# Patient Record
Sex: MALE | Race: WHITE | NOT HISPANIC OR LATINO | Employment: OTHER | URBAN - NONMETROPOLITAN AREA
[De-identification: names, ages, dates, MRNs, and addresses within clinical notes are randomized per-mention and may not be internally consistent; named-entity substitution may affect disease eponyms.]

---

## 2020-07-26 ENCOUNTER — APPOINTMENT (EMERGENCY)
Dept: CT IMAGING | Facility: HOSPITAL | Age: 60
End: 2020-07-26
Payer: MEDICARE

## 2020-07-26 ENCOUNTER — APPOINTMENT (EMERGENCY)
Dept: RADIOLOGY | Facility: HOSPITAL | Age: 60
End: 2020-07-26
Payer: MEDICARE

## 2020-07-26 ENCOUNTER — HOSPITAL ENCOUNTER (EMERGENCY)
Facility: HOSPITAL | Age: 60
Discharge: HOME | End: 2020-07-26
Attending: EMERGENCY MEDICINE | Admitting: EMERGENCY MEDICINE
Payer: MEDICARE

## 2020-07-26 VITALS
WEIGHT: 245 LBS | OXYGEN SATURATION: 95 % | TEMPERATURE: 97.5 F | DIASTOLIC BLOOD PRESSURE: 79 MMHG | HEART RATE: 50 BPM | SYSTOLIC BLOOD PRESSURE: 184 MMHG | HEIGHT: 68 IN | RESPIRATION RATE: 20 BRPM | BODY MASS INDEX: 37.13 KG/M2

## 2020-07-26 DIAGNOSIS — R60.0 LOCALIZED EDEMA: Primary | ICD-10-CM

## 2020-07-26 DIAGNOSIS — R91.1 PULMONARY NODULE: ICD-10-CM

## 2020-07-26 LAB
ALBUMIN SERPL BCP-MCNC: 3.5 G/DL (ref 3.5–5)
ALP SERPL-CCNC: 95 U/L (ref 46–116)
ALT SERPL W P-5'-P-CCNC: 33 U/L (ref 12–78)
ANION GAP SERPL CALCULATED.3IONS-SCNC: 8 MMOL/L (ref 4–13)
AST SERPL W P-5'-P-CCNC: 19 U/L (ref 5–45)
BASOPHILS # BLD AUTO: 0.03 THOUSANDS/ΜL (ref 0–0.1)
BASOPHILS NFR BLD AUTO: 0 % (ref 0–1)
BILIRUB SERPL-MCNC: 0.25 MG/DL (ref 0.2–1)
BUN SERPL-MCNC: 19 MG/DL (ref 5–25)
CALCIUM SERPL-MCNC: 8.7 MG/DL (ref 8.3–10.1)
CHLORIDE SERPL-SCNC: 106 MMOL/L (ref 100–108)
CO2 SERPL-SCNC: 28 MMOL/L (ref 21–32)
CREAT SERPL-MCNC: 0.95 MG/DL (ref 0.6–1.3)
EOSINOPHIL # BLD AUTO: 0.29 THOUSAND/ΜL (ref 0–0.61)
EOSINOPHIL NFR BLD AUTO: 3 % (ref 0–6)
ERYTHROCYTE [DISTWIDTH] IN BLOOD BY AUTOMATED COUNT: 14.1 % (ref 11.6–15.1)
GFR SERPL CREATININE-BSD FRML MDRD: 86 ML/MIN/1.73SQ M
GLUCOSE SERPL-MCNC: 84 MG/DL (ref 65–140)
HCT VFR BLD AUTO: 37.3 % (ref 36.5–49.3)
HGB BLD-MCNC: 12.5 G/DL (ref 12–17)
IMM GRANULOCYTES # BLD AUTO: 0.03 THOUSAND/UL (ref 0–0.2)
IMM GRANULOCYTES NFR BLD AUTO: 0 % (ref 0–2)
LYMPHOCYTES # BLD AUTO: 2.54 THOUSANDS/ΜL (ref 0.6–4.47)
LYMPHOCYTES NFR BLD AUTO: 29 % (ref 14–44)
MAGNESIUM SERPL-MCNC: 2.1 MG/DL (ref 1.6–2.6)
MCH RBC QN AUTO: 31.2 PG (ref 26.8–34.3)
MCHC RBC AUTO-ENTMCNC: 33.5 G/DL (ref 31.4–37.4)
MCV RBC AUTO: 93 FL (ref 82–98)
MONOCYTES # BLD AUTO: 0.58 THOUSAND/ΜL (ref 0.17–1.22)
MONOCYTES NFR BLD AUTO: 7 % (ref 4–12)
NEUTROPHILS # BLD AUTO: 5.19 THOUSANDS/ΜL (ref 1.85–7.62)
NEUTS SEG NFR BLD AUTO: 61 % (ref 43–75)
NRBC BLD AUTO-RTO: 0 /100 WBCS
NT-PROBNP SERPL-MCNC: 499 PG/ML
PLATELET # BLD AUTO: 180 THOUSANDS/UL (ref 149–390)
PMV BLD AUTO: 10.8 FL (ref 8.9–12.7)
POTASSIUM SERPL-SCNC: 3.9 MMOL/L (ref 3.5–5.3)
PROT SERPL-MCNC: 7 G/DL (ref 6.4–8.2)
RBC # BLD AUTO: 4.01 MILLION/UL (ref 3.88–5.62)
SARS-COV-2 RNA RESP QL NAA+PROBE: NEGATIVE
SODIUM SERPL-SCNC: 142 MMOL/L (ref 136–145)
TROPONIN I SERPL-MCNC: <0.02 NG/ML
WBC # BLD AUTO: 8.66 THOUSAND/UL (ref 4.31–10.16)

## 2020-07-26 PROCEDURE — 71275 CT ANGIOGRAPHY CHEST: CPT

## 2020-07-26 PROCEDURE — 80053 COMPREHEN METABOLIC PANEL: CPT | Performed by: EMERGENCY MEDICINE

## 2020-07-26 PROCEDURE — 99285 EMERGENCY DEPT VISIT HI MDM: CPT | Performed by: EMERGENCY MEDICINE

## 2020-07-26 PROCEDURE — 85025 COMPLETE CBC W/AUTO DIFF WBC: CPT | Performed by: EMERGENCY MEDICINE

## 2020-07-26 PROCEDURE — 87635 SARS-COV-2 COVID-19 AMP PRB: CPT | Performed by: EMERGENCY MEDICINE

## 2020-07-26 PROCEDURE — 71046 X-RAY EXAM CHEST 2 VIEWS: CPT

## 2020-07-26 PROCEDURE — 84484 ASSAY OF TROPONIN QUANT: CPT | Performed by: EMERGENCY MEDICINE

## 2020-07-26 PROCEDURE — 83880 ASSAY OF NATRIURETIC PEPTIDE: CPT | Performed by: EMERGENCY MEDICINE

## 2020-07-26 PROCEDURE — 83735 ASSAY OF MAGNESIUM: CPT | Performed by: EMERGENCY MEDICINE

## 2020-07-26 PROCEDURE — 99284 EMERGENCY DEPT VISIT MOD MDM: CPT

## 2020-07-26 PROCEDURE — 36415 COLL VENOUS BLD VENIPUNCTURE: CPT | Performed by: EMERGENCY MEDICINE

## 2020-07-26 RX ORDER — LOSARTAN POTASSIUM 100 MG/1
25 TABLET ORAL DAILY
COMMUNITY
End: 2021-01-11

## 2020-07-26 RX ORDER — LAMOTRIGINE 25 MG/1
50 TABLET ORAL
COMMUNITY

## 2020-07-26 RX ORDER — GABAPENTIN 300 MG/1
300 CAPSULE ORAL 2 TIMES DAILY
COMMUNITY

## 2020-07-26 RX ORDER — DULOXETIN HYDROCHLORIDE 30 MG/1
30 CAPSULE, DELAYED RELEASE ORAL DAILY
COMMUNITY

## 2020-07-26 RX ORDER — FUROSEMIDE 40 MG/1
40 TABLET ORAL DAILY
Qty: 3 TABLET | Refills: 0 | Status: SHIPPED | OUTPATIENT
Start: 2020-07-26 | End: 2021-01-11

## 2020-07-26 RX ORDER — ASPIRIN 81 MG/1
81 TABLET, CHEWABLE ORAL DAILY
COMMUNITY

## 2020-07-26 RX ORDER — FOLIC ACID/MULTIVIT,IRON,MINER .4-18-35
1 TABLET,CHEWABLE ORAL DAILY
COMMUNITY

## 2020-07-26 RX ORDER — METHADONE HYDROCHLORIDE 10 MG/1
TABLET ORAL EVERY 6 HOURS PRN
COMMUNITY
End: 2021-01-11

## 2020-07-26 RX ORDER — ATORVASTATIN CALCIUM 20 MG/1
20 TABLET, FILM COATED ORAL DAILY
COMMUNITY

## 2020-07-26 RX ORDER — LANOLIN ALCOHOL/MO/W.PET/CERES
CREAM (GRAM) TOPICAL DAILY
COMMUNITY

## 2020-07-26 RX ADMIN — IOHEXOL 100 ML: 350 INJECTION, SOLUTION INTRAVENOUS at 20:08

## 2020-07-26 NOTE — ED PROVIDER NOTES
History  Chief Complaint   Patient presents with    Pain     to the legs and ankles     Patient is a 60-year-old male presenting to the emergency department today complaining of lower extremity edema, shortness of breath, dyspnea on exertion and orthopnea, as well as 3-4 lb weight gain that fluctuates, patient reports symptoms have been worsening over the past few weeks, he denies any fever or chills, no chest pain, no abdominal pain patient denies a history of congestive heart failure          Prior to Admission Medications   Prescriptions Last Dose Informant Patient Reported? Taking?    DULoxetine (CYMBALTA) 30 mg delayed release capsule 7/26/2020 at Unknown time  Yes Yes   Sig: Take 30 mg by mouth daily   Melatonin 1 MG CAPS 7/26/2020 at Unknown time  Yes Yes   Sig: Take 1 mg by mouth   aspirin 81 mg chewable tablet 7/26/2020 at Unknown time  Yes Yes   Sig: Chew 81 mg daily   atorvastatin (LIPITOR) 20 mg tablet 7/25/2020 at Unknown time  Yes Yes   Sig: Take 20 mg by mouth daily   co-enzyme Q-10 30 MG capsule 7/26/2020 at Unknown time  Yes Yes   Sig: Take 100 mg by mouth 3 (three) times a day   gabapentin (NEURONTIN) 600 MG tablet 7/26/2020 at Unknown time  Yes Yes   Sig: Take 600 mg by mouth 3 (three) times a day   lamoTRIgine (LaMICtal) 25 mg tablet 7/26/2020 at Unknown time  Yes Yes   Sig: Take 25 mg by mouth daily   losartan (COZAAR) 100 MG tablet 7/26/2020 at Unknown time  Yes Yes   Sig: Take 25 mg by mouth daily   methadone (DOLOPHINE) 10 mg tablet 7/26/2020 at Unknown time  Yes Yes   Sig: Take by mouth every 6 (six) hours as needed for moderate pain,   multivitamin-iron-minerals-folic acid (CENTRUM) chewable tablet 7/26/2020 at Unknown time  Yes Yes   Sig: Chew 1 tablet daily   vitamin B-12 (VITAMIN B-12) 1,000 mcg tablet 7/26/2020 at Unknown time  Yes Yes   Sig: Take by mouth daily      Facility-Administered Medications: None       Past Medical History:   Diagnosis Date    Arthritis     High cholesterol  Hypertension        History reviewed  No pertinent surgical history  History reviewed  No pertinent family history  I have reviewed and agree with the history as documented  E-Cigarette/Vaping     E-Cigarette/Vaping Substances     Social History     Tobacco Use    Smoking status: Never Smoker    Smokeless tobacco: Never Used   Substance Use Topics    Alcohol use: Not Currently    Drug use: Not Currently       Review of Systems   Constitutional: Positive for unexpected weight change  HENT: Negative  Eyes: Negative  Respiratory: Positive for shortness of breath  Cardiovascular: Positive for leg swelling  Gastrointestinal: Negative  Endocrine: Negative  Genitourinary: Negative  Musculoskeletal: Negative  Skin: Negative  Allergic/Immunologic: Negative  Neurological: Negative  Hematological: Negative  Psychiatric/Behavioral: Negative  Physical Exam  Physical Exam   Constitutional: He is oriented to person, place, and time  He appears well-developed and well-nourished  HENT:   Head: Normocephalic and atraumatic  Eyes: Pupils are equal, round, and reactive to light  Conjunctivae and EOM are normal    Neck: Normal range of motion  Neck supple  Cardiovascular: Normal rate  Pulmonary/Chest: Effort normal    Abdominal: Soft  Obese   Musculoskeletal: Normal range of motion  He exhibits edema (Pitting edema to bilateral lower extremities)  Neurological: He is alert and oriented to person, place, and time  Skin: Skin is warm and dry  Psychiatric: He has a normal mood and affect         Vital Signs  ED Triage Vitals   Temperature Pulse Respirations Blood Pressure SpO2   07/26/20 1800 07/26/20 1800 07/26/20 1800 07/26/20 1800 07/26/20 1800   97 5 °F (36 4 °C) 62 16 (!) 195/87 99 %      Temp Source Heart Rate Source Patient Position - Orthostatic VS BP Location FiO2 (%)   07/26/20 1800 07/26/20 2034 07/26/20 2034 07/26/20 2034 --   Temporal Monitor Sitting Right arm       Pain Score       --                  Vitals:    07/26/20 1800 07/26/20 1835 07/26/20 2034   BP: (!) 195/87 (!) 171/79 (!) 184/79   Pulse: 62 (!) 48 (!) 50   Patient Position - Orthostatic VS:   Sitting             ED Medications  Medications   iohexol (OMNIPAQUE) 350 MG/ML injection (SINGLE-DOSE) 100 mL (100 mL Intravenous Given 7/26/20 2008)       Diagnostic Studies  Results Reviewed     Procedure Component Value Units Date/Time    Novel Coronavirus L[facility] [[id number]]  (Normal) Collected:  07/26/20 1932    Lab Status:  Final result Specimen:  Nares from Nose Updated:  07/26/20 2035     SARS-CoV-2 Negative    Narrative: The specimen collection materials, transport medium, and/or testing methodology utilized in the production of these test results have been proven to be reliable in a limited validation with an abbreviated program under the Emergency Utilization Authorization provided by the FDA  Testing reported as "Presumptive positive" will be confirmed with secondary testing with a reference laboratory to ensure result accuracy  Clinical caution and judgement should be used with the interpretation of these results with consideration of the clinical impression and other laboratory testing  Testing reported as "Positive" or "Negative" has been proven to be accurate according to standard laboratory validation requirements  All testing is performed with control materials showing appropriate reactivity at standard intervals        NT-BNP PRO [[id number]]  (Abnormal) Collected:  07/26/20 1833    Lab Status:  Final result Specimen:  Blood from Arm, Left Updated:  07/26/20 1908     NT-proBNP 499 pg/mL     Comprehensive metabolic panel [[id number]] Collected:  07/26/20 1833    Lab Status:  Final result Specimen:  Blood from Arm, Left Updated:  07/26/20 1908     Sodium 142 mmol/L      Potassium 3 9 mmol/L      Chloride 106 mmol/L      CO2 28 mmol/L      ANION GAP 8 mmol/L      BUN 19 mg/dL      Creatinine 0 95 mg/dL      Glucose 84 mg/dL      Calcium 8 7 mg/dL      AST 19 U/L      ALT 33 U/L      Alkaline Phosphatase 95 U/L      Total Protein 7 0 g/dL      Albumin 3 5 g/dL      Total Bilirubin 0 25 mg/dL      eGFR 86 ml/min/1 73sq m     Narrative:       [place] guidelines for Chronic Kidney Disease (CKD):     Stage 1 with normal or high GFR (GFR > 90 mL/min/1 73 square meters)    Stage 2 Mild CKD (GFR = 60-89 mL/min/1 73 square meters)    Stage 3A Moderate CKD (GFR = 45-59 mL/min/1 73 square meters)    Stage 3B Moderate CKD (GFR = 30-44 mL/min/1 73 square meters)    Stage 4 Severe CKD (GFR = 15-29 mL/min/1 73 square meters)    Stage 5 End Stage CKD (GFR <15 mL/min/1 73 square meters)  Note: GFR calculation is accurate only with a steady state creatinine    Magnesium [[id number]]  (Normal) Collected:  07/26/20 1833    Lab Status:  Final result Specimen:  Blood from Arm, Left Updated:  07/26/20 1908     Magnesium 2 1 mg/dL     Troponin I [[id number]]  (Normal) Collected:  07/26/20 1833    Lab Status:  Final result Specimen:  Blood from Arm, Left Updated:  07/26/20 1907     Troponin I <0 02 ng/mL     CBC and differential [[id number]] Collected:  07/26/20 1833    Lab Status:  Final result Specimen:  Blood from Arm, Left Updated:  07/26/20 1847     WBC 8 66 Thousand/uL      RBC 4 01 Million/uL      Hemoglobin 12 5 g/dL      Hematocrit 37 3 %      MCV 93 fL      MCH 31 2 pg      MCHC 33 5 g/dL      RDW 14 1 %      MPV 10 8 fL      Platelets 250 Thousands/uL      nRBC 0 /100 WBCs      Neutrophils Relative 61 %      Immat GRANS % 0 %      Lymphocytes Relative 29 %      Monocytes Relative 7 %      Eosinophils Relative 3 %      Basophils Relative 0 %      Neutrophils Absolute 5 19 Thousands/µL      Immature Grans Absolute 0 03 Thousand/uL      Lymphocytes Absolute 2 54 Thousands/µL      Monocytes Absolute 0 58 Thousand/µL      Eosinophils Absolute 0 29 Thousand/µL Basophils Absolute 0 03 Thousands/µL                  CTA ED chest PE study   Final Result by Talia Johnson MD (07/26 2017)      No acute findings  No pulmonary arterial embolism or pulmonary infiltrate/consolidation  7 mm subpleural right middle lobe nodule  Follow-up with repeat CT chest in 6 months  Mild nonspecific mediastinal and bilateral parahilar adenopathy  The study was marked in [facility] for immediate notification                    Workstation performed: [id number]         XR chest 2 views    (Results Pending)              Procedures  ECG 12 Lead Documentation Only  Date/Time: 7/26/2020 6:40 PM  Performed by: Muna Harrison DO  Authorized by: Muna Harrison DO     Indications / Diagnosis:  Shortness of breath  ECG reviewed by me, the ED Provider: yes    Patient location:  ED  Previous ECG:     Previous ECG:  Unavailable  Interpretation:     Interpretation: abnormal    Rate:     ECG rate:  48    ECG rate assessment: bradycardic    Rhythm:     Rhythm: sinus rhythm    Ectopy:     Ectopy: none    QRS:     QRS axis:  Normal    QRS intervals:  Normal  Conduction:     Conduction: normal    ST segments:     ST segments:  Normal  T waves:     T waves: normal               ED Course  ED Course as of Jul 26 2047   Sun Jul 26, 2020 2041 Lab and imaging findings discussed at bedside which are relatively unremarkable except for BNP of 499 and nodule noted on CT scan in the right lung, patient advised to follow-up with PCP in 2-3 days, as well as obtain repeat CT scan in 6 months, since patient has pitting edema with dyspnea on exertion and normal kidney function I will place him on Lasix x3 days in an effort to decrease his increased fluid, I offered a dose of this medication in the emergency department but after advising patient that it would likely make him urinate multiple times throughout the night he opted to  the prescription and start taking it tomorrow, patient acknowledges understanding and agreement with this plan          US AUDIT      Most Recent Value   Initial Alcohol Screen: US AUDIT-C    1  How often do you have a drink containing alcohol?  0 Filed at: 07/26/2020 1800   2  How many drinks containing alcohol do you have on a typical day you are drinking? 0 Filed at: 07/26/2020 1800   3a  Male UNDER 65: How often do you have five or more drinks on one occasion? 0 Filed at: 07/26/2020 1800   3b  FEMALE Any Age, or MALE 65+: How often do you have 4 or more drinks on one occassion? 0 Filed at: 07/26/2020 1800   Audit-C Score  0 Filed at: 07/26/2020 1800                  ANN/DAST-10      Most Recent Value   How many times in the past year have you    Used an illegal drug or used a prescription medication for non-medical reasons? Never Filed at: 07/26/2020 1800                                    Disposition  Final diagnoses:   Localized edema   Pulmonary nodule     Time reflects when diagnosis was documented in both MDM as applicable and the Disposition within this note     Time User Action Codes Description Comment    7/26/2020  8:38 PM Suzie Batista Add [R60 0] Localized edema     7/26/2020  8:39 PM Suzie Batista Add [R91 1] Pulmonary nodule       ED Disposition     ED Disposition Condition Date/Time Comment    Discharge Stable Sun Jul 26, 2020  8:38 PM Kelli Lopezu discharge to home/self care  Follow-up Information     Follow up With Specialties Details Why Contact Info    Gabriela Mehta MD Family Medicine In 2 days  [address]  [phone]            Patient's Medications   Discharge Prescriptions    FUROSEMIDE (LASIX) 40 MG TABLET    Take 1 tablet (40 mg total) by mouth daily for 3 days       Start Date: 7/26/2020 End Date: 7/29/2020       Order Dose: 40 mg       Quantity: 3 tablet    Refills: 0     No discharge procedures on file      PDMP Review     None          ED Provider  Electronically Signed by           Costella Meckel [id number],   07/26/20 2041

## 2020-07-31 ENCOUNTER — TRANSCRIBE ORDERS (OUTPATIENT)
Dept: ADMINISTRATIVE | Facility: HOSPITAL | Age: 60
End: 2020-07-31

## 2020-07-31 DIAGNOSIS — R06.02 SHORTNESS OF BREATH: Primary | ICD-10-CM

## 2020-08-18 DIAGNOSIS — G47.33 OBSTRUCTIVE SLEEP APNEA (ADULT) (PEDIATRIC): ICD-10-CM

## 2020-08-18 DIAGNOSIS — I50.32 CHRONIC DIASTOLIC (CONGESTIVE) HEART FAILURE (HCC): ICD-10-CM

## 2020-08-18 DIAGNOSIS — I10 ESSENTIAL (PRIMARY) HYPERTENSION: ICD-10-CM

## 2020-08-18 DIAGNOSIS — E66.01 MORBID (SEVERE) OBESITY DUE TO EXCESS CALORIES (HCC): ICD-10-CM

## 2020-08-18 DIAGNOSIS — E78.5 HYPERLIPIDEMIA, UNSPECIFIED: ICD-10-CM

## 2020-08-18 DIAGNOSIS — Z86.79 PERSONAL HISTORY OF OTHER DISEASES OF THE CIRCULATORY SYSTEM: ICD-10-CM

## 2020-08-18 DIAGNOSIS — Z87.891 PERSONAL HISTORY OF NICOTINE DEPENDENCE: ICD-10-CM

## 2020-08-18 DIAGNOSIS — R06.09 OTHER FORMS OF DYSPNEA: ICD-10-CM

## 2020-08-31 ENCOUNTER — HOSPITAL ENCOUNTER (OUTPATIENT)
Dept: NUCLEAR MEDICINE | Facility: HOSPITAL | Age: 60
Discharge: HOME | End: 2020-08-31
Payer: MEDICARE

## 2020-08-31 ENCOUNTER — HOSPITAL ENCOUNTER (OUTPATIENT)
Dept: NON INVASIVE DIAGNOSTICS | Facility: HOSPITAL | Age: 60
Discharge: HOME | End: 2020-08-31
Payer: MEDICARE

## 2020-08-31 DIAGNOSIS — Z87.891 PERSONAL HISTORY OF NICOTINE DEPENDENCE: ICD-10-CM

## 2020-08-31 DIAGNOSIS — E78.5 HYPERLIPIDEMIA, UNSPECIFIED: ICD-10-CM

## 2020-08-31 DIAGNOSIS — I50.32 CHRONIC DIASTOLIC (CONGESTIVE) HEART FAILURE (HCC): ICD-10-CM

## 2020-08-31 DIAGNOSIS — E66.01 MORBID (SEVERE) OBESITY DUE TO EXCESS CALORIES (HCC): ICD-10-CM

## 2020-08-31 DIAGNOSIS — Z86.79 PERSONAL HISTORY OF OTHER DISEASES OF THE CIRCULATORY SYSTEM: ICD-10-CM

## 2020-08-31 DIAGNOSIS — R06.09 OTHER FORMS OF DYSPNEA: ICD-10-CM

## 2020-08-31 DIAGNOSIS — G47.33 OBSTRUCTIVE SLEEP APNEA (ADULT) (PEDIATRIC): ICD-10-CM

## 2020-08-31 DIAGNOSIS — I10 ESSENTIAL (PRIMARY) HYPERTENSION: ICD-10-CM

## 2020-08-31 PROCEDURE — 78452 HT MUSCLE IMAGE SPECT MULT: CPT | Performed by: INTERNAL MEDICINE

## 2020-08-31 PROCEDURE — 93016 CV STRESS TEST SUPVJ ONLY: CPT | Performed by: INTERNAL MEDICINE

## 2020-08-31 PROCEDURE — 93017 CV STRESS TEST TRACING ONLY: CPT

## 2020-08-31 PROCEDURE — 93018 CV STRESS TEST I&R ONLY: CPT | Performed by: INTERNAL MEDICINE

## 2020-08-31 PROCEDURE — A9502 TC99M TETROFOSMIN: HCPCS

## 2020-08-31 PROCEDURE — 78452 HT MUSCLE IMAGE SPECT MULT: CPT

## 2020-08-31 RX ADMIN — REGADENOSON 0.4 MG: 0.08 INJECTION, SOLUTION INTRAVENOUS at 08:35

## 2021-01-11 ENCOUNTER — APPOINTMENT (EMERGENCY)
Dept: RADIOLOGY | Facility: HOSPITAL | Age: 61
End: 2021-01-11
Payer: MEDICARE

## 2021-01-11 ENCOUNTER — HOSPITAL ENCOUNTER (EMERGENCY)
Facility: HOSPITAL | Age: 61
Discharge: HOME | End: 2021-01-11
Attending: EMERGENCY MEDICINE
Payer: MEDICARE

## 2021-01-11 VITALS
BODY MASS INDEX: 37.42 KG/M2 | RESPIRATION RATE: 15 BRPM | DIASTOLIC BLOOD PRESSURE: 66 MMHG | HEART RATE: 67 BPM | OXYGEN SATURATION: 98 % | TEMPERATURE: 97.5 F | HEIGHT: 68 IN | WEIGHT: 246.91 LBS | SYSTOLIC BLOOD PRESSURE: 137 MMHG

## 2021-01-11 DIAGNOSIS — E87.6 HYPOKALEMIA: ICD-10-CM

## 2021-01-11 DIAGNOSIS — I50.9 CONGESTIVE HEART FAILURE (CHF) (HCC): ICD-10-CM

## 2021-01-11 DIAGNOSIS — R06.00 DYSPNEA: Primary | ICD-10-CM

## 2021-01-11 LAB
ALBUMIN SERPL BCP-MCNC: 3.3 G/DL (ref 3.5–5)
ALP SERPL-CCNC: 119 U/L (ref 46–116)
ALT SERPL W P-5'-P-CCNC: 36 U/L (ref 12–78)
ANION GAP SERPL CALCULATED.3IONS-SCNC: 8 MMOL/L (ref 4–13)
AST SERPL W P-5'-P-CCNC: 18 U/L (ref 5–45)
BASOPHILS # BLD AUTO: 0.02 THOUSANDS/ΜL (ref 0–0.1)
BASOPHILS NFR BLD AUTO: 0 % (ref 0–1)
BILIRUB SERPL-MCNC: 0.42 MG/DL (ref 0.2–1)
BUN SERPL-MCNC: 23 MG/DL (ref 5–25)
CALCIUM ALBUM COR SERPL-MCNC: 9.8 MG/DL (ref 8.3–10.1)
CALCIUM SERPL-MCNC: 9.2 MG/DL (ref 8.3–10.1)
CHLORIDE SERPL-SCNC: 96 MMOL/L (ref 100–108)
CO2 SERPL-SCNC: 33 MMOL/L (ref 21–32)
CREAT SERPL-MCNC: 1.15 MG/DL (ref 0.6–1.3)
D DIMER PPP FEU-MCNC: 0.57 UG/ML FEU
EOSINOPHIL # BLD AUTO: 0.22 THOUSAND/ΜL (ref 0–0.61)
EOSINOPHIL NFR BLD AUTO: 3 % (ref 0–6)
ERYTHROCYTE [DISTWIDTH] IN BLOOD BY AUTOMATED COUNT: 13.5 % (ref 11.6–15.1)
FLUAV RNA RESP QL NAA+PROBE: NEGATIVE
FLUBV RNA RESP QL NAA+PROBE: NEGATIVE
GFR SERPL CREATININE-BSD FRML MDRD: 68 ML/MIN/1.73SQ M
GLUCOSE SERPL-MCNC: 151 MG/DL (ref 65–140)
HCT VFR BLD AUTO: 40.7 % (ref 36.5–49.3)
HGB BLD-MCNC: 13.9 G/DL (ref 12–17)
IMM GRANULOCYTES # BLD AUTO: 0.02 THOUSAND/UL (ref 0–0.2)
IMM GRANULOCYTES NFR BLD AUTO: 0 % (ref 0–2)
LYMPHOCYTES # BLD AUTO: 1.9 THOUSANDS/ΜL (ref 0.6–4.47)
LYMPHOCYTES NFR BLD AUTO: 24 % (ref 14–44)
MCH RBC QN AUTO: 31.2 PG (ref 26.8–34.3)
MCHC RBC AUTO-ENTMCNC: 34.2 G/DL (ref 31.4–37.4)
MCV RBC AUTO: 91 FL (ref 82–98)
MONOCYTES # BLD AUTO: 0.57 THOUSAND/ΜL (ref 0.17–1.22)
MONOCYTES NFR BLD AUTO: 7 % (ref 4–12)
NEUTROPHILS # BLD AUTO: 5.18 THOUSANDS/ΜL (ref 1.85–7.62)
NEUTS SEG NFR BLD AUTO: 66 % (ref 43–75)
NRBC BLD AUTO-RTO: 0 /100 WBCS
NT-PROBNP SERPL-MCNC: 39 PG/ML
PLATELET # BLD AUTO: 176 THOUSANDS/UL (ref 149–390)
PMV BLD AUTO: 11.5 FL (ref 8.9–12.7)
POTASSIUM SERPL-SCNC: 2.7 MMOL/L (ref 3.5–5.3)
PROT SERPL-MCNC: 7.5 G/DL (ref 6.4–8.2)
RBC # BLD AUTO: 4.46 MILLION/UL (ref 3.88–5.62)
RSV RNA RESP QL NAA+PROBE: NEGATIVE
SARS-COV-2 RNA RESP QL NAA+PROBE: NEGATIVE
SODIUM SERPL-SCNC: 137 MMOL/L (ref 136–145)
TROPONIN I SERPL-MCNC: <0.02 NG/ML
WBC # BLD AUTO: 7.91 THOUSAND/UL (ref 4.31–10.16)

## 2021-01-11 PROCEDURE — 85379 FIBRIN DEGRADATION QUANT: CPT | Performed by: EMERGENCY MEDICINE

## 2021-01-11 PROCEDURE — 36415 COLL VENOUS BLD VENIPUNCTURE: CPT | Performed by: EMERGENCY MEDICINE

## 2021-01-11 PROCEDURE — 96365 THER/PROPH/DIAG IV INF INIT: CPT

## 2021-01-11 PROCEDURE — 99285 EMERGENCY DEPT VISIT HI MDM: CPT | Performed by: EMERGENCY MEDICINE

## 2021-01-11 PROCEDURE — 71045 X-RAY EXAM CHEST 1 VIEW: CPT

## 2021-01-11 PROCEDURE — 85025 COMPLETE CBC W/AUTO DIFF WBC: CPT | Performed by: EMERGENCY MEDICINE

## 2021-01-11 PROCEDURE — 99285 EMERGENCY DEPT VISIT HI MDM: CPT

## 2021-01-11 PROCEDURE — 96375 TX/PRO/DX INJ NEW DRUG ADDON: CPT

## 2021-01-11 PROCEDURE — 0241U HB NFCT DS VIR RESP RNA 4 TRGT: CPT | Performed by: EMERGENCY MEDICINE

## 2021-01-11 PROCEDURE — 80053 COMPREHEN METABOLIC PANEL: CPT | Performed by: EMERGENCY MEDICINE

## 2021-01-11 PROCEDURE — 84484 ASSAY OF TROPONIN QUANT: CPT | Performed by: EMERGENCY MEDICINE

## 2021-01-11 PROCEDURE — 83880 ASSAY OF NATRIURETIC PEPTIDE: CPT | Performed by: EMERGENCY MEDICINE

## 2021-01-11 RX ORDER — TORSEMIDE 20 MG/1
60 TABLET ORAL DAILY
COMMUNITY

## 2021-01-11 RX ORDER — OXYCODONE HYDROCHLORIDE 20 MG/1
20 TABLET ORAL 4 TIMES DAILY
COMMUNITY

## 2021-01-11 RX ORDER — FUROSEMIDE 10 MG/ML
80 INJECTION INTRAMUSCULAR; INTRAVENOUS ONCE
Status: COMPLETED | OUTPATIENT
Start: 2021-01-11 | End: 2021-01-11

## 2021-01-11 RX ORDER — POTASSIUM CHLORIDE 14.9 MG/ML
20 INJECTION INTRAVENOUS ONCE
Status: COMPLETED | OUTPATIENT
Start: 2021-01-11 | End: 2021-01-11

## 2021-01-11 RX ORDER — POTASSIUM CHLORIDE 750 MG/1
20 CAPSULE, EXTENDED RELEASE ORAL DAILY
COMMUNITY

## 2021-01-11 RX ORDER — METOLAZONE 2.5 MG/1
2.5 TABLET ORAL WEEKLY
COMMUNITY

## 2021-01-11 RX ORDER — METOPROLOL SUCCINATE 25 MG/1
25 TABLET, EXTENDED RELEASE ORAL DAILY
COMMUNITY

## 2021-01-11 RX ADMIN — FUROSEMIDE 80 MG: 10 INJECTION, SOLUTION INTRAMUSCULAR; INTRAVENOUS at 11:23

## 2021-01-11 RX ADMIN — POTASSIUM CHLORIDE 20 MEQ: 14.9 INJECTION, SOLUTION INTRAVENOUS at 11:23

## 2021-01-11 NOTE — ED PROVIDER NOTES
History  Chief Complaint   Patient presents with    Shortness of Breath     pt c/o increased sob w/dizziness, fatigue, body aches, nausea,vomiting, and cough for past 3 days  denies travel/fevers/d     Patient to this emergency department complaining of 3 days worsening shortness of breath  Patient reports he has been fatigued and felt dizzy and has generalized weakness with some hot flashes, nausea and some vomiting  Patient reports that he has had difficulty sleeping despite the use of his BiPAP machine  He reports he is getting about 90 minutes a night  Reports that he believes that the shortness of breath is what is keeping him up  He reports that he has had some back pain which is somewhat normal for him, however, he has not had any chest pressure or discomfort  He does report that he has had some lower extremity edema which he believes is worse and he has had in the past     Patient reports that he has a history of congestive heart failure  He has had by his report a cardiac catheterization within the last several months which he reports is normal   In July he also had a nuclear stress test which showed "Abnormal study after pharmacologic stress with a fixed inferior wall defect with a mild degree of ischemia noted in the apical inferior wall as well as a small fixed defect in the basal to mid anterior wall with mild ischemia in the anterolateral wall  Study quality severely reduces accuracy of the test  Significant patient motion noted on raw images  Left ventricular systolic function was normal "      History provided by:  Patient      Prior to Admission Medications   Prescriptions Last Dose Informant Patient Reported? Taking?    DULoxetine (CYMBALTA) 30 mg delayed release capsule   Yes Yes   Sig: Take 30 mg by mouth daily   aspirin 81 mg chewable tablet   Yes Yes   Sig: Chew 81 mg daily   atorvastatin (LIPITOR) 20 mg tablet   Yes Yes   Sig: Take 20 mg by mouth daily   co-enzyme Q-10 30 MG capsule Yes Yes   Sig: Take 100 mg by mouth daily    gabapentin (NEURONTIN) 300 mg capsule   Yes Yes   Sig: Take 300 mg by mouth 2 (two) times a day    lamoTRIgine (LaMICtal) 25 mg tablet   Yes Yes   Sig: Take 50 mg by mouth daily at bedtime    metolazone (ZAROXOLYN) 2 5 mg tablet   Yes Yes   Sig: Take 2 5 mg by mouth once a week   metoprolol succinate (TOPROL-XL) 25 mg 24 hr tablet   Yes Yes   Sig: Take 25 mg by mouth daily   multivitamin-iron-minerals-folic acid (CENTRUM) chewable tablet   Yes Yes   Sig: Chew 1 tablet daily   oxyCODONE (ROXICODONE) 20 MG TABS 2021 at Unknown time  Yes Yes   Si mg 4 (four) times a day    potassium chloride (MICRO-K) 10 MEQ CR capsule   Yes Yes   Sig: Take 20 mEq by mouth daily   torsemide (DEMADEX) 20 mg tablet  Self Yes Yes   Sig: Take 60 mg by mouth daily   vitamin B-12 (VITAMIN B-12) 1,000 mcg tablet   Yes Yes   Sig: Take by mouth daily      Facility-Administered Medications: None       Past Medical History:   Diagnosis Date    Arthritis     High cholesterol     Hypertension     Obstructive sleep apnea on CPAP     Sleep apnea        No past surgical history on file  No family history on file  I have reviewed and agree with the history as documented  E-Cigarette/Vaping     E-Cigarette/Vaping Substances     Social History     Tobacco Use    Smoking status: Never Smoker    Smokeless tobacco: Never Used   Substance Use Topics    Alcohol use: Not Currently    Drug use: Not Currently       Review of Systems   Constitutional: Positive for diaphoresis  Negative for activity change and fever  HENT: Negative for congestion, ear pain, rhinorrhea, sinus pressure and sore throat  Eyes: Negative  Respiratory: Positive for shortness of breath  Negative for cough, chest tightness and wheezing  Cardiovascular: Positive for leg swelling  Negative for chest pain and palpitations  Gastrointestinal: Positive for nausea   Negative for abdominal pain, diarrhea and vomiting  Endocrine: Negative  Genitourinary: Negative for decreased urine volume, dysuria and flank pain  Musculoskeletal: Negative for arthralgias, back pain and myalgias  Skin: Negative for pallor and rash  Allergic/Immunologic: Negative  Neurological: Positive for weakness and light-headedness  Negative for dizziness and headaches  Hematological: Negative  Psychiatric/Behavioral: The patient is nervous/anxious  All other systems reviewed and are negative  Physical Exam  Physical Exam  Vitals signs and nursing note reviewed  Constitutional:       Appearance: Normal appearance  He is well-developed  He is obese  HENT:      Head: Normocephalic and atraumatic  Nose: Nose normal       Mouth/Throat:      Mouth: Mucous membranes are moist       Pharynx: Oropharynx is clear  Eyes:      Pupils: Pupils are equal, round, and reactive to light  Neck:      Musculoskeletal: Normal range of motion and neck supple  Cardiovascular:      Rate and Rhythm: Normal rate and regular rhythm  Heart sounds: Normal heart sounds  No murmur  Pulmonary:      Effort: Pulmonary effort is normal  No respiratory distress  Breath sounds: No stridor  Decreased breath sounds present  No wheezing, rhonchi or rales  Abdominal:      Palpations: Abdomen is soft  Abdomen is not rigid  There is no mass  Tenderness: There is no abdominal tenderness  There is no guarding or rebound  Hernia: No hernia is present  Musculoskeletal: Normal range of motion  Right lower leg: Edema present  Left lower leg: Edema present  Lymphadenopathy:      Cervical: No cervical adenopathy  Skin:     General: Skin is warm and dry  Coloration: Skin is not pale  Findings: No rash  Neurological:      Mental Status: He is alert and oriented to person, place, and time  Psychiatric:         Mood and Affect: Mood is anxious           Vital Signs  ED Triage Vitals [01/11/21 0835] Temperature Pulse Respirations Blood Pressure SpO2   97 5 °F (36 4 °C) 66 22 160/76 99 %      Temp Source Heart Rate Source Patient Position - Orthostatic VS BP Location FiO2 (%)   Temporal Monitor Lying Left arm --      Pain Score       7           Vitals:    01/11/21 0835 01/11/21 1015 01/11/21 1245   BP: 160/76 115/55 137/66   Pulse: 66 (!) 50 67   Patient Position - Orthostatic VS: Lying           Visual Acuity      ED Medications  Medications   potassium chloride 20 mEq IVPB (premix) (0 mEq Intravenous Stopped 1/11/21 1252)   furosemide (LASIX) injection 80 mg (80 mg Intravenous Given 1/11/21 1123)       Diagnostic Studies  Results Reviewed     Procedure Component Value Units Date/Time    NT-BNP PRO [[id number]]  (Normal) Collected: 01/11/21 0919    Lab Status: Final result Specimen: Blood from Line, Venous Updated: 01/11/21 1023     NT-proBNP 39 pg/mL     COVID19, Influenza A/B, RSV PCR, [facility] [[id number]]  (Normal) Collected: 01/11/21 0919    Lab Status: Final result Specimen: Nares from Nasopharyngeal Swab Updated: 01/11/21 1009     SARS-CoV-2 Negative     INFLUENZA A PCR Negative     INFLUENZA B PCR Negative     RSV PCR Negative    Narrative: This test has been authorized by FDA under an EUA (Emergency Use Assay) for use by authorized laboratories  Clinical caution and judgement should be used with the interpretation of these results with consideration of the clinical impression and other laboratory testing  Testing reported as "Positive" or "Negative" has been proven to be accurate according to standard laboratory validation requirements  All testing is performed with control materials showing appropriate reactivity at standard intervals      D-Dimer [[id number]]  (Abnormal) Collected: 01/11/21 0919    Lab Status: Final result Specimen: Blood from Line, Venous Updated: 01/11/21 0953     D-Dimer, Quant 0 57 ug/ml FEU     Troponin I [[id number]]  (Normal) Collected: 01/11/21 0919    Lab Status: Final result Specimen: Blood from Line, Venous Updated: 01/11/21 0950     Troponin I <0 02 ng/mL     Comprehensive metabolic panel [[id number]]  (Abnormal) Collected: 01/11/21 0919    Lab Status: Final result Specimen: Blood from Line, Venous Updated: 01/11/21 0948     Sodium 137 mmol/L      Potassium 2 7 mmol/L      Chloride 96 mmol/L      CO2 33 mmol/L      ANION GAP 8 mmol/L      BUN 23 mg/dL      Creatinine 1 15 mg/dL      Glucose 151 mg/dL      Calcium 9 2 mg/dL      Corrected Calcium 9 8 mg/dL      AST 18 U/L      ALT 36 U/L      Alkaline Phosphatase 119 U/L      Total Protein 7 5 g/dL      Albumin 3 3 g/dL      Total Bilirubin 0 42 mg/dL      eGFR 68 ml/min/1 73sq m     Narrative:      [place] guidelines for Chronic Kidney Disease (CKD):     Stage 1 with normal or high GFR (GFR > 90 mL/min/1 73 square meters)    Stage 2 Mild CKD (GFR = 60-89 mL/min/1 73 square meters)    Stage 3A Moderate CKD (GFR = 45-59 mL/min/1 73 square meters)    Stage 3B Moderate CKD (GFR = 30-44 mL/min/1 73 square meters)    Stage 4 Severe CKD (GFR = 15-29 mL/min/1 73 square meters)    Stage 5 End Stage CKD (GFR <15 mL/min/1 73 square meters)  Note: GFR calculation is accurate only with a steady state creatinine    CBC and differential [[id number]] Collected: 01/11/21 0919    Lab Status: Final result Specimen: Blood from Line, Venous Updated: 01/11/21 0930     WBC 7 91 Thousand/uL      RBC 4 46 Million/uL      Hemoglobin 13 9 g/dL      Hematocrit 40 7 %      MCV 91 fL      MCH 31 2 pg      MCHC 34 2 g/dL      RDW 13 5 %      MPV 11 5 fL      Platelets 416 Thousands/uL      nRBC 0 /100 WBCs      Neutrophils Relative 66 %      Immat GRANS % 0 %      Lymphocytes Relative 24 %      Monocytes Relative 7 %      Eosinophils Relative 3 %      Basophils Relative 0 %      Neutrophils Absolute 5 18 Thousands/µL      Immature Grans Absolute 0 02 Thousand/uL      Lymphocytes Absolute 1 90 Thousands/µL      Monocytes Absolute 0 57 Thousand/µL      Eosinophils Absolute 0 22 Thousand/µL      Basophils Absolute 0 02 Thousands/µL                  XR chest 1 view portable   ED Interpretation by Santiago Burgess DO (01/11 1049)   No significant cardiopulmonary disease  Final Result by Melissa Dahl MD (01/11 1211)      No acute findings  Stable 7 mm right middle lobe nodule                  Workstation performed: [id number]                    Procedures  ECG 12 Lead Documentation Only    Date/Time: 1/11/2021 9:24 AM  Performed by: Santiago Burgess DO  Authorized by: Santiago Burgess DO     ECG reviewed by me, the ED Provider: yes    Patient location:  ED  Previous ECG:     Previous ECG:  Unavailable    Comparison to cardiac monitor: Yes    Interpretation:     Interpretation: normal    Rate:     ECG rate assessment: normal    Rhythm:     Rhythm: sinus rhythm    Ectopy:     Ectopy: none    QRS:     QRS axis:  Normal  Conduction:     Conduction: normal    ST segments:     ST segments:  Normal  T waves:     T waves: normal               ED Course  ED Course as of Jan 11 1400   Mon Jan 11, 2021   0957 Will replace     Potassium(!!): 2 7   1007 Age adjusted D-dimer would indicate that this was negative  D-Dimer, Quant(!): 0 57   1007 Troponin I: <0 02   1010 SARS-COV-2: Negative   1033 NT-proBNP: 39   1048 Reviewed patient's findings  Discussed the findings with him  Also discussed with Cardiology  Plan at this time is to give the patient an extra dose diuretic, replace potassium and patient will be discharged home  Patient follow-up with Cardiology in the office  I have also advised the patient to increase his potassium supplement from 20 mg once a day to 20 mg b i d  SBIRT 20yo+      Most Recent Value   SBIRT (24 yo +)   In order to provide better care to our patients, we are screening all of our patients for alcohol and drug use   Would it be okay to ask you these screening questions? Yes Filed at: 01/11/2021 2333   Initial Alcohol Screen: US AUDIT-C    1  How often do you have a drink containing alcohol?  0 Filed at: 01/11/2021 0912   2  How many drinks containing alcohol do you have on a typical day you are drinking? 0 Filed at: 01/11/2021 0912   3a  Male UNDER 65: How often do you have five or more drinks on one occasion? 0 Filed at: 01/11/2021 0912   3b  FEMALE Any Age, or MALE 65+: How often do you have 4 or more drinks on one occassion? 0 Filed at: 01/11/2021 0912   Audit-C Score  0 Filed at: 01/11/2021 9612   ANN: How many times in the past year have you    Used an illegal drug or used a prescription medication for non-medical reasons? Never Filed at: 01/11/2021 3793                    MDM    Disposition  Final diagnoses:   Dyspnea   Hypokalemia   Congestive heart failure (CHF) ([facility] [address] )     Time reflects when diagnosis was documented in both MDM as applicable and the Disposition within this note     Time User Action Codes Description Comment    1/11/2021 10:52 AM Clovia Lindy Add [R06 00] Dyspnea     1/11/2021 10:52 AM Clovia Lindy Add [E87 6] Hypokalemia     1/11/2021 10:52 AM Radha Benitez Add [I50 9] Congestive heart failure (CHF) [facility])       ED Disposition     ED Disposition Condition Date/Time Comment    Discharge Stable Mon Jan 11, 2021 10:47 AM Shawn Wolfe discharge to home/self care              Follow-up Information     Follow up With Specialties Details Why [address] [facility], Physician Assistant In 1 week Even in well [address] [place] [id number]            Discharge Medication List as of 1/11/2021 11:18 AM      CONTINUE these medications which have NOT CHANGED    Details   aspirin 81 mg chewable tablet Chew 81 mg daily, Historical Med      atorvastatin (LIPITOR) 20 mg tablet Take 20 mg by mouth daily, Historical Med      co-enzyme Q-10 30 MG capsule Take 100 mg by mouth daily , Historical Med DULoxetine (CYMBALTA) 30 mg delayed release capsule Take 30 mg by mouth daily, Historical Med      gabapentin (NEURONTIN) 300 mg capsule Take 300 mg by mouth 2 (two) times a day , Historical Med      lamoTRIgine (LaMICtal) 25 mg tablet Take 50 mg by mouth daily at bedtime , Historical Med      metolazone (ZAROXOLYN) 2 5 mg tablet Take 2 5 mg by mouth once a week, Historical Med      metoprolol succinate (TOPROL-XL) 25 mg 24 hr tablet Take 25 mg by mouth daily, Historical Med      multivitamin-iron-minerals-folic acid (CENTRUM) chewable tablet Chew 1 tablet daily, Historical Med      oxyCODONE (ROXICODONE) 20 MG TABS 20 mg 4 (four) times a day , Historical Med      potassium chloride (MICRO-K) 10 MEQ CR capsule Take 20 mEq by mouth daily, Historical Med      torsemide (DEMADEX) 20 mg tablet Take 60 mg by mouth daily, Historical Med      vitamin B-12 (VITAMIN B-12) 1,000 mcg tablet Take by mouth daily, Historical Med           Outpatient Discharge Orders   Basic metabolic panel   Standing Status: Future Standing Exp   Date: 01/11/22       PDMP Review     None          ED Provider  Electronically Signed by           Brijesh Reyez DO  01/11/21 1400

## 2021-01-11 NOTE — DISCHARGE INSTRUCTIONS
We have given you an extra dose of diuretic today  Please increase your potassium from two 10 mEq tablets in the morning to two twice a day  Potassium will need to be rechecked in about a week  We have provided use prescription for this  Please return with any worsening  Please follow-up Cardiology  Your imaging studies have been preliminarily reviewed by the emergency department  Further review by Radiology is pending at this time  If there is a discrepancy or a finding of additional concern identified, we will attempt to contact you at the number you have provided us  If you do not hear from us, follow-up with your primary care provider within 1-2 weeks is always recommended to ensure that all findings were normal or as initially reported  Your results may also be available on [email or web address] com cy    Thank you for choosing the emergency department at [facility]  We appreciated the opportunity and privilege to address your healthcare needs  We remain available to you should you require additional evaluation or assistance  We value your feedback and would appreciate the opportunity to address anything you identified as an opportunity to improve or where we excelled  If there are colleagues who deserve special recognition, please let us know! We hope you are feeling better soon!

## 2021-12-17 ENCOUNTER — HOSPITAL ENCOUNTER (OUTPATIENT)
Dept: RADIOLOGY | Facility: HOSPITAL | Age: 61
Discharge: HOME | End: 2021-12-17
Payer: MEDICARE

## 2021-12-17 DIAGNOSIS — I10 ESSENTIAL HYPERTENSION WITH GOAL BLOOD PRESSURE LESS THAN 130/80: ICD-10-CM

## 2021-12-17 DIAGNOSIS — Z87.891 FORMER SMOKER: ICD-10-CM

## 2021-12-17 DIAGNOSIS — G47.33 OSA (OBSTRUCTIVE SLEEP APNEA): ICD-10-CM

## 2021-12-17 DIAGNOSIS — E78.5 DYSLIPIDEMIA, GOAL LDL BELOW 70: ICD-10-CM

## 2021-12-17 DIAGNOSIS — I50.33 ACUTE ON CHRONIC DIASTOLIC HEART FAILURE (HCC): ICD-10-CM

## 2021-12-17 DIAGNOSIS — Z86.79 HISTORY OF CEREBROVASCULAR DISEASE: ICD-10-CM

## 2021-12-17 PROCEDURE — 71046 X-RAY EXAM CHEST 2 VIEWS: CPT

## 2022-09-20 ENCOUNTER — HOSPITAL ENCOUNTER (OUTPATIENT)
Dept: ULTRASOUND IMAGING | Facility: HOSPITAL | Age: 62
Discharge: HOME | End: 2022-09-20
Payer: MEDICARE

## 2022-09-20 ENCOUNTER — HOSPITAL ENCOUNTER (OUTPATIENT)
Dept: RADIOLOGY | Facility: HOSPITAL | Age: 62
Discharge: HOME | End: 2022-09-20
Payer: MEDICARE

## 2022-09-20 DIAGNOSIS — E11.29 TYPE 2 DIABETES MELLITUS WITH OTHER DIABETIC KIDNEY COMPLICATION (HCC): ICD-10-CM

## 2022-09-20 DIAGNOSIS — M25.572 ARTHRALGIA OF BOTH ANKLES: ICD-10-CM

## 2022-09-20 DIAGNOSIS — Z82.49 FAMILY HISTORY OF ISCHEMIC HEART DISEASE AND OTHER DISEASES OF THE CIRCULATORY SYSTEM: ICD-10-CM

## 2022-09-20 DIAGNOSIS — R80.9 PROTEINURIA, UNSPECIFIED: ICD-10-CM

## 2022-09-20 DIAGNOSIS — M25.571 ARTHRALGIA OF BOTH ANKLES: ICD-10-CM

## 2022-09-20 PROCEDURE — 76706 US ABDL AORTA SCREEN AAA: CPT

## 2022-09-20 PROCEDURE — 73610 X-RAY EXAM OF ANKLE: CPT

## 2022-09-20 PROCEDURE — 76770 US EXAM ABDO BACK WALL COMP: CPT

## 2022-09-23 ENCOUNTER — TRANSCRIBE ORDERS (OUTPATIENT)
Dept: CARDIOLOGY CLINIC | Facility: CLINIC | Age: 62
End: 2022-09-23

## 2022-09-23 DIAGNOSIS — N32.89 BLADDER MASS: Primary | ICD-10-CM

## 2022-11-09 RX ORDER — LOSARTAN POTASSIUM 25 MG/1
TABLET ORAL
COMMUNITY

## 2022-11-09 RX ORDER — FUROSEMIDE 40 MG/1
TABLET ORAL
COMMUNITY

## 2022-11-09 RX ORDER — TOPIRAMATE 25 MG/1
TABLET ORAL
COMMUNITY

## 2022-11-09 RX ORDER — FLUTICASONE PROPIONATE 50 MCG
SPRAY, SUSPENSION (ML) NASAL
COMMUNITY
Start: 2020-12-07

## 2022-11-14 ENCOUNTER — OFFICE VISIT (OUTPATIENT)
Dept: UROLOGY | Facility: CLINIC | Age: 62
End: 2022-11-14

## 2022-11-14 ENCOUNTER — TELEPHONE (OUTPATIENT)
Dept: UROLOGY | Facility: CLINIC | Age: 62
End: 2022-11-14

## 2022-11-14 VITALS
HEART RATE: 62 BPM | DIASTOLIC BLOOD PRESSURE: 66 MMHG | BODY MASS INDEX: 37.68 KG/M2 | SYSTOLIC BLOOD PRESSURE: 112 MMHG | OXYGEN SATURATION: 93 % | TEMPERATURE: 97.8 F | WEIGHT: 248.6 LBS | HEIGHT: 68 IN

## 2022-11-14 DIAGNOSIS — R35.0 URINARY FREQUENCY: ICD-10-CM

## 2022-11-14 DIAGNOSIS — N32.89 BLADDER MASS: Primary | ICD-10-CM

## 2022-11-14 DIAGNOSIS — Z12.5 PROSTATE CANCER SCREENING: ICD-10-CM

## 2022-11-14 RX ORDER — DAPAGLIFLOZIN 5 MG/1
TABLET, FILM COATED ORAL
COMMUNITY
Start: 2022-10-26

## 2022-11-14 RX ORDER — POTASSIUM CHLORIDE 20 MEQ/1
TABLET, EXTENDED RELEASE ORAL
COMMUNITY
Start: 2022-10-26

## 2022-11-14 RX ORDER — LISINOPRIL 2.5 MG/1
TABLET ORAL
COMMUNITY
Start: 2022-10-26

## 2022-11-14 RX ORDER — ATORVASTATIN CALCIUM 40 MG/1
TABLET, FILM COATED ORAL
COMMUNITY
Start: 2022-10-03

## 2022-11-14 NOTE — Clinical Note
Please schedule patient for cystoscopy to further evaluate possible bladder mass seen on Renal US from September  Would prefer within next 1-2 months

## 2022-11-14 NOTE — PROGRESS NOTES
11/14/2022    Chief Complaint   Patient presents with   • New Patient Visit     Here for ultrasound results       Assessment and Plan    60 y o  male new patient to office    1  Bladder mass  · Renal US from 9/20/2022 showed a bladder mass measuring 2 2 x 2 2 x 2 9 cm that protrudes into the bladder  Position appears somewhat atypical for prostate invagination and a bladder tumor is possible  · Recommend cystoscopy for further evaluation  2  Urinary frequency  · frequency every 2-3 hours  Does take diuretics for CHF  Possible component of BPH however is not currently bothered and does not wish to start any oral medications  · Follow-up as needed  3  Prostate Cancer Screening   · Negative family history of prostate cancer   · EDWIN reveals smooth symmetric prostate, no palpable nodules or masses  Estimated gland size between 35-40 g  · No prior PSA on record to review  We will check a PSA and call with those results  History of Present Illness  Silverio Arnold is a 60 y o  male new patient to office with PMHx significant for HTN, HLD, ERICA, and CHF  Here for evaluation of bladder mass  He reports that he has a family history of aortic aneurysms and Renal US was completed as part of routine screening  He denies any family history of prostate, bladder, ureteral, or renal cancer  Does report family history of colon cancer on his mother side  Renal US from 9/20/2022 showed a mass protruding into the bladder lumen with what appears to be a calcified edge measuring 2 2 x 2 2 x 2 9 cm  This arises from the inferior wall of the bladder  He denies any episodes of gross hematuria or abdominal pain  Is is a former smoker having quite about 6 years ago and was smoking about 1 ppd  Denies any chemical exposure history  He does reports issues with urinary frequency and nocturia ongoing for several years  He reports frquency and nocturia every 2-3 hours   He feels as though he is emptying his bladder completely  He does take torsemide daily for CHF  He drinks about 80-90 oz of liquid per day  Review of Systems   Constitutional: Negative for chills and fever  HENT: Negative for congestion and sore throat  Respiratory: Negative for cough and shortness of breath  Cardiovascular: Negative for chest pain and leg swelling  Gastrointestinal: Negative for abdominal pain, constipation, diarrhea, nausea and vomiting  Genitourinary: Positive for frequency  Negative for difficulty urinating, dysuria, hematuria and urgency  Musculoskeletal: Negative for back pain and gait problem  Skin: Negative for wound  Allergic/Immunologic: Negative for immunocompromised state  Neurological: Negative for dizziness, weakness and numbness  Hematological: Does not bruise/bleed easily  Vitals  Vitals:    11/14/22 1140   BP: 112/66   Pulse: 62   Temp: 97 8 °F (36 6 °C)   SpO2: 93%   Weight: 113 kg (248 lb 9 6 oz)   Height: 5' 8" (1 727 m)       Physical Exam  Vitals reviewed  Constitutional:       General: He is not in acute distress  Appearance: Normal appearance  He is not ill-appearing or toxic-appearing  HENT:      Head: Normocephalic and atraumatic  Eyes:      General: No scleral icterus  Conjunctiva/sclera: Conjunctivae normal    Cardiovascular:      Rate and Rhythm: Normal rate  Pulmonary:      Effort: Pulmonary effort is normal  No respiratory distress  Abdominal:      Tenderness: There is no right CVA tenderness or left CVA tenderness  Hernia: No hernia is present  Genitourinary:     Comments: EDWIN reveals smooth symmetric prostate, no palpable nodules or masses  Musculoskeletal:      Cervical back: Normal range of motion  Right lower leg: No edema  Left lower leg: No edema  Skin:     General: Skin is warm and dry  Coloration: Skin is not jaundiced or pale  Neurological:      General: No focal deficit present        Mental Status: He is alert and oriented to person, place, and time  Mental status is at baseline  Gait: Gait normal    Psychiatric:         Mood and Affect: Mood normal          Behavior: Behavior normal          Thought Content:  Thought content normal          Judgment: Judgment normal          Past History  Past Medical History:   Diagnosis Date   • Arthritis    • High cholesterol    • Hypertension    • Obstructive sleep apnea on CPAP    • Sleep apnea      Social History     Socioeconomic History   • Marital status: /Civil Union     Spouse name: None   • Number of children: None   • Years of education: None   • Highest education level: None   Occupational History   • None   Tobacco Use   • Smoking status: Former Smoker     Packs/day: 1 00     Years: 30 00     Pack years: 30 00     Quit date:      Years since quittin 8   • Smokeless tobacco: Never Used   Vaping Use   • Vaping Use: Never used   Substance and Sexual Activity   • Alcohol use: Not Currently   • Drug use: Not Currently   • Sexual activity: Not Currently   Other Topics Concern   • None   Social History Narrative   • None     Social Determinants of Health     Financial Resource Strain: Not on file   Food Insecurity: Not on file   Transportation Needs: Not on file   Physical Activity: Not on file   Stress: Not on file   Social Connections: Not on file   Intimate Partner Violence: Not on file   Housing Stability: Not on file     Social History     Tobacco Use   Smoking Status Former Smoker   • Packs/day: 1 00   • Years: 30 00   • Pack years: 30 00   • Quit date:    • Years since quittin 8   Smokeless Tobacco Never Used     Family History   Problem Relation Age of Onset   • Aortic aneurysm Father    • No Known Problems Mother        The following portions of the patient's history were reviewed and updated as appropriate allergies, current medications, past medical history, past social history, past surgical history and problem list    Imaging: 9/20/2022  RENAL ULTRASOUND     INDICATION:   E11 29: Type 2 diabetes mellitus with other diabetic kidney complication  Y53 8: Proteinuria, unspecified      COMPARISON: PET study from 7/10/2017; CT chest from 7/26/2020     TECHNIQUE:   Ultrasound of the retroperitoneum was performed with a curvilinear transducer utilizing volumetric sweeps and still imaging techniques       FINDINGS:     KIDNEYS:  Symmetric and normal size  Right kidney:  13 4 x 6 6 x 6 1 cm  Volume 282 7 mL  Left kidney:  13 1 x 6 6 x 6 0 cm  Volume 269 5 mL     Right kidney  Normal echogenicity and contour  No mass is identified  No hydronephrosis  No shadowing calculi  No perinephric fluid collections      Left kidney  Normal echogenicity and contour  No mass is identified  No hydronephrosis  No shadowing calculi  No perinephric fluid collections      URETERS:  Nonvisualized      BLADDER:   Normally distended  Mild diffuse bladder wall thickening is identified  There is a mass protruding into the bladder lumen with what appears to be a calcified edge measuring 2 2 x 2 2 x 2 9 cm  This arises from the inferior wall the bladder  No focal thickening or mass lesions  Bilateral ureteral jets detected            IMPRESSION:     Mass protrudes into the bladder  The position appears somewhat atypical for prostate invagination and a bladder tumor is possible and should be correlated with any history of hematuria  Urologic consultation/cystoscopy is advised            Results  No results found for this or any previous visit (from the past 1 hour(s))  ]  No results found for: PSA  Lab Results   Component Value Date    CALCIUM 9 2 01/11/2021    K 2 7 (LL) 01/11/2021    CO2 33 (H) 01/11/2021    CL 96 (L) 01/11/2021    BUN 23 01/11/2021    CREATININE 1 15 01/11/2021     Lab Results   Component Value Date    WBC 7 91 01/11/2021    HGB 13 9 01/11/2021    HCT 40 7 01/11/2021    MCV 91 01/11/2021     01/11/2021       Please Note:  Voice dictation software has been used to create this document  There may be inadvertent transcriptions errors       Stephanie Sandhu

## 2022-11-14 NOTE — TELEPHONE ENCOUNTER
----- Message from DIA Mathew sent at 11/14/2022 12:02 PM EST -----  Please schedule patient for cystoscopy to further evaluate possible bladder mass seen on Renal US from September  Would prefer within next 1-2 months

## 2022-11-18 ENCOUNTER — APPOINTMENT (OUTPATIENT)
Dept: LAB | Facility: HOSPITAL | Age: 62
End: 2022-11-18

## 2022-11-18 DIAGNOSIS — Z12.5 PROSTATE CANCER SCREENING: ICD-10-CM

## 2022-11-18 LAB — PSA SERPL-MCNC: 0.3 NG/ML (ref 0–4)

## 2022-11-25 ENCOUNTER — HOSPITAL ENCOUNTER (OUTPATIENT)
Dept: NON INVASIVE DIAGNOSTICS | Facility: HOSPITAL | Age: 62
Discharge: HOME | End: 2022-11-25

## 2022-11-25 VITALS
BODY MASS INDEX: 37.59 KG/M2 | HEIGHT: 68 IN | WEIGHT: 248 LBS | DIASTOLIC BLOOD PRESSURE: 60 MMHG | HEART RATE: 70 BPM | SYSTOLIC BLOOD PRESSURE: 110 MMHG

## 2022-11-25 DIAGNOSIS — G47.33 OBSTRUCTIVE SLEEP APNEA (ADULT) (PEDIATRIC): ICD-10-CM

## 2022-11-25 DIAGNOSIS — E66.01 MORBID (SEVERE) OBESITY DUE TO EXCESS CALORIES (HCC): ICD-10-CM

## 2022-11-25 DIAGNOSIS — Z87.891 PERSONAL HISTORY OF NICOTINE DEPENDENCE: ICD-10-CM

## 2022-11-25 DIAGNOSIS — I50.32 CHRONIC DIASTOLIC (CONGESTIVE) HEART FAILURE (HCC): ICD-10-CM

## 2022-11-25 DIAGNOSIS — E78.5 HYPERLIPIDEMIA, UNSPECIFIED: ICD-10-CM

## 2022-11-25 DIAGNOSIS — I10 ESSENTIAL (PRIMARY) HYPERTENSION: ICD-10-CM

## 2022-11-25 LAB
AORTIC ROOT: 3.7 CM
APICAL FOUR CHAMBER EJECTION FRACTION: 69 %
ASCENDING AORTA: 3.1 CM
E WAVE DECELERATION TIME: 270 MS
FRACTIONAL SHORTENING: 46 % (ref 28–44)
INTERVENTRICULAR SEPTUM IN DIASTOLE (PARASTERNAL SHORT AXIS VIEW): 1.1 CM
INTERVENTRICULAR SEPTUM: 1.1 CM (ref 0.6–1.1)
IVC: 2.1 MM
LAAS-AP2: 18.9 CM2
LAAS-AP4: 17.8 CM2
LEFT ATRIUM SIZE: 4.2 CM
LEFT INTERNAL DIMENSION IN SYSTOLE: 2.6 CM (ref 2.1–4)
LEFT VENTRICULAR INTERNAL DIMENSION IN DIASTOLE: 4.8 CM (ref 3.5–6)
LEFT VENTRICULAR POSTERIOR WALL IN END DIASTOLE: 1.1 CM
LEFT VENTRICULAR STROKE VOLUME: 86 ML
LVSV (TEICH): 86 ML
MV E'TISSUE VEL-LAT: 15 CM/S
MV E'TISSUE VEL-SEP: 11 CM/S
MV PEAK A VEL: 0.54 M/S
MV PEAK E VEL: 88 CM/S
MV STENOSIS PRESSURE HALF TIME: 78 MS
MV VALVE AREA P 1/2 METHOD: 2.82 CM2
RA PRESSURE ESTIMATED: 3 MMHG
RIGHT ATRIAL 2D VOLUME: 59 ML
RIGHT ATRIUM AREA SYSTOLE A4C: 18.8 CM2
RIGHT VENTRICLE ID DIMENSION: 4.3 CM
RV PSP: 28 MMHG
SL CV LEFT ATRIUM LENGTH A2C: 5.6 CM
SL CV LV EF: 65
SL CV PED ECHO LEFT VENTRICLE DIASTOLIC VOLUME (MOD BIPLANE) 2D: 110 ML
SL CV PED ECHO LEFT VENTRICLE SYSTOLIC VOLUME (MOD BIPLANE) 2D: 24 ML
TR MAX PG: 25 MMHG
TR PEAK VELOCITY: 2.5 M/S
TRICUSPID VALVE PEAK REGURGITATION VELOCITY: 2.48 M/S

## 2023-02-13 ENCOUNTER — PROCEDURE VISIT (OUTPATIENT)
Dept: UROLOGY | Facility: CLINIC | Age: 63
End: 2023-02-13

## 2023-02-13 VITALS
DIASTOLIC BLOOD PRESSURE: 76 MMHG | WEIGHT: 254 LBS | BODY MASS INDEX: 38.49 KG/M2 | SYSTOLIC BLOOD PRESSURE: 140 MMHG | HEART RATE: 77 BPM | HEIGHT: 68 IN

## 2023-02-13 DIAGNOSIS — N32.89 BLADDER MASS: Primary | ICD-10-CM

## 2023-02-13 DIAGNOSIS — R35.0 URINARY FREQUENCY: ICD-10-CM

## 2023-02-13 DIAGNOSIS — C67.3 MALIGNANT NEOPLASM OF ANTERIOR WALL OF URINARY BLADDER (HCC): ICD-10-CM

## 2023-02-13 DIAGNOSIS — R68.89 OTHER GENERAL SYMPTOMS AND SIGNS: ICD-10-CM

## 2023-02-13 RX ORDER — CEPHALEXIN 500 MG/1
500 CAPSULE ORAL ONCE
Qty: 1 CAPSULE | Refills: 0 | Status: SHIPPED | OUTPATIENT
Start: 2023-02-13 | End: 2023-02-13

## 2023-02-13 NOTE — PROGRESS NOTES
[address] [facility]  [facility]  [address] [facility]  [place], [address] [place] [address]  [phone]  www  [email or web address]  [email or web address]      NAME: Jj Zapata  AGE: 58 y o  SEX: male  : 1960   MRN: [id number]    DATE: 2023  TIME: 10:33 AM    Assessment and Plan:    3 cm bladder mass, most likely bladder cancer anterior wall, just before the dome  No other evidence of cancer in the bladder  Plan TURBT in the operating room and he wishes to have this done at [facility]  I have contacted the urologist there to set him up for this  H&P done  The risks of bleeding infection damage to the bladder with perforation and need for open surgery been explained and he gives informed consent  We will ask Dr Arti Vergara to clear him from a medical standpoint  Chief Complaint   No chief complaint on file  History of Present Illness   Bladder mass-58year-old man, established patient but new to me-saw Mr Hernan Celeste on 2022 for a bladder mass seen on renal ultrasound 2022 measuring 2 2 cm that protruded into the bladder  The position appeared somewhat atypical for prostate invagination  He also has urinary frequency every 2-3 hours but he takes diuretics for CHF  PSA 0 3 2022  He has a cardiomyopathy of unknown origin, denies any history of cardiac surgery or stents, and only takes aspirin  He says he has left ventricular dysfunction  He has been seeing cardiology for 3 years  He takes Zaroxolyn for the chronic lower extremity swelling and he says this is his only manifestation of this  Cystoscopy     Date/Time 2023 10:22 AM     Performed by  Wolfgang العلي MD     Authorized by DIA Hernandez      Universal Protocol:  Consent: Verbal consent obtained  Written consent obtained        Procedure Details:  Procedure type: cystoscopy    Additional Procedure Details: Cystoscopy Procedure Note        Pre-operative Diagnosis: bladder mass  Seen on VICKY    Post-operative Diagnosis: 3 cm bladder cancer anterior wall    Procedure: Flexible cystoscopy    Surgeon: Med Barnes MD    Anesthesia: 1% Xylocaine per urethra    EBL: Minimal    Complications: none    Procedure Details   The risks, benefits, complications, treatment options, and expected outcomes were discussed with the patient  The patient concurred with the proposed plan, giving informed consent  Cystoscopy was performed today under local anesthesia, using sterile technique  The patient was placed in the supine position, prepped with Betadine, and draped in the usual sterile fashion  The flexible cystocope was used to inspect both the urethra and bladder    Findings:  Urethra: Normal without stricture  Prostate moderately obstructed  Bladder: Smooth trabeculated and there is a papillary mass at the anterior wall of the bladder about 3 cm  The orifices were orthotopic and intact  Specimens: none                 Complications:  None           Disposition: To home            Condition:  Stable          The following portions of the patient's history were reviewed and updated as appropriate: allergies, current medications, past family history, past medical history, past social history, past surgical history and problem list   Past Medical History:   Diagnosis Date   • Arthritis    • High cholesterol    • Hypertension    • Obstructive sleep apnea on CPAP    • Sleep apnea      Past Surgical History:   Procedure Laterality Date   • BACK SURGERY       shoulder  Review of Systems   Review of Systems    Active Problem List   There is no problem list on file for this patient  Objective   /76   Pulse 77   Ht 5' 8" (1 727 m)   Wt 115 kg (254 lb)   BMI 38 62 kg/m²     Physical Exam  Vitals reviewed  Constitutional:       Appearance: Normal appearance  HENT:      Head: Normocephalic and atraumatic     Eyes:      Extraocular Movements: Extraocular movements intact  Cardiovascular:      Rate and Rhythm: Normal rate and regular rhythm  Heart sounds: Normal heart sounds  Pulmonary:      Effort: Pulmonary effort is normal  No respiratory distress  Breath sounds: Normal breath sounds  Abdominal:      Palpations: Abdomen is soft  Genitourinary:     Penis: Normal        Testes: Normal    Musculoskeletal:      Cervical back: Normal range of motion  Right lower leg: Edema present  Left lower leg: Edema present  Comments: Walks with a cane due to spinal stenosis  Skin:     Coloration: Skin is not jaundiced or pale  Neurological:      General: No focal deficit present  Mental Status: He is alert and oriented to person, place, and time  Psychiatric:         Mood and Affect: Mood normal          Behavior: Behavior normal          Thought Content:  Thought content normal          Judgment: Judgment normal              Current Medications     Current Outpatient Medications:   •  aspirin 81 mg chewable tablet, Chew 81 mg daily, Disp: , Rfl:   •  atorvastatin (LIPITOR) 40 mg tablet, , Disp: , Rfl:   •  cephalexin (KEFLEX) 500 mg capsule, Take 1 capsule (500 mg total) by mouth 1 (one) time for 1 dose Take after procedure, Disp: 1 capsule, Rfl: 0  •  co-enzyme Q-10 30 MG capsule, Take 100 mg by mouth daily , Disp: , Rfl:   •  Farxiga 5 MG TABS, , Disp: , Rfl:   •  gabapentin (NEURONTIN) 300 mg capsule, Take 300 mg by mouth 2 (two) times a day , Disp: , Rfl:   •  lisinopril (ZESTRIL) 2 5 mg tablet, , Disp: , Rfl:   •  metolazone (ZAROXOLYN) 2 5 mg tablet, Take 2 5 mg by mouth once a week, Disp: , Rfl:   •  metoprolol succinate (TOPROL-XL) 25 mg 24 hr tablet, Take 25 mg by mouth daily, Disp: , Rfl:   •  multivitamin-iron-minerals-folic acid (CENTRUM) chewable tablet, Chew 1 tablet daily, Disp: , Rfl:   •  oxyCODONE (ROXICODONE) 20 MG TABS, 30 mg 4 (four) times a day Takes 4 times daily per patient, Disp: , Rfl:   •  potassium chloride (K-DUR,KLOR-CON) 20 mEq tablet, , Disp: , Rfl:   •  torsemide (DEMADEX) 20 mg tablet, Take 60 mg by mouth daily, Disp: , Rfl:   •  vitamin B-12 (VITAMIN B-12) 1,000 mcg tablet, Take by mouth daily, Disp: , Rfl:   •  atorvastatin (LIPITOR) 20 mg tablet, Take 20 mg by mouth daily (Patient not taking: Reported on 11/14/2022), Disp: , Rfl:   •  DULoxetine (CYMBALTA) 30 mg delayed release capsule, Take 30 mg by mouth daily (Patient not taking: Reported on 11/14/2022), Disp: , Rfl:   •  fluticasone (FLONASE) 50 mcg/act nasal spray, , Disp: , Rfl:   •  furosemide (LASIX) 40 mg tablet, Take by mouth (Patient not taking: Reported on 2/13/2023), Disp: , Rfl:   •  lamoTRIgine (LaMICtal) 25 mg tablet, Take 50 mg by mouth daily at bedtime  (Patient not taking: Reported on 11/14/2022), Disp: , Rfl:   •  losartan (COZAAR) 25 mg tablet, Take by mouth (Patient not taking: Reported on 11/14/2022), Disp: , Rfl:   •  potassium chloride (MICRO-K) 10 MEQ CR capsule, Take 20 mEq by mouth daily (Patient not taking: Reported on 11/14/2022), Disp: , Rfl:   •  topiramate (TOPAMAX) 25 mg tablet, , Disp: , Rfl:         Nessa Moore MD

## 2023-02-13 NOTE — H&P
[address] [facility]    [address] [facility]  [place], [address] [place] [address]  [phone]  www  [facility]  org      NAME: Tiffanie Magaan  AGE: 58 y o  SEX: male  : 1960   MRN: [id number]    DATE: 2023  TIME: 10:33 AM    Assessment and Plan:    3 cm bladder mass, most likely bladder cancer anterior wall, just before the dome  No other evidence of cancer in the bladder  Plan TURBT in the operating room and he wishes to have this done at [facility]  I have contacted the urologist there to set him up for this  H&P done  The risks of bleeding infection damage to the bladder with perforation and need for open surgery been explained and he gives informed consent  We will ask Dr Jeff Hastings to clear him from a medical standpoint  Chief Complaint   No chief complaint on file  History of Present Illness   Bladder mass-58year-old man, established patient but new to me-saw Mr Matt Quevedo on 2022 for a bladder mass seen on renal ultrasound 2022 measuring 2 2 cm that protruded into the bladder  The position appeared somewhat atypical for prostate invagination  He also has urinary frequency every 2-3 hours but he takes diuretics for CHF  PSA 0 3 2022  He has a cardiomyopathy of unknown origin, denies any history of cardiac surgery or stents, and only takes aspirin  He says he has left ventricular dysfunction  He has been seeing cardiology for 3 years  He takes Zaroxolyn for the chronic lower extremity swelling and he says this is his only manifestation of this  Cystoscopy     Date/Time 2023 10:22 AM     Performed by  Edmund Cates MD     Authorized by DIA Mccormack      Universal Protocol:  Consent: Verbal consent obtained  Written consent obtained        Procedure Details:  Procedure type: cystoscopy    Additional Procedure Details: Cystoscopy Procedure Note        Pre-operative Diagnosis: bladder mass  Seen on VICKY    Post-operative Diagnosis: 3 cm bladder cancer anterior wall    Procedure: Flexible cystoscopy    Surgeon: Corean Dancer MD    Anesthesia: 1% Xylocaine per urethra    EBL: Minimal    Complications: none    Procedure Details   The risks, benefits, complications, treatment options, and expected outcomes were discussed with the patient  The patient concurred with the proposed plan, giving informed consent  Cystoscopy was performed today under local anesthesia, using sterile technique  The patient was placed in the supine position, prepped with Betadine, and draped in the usual sterile fashion  The flexible cystocope was used to inspect both the urethra and bladder    Findings:  Urethra: Normal without stricture  Prostate moderately obstructed  Bladder: Smooth trabeculated and there is a papillary mass at the anterior wall of the bladder about 3 cm  The orifices were orthotopic and intact  Specimens: none                 Complications:  None           Disposition: To home            Condition:  Stable          The following portions of the patient's history were reviewed and updated as appropriate: allergies, current medications, past family history, past medical history, past social history, past surgical history and problem list   Past Medical History:   Diagnosis Date   • Arthritis    • High cholesterol    • Hypertension    • Obstructive sleep apnea on CPAP    • Sleep apnea      Past Surgical History:   Procedure Laterality Date   • BACK SURGERY       shoulder  Review of Systems   Review of Systems    Active Problem List   There is no problem list on file for this patient  Objective   /76   Pulse 77   Ht 5' 8" (1 727 m)   Wt 115 kg (254 lb)   BMI 38 62 kg/m²     Physical Exam  Vitals reviewed  Constitutional:       Appearance: Normal appearance  HENT:      Head: Normocephalic and atraumatic     Eyes:      Extraocular Movements: Extraocular movements intact  Cardiovascular:      Rate and Rhythm: Normal rate and regular rhythm  Heart sounds: Normal heart sounds  Pulmonary:      Effort: Pulmonary effort is normal  No respiratory distress  Breath sounds: Normal breath sounds  Abdominal:      Palpations: Abdomen is soft  Genitourinary:     Penis: Normal        Testes: Normal    Musculoskeletal:      Cervical back: Normal range of motion  Right lower leg: Edema present  Left lower leg: Edema present  Comments: Walks with a cane due to spinal stenosis  Skin:     Coloration: Skin is not jaundiced or pale  Neurological:      General: No focal deficit present  Mental Status: He is alert and oriented to person, place, and time  Psychiatric:         Mood and Affect: Mood normal          Behavior: Behavior normal          Thought Content:  Thought content normal          Judgment: Judgment normal              Current Medications     Current Outpatient Medications:   •  aspirin 81 mg chewable tablet, Chew 81 mg daily, Disp: , Rfl:   •  atorvastatin (LIPITOR) 40 mg tablet, , Disp: , Rfl:   •  cephalexin (KEFLEX) 500 mg capsule, Take 1 capsule (500 mg total) by mouth 1 (one) time for 1 dose Take after procedure, Disp: 1 capsule, Rfl: 0  •  co-enzyme Q-10 30 MG capsule, Take 100 mg by mouth daily , Disp: , Rfl:   •  Farxiga 5 MG TABS, , Disp: , Rfl:   •  gabapentin (NEURONTIN) 300 mg capsule, Take 300 mg by mouth 2 (two) times a day , Disp: , Rfl:   •  lisinopril (ZESTRIL) 2 5 mg tablet, , Disp: , Rfl:   •  metolazone (ZAROXOLYN) 2 5 mg tablet, Take 2 5 mg by mouth once a week, Disp: , Rfl:   •  metoprolol succinate (TOPROL-XL) 25 mg 24 hr tablet, Take 25 mg by mouth daily, Disp: , Rfl:   •  multivitamin-iron-minerals-folic acid (CENTRUM) chewable tablet, Chew 1 tablet daily, Disp: , Rfl:   •  oxyCODONE (ROXICODONE) 20 MG TABS, 30 mg 4 (four) times a day Takes 4 times daily per patient, Disp: , Rfl:   •  potassium chloride (K-DUR,KLOR-CON) 20 mEq tablet, , Disp: , Rfl:   •  torsemide (DEMADEX) 20 mg tablet, Take 60 mg by mouth daily, Disp: , Rfl:   •  vitamin B-12 (VITAMIN B-12) 1,000 mcg tablet, Take by mouth daily, Disp: , Rfl:   •  atorvastatin (LIPITOR) 20 mg tablet, Take 20 mg by mouth daily (Patient not taking: Reported on 11/14/2022), Disp: , Rfl:   •  DULoxetine (CYMBALTA) 30 mg delayed release capsule, Take 30 mg by mouth daily (Patient not taking: Reported on 11/14/2022), Disp: , Rfl:   •  fluticasone (FLONASE) 50 mcg/act nasal spray, , Disp: , Rfl:   •  furosemide (LASIX) 40 mg tablet, Take by mouth (Patient not taking: Reported on 2/13/2023), Disp: , Rfl:   •  lamoTRIgine (LaMICtal) 25 mg tablet, Take 50 mg by mouth daily at bedtime  (Patient not taking: Reported on 11/14/2022), Disp: , Rfl:   •  losartan (COZAAR) 25 mg tablet, Take by mouth (Patient not taking: Reported on 11/14/2022), Disp: , Rfl:   •  potassium chloride (MICRO-K) 10 MEQ CR capsule, Take 20 mEq by mouth daily (Patient not taking: Reported on 11/14/2022), Disp: , Rfl:   •  topiramate (TOPAMAX) 25 mg tablet, , Disp: , Rfl:         Freddie Adkins MD

## 2023-02-13 NOTE — LETTER
2023     [facility], DO  [address]    Patient: Shelby Sandra   YOB: 1960   Date of Visit: 2023       Dear Dr Maria Teresa Storm:    Thank you for referring Cinthya Zimmerman to me for evaluation  Below are my notes for this consultation  He has a 3 cm bladder tumor that needs to be removed  I am setting him up for this to be done at [facility]  If you could provide medical clearance I would greatly appreciate it  I have ordered a chest abdomen and pelvis CT as well as blood work  Sincerely,        Pastor Sonya MD        CC: No Recipients  Pastor Sonya MD  2023 10:51 AM  Sign when Signing Visit  [address] [facility]  [place] [phone]  www  [facility]  org      NAME: Shelby Sandra  AGE: 58 y o  SEX: male  : 1960   MRN: [id number]    DATE: 2023  TIME: 10:33 AM    Assessment and Plan:    3 cm bladder mass, most likely bladder cancer anterior wall, just before the dome  No other evidence of cancer in the bladder  Plan TURBT in the operating room and he wishes to have this done at [facility]  I have contacted the urologist there to set him up for this  H&P done  The risks of bleeding infection damage to the bladder with perforation and need for open surgery been explained and he gives informed consent  We will ask Dr Ely Viera to clear him from a medical standpoint  Chief Complaint   No chief complaint on file  History of Present Illness   Bladder mass-58year-old man, established patient but new to me-saw Mr Elsa Kothari on 2022 for a bladder mass seen on renal ultrasound 2022 measuring 2 2 cm that protruded into the bladder  The position appeared somewhat atypical for prostate invagination    He also has urinary frequency every 2-3 hours but he takes diuretics for CHF PSA 0 3 11/18/2022  He has a cardiomyopathy of unknown origin, denies any history of cardiac surgery or stents, and only takes aspirin  He says he has left ventricular dysfunction  He has been seeing cardiology for 3 years  He takes Zaroxolyn for the chronic lower extremity swelling and he says this is his only manifestation of this  Cystoscopy     Date/Time 2/13/2023 10:22 AM     Performed by  Nessa Moore MD     Authorized by DIA Schofield      Universal Protocol:  Consent: Verbal consent obtained  Written consent obtained  Procedure Details:  Procedure type: cystoscopy    Additional Procedure Details: Cystoscopy Procedure Note        Pre-operative Diagnosis: bladder mass  Seen on VICKY    Post-operative Diagnosis: 3 cm bladder cancer anterior wall    Procedure: Flexible cystoscopy    Surgeon: Manolo Das MD    Anesthesia: 1% Xylocaine per urethra    EBL: Minimal    Complications: none    Procedure Details   The risks, benefits, complications, treatment options, and expected outcomes were discussed with the patient  The patient concurred with the proposed plan, giving informed consent  Cystoscopy was performed today under local anesthesia, using sterile technique  The patient was placed in the supine position, prepped with Betadine, and draped in the usual sterile fashion  The flexible cystocope was used to inspect both the urethra and bladder    Findings:  Urethra: Normal without stricture  Prostate moderately obstructed  Bladder: Smooth trabeculated and there is a papillary mass at the anterior wall of the bladder about 3 cm  The orifices were orthotopic and intact             Specimens: none                 Complications:  None           Disposition: To home            Condition:  Stable          The following portions of the patient's history were reviewed and updated as appropriate: allergies, current medications, past family history, past medical history, past social history, past surgical history and problem list   Past Medical History:   Diagnosis Date   • Arthritis    • High cholesterol    • Hypertension    • Obstructive sleep apnea on CPAP    • Sleep apnea      Past Surgical History:   Procedure Laterality Date   • BACK SURGERY       shoulder  Review of Systems   Review of Systems    Active Problem List   There is no problem list on file for this patient  Objective   /76   Pulse 77   Ht 5' 8" (1 727 m)   Wt 115 kg (254 lb)   BMI 38 62 kg/m²     Physical Exam  Vitals reviewed  Constitutional:       Appearance: Normal appearance  HENT:      Head: Normocephalic and atraumatic  Eyes:      Extraocular Movements: Extraocular movements intact  Cardiovascular:      Rate and Rhythm: Normal rate and regular rhythm  Heart sounds: Normal heart sounds  Pulmonary:      Effort: Pulmonary effort is normal  No respiratory distress  Breath sounds: Normal breath sounds  Abdominal:      Palpations: Abdomen is soft  Genitourinary:     Penis: Normal        Testes: Normal    Musculoskeletal:      Cervical back: Normal range of motion  Right lower leg: Edema present  Left lower leg: Edema present  Comments: Walks with a cane due to spinal stenosis  Skin:     Coloration: Skin is not jaundiced or pale  Neurological:      General: No focal deficit present  Mental Status: He is alert and oriented to person, place, and time  Psychiatric:         Mood and Affect: Mood normal          Behavior: Behavior normal          Thought Content:  Thought content normal          Judgment: Judgment normal              Current Medications     Current Outpatient Medications:   •  aspirin 81 mg chewable tablet, Chew 81 mg daily, Disp: , Rfl:   •  atorvastatin (LIPITOR) 40 mg tablet, , Disp: , Rfl:   •  cephalexin (KEFLEX) 500 mg capsule, Take 1 capsule (500 mg total) by mouth 1 (one) time for 1 dose Take after procedure, Disp: 1 capsule, Rfl: 0  •  co-enzyme Q-10 30 MG capsule, Take 100 mg by mouth daily , Disp: , Rfl:   •  Farxiga 5 MG TABS, , Disp: , Rfl:   •  gabapentin (NEURONTIN) 300 mg capsule, Take 300 mg by mouth 2 (two) times a day , Disp: , Rfl:   •  lisinopril (ZESTRIL) 2 5 mg tablet, , Disp: , Rfl:   •  metolazone (ZAROXOLYN) 2 5 mg tablet, Take 2 5 mg by mouth once a week, Disp: , Rfl:   •  metoprolol succinate (TOPROL-XL) 25 mg 24 hr tablet, Take 25 mg by mouth daily, Disp: , Rfl:   •  multivitamin-iron-minerals-folic acid (CENTRUM) chewable tablet, Chew 1 tablet daily, Disp: , Rfl:   •  oxyCODONE (ROXICODONE) 20 MG TABS, 30 mg 4 (four) times a day Takes 4 times daily per patient, Disp: , Rfl:   •  potassium chloride (K-DUR,KLOR-CON) 20 mEq tablet, , Disp: , Rfl:   •  torsemide (DEMADEX) 20 mg tablet, Take 60 mg by mouth daily, Disp: , Rfl:   •  vitamin B-12 (VITAMIN B-12) 1,000 mcg tablet, Take by mouth daily, Disp: , Rfl:   •  atorvastatin (LIPITOR) 20 mg tablet, Take 20 mg by mouth daily (Patient not taking: Reported on 11/14/2022), Disp: , Rfl:   •  DULoxetine (CYMBALTA) 30 mg delayed release capsule, Take 30 mg by mouth daily (Patient not taking: Reported on 11/14/2022), Disp: , Rfl:   •  fluticasone (FLONASE) 50 mcg/act nasal spray, , Disp: , Rfl:   •  furosemide (LASIX) 40 mg tablet, Take by mouth (Patient not taking: Reported on 2/13/2023), Disp: , Rfl:   •  lamoTRIgine (LaMICtal) 25 mg tablet, Take 50 mg by mouth daily at bedtime  (Patient not taking: Reported on 11/14/2022), Disp: , Rfl:   •  losartan (COZAAR) 25 mg tablet, Take by mouth (Patient not taking: Reported on 11/14/2022), Disp: , Rfl:   •  potassium chloride (MICRO-K) 10 MEQ CR capsule, Take 20 mEq by mouth daily (Patient not taking: Reported on 11/14/2022), Disp: , Rfl:   •  topiramate (TOPAMAX) 25 mg tablet, , Disp: , Rfl:         David Cook MD

## 2023-02-14 ENCOUNTER — PREP FOR PROCEDURE (OUTPATIENT)
Dept: UROLOGY | Facility: CLINIC | Age: 63
End: 2023-02-14

## 2023-02-14 ENCOUNTER — APPOINTMENT (OUTPATIENT)
Dept: LAB | Facility: HOSPITAL | Age: 63
End: 2023-02-14

## 2023-02-14 ENCOUNTER — OFFICE VISIT (OUTPATIENT)
Dept: LAB | Facility: HOSPITAL | Age: 63
End: 2023-02-14

## 2023-02-14 ENCOUNTER — TELEPHONE (OUTPATIENT)
Dept: UROLOGY | Facility: CLINIC | Age: 63
End: 2023-02-14

## 2023-02-14 DIAGNOSIS — C67.3 MALIGNANT NEOPLASM OF ANTERIOR WALL OF URINARY BLADDER (HCC): ICD-10-CM

## 2023-02-14 LAB
ALBUMIN SERPL BCP-MCNC: 3.8 G/DL (ref 3.5–5)
ALP SERPL-CCNC: 74 U/L (ref 34–104)
ALT SERPL W P-5'-P-CCNC: 24 U/L (ref 7–52)
ANION GAP SERPL CALCULATED.3IONS-SCNC: 3 MMOL/L (ref 4–13)
AST SERPL W P-5'-P-CCNC: 14 U/L (ref 13–39)
BILIRUB SERPL-MCNC: 0.27 MG/DL (ref 0.2–1)
BUN SERPL-MCNC: 28 MG/DL (ref 5–25)
CALCIUM SERPL-MCNC: 9.1 MG/DL (ref 8.4–10.2)
CHLORIDE SERPL-SCNC: 102 MMOL/L (ref 96–108)
CO2 SERPL-SCNC: 34 MMOL/L (ref 21–32)
CREAT SERPL-MCNC: 0.91 MG/DL (ref 0.6–1.3)
ERYTHROCYTE [DISTWIDTH] IN BLOOD BY AUTOMATED COUNT: 14 % (ref 11.6–15.1)
GFR SERPL CREATININE-BSD FRML MDRD: 88 ML/MIN/1.73SQ M
GLUCOSE P FAST SERPL-MCNC: 123 MG/DL (ref 65–99)
HCT VFR BLD AUTO: 40.9 % (ref 36.5–49.3)
HGB BLD-MCNC: 13.8 G/DL (ref 12–17)
MCH RBC QN AUTO: 31.9 PG (ref 26.8–34.3)
MCHC RBC AUTO-ENTMCNC: 33.7 G/DL (ref 31.4–37.4)
MCV RBC AUTO: 95 FL (ref 82–98)
PLATELET # BLD AUTO: 171 THOUSANDS/UL (ref 149–390)
PMV BLD AUTO: 12 FL (ref 8.9–12.7)
POTASSIUM SERPL-SCNC: 3.8 MMOL/L (ref 3.5–5.3)
PROT SERPL-MCNC: 6.5 G/DL (ref 6.4–8.4)
RBC # BLD AUTO: 4.32 MILLION/UL (ref 3.88–5.62)
SODIUM SERPL-SCNC: 139 MMOL/L (ref 135–147)
WBC # BLD AUTO: 7.65 THOUSAND/UL (ref 4.31–10.16)

## 2023-02-14 NOTE — TELEPHONE ENCOUNTER
Spoke to pt, scheduled TURBT for 3/17 at [facility] with Dr Alfredo Garcia  Verbal instructions given and mailed along with all preop info  Postop appt also scheduled  Medical clearance form faxed to PCP

## 2023-02-15 LAB — BACTERIA UR CULT: NORMAL

## 2023-02-17 ENCOUNTER — HOSPITAL ENCOUNTER (OUTPATIENT)
Dept: CT IMAGING | Facility: HOSPITAL | Age: 63
End: 2023-02-17
Attending: UROLOGY

## 2023-02-17 DIAGNOSIS — C67.3 MALIGNANT NEOPLASM OF ANTERIOR WALL OF URINARY BLADDER (HCC): ICD-10-CM

## 2023-02-17 RX ADMIN — IOHEXOL 100 ML: 350 INJECTION, SOLUTION INTRAVENOUS at 08:01

## 2023-02-18 LAB
ATRIAL RATE: 48 BPM
P AXIS: 69 DEGREES
PR INTERVAL: 148 MS
QRS AXIS: 26 DEGREES
QRSD INTERVAL: 84 MS
QT INTERVAL: 436 MS
QTC INTERVAL: 389 MS
T WAVE AXIS: 36 DEGREES
VENTRICULAR RATE: 48 BPM

## 2023-03-05 PROBLEM — R35.0 BENIGN PROSTATIC HYPERPLASIA WITH URINARY FREQUENCY: Status: ACTIVE | Noted: 2023-03-05

## 2023-03-05 PROBLEM — N40.1 BENIGN PROSTATIC HYPERPLASIA WITH URINARY FREQUENCY: Status: ACTIVE | Noted: 2023-03-05

## 2023-03-06 PROBLEM — N32.89 BLADDER MASS: Status: ACTIVE | Noted: 2023-03-06

## 2023-03-07 NOTE — H&P (VIEW-ONLY)
UROLOGY PROGRESS NOTE         NAME: Xavier Ronquillo  AGE: 58 y o  SEX: male  : 1960   MRN: [id number]    DATE: 3/6/2023  TIME: 7:12 PM    Assessment and Plan   Procedures       Impression:   1  Benign prostatic hyperplasia with urinary frequency    2  Bladder mass         Plan: Plan is a cystoscopy TURBT and bilateral retrograde pyelogram risk and mention the procedure explained to the patient he wished to proceed  These risk include bleeding infection death and bladder perforation  Plan is a 23 observation likely CBI postop  I did review the CAT scan with he and his wife regarding the slightly enlarged right iliac node that will be dealt with certainly after his surgical pathology report is back  Chief Complaint   No chief complaint on file  History of Present Illness     HPI: Xavier Ronquillo is a 58y o  year old male who presents with follow-up for surgery on 3/17/2023 for bladder mass  This patient has been worked up by Dr Antioen Siddiqui  On 2023 underwent a cystoscopy that revealed a 3 cm mass in the bladder anterior wall just before the dome  He presented with urinary frequency and an ultrasound in September showed the bladder lesion  His PSA in 2022 was 0 3  It was noted on the office cystoscopy by Dr Antione Siddiqui the patient had a moderately enlarged prostate but no ureteral strictures  Patient did have a CT scan done on 2023 this included the chest abdomen and pelvis that showed no evidence of metastatic disease  There was some mildly enlarged hilar and mediastinal nodes that were stable  It did note to 2 5 cm calcified mass in the bladder  It did mention some mild enlargement of the right iliac chain raising suspicion for possible local metastasis  Patient's recent creatinine level 2023 was 0 91 and urine culture was negative                The following portions of the patient's history were reviewed and updated as appropriate: allergies, current medications, past family history, past medical history, past social history, past surgical history and problem list   Past Medical History:   Diagnosis Date   • Arthritis    • High cholesterol    • Hypertension    • Obstructive sleep apnea on CPAP    • Sleep apnea      Past Surgical History:   Procedure Laterality Date   • BACK SURGERY       shoulder  Review of Systems     Const: Denies chills, fever and weight loss  CV: Denies chest pain  Resp: Denies SOB  GI: Denies abdominal pain, nausea and vomiting  : Denies symptoms other than stated above  Musculo: Denies back pain  Objective   There were no vitals taken for this visit  Physical Exam  Const: Appears healthy and well developed  No signs of acute distress present  Resp: Respirations are regular and unlabored  CV: Rate is regular  Rhythm is regular  Abdomen: Abdomen is soft, nontender, and nondistended  Kidneys are not palpable  : Normal  Psych: Patient's attitude is cooperative   Mood is normal  Affect is normal     Current Medications     Current Outpatient Medications:   •  aspirin 81 mg chewable tablet, Chew 81 mg daily, Disp: , Rfl:   •  atorvastatin (LIPITOR) 20 mg tablet, Take 20 mg by mouth daily (Patient not taking: Reported on 11/14/2022), Disp: , Rfl:   •  atorvastatin (LIPITOR) 40 mg tablet, , Disp: , Rfl:   •  co-enzyme Q-10 30 MG capsule, Take 100 mg by mouth daily , Disp: , Rfl:   •  DULoxetine (CYMBALTA) 30 mg delayed release capsule, Take 30 mg by mouth daily (Patient not taking: Reported on 11/14/2022), Disp: , Rfl:   •  Farxiga 5 MG TABS, , Disp: , Rfl:   •  fluticasone (FLONASE) 50 mcg/act nasal spray, , Disp: , Rfl:   •  furosemide (LASIX) 40 mg tablet, Take by mouth (Patient not taking: Reported on 2/13/2023), Disp: , Rfl:   •  gabapentin (NEURONTIN) 300 mg capsule, Take 300 mg by mouth 2 (two) times a day , Disp: , Rfl:   •  lamoTRIgine (LaMICtal) 25 mg tablet, Take 50 mg by mouth daily at bedtime  (Patient not taking: Reported on 11/14/2022), Disp: , Rfl:   •  lisinopril (ZESTRIL) 2 5 mg tablet, , Disp: , Rfl:   •  losartan (COZAAR) 25 mg tablet, Take by mouth (Patient not taking: Reported on 11/14/2022), Disp: , Rfl:   •  metolazone (ZAROXOLYN) 2 5 mg tablet, Take 2 5 mg by mouth once a week, Disp: , Rfl:   •  metoprolol succinate (TOPROL-XL) 25 mg 24 hr tablet, Take 25 mg by mouth daily, Disp: , Rfl:   •  multivitamin-iron-minerals-folic acid (CENTRUM) chewable tablet, Chew 1 tablet daily, Disp: , Rfl:   •  oxyCODONE (ROXICODONE) 20 MG TABS, 30 mg 4 (four) times a day Takes 4 times daily per patient, Disp: , Rfl:   •  potassium chloride (K-DUR,KLOR-CON) 20 mEq tablet, , Disp: , Rfl:   •  potassium chloride (MICRO-K) 10 MEQ CR capsule, Take 20 mEq by mouth daily (Patient not taking: Reported on 11/14/2022), Disp: , Rfl:   •  topiramate (TOPAMAX) 25 mg tablet, , Disp: , Rfl:   •  torsemide (DEMADEX) 20 mg tablet, Take 60 mg by mouth daily, Disp: , Rfl:   •  vitamin B-12 (VITAMIN B-12) 1,000 mcg tablet, Take by mouth daily, Disp: , Rfl:         Raelene Siemens, MD

## 2023-03-07 NOTE — PROGRESS NOTES
UROLOGY PROGRESS NOTE         NAME: Hemalatha Costello  AGE: 58 y o  SEX: male  : 1960   MRN: [id number]    DATE: 3/6/2023  TIME: 7:12 PM    Assessment and Plan   Procedures       Impression:   1  Benign prostatic hyperplasia with urinary frequency    2  Bladder mass         Plan: Plan is a cystoscopy TURBT and bilateral retrograde pyelogram risk and mention the procedure explained to the patient he wished to proceed  These risk include bleeding infection death and bladder perforation  Plan is a 23 observation likely CBI postop  I did review the CAT scan with he and his wife regarding the slightly enlarged right iliac node that will be dealt with certainly after his surgical pathology report is back  Chief Complaint   No chief complaint on file  History of Present Illness     HPI: Hemalatha Costello is a 58y o  year old male who presents with follow-up for surgery on 3/17/2023 for bladder mass  This patient has been worked up by Dr Starr James  On 2023 underwent a cystoscopy that revealed a 3 cm mass in the bladder anterior wall just before the dome  He presented with urinary frequency and an ultrasound in September showed the bladder lesion  His PSA in 2022 was 0 3  It was noted on the office cystoscopy by Dr Starr James the patient had a moderately enlarged prostate but no ureteral strictures  Patient did have a CT scan done on 2023 this included the chest abdomen and pelvis that showed no evidence of metastatic disease  There was some mildly enlarged hilar and mediastinal nodes that were stable  It did note to 2 5 cm calcified mass in the bladder  It did mention some mild enlargement of the right iliac chain raising suspicion for possible local metastasis  Patient's recent creatinine level 2023 was 0 91 and urine culture was negative                The following portions of the patient's history were reviewed and updated as appropriate: allergies, current medications, past family history, past medical history, past social history, past surgical history and problem list   Past Medical History:   Diagnosis Date   • Arthritis    • High cholesterol    • Hypertension    • Obstructive sleep apnea on CPAP    • Sleep apnea      Past Surgical History:   Procedure Laterality Date   • BACK SURGERY       shoulder  Review of Systems     Const: Denies chills, fever and weight loss  CV: Denies chest pain  Resp: Denies SOB  GI: Denies abdominal pain, nausea and vomiting  : Denies symptoms other than stated above  Musculo: Denies back pain  Objective   There were no vitals taken for this visit  Physical Exam  Const: Appears healthy and well developed  No signs of acute distress present  Resp: Respirations are regular and unlabored  CV: Rate is regular  Rhythm is regular  Abdomen: Abdomen is soft, nontender, and nondistended  Kidneys are not palpable  : Normal  Psych: Patient's attitude is cooperative   Mood is normal  Affect is normal     Current Medications     Current Outpatient Medications:   •  aspirin 81 mg chewable tablet, Chew 81 mg daily, Disp: , Rfl:   •  atorvastatin (LIPITOR) 20 mg tablet, Take 20 mg by mouth daily (Patient not taking: Reported on 11/14/2022), Disp: , Rfl:   •  atorvastatin (LIPITOR) 40 mg tablet, , Disp: , Rfl:   •  co-enzyme Q-10 30 MG capsule, Take 100 mg by mouth daily , Disp: , Rfl:   •  DULoxetine (CYMBALTA) 30 mg delayed release capsule, Take 30 mg by mouth daily (Patient not taking: Reported on 11/14/2022), Disp: , Rfl:   •  Farxiga 5 MG TABS, , Disp: , Rfl:   •  fluticasone (FLONASE) 50 mcg/act nasal spray, , Disp: , Rfl:   •  furosemide (LASIX) 40 mg tablet, Take by mouth (Patient not taking: Reported on 2/13/2023), Disp: , Rfl:   •  gabapentin (NEURONTIN) 300 mg capsule, Take 300 mg by mouth 2 (two) times a day , Disp: , Rfl:   •  lamoTRIgine (LaMICtal) 25 mg tablet, Take 50 mg by mouth daily at bedtime  (Patient not taking: Reported on 11/14/2022), Disp: , Rfl:   •  lisinopril (ZESTRIL) 2 5 mg tablet, , Disp: , Rfl:   •  losartan (COZAAR) 25 mg tablet, Take by mouth (Patient not taking: Reported on 11/14/2022), Disp: , Rfl:   •  metolazone (ZAROXOLYN) 2 5 mg tablet, Take 2 5 mg by mouth once a week, Disp: , Rfl:   •  metoprolol succinate (TOPROL-XL) 25 mg 24 hr tablet, Take 25 mg by mouth daily, Disp: , Rfl:   •  multivitamin-iron-minerals-folic acid (CENTRUM) chewable tablet, Chew 1 tablet daily, Disp: , Rfl:   •  oxyCODONE (ROXICODONE) 20 MG TABS, 30 mg 4 (four) times a day Takes 4 times daily per patient, Disp: , Rfl:   •  potassium chloride (K-DUR,KLOR-CON) 20 mEq tablet, , Disp: , Rfl:   •  potassium chloride (MICRO-K) 10 MEQ CR capsule, Take 20 mEq by mouth daily (Patient not taking: Reported on 11/14/2022), Disp: , Rfl:   •  topiramate (TOPAMAX) 25 mg tablet, , Disp: , Rfl:   •  torsemide (DEMADEX) 20 mg tablet, Take 60 mg by mouth daily, Disp: , Rfl:   •  vitamin B-12 (VITAMIN B-12) 1,000 mcg tablet, Take by mouth daily, Disp: , Rfl:         Silver Calix MD

## 2023-03-08 RX ORDER — CLOTRIMAZOLE AND BETAMETHASONE DIPROPIONATE 10; .64 MG/G; MG/G
CREAM TOPICAL
COMMUNITY
Start: 2023-02-15

## 2023-03-08 NOTE — PRE-PROCEDURE INSTRUCTIONS
Pre-Surgery Instructions:   Medication Instructions   • aspirin 81 mg chewable tablet Instructions provided by Kirit pt last dose 3/9/23   • atorvastatin (LIPITOR) 40 mg tablet Take day of surgery  • clotrimazole-betamethasone (LOTRISONE) 1-0 05 % cream Hold day of surgery  • co-enzyme Q-10 30 MG capsule Stop taking 7 days prior to surgery  • Farxiga 5 MG TABS Hold day of surgery  • gabapentin (NEURONTIN) 300 mg capsule Take day of surgery  • lisinopril (ZESTRIL) 2 5 mg tablet Hold day of surgery  • metolazone (ZAROXOLYN) 2 5 mg tablet Hold day of surgery  • metoprolol succinate (TOPROL-XL) 25 mg 24 hr tablet Take day of surgery  • multivitamin-iron-minerals-folic acid (CENTRUM) chewable tablet Stop taking 7 days prior to surgery  • oxyCODONE (ROXICODONE) 20 MG TABS Take day of surgery  • potassium chloride (K-DUR,KLOR-CON) 20 mEq tablet Hold day of surgery  • torsemide (DEMADEX) 20 mg tablet Hold day of surgery  • vitamin B-12 (VITAMIN B-12) 1,000 mcg tablet Stop taking 7 days prior to surgery  Medication instructions for day surgery reviewed  Please use only a sip of water to take your instructed medications  Avoid all over the counter vitamins, supplements and NSAIDS for one week prior to surgery per anesthesia guidelines  Tylenol is ok to take as needed  You will receive a call one business day prior to surgery with an arrival time and hospital directions  If your surgery is scheduled on a Monday, the hospital will be calling you on the Friday prior to your surgery  If you have not heard from anyone by 8pm, please call the hospital supervisor through the hospital  at [phone]  [facility] [phone])  Do not eat or drink anything after midnight the night before your surgery, including candy, mints, lifesavers, or chewing gum  Do not drink alcohol 24hrs before your surgery  Try not to smoke at least 24hrs before your surgery         Follow the pre surgery showering instructions as listed in the [facility] Surgical Experience Booklet” or otherwise provided by your surgeon's office  Do not shave the surgical area 24 hours before surgery  Do not apply any lotions, creams, including makeup, cologne, deodorant, or perfumes after showering on the day of your surgery  No contact lenses, eye make-up, or artificial eyelashes  Remove nail polish, including gel polish, and any artificial, gel, or acrylic nails if possible  Remove all jewelry including rings and body piercing jewelry  Wear causal clothing that is easy to take on and off  Consider your type of surgery  Keep any valuables, jewelry, piercings at home  Please bring any specially ordered equipment (sling, braces) if indicated  Arrange for a responsible person to drive you to and from the hospital on the day of your surgery  Visitor Guidelines discussed  Call the surgeon's office with any new illnesses, exposures, or additional questions prior to surgery  Please reference your [facility] Surgical Experience Booklet” for additional information to prepare for your upcoming surgery

## 2023-03-14 ENCOUNTER — TELEPHONE (OUTPATIENT)
Dept: UROLOGY | Facility: CLINIC | Age: 63
End: 2023-03-14

## 2023-03-14 NOTE — TELEPHONE ENCOUNTER
Left message for pt to call office back  Please find out if pt was cleared by PCP for upcoming procedure, & have him sign release from [facility] to release records to us

## 2023-03-15 ENCOUNTER — OFFICE VISIT (OUTPATIENT)
Dept: UROLOGY | Facility: CLINIC | Age: 63
End: 2023-03-15

## 2023-03-15 VITALS — BODY MASS INDEX: 38.56 KG/M2 | TEMPERATURE: 97.3 F | WEIGHT: 253.6 LBS

## 2023-03-15 DIAGNOSIS — R35.0 BENIGN PROSTATIC HYPERPLASIA WITH URINARY FREQUENCY: Primary | ICD-10-CM

## 2023-03-15 DIAGNOSIS — N40.1 BENIGN PROSTATIC HYPERPLASIA WITH URINARY FREQUENCY: Primary | ICD-10-CM

## 2023-03-15 DIAGNOSIS — N32.89 BLADDER MASS: ICD-10-CM

## 2023-03-15 LAB
SL AMB  POCT GLUCOSE, UA: ABNORMAL
SL AMB LEUKOCYTE ESTERASE,UA: ABNORMAL
SL AMB POCT BILIRUBIN,UA: ABNORMAL
SL AMB POCT BLOOD,UA: ABNORMAL
SL AMB POCT CLARITY,UA: CLEAR
SL AMB POCT COLOR,UA: YELLOW
SL AMB POCT KETONES,UA: ABNORMAL
SL AMB POCT NITRITE,UA: ABNORMAL
SL AMB POCT PH,UA: 7
SL AMB POCT SPECIFIC GRAVITY,UA: 1.01
SL AMB POCT URINE PROTEIN: ABNORMAL
SL AMB POCT UROBILINOGEN: 0.2

## 2023-03-17 ENCOUNTER — ANESTHESIA (OUTPATIENT)
Dept: PERIOP | Facility: HOSPITAL | Age: 63
End: 2023-03-17

## 2023-03-17 ENCOUNTER — ANESTHESIA EVENT (OUTPATIENT)
Dept: PERIOP | Facility: HOSPITAL | Age: 63
End: 2023-03-17

## 2023-03-17 ENCOUNTER — APPOINTMENT (OUTPATIENT)
Dept: RADIOLOGY | Facility: HOSPITAL | Age: 63
End: 2023-03-17

## 2023-03-17 ENCOUNTER — HOSPITAL ENCOUNTER (OUTPATIENT)
Facility: HOSPITAL | Age: 63
Setting detail: OUTPATIENT SURGERY
Discharge: HOME | End: 2023-03-18
Attending: UROLOGY | Admitting: UROLOGY

## 2023-03-17 DIAGNOSIS — N32.89 BLADDER MASS: Primary | ICD-10-CM

## 2023-03-17 DIAGNOSIS — C67.3 MALIGNANT NEOPLASM OF ANTERIOR WALL OF URINARY BLADDER (HCC): ICD-10-CM

## 2023-03-17 DIAGNOSIS — G47.33 OBSTRUCTIVE SLEEP APNEA ON CPAP: ICD-10-CM

## 2023-03-17 DIAGNOSIS — I10 PRIMARY HYPERTENSION: ICD-10-CM

## 2023-03-17 DIAGNOSIS — Z99.89 OBSTRUCTIVE SLEEP APNEA ON CPAP: ICD-10-CM

## 2023-03-17 RX ORDER — MIDAZOLAM HYDROCHLORIDE 2 MG/2ML
INJECTION, SOLUTION INTRAMUSCULAR; INTRAVENOUS AS NEEDED
Status: DISCONTINUED | OUTPATIENT
Start: 2023-03-17 | End: 2023-03-17

## 2023-03-17 RX ORDER — METOPROLOL SUCCINATE 25 MG/1
25 TABLET, EXTENDED RELEASE ORAL DAILY
Status: DISCONTINUED | OUTPATIENT
Start: 2023-03-18 | End: 2023-03-18 | Stop reason: HOSPADM

## 2023-03-17 RX ORDER — HYDROCODONE BITARTRATE AND ACETAMINOPHEN 5; 325 MG/1; MG/1
1 TABLET ORAL EVERY 6 HOURS PRN
Status: DISCONTINUED | OUTPATIENT
Start: 2023-03-17 | End: 2023-03-17

## 2023-03-17 RX ORDER — FENTANYL CITRATE 50 UG/ML
INJECTION, SOLUTION INTRAMUSCULAR; INTRAVENOUS AS NEEDED
Status: DISCONTINUED | OUTPATIENT
Start: 2023-03-17 | End: 2023-03-17

## 2023-03-17 RX ORDER — IPRATROPIUM BROMIDE AND ALBUTEROL SULFATE 2.5; .5 MG/3ML; MG/3ML
3 SOLUTION RESPIRATORY (INHALATION)
Status: DISCONTINUED | OUTPATIENT
Start: 2023-03-17 | End: 2023-03-17

## 2023-03-17 RX ORDER — SODIUM CHLORIDE, SODIUM LACTATE, POTASSIUM CHLORIDE, CALCIUM CHLORIDE 600; 310; 30; 20 MG/100ML; MG/100ML; MG/100ML; MG/100ML
INJECTION, SOLUTION INTRAVENOUS CONTINUOUS PRN
Status: DISCONTINUED | OUTPATIENT
Start: 2023-03-17 | End: 2023-03-17

## 2023-03-17 RX ORDER — OXYBUTYNIN CHLORIDE 5 MG/1
5 TABLET ORAL 3 TIMES DAILY PRN
Status: DISCONTINUED | OUTPATIENT
Start: 2023-03-17 | End: 2023-03-18 | Stop reason: HOSPADM

## 2023-03-17 RX ORDER — SODIUM CHLORIDE, SODIUM LACTATE, POTASSIUM CHLORIDE, CALCIUM CHLORIDE 600; 310; 30; 20 MG/100ML; MG/100ML; MG/100ML; MG/100ML
125 INJECTION, SOLUTION INTRAVENOUS CONTINUOUS
Status: DISCONTINUED | OUTPATIENT
Start: 2023-03-17 | End: 2023-03-17

## 2023-03-17 RX ORDER — ACETAMINOPHEN 325 MG/1
650 TABLET ORAL EVERY 6 HOURS PRN
Status: DISCONTINUED | OUTPATIENT
Start: 2023-03-17 | End: 2023-03-18 | Stop reason: HOSPADM

## 2023-03-17 RX ORDER — ACETAMINOPHEN 325 MG/1
975 TABLET ORAL ONCE
Status: COMPLETED | OUTPATIENT
Start: 2023-03-17 | End: 2023-03-17

## 2023-03-17 RX ORDER — OXYCODONE HYDROCHLORIDE 10 MG/1
30 TABLET ORAL EVERY 6 HOURS
Status: DISCONTINUED | OUTPATIENT
Start: 2023-03-17 | End: 2023-03-18 | Stop reason: HOSPADM

## 2023-03-17 RX ORDER — HYDROCODONE BITARTRATE AND ACETAMINOPHEN 5; 325 MG/1; MG/1
1 TABLET ORAL EVERY 4 HOURS PRN
Status: DISCONTINUED | OUTPATIENT
Start: 2023-03-17 | End: 2023-03-18 | Stop reason: HOSPADM

## 2023-03-17 RX ORDER — CEFAZOLIN SODIUM 2 G/50ML
2000 SOLUTION INTRAVENOUS ONCE
Status: COMPLETED | OUTPATIENT
Start: 2023-03-17 | End: 2023-03-17

## 2023-03-17 RX ORDER — METOPROLOL TARTRATE 5 MG/5ML
INJECTION INTRAVENOUS AS NEEDED
Status: DISCONTINUED | OUTPATIENT
Start: 2023-03-17 | End: 2023-03-17

## 2023-03-17 RX ORDER — ROCURONIUM BROMIDE 10 MG/ML
INJECTION, SOLUTION INTRAVENOUS AS NEEDED
Status: DISCONTINUED | OUTPATIENT
Start: 2023-03-17 | End: 2023-03-17

## 2023-03-17 RX ORDER — HYDROMORPHONE HCL/PF 1 MG/ML
0.4 SYRINGE (ML) INJECTION ONCE
Status: COMPLETED | OUTPATIENT
Start: 2023-03-17 | End: 2023-03-17

## 2023-03-17 RX ORDER — ALBUTEROL SULFATE 2.5 MG/3ML
2.5 SOLUTION RESPIRATORY (INHALATION) ONCE AS NEEDED
Status: DISCONTINUED | OUTPATIENT
Start: 2023-03-17 | End: 2023-03-18 | Stop reason: HOSPADM

## 2023-03-17 RX ORDER — PHENAZOPYRIDINE HYDROCHLORIDE 100 MG/1
100 TABLET, FILM COATED ORAL
Status: DISCONTINUED | OUTPATIENT
Start: 2023-03-17 | End: 2023-03-18 | Stop reason: HOSPADM

## 2023-03-17 RX ORDER — MAGNESIUM HYDROXIDE 1200 MG/15ML
LIQUID ORAL AS NEEDED
Status: DISCONTINUED | OUTPATIENT
Start: 2023-03-17 | End: 2023-03-17 | Stop reason: HOSPADM

## 2023-03-17 RX ORDER — GABAPENTIN 300 MG/1
300 CAPSULE ORAL 2 TIMES DAILY
Status: DISCONTINUED | OUTPATIENT
Start: 2023-03-17 | End: 2023-03-18 | Stop reason: HOSPADM

## 2023-03-17 RX ORDER — ONDANSETRON 2 MG/ML
4 INJECTION INTRAMUSCULAR; INTRAVENOUS ONCE AS NEEDED
Status: DISCONTINUED | OUTPATIENT
Start: 2023-03-17 | End: 2023-03-18 | Stop reason: HOSPADM

## 2023-03-17 RX ORDER — PROPOFOL 10 MG/ML
INJECTION, EMULSION INTRAVENOUS AS NEEDED
Status: DISCONTINUED | OUTPATIENT
Start: 2023-03-17 | End: 2023-03-17

## 2023-03-17 RX ORDER — FENTANYL CITRATE 50 UG/ML
INJECTION, SOLUTION INTRAMUSCULAR; INTRAVENOUS
Status: COMPLETED
Start: 2023-03-17 | End: 2023-03-17

## 2023-03-17 RX ORDER — HYDROMORPHONE HCL/PF 1 MG/ML
1 SYRINGE (ML) INJECTION ONCE
Status: COMPLETED | OUTPATIENT
Start: 2023-03-17 | End: 2023-03-17

## 2023-03-17 RX ORDER — FENTANYL CITRATE/PF 50 MCG/ML
25 SYRINGE (ML) INJECTION
Status: DISCONTINUED | OUTPATIENT
Start: 2023-03-17 | End: 2023-03-18 | Stop reason: HOSPADM

## 2023-03-17 RX ORDER — ATORVASTATIN CALCIUM 40 MG/1
40 TABLET, FILM COATED ORAL
Status: DISCONTINUED | OUTPATIENT
Start: 2023-03-17 | End: 2023-03-18 | Stop reason: HOSPADM

## 2023-03-17 RX ORDER — DEXAMETHASONE SODIUM PHOSPHATE 10 MG/ML
INJECTION, SOLUTION INTRAMUSCULAR; INTRAVENOUS AS NEEDED
Status: DISCONTINUED | OUTPATIENT
Start: 2023-03-17 | End: 2023-03-17

## 2023-03-17 RX ORDER — TORSEMIDE 20 MG/1
60 TABLET ORAL DAILY
Status: DISCONTINUED | OUTPATIENT
Start: 2023-03-18 | End: 2023-03-18 | Stop reason: HOSPADM

## 2023-03-17 RX ORDER — ONDANSETRON 2 MG/ML
INJECTION INTRAMUSCULAR; INTRAVENOUS AS NEEDED
Status: DISCONTINUED | OUTPATIENT
Start: 2023-03-17 | End: 2023-03-17

## 2023-03-17 RX ADMIN — HYDROMORPHONE HYDROCHLORIDE 1 MG: 1 INJECTION, SOLUTION INTRAMUSCULAR; INTRAVENOUS; SUBCUTANEOUS at 11:42

## 2023-03-17 RX ADMIN — SUGAMMADEX 200 MG: 100 INJECTION, SOLUTION INTRAVENOUS at 10:01

## 2023-03-17 RX ADMIN — ONDANSETRON 4 MG: 2 INJECTION INTRAMUSCULAR; INTRAVENOUS at 09:54

## 2023-03-17 RX ADMIN — METOPROLOL TARTRATE 2.5 MG: 5 INJECTION INTRAVENOUS at 09:32

## 2023-03-17 RX ADMIN — FENTANYL CITRATE 25 MCG: 50 INJECTION INTRAMUSCULAR; INTRAVENOUS at 10:24

## 2023-03-17 RX ADMIN — PHENAZOPYRIDINE 100 MG: 100 TABLET ORAL at 15:23

## 2023-03-17 RX ADMIN — FENTANYL CITRATE 100 MCG: 50 INJECTION INTRAMUSCULAR; INTRAVENOUS at 08:48

## 2023-03-17 RX ADMIN — PROPOFOL 200 MG: 10 INJECTION, EMULSION INTRAVENOUS at 08:48

## 2023-03-17 RX ADMIN — MORPHINE SULFATE 2 MG: 2 INJECTION, SOLUTION INTRAMUSCULAR; INTRAVENOUS at 12:39

## 2023-03-17 RX ADMIN — MORPHINE SULFATE 2 MG: 2 INJECTION, SOLUTION INTRAMUSCULAR; INTRAVENOUS at 17:20

## 2023-03-17 RX ADMIN — OXYBUTYNIN CHLORIDE 5 MG: 5 TABLET ORAL at 15:25

## 2023-03-17 RX ADMIN — OXYCODONE HYDROCHLORIDE 30 MG: 10 TABLET ORAL at 22:15

## 2023-03-17 RX ADMIN — PHENAZOPYRIDINE 100 MG: 100 TABLET ORAL at 10:58

## 2023-03-17 RX ADMIN — GABAPENTIN 300 MG: 300 CAPSULE ORAL at 17:20

## 2023-03-17 RX ADMIN — DEXAMETHASONE SODIUM PHOSPHATE 8 MG: 10 INJECTION, SOLUTION INTRAMUSCULAR; INTRAVENOUS at 09:03

## 2023-03-17 RX ADMIN — FENTANYL CITRATE 50 MCG: 50 INJECTION INTRAMUSCULAR; INTRAVENOUS at 09:34

## 2023-03-17 RX ADMIN — MORPHINE SULFATE 2 MG: 2 INJECTION, SOLUTION INTRAMUSCULAR; INTRAVENOUS at 21:26

## 2023-03-17 RX ADMIN — ROCURONIUM BROMIDE 50 MG: 50 INJECTION, SOLUTION INTRAVENOUS at 08:48

## 2023-03-17 RX ADMIN — FENTANYL CITRATE 25 MCG: 50 INJECTION INTRAMUSCULAR; INTRAVENOUS at 10:33

## 2023-03-17 RX ADMIN — METOPROLOL TARTRATE 2.5 MG: 5 INJECTION INTRAVENOUS at 09:08

## 2023-03-17 RX ADMIN — MIDAZOLAM 2 MG: 1 INJECTION INTRAMUSCULAR; INTRAVENOUS at 08:42

## 2023-03-17 RX ADMIN — ACETAMINOPHEN 325MG 975 MG: 325 TABLET ORAL at 11:42

## 2023-03-17 RX ADMIN — SODIUM CHLORIDE, SODIUM LACTATE, POTASSIUM CHLORIDE, AND CALCIUM CHLORIDE: .6; .31; .03; .02 INJECTION, SOLUTION INTRAVENOUS at 08:10

## 2023-03-17 RX ADMIN — ROCURONIUM BROMIDE 25 MG: 50 INJECTION, SOLUTION INTRAVENOUS at 09:34

## 2023-03-17 RX ADMIN — ROCURONIUM BROMIDE 10 MG: 50 INJECTION, SOLUTION INTRAVENOUS at 09:17

## 2023-03-17 RX ADMIN — CEFAZOLIN SODIUM 2000 MG: 2 SOLUTION INTRAVENOUS at 08:42

## 2023-03-17 RX ADMIN — HYDROMORPHONE HYDROCHLORIDE 0.4 MG: 1 INJECTION, SOLUTION INTRAMUSCULAR; INTRAVENOUS; SUBCUTANEOUS at 11:11

## 2023-03-17 RX ADMIN — OXYBUTYNIN CHLORIDE 5 MG: 5 TABLET ORAL at 10:32

## 2023-03-17 RX ADMIN — ATORVASTATIN CALCIUM 40 MG: 40 TABLET, FILM COATED ORAL at 17:20

## 2023-03-17 RX ADMIN — ROCURONIUM BROMIDE 15 MG: 50 INJECTION, SOLUTION INTRAVENOUS at 09:02

## 2023-03-17 RX ADMIN — SODIUM CHLORIDE, SODIUM LACTATE, POTASSIUM CHLORIDE, AND CALCIUM CHLORIDE: .6; .31; .03; .02 INJECTION, SOLUTION INTRAVENOUS at 10:00

## 2023-03-17 RX ADMIN — FENTANYL CITRATE 50 MCG: 50 INJECTION INTRAMUSCULAR; INTRAVENOUS at 09:59

## 2023-03-17 NOTE — ASSESSMENT & PLAN NOTE
Status post TURBT with urology  Continue CBI as per urology recommendation  Pain management  Anticoagulation

## 2023-03-17 NOTE — OP NOTE
OPERATIVE REPORT  PATIENT NAME: Aliza Dunbar    :  1960  MRN: [id number]  Pt Location: [facility] OR ROOM 02    SURGERY DATE: 3/17/2023    Surgeon(s) and Role:     * Mona Rubinstein, MD - Primary    Preop Diagnosis:  Malignant neoplasm of anterior wall of urinary bladder ([facility] [address] ) [C67 3]    Post-Op Diagnosis Codes:     * Malignant neoplasm of anterior wall of urinary bladder (HCC) [C67 3]    Procedure(s):  TRANSURETHRAL RESECTION OF BLADDER TUMOR (TURBT)  Bilateral - bilateral retrograde    Specimen(s):  ID Type Source Tests Collected by Time Destination   1 : bladder tumor (anterior) Tissue Urinary Bladder TISSUE EXAM Mona Rubinstein, MD 3/17/2023  9:01 AM        Estimated Blood Loss:   Minimal    Drains:  Urethral Catheter 24 Fr  (Active)   Number of days: 0       Anesthesia Type:   General/LMA    Operative Indications:  Malignant neoplasm of anterior wall of urinary bladder (HCC) [C67 3]  Gross hematuria    Operative Findings:  Normal anterior and posterior urethra  He had an enlarged prostatic fossa with a prominent median lobe  The bladder showed the anterior bladder wall mass worrisome for bladder cancer it was about 3 cm  The rest of his bladder looked fine just some mild bladder trabeculation  Right retrograde and left retrograde pyelograms were both normal no filling defect strictures or stones    Complications:   None    Procedure and Technique:  Patient was placed in dorsolithotomy vision and perineum and external genitalia were prepped and draped usual sterile fashion Debra Jungling sounds were used to dilate the anterior urethra to 27 [place] and then we placed a 24 Wolof resectoscope sheath into the bladder  However prior to doing that we did do our own cystoscopy using the 30 and 70 degree lens with the above findings noted    Once the resectoscope was in the bladder we used a 24 medium loop and we resected the tumor great care was taken not to perforate the bladder we removed all visible tumor on that anterior/right side of his bladder neck area  All specimen was removed hemostasis was achieved  Prior to doing the TUR however when we were doing the cystoscopy we did do bilateral retrograde pyelograms using a tiger tail with the above findings both collecting systems were normal with no filling defects no strictures no hydro      A three-way Barragan catheter was placed 24 Swazi irrigated to clear hooked up to CBI patient tolerated well   I was present for the entire procedure    Patient Disposition:  hemodynamically stable        SIGNATURE: Agustin Sarabia MD  DATE: March 17, 2023  TIME: 10:05 AM

## 2023-03-17 NOTE — ANESTHESIA PREPROCEDURE EVALUATION
Procedure:  TRANSURETHRAL RESECTION OF BLADDER TUMOR (TURBT) (Bladder)  bilateral retrograde (Bilateral: Ureter)    Relevant Problems   CARDIO   (+) Hypertension      PULMONARY   (+) Obstructive sleep apnea on CPAP      Other   (+) Benign prostatic hyperplasia with urinary frequency   (+) Bladder mass      Sinus bradycardia  Otherwise normal ECG  When compared with ECG of 11-JAN-2021 08:39,  QT has shortened  Confirmed by Heather Toney, [employer] ([id number]) on 2/18/2023 10:18:34 PM    TTE SUMMARY (2020): -  Stress results: There was no chest pain during stress  -  ECG conclusions: The stress ECG was negative for ischemia and normal   -  Perfusion imaging: There was a moderate-sized, moderately severe, mainly fixed myocardial perfusion defect of the basal to mid inferior wall  There does appear to be a mild degree of inferoapical ischemia (although study quality is  poor)  There was a moderate-sized, mildly severe, partially reversible myocardial perfusion defect of the basal to mid anterior/anterolateral wall  There is a small amount of ischemia noted in the anterior/anterolateral wall (again limited  by study quality)  -  Gated SPECT: The calculated left ventricular ejection fraction was 55 %  Left ventricular ejection fraction was within normal limits by visual estimate      IMPRESSIONS: Abnormal study after pharmacologic stress with a fixed inferior wall defect with a mild degree of ischemia noted in the apical inferior wall as well as a small fixed defect in the basal to mid anterior wall with mild ischemia  in the anterolateral wall  Study quality severely reduces accuracy of the test  Significant patient motion noted on raw images  Left ventricular systolic function was normal     Physical Exam    Airway    Mallampati score:  I  TM Distance: >3 FB  Neck ROM: full     Dental   upper dentures and lower dentures,     Cardiovascular  Rhythm: regular, Rate: normal,     Pulmonary  Breath sounds clear to auscultation, Other Findings        Anesthesia Plan  ASA Score- 3     Anesthesia Type- general with ASA Monitors  Additional Monitors:   Airway Plan: ETT  Plan Factors-Exercise tolerance (METS): >4 METS  Chart reviewed  Patient is not a current smoker  Obstructive sleep apnea risk education given perioperatively  Induction- intravenous  Postoperative Plan- Plan for postoperative opioid use  Planned trial extubation    Informed Consent- Anesthetic plan and risks discussed with patient  I personally reviewed this patient with the CRNA  Discussed and agreed on the Anesthesia Plan with the CRNA  Margo Larson

## 2023-03-17 NOTE — CONSULTS
Tyra 80 1960, 58 y o  male MRN: [id number]  Unit/Bed#: -01 Encounter: [id number]  Primary Care Provider: Isabella Castano DO   Date and time admitted to hospital: 3/17/2023  7:04 AM    Inpatient consult to Internal Medicine  Consult performed by: Delon Martinez MD  Consult ordered by: Tres Garcia MD          Obstructive sleep apnea on CPAP  Assessment & Plan  Continue CPAP at night    * Malignant neoplasm of anterior wall of urinary bladder [facility])  Assessment & Plan  Status post TURBT with urology  Continue CBI as per urology recommendation  Pain management  Anticoagulation    Hypertension  Assessment & Plan  Blood pressure is in target range  Continue metoprolol , torsemide        VTE Prophylaxis:   Patient is status post surgery with hematuria, avoid any kind of anticoagulant    Recommendations for Discharge:  · Resume all home medication    Total Time Spent on Date of Encounter in care of patient: 15 minutes This time was spent on one or more of the following: performing physical exam; counseling and coordination of care; obtaining or reviewing history; documenting in the medical record; reviewing/ordering tests, medications or procedures; communicating with other healthcare professionals and discussing with patient's family/caregivers  Collaboration of Care: Were Recommendations Directly Discussed with Primary Treatment Team? Yes    History of Present Illness:  Scott Osman is a 58 y o  male who is originally admitted to the urology service due to malignant neoplasm of anterior wall of bladder, status post surgical procedure  We are consulted for additional management  Waning of pain and pressure-like effect and burning in the urine area  Denies any nausea, vomiting, diarrhea  Patient reports he had history of hypertension hyperlipidemia and CPAP      Review of Systems:  Review of Systems   Constitutional: Negative for activity change, appetite change, chills and diaphoresis  HENT: Negative for congestion, dental problem and drooling  Respiratory: Negative for apnea, choking, chest tightness, shortness of breath, wheezing and stridor  Cardiovascular: Negative for chest pain, palpitations and leg swelling  Gastrointestinal: Negative for abdominal distention, abdominal pain, anal bleeding, blood in stool, rectal pain and vomiting  Genitourinary: Positive for dysuria  Musculoskeletal: Negative for arthralgias, back pain and gait problem  Skin: Negative for color change and pallor  Neurological: Negative for dizziness, facial asymmetry and headaches  Hematological: Negative for adenopathy  Psychiatric/Behavioral: Negative for agitation, behavioral problems, confusion and decreased concentration  All other systems reviewed and are negative        Past Medical and Surgical History:   Past Medical History:   Diagnosis Date   • Arthritis    • Bladder cancer [address] [address] )    • [facility]    • Chronic pain disorder     lower back and down both legs   • Colon polyp    • Coronary artery disease    • CPAP (continuous positive airway pressure) dependence    • Does use hearing aid     will wear DOS   • Full dentures    • Heart failure (HCC)     and "fluid retention" SL ABDULAZIZ musa PA"   • High cholesterol    • Hypertension    • Mild ankle edema     and feet   • Obstructive sleep apnea on CPAP    • Prediabetes    • Shortness of breath     per pt "with just exertion"   • Sleep apnea    • Wears glasses        Past Surgical History:   Procedure Laterality Date   • BACK SURGERY      titanium rods implanted   • COLONOSCOPY     • CYSTOSCOPY W/ URETERAL STENT PLACEMENT Bilateral 3/17/2023    Procedure: bilateral retrograde;  Surgeon: Lilia Perkins MD;  Location: [facility];  Service: Urology   • HERNIA REPAIR      x5   • TRANSURETHRAL RESECTION OF BLADDER TUMOR N/A 3/17/2023    Procedure: TRANSURETHRAL RESECTION OF BLADDER TUMOR (TURBT); Surgeon: Chace Alonzo MD;  Location:  MAIN OR;  Service: Urology       Meds/Allergies:  all medications and allergies reviewed    Allergies: No Known Allergies    Social History:  Marital Status: /Civil Union  Substance Use History:   Social History     Substance and Sexual Activity   Alcohol Use Not Currently    Comment: 3 per year     Social History     Tobacco Use   Smoking Status Former   • Packs/day: 1 00   • Years: 35 00   • Pack years: 35 00   • Types: Cigarettes   • Start date: 2023   • Quit date: 2016   • Years since quittin 2   Smokeless Tobacco Never     Social History     Substance and Sexual Activity   Drug Use Not Currently   • Types: Marijuana       Family History:  Noncontributory    Physical Exam:   Vitals:   Blood Pressure: 144/64 (23 1451)  Pulse: 65 (23 145)  Temperature: 97 5 °F (36 4 °C) (23 145)  Temp Source: Oral (23 07)  Respirations: 16 (23 145)  Height: 5' 8" (172 7 cm) (23 0725)  Weight - Scale: 109 kg (240 lb 4 8 oz) (23 0725)  SpO2: 94 % (23 145)    Physical Exam  Vitals and nursing note reviewed  Constitutional:       Appearance: Normal appearance  He is not ill-appearing or diaphoretic  HENT:      Nose: Nose normal  No congestion  Mouth/Throat:      Mouth: Mucous membranes are moist       Pharynx: Oropharynx is clear  No oropharyngeal exudate  Eyes:      General:         Left eye: No discharge  Extraocular Movements: Extraocular movements intact  Conjunctiva/sclera: Conjunctivae normal       Pupils: Pupils are equal, round, and reactive to light  Cardiovascular:      Rate and Rhythm: Normal rate  Heart sounds: No murmur heard  No friction rub  No gallop  Pulmonary:      Effort: Pulmonary effort is normal  No respiratory distress  Breath sounds: No stridor  No wheezing or rhonchi  Abdominal:      General: Abdomen is flat   Bowel sounds are normal  There is no distension  Palpations: There is no mass  Tenderness: There is no abdominal tenderness  Hernia: No hernia is present  Genitourinary:     Comments: Barragan in place with continuous bladder irrigation  Musculoskeletal:         General: No swelling or deformity  Normal range of motion  Cervical back: Normal range of motion  No rigidity  Lymphadenopathy:      Cervical: No cervical adenopathy  Skin:     General: Skin is warm  Capillary Refill: Capillary refill takes less than 2 seconds  Neurological:      General: No focal deficit present  Mental Status: He is alert and oriented to person, place, and time  Cranial Nerves: No cranial nerve deficit  Sensory: No sensory deficit  Motor: No weakness  Coordination: Coordination normal          Additional Data:   Lab Results:                    No results found for: HGBA1C            Imaging: No pertinent imaging reviewed  XR retrograde pyelogram    (Results Pending)       EKG, Pathology, and Other Studies Reviewed on Admission:   · EKG: No EKG obtained  ** Please Note: This note may have been constructed using a voice recognition system   **

## 2023-03-17 NOTE — INTERVAL H&P NOTE
H&P reviewed  After examining the patient I find no changes in the patients condition since the H&P had been written      Vitals:    03/17/23 0725   BP: 150/70   Pulse: 64   Resp: 20   Temp: 98 6 °F (37 °C)   SpO2: 94%

## 2023-03-17 NOTE — PLAN OF CARE
Problem: MOBILITY - ADULT  Goal: Maintain or return to baseline ADL function  Description: INTERVENTIONS:  -  Assess patient's ability to carry out ADLs; assess patient's baseline for ADL function and identify physical deficits which impact ability to perform ADLs (bathing, care of mouth/teeth, toileting, grooming, dressing, etc )  - Assess/evaluate cause of self-care deficits   - Assess range of motion  - Assess patient's mobility; develop plan if impaired  - Assess patient's need for assistive devices and provide as appropriate  - Encourage maximum independence but intervene and supervise when necessary  - Involve family in performance of ADLs  - Assess for home care needs following discharge   - Consider OT consult to assist with ADL evaluation and planning for discharge  - Provide patient education as appropriate  Outcome: Progressing  Goal: Maintains/Returns to pre admission functional level  Description: INTERVENTIONS:  - Perform BMAT or MOVE assessment daily    - Set and communicate daily mobility goal to care team and patient/family/caregiver     - Collaborate with rehabilitation services on mobility goals if consulted  - Ambulate patient 3 times a day  - Out of bed for toileting  - Record patient progress and toleration of activity level   Outcome: Progressing     Problem: GENITOURINARY - ADULT  Goal: Maintains or returns to baseline urinary function  Description: INTERVENTIONS:  - Assess urinary function  - Encourage oral fluids to ensure adequate hydration if ordered  - Administer IV fluids as ordered to ensure adequate hydration  - Administer ordered medications as needed  - Offer frequent toileting  - Follow urinary retention protocol if ordered  Outcome: Progressing  Goal: Absence of urinary retention  Description: INTERVENTIONS:  - Assess patient’s ability to void and empty bladder  - Monitor I/O  - Bladder scan as needed  - Discuss with physician/AP medications to alleviate retention as needed  - Discuss catheterization for long term situations as appropriate  Outcome: Progressing  Goal: Urinary catheter remains patent  Description: INTERVENTIONS:  - Assess patency of urinary catheter  - If patient has a chronic solis, consider changing catheter if non-functioning  - Follow guidelines for intermittent irrigation of non-functioning urinary catheter  Outcome: Progressing     Problem: PAIN - ADULT  Goal: Verbalizes/displays adequate comfort level or baseline comfort level  Description: Interventions:  - Encourage patient to monitor pain and request assistance  - Assess pain using appropriate pain scale  - Administer analgesics based on type and severity of pain and evaluate response  - Implement non-pharmacological measures as appropriate and evaluate response  - Consider cultural and social influences on pain and pain management  - Notify physician/advanced practitioner if interventions unsuccessful or patient reports new pain  Outcome: Progressing     Problem: INFECTION - ADULT  Goal: Absence or prevention of progression during hospitalization  Description: INTERVENTIONS:  - Assess and monitor for signs and symptoms of infection  - Monitor lab/diagnostic results  - Monitor all insertion sites, i e  indwelling lines, tubes, and drains  - Monitor endotracheal if appropriate and nasal secretions for changes in amount and color  - [place] appropriate cooling/warming therapies per order  - Administer medications as ordered  - Instruct and encourage patient and family to use good hand hygiene technique  - Identify and instruct in appropriate isolation precautions for identified infection/condition  Outcome: Progressing     Problem: DISCHARGE PLANNING  Goal: Discharge to home or other facility with appropriate resources  Description: INTERVENTIONS:  - Identify barriers to discharge w/patient and caregiver  - Arrange for needed discharge resources and transportation as appropriate  - Identify discharge learning needs (meds, wound care, etc )  - Arrange for interpretive services to assist at discharge as needed  - Refer to Case Management Department for coordinating discharge planning if the patient needs post-hospital services based on physician/advanced practitioner order or complex needs related to functional status, cognitive ability, or social support system  Outcome: Progressing     Problem: Knowledge Deficit  Goal: Patient/family/caregiver demonstrates understanding of disease process, treatment plan, medications, and discharge instructions  Description: Complete learning assessment and assess knowledge base    Interventions:  - Provide teaching at level of understanding  - Provide teaching via preferred learning methods  Outcome: Progressing

## 2023-03-17 NOTE — ANESTHESIA POSTPROCEDURE EVALUATION
Post-Op Assessment Note    CV Status:  Stable    Pain management: adequate     Mental Status:  Alert and awake   Hydration Status:  Euvolemic   PONV Controlled:  Controlled   Airway Patency:  Patent      Post Op Vitals Reviewed: Yes      Staff: CRNA         No notable events documented      /70 (03/17/23 1014)    Temp 97 7 °F (36 5 °C) (03/17/23 1014)    Pulse 68 (03/17/23 1014)   Resp 20 (03/17/23 1014)    SpO2 97 % (03/17/23 1014)

## 2023-03-17 NOTE — RESPIRATORY THERAPY NOTE
RT Protocol Note  Hemalatha Costello 58 y o  male MRN: [id number]  Unit/Bed#: -01 Encounter: [id number]    Assessment    Principal Problem:    Malignant neoplasm of anterior wall of urinary bladder (HCC)  Active Problems:    Obstructive sleep apnea on CPAP    Hypertension      Home Pulmonary Medications:    Home Devices/Therapy: BiPAP/CPAP    Past Medical History:   Diagnosis Date   • Arthritis    • Bladder cancer ([facility] [address] )    • Chronic narcotic dependence ([facility] [address] )    • Chronic pain disorder     lower back and down both legs   • Colon polyp    • Coronary artery disease    • CPAP (continuous positive airway pressure) dependence    • Does use hearing aid     will wear DOS   • Full dentures    • Heart failure (HCC)     and "fluid retention" SL OW B Daryl cardio PA"   • High cholesterol    • Hypertension    • Mild ankle edema     and feet   • Obstructive sleep apnea on CPAP    • Prediabetes    • Shortness of breath     per pt "with just exertion"   • Sleep apnea    • Wears glasses      Social History     Socioeconomic History   • Marital status: /Civil Union     Spouse name: None   • Number of children: None   • Years of education: None   • Highest education level: None   Occupational History   • None   Tobacco Use   • Smoking status: Former     Packs/day: 1 00     Years: 35 00     Pack years: 35 00     Types: Cigarettes     Start date: 2023     Quit date: 2016     Years since quittin 2   • Smokeless tobacco: Never   Vaping Use   • Vaping Use: Never used   Substance and Sexual Activity   • Alcohol use: Not Currently     Comment: 3 per year   • Drug use: Not Currently     Types: Marijuana   • Sexual activity: Not Currently     Partners: Female     Birth control/protection: Male Sterilization     Comment: defer   Other Topics Concern   • None   Social History Narrative   • None     Social Determinants of Health     Financial Resource Strain: Not on file   Food Insecurity: Not on file   Transportation Needs: Not on file   Physical Activity: Not on file   Stress: Not on file   Social Connections: Not on file   Intimate Partner Violence: Not on file   Housing Stability: Not on file       Subjective         Objective    Physical Exam:   Assessment Type: Assess only  General Appearance: Alert, Awake  Respiratory Pattern: Normal  Chest Assessment: Chest expansion symmetrical  Bilateral Breath Sounds: Clear, Diminished    Vitals:  Blood pressure 144/64, pulse 65, temperature 97 5 °F (36 4 °C), resp  rate 16, height 5' 8" (1 727 m), weight 109 kg (240 lb 4 8 oz), SpO2 94 %  Imaging and other studies: I have personally reviewed pertinent reports              Plan    Respiratory Plan: Discontinue Protocol        Resp Comments: Pt denies pulm hx besides portillo, denies SOB, denies home resp meds

## 2023-03-18 VITALS
SYSTOLIC BLOOD PRESSURE: 143 MMHG | BODY MASS INDEX: 36.42 KG/M2 | OXYGEN SATURATION: 96 % | RESPIRATION RATE: 18 BRPM | DIASTOLIC BLOOD PRESSURE: 62 MMHG | TEMPERATURE: 97.7 F | HEIGHT: 68 IN | WEIGHT: 240.3 LBS | HEART RATE: 52 BPM

## 2023-03-18 PROBLEM — Z87.898 HISTORY OF GROSS HEMATURIA: Status: ACTIVE | Noted: 2023-03-18

## 2023-03-18 LAB
HCT VFR BLD AUTO: 36.3 % (ref 36.5–49.3)
HGB BLD-MCNC: 12 G/DL (ref 12–17)

## 2023-03-18 RX ORDER — PHENAZOPYRIDINE HYDROCHLORIDE 100 MG/1
100 TABLET, FILM COATED ORAL 3 TIMES DAILY PRN
Qty: 20 TABLET | Refills: 0 | Status: SHIPPED | OUTPATIENT
Start: 2023-03-18 | End: 2023-03-22

## 2023-03-18 RX ORDER — CEFUROXIME AXETIL 500 MG/1
500 TABLET ORAL EVERY 12 HOURS SCHEDULED
Qty: 8 TABLET | Refills: 0 | Status: SHIPPED | OUTPATIENT
Start: 2023-03-18 | End: 2023-03-22

## 2023-03-18 RX ADMIN — OXYBUTYNIN CHLORIDE 5 MG: 5 TABLET ORAL at 00:12

## 2023-03-18 RX ADMIN — MORPHINE SULFATE 2 MG: 2 INJECTION, SOLUTION INTRAMUSCULAR; INTRAVENOUS at 01:27

## 2023-03-18 RX ADMIN — PHENAZOPYRIDINE 100 MG: 100 TABLET ORAL at 11:22

## 2023-03-18 RX ADMIN — GABAPENTIN 300 MG: 300 CAPSULE ORAL at 08:02

## 2023-03-18 RX ADMIN — METOPROLOL SUCCINATE 25 MG: 25 TABLET, EXTENDED RELEASE ORAL at 08:00

## 2023-03-18 RX ADMIN — PHENAZOPYRIDINE 100 MG: 100 TABLET ORAL at 07:59

## 2023-03-18 RX ADMIN — HYDROCODONE BITARTRATE AND ACETAMINOPHEN 1 TABLET: 5; 325 TABLET ORAL at 11:22

## 2023-03-18 RX ADMIN — TORSEMIDE 60 MG: 20 TABLET ORAL at 08:00

## 2023-03-18 RX ADMIN — OXYCODONE HYDROCHLORIDE 30 MG: 10 TABLET ORAL at 08:02

## 2023-03-18 RX ADMIN — MORPHINE SULFATE 2 MG: 2 INJECTION, SOLUTION INTRAMUSCULAR; INTRAVENOUS at 06:09

## 2023-03-18 RX ADMIN — OXYBUTYNIN CHLORIDE 5 MG: 5 TABLET ORAL at 11:22

## 2023-03-18 RX ADMIN — OXYCODONE HYDROCHLORIDE 30 MG: 10 TABLET ORAL at 03:50

## 2023-03-18 NOTE — RESPIRATORY THERAPY NOTE
Assisted patient with home CPAP unit set up including t-piece for O2 bleed in and sterile water for humidifier    Patient is capable of managing its use on his own and was encouraged to do so

## 2023-03-18 NOTE — DISCHARGE INSTR - AVS FIRST PAGE
You are being sent home with a solis catheter in place  This may be removed by Dr My Ge at your follow up  Until then you will need to care for this  Instructions are provided to you so you can properly care for your catheter  Take your medications as directed  You are prescribed an antibiotic called ceftin  It is very important you take this medication to prevent infection  Follow up with Dr My Ge as scheduled on Tuesday 3/21/23 in [facility] at 1:15PM     Call Dr Laura Strong office, [place] [address] Urology, with any concerns: [phone]  Nurse's notes:    KEEP CATHETER BAG BELOW BLADDER LEVEL AT ALL TIMES  When using leg bag, twist blue cap over toilet to open and twist to close--Empty this bag Frequently--    Use bigger bag for night time and squeeze green clips and then pop silver clamp over toilet or cylinder to empty  Re-clamp silver clip and insert green plug into mejía  Always keep extra port plugged with white plug  I have given you an extra one

## 2023-03-18 NOTE — PROGRESS NOTES
UROLOGY PROGRESS NOTE         NAME: Kamran Morris  AGE: 58 y o  SEX: male  : 1960   MRN: [id number]    DATE: 3/18/2023  TIME: 2:10 PM    Assessment and Plan         Bladder instillation     Date/Time 3/21/2023 1:14 PM     Performed by  Jakub Howell MD     Authorized by Jakub Howell MD      Procedure Details   Procedure Notes: The bladder was irrigated with 60 cc of normal saline irrigated to clear and a three-way 24 Armenian catheter was removed by deflating the balloon the patient tolerated well              Impression:   1  Bladder mass    2  History of gross hematuria    3  Benign prostatic hyperplasia with urinary frequency         Plan: Plan is to give the patient a call once we get the pathology back then make appropriate further recommendations based on his report  He and his wife agree with this plan  He has a drink extra water and rest and postop instructions were discussed he understands and agrees      Chief Complaint   No chief complaint on file  History of Present Illness     HPI: Kamran Morris is a 58y o  year old male who presents with postop day #4 status post cystoscopy bilateral retrograde pyelograms and TURBT of a large anterior calcified bladder mass suspicious for bladder cancer  Please note his retrogrades were normal   The patient did spend 1 day in the hospital postop with CBI and he did well  Patient is here for voiding trial and discussed pathology  Pathology report is still pending  No problems per patient with the catheter has been draining clear urine      The following portions of the patient's history were reviewed and updated as appropriate: allergies, current medications, past family history, past medical history, past social history, past surgical history and problem list   Past Medical History:   Diagnosis Date   • Arthritis    • Bladder cancer ([facility] [address] )    • Chronic narcotic dependence ([facility] [address] )    • Chronic pain disorder     lower back and down both legs • Colon polyp    • Coronary artery disease    • CPAP (continuous positive airway pressure) dependence    • Does use hearing aid     will wear DOS   • Full dentures    • Heart failure (HCC)     and "fluid retention" SL OW B Daryl cardio PA"   • High cholesterol    • Hypertension    • Mild ankle edema     and feet   • Obstructive sleep apnea on CPAP    • Prediabetes    • Shortness of breath     per pt "with just exertion"   • Sleep apnea    • Wears glasses      Past Surgical History:   Procedure Laterality Date   • BACK SURGERY      titanium rods implanted   • COLONOSCOPY     • CYSTOSCOPY W/ URETERAL STENT PLACEMENT Bilateral 3/17/2023    Procedure: bilateral retrograde;  Surgeon: Scottie Hatch MD;  Location:  MAIN OR;  Service: Urology   • HERNIA REPAIR      x5   • TRANSURETHRAL RESECTION OF BLADDER TUMOR N/A 3/17/2023    Procedure: TRANSURETHRAL RESECTION OF BLADDER TUMOR (TURBT); Surgeon: Scottie Hatch MD;  Location:  MAIN OR;  Service: Urology     shoulder  Review of Systems     Const: Denies chills, fever and weight loss  CV: Denies chest pain  Resp: Denies SOB  GI: Denies abdominal pain, nausea and vomiting  : Denies symptoms other than stated above  Musculo: Denies back pain  Objective   There were no vitals taken for this visit  Physical Exam  Const: Appears healthy and well developed  No signs of acute distress present  Resp: Respirations are regular and unlabored  CV: Rate is regular  Rhythm is regular  Abdomen: Abdomen is soft, nontender, and nondistended  Kidneys are not palpable  : nl  Psych: Patient's attitude is cooperative   Mood is normal  Affect is normal     Current Medications     Current Outpatient Medications:   •  aspirin 81 mg chewable tablet, Chew 81 mg daily, Disp: , Rfl:   •  atorvastatin (LIPITOR) 40 mg tablet, , Disp: , Rfl:   •  cefuroxime (CEFTIN) 500 mg tablet, Take 1 tablet (500 mg total) by mouth every 12 (twelve) hours for 4 days, Disp: 8 tablet, Rfl: 0  • clotrimazole-betamethasone (LOTRISONE) 1-0 05 % cream, , Disp: , Rfl:   •  co-enzyme Q-10 30 MG capsule, Take 100 mg by mouth daily , Disp: , Rfl:   •  Farxiga 5 MG TABS, 5 mg daily 5mg alternating with 2 5mg for fluid retention, Disp: , Rfl:   •  gabapentin (NEURONTIN) 300 mg capsule, Take 300 mg by mouth 2 (two) times a day , Disp: , Rfl:   •  lisinopril (ZESTRIL) 2 5 mg tablet, , Disp: , Rfl:   •  metolazone (ZAROXOLYN) 2 5 mg tablet, Take 2 5 mg by mouth 3 (three) times a week M-W-F, Disp: , Rfl:   •  metoprolol succinate (TOPROL-XL) 25 mg 24 hr tablet, Take 25 mg by mouth daily, Disp: , Rfl:   •  multivitamin-iron-minerals-folic acid (CENTRUM) chewable tablet, Chew 1 tablet daily, Disp: , Rfl:   •  oxyCODONE (ROXICODONE) 20 MG TABS, 30 mg 4 (four) times a day Takes 4 times daily per patient, Disp: , Rfl:   •  phenazopyridine (PYRIDIUM) 100 mg tablet, Take 1 tablet (100 mg total) by mouth 3 (three) times a day as needed for bladder spasms for up to 4 days, Disp: 20 tablet, Rfl: 0  •  potassium chloride (K-DUR,KLOR-CON) 20 mEq tablet, 2 (two) times a day, Disp: , Rfl:   •  torsemide (DEMADEX) 20 mg tablet, Take 60 mg by mouth daily, Disp: , Rfl:   •  vitamin B-12 (VITAMIN B-12) 1,000 mcg tablet, Take by mouth daily, Disp: , Rfl:   No current facility-administered medications for this visit      Facility-Administered Medications Ordered in Other Visits:   •  acetaminophen (TYLENOL) tablet 650 mg, 650 mg, Oral, Q6H PRN, Jenifer Webber MD  •  albuterol inhalation solution 2 5 mg, 2 5 mg, Nebulization, Once PRN, Jarod Griffith CRNA  •  atorvastatin (LIPITOR) tablet 40 mg, 40 mg, Oral, Daily With Mehreen Hinds MD, 40 mg at 03/17/23 1720  •  fentaNYL (SUBLIMAZE) injection 25 mcg, 25 mcg, Intravenous, Q3 min PRN, Jarod Griffith CRNA, 25 mcg at 03/17/23 1033  •  gabapentin (NEURONTIN) capsule 300 mg, 300 mg, Oral, BID, Jenifer Webber MD, 300 mg at 03/18/23 0802  •  HYDROcodone-acetaminophen ([facility]) 5-325 mg per tablet 1 tablet, 1 tablet, Oral, Q4H PRN, Jose Webber MD, 1 tablet at 03/18/23 1122  •  metoprolol succinate (TOPROL-XL) 24 hr tablet 25 mg, 25 mg, Oral, Daily, Yves Webber MD, 25 mg at 03/18/23 0800  •  morphine injection 2 mg, 2 mg, Intravenous, Q4H PRN, Mar Del Castillo MD, 2 mg at 03/18/23 0609  •  ondansetron [facility]) injection 4 mg, 4 mg, Intravenous, Once PRN, Shila Ocampo CRNA  •  oxybutynin [facility]) tablet 5 mg, 5 mg, Oral, TID PRN, Mar Del Castillo MD, 5 mg at 03/18/23 1122  •  oxyCODONE (ROXICODONE) immediate release tablet 30 mg, 30 mg, Oral, Q6H, Laura Landaverde MD, 30 mg at 03/18/23 0802  •  phenazopyridine (PYRIDIUM) tablet 100 mg, 100 mg, Oral, TID With Meals, Mar Del Castillo MD, 100 mg at 03/18/23 1122  •  torsemide (DEMADEX) tablet 60 mg, 60 mg, Oral, Daily, Gino Ormond, MD, 60 mg at 03/18/23 0800        Mar Del Castillo MD

## 2023-03-18 NOTE — NURSING NOTE
DC'ed to home in stable condition  Spouse as   Solis education completed w pt and spouse who verbalize understanding of same-See AVS for nurse's notes  Pt valuables w pt at DC including glasses dentures and CPAP  DC'ed w new 2000ml solis bag in place

## 2023-03-18 NOTE — PLAN OF CARE
Problem: GENITOURINARY - ADULT  Goal: Maintains or returns to baseline urinary function  Description: INTERVENTIONS:  - Assess urinary function  - Encourage oral fluids to ensure adequate hydration if ordered  - Administer IV fluids as ordered to ensure adequate hydration  - Administer ordered medications as needed  - Offer frequent toileting  - Follow urinary retention protocol if ordered  Outcome: Progressing  Goal: Absence of urinary retention  Description: INTERVENTIONS:  - Assess patient’s ability to void and empty bladder  - Monitor I/O  - Bladder scan as needed  - Discuss with physician/AP medications to alleviate retention as needed  - Discuss catheterization for long term situations as appropriate  Outcome: Progressing  Goal: Urinary catheter remains patent  Description: INTERVENTIONS:  - Assess patency of urinary catheter  - If patient has a chronic solis, consider changing catheter if non-functioning  - Follow guidelines for intermittent irrigation of non-functioning urinary catheter  Outcome: Progressing

## 2023-03-18 NOTE — QUICK NOTE
Patient quickly evaluated during the rounds  Resting comfortably  Patient is still has Barragan in place, in the bag, patient has blood-tinged urine  Blood in the tube, urine color is improving  Remained comfortable  H&H normal     From medicine point of view, patient remain stable to be discharged with resuming all home medication  Please feel free to reach out to medicine, if any further needs or concerns  Thanks for involving us with his patient care

## 2023-03-18 NOTE — DISCHARGE SUMMARY
Discharge Summary - Scott Osman 58 y o  male MRN: [id number]    Unit/Bed#: -01 Encounter: [id number]    Admission Date:   Admission Orders (From admission, onward)     Ordered        03/17/23 1014  Place in Observation  Once                        Admitting Diagnosis: Malignant neoplasm of anterior wall of urinary bladder ([address] ) [C67 3]    HPI: Scott Osman is a 58y o  year old male who presents for surgery for bladder mass  This patient has been worked up by Dr Kaley Rowland  On 2/13/2023 underwent a cystoscopy that revealed a 3 cm mass in the bladder anterior wall just before the dome  He presented with urinary frequency and an ultrasound in September showed the bladder lesion  His PSA in November 2022 was 0 3  It was noted on the office cystoscopy by Dr Kaley Rowland the patient had a moderately enlarged prostate but no ureteral strictures  Patient did have a CT scan done on 2/17/2023 this included the chest abdomen and pelvis that showed no evidence of metastatic disease  There was some mildly enlarged hilar and mediastinal nodes that were stable  It did note to 2 5 cm calcified mass in the bladder  It did mention some mild enlargement of the right iliac chain raising suspicion for possible local metastasis         Procedures Performed: TRANSURETHRAL RESECTION OF BLADDER TUMOR (TURBT)  Bilateral - bilateral retrograde by Dr Andres Bob on 3/17/23     Summary of Hospital Course: Patient presented for outpatient surgery with Dr Glynn Hassan on 3/17/23  Transurethral resection of bladder tumor and bilateral retrograde pyelogram completed excessively  Barragan catheter placed and CBI ran until POD#1 6AM  Medicine team managed comorbidities including hypertension  POD#1 patient felt appropriately sore in the lower abdomen but was overall feeling well  Initially bloody urine but began to clear up to yellow  Pt tolerated regular diet  Barragan left in place and patient discharged with pyridium and ceftin   Follow up already scheduled for Tuesday with Dr Faiza Breen  Significant Findings, Care, Treatment and Services Provided: 3cm anterior wall bladder tumor resected via transurethral approach by Dr Faiza Breen  Bilateral retrograde pyelogram both normal without filling defect, strictures, or stones  Complications: None    Discharge Diagnosis: s/p resection of bladder wall tumor    Medical Problems     Resolved Problems  Date Reviewed: 3/15/2023   None         Condition at Discharge: good         Discharge instructions/Information to patient and family:   See after visit summary for information provided to patient and family  Provisions for Follow-Up Care:  See after visit summary for information related to follow-up care and any pertinent home health orders  PCP: Ben Cordon DO    Disposition: Home    Planned Readmission: No      Discharge Statement   I spent 25 minutes discharging the patient  This time was spent on the day of discharge  I had direct contact with the patient on the day of discharge  Additional documentation is required if more than 30 minutes were spent on discharge  Discharge Medications:  See after visit summary for reconciled discharge medications provided to patient and family         John Clinton PA-C

## 2023-03-18 NOTE — PROGRESS NOTES
Progress Note - Urology  Samia Carrington 58 y o  male MRN: [id number]  Unit/Bed#: -01 Encounter: [id number]    Assessment:  - 58 year old male s/p transurethral resection of bladder tumor   - Intra-Op right and left retrograde pyelograms normal  - Placed on CBI post-op, d/amor at 6am today  - Afebrile  Bradycardic to 50bpm, RR 16, /55, SpO2 87% this AM on 2L nasal cannula  - 24hr urine output via catheter: 6425mL    Plan:  - Will re-evaluate later today around lunch for possible discharge  - F/u with Dr Brenda Farfan Tufanny  - CBI capped at 6AM today  - Leave in catheter at discharge, will likely be removed at Urology f/u  - Pyridium 100 TID prn   - Ceftin 500mg bid 4 days  - 2L O2 nasal cannula  - Incentive spirometry  - OOB with assitance    Subjective/Objective   Subjective: No acute events overnight  Patient reports overall he is doing well  He does have pressure/soreness in the pelvic area, which he has expected  There is bloody urine going through the catheter  CBI was discontinued at 6 AM as directed by urologist  No BM yet  Patient denies abdominal pain, nausea, vomiting, fever, chills, or chest pain  Does admit to mild SOB if lying flat  Uses CPAP at home  Objective:     Blood pressure 123/55, pulse (!) 50, temperature 97 9 °F (36 6 °C), resp  rate 16, height 5' 8" (1 727 m), weight 109 kg (240 lb 4 8 oz), SpO2 (!) 87 %  ,Body mass index is 36 54 kg/m²        Intake/Output Summary (Last 24 hours) at 3/18/2023 0725  Last data filed at 3/18/2023 0541  Gross per 24 hour   Intake 2300 ml   Output 6425 ml   Net -4125 ml       Invasive Devices     Peripheral Intravenous Line  Duration           Peripheral IV 03/18/23 Left;Ventral (anterior) Forearm <1 day          Drain  Duration           Urethral Catheter 24 Fr  <1 day                Physical Exam: /62   Pulse (!) 52   Temp 97 7 °F (36 5 °C)   Resp 18   Ht 5' 8" (1 727 m)   Wt 109 kg (240 lb 4 8 oz)   SpO2 96%   BMI 36 54 kg/m² General appearance: alert and oriented, in no acute distress  Lungs: clear to auscultation bilaterally  Heart: regular rate and rhythm, S1, S2 normal, no murmur, click, rub or gallop  Abdomen: soft, non-tender; bowel sounds normal; no masses,  no organomegaly and distended  Male genitalia: normal, solis in place with transparent red urine in collection bag      VTE Mechanical Prophylaxis: sequential compression device      Prasad Salcido PA-C

## 2023-03-21 ENCOUNTER — OFFICE VISIT (OUTPATIENT)
Dept: UROLOGY | Facility: CLINIC | Age: 63
End: 2023-03-21

## 2023-03-21 VITALS
DIASTOLIC BLOOD PRESSURE: 60 MMHG | SYSTOLIC BLOOD PRESSURE: 110 MMHG | BODY MASS INDEX: 37.22 KG/M2 | OXYGEN SATURATION: 98 % | HEART RATE: 85 BPM | TEMPERATURE: 97.7 F | HEIGHT: 68 IN | WEIGHT: 245.6 LBS

## 2023-03-21 DIAGNOSIS — Z87.898 HISTORY OF GROSS HEMATURIA: ICD-10-CM

## 2023-03-21 DIAGNOSIS — R35.0 BENIGN PROSTATIC HYPERPLASIA WITH URINARY FREQUENCY: ICD-10-CM

## 2023-03-21 DIAGNOSIS — N40.1 BENIGN PROSTATIC HYPERPLASIA WITH URINARY FREQUENCY: ICD-10-CM

## 2023-03-21 DIAGNOSIS — N32.89 BLADDER MASS: Primary | ICD-10-CM

## 2023-03-22 DIAGNOSIS — C67.3 MALIGNANT NEOPLASM OF ANTERIOR WALL OF URINARY BLADDER (HCC): Primary | ICD-10-CM

## 2023-03-23 ENCOUNTER — DOCUMENTATION (OUTPATIENT)
Dept: HEMATOLOGY ONCOLOGY | Facility: CLINIC | Age: 63
End: 2023-03-23

## 2023-03-23 NOTE — PROGRESS NOTES
Intake received/ Chart reviewed for services completed outside of [facility]    Pathology completed: 3/17/23     Imaging completed: 2/17/23    All records needed are in patients chart  No records retrieval needed at this time

## 2023-03-28 ENCOUNTER — HOSPITAL ENCOUNTER (EMERGENCY)
Facility: HOSPITAL | Age: 63
Discharge: HOME | End: 2023-03-28
Attending: STUDENT IN AN ORGANIZED HEALTH CARE EDUCATION/TRAINING PROGRAM

## 2023-03-28 ENCOUNTER — APPOINTMENT (EMERGENCY)
Dept: RADIOLOGY | Facility: HOSPITAL | Age: 63
End: 2023-03-28

## 2023-03-28 ENCOUNTER — APPOINTMENT (EMERGENCY)
Dept: CT IMAGING | Facility: HOSPITAL | Age: 63
End: 2023-03-28

## 2023-03-28 VITALS
OXYGEN SATURATION: 100 % | SYSTOLIC BLOOD PRESSURE: 128 MMHG | TEMPERATURE: 98.1 F | BODY MASS INDEX: 38.52 KG/M2 | HEART RATE: 60 BPM | WEIGHT: 254.19 LBS | HEIGHT: 68 IN | DIASTOLIC BLOOD PRESSURE: 78 MMHG | RESPIRATION RATE: 18 BRPM

## 2023-03-28 DIAGNOSIS — J44.1 COPD WITH ACUTE EXACERBATION (HCC): ICD-10-CM

## 2023-03-28 DIAGNOSIS — R06.02 SOB (SHORTNESS OF BREATH): Primary | ICD-10-CM

## 2023-03-28 DIAGNOSIS — E87.6 HYPOKALEMIA: ICD-10-CM

## 2023-03-28 LAB
ALBUMIN SERPL BCP-MCNC: 4.1 G/DL (ref 3.5–5)
ALP SERPL-CCNC: 97 U/L (ref 34–104)
ALT SERPL W P-5'-P-CCNC: 23 U/L (ref 7–52)
ANION GAP SERPL CALCULATED.3IONS-SCNC: 12 MMOL/L (ref 4–13)
APTT PPP: 30 SECONDS (ref 23–37)
AST SERPL W P-5'-P-CCNC: 11 U/L (ref 13–39)
BACTERIA UR QL AUTO: ABNORMAL /HPF
BASOPHILS # BLD AUTO: 0.04 THOUSANDS/ÂΜL (ref 0–0.1)
BASOPHILS NFR BLD AUTO: 0 % (ref 0–1)
BILIRUB SERPL-MCNC: 0.44 MG/DL (ref 0.2–1)
BILIRUB UR QL STRIP: NEGATIVE
BNP SERPL-MCNC: 37 PG/ML (ref 0–100)
BUN SERPL-MCNC: 20 MG/DL (ref 5–25)
CALCIUM SERPL-MCNC: 9.7 MG/DL (ref 8.4–10.2)
CARDIAC TROPONIN I PNL SERPL HS: 5 NG/L
CHLORIDE SERPL-SCNC: 93 MMOL/L (ref 96–108)
CLARITY UR: CLEAR
CO2 SERPL-SCNC: 30 MMOL/L (ref 21–32)
COLOR UR: YELLOW
CREAT SERPL-MCNC: 1.11 MG/DL (ref 0.6–1.3)
EOSINOPHIL # BLD AUTO: 0.13 THOUSAND/ÂΜL (ref 0–0.61)
EOSINOPHIL NFR BLD AUTO: 1 % (ref 0–6)
ERYTHROCYTE [DISTWIDTH] IN BLOOD BY AUTOMATED COUNT: 13.5 % (ref 11.6–15.1)
FLUAV RNA RESP QL NAA+PROBE: NEGATIVE
FLUBV RNA RESP QL NAA+PROBE: NEGATIVE
GFR SERPL CREATININE-BSD FRML MDRD: 69 ML/MIN/1.73SQ M
GLUCOSE SERPL-MCNC: 168 MG/DL (ref 65–140)
GLUCOSE UR STRIP-MCNC: ABNORMAL MG/DL
HCT VFR BLD AUTO: 39.1 % (ref 36.5–49.3)
HGB BLD-MCNC: 13 G/DL (ref 12–17)
HGB UR QL STRIP.AUTO: ABNORMAL
IMM GRANULOCYTES # BLD AUTO: 0.04 THOUSAND/UL (ref 0–0.2)
IMM GRANULOCYTES NFR BLD AUTO: 0 % (ref 0–2)
INR PPP: 0.93 (ref 0.84–1.19)
KETONES UR STRIP-MCNC: NEGATIVE MG/DL
LACTATE SERPL-SCNC: 1.5 MMOL/L (ref 0.5–2)
LEUKOCYTE ESTERASE UR QL STRIP: ABNORMAL
LYMPHOCYTES # BLD AUTO: 1.64 THOUSANDS/ÂΜL (ref 0.6–4.47)
LYMPHOCYTES NFR BLD AUTO: 17 % (ref 14–44)
MAGNESIUM SERPL-MCNC: 1.7 MG/DL (ref 1.9–2.7)
MCH RBC QN AUTO: 31 PG (ref 26.8–34.3)
MCHC RBC AUTO-ENTMCNC: 33.2 G/DL (ref 31.4–37.4)
MCV RBC AUTO: 93 FL (ref 82–98)
MONOCYTES # BLD AUTO: 0.52 THOUSAND/ÂΜL (ref 0.17–1.22)
MONOCYTES NFR BLD AUTO: 5 % (ref 4–12)
NEUTROPHILS # BLD AUTO: 7.35 THOUSANDS/ÂΜL (ref 1.85–7.62)
NEUTS SEG NFR BLD AUTO: 77 % (ref 43–75)
NITRITE UR QL STRIP: NEGATIVE
NON-SQ EPI CELLS URNS QL MICRO: ABNORMAL /HPF
NRBC BLD AUTO-RTO: 0 /100 WBCS
PH UR STRIP.AUTO: 7.5 [PH]
PLATELET # BLD AUTO: 234 THOUSANDS/UL (ref 149–390)
PMV BLD AUTO: 10.1 FL (ref 8.9–12.7)
POTASSIUM SERPL-SCNC: 2.9 MMOL/L (ref 3.5–5.3)
PROT SERPL-MCNC: 7.9 G/DL (ref 6.4–8.4)
PROT UR STRIP-MCNC: NEGATIVE MG/DL
PROTHROMBIN TIME: 12.5 SECONDS (ref 11.6–14.5)
RBC # BLD AUTO: 4.19 MILLION/UL (ref 3.88–5.62)
RBC #/AREA URNS AUTO: ABNORMAL /HPF
RSV RNA RESP QL NAA+PROBE: NEGATIVE
SARS-COV-2 RNA RESP QL NAA+PROBE: NEGATIVE
SODIUM SERPL-SCNC: 135 MMOL/L (ref 135–147)
SP GR UR STRIP.AUTO: 1.01 (ref 1–1.03)
UROBILINOGEN UR QL STRIP.AUTO: 0.2 E.U./DL
WBC # BLD AUTO: 9.72 THOUSAND/UL (ref 4.31–10.16)
WBC #/AREA URNS AUTO: ABNORMAL /HPF

## 2023-03-28 RX ORDER — FUROSEMIDE 10 MG/ML
20 INJECTION INTRAMUSCULAR; INTRAVENOUS ONCE
Status: COMPLETED | OUTPATIENT
Start: 2023-03-28 | End: 2023-03-28

## 2023-03-28 RX ORDER — ALBUTEROL SULFATE 90 UG/1
2 AEROSOL, METERED RESPIRATORY (INHALATION) ONCE
Status: COMPLETED | OUTPATIENT
Start: 2023-03-28 | End: 2023-03-28

## 2023-03-28 RX ORDER — AZITHROMYCIN 250 MG/1
TABLET, FILM COATED ORAL
Qty: 6 TABLET | Refills: 0 | Status: SHIPPED | OUTPATIENT
Start: 2023-03-28 | End: 2023-04-01

## 2023-03-28 RX ORDER — METHYLPREDNISOLONE SODIUM SUCCINATE 125 MG/2ML
60 INJECTION, POWDER, LYOPHILIZED, FOR SOLUTION INTRAMUSCULAR; INTRAVENOUS ONCE
Status: COMPLETED | OUTPATIENT
Start: 2023-03-28 | End: 2023-03-28

## 2023-03-28 RX ORDER — POTASSIUM CHLORIDE 20 MEQ/1
40 TABLET, EXTENDED RELEASE ORAL ONCE
Status: COMPLETED | OUTPATIENT
Start: 2023-03-28 | End: 2023-03-28

## 2023-03-28 RX ORDER — POTASSIUM CHLORIDE 750 MG/1
10 TABLET, EXTENDED RELEASE ORAL 2 TIMES DAILY
Qty: 20 TABLET | Refills: 0 | Status: SHIPPED | OUTPATIENT
Start: 2023-03-28

## 2023-03-28 RX ORDER — PREDNISONE 20 MG/1
40 TABLET ORAL DAILY
Qty: 10 TABLET | Refills: 0 | Status: SHIPPED | OUTPATIENT
Start: 2023-03-28 | End: 2023-04-02

## 2023-03-28 RX ORDER — MAGNESIUM SULFATE 1 G/100ML
1 INJECTION INTRAVENOUS ONCE
Status: COMPLETED | OUTPATIENT
Start: 2023-03-28 | End: 2023-03-28

## 2023-03-28 RX ORDER — ALBUTEROL SULFATE 90 UG/1
2 AEROSOL, METERED RESPIRATORY (INHALATION) EVERY 6 HOURS PRN
Qty: 6.7 G | Refills: 0 | Status: SHIPPED | OUTPATIENT
Start: 2023-03-28

## 2023-03-28 RX ADMIN — ALBUTEROL SULFATE 2 PUFF: 90 AEROSOL, METERED RESPIRATORY (INHALATION) at 14:16

## 2023-03-28 RX ADMIN — METHYLPREDNISOLONE SODIUM SUCCINATE 60 MG: 125 INJECTION, POWDER, FOR SOLUTION INTRAMUSCULAR; INTRAVENOUS at 14:14

## 2023-03-28 RX ADMIN — FUROSEMIDE 20 MG: 10 INJECTION, SOLUTION INTRAVENOUS at 14:14

## 2023-03-28 RX ADMIN — IOHEXOL 100 ML: 350 INJECTION, SOLUTION INTRAVENOUS at 11:47

## 2023-03-28 RX ADMIN — POTASSIUM CHLORIDE 40 MEQ: 20 TABLET, EXTENDED RELEASE ORAL at 12:16

## 2023-03-28 RX ADMIN — MAGNESIUM SULFATE HEPTAHYDRATE 1 G: 1 INJECTION, SOLUTION INTRAVENOUS at 12:16

## 2023-03-28 NOTE — ED PROVIDER NOTES
History  Chief Complaint   Patient presents with   • Shortness of Breath     SOB over the past few days, worse with exertion and lying down  Tingling/numbness in legs, ankles, arms, wrists  Hx of CHF  Patient tearful in triage, also reporting recent tumor removal and anxiety over biopsy results  The patient is a 65-year-old male, former smoker, presents emergency department today with a chief complaint of this of breath  The patient has had increased shortness of breath over the last few days worse with laying flat and exertion  Patient states that he just feels uncomfortable  He denies any chest pain, abdominal pain nausea vomiting diarrhea falls or traumas  Patient states that approximately a week ago he did have a found mass on his bladder wall which was biopsied and found to be cancerous  Patient is now scheduled to have an upcoming appointment with an oncologist to start chemotherapy  Patient has been very emotionally upset over the last few weeks  Patient states that he feels as though he is increasingly more anxious  He denies any Deshaun ideations or homicidal ideations  He states that he is unsure if his breathing has worsened secondary to his anxiety  Denies any cough or sputum production  Denies  any chest pain  Does not wear oxygen at home  History provided by:  Patient  Shortness of Breath  Severity:  Moderate  Onset quality:  Gradual  Duration:  4 days  Timing:  Constant  Progression:  Worsening  Chronicity:  New  Context: emotional upset    Relieved by:  Rest  Worsened by: Activity, stress and exertion  Ineffective treatments:  Rest and sitting up  Associated symptoms: no abdominal pain, no chest pain, no cough, no ear pain, no fever, no headaches, no sore throat, no sputum production and no syncope    Risk factors: recent surgery        Prior to Admission Medications   Prescriptions Last Dose Informant Patient Reported? Taking?    Farxiga 5 MG TABS   Yes No   Si mg daily 5mg "alternating with 2 5mg for fluid retention   aspirin 81 mg chewable tablet   Yes No   Sig: Chew 81 mg daily   atorvastatin (LIPITOR) 40 mg tablet   Yes No   clotrimazole-betamethasone (LOTRISONE) 1-0 05 % cream   Yes No   co-enzyme Q-10 30 MG capsule   Yes No   Sig: Take 100 mg by mouth daily    gabapentin (NEURONTIN) 300 mg capsule   Yes No   Sig: Take 300 mg by mouth 2 (two) times a day    lisinopril (ZESTRIL) 2 5 mg tablet   Yes No   metolazone (ZAROXOLYN) 2 5 mg tablet   Yes No   Sig: Take 2 5 mg by mouth 3 (three) times a week -   metoprolol succinate (TOPROL-XL) 25 mg 24 hr tablet   Yes No   Sig: Take 25 mg by mouth daily   multivitamin-iron-minerals-folic acid (CENTRUM) chewable tablet   Yes No   Sig: Chew 1 tablet daily   oxyCODONE (ROXICODONE) 20 MG TABS   Yes No   Si mg 4 (four) times a day Takes 4 times daily per patient   potassium chloride (K-DUR,KLOR-CON) 20 mEq tablet   Yes No   Si (two) times a day   torsemide (DEMADEX) 20 mg tablet   Yes No   Sig: Take 60 mg by mouth daily   vitamin B-12 (VITAMIN B-12) 1,000 mcg tablet   Yes No   Sig: Take by mouth daily      Facility-Administered Medications: None       Past Medical History:   Diagnosis Date   • Arthritis    • Bladder cancer (HCC)    • Chronic narcotic dependence (HCC)    • Chronic pain disorder     lower back and down both legs   • Colon polyp    • Coronary artery disease    • CPAP (continuous positive airway pressure) dependence    • Does use hearing aid     will wear DOS   • Full dentures    • Heart failure (HCC)     and \"fluid retention\" SL  B Daryl cardio PA\"   • High cholesterol    • Hypertension    • Mild ankle edema     and feet   • Obstructive sleep apnea on CPAP    • Prediabetes    • Shortness of breath     per pt \"with just exertion\"   • Sleep apnea    • Wears glasses        Past Surgical History:   Procedure Laterality Date   • BACK SURGERY      titanium rods implanted   • COLONOSCOPY     • CYSTOSCOPY W/ URETERAL STENT " PLACEMENT Bilateral 3/17/2023    Procedure: bilateral retrograde;  Surgeon: Joellen Lucio MD;  Location: [facility];  Service: Urology   • HERNIA REPAIR      x5   • TRANSURETHRAL RESECTION OF BLADDER TUMOR N/A 3/17/2023    Procedure: TRANSURETHRAL RESECTION OF BLADDER TUMOR (TURBT); Surgeon: Joellen Lucio MD;  Location: [facility];  Service: Urology       Family History   Problem Relation Age of Onset   • Cancer Father    • Aortic aneurysm Father    • Leukemia Father    • Hypertension Mother      I have reviewed and agree with the history as documented  E-Cigarette/Vaping   • E-Cigarette Use Never User      E-Cigarette/Vaping Substances     Social History     Tobacco Use   • Smoking status: Former     Packs/day: 1 00     Years: 35 00     Pack years: 35 00     Types: Cigarettes     Start date: 2023     Quit date: 2016     Years since quittin 2   • Smokeless tobacco: Never   Vaping Use   • Vaping Use: Never used   Substance Use Topics   • Alcohol use: Not Currently     Comment: 3 per year   • Drug use: Not Currently     Types: Marijuana       Review of Systems   Constitutional: Negative for fever  HENT: Negative for ear pain and sore throat  Respiratory: Positive for shortness of breath  Negative for cough and sputum production  Cardiovascular: Negative for chest pain and syncope  Gastrointestinal: Negative for abdominal pain  Neurological: Negative for headaches  All other systems reviewed and are negative  Physical Exam  Physical Exam  Vitals and nursing note reviewed  Constitutional:       General: He is not in acute distress  Appearance: He is well-developed  HENT:      Head: Normocephalic and atraumatic  Mouth/Throat:      Mouth: Mucous membranes are moist    Eyes:      Extraocular Movements: Extraocular movements intact  Conjunctiva/sclera: Conjunctivae normal       Pupils: Pupils are equal, round, and reactive to light     Cardiovascular:      Rate and Rhythm: Normal rate and regular rhythm  Pulses: Normal pulses  Heart sounds: Normal heart sounds  No murmur heard  Pulmonary:      Effort: Pulmonary effort is normal  No respiratory distress  Breath sounds: Examination of the left-lower field reveals wheezing  Wheezing present  Chest:      Chest wall: No mass or tenderness  Abdominal:      Palpations: Abdomen is soft  Tenderness: There is no abdominal tenderness  Musculoskeletal:         General: No swelling  Cervical back: Neck supple  Right lower leg: No tenderness  No edema  Left lower leg: No tenderness  No edema  Skin:     General: Skin is warm and dry  Capillary Refill: Capillary refill takes less than 2 seconds  Neurological:      General: No focal deficit present  Mental Status: He is alert and oriented to person, place, and time     Psychiatric:         Mood and Affect: Mood normal       Comments: Patient is tearful and anxious          Vital Signs  ED Triage Vitals   Temperature Pulse Respirations Blood Pressure SpO2   03/28/23 1016 03/28/23 1016 03/28/23 1016 03/28/23 1016 03/28/23 1016   98 1 °F (36 7 °C) 68 21 139/83 96 %      Temp Source Heart Rate Source Patient Position - Orthostatic VS BP Location FiO2 (%)   03/28/23 1016 03/28/23 1016 03/28/23 1016 03/28/23 1016 --   Temporal Monitor Lying Left arm       Pain Score       03/28/23 1400       No Pain           Vitals:    03/28/23 1030 03/28/23 1115 03/28/23 1230 03/28/23 1400   BP: 140/84 154/70 127/60 128/78   Pulse: 64 59 58 60   Patient Position - Orthostatic VS:             Visual Acuity      ED Medications  Medications   iohexol (OMNIPAQUE) 350 MG/ML injection (SINGLE-DOSE) 100 mL (100 mL Intravenous Given 3/28/23 1147)   magnesium sulfate IVPB (premix) SOLN 1 g (0 g Intravenous Stopped 3/28/23 1316)   potassium chloride (K-DUR,KLOR-CON) CR tablet 40 mEq (40 mEq Oral Given 3/28/23 1216)   furosemide (LASIX) injection 20 mg (20 mg Intravenous Given 3/28/23 1414)   methylPREDNISolone sodium succinate (Solu-MEDROL) injection 60 mg (60 mg Intravenous Given 3/28/23 1414)   albuterol (PROVENTIL HFA,VENTOLIN HFA) inhaler 2 puff (2 puffs Inhalation Given 3/28/23 1416)       Diagnostic Studies  Results Reviewed     Procedure Component Value Units Date/Time    Urine Microscopic [[id number]]  (Abnormal) Collected: 03/28/23 1106    Lab Status: Final result Specimen: Urine, Clean Catch Updated: 03/28/23 1137     RBC, UA 10-20 /hpf      WBC, UA 1-2 /hpf      Epithelial Cells Occasional /hpf      Bacteria, UA Occasional /hpf     FLU/RSV/COVID - if FLU/RSV clinically relevant [[id number]]  (Normal) Collected: 03/28/23 1047    Lab Status: Final result Specimen: Nares from Nose Updated: 03/28/23 1134     SARS-CoV-2 Negative     INFLUENZA A PCR Negative     INFLUENZA B PCR Negative     RSV PCR Negative    Narrative:      FOR PEDIATRIC PATIENTS - copy/paste COVID Guidelines URL to browser: [email or web address]  Combined [employer]    SARS-CoV-2 assay is a Nucleic Acid Amplification assay intended for the  qualitative detection of nucleic acid from SARS-CoV-2 in nasopharyngeal  swabs  Results are for the presumptive identification of SARS-CoV-2 RNA  Positive results are indicative of infection with SARS-CoV-2, the virus  causing COVID-19, but do not rule out bacterial infection or co-infection  with other viruses  Laboratories within the [place] and its  territories are required to report all positive results to the appropriate  public health authorities  Negative results do not preclude SARS-CoV-2  infection and should not be used as the sole basis for treatment or other  patient management decisions  Negative results must be combined with  clinical observations, patient history, and epidemiological information  This test has not been FDA cleared or approved      This test has been authorized by FDA under an Emergency Use Authorization  (EUA)  This test is only authorized for the duration of time the  declaration that circumstances exist justifying the authorization of the  emergency use of an in vitro diagnostic tests for detection of SARS-CoV-2  virus and/or diagnosis of COVID-19 infection under section 564(b)(1) of  the Act, 21 U  S C  956JVA-0(Z)(0), unless the authorization is terminated  or revoked sooner  The test has been validated but independent review by FDA  and CLIA is pending  Test performed using [employer] GeneXpert: This RT-PCR assay targets N2,  a region unique to SARS-CoV-2  A conserved region in the E-gene was chosen  for pan-Sarbecovirus detection which includes SARS-CoV-2  According to CMS-2020-01-R, this platform meets the definition of high-throughput technology      HS Troponin 0hr (reflex protocol) [[id number]]  (Normal) Collected: 03/28/23 1047    Lab Status: Final result Specimen: Blood from Arm, Right Updated: 03/28/23 1123     hs TnI 0hr 5 ng/L     B-Type Natriuretic Peptide(BNP) [[id number]]  (Normal) Collected: 03/28/23 1047    Lab Status: Final result Specimen: Blood from Arm, Right Updated: 03/28/23 1123     BNP 37 pg/mL     UA w Reflex to Microscopic w Reflex to Culture [[id number]]  (Abnormal) Collected: 03/28/23 1106    Lab Status: Final result Specimen: Urine, Clean Catch Updated: 03/28/23 1116     Color, UA Yellow     Clarity, UA Clear     Specific Gravity, UA 1 015     pH, UA 7 5     Leukocytes, UA Trace     Nitrite, UA Negative     Protein, UA Negative mg/dl      Glucose,  (1/10%) mg/dl      Ketones, UA Negative mg/dl      Urobilinogen, UA 0 2 E U /dl      Bilirubin, UA Negative     Occult Blood, UA Moderate    Comprehensive metabolic panel [[id number]]  (Abnormal) Collected: 03/28/23 1047    Lab Status: Final result Specimen: Blood from Arm, Right Updated: 03/28/23 1116     Sodium 135 mmol/L      Potassium 2 9 mmol/L      Chloride 93 mmol/L      CO2 30 mmol/L      ANION GAP 12 mmol/L BUN 20 mg/dL      Creatinine 1 11 mg/dL      Glucose 168 mg/dL      Calcium 9 7 mg/dL      AST 11 U/L      ALT 23 U/L      Alkaline Phosphatase 97 U/L      Total Protein 7 9 g/dL      Albumin 4 1 g/dL      Total Bilirubin 0 44 mg/dL      eGFR 69 ml/min/1 73sq m     Narrative:      [place] guidelines for Chronic Kidney Disease (CKD):   •  Stage 1 with normal or high GFR (GFR > 90 mL/min/1 73 square meters)  •  Stage 2 Mild CKD (GFR = 60-89 mL/min/1 73 square meters)  •  Stage 3A Moderate CKD (GFR = 45-59 mL/min/1 73 square meters)  •  Stage 3B Moderate CKD (GFR = 30-44 mL/min/1 73 square meters)  •  Stage 4 Severe CKD (GFR = 15-29 mL/min/1 73 square meters)  •  Stage 5 End Stage CKD (GFR <15 mL/min/1 73 square meters)  Note: GFR calculation is accurate only with a steady state creatinine    Magnesium [[id number]]  (Abnormal) Collected: 03/28/23 1047    Lab Status: Final result Specimen: Blood from Arm, Right Updated: 03/28/23 1116     Magnesium 1 7 mg/dL     Lactic acid [[id number]]  (Normal) Collected: 03/28/23 1047    Lab Status: Final result Specimen: Blood from Arm, Right Updated: 03/28/23 1115     LACTIC ACID 1 5 mmol/L     Narrative:      Result may be elevated if tourniquet was used during collection      Bernie Centeno [[id number]]  (Normal) Collected: 03/28/23 1047    Lab Status: Final result Specimen: Blood from Arm, Right Updated: 03/28/23 1113     Protime 12 5 seconds      INR 0 93    APTT [[id number]]  (Normal) Collected: 03/28/23 1047    Lab Status: Final result Specimen: Blood from Arm, Right Updated: 03/28/23 1113     PTT 30 seconds     CBC and differential [[id number]]  (Abnormal) Collected: 03/28/23 1047    Lab Status: Final result Specimen: Blood from Arm, Right Updated: 03/28/23 1055     WBC 9 72 Thousand/uL      RBC 4 19 Million/uL      Hemoglobin 13 0 g/dL      Hematocrit 39 1 %      MCV 93 fL      MCH 31 0 pg      MCHC 33 2 g/dL      RDW 13 5 %      MPV 10 1 fL      Platelets 234 Thousands/uL      nRBC 0 /100 WBCs      Neutrophils Relative 77 %      Immat GRANS % 0 %      Lymphocytes Relative 17 %      Monocytes Relative 5 %      Eosinophils Relative 1 %      Basophils Relative 0 %      Neutrophils Absolute 7 35 Thousands/µL      Immature Grans Absolute 0 04 Thousand/uL      Lymphocytes Absolute 1 64 Thousands/µL      Monocytes Absolute 0 52 Thousand/µL      Eosinophils Absolute 0 13 Thousand/µL      Basophils Absolute 0 04 Thousands/µL                  CTA ED chest PE study   Final Result by Bin Rodriguez MD (03/28 1340)   No PE  Mild pulmonary edema and background COPD  Workstation performed: [id number]         XR chest 1 view portable    (Results Pending)              Procedures  ECG 12 Lead Documentation Only    Date/Time: 3/28/2023 11:34 AM  Performed by: Evan Bhagat PA-C  Authorized by: Evan Bhagat PA-C     Indications / Diagnosis:  Sob  ECG reviewed by me, the ED Provider: yes    Patient location:  ED  Previous ECG:     Previous ECG:  Unavailable  Interpretation:     Interpretation: abnormal    Rate:     ECG rate:  61    ECG rate assessment: normal    Rhythm:     Rhythm: sinus rhythm               ED Course  ED Course as of 03/28/23 1424   Tue Mar 28, 2023   1152 Potassium(!): 2 9   1152 Magnesium(!): 1 7   1221 We will replenish the patient's magnesium and potassium  1221 UA does have some blood and trace leukocytes probably from recent bladder biopsy/tumor                               SBIRT 22yo+    Flowsheet Row Most Recent Value   SBIRT (25 yo +)    In order to provide better care to our patients, we are screening all of our patients for alcohol and drug use  Would it be okay to ask you these screening questions? No Filed at: 03/28/2023 1143                    Medical Decision Making  The patient is a 59-year-old male, former smoker, presents emergency department today with a chief complaint of this of breath    The patient has had increased shortness of breath over the last few days worse with laying flat and exertion  Patient states that he just feels uncomfortable  He denies any chest pain, abdominal pain nausea vomiting diarrhea falls or traumas  Patient states that approximately a week ago he did have a found mass on his bladder wall which was biopsied and found to be cancerous  Patient is now scheduled to have an upcoming appointment with an oncologist to start chemotherapy  Patient has been very emotionally upset over the last few weeks  Patient states that he feels as though he is increasingly more anxious  He denies any Deshaun ideations or homicidal ideations  He states that he is unsure if his breathing has worsened secondary to his anxiety  Denies any cough or sputum production  Denies  any chest pain  Does not wear oxygen at home  Labs illustrated hypomag and hypo kalemia  EKG unremarkable  UA did blood however the patient does have a known diagnosis of a bladder tumor       the patient on physical examination had a slight expiratory wheeze in the bases  Patient was not hypoxic did not appear dyspneic  No lower leg edema  This time low suspicion for CHF exacerbation  ddx did include CHF exacerbation, pneumonia, COPD exacerbation, PE , ACS      At this time will discharge and tx as an outpatient  And in agreement with treatment plan we will treat for COPD exacerbation  Also given prescription for potassium due to hypokalemia  Patient was given predisone, zpack and inhaler as outpatient tx for COPD exacerbation  Was given a once time IV dose of lasix for the mild edema seen on CTPE  Amount and/or Complexity of Data Reviewed  Labs: ordered  Decision-making details documented in ED Course  Radiology: ordered  Decision-making details documented in ED Course  ECG/medicine tests: ordered  Decision-making details documented in ED Course        Risk  Prescription drug management  Disposition  Final diagnoses:   SOB (shortness of breath)   COPD with acute exacerbation ([facility] [address] )   Hypokalemia     Time reflects when diagnosis was documented in both MDM as applicable and the Disposition within this note     Time User Action Codes Description Comment    3/28/2023  1:52 PM Barak Ehumuet Add [R06 02] SOB (shortness of breath)     3/28/2023  1:53 PM [facility] Ehonnet Add [J44 1] COPD with acute exacerbation ([facility] [address] )     3/28/2023  1:53 PM [facility] Ehumuet Add [E87 6] Hypokalemia       ED Disposition     ED Disposition   Discharge    Condition   Stable    Date/Time   Tue Mar 28, 2023  1:54 PM    Comment   Irasema Wang discharge to home/self care  Follow-up Information    None         Patient's Medications   Discharge Prescriptions    ALBUTEROL (PROVENTIL HFA) 90 MCG/ACT INHALER    Inhale 2 puffs every 6 (six) hours as needed for wheezing       Start Date: 3/28/2023 End Date: --       Order Dose: 2 puffs       Quantity: 6 7 g    Refills: 0    AZITHROMYCIN (ZITHROMAX) 250 MG TABLET    Take 2 tablets today then 1 tablet daily x 4 days       Start Date: 3/28/2023 End Date: 4/1/2023       Order Dose: --       Quantity: 6 tablet    Refills: 0    POTASSIUM CHLORIDE (K-DUR,KLOR-CON) 10 MEQ TABLET    Take 1 tablet (10 mEq total) by mouth 2 (two) times a day       Start Date: 3/28/2023 End Date: --       Order Dose: 10 mEq       Quantity: 20 tablet    Refills: 0    PREDNISONE 20 MG TABLET    Take 2 tablets (40 mg total) by mouth daily for 5 days       Start Date: 3/28/2023 End Date: 4/2/2023       Order Dose: 40 mg       Quantity: 10 tablet    Refills: 0       No discharge procedures on file      PDMP Review     None          ED Provider  Electronically Signed by           Luis Garcia PA-C  03/28/23 2867

## 2023-03-29 ENCOUNTER — PATIENT OUTREACH (OUTPATIENT)
Dept: HEMATOLOGY ONCOLOGY | Facility: CLINIC | Age: 63
End: 2023-03-29

## 2023-03-29 ENCOUNTER — TELEPHONE (OUTPATIENT)
Dept: UROLOGY | Facility: CLINIC | Age: 63
End: 2023-03-29

## 2023-03-29 DIAGNOSIS — C67.3 MALIGNANT NEOPLASM OF ANTERIOR WALL OF URINARY BLADDER (HCC): ICD-10-CM

## 2023-03-29 DIAGNOSIS — Z74.09 SLIGHTLY LIMITED MOBILITY: ICD-10-CM

## 2023-03-29 DIAGNOSIS — Z72.820 POOR SLEEP: ICD-10-CM

## 2023-03-29 DIAGNOSIS — F41.9 ANXIETY: ICD-10-CM

## 2023-03-29 LAB
ATRIAL RATE: 61 BPM
P AXIS: 53 DEGREES
PR INTERVAL: 158 MS
QRS AXIS: 10 DEGREES
QRSD INTERVAL: 86 MS
QT INTERVAL: 428 MS
QTC INTERVAL: 430 MS
T WAVE AXIS: 6 DEGREES
VENTRICULAR RATE: 61 BPM

## 2023-03-29 NOTE — PROGRESS NOTES
Call to pt and introduced myself as Nurse Navigator and explained my role in his care with coordinating appts, being a point of contact, providing support and connecting him with available resources as needed  General assessment completed and evaluated for any barriers to care  Referral to Oncology Social Work placed  Pt with chronic back pain that he states has worsened since initial work-up for bladder ca began  On chronic oxycodone via PCP  States he is having issues with severe anxiety and inability to sleep  Reviewed Palliative Care and he is agreeable to referral   Answered questions to pt's satisfaction  Reviewed upcoming appts and informed him of consult with Dr Shekhar Burns on 4/7/23 and pt stated he is scheduled with Dr Deisy Dhillon Oncologist at [facility] on 4/11/23  Provided support and encouraged to call with any questions or concerns      Future Appointments   Date Time Provider [place]   4/5/2023  9:40 AM Avis Baez MD [facility]   4/7/2023  9:00 AM Juan Hunter MD [facility]     4/11/23 Dr Deisy Dhillon with [facility]

## 2023-03-29 NOTE — TELEPHONE ENCOUNTER
Spoke with pt for initial outreach and made him aware of appt with Dr Katie Andre for 4/7/23 and he accepted

## 2023-03-29 NOTE — TELEPHONE ENCOUNTER
Patient of Dr Ana Paula Salas in [facility] office  Recent pathology from TURBT showing MIBC, plan for appt with Dr Ramya Gomez to discuss surgery  Appt scheduled for 4/7/23 in the [facility] office  Please confirm

## 2023-03-30 ENCOUNTER — DOCUMENTATION (OUTPATIENT)
Dept: HEMATOLOGY ONCOLOGY | Facility: CLINIC | Age: 63
End: 2023-03-30

## 2023-03-30 NOTE — PROGRESS NOTES
In-basket message received from Curry Russell RN to add patient to urology oncology tumor board on 4/4/2023  Chart reviewed and tumor board prep completed

## 2023-04-04 ENCOUNTER — DOCUMENTATION (OUTPATIENT)
Dept: HEMATOLOGY ONCOLOGY | Facility: CLINIC | Age: 63
End: 2023-04-04

## 2023-04-04 DIAGNOSIS — C67.3 MALIGNANT NEOPLASM OF ANTERIOR WALL OF URINARY BLADDER (HCC): Primary | ICD-10-CM

## 2023-04-04 NOTE — PROGRESS NOTES
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 and involvement of smooth muscle     CT C/A/P from 2/2023: Mild asymmetric enlargement of a right external iliac chain lymph node measures top limits of normal at 10 mm  Given the concomitant bladder mass, this raises suspicion for regional metastasis  Path review-Could not differentiate between muscularis mucosa invasion vs propria  High likelihood for T2 disease    Recommendations of Group:  Pet Scan first to further evaluate LN and for additional metastatic disease  If LN positive proceed with neoadjuvant chemo followed by cystectomy  If PET scan negative, will need external review of path +/- repeat TURBT  Upcoming Appointments:    Future Appointments   Date Time Provider [place]   4/7/2023  9:00 AM Case Clark MD [facility]      4/11/2023- Medical Oncology at [facility]       Patient was discussed at the Multidisciplinary Urology Case review on 4/4/2023      NCCN guidelines were available for review  The final treatment plan will be left to the discretion of the patient and the treating physician  DISCLAIMERS:  TO THE TREATING PHYSICIAN:  This conference is a meeting of clinicians from various specialty areas who evaluate and discuss patients for whom a multidisciplinary treatment approach is being considered   Please note that the above opinion was a consensus of the conference attendees and is intended only to assist in quality care of the discussed patient  The responsibility for follow up on the input given during the conference, along with any final decisions regarding plan of care, is that of the patient and the patient's provider  Accordingly, appointments have only been recommended based on this information and have NOT been scheduled unless otherwise noted  TO THE PATIENT:  This summary is a brief record of major aspects of your cancer treatment  You may choose to share a copy with any of your doctors or nurses  However, this is not a detailed or comprehensive record of your care

## 2023-04-05 ENCOUNTER — PATIENT OUTREACH (OUTPATIENT)
Dept: HEMATOLOGY ONCOLOGY | Facility: CLINIC | Age: 63
End: 2023-04-05

## 2023-04-05 NOTE — PROGRESS NOTES
Email sent to Pathology requesting slides to be sent to [facility], Dr Saul Hernandez, for expert review  PET scan scheduled for 4/19/23 and once authorized will work on moving up  [place] [facility] asap and let me know once complete so I can try for sooner appt  Thank you!

## 2023-04-05 NOTE — PATIENT INSTRUCTIONS
For pain:    Start oxycodone extended release 40mg tab, 1 tab every 8 hours SCHEDULED  Please do not crush, chew or cut in half  Oxycodone 30mg per tab, 1/2 tab for moderate pain or 1 tab for severe pain every 4 hours AS NEEDED only for breakthrough pain  Take senokot every other day to prevent constipation  We will schedule you for re-certification for MMJ to help with anxiety, appetite and energy          PRESCRIPTION REFILL REMINDER:  All medication refills should be requested prior to Noon on Friday  Any refill requests after noon on Friday would be addressed the following Monday  Please protect yourself from [place]   = Wash your hands  Soap and water, or hand  with at least 60% alcohol, are both effective at killing the virus  = Wear a mask  This will help protect others from any virus particles you might spread  Your mouth and nose BOTH need to be covered  = Keep the distance  Keep 6 feet of distance from other people, even if they seem healthy  Keeping distance protects you from the other person's virus spread     = Get a vaccine, and boost it  Three vaccines are approved for use in the [place] for all adults  These vaccines do provide protection against all known variants, and the new 'bi-valent' booster is predicted to be more protective against Omicron variants   + Pfizer is FDA approved for all persons age 11 and older   + Iron End may be given to all person age 25 and older   - We do NOT recommend [address] vaccine for our Palliative Care patients  - [facility] is no longer offering regular COVID vaccine clinics  You may ask your primary doctor for help and advice  = you may also visit the CDC's complete list of approved sites for new vaccines: Vaccines  gov - Vaccine Location Search Results   (You may also visit Vaccines  gov and search for 'Bi-valent booster' in your zip code )  = Le[address] ([facility]), [employer], [employer], [facility], and many [facility] locations ALL have shots   + The CDC recommends booster shots on ALL vaccines for ALL adults  = Test to treat  If you have symptoms, get tested, and get treatment! New drugs like Paxlovid can prevent you from ever having to go to the hospital , and may be covered completely by your insurance or special government programs    - https://aspr hhs gov/TestToTreat/      Informacion en espanol sobre vacunas, de nos companeros de [address] --  Amber ramirez    Check out [employer] for [address] that are updated daily:    http://www [employer]/     Global Epidemics  Org, from [facility] (OUTPATIENT C[facility]), will give you Tyjwtq-pn-Lhytvi information on virus cases and vaccination rates:    Https://globalepidemics  org/    Frequently Asked Questions about COVID, answered by [facility]    [employer]

## 2023-04-06 ENCOUNTER — DOCUMENTATION (OUTPATIENT)
Dept: HEMATOLOGY ONCOLOGY | Facility: CLINIC | Age: 63
End: 2023-04-06

## 2023-04-06 ENCOUNTER — CONSULT (OUTPATIENT)
Dept: PALLIATIVE MEDICINE | Facility: CLINIC | Age: 63
End: 2023-04-06

## 2023-04-06 VITALS
OXYGEN SATURATION: 95 % | HEART RATE: 67 BPM | TEMPERATURE: 96.4 F | HEIGHT: 68 IN | SYSTOLIC BLOOD PRESSURE: 90 MMHG | DIASTOLIC BLOOD PRESSURE: 60 MMHG | WEIGHT: 249.4 LBS | BODY MASS INDEX: 37.8 KG/M2

## 2023-04-06 DIAGNOSIS — M54.41 CHRONIC BILATERAL LOW BACK PAIN WITH BILATERAL SCIATICA: ICD-10-CM

## 2023-04-06 DIAGNOSIS — M54.42 CHRONIC BILATERAL LOW BACK PAIN WITH BILATERAL SCIATICA: ICD-10-CM

## 2023-04-06 DIAGNOSIS — G89.3 CANCER-RELATED PAIN: Primary | ICD-10-CM

## 2023-04-06 DIAGNOSIS — C67.3 MALIGNANT NEOPLASM OF ANTERIOR WALL OF URINARY BLADDER (HCC): ICD-10-CM

## 2023-04-06 DIAGNOSIS — Z74.09 SLIGHTLY LIMITED MOBILITY: ICD-10-CM

## 2023-04-06 DIAGNOSIS — G89.29 CHRONIC BILATERAL LOW BACK PAIN WITH BILATERAL SCIATICA: ICD-10-CM

## 2023-04-06 DIAGNOSIS — Z72.820 POOR SLEEP: ICD-10-CM

## 2023-04-06 DIAGNOSIS — F41.9 ANXIETY: ICD-10-CM

## 2023-04-06 RX ORDER — OXYCODONE HYDROCHLORIDE 30 MG/1
TABLET ORAL
Qty: 120 TABLET | Refills: 0 | Status: SHIPPED | OUTPATIENT
Start: 2023-04-06

## 2023-04-06 RX ORDER — OXYCODONE HCL 40 MG/1
40 TABLET, FILM COATED, EXTENDED RELEASE ORAL EVERY 8 HOURS SCHEDULED
Qty: 63 TABLET | Refills: 0 | Status: SHIPPED | OUTPATIENT
Start: 2023-04-06

## 2023-04-06 NOTE — PROGRESS NOTES
Palliative and Supportive Care   Chu Patrick 58 y o  male [id number]    Assessment/Plan:  1  Cancer-related pain    2  Anxiety    3  Poor sleep    4  Slightly limited mobility    5  Malignant neoplasm of anterior wall of urinary bladder (HCC)    6  Chronic bilateral low back pain with bilateral sciatica      Symptoms - chronic low back pain; increased back pain since TURBT - will assume CRP for now until proven otherwise; anxiety; poor sleep; poor appetite    · Start OxyER 40mg PO q8H ATC in an effort to provide continuous pain coverage to minimize frequency or intensity of breakthrough pains  · OxyIR 30mg tabs, 1/2 tabs (15mg) or 1 tab (30mg) PO q4H prn for moderate or severe breakthrough pain  · He is advised to call the office before making any changes on his own, contrary to above instructions  · Will avoid polypharmacy at this time given increase in opioids  He is also agreeable and appreciative of this approach  · Senokot, 1 tab every other day to prevent OIC  · Continue gabapentin 300mg BID  Will let PCP continue managing for now given no changes recommended at this time  · Recommend re-certifying for MMJ to help with anxiety, sleep, appetite  · I encouraged him to discuss with Dr Grady Bass increase in LBP since TURBT and bilateral retrograde pyelogram done on 3/17  · He requests we call his PCP to let them know about palliative managing his pain for now  I made it clear to patient that palliative care will continue managing pain as long as cancer is active  Once we achieve remission, we will turn over pain management to PCP for his chronic LBP  · He is interested in following cares eventually with one of our providers who is closer to his home  Follow up  · RTO in 3 weeks, check on pain and discuss ACP    Controlled Substance Review    [place] PDMP or [place]  reviewed: No red flags were identified; safe to proceed with prescription  Titi Bro  Written  Sold  ID  Drug  QTY  Days  Prescriber  RX # Dispenser  Refill  Daily Dose*  Pymt Type       03/16/2023 03/16/2023   1  Oxycodone Hcl (Ir) 30 Mg Tab 120 00  30  An Leopold Berg  [id number]   Tho ([id number])   180 00 MME  [place]     02/16/2023 02/16/2023   1  Oxycodone Hcl (Ir) 30 Mg Tab 120 00  30  An Leopold Berg  [id number]   Tho ([id number])   180 00 M[place]     01/19/2023 01/19/2023   1  Oxycodone Hcl (Ir) 30 Mg Tab 120 00  30  An Leopold Berg  [id number]   Tho ([id number])   180 00 M[place]     12/21/2022 12/20/2022   1  Oxycodone Hcl (Ir) 30 Mg Tab 120 00  30  An Leopold Berg  [id number]   Tho (1121)   180 00 M[place]     11/23/2022 11/21/2022   1  Oxycodone Hcl (Ir) 30 Mg Tab 120 00  30  An Leopold Berg  [id number]   Tho ([id number])   180 00 M[place]     10/27/2022  10/26/2022   1  Oxycodone Hcl (Ir) 30 Mg Tab 120 00  30  An Leopold Berg  [id number]   Tho ([id number])   180 00 M[place]     09/29/2022 09/29/2022   1  Oxycodone Hcl (Ir) 30 Mg Tab 120 00  30  An Leopold Berg  [id number]   Tho ([id number])   180 00 M[place]     08/30/2022 08/30/2022   1  Oxycodone Hcl (Ir) 30 Mg Tab 120 00  30  An Leopold Berg  [id number]   Tho (1277)   180 00 [place]     08/03/2022 08/03/2022   1  Oxycodone Hcl (Ir) 30 Mg Tab 120 00  30  An Leopold Berg  [id number]   Tho ([id number])   180 00 M[place]     07/05/2022 07/05/2022   1  Oxycodone Hcl (Ir) 30 Mg Tab 120 00  30  An Leopold Berg  [id number]   Tho ([id number])   180 00 M[place]     06/06/2022 06/06/2022   1  Oxycodone Hcl (Ir) 30 Mg Tab 120 00  30  An Leopold Berg  [id number]   Tho ([id number])   180 00 [place]     05/18/2022 05/17/2022   1  Belsomra 10 Mg Tablet 30 00  30  Ch Gid  [id number]   Tho (0343)   0 20 L[place]     05/09/2022 05/09/2022   1  Oxycodone Hcl (Ir) 30 Mg Tab 120 00  30  An Leopold Berg  [id number]   Tho (0343)   180 00 M[place]     04/11/2022 04/11/2022   1  Oxycodone Hcl (Ir) 30 Mg Tab 120 00  30  An Leopold Berg  [id number]   Tho ([id number])   180 00 M[place]     03/17/2022 03/17/2022   1  Oxycodone Hcl (Ir) 20 Mg Tab                Requested Prescriptions     Signed Prescriptions Disp Refills   • oxyCODONE (OxyCONTIN) 40 mg 12 hr tablet 63 tablet 0     Sig: Take 1 tablet (40 mg total) by mouth every 8 (eight) hours Max Daily Amount: 120 mg   • oxyCODONE (ROXICODONE) 30 MG immediate release tablet 120 tablet 0     Sig: Take 1/2 tab for moderate breakthrough pain OR 1 tab for severe breakthrough pain every 4 hours as needed     Medications Discontinued During This Encounter   Medication Reason   • oxyCODONE (ROXICODONE) 20 MG TABS        Representatives have queried the patient's controlled substance dispensing history in the Prescription Drug Monitoring Program in compliance with regulations before I have prescribed any controlled substances  The prescription history is consistent with prescribed therapy and our practice policies  60 minutes were spent face to face with Alva Diaz with greater than 50% of the time spent in counseling or coordination of care including discussions of etiology of diagnosis, pathogenesis of diagnosis, diagnostic results, impression, and recommendations, risks and benefits of treatment, instructions for disease self management, treatment instructions, follow up requirements, risk factors and risk reduction of disease, patient and family counseling/involvement in care and compliance with treatment regimen   All of the patient's questions were answered during this discussion  No follow-ups on file  Subjective:   Chief Complaint  New consultation for:  symptom management, goal of care assessment and decisional support, disease process education and discussion of prognosis, advance care planning, emotional support in the setting of serious illness  HPI     Alva Diaz is a 58 y o  male with 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 and involvement of smooth muscle (diagnosed via TURBT in 2/2023)   CT CAP also showed   enlargement of a right external iliac chain lymph "node suspicion for regional metastasis  His case is discussed at the tumor board, recommendations are for PET-CT (scheduled on 4/19) to further evaluate LN and for additional metastatic disease  If LN positive proceed with neoadjuvant chemo followed by cystectomy  If PET scan negative, will need external review of path +/- repeat TURBT  He is referred to palliative care for supportive cares in the interim  Of note, he has been on opioids for a long time for chronic low back pain that did not improve after spine fusion surgery done 6 years ago (2017)  Met with patient who arrived by himself in the office  Introduced palliative care  We only focused on symptom management at this time given increased pain since TURBT  We will address goals at a later time after he completes his work up at least      He reports being on oxyIR at least for 6-7 years since undergoing spinal fusion surgery for injured lower back that did not really work for pain  He reports being on OxyER before the surgery but he weaned himself off after the surgery in the hopes that it will help with his pain, but it did not  He remained on the IR and tried to manage his LBP as best as he can  He followed with his PCP for pain control since  He reports having more lower back pain since they did \"the procedure from March 17\" which, on chart review, is a TURBT and bilateral retrograde pyelogram  He said since then, his lower back pain on both sides \"exploded\"  He can hardly walk or stand or sit without a lot of discomfort  This was witnessed in the office, patient had to stand up from sitting when back pain increased while sitting  Pain is described as constant but will have frequent waves of increased/worsened pain that can be exacerbated with minimal movement  In reviewing his opioids - he was ordered a 30mg OxyIR q6H prn  He reports not lasting 6 hours, feels it wearing off after 3 5hrs   So what he has been doing at home is taking 15mg " (1/2 tab) ever 3 hours  However, in comparing 15mg and 30mg, 30mg obviously provided him more relief but not enough, especially since 2-3 weeks ago  We spent time discussing rationale of starting ER oxycodone to provide steady coverage for his constant pain with use of IR oxycodone to address breakthrough pain in between those ER opioid  We utilized the white board to illustrate the plan  He was given the chance to ask questions and clarifications that were answered to his expressed satisfaction  We spent time discussing risk and benefit of opioids and discussed no further increase if not medically necessary and/or if safety is becoming a concern  He understands and wishes to proceed  He requests that we reach out to his PCP to let them know that we are taking over pain control while patient has active cancer  We also discussed that if/when he achieves remission, we will turn over cares back to his PCP who had been managing his chronic LBP for 6-7 years now  He has no issues with constipation, does take senokot and/or suppository prn  I discussed taking senokot every other day on a regular basis to prevent OIC  He is on gabapentin that we will let PCP continue managing  As for his other symptoms of anxiety (about health), poor sleep (because of cancer diagnosis) and poor appetite (likely from cancer), MMJ appears appropriate for these indications  He had an MMJ card that he let lapsed when it did not help his pain  He is agreeable to a renewal  He agreed to avoid polypharmacy at this time  Focus on better pain control for now  He lives with is wife  They have 2 children together  All are supportive  The following portions of the medical history were reviewed: past medical history, problem list, medication list, and social history      Current Outpatient Medications:   •  aspirin 81 mg chewable tablet, Chew 81 mg daily, Disp: , Rfl:   •  atorvastatin (LIPITOR) 40 mg tablet, , Disp: , Rfl:   • co-enzyme Q-10 30 MG capsule, Take 100 mg by mouth daily , Disp: , Rfl:   •  Farxiga 5 MG TABS, 5 mg daily 5mg alternating with 2 5mg for fluid retention, Disp: , Rfl:   •  gabapentin (NEURONTIN) 300 mg capsule, Take 300 mg by mouth 2 (two) times a day , Disp: , Rfl:   •  lisinopril (ZESTRIL) 2 5 mg tablet, , Disp: , Rfl:   •  metolazone (ZAROXOLYN) 2 5 mg tablet, Take 2 5 mg by mouth 3 (three) times a week M-W-F, Disp: , Rfl:   •  metoprolol succinate (TOPROL-XL) 25 mg 24 hr tablet, Take 25 mg by mouth daily, Disp: , Rfl:   •  multivitamin-iron-minerals-folic acid (CENTRUM) chewable tablet, Chew 1 tablet daily, Disp: , Rfl:   •  oxyCODONE (OxyCONTIN) 40 mg 12 hr tablet, Take 1 tablet (40 mg total) by mouth every 8 (eight) hours Max Daily Amount: 120 mg, Disp: 63 tablet, Rfl: 0  •  oxyCODONE (ROXICODONE) 30 MG immediate release tablet, Take 1/2 tab for moderate breakthrough pain OR 1 tab for severe breakthrough pain every 4 hours as needed, Disp: 120 tablet, Rfl: 0  •  potassium chloride (K-DUR,KLOR-CON) 20 mEq tablet, 2 (two) times a day, Disp: , Rfl:   •  torsemide (DEMADEX) 20 mg tablet, Take 60 mg by mouth daily, Disp: , Rfl:   •  albuterol (Proventil HFA) 90 mcg/act inhaler, Inhale 2 puffs every 6 (six) hours as needed for wheezing, Disp: 6 7 g, Rfl: 0  •  clotrimazole-betamethasone (LOTRISONE) 1-0 05 % cream, , Disp: , Rfl:   •  potassium chloride (K-DUR,KLOR-CON) 10 mEq tablet, Take 1 tablet (10 mEq total) by mouth 2 (two) times a day, Disp: 20 tablet, Rfl: 0  •  vitamin B-12 (VITAMIN B-12) 1,000 mcg tablet, Take by mouth daily (Patient not taking: Reported on 4/6/2023), Disp: , Rfl:   Review of Systems   Constitutional: Positive for activity change and appetite change  HENT: Negative for trouble swallowing  Respiratory: Negative for shortness of breath  Cardiovascular: Negative for chest pain  Gastrointestinal: Negative for abdominal pain, constipation, diarrhea, nausea and vomiting  "  Musculoskeletal:        Chronic low back pain, had gotten worse lately   Neurological: Negative for weakness  Psychiatric/Behavioral: Positive for sleep disturbance  The patient is nervous/anxious  All other systems reviewed and are negative  All other systems negative    Objective:  Vital Signs  BP 90/60 (BP Location: Left arm, Patient Position: Sitting, Cuff Size: Standard)   Pulse 67   Temp (!) 96 4 °F (35 8 °C) (Temporal)   Ht 5' 8\" (1 727 m)   Wt 113 kg (249 lb 6 4 oz)   SpO2 95%   BMI 37 92 kg/m²    Physical Exam    Constitutional: Appears well-developed and well-nourished  Appears chronically ill looking, but not toxic appearing  Firnedly, pleasant  In no acute physical or emotional distress  Head: Normocephalic and atraumatic  Eyes: EOM are normal  No ocular discharge  No scleral icterus  Neck: No visible adenopathy or masses  Respiratory: Effort normal  No stridor  No respiratory distress  Gastrointestinal: No abdominal distension  Musculoskeletal: No edema  Neurological: Alert, oriented and appropriately conversant  Walked with a cane  Walking slowly because of LBP and hunched slightly forward  Skin: No diaphoresis, no rashes seen on exposed areas of skin  Psychiatric: Displays a normal mood and affect   Behavior, judgement and thought content appear normal  Slightly tearful    Mandy Vaughan MD  Palliative Medicine & Supportive Care  Internal Medicine  Available via [facility] Text  Office: [phone]  Fax: [phone]   "

## 2023-04-06 NOTE — PROGRESS NOTES
Received call from Coit Najjar with Pet dept and pt's appt has been moved to 4/7/23 at 6:30am   STAT read requested in preparation for visit with Dr Sabi Coughlin same day

## 2023-04-07 ENCOUNTER — PATIENT OUTREACH (OUTPATIENT)
Dept: HEMATOLOGY ONCOLOGY | Facility: CLINIC | Age: 63
End: 2023-04-07

## 2023-04-07 ENCOUNTER — OFFICE VISIT (OUTPATIENT)
Dept: UROLOGY | Facility: CLINIC | Age: 63
End: 2023-04-07

## 2023-04-07 ENCOUNTER — HOSPITAL ENCOUNTER (OUTPATIENT)
Dept: NUCLEAR MEDICINE | Facility: HOSPITAL | Age: 63
End: 2023-04-07

## 2023-04-07 VITALS
WEIGHT: 243.8 LBS | HEART RATE: 77 BPM | SYSTOLIC BLOOD PRESSURE: 112 MMHG | DIASTOLIC BLOOD PRESSURE: 72 MMHG | OXYGEN SATURATION: 98 % | HEIGHT: 68 IN | BODY MASS INDEX: 36.95 KG/M2

## 2023-04-07 DIAGNOSIS — C67.3 MALIGNANT NEOPLASM OF ANTERIOR WALL OF URINARY BLADDER (HCC): Primary | ICD-10-CM

## 2023-04-07 DIAGNOSIS — C67.3 MALIGNANT NEOPLASM OF ANTERIOR WALL OF URINARY BLADDER (HCC): ICD-10-CM

## 2023-04-07 LAB — GLUCOSE SERPL-MCNC: 151 MG/DL (ref 65–140)

## 2023-04-07 NOTE — PROGRESS NOTES
4/7/2023    Michele Level  1960  [id number]        Assessment  High-grade muscle invasive bladder cancer      Discussion  I had a lengthy discussion with Rosa Marc and his wife in the office today along with her nurse navigator  We discussed recent pathology which reveals high-grade muscle invasive bladder cancer  Pathology has been sent to [facility] for second opinion  Assuming that this is high-grade muscle invasive disease we discussed standard of care is neoadjuvant chemotherapy followed by radical cystoprostatectomy with ileal conduit creation  We also discussed bladder preservation therapy as well with concurrent chemoradiation and repeat TURBT as necessary  We discussed the possibility of a neobladder which can be performed at a specialty institution such as [facility]  The patient is not interested in a neobladder after discussion of the risk and benefits  We reviewed that fortunately his PET scan shows no evidence of locally advanced or metastatic disease  A single borderline 7 mm lymph node was identified but was not confirmed to be pathologic  If there is a question of pathology review at [facility] I discussed returning to the operating room for repeat TURBT  The patient is scheduled to follow with a medical oncologist at [facility], Dr Jewel Rich  I recommend that she hold on initiation of platinum based chemotherapy until pathology from [facility] has been confirmed as muscle invasive  He will return in follow-up with me in approximately 4 weeks at which point pathology will have returned and hopefully he will have initiated his platinum based chemotherapy  History of Present Illness  58 y o  male with a history of a 3 cm bladder tumor  He underwent TURBT with Dr [place] Brantley in March 2023  Pathology shows high-grade muscle invasive disease  Pathology was reviewed at our recent urology tumor board  There was a question of true invasion into thick muscle fibers  The consensus of the multidisciplinary tumor board was to send the pathology to [facility] for second opinion  This is pending at this time  This morning the patient had a CT PET scan and fortunately this is negative for locally advanced or metastatic disease  A single lymph node measuring 7 mm was identified  Patient presents today with his wife  We had a lengthy discussion along with our nurse navigator reviewing high-grade muscle invasive bladder cancer along with standard of care treatment and plan  He denies any lower urinary tract symptoms  He has significant erectile dysfunction  He is on disability after recent back surgery  He ambulates with a cane  He has had multiple inguinal hernia repairs as a child but is never had abdominal surgery  AUA Symptom Score  AUA SYMPTOM SCORE    Flowsheet Row Most Recent Value   AUA SYMPTOM SCORE    How often have you had a sensation of not emptying your bladder completely after you finished urinating? 2 (P)     How often have you had to urinate again less than two hours after you finished urinating? 5 (P)     How often have you found you stopped and started again several times when you urinate? 4 (P)     How often have you found it difficult to postpone urination? 1 (P)     How often have you had a weak urinary stream? 3 (P)     How often have you had to push or strain to begin urination? 1 (P)     How many times did you most typically get up to urinate from the time you went to bed at night until the time you got up in the morning? 4 (P)     Quality of Life: If you were to spend the rest of your life with your urinary condition just the way it is now, how would you feel about that? 4 (P)     AUA SYMPTOM SCORE 20 (P)           Review of Systems  Review of Systems   Constitutional: Negative  HENT: Negative  Eyes: Negative  Respiratory: Negative  Cardiovascular: Negative  Gastrointestinal: Negative  Endocrine: Negative      Genitourinary: "    Per HPI   Musculoskeletal: Negative  Skin: Negative  Allergic/Immunologic: Negative  Neurological: Negative  Hematological: Negative  Psychiatric/Behavioral: Negative  Past Medical History  Past Medical History:   Diagnosis Date   • Arthritis    • Bladder cancer ([facility] [address] )    • Chronic narcotic dependence ([facility] [address] )    • Chronic pain disorder     lower back and down both legs   • Colon polyp    • Coronary artery disease    • CPAP (continuous positive airway pressure) dependence    • Does use hearing aid     will wear DOS   • Full dentures    • Heart failure ([facility] [address] )     and \"fluid retention\" SL OW B Daryl cardio PA\"   • High cholesterol    • Hypertension    • Mild ankle edema     and feet   • Obstructive sleep apnea on CPAP    • Prediabetes    • Shortness of breath     per pt \"with just exertion\"   • Sleep apnea    • Wears glasses        Past Social History  Past Surgical History:   Procedure Laterality Date   • BACK SURGERY      titanium rods implanted   • COLONOSCOPY     • CYSTOSCOPY W/ URETERAL STENT PLACEMENT Bilateral 3/17/2023    Procedure: bilateral retrograde;  Surgeon: Megan Walsh MD;  Location:  MAIN OR;  Service: Urology   • HERNIA REPAIR      x5   • TRANSURETHRAL RESECTION OF BLADDER TUMOR N/A 3/17/2023    Procedure: TRANSURETHRAL RESECTION OF BLADDER TUMOR (TURBT);   Surgeon: Megan Walsh MD;  Location:  MAIN OR;  Service: Urology       Past Family History  Family History   Problem Relation Age of Onset   • Cancer Father    • Aortic aneurysm Father    • Leukemia Father    • Hypertension Mother        Past Social history  Social History     Socioeconomic History   • Marital status: /Civil Union     Spouse name: Not on file   • Number of children: Not on file   • Years of education: Not on file   • Highest education level: Not on file   Occupational History   • Not on file   Tobacco Use   • Smoking status: Former     Packs/day: 1 00     Years: 35 00     Pack years: 35 00     " Types: Cigarettes     Start date: 2023     Quit date: 2016     Years since quittin 2   • Smokeless tobacco: Never   Vaping Use   • Vaping Use: Never used   Substance and Sexual Activity   • Alcohol use: Not Currently     Comment: 3 per year   • Drug use: Not Currently     Types: Marijuana   • Sexual activity: Not Currently   Other Topics Concern   • Not on file   Social History Narrative   • Not on file     Social Determinants of Health     Financial Resource Strain: Not on file   Food Insecurity: Not on file   Transportation Needs: Not on file   Physical Activity: Not on file   Stress: Not on file   Social Connections: Not on file   Intimate Partner Violence: Not on file   Housing Stability: Not on file       Current Medications  Current Outpatient Medications   Medication Sig Dispense Refill   • aspirin 81 mg chewable tablet Chew 81 mg daily     • atorvastatin (LIPITOR) 40 mg tablet      • co-enzyme Q-10 30 MG capsule Take 100 mg by mouth daily      • Farxiga 5 MG TABS 5 mg daily 5mg alternating with 2 5mg for fluid retention     • gabapentin (NEURONTIN) 300 mg capsule Take 300 mg by mouth 2 (two) times a day      • lisinopril (ZESTRIL) 2 5 mg tablet      • metolazone (ZAROXOLYN) 2 5 mg tablet Take 2 5 mg by mouth 3 (three) times a week --     • metoprolol succinate (TOPROL-XL) 25 mg 24 hr tablet Take 25 mg by mouth daily     • multivitamin-iron-minerals-folic acid (CENTRUM) chewable tablet Chew 1 tablet daily     • oxyCODONE (OxyCONTIN) 40 mg 12 hr tablet Take 1 tablet (40 mg total) by mouth every 8 (eight) hours Max Daily Amount: 120 mg 63 tablet 0   • oxyCODONE (ROXICODONE) 30 MG immediate release tablet Take 1/2 tab for moderate breakthrough pain OR 1 tab for severe breakthrough pain every 4 hours as needed 120 tablet 0   • potassium chloride (K-DUR,KLOR-CON) 20 mEq tablet 2 (two) times a day     • torsemide (DEMADEX) 20 mg tablet Take 60 mg by mouth daily     • albuterol (Proventil HFA) 90 "mcg/act inhaler Inhale 2 puffs every 6 (six) hours as needed for wheezing 6 7 g 0   • clotrimazole-betamethasone (LOTRISONE) 1-0 05 % cream      • potassium chloride (K-DUR,KLOR-CON) 10 mEq tablet Take 1 tablet (10 mEq total) by mouth 2 (two) times a day 20 tablet 0   • vitamin B-12 (VITAMIN B-12) 1,000 mcg tablet Take by mouth daily (Patient not taking: Reported on 4/6/2023)       No current facility-administered medications for this visit  Allergies  No Known Allergies    Past Medical History, Social History, Family History, medications and allergies were reviewed  Vitals  Vitals:    04/07/23 0856   BP: 112/72   BP Location: Left arm   Patient Position: Sitting   Cuff Size: Adult   Pulse: 77   SpO2: 98%   Weight: 111 kg (243 lb 12 8 oz)   Height: 5' 8\" (1 727 m)       Physical Exam  Physical Exam  On examination he is in no acute distress  Gait is slow with the assistance of a cane  Affect normal       Results  Lab Results   Component Value Date    PSA 0 3 11/18/2022     Lab Results   Component Value Date    CALCIUM 9 7 03/28/2023    K 2 9 (L) 03/28/2023    CO2 30 03/28/2023    CL 93 (L) 03/28/2023    BUN 20 03/28/2023    CREATININE 1 11 03/28/2023     Lab Results   Component Value Date    WBC 9 72 03/28/2023    HGB 13 0 03/28/2023    HCT 39 1 03/28/2023    MCV 93 03/28/2023     03/28/2023         Office Urine Dip  No results found for this or any previous visit (from the past 1 hour(s))  ]      Total visit time was 50 minutes of which over 50% was spent on counseling        "

## 2023-04-07 NOTE — PROGRESS NOTES
Attend pt consult with Dr Clary Reid  Pt was accompanied by his wife  Plan of care reviewed and recommendations from Tumor Board also discussed  Pt and wife emotional during visit and support provided  Information on support group and Urostomy provided  [facility]'s to start chemotherapy    Encouraged pt to call with any questions/concerns

## 2023-04-07 NOTE — LETTER
2023     Landon Jo MD PhD  Charo  [address] [address] [facility]    Patient: Michele Level   YOB: 1960   Date of Visit: 2023       Dear Dr Kermit Cochran: Thank you for referring Frank Barnett to me for evaluation  Below are my notes for this consultation  If you have questions, please do not hesitate to call me  I look forward to following your patient along with you  Sincerely,        Italo Hector MD        CC: MD Margarita Muse, DO Italo Hector MD  2023 10:12 AM  Sign when Signing Visit  2023    Michele Level  1960  [id number]        Assessment  High-grade muscle invasive bladder cancer      Discussion  I had a lengthy discussion with Rosa Marc and his wife in the office today along with her nurse navigator  We discussed recent pathology which reveals high-grade muscle invasive bladder cancer  Pathology has been sent to [facility] for second opinion  Assuming that this is high-grade muscle invasive disease we discussed standard of care is neoadjuvant chemotherapy followed by radical cystoprostatectomy with ileal conduit creation  We also discussed bladder preservation therapy as well with concurrent chemoradiation and repeat TURBT as necessary  We discussed the possibility of a neobladder which can be performed at a specialty [facility] such [facility]  The patient is not interested in a neobladder after discussion of the risk and benefits  We reviewed that fortunately his PET scan shows no evidence of locally advanced or metastatic disease  A single borderline 7 mm lymph node was identified but was not confirmed to be pathologic  If there is a question of pathology review at [facility] I discussed returning to the operating room for repeat TURBT  The patient is scheduled to follow with a medical oncologist at [facility] Ritika Foster, Dr Jewel Rich    I recommend that she hold on initiation of platinum based chemotherapy until pathology from [facility] has been confirmed as muscle invasive  He will return in follow-up with me in approximately 4 weeks at which point pathology will have returned and hopefully he will have initiated his platinum based chemotherapy  History of Present Illness  58 y o  male with a history of a 3 cm bladder tumor  He underwent TURBT with Dr Carlos Srinivasan in March 2023  Pathology shows high-grade muscle invasive disease  Pathology was reviewed at our recent urology tumor board  There was a question of true invasion into thick muscle fibers  The consensus of the multidisciplinary tumor board was to send the pathology to [facility] for second opinion  This is pending at this time  This morning the patient had a CT PET scan and fortunately this is negative for locally advanced or metastatic disease  A single lymph node measuring 7 mm was identified  Patient presents today with his wife  We had a lengthy discussion along with our nurse navigator reviewing high-grade muscle invasive bladder cancer along with standard of care treatment and plan  He denies any lower urinary tract symptoms  He has significant erectile dysfunction  He is on disability after recent back surgery  He ambulates with a cane  He has had multiple inguinal hernia repairs as a child but is never had abdominal surgery        AUA Symptom Score  AUA SYMPTOM SCORE    Flowsheet Row Most Recent Value   AUA SYMPTOM SCORE    How often have you had a sensation of not emptying your bladder completely after you finished urinating? 2 (P)     How often have you had to urinate again less than two hours after you finished urinating? 5 (P)     How often have you found you stopped and started again several times when you urinate? 4 (P)     How often have you found it difficult to postpone urination? 1 (P)     How often have you had a weak urinary stream? 3 (P)     How often have you had to push "or strain to begin urination? 1 (P)     How many times did you most typically get up to urinate from the time you went to bed at night until the time you got up in the morning? 4 (P)     Quality of Life: If you were to spend the rest of your life with your urinary condition just the way it is now, how would you feel about that? 4 (P)     AUA SYMPTOM SCORE 20 (P)           Review of Systems  Review of Systems   Constitutional: Negative  HENT: Negative  Eyes: Negative  Respiratory: Negative  Cardiovascular: Negative  Gastrointestinal: Negative  Endocrine: Negative  Genitourinary:        Per HPI   Musculoskeletal: Negative  Skin: Negative  Allergic/Immunologic: Negative  Neurological: Negative  Hematological: Negative  Psychiatric/Behavioral: Negative  Past Medical History  Past Medical History:   Diagnosis Date   • Arthritis    • Bladder cancer ([facility] [address] )    • Chronic narcotic dependence ([facility] [address] )    • Chronic pain disorder     lower back and down both legs   • Colon polyp    • Coronary artery disease    • CPAP (continuous positive airway pressure) dependence    • Does use hearing aid     will wear DOS   • Full dentures    • Heart failure ([facility] [address] )     and \"fluid retention\" SL OW B Daryl cardio PA\"   • High cholesterol    • Hypertension    • Mild ankle edema     and feet   • Obstructive sleep apnea on CPAP    • Prediabetes    • Shortness of breath     per pt \"with just exertion\"   • Sleep apnea    • Wears glasses        Past Social History  Past Surgical History:   Procedure Laterality Date   • BACK SURGERY      titanium rods implanted   • COLONOSCOPY     • CYSTOSCOPY W/ URETERAL STENT PLACEMENT Bilateral 3/17/2023    Procedure: bilateral retrograde;  Surgeon: Loan Garcia MD;  Location: [facility];  Service: Urology   • HERNIA REPAIR      x5   • TRANSURETHRAL RESECTION OF BLADDER TUMOR N/A 3/17/2023    Procedure: TRANSURETHRAL RESECTION OF BLADDER TUMOR (TURBT);   Surgeon: Thong Box" Chapis Cordova MD;  Location:  MAIN OR;  Service: Urology       Past Family History  Family History   Problem Relation Age of Onset   • Cancer Father    • Aortic aneurysm Father    • Leukemia Father    • Hypertension Mother        Past Social history  Social History     Socioeconomic History   • Marital status: /Civil Union     Spouse name: Not on file   • Number of children: Not on file   • Years of education: Not on file   • Highest education level: Not on file   Occupational History   • Not on file   Tobacco Use   • Smoking status: Former     Packs/day: 1 00     Years: 35 00     Pack years: 35 00     Types: Cigarettes     Start date: 2023     Quit date: 2016     Years since quittin 2   • Smokeless tobacco: Never   Vaping Use   • Vaping Use: Never used   Substance and Sexual Activity   • Alcohol use: Not Currently     Comment: 3 per year   • Drug use: Not Currently     Types: Marijuana   • Sexual activity: Not Currently   Other Topics Concern   • Not on file   Social History Narrative   • Not on file     Social Determinants of Health     Financial Resource Strain: Not on file   Food Insecurity: Not on file   Transportation Needs: Not on file   Physical Activity: Not on file   Stress: Not on file   Social Connections: Not on file   Intimate Partner Violence: Not on file   Housing Stability: Not on file       Current Medications  Current Outpatient Medications   Medication Sig Dispense Refill   • aspirin 81 mg chewable tablet Chew 81 mg daily     • atorvastatin (LIPITOR) 40 mg tablet      • co-enzyme Q-10 30 MG capsule Take 100 mg by mouth daily      • Farxiga 5 MG TABS 5 mg daily 5mg alternating with 2 5mg for fluid retention     • gabapentin (NEURONTIN) 300 mg capsule Take 300 mg by mouth 2 (two) times a day      • lisinopril (ZESTRIL) 2 5 mg tablet      • metolazone (ZAROXOLYN) 2 5 mg tablet Take 2 5 mg by mouth 3 (three) times a week      • metoprolol succinate (TOPROL-XL) 25 mg 24 hr tablet "Take 25 mg by mouth daily     • multivitamin-iron-minerals-folic acid (CENTRUM) chewable tablet Chew 1 tablet daily     • oxyCODONE (OxyCONTIN) 40 mg 12 hr tablet Take 1 tablet (40 mg total) by mouth every 8 (eight) hours Max Daily Amount: 120 mg 63 tablet 0   • oxyCODONE (ROXICODONE) 30 MG immediate release tablet Take 1/2 tab for moderate breakthrough pain OR 1 tab for severe breakthrough pain every 4 hours as needed 120 tablet 0   • potassium chloride (K-DUR,KLOR-CON) 20 mEq tablet 2 (two) times a day     • torsemide (DEMADEX) 20 mg tablet Take 60 mg by mouth daily     • albuterol (Proventil HFA) 90 mcg/act inhaler Inhale 2 puffs every 6 (six) hours as needed for wheezing 6 7 g 0   • clotrimazole-betamethasone (LOTRISONE) 1-0 05 % cream      • potassium chloride (K-DUR,KLOR-CON) 10 mEq tablet Take 1 tablet (10 mEq total) by mouth 2 (two) times a day 20 tablet 0   • vitamin B-12 (VITAMIN B-12) 1,000 mcg tablet Take by mouth daily (Patient not taking: Reported on 4/6/2023)       No current facility-administered medications for this visit  Allergies  No Known Allergies    Past Medical History, Social History, Family History, medications and allergies were reviewed  Vitals  Vitals:    04/07/23 0856   BP: 112/72   BP Location: Left arm   Patient Position: Sitting   Cuff Size: Adult   Pulse: 77   SpO2: 98%   Weight: 111 kg (243 lb 12 8 oz)   Height: 5' 8\" (1 727 m)       Physical Exam  Physical Exam  On examination he is in no acute distress  Gait is slow with the assistance of a cane    Affect normal       Results  Lab Results   Component Value Date    PSA 0 3 11/18/2022     Lab Results   Component Value Date    CALCIUM 9 7 03/28/2023    K 2 9 (L) 03/28/2023    CO2 30 03/28/2023    CL 93 (L) 03/28/2023    BUN 20 03/28/2023    CREATININE 1 11 03/28/2023     Lab Results   Component Value Date    WBC 9 72 03/28/2023    HGB 13 0 03/28/2023    HCT 39 1 03/28/2023    MCV 93 03/28/2023     03/28/2023 " Office Urine Dip  No results found for this or any previous visit (from the past 1 hour(s))  ]      Total visit time was 50 minutes of which over 50% was spent on counseling

## 2023-04-15 NOTE — H&P (VIEW-ONLY)
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)    2  History of gross hematuria    3  Benign prostatic hyperplasia with urinary frequency         Plan: We will continue monitor to bladder cancer  Once we get the report back and I discussed with the patient will let medical oncology as well as my partner Dr French Roach aware of  Risk and benefits of the procedure explained to the patient he understands and agrees she is likely to go home with a catheter for a couple days  Repeat urine culture preop will be done  Chief Complaint   No chief complaint on file  History of Present Illness     HPI: Joshua Martinez is a 58y o  year old male who presents with follow-up bladder cancer diagnosis  Patient had a TURBT done on 3/17/2023  Pathology report came back 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  They recommended repeat TURBT they were not sure whether the tumor invaded muscularis proper or lamina propria or muscularis  So patient will be set up for a repeat TURBT to determine whether this is T1 or T2 disease  He did have a PET scan that did not show any evidence of metastatic disease  He has been seen by [facility] Dr Elissa Castillo as well as medical oncology with Dr Kamaljit Voss  Patient is here today to update H&P and set up for surgery on May 1  Patient is doing well outside of some lower back pain that is been chronic no irritable or obstructive voiding complaints no fever chills nausea vomiting          The following portions of the patient's history were reviewed and updated as appropriate: allergies, current medications, past "family history, past medical history, past social history, past surgical history and problem list   Past Medical History:   Diagnosis Date    Arthritis     [facility]adder [facility]     Chronic narcotic dependence ([facility] )     Chronic pain disorder     lower back and down both legs    Colon polyp     Coronary artery disease     CPAP (continuous positive airway pressure) dependence     Does use hearing aid     will wear DOS    Full dentures     Heart failure ([facility] )     and \"fluid retention\" SL OW B Daryl cardio PA\"    High cholesterol     Hypertension     Mild ankle edema     and feet    Obstructive sleep apnea on CPAP     Prediabetes     Shortness of breath     per pt \"with just exertion\"    Sleep apnea     Wears glasses      Past Surgical History:   Procedure Laterality Date    BACK SURGERY      titanium rods implanted    COLONOSCOPY      CYSTOSCOPY W/ URETERAL STENT PLACEMENT Bilateral 3/17/2023    Procedure: bilateral retrograde;  Surgeon: Melissa Mcdonald MD;  Location:  MAIN OR;  Service: Urology    HERNIA REPAIR      x5    TRANSURETHRAL RESECTION OF BLADDER TUMOR N/A 3/17/2023    Procedure: TRANSURETHRAL RESECTION OF BLADDER TUMOR (TURBT); Surgeon: Melissa Mcdonald MD;  Location:  MAIN OR;  Service: Urology     shoulder  Review of Systems     Const: Denies chills, fever and weight loss  CV: Denies chest pain  Resp: Denies SOB  GI: Denies abdominal pain, nausea and vomiting  : Denies symptoms other than stated above  Musculo: Denies back pain  Objective   There were no vitals taken for this visit  Physical Exam  Const: Appears healthy and well developed  No signs of acute distress present  Resp: Respirations are regular and unlabored  CV: Rate is regular  Rhythm is regular  Abdomen: Abdomen is soft, nontender, and nondistended  Kidneys are not palpable  : nl  Psych: Patient's attitude is cooperative   Mood is normal  Affect is normal     Current Medications     Current " Outpatient Medications:     aspirin 81 mg chewable tablet, Chew 81 mg daily, Disp: , Rfl:     atorvastatin (LIPITOR) 40 mg tablet, , Disp: , Rfl:     co-enzyme Q-10 30 MG capsule, Take 100 mg by mouth daily , Disp: , Rfl:     Farxiga 5 MG TABS, 5 mg daily 5mg alternating with 2 5mg for fluid retention, Disp: , Rfl:     gabapentin (NEURONTIN) 300 mg capsule, Take 300 mg by mouth 2 (two) times a day , Disp: , Rfl:     lisinopril (ZESTRIL) 2 5 mg tablet, , Disp: , Rfl:     metolazone (ZAROXOLYN) 2 5 mg tablet, Take 2 5 mg by mouth 3 (three) times a week M-W-F, Disp: , Rfl:     metoprolol succinate (TOPROL-XL) 25 mg 24 hr tablet, Take 25 mg by mouth daily, Disp: , Rfl:     multivitamin-iron-minerals-folic acid (CENTRUM) chewable tablet, Chew 1 tablet daily, Disp: , Rfl:     oxyCODONE (OxyCONTIN) 40 mg 12 hr tablet, Take 1 tablet (40 mg total) by mouth every 8 (eight) hours Max Daily Amount: 120 mg, Disp: 63 tablet, Rfl: 0    oxyCODONE (ROXICODONE) 30 MG immediate release tablet, Take 1/2 tab for moderate breakthrough pain OR 1 tab for severe breakthrough pain every 4 hours as needed, Disp: 120 tablet, Rfl: 0    potassium chloride (K-DUR,KLOR-CON) 20 mEq tablet, 2 (two) times a day, Disp: , Rfl:     torsemide (DEMADEX) 20 mg tablet, Take 60 mg by mouth daily, Disp: , Rfl:     vitamin B-12 (VITAMIN B-12) 1,000 mcg tablet, Take by mouth daily (Patient not taking: Reported on 4/6/2023), Disp: , Rfl:         Sebastián Peraza MD

## 2023-04-19 ENCOUNTER — ANESTHESIA EVENT (OUTPATIENT)
Dept: PERIOP | Facility: HOSPITAL | Age: 63
End: 2023-04-19

## 2023-04-21 NOTE — PRE-PROCEDURE INSTRUCTIONS
Pre-Surgery Instructions:   Medication Instructions    aspirin 81 mg chewable tablet Stop taking 7 days prior to surgery   atorvastatin (LIPITOR) 40 mg tablet Take day of surgery   co-enzyme Q-10 30 MG capsule Stop taking 7 days prior to surgery   Farxiga 5 MG TABS Msg to soc to clarify since being used for CHF    gabapentin (NEURONTIN) 300 mg capsule Take day of surgery   lisinopril (ZESTRIL) 2 5 mg tablet Hold day of surgery   metolazone (ZAROXOLYN) 2 5 mg tablet Hold day of surgery   metoprolol succinate (TOPROL-XL) 25 mg 24 hr tablet Take day of surgery   multivitamin-iron-minerals-folic acid (CENTRUM) chewable tablet Stop taking 7 days prior to surgery   oxyCODONE (OxyCONTIN) 40 mg 12 hr tablet Uses PRN- OK to take day of surgery    oxyCODONE (ROXICODONE) 30 MG immediate release tablet Uses PRN- OK to take day of surgery    potassium chloride (K-DUR,KLOR-CON) 20 mEq tablet Hold day of surgery   torsemide (DEMADEX) 20 mg tablet Hold day of surgery  Medication instructions for day surgery reviewed  Please use only a sip of water to take your instructed medications  Avoid all over the counter vitamins, supplements and NSAIDS for one week prior to surgery per anesthesia guidelines  Tylenol is ok to take as needed  You will receive a call one business day prior to surgery with an arrival time and hospital directions  If your surgery is scheduled on a Monday, the hospital will be calling you on the Friday prior to your surgery  If you have not heard from anyone by 8pm, please call the hospital supervisor through the hospital  at [phone]  Camila Jacobo [phone])  Do not eat or drink anything after midnight the night before your surgery, including candy, mints, lifesavers, or chewing gum  Do not drink alcohol 24hrs before your surgery  Try not to smoke at least 24hrs before your surgery         Follow the pre surgery showering instructions as listed in the [facility] Surgical Experience Booklet or otherwise provided by your surgeon's office  Do not shave the surgical area 24 hours before surgery  Do not apply any lotions, creams, including makeup, cologne, deodorant, or perfumes after showering on the day of your surgery  No contact lenses, eye make-up, or artificial eyelashes  Remove nail polish, including gel polish, and any artificial, gel, or acrylic nails if possible  Remove all jewelry including rings and body piercing jewelry  Wear causal clothing that is easy to take on and off  Consider your type of surgery  Keep any valuables, jewelry, piercings at home  Please bring any specially ordered equipment (sling, braces) if indicated  Arrange for a responsible person to drive you to and from the hospital on the day of your surgery  Visitor Guidelines discussed  Call the surgeon's office with any new illnesses, exposures, or additional questions prior to surgery  Please reference your [facility] Surgical Experience Booklet for additional information to prepare for your upcoming surgery

## 2023-04-26 ENCOUNTER — OFFICE VISIT (OUTPATIENT)
Dept: PALLIATIVE MEDICINE | Facility: CLINIC | Age: 63
End: 2023-04-26

## 2023-04-26 ENCOUNTER — SOCIAL WORK (OUTPATIENT)
Dept: PALLIATIVE MEDICINE | Facility: CLINIC | Age: 63
End: 2023-04-26

## 2023-04-26 VITALS
DIASTOLIC BLOOD PRESSURE: 70 MMHG | HEART RATE: 68 BPM | WEIGHT: 253.2 LBS | SYSTOLIC BLOOD PRESSURE: 120 MMHG | OXYGEN SATURATION: 96 % | BODY MASS INDEX: 38.5 KG/M2 | TEMPERATURE: 96.7 F

## 2023-04-26 DIAGNOSIS — Z71.89 COUNSELING AND COORDINATION OF CARE: Primary | ICD-10-CM

## 2023-04-26 DIAGNOSIS — Z71.89 ADVANCED CARE PLANNING/COUNSELING DISCUSSION: ICD-10-CM

## 2023-04-26 DIAGNOSIS — M54.41 CHRONIC BILATERAL LOW BACK PAIN WITH BILATERAL SCIATICA: ICD-10-CM

## 2023-04-26 DIAGNOSIS — C67.3 MALIGNANT NEOPLASM OF ANTERIOR WALL OF URINARY BLADDER (HCC): Primary | ICD-10-CM

## 2023-04-26 DIAGNOSIS — M54.42 CHRONIC BILATERAL LOW BACK PAIN WITH BILATERAL SCIATICA: ICD-10-CM

## 2023-04-26 DIAGNOSIS — G89.29 CHRONIC BILATERAL LOW BACK PAIN WITH BILATERAL SCIATICA: ICD-10-CM

## 2023-04-26 RX ORDER — OXYCODONE HCL 40 MG/1
40 TABLET, FILM COATED, EXTENDED RELEASE ORAL EVERY 8 HOURS SCHEDULED
Qty: 90 TABLET | Refills: 0 | Status: SHIPPED | OUTPATIENT
Start: 2023-04-26

## 2023-04-26 NOTE — PROGRESS NOTES
[facility] SW met with patient to introduce the roll of [facility] HELENA in his care/treatment  Patient was given the opportunity to have his questions/concerns addressed  [facility] encouraged patient to reach out to SW as needed for support and/or resources as needed  Palliative Outpatient Assessment of Need    MSW completed an assessment of need which was completed with patient in the office     Relationship status:     Duration of relationship: 35 years    Name of significant other:Cami    Children and Ages: Adult son Jayden Justice    Pets: 1 dog ([place]) pt states is his wife's dog    Other important family information:The pt also has two adult step sons who reside in [place] and [place]    Living situation (where and whom):The pt resides in a 3 story home with 0 WES  The pt bedroom is on the second floor but he states there is an ADA shower on the first floor as well  The pt will either sleep upstairs or downstairs at times  Patient's primary caregiver:  Self, wife and son, Jayden Justice    Any limitations of caregiver:none    Environmental concerns or barriers:none     history:none    Employment history/source of income: Retired from [employer] pt was a     Disability:  None    Concerns regarding literacy: none    Spirituality/ Episcopalian:  Spiritual    Patient's strengths, social supports, and resources: The pt wife, Luis Manuel Madrigal and his son, Rebeca Santos information:     Mental Health current or previous:Anxiety about the upcoming surgery Monday and starting Chemotherapy May 12th, 2023  MSW discussed therapeutic support, but the pt states he has to have the surgery and treatment plan completed first in order to determine what he would need from therapy  Substance use or history: 1 sanya of cigarettes a until 6 years ago when the pt stopped smoking    Sleep: The pt states he has not been able to sleep more than 1 1/2 at a time due to anxiety about the unknown    The pt shared he was prescribed medication to assist him with sleeping, but he woke up in the [address] parking lot a couple weeks ago with no recollection about how he arrived there  The pt has discontinued taking the medication but he states it has taken awhile for the medication to leave his system  The pt states Melatonin is not helpful either  MSW spoke with the pt about [place] as a possible sleep aide  The pt was comfortable with the all natural suggestion     Exercise:walking and yard work    Diet/nutrition:The pt is eating well and states he enjoys cooking    [address] needs:Cpap machine and cane    Transportation:The pt drives himself to and from appointments    Financial concerns:none at this time    Advanced Directive:discussed with the pt  He completed the pw and a copy has been scanned into his chart    Other medical or social work providers involved:Palliative Sw only    Patient/caregiver current level of coping: The pt is very anxious about his surgery and his treatment  The pt shared he is very concerned about how his health will impact his immediate family  The pt has always been the provider and expressed concerns about his role in the family  MSW spoke with the pt about accepting his families assistance and giving himself permission to focus on himself  The pt recognizes his family would like to assist him  MSW discussed with the pt the importance of recognizing he may have some limitations as his body responds to his treatment  MSW explained it is a normal response, but the pt has been encouraged to share when something does not feel right or he has pain  Understanding:  The pt is awaiting the surgery on Monday and a discussion about his treatment plan    Patient/family concerns and areas of need:the pt was concerned about the AD paperwork and was pleased the pw was able to be completed today    Patient's interests:     I have spent 40 minutes with Patient today in which greater than 50% of this time was spent in counseling/coordination of care regarding ongoing emotional support  *All questions may not be answered due to constraints    Follow-up discussions may need to occur

## 2023-04-26 NOTE — PROGRESS NOTES
Palliative and Supportive Care   Lyn Dunn 58 y o  male [id number]    Assessment/Plan:  1  Malignant neoplasm of anterior wall of urinary bladder (HCC)    2  Chronic bilateral low back pain with bilateral sciatica    3  Advanced care planning/counseling discussion      Symptoms - chronic low back pain, improved  · Continue OxyER 40mg PO q8H ATC - pill count: 22 tabs  Will send refill now with note to pharmacy to not dispense earlier than 5/2  · OxyIR 30mg tabs, 1/2 tabs (15mg) PO q4H prn - averaging 4-6 doses a day - he did not bring this for pill count  He will bring next time  He said he still has enough, he will call for refills  ·  He is advised to call the office before making any changes on his own, contrary to above instructions  · Will avoid polypharmacy at this time given increase in opioids  He is also agreeable and appreciative of this approach  · Senokot, 1 tab daily to prevent OIC  · Continue gabapentin 300mg BID  Will let PCP continue managing for now given no changes recommended at this time  · Recommend re-certifying for MMJ to help with anxiety, sleep, appetite  · He is interested in following cares eventually with one of our providers who is closer to his home  ACP/GOC:  · He has no living will  [place] Act 169 explained in term of hierarchy in surrogate decision making  · He lives with his wife  They have 1 son (33yrs old) who lives a mile away from his home  His mother is in her 80s and she lives in [place]  He has 5 other siblings (4 brothers and a sister) who all live in [place] as well  · SL Advanced directives/POA paperwork offered and he is interested in this - he filled this out and completed this during this visit  · He names his wife as his HCA and his son as 1st alternate  · He wishes to be a level 4/comfort cares only in the event of a terminal illness  · This was witnessed, signed, and scanned in his chart   He was given back the original copy with 2 photocopies   · He also met with our [facility] SW  Please refer to her separate documentation for more details     Follow up  · RTO in 6 weeks    Controlled Substance Review    [place] or [place]  reviewed: No red flags were identified; safe to proceed with prescription  04/10/2023  04/06/2023   1  Oxycontin Er 40 Mg Tablet 63 00  21  Ti Mode  [id number]   Tho (0343)   180 00 MME  [place] Ins  [place]     04/10/2023  04/06/2023   1  Oxycodone Hcl (Ir) 30 Mg Tab 120 00  30  Ti Mode  [id number]   Tho (0343)   180 00 MME  [place]     03/16/2023 03/16/2023   1  Oxycodone Hcl (Ir) 30 Mg Tab 120 00  30  An Gwynda Jonatan  [id number]   Tho (8726)   180 00 MME  [place]     02/16/2023 02/16/2023   1  Oxycodone Hcl (Ir) 30 Mg Tab 120 00  30  An Gwynda Jonatan  [id number]   Tho (0343)   180 00 MME  [place]     Requested Prescriptions     Signed Prescriptions Disp Refills   • oxyCODONE (OxyCONTIN) 40 mg 12 hr tablet 90 tablet 0     Sig: Take 1 tablet (40 mg total) by mouth every 8 (eight) hours Max Daily Amount: 120 mg     Medications Discontinued During This Encounter   Medication Reason   • oxyCODONE (OxyCONTIN) 40 mg 12 hr tablet Reorder       Representatives have queried the patient's controlled substance dispensing history in the Prescription Drug Monitoring Program in compliance with regulations before I have prescribed any controlled substances  The prescription history is consistent with prescribed therapy and our practice policies        60 minutes were spent face to face with Tommy Felton with greater than 50% of the time spent in counseling or coordination of care including discussions of etiology of diagnosis, pathogenesis of diagnosis, diagnostic results, impression, and recommendations, risks and benefits of treatment, instructions for disease self management, treatment instructions, follow up requirements, risk factors and risk reduction of disease, patient and family counseling/involvement in care and compliance with treatment regimen    All of the patient's questions were answered during this discussion  No follow-ups on file  Subjective:   Chief Complaint  Follow up visit for:  symptom management, goal of care assessment and decisional support, disease process education and discussion of prognosis, advance care planning, emotional support in the setting of serious illness  HPI     Marta Pacheco is a 58 y o  male with Hx of chronic low back that did not improve after spine fusion surgery done 6 years ago (2017); and 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 and involvement of smooth muscle (diagnosed via TURBT in 2/2023)  CT CAP also showed enlargement of a right external iliac chain lymph node suspicion for regional metastasis  His case is discussed at the tumor board, recommendations are for PET-CT (scheduled on 4/19) to further evaluate LN and for additional metastatic disease  If LN positive proceed with neoadjuvant chemo followed by cystectomy  If PET scan negative, will need external review of path +/- repeat TURBT  PET-CT on 4/7 showed no evidence of metastatic disease  Rishabh Alexander recommended a repeat TURBT to determine T1 or T2 disease  This is scheduled for 5/1/2023  Patient was last seen on initial consultation on 4/6/2023 where he complained of new pain in the lower back pain that started and persisted after his TURBT on March 17, 2023  We started him on long acting Oxy ER and continued oxyIR as is  Since this last visit, he had his PET-CT done and he followed up with urology  Findings and plans as noted above  He returns today for his routine follow up  Since starting OxyER, he did note improvement of his LBP to levels close/similar to his chronic pain levels before March 17, 2023  He struggles with his mobility, but this is a chronic issue with some worsening lately  Mobility restricted due to ongoing LBP and joint pains   He had done PT 6 months ago and was told they had "\"maxed out\" what they can offer him  We can revisit this again in the future, when appropriate  He is moving his bowels with daily senokot  He wonders about addition of a muscle relaxant, but given high dose opioids, will not recommend this at this time  Can revisit in the future if pain gets worse  He is on gabapentin that we will let PCP continue managing  On previous visit ---> As for his other symptoms of anxiety (about health), poor sleep (because of cancer diagnosis) and poor appetite (likely from cancer), MMJ appears appropriate for these indications  He had an MMJ card that he let lapsed when it did not help his pain  He is agreeable to a renewal  He agreed to avoid polypharmacy at this time  Focus on better pain control for now  Chronic back pain:  He reports being on oxyIR at least for 6-7 years since undergoing spinal fusion surgery for injured lower back that did not really work for pain  He reports being on OxyER before the surgery but he weaned himself off after the surgery in the hopes that it will help with his pain, but it did not  He remained on the IR and tried to manage his LBP as best as he can  He followed with his PCP for pain control since  ACP/GOC:  He has no living will  [place] 169 explained in term of hierarchy in surrogate decision making  He lives with his wife  They have 1 son (33yrs old) who lives a mile away from his home  His mother is in her 80s and she lives in [place]  He has 5 other siblings (4 brothers and a sister) who all live in [place] as well  [place] Advanced directives/POA paperwork offered and he is interested in this  He also met with our [facility] SW  Please refer to her separate documentation for more details     The following portions of the medical history were reviewed: past medical history, problem list, medication list, and social history      Current Outpatient Medications:   •  aspirin 81 mg chewable tablet, Chew 81 mg daily, Disp: , Rfl:   •  " atorvastatin (LIPITOR) 40 mg tablet, , Disp: , Rfl:   •  co-enzyme Q-10 30 MG capsule, Take 100 mg by mouth daily , Disp: , Rfl:   •  Farxiga 5 MG TABS, 5 mg daily 5mg alternating with 2 5mg for fluid retention, Disp: , Rfl:   •  gabapentin (NEURONTIN) 300 mg capsule, Take 300 mg by mouth 2 (two) times a day , Disp: , Rfl:   •  lisinopril (ZESTRIL) 2 5 mg tablet, , Disp: , Rfl:   •  metolazone (ZAROXOLYN) 2 5 mg tablet, Take 2 5 mg by mouth 3 (three) times a week M-W-F, Disp: , Rfl:   •  metoprolol succinate (TOPROL-XL) 25 mg 24 hr tablet, Take 25 mg by mouth daily, Disp: , Rfl:   •  multivitamin-iron-minerals-folic acid (CENTRUM) chewable tablet, Chew 1 tablet daily, Disp: , Rfl:   •  oxyCODONE (OxyCONTIN) 40 mg 12 hr tablet, Take 1 tablet (40 mg total) by mouth every 8 (eight) hours Max Daily Amount: 120 mg, Disp: 90 tablet, Rfl: 0  •  oxyCODONE (ROXICODONE) 30 MG immediate release tablet, Take 1/2 tab for moderate breakthrough pain OR 1 tab for severe breakthrough pain every 4 hours as needed, Disp: 120 tablet, Rfl: 0  •  potassium chloride (K-DUR,KLOR-CON) 20 mEq tablet, 2 (two) times a day, Disp: , Rfl:   •  torsemide (DEMADEX) 20 mg tablet, Take 60 mg by mouth daily, Disp: , Rfl:   •  vitamin B-12 (VITAMIN B-12) 1,000 mcg tablet, Take by mouth daily (Patient not taking: Reported on 4/6/2023), Disp: , Rfl:   Review of Systems   Constitutional: Positive for activity change  Negative for appetite change and fatigue  HENT: Negative for trouble swallowing  Respiratory: Negative for shortness of breath  Cardiovascular: Negative for chest pain  Gastrointestinal: Negative for abdominal pain, constipation, diarrhea, nausea and vomiting  Musculoskeletal: Positive for arthralgias  Chronic low back pain, back to chronic baseline   Neurological: Negative for weakness  Psychiatric/Behavioral: Negative for sleep disturbance  The patient is nervous/anxious      All other systems reviewed and are negative  All other systems negative    Objective:  Vital Signs  /70 (BP Location: Left arm, Patient Position: Sitting, Cuff Size: Standard)   Pulse 68   Temp (!) 96 7 °F (35 9 °C) (Temporal)   Wt 115 kg (253 lb 3 2 oz)   SpO2 96%   BMI 38 50 kg/m²    Physical Exam    Constitutional: Appears well-developed and well-nourished  Appears chronically ill looking, but not toxic appearing  Friendly, pleasant  In no acute physical or emotional distress  Head: Normocephalic and atraumatic  Eyes: EOM are normal  No ocular discharge  No scleral icterus  Neck: No visible adenopathy or masses  Respiratory: Effort normal  No stridor  No respiratory distress  Gastrointestinal: No abdominal distension  Musculoskeletal: No edema  Neurological: Alert, oriented and appropriately conversant  Walked with a cane  Walking slowly because of LBP  Skin: No diaphoresis, no rashes seen on exposed areas of skin  Psychiatric: Displays a normal mood and affect   Behavior, judgement and thought content appear normal  Slightly tearful    Marianne Packer MD  Palliative Medicine & Supportive Care  Internal Medicine  Available via [facility] Text  Office: [phone]  Fax: [phone]

## 2023-04-28 ENCOUNTER — DOCUMENTATION (OUTPATIENT)
Dept: HEMATOLOGY ONCOLOGY | Facility: CLINIC | Age: 63
End: 2023-04-28

## 2023-04-28 ENCOUNTER — PATIENT OUTREACH (OUTPATIENT)
Dept: HEMATOLOGY ONCOLOGY | Facility: CLINIC | Age: 63
End: 2023-04-28

## 2023-04-28 NOTE — PROGRESS NOTES
Pt with repeat TURBT on 5/1/23 with Dr Gabriela Eubanks  Currently scheduled for 5/5/23 with Dr Harmon Nurse  Concerned path will not be resulted in time  Can we push this to 5/10/23 or wait on scheduling new appt until we know results? Please advise  Thank you!

## 2023-04-28 NOTE — PROGRESS NOTES
Received voicemail from pt:  Good morning, Yudith  This is Cam Giron calling  My phone number is [phone]  My date of birth is   If you can give me a call  I have some concerns about scheduling next week  Nothing of great importance, but just something I'd like to straighten out if possible  Thanks a lot  debra Simmons      Returned call to pt  Pt inquiring about appt scheduled with Dr Tabitha Toro on 5/5/23 and inquiring if it is needed at this time  Scheduled for repeat TURBT on 5/1/23  Advised that we most likely will not have results for the 5/5 visit and suggested delaying 1 week but will check with Urology if we need to reschedule at this time or wait until we know results  Pt has already met with Dr Katie Andre and had discussion should this be muscle invasive and surgical option  Task sent to Urology to inquire how to proceed with scheduling

## 2023-04-30 NOTE — ANESTHESIA PREPROCEDURE EVALUATION
Procedure:  CYSTOSCOPY TURBT (Bladder)    Relevant Problems   CARDIO   (+) Hypertension      MUSCULOSKELETAL   (+) Chronic bilateral low back pain with bilateral sciatica      NEURO/PSYCH   (+) Anxiety   (+) Chronic bilateral low back pain with bilateral sciatica   (+) History of gross hematuria      PULMONARY   (+) Obstructive sleep apnea on CPAP     Chronic narcotics    Former smoker     Physical Exam    Airway    Mallampati score: II  TM Distance: >3 FB  Neck ROM: full     Dental   No notable dental hx     Cardiovascular  Cardiovascular exam normal    Pulmonary  Pulmonary exam normal     Other Findings     Sinus bradycardia  Otherwise normal ECG  When compared with ECG of 11-JAN-2021 08:39,  QT has shortened  Confirmed by Chelle Ahmadi ([id number]) on 2/18/2023 10:18:34 PM     TTE SUMMARY (2020):  -  Stress results: There was no chest pain during stress  -  ECG conclusions: The stress ECG was negative for ischemia and normal   -  Perfusion imaging: There was a moderate-sized, moderately severe, mainly fixed myocardial perfusion defect of the basal to mid inferior wall  There does appear to be a mild degree of inferoapical ischemia (although study quality is  poor)  There was a moderate-sized, mildly severe, partially reversible myocardial perfusion defect of the basal to mid anterior/anterolateral wall  There is a small amount of ischemia noted in the anterior/anterolateral wall (again limited  by study quality)  -  Gated SPECT: The calculated left ventricular ejection fraction was 55 %  Left ventricular ejection fraction was within normal limits by visual estimate      IMPRESSIONS: Abnormal study after pharmacologic stress with a fixed inferior wall defect with a mild degree of ischemia noted in the apical inferior wall as well as a small fixed defect in the basal to mid anterior wall with mild ischemia  in the anterolateral wall   Study quality severely reduces accuracy of the test  Significant patient motion noted on raw images  Left ventricular systolic function was normal    Anesthesia Plan  ASA Score- 3     Anesthesia Type- general with ASA Monitors  Additional Monitors:   Airway Plan: ETT  Plan Factors-Exercise tolerance (METS): >4 METS  Chart reviewed  EKG reviewed  Imaging results reviewed  Existing labs reviewed  Patient summary reviewed  Patient is not a current smoker  Patient not instructed to abstain from smoking on day of procedure  Patient did not smoke on day of surgery  Obstructive sleep apnea risk education given perioperatively  Induction-     Postoperative Plan-     Informed Consent- Anesthetic plan and risks discussed with patient  I personally reviewed this patient with the CRNA  Discussed and agreed on the Anesthesia Plan with the CRNA  Rupesh Treadwell

## 2023-05-01 ENCOUNTER — HOSPITAL ENCOUNTER (INPATIENT)
Facility: HOSPITAL | Age: 63
LOS: 1 days | Discharge: HOME | DRG: 669 | End: 2023-05-03
Attending: UROLOGY | Admitting: UROLOGY
Payer: MEDICARE

## 2023-05-01 ENCOUNTER — ANESTHESIA (OUTPATIENT)
Dept: PERIOP | Facility: HOSPITAL | Age: 63
End: 2023-05-01

## 2023-05-01 DIAGNOSIS — C67.3 MALIGNANT NEOPLASM OF ANTERIOR WALL OF URINARY BLADDER (HCC): Primary | ICD-10-CM

## 2023-05-01 DIAGNOSIS — N40.1 BENIGN PROSTATIC HYPERPLASIA WITH URINARY FREQUENCY: ICD-10-CM

## 2023-05-01 DIAGNOSIS — I10 PRIMARY HYPERTENSION: ICD-10-CM

## 2023-05-01 DIAGNOSIS — E87.6 HYPOKALEMIA: ICD-10-CM

## 2023-05-01 DIAGNOSIS — R35.0 BENIGN PROSTATIC HYPERPLASIA WITH URINARY FREQUENCY: ICD-10-CM

## 2023-05-01 PROBLEM — G89.3 CANCER-RELATED PAIN: Status: RESOLVED | Noted: 2023-04-06 | Resolved: 2023-05-01

## 2023-05-01 PROBLEM — Z72.820 POOR SLEEP: Status: RESOLVED | Noted: 2023-04-06 | Resolved: 2023-05-01

## 2023-05-01 PROBLEM — Z87.898 HISTORY OF GROSS HEMATURIA: Status: RESOLVED | Noted: 2023-03-18 | Resolved: 2023-05-01

## 2023-05-01 PROBLEM — Z74.09: Status: RESOLVED | Noted: 2023-04-06 | Resolved: 2023-05-01

## 2023-05-01 LAB
ANION GAP SERPL CALCULATED.3IONS-SCNC: 10 MMOL/L (ref 4–13)
BASOPHILS # BLD AUTO: 0.02 THOUSANDS/ÂΜL (ref 0–0.1)
BASOPHILS NFR BLD AUTO: 0 % (ref 0–1)
BUN SERPL-MCNC: 19 MG/DL (ref 5–25)
CALCIUM SERPL-MCNC: 8.9 MG/DL (ref 8.4–10.2)
CHLORIDE SERPL-SCNC: 101 MMOL/L (ref 96–108)
CO2 SERPL-SCNC: 26 MMOL/L (ref 21–32)
CREAT SERPL-MCNC: 0.95 MG/DL (ref 0.6–1.3)
EOSINOPHIL # BLD AUTO: 0 THOUSAND/ÂΜL (ref 0–0.61)
EOSINOPHIL NFR BLD AUTO: 0 % (ref 0–6)
ERYTHROCYTE [DISTWIDTH] IN BLOOD BY AUTOMATED COUNT: 14.6 % (ref 11.6–15.1)
GFR SERPL CREATININE-BSD FRML MDRD: 84 ML/MIN/1.73SQ M
GLUCOSE P FAST SERPL-MCNC: 146 MG/DL (ref 65–99)
GLUCOSE SERPL-MCNC: 146 MG/DL (ref 65–140)
GLUCOSE SERPL-MCNC: 153 MG/DL (ref 65–140)
HCT VFR BLD AUTO: 35.5 % (ref 36.5–49.3)
HGB BLD-MCNC: 11.8 G/DL (ref 12–17)
IMM GRANULOCYTES # BLD AUTO: 0.05 THOUSAND/UL (ref 0–0.2)
IMM GRANULOCYTES NFR BLD AUTO: 1 % (ref 0–2)
LYMPHOCYTES # BLD AUTO: 0.69 THOUSANDS/ÂΜL (ref 0.6–4.47)
LYMPHOCYTES NFR BLD AUTO: 11 % (ref 14–44)
MCH RBC QN AUTO: 31.6 PG (ref 26.8–34.3)
MCHC RBC AUTO-ENTMCNC: 33.2 G/DL (ref 31.4–37.4)
MCV RBC AUTO: 95 FL (ref 82–98)
MONOCYTES # BLD AUTO: 0.08 THOUSAND/ÂΜL (ref 0.17–1.22)
MONOCYTES NFR BLD AUTO: 1 % (ref 4–12)
NEUTROPHILS # BLD AUTO: 5.73 THOUSANDS/ÂΜL (ref 1.85–7.62)
NEUTS SEG NFR BLD AUTO: 87 % (ref 43–75)
NRBC BLD AUTO-RTO: 0 /100 WBCS
PLATELET # BLD AUTO: 198 THOUSANDS/UL (ref 149–390)
PMV BLD AUTO: 10.5 FL (ref 8.9–12.7)
POTASSIUM SERPL-SCNC: 3.3 MMOL/L (ref 3.5–5.3)
RBC # BLD AUTO: 3.73 MILLION/UL (ref 3.88–5.62)
SODIUM SERPL-SCNC: 137 MMOL/L (ref 135–147)
WBC # BLD AUTO: 6.57 THOUSAND/UL (ref 4.31–10.16)

## 2023-05-01 PROCEDURE — 80048 BASIC METABOLIC PNL TOTAL CA: CPT | Performed by: STUDENT IN AN ORGANIZED HEALTH CARE EDUCATION/TRAINING PROGRAM

## 2023-05-01 PROCEDURE — 88307 TISSUE EXAM BY PATHOLOGIST: CPT | Performed by: PATHOLOGY

## 2023-05-01 PROCEDURE — 99222 1ST HOSP IP/OBS MODERATE 55: CPT | Performed by: FAMILY MEDICINE

## 2023-05-01 PROCEDURE — 82948 REAGENT STRIP/BLOOD GLUCOSE: CPT

## 2023-05-01 PROCEDURE — 85025 COMPLETE CBC W/AUTO DIFF WBC: CPT | Performed by: FAMILY MEDICINE

## 2023-05-01 PROCEDURE — 52235 CYSTOSCOPY AND TREATMENT: CPT | Performed by: UROLOGY

## 2023-05-01 RX ORDER — MIDAZOLAM HYDROCHLORIDE 2 MG/2ML
INJECTION, SOLUTION INTRAMUSCULAR; INTRAVENOUS AS NEEDED
Status: DISCONTINUED | OUTPATIENT
Start: 2023-05-01 | End: 2023-05-01

## 2023-05-01 RX ORDER — GABAPENTIN 300 MG/1
300 CAPSULE ORAL 2 TIMES DAILY
Status: DISCONTINUED | OUTPATIENT
Start: 2023-05-01 | End: 2023-05-03 | Stop reason: HOSPADM

## 2023-05-01 RX ORDER — TORSEMIDE 20 MG/1
60 TABLET ORAL DAILY
Status: DISCONTINUED | OUTPATIENT
Start: 2023-05-01 | End: 2023-05-03 | Stop reason: HOSPADM

## 2023-05-01 RX ORDER — FENTANYL CITRATE 50 UG/ML
INJECTION, SOLUTION INTRAMUSCULAR; INTRAVENOUS AS NEEDED
Status: DISCONTINUED | OUTPATIENT
Start: 2023-05-01 | End: 2023-05-01

## 2023-05-01 RX ORDER — HYDROMORPHONE HCL/PF 1 MG/ML
0.5 SYRINGE (ML) INJECTION EVERY 4 HOURS PRN
Status: DISCONTINUED | OUTPATIENT
Start: 2023-05-01 | End: 2023-05-02

## 2023-05-01 RX ORDER — HYDROMORPHONE HCL IN WATER/PF 6 MG/30 ML
0.2 PATIENT CONTROLLED ANALGESIA SYRINGE INTRAVENOUS
Status: DISCONTINUED | OUTPATIENT
Start: 2023-05-01 | End: 2023-05-01

## 2023-05-01 RX ORDER — LISINOPRIL 2.5 MG/1
2.5 TABLET ORAL DAILY
Status: DISCONTINUED | OUTPATIENT
Start: 2023-05-01 | End: 2023-05-03 | Stop reason: HOSPADM

## 2023-05-01 RX ORDER — ACETAMINOPHEN 325 MG/1
975 TABLET ORAL ONCE
Status: COMPLETED | OUTPATIENT
Start: 2023-05-01 | End: 2023-05-01

## 2023-05-01 RX ORDER — CIPROFLOXACIN 500 MG/1
500 TABLET, FILM COATED ORAL EVERY 12 HOURS SCHEDULED
Status: COMPLETED | OUTPATIENT
Start: 2023-05-01 | End: 2023-05-01

## 2023-05-01 RX ORDER — MAGNESIUM HYDROXIDE 1200 MG/15ML
LIQUID ORAL AS NEEDED
Status: DISCONTINUED | OUTPATIENT
Start: 2023-05-01 | End: 2023-05-01 | Stop reason: HOSPADM

## 2023-05-01 RX ORDER — FENTANYL CITRATE/PF 50 MCG/ML
50 SYRINGE (ML) INJECTION
Status: DISCONTINUED | OUTPATIENT
Start: 2023-05-01 | End: 2023-05-01

## 2023-05-01 RX ORDER — OXYCODONE HCL 20 MG/1
40 TABLET, FILM COATED, EXTENDED RELEASE ORAL EVERY 8 HOURS SCHEDULED
Status: DISCONTINUED | OUTPATIENT
Start: 2023-05-01 | End: 2023-05-03 | Stop reason: HOSPADM

## 2023-05-01 RX ORDER — ROCURONIUM BROMIDE 10 MG/ML
INJECTION, SOLUTION INTRAVENOUS AS NEEDED
Status: DISCONTINUED | OUTPATIENT
Start: 2023-05-01 | End: 2023-05-01

## 2023-05-01 RX ORDER — LIDOCAINE HYDROCHLORIDE 10 MG/ML
INJECTION, SOLUTION EPIDURAL; INFILTRATION; INTRACAUDAL; PERINEURAL AS NEEDED
Status: DISCONTINUED | OUTPATIENT
Start: 2023-05-01 | End: 2023-05-01

## 2023-05-01 RX ORDER — HYDROMORPHONE HCL/PF 1 MG/ML
SYRINGE (ML) INJECTION AS NEEDED
Status: DISCONTINUED | OUTPATIENT
Start: 2023-05-01 | End: 2023-05-01

## 2023-05-01 RX ORDER — PROPOFOL 10 MG/ML
INJECTION, EMULSION INTRAVENOUS AS NEEDED
Status: DISCONTINUED | OUTPATIENT
Start: 2023-05-01 | End: 2023-05-01

## 2023-05-01 RX ORDER — SODIUM CHLORIDE, SODIUM LACTATE, POTASSIUM CHLORIDE, CALCIUM CHLORIDE 600; 310; 30; 20 MG/100ML; MG/100ML; MG/100ML; MG/100ML
50 INJECTION, SOLUTION INTRAVENOUS CONTINUOUS
Status: DISCONTINUED | OUTPATIENT
Start: 2023-05-01 | End: 2023-05-03 | Stop reason: HOSPADM

## 2023-05-01 RX ORDER — HYDROMORPHONE HCL IN WATER/PF 6 MG/30 ML
0.2 PATIENT CONTROLLED ANALGESIA SYRINGE INTRAVENOUS EVERY 6 HOURS PRN
Status: DISCONTINUED | OUTPATIENT
Start: 2023-05-01 | End: 2023-05-02

## 2023-05-01 RX ORDER — CEFAZOLIN SODIUM 2 G/50ML
2000 SOLUTION INTRAVENOUS ONCE
Status: COMPLETED | OUTPATIENT
Start: 2023-05-01 | End: 2023-05-01

## 2023-05-01 RX ORDER — HYDROCODONE BITARTRATE AND ACETAMINOPHEN 5; 325 MG/1; MG/1
1 TABLET ORAL EVERY 6 HOURS PRN
Status: DISCONTINUED | OUTPATIENT
Start: 2023-05-01 | End: 2023-05-01

## 2023-05-01 RX ORDER — OXYBUTYNIN CHLORIDE 5 MG/1
5 TABLET ORAL ONCE
Status: COMPLETED | OUTPATIENT
Start: 2023-05-01 | End: 2023-05-01

## 2023-05-01 RX ORDER — POTASSIUM CHLORIDE 14.9 MG/ML
20 INJECTION INTRAVENOUS
Status: DISCONTINUED | OUTPATIENT
Start: 2023-05-01 | End: 2023-05-01

## 2023-05-01 RX ORDER — HYDROMORPHONE HCL/PF 1 MG/ML
0.5 SYRINGE (ML) INJECTION
Status: DISCONTINUED | OUTPATIENT
Start: 2023-05-01 | End: 2023-05-01

## 2023-05-01 RX ORDER — ATORVASTATIN CALCIUM 40 MG/1
40 TABLET, FILM COATED ORAL
Status: DISCONTINUED | OUTPATIENT
Start: 2023-05-01 | End: 2023-05-03 | Stop reason: HOSPADM

## 2023-05-01 RX ORDER — KETAMINE HCL IN NACL, ISO-OSM 100MG/10ML
SYRINGE (ML) INJECTION AS NEEDED
Status: DISCONTINUED | OUTPATIENT
Start: 2023-05-01 | End: 2023-05-01

## 2023-05-01 RX ORDER — LIDOCAINE HYDROCHLORIDE 10 MG/ML
0.5 INJECTION, SOLUTION EPIDURAL; INFILTRATION; INTRACAUDAL; PERINEURAL ONCE AS NEEDED
Status: DISCONTINUED | OUTPATIENT
Start: 2023-05-01 | End: 2023-05-01

## 2023-05-01 RX ORDER — PHENAZOPYRIDINE HYDROCHLORIDE 100 MG/1
100 TABLET, FILM COATED ORAL
Status: DISCONTINUED | OUTPATIENT
Start: 2023-05-01 | End: 2023-05-03 | Stop reason: HOSPADM

## 2023-05-01 RX ORDER — ACETAMINOPHEN 325 MG/1
650 TABLET ORAL EVERY 6 HOURS PRN
Status: DISCONTINUED | OUTPATIENT
Start: 2023-05-01 | End: 2023-05-03 | Stop reason: HOSPADM

## 2023-05-01 RX ORDER — DEXAMETHASONE SODIUM PHOSPHATE 10 MG/ML
INJECTION, SOLUTION INTRAMUSCULAR; INTRAVENOUS AS NEEDED
Status: DISCONTINUED | OUTPATIENT
Start: 2023-05-01 | End: 2023-05-01

## 2023-05-01 RX ORDER — METOPROLOL SUCCINATE 25 MG/1
25 TABLET, EXTENDED RELEASE ORAL DAILY
Status: DISCONTINUED | OUTPATIENT
Start: 2023-05-02 | End: 2023-05-03 | Stop reason: HOSPADM

## 2023-05-01 RX ORDER — METOLAZONE 5 MG/1
2.5 TABLET ORAL 3 TIMES WEEKLY
Status: DISCONTINUED | OUTPATIENT
Start: 2023-05-01 | End: 2023-05-03 | Stop reason: HOSPADM

## 2023-05-01 RX ORDER — ONDANSETRON 2 MG/ML
INJECTION INTRAMUSCULAR; INTRAVENOUS AS NEEDED
Status: DISCONTINUED | OUTPATIENT
Start: 2023-05-01 | End: 2023-05-01

## 2023-05-01 RX ORDER — POTASSIUM CHLORIDE 20 MEQ/1
40 TABLET, EXTENDED RELEASE ORAL ONCE
Status: COMPLETED | OUTPATIENT
Start: 2023-05-01 | End: 2023-05-01

## 2023-05-01 RX ORDER — ONDANSETRON 2 MG/ML
4 INJECTION INTRAMUSCULAR; INTRAVENOUS ONCE AS NEEDED
Status: DISCONTINUED | OUTPATIENT
Start: 2023-05-01 | End: 2023-05-01 | Stop reason: HOSPADM

## 2023-05-01 RX ADMIN — FENTANYL CITRATE 50 MCG: 0.05 INJECTION, SOLUTION INTRAMUSCULAR; INTRAVENOUS at 09:16

## 2023-05-01 RX ADMIN — GABAPENTIN 300 MG: 300 CAPSULE ORAL at 17:53

## 2023-05-01 RX ADMIN — PROPOFOL 100 MG: 10 INJECTION, EMULSION INTRAVENOUS at 09:16

## 2023-05-01 RX ADMIN — HYDROMORPHONE HYDROCHLORIDE 0.5 MG: 1 INJECTION, SOLUTION INTRAMUSCULAR; INTRAVENOUS; SUBCUTANEOUS at 15:35

## 2023-05-01 RX ADMIN — DEXAMETHASONE SODIUM PHOSPHATE 10 MG: 10 INJECTION, SOLUTION INTRAMUSCULAR; INTRAVENOUS at 09:20

## 2023-05-01 RX ADMIN — OXYBUTYNIN CHLORIDE 5 MG: 5 TABLET ORAL at 12:58

## 2023-05-01 RX ADMIN — PHENAZOPYRIDINE 100 MG: 100 TABLET ORAL at 11:07

## 2023-05-01 RX ADMIN — OXYCODONE HYDROCHLORIDE 40 MG: 20 TABLET, FILM COATED, EXTENDED RELEASE ORAL at 21:48

## 2023-05-01 RX ADMIN — ROCURONIUM BROMIDE 10 MG: 50 INJECTION, SOLUTION INTRAVENOUS at 09:37

## 2023-05-01 RX ADMIN — CIPROFLOXACIN HYDROCHLORIDE 500 MG: 500 TABLET, FILM COATED ORAL at 12:32

## 2023-05-01 RX ADMIN — MIDAZOLAM 2 MG: 1 INJECTION INTRAMUSCULAR; INTRAVENOUS at 09:12

## 2023-05-01 RX ADMIN — SUGAMMADEX 200 MG: 100 INJECTION, SOLUTION INTRAVENOUS at 10:12

## 2023-05-01 RX ADMIN — Medication 50 MG: at 09:16

## 2023-05-01 RX ADMIN — LIDOCAINE HYDROCHLORIDE 50 MG: 10 INJECTION, SOLUTION EPIDURAL; INFILTRATION; INTRACAUDAL; PERINEURAL at 09:16

## 2023-05-01 RX ADMIN — HYDROMORPHONE HYDROCHLORIDE 0.5 MG: 1 INJECTION, SOLUTION INTRAMUSCULAR; INTRAVENOUS; SUBCUTANEOUS at 10:20

## 2023-05-01 RX ADMIN — ACETAMINOPHEN 975 MG: 325 TABLET ORAL at 09:09

## 2023-05-01 RX ADMIN — SODIUM CHLORIDE, SODIUM LACTATE, POTASSIUM CHLORIDE, AND CALCIUM CHLORIDE: .6; .31; .03; .02 INJECTION, SOLUTION INTRAVENOUS at 09:12

## 2023-05-01 RX ADMIN — POTASSIUM CHLORIDE 20 MEQ: 14.9 INJECTION, SOLUTION INTRAVENOUS at 14:41

## 2023-05-01 RX ADMIN — TORSEMIDE 60 MG: 20 TABLET ORAL at 12:58

## 2023-05-01 RX ADMIN — FENTANYL CITRATE 50 MCG: 50 INJECTION INTRAMUSCULAR; INTRAVENOUS at 10:31

## 2023-05-01 RX ADMIN — ROCURONIUM BROMIDE 40 MG: 50 INJECTION, SOLUTION INTRAVENOUS at 09:16

## 2023-05-01 RX ADMIN — FENTANYL CITRATE 50 MCG: 0.05 INJECTION, SOLUTION INTRAMUSCULAR; INTRAVENOUS at 09:37

## 2023-05-01 RX ADMIN — POTASSIUM CHLORIDE 40 MEQ: 1500 TABLET, EXTENDED RELEASE ORAL at 15:28

## 2023-05-01 RX ADMIN — CEFAZOLIN SODIUM 2000 MG: 2 SOLUTION INTRAVENOUS at 09:20

## 2023-05-01 RX ADMIN — Medication 90 MG: at 12:58

## 2023-05-01 RX ADMIN — HYDROMORPHONE HYDROCHLORIDE 0.5 MG: 1 INJECTION, SOLUTION INTRAMUSCULAR; INTRAVENOUS; SUBCUTANEOUS at 21:48

## 2023-05-01 RX ADMIN — FENTANYL CITRATE 50 MCG: 50 INJECTION INTRAMUSCULAR; INTRAVENOUS at 10:38

## 2023-05-01 RX ADMIN — METOLAZONE 2.5 MG: 5 TABLET ORAL at 12:59

## 2023-05-01 RX ADMIN — HYDROMORPHONE HYDROCHLORIDE 0.2 MG: 0.2 INJECTION, SOLUTION INTRAMUSCULAR; INTRAVENOUS; SUBCUTANEOUS at 12:59

## 2023-05-01 RX ADMIN — LISINOPRIL 2.5 MG: 2.5 TABLET ORAL at 14:40

## 2023-05-01 RX ADMIN — ATORVASTATIN CALCIUM 40 MG: 40 TABLET, FILM COATED ORAL at 15:36

## 2023-05-01 RX ADMIN — ROCURONIUM BROMIDE 10 MG: 50 INJECTION, SOLUTION INTRAVENOUS at 09:47

## 2023-05-01 RX ADMIN — PHENAZOPYRIDINE 100 MG: 100 TABLET ORAL at 15:36

## 2023-05-01 RX ADMIN — HYDROMORPHONE HYDROCHLORIDE 0.5 MG: 1 INJECTION, SOLUTION INTRAMUSCULAR; INTRAVENOUS; SUBCUTANEOUS at 10:47

## 2023-05-01 RX ADMIN — ONDANSETRON 4 MG: 2 INJECTION INTRAMUSCULAR; INTRAVENOUS at 09:20

## 2023-05-01 RX ADMIN — HYDROCODONE BITARTRATE AND ACETAMINOPHEN 1 TABLET: 5; 325 TABLET ORAL at 12:58

## 2023-05-01 RX ADMIN — HYDROMORPHONE HYDROCHLORIDE 0.5 MG: 1 INJECTION, SOLUTION INTRAMUSCULAR; INTRAVENOUS; SUBCUTANEOUS at 11:22

## 2023-05-01 NOTE — ASSESSMENT & PLAN NOTE
Patient has noticed a bump on her left wrist. She does not have swelling but has intermittent pain.  She has had this for several months.  This week she was lifting an object and had to stop due to severe pain in her left wrist/hand.  She does not take anything for pain because the pain is intermittent.  Appointment made for tomorrow. Patient verbalized understanding.   Status post TURBT  Patient has Barragan in place, continue CBI as per urology recommendation  Hold any kind of anticoagulation  Continue pain management

## 2023-05-01 NOTE — PLAN OF CARE
Problem: Prexisting or High Potential for Compromised Skin Integrity  Goal: Skin integrity is maintained or improved  Description: INTERVENTIONS:  - Identify patients at risk for skin breakdown  - Assess and monitor skin integrity  - Assess and monitor nutrition and hydration status  - Monitor labs   - Assess for incontinence   - Turn and reposition patient  - Assist with mobility/ambulation  - Relieve pressure over bony prominences  - Avoid friction and shearing  - Provide appropriate hygiene as needed including keeping skin clean and dry  - Evaluate need for skin moisturizer/barrier cream  - Collaborate with interdisciplinary team   - Patient/family teaching  - Consider wound care consult   Outcome: Progressing     Problem: MOBILITY - ADULT  Goal: Maintain or return to baseline ADL function  Description: INTERVENTIONS:  -  Assess patient's ability to carry out ADLs; assess patient's baseline for ADL function and identify physical deficits which impact ability to perform ADLs (bathing, care of mouth/teeth, toileting, grooming, dressing, etc )  - Assess/evaluate cause of self-care deficits   - Assess range of motion  - Assess patient's mobility; develop plan if impaired  - Assess patient's need for assistive devices and provide as appropriate  - Encourage maximum independence but intervene and supervise when necessary  - Involve family in performance of ADLs  - Assess for home care needs following discharge   - Consider OT consult to assist with ADL evaluation and planning for discharge  - Provide patient education as appropriate  Outcome: Progressing  Goal: Maintains/Returns to pre admission functional level  Description: INTERVENTIONS:  - Perform BMAT or MOVE assessment daily    - Set and communicate daily mobility goal to care team and patient/family/caregiver  - Collaborate with rehabilitation services on mobility goals if consulted  - Perform Range of Motion 2 times a day    - Reposition patient every 2 hours   - Dangle patient 2 times a day  - Stand patient 2 times a day  - Ambulate patient 2 times a day  - Out of bed to chair 2 times a day   - Out of bed for meals 2 times a day  - Out of bed for toileting  - Record patient progress and toleration of activity level   Outcome: Progressing     Problem: PAIN - ADULT  Goal: Verbalizes/displays adequate comfort level or baseline comfort level  Description: Interventions:  - Encourage patient to monitor pain and request assistance  - Assess pain using appropriate pain scale  - Administer analgesics based on type and severity of pain and evaluate response  - Implement non-pharmacological measures as appropriate and evaluate response  - Consider cultural and social influences on pain and pain management  - Notify physician/advanced practitioner if interventions unsuccessful or patient reports new pain  Outcome: Progressing     Problem: INFECTION - ADULT  Goal: Absence or prevention of progression during hospitalization  Description: INTERVENTIONS:  - Assess and monitor for signs and symptoms of infection  - Monitor lab/diagnostic results  - Monitor all insertion sites, i e  indwelling lines, tubes, and drains  - Monitor endotracheal if appropriate and nasal secretions for changes in amount and color  - [place] appropriate cooling/warming therapies per order  - Administer medications as ordered  - Instruct and encourage patient and family to use good hand hygiene technique  - Identify and instruct in appropriate isolation precautions for identified infection/condition  Outcome: Progressing  Goal: Absence of fever/infection during neutropenic period  Description: INTERVENTIONS:  - Monitor WBC    Outcome: Progressing     Problem: SAFETY ADULT  Goal: Maintain or return to baseline ADL function  Description: INTERVENTIONS:  -  Assess patient's ability to carry out ADLs; assess patient's baseline for ADL function and identify physical deficits which impact ability to perform ADLs (bathing, care of mouth/teeth, toileting, grooming, dressing, etc )  - Assess/evaluate cause of self-care deficits   - Assess range of motion  - Assess patient's mobility; develop plan if impaired  - Assess patient's need for assistive devices and provide as appropriate  - Encourage maximum independence but intervene and supervise when necessary  - Involve family in performance of ADLs  - Assess for home care needs following discharge   - Consider OT consult to assist with ADL evaluation and planning for discharge  - Provide patient education as appropriate  Outcome: Progressing  Goal: Maintains/Returns to pre admission functional level  Description: INTERVENTIONS:  - Perform BMAT or MOVE assessment daily    - Set and communicate daily mobility goal to care team and patient/family/caregiver  - Collaborate with rehabilitation services on mobility goals if consulted  - Perform Range of Motion 2 times a day  - Reposition patient every 2 hours    - Dangle patient 2 times a day  - Stand patient 2 times a day  - Ambulate patient 2 times a day  - Out of bed to chair 2 times a day   - Out of bed for meals 2 times a day  - Out of bed for toileting  - Record patient progress and toleration of activity level   Outcome: Progressing  Goal: Patient will remain free of falls  Description: INTERVENTIONS:  - Educate patient/family on patient safety including physical limitations  - Instruct patient to call for assistance with activity   - Consult OT/PT to assist with strengthening/mobility   - Keep Call bell within reach  - Keep bed low and locked with side rails adjusted as appropriate  - Keep care items and personal belongings within reach  - Initiate and maintain comfort rounds  - Make Fall Risk Sign visible to staff  - Offer Toileting every 2 Hours, in advance of need  - Initiate/Maintain bed alarm  - Obtain necessary fall risk management equipment: yellow socks  Problem: Knowledge Deficit  Goal: Patient/family/caregiver demonstrates understanding of disease process, treatment plan, medications, and discharge instructions  Description: Complete learning assessment and assess knowledge base    Interventions:  - Provide teaching at level of understanding  - Provide teaching via preferred learning methods  Outcome: Progressing     Problem: GENITOURINARY - ADULT  Goal: Maintains or returns to baseline urinary function  Description: INTERVENTIONS:  - Assess urinary function  - Encourage oral fluids to ensure adequate hydration if ordered  - Administer IV fluids as ordered to ensure adequate hydration  - Administer ordered medications as needed  - Offer frequent toileting  - Follow urinary retention protocol if ordered  Outcome: Progressing  Goal: Absence of urinary retention  Description: INTERVENTIONS:  - Assess patients ability to void and empty bladder  - Monitor I/O  - Bladder scan as needed  - Discuss with physician/AP medications to alleviate retention as needed  - Discuss catheterization for long term situations as appropriate  Outcome: Progressing  Goal: Urinary catheter remains patent  Description: INTERVENTIONS:  - Assess patency of urinary catheter  - If patient has a chronic solis, consider changing catheter if non-functioning  - Follow guidelines for intermittent irrigation of non-functioning urinary catheter  Outcome: Progressing     - Apply yellow socks and bracelet for high fall risk patients  - Consider moving patient to room near nurses station  Outcome: Progressing

## 2023-05-01 NOTE — RESPIRATORY THERAPY NOTE
"RT Protocol Note  Aaron Morris 58 y o  male MRN: [id number]  Unit/Bed#: -01 Encounter: [id number]    Assessment    Active Problems:    * No active hospital problems   *      Home Pulmonary Medications:  None       Past Medical History:   Diagnosis Date    Arthritis     Bladder cancer ([facility] [address] )     Chronic narcotic dependence (HCC)     Chronic pain disorder     lower back and down both legs    Colon polyp     Coronary artery disease     CPAP (continuous positive airway pressure) dependence     Does use hearing aid     will wear DOS    Full dentures     Heart failure ([facility] [address] )     and \"fluid retention\" [facility] cardio PA\"    High cholesterol     Hypertension     Mild ankle edema     and feet    Obstructive sleep apnea on CPAP     Prediabetes     Shortness of breath     per pt \"with just exertion\"    Sleep apnea     Wears glasses      Social History     Socioeconomic History    Marital status: /Civil Union     Spouse name: None    Number of children: None    Years of education: None    Highest education level: None   Occupational History    None   Tobacco Use    Smoking status: Former     Packs/day: 1 00     Years: 35 00     Pack years: 35 00     Types: Cigarettes     Start date: 2023     Quit date: 2016     Years since quittin 3    Smokeless tobacco: Never   Vaping Use    Vaping Use: Never used   Substance and Sexual Activity    Alcohol use: Not Currently     Comment: 3 per year    Drug use: Not Currently     Types: Marijuana    Sexual activity: Not Currently   Other Topics Concern    None   Social History Narrative    None     Social Determinants of Health     Financial Resource Strain: Not on file   Food Insecurity: Not on file   Transportation Needs: Not on file   Physical Activity: Not on file   Stress: Not on file   Social Connections: Not on file   Intimate Partner Violence: Not on file   Housing Stability: Not on file       Subjective         Objective    Physical Exam:   Assessment " "Type: Assess only  General Appearance: Awake, Alert  Respiratory Pattern: Normal  Chest Assessment: Chest expansion symmetrical  Bilateral Breath Sounds: Clear  O2 Device: 2L nc    Vitals:  Blood pressure 117/62, pulse 67, temperature (!) 97 °F (36 1 °C), resp  rate 19, height 5' 8\" (1 727 m), weight 115 kg (253 lb), SpO2 91 %  Imaging and other studies:     O2 Device: 2L nc     Plan    Respiratory Plan: No distress/Pulmonary history, Discontinue Protocol        Resp Comments: (P) Pt admitted by urology for obs  No respiratory hx does not use any nebs or MDI denies resp distress will d/c protocol  Wears cpap at night     "

## 2023-05-01 NOTE — OP NOTE
OPERATIVE REPORT  PATIENT NAME: Vicki Cannon    :  1960  MRN: [id number]  Pt Location: [facility] OR ROOM 01    SURGERY DATE: 2023    Surgeon(s) and Role:     * Alba Raygoza MD - Primary    Preop Diagnosis:  Malignant neoplasm of anterior wall of urinary bladder ([facility] [address] ) [C67 3]    Post-Op Diagnosis Codes:     * Malignant neoplasm of anterior wall of urinary bladder ([address] ) [C67 3]    Procedure(s):  CYSTOSCOPY TURBT    Specimen(s):  ID Type Source Tests Collected by Time Destination   1 : previously resected bladder tumor rule out t1 or t2 lesion (see previous lab report) Tissue Urinary Bladder TISSUE EXAM Alba Raygoza MD 2023  9:30 AM        Estimated Blood Loss:   Minimal    Drains:  Urethral Catheter Non-latex 24 Fr  (Active)   Number of days: 0       Anesthesia Type:   General    Operative Indications:  Malignant neoplasm of anterior wall of urinary bladder (HCC) [C67 3]      Operative Findings:  Previously resected anterior bladder neck had mucosal changes consistent with recent TUR no gross tumor was seen  We resected deep into the tumor bed with plenty of specimen  No perforation was noted  The orifice ease were normal the rest of his bladder just had mild trabeculation no evidence of gross tumors    Complications:   None    Procedure and Technique:  Patient was placed in dorsolithotomy vision prepped and draped in usual sterile fashion we dilated the anterior meatus with Kaykay Pupa sounds up to [address] [place] and placed a 24 [place] resectoscope sheath into the bladder 30 degree lens and the median loop using the bipolar device for resected the tumor bed as described above  All specimen was removed irrigated to clear three-way [address] [place] Barragan catheter was placed patient tolerated procedure well   I was present for the entire procedure      Patient Disposition:  hemodynamically stable  The size of the lesion resected 2 0 cm      SIGNATURE: Alba Raygoza MD  DATE: May 1, 2023  TIME: 10:14 AM

## 2023-05-01 NOTE — ANESTHESIA POSTPROCEDURE EVALUATION
Post-Op Assessment Note    CV Status:  Stable  Pain Score: 7       Mental Status:  Alert and awake   Hydration Status:  Stable   PONV Controlled:  None   Airway Patency:  Patent  Airway: intubated      Post Op Vitals Reviewed: Yes      Staff: CRNA         No notable events documented      BP   160/71   Temp 98 1 °F (36 7 °C) (05/01/23 1023)    Pulse 77 (05/01/23 1023)   Resp 18 (05/01/23 1023)    SpO2 93 % (05/01/23 1023)

## 2023-05-01 NOTE — CONSULTS
[address]  Consult  Name: Bertin Hampton 58 y o  male I MRN: [id number]  Unit/Bed#: -01 I Date of Admission: 5/1/2023   Date of Service: 5/1/2023 I Hospital Day: 0    Inpatient consult to Internal Medicine  Consult performed by: Kunal Watts MD  Consult ordered by: Kristin Henao MD          Assessment/Plan   Malignant neoplasm of anterior wall of urinary bladder [facility])  Assessment & Plan  Status post TURBT  Patient has Barragan in place, continue CBI as per urology recommendation  Hold any kind of anticoagulation  Continue pain management    Obstructive sleep apnea on CPAP  Assessment & Plan  Patient does use CPAP  Continue    Hypertension  Assessment & Plan  Due to home medication    Hypokalemia  Assessment & Plan  Present 3 3  Continue supplement           VTE Prophylaxis:   Hold anticoagulation    Recommendations for Discharge:  · Can resume all home medication upon discharge    Total Time Spent on Date of Encounter in care of patient: 15 minutes This time was spent on one or more of the following: performing physical exam; counseling and coordination of care; obtaining or reviewing history; documenting in the medical record; reviewing/ordering tests, medications or procedures; communicating with other healthcare professionals and discussing with patient's family/caregivers  Collaboration of Care: Were Recommendations Directly Discussed with Primary Treatment Team? Yes    History of Present Illness:  Bertin Hampton is a 58 y o  male who is originally admitted to the urology service service due to bladder mass  We are consulted for regular management  Status post TURBT with urology  Came to floor  Complaining of severe pain and spasm  Denies any nausea, vomiting, diarrhea  Review of Systems:  Review of Systems   Constitutional: Positive for activity change  Negative for appetite change, chills, diaphoresis, fatigue and fever     HENT: Negative for congestion, dental "problem, drooling and ear discharge  Respiratory: Negative for apnea, choking and chest tightness  Cardiovascular: Negative for chest pain and leg swelling  Gastrointestinal: Negative for abdominal distention, abdominal pain and anal bleeding  Genitourinary: Positive for difficulty urinating  Discomfort, Barragan in place   Musculoskeletal: Positive for back pain  Negative for arthralgias  Skin: Negative for color change, pallor and rash  Neurological: Negative for dizziness, facial asymmetry, light-headedness and headaches  Hematological: Negative for adenopathy  Psychiatric/Behavioral: Negative for agitation  All other systems reviewed and are negative        Past Medical and Surgical History:   Past Medical History:   Diagnosis Date    Arthritis     Bladder [facility] ([facility] [address]     Chronic narcotic dependence ([facility] )     Chronic pain disorder     lower back and down both legs    Colon polyp     Coronary artery disease     CPAP (continuous positive airway pressure) dependence     Does use hearing aid     will wear DOS    Full dentures     [facility]     and \"fluid retention\" SL OW B Daryl cardio PA\"    High cholesterol     Hypertension     Mild ankle edema     and feet    Obstructive sleep apnea on CPAP     Prediabetes     Shortness of breath     per pt \"with just exertion\"    Sleep apnea     Wears glasses        Past Surgical History:   Procedure Laterality Date    BACK SURGERY      titanium rods implanted    COLONOSCOPY      CYSTOSCOPY W/ URETERAL STENT PLACEMENT Bilateral 3/17/2023    Procedure: bilateral retrograde;  Surgeon: Shun Pulido MD;  Location:  MAIN OR;  Service: Urology    HERNIA REPAIR      x5    DC CYSTO W/REMOVAL OF LESIONS SMALL N/A 5/1/2023    Procedure: CYSTOSCOPY TURBT;  Surgeon: Shun Pulido MD;  Location:  MAIN OR;  Service: Urology    TRANSURETHRAL RESECTION OF BLADDER TUMOR N/A 3/17/2023    Procedure: TRANSURETHRAL RESECTION OF BLADDER " "TUMOR (TURBT); Surgeon: Joseph He MD;  Location:  MAIN OR;  Service: Urology       Meds/Allergies:  all medications and allergies reviewed    Allergies: Allergies   Allergen Reactions    Mirtazapine Other (See Comments) and Confusion     Pt drove without knowing and woke up at a new destination       Social History:  Marital Status: /Civil Union  Substance Use History:   Social History     Substance and Sexual Activity   Alcohol Use Not Currently    Comment: 3 per year     Social History     Tobacco Use   Smoking Status Former    Packs/day: 1 00    Years: 35 00    Pack years: 35 00    Types: Cigarettes    Start date: 2023   Sandra Alvarez Quit date: 2016    Years since quittin 3   Smokeless Tobacco Never     Social History     Substance and Sexual Activity   Drug Use Not Currently    Types: Marijuana       Family History:  Family History   Problem Relation Age of Onset    Cancer Father     Aortic aneurysm Father     Leukemia Father     Hypertension Mother        Physical Exam:   Vitals:   Blood Pressure: 130/52 (23 1312)  Pulse: 70 (23 1312)  Temperature: (!) 97 °F (36 1 °C) (23 1211)  Temp Source: Oral (23 0852)  Respirations: 18 (23 1312)  Height: 5' 8\" (172 7 cm) (23 0852)  Weight - Scale: 115 kg (253 lb) (23 0852)  SpO2: 96 % (23 1312)    Physical Exam  Vitals and nursing note reviewed  Constitutional:       Appearance: Normal appearance  He is obese  He is not ill-appearing or diaphoretic  HENT:      Mouth/Throat:      Mouth: Mucous membranes are moist    Eyes:      General: No scleral icterus  Left eye: No discharge  Extraocular Movements: Extraocular movements intact  Conjunctiva/sclera: Conjunctivae normal       Pupils: Pupils are equal, round, and reactive to light  Cardiovascular:      Rate and Rhythm: Normal rate  Heart sounds: Normal heart sounds  No murmur heard  No friction rub  No gallop   " Pulmonary:      Effort: Pulmonary effort is normal  No respiratory distress  Breath sounds: No stridor  No wheezing or rhonchi  Abdominal:      General: Abdomen is flat  Bowel sounds are normal  There is no distension  Palpations: There is no mass  Tenderness: There is no abdominal tenderness  Hernia: No hernia is present  Genitourinary:     Comments: Barragan in place  Musculoskeletal:         General: Tenderness (lower pelvic area) present  No swelling  Normal range of motion  Cervical back: Normal range of motion  Neurological:      General: No focal deficit present  Mental Status: He is alert and oriented to person, place, and time  Cranial Nerves: No cranial nerve deficit  Sensory: No sensory deficit  Motor: No weakness  Coordination: Coordination normal          Additional Data:   Lab Results:        Results from last 7 days   Lab Units 05/01/23  0907   SODIUM mmol/L 137   POTASSIUM mmol/L 3 3*   CHLORIDE mmol/L 101   CO2 mmol/L 26   BUN mg/dL 19   CREATININE mg/dL 0 95   ANION GAP mmol/L 10   CALCIUM mg/dL 8 9   GLUCOSE RANDOM mg/dL 146*             No results found for: HGBA1C  Results from last 7 days   Lab Units 05/01/23  1025   POC GLUCOSE mg/dl 153*           Imaging: No pertinent imaging reviewed  No orders to display       EKG, Pathology, and Other Studies Reviewed on Admission:   · EKG: No EKG obtained  ** Please Note: This note may have been constructed using a voice recognition system   **

## 2023-05-02 LAB
ANION GAP SERPL CALCULATED.3IONS-SCNC: 9 MMOL/L (ref 4–13)
BUN SERPL-MCNC: 22 MG/DL (ref 5–25)
CA-I BLD-SCNC: 1.04 MMOL/L (ref 1.12–1.32)
CALCIUM SERPL-MCNC: 9 MG/DL (ref 8.4–10.2)
CHLORIDE SERPL-SCNC: 98 MMOL/L (ref 96–108)
CO2 SERPL-SCNC: 30 MMOL/L (ref 21–32)
CREAT SERPL-MCNC: 1.05 MG/DL (ref 0.6–1.3)
GFR SERPL CREATININE-BSD FRML MDRD: 74 ML/MIN/1.73SQ M
GLUCOSE P FAST SERPL-MCNC: 122 MG/DL (ref 65–99)
GLUCOSE SERPL-MCNC: 122 MG/DL (ref 65–140)
POTASSIUM SERPL-SCNC: 3.1 MMOL/L (ref 3.5–5.3)
SODIUM SERPL-SCNC: 137 MMOL/L (ref 135–147)

## 2023-05-02 PROCEDURE — 82330 ASSAY OF CALCIUM: CPT

## 2023-05-02 PROCEDURE — 80048 BASIC METABOLIC PNL TOTAL CA: CPT

## 2023-05-02 PROCEDURE — 99232 SBSQ HOSP IP/OBS MODERATE 35: CPT | Performed by: UROLOGY

## 2023-05-02 PROCEDURE — 99232 SBSQ HOSP IP/OBS MODERATE 35: CPT

## 2023-05-02 RX ORDER — POTASSIUM CHLORIDE 20 MEQ/1
20 TABLET, EXTENDED RELEASE ORAL 2 TIMES DAILY WITH MEALS
Status: DISCONTINUED | OUTPATIENT
Start: 2023-05-03 | End: 2023-05-03 | Stop reason: HOSPADM

## 2023-05-02 RX ORDER — POTASSIUM CHLORIDE 20 MEQ/1
40 TABLET, EXTENDED RELEASE ORAL ONCE
Status: COMPLETED | OUTPATIENT
Start: 2023-05-02 | End: 2023-05-02

## 2023-05-02 RX ORDER — HYDROMORPHONE HCL/PF 1 MG/ML
0.5 SYRINGE (ML) INJECTION EVERY 6 HOURS PRN
Status: DISCONTINUED | OUTPATIENT
Start: 2023-05-02 | End: 2023-05-03 | Stop reason: HOSPADM

## 2023-05-02 RX ORDER — TAMSULOSIN HYDROCHLORIDE 0.4 MG/1
0.4 CAPSULE ORAL
Status: DISCONTINUED | OUTPATIENT
Start: 2023-05-02 | End: 2023-05-03 | Stop reason: HOSPADM

## 2023-05-02 RX ORDER — OXYBUTYNIN CHLORIDE 5 MG/1
10 TABLET, EXTENDED RELEASE ORAL ONCE
Status: COMPLETED | OUTPATIENT
Start: 2023-05-02 | End: 2023-05-02

## 2023-05-02 RX ORDER — HYDROXYZINE HYDROCHLORIDE 25 MG/1
25 TABLET, FILM COATED ORAL EVERY 6 HOURS PRN
Status: DISCONTINUED | OUTPATIENT
Start: 2023-05-02 | End: 2023-05-03 | Stop reason: HOSPADM

## 2023-05-02 RX ORDER — OXYCODONE HYDROCHLORIDE 10 MG/1
30 TABLET ORAL EVERY 4 HOURS PRN
Status: DISCONTINUED | OUTPATIENT
Start: 2023-05-02 | End: 2023-05-03 | Stop reason: HOSPADM

## 2023-05-02 RX ADMIN — OXYCODONE HYDROCHLORIDE 40 MG: 20 TABLET, FILM COATED, EXTENDED RELEASE ORAL at 21:50

## 2023-05-02 RX ADMIN — GABAPENTIN 300 MG: 300 CAPSULE ORAL at 17:43

## 2023-05-02 RX ADMIN — ATORVASTATIN CALCIUM 40 MG: 40 TABLET, FILM COATED ORAL at 16:14

## 2023-05-02 RX ADMIN — PHENAZOPYRIDINE 100 MG: 100 TABLET ORAL at 06:02

## 2023-05-02 RX ADMIN — OXYBUTYNIN CHLORIDE 10 MG: 5 TABLET, EXTENDED RELEASE ORAL at 09:07

## 2023-05-02 RX ADMIN — HYDROMORPHONE HYDROCHLORIDE 0.5 MG: 1 INJECTION, SOLUTION INTRAMUSCULAR; INTRAVENOUS; SUBCUTANEOUS at 04:29

## 2023-05-02 RX ADMIN — OXYCODONE HYDROCHLORIDE 40 MG: 20 TABLET, FILM COATED, EXTENDED RELEASE ORAL at 13:49

## 2023-05-02 RX ADMIN — OXYCODONE HYDROCHLORIDE 30 MG: 10 TABLET ORAL at 09:07

## 2023-05-02 RX ADMIN — METOPROLOL SUCCINATE 25 MG: 25 TABLET, EXTENDED RELEASE ORAL at 08:19

## 2023-05-02 RX ADMIN — OXYCODONE HYDROCHLORIDE 15 MG: 10 TABLET ORAL at 11:53

## 2023-05-02 RX ADMIN — Medication 90 MG: at 08:18

## 2023-05-02 RX ADMIN — PHENAZOPYRIDINE 100 MG: 100 TABLET ORAL at 11:53

## 2023-05-02 RX ADMIN — LISINOPRIL 2.5 MG: 2.5 TABLET ORAL at 08:18

## 2023-05-02 RX ADMIN — POTASSIUM CHLORIDE 40 MEQ: 1500 TABLET, EXTENDED RELEASE ORAL at 08:19

## 2023-05-02 RX ADMIN — TAMSULOSIN HYDROCHLORIDE 0.4 MG: 0.4 CAPSULE ORAL at 17:43

## 2023-05-02 RX ADMIN — OXYCODONE HYDROCHLORIDE 40 MG: 20 TABLET, FILM COATED, EXTENDED RELEASE ORAL at 05:11

## 2023-05-02 RX ADMIN — HYDROXYZINE HYDROCHLORIDE 25 MG: 25 TABLET ORAL at 11:52

## 2023-05-02 RX ADMIN — PHENAZOPYRIDINE 100 MG: 100 TABLET ORAL at 16:14

## 2023-05-02 RX ADMIN — POTASSIUM CHLORIDE 40 MEQ: 1500 TABLET, EXTENDED RELEASE ORAL at 09:07

## 2023-05-02 RX ADMIN — OXYCODONE HYDROCHLORIDE 15 MG: 10 TABLET ORAL at 16:13

## 2023-05-02 RX ADMIN — GABAPENTIN 300 MG: 300 CAPSULE ORAL at 08:18

## 2023-05-02 RX ADMIN — TORSEMIDE 60 MG: 20 TABLET ORAL at 08:19

## 2023-05-02 RX ADMIN — HYDROMORPHONE HYDROCHLORIDE 0.5 MG: 1 INJECTION, SOLUTION INTRAMUSCULAR; INTRAVENOUS; SUBCUTANEOUS at 08:18

## 2023-05-02 NOTE — PROGRESS NOTES
[address]  Progress Note  Name: Fabiola Romero  MRN: [id number]  Unit/Bed#: -01 I Date of Admission: 5/1/2023   Date of Service: 5/2/2023 I Hospital Day: 0    Assessment/Plan   * Malignant neoplasm of anterior wall of urinary bladder ([facility] [address] )  Assessment & Plan  · Status post TURBT  · Patient has Solis in place, continue CBI as per urology recommendation  · Hold any kind of anticoagulation - solis catheter draining blood, continue to hold anticoagulation  · Continue pain management - patient was not having adequate pain management, restarted home pain regimen  Adjust as needed  Discussed with urology and due to spasms, 1 dose of ditropan XL 10 mg ordered  Does not want to do more than this due to giving him a voiding trial in the next 48 hours and does not want patient to develop urinary retention  · Further management per urology  · Continue follow up with urology as instructed  Hypokalemia  Assessment & Plan  · Present 3 3  · On potassium supplement outpatient  Reordered on 5/2, given 80 mEq of potassium on 5/2, will need to have repeat BMP done in 1 week after discharge  · Continue supplement and additional potassium if needed  Lab Results   Component Value Date    K 3 1 (L) 05/02/2023    K 3 3 (L) 05/01/2023    K 2 9 (L) 03/28/2023       Chronic bilateral low back pain with bilateral sciatica  Assessment & Plan  · History of chronic back pain  Currently taking oxycodone 40 mg 12 hr tablet q8h scheduled outpatient  · Also taking oxycodone 30 mg IR 1/2 tablet for moderate breakthrough pain or 1 tablet for severe breakthrough pain q4h prn  · Reordered as patient was not having adequate pain control     · Monitor    Hypertension  Assessment & Plan  · Resume home medication   · Monitor BP  · Pain control  · Avoid hypotension    Obstructive sleep apnea on CPAP  Assessment & Plan  · Patient does use CPAP  · Continue         VTE Pharmacologic Prophylaxis:   Moderate Risk (Score 3-4) - Pharmacological DVT Prophylaxis Contraindicated  Sequential Compression Devices Ordered  Patient Centered Rounds: I performed bedside rounds with nursing staff today  Discussions with Specialists or Other Care Team Provider: nursing, case management, urology    Education and Discussions with Family / Patient: Primary to call patient's family  Total Time Spent on Date of Encounter in care of patient: 35 minutes This time was spent on one or more of the following: performing physical exam; counseling and coordination of care; obtaining or reviewing history; documenting in the medical record; reviewing/ordering tests, medications or procedures; communicating with other healthcare professionals and discussing with patient's family/caregivers  Current Length of Stay: 0 day(s)  Current Patient Status: Outpatient Surgery   Certification Statement: The patient will continue to require additional inpatient hospital stay due to urology clearance, as urology is primary  Discharge Plan: Corinna Aase is following this patient on consult  They are medically stable for discharge when deemed appropriate by primary service  Ensure pain control for patient prior to discharge, but medically stable  Code Status: Level 3 - DNAR and DNI    Subjective:   Patient seen and examined at bedside this morning  He is in lots of pain following his procedure  He states that he has pain in the suprapubic area and also in the penis  Still with his chronic back pain that goes down the back of his legs, but biggest complaint is the pain in the penis  Feeling anxious  Denies nausea, vomiting, diarrhea, constipation, fever, chills, CP, SOB  Feels that his pain is worse with his anxiety       Objective:     Vitals:   Temp (24hrs), Av 9 °F (36 6 °C), Min:97 °F (36 1 °C), Max:98 2 °F (36 8 °C)    Temp:  [97 °F (36 1 °C)-98 2 °F (36 8 °C)] 98 2 °F (36 8 °C)  HR:  [56-77] 56  Resp:  [14-22] 19  BP: (111-145)/(49-74) 124/63  SpO2:  [89 %-97 %] 96 %  Body mass index is 38 47 kg/m²  Input and Output Summary (last 24 hours): Intake/Output Summary (Last 24 hours) at 5/2/2023 1140  Last data filed at 5/2/2023 1120  Gross per 24 hour   Intake 2292 ml   Output 6850 ml   Net -4558 ml       Physical Exam:   Physical Exam  Vitals reviewed  Constitutional:       General: He is not in acute distress  Appearance: He is obese  He is not toxic-appearing  HENT:      Head: Normocephalic and atraumatic  Nose: Nose normal       Mouth/Throat:      Mouth: Mucous membranes are moist    Eyes:      Pupils: Pupils are equal, round, and reactive to light  Cardiovascular:      Rate and Rhythm: Normal rate and regular rhythm  Heart sounds: Normal heart sounds  Pulmonary:      Effort: Pulmonary effort is normal  No respiratory distress  Breath sounds: Normal breath sounds  No stridor  No wheezing  Abdominal:      General: Bowel sounds are normal  There is distension  Palpations: Abdomen is soft  Tenderness: There is abdominal tenderness (suprapubic tenderness)  Genitourinary:     Comments: Barragan in place draining red urine output  Musculoskeletal:         General: Normal range of motion  Right lower leg: No edema  Left lower leg: No edema  Skin:     General: Skin is warm and dry  Neurological:      General: No focal deficit present  Mental Status: He is alert and oriented to person, place, and time     Psychiatric:         Mood and Affect: Mood normal       Comments: Anxious and tearful          Additional Data:     Labs:  Results from last 7 days   Lab Units 05/01/23  1428   WBC Thousand/uL 6 57   HEMOGLOBIN g/dL 11 8*   HEMATOCRIT % 35 5*   PLATELETS Thousands/uL 198   NEUTROS PCT % 87*   LYMPHS PCT % 11*   MONOS PCT % 1*   EOS PCT % 0     Results from last 7 days   Lab Units 05/02/23  0423   SODIUM mmol/L 137   POTASSIUM mmol/L 3 1*   CHLORIDE mmol/L 98   CO2 mmol/L 30   BUN mg/dL 22 CREATININE mg/dL 1 05   ANION GAP mmol/L 9   CALCIUM mg/dL 9 0   GLUCOSE RANDOM mg/dL 122         Results from last 7 days   Lab Units 05/01/23  1025   POC GLUCOSE mg/dl 153*               Lines/Drains:  Invasive Devices     Peripheral Intravenous Line  Duration           Peripheral IV 05/01/23 Dorsal (posterior); Left Hand 1 day          Drain  Duration           Continuous Bladder Irrigation Three-way 1 day    Urethral Catheter Non-latex 24 Fr  1 day              Urinary Catheter:  Goal for removal: Per urology recommendations               Imaging: No pertinent imaging reviewed      Recent Cultures (last 7 days):         Last 24 Hours Medication List:   Current Facility-Administered Medications   Medication Dose Route Frequency Provider Last Rate    acetaminophen  650 mg Oral Q6H PRN Sarah Portillo MD      atorvastatin  40 mg Oral Daily With Stacey Lira MD      co-enzyme Q-10  90 mg Oral Daily Yves Webber MD      gabapentin  300 mg Oral BID Sarah Portillo MD      HYDROmorphone  0 5 mg Intravenous Q6H PRN Kristy Landrum PA-C      hydrOXYzine HCL  25 mg Oral Q6H PRN Kristy Landrum PA-C      lactated ringers  50 mL/hr Intravenous Continuous Willem Goncalves MD      lisinopril  2 5 mg Oral Daily Sarah oPrtillo MD      metolazone  2 5 mg Oral Once per day on Mon Wed Fri Yves Webber MD      metoprolol succinate  25 mg Oral Daily Sarah Portillo MD      naloxone  0 04 mg Intravenous Q1MIN PRN Sarah Portillo MD      oxyCODONE  40 mg Oral [facility] DIA Salazar      oxyCODONE  30 mg Oral Q4H PRN Lupe Bowman PA-C      oxyCODONE  15 mg Oral Q4H PRN Lupe Bowman PA-C      phenazopyridine  100 mg Oral TID With Meals Willem Goncalves MD      [START ON 5/3/2023] potassium chloride  20 mEq Oral BID With Meals Lupe Ceron PA-C      torsemide  60 mg Oral Daily Sarah Portillo MD          Today, Patient Was Seen By: Lupe Castillo MOHAN Ceron    **Please Note: This note may have been constructed using a voice recognition system  **

## 2023-05-02 NOTE — NURSING NOTE
Pt started on home Oxycontin dose and given 0 5 mg IV Dilaudid for acute pain  The last few hours of the shift, pt c/o burning, throbbing pain at end of penis  AM dose of oxycontin and prn dilaudid did not alleviate pt's pain  Pt moaning and crying in pain  Pt given morning's dose of pyridium early  Urine orange/lt punch clear without clots or flecks of blood  Will continue to monitor pt's pain

## 2023-05-02 NOTE — PLAN OF CARE
Problem: Prexisting or High Potential for Compromised Skin Integrity  Goal: Skin integrity is maintained or improved  Description: INTERVENTIONS:  - Identify patients at risk for skin breakdown  - Assess and monitor skin integrity  - Assess and monitor nutrition and hydration status  - Monitor labs   - Assess for incontinence   - Turn and reposition patient  - Assist with mobility/ambulation  - Relieve pressure over bony prominences  - Avoid friction and shearing  - Provide appropriate hygiene as needed including keeping skin clean and dry  - Evaluate need for skin moisturizer/barrier cream  - Collaborate with interdisciplinary team   - Patient/family teaching  - Consider wound care consult   Outcome: Progressing     Problem: MOBILITY - ADULT  Goal: Maintain or return to baseline ADL function  Description: INTERVENTIONS:  -  Assess patient's ability to carry out ADLs; assess patient's baseline for ADL function and identify physical deficits which impact ability to perform ADLs (bathing, care of mouth/teeth, toileting, grooming, dressing, etc )  - Assess/evaluate cause of self-care deficits   - Assess range of motion  - Assess patient's mobility; develop plan if impaired  - Assess patient's need for assistive devices and provide as appropriate  - Encourage maximum independence but intervene and supervise when necessary  - Involve family in performance of ADLs  - Assess for home care needs following discharge   - Consider OT consult to assist with ADL evaluation and planning for discharge  - Provide patient education as appropriate  Outcome: Progressing  Goal: Maintains/Returns to pre admission functional level  Description: INTERVENTIONS:  - Perform BMAT or MOVE assessment daily    - Set and communicate daily mobility goal to care team and patient/family/caregiver  - Collaborate with rehabilitation services on mobility goals if consulted  - Perform Range of Motion 2 times a day    - Reposition patient every 2 hours if unable to reposition self  - Record patient progress and toleration of activity level   Outcome: Progressing     Problem: PAIN - ADULT  Goal: Verbalizes/displays adequate comfort level or baseline comfort level  Description: Interventions:  - Encourage patient to monitor pain and request assistance  - Assess pain using appropriate pain scale  - Administer analgesics based on type and severity of pain and evaluate response  - Implement non-pharmacological measures as appropriate and evaluate response  - Consider cultural and social influences on pain and pain management  - Notify physician/advanced practitioner if interventions unsuccessful or patient reports new pain  Outcome: Progressing     Problem: INFECTION - ADULT  Goal: Absence or prevention of progression during hospitalization  Description: INTERVENTIONS:  - Assess and monitor for signs and symptoms of infection  - Monitor lab/diagnostic results  - Monitor all insertion sites, i e  indwelling lines, tubes, and drains  - Monitor endotracheal if appropriate and nasal secretions for changes in amount and color  - [place] appropriate cooling/warming therapies per order  - Administer medications as ordered  - Instruct and encourage patient and family to use good hand hygiene technique  - Identify and instruct in appropriate isolation precautions for identified infection/condition  Outcome: Progressing     Problem: SAFETY ADULT  Goal: Maintain or return to baseline ADL function  Description: INTERVENTIONS:  -  Assess patient's ability to carry out ADLs; assess patient's baseline for ADL function and identify physical deficits which impact ability to perform ADLs (bathing, care of mouth/teeth, toileting, grooming, dressing, etc )  - Assess/evaluate cause of self-care deficits   - Assess range of motion  - Assess patient's mobility; develop plan if impaired  - Assess patient's need for assistive devices and provide as appropriate  - Encourage maximum independence but intervene and supervise when necessary  - Involve family in performance of ADLs  - Assess for home care needs following discharge   - Consider OT consult to assist with ADL evaluation and planning for discharge  - Provide patient education as appropriate  Outcome: Progressing  Goal: Maintains/Returns to pre admission functional level  Description: INTERVENTIONS:  - Perform BMAT or MOVE assessment daily    - Set and communicate daily mobility goal to care team and patient/family/caregiver  - Collaborate with rehabilitation services on mobility goals if consulted  - Perform Range of Motion 2 times a day    - Reposition patient every 2 hours if unable to reposition self  - Record patient progress and toleration of activity level   Outcome: Progressing  Goal: Patient will remain free of falls  Description: INTERVENTIONS:  - Educate patient/family on patient safety including physical limitations  - Instruct patient to call for assistance with activity   - Consult OT/PT to assist with strengthening/mobility   - Keep Call bell within reach  - Keep bed low and locked with side rails adjusted as appropriate  - Keep care items and personal belongings within reach  - Initiate and maintain comfort rounds  - Make Fall Risk Sign visible to staff  - Offer Toileting every 2 Hours, in advance of need  - Initiate/Maintain na alarm  - Obtain necessary fall risk management equipment: na  - Apply yellow socks and bracelet for high fall risk patients  - Consider moving patient to room near nurses station  Outcome: Progressing     Problem: DISCHARGE PLANNING  Goal: Discharge to home or other facility with appropriate resources  Description: INTERVENTIONS:  - Identify barriers to discharge w/patient and caregiver  - Arrange for needed discharge resources and transportation as appropriate  - Identify discharge learning needs (meds, wound care, etc )  - Arrange for interpretive services to assist at discharge as needed  - Refer to Case Management Department for coordinating discharge planning if the patient needs post-hospital services based on physician/advanced practitioner order or complex needs related to functional status, cognitive ability, or social support system  Outcome: Progressing     Problem: Knowledge Deficit  Goal: Patient/family/caregiver demonstrates understanding of disease process, treatment plan, medications, and discharge instructions  Description: Complete learning assessment and assess knowledge base    Interventions:  - Provide teaching at level of understanding  - Provide teaching via preferred learning methods  Outcome: Progressing     Problem: GENITOURINARY - ADULT  Goal: Maintains or returns to baseline urinary function  Description: INTERVENTIONS:  - Assess urinary function  - Encourage oral fluids to ensure adequate hydration if ordered  - Administer IV fluids as ordered to ensure adequate hydration  - Administer ordered medications as needed  - Offer frequent toileting  - Follow urinary retention protocol if ordered  Outcome: Progressing  Goal: Absence of urinary retention  Description: INTERVENTIONS:  - Assess patients ability to void and empty bladder  - Monitor I/O  - Bladder scan as needed  - Discuss with physician/AP medications to alleviate retention as needed  - Discuss catheterization for long term situations as appropriate  Outcome: Progressing  Goal: Urinary catheter remains patent  Description: INTERVENTIONS:  - Assess patency of urinary catheter  - If patient has a chronic solis, consider changing catheter if non-functioning  - Follow guidelines for intermittent irrigation of non-functioning urinary catheter  Outcome: Progressing

## 2023-05-02 NOTE — PROGRESS NOTES
Postop day #1 status post cystoscopy TURBT  Patient CBI was turned off last night and the urine has been fine  He has been having a lot of pain in his back legs and tip of penis  Patient's on chronic pain medication at home for his back and the nurse was able to alert the medical team to restart all his pain medication  He did tolerate a diet  Physical exam abdomen is protuberant but nontender  Catheter in place draining clear urine off CBI    Patient's creatinine level is 1 0 this morning electrolytes are normal except for hypokalemia  We will ask medicine to replace potassium   impression status post above urological procedure clinically stable but difficult with postop pain management   plan replace potassium per the medical team   Gerda Grajeda all outpatient pain medicine per medical team     If patient is ambulating well later today eating well and pain controlled we will plan discharge later today or possibly tomorrow    Patient agrees

## 2023-05-02 NOTE — PLAN OF CARE
Pt POD #1 s/p TURBT  CBI off since 2200 5/1  Pt's urine orange from Pyridium  Pt with bladder pressure but no clots and no urine on bladder scan  Pt with increased anxiety and tremors last night  Pt restarted on long acting Oxycontin  Pt for labs in am   Will continue to monitor and support as needed      Problem: Prexisting or High Potential for Compromised Skin Integrity  Goal: Skin integrity is maintained or improved  Description: INTERVENTIONS:  - Identify patients at risk for skin breakdown  - Assess and monitor skin integrity  - Assess and monitor nutrition and hydration status  - Monitor labs   - Assess for incontinence   - Turn and reposition patient  - Assist with mobility/ambulation  - Relieve pressure over bony prominences  - Avoid friction and shearing  - Provide appropriate hygiene as needed including keeping skin clean and dry  - Evaluate need for skin moisturizer/barrier cream  - Collaborate with interdisciplinary team   - Patient/family teaching  - Consider wound care consult   Outcome: Progressing     Problem: MOBILITY - ADULT  Goal: Maintain or return to baseline ADL function  Description: INTERVENTIONS:  -  Assess patient's ability to carry out ADLs; assess patient's baseline for ADL function and identify physical deficits which impact ability to perform ADLs (bathing, care of mouth/teeth, toileting, grooming, dressing, etc )  - Assess/evaluate cause of self-care deficits   - Assess range of motion  - Assess patient's mobility; develop plan if impaired  - Assess patient's need for assistive devices and provide as appropriate  - Encourage maximum independence but intervene and supervise when necessary  - Involve family in performance of ADLs  - Assess for home care needs following discharge   - Consider OT consult to assist with ADL evaluation and planning for discharge  - Provide patient education as appropriate  Outcome: Progressing  Goal: Maintains/Returns to pre admission functional level  Description: INTERVENTIONS:  - Perform BMAT or MOVE assessment daily    - Set and communicate daily mobility goal to care team and patient/family/caregiver     - Record patient progress and toleration of activity level   Outcome: Progressing     Problem: PAIN - ADULT  Goal: Verbalizes/displays adequate comfort level or baseline comfort level  Description: Interventions:  - Encourage patient to monitor pain and request assistance  - Assess pain using appropriate pain scale  - Administer analgesics based on type and severity of pain and evaluate response  - Implement non-pharmacological measures as appropriate and evaluate response  - Consider cultural and social influences on pain and pain management  - Notify physician/advanced practitioner if interventions unsuccessful or patient reports new pain  Outcome: Progressing     Problem: INFECTION - ADULT  Goal: Absence or prevention of progression during hospitalization  Description: INTERVENTIONS:  - Assess and monitor for signs and symptoms of infection  - Monitor lab/diagnostic results  - Monitor all insertion sites, i e  indwelling lines, tubes, and drains  - Monitor endotracheal if appropriate and nasal secretions for changes in amount and color  - Frederica appropriate cooling/warming therapies per order  - Administer medications as ordered  - Instruct and encourage patient and family to use good hand hygiene technique  - Identify and instruct in appropriate isolation precautions for identified infection/condition  Outcome: Progressing  Goal: Absence of fever/infection during neutropenic period  Description: INTERVENTIONS:  - Monitor WBC    Outcome: Progressing     Problem: SAFETY ADULT  Goal: Maintain or return to baseline ADL function  Description: INTERVENTIONS:  -  Assess patient's ability to carry out ADLs; assess patient's baseline for ADL function and identify physical deficits which impact ability to perform ADLs (bathing, care of mouth/teeth, toileting, grooming, dressing, etc )  - Assess/evaluate cause of self-care deficits   - Assess range of motion  - Assess patient's mobility; develop plan if impaired  - Assess patient's need for assistive devices and provide as appropriate  - Encourage maximum independence but intervene and supervise when necessary  - Involve family in performance of ADLs  - Assess for home care needs following discharge   - Consider OT consult to assist with ADL evaluation and planning for discharge  - Provide patient education as appropriate  Outcome: Progressing  Goal: Maintains/Returns to pre admission functional level  Description: INTERVENTIONS:  - Perform BMAT or MOVE assessment daily    - Set and communicate daily mobility goal to care team and patient/family/caregiver  - Collaborate with rehabilitation services on mobility goals if consulted  - Perform Range of Motion 2 times a day  - Reposition patient every 2 hours    - Dangle patient 2 times a day  - Stand patient 2 times a day  - Ambulate patient 2 times a day  - Out of bed to chair 2 times a day   - Out of bed for meals 2 times a day  - Out of bed for toileting  - Record patient progress and toleration of activity level   Outcome: Progressing  Goal: Patient will remain free of falls  Description: INTERVENTIONS:  - Educate patient/family on patient safety including physical limitations  - Instruct patient to call for assistance with activity   - Consult OT/PT to assist with strengthening/mobility   - Keep Call bell within reach  - Keep bed low and locked with side rails adjusted as appropriate  - Keep care items and personal belongings within reach  - Initiate and maintain comfort rounds  - Make Fall Risk Sign visible to staff  - Apply yellow socks and bracelet for high fall risk patients  - Consider moving patient to room near nurses station  Outcome: Progressing     Problem: DISCHARGE PLANNING  Goal: Discharge to home or other facility with appropriate resources  Description: INTERVENTIONS:  - Identify barriers to discharge w/patient and caregiver  - Arrange for needed discharge resources and transportation as appropriate  - Identify discharge learning needs (meds, wound care, etc )  - Arrange for interpretive services to assist at discharge as needed  - Refer to Case Management Department for coordinating discharge planning if the patient needs post-hospital services based on physician/advanced practitioner order or complex needs related to functional status, cognitive ability, or social support system  Outcome: Progressing     Problem: Knowledge Deficit  Goal: Patient/family/caregiver demonstrates understanding of disease process, treatment plan, medications, and discharge instructions  Description: Complete learning assessment and assess knowledge base    Interventions:  - Provide teaching at level of understanding  - Provide teaching via preferred learning methods  Outcome: Progressing     Problem: GENITOURINARY - ADULT  Goal: Maintains or returns to baseline urinary function  Description: INTERVENTIONS:  - Assess urinary function  - Encourage oral fluids to ensure adequate hydration if ordered  - Administer IV fluids as ordered to ensure adequate hydration  - Administer ordered medications as needed  - Offer frequent toileting  - Follow urinary retention protocol if ordered  Outcome: Progressing  Goal: Absence of urinary retention  Description: INTERVENTIONS:  - Assess patients ability to void and empty bladder  - Monitor I/O  - Bladder scan as needed  - Discuss with physician/AP medications to alleviate retention as needed  - Discuss catheterization for long term situations as appropriate  Outcome: Progressing  Goal: Urinary catheter remains patent  Description: INTERVENTIONS:  - Assess patency of urinary catheter  - If patient has a chronic solis, consider changing catheter if non-functioning  - Follow guidelines for intermittent irrigation of non-functioning urinary catheter  Outcome: Progressing

## 2023-05-02 NOTE — ASSESSMENT & PLAN NOTE
· Present 3 3  · On potassium supplement outpatient  Reordered on 5/2, given 80 mEq of potassium on 5/2, will need to have repeat BMP done in 1 week after discharge  · Continue supplement and additional potassium if needed       Lab Results   Component Value Date    K 3 1 (L) 05/02/2023    K 3 3 (L) 05/01/2023    K 2 9 (L) 03/28/2023

## 2023-05-02 NOTE — ASSESSMENT & PLAN NOTE
· Status post TURBT  · Patient has Solis in place, continue CBI as per urology recommendation  · Hold any kind of anticoagulation - solis catheter draining blood, continue to hold anticoagulation  · Continue pain management - patient was not having adequate pain management, restarted home pain regimen  Adjust as needed  Discussed with urology and due to spasms, 1 dose of ditropan XL 10 mg ordered  Does not want to do more than this due to giving him a voiding trial in the next 48 hours and does not want patient to develop urinary retention  · Further management per urology  · Continue follow up with urology as instructed

## 2023-05-02 NOTE — ASSESSMENT & PLAN NOTE
· History of chronic back pain  Currently taking oxycodone 40 mg 12 hr tablet q8h scheduled outpatient  · Also taking oxycodone 30 mg IR 1/2 tablet for moderate breakthrough pain or 1 tablet for severe breakthrough pain q4h prn  · Reordered as patient was not having adequate pain control     · Monitor

## 2023-05-02 NOTE — NURSING NOTE
"Pt yelling out and stating he \"feels antsy and not sure what is wrong with me  \"  Pt jittery, crying, and agitated  Pt states he feels confused  Bladder scan completed to ensure solis emptying appropriately  Bladder scan revealed 0 ml urine  Urine completely clear orange so CBI turned off  Pt takes a large amt of opioids for chronic pain; meds were not started  Orders received for routine oxycontin and provided to pt  Pt also given prn dilaudid for severe pain  Will continue to monitor     "

## 2023-05-03 ENCOUNTER — TELEPHONE (OUTPATIENT)
Dept: OTHER | Facility: OTHER | Age: 63
End: 2023-05-03

## 2023-05-03 VITALS
OXYGEN SATURATION: 89 % | DIASTOLIC BLOOD PRESSURE: 53 MMHG | SYSTOLIC BLOOD PRESSURE: 101 MMHG | HEART RATE: 70 BPM | BODY MASS INDEX: 38.34 KG/M2 | TEMPERATURE: 98.1 F | WEIGHT: 253 LBS | HEIGHT: 68 IN | RESPIRATION RATE: 20 BRPM

## 2023-05-03 LAB
ANION GAP SERPL CALCULATED.3IONS-SCNC: 9 MMOL/L (ref 4–13)
BUN SERPL-MCNC: 26 MG/DL (ref 5–25)
CALCIUM SERPL-MCNC: 8.8 MG/DL (ref 8.4–10.2)
CHLORIDE SERPL-SCNC: 95 MMOL/L (ref 96–108)
CO2 SERPL-SCNC: 31 MMOL/L (ref 21–32)
CREAT SERPL-MCNC: 1.03 MG/DL (ref 0.6–1.3)
ERYTHROCYTE [DISTWIDTH] IN BLOOD BY AUTOMATED COUNT: 15.2 % (ref 11.6–15.1)
GFR SERPL CREATININE-BSD FRML MDRD: 76 ML/MIN/1.73SQ M
GLUCOSE SERPL-MCNC: 121 MG/DL (ref 65–140)
HCT VFR BLD AUTO: 33.3 % (ref 36.5–49.3)
HGB BLD-MCNC: 11 G/DL (ref 12–17)
MAGNESIUM SERPL-MCNC: 2 MG/DL (ref 1.9–2.7)
MCH RBC QN AUTO: 31.2 PG (ref 26.8–34.3)
MCHC RBC AUTO-ENTMCNC: 33 G/DL (ref 31.4–37.4)
MCV RBC AUTO: 94 FL (ref 82–98)
PLATELET # BLD AUTO: 179 THOUSANDS/UL (ref 149–390)
PMV BLD AUTO: 11 FL (ref 8.9–12.7)
POTASSIUM SERPL-SCNC: 2.8 MMOL/L (ref 3.5–5.3)
POTASSIUM SERPL-SCNC: 3.1 MMOL/L (ref 3.5–5.3)
POTASSIUM SERPL-SCNC: 3.4 MMOL/L (ref 3.5–5.3)
RBC # BLD AUTO: 3.53 MILLION/UL (ref 3.88–5.62)
SODIUM SERPL-SCNC: 135 MMOL/L (ref 135–147)
WBC # BLD AUTO: 8.57 THOUSAND/UL (ref 4.31–10.16)

## 2023-05-03 PROCEDURE — 84132 ASSAY OF SERUM POTASSIUM: CPT

## 2023-05-03 PROCEDURE — 99232 SBSQ HOSP IP/OBS MODERATE 35: CPT

## 2023-05-03 PROCEDURE — 0TBB8ZZ EXCISION OF BLADDER, VIA NATURAL OR ARTIFICIAL OPENING ENDOSCOPIC: ICD-10-PCS | Performed by: UROLOGY

## 2023-05-03 PROCEDURE — 88307 TISSUE EXAM BY PATHOLOGIST: CPT | Performed by: PATHOLOGY

## 2023-05-03 PROCEDURE — 80048 BASIC METABOLIC PNL TOTAL CA: CPT

## 2023-05-03 PROCEDURE — 85027 COMPLETE CBC AUTOMATED: CPT

## 2023-05-03 PROCEDURE — 99232 SBSQ HOSP IP/OBS MODERATE 35: CPT | Performed by: UROLOGY

## 2023-05-03 PROCEDURE — 83735 ASSAY OF MAGNESIUM: CPT

## 2023-05-03 PROCEDURE — NC001 PR NO CHARGE: Performed by: UROLOGY

## 2023-05-03 RX ORDER — POTASSIUM CHLORIDE 14.9 MG/ML
20 INJECTION INTRAVENOUS
Status: DISPENSED | OUTPATIENT
Start: 2023-05-03 | End: 2023-05-03

## 2023-05-03 RX ORDER — POTASSIUM CHLORIDE 14.9 MG/ML
20 INJECTION INTRAVENOUS ONCE
Status: COMPLETED | OUTPATIENT
Start: 2023-05-03 | End: 2023-05-03

## 2023-05-03 RX ORDER — PHENAZOPYRIDINE HYDROCHLORIDE 200 MG/1
200 TABLET, FILM COATED ORAL
Qty: 20 TABLET | Refills: 1 | Status: SHIPPED | OUTPATIENT
Start: 2023-05-03 | End: 2023-06-21

## 2023-05-03 RX ORDER — TAMSULOSIN HYDROCHLORIDE 0.4 MG/1
0.4 CAPSULE ORAL
Qty: 90 CAPSULE | Refills: 3 | Status: ON HOLD | OUTPATIENT
Start: 2023-05-03 | End: 2023-06-26

## 2023-05-03 RX ORDER — POTASSIUM CHLORIDE 20 MEQ/1
20 TABLET, EXTENDED RELEASE ORAL ONCE
Status: COMPLETED | OUTPATIENT
Start: 2023-05-03 | End: 2023-05-03

## 2023-05-03 RX ORDER — POTASSIUM CHLORIDE 20 MEQ/1
40 TABLET, EXTENDED RELEASE ORAL 2 TIMES DAILY
Qty: 28 TABLET | Refills: 0 | Status: ON HOLD | OUTPATIENT
Start: 2023-05-03 | End: 2023-06-26

## 2023-05-03 RX ORDER — CEFUROXIME AXETIL 500 MG/1
500 TABLET ORAL EVERY 12 HOURS SCHEDULED
Qty: 6 TABLET | Refills: 0 | Status: SHIPPED | OUTPATIENT
Start: 2023-05-03 | End: 2023-05-06

## 2023-05-03 RX ORDER — POTASSIUM CHLORIDE 20 MEQ/1
40 TABLET, EXTENDED RELEASE ORAL ONCE
Status: COMPLETED | OUTPATIENT
Start: 2023-05-03 | End: 2023-05-03

## 2023-05-03 RX ORDER — POTASSIUM CHLORIDE 14.9 MG/ML
20 INJECTION INTRAVENOUS
Status: COMPLETED | OUTPATIENT
Start: 2023-05-03 | End: 2023-05-03

## 2023-05-03 RX ADMIN — POTASSIUM CHLORIDE 20 MEQ: 14.9 INJECTION, SOLUTION INTRAVENOUS at 12:32

## 2023-05-03 RX ADMIN — METOPROLOL SUCCINATE 25 MG: 25 TABLET, EXTENDED RELEASE ORAL at 08:22

## 2023-05-03 RX ADMIN — METOLAZONE 2.5 MG: 5 TABLET ORAL at 08:22

## 2023-05-03 RX ADMIN — OXYCODONE HYDROCHLORIDE 40 MG: 20 TABLET, FILM COATED, EXTENDED RELEASE ORAL at 15:49

## 2023-05-03 RX ADMIN — POTASSIUM CHLORIDE 20 MEQ: 1500 TABLET, EXTENDED RELEASE ORAL at 05:59

## 2023-05-03 RX ADMIN — HYDROXYZINE HYDROCHLORIDE 25 MG: 25 TABLET ORAL at 11:26

## 2023-05-03 RX ADMIN — POTASSIUM CHLORIDE 20 MEQ: 14.9 INJECTION, SOLUTION INTRAVENOUS at 06:07

## 2023-05-03 RX ADMIN — POTASSIUM CHLORIDE 40 MEQ: 1500 TABLET, EXTENDED RELEASE ORAL at 18:48

## 2023-05-03 RX ADMIN — PHENAZOPYRIDINE 100 MG: 100 TABLET ORAL at 08:23

## 2023-05-03 RX ADMIN — POTASSIUM CHLORIDE 20 MEQ: 1500 TABLET, EXTENDED RELEASE ORAL at 08:22

## 2023-05-03 RX ADMIN — TAMSULOSIN HYDROCHLORIDE 0.4 MG: 0.4 CAPSULE ORAL at 15:48

## 2023-05-03 RX ADMIN — OXYCODONE HYDROCHLORIDE 30 MG: 10 TABLET ORAL at 08:29

## 2023-05-03 RX ADMIN — GABAPENTIN 300 MG: 300 CAPSULE ORAL at 15:49

## 2023-05-03 RX ADMIN — GABAPENTIN 300 MG: 300 CAPSULE ORAL at 08:22

## 2023-05-03 RX ADMIN — Medication 90 MG: at 08:22

## 2023-05-03 RX ADMIN — SODIUM CHLORIDE, SODIUM LACTATE, POTASSIUM CHLORIDE, AND CALCIUM CHLORIDE 50 ML/HR: .6; .31; .03; .02 INJECTION, SOLUTION INTRAVENOUS at 01:56

## 2023-05-03 RX ADMIN — POTASSIUM CHLORIDE 20 MEQ: 1500 TABLET, EXTENDED RELEASE ORAL at 15:49

## 2023-05-03 RX ADMIN — ATORVASTATIN CALCIUM 40 MG: 40 TABLET, FILM COATED ORAL at 15:49

## 2023-05-03 RX ADMIN — PHENAZOPYRIDINE 100 MG: 100 TABLET ORAL at 11:26

## 2023-05-03 RX ADMIN — PHENAZOPYRIDINE 100 MG: 100 TABLET ORAL at 16:01

## 2023-05-03 RX ADMIN — LISINOPRIL 2.5 MG: 2.5 TABLET ORAL at 08:22

## 2023-05-03 RX ADMIN — TORSEMIDE 60 MG: 20 TABLET ORAL at 08:23

## 2023-05-03 RX ADMIN — HYDROMORPHONE HYDROCHLORIDE 0.5 MG: 1 INJECTION, SOLUTION INTRAMUSCULAR; INTRAVENOUS; SUBCUTANEOUS at 11:26

## 2023-05-03 RX ADMIN — POTASSIUM CHLORIDE 20 MEQ: 14.9 INJECTION, SOLUTION INTRAVENOUS at 15:49

## 2023-05-03 RX ADMIN — OXYCODONE HYDROCHLORIDE 40 MG: 20 TABLET, FILM COATED, EXTENDED RELEASE ORAL at 05:22

## 2023-05-03 NOTE — PROGRESS NOTES
"[address]  Progress Note  Name: Sally Hinkle  MRN: [id number]  Unit/Bed#: -01 I Date of Admission: 5/1/2023   Date of Service: 5/3/2023 I Hospital Day: 1    Assessment/Plan   * Malignant neoplasm of anterior wall of urinary bladder ([facility] )  Assessment & Plan  · Status post TURBT  · Patient has Solis in place, continue CBI as per urology recommendation  · Hold any kind of anticoagulation - solis catheter draining blood, continue to hold anticoagulation  · Continue pain management - patient was not having adequate pain management, restarted home pain regimen  Adjust as needed  Discussed with urology and due to spasms, 1 dose of ditropan XL 10 mg ordered  Does not want to do more than this due to giving him a voiding trial in the next 48 hours and does not want patient to develop urinary retention  · Further management per urology  · Continue follow up with urology as instructed  Opioid withdrawal ([place] [id number] )  Assessment & Plan  · Patient likely with opioid withdrawal on 5/1 in chronic back pain and daily use of narcotics  · RN document on 5/1: \"patient yelling out and stating he feels antsy and not sure what is wrong with me  Patient jittery, crying, and agitated, treated with dilaudid and orders for restarting patient's home pain meds  · Currently taking oxycodone 40 mg 12 hr q8h scheduled outpatinet  Also taking oxycodone 30 mg IR 0 5 tablet for moderate breakthrough pain or 1 tablet for severe breakthrough pain q4h prn  · Pain medications reordered in hospital and patient no further complaints of anxiousness or jittery  · Monitor and follow up outpatient with providers for follow up pain regimen       Chronic diastolic heart failure (HCC)  Assessment & Plan  Wt Readings from Last 3 Encounters:   05/01/23 115 kg (253 lb)   04/26/23 115 kg (253 lb 3 2 oz)   04/17/23 114 kg (252 lb 3 2 oz)     · With history of diastolic CHF, not currently in acute " exacerbation  · Continue with torsemide 60 mg QD, zaroxolyn 2 5 mg three times a week, lisinopril 2 5 mg qd, toprol xl 25 mg qd  · Echo from 11/25/2022: the left ventricular ejection fraction is 65%  Systolic function is normal  Wall motion is normal  Indeterminate diastolic function  Hypokalemia  Assessment & Plan  · Present 3 3  · On potassium supplement outpatient  Reordered on 5/2, given 80 mEq of potassium on 5/2, will need to have repeat BMP done in 1 week after discharge  · Continue supplement and additional potassium if needed  · Given potassium 20 mEq IV x 2 doses along with his daily 20 mEq bid  Recommending discharge home with potassium 40 mEq bid and repeat BMP in 1 week  Repeat potassium to be done at 1400  Lab Results   Component Value Date    K 2 8 (L) 05/03/2023    K 3 1 (L) 05/02/2023    K 3 3 (L) 05/01/2023       Chronic bilateral low back pain with bilateral sciatica  Assessment & Plan  · History of chronic back pain  Currently taking oxycodone 40 mg 12 hr tablet q8h scheduled outpatient  · Also taking oxycodone 30 mg IR 1/2 tablet for moderate breakthrough pain or 1 tablet for severe breakthrough pain q4h prn  · Reordered as patient was not having adequate pain control  · Monitor    Hypertension  Assessment & Plan  · Resume home medication   · Monitor BP  · Pain control  · Avoid hypotension    Obstructive sleep apnea on CPAP  Assessment & Plan  · Patient does use CPAP  · Continue         VTE Pharmacologic Prophylaxis:   Moderate Risk (Score 3-4) - Pharmacological DVT Prophylaxis Contraindicated  Sequential Compression Devices Ordered  Patient Centered Rounds: I performed bedside rounds with nursing staff today  Discussions with Specialists or Other Care Team Provider: nursing and case management    Education and Discussions with Family / Patient: Primary team to call patient's family       Total Time Spent on Date of Encounter in care of patient: 35 minutes This time was spent on one or more of the following: performing physical exam; counseling and coordination of care; obtaining or reviewing history; documenting in the medical record; reviewing/ordering tests, medications or procedures; communicating with other healthcare professionals and discussing with patient's family/caregivers  Current Length of Stay: 1 day(s)  Current Patient Status: Inpatient   Certification Statement: SLIM on this patient as consult, pending urology recommendations  Discharge Plan: Babs Pretty is following this patient on consult  They are medically stable for discharge when deemed appropriate by primary service  Will need discharge home with potassium supplement and repeat BMP in 1 week  Code Status: Level 3 - DNAR and DNI    Subjective:   Patient seen and examined at bedside this morning  He states that he is feeling better today he is not in as much pain and his anxiety is better  States that he had a bowel movement and has been urinating well  He states that he does not have full control over his urination feels like he does not always empty it fully but it is better than it was before  He is very relieved that the pathology results came back with good news  Denies nausea, vomiting, diarrhea, constipation, fever, chills, chest pain, shortness of breath  He is eating and drinking well  Ambulating without difficulty  Objective:     Vitals:   Temp (24hrs), Av 7 °F (36 5 °C), Min:97 3 °F (36 3 °C), Max:98 1 °F (36 7 °C)    Temp:  [97 3 °F (36 3 °C)-98 1 °F (36 7 °C)] 98 1 °F (36 7 °C)  HR:  [55-65] 63  Resp:  [16-19] 19  BP: (109-115)/(51-62) 115/52  SpO2:  [89 %-96 %] 93 %  Body mass index is 38 47 kg/m²  Input and Output Summary (last 24 hours): Intake/Output Summary (Last 24 hours) at 5/3/2023 1414  Last data filed at 5/3/2023 1406  Gross per 24 hour   Intake 780 ml   Output 2900 ml   Net -2120 ml       Physical Exam:   Physical Exam  Vitals reviewed     Constitutional: General: He is not in acute distress  Appearance: He is obese  He is not ill-appearing or toxic-appearing  HENT:      Head: Normocephalic and atraumatic  Nose: Nose normal       Mouth/Throat:      Mouth: Mucous membranes are moist    Eyes:      Pupils: Pupils are equal, round, and reactive to light  Cardiovascular:      Rate and Rhythm: Normal rate and regular rhythm  Heart sounds: Normal heart sounds  Pulmonary:      Effort: Pulmonary effort is normal  No respiratory distress  Breath sounds: Normal breath sounds  No stridor  No wheezing  Abdominal:      General: Bowel sounds are normal  There is distension (Mild distention improved from yesterday)  Palpations: Abdomen is soft  There is no mass  Tenderness: There is abdominal tenderness (Mild suprapubic tenderness  )  Musculoskeletal:         General: Normal range of motion  Cervical back: Normal range of motion  Right lower leg: No edema  Left lower leg: No edema  Skin:     General: Skin is warm and dry  Neurological:      General: No focal deficit present  Mental Status: He is alert and oriented to person, place, and time     Psychiatric:         Mood and Affect: Mood normal          Behavior: Behavior normal           Additional Data:     Labs:  Results from last 7 days   Lab Units 05/03/23  0500 05/01/23  1428   WBC Thousand/uL 8 57 6 57   HEMOGLOBIN g/dL 11 0* 11 8*   HEMATOCRIT % 33 3* 35 5*   PLATELETS Thousands/uL 179 198   NEUTROS PCT %  --  87*   LYMPHS PCT %  --  11*   MONOS PCT %  --  1*   EOS PCT %  --  0     Results from last 7 days   Lab Units 05/03/23  0500   SODIUM mmol/L 135   POTASSIUM mmol/L 2 8*   CHLORIDE mmol/L 95*   CO2 mmol/L 31   BUN mg/dL 26*   CREATININE mg/dL 1 03   ANION GAP mmol/L 9   CALCIUM mg/dL 8 8   GLUCOSE RANDOM mg/dL 121         Results from last 7 days   Lab Units 05/01/23  1025   POC GLUCOSE mg/dl 153*               Lines/Drains:  Invasive Devices     Peripheral Intravenous Line  Duration           Peripheral IV 05/03/23 Right Antecubital <1 day                      Imaging: No pertinent imaging reviewed  Recent Cultures (last 7 days):         Last 24 Hours Medication List:   Current Facility-Administered Medications   Medication Dose Route Frequency Provider Last Rate    acetaminophen  650 mg Oral Q6H PRN Korey Shin MD      atorvastatin  40 mg Oral Daily With Stacey Lira MD      co-enzyme Q-10  90 mg Oral Daily Yves Webber MD      gabapentin  300 mg Oral BID Korey Shin MD      HYDROmorphone  0 5 mg Intravenous Q6H PRN Saige Reed PA-C      hydrOXYzine HCL  25 mg Oral Q6H PRN Saige Reed PA-C      lactated ringers  50 mL/hr Intravenous Continuous Lesly Franco MD 50 mL/hr (05/03/23 0156)    lisinopril  2 5 mg Oral Daily Korey Shin MD      metolazone  2 5 mg Oral Once per day on Mon Wed Fri Yves Webber MD      metoprolol succinate  25 mg Oral Daily Korey Shin MD      naloxone  0 04 mg Intravenous Q1MIN PRN Korey Shin MD      oxyCODONE  40 mg Oral [facility] Lear DIA Thomas      oxyCODONE  30 mg Oral Q4H PRN Juany Bowman PA-C      oxyCODONE  15 mg Oral Q4H PRN Saige Reed PA-C      phenazopyridine  100 mg Oral TID With Meals Lesly Franco MD      potassium chloride  20 mEq Oral BID With Meals Juany Bowman PA-C      potassium chloride  20 mEq Intravenous Q2H Juany Bowman PA-C 20 mEq (05/03/23 1232)    tamsulosin  0 4 mg Oral Daily With Robert Franco MD      torsemide  60 mg Oral Daily Korey Shin MD          Today, Patient Was Seen By: Saige Reed PA-C    **Please Note: This note may have been constructed using a voice recognition system  **

## 2023-05-03 NOTE — PROGRESS NOTES
Patient discharged today 5/3/23 from [facility]  Patient scheduled for 5/5 to review biopsy results   - labs resulted please review

## 2023-05-03 NOTE — PLAN OF CARE
Problem: Prexisting or High Potential for Compromised Skin Integrity  Goal: Skin integrity is maintained or improved  Description: INTERVENTIONS:  - Identify patients at risk for skin breakdown  - Assess and monitor skin integrity  - Assess and monitor nutrition and hydration status  - Monitor labs   - Assess for incontinence   - Turn and reposition patient  - Assist with mobility/ambulation  - Relieve pressure over bony prominences  - Avoid friction and shearing  - Provide appropriate hygiene as needed including keeping skin clean and dry  - Evaluate need for skin moisturizer/barrier cream  - Collaborate with interdisciplinary team   - Patient/family teaching  - Consider wound care consult   Outcome: Progressing     Problem: MOBILITY - ADULT  Goal: Maintain or return to baseline ADL function  Description: INTERVENTIONS:  -  Assess patient's ability to carry out ADLs; assess patient's baseline for ADL function and identify physical deficits which impact ability to perform ADLs (bathing, care of mouth/teeth, toileting, grooming, dressing, etc )  - Assess/evaluate cause of self-care deficits   - Assess range of motion  - Assess patient's mobility; develop plan if impaired  - Assess patient's need for assistive devices and provide as appropriate  - Encourage maximum independence but intervene and supervise when necessary  - Involve family in performance of ADLs  - Assess for home care needs following discharge   - Consider OT consult to assist with ADL evaluation and planning for discharge  - Provide patient education as appropriate  Outcome: Progressing  Goal: Maintains/Returns to pre admission functional level  Description: INTERVENTIONS:  - Perform BMAT or MOVE assessment daily    - Set and communicate daily mobility goal to care team and patient/family/caregiver  - Collaborate with rehabilitation services on mobility goals if consulted  - Perform Range of Motion 2 times a day    - Reposition patient every 2 hours if unable to reposition self  - Record patient progress and toleration of activity level   Outcome: Progressing     Problem: PAIN - ADULT  Goal: Verbalizes/displays adequate comfort level or baseline comfort level  Description: Interventions:  - Encourage patient to monitor pain and request assistance  - Assess pain using appropriate pain scale  - Administer analgesics based on type and severity of pain and evaluate response  - Implement non-pharmacological measures as appropriate and evaluate response  - Consider cultural and social influences on pain and pain management  - Notify physician/advanced practitioner if interventions unsuccessful or patient reports new pain  Outcome: Progressing     Problem: INFECTION - ADULT  Goal: Absence or prevention of progression during hospitalization  Description: INTERVENTIONS:  - Assess and monitor for signs and symptoms of infection  - Monitor lab/diagnostic results  - Monitor all insertion sites, i e  indwelling lines, tubes, and drains  - Monitor endotracheal if appropriate and nasal secretions for changes in amount and color  - [place] appropriate cooling/warming therapies per order  - Administer medications as ordered  - Instruct and encourage patient and family to use good hand hygiene technique  - Identify and instruct in appropriate isolation precautions for identified infection/condition  Outcome: Progressing     Problem: SAFETY ADULT  Goal: Maintain or return to baseline ADL function  Description: INTERVENTIONS:  -  Assess patient's ability to carry out ADLs; assess patient's baseline for ADL function and identify physical deficits which impact ability to perform ADLs (bathing, care of mouth/teeth, toileting, grooming, dressing, etc )  - Assess/evaluate cause of self-care deficits   - Assess range of motion  - Assess patient's mobility; develop plan if impaired  - Assess patient's need for assistive devices and provide as appropriate  - Encourage maximum independence but intervene and supervise when necessary  - Involve family in performance of ADLs  - Assess for home care needs following discharge   - Consider OT consult to assist with ADL evaluation and planning for discharge  - Provide patient education as appropriate  Outcome: Progressing  Goal: Maintains/Returns to pre admission functional level  Description: INTERVENTIONS:  - Perform BMAT or MOVE assessment daily    - Set and communicate daily mobility goal to care team and patient/family/caregiver  - Collaborate with rehabilitation services on mobility goals if consulted  - Perform Range of Motion 2 times a day  - Reposition patient every 2 hours if unable to reposition self  - Record patient progress and toleration of activity level   Outcome: Progressing  Goal: Patient will remain free of falls  Description: INTERVENTIONS:  - Educate patient/family on patient safety including physical limitations  - Instruct patient to call for assistance with activity   - Consult OT/PT to assist with strengthening/mobility   - Keep Call bell within reach  - Keep bed low and locked with side rails adjusted as appropriate  - Keep care items and personal belongings within reach  - Initiate and maintain comfort rounds  - Make Fall Risk Sign visible to staff  - Offer Toileting every   Hours, in advance of need  - Initiate/Maintain   alarm  - Obtain necessary fall risk management equipment:     [place]   - Apply yellow socks and bracelet for high fall risk patients  - Consider moving patient to room near nurses station  Outcome: Progressing     Problem: DISCHARGE PLANNING  Goal: Discharge to home or other facility with appropriate resources  Description: INTERVENTIONS:  - Identify barriers to discharge w/patient and caregiver  - Arrange for needed discharge resources and transportation as appropriate  - Identify discharge learning needs (meds, wound care, etc )  - Arrange for interpretive services to assist at discharge as needed  - Refer to Case Management Department for coordinating discharge planning if the patient needs post-hospital services based on physician/advanced practitioner order or complex needs related to functional status, cognitive ability, or social support system  Outcome: Progressing     Problem: Knowledge Deficit  Goal: Patient/family/caregiver demonstrates understanding of disease process, treatment plan, medications, and discharge instructions  Description: Complete learning assessment and assess knowledge base    Interventions:  - Provide teaching at level of understanding  - Provide teaching via preferred learning methods  Outcome: Progressing     Problem: GENITOURINARY - ADULT  Goal: Maintains or returns to baseline urinary function  Description: INTERVENTIONS:  - Assess urinary function  - Encourage oral fluids to ensure adequate hydration if ordered  - Administer IV fluids as ordered to ensure adequate hydration  - Administer ordered medications as needed  - Offer frequent toileting  - Follow urinary retention protocol if ordered  Outcome: Progressing  Goal: Absence of urinary retention  Description: INTERVENTIONS:  - Assess patients ability to void and empty bladder  - Monitor I/O  - Bladder scan as needed  - Discuss with physician/AP medications to alleviate retention as needed  - Discuss catheterization for long term situations as appropriate  Outcome: Progressing  Goal: Urinary catheter remains patent  Description: INTERVENTIONS:  - Assess patency of urinary catheter  - If patient has a chronic solis, consider changing catheter if non-functioning  - Follow guidelines for intermittent irrigation of non-functioning urinary catheter  Outcome: Progressing

## 2023-05-03 NOTE — TELEPHONE ENCOUNTER
Patients wife called in stating she received a call from Dr Nakia Mccarthy advising her to call back to discuss patients recent surgery

## 2023-05-03 NOTE — ASSESSMENT & PLAN NOTE
· Present 3 3  · On potassium supplement outpatient  Reordered on 5/2, given 80 mEq of potassium on 5/2, will need to have repeat BMP done in 1 week after discharge  · Continue supplement and additional potassium if needed  · Given potassium 20 mEq IV x 2 doses along with his daily 20 mEq bid  Recommending discharge home with potassium 40 mEq bid and repeat BMP in 1 week  Repeat potassium to be done at 1400      Lab Results   Component Value Date    K 2 8 (L) 05/03/2023    K 3 1 (L) 05/02/2023    K 3 3 (L) 05/01/2023

## 2023-05-03 NOTE — ASSESSMENT & PLAN NOTE
Wt Readings from Last 3 Encounters:   05/01/23 115 kg (253 lb)   04/26/23 115 kg (253 lb 3 2 oz)   04/17/23 114 kg (252 lb 3 2 oz)     · With history of diastolic CHF, not currently in acute exacerbation  · Continue with torsemide 60 mg QD, zaroxolyn 2 5 mg three times a week, lisinopril 2 5 mg qd, toprol xl 25 mg qd  · Echo from 11/25/2022: the left ventricular ejection fraction is 65%  Systolic function is normal  Wall motion is normal  Indeterminate diastolic function

## 2023-05-03 NOTE — PROGRESS NOTES
No fever vital signs stable postop day #2 status post cystoscopy TURBT  Patient states pain was controlled well overnight  His Barr[place] catheter was removed this morning about an hour ago  Physical exam he is alert awake answers questions appropriately abdomen is protuberant but soft he had some old dried blood around the meatus but the bladder was nontender nondistended  No calf tenderness  Labs include a creatinine stable at 1 0 potassium still low at 2 8  His H&H is stable at 11 and 33   impression postop day #2 status post TUR BT clinically improved   plan if okay with medicine and the patient is ambulating and voiding we will discharge her on lunch today  Will discuss with medicine may need to replace his potassium or send him home on a potassium supplement  I will arrange outpatient follow-up with me once the path report is complete  Patient understands and agrees went over discharge instructions

## 2023-05-03 NOTE — ASSESSMENT & PLAN NOTE
"· Patient likely with opioid withdrawal on 5/1 in chronic back pain and daily use of narcotics  · RN document on 5/1: \"patient yelling out and stating he feels antsy and not sure what is wrong with me  Patient jittery, crying, and agitated, treated with dilaudid and orders for restarting patient's home pain meds  · Currently taking oxycodone 40 mg 12 hr q8h scheduled outpatinet  Also taking oxycodone 30 mg IR 0 5 tablet for moderate breakthrough pain or 1 tablet for severe breakthrough pain q4h prn  · Pain medications reordered in hospital and patient no further complaints of anxiousness or jittery  · Monitor and follow up outpatient with providers for follow up pain regimen     "

## 2023-05-03 NOTE — DISCHARGE SUMMARY
Discharge Summary - Rupal Roe 58 y o  male MRN: [id number]    Unit/Bed#: -01 Encounter: [id number]    Admission Date:   Admission Orders (From admission, onward)     Ordered        05/02/23 1611  Inpatient Admission  Once            05/01/23 1023  Place in Observation  Once                        Admitting Diagnosis: Malignant neoplasm of anterior wall of urinary bladder ([address] ) [C67 3]    HPI: 58year-old history of bladder cancer for relook in the bladder for restaging  Procedures Performed: No orders of the defined types were placed in this encounter  Summary of Hospital Course: Patient was admitted status post TURBT needed for postop pain control and hematuria  Eventually his catheter was removed and his pain was controlled and was ready for discharge on postop day 2  Medicine was managing his medical issues including hypokalemia hypertension chronic pain management as well  Significant Findings, Care, Treatment and Services Provided: TURBT and medical consult  Complications: None    Discharge Diagnosis: Bladder cancer    Medical Problems     Resolved Problems  Date Reviewed: 5/2/2023   None         Condition at Discharge: good         Discharge instructions/Information to patient and family:   See after visit summary for information provided to patient and family  Provisions for Follow-Up Care:  See after visit summary for information related to follow-up care and any pertinent home health orders  PCP: Fernanda Martínez DO    Disposition: Home    Planned Readmission: No      Discharge Statement   I spent  minutes discharging the patient  This time was spent on the day of discharge  I had direct contact with the patient on the day of discharge  Additional documentation is required if more than 30 minutes were spent on discharge  Discharge Medications:  See after visit summary for reconciled discharge medications provided to patient and family

## 2023-05-03 NOTE — DISCHARGE INSTR - AVS FIRST PAGE
Call the office fever greater than 102 increased bleeding or pain  Report to the ER for chest pain shortness of breath leg swelling or medical concerns  We will have some blood in the urine 7 to 10 days or so please drink extra water and rest   May passed some blood clots as well  Heating pad over the penis and bladder area is helpful for bladder discomfort  3 prescriptions will be sent electronically for outpatient discharge including Flomax to help relax the prostate, Ceftin antibiotic for few days, and Pyridium to help for urinary burning and penile discomfort  It turns urine orange  Outpatient follow-up with Dr Deandra Moore to be arranged  Please take potassium 40 mEq by mouth twice daily for 7 days and then resume your 20 mEq by mouth twice daily  Repeat BMP in 1 week

## 2023-05-04 ENCOUNTER — PATIENT OUTREACH (OUTPATIENT)
Dept: HEMATOLOGY ONCOLOGY | Facility: CLINIC | Age: 63
End: 2023-05-04

## 2023-05-04 NOTE — PROGRESS NOTES
Received voicemail from pt:  Good morning, Yudith  This is Cam Giron calling  Birth date is   My phone number is [phone]  I'm calling you from home, but I guess I got the best news that a butch could get yesterday  So I just need to talk to you and kind of reevaluate where I'm at and where I'm going  So thank you so much for everything  I so appreciate what you've done  I'll talk to you soon  debra Simmons     Returned call to pt  Pt is very relieved that repeat TURB did not show any evidence of cancer  Briefly reviewed BCG but assured him this would be discussed during his first visit  Email sent with BCG information from [facility]

## 2023-05-04 NOTE — TELEPHONE ENCOUNTER
Spoke to patient and informed him that DR emery is out of office for a week and will reach out when he returns

## 2023-05-05 ENCOUNTER — DOCUMENTATION (OUTPATIENT)
Dept: HEMATOLOGY ONCOLOGY | Facility: CLINIC | Age: 63
End: 2023-05-05

## 2023-05-05 NOTE — PROGRESS NOTES
In-basket message received from Odin Madrigal, RN to add patient to  oncology [facility] on 5/16/2023  Chart reviewed and prep completed

## 2023-05-10 DIAGNOSIS — M54.41 CHRONIC BILATERAL LOW BACK PAIN WITH BILATERAL SCIATICA: ICD-10-CM

## 2023-05-10 DIAGNOSIS — C67.3 MALIGNANT NEOPLASM OF ANTERIOR WALL OF URINARY BLADDER (HCC): ICD-10-CM

## 2023-05-10 DIAGNOSIS — G89.29 CHRONIC BILATERAL LOW BACK PAIN WITH BILATERAL SCIATICA: ICD-10-CM

## 2023-05-10 DIAGNOSIS — G89.3 CANCER-RELATED PAIN: ICD-10-CM

## 2023-05-10 DIAGNOSIS — M54.42 CHRONIC BILATERAL LOW BACK PAIN WITH BILATERAL SCIATICA: ICD-10-CM

## 2023-05-10 RX ORDER — OXYCODONE HYDROCHLORIDE 30 MG/1
15 TABLET ORAL EVERY 4 HOURS PRN
Qty: 120 TABLET | Refills: 0 | Status: SHIPPED | OUTPATIENT
Start: 2023-05-10

## 2023-05-10 NOTE — TELEPHONE ENCOUNTER
Primary palliative medicine provider: Dr Naylor Poag     Medication requested:oxycodone 27         Next scheduled appointment:6/7/23      Pharmacy:attached

## 2023-05-10 NOTE — TELEPHONE ENCOUNTER
Controlled Substance Review    [place] or [place]  reviewed: No red flags were identified; safe to proceed with prescription       05/06/2023 04/26/2023   1  Oxycontin Er 40 Mg Tablet 90 00  30  Ti Mode  [id number]   Tho (0343)   180 00 MME  [place]    04/10/2023  04/06/2023   1  Oxycontin Er 40 Mg Tablet 63 00  21  Ti Mode  [id number]   Tho (0343)   180 00 [place]    04/10/2023  04/06/2023   1  Oxycodone Hcl (Ir) 30 Mg Tab 120 00  30  Ti Mode  [id number]   Tho (0343)   180 00 MME  [place]    03/16/2023 03/16/2023   1  Oxycodone Hcl (Ir) 30 Mg Tab 120 00  30  An Soundra Reese  [id number]   Tho ([id number])   180 00 MME  Comm Ins  ALESIA Palma MD  Palliative Medicine & Supportive Care  Internal Medicine  Available via [facility] Text  Office: [phone]  Fax: [phone]

## 2023-05-15 ENCOUNTER — PATIENT OUTREACH (OUTPATIENT)
Dept: HEMATOLOGY ONCOLOGY | Facility: CLINIC | Age: 63
End: 2023-05-15

## 2023-05-15 ENCOUNTER — DOCUMENTATION (OUTPATIENT)
Dept: HEMATOLOGY ONCOLOGY | Facility: CLINIC | Age: 63
End: 2023-05-15

## 2023-05-15 NOTE — PROGRESS NOTES
Received email from pt stating he has not heard anything from urology since having his surgery 2 weeks ago, does not have any follow-up scheduled and inquiring about scheduling/teaching of BCG treatments  Has a vacation to the [facility] scheduled for 9/2-9/16/2023  Task sent to [facility] Urology Clinical team with pt's concerns and request to follow-up with him

## 2023-05-15 NOTE — PROGRESS NOTES
Called and spoke with patient at this time  Advised I was waiting for BCG allotment as well as tumor board discussion tomorrow  Advised the plan is BCG, and I have allotted dosing for him  I will be in contact later this week to schedule  Patient thankful for call back and verbalized understanding  spontaneous

## 2023-05-15 NOTE — PROGRESS NOTES
Received email from pt stating he has not heard anything from urology since having his surgery 2 weeks ago, does not have any follow-up scheduled and inquiring about scheduling/teaching of BCG treatments  Please follow-up with pt to assist with questions/scheduling  Thank you!

## 2023-05-16 ENCOUNTER — DOCUMENTATION (OUTPATIENT)
Dept: HEMATOLOGY ONCOLOGY | Facility: CLINIC | Age: 63
End: 2023-05-16

## 2023-05-16 NOTE — PROGRESS NOTES
Oncology Navigator Note: Multidisciplinary Urology Case Review 5/16/2023        Diagnosis: Amor Newton is a 58 y o  male who was presented at the Urology Oncology Multidisciplinary Tumor Conference today  Marianela Kumar has a history of a 3cm bladder tumor s/p TURBT in 3/2023 invasion into muscularis propria was not definitive and sent out for second opinoin at [facility]  Second opinion and tumor board consensus was to repeat TURBT  Pt underwent second TURB on 5/1/23 and benign urothelial mucosa seen  Muscularis propria negative benign  No visible gross tumor on repeat TURB and negative for malignancy in deep muscle        Recommendations of Group:  BCG      Upcoming Appointments:    Future Appointments   Date Time Provider [place]   6/7/2023  8:00 AM Havery Smolder, MD [facility]   6/7/2023  8:00 AM JUAN Cifuentes [facility]            Patient was discussed at the Multidisciplinary Urology Case review on 5/16/2023      NCCN guidelines were available for review  The final treatment plan will be left to the discretion of the patient and the treating physician  DISCLAIMERS:  TO THE TREATING PHYSICIAN:  This conference is a meeting of clinicians from various specialty areas who evaluate and discuss patients for whom a multidisciplinary treatment approach is being considered  Please note that the above opinion was a consensus of the conference attendees and is intended only to assist in quality care of the discussed patient  The responsibility for follow up on the input given during the conference, along with any final decisions regarding plan of care, is that of the patient and the patient's provider  Accordingly, appointments have only been recommended based on this information and have NOT been scheduled unless otherwise noted  TO THE PATIENT:  This summary is a brief record of major aspects of your cancer treatment   You may choose to share a copy with any of your doctors or nurses  However, this is not a detailed or comprehensive record of your care

## 2023-06-03 NOTE — PROGRESS NOTES
UROLOGY PROGRESS NOTE         NAME: June Lozoya  AGE: 58 y o  SEX: male  : 1960   MRN: [id number]    DATE: 6/3/2023  TIME: 12:52 PM    Assessment and Plan   Procedures     Impression:   1  Malignant neoplasm of anterior wall of urinary bladder (HCC)    2  Benign prostatic hyperplasia with urinary frequency    3  History of gross hematuria         Plan: Today #1 and 6 BCG  Patient was given amoxicillin  He was told to call for fever greater than 102 increased bleeding or pain or report to the emergency room after hours  Patient will follow-up in 1 week to continue induction therapy for BCG  He wanted to know what the plan was down the road and I told him in about 4 weeks after the BCG has been completed we will redo with TURBT bladder biopsy which will occur sometime in mid August     All questions were answered for the patient he was satisfied  Patient will also refill his Flomax  He was told to hold the medicine for 2 hours then void  He also received 500 mg of amoxicillin  Chief Complaint   No chief complaint on file  History of Present Illness     HPI: June Lozoya is a 58y o  year old male who presents with follow-up bladder cancer high-grade T1 disease  Patient to start his induction therapy with BCG No  1 of 6 treatments  Patient was originally diagnosed with bladder cancer on 3/17/2023 August initial TURBT  Initially the path came back likely muscle invasive cancer but [facility] for second opinion could determine whether it was T1 or T2 and he subsequently underwent a repeat TURBT in May 2023 that came back high-grade T1 disease plenty of muscle present but not invasive  Patient did have a PET scan set up that did not show any evidence of metastatic disease  Patient has been on Flomax for his BPH  He is also on [place] for diabetes that can cause urinary frequency                        The following portions of the patient's history were reviewed and "updated as appropriate: allergies, current medications, past family history, past medical history, past social history, past surgical history and problem list   Past Medical History:   Diagnosis Date   • Arthritis    • Bladder cancer ([facility] [address] )    • Chronic narcotic dependence ([facility] )    • Chronic pain disorder     lower back and down both legs   • Colon polyp    • Coronary artery disease    • CPAP (continuous positive airway pressure) dependence    • Does use hearing aid     will wear DOS   • Full dentures    • Heart failure ([facility] )     and \"fluid retention\" SL OW B Daryl cardio PA\"   • High cholesterol    • Hypertension    • Mild ankle edema     and feet   • Obstructive sleep apnea on CPAP    • Prediabetes    • Shortness of breath     per pt \"with just exertion\"   • Sleep apnea    • Wears glasses      Past Surgical History:   Procedure Laterality Date   • BACK SURGERY      titanium rods implanted   • COLONOSCOPY     • CYSTOSCOPY W/ URETERAL STENT PLACEMENT Bilateral 3/17/2023    Procedure: bilateral retrograde;  Surgeon: Maxi Boucher MD;  Location:  MAIN OR;  Service: Urology   • HERNIA REPAIR      x5   • NE CYSTO W/REMOVAL OF LESIONS SMALL N/A 5/1/2023    Procedure: CYSTOSCOPY TURBT;  Surgeon: Maxi Boucher MD;  Location:  MAIN OR;  Service: Urology   • TRANSURETHRAL RESECTION OF BLADDER TUMOR N/A 3/17/2023    Procedure: TRANSURETHRAL RESECTION OF BLADDER TUMOR (TURBT); Surgeon: Maxi Boucher MD;  Location:  MAIN OR;  Service: Urology     shoulder  Review of Systems     Const: Denies chills, fever and weight loss  CV: Denies chest pain  Resp: Denies SOB  GI: Denies abdominal pain, nausea and vomiting  : Denies symptoms other than stated above  Musculo: Denies back pain  Objective   There were no vitals taken for this visit  Physical Exam  Const: Appears healthy and well developed  No signs of acute distress present  Resp: Respirations are regular and unlabored  CV: Rate is regular   Rhythm is " regular  Abdomen: Abdomen is soft, nontender, and nondistended  Kidneys are not palpable  : nl  Psych: Patient's attitude is cooperative   Mood is normal  Affect is normal     Current Medications     Current Outpatient Medications:   •  aspirin 81 mg chewable tablet, Chew 81 mg daily, Disp: , Rfl:   •  atorvastatin (LIPITOR) 40 mg tablet, , Disp: , Rfl:   •  co-enzyme Q-10 30 MG capsule, Take 100 mg by mouth daily , Disp: , Rfl:   •  Farxiga 5 MG TABS, 5 mg daily 5mg alternating with 2 5mg for fluid retention, Disp: , Rfl:   •  gabapentin (NEURONTIN) 300 mg capsule, Take 300 mg by mouth 2 (two) times a day , Disp: , Rfl:   •  lisinopril (ZESTRIL) 2 5 mg tablet, , Disp: , Rfl:   •  metolazone (ZAROXOLYN) 2 5 mg tablet, Take 2 5 mg by mouth 3 (three) times a week M-W-F, Disp: , Rfl:   •  metoprolol succinate (TOPROL-XL) 25 mg 24 hr tablet, Take 25 mg by mouth daily, Disp: , Rfl:   •  multivitamin-iron-minerals-folic acid (CENTRUM) chewable tablet, Chew 1 tablet daily, Disp: , Rfl:   •  oxyCODONE (OxyCONTIN) 40 mg 12 hr tablet, Take 1 tablet (40 mg total) by mouth every 8 (eight) hours Max Daily Amount: 120 mg, Disp: 90 tablet, Rfl: 0  •  oxyCODONE (ROXICODONE) 30 MG immediate release tablet, Take 0 5 tablets (15 mg total) by mouth every 4 (four) hours as needed for moderate pain Max Daily Amount: 90 mg, Disp: 120 tablet, Rfl: 0  •  phenazopyridine (PYRIDIUM) 200 mg tablet, Take 1 tablet (200 mg total) by mouth 3 (three) times a day with meals, Disp: 20 tablet, Rfl: 1  •  potassium chloride (K-DUR,KLOR-CON) 20 mEq tablet, 2 (two) times a day, Disp: , Rfl:   •  potassium chloride (K-DUR,KLOR-CON) 20 mEq tablet, Take 2 tablets (40 mEq total) by mouth 2 (two) times a day, Disp: 28 tablet, Rfl: 0  •  tamsulosin (FLOMAX) 0 4 mg, Take 1 capsule (0 4 mg total) by mouth daily with dinner, Disp: 90 capsule, Rfl: 3  •  torsemide (DEMADEX) 20 mg tablet, Take 60 mg by mouth daily, Disp: , Rfl:         Geovanni Maria MD

## 2023-06-05 DIAGNOSIS — M54.41 CHRONIC BILATERAL LOW BACK PAIN WITH BILATERAL SCIATICA: ICD-10-CM

## 2023-06-05 DIAGNOSIS — C67.3 MALIGNANT NEOPLASM OF ANTERIOR WALL OF URINARY BLADDER (HCC): ICD-10-CM

## 2023-06-05 DIAGNOSIS — M54.42 CHRONIC BILATERAL LOW BACK PAIN WITH BILATERAL SCIATICA: ICD-10-CM

## 2023-06-05 DIAGNOSIS — G89.29 CHRONIC BILATERAL LOW BACK PAIN WITH BILATERAL SCIATICA: ICD-10-CM

## 2023-06-05 RX ORDER — OXYCODONE HCL 40 MG/1
40 TABLET, FILM COATED, EXTENDED RELEASE ORAL EVERY 8 HOURS SCHEDULED
Qty: 90 TABLET | Refills: 0 | Status: SHIPPED | OUTPATIENT
Start: 2023-06-05

## 2023-06-05 NOTE — TELEPHONE ENCOUNTER
Primary palliative medicine provider:   Lachelle Patino     Medication requested:  OxyContin 40 mg      If for pain, how has the patient been taking their pain medicine? Every 8 hours scheduled     Last appointment: 4/26/23    Next scheduled appointment: 6/7 Dr Lachelle Patino ( will try to reschedule in [facility] office for 6/6/23  Awaiting call back from pt         Pharmacy: [facility]

## 2023-06-06 ENCOUNTER — OFFICE VISIT (OUTPATIENT)
Age: 63
End: 2023-06-06

## 2023-06-06 VITALS
SYSTOLIC BLOOD PRESSURE: 138 MMHG | WEIGHT: 243.2 LBS | HEART RATE: 78 BPM | DIASTOLIC BLOOD PRESSURE: 72 MMHG | TEMPERATURE: 96.8 F | BODY MASS INDEX: 36.86 KG/M2 | OXYGEN SATURATION: 95 % | HEIGHT: 68 IN

## 2023-06-06 DIAGNOSIS — I50.32 CHRONIC DIASTOLIC HEART FAILURE (HCC): ICD-10-CM

## 2023-06-06 DIAGNOSIS — Z71.89 ADVANCED CARE PLANNING/COUNSELING DISCUSSION: ICD-10-CM

## 2023-06-06 DIAGNOSIS — G89.3 CANCER-RELATED PAIN: ICD-10-CM

## 2023-06-06 DIAGNOSIS — Z99.89 OBSTRUCTIVE SLEEP APNEA ON CPAP: ICD-10-CM

## 2023-06-06 DIAGNOSIS — R35.0 BENIGN PROSTATIC HYPERPLASIA WITH URINARY FREQUENCY: ICD-10-CM

## 2023-06-06 DIAGNOSIS — N40.1 BENIGN PROSTATIC HYPERPLASIA WITH URINARY FREQUENCY: ICD-10-CM

## 2023-06-06 DIAGNOSIS — G47.33 OBSTRUCTIVE SLEEP APNEA ON CPAP: ICD-10-CM

## 2023-06-06 DIAGNOSIS — N32.89 BLADDER MASS: ICD-10-CM

## 2023-06-06 DIAGNOSIS — Z71.89 COUNSELING AND COORDINATION OF CARE: ICD-10-CM

## 2023-06-06 DIAGNOSIS — M54.42 CHRONIC BILATERAL LOW BACK PAIN WITH BILATERAL SCIATICA: ICD-10-CM

## 2023-06-06 DIAGNOSIS — Z72.820 POOR SLEEP: ICD-10-CM

## 2023-06-06 DIAGNOSIS — F41.9 ANXIETY: ICD-10-CM

## 2023-06-06 DIAGNOSIS — M54.41 CHRONIC BILATERAL LOW BACK PAIN WITH BILATERAL SCIATICA: ICD-10-CM

## 2023-06-06 DIAGNOSIS — C67.3 MALIGNANT NEOPLASM OF ANTERIOR WALL OF URINARY BLADDER (HCC): Primary | ICD-10-CM

## 2023-06-06 DIAGNOSIS — Z74.09 SLIGHTLY LIMITED MOBILITY: ICD-10-CM

## 2023-06-06 DIAGNOSIS — G89.29 CHRONIC BILATERAL LOW BACK PAIN WITH BILATERAL SCIATICA: ICD-10-CM

## 2023-06-06 DIAGNOSIS — G47.00 INSOMNIA: ICD-10-CM

## 2023-06-06 RX ORDER — OXYCODONE HYDROCHLORIDE 30 MG/1
15 TABLET ORAL EVERY 4 HOURS PRN
Qty: 120 TABLET | Refills: 0 | Status: SHIPPED | OUTPATIENT
Start: 2023-06-09 | End: 2023-06-09 | Stop reason: SDUPTHER

## 2023-06-06 NOTE — PROGRESS NOTES
Follow-up with Palliative and Supportive Care  Nigel Wilkerson 58 y o  male [id number]    ASSESSMENT & PLAN:  1  Malignant neoplasm of anterior wall of urinary bladder (HCC)    2  Chronic bilateral low back pain with bilateral sciatica    3  Benign prostatic hyperplasia with urinary frequency    4  Obstructive sleep apnea on CPAP    5  Chronic diastolic heart failure ([facility] [address] )    6  Anxiety    7  Bladder mass    8  Cancer-related pain    9  Advanced care planning/counseling discussion    10  Poor sleep    11  Slightly limited mobility    12  Counseling and coordination of care    13  Insomnia      • Chronic pain - worse pain recently since his cancer diagnosis  Discussed how other untreated symptoms like his anxiety/depression/insomnia can lead to worsening pain as well  Patient would like to continue current pain regimen and consider treatment of other associated symptoms to help with pain  o Refill oxycodone  OxyContin refill already sent to pharmacy yesterday  • Bowel regimen - BMs normal, continue senokot daily  • Anxiety/depression/sleep difficulty - Symptoms worse recently since new cancer diagnosis  Patient is open to trying a medication at next visit if symptoms don't improve  He is to start immunotherapy tomorrow and agrees that waiting to start a new medication to better gauge how he reacts to immunotherapy is the best   Return in about 6 weeks (around 7/18/2023) for Next scheduled follow up  • Emotional support provided  • Medication safety issues addressed - no driving under the influence of narcotics (including opioids), watch for adverse effects including AMS or respiratory depression (slowed breathing), keep medications stored in a safe/locked environment, do not use alcohol while opioids or other narcotics are in one's system    • Reviewed recent notes (palliative, Urology), labs (05/03/23 - K+ 3 4 [L - improving], Mg 2 0 [normalized], BMP - BUN/Cr  26/1 03, eGFR 76, CBC - RBC 3 53 [L], H/H 11  0/33 3[L] ), imaging (04/07/23 PET scan)  •   Requested Prescriptions     Signed Prescriptions Disp Refills   • oxyCODONE (ROXICODONE) 30 MG immediate release tablet 120 tablet 0     Sig: Take 0 5 tablets (15 mg total) by mouth every 4 (four) hours as needed for moderate pain Max Daily Amount: 90 mg Do not start before June 9, 2023  Medications Discontinued During This Encounter   Medication Reason   • oxyCODONE (ROXICODONE) 30 MG immediate release tablet Reorder       Mel Fischer was seen today for symptoms and planning cares related to above illnesses  I have reviewed the patient's controlled substance dispensing history in the Prescription Drug Monitoring Program in compliance with the [facility] regulations before prescribing any controlled substances  They are invited to continue to follow with us  If there are questions or concerns, please contact us through our clinic/answering service 24 hours a day, seven days a week  45 minutes were spent in this ambulatory visit with greater than 50% of the time spent face to face with patient  in counseling or coordination of care including symptom assessment and management, medication review, psychosocial support, chart review, imaging review, lab review, supportive listening and anticipatory guidance  All of the patient's questions were answered during this discussion  Visit Information    Accompanied By: No one    Source of History: Self    History Limitations: None    Contacts: Extended Emergency Contact Information  Primary Emergency Contact: Chuck Storey  Mobile Phone: [phone]  Relation: Spouse  Secondary Emergency Contact: Edgerton Fuss  Mobile Phone: [phone]  Relation: Son       SUBJECTIVE:  Chief Complaint   Patient presents with   • Follow-up     Pt has a history of chronic pain and is also starting immunotherapy treatments          BRIAN Fischer is a 58 y o  male with hx of high-grade papillary urothelial carcinoma and chronic low back pain presents for follow up for symptom management  Follows with Dr Boneta Prader (urology), Dr Jessica Rosen (PCP)  He is about to start immunotherapy tomorrow Jillian Hatchet)  Today patient reports ongoing low back pain, anxiety and depresison about his cancer treatment/dx, and trouble sleeping  He states his pain has been worse since his recent cancer diagnosis  He continues to take his OxyContin and oxycodone with some relief  Lately he has had trouble sleeping and reports high levels of anxiety as wel  Also reports some depressive thoughts, no SI/HI  Was on duloxetine in the past for depression but stopped since he didn't feel it helped much  Had also tried mirtazapine but had a reaction to this and it is now listed as an allergy  Discussed medications to help address the aforementioned symptoms that could also help with component of pain  Patient agrees better sleep, less anxiety and depressive thoughts could help with his pain  He is going to consider this more to discuss at his next visit  He would like to see how he does on the immunotherapy first and is hoping some of this will resolve or improve in the next 6 weeks  His pain is manageable enough now and is okay with keeping medications the same at this point  Previous office visit, Dr Leopold Penta, 04/26/23  Mychal Muñiz is a 58 y o  male with Hx of chronic low back that did not improve after spine fusion surgery done 6 years ago (2017); and 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 and involvement of smooth muscle (diagnosed via TURBT in 2/2023)  CT CAP also showed enlargement of a right external iliac chain lymph node suspicion for regional metastasis   His case is discussed at the tumor board, recommendations are for PET-CT (scheduled on 4/19) to further evaluate LN and for additional metastatic disease   If LN positive proceed with neoadjuvant chemo followed by cystectomy   If PET scan negative, will "need external review of path +/- repeat TURBT  PET-CT on 4/7 showed no evidence of metastatic disease  Chase Hart recommended a repeat TURBT to determine T1 or T2 disease  This is scheduled for 5/1/2023       Patient was last seen on initial consultation on 4/6/2023 where he complained of new pain in the lower back pain that started and persisted after his TURBT on March 17, 2023  We started him on long acting Oxy ER and continued oxyIR as is  Since this last visit, he had his PET-CT done and he followed up with urology  Findings and plans as noted above       He returns today for his routine follow up  Since starting OxyER, he did note improvement of his LBP to levels close/similar to his chronic pain levels before March 17, 2023  He struggles with his mobility, but this is a chronic issue with some worsening lately  Mobility restricted due to ongoing LBP and joint pains  He had done PT 6 months ago and was told they had \"maxed out\" what they can offer him  We can revisit this again in the future, when appropriate  He is moving his bowels with daily senokot  He wonders about addition of a muscle relaxant, but given high dose opioids, will not recommend this at this time  Can revisit in the future if pain gets worse  He is on gabapentin that we will let PCP continue managing       On previous visit ---> As for his other symptoms of anxiety (about health), poor sleep (because of cancer diagnosis) and poor appetite (likely from cancer), MMJ appears appropriate for these indications  He had an MMJ card that he let lapsed when it did not help his pain  He is agreeable to a renewal  He agreed to avoid polypharmacy at this time  Focus on better pain control for now         Chronic back pain:  He reports being on oxyIR at least for 6-7 years since undergoing spinal fusion surgery for injured lower back that did not really work for pain   He reports being on OxyER before the surgery but he weaned himself off after the " surgery in the hopes that it will help with his pain, but it did not  He remained on the IR and tried to manage his LBP as best as he can  He followed with his PCP for pain control since       ACP/GOC:  He has no living will  [place] Act 169 explained in term of hierarchy in surrogate decision making  He lives with his wife  They have 1 son (33yrs old) who lives a mile away from his home  His mother is in her 80s and she lives in [place]  He has 5 other siblings (4 brothers and a sister) who all live in [place] as well  SL Advanced directives/POA paperwork offered and he is interested in this  He also met with our [facility] SW  Please refer to her separate documentation for more details     PDMP shows no concerns  Filled  Written  ID  Drug  QTY  Days  Prescriber  RX #  Dispenser  Refill  Daily Dose*  Pymt Type      05/11/2023  05/10/2023  1  Oxycodone Hcl (Ir) 30 Mg Tab 120 00  40  Ti Mode  [id number]   Tho ([id number])   135 00 MME  [place]    05/06/2023 04/26/2023  1  Oxycontin Er 40 Mg Tablet 90 00  30  Ti Mode  [id number]   Tho ([id number])   180 00 MME  [place]      The following portions of the medical history were reviewed: past medical history, surgical history, problem list, medication list, family history, and social history        Current Outpatient Medications:   •  aspirin 81 mg chewable tablet, Chew 81 mg daily, Disp: , Rfl:   •  atorvastatin (LIPITOR) 40 mg tablet, , Disp: , Rfl:   •  co-enzyme Q-10 30 MG capsule, Take 100 mg by mouth daily , Disp: , Rfl:   •  Farxiga 5 MG TABS, 5 mg daily 5mg alternating with 2 5mg for fluid retention, Disp: , Rfl:   •  gabapentin (NEURONTIN) 300 mg capsule, Take 300 mg by mouth 2 (two) times a day , Disp: , Rfl:   •  lisinopril (ZESTRIL) 2 5 mg tablet, , Disp: , Rfl:   •  metolazone (ZAROXOLYN) 2 5 mg tablet, Take 2 5 mg by mouth 3 (three) times a week M-W-F, Disp: , Rfl:   •  metoprolol succinate (TOPROL-XL) 25 mg 24 hr tablet, Take 25 mg by mouth daily, Disp: , Rfl:   •  multivitamin-iron-minerals-folic acid (CENTRUM) chewable tablet, Chew 1 tablet daily, Disp: , Rfl:   •  oxyCODONE (OxyCONTIN) 40 mg 12 hr tablet, Take 1 tablet (40 mg total) by mouth every 8 (eight) hours Max Daily Amount: 120 mg, Disp: 90 tablet, Rfl: 0  •  [START ON 6/9/2023] oxyCODONE (ROXICODONE) 30 MG immediate release tablet, Take 0 5 tablets (15 mg total) by mouth every 4 (four) hours as needed for moderate pain Max Daily Amount: 90 mg Do not start before June 9, 2023 , Disp: 120 tablet, Rfl: 0  •  potassium chloride (K-DUR,KLOR-CON) 20 mEq tablet, 2 (two) times a day, Disp: , Rfl:   •  torsemide (DEMADEX) 20 mg tablet, Take 60 mg by mouth daily, Disp: , Rfl:   •  phenazopyridine (PYRIDIUM) 200 mg tablet, Take 1 tablet (200 mg total) by mouth 3 (three) times a day with meals (Patient not taking: Reported on 6/6/2023), Disp: 20 tablet, Rfl: 1  •  potassium chloride (K-DUR,KLOR-CON) 20 mEq tablet, Take 2 tablets (40 mEq total) by mouth 2 (two) times a day (Patient not taking: Reported on 6/6/2023), Disp: 28 tablet, Rfl: 0  •  tamsulosin (FLOMAX) 0 4 mg, Take 1 capsule (0 4 mg total) by mouth daily with dinner (Patient not taking: Reported on 6/6/2023), Disp: 90 capsule, Rfl: 3    Past medical, surgical, social, and family histories are reviewed and pertinent updates are made  Review of Systems   Constitutional: Positive for fatigue  Negative for appetite change, chills, fever and unexpected weight change  HENT: Negative  Respiratory: Negative for shortness of breath  Cardiovascular: Negative for chest pain  Gastrointestinal: Negative for abdominal distention, abdominal pain, blood in stool, constipation, diarrhea, nausea and vomiting  Genitourinary: Negative for decreased urine volume, dysuria and urgency  Musculoskeletal: Positive for arthralgias, back pain and myalgias  Skin: Negative for rash  Neurological: Negative for weakness, numbness and headaches  "  Psychiatric/Behavioral: Positive for dysphoric mood and sleep disturbance  Negative for agitation, behavioral problems and confusion  The patient is nervous/anxious  OBJECTIVE:  /72 (BP Location: Left arm, Patient Position: Sitting, Cuff Size: Adult)   Pulse 78   Temp (!) 96 8 °F (36 °C)   Ht 5' 8\" (1 727 m)   Wt 110 kg (243 lb 3 2 oz)   SpO2 95%   BMI 36 98 kg/m²   Physical Exam     Siddharth Hernandez PA-C  [facility] Palliative and Supportive Care      Portions of this document may have been created using dictation software and as such some \"sound alike\" terms may have been generated by the system  Do not hesitate to contact me with any questions or clarifications       "

## 2023-06-07 ENCOUNTER — PROCEDURE VISIT (OUTPATIENT)
Dept: UROLOGY | Facility: CLINIC | Age: 63
End: 2023-06-07
Payer: MEDICARE

## 2023-06-07 VITALS
BODY MASS INDEX: 36.4 KG/M2 | SYSTOLIC BLOOD PRESSURE: 132 MMHG | HEIGHT: 68 IN | OXYGEN SATURATION: 96 % | WEIGHT: 240.2 LBS | DIASTOLIC BLOOD PRESSURE: 60 MMHG | HEART RATE: 64 BPM

## 2023-06-07 DIAGNOSIS — N40.1 BENIGN PROSTATIC HYPERPLASIA WITH URINARY FREQUENCY: ICD-10-CM

## 2023-06-07 DIAGNOSIS — C67.3 MALIGNANT NEOPLASM OF ANTERIOR WALL OF URINARY BLADDER (HCC): Primary | ICD-10-CM

## 2023-06-07 DIAGNOSIS — Z87.898 HISTORY OF GROSS HEMATURIA: ICD-10-CM

## 2023-06-07 DIAGNOSIS — R35.0 BENIGN PROSTATIC HYPERPLASIA WITH URINARY FREQUENCY: ICD-10-CM

## 2023-06-07 LAB
SL AMB  POCT GLUCOSE, UA: ABNORMAL
SL AMB LEUKOCYTE ESTERASE,UA: ABNORMAL
SL AMB POCT BILIRUBIN,UA: ABNORMAL
SL AMB POCT BLOOD,UA: ABNORMAL
SL AMB POCT CLARITY,UA: CLEAR
SL AMB POCT COLOR,UA: YELLOW
SL AMB POCT KETONES,UA: ABNORMAL
SL AMB POCT NITRITE,UA: ABNORMAL
SL AMB POCT PH,UA: 5.5
SL AMB POCT SPECIFIC GRAVITY,UA: 1.01
SL AMB POCT URINE PROTEIN: ABNORMAL
SL AMB POCT UROBILINOGEN: 0.2

## 2023-06-07 PROCEDURE — 81003 URINALYSIS AUTO W/O SCOPE: CPT | Performed by: UROLOGY

## 2023-06-07 PROCEDURE — 99213 OFFICE O/P EST LOW 20 MIN: CPT | Performed by: UROLOGY

## 2023-06-07 RX ORDER — TAMSULOSIN HYDROCHLORIDE 0.4 MG/1
0.4 CAPSULE ORAL
Qty: 90 CAPSULE | Refills: 3 | Status: SHIPPED | OUTPATIENT
Start: 2023-06-07

## 2023-06-09 ENCOUNTER — TELEPHONE (OUTPATIENT)
Dept: PALLIATIVE MEDICINE | Facility: CLINIC | Age: 63
End: 2023-06-09

## 2023-06-09 DIAGNOSIS — G89.29 CHRONIC BILATERAL LOW BACK PAIN WITH BILATERAL SCIATICA: ICD-10-CM

## 2023-06-09 DIAGNOSIS — M54.42 CHRONIC BILATERAL LOW BACK PAIN WITH BILATERAL SCIATICA: ICD-10-CM

## 2023-06-09 DIAGNOSIS — G89.3 CANCER-RELATED PAIN: ICD-10-CM

## 2023-06-09 DIAGNOSIS — C67.3 MALIGNANT NEOPLASM OF ANTERIOR WALL OF URINARY BLADDER (HCC): ICD-10-CM

## 2023-06-09 DIAGNOSIS — M54.41 CHRONIC BILATERAL LOW BACK PAIN WITH BILATERAL SCIATICA: ICD-10-CM

## 2023-06-09 RX ORDER — OXYCODONE HYDROCHLORIDE 30 MG/1
TABLET ORAL
Qty: 28 TABLET | Refills: 0 | Status: SHIPPED | OUTPATIENT
Start: 2023-06-09 | End: 2023-06-14 | Stop reason: SDUPTHER

## 2023-06-09 RX ORDER — OXYCODONE HYDROCHLORIDE 30 MG/1
TABLET ORAL
Qty: 90 TABLET | Refills: 0 | Status: SHIPPED | OUTPATIENT
Start: 2023-06-09 | End: 2023-06-09

## 2023-06-09 NOTE — TELEPHONE ENCOUNTER
Pt is due to  his rx for Oxycodone 30mg tabs today  He attempted to fill rx at his pharmacy but insurance isn't covering as there are some discrepancies  Current SIG only allows for pt to take up to 3 tabs a day, however, the quantity of 120 will allow for either a 40 day fill with current SIG or 30 day fill if pt is to take 4 tabs a day (which pt stated this is how he is using medication)  If you wish for pt to fill today at 4 tabs a day, please resend with new SIG  Pharmacy states pt is completely out

## 2023-06-09 NOTE — TELEPHONE ENCOUNTER
06/06/2023 06/05/2023   1  Oxycontin Er 40 Mg Tablet 90 00  30  Marian Gonzales  [id number]   Tho ([id number])   180 00 MME  [place]    05/11/2023  05/10/2023   1  Oxycodone Hcl (Ir) 30 Mg Tab 120 00  40  Ti Mode  [id number]   Tho ([id number])   135 00 MME  [place]    05/06/2023 04/26/2023   1  Oxycontin Er 40 Mg Tablet 90 00  30  Ti Mode  [id number]   Tho ([id number])   180 00 MME  [place]      Spoke to patient, he is using oxycodone 1/2 tablet (15mg) q4h around the clock  Also taking Oxycotin 40mg q8 scheduled  Says he does wake up at night to stay on schedule with q4 dosing  He will run out of medication on Saturday night  Will give patient a week supply and have primary provider contact him next week to discuss pain regimen

## 2023-06-10 PROBLEM — C67.9 MALIGNANT NEOPLASM OF URINARY BLADDER (HCC): Status: ACTIVE | Noted: 2023-03-17

## 2023-06-10 NOTE — PROGRESS NOTES
"UROLOGY PROGRESS NOTE         NAME: Jesusita Barcenas  AGE: 58 y o  SEX: male  : 1960   MRN: [id number]    DATE: 6/10/2023  TIME: 2:13 PM    Assessment and Plan   Procedures     Impression:   1  Malignant neoplasm of urinary bladder, unspecified site ([facility] )    2  Benign prostatic hyperplasia with urinary frequency    3  History of gross hematuria         Plan: Patient received second of 6 BCG's today  He also received an amoxicillin  He will follow-up in 1 week to continue induction BCG therapy for bladder cancer  I recommended for his testicular discomfort to try heating pad and take Motrin 600 mg with meals for 2 or 3 days  Chief Complaint   No chief complaint on file  History of Present Illness     HPI: Jesusita Barcenas is a 58y o  year old male who presents with continued follow-up for T1 bladder cancer receiving induction therapy with BCG G  This will be his second of 6 treatments  He will receive an amoxicillin  Patient tolerated first and 6 BCG's well was able to hold it for 2 hours  No fever chills nausea vomiting and no voiding complaints  Patient is complaining of some scrotal testicle pain            The following portions of the patient's history were reviewed and updated as appropriate: allergies, current medications, past family history, past medical history, past social history, past surgical history and problem list   Past Medical History:   Diagnosis Date   • Arthritis    • Bladder cancer ([facility] )    • Chronic narcotic dependence ([facility] )    • Chronic pain disorder     lower back and down both legs   • Colon polyp    • Coronary artery disease    • CPAP (continuous positive airway pressure) dependence    • Does use hearing aid     will wear DOS   • Full dentures    • Heart failure ([facility] )     and \"fluid retention\" [facility] PA\"   • High cholesterol    • Hypertension    • Mild ankle edema     and feet   • Obstructive sleep apnea on CPAP    • Prediabetes    • " "Shortness of breath     per pt \"with just exertion\"   • Sleep apnea    • Wears glasses      Past Surgical History:   Procedure Laterality Date   • BACK SURGERY      titanium rods implanted   • COLONOSCOPY     • CYSTOSCOPY W/ URETERAL STENT PLACEMENT Bilateral 3/17/2023    Procedure: bilateral retrograde;  Surgeon: Amos Rodriguez MD;  Location: [facility];  Service: Urology   • HERNIA REPAIR      x5   • MO CYSTO W/REMOVAL OF LESIONS SMALL N/A 5/1/2023    Procedure: CYSTOSCOPY TURBT;  Surgeon: Amos Rodriguez MD;  Location: [facility];  Service: Urology   • TRANSURETHRAL RESECTION OF BLADDER TUMOR N/A 3/17/2023    Procedure: TRANSURETHRAL RESECTION OF BLADDER TUMOR (TURBT); Surgeon: Amos Rodriguez MD;  Location: [facility];  Service: Urology     shoulder  Review of Systems     Const: Denies chills, fever and weight loss  CV: Denies chest pain  Resp: Denies SOB  GI: Denies abdominal pain, nausea and vomiting  : Denies symptoms other than stated above  Musculo: Denies back pain  Objective   There were no vitals taken for this visit  Physical Exam  Const: Appears healthy and well developed  No signs of acute distress present  Resp: Respirations are regular and unlabored  CV: Rate is regular  Rhythm is regular  Abdomen: Abdomen is soft, nontender, and nondistended  Kidneys are not palpable  : nl both testicles are fine images a bit sensitive but no focal mass no sign of swelling or infection  No induration either  Psych: Patient's attitude is cooperative   Mood is normal  Affect is normal     Current Medications     Current Outpatient Medications:   •  aspirin 81 mg chewable tablet, Chew 81 mg daily, Disp: , Rfl:   •  atorvastatin (LIPITOR) 40 mg tablet, , Disp: , Rfl:   •  co-enzyme Q-10 30 MG capsule, Take 100 mg by mouth daily , Disp: , Rfl:   •  Farxiga 5 MG TABS, 5 mg daily 5mg alternating with 2 5mg for fluid retention, Disp: , Rfl:   •  gabapentin (NEURONTIN) 300 mg capsule, Take 300 mg by mouth 2 " (two) times a day , Disp: , Rfl:   •  lisinopril (ZESTRIL) 2 5 mg tablet, , Disp: , Rfl:   •  metolazone (ZAROXOLYN) 2 5 mg tablet, Take 2 5 mg by mouth 3 (three) times a week M-W-F, Disp: , Rfl:   •  metoprolol succinate (TOPROL-XL) 25 mg 24 hr tablet, Take 25 mg by mouth daily, Disp: , Rfl:   •  multivitamin-iron-minerals-folic acid (CENTRUM) chewable tablet, Chew 1 tablet daily, Disp: , Rfl:   •  oxyCODONE (OxyCONTIN) 40 mg 12 hr tablet, Take 1 tablet (40 mg total) by mouth every 8 (eight) hours Max Daily Amount: 120 mg, Disp: 90 tablet, Rfl: 0  •  oxyCODONE (ROXICODONE) 30 MG immediate release tablet, Take 0 5 tablets (15mg) by mouth q4h PRN for moderate to severe cancer pain   Max daily amount 120mgs, Disp: 28 tablet, Rfl: 0  •  phenazopyridine (PYRIDIUM) 200 mg tablet, Take 1 tablet (200 mg total) by mouth 3 (three) times a day with meals (Patient not taking: Reported on 6/6/2023), Disp: 20 tablet, Rfl: 1  •  potassium chloride (K-DUR,KLOR-CON) 20 mEq tablet, 2 (two) times a day, Disp: , Rfl:   •  potassium chloride (K-DUR,KLOR-CON) 20 mEq tablet, Take 2 tablets (40 mEq total) by mouth 2 (two) times a day (Patient not taking: Reported on 6/6/2023), Disp: 28 tablet, Rfl: 0  •  tamsulosin (FLOMAX) 0 4 mg, Take 1 capsule (0 4 mg total) by mouth daily with dinner (Patient not taking: Reported on 6/6/2023), Disp: 90 capsule, Rfl: 3  •  tamsulosin (FLOMAX) 0 4 mg, Take 1 capsule (0 4 mg total) by mouth daily with dinner, Disp: 90 capsule, Rfl: 3  •  torsemide (DEMADEX) 20 mg tablet, Take 60 mg by mouth daily, Disp: , Rfl:         Sanjeev Cruz MD

## 2023-06-12 ENCOUNTER — PATIENT OUTREACH (OUTPATIENT)
Dept: CASE MANAGEMENT | Facility: HOSPITAL | Age: 63
End: 2023-06-12

## 2023-06-12 NOTE — PROGRESS NOTES
OncSW referral received, chart review completed  Pt has started tx, MSW will outreach in the near future to assess for needs

## 2023-06-14 ENCOUNTER — DOCUMENTATION (OUTPATIENT)
Dept: PALLIATIVE MEDICINE | Facility: CLINIC | Age: 63
End: 2023-06-14

## 2023-06-14 ENCOUNTER — PROCEDURE VISIT (OUTPATIENT)
Dept: UROLOGY | Facility: CLINIC | Age: 63
End: 2023-06-14
Payer: MEDICARE

## 2023-06-14 VITALS
OXYGEN SATURATION: 97 % | BODY MASS INDEX: 37.28 KG/M2 | SYSTOLIC BLOOD PRESSURE: 146 MMHG | TEMPERATURE: 96.3 F | HEART RATE: 56 BPM | HEIGHT: 68 IN | DIASTOLIC BLOOD PRESSURE: 80 MMHG | WEIGHT: 246 LBS

## 2023-06-14 DIAGNOSIS — Z87.898 HISTORY OF GROSS HEMATURIA: ICD-10-CM

## 2023-06-14 DIAGNOSIS — C67.9 MALIGNANT NEOPLASM OF URINARY BLADDER, UNSPECIFIED SITE (HCC): Primary | ICD-10-CM

## 2023-06-14 DIAGNOSIS — C67.3 MALIGNANT NEOPLASM OF ANTERIOR WALL OF URINARY BLADDER (HCC): ICD-10-CM

## 2023-06-14 DIAGNOSIS — N40.1 BENIGN PROSTATIC HYPERPLASIA WITH URINARY FREQUENCY: ICD-10-CM

## 2023-06-14 DIAGNOSIS — G89.29 CHRONIC BILATERAL LOW BACK PAIN WITH BILATERAL SCIATICA: ICD-10-CM

## 2023-06-14 DIAGNOSIS — R35.0 BENIGN PROSTATIC HYPERPLASIA WITH URINARY FREQUENCY: ICD-10-CM

## 2023-06-14 DIAGNOSIS — G89.3 CANCER-RELATED PAIN: ICD-10-CM

## 2023-06-14 DIAGNOSIS — M54.42 CHRONIC BILATERAL LOW BACK PAIN WITH BILATERAL SCIATICA: ICD-10-CM

## 2023-06-14 DIAGNOSIS — M54.41 CHRONIC BILATERAL LOW BACK PAIN WITH BILATERAL SCIATICA: ICD-10-CM

## 2023-06-14 PROCEDURE — 99213 OFFICE O/P EST LOW 20 MIN: CPT | Performed by: UROLOGY

## 2023-06-14 PROCEDURE — 81003 URINALYSIS AUTO W/O SCOPE: CPT | Performed by: UROLOGY

## 2023-06-14 RX ORDER — OXYCODONE HYDROCHLORIDE 30 MG/1
TABLET ORAL
Qty: 28 TABLET | Refills: 0 | Status: SHIPPED | OUTPATIENT
Start: 2023-06-14 | End: 2023-06-20 | Stop reason: SDUPTHER

## 2023-06-14 NOTE — TELEPHONE ENCOUNTER
Called  pt as fu to call to MSW  ( see separate documentation 6/14/23)     Assessed pain location, severity, character. Lower back, throughout lower body, legs. Rates 8.5/10 . Describes pain in legs as heavy, tingling numbness. Difficult to even walk. Reports the pain is worse over past week and different then his pst chronic pain. Has received 2 treatments immunotherapy per Urology. Medication   Has continued his Oxycontin 40 mg every 8 hours as scheduled. Denies missing a dose. Continues Oxycodone 15 mg ( half 30 mg tab) every 4 hours as needed. Per pt needs to take every 4 hours. Pt told new script for oxycodone 30 mg called into pharmacy today. Pt has enough from previous order to last another day and half at current dosing. Instructed pt , if pain is intractable, he should proceed to ED . Instructed pt to also report his status to urology , including the increased/different pain as it may be related to therapy and may need to be adjusted. Per pt he ws not reporting the level of pain. Will fu with pt if any new instructions. Pt will contact Palliative if presents to ED in [place]. Next appointment 07/11/23    Should appointment date be moved up?

## 2023-06-14 NOTE — PROGRESS NOTES
MSW received a message to call the pt  MSW reached out the pt at [phone] and he informed the MSW that his pain was a 8 1/2 and he stated his pain medications have not been working  The pt states he has not been able to eat or sleep for the past 7 days  He had his cancer treatment today and he states he has just not been able to do anything that brings him alfredo in the past week  MSW inquired if she was able to share this information with the provider in order to get the pt some relief from his pain  The pt was agreeable and apologetic for contacting the MSW, but was reassured we are all a team in Palliative and he should never apologize for calling for assistance  MSW will follow up with the team about the pt needs  I have spent 15 minutes with Patient today in which greater than 50% of this time was spent in counseling/coordination of care regarding ongoing emotional support      Palliative  will follow-up as requested by patient, family, and primary team   Please contact with any specific requests

## 2023-06-14 NOTE — PATIENT INSTRUCTIONS
For the testicular pain please try Motrin 600 mg 3 times a day with meals for 2 to 3 days    Also heating pad over the scrotal area is helpful to over the next 3 to 4 days [place]

## 2023-06-14 NOTE — TELEPHONE ENCOUNTER
Primary palliative medicine provider: Dory      Medication requested: Oxycodone 30 mg IR      Last appointment: 6/6/2023    Next scheduled appointment: 7/11/2023    [facility]

## 2023-06-15 ENCOUNTER — PATIENT OUTREACH (OUTPATIENT)
Dept: CASE MANAGEMENT | Facility: HOSPITAL | Age: 63
End: 2023-06-15

## 2023-06-15 ENCOUNTER — TELEPHONE (OUTPATIENT)
Facility: HOSPITAL | Age: 63
End: 2023-06-15

## 2023-06-15 DIAGNOSIS — M54.50 ACUTE BILATERAL LOW BACK PAIN, UNSPECIFIED WHETHER SCIATICA PRESENT: Primary | ICD-10-CM

## 2023-06-15 RX ORDER — DEXAMETHASONE 2 MG/1
2 TABLET ORAL 2 TIMES DAILY WITH MEALS
Qty: 7 TABLET | Refills: 0 | Status: SHIPPED | OUTPATIENT
Start: 2023-06-15

## 2023-06-15 NOTE — TELEPHONE ENCOUNTER
"Patient called in pain crisis  States his pain level is 8/10 consistently  His pain has worsened after his biopsies  States pain starts in his lower back, when he walks it moves into his pelvis and radiates down his legs and into his ankles  Makes walking difficult  D/t pain  He does experience some tingling/numbness below his knees  States he is only sleeping 2 hours a night  Also c/o dizziness and loss of balance that comes in goes the past 6 weeks  Using a cane to ambulate now  Taking oxycodone ER 40mg TID,was taking OxyIR 15mg q4hrs  Has been taking 30mg every 4 hours for past 6 weeks    Says \"top 5% of my pain dissipates but in no way do I feel relief  \"    Taking gabapentin 300mg BID - no noticeable effect  Not taking OTC tylenol or motrin  Etiology of worsening pain in question  Discussed concern for lumbar compression fx/nerve impingement  No cauda equina symptoms such as saddle anesthesia, loss of b/b control  Patient advised to go to ER for immediate imaging and rapid pain relief  Patient refused ER today, states he is concerned things would not get done in a timely manner as the weekend is approaching  Can offer outpatient imaging and start decadron 2mg BID  Escalation in opioids does not seem appropriate at this time as he has had insignificant improvement with recent escalations in his oxycodone  Discussed red flag symptoms and patient will be reflecting on his symptoms today to decide if he will go to the hospital or not  He understands symptom management for this concerning acute pain with unknown etiology will be limited as we are unable to rapid dose find and work up the source of his pain quickly  Patient verbalizes understanding  He will contact our office in the morning and let us know what he decides  Lumbar xray and decadron orders placed  Advised to increase gabapentin to 600mg BID, confirmed he has additional gabapentin at home to accommodate this     "

## 2023-06-15 NOTE — PROGRESS NOTES
Pt returned my call this afternoon, he says that he was appreciative of the outreach  He shared that he just had his second immunotherapy tx this week and shared some of his diagnosis story with me  Pt's treating physician at this point is his urologist   He also shared that unfortunately with each biopsy he has had done, he has had increasing pain which he thinks originally stems from spinal surgery several years ago, regardless his pain is uncontrolled  Pt was emotional when explaining to me that he sees a decline in his functioning, he shared that he was previously a fully independent person and now he requires assistance with almost all ADLs, including dressing himself  He says that he sleeps about 2 hours per night due to pain, and is frustrated with the lack of relief  He says that he can't tolerate running quick errands and it takes him working in small increments of time essentially all day to cook a meal    He does note that he lives with his wife and his son lives nearby and they are helping him, however he is very down about this change in being able to care for himself with no end in sight  Pt has seen palliative care and I sent a TT to Dr Yue Davenport and Douglas Shane asking that pt receive a call to assess his symptoms today, Barrie Metz replied that he will reach out today  Pt was genuinely appreciative of the help and I encouraged him to call me as needed moving forward, he is also in touch with the palliative MSW Alexander Cortes as well

## 2023-06-16 ENCOUNTER — HOSPITAL ENCOUNTER (OUTPATIENT)
Dept: RADIOLOGY | Facility: HOSPITAL | Age: 63
End: 2023-06-16
Payer: MEDICARE

## 2023-06-16 ENCOUNTER — TELEPHONE (OUTPATIENT)
Dept: PALLIATIVE MEDICINE | Facility: CLINIC | Age: 63
End: 2023-06-16

## 2023-06-16 DIAGNOSIS — M54.50 ACUTE BILATERAL LOW BACK PAIN, UNSPECIFIED WHETHER SCIATICA PRESENT: ICD-10-CM

## 2023-06-16 PROCEDURE — 72114 X-RAY EXAM L-S SPINE BENDING: CPT

## 2023-06-16 NOTE — TELEPHONE ENCOUNTER
He has completed all radiology testing, results will be in about 5 days  He stated he is taking prescribed medications

## 2023-06-20 DIAGNOSIS — C67.3 MALIGNANT NEOPLASM OF ANTERIOR WALL OF URINARY BLADDER (HCC): ICD-10-CM

## 2023-06-20 DIAGNOSIS — G89.29 CHRONIC BILATERAL LOW BACK PAIN WITH BILATERAL SCIATICA: ICD-10-CM

## 2023-06-20 DIAGNOSIS — M54.42 CHRONIC BILATERAL LOW BACK PAIN WITH BILATERAL SCIATICA: ICD-10-CM

## 2023-06-20 DIAGNOSIS — M54.41 CHRONIC BILATERAL LOW BACK PAIN WITH BILATERAL SCIATICA: ICD-10-CM

## 2023-06-20 DIAGNOSIS — G89.3 CANCER-RELATED PAIN: ICD-10-CM

## 2023-06-20 RX ORDER — OXYCODONE HYDROCHLORIDE 30 MG/1
TABLET ORAL
Qty: 28 TABLET | Refills: 0 | Status: SHIPPED | OUTPATIENT
Start: 2023-06-20 | End: 2023-06-20 | Stop reason: CLARIF

## 2023-06-20 RX ORDER — OXYCODONE HYDROCHLORIDE 30 MG/1
TABLET ORAL
Qty: 90 TABLET | Refills: 0 | Status: SHIPPED | OUTPATIENT
Start: 2023-06-20

## 2023-06-20 RX ORDER — GABAPENTIN 300 MG/1
300 CAPSULE ORAL 2 TIMES DAILY
Qty: 60 CAPSULE | Refills: 0 | Status: ON HOLD | OUTPATIENT
Start: 2023-06-20 | End: 2023-07-01 | Stop reason: SDUPTHER

## 2023-06-20 NOTE — TELEPHONE ENCOUNTER
Patient called requesting a 30 day supply of his pain medication oxycodone  He stated he will be out of gabapentin in a week           Last appointment:6/6    Next scheduled appointment:  7/11

## 2023-06-21 ENCOUNTER — CLINICAL SUPPORT (OUTPATIENT)
Dept: UROLOGY | Facility: CLINIC | Age: 63
End: 2023-06-21
Payer: MEDICARE

## 2023-06-21 VITALS
HEART RATE: 63 BPM | TEMPERATURE: 97.5 F | WEIGHT: 246.5 LBS | OXYGEN SATURATION: 97 % | HEIGHT: 68 IN | BODY MASS INDEX: 37.36 KG/M2 | SYSTOLIC BLOOD PRESSURE: 150 MMHG | DIASTOLIC BLOOD PRESSURE: 72 MMHG

## 2023-06-21 DIAGNOSIS — N32.89 BLADDER MASS: Primary | ICD-10-CM

## 2023-06-21 LAB
SL AMB  POCT GLUCOSE, UA: ABNORMAL
SL AMB LEUKOCYTE ESTERASE,UA: ABNORMAL
SL AMB POCT BILIRUBIN,UA: ABNORMAL
SL AMB POCT BLOOD,UA: ABNORMAL
SL AMB POCT CLARITY,UA: CLEAR
SL AMB POCT COLOR,UA: YELLOW
SL AMB POCT KETONES,UA: ABNORMAL
SL AMB POCT NITRITE,UA: ABNORMAL
SL AMB POCT PH,UA: 6.5
SL AMB POCT SPECIFIC GRAVITY,UA: 1.01
SL AMB POCT URINE PROTEIN: ABNORMAL
SL AMB POCT UROBILINOGEN: 0.2

## 2023-06-21 PROCEDURE — 51720 TREATMENT OF BLADDER LESION: CPT

## 2023-06-21 PROCEDURE — 99211 OFF/OP EST MAY X REQ PHY/QHP: CPT

## 2023-06-21 PROCEDURE — 81003 URINALYSIS AUTO W/O SCOPE: CPT

## 2023-06-21 RX ORDER — DEXAMETHASONE 1 MG
TABLET ORAL
Status: ON HOLD | COMMUNITY
Start: 2023-06-15 | End: 2023-06-26

## 2023-06-22 ENCOUNTER — TELEPHONE (OUTPATIENT)
Age: 63
End: 2023-06-22

## 2023-06-22 DIAGNOSIS — M54.41 CHRONIC BILATERAL LOW BACK PAIN WITH BILATERAL SCIATICA: Primary | ICD-10-CM

## 2023-06-22 DIAGNOSIS — R20.2 PARESTHESIA OF BILATERAL LEGS: ICD-10-CM

## 2023-06-22 DIAGNOSIS — G89.29 CHRONIC BILATERAL LOW BACK PAIN WITH BILATERAL SCIATICA: Primary | ICD-10-CM

## 2023-06-22 DIAGNOSIS — Z74.09 SLIGHTLY LIMITED MOBILITY: ICD-10-CM

## 2023-06-22 DIAGNOSIS — M54.42 CHRONIC BILATERAL LOW BACK PAIN WITH BILATERAL SCIATICA: Primary | ICD-10-CM

## 2023-06-22 RX ORDER — METHYLPREDNISOLONE 4 MG/1
TABLET ORAL
Qty: 21 TABLET | Refills: 0 | Status: SHIPPED | OUTPATIENT
Start: 2023-06-22 | End: 2023-07-01

## 2023-06-22 NOTE — TELEPHONE ENCOUNTER
Patient called regarding pain and medication  Patient inquiring about last script Oxycodone 30mg qty 90  He states he has been getting qty 120  He also states per last conversation he is taking Gabapentin 600mg 2x daily and will run out of medication early  Patient also wanted to let you know while he was taking the Dexamethason he was having positive results  He was feeling better and pain was better  He finnished the Dexamethasone  Monday  Patient also inquiring about x-ray results

## 2023-06-22 NOTE — TELEPHONE ENCOUNTER
Called patient back  Will resend gabapentin script for missing additional doses as it was sent as 300mg BID instead of 600mg BID  Discussed oxycodone supply  Was sent at 80 vs 120 intentionally, it will be enough at the current dosage to make it to our next office visit  Seeing as patients pain is currently uncontrolled and we have been adjusting pain regimen, will not want to have additional controlled substances at home if we were to adjust our regimen at our next visit  He had significant improvement with decadron but pain is returning since finishing steroid  Will place on medrol taper for now  Reviewed xrays, no acute findings, however, symptoms remain concerning for nerve impingement  Patient may need better imaging of his spine  Had last seen ortho for his back >6years ago  Discussed referral to orthopedics and patient is agreeable

## 2023-06-23 NOTE — PROGRESS NOTES
"6/21/2023    Aleah Lat  1960  [id number]    Diagnosis  Chief Complaint    bcg         Patient presents for BCG 3 of 6 managed by Dr Tylor Hua This Encounter   Procedures   • POCT urine dip auto non-scope     Patient instructed to call with persistent hematuria, fever, or other concerns  Procedure BCG treatment 3 of 6  Vitals:    06/21/23 1020   BP: 150/72   BP Location: Left arm   Patient Position: Sitting   Cuff Size: Adult   Pulse: 63   Temp: 97 5 °F (36 4 °C)   SpO2: 97%   Weight: 112 kg (246 lb 8 oz)   Height: 5' 8\" (1 727 m)       No results found for this or any previous visit (from the past 4 hour(s))  Bladder instillation     Date/Time 6/21/2023 10:30 AM     Performed by  Skip Thorpe LPN   Authorized by Nery Padilla MD     Universal Protocol   Consent: Verbal consent obtained  Risks and benefits: risks, benefits and alternatives were discussed  Consent given by: patient  Patient understanding: patient states understanding of the procedure being performed  Patient consent: the patient's understanding of the procedure matches consent given  Procedure consent: procedure consent matches procedure scheduled  Patient identity confirmed: verbally with patient               Sterile technique employed  Patient prepped and identified  16F Straight coude catheter placed  Bladder drained, residual approximately 30 mL  The following solution was instilled directly into the bladder via catheter: 1 Vial BCG  Catheter removed  Patient discharged  Patient tolerated procedure             Skip Thorpe LPN  "

## 2023-06-26 ENCOUNTER — HOSPITAL ENCOUNTER (INPATIENT)
Facility: HOSPITAL | Age: 63
LOS: 1 days | DRG: 552 | End: 2023-06-27
Attending: EMERGENCY MEDICINE | Admitting: FAMILY MEDICINE
Payer: MEDICARE

## 2023-06-26 ENCOUNTER — APPOINTMENT (INPATIENT)
Dept: ULTRASOUND IMAGING | Facility: HOSPITAL | Age: 63
DRG: 552 | End: 2023-06-26
Payer: MEDICARE

## 2023-06-26 ENCOUNTER — TELEPHONE (OUTPATIENT)
Dept: OTHER | Facility: OTHER | Age: 63
End: 2023-06-26

## 2023-06-26 DIAGNOSIS — N17.9 AKI (ACUTE KIDNEY INJURY) (HCC): ICD-10-CM

## 2023-06-26 DIAGNOSIS — E87.6 HYPOKALEMIA: ICD-10-CM

## 2023-06-26 DIAGNOSIS — M54.50 LOW BACK PAIN: Primary | ICD-10-CM

## 2023-06-26 PROBLEM — M54.59 INTRACTABLE LOW BACK PAIN: Status: ACTIVE | Noted: 2023-06-26

## 2023-06-26 LAB
ALBUMIN SERPL BCP-MCNC: 4.5 G/DL (ref 3.5–5)
ALP SERPL-CCNC: 90 U/L (ref 34–104)
ALT SERPL W P-5'-P-CCNC: 30 U/L (ref 7–52)
ANION GAP SERPL CALCULATED.3IONS-SCNC: 11 MMOL/L
ANION GAP SERPL CALCULATED.3IONS-SCNC: 11 MMOL/L
ANION GAP SERPL CALCULATED.3IONS-SCNC: 12 MMOL/L
AST SERPL W P-5'-P-CCNC: 12 U/L (ref 13–39)
BASOPHILS # BLD AUTO: 0.03 THOUSANDS/ÂΜL (ref 0–0.1)
BASOPHILS NFR BLD AUTO: 0 % (ref 0–1)
BILIRUB SERPL-MCNC: 0.35 MG/DL (ref 0.2–1)
BILIRUB UR QL STRIP: NEGATIVE
BUN SERPL-MCNC: 64 MG/DL (ref 5–25)
BUN SERPL-MCNC: 64 MG/DL (ref 5–25)
BUN SERPL-MCNC: 67 MG/DL (ref 5–25)
CALCIUM SERPL-MCNC: 10.3 MG/DL (ref 8.4–10.2)
CALCIUM SERPL-MCNC: 9.4 MG/DL (ref 8.4–10.2)
CALCIUM SERPL-MCNC: 9.6 MG/DL (ref 8.4–10.2)
CHLORIDE SERPL-SCNC: 86 MMOL/L (ref 96–108)
CHLORIDE SERPL-SCNC: 87 MMOL/L (ref 96–108)
CHLORIDE SERPL-SCNC: 90 MMOL/L (ref 96–108)
CLARITY UR: CLEAR
CO2 SERPL-SCNC: 31 MMOL/L (ref 21–32)
CO2 SERPL-SCNC: 34 MMOL/L (ref 21–32)
CO2 SERPL-SCNC: 35 MMOL/L (ref 21–32)
COLOR UR: YELLOW
CREAT SERPL-MCNC: 1.69 MG/DL (ref 0.6–1.3)
CREAT SERPL-MCNC: 1.73 MG/DL (ref 0.6–1.3)
CREAT SERPL-MCNC: 1.79 MG/DL (ref 0.6–1.3)
EOSINOPHIL # BLD AUTO: 0.08 THOUSAND/ÂΜL (ref 0–0.61)
EOSINOPHIL NFR BLD AUTO: 1 % (ref 0–6)
ERYTHROCYTE [DISTWIDTH] IN BLOOD BY AUTOMATED COUNT: 14.7 % (ref 11.6–15.1)
GFR SERPL CREATININE-BSD FRML MDRD: 39 ML/MIN/1.73SQ M
GFR SERPL CREATININE-BSD FRML MDRD: 40 ML/MIN/1.73SQ M
GFR SERPL CREATININE-BSD FRML MDRD: 41 ML/MIN/1.73SQ M
GLUCOSE SERPL-MCNC: 181 MG/DL (ref 65–140)
GLUCOSE SERPL-MCNC: 203 MG/DL (ref 65–140)
GLUCOSE SERPL-MCNC: 215 MG/DL (ref 65–140)
GLUCOSE UR STRIP-MCNC: NEGATIVE MG/DL
HCT VFR BLD AUTO: 42.8 % (ref 36.5–49.3)
HGB BLD-MCNC: 14.5 G/DL (ref 12–17)
HGB UR QL STRIP.AUTO: NEGATIVE
IMM GRANULOCYTES # BLD AUTO: 0.08 THOUSAND/UL (ref 0–0.2)
IMM GRANULOCYTES NFR BLD AUTO: 1 % (ref 0–2)
KETONES UR STRIP-MCNC: NEGATIVE MG/DL
LEUKOCYTE ESTERASE UR QL STRIP: NEGATIVE
LYMPHOCYTES # BLD AUTO: 2.21 THOUSANDS/ÂΜL (ref 0.6–4.47)
LYMPHOCYTES NFR BLD AUTO: 21 % (ref 14–44)
MAGNESIUM SERPL-MCNC: 2.6 MG/DL (ref 1.9–2.7)
MCH RBC QN AUTO: 31.4 PG (ref 26.8–34.3)
MCHC RBC AUTO-ENTMCNC: 33.9 G/DL (ref 31.4–37.4)
MCV RBC AUTO: 93 FL (ref 82–98)
MONOCYTES # BLD AUTO: 0.63 THOUSAND/ÂΜL (ref 0.17–1.22)
MONOCYTES NFR BLD AUTO: 6 % (ref 4–12)
NEUTROPHILS # BLD AUTO: 7.69 THOUSANDS/ÂΜL (ref 1.85–7.62)
NEUTS SEG NFR BLD AUTO: 71 % (ref 43–75)
NITRITE UR QL STRIP: NEGATIVE
NRBC BLD AUTO-RTO: 0 /100 WBCS
PH UR STRIP.AUTO: 6.5 [PH]
PLATELET # BLD AUTO: 241 THOUSANDS/UL (ref 149–390)
PMV BLD AUTO: 10.9 FL (ref 8.9–12.7)
POTASSIUM SERPL-SCNC: 2.5 MMOL/L (ref 3.5–5.3)
POTASSIUM SERPL-SCNC: 2.6 MMOL/L (ref 3.5–5.3)
POTASSIUM SERPL-SCNC: 2.8 MMOL/L (ref 3.5–5.3)
PROT SERPL-MCNC: 8.2 G/DL (ref 6.4–8.4)
PROT UR STRIP-MCNC: NEGATIVE MG/DL
RBC # BLD AUTO: 4.62 MILLION/UL (ref 3.88–5.62)
SODIUM SERPL-SCNC: 132 MMOL/L (ref 135–147)
SODIUM SERPL-SCNC: 132 MMOL/L (ref 135–147)
SODIUM SERPL-SCNC: 133 MMOL/L (ref 135–147)
SP GR UR STRIP.AUTO: 1.01 (ref 1–1.03)
TSH SERPL DL<=0.05 MIU/L-ACNC: 0.85 UIU/ML (ref 0.45–4.5)
UROBILINOGEN UR QL STRIP.AUTO: 0.2 E.U./DL
WBC # BLD AUTO: 10.72 THOUSAND/UL (ref 4.31–10.16)

## 2023-06-26 PROCEDURE — 85025 COMPLETE CBC W/AUTO DIFF WBC: CPT | Performed by: EMERGENCY MEDICINE

## 2023-06-26 PROCEDURE — 83735 ASSAY OF MAGNESIUM: CPT | Performed by: FAMILY MEDICINE

## 2023-06-26 PROCEDURE — 81003 URINALYSIS AUTO W/O SCOPE: CPT | Performed by: EMERGENCY MEDICINE

## 2023-06-26 PROCEDURE — 36415 COLL VENOUS BLD VENIPUNCTURE: CPT | Performed by: EMERGENCY MEDICINE

## 2023-06-26 PROCEDURE — 84443 ASSAY THYROID STIM HORMONE: CPT | Performed by: FAMILY MEDICINE

## 2023-06-26 PROCEDURE — 80053 COMPREHEN METABOLIC PANEL: CPT | Performed by: EMERGENCY MEDICINE

## 2023-06-26 PROCEDURE — 99223 1ST HOSP IP/OBS HIGH 75: CPT | Performed by: FAMILY MEDICINE

## 2023-06-26 PROCEDURE — 76770 US EXAM ABDO BACK WALL COMP: CPT

## 2023-06-26 PROCEDURE — 80048 BASIC METABOLIC PNL TOTAL CA: CPT | Performed by: FAMILY MEDICINE

## 2023-06-26 RX ORDER — TIZANIDINE 2 MG/1
4 TABLET ORAL EVERY 8 HOURS PRN
Status: DISCONTINUED | OUTPATIENT
Start: 2023-06-26 | End: 2023-06-26

## 2023-06-26 RX ORDER — ATORVASTATIN CALCIUM 40 MG/1
40 TABLET, FILM COATED ORAL
Status: DISCONTINUED | OUTPATIENT
Start: 2023-06-27 | End: 2023-06-27 | Stop reason: HOSPADM

## 2023-06-26 RX ORDER — TIZANIDINE 2 MG/1
4 TABLET ORAL 3 TIMES DAILY
Status: DISCONTINUED | OUTPATIENT
Start: 2023-06-26 | End: 2023-06-27 | Stop reason: HOSPADM

## 2023-06-26 RX ORDER — POLYETHYLENE GLYCOL 3350 17 G/17G
17 POWDER, FOR SOLUTION ORAL DAILY
Status: DISCONTINUED | OUTPATIENT
Start: 2023-06-26 | End: 2023-06-27 | Stop reason: HOSPADM

## 2023-06-26 RX ORDER — ACETAMINOPHEN 325 MG/1
650 TABLET ORAL EVERY 6 HOURS PRN
Status: DISCONTINUED | OUTPATIENT
Start: 2023-06-26 | End: 2023-06-27 | Stop reason: HOSPADM

## 2023-06-26 RX ORDER — GABAPENTIN 300 MG/1
600 CAPSULE ORAL 2 TIMES DAILY
Status: DISCONTINUED | OUTPATIENT
Start: 2023-06-26 | End: 2023-06-27 | Stop reason: HOSPADM

## 2023-06-26 RX ORDER — POTASSIUM CHLORIDE 20 MEQ/1
20 TABLET, EXTENDED RELEASE ORAL ONCE
Status: COMPLETED | OUTPATIENT
Start: 2023-06-26 | End: 2023-06-26

## 2023-06-26 RX ORDER — ASPIRIN 81 MG/1
81 TABLET, CHEWABLE ORAL DAILY
Status: DISCONTINUED | OUTPATIENT
Start: 2023-06-26 | End: 2023-06-27 | Stop reason: HOSPADM

## 2023-06-26 RX ORDER — POTASSIUM CHLORIDE 20 MEQ/1
40 TABLET, EXTENDED RELEASE ORAL ONCE
Status: COMPLETED | OUTPATIENT
Start: 2023-06-26 | End: 2023-06-26

## 2023-06-26 RX ORDER — ONDANSETRON 2 MG/ML
4 INJECTION INTRAMUSCULAR; INTRAVENOUS EVERY 6 HOURS PRN
Status: DISCONTINUED | OUTPATIENT
Start: 2023-06-26 | End: 2023-06-27 | Stop reason: HOSPADM

## 2023-06-26 RX ORDER — LIDOCAINE 50 MG/G
1 PATCH TOPICAL DAILY
Status: DISCONTINUED | OUTPATIENT
Start: 2023-06-26 | End: 2023-06-27 | Stop reason: HOSPADM

## 2023-06-26 RX ORDER — POTASSIUM CHLORIDE 20 MEQ/1
20 TABLET, EXTENDED RELEASE ORAL 2 TIMES DAILY
Status: DISCONTINUED | OUTPATIENT
Start: 2023-06-26 | End: 2023-06-26

## 2023-06-26 RX ORDER — HYDROMORPHONE HCL/PF 1 MG/ML
0.5 SYRINGE (ML) INJECTION EVERY 6 HOURS PRN
Status: DISCONTINUED | OUTPATIENT
Start: 2023-06-26 | End: 2023-06-27 | Stop reason: HOSPADM

## 2023-06-26 RX ORDER — SODIUM CHLORIDE AND POTASSIUM CHLORIDE 300; 900 MG/100ML; MG/100ML
125 INJECTION, SOLUTION INTRAVENOUS CONTINUOUS
Status: DISCONTINUED | OUTPATIENT
Start: 2023-06-26 | End: 2023-06-27

## 2023-06-26 RX ORDER — DEXAMETHASONE 2 MG/1
2 TABLET ORAL 2 TIMES DAILY WITH MEALS
Status: DISCONTINUED | OUTPATIENT
Start: 2023-06-26 | End: 2023-06-26

## 2023-06-26 RX ORDER — DEXAMETHASONE 2 MG/1
2 TABLET ORAL 2 TIMES DAILY WITH MEALS
Status: DISCONTINUED | OUTPATIENT
Start: 2023-06-27 | End: 2023-06-27 | Stop reason: HOSPADM

## 2023-06-26 RX ORDER — DOCUSATE SODIUM 100 MG/1
100 CAPSULE, LIQUID FILLED ORAL 2 TIMES DAILY
Status: DISCONTINUED | OUTPATIENT
Start: 2023-06-26 | End: 2023-06-27 | Stop reason: HOSPADM

## 2023-06-26 RX ORDER — HYDROMORPHONE HCL/PF 1 MG/ML
1 SYRINGE (ML) INJECTION ONCE
Status: COMPLETED | OUTPATIENT
Start: 2023-06-26 | End: 2023-06-26

## 2023-06-26 RX ORDER — HEPARIN SODIUM 5000 [USP'U]/ML
5000 INJECTION, SOLUTION INTRAVENOUS; SUBCUTANEOUS EVERY 8 HOURS SCHEDULED
Status: DISCONTINUED | OUTPATIENT
Start: 2023-06-26 | End: 2023-06-27 | Stop reason: HOSPADM

## 2023-06-26 RX ORDER — OXYCODONE HCL 20 MG/1
40 TABLET, FILM COATED, EXTENDED RELEASE ORAL EVERY 8 HOURS SCHEDULED
Status: DISCONTINUED | OUTPATIENT
Start: 2023-06-26 | End: 2023-06-27 | Stop reason: HOSPADM

## 2023-06-26 RX ORDER — METOPROLOL SUCCINATE 25 MG/1
25 TABLET, EXTENDED RELEASE ORAL DAILY
Status: DISCONTINUED | OUTPATIENT
Start: 2023-06-27 | End: 2023-06-27 | Stop reason: HOSPADM

## 2023-06-26 RX ORDER — SODIUM CHLORIDE AND POTASSIUM CHLORIDE 300; 900 MG/100ML; MG/100ML
125 INJECTION, SOLUTION INTRAVENOUS CONTINUOUS
Status: DISCONTINUED | OUTPATIENT
Start: 2023-06-26 | End: 2023-06-26

## 2023-06-26 RX ORDER — TAMSULOSIN HYDROCHLORIDE 0.4 MG/1
0.4 CAPSULE ORAL
Status: DISCONTINUED | OUTPATIENT
Start: 2023-06-26 | End: 2023-06-27 | Stop reason: HOSPADM

## 2023-06-26 RX ADMIN — TAMSULOSIN HYDROCHLORIDE 0.4 MG: 0.4 CAPSULE ORAL at 15:30

## 2023-06-26 RX ADMIN — TIZANIDINE 4 MG: 2 TABLET ORAL at 21:29

## 2023-06-26 RX ADMIN — HEPARIN SODIUM 5000 UNITS: 5000 INJECTION INTRAVENOUS; SUBCUTANEOUS at 13:55

## 2023-06-26 RX ADMIN — HYDROMORPHONE HYDROCHLORIDE 1 MG: 1 INJECTION, SOLUTION INTRAMUSCULAR; INTRAVENOUS; SUBCUTANEOUS at 08:58

## 2023-06-26 RX ADMIN — POTASSIUM CHLORIDE 20 MEQ: 1500 TABLET, EXTENDED RELEASE ORAL at 10:07

## 2023-06-26 RX ADMIN — POTASSIUM CHLORIDE AND SODIUM CHLORIDE 125 ML/HR: 900; 300 INJECTION, SOLUTION INTRAVENOUS at 13:53

## 2023-06-26 RX ADMIN — HEPARIN SODIUM 5000 UNITS: 5000 INJECTION INTRAVENOUS; SUBCUTANEOUS at 21:29

## 2023-06-26 RX ADMIN — OXYCODONE HYDROCHLORIDE 15 MG: 5 TABLET ORAL at 18:04

## 2023-06-26 RX ADMIN — OXYCODONE HYDROCHLORIDE 15 MG: 5 TABLET ORAL at 13:54

## 2023-06-26 RX ADMIN — POLYETHYLENE GLYCOL 3350 17 G: 17 POWDER, FOR SOLUTION ORAL at 13:55

## 2023-06-26 RX ADMIN — POTASSIUM CHLORIDE 40 MEQ: 1500 TABLET, EXTENDED RELEASE ORAL at 21:29

## 2023-06-26 RX ADMIN — POTASSIUM CHLORIDE 20 MEQ: 1500 TABLET, EXTENDED RELEASE ORAL at 13:55

## 2023-06-26 RX ADMIN — OXYCODONE HYDROCHLORIDE 40 MG: 20 TABLET, FILM COATED, EXTENDED RELEASE ORAL at 20:40

## 2023-06-26 RX ADMIN — POTASSIUM CHLORIDE AND SODIUM CHLORIDE 125 ML/HR: 900; 300 INJECTION, SOLUTION INTRAVENOUS at 10:08

## 2023-06-26 RX ADMIN — TIZANIDINE 4 MG: 2 TABLET ORAL at 15:29

## 2023-06-26 RX ADMIN — POTASSIUM CHLORIDE AND SODIUM CHLORIDE 125 ML/HR: 900; 300 INJECTION, SOLUTION INTRAVENOUS at 20:19

## 2023-06-26 RX ADMIN — OXYCODONE HYDROCHLORIDE 15 MG: 5 TABLET ORAL at 22:55

## 2023-06-26 RX ADMIN — HYDROMORPHONE HYDROCHLORIDE 0.5 MG: 1 INJECTION, SOLUTION INTRAMUSCULAR; INTRAVENOUS; SUBCUTANEOUS at 15:53

## 2023-06-26 RX ADMIN — GABAPENTIN 600 MG: 300 CAPSULE ORAL at 17:27

## 2023-06-26 RX ADMIN — DOCUSATE SODIUM 100 MG: 100 CAPSULE, LIQUID FILLED ORAL at 13:55

## 2023-06-26 RX ADMIN — ASPIRIN 81 MG 81 MG: 81 TABLET ORAL at 13:55

## 2023-06-26 RX ADMIN — LIDOCAINE 1 PATCH: 50 PATCH CUTANEOUS at 13:55

## 2023-06-26 NOTE — H&P
[address]  H&P  Name: Marlee Lucas 58 y o  male I MRN: [id number]  Unit/Bed#: -01 I Date of Admission: 6/26/2023   Date of Service: 6/26/2023 I Hospital Day: 0      Assessment/Plan   Hypokalemia  Assessment & Plan  Potassium 2 9  Magnesium  Placed on IV hydration with potassium supplement-continue to monitor for volume status since patient has chronic diastolic heart failure  Malignant neoplasm of urinary bladder [facility])  Assessment & Plan  Follow-up with urology    Obstructive sleep apnea on CPAP  Assessment & Plan  Patient oxygen saturations dropped to 87% while patient sleeping  Continue CPAP    * RODO (acute kidney injury) ([facility] [address] )  Assessment & Plan  Baseline creatinine ranged between 0 9-1 14  Today creatinine is 1 79  Hold lisinopril, Demadex, metolazone  Retention protocol  Ultrasound of kidney bladder  Patient placed on IV hydration with potassium supplement,-monitor volume status while remain in the IV hydration    Intractable low back pain  Assessment & Plan  Imaging showsStable postoperative change after L4-5 posterior fixation without hardware complication  Age-appropriate degenerative change  No dynamic instability  Per chart review, patient does follow-up with palliative care-we will continue pain medication as per last note  Continue steroid, Lidoderm patch, tizanidine  PT/OT  It will be better if we could use NSAIDs-due to renal function unable to use      Chronic diastolic heart failure (HCC)  Assessment & Plan  Wt Readings from Last 3 Encounters:   06/26/23 113 kg (250 lb)   06/21/23 112 kg (246 lb 8 oz)   06/14/23 112 kg (246 lb)     Last echocardiogram shows ejection fraction 65% with dilated chambers  Diuretics remain on hold due to RODO, as well as lisinopril  She is receiving IV hydration-continue to monitor for volume status  Intake and output, standing weight if possible, cardiac diet            VTE Pharmacologic Prophylaxis: VTE Score: 5 High Risk (Score >/= 5) - Pharmacological DVT Prophylaxis Ordered: heparin  Sequential Compression Devices Ordered  Code Status: Level 1 - Full Code as per patient  Discussion with family: Updated  (wife) via phone -left voice message    Anticipated Length of Stay: Patient will be admitted on an inpatient basis with an anticipated length of stay of greater than 2 midnights secondary to To monitor above condition  Total Time Spent on Date of Encounter in care of patient: 20 minutes This time was spent on one or more of the following: performing physical exam; counseling and coordination of care; obtaining or reviewing history; documenting in the medical record; reviewing/ordering tests, medications or procedures; communicating with other healthcare professionals and discussing with patient's family/caregivers  Chief Complaint: Lower back pain    History of Present Illness:  Gil Bethea is a 58 y o  male with a PMH of CHF, bladder mass, chronic lower back pain who presents with intractable back pain, electric like feeling, radiates to both lower extremities, affecting his daily activities including walking  On and off basis had some incontinence  Patient had surgery 6 years ago  Was able to walk around, but recently started using walker  Denies any weight loss, any trauma, any nausea, vomiting, diarrhea  Denies any chest pain       Review of Systems:  Review of Systems   Constitutional: Positive for activity change  Negative for appetite change, chills, diaphoresis, fatigue, fever and unexpected weight change  HENT: Negative for congestion, dental problem, drooling, sinus pressure, sneezing and sore throat  Respiratory: Negative for apnea, cough, shortness of breath and stridor  Cardiovascular: Negative for chest pain and leg swelling  Gastrointestinal: Negative for abdominal distention, abdominal pain, constipation, nausea and rectal pain     Genitourinary: Negative for difficulty urinating and "dysuria  Musculoskeletal: Positive for arthralgias, back pain and gait problem  Skin: Negative for color change, pallor and wound  Neurological: Negative for dizziness, tremors, facial asymmetry, weakness and headaches  Hematological: Negative for adenopathy  Psychiatric/Behavioral: Negative for agitation, behavioral problems and confusion  All other systems reviewed and are negative  Past Medical and Surgical History:   Past Medical History:   Diagnosis Date   • Arthritis    • Bladder cancer ([facility] [address] )    • [facility]    • Chronic pain disorder     lower back and down both legs   • Colon polyp    • Coronary artery disease    • CPAP (continuous positive airway pressure) dependence    • Does use hearing aid     will wear DOS   • Full dentures    • Heart failure ([facility] [address] )     and \"fluid retention\" SL OW B Daryl cardio PA\"   • High cholesterol    • Hypertension    • Mild ankle edema     and feet   • Obstructive sleep apnea on CPAP    • Prediabetes    • Shortness of breath     per pt \"with just exertion\"   • Sleep apnea    • Wears glasses        Past Surgical History:   Procedure Laterality Date   • BACK SURGERY      titanium rods implanted   • COLONOSCOPY     • CYSTOSCOPY W/ URETERAL STENT PLACEMENT Bilateral 3/17/2023    Procedure: bilateral retrograde;  Surgeon: Neptali Adamson MD;  Location:  MAIN OR;  Service: Urology   • HERNIA REPAIR      x5   • LA CYSTO W/REMOVAL OF LESIONS SMALL N/A 5/1/2023    Procedure: CYSTOSCOPY TURBT;  Surgeon: Neptali Adamson MD;  Location:  MAIN OR;  Service: Urology   • TRANSURETHRAL RESECTION OF BLADDER TUMOR N/A 3/17/2023    Procedure: TRANSURETHRAL RESECTION OF BLADDER TUMOR (TURBT); Surgeon: Neptali Adamson MD;  Location:  MAIN OR;  Service: Urology       Meds/Allergies:  Prior to Admission medications    Medication Sig Start Date End Date Taking?  Authorizing Provider   aspirin 81 mg chewable tablet Chew 81 mg daily    Historical Provider, MD " atorvastatin (LIPITOR) 40 mg tablet  10/3/22   Historical Provider, MD   co-enzyme Q-10 30 MG capsule Take 100 mg by mouth daily     Historical Provider, MD   dexamethasone (DECADRON) 1 mg tablet  6/15/23   Historical Provider, MD   dexamethasone (DECADRON) 2 mg tablet Take 1 tablet (2 mg total) by mouth 2 (two) times a day with meals 1 tab po at breakfast and at lunch  Patient not taking: Reported on 6/21/2023 6/15/23   Thea Alves PA-C   Farxiga 5 MG TABS 5 mg daily 5mg alternating with 2 5mg for fluid retention 10/26/22   Historical Provider, MD   gabapentin (NEURONTIN) 300 mg capsule Take 1 capsule (300 mg total) by mouth 2 (two) times a day 6/20/23   Thea Alves PA-C   lisinopril (ZESTRIL) 2 5 mg tablet  10/26/22   Historical Provider, MD   methylPREDNISolone 4 MG tablet therapy pack Use as directed on package 6/22/23   Thea Alves PA-C   metolazone (ZAROXOLYN) 2 5 mg tablet Take 2 5 mg by mouth 3 (three) times a week M-W-F    Historical Provider, MD   metoprolol succinate (TOPROL-XL) 25 mg 24 hr tablet Take 25 mg by mouth daily    Historical Provider, MD   multivitamin-iron-minerals-folic acid (CENTRUM) chewable tablet Chew 1 tablet daily    Historical Provider, MD   oxyCODONE (OxyCONTIN) 40 mg 12 hr tablet Take 1 tablet (40 mg total) by mouth every 8 (eight) hours Max Daily Amount: 120 mg 6/5/23   Connie Ewing MD   oxyCODONE (ROXICODONE) 30 MG immediate release tablet Take 0 5 tablets (15mg) by mouth q4h PRN for moderate to severe cancer pain   Max daily amount 120mgs 6/20/23   Thea Alves PA-C   potassium chloride (K-DUR,KLOR-CON) 20 mEq tablet 2 (two) times a day 10/26/22   Historical Provider, MD   potassium chloride (K-DUR,KLOR-CON) 20 mEq tablet Take 2 tablets (40 mEq total) by mouth 2 (two) times a day  Patient not taking: Reported on 6/6/2023 5/3/23   Damoin Meyers PA-C   tamsulosin [facility]) 0 4 mg Take 1 capsule (0 4 mg total) by mouth daily with dinner  Patient not "taking: Reported on 2023 5/3/23   Paola Calhoun MD   tamsulosin [facility]) 0 4 mg Take 1 capsule (0 4 mg total) by mouth daily with dinner 23   Paola Calhoun MD   torsemide [facility]) 20 mg tablet Take 60 mg by mouth daily    Historical Provider, MD     I have reviewed home medications with patient personally  Allergies: Allergies   Allergen Reactions   • Mirtazapine Other (See Comments) and Confusion     Pt drove without knowing and woke up at a new destination       Social History:  Marital Status: /Civil Union   Occupation: Unknown  Patient Pre-hospital Living Situation: Home  Patient Pre-hospital Level of Mobility: walks  Patient Pre-hospital Diet Restrictions: Cardiac diet  Substance Use History:   Social History     Substance and Sexual Activity   Alcohol Use Not Currently    Comment: 3 per year     Social History     Tobacco Use   Smoking Status Former   • Packs/day: 1 00   • Years: 35 00   • Total pack years: 35 00   • Types: Cigarettes   • Start date: 2023   • Quit date: 2016   • Years since quittin 4   Smokeless Tobacco Never     Social History     Substance and Sexual Activity   Drug Use Not Currently   • Types: Marijuana       Family History:  Family History   Problem Relation Age of Onset   • Cancer Father    • Aortic aneurysm Father    • Leukemia Father    • Hypertension Mother        Physical Exam:     Vitals:   Blood Pressure: 145/64 (23 1127)  Pulse: 57 (23 112)  Temperature: (!) 97 3 °F (36 3 °C) (23 112)  Temp Source: Temporal (23 0816)  Respirations: 17 (23 112)  Height: 5' 8\" (172 7 cm) (23 0816)  Weight - Scale: 110 kg (241 lb 10 oz) (23 1121)  SpO2: 97 % (23 1127)    Physical Exam  Vitals and nursing note reviewed  Constitutional:       Appearance: Normal appearance  He is obese  He is not ill-appearing or diaphoretic  HENT:      Nose: Nose normal  No congestion        Mouth/Throat:      Mouth: Mucous membranes " are moist       Pharynx: Oropharynx is clear  No oropharyngeal exudate  Eyes:      General: No scleral icterus  Left eye: No discharge  Extraocular Movements: Extraocular movements intact  Conjunctiva/sclera: Conjunctivae normal       Pupils: Pupils are equal, round, and reactive to light  Cardiovascular:      Rate and Rhythm: Normal rate  Heart sounds: Normal heart sounds  No murmur heard  No gallop  Pulmonary:      Effort: Pulmonary effort is normal  No respiratory distress  Breath sounds: No stridor  No wheezing or rhonchi  Abdominal:      General: Abdomen is flat  Bowel sounds are normal  There is no distension  Palpations: There is no mass  Tenderness: There is no abdominal tenderness  Hernia: No hernia is present  Musculoskeletal:         General: Tenderness (lower back) present  Cervical back: Normal range of motion  Comments: Severely restricted range of motion in the sacroiliac -and lumbosacral joint  Neurological:      Mental Status: He is alert and oriented to person, place, and time  Cranial Nerves: No cranial nerve deficit  Motor: No weakness        Coordination: Coordination normal       Gait: Gait normal          Additional Data:     Lab Results:  Results from last 7 days   Lab Units 06/26/23  0857   WBC Thousand/uL 10 72*   HEMOGLOBIN g/dL 14 5   HEMATOCRIT % 42 8   PLATELETS Thousands/uL 241   NEUTROS PCT % 71   LYMPHS PCT % 21   MONOS PCT % 6   EOS PCT % 1     Results from last 7 days   Lab Units 06/26/23  0857   SODIUM mmol/L 132*   POTASSIUM mmol/L 2 8*   CHLORIDE mmol/L 86*   CO2 mmol/L 35*   BUN mg/dL 64*   CREATININE mg/dL 1 79*   ANION GAP mmol/L 11   CALCIUM mg/dL 10 3*   ALBUMIN g/dL 4 5   TOTAL BILIRUBIN mg/dL 0 35   ALK PHOS U/L 90   ALT U/L 30   AST U/L 12*   GLUCOSE RANDOM mg/dL 203*                       Lines/Drains:  Invasive Devices     Peripheral Intravenous Line  Duration           Peripheral IV 06/26/23 Right Antecubital <1 day                    Imaging: Reviewed radiology reports from this admission including: xray(s)  US kidney and bladder with pvr    (Results Pending)       EKG and Other Studies Reviewed on Admission:   · EKG: Sinus rhythm with heart rate 61       ** Please Note: This note has been constructed using a voice recognition system   **

## 2023-06-26 NOTE — ASSESSMENT & PLAN NOTE
Wt Readings from Last 3 Encounters:   06/26/23 113 kg (250 lb)   06/21/23 112 kg (246 lb 8 oz)   06/14/23 112 kg (246 lb)     Last echocardiogram shows ejection fraction 65% with dilated chambers  Diuretics remain on hold due to RODO, as well as lisinopril  She is receiving IV hydration-continue to monitor for volume status  Intake and output, standing weight if possible, cardiac diet

## 2023-06-26 NOTE — ED PROVIDER NOTES
History  Chief Complaint   Patient presents with   • Back Pain     Pt c/o worsening bilateral lower back pain radiating down both legs over past 4 months just unable to tolerate pain at this time starting to lose bladder at times  Current pain medication and steroid tx not working  Hx back surgery years ago;chronic back pain  Denies recent injury/trauma/fevers     79-year-old male with chronic back pain bladder cancer describes worsening of severe lower back discomfort despite pain regimen  No numbness or weakness  No bowel or bladder incontinence or retention except occasionally when sleeping will wake having urinated, has happened a few times in recent past   Notes he is able to control bladder and move the bathroom and urinate without difficulty currently and typically  No fever or chills  No injuries  He is currently on potassium supplementation for hypokalemia      History provided by:  Patient  Back Pain  Location:  Lumbar spine  Quality:  Aching  Radiates to:  R thigh and L thigh  Pain severity:  Severe  Pain is:  Same all the time  Onset quality:  Gradual  Timing:  Constant  Progression:  Worsening  Chronicity:  Chronic  Context: not recent injury and not twisting    Relieved by:  Nothing  Worsened by:  Ambulation, bending and movement  Ineffective treatments:  Narcotics  Associated symptoms: no abdominal pain, no bladder incontinence, no bowel incontinence, no numbness, no tingling and no weakness    Risk factors: hx of cancer        Prior to Admission Medications   Prescriptions Last Dose Informant Patient Reported? Taking?    Farxiga 5 MG TABS  Self Yes No   Si mg daily 5mg alternating with 2 5mg for fluid retention   aspirin 81 mg chewable tablet  Self Yes No   Sig: Chew 81 mg daily   atorvastatin (LIPITOR) 40 mg tablet  Self Yes No   co-enzyme Q-10 30 MG capsule  Self Yes No   Sig: Take 100 mg by mouth daily    dexamethasone (DECADRON) 1 mg tablet   Yes No   Patient not taking: Reported on 2023   dexamethasone (DECADRON) 2 mg tablet   No No   Sig: Take 1 tablet (2 mg total) by mouth 2 (two) times a day with meals 1 tab po at breakfast and at lunch   Patient not taking: Reported on 2023   gabapentin (NEURONTIN) 300 mg capsule   No No   Sig: Take 1 capsule (300 mg total) by mouth 2 (two) times a day   lisinopril (ZESTRIL) 2 5 mg tablet  Self Yes No   methylPREDNISolone 4 MG tablet therapy pack   No No   Sig: Use as directed on package   metolazone (ZAROXOLYN) 2 5 mg tablet  Self Yes No   Sig: Take 2 5 mg by mouth 3 (three) times a week    metoprolol succinate (TOPROL-XL) 25 mg 24 hr tablet  Self Yes No   Sig: Take 25 mg by mouth daily   multivitamin-iron-minerals-folic acid (CENTRUM) chewable tablet  Self Yes No   Sig: Chew 1 tablet daily   oxyCODONE (OxyCONTIN) 40 mg 12 hr tablet   No No   Sig: Take 1 tablet (40 mg total) by mouth every 8 (eight) hours Max Daily Amount: 120 mg   oxyCODONE (ROXICODONE) 30 MG immediate release tablet   No No   Sig: Take 0 5 tablets (15mg) by mouth q4h PRN for moderate to severe cancer pain   Max daily amount 120mgs   potassium chloride (K-DUR,KLOR-CON) 20 mEq tablet  Self Yes No   Si (two) times a day   potassium chloride (K-DUR,KLOR-CON) 20 mEq tablet   No No   Sig: Take 2 tablets (40 mEq total) by mouth 2 (two) times a day   Patient not taking: Reported on 2023   tamsulosin (FLOMAX) 0 4 mg   No No   Sig: Take 1 capsule (0 4 mg total) by mouth daily with dinner   Patient not taking: Reported on 2023   tamsulosin (FLOMAX) 0 4 mg   No No   Sig: Take 1 capsule (0 4 mg total) by mouth daily with dinner   torsemide (DEMADEX) 20 mg tablet  Self Yes No   Sig: Take 60 mg by mouth daily      Facility-Administered Medications: None       Past Medical History:   Diagnosis Date   • Arthritis    • Bladder cancer ([facility] [address] )    • Chronic narcotic dependence (HCC)    • Chronic pain disorder     lower back and down both legs   • Colon polyp    • Coronary artery "disease    • CPAP (continuous positive airway pressure) dependence    • Does use hearing aid     will wear DOS   • Full dentures    • Heart failure ([address] )     and \"fluid retention\" SL OW B Daryl cardio PA\"   • High cholesterol    • Hypertension    • Mild ankle edema     and feet   • Obstructive sleep apnea on CPAP    • Prediabetes    • Shortness of breath     per pt \"with just exertion\"   • Sleep apnea    • Wears glasses        Past Surgical History:   Procedure Laterality Date   • BACK SURGERY      titanium rods implanted   • COLONOSCOPY     • CYSTOSCOPY W/ URETERAL STENT PLACEMENT Bilateral 3/17/2023    Procedure: bilateral retrograde;  Surgeon: Shreyas Garsia MD;  Location:  MAIN OR;  Service: Urology   • HERNIA REPAIR      x5   • NV CYSTO W/REMOVAL OF LESIONS SMALL N/A 2023    Procedure: CYSTOSCOPY TURBT;  Surgeon: Shreyas Garsia MD;  Location:  MAIN OR;  Service: Urology   • TRANSURETHRAL RESECTION OF BLADDER TUMOR N/A 3/17/2023    Procedure: TRANSURETHRAL RESECTION OF BLADDER TUMOR (TURBT); Surgeon: Shreyas Garsia MD;  Location:  MAIN OR;  Service: Urology       Family History   Problem Relation Age of Onset   • Cancer Father    • Aortic aneurysm Father    • Leukemia Father    • Hypertension Mother      I have reviewed and agree with the history as documented      E-Cigarette/Vaping   • E-Cigarette Use Never User      E-Cigarette/Vaping Substances   • Nicotine No    • THC No    • CBD No    • Flavoring No    • Other No    • Unknown No      Social History     Tobacco Use   • Smoking status: Former     Packs/day: 1 00     Years: 35 00     Total pack years: 35 00     Types: Cigarettes     Start date: 2023     Quit date: 2016     Years since quittin 4   • Smokeless tobacco: Never   Vaping Use   • Vaping Use: Never used   Substance Use Topics   • Alcohol use: Not Currently     Comment: 3 per year   • Drug use: Not Currently     Types: Marijuana       Review of Systems   Gastrointestinal: Negative " for abdominal pain and bowel incontinence  Genitourinary: Negative for bladder incontinence  Musculoskeletal: Positive for back pain  Neurological: Negative for tingling, weakness and numbness  All other systems reviewed and are negative  Physical Exam  Physical Exam  Vitals and nursing note reviewed  Constitutional:       Comments: Pleasant, comfortable-appearing, until stands, getting up from bed and ambulates, slowly, shuffling using cane   HENT:      Head: Normocephalic and atraumatic  Mouth/Throat:      Mouth: Mucous membranes are moist       Pharynx: Oropharynx is clear  Eyes:      Conjunctiva/sclera: Conjunctivae normal       Pupils: Pupils are equal, round, and reactive to light  Cardiovascular:      Rate and Rhythm: Normal rate and regular rhythm  Heart sounds: Normal heart sounds  Pulmonary:      Effort: Pulmonary effort is normal       Breath sounds: Normal breath sounds  Abdominal:      General: Bowel sounds are normal  There is no distension  Palpations: Abdomen is soft  Tenderness: There is no abdominal tenderness  Musculoskeletal:         General: No deformity  Cervical back: Neck supple  Comments: Generally tender posterior pelvic/lumbar/sacroiliac areas, no overlying skin changes or deformity except midline lumbar scar  Able to stand on heels and toes  Saddle area sensation intact  Skin:     General: Skin is warm and dry  Neurological:      General: No focal deficit present  Mental Status: He is alert and oriented to person, place, and time  Cranial Nerves: No cranial nerve deficit  Coordination: Coordination normal    Psychiatric:         Behavior: Behavior normal          Thought Content:  Thought content normal          Judgment: Judgment normal          Vital Signs  ED Triage Vitals [06/26/23 0816]   Temperature Pulse Respirations Blood Pressure SpO2   97 7 °F (36 5 °C) 61 18 145/63 95 %      Temp Source Heart Rate Source Patient Position - Orthostatic VS BP Location FiO2 (%)   Temporal Monitor Lying Left arm --      Pain Score       9           Vitals:    06/26/23 0816 06/26/23 0900 06/26/23 0930 06/26/23 0935   BP: 145/63 152/70     Pulse: 61 60 55 (!) 53   Patient Position - Orthostatic VS: Lying Lying           Visual Acuity  Visual Acuity    Flowsheet Row Most Recent Value   L Pupil Size (mm) 3   R Pupil Size (mm) 3          ED Medications  Medications   sodium chloride 0 9 % with KCl 40 mEq/L infusion (premix) (125 mL/hr Intravenous New Bag 6/26/23 1008)   HYDROmorphone (DILAUDID) injection 1 mg (1 mg Intravenous Given 6/26/23 0858)   potassium chloride (K-DUR,KLOR-CON) CR tablet 20 mEq (20 mEq Oral Given 6/26/23 1007)       Diagnostic Studies  Results Reviewed     Procedure Component Value Units Date/Time    Comprehensive metabolic panel [[id number]]  (Abnormal) Collected: 06/26/23 0857    Lab Status: Final result Specimen: Blood from Arm, Right Updated: 06/26/23 0929     Sodium 132 mmol/L      Potassium 2 8 mmol/L      Chloride 86 mmol/L      CO2 35 mmol/L      ANION GAP 11 mmol/L      BUN 64 mg/dL      Creatinine 1 79 mg/dL      Glucose 203 mg/dL      Calcium 10 3 mg/dL      AST 12 U/L      ALT 30 U/L      Alkaline Phosphatase 90 U/L      Total Protein 8 2 g/dL      Albumin 4 5 g/dL      Total Bilirubin 0 35 mg/dL      eGFR 39 ml/min/1 73sq m     Narrative:      [place] guidelines for Chronic Kidney Disease (CKD):   •  Stage 1 with normal or high GFR (GFR > 90 mL/min/1 73 square meters)  •  Stage 2 Mild CKD (GFR = 60-89 mL/min/1 73 square meters)  •  Stage 3A Moderate CKD (GFR = 45-59 mL/min/1 73 square meters)  •  Stage 3B Moderate CKD (GFR = 30-44 mL/min/1 73 square meters)  •  Stage 4 Severe CKD (GFR = 15-29 mL/min/1 73 square meters)  •  Stage 5 End Stage CKD (GFR <15 mL/min/1 73 square meters)  Note: GFR calculation is accurate only with a steady state creatinine    UA w Reflex to Microscopic w Reflex to Culture [[id number]] Collected: 06/26/23 0858    Lab Status: Final result Specimen: Urine, Clean Catch Updated: 06/26/23 0909     Color, UA Yellow     Clarity, UA Clear     Specific Gravity, UA 1 010     pH, UA 6 5     Leukocytes, UA Negative     Nitrite, UA Negative     Protein, UA Negative mg/dl      Glucose, UA Negative mg/dl      Ketones, UA Negative mg/dl      Urobilinogen, UA 0 2 E U /dl      Bilirubin, UA Negative     Occult Blood, UA Negative    CBC and differential [[id number]]  (Abnormal) Collected: 06/26/23 0857    Lab Status: Final result Specimen: Blood from Arm, Right Updated: 06/26/23 0908     WBC 10 72 Thousand/uL      RBC 4 62 Million/uL      Hemoglobin 14 5 g/dL      Hematocrit 42 8 %      MCV 93 fL      MCH 31 4 pg      MCHC 33 9 g/dL      RDW 14 7 %      MPV 10 9 fL      Platelets 912 Thousands/uL      nRBC 0 /100 WBCs      Neutrophils Relative 71 %      Immat GRANS % 1 %      Lymphocytes Relative 21 %      Monocytes Relative 6 %      Eosinophils Relative 1 %      Basophils Relative 0 %      Neutrophils Absolute 7 69 Thousands/µL      Immature Grans Absolute 0 08 Thousand/uL      Lymphocytes Absolute 2 21 Thousands/µL      Monocytes Absolute 0 63 Thousand/µL      Eosinophils Absolute 0 08 Thousand/µL      Basophils Absolute 0 03 Thousands/µL                  No orders to display              Procedures  Procedures         ED Course  ED Course as of 06/26/23 1016   Mon Jun 26, 2023   0932 Leukocytes, UA: Negative   0932 Nitrite, UA: Negative   0932 Sodium(!): 132  Mildly decreased   0932 Potassium(!): 2 8  Stable compared to prior   0932 BUN(!): 64   0932 Creatinine(!): 1 79  Increased compared to prior   0933 WBC(!): 10 72   0941 Sedated with medications, sleeping with decreased and oxygenation to 85-86%, improved with 2 L nasal cannula  Notes back pain minimally improved  We discussed hypokalemia and currently on supplementation    Informed of results and agreeable with hospitalization for RODO and hydration                               SBIRT 20yo+    Flowsheet Row Most Recent Value   Initial Alcohol Screen: US AUDIT-C     1  How often do you have a drink containing alcohol? 0 Filed at: 06/26/2023 0819   2  How many drinks containing alcohol do you have on a typical day you are drinking? 0 Filed at: 06/26/2023 0819   3a  Male UNDER 65: How often do you have five or more drinks on one occasion? 0 Filed at: 06/26/2023 0819   Audit-C Score 0 Filed at: 06/26/2023 2864   ANN: How many times in the past year have you    Used an illegal drug or used a prescription medication for non-medical reasons? Never Filed at: 06/26/2023 6610                    Medical Decision Making  RODO (acute kidney injury) [facility]): acute illness or injury  Hypokalemia: acute illness or injury  Low back pain: acute illness or injury  Amount and/or Complexity of Data Reviewed  Labs: ordered  Decision-making details documented in ED Course  ECG/medicine tests: ordered and independent interpretation performed  Decision-making details documented in ED Course  Risk  Prescription drug management  Decision regarding hospitalization            Disposition  Final diagnoses:   Low back pain   RODO (acute kidney injury) ([facility] [address] )   Hypokalemia     Time reflects when diagnosis was documented in both MDM as applicable and the Disposition within this note     Time User Action Codes Description Comment    6/26/2023 10:14 AM Claudia Jorge Add [M54 50] Low back pain     6/26/2023 10:14 AM Claudia Jorge Add [N17 9] RODO (acute kidney injury) ([facility] [address] )     6/26/2023 10:15 AM Claudia Jorge Add [E87 6] Hypokalemia       ED Disposition     ED Disposition   Admit    Condition   Stable    Date/Time   Mon Jun 26, 2023 10:14 AM    Comment   Case was discussed with Counts and the patient's admission status was agreed to be Admission Status: inpatient status to the service of Dr Alyse Roe            Follow-up Information    None         Patient's Medications   Discharge Prescriptions    No medications on file       No discharge procedures on file      PDMP Review       Value Time User    PDMP Reviewed  Yes 6/22/2023 11:43 AM Michelle Santoro PA-C          ED Provider  Electronically Signed by           Mayte Arguello DO  06/26/23 1016

## 2023-06-26 NOTE — ASSESSMENT & PLAN NOTE
Baseline creatinine ranged between 0 9-1 14  Today creatinine is 1 79  Hold lisinopril, Demadex, metolazone  Retention protocol  Ultrasound of kidney bladder  Patient placed on IV hydration with potassium supplement,-monitor volume status while remain in the IV hydration

## 2023-06-26 NOTE — ASSESSMENT & PLAN NOTE
Potassium 2 9  Magnesium  Placed on IV hydration with potassium supplement-continue to monitor for volume status since patient has chronic diastolic heart failure

## 2023-06-26 NOTE — RESPIRATORY THERAPY NOTE
"RT Protocol Note  Gil Bethea 58 y o  male MRN: [id number]  Unit/Bed#: -01 Encounter: [id number]    Assessment    Principal Problem:    RODO (acute kidney injury) ([facility] [address] )  Active Problems:    Obstructive sleep apnea on CPAP    Malignant neoplasm of urinary bladder (HCC)    Hypokalemia    Chronic diastolic heart failure (HCC)    Intractable low back pain      Home Pulmonary Medications:  NA  Home Devices/Therapy: BiPAP/CPAP    Past Medical History:   Diagnosis Date    Arthritis     Bladder cancer (HCC)     Chronic narcotic dependence (HCC)     Chronic pain disorder     lower back and down both legs    Colon polyp     Coronary artery disease     CPAP (continuous positive airway pressure) dependence     Does use hearing aid     will wear DOS    Full dentures     Heart failure ([facility] )     and \"fluid retention\" SL ABDULAZIZ B Daryl cardio PA\"    High cholesterol     Hypertension     Mild ankle edema     and feet    Obstructive sleep apnea on CPAP     Prediabetes     Shortness of breath     per pt \"with just exertion\"    Sleep apnea     Wears glasses      Social History     Socioeconomic History    Marital status: /Civil Union     Spouse name: None    Number of children: None    Years of education: None    Highest education level: None   Occupational History    None   Tobacco Use    Smoking status: Former     Packs/day: 1 00     Years: 35 00     Total pack years: 35 00     Types: Cigarettes     Start date: 2023     Quit date: 2016     Years since quittin 4    Smokeless tobacco: Never   Vaping Use    Vaping Use: Never used   Substance and Sexual Activity    Alcohol use: Not Currently     Comment: 3 per year    Drug use: Not Currently     Types: Marijuana    Sexual activity: Not Currently   Other Topics Concern    None   Social History Narrative    None     Social Determinants of Health     Financial Resource Strain: Not on file   Food Insecurity: Not on file   Transportation Needs: Not on file   Physical " "Activity: Not on file   Stress: Not on file   Social Connections: Not on file   Intimate Partner Violence: Not on file   Housing Stability: Not on file       Subjective         Objective    Physical Exam:   Assessment Type: Assess only  General Appearance: Alert, Awake  Respiratory Pattern: Normal  Chest Assessment: Chest expansion symmetrical  Bilateral Breath Sounds: Clear  Cough: None  O2 Device: (P) 2 L    Vitals:  Blood pressure 145/64, pulse 57, temperature (!) 97 3 °F (36 3 °C), resp  rate 17, height 5' 8\" (1 727 m), weight 110 kg (241 lb 10 oz), SpO2 96 %  Imaging and other studies: I have personally reviewed pertinent reports  O2 Device: (P) 2 L     Plan    Respiratory Plan: Discontinue Protocol        Resp Comments: (P) Pt admitted with RODO has no pulmonary medications athome  Currently uses CPAP at night for ERICA, cpap ordered hs  Denies any SOB  Will d/c protocol     "

## 2023-06-26 NOTE — ASSESSMENT & PLAN NOTE
Imaging showsStable postoperative change after L4-5 posterior fixation without hardware complication  Age-appropriate degenerative change  No dynamic instability  Per chart review, patient does follow-up with palliative care-we will continue pain medication as per last note  Continue steroid, Lidoderm patch, tizanidine  PT/OT  It will be better if we could use NSAIDs-due to renal function unable to use

## 2023-06-26 NOTE — PLAN OF CARE
Problem: MOBILITY - ADULT  Goal: Maintain or return to baseline ADL function  Description: INTERVENTIONS:  -  Assess patient's ability to carry out ADLs; assess patient's baseline for ADL function and identify physical deficits which impact ability to perform ADLs (bathing, care of mouth/teeth, toileting, grooming, dressing, etc )  - Assess/evaluate cause of self-care deficits   - Assess range of motion  - Assess patient's mobility; develop plan if impaired  - Assess patient's need for assistive devices and provide as appropriate  - Encourage maximum independence but intervene and supervise when necessary  - Involve family in performance of ADLs  - Assess for home care needs following discharge   - Consider OT consult to assist with ADL evaluation and planning for discharge  - Provide patient education as appropriate  Outcome: Progressing  Goal: Maintains/Returns to pre admission functional level  Description: INTERVENTIONS:  - Perform BMAT or MOVE assessment daily    - Set and communicate daily mobility goal to care team and patient/family/caregiver  - Collaborate with rehabilitation services on mobility goals if consulted  - Perform Range of Motion   times a day  - Reposition patient every   hours  - Dangle patient   times a day  - Stand patient   times a day  - Ambulate patient   times a day  - Out of bed to chair     times a day   - Out of bed for meals    times a day  - Out of bed for toileting  - Record patient progress and toleration of activity level   Outcome: Progressing     Problem: GENITOURINARY - ADULT  Goal: Maintains or returns to baseline urinary function  Description: INTERVENTIONS:  - Assess urinary function  - Encourage oral fluids to ensure adequate hydration if ordered  - Administer IV fluids as ordered to ensure adequate hydration  - Administer ordered medications as needed  - Offer frequent toileting  - Follow urinary retention protocol if ordered  Outcome: Progressing  Goal: Absence of urinary retention  Description: INTERVENTIONS:  - Assess patient's ability to void and empty bladder  - Monitor I/O  - Bladder scan as needed  - Discuss with physician/AP medications to alleviate retention as needed  - Discuss catheterization for long term situations as appropriate  Outcome: Progressing     Problem: METABOLIC, FLUID AND ELECTROLYTES - ADULT  Goal: Electrolytes maintained within normal limits  Description: INTERVENTIONS:  - Monitor labs and assess patient for signs and symptoms of electrolyte imbalances  - Administer electrolyte replacement as ordered  - Monitor response to electrolyte replacements, including repeat lab results as appropriate  - Instruct patient on fluid and nutrition as appropriate  Outcome: Progressing  Goal: Fluid balance maintained  Description: INTERVENTIONS:  - Monitor labs   - Monitor I/O and WT  - Instruct patient on fluid and nutrition as appropriate  - Assess for signs & symptoms of volume excess or deficit  Outcome: Progressing  Goal: Glucose maintained within target range  Description: INTERVENTIONS:  - Monitor Blood Glucose as ordered  - Assess for signs and symptoms of hyperglycemia and hypoglycemia  - Administer ordered medications to maintain glucose within target range  - Assess nutritional intake and initiate nutrition service referral as needed  Outcome: Progressing     Problem: PAIN - ADULT  Goal: Verbalizes/displays adequate comfort level or baseline comfort level  Description: Interventions:  - Encourage patient to monitor pain and request assistance  - Assess pain using appropriate pain scale  - Administer analgesics based on type and severity of pain and evaluate response  - Implement non-pharmacological measures as appropriate and evaluate response  - Consider cultural and social influences on pain and pain management  - Notify physician/advanced practitioner if interventions unsuccessful or patient reports new pain  Outcome: Progressing     Problem: DISCHARGE PLANNING  Goal: Discharge to home or other facility with appropriate resources  Description: INTERVENTIONS:  - Identify barriers to discharge w/patient and caregiver  - Arrange for needed discharge resources and transportation as appropriate  - Identify discharge learning needs (meds, wound care, etc )  - Arrange for interpretive services to assist at discharge as needed  - Refer to Case Management Department for coordinating discharge planning if the patient needs post-hospital services based on physician/advanced practitioner order or complex needs related to functional status, cognitive ability, or social support system  Outcome: Progressing

## 2023-06-27 ENCOUNTER — APPOINTMENT (INPATIENT)
Dept: MRI IMAGING | Facility: HOSPITAL | Age: 63
DRG: 552 | End: 2023-06-27
Payer: MEDICARE

## 2023-06-27 ENCOUNTER — HOSPITAL ENCOUNTER (INPATIENT)
Facility: HOSPITAL | Age: 63
LOS: 4 days | Discharge: HOME HEALTH | DRG: 519 | End: 2023-07-01
Attending: STUDENT IN AN ORGANIZED HEALTH CARE EDUCATION/TRAINING PROGRAM | Admitting: STUDENT IN AN ORGANIZED HEALTH CARE EDUCATION/TRAINING PROGRAM
Payer: MEDICARE

## 2023-06-27 VITALS
SYSTOLIC BLOOD PRESSURE: 126 MMHG | BODY MASS INDEX: 37.04 KG/M2 | OXYGEN SATURATION: 95 % | RESPIRATION RATE: 16 BRPM | WEIGHT: 244.4 LBS | DIASTOLIC BLOOD PRESSURE: 89 MMHG | HEART RATE: 52 BPM | TEMPERATURE: 97.7 F | HEIGHT: 68 IN

## 2023-06-27 DIAGNOSIS — M54.42 CHRONIC BILATERAL LOW BACK PAIN WITH BILATERAL SCIATICA: ICD-10-CM

## 2023-06-27 DIAGNOSIS — M54.41 CHRONIC BILATERAL LOW BACK PAIN WITH BILATERAL SCIATICA: ICD-10-CM

## 2023-06-27 DIAGNOSIS — M48.062 SPINAL STENOSIS OF LUMBAR REGION WITH NEUROGENIC CLAUDICATION: ICD-10-CM

## 2023-06-27 DIAGNOSIS — C67.3 MALIGNANT NEOPLASM OF ANTERIOR WALL OF URINARY BLADDER (HCC): ICD-10-CM

## 2023-06-27 DIAGNOSIS — G89.29 CHRONIC BILATERAL LOW BACK PAIN WITH BILATERAL SCIATICA: ICD-10-CM

## 2023-06-27 DIAGNOSIS — M54.59 INTRACTABLE LOW BACK PAIN: Primary | ICD-10-CM

## 2023-06-27 DIAGNOSIS — R00.1 BRADYCARDIA: ICD-10-CM

## 2023-06-27 DIAGNOSIS — G89.3 CANCER-RELATED PAIN: ICD-10-CM

## 2023-06-27 PROBLEM — E87.1 ACUTE HYPONATREMIA: Status: ACTIVE | Noted: 2023-06-27

## 2023-06-27 LAB
ALBUMIN SERPL BCP-MCNC: 4 G/DL (ref 3.5–5)
ALP SERPL-CCNC: 75 U/L (ref 34–104)
ALT SERPL W P-5'-P-CCNC: 25 U/L (ref 7–52)
ANION GAP SERPL CALCULATED.3IONS-SCNC: 10 MMOL/L
AST SERPL W P-5'-P-CCNC: 12 U/L (ref 13–39)
BILIRUB SERPL-MCNC: 0.35 MG/DL (ref 0.2–1)
BUN SERPL-MCNC: 61 MG/DL (ref 5–25)
CALCIUM SERPL-MCNC: 9.2 MG/DL (ref 8.4–10.2)
CHLORIDE SERPL-SCNC: 94 MMOL/L (ref 96–108)
CO2 SERPL-SCNC: 29 MMOL/L (ref 21–32)
CREAT SERPL-MCNC: 1.32 MG/DL (ref 0.6–1.3)
ERYTHROCYTE [DISTWIDTH] IN BLOOD BY AUTOMATED COUNT: 14.8 % (ref 11.6–15.1)
GFR SERPL CREATININE-BSD FRML MDRD: 56 ML/MIN/1.73SQ M
GLUCOSE SERPL-MCNC: 152 MG/DL (ref 65–140)
HCT VFR BLD AUTO: 35.1 % (ref 36.5–49.3)
HGB BLD-MCNC: 11.9 G/DL (ref 12–17)
MCH RBC QN AUTO: 31.8 PG (ref 26.8–34.3)
MCHC RBC AUTO-ENTMCNC: 33.9 G/DL (ref 31.4–37.4)
MCV RBC AUTO: 94 FL (ref 82–98)
PLATELET # BLD AUTO: 188 THOUSANDS/UL (ref 149–390)
PMV BLD AUTO: 10.8 FL (ref 8.9–12.7)
POTASSIUM SERPL-SCNC: 2.9 MMOL/L (ref 3.5–5.3)
POTASSIUM SERPL-SCNC: 3.3 MMOL/L (ref 3.5–5.3)
PROT SERPL-MCNC: 7 G/DL (ref 6.4–8.4)
RBC # BLD AUTO: 3.74 MILLION/UL (ref 3.88–5.62)
SODIUM SERPL-SCNC: 133 MMOL/L (ref 135–147)
WBC # BLD AUTO: 10.89 THOUSAND/UL (ref 4.31–10.16)

## 2023-06-27 PROCEDURE — 97535 SELF CARE MNGMENT TRAINING: CPT

## 2023-06-27 PROCEDURE — 97167 OT EVAL HIGH COMPLEX 60 MIN: CPT

## 2023-06-27 PROCEDURE — 99239 HOSP IP/OBS DSCHRG MGMT >30: CPT | Performed by: FAMILY MEDICINE

## 2023-06-27 PROCEDURE — 97116 GAIT TRAINING THERAPY: CPT

## 2023-06-27 PROCEDURE — 72148 MRI LUMBAR SPINE W/O DYE: CPT

## 2023-06-27 PROCEDURE — 80053 COMPREHEN METABOLIC PANEL: CPT

## 2023-06-27 PROCEDURE — NC001 PR NO CHARGE: Performed by: FAMILY MEDICINE

## 2023-06-27 PROCEDURE — 84132 ASSAY OF SERUM POTASSIUM: CPT

## 2023-06-27 PROCEDURE — 97163 PT EVAL HIGH COMPLEX 45 MIN: CPT

## 2023-06-27 PROCEDURE — 99223 1ST HOSP IP/OBS HIGH 75: CPT | Performed by: STUDENT IN AN ORGANIZED HEALTH CARE EDUCATION/TRAINING PROGRAM

## 2023-06-27 PROCEDURE — 85027 COMPLETE CBC AUTOMATED: CPT

## 2023-06-27 RX ORDER — HYDROMORPHONE HCL/PF 1 MG/ML
0.5 SYRINGE (ML) INJECTION EVERY 6 HOURS PRN
Status: CANCELLED | OUTPATIENT
Start: 2023-06-27

## 2023-06-27 RX ORDER — LIDOCAINE 50 MG/G
1 PATCH TOPICAL DAILY
Status: CANCELLED | OUTPATIENT
Start: 2023-06-28

## 2023-06-27 RX ORDER — ASPIRIN 81 MG/1
81 TABLET, CHEWABLE ORAL DAILY
Status: CANCELLED | OUTPATIENT
Start: 2023-06-28

## 2023-06-27 RX ORDER — DOCUSATE SODIUM 100 MG/1
100 CAPSULE, LIQUID FILLED ORAL 2 TIMES DAILY
Status: CANCELLED | OUTPATIENT
Start: 2023-06-27

## 2023-06-27 RX ORDER — CHOLECALCIFEROL (VITAMIN D3) 125 MCG
100 CAPSULE ORAL DAILY
Status: CANCELLED | OUTPATIENT
Start: 2023-06-28

## 2023-06-27 RX ORDER — POLYETHYLENE GLYCOL 3350 17 G/17G
17 POWDER, FOR SOLUTION ORAL DAILY
Status: CANCELLED | OUTPATIENT
Start: 2023-06-28

## 2023-06-27 RX ORDER — METOPROLOL SUCCINATE 25 MG/1
25 TABLET, EXTENDED RELEASE ORAL DAILY
Status: DISCONTINUED | OUTPATIENT
Start: 2023-06-28 | End: 2023-06-29

## 2023-06-27 RX ORDER — GABAPENTIN 300 MG/1
600 CAPSULE ORAL 2 TIMES DAILY
Status: CANCELLED | OUTPATIENT
Start: 2023-06-27

## 2023-06-27 RX ORDER — HEPARIN SODIUM 5000 [USP'U]/ML
5000 INJECTION, SOLUTION INTRAVENOUS; SUBCUTANEOUS EVERY 8 HOURS SCHEDULED
Status: CANCELLED | OUTPATIENT
Start: 2023-06-27

## 2023-06-27 RX ORDER — POTASSIUM CHLORIDE 20 MEQ/1
40 TABLET, EXTENDED RELEASE ORAL ONCE
Status: COMPLETED | OUTPATIENT
Start: 2023-06-27 | End: 2023-06-27

## 2023-06-27 RX ORDER — ATORVASTATIN CALCIUM 40 MG/1
40 TABLET, FILM COATED ORAL
Status: CANCELLED | OUTPATIENT
Start: 2023-06-27

## 2023-06-27 RX ORDER — METOPROLOL SUCCINATE 25 MG/1
25 TABLET, EXTENDED RELEASE ORAL DAILY
Status: CANCELLED | OUTPATIENT
Start: 2023-06-28

## 2023-06-27 RX ORDER — TIZANIDINE 2 MG/1
4 TABLET ORAL 3 TIMES DAILY
Status: CANCELLED | OUTPATIENT
Start: 2023-06-27

## 2023-06-27 RX ORDER — SODIUM CHLORIDE 9 MG/ML
75 INJECTION, SOLUTION INTRAVENOUS CONTINUOUS
Status: DISCONTINUED | OUTPATIENT
Start: 2023-06-27 | End: 2023-06-27

## 2023-06-27 RX ORDER — GABAPENTIN 300 MG/1
600 CAPSULE ORAL 2 TIMES DAILY
Status: DISCONTINUED | OUTPATIENT
Start: 2023-06-28 | End: 2023-06-29

## 2023-06-27 RX ORDER — ONDANSETRON 2 MG/ML
4 INJECTION INTRAMUSCULAR; INTRAVENOUS EVERY 6 HOURS PRN
Status: CANCELLED | OUTPATIENT
Start: 2023-06-27

## 2023-06-27 RX ORDER — TIZANIDINE 4 MG/1
4 TABLET ORAL 3 TIMES DAILY
Status: DISCONTINUED | OUTPATIENT
Start: 2023-06-27 | End: 2023-07-01 | Stop reason: HOSPADM

## 2023-06-27 RX ORDER — POTASSIUM CHLORIDE 14.9 MG/ML
20 INJECTION INTRAVENOUS
Status: COMPLETED | OUTPATIENT
Start: 2023-06-27 | End: 2023-06-27

## 2023-06-27 RX ORDER — DEXAMETHASONE 2 MG/1
2 TABLET ORAL 2 TIMES DAILY WITH MEALS
Status: COMPLETED | OUTPATIENT
Start: 2023-06-28 | End: 2023-06-29

## 2023-06-27 RX ORDER — DEXAMETHASONE 2 MG/1
2 TABLET ORAL 2 TIMES DAILY WITH MEALS
Status: CANCELLED | OUTPATIENT
Start: 2023-06-28 | End: 2023-06-30

## 2023-06-27 RX ORDER — LIDOCAINE 50 MG/G
1 PATCH TOPICAL DAILY
Status: DISCONTINUED | OUTPATIENT
Start: 2023-06-28 | End: 2023-07-01 | Stop reason: HOSPADM

## 2023-06-27 RX ORDER — OXYCODONE HCL 20 MG/1
40 TABLET, FILM COATED, EXTENDED RELEASE ORAL EVERY 8 HOURS SCHEDULED
Status: CANCELLED | OUTPATIENT
Start: 2023-06-27

## 2023-06-27 RX ORDER — DOCUSATE SODIUM 100 MG/1
100 CAPSULE, LIQUID FILLED ORAL 2 TIMES DAILY
Status: DISCONTINUED | OUTPATIENT
Start: 2023-06-28 | End: 2023-07-01 | Stop reason: HOSPADM

## 2023-06-27 RX ORDER — ACETAMINOPHEN 325 MG/1
650 TABLET ORAL EVERY 6 HOURS PRN
Status: DISCONTINUED | OUTPATIENT
Start: 2023-06-27 | End: 2023-06-29

## 2023-06-27 RX ORDER — ACETAMINOPHEN 325 MG/1
650 TABLET ORAL EVERY 6 HOURS PRN
Status: CANCELLED | OUTPATIENT
Start: 2023-06-27

## 2023-06-27 RX ORDER — ONDANSETRON 2 MG/ML
4 INJECTION INTRAMUSCULAR; INTRAVENOUS EVERY 6 HOURS PRN
Status: DISCONTINUED | OUTPATIENT
Start: 2023-06-27 | End: 2023-07-01 | Stop reason: HOSPADM

## 2023-06-27 RX ORDER — OXYCODONE HCL 40 MG/1
40 TABLET, FILM COATED, EXTENDED RELEASE ORAL EVERY 8 HOURS SCHEDULED
Status: DISCONTINUED | OUTPATIENT
Start: 2023-06-27 | End: 2023-07-01 | Stop reason: HOSPADM

## 2023-06-27 RX ORDER — CHOLECALCIFEROL (VITAMIN D3) 125 MCG
100 CAPSULE ORAL DAILY
Status: DISCONTINUED | OUTPATIENT
Start: 2023-06-28 | End: 2023-07-01 | Stop reason: HOSPADM

## 2023-06-27 RX ORDER — ATORVASTATIN CALCIUM 40 MG/1
40 TABLET, FILM COATED ORAL
Status: DISCONTINUED | OUTPATIENT
Start: 2023-06-28 | End: 2023-07-01 | Stop reason: HOSPADM

## 2023-06-27 RX ORDER — ASPIRIN 81 MG/1
81 TABLET, CHEWABLE ORAL DAILY
Status: DISCONTINUED | OUTPATIENT
Start: 2023-06-28 | End: 2023-06-28

## 2023-06-27 RX ORDER — HEPARIN SODIUM 5000 [USP'U]/ML
5000 INJECTION, SOLUTION INTRAVENOUS; SUBCUTANEOUS EVERY 8 HOURS SCHEDULED
Status: DISCONTINUED | OUTPATIENT
Start: 2023-06-27 | End: 2023-07-01 | Stop reason: HOSPADM

## 2023-06-27 RX ORDER — POLYETHYLENE GLYCOL 3350 17 G/17G
17 POWDER, FOR SOLUTION ORAL DAILY
Status: DISCONTINUED | OUTPATIENT
Start: 2023-06-28 | End: 2023-07-01 | Stop reason: HOSPADM

## 2023-06-27 RX ORDER — TAMSULOSIN HYDROCHLORIDE 0.4 MG/1
0.4 CAPSULE ORAL
Status: CANCELLED | OUTPATIENT
Start: 2023-06-27

## 2023-06-27 RX ORDER — TAMSULOSIN HYDROCHLORIDE 0.4 MG/1
0.4 CAPSULE ORAL
Status: DISCONTINUED | OUTPATIENT
Start: 2023-06-28 | End: 2023-07-01 | Stop reason: HOSPADM

## 2023-06-27 RX ORDER — HYDROMORPHONE HCL/PF 1 MG/ML
0.5 SYRINGE (ML) INJECTION EVERY 6 HOURS PRN
Status: DISCONTINUED | OUTPATIENT
Start: 2023-06-27 | End: 2023-06-28

## 2023-06-27 RX ADMIN — OXYCODONE HYDROCHLORIDE 40 MG: 20 TABLET, FILM COATED, EXTENDED RELEASE ORAL at 05:07

## 2023-06-27 RX ADMIN — POTASSIUM CHLORIDE 20 MEQ: 14.9 INJECTION, SOLUTION INTRAVENOUS at 05:47

## 2023-06-27 RX ADMIN — HEPARIN SODIUM 5000 UNITS: 5000 INJECTION INTRAVENOUS; SUBCUTANEOUS at 05:07

## 2023-06-27 RX ADMIN — OXYCODONE HYDROCHLORIDE 15 MG: 5 TABLET ORAL at 11:45

## 2023-06-27 RX ADMIN — ASPIRIN 81 MG 81 MG: 81 TABLET ORAL at 08:03

## 2023-06-27 RX ADMIN — DOCUSATE SODIUM 100 MG: 100 CAPSULE, LIQUID FILLED ORAL at 17:24

## 2023-06-27 RX ADMIN — TIZANIDINE 4 MG: 2 TABLET ORAL at 08:03

## 2023-06-27 RX ADMIN — LIDOCAINE 1 PATCH: 50 PATCH CUTANEOUS at 08:03

## 2023-06-27 RX ADMIN — POTASSIUM CHLORIDE 40 MEQ: 1500 TABLET, EXTENDED RELEASE ORAL at 13:31

## 2023-06-27 RX ADMIN — Medication 90 MG: at 08:03

## 2023-06-27 RX ADMIN — TIZANIDINE 4 MG: 2 TABLET ORAL at 16:11

## 2023-06-27 RX ADMIN — SODIUM CHLORIDE 75 ML/HR: 0.9 INJECTION, SOLUTION INTRAVENOUS at 05:46

## 2023-06-27 RX ADMIN — OXYCODONE HYDROCHLORIDE 40 MG: 40 TABLET, FILM COATED, EXTENDED RELEASE ORAL at 21:43

## 2023-06-27 RX ADMIN — POLYETHYLENE GLYCOL 3350 17 G: 17 POWDER, FOR SOLUTION ORAL at 08:03

## 2023-06-27 RX ADMIN — POTASSIUM CHLORIDE AND SODIUM CHLORIDE 125 ML/HR: 900; 300 INJECTION, SOLUTION INTRAVENOUS at 04:11

## 2023-06-27 RX ADMIN — DEXAMETHASONE 2 MG: 2 TABLET ORAL at 11:46

## 2023-06-27 RX ADMIN — ATORVASTATIN CALCIUM 40 MG: 40 TABLET, FILM COATED ORAL at 16:12

## 2023-06-27 RX ADMIN — TAMSULOSIN HYDROCHLORIDE 0.4 MG: 0.4 CAPSULE ORAL at 16:12

## 2023-06-27 RX ADMIN — GABAPENTIN 600 MG: 300 CAPSULE ORAL at 17:24

## 2023-06-27 RX ADMIN — GABAPENTIN 600 MG: 300 CAPSULE ORAL at 08:03

## 2023-06-27 RX ADMIN — HEPARIN SODIUM 5000 UNITS: 5000 INJECTION INTRAVENOUS; SUBCUTANEOUS at 13:14

## 2023-06-27 RX ADMIN — POTASSIUM CHLORIDE 40 MEQ: 1500 TABLET, EXTENDED RELEASE ORAL at 05:47

## 2023-06-27 RX ADMIN — POTASSIUM CHLORIDE 20 MEQ: 14.9 INJECTION, SOLUTION INTRAVENOUS at 08:03

## 2023-06-27 RX ADMIN — DOCUSATE SODIUM 100 MG: 100 CAPSULE, LIQUID FILLED ORAL at 08:03

## 2023-06-27 RX ADMIN — METOPROLOL SUCCINATE 25 MG: 25 TABLET, EXTENDED RELEASE ORAL at 08:04

## 2023-06-27 RX ADMIN — OXYCODONE HYDROCHLORIDE 15 MG: 5 TABLET ORAL at 16:12

## 2023-06-27 RX ADMIN — TIZANIDINE 4 MG: 4 TABLET ORAL at 21:43

## 2023-06-27 RX ADMIN — DEXAMETHASONE 2 MG: 2 TABLET ORAL at 08:03

## 2023-06-27 RX ADMIN — OXYCODONE HYDROCHLORIDE 40 MG: 20 TABLET, FILM COATED, EXTENDED RELEASE ORAL at 13:14

## 2023-06-27 RX ADMIN — HYDROMORPHONE HYDROCHLORIDE 0.5 MG: 1 INJECTION, SOLUTION INTRAMUSCULAR; INTRAVENOUS; SUBCUTANEOUS at 08:08

## 2023-06-27 RX ADMIN — OXYCODONE HYDROCHLORIDE 15 MG: 5 TABLET ORAL at 05:56

## 2023-06-27 RX ADMIN — HEPARIN SODIUM 5000 UNITS: 5000 INJECTION INTRAVENOUS; SUBCUTANEOUS at 21:43

## 2023-06-27 NOTE — PLAN OF CARE
Problem: OCCUPATIONAL THERAPY ADULT  Goal: Performs self-care activities at highest level of function for planned discharge setting  See evaluation for individualized goals  Description: Treatment Interventions: ADL retraining, Functional transfer training, UE strengthening/ROM, Endurance training, Patient/family training, Equipment evaluation/education, Neuromuscular reeducation, Compensatory technique education, Continued evaluation, Energy conservation, Activityengagement          See flowsheet documentation for full assessment, interventions and recommendations  Note: Limitation: Decreased ADL status, Decreased UE strength, Decreased Safe judgement during ADL, Decreased endurance, Decreased self-care trans, Decreased high-level ADLs  Prognosis: Good  Assessment: Pt is a 58 y o  male, admitted to [address] [place] [address] 6/26/2023 d/t experiencing increased low back pain  Dx: RODO  Pt with PMHx impacting their performance during ADL tasks, including: CHF, bladder mass,, chronic back pain, arthritis, CAD, heart failure, CM  Prior to admission to the hospital Pt was performing ADLs without physical assistance  IADLs without physical assistance  Functional transfers/ambulation without physical assistance  Cognitive status was PTA was intact  OT order placed to assess Pt's ADLs, cognitive status, and performance during functional tasks in order to maximize safety and independence while making most appropriate d/c recommendations   Pt's clinical presentation is currently unstable/unpredictable given new onset deficits that effect Pt's occupational performance and ability to safely return to OF including decrease activity tolerance, decrease standing balance, decrease performance during ADL tasks, decrease UB MS, increased pain, decrease generalized strength, decrease activity engagement, decrease performance during functional transfers, frequent falls and high fall risk combined with medical complications of pain impacting overall mobility status, abnormal CBC, abnormal sodium values, abnormal potassium values, edema/swelling, multiple readmissions and need for input for mobility technique/safety  Personal factors affecting Pt at time of initial evaluation include: past experience, inability to perform current job functions, inability to perform IADLs, inability to perform ADLs, new need for AD, inability to ambulate household distances, inability to navigate community distances, limited insight into impairments, decreased initiation and engagement and recent fall(s)/fall history  Pt will benefit from continued skilled OT services to address deficits as defined above and to maximize level independence/participation during ADLs and functional tasks to facilitate return toward PLOF and improved quality of life  From an occupational therapy standpoint, recommendation at time of d/c would be HHOT       OT Discharge Recommendation: Home with home health rehabilitation

## 2023-06-27 NOTE — OCCUPATIONAL THERAPY NOTE
"    Occupational Therapy Evaluation     Evaluation: 7366-8992  Treatment: 2677-6600    Patient Name: Ortega Conde  XRSNT'R Date: 6/27/2023  Problem List  Principal Problem:    RODO (acute kidney injury) [facility])  Active Problems:    Obstructive sleep apnea on CPAP    Malignant neoplasm of urinary bladder (HCC)    Hypokalemia    Chronic diastolic heart failure (HCC)    Intractable low back pain    Past Medical History  Past Medical History:   Diagnosis Date    Arthritis     Bladder cancer ([address] )     Chronic narcotic dependence (HCC)     Chronic pain disorder     lower back and down both legs    Colon polyp     Coronary artery disease     CPAP (continuous positive airway pressure) dependence     Does use hearing aid     will wear DOS    Full dentures     Heart failure ([address] )     and \"fluid retention\" [facility]\"    High cholesterol     Hypertension     Mild ankle edema     and feet    Obstructive sleep apnea on CPAP     Prediabetes     Shortness of breath     per pt \"with just exertion\"    Sleep apnea     Wears glasses      Past Surgical History  Past Surgical History:   Procedure Laterality Date    BACK SURGERY      titanium rods implanted    COLONOSCOPY      CYSTOSCOPY W/ URETERAL STENT PLACEMENT Bilateral 3/17/2023    Procedure: bilateral retrograde;  Surgeon: Shreyas Garsia MD;  Location: [facility];  Service: Urology    HERNIA REPAIR      x5    OR CYSTO W/REMOVAL OF LESIONS SMALL N/A 5/1/2023    Procedure: CYSTOSCOPY TURBT;  Surgeon: Shreyas Garsia MD;  Location[facility];  Service: Urology    TRANSURETHRAL RESECTION OF BLADDER TUMOR N/A 3/17/2023    Procedure: TRANSURETHRAL RESECTION OF BLADDER TUMOR (TURBT); Surgeon: Shreyas Garsia MD;  Location[facility];  Service: Urology           06/27/23 0146   Note Type   Note type Evaluation   Pain Assessment   Pain Assessment Tool 0-10   Pain Score 8   Pain Location/Orientation Orientation: Lower; Location: Back;Orientation: Bilateral;Location: Leg   Pain " Rating: FLACC (Rest) - Face 1   Pain Rating: FLACC (Rest) - Legs 0   Pain Rating: FLACC (Rest) - Activity 0   Pain Rating: FLACC (Rest) - Cry 1   Pain Rating: FLACC (Rest) - Consolability 0   Score: FLACC (Rest) 2   Pain Rating: FLACC (Activity) - Face 1   Pain Rating: FLACC (Activity) - Legs 1   Pain Rating: FLACC (Activity) - Activity 0   Pain Rating: FLACC (Activity) - Cry 1   Restrictions/Precautions   Other Precautions Chair Alarm; Fall Risk;Pain;Multiple lines   Home Living   Type of Home House  (2 WES no HR)   Home Layout Two level  (full bath on 1st floor  FF R HR to 2nd floor to bedroom)   Bathroom Shower/Tub Walk-in shower   Bathroom Toilet Raised   Bathroom Equipment Shower chair   Home Equipment Cane;Walker  (been using SPC the past month )   Additional Comments Pt reports living in a 2 story home with 2 WES  Pt recently started ambulating with SPC  Pt has a full bath on 1st floor, FF to 2nd floor to bedroom  Prior Function   Level of [place] Independent with ADLs; Independent with functional mobility; Independent with IADLS   Lives With Spouse   Receives Help From Family   IADLs Independent with driving; Independent with meal prep; Independent with medication management   Falls in the last 6 months (S)  >10   Comments Pt reports completing ADLs, IADLs, functional ambulation and community mobility + driving @ I   ADL   Where Assessed Chair   Grooming Assistance 5  Supervision/Setup   Grooming Deficit Setup   UB Dressing Assistance 5  Supervision/Setup   UB Dressing Deficit Setup   LB Dressing Assistance 3  Moderate Assistance   LB Dressing Deficit Requires assistive device for steadying;Verbal cueing;Supervision/safety; Increased time to complete; Don/doff R sock; Don/doff L sock;Pull up over hips   Additional Comments Pt completing ADL tasks while seated OOB in recliner chair  UB Dressing and grooming tasks @ S after set-up  LB Dressing @ Mod A d/t inability to prudence socks   Pt reports using his cane PRN for assistance donning pants/underwear around feet  Steadying Assist for CM around waist while standing  Please refer to treatment note for performance during LB Dressing with use of AD  Bed Mobility   Additional Comments Pt seated OOB at start and end of session with call bell in reach, chair alarm intact and all needs met at this time   Transfers   Sit to Stand   (SBA)   Additional items Increased time required;Verbal cues   Stand to Sit   (SBA)   Additional items Increased time required;Verbal cues   Stand pivot   (SBA)   Additional items Increased time required;Verbal cues   Additional Comments Pt completing functional transfers with use of RW for UB support  SBA for STS and SPT with increased time, vc'ing for RW management and safety  Balance   Static Sitting Good   Dynamic Sitting Fair +   Static Standing Fair   Dynamic Standing Fair -   Activity Tolerance   Activity Tolerance Patient limited by fatigue   Medical Staff Made Aware Spoke with PT, Estela Gary   Nurse Made Aware Spoke with RNCarlotta   RUE Assessment   RUE Assessment WFL   LUE Assessment   LUE Assessment WFL   Hand Function   Gross Motor Coordination Functional   Fine Motor Coordination Functional   Sensation   Light Touch No apparent deficits   Cognition   Overall Cognitive Status WFL   Arousal/Participation Alert; Responsive; Cooperative   Attention Attends with cues to redirect   Orientation Level Oriented X4   Memory Within functional limits   Following Commands Follows one step commands without difficulty   Assessment   Limitation Decreased ADL status; Decreased UE strength;Decreased Safe judgement during ADL;Decreased endurance;Decreased self-care trans;Decreased high-level ADLs   Prognosis Good   Assessment Pt is a 58 y o  male, admitted to [address] [place] [address] 6/26/2023 d/t experiencing increased low back pain  Dx: RODO  Pt with PMHx impacting their performance during ADL tasks, including: CHF, bladder mass,, chronic back pain, arthritis, CAD, heart failure, CM  Prior to admission to the hospital Pt was performing ADLs without physical assistance  IADLs without physical assistance  Functional transfers/ambulation without physical assistance  Cognitive status was PTA was intact  OT order placed to assess Pt's ADLs, cognitive status, and performance during functional tasks in order to maximize safety and independence while making most appropriate d/c recommendations  Pt's clinical presentation is currently unstable/unpredictable given new onset deficits that effect Pt's occupational performance and ability to safely return to PLOF including decrease activity tolerance, decrease standing balance, decrease performance during ADL tasks, decrease UB MS, increased pain, decrease generalized strength, decrease activity engagement, decrease performance during functional transfers, frequent falls and high fall risk combined with medical complications of pain impacting overall mobility status, abnormal CBC, abnormal sodium values, abnormal potassium values, edema/swelling, multiple readmissions and need for input for mobility technique/safety  Personal factors affecting Pt at time of initial evaluation include: past experience, inability to perform current job functions, inability to perform IADLs, inability to perform ADLs, new need for AD, inability to ambulate household distances, inability to navigate community distances, limited insight into impairments, decreased initiation and engagement and recent fall(s)/fall history  Pt will benefit from continued skilled OT services to address deficits as defined above and to maximize level independence/participation during ADLs and functional tasks to facilitate return toward PLOF and improved quality of life  From an occupational therapy standpoint, recommendation at time of d/c would be HHOT  Plan   Treatment Interventions ADL retraining;Functional transfer training;UE strengthening/ROM; Endurance training;Patient/family "training;Equipment evaluation/education; Neuromuscular reeducation; Compensatory technique education;Continued evaluation; Energy conservation; Activityengagement   Goal Expiration Date 07/11/23   OT Treatment Day 1   OT Frequency 3-5x/wk   Recommendation   OT Discharge Recommendation Home with home health rehabilitation   [facility] Daily Activity Inpatient   Lower Body Dressing 2   Bathing 3   Toileting 3   Upper Body Dressing 3   Grooming 3   Eating 4   Daily Activity Raw Score 18   Daily Activity Standardized Score (Calc for Raw Score >=11) 38 66   [facility] Applied Cognition Inpatient   Following a Speech/Presentation 4   Understanding Ordinary Conversation 4   Taking Medications 4   Remembering Where Things Are Placed or Put Away 4   Remembering List of 4-5 Errands 4   Taking Care of Complicated Tasks 4   Applied Cognition Raw Score 24   Applied Cognition Standardized Score 62 21   Additional Treatment Session   Start Time 0751   End Time 0801   Treatment Assessment Pt seen for treatment session #1 this date  Pt alert and agreeable to participate at this time  Pt completing LB Dressing while seated OOB in recliner chair with use of AD  Therapist demonstrated proper use of sock aide and dressing stick and Pt did a return demonstration of donning sock/pants with use of AD with minimal difficulty noted and occasional cues for technique  Pt reports \"that works pretty well\"  Pt provided with equiptment to take home  Pt seated OOB at end of session with call bell in reach, chair alarm intact and all needs met at this time  Additional Treatment Day 1     The patient's raw score on the AM-PAC Daily Activity Inpatient Short Form is 18  A raw score of less than 19 suggests the patient may benefit from discharge to post-acute rehabilitation services  Please refer to the recommendation of the Occupational Therapist for safe discharge planning  With use of LB AD Pt is able to complete LB dressing with ELIZ العلي is recommended at " this time        Pt goals to be met by 7/11/2023    Pt will demonstrate ability to complete LB dressing @ Mod I with AD PRN in order to increase safety and independence during meaningful tasks  Pt will demonstrate ability to prudence/doff socks/shoes while sitting EOB @ Mod I with AD PRN in order to increase safety and independence during meaningful tasks  Pt will demonstrate ability to complete toileting tasks including CM and pericare @ Mod I in order to increase safety and independence during meaningful tasks  Pt will demonstrate ability to complete EOB, chair, toilet/commode transfers @ Mod I in order to increase performance and participation during functional tasks  Pt will demonstrate ability to stand for 10+ minutes while maintaining G balance with use of RW for UB support PRN  Pt will demonstrate ability to tolerate 30-35 minute OT session with no vc'ing for deep breathing or use of energy conservation techniques in order to increase activity tolerance during functional tasks  Pt will demonstrate Good carryover of use of energy conservation/compensatory strategies during ADLs and functional tasks in order to increase safety and reduce risk for falls  Pt will demonstrate Good attention and participation in continued evaluation of functional ambulation house hold distances in order to assist with safe d/c planning  Pt will identify 3 areas of interest/hobbies and 1 intervention on how to incorporate into daily life in order to increase interaction with environment and peers as well as increase participation in meaningful tasks  Pt will demonstrate 100% carryover of BUE HEP in order to increase BUE MS and increase performance during functional tasks upon d/c home      Deena Anthony OTR/L

## 2023-06-27 NOTE — ASSESSMENT & PLAN NOTE
· Potassium 2 9  · Magnesium- nml  · Suspect in light of torsemide and metalazone- multiple supplementations now improved to 3 3 will give another 40 meq dose     · Hold diuretics

## 2023-06-27 NOTE — ASSESSMENT & PLAN NOTE
Follow-up with urology he is on immunotherapy with [facility] I spoke to dr Tj Rae session will be canceled yoli and he discussed with patient

## 2023-06-27 NOTE — TELEMEDICINE
"e-Consult ([facility])     Contacted by Dr Boni Valadez 58 y o  male MRN: [id number]  Unit/Bed#: -01 Encounter: [id number]    Reason for Consult    Per provider report, patient presents with acute on chronic low back pain with bilateral radicular pain and urinary incontinence  Incontinence presented and has no reoccurred since Friday  Patient has been placed on multimodal regimen at Outside hospital without improvement in his pain and discomfort  Ambulatory dysfunction with use of a walker  Numbness and tingling from the inferior abdomen into the LE bilaterally  Available past medical history,social history, surgical history, medication list, drug allergies and review of systems were reviewed  /69   Pulse 62   Temp 97 5 °F (36 4 °C)   Resp 19   Ht 5' 8\" (1 727 m)   Wt 111 kg (244 lb 6 4 oz)   SpO2 92%   BMI 37 16 kg/m²      Clinical exam per provider report, strength intact  Sensation decreased from just below umbilicus into the LE diffusely  Imaging personally reviewed  MRI lumbar spine showing stable posterior fixation fusion hardware at L4-5 with bilateral laminectomies at L4-5 and L5-S1  Central canal patent at these levels  New diffuse disc bulge at L3-4 with increased ligament and facet hypertrophy causing severe central canal narrowing and bilateral subarticular/lateral recess stenosis  Assessment and Recommendations    1  Continue to monitor signs and symptoms  2  Continue multimodal pain regimen  3  Recommend transfer to [facility] given intractable nature of patients pain and ambultory dysfunction in the setting of severe lumbar spinal stenosis  4  Will evalute patient once at [facility]  After evaluation will determine plan of care which may include surgical intervention although this will depend on clinical examination of the patient and correlation of his imaging with symptoms  All questions answered  Provider is in agreement with the course of action   11-20 " minutes, >50% of the total time devoted to medical consultative verbal/EMR discussion between providers  Written report will be generated in the EMR

## 2023-06-27 NOTE — ASSESSMENT & PLAN NOTE
Follow-up with urology he is on immunotherapy with [facility] I spoke to dr Sebastián Sanchez session will be canceled yoli and he discussed with patient

## 2023-06-27 NOTE — DISCHARGE SUMMARY
[address]  Discharge- Gil Bethea 1960, 58 y o  male MRN: [id number]  Unit/Bed#: -01 Encounter: [id number]  Primary Care Provider: Anamika Pitt DO   Date and time admitted to hospital: 6/26/2023  8:08 AM    * RODO (acute kidney injury) [facility])  Assessment & Plan  · Baseline creatinine ranged between 0 9-1 14  · On admission 1 7 improved to 1 3- eating drinking no obstruction on us - dc fluids   Repeat am avoid hypotension   · Hold lisinopril, Demadex, metolazone  · Suspect prerenal       Acute hyponatremia  Assessment & Plan  · Suspect in light of diuretics improved with fluids am labs     Intractable low back pain  Assessment & Plan  · Imaging showsStable postoperative change after L4-5 posterior fixation without hardware complication  Age-appropriate degenerative change  No dynamic instability  · Patient has acute on chronic lower back pain that has been worsening since May he has been attending palliative care medications have been adjusted he also prior to coming in took 2 doses of steroids and it has not been helping at 8 out of 10 is radiating down the legs which is new for him as he started to have urine incontinence on Friday he denies any anal paresthesias but he is complaining of numbness and tingling below the bellybutton down to the feet which is also new for him  Recently he has started using a walker  Pain is still 8 out of 10  · He has been started on steroids Decadron 2 mg p o  twice daily and restarted his pain control  With his concerning signs I did do a stat MRI of the lumbar which reveals- Stable posterior lumbar fixation hardware including bilateral transpedicular screws at the L4 and L5 levels with susceptibility artifacts   Bilateral laminectomies are again noted at the L4-5 and L5-S1 levels with patency of the central canal  A   chronic fluid collection is noted within the L4-5 laminectomy bed ,decreased in size compared to the prior 10/17/2017 MRI examination       New diffuse disc bulge at the L3-4 level with increased bilateral ligamentum flavum and facet hypertrophy causing moderate to severe central canal and bilateral subarticular/lateral recess narrowing with impingement of the cauda equina  · Discussed with neurosurgery AP on call for [facility]- agree to transfer to [facility] for neurosurgery evaluation and options- discussed with patient in agreement  · Accepted by [facility] Dr Maryanne Goodwin     Chronic diastolic heart failure [facility])  Assessment & Plan  Wt Readings from Last 3 Encounters:   06/27/23 111 kg (244 lb 6 4 oz)   06/27/23 111 kg (244 lb)   06/21/23 112 kg (246 lb 8 oz)     · Last echocardiogram shows ejection fraction 65% with dilated chambers 2022  · Diuretics remain on hold due to RODO, as well as lisinopril  · Euvolemic         Hypokalemia  Assessment & Plan  · Potassium 2 9  · Magnesium- nml  · Suspect in light of torsemide and metalazone- multiple supplementations now improved to 3 3 will give another 40 meq dose   · Hold diuretics    Malignant neoplasm of urinary bladder [facility])  Assessment & Plan  Follow-up with urology he is on immunotherapy with [facility] I spoke to dr Joanie Christensen session will be canceled yoli and he discussed with patient    Obstructive sleep apnea on CPAP  Assessment & Plan  Patient oxygen saturations dropped to 87% while patient sleeping  Continue CPAP        Medical Problems     Resolved Problems  Date Reviewed: 6/27/2023   None       Discharging Physician / Practitioner: Horace Carpenter MD  PCP: Anamika Pitt DO  Admission Date:   Admission Orders (From admission, onward)     Ordered        06/26/23 1014  1 [facility],[address] [facility] [place]  Once                      Discharge Date: 06/27/23    Consultations During Hospital Stay:  · Neurosurgery     Procedures Performed:   · none    Significant Findings / Test Results:   · Mri lumbar- 1   Stable posterior lumbar fixation hardware including bilateral transpedicular screws at the L4 and L5 levels with susceptibility artifacts  Bilateral laminectomies are again noted at the L4-5 and L5-S1 levels with patency of the central canal  A   chronic fluid collection is noted within the L4-5 laminectomy bed ,decreased in size compared to the prior 10/17/2017 MRI examination      2  New diffuse disc bulge at the L3-4 level with increased bilateral ligamentum flavum and facet hypertrophy causing moderate to severe central canal and bilateral subarticular/lateral recess narrowing with impingement of the cauda equina  Incidental Findings:   · none    Test Results Pending at Discharge (will require follow up):   · none     Outpatient Tests Requested:  · none    Complications:  none    Reason for Admission: intractable back pain     Hospital Course:   Rosendo Singh is a 58 y o  male patient who originally presented to the hospital on 6/26/2023 due to acute on chronic worsening lower back pain with urine incontinence started Friday which is new for him and decreased sensation with numbers below the umbilicus down to the feet which is new for him patient never walks with a walker due to intractable pain he has been ambulating with a walker he follows with palliative care who has adjusted his chronic pain medications which has not been helping and he also took steroids which has not helped  He still complaining of pain but no incontinence and now ultrasound of the kidney and bladder has not revealed any urinary issues he is on diuretics was found to have RODO and hypokalemia which improved with supplementation and fluids  Patient had a stat MRI of the lumbar spine done which showed severe spinal stenosis with impingement of cauda equina spoke to neurosurgery on call at [facility] patient will be transferred to [facility] for evaluation by neurosurgery and options  Accepted by Zach Bansal        Please see above list of diagnoses and related plan for additional information  Condition at Discharge: stable    Discharge Day Visit / Exam:   * Please refer to separate progress note for these details *    Discussion with Family: Updated  (wife) via phone  Discharge instructions/Information to patient and family:   See after visit summary for information provided to patient and family  Provisions for Follow-Up Care:  See after visit summary for information related to follow-up care and any pertinent home health orders  Disposition:   [address] Transfer to [facility]     Planned Readmission: no      Discharge Statement:  I spent >35 minutes discharging the patient  This time was spent on the day of discharge  I had direct contact with the patient on the day of discharge  Greater than 50% of the total time was spent examining patient, answering all patient questions, arranging and discussing plan of care with patient as well as directly providing post-discharge instructions  Additional time then spent on discharge activities  Discharge Medications:  See after visit summary for reconciled discharge medications provided to patient and/or family        **Please Note: This note may have been constructed using a voice recognition system**

## 2023-06-27 NOTE — TELEPHONE ENCOUNTER
Call to patient's wife, she stated that patient was admitted to hospital for back pain and leg pain  She stated that she wanted Dr Yu Chua to look at chart and advise  Dr Yu Chua aware and reviewed chart in front of me, stated that UA normal   Ultrasound normal  Message then relaid to wife  Wife then stated that another doctor had ordered steroid for patient, patient then had relief  But then doctor had another episode of pain and was prescribed another steroid with no relief  Wife is asking if patient can have 1st prescribed steroid  Chart record reviewed, Corazon Schneider Rx steroid , message to provider

## 2023-06-27 NOTE — PHYSICAL THERAPY NOTE
"   PHYSICAL THERAPY EVALUATION  NAME:  Gafrield Womack  DATE: 06/27/23    AGE:   58 y o  Mrn:   [id number]  ADMIT DX:  Low back pain [M54 50]  Hypokalemia [E87 6]  Back pain [M54 9]  RODO (acute kidney injury) ([facility] [address] ) [N17 9]    Past Medical History:   Diagnosis Date   • Arthritis    • Bladder cancer ([facility] [address] )    • Chronic narcotic dependence ([facility] [address] )    • Chronic pain disorder     lower back and down both legs   • Colon polyp    • Coronary artery disease    • CPAP (continuous positive airway pressure) dependence    • Does use hearing aid     will wear DOS   • Full dentures    • Heart failure ([facility] [address] )     and \"fluid retention\" [facility] cardio PA\"   • High cholesterol    • Hypertension    • Mild ankle edema     and feet   • Obstructive sleep apnea on CPAP    • Prediabetes    • Shortness of breath     per pt \"with just exertion\"   • Sleep apnea    • Wears glasses      Length Of Stay: 1  Performed at least 2 patient identifiers during session: Name and Birthday  PHYSICAL THERAPY EVALUATION :      06/27/23 0733   PT Last Visit   PT Visit Date 06/27/23   Note Type   Note type Evaluation   Pain Assessment   Pain Assessment Tool FLACC   Pain Score 8   Pain Location/Orientation Orientation: Lower; Location: Back   Pain Radiating Towards radiates down B posterior legs to heels, reports \"numbness throguhout leg   Pain Rating: FLACC (Rest) - Face 1   Pain Rating: FLACC (Rest) - Legs 0   Pain Rating: FLACC (Rest) - Activity 0   Pain Rating: FLACC (Rest) - Cry 1   Pain Rating: FLACC (Rest) - Consolability 0   Score: FLACC (Rest) 2   Pain Rating: FLACC (Activity) - Face 1   Pain Rating: FLACC (Activity) - Legs 1   Pain Rating: FLACC (Activity) - Activity 1   Pain Rating: FLACC (Activity) - Cry 1   Pain Rating: FLACC (Activity) - Consolability 1   Score: FLACC (Activity) 5   Restrictions/Precautions   Other Precautions Chair Alarm; Fall Risk;Pain;Multiple lines   Home Living   Type of Home House  (2 WES no HRs)   Home Layout Two " "level  (full bath on 1st floor  FF R HR to 2nd floor to bedroom)   Bathroom Shower/Tub Walk-in shower   Bathroom Toilet Raised   Bathroom Equipment Shower chair   Home Equipment Cane;Walker  (been using SPC the past month )   Additional Comments Reports living in a [address] with 2 WES no HRs and FF R HR to 2nd floor  Reports has been using spc for last month  Prior Function   Level of [place] Independent with ADLs; Independent with functional mobility; Independent with IADLS   Lives With Spouse   Receives Help From Family   IADLs Independent with driving; Independent with meal prep; Independent with medication management   Falls in the last 6 months (S)  >10   Comments Reports being independent with mobility, ADLs and IADLs  General   Additional Pertinent History inc B LE edema  Cognition   Orientation Level Oriented X4   Following Commands Follows one step commands without difficulty   Comments pt tearful during session associated with pain   Subjective   Subjective \"I have been using the cane the last month  \"   RLE Assessment   RLE Assessment WFL  (strength 3+/5  inc pain with knee flexion )   LLE Assessment   LLE Assessment WFL  (except hip flexion just > 90 deg  strength 3+/5 except hip flex 2+/5   inc pain with knee flexion)   Coordination   Rapid Alternating Movements Impaired  (slowed)   Light Touch   RLE Light Touch Impaired   RLE Light Touch Comments reports increased sensation at foot compared to proximally   LLE Light Touch Impaired   LLE Light Touch Comments reports increased sensation at thigh compared to foot   Bed Mobility   Additional Comments Pt sitting in recliner chair at start and end of session with all needs wihtin reach and posey alarm on   Transfers   Sit to Stand   (SBA)   Additional items Increased time required;Verbal cues   Stand to Sit   (SBA)   Additional items Increased time required;Verbal cues   Stand pivot   (steadying to minimal assistance with spc  pt reaching for external " "support )   Additional items Increased time required;Verbal cues   Additional Comments use of spc  sit<>stand with SBA with inc time and effort  spt with spc with steayding to minimal assistance, pt reaching for external support  Ambulation/Elevation   Gait pattern Wide YOLI; Improper Weight shift; Antalgic; Short stride; Excessively slow;Decreased hip extension  (inc lateral sway )   Gait Assistance   (steadying to minimal assistance)   Additional items Assist x 1;Verbal cues   Assistive Device [facility]   Distance ambulated 10'x1 with spc with steadying to minimal assistance with wide YOLI, dec step elngth foot clearance, inc lateral sway, pt reaching for external support  Stair Management Assistance   (declined stair trial  can stay on 1st floor)   Balance   Static Sitting Good   Dynamic Sitting Fair +   Static Standing Fair   Dynamic Standing Fair -   Ambulatory Poor +   Endurance Deficit   Endurance Deficit Yes   Endurance Deficit Description fatigue, inc pain   Activity Tolerance   Activity Tolerance Patient limited by fatigue;Patient limited by pain   Medical Staff Made Aware OT, [place]   Nurse Made Aware RN, [place]   Assessment   Prognosis Good   Problem List Decreased strength;Decreased endurance; Impaired balance;Decreased mobility; Decreased coordination;Decreased safety awareness; Obesity;Pain   Barriers to Discharge Inaccessible home environment   Barriers to Discharge Comments 2nd floor bedroom  has full bathroom on 1st floor he can use  Goals   Patient Goals \"Go home\"   STG Expiration Date 07/11/23   PT Treatment Day 1   Plan   Treatment/Interventions ADL retraining;Functional transfer training;LE strengthening/ROM; Elevations; Therapeutic exercise; Endurance training;Patient/family training;Equipment eval/education; Bed mobility;Gait training; Compensatory technique education;Spoke to nursing;Spoke to case management;OT   PT Frequency 3-5x/wk   Recommendation   PT Discharge Recommendation Home with home health " rehabilitation   Equipment Recommended   (walker-pt reports having)   Additional Comments increased support from spouse prn   AM-PAC Basic Mobility Inpatient   Turning in Flat Bed Without Bedrails 3   Lying on Back to Sitting on Edge of Flat Bed Without Bedrails 3   Moving Bed to Chair 3   Standing Up From Chair Using Arms 3   Walk in Room 3   Climb 3-5 Stairs With Railing 2   Basic Mobility Inpatient Raw Score 17   Basic Mobility Standardized Score 39 67   Highest Level Of Mobility   -[facility] Goal 5: Stand one or more mins   -[facility] Achieved 7: Walk 25 feet or more   Additional Treatment Session   Start Time 0750   End Time 0801   Treatment Assessment Pt tolerated session fairly well despite c/o pain  He was able to ambulate inc distance with RW with decreased assistance comapred to spc  He requires cues for improved gait pattern and technique  He reports inc eddie in low back and is frustrated about use of RW, but was agreeable and verbalized understanding of use  He is limtied by pain, decreased strength, balance, ednruance  He will continue to benefit from PT services to maximize LOF  Equipment Use initiated use of RW  pt ambulated 62'x1 with extremely slowed alisia with decreased step length, inc ant lean with cues to stay wihtin YOLI of RW for inc UE support  inc alisia as distance increased  reviewed LE TE of ankle DF/PF, hip flexion and LAQ  pt demonstrated understanding  discussed use of RW upon return to home  pt upset, but agreeable after education given increased number of falls and potential for greater independence  discussed HHPT services upon return to home with transisiton to outpatient PT as appropriate  pt upset, but agreeable   discussed technique as needed to complete WES home as pt declined stair trial  pt instructed to use RW, have chair without arms at landing of 2nd step, turn around and face away from steps, step up onto step backwards and then sit onto chair, turn on chair and then stand to proceed into the home  pt verbalized understanding without questions or concerns  End of Consult   Patient Position at End of Consult Bedside chair;Bed/Chair alarm activated; All needs within reach       Pt requires PT/OT co-eval due to medical complexity, signficant assistance with mobility and/or cognitive-behavioral impairments  (Please find full objective findings from PT assessment regarding body systems outlined above)  Assessment: Pt is a 58 y o  male seen for PT evaluation s/p admission to [address] [place] [address] on 6/26/2023 with RODO (acute kidney injury) ([facility] [address] )  Order placed for PT services  Upon evaluation: Pt is presenting with impaired functional mobility due to pain, decreased strength, decreased endurance, impaired balance, impaired coordination, gait deviations, altered sensation, decreased safety awareness, fall risk and LE edema requiring stand by to steadying/minimal assistance for transfers and steadying/minimal assistance for ambulation with spc  Pt's clinical presentation is currently unpredictable given the functional mobility deficits above, especially weakness, edema of extremities, decreased endurance, impaired coordination, gait deviations, pain, decreased activity tolerance, decreased functional mobility tolerance, altered sensation and decreased safety awareness, coupled with fall risks as indicated by AM-PAC 6-Clicks: 30/72 as well as hx of falls, impaired balance, polypharmacy, decreased safety awareness, limited sensation/neuropathy and obesity and combined with medical complications of pain impacting overall mobility status, abnormal renal lab values, abnormal H&H, abnormal WBCs, abnormal blood sugars, abnormal sodium values, abnormal potassium values, need for input for mobility technique/safety and RODO, intractable LBP    Pt's PMHx and comorbidities that may affect physical performance and progress include: CHF and L4-5 posterior fixation, bladder mass with malignant neoplasm of urinary bladder, arthritis, CAD, chronic narcotic dependence, HTN, prediabetes  Personal factors affecting pt at time of IE include: step(s) to enter environment, multi-level environment, inability to perform IADLs, inability to perform ADLs, inability to navigate level surfaces without external assistance, inability to ambulate household distances and recent fall(s)/fall history  Pt will benefit from continued skilled PT services to address deficits as defined above and to maximize level of functional mobility to facilitate return toward PLOF and improved QOL  From PT/mobility standpoint, recommendation at time of d/c would be Home PT, home with family support and with walker pending progress in order to reduce fall risk and maximize pt's functional independence and consistency with mobility in order to facilitate return to PLOF  Recommend trial with walker next 1-2 sessions, trial with cane next 1-2 sessions and ther ex next 1-2 sessions  The patient's AM-PAC Basic Mobility Inpatient Short Form Raw Score is 17  A Raw score of greater than 16 suggests the patient may benefit from discharge to home  Please also refer to the recommendation of the Physical Therapist for safe discharge planning  Goals: Pt will: Perform bed mobility tasks with modified Independent to reposition in bed and prepare for transfers  Pt will perform transfers with modified Independent to decrease burden of care, decrease risk for falls and improve activity tolerance and prepare for ambulation  Pt will ambulate with LRAD for >/= 250' with  modified Independent  to decrease burden of care, decrease risk for falls, improve activity tolerance and improve gait quality and to access home environment  Pt will complete 1 step with LRAD and >/= 12 steps with unilateral handrail with supervision to decrease burden of care, return to home with WES, return to [facility] home, decrease risk for falls and improve activity tolerance  Pt will participate in objective balance assessment to determine baseline fall risk  Pt will participate in SSWS assessment to determine level of mobility  Pt will increase B LE strength >/= 1/2 MMT grade to facilitate functional mobility        Bobby Cornell PT,DPT

## 2023-06-27 NOTE — CASE MANAGEMENT
Case Management Assessment & Discharge Planning Note    Patient name Aleah Lat  Location /-74 MRN [id number]  : 1960 Date 2023       Current Admission Date: 2023  Current Admission Diagnosis:RODO (acute kidney injury) [facility])   Patient Active Problem List    Diagnosis Date Noted   • RODO (acute kidney injury) ([facility] ) 2023   • Intractable low back pain 2023   • Chronic diastolic heart failure ([facility] )    • Opioid withdrawal ([facility] )    • Hypokalemia 2023   • Anxiety 2023   • Chronic bilateral low back pain with bilateral sciatica 2023   • History of gross hematuria 2023   • Malignant neoplasm of urinary bladder ([facility] ) 2023   • Obstructive sleep apnea on CPAP    • Hypertension    • Bladder mass 2023   • Benign prostatic hyperplasia with urinary frequency 2023      LOS (days): 1  Geometric Mean LOS (GMLOS) (days): 3 10  Days to GMLOS:2 1     OBJECTIVE:    Risk of Unplanned Readmission Score: 30 24         Current admission status: Inpatient  Referral Reason: Other (Discharge planning - home needs, placement, medications )    Preferred Pharmacy:   [address] [address]  Phone: [phone] Fax: [phone], [address] [address]  Phone: [phone] Fax: [phone]    Primary Care Provider: Mook Medina DO    Primary Insurance: MEDICARE  Secondary Insurance: BLUE CROSS    CM met with patient at the bedside,baseline information  was obtained  CM discussed the role of CM in helping the patient develop a discharge plan and assist the patient in carry out their plan  ASSESSMENT:  Active Health Care Proxies    There are no active Health Care Proxies on file         Advance Directives  Does patient have a Health Care POA?: No  Was patient offered paperwork?: Yes (Declined )  Does patient currently have a Health Care decision maker?: Yes, please see Health Care Proxy section  Does patient have Advance Directives?: Yes  Advance Directives: Living will     PT said that hospital should have AD on file  Readmission Root Cause  30 Day Readmission: No    Patient Information  Admitted from[de-identified] Home  Mental Status: Alert  During Assessment patient was accompanied by: Not accompanied during assessment  Assessment information provided by[de-identified] Patient  Primary Caregiver: Self  Support Systems: Spouse/significant other, [address] of Residence: [address] do you live in?: [address] [facility] entry access options   Select all that apply : Stairs  Number of steps to enter home : 5  Type of Current Residence: 3 story home  Upon entering residence, is there a bedroom on the main floor (no further steps)?: No  A bedroom is located on the following floor levels of residence (select all that apply):: 2nd Floor  Upon entering residence, is there a bathroom on the main floor (no further steps)?: Yes  Number of steps to 2nd floor from main floor: One Flight  In the last 12 months, was there a time when you were not able to pay the mortgage or rent on time?: No  In the last 12 months, how many places have you lived?: 1  In the last 12 months, was there a time when you did not have a steady place to sleep or slept in a shelter (including now)?: No  Homeless/housing insecurity resource given?: No  Living Arrangements: Lives w/ Spouse/significant other  Is patient a ?: No    Activities of Daily Living Prior to Admission  Functional Status: Independent  Completes ADLs independently?: Yes  Ambulates independently?: Yes  Does patient use assisted devices?: Yes  Assisted Devices (DME) used: Straight Cane  Does patient currently own DME?: Yes  What DME does the patient currently own?: Straight Cane  Does patient have a history of Outpatient Therapy (PT/OT)?: Yes ([facility] - negative experience, would prefer to not go back there, felt as if they gave up on him/discharged him too soon  Also saw Dr Kay Philip in the past, felt great experience and provider of preference )  Does the patient have a history of Short-Term Rehab?: No  Does patient have a history of HHC?: No  Does patient currently have [facility]?: No         Patient Information Continued  Income Source: SSI/SSD  Does patient have prescription coverage?: Yes  Within the past 12 months, you worried that your food would run out before you got the money to buy more : Never true  Within the past 12 months, the food you bought just didn't last and you didn't have money to get more : Never true  Food insecurity resource given?: No  Does patient receive dialysis treatments?: No  Does patient have a history of substance abuse?: No  Does patient have a history of Mental Health Diagnosis?: No         Means of Transportation  Means of Transport to Appts[de-identified] Drives Self  In the past 12 months, has lack of transportation kept you from medical appointments or from getting medications?: No  In the past 12 months, has lack of transportation kept you from meetings, work, or from getting things needed for daily living?: No  Was application for public transport provided?: No  PT admitted that do drive themselves on occasion or has family transport them to appointments         DISCHARGE DETAILS:    Discharge planning discussed with[de-identified] Patient  Freedom of Choice: Yes  Comments - Freedom of Choice: Providers for OP PT discussed and preferred agency is Dr Kay Philip   CM contacted family/caregiver?: No- see comments (PT Declined )  Were Treatment Team discharge recommendations reviewed with patient/caregiver?: Yes  Did patient/caregiver verbalize understanding of patient care needs?: Yes  Were patient/caregiver advised of the risks associated with not following Treatment Team discharge recommendations?: Yes         [address]         Is the patient interested in [facility] at discharge?: No    DME Referral Provided  Referral made for DME?: No    Other Referral/Resources/Interventions Provided:  Interventions: Outpatient PT  Referral Comments: PT educated that upon discharged if OP PT preference over HHC/Home PT that prescription would be provided for PT to reach out to prefered provider  Would you like to participate in our [address] service program?  : No - Declined    Treatment Team Recommendation: Home with [address] [facility]  Discharge Destination Plan[de-identified] Home, Other (OP PT with Dr Merlin Guaman)  Transport at Discharge : Family      PT reported that should they not be discharged by tomorrow, would leave AMA as they have Immunology appointment through urology and services began three weeks ago  It is very important to PT that they attend this appointment  PT services through [address] [place] [address] Drive across the street

## 2023-06-27 NOTE — PLAN OF CARE
Problem: MOBILITY - ADULT  Goal: Maintain or return to baseline ADL function  Description: INTERVENTIONS:  -  Assess patient's ability to carry out ADLs; assess patient's baseline for ADL function and identify physical deficits which impact ability to perform ADLs (bathing, care of mouth/teeth, toileting, grooming, dressing, etc )  - Assess/evaluate cause of self-care deficits   - Assess range of motion  - Assess patient's mobility; develop plan if impaired  - Assess patient's need for assistive devices and provide as appropriate  - Encourage maximum independence but intervene and supervise when necessary  - Involve family in performance of ADLs  - Assess for home care needs following discharge   - Consider OT consult to assist with ADL evaluation and planning for discharge  - Provide patient education as appropriate  6/27/2023 1527 by Viviana Morfin RN  Outcome: Adequate for Discharge  6/27/2023 0738 by Viviana Morfin RN  Outcome: Progressing  Goal: Maintains/Returns to pre admission functional level  Description: INTERVENTIONS:  - Perform BMAT or MOVE assessment daily    - Set and communicate daily mobility goal to care team and patient/family/caregiver  - Collaborate with rehabilitation services on mobility goals if consulted  - Perform Range of Motion   times a day  - Reposition patient every   hours  - Dangle patient   times a day  - Stand patient   times a day  - Ambulate patient   times a day  - Out of bed to chair     times a day   - Out of bed for meals    times a day  - Out of bed for toileting  - Record patient progress and toleration of activity level   6/27/2023 1527 by Viviana Morfin RN  Outcome: Adequate for Discharge  6/27/2023 0738 by Viviana Morfin RN  Outcome: Progressing     Problem: GENITOURINARY - ADULT  Goal: Maintains or returns to baseline urinary function  Description: INTERVENTIONS:  - Assess urinary function  - Encourage oral fluids to ensure adequate hydration if ordered  - Administer IV fluids as ordered to ensure adequate hydration  - Administer ordered medications as needed  - Offer frequent toileting  - Follow urinary retention protocol if ordered  6/27/2023 1527 by Marcos Chappell RN  Outcome: Adequate for Discharge  6/27/2023 0738 by Marcos Chappell RN  Outcome: Progressing  Goal: Absence of urinary retention  Description: INTERVENTIONS:  - Assess patient's ability to void and empty bladder  - Monitor I/O  - Bladder scan as needed  - Discuss with physician/AP medications to alleviate retention as needed  - Discuss catheterization for long term situations as appropriate  6/27/2023 1527 by Marcos Chappell RN  Outcome: Adequate for Discharge  6/27/2023 0738 by Marcos Chappell RN  Outcome: Progressing     Problem: METABOLIC, FLUID AND ELECTROLYTES - ADULT  Goal: Electrolytes maintained within normal limits  Description: INTERVENTIONS:  - Monitor labs and assess patient for signs and symptoms of electrolyte imbalances  - Administer electrolyte replacement as ordered  - Monitor response to electrolyte replacements, including repeat lab results as appropriate  - Instruct patient on fluid and nutrition as appropriate  6/27/2023 1527 by Marcos Chappell RN  Outcome: Adequate for Discharge  6/27/2023 0738 by Marcos Chappell RN  Outcome: Progressing  Goal: Fluid balance maintained  Description: INTERVENTIONS:  - Monitor labs   - Monitor I/O and WT  - Instruct patient on fluid and nutrition as appropriate  - Assess for signs & symptoms of volume excess or deficit  6/27/2023 1527 by Marcos Chappell RN  Outcome: Adequate for Discharge  6/27/2023 0738 by Marcos Chappell RN  Outcome: Progressing  Goal: Glucose maintained within target range  Description: INTERVENTIONS:  - Monitor Blood Glucose as ordered  - Assess for signs and symptoms of hyperglycemia and hypoglycemia  - Administer ordered medications to maintain glucose within target range  - Assess nutritional intake and initiate nutrition service referral as needed  6/27/2023 1527 by Vitor Renee RN  Outcome: Adequate for Discharge  6/27/2023 0668 by Vitor Renee RN  Outcome: Progressing     Problem: PAIN - ADULT  Goal: Verbalizes/displays adequate comfort level or baseline comfort level  Description: Interventions:  - Encourage patient to monitor pain and request assistance  - Assess pain using appropriate pain scale  - Administer analgesics based on type and severity of pain and evaluate response  - Implement non-pharmacological measures as appropriate and evaluate response  - Consider cultural and social influences on pain and pain management  - Notify physician/advanced practitioner if interventions unsuccessful or patient reports new pain  6/27/2023 1527 by Vitor Renee RN  Outcome: Adequate for Discharge  6/27/2023 0738 by Vitor Renee RN  Outcome: Progressing     Problem: DISCHARGE PLANNING  Goal: Discharge to home or other facility with appropriate resources  Description: INTERVENTIONS:  - Identify barriers to discharge w/patient and caregiver  - Arrange for needed discharge resources and transportation as appropriate  - Identify discharge learning needs (meds, wound care, etc )  - Arrange for interpretive services to assist at discharge as needed  - Refer to Case Management Department for coordinating discharge planning if the patient needs post-hospital services based on physician/advanced practitioner order or complex needs related to functional status, cognitive ability, or social support system  6/27/2023 1527 by Vitor Renee RN  Outcome: Adequate for Discharge  6/27/2023 0738 by Vitor Renee RN  Outcome: Progressing

## 2023-06-27 NOTE — TRANSPORTATION MEDICAL NECESSITY
"Section I - General Information    Name of Patient: Moraima Dan                 : 1960    Medicare #: [id number]  Transport Date: 23 (PCS is valid for round trips on this date and for all repetitive trips in the 60-day range as noted below )  Origin: [address] [place] [address] Ave: [address]   Is the pt's stay covered under Medicare Part A (PPS/DRG)   [x]     Closest appropriate facility? If no, why is transport to more distant facility required? Yes  If hospice pt, is this transport related to pt's terminal illness? No       Section II - Medical Necessity Questionnaire  Ambulance transportation is medically necessary only if other means of transport are contraindicated or would be potentially harmful to the patient  To meet this requirement, the patient must either be \"bed confined\" or suffer from a condition such that transport by means other than ambulance is contraindicated by the patient's condition  The following questions must be answered by the medical professional signing below for this form to be valid:    1)  Describe the MEDICAL CONDITION (physical and/or mental) of this patient AT [address] [place] [address] that requires the patient to be transported in an ambulance and why transport by other means is contraindicated by the patient's condition:moderate to severe central canal and bilateral subarticular/lateral recess narrowing with impingement of the cauda equina  2) Is the patient \"bed confined\" as defined below? No  To be \"be confined\" the patient must satisfy all three of the following conditions: (1) unable to get up from bed without Assistance; AND (2) unable to ambulate; AND (3) unable to sit in a chair or wheelchair  3) Can this patient safely be transported by car or wheelchair van (i e , seated during transport without a medical attendant or monitoring)?    No    4) In addition " to completing questions 1-3 above, please check any of the following conditions that apply*:   *Note: supporting documentation for any boxes checked must be maintained in the patient's medical records  Moderate/severe pain on movement   Medical attendant required   Hemodynamic monitoring required en route     If hosp-hosp transfer, describe services needed at 2nd facility not available at 1st facility? Neurology and neurosurgical needs    Section III - Signature of Physician or Healthcare Professional  I certify that the above information is true and correct based on my evaluation of this patient, and represent that the patient requires transport by ambulance and that other forms of transport are contraindicated  I understand that this information will be used by the Centers for Medicare and Medicaid Services (CMS) to support the determination of medical necessity for ambulance services, and I represent that I have personal knowledge of the patient's condition at time of transport  []  If this box is checked, I also certify that the patient is physically or mentally incapable of signing the ambulance service's claim and that the institution with which I am affiliated has furnished care, services, or assistance to the patient  My signature below is made on behalf of the patient pursuant to 42 CFR §424 36(b)(4)  In accordance with 42 CFR §424 37, the specific reason(s) that the patient is physically or mentally incapable of signing the claim form is as follows:  Cliff Ortiz of Physician* or Healthcare Professional______________________________________________________________  Signature Date 06/27/23 (For scheduled repetitive transports, this form is not valid for transports performed more than 60 days after this date)    Printed Name & Credentials of Physician or Healthcare Professional (MD, DO, RN, etc )__STEVE Noyola_____________  *Form must be signed by patient's attending physician for scheduled, repetitive transports   For non-repetitive, unscheduled ambulance transports, if unable to obtain the signature of the attending physician, any of the following may sign (choose appropriate option below)  [] Physician Assistant []  Clinical Nurse Specialist []  Registered Nurse  []  Nurse Practitioner  [] Discharge Planner

## 2023-06-27 NOTE — ASSESSMENT & PLAN NOTE
· Imaging showsStable postoperative change after L4-5 posterior fixation without hardware complication  Age-appropriate degenerative change  No dynamic instability  · Patient has acute on chronic lower back pain that has been worsening since May he has been attending palliative care medications have been adjusted he also prior to coming in took 2 doses of steroids and it has not been helping at 8 out of 10 is radiating down the legs which is new for him as he started to have urine incontinence on Friday he denies any anal paresthesias but he is complaining of numbness and tingling below the bellybutton down to the feet which is also new for him  Recently he has started using a walker  Pain is still 8 out of 10  · He has been started on steroids Decadron 2 mg p o  twice daily and restarted his pain control  With his concerning signs I did do a stat MRI of the lumbar which reveals- Stable posterior lumbar fixation hardware including bilateral transpedicular screws at the L4 and L5 levels with susceptibility artifacts  Bilateral laminectomies are again noted at the L4-5 and L5-S1 levels with patency of the central canal  A   chronic fluid collection is noted within the L4-5 laminectomy bed ,decreased in size compared to the prior 10/17/2017 MRI examination       New diffuse disc bulge at the L3-4 level with increased bilateral ligamentum flavum and facet hypertrophy causing moderate to severe central canal and bilateral subarticular/lateral recess narrowing with impingement of the cauda equina    · Due to above reached out to [facility] neurosurgery for advice awaiting return call /text

## 2023-06-27 NOTE — ASSESSMENT & PLAN NOTE
Wt Readings from Last 3 Encounters:   06/27/23 111 kg (244 lb 6 4 oz)   06/27/23 111 kg (244 lb)   06/21/23 112 kg (246 lb 8 oz)     · Last echocardiogram shows ejection fraction 65% with dilated chambers 2022  · Diuretics remain on hold due to RODO, as well as lisinopril  · Euvolemic

## 2023-06-27 NOTE — ASSESSMENT & PLAN NOTE
· Baseline creatinine ranged between 0 9-1 14  · On admission 1 7 improved to 1 3- eating drinking no obstruction on us - dc fluids    Repeat am avoid hypotension   · Hold lisinopril, Demadex, metolazone  · Suspect prerenal

## 2023-06-27 NOTE — TELEPHONE ENCOUNTER
Call received from Dr Joce Chow, patient currently hospitalized at [facility]  He was supposed to have BCG 4/6 tomorrow, 6/28/23  Plan to cancel visit for tomorrow and add a visit on at the end of his treatment to accommodate all 6 doses  Please confirm with patient's wife, his appt tomorrow was cancelled  His next BCG will be 7/5/23  His last dose was supposed to be 7/12/23, but will now go to 7/19/23  The appt on 7/19/23 is with the nurse

## 2023-06-27 NOTE — PLAN OF CARE
Problem: PHYSICAL THERAPY ADULT  Goal: Performs mobility at highest level of function for planned discharge setting  See evaluation for individualized goals  Description: Treatment/Interventions: ADL retraining, Functional transfer training, LE strengthening/ROM, Elevations, Therapeutic exercise, Endurance training, Patient/family training, Equipment eval/education, Bed mobility, Gait training, Compensatory technique education, Spoke to nursing, Spoke to case management, OT  Equipment Recommended:  (walker-pt reports having)       See flowsheet documentation for full assessment, interventions and recommendations  2/52/2190 [id number] by Dixie Sandoval, PT  Note: Prognosis: Good  Problem List: Decreased strength, Decreased endurance, Impaired balance, Decreased mobility, Decreased coordination, Decreased safety awareness, Obesity, Pain  Pt tolerated session fairly well despite c/o pain  He was able to ambulate inc distance with RW with decreased assistance comapred to spc  He requires cues for improved gait pattern and technique  He reports inc eddie in low back and is frustrated about use of RW, but was agreeable and verbalized understanding of use  He is limtied by pain, decreased strength, balance, ednruance  He will continue to benefit from PT services to maximize LOF  Barriers to Discharge: Inaccessible home environment  Barriers to Discharge Comments: 2nd floor bedroom  has full bathroom on 1st floor he can use  PT Discharge Recommendation: Home with home health rehabilitation    See flowsheet documentation for full assessment

## 2023-06-27 NOTE — PLAN OF CARE
Problem: MOBILITY - ADULT  Goal: Maintain or return to baseline ADL function  Description: INTERVENTIONS:  -  Assess patient's ability to carry out ADLs; assess patient's baseline for ADL function and identify physical deficits which impact ability to perform ADLs (bathing, care of mouth/teeth, toileting, grooming, dressing, etc )  - Assess/evaluate cause of self-care deficits   - Assess range of motion  - Assess patient's mobility; develop plan if impaired  - Assess patient's need for assistive devices and provide as appropriate  - Encourage maximum independence but intervene and supervise when necessary  - Involve family in performance of ADLs  - Assess for home care needs following discharge   - Consider OT consult to assist with ADL evaluation and planning for discharge  - Provide patient education as appropriate  Outcome: Progressing  Goal: Maintains/Returns to pre admission functional level  Description: INTERVENTIONS:  - Perform BMAT or MOVE assessment daily    - Set and communicate daily mobility goal to care team and patient/family/caregiver  - Collaborate with rehabilitation services on mobility goals if consulted  - Perform Range of Motion   times a day  - Reposition patient every   hours  - Dangle patient   times a day  - Stand patient   times a day  - Ambulate patient   times a day  - Out of bed to chair     times a day   - Out of bed for meals    times a day  - Out of bed for toileting  - Record patient progress and toleration of activity level   Outcome: Progressing

## 2023-06-27 NOTE — PLAN OF CARE
Problem: PHYSICAL THERAPY ADULT  Goal: Performs mobility at highest level of function for planned discharge setting  See evaluation for individualized goals  Description: Treatment/Interventions: ADL retraining, Functional transfer training, LE strengthening/ROM, Elevations, Therapeutic exercise, Endurance training, Patient/family training, Equipment eval/education, Bed mobility, Gait training, Compensatory technique education, Spoke to nursing, Spoke to case management, OT  Equipment Recommended:  (walker-pt reports having)       See flowsheet documentation for full assessment, interventions and recommendations  Note: Prognosis: Good  Problem List: Decreased strength, Decreased endurance, Impaired balance, Decreased mobility, Decreased coordination, Decreased safety awareness, Obesity, Pain  Assessment: Pt is a 58 y o  male seen for PT evaluation s/p admission to [address] [place] [address] on 6/26/2023 with RODO (acute kidney injury) ([facility]  Order placed for PT services  Upon evaluation: Pt is presenting with impaired functional mobility due to pain, decreased strength, decreased endurance, impaired balance, impaired coordination, gait deviations, altered sensation, decreased safety awareness, fall risk and LE edema requiring stand by to steadying/minimal assistance for transfers and steadying/minimal assistance for ambulation with spc   Pt's clinical presentation is currently unpredictable given the functional mobility deficits above, especially weakness, edema of extremities, decreased endurance, impaired coordination, gait deviations, pain, decreased activity tolerance, decreased functional mobility tolerance, altered sensation and decreased safety awareness, coupled with fall risks as indicated by AM-PAC 6-Clicks: 21/90 as well as hx of falls, impaired balance, polypharmacy, decreased safety awareness, limited sensation/neuropathy and obesity and combined with medical complications of pain impacting overall mobility status, abnormal renal lab values, abnormal H&H, abnormal WBCs, abnormal blood sugars, abnormal sodium values, abnormal potassium values, need for input for mobility technique/safety and RODO, intractable LBP  Pt's PMHx and comorbidities that may affect physical performance and progress include: CHF and L4-5 posterior fixation, bladder mass with malignant neoplasm of urinary bladder, arthritis, CAD, chronic narcotic dependence, HTN, prediabetes  Personal factors affecting pt at time of IE include: step(s) to enter environment, multi-level environment, inability to perform IADLs, inability to perform ADLs, inability to navigate level surfaces without external assistance, inability to ambulate household distances and recent fall(s)/fall history  Pt will benefit from continued skilled PT services to address deficits as defined above and to maximize level of functional mobility to facilitate return toward PLOF and improved QOL  From PT/mobility standpoint, recommendation at time of d/c would be Home PT, home with family support and with walker pending progress in order to reduce fall risk and maximize pt's functional independence and consistency with mobility in order to facilitate return to PLOF  Recommend trial with walker next 1-2 sessions, trial with cane next 1-2 sessions and ther ex next 1-2 sessions  Barriers to Discharge: Inaccessible home environment  Barriers to Discharge Comments: 2nd floor bedroom  has full bathroom on 1st floor he can use  PT Discharge Recommendation: Home with home health rehabilitation    See flowsheet documentation for full assessment

## 2023-06-27 NOTE — PROGRESS NOTES
[address]  Progress Note  Name: Madhavi Mac I  MRN: [id number]  Unit/Bed#: -01 I Date of Admission: 6/26/2023   Date of Service: 6/27/2023 I Hospital Day: 1    Assessment/Plan   * [facility] )  Assessment & Plan  · Baseline creatinine ranged between 0 9-1 14  · On admission 1 7 improved to 1 3- eating drinking no obstruction on us - dc fluids   Repeat am avoid hypotension   · Hold lisinopril, Demadex, metolazone  · Suspect prerenal       Intractable low back pain  Assessment & Plan  · Imaging showsStable postoperative change after L4-5 posterior fixation without hardware complication  Age-appropriate degenerative change  No dynamic instability  · Patient has acute on chronic lower back pain that has been worsening since May he has been attending palliative care medications have been adjusted he also prior to coming in took 2 doses of steroids and it has not been helping at 8 out of 10 is radiating down the legs which is new for him as he started to have urine incontinence on Friday he denies any anal paresthesias but he is complaining of numbness and tingling below the bellybutton down to the feet which is also new for him  Recently he has started using a walker  Pain is still 8 out of 10  · He has been started on steroids Decadron 2 mg p o  twice daily and restarted his pain control  With his concerning signs I did do a stat MRI of the lumbar which reveals- Stable posterior lumbar fixation hardware including bilateral transpedicular screws at the L4 and L5 levels with susceptibility artifacts   Bilateral laminectomies are again noted at the L4-5 and L5-S1 levels with patency of the central canal  A   chronic fluid collection is noted within the L4-5 laminectomy bed ,decreased in size compared to the prior 10/17/2017 MRI examination       New diffuse disc bulge at the L3-4 level with increased bilateral ligamentum flavum and facet hypertrophy causing moderate to severe central canal and bilateral subarticular/lateral recess narrowing with impingement of the cauda equina  · Due to above reached out to [facility] neurosurgery for advice awaiting return call /text    Chronic diastolic heart failure (HCC)  Assessment & Plan  Wt Readings from Last 3 Encounters:   06/27/23 111 kg (244 lb 6 4 oz)   06/27/23 111 kg (244 lb)   06/21/23 112 kg (246 lb 8 oz)     · Last echocardiogram shows ejection fraction 65% with dilated chambers 2022  · Diuretics remain on hold due to RODO, as well as lisinopril  · Euvolemic         Hypokalemia  Assessment & Plan  · Potassium 2 9  · Magnesium- nml  · Suspect in light of torsemide and metalazone- multiple supplementations now improved to 3 3 will give another 40 meq dose   · Hold diuretics    Malignant neoplasm of urinary bladder [facility])  Assessment & Plan  Follow-up with urology he is on immunotherapy with [facility] I spoke to dr Elizabeth Braden session will be canceled yoli and he discussed with patient    Obstructive sleep apnea on CPAP  Assessment & Plan  Patient oxygen saturations dropped to 87% while patient sleeping  Continue CPAP             VTE Pharmacologic Prophylaxis: VTE Score: 5 High Risk (Score >/= 5) - Pharmacological DVT Prophylaxis Ordered: heparin  Sequential Compression Devices Ordered  Patient Centered Rounds: I performed bedside rounds with nursing staff today  Discussions with Specialists or Other Care Team Provider: radiology    Education and Discussions with Family / Patient: pt   Total Time Spent on Date of Encounter in care of patient: 35 minutes This time was spent on one or more of the following: performing physical exam; counseling and coordination of care; obtaining or reviewing history; documenting in the medical record; reviewing/ordering tests, medications or procedures; communicating with other healthcare professionals and discussing with patient's family/caregivers      Current Length of Stay: 1 day(s)  Current Patient Status: Inpatient   Certification Statement: The patient will continue to require additional inpatient hospital stay due to await mri k repeat  Discharge Plan: Anticipate discharge later today or tomorrow to home  Code Status: Level 1 - Full Code    Subjective:   Seen and examined still back pain and pelvic parasthesia     Objective:     Vitals:   Temp (24hrs), Av 7 °F (36 5 °C), Min:97 5 °F (36 4 °C), Max:97 9 °F (36 6 °C)    Temp:  [97 5 °F (36 4 °C)-97 9 °F (36 6 °C)] 97 5 °F (36 4 °C)  HR:  [52-94] 62  Resp:  [16-19] 19  BP: (115-140)/(55-72) 115/69  SpO2:  [91 %-99 %] 92 %  Body mass index is 37 16 kg/m²  Input and Output Summary (last 24 hours): Intake/Output Summary (Last 24 hours) at 2023 1339  Last data filed at 2023 1209  Gross per 24 hour   Intake 1320 ml   Output 600 ml   Net 720 ml       Physical Exam:   Physical Exam  Vitals and nursing note reviewed  Constitutional:       General: He is not in acute distress  Appearance: He is well-developed  HENT:      Head: Normocephalic and atraumatic  Eyes:      Conjunctiva/sclera: Conjunctivae normal    Cardiovascular:      Rate and Rhythm: Normal rate and regular rhythm  Heart sounds: No murmur heard  Pulmonary:      Effort: Pulmonary effort is normal  No respiratory distress  Breath sounds: Normal breath sounds  No wheezing or rales  Abdominal:      General: There is no distension  Palpations: Abdomen is soft  Tenderness: There is no abdominal tenderness  Musculoskeletal:         General: Swelling (mild b/l) present  Cervical back: Neck supple  Comments: Decreased sensation on lower abdomen and feet sensation   Skin:     General: Skin is warm and dry  Capillary Refill: Capillary refill takes less than 2 seconds  Neurological:      Mental Status: He is alert and oriented to person, place, and time     Psychiatric:         Mood and Affect: Mood normal  Additional Data:     Labs:  Results from last 7 days   Lab Units 06/27/23  0109 06/26/23  0857   WBC Thousand/uL 10 89* 10 72*   HEMOGLOBIN g/dL 11 9* 14 5   HEMATOCRIT % 35 1* 42 8   PLATELETS Thousands/uL 188 241   NEUTROS PCT %  --  71   LYMPHS PCT %  --  21   MONOS PCT %  --  6   EOS PCT %  --  1     Results from last 7 days   Lab Units 06/27/23  1220 06/27/23  0237   SODIUM mmol/L  --  133*   POTASSIUM mmol/L 3 3* 2 9*   CHLORIDE mmol/L  --  94*   CO2 mmol/L  --  29   BUN mg/dL  --  61*   CREATININE mg/dL  --  1 32*   ANION GAP mmol/L  --  10   CALCIUM mg/dL  --  9 2   ALBUMIN g/dL  --  4 0   TOTAL BILIRUBIN mg/dL  --  0 35   ALK PHOS U/L  --  75   ALT U/L  --  25   AST U/L  --  12*   GLUCOSE RANDOM mg/dL  --  152*                       Lines/Drains:  Invasive Devices     Peripheral Intravenous Line  Duration           Peripheral IV 06/26/23 Right Antecubital 1 day    Peripheral IV 06/26/23 Left Forearm <1 day                  Telemetry:  Telemetry Orders (From admission, onward)             24 Hour Telemetry Monitoring  Continuous x 24 Hours (Telem)        Question:  Reason for 24 Hour Telemetry  Answer:  Metabolic/electrolyte disturbance with high probability of dysrhythmia  K level <3 or >6 OR KCL infusion >10mEq/hr                 Telemetry Reviewed: Normal Sinus Rhythm  Indication for Continued Telemetry Use: No indication for continued use  Will discontinue                Imaging: no imaging done will eval mri    Recent Cultures (last 7 days):         Last 24 Hours Medication List:   Current Facility-Administered Medications   Medication Dose Route Frequency Provider Last Rate   • acetaminophen  650 mg Oral Q6H PRN Mellisa Carrington MD     • aspirin  81 mg Oral Daily Mellisa Carrington MD     • atorvastatin  40 mg Oral Daily With Stacey Lira MD     • co-enzyme Q-10  90 mg Oral Daily Yves Webber MD     • dexamethasone  2 mg Oral BID With Meals Mellisa Carrington MD     • docusate sodium  100 mg Oral BID Blake Isbell MD     • gabapentin  600 mg Oral BID Blake Isbell MD     • heparin (porcine)  5,000 Units Subcutaneous Q8H [facility] Yves Webber MD     • HYDROmorphone  0 5 mg Intravenous Q6H PRN Blake Isbell MD     • lidocaine  1 patch Topical Daily Blake Isbell MD     • metoprolol succinate  25 mg Oral Daily Blake Isbell MD     • naloxone  0 04 mg Intravenous Q1MIN PRN Blake Isbell MD     • ondansetron  4 mg Intravenous Q6H PRN Blake Isbell MD     • oxyCODONE  40 mg Oral Pending sale to [facility]     • oxyCODONE  15 mg Oral Q4H PRN Blake Isbell MD     • polyethylene glycol  17 g Oral Daily Blake Isbell MD     • tamsulosin  0 4 mg Oral Daily With Jair Liao MD     • tiZANidine  4 mg Oral TID Blake Isbell MD          Today, Patient Was Seen By: Zach Madison MD    **Please Note: This note may have been constructed using a voice recognition system  **

## 2023-06-27 NOTE — TELEPHONE ENCOUNTER
Patients wife called stating she wanted to speak to office again  She has some questions      She can be reached at [phone]

## 2023-06-27 NOTE — CASE MANAGEMENT
Case Management Discharge Planning Note    Patient name Desean Clarke /-00 MRN [id number]  : 1960 Date 2023       Current Admission Date: 2023  Current Admission Diagnosis:RODO (acute kidney injury) [facility])   Patient Active Problem List    Diagnosis Date Noted   • Acute hyponatremia 2023   • RODO (acute kidney injury) ([facility] ) 2023   • Intractable low back pain 2023   • Chronic diastolic heart failure ([facility] )    • Opioid withdrawal ([facility] )    • Hypokalemia 2023   • Anxiety 2023   • Chronic bilateral low back pain with bilateral sciatica 2023   • History of gross hematuria 2023   • Malignant ne[facility] bladder [facility] 2023   • Obstructive sleep apnea on CPAP    • Hypertension    • Bladder mass 2023   • Benign prostatic hyperplasia with urinary frequency 2023      LOS (days): 1  Geometric Mean LOS (GMLOS) (days): 3 10  Days to GMLOS:1 9     OBJECTIVE:  Risk of Unplanned Readmission Score: 29 51         Current admission status: Inpatient   Preferred Pharmacy:   [facility], [address] [place] [address]  Phone: [phone] Fax: [address], [address] [place] [address]  Phone: [phone] Fax: [phone]    Primary Care Provider: Anju Mercado DO    Primary Insurance: MEDICARE  Secondary Insurance: BLUE CROSS    DISCHARGE DETAILS:     PT is recommended for a higher level of care at [facility] for a neurology consult/neurosurgical needs  Medical necessity form was completed and is in the PT folder

## 2023-06-27 NOTE — ASSESSMENT & PLAN NOTE
· Imaging showsStable postoperative change after L4-5 posterior fixation without hardware complication  Age-appropriate degenerative change  No dynamic instability  · Patient has acute on chronic lower back pain that has been worsening since May he has been attending palliative care medications have been adjusted he also prior to coming in took 2 doses of steroids and it has not been helping at 8 out of 10 is radiating down the legs which is new for him as he started to have urine incontinence on Friday he denies any anal paresthesias but he is complaining of numbness and tingling below the bellybutton down to the feet which is also new for him  Recently he has started using a walker  Pain is still 8 out of 10  · He has been started on steroids Decadron 2 mg p o  twice daily and restarted his pain control  With his concerning signs I did do a stat MRI of the lumbar which reveals- Stable posterior lumbar fixation hardware including bilateral transpedicular screws at the L4 and L5 levels with susceptibility artifacts  Bilateral laminectomies are again noted at the L4-5 and L5-S1 levels with patency of the central canal  A   chronic fluid collection is noted within the L4-5 laminectomy bed ,decreased in size compared to the prior 10/17/2017 MRI examination       New diffuse disc bulge at the L3-4 level with increased bilateral ligamentum flavum and facet hypertrophy causing moderate to severe central canal and bilateral subarticular/lateral recess narrowing with impingement of the cauda equina    · Discussed with neurosurgery AP on call for [facility]- agree to transfer to [facility] for neurosurgery evaluation and options- discussed with patient in agreement  · Accepted by Shahid Villafana

## 2023-06-28 ENCOUNTER — DOCUMENTATION (OUTPATIENT)
Dept: PALLIATIVE MEDICINE | Facility: CLINIC | Age: 63
End: 2023-06-28

## 2023-06-28 PROBLEM — R73.9 HYPERGLYCEMIA: Status: ACTIVE | Noted: 2023-06-28

## 2023-06-28 PROBLEM — F11.90 CHRONIC, CONTINUOUS USE OF OPIOIDS: Status: ACTIVE | Noted: 2023-06-28

## 2023-06-28 LAB
ANION GAP SERPL CALCULATED.3IONS-SCNC: 3 MMOL/L
BUN SERPL-MCNC: 28 MG/DL (ref 5–25)
CALCIUM SERPL-MCNC: 9.2 MG/DL (ref 8.3–10.1)
CHLORIDE SERPL-SCNC: 106 MMOL/L (ref 96–108)
CO2 SERPL-SCNC: 28 MMOL/L (ref 21–32)
CREAT SERPL-MCNC: 0.83 MG/DL (ref 0.6–1.3)
GFR SERPL CREATININE-BSD FRML MDRD: 92 ML/MIN/1.73SQ M
GLUCOSE SERPL-MCNC: 135 MG/DL (ref 65–140)
GLUCOSE SERPL-MCNC: 138 MG/DL (ref 65–140)
GLUCOSE SERPL-MCNC: 142 MG/DL (ref 65–140)
GLUCOSE SERPL-MCNC: 142 MG/DL (ref 65–140)
MAGNESIUM SERPL-MCNC: 2.3 MG/DL (ref 1.6–2.6)
POTASSIUM SERPL-SCNC: 3.3 MMOL/L (ref 3.5–5.3)
SODIUM SERPL-SCNC: 137 MMOL/L (ref 135–147)

## 2023-06-28 PROCEDURE — 99223 1ST HOSP IP/OBS HIGH 75: CPT | Performed by: INTERNAL MEDICINE

## 2023-06-28 PROCEDURE — 83735 ASSAY OF MAGNESIUM: CPT | Performed by: STUDENT IN AN ORGANIZED HEALTH CARE EDUCATION/TRAINING PROGRAM

## 2023-06-28 PROCEDURE — 99232 SBSQ HOSP IP/OBS MODERATE 35: CPT | Performed by: NURSE PRACTITIONER

## 2023-06-28 PROCEDURE — 99223 1ST HOSP IP/OBS HIGH 75: CPT | Performed by: PHYSICIAN ASSISTANT

## 2023-06-28 PROCEDURE — 80048 BASIC METABOLIC PNL TOTAL CA: CPT | Performed by: PHYSICIAN ASSISTANT

## 2023-06-28 PROCEDURE — 82948 REAGENT STRIP/BLOOD GLUCOSE: CPT

## 2023-06-28 RX ORDER — POTASSIUM CHLORIDE 20 MEQ/1
40 TABLET, EXTENDED RELEASE ORAL ONCE
Status: COMPLETED | OUTPATIENT
Start: 2023-06-28 | End: 2023-06-28

## 2023-06-28 RX ORDER — INSULIN LISPRO 100 [IU]/ML
1-6 INJECTION, SOLUTION INTRAVENOUS; SUBCUTANEOUS
Status: DISCONTINUED | OUTPATIENT
Start: 2023-06-28 | End: 2023-07-01 | Stop reason: HOSPADM

## 2023-06-28 RX ORDER — HYDROMORPHONE HCL/PF 1 MG/ML
1 SYRINGE (ML) INJECTION
Status: DISCONTINUED | OUTPATIENT
Start: 2023-06-28 | End: 2023-06-29

## 2023-06-28 RX ORDER — OXYCODONE HYDROCHLORIDE 10 MG/1
20 TABLET ORAL
Status: DISCONTINUED | OUTPATIENT
Start: 2023-06-28 | End: 2023-06-29

## 2023-06-28 RX ORDER — PANTOPRAZOLE SODIUM 40 MG/1
40 TABLET, DELAYED RELEASE ORAL
Status: DISCONTINUED | OUTPATIENT
Start: 2023-06-28 | End: 2023-07-01 | Stop reason: HOSPADM

## 2023-06-28 RX ADMIN — OXYCODONE HYDROCHLORIDE 40 MG: 40 TABLET, FILM COATED, EXTENDED RELEASE ORAL at 21:01

## 2023-06-28 RX ADMIN — DOCUSATE SODIUM 100 MG: 100 CAPSULE, LIQUID FILLED ORAL at 17:04

## 2023-06-28 RX ADMIN — Medication 100 MG: at 09:08

## 2023-06-28 RX ADMIN — OXYCODONE HYDROCHLORIDE 40 MG: 40 TABLET, FILM COATED, EXTENDED RELEASE ORAL at 05:32

## 2023-06-28 RX ADMIN — HEPARIN SODIUM 5000 UNITS: 5000 INJECTION INTRAVENOUS; SUBCUTANEOUS at 05:32

## 2023-06-28 RX ADMIN — GABAPENTIN 600 MG: 300 CAPSULE ORAL at 17:04

## 2023-06-28 RX ADMIN — ASPIRIN 81 MG CHEWABLE TABLET 81 MG: 81 TABLET CHEWABLE at 09:08

## 2023-06-28 RX ADMIN — DEXAMETHASONE 2 MG: 2 TABLET ORAL at 09:08

## 2023-06-28 RX ADMIN — POTASSIUM CHLORIDE 40 MEQ: 1500 TABLET, EXTENDED RELEASE ORAL at 11:07

## 2023-06-28 RX ADMIN — LIDOCAINE 1 PATCH: 50 PATCH CUTANEOUS at 09:08

## 2023-06-28 RX ADMIN — HYDROMORPHONE HYDROCHLORIDE 1 MG: 1 INJECTION, SOLUTION INTRAMUSCULAR; INTRAVENOUS; SUBCUTANEOUS at 21:59

## 2023-06-28 RX ADMIN — TIZANIDINE 4 MG: 4 TABLET ORAL at 17:04

## 2023-06-28 RX ADMIN — DEXAMETHASONE 2 MG: 2 TABLET ORAL at 11:07

## 2023-06-28 RX ADMIN — OXYCODONE HYDROCHLORIDE 15 MG: 10 TABLET ORAL at 09:29

## 2023-06-28 RX ADMIN — TIZANIDINE 4 MG: 4 TABLET ORAL at 21:01

## 2023-06-28 RX ADMIN — HEPARIN SODIUM 5000 UNITS: 5000 INJECTION INTRAVENOUS; SUBCUTANEOUS at 13:50

## 2023-06-28 RX ADMIN — OXYCODONE HYDROCHLORIDE 20 MG: 10 TABLET ORAL at 15:20

## 2023-06-28 RX ADMIN — HEPARIN SODIUM 5000 UNITS: 5000 INJECTION INTRAVENOUS; SUBCUTANEOUS at 21:01

## 2023-06-28 RX ADMIN — ATORVASTATIN CALCIUM 40 MG: 40 TABLET, FILM COATED ORAL at 17:04

## 2023-06-28 RX ADMIN — HYDROMORPHONE HYDROCHLORIDE 0.5 MG: 1 INJECTION, SOLUTION INTRAMUSCULAR; INTRAVENOUS; SUBCUTANEOUS at 11:19

## 2023-06-28 RX ADMIN — GABAPENTIN 600 MG: 300 CAPSULE ORAL at 09:08

## 2023-06-28 RX ADMIN — OXYCODONE HYDROCHLORIDE 40 MG: 40 TABLET, FILM COATED, EXTENDED RELEASE ORAL at 13:50

## 2023-06-28 RX ADMIN — TAMSULOSIN HYDROCHLORIDE 0.4 MG: 0.4 CAPSULE ORAL at 17:04

## 2023-06-28 RX ADMIN — OXYCODONE HYDROCHLORIDE 15 MG: 10 TABLET ORAL at 04:17

## 2023-06-28 RX ADMIN — OXYCODONE HYDROCHLORIDE 15 MG: 10 TABLET ORAL at 04:15

## 2023-06-28 RX ADMIN — TIZANIDINE 4 MG: 4 TABLET ORAL at 09:08

## 2023-06-28 RX ADMIN — METOPROLOL SUCCINATE 25 MG: 25 TABLET, FILM COATED, EXTENDED RELEASE ORAL at 09:08

## 2023-06-28 NOTE — ASSESSMENT & PLAN NOTE
Patient p/w chronic B/L lower back pain with radiation down B/L lower extremities   • S/p L4/5 PLDF ~ 6 years ago  • Patient with chronic persistent 7/10 lower back pain since surgery, worsening with recent bladder biopsy on 3/17/2023  • [facility] following patient as an outpatient for pain management  • Presented to [address] [place] [address] [place] on 6/26 due to worsening lower back pain and 2 episodes of urinary incontinence. Imaging:   · MRI lumbar spine without 6/27/23: stable posterior lumbar fixation hardware including bilateral transpedicular screws at the L4 and L5 levels with susceptibility artifacts. Bilateral laminectomies are again noted at the L4-5 and L5-S1 levels with patency of the central canal. A chronic fluid collection is noted within the L4-5 laminectomy bed, decreased in size compared to the prior 10/17/2017 MRI examination. New diffuse disc bulge at the L3-4 level with increased bilateral ligamentum flavum and facet hypertrophy causing moderate to severe central canal and bilateral subarticular/lateral recess narrowing with impingement of the cauda equina. · Lumbar spine bending x-rays 6/16/23: Evidence of prior hardware L4-L5 without any complication or loosening. No dynamic instability noted. Plan:   • Continue to monitor neurological exam.    o Currently no focal motor deficits. Mild decrease sensation lateral right foot. • Pain management per palliative.   o Could consider increasing frequency of gabapentin to 3 times daily if patient able to tolerate. o Could consider scheduling Tylenol 975 mg every 8 hours. • Discussed inpatient surgical management vs. outpatient surgical/medical  management of lumbar stenosis based on the patients concurrent immunotherapy treatment of his bladder cancer  • Discussed baseline bending x-rays to determine if if fixation would be indicated. It appears these x-rays have been completed on June 16 without concern for dynamic instability.   Will discuss with team if decompression alone without extension of fusion would be option. • Options:   o lumbar decompression with or without fixation as inpatient, holding immunotherapy until 2 weeks post op  o lumbar decompression with or without fixation as outpatient, prior to or after completion of immunotherapies  o Consideration for maximizing conservative management including therapy and injections  • Briefly discussed with patient expected postoperative course and hospitalization. • Will discuss with immunotherapy plan with patient's urologist, Dr. Abigail Koenig. Discussed with patient need to decide if it is acceptable to hold immunotherapies for surgical intervention and its impact on wound healing and risk of infection. We will clarify disease status. We will continue to follow closely. Call with questions or concerns.

## 2023-06-28 NOTE — H&P
[address] [facility]  H&P  Name: Gardenia Mitchell 58 y.o. male I MRN: [id number]  Unit/Bed#: [facility] 578-86 I Date of Admission: 6/27/2023   Date of Service: 6/27/2023 I Hospital Day: 0      Assessment/Plan   Acute hyponatremia  Assessment & Plan  · Na 132-133 at [facility]. Believed in part to be hypovolemic in nature. Diuretics held, repeat BMP in the morning and consider further work-up if failing to improve    RODO (acute kidney injury) [facility])  Assessment & Plan  · Admission creatinine 1.79 from baseline of around 0.9-1.15  · Suspected to be hypovolemic so diuretics and lisinopril currently held  · Daily volume assessments, if renal function stable can consider restarting gentle dose of diuretic tomorrow    Chronic diastolic heart failure (HCC)  Assessment & Plan  Wt Readings from Last 3 Encounters:   06/27/23 111 kg (244 lb 6.4 oz)   06/27/23 111 kg (244 lb)   06/21/23 112 kg (246 lb 8 oz)     · Noted Echo from 11/15 with preserved LVEF  · Diuretics currently held out of concern for RODO and hyponatremia  · I/O, daily weight. Fluid restriction and low-sodium diet  · Consider restarting diuretics at lower dose tomorrow        Hypokalemia  Assessment & Plan  · 3.3 today, improving.   · Believed to be decreased due to diuretics  · Repeat BMP in the morning with magnesium level    Chronic bilateral low back pain with bilateral sciatica  Assessment & Plan  · Admitted 6/26 for acute on chronic low back pain  · Follows with palliative for his chronic low back pain as well as bladder cancer  · Patient made medical team aware of lower extremity paresthesias and occasional incontinence, so MRI was ordered which shows: "New diffuse disc bulge at the L3-4 level with increased bilateral ligamentum flavum and facet hypertrophy causing moderate to severe central canal and bilateral subarticular/lateral recess narrowing with impingement of the cauda equina"  · Started on Decadron and case discussed with neurology. Accepted in transfer and neurology to see in the morning  · Patient's pain is currently 8 out of 10 which she states is baseline for him. He states he had 3 episodes of urinary incontinence over the past week and they all seem to occur when patient is laying in the same supine position    Malignant neoplasm of urinary bladder ([address])  Assessment & Plan  · Follows with urology Dr. Zach Rodriguez, will likely miss immunotherapy scheduled for this week due to hospitalization    Obstructive sleep apnea on CPAP  Assessment & Plan  Continue QHS Cpap         VTE Pharmacologic Prophylaxis:   Moderate Risk (Score 3-4) - Pharmacological DVT Prophylaxis Ordered: heparin. Code Status: Level 3 - DNAR and DNI   Discussion with family: Patient declined call to . Patient in regular contact with family    Anticipated Length of Stay: Patient will be admitted on an inpatient basis with an anticipated length of stay of greater than 2 midnights secondary to Back Pain. Total Time Spent on Date of Encounter in care of patient: 55 minutes This time was spent on one or more of the following: performing physical exam; counseling and coordination of care; obtaining or reviewing history; documenting in the medical record; reviewing/ordering tests, medications or procedures; communicating with other healthcare professionals and discussing with patient's family/caregivers. Chief Complaint: Back Pain    History of Present Illness:  Roselia Zuleta is a 58 y.o. male with a PMH of bladder cancer, chronic low back pain, CHF, among others who presents with worsening low back. Lebron Vazquez was admitted to [facility] yesterday for acute on chronic low back pain that was refractory to his home medications. He follows with palliative for his severe low back pain.   Patient had other medical findings including hyponatremia, hypokalemia and RODO, all of which were believed to be due to a hypovolemic state from his diuretic use. They were held and patient was gently hydrated with some improvement in some of these parameters. In discussion with the medical team, Saud Jessica does state he gets occasional lower extremity paresthesias, he weekly endorses saddle anesthesia, essentially telling it happens so infrequently that he he does not really take note of it. However he does state that he has had 3 episodes of urinary incontinence over the last week. This along with the pain prompted medical team to obtain an MRI which showed concerning severe lumbar stenosis with concern for cauda equina syndrome. Patient was referred for transfer and accepted. At present, Saud Jessica is sitting in chair at bedside and he states he feels well. Pain is currently 8 out of 10, but that is essentially his baseline with his multiple spinal issues. He has no other concerns at this time. Review of Systems:  Review of Systems   Constitutional: Negative for chills and fever. Cardiovascular: Negative for chest pain and palpitations. Gastrointestinal: Negative for diarrhea, nausea and vomiting. Genitourinary: Negative for dysuria.        Past Medical and Surgical History:   Past Medical History:   Diagnosis Date   • Arthritis    • Bladder cancer ([address])    • Chronic narcotic dependence ([address])    • Chronic pain disorder     lower back and down both legs   • Colon polyp    • Coronary artery disease    • CPAP (continuous positive airway pressure) dependence    • Does use hearing aid     will wear DOS   • Full dentures    • Heart failure (HCC)     and "fluid retention" SL ABDULAZIZ musa PA"   • High cholesterol    • Hypertension    • Mild ankle edema     and feet   • Obstructive sleep apnea on CPAP    • Prediabetes    • Shortness of breath     per pt "with just exertion"   • Sleep apnea    • Wears glasses        Past Surgical History:   Procedure Laterality Date   • BACK SURGERY      titanium rods implanted   • COLONOSCOPY     • CYSTOSCOPY W/ URETERAL STENT PLACEMENT Bilateral 3/17/2023    Procedure: bilateral retrograde;  Surgeon: Christie Guerrero MD;  Location: [facility];  Service: Urology   • HERNIA REPAIR      x5   • MT CYSTO W/REMOVAL OF LESIONS SMALL N/A 5/1/2023    Procedure: CYSTOSCOPY TURBT;  Surgeon: Christie Guerrero MD;  Location: [facility];  Service: Urology   • TRANSURETHRAL RESECTION OF BLADDER TUMOR N/A 3/17/2023    Procedure: TRANSURETHRAL RESECTION OF BLADDER TUMOR (TURBT); Surgeon: Christie Guerrero MD;  Location: [facility];  Service: Urology       Meds/Allergies:  Prior to Admission medications    Medication Sig Start Date End Date Taking?  Authorizing Provider   aspirin 81 mg chewable tablet Chew 81 mg daily    Historical Provider, MD   atorvastatin (LIPITOR) 40 mg tablet  10/3/22   Historical Provider, MD   co-enzyme Q-10 30 MG capsule Take 100 mg by mouth daily     Historical Provider, MD   dexamethasone (DECADRON) 2 mg tablet Take 1 tablet (2 mg total) by mouth 2 (two) times a day with meals 1 tab po at breakfast and at lunch  Patient not taking: Reported on 6/21/2023 6/15/23   Tristan Sahni PA-C   Farxiga 5 MG TABS 5 mg daily 5mg alternating with 2.5mg for fluid retention 10/26/22   Historical Provider, MD   gabapentin (NEURONTIN) 300 mg capsule Take 1 capsule (300 mg total) by mouth 2 (two) times a day  Patient taking differently: Take 600 mg by mouth 2 (two) times a day 6/20/23   Tristan Sahni PA-C   lisinopril (ZESTRIL) 2.5 mg tablet  10/26/22   Historical Provider, MD   methylPREDNISolone 4 MG tablet therapy pack Use as directed on package 6/22/23   Tristan Sahni PA-C   metolazone (ZAROXOLYN) 2.5 mg tablet Take 2.5 mg by mouth 3 (three) times a week M-W-F    Historical Provider, MD   metoprolol succinate (TOPROL-XL) 25 mg 24 hr tablet Take 25 mg by mouth daily    Historical Provider, MD   multivitamin-iron-minerals-folic acid (CENTRUM) chewable tablet Chew 1 tablet daily    Historical Provider, MD   oxyCODONE (OxyCONTIN) 40 mg 12 hr tablet Take 1 tablet (40 mg total) by mouth every 8 (eight) hours Max Daily Amount: 120 mg 23   Kyler Lin MD   oxyCODONE (ROXICODONE) 30 MG immediate release tablet Take 0.5 tablets (15mg) by mouth q4h PRN for moderate to severe cancer pain. Max daily amount 120mgs 23   Darshan Hunt PA-C   potassium chloride (K-DUR,KLOR-CON) 20 mEq tablet 2 (two) times a day 10/26/22   Historical Provider, MD   tamsulosin (FLOMAX) 0.4 mg Take 1 capsule (0.4 mg total) by mouth daily with dinner 23   Bessy Sharpe MD   torsemide [facility]) 20 mg tablet Take 60 mg by mouth daily    Historical Provider, MD     I have reviewed home medications using recent Epic encounter. Allergies:    Allergies   Allergen Reactions   • Mirtazapine Other (See Comments) and Confusion     Pt drove without knowing and woke up at a new destination       Social History:  Marital Status: /Civil Union   Occupation: Not on file  Patient Pre-hospital Living Situation: Home  Patient Pre-hospital Level of Mobility: walks  Patient Pre-hospital Diet Restrictions: Diabetic  Substance Use History:   Social History     Substance and Sexual Activity   Alcohol Use Not Currently    Comment: 3 per year     Social History     Tobacco Use   Smoking Status Former   • Packs/day: 1.00   • Years: 35.00   • Total pack years: 35.00   • Types: Cigarettes   • Start date: 2023   • Quit date: 2016   • Years since quittin.4   Smokeless Tobacco Never     Social History     Substance and Sexual Activity   Drug Use Not Currently   • Types: Marijuana       Family History:  Family History   Problem Relation Age of Onset   • Cancer Father    • Aortic aneurysm Father    • Leukemia Father    • Hypertension Mother        Physical Exam:     Vitals:   Blood Pressure: 133/63 (23)  Pulse: (!) 52 (23)  Temperature: 97.8 °F (36.6 °C) (23)  Temp Source: Oral (23)  SpO2: 95 % (23)    Physical Exam  Vitals and nursing note reviewed. Constitutional:       General: He is not in acute distress. Appearance: He is well-developed. He is not toxic-appearing or diaphoretic. HENT:      Head: Normocephalic and atraumatic. Mouth/Throat:      Mouth: Mucous membranes are moist.   Eyes:      General: No scleral icterus. Extraocular Movements: Extraocular movements intact. Conjunctiva/sclera: Conjunctivae normal.   Cardiovascular:      Rate and Rhythm: Normal rate and regular rhythm. Pulses: Normal pulses. Heart sounds: No murmur heard. No friction rub. No gallop. Pulmonary:      Effort: Pulmonary effort is normal. No respiratory distress. Breath sounds: Normal breath sounds. No wheezing, rhonchi or rales. Abdominal:      General: Abdomen is flat. Bowel sounds are normal. There is no distension. Palpations: Abdomen is soft. There is no mass. Tenderness: There is no abdominal tenderness. There is no guarding. Musculoskeletal:         General: No swelling. Cervical back: Neck supple. Right lower leg: No edema. Left lower leg: No edema. Comments: Point tenderness of the central lumbar spine   Lymphadenopathy:      Cervical: No cervical adenopathy. Skin:     General: Skin is warm and dry. Capillary Refill: Capillary refill takes less than 2 seconds. Coloration: Skin is not jaundiced. Findings: No rash. Neurological:      General: No focal deficit present. Mental Status: He is alert and oriented to person, place, and time. Sensory: No sensory deficit. Motor: No weakness.    Psychiatric:         Mood and Affect: Mood normal.          Additional Data:     Lab Results:  Results from last 7 days   Lab Units 06/27/23  0109 06/26/23  0857   WBC Thousand/uL 10.89* 10.72*   HEMOGLOBIN g/dL 11.9* 14.5   HEMATOCRIT % 35.1* 42.8   PLATELETS Thousands/uL 188 241   NEUTROS PCT %  --  71   LYMPHS PCT %  --  21   MONOS PCT %  --  6 EOS PCT %  --  1     Results from last 7 days   Lab Units 06/27/23  1220 06/27/23  0237   SODIUM mmol/L  --  133*   POTASSIUM mmol/L 3.3* 2.9*   CHLORIDE mmol/L  --  94*   CO2 mmol/L  --  29   BUN mg/dL  --  61*   CREATININE mg/dL  --  1.32*   ANION GAP mmol/L  --  10   CALCIUM mg/dL  --  9.2   ALBUMIN g/dL  --  4.0   TOTAL BILIRUBIN mg/dL  --  0.35   ALK PHOS U/L  --  75   ALT U/L  --  25   AST U/L  --  12*   GLUCOSE RANDOM mg/dL  --  152*                       Lines/Drains:  Invasive Devices     Peripheral Intravenous Line  Duration           Peripheral IV 06/26/23 Left Forearm 1 day    Peripheral IV 06/26/23 Right Antecubital 1 day                    Imaging: Reviewed radiology reports from this admission including: MRI spine  No orders to display       EKG and Other Studies Reviewed on Admission:   · EKG: No EKG obtained. ** Please Note: This note has been constructed using a voice recognition system.  **

## 2023-06-28 NOTE — ASSESSMENT & PLAN NOTE
Wt Readings from Last 3 Encounters:   06/28/23 110 kg (242 lb 8.1 oz)   06/27/23 111 kg (244 lb 6.4 oz)   06/27/23 111 kg (244 lb)     · ECHO 11/25/22 -> EF of 65% with intermediate diastolic function  · Monitor I/Os and weights  · Management per primary team

## 2023-06-28 NOTE — ASSESSMENT & PLAN NOTE
Admission creatinine 1.79 from baseline of around 0.9-1.15  · Suspected to be hypovolemic so diuretics and lisinopril held, with resolution.   · Cardiology resumed home torsemide 60 mg daily on 6/29, monitor   · US kidney bladder negative for hydro  · Avoid nephrotoxins and hypotension  · Monitor PVR  · BMP in AM

## 2023-06-28 NOTE — CONSULTS
Consultation - Palliative and Supportive Care   Martine Ahn 58 y.o. male [id number]    Patient Active Problem List   Diagnosis   • Benign prostatic hyperplasia with urinary frequency   • Bladder mass   • Obstructive sleep apnea on CPAP   • Hypertension   • Malignant neoplasm of urinary bladder (HCC)   • History of gross hematuria   • Anxiety   • Chronic bilateral low back pain with bilateral sciatica   • Hypokalemia   • Chronic diastolic heart failure (HCC)   • Opioid withdrawal (HCC)   • RODO (acute kidney injury) ([address])   • Intractable low back pain   • Acute hyponatremia   • Chronic, continuous use of opioids   • Hyperglycemia     Active issues specifically addressed today include:  Bladder cancer  Acute on chronic low back pain  Uncomplicated opioid dependence  Chronic diastolic heart failure  Anxiety related to health  Palliative care patient  Goals of care    Plan:  1. Symptom management -    -Maintain OxyContin 40 mg 3 times daily   -DC as needed opioids and replaced with following: Oxycodone 20 mg every 3 hours as needed for moderate pain and link with order IV Dilaudid 1 mg every 3 hours as needed for severe pain.   -Keep other symptom regimen as is     2. Goals -patient very anxious with his diagnosis of severe spinal stenosis and acute kidney injury. Spent time discussing what his diagnoses meant and also reassured patient that his kidney function has returned to his baseline. Patient does worry that he does not have the strength to "fight" but wants to learn all of his options. Did discuss neurosurgery will review if he is a surgical candidate or not and see if any surgical intervention is warranted at this time. Patient is clear that he is a DNR/DNI and has advance care planning documents in the chart. 3. Psychosocial support -prolonged time spent providing supportive listening and anxiety containment. Please see social work note for further details.        4. PSC [id number]       Code Status: DNR DNI- Level 3   Decisional apparatus:  Patient is competent on my exam today. If competence is lost, patient's substitute decision maker would default to spouse by [place] Act 169. Advance Directive / Living Will / POLST: Yes     I have reviewed the patient's controlled substance dispensing history in the Prescription Drug Monitoring Program in compliance with the [facility] regulations before prescribing any controlled substances. We appreciate the invitation to be involved in this patient's care. We will follow. Please do not hesitate to reach our on call provider through our clinic answering service at [phone] should you have acute symptom control concerns. Mark Bowden DO  Palliative and Supportive Care  Clinic/Answering Service: [phone]  You can find me on [employer] Personal Care Device! IDENTIFICATION:  Inpatient consult to Palliative Care  Consult performed by: Mark Bowden DO  Consult ordered by: David Mariscal, [facility]        Physician Requesting Consult: Jarad Maki MD  Reason for Consult / Principal Problem: Patient request  Hx and PE limited by: N/A    HISTORY OF PRESENT ILLNESS:       Nash Aguilar is a 58 y.o. male who presented on June 27 as a transfer from [facility]. Patient is known to palliative care as he follows for symptoms related to bladder cancer with ongoing chronic pain: Continues opioids. Patient came to hospital for worsening back pain. He was found to be hypovolemic with electrolyte derangements and was admitted at that time for further management. Given worsening back pain MRI was done showing severe lumbar stenosis and neurosurgery recommended transfer to [address] [place] [address] for neurosurgical intervention. Patient admits that he has poor health literacy and the worst are being used around him is making him feel very scared that he is dying.   We did spend a long time reviewing all of his concerns and discussing what different medical terms meant. For example, he thought using the word "acute" met life-threatening and we discussed the difference between an acute issue versus a chronic issue. Patient also states worsening pain. He feels that this is different than his chronic pain. He rates his chronic pain at a 6-1/2-7-1/2 on a pain scale but states that of recently his pain has been consistently 8-8-1/2. He is unsure if the oxycodone products help with his cancer pain but he does not want to make a lot of changes all at once as he wants to make sure he knows what is working and what is not working. Did discuss that we will make small changes to his as needed opioids to try to attain better pain relief. He is agreeable to this. Patient and family agree to ongoing palliative care follow-up. Review of Systems   Musculoskeletal: Positive for arthritis, back pain, muscle weakness and myalgias. Gastrointestinal: Negative for bowel incontinence, constipation, nausea and vomiting. Neurological: Positive for weakness. Psychiatric/Behavioral: The patient is nervous/anxious. All other systems reviewed and are negative.       Past Medical History:   Diagnosis Date   • Arthritis    • Bladder cancer ([address])    • Chronic narcotic dependence ([address])    • Chronic pain disorder     lower back and down both legs   • Colon polyp    • Coronary artery disease    • CPAP (continuous positive airway pressure) dependence    • Does use hearing aid     will wear DOS   • Full dentures    • Heart failure (HCC)     and "fluid retention" SL ABDULAZIZ musa PA"   • High cholesterol    • Hypertension    • Mild ankle edema     and feet   • Obstructive sleep apnea on CPAP    • Prediabetes    • Shortness of breath     per pt "with just exertion"   • Sleep apnea    • Wears glasses      Past Surgical History:   Procedure Laterality Date   • BACK SURGERY      titanium rods implanted   • COLONOSCOPY     • CYSTOSCOPY W/ URETERAL STENT PLACEMENT Bilateral 3/17/2023    Procedure: bilateral retrograde;  Surgeon: Jo-Ann Leroy MD;  Location: [facility];  Service: Urology   • HERNIA REPAIR      x5   • OR CYSTO W/REMOVAL OF LESIONS SMALL N/A 2023    Procedure: CYSTOSCOPY TURBT;  Surgeon: Jo-Ann Leroy MD;  Location: [facility];  Service: Urology   • TRANSURETHRAL RESECTION OF BLADDER TUMOR N/A 3/17/2023    Procedure: TRANSURETHRAL RESECTION OF BLADDER TUMOR (TURBT); Surgeon: Jo-Ann Leroy MD;  Location: [facility];  Service: Urology     Social History     Socioeconomic History   • Marital status: /Civil Union     Spouse name: Not on file   • Number of children: Not on file   • Years of education: Not on file   • Highest education level: Not on file   Occupational History   • Not on file   Tobacco Use   • Smoking status: Former     Packs/day: 1.00     Years: 35.00     Total pack years: 35.00     Types: Cigarettes     Start date: 2023     Quit date: 2016     Years since quittin.4   • Smokeless tobacco: Never   Vaping Use   • Vaping Use: Never used   Substance and Sexual Activity   • Alcohol use: Not Currently     Comment: 3 per year   • Drug use: Not Currently     Types: Marijuana   • Sexual activity: Not Currently   Other Topics Concern   • Not on file   Social History Narrative   • Not on file     Social Determinants of Health     Financial Resource Strain: Not on file   Food Insecurity: No Food Insecurity (2023)    Hunger Vital Sign    • Worried About Running Out of Food in the Last Year: Never true    • Ran Out of Food in the Last Year: Never true   Transportation Needs: No Transportation Needs (2023)    PRAPARE - Transportation    • Lack of Transportation (Medical): No    • Lack of Transportation (Non-Medical):  No   Physical Activity: Not on file   Stress: Not on file   Social Connections: Not on file   Intimate Partner Violence: Not on file   Housing Stability: Low Risk  (2023)    Housing Stability Vital Sign    • Unable to Pay for Housing in the Last Year: No    • Number of Places Lived in the Last Year: 1    • Unstable Housing in the Last Year: No     Family History   Problem Relation Age of Onset   • Cancer Father    • Aortic aneurysm Father    • Leukemia Father    • Hypertension Mother        MEDICATIONS / ALLERGIES:    all current active meds have been reviewed and current meds:   Current Facility-Administered Medications   Medication Dose Route Frequency   • acetaminophen (TYLENOL) tablet 650 mg  650 mg Oral Q6H PRN   • atorvastatin (LIPITOR) tablet 40 mg  40 mg Oral Daily With Dinner   • co-enzyme Q-10 capsule 100 mg  100 mg Oral Daily   • dexamethasone (DECADRON) tablet 2 mg  2 mg Oral BID With Meals   • docusate sodium (COLACE) capsule 100 mg  100 mg Oral BID   • gabapentin (NEURONTIN) capsule 600 mg  600 mg Oral BID   • heparin (porcine) subcutaneous injection 5,000 Units  5,000 Units Subcutaneous Q8H [address]   • oxyCODONE (ROXICODONE) immediate release tablet 20 mg  20 mg Oral Q3H PRN    Or   • HYDROmorphone (DILAUDID) injection 1 mg  1 mg Intravenous Q3H PRN   • insulin lispro (HumaLOG) 100 units/mL subcutaneous injection 1-6 Units  1-6 Units Subcutaneous TID AC   • insulin lispro (HumaLOG) 100 units/mL subcutaneous injection 1-6 Units  1-6 Units Subcutaneous HS   • lidocaine (LIDODERM) 5 % patch 1 patch  1 patch Topical Daily   • metoprolol succinate (TOPROL-XL) 24 hr tablet 25 mg  25 mg Oral Daily   • naloxone (NARCAN) 0.04 mg/mL syringe 0.04 mg  0.04 mg Intravenous Q1MIN PRN   • ondansetron (ZOFRAN) injection 4 mg  4 mg Intravenous Q6H PRN   • oxyCODONE (OxyCONTIN) 12 hr tablet 40 mg  40 mg Oral Q8H [address]   • pantoprazole (PROTONIX) EC tablet 40 mg  40 mg Oral Early Morning   • polyethylene glycol (MIRALAX) packet 17 g  17 g Oral Daily   • tamsulosin (FLOMAX) capsule 0.4 mg  0.4 mg Oral Daily With Dinner   • tiZANidine (ZANAFLEX) tablet 4 mg  4 mg Oral TID       Allergies   Allergen Reactions   • Mirtazapine Other (See Comments) and Confusion     Pt drove without knowing and woke up at a new destination       OBJECTIVE:    Physical Exam  Physical Exam  Vitals and nursing note reviewed. Constitutional:       General: He is not in acute distress. Appearance: He is obese. He is ill-appearing. HENT:      Head: Normocephalic and atraumatic. Right Ear: External ear normal.      Left Ear: External ear normal.      Nose: Nose normal.   Eyes:      General: No scleral icterus. Right eye: No discharge. Left eye: No discharge. Cardiovascular:      Rate and Rhythm: Normal rate and regular rhythm. Pulmonary:      Effort: Pulmonary effort is normal. No respiratory distress. Breath sounds: Normal breath sounds. Abdominal:      General: Bowel sounds are normal. There is no distension. Palpations: Abdomen is soft. Skin:     General: Skin is warm and dry. Coloration: Skin is pale. Neurological:      Mental Status: He is alert and oriented to person, place, and time. Psychiatric:      Comments: Very anxious and depressed         Lab Results:   I have personally reviewed pertinent labs. , CBC: No results found for: "WBC", "HGB", "HCT", "MCV", "PLT", "ADJUSTEDWBC", "RBC", "MCH", "MCHC", "RDW", "MPV", "NRBC", CMP:   Lab Results   Component Value Date    SODIUM 137 06/28/2023    K 3.3 (L) 06/28/2023     06/28/2023    CO2 28 06/28/2023    BUN 28 (H) 06/28/2023    CREATININE 0.83 06/28/2023    CALCIUM 9.2 06/28/2023    EGFR 92 06/28/2023   , PT/PTT:No results found for: "PT", "PTT"  Imaging Studies: Reviewed imaging on PACS  EKG, Pathology, and Other Studies: Reviewed    Counseling / Coordination of Care    Total floor / unit time spent today 75+ minutes. Greater than 50% of total time was spent with the patient and / or family counseling and / or coordination of care.  A description of the counseling / coordination of care: Chart review, medication review, medication adjustments, discussion of goals of care, supportive listening, discussion with care team..    Portions of this document may have been created using dictation software and as such some "sound alike" terms may have been generated by the system. Do not hesitate to contact me with any questions or clarifications.

## 2023-06-28 NOTE — PROGRESS NOTES
[address]  Progress Note  Name: Gus Washington I  MRN: [id number]  Unit/Bed#: PPHP 847-83 I Date of Admission: 6/27/2023   Date of Service: 6/28/2023 I Hospital Day: 1    Assessment/Plan   * Chronic bilateral low back pain with bilateral sciatica  Assessment & Plan  Presented to [facility] on 6/26 due to acute on chronic low back pain. Patient with history of L4/L5 fusion. Follows with palliative care in the outpatient setting for pain management. Transferred to [facility] given MRI findings as below. · MRI showed Stable posterior lumbar fixation hardware including bilateral transpedicular screws at the L4 and L5 levels with susceptibility artifacts. Bilateral laminectomies are again noted at the L4-5 and L5-S1 levels with patency of the central canal. A chronic fluid collection is noted within the L4-5 laminectomy bed ,decreased in size compared to the prior 10/17/2017 MRI examination. New diffuse disc bulge at the L3-4 level with increased bilateral ligamentum flavum and facet hypertrophy causing moderate to severe central canal and bilateral subarticular/lateral recess narrowing with impingement of the cauda equina. · NSX evaluation pending  · Continue Decadron  · Hold ASA pending NSX evaluation  · Monitor PVR, follow retention protocol. · Pain control: continue outpatient regimen of Oxycontin 40 mg Q8 hours and Oxycodone 15 mg PO Q4 PRN for moderate to severe pain. Will also continue home gabapentin and Tizanidine. IV Dilaudid for breakthrough. · Palliative consult   · PT/OT evaluations     Acute hyponatremia  Assessment & Plan  Na 132-133 at [facility] Believed in part to be hypovolemic in nature. · Diuretics held and NA now improved. · Monitor BMP    RODO (acute kidney injury) [facility])  Assessment & Plan  Admission creatinine 1.79 from baseline of around 0.9-1.15  · Suspected to be hypovolemic so diuretics and lisinopril held, now improved.  Would continue to hold, can likely resume diuretics tomorrow if stable. · US kidney bladder negative for hydro  · Avoid nephrotoxins and hypotension  · Monitor PVR  · BMP in AM    Hyperglycemia  Assessment & Plan  Noted, possibly steroid-induced. No history of diabetes. · Check A1c  · Start SSI  · Diabetic diet  · Monitor Accu-Cheks and adjust regimen as needed    Chronic diastolic heart failure (HCC)  Assessment & Plan  Wt Readings from Last 3 Encounters:   06/28/23 110 kg (242 lb 8.1 oz)   06/27/23 111 kg (244 lb 6.4 oz)   06/27/23 111 kg (244 lb)     · Noted Echo from 11/15 with preserved LVEF  · Diuretics currently held, can likely resume tomorrow. · I/O, daily weight. Fluid restriction and low-sodium diet  · Monitor volume status closely        Chronic, continuous use of opioids  Assessment & Plan  PDMP reviewed. Follows with palliative outpatient, consult pending given acute on chronic pain. Hypokalemia  Assessment & Plan  · 3.3 today  · Replete  · BMP in AM    Malignant neoplasm of urinary bladder ([address])  Assessment & Plan  · Follows with urology Dr. Colleen Ferreira, will likely miss immunotherapy scheduled for this week due to hospitalization    Obstructive sleep apnea on CPAP  Assessment & Plan  · Continue QHS Cpap           VTE Pharmacologic Prophylaxis:   Moderate Risk (Score 3-4) - Pharmacological DVT Prophylaxis Ordered: heparin. Patient Centered Rounds: I performed bedside rounds with nursing staff today. Discussions with Specialists or Other Care Team Provider: nursing, cm    Education and Discussions with Family / Patient: Patient declined call to .      Total Time Spent on Date of Encounter in care of patient: 35 minutes This time was spent on one or more of the following: performing physical exam; counseling and coordination of care; obtaining or reviewing history; documenting in the medical record; reviewing/ordering tests, medications or procedures; communicating with other healthcare professionals and discussing with patient's family/caregivers. Current Length of Stay: 1 day(s)  Current Patient Status: Inpatient   Certification Statement: The patient will continue to require additional inpatient hospital stay due to Neurosurgery consult, pain control, PT/OT evaluations  Discharge Plan: Anticipate discharge in 48-72 hrs to discharge location to be determined pending rehab evaluations. Code Status: Level 3 - DNAR and DNI    Subjective:   Patient reports uncontrolled low back pain, radiation to bilateral thighs. Intermittent paresthesias of lower extremities. Had 2-3 episodes of urinary incontinence last Thursday but since then, has been urinating without difficulty. No bowel incontinence or constipation. Objective:     Vitals:   Temp (24hrs), Av.7 °F (36.5 °C), Min:97.6 °F (36.4 °C), Max:97.8 °F (36.6 °C)    Temp:  [97.6 °F (36.4 °C)-97.8 °F (36.6 °C)] 97.6 °F (36.4 °C)  HR:  [52-58] 58  Resp:  [16] 16  BP: (126-147)/(61-89) 147/61  SpO2:  [95 %-99 %] 99 %  Body mass index is 36.87 kg/m². Input and Output Summary (last 24 hours): Intake/Output Summary (Last 24 hours) at 2023 1127  Last data filed at 2023 0200  Gross per 24 hour   Intake 840 ml   Output --   Net 840 ml       Physical Exam:   Physical Exam  Vitals and nursing note reviewed. Constitutional:       Appearance: He is obese. Cardiovascular:      Rate and Rhythm: Normal rate. Pulmonary:      Breath sounds: Normal breath sounds. Musculoskeletal:         General: No swelling. Skin:     General: Skin is warm. Neurological:      Mental Status: He is alert and oriented to person, place, and time. Mental status is at baseline.    Psychiatric:         Mood and Affect: Mood normal.          Additional Data:     Labs:  Results from last 7 days   Lab Units 23  0109 23  0857   WBC Thousand/uL 10.89* 10.72*   HEMOGLOBIN g/dL 11.9* 14.5   HEMATOCRIT % 35.1* 42.8   PLATELETS Thousands/uL 188 241   NEUTROS PCT %  --  71   LYMPHS PCT %  --  21   MONOS PCT %  --  6   EOS PCT %  --  1     Results from last 7 days   Lab Units 06/28/23  0711 06/27/23  1220 06/27/23  0237   SODIUM mmol/L 137  --  133*   POTASSIUM mmol/L 3.3*   < > 2.9*   CHLORIDE mmol/L 106  --  94*   CO2 mmol/L 28  --  29   BUN mg/dL 28*  --  61*   CREATININE mg/dL 0.83  --  1.32*   ANION GAP mmol/L 3  --  10   CALCIUM mg/dL 9.2  --  9.2   ALBUMIN g/dL  --   --  4.0   TOTAL BILIRUBIN mg/dL  --   --  0.35   ALK PHOS U/L  --   --  75   ALT U/L  --   --  25   AST U/L  --   --  12*   GLUCOSE RANDOM mg/dL 135  --  152*    < > = values in this interval not displayed.                        Lines/Drains:  Invasive Devices     Peripheral Intravenous Line  Duration           Peripheral IV 06/26/23 Right Antecubital 2 days    Peripheral IV 06/26/23 Left Forearm 1 day                      Imaging: Reviewed radiology reports from this admission including: MRI spine    Recent Cultures (last 7 days):         Last 24 Hours Medication List:   Current Facility-Administered Medications   Medication Dose Route Frequency Provider Last Rate   • acetaminophen  650 mg Oral Q6H PRN Mack Ly PA-C     • atorvastatin  40 mg Oral Daily With Dinner Mack Ly PA-C     • co-enzyme Q-10  100 mg Oral Daily Mack Ly PA-C     • dexamethasone  2 mg Oral BID With Meals Mack Ly PA-C     • docusate sodium  100 mg Oral BID Mack Ly PA-C     • gabapentin  600 mg Oral BID Mack Ly PA-C     • heparin (porcine)  5,000 Units Subcutaneous Q8H [address] Mack Ly PA-C     • HYDROmorphone  0.5 mg Intravenous Q6H PRN Mack Ly PA-C     • insulin lispro  1-6 Units Subcutaneous TID AC DIA Michele     • insulin lispro  1-6 Units Subcutaneous HS DIA Michele     • lidocaine  1 patch Topical Daily Mack Ly PA-C     • metoprolol succinate  25 mg Oral Daily Mack Ly PA-C     • naloxone  0.04 mg Intravenous Q1MIN PRN Margaret Flores PA-C     • ondansetron  4 mg Intravenous Q6H PRN Margaret Flores PA-C     • oxyCODONE  40 mg Oral Q8H [facility]     • oxyCODONE  15 mg Oral Q4H PRN Margaret Flores PA-C     • pantoprazole  40 mg Oral Early Morning DIA Michele     • polyethylene glycol  17 g Oral Daily Margaret Flores PA-C     • tamsulosin  0.4 mg Oral Daily With [facility]     • tiZANidine  4 mg Oral TID Margaret Flores PA-C          Today, Patient Was Seen By: DIA Suarez    **Please Note: This note may have been constructed using a voice recognition system. **

## 2023-06-28 NOTE — ASSESSMENT & PLAN NOTE
· Admission creatinine 1.79 from baseline of around 0.9-1.15  · Suspected to be hypovolemic so diuretics and lisinopril currently held  · Daily volume assessments, if renal function stable can consider restarting gentle dose of diuretic tomorrow

## 2023-06-28 NOTE — ASSESSMENT & PLAN NOTE
· Follows with urology Dr. David Jc, will likely miss immunotherapy scheduled for this week due to hospitalization  · Neurosurgery to discuss with urology regarding possible surgical intervention and immunotherapy schedule

## 2023-06-28 NOTE — ASSESSMENT & PLAN NOTE
Wt Readings from Last 3 Encounters:   06/27/23 111 kg (244 lb 6.4 oz)   06/27/23 111 kg (244 lb)   06/21/23 112 kg (246 lb 8 oz)     · Noted Echo from 11/15 with preserved LVEF  · Diuretics currently held out of concern for RODO and hyponatremia  · I/O, daily weight.   Fluid restriction and low-sodium diet  · Consider restarting diuretics at lower dose tomorrow

## 2023-06-28 NOTE — ASSESSMENT & PLAN NOTE
· 3.3 today, improving.   · Believed to be decreased due to diuretics  · Repeat BMP in the morning with magnesium level

## 2023-06-28 NOTE — ASSESSMENT & PLAN NOTE
Presented to [facility] on 6/26 due to acute on chronic low back pain. Patient with history of L4/L5 fusion. Follows with palliative care in the outpatient setting for pain management. Transferred to [facility] given MRI findings as below. · MRI showed Stable posterior lumbar fixation hardware including bilateral transpedicular screws at the L4 and L5 levels with susceptibility artifacts. Bilateral laminectomies are again noted at the L4-5 and L5-S1 levels with patency of the central canal. A chronic fluid collection is noted within the L4-5 laminectomy bed ,decreased in size compared to the prior 10/17/2017 MRI examination. New diffuse disc bulge at the L3-4 level with increased bilateral ligamentum flavum and facet hypertrophy causing moderate to severe central canal and bilateral subarticular/lateral recess narrowing with impingement of the cauda equina. · NSX evaluation pending  · Continue Decadron  · Hold ASA pending NSX evaluation  · Monitor PVR, follow retention protocol. · Pain control: continue outpatient regimen of Oxycontin 40 mg Q8 hours and Oxycodone 15 mg PO Q4 PRN for moderate to severe pain. Will also continue home gabapentin and Tizanidine. IV Dilaudid for breakthrough.    · Palliative consult   · PT/OT evaluations

## 2023-06-28 NOTE — PLAN OF CARE
Problem: Prexisting or High Potential for Compromised Skin Integrity  Goal: Skin integrity is maintained or improved  Description: INTERVENTIONS:  - Identify patients at risk for skin breakdown  - Assess and monitor skin integrity  - Assess and monitor nutrition and hydration status  - Monitor labs   - Assess for incontinence   - Turn and reposition patient  - Assist with mobility/ambulation  - Relieve pressure over bony prominences  - Avoid friction and shearing  - Provide appropriate hygiene as needed including keeping skin clean and dry  - Evaluate need for skin moisturizer/barrier cream  - Collaborate with interdisciplinary team   - Patient/family teaching  - Consider wound care consult   Outcome: Progressing     Problem: PAIN - ADULT  Goal: Verbalizes/displays adequate comfort level or baseline comfort level  Description: Interventions:  - Encourage patient to monitor pain and request assistance  - Assess pain using appropriate pain scale  - Administer analgesics based on type and severity of pain and evaluate response  - Implement non-pharmacological measures as appropriate and evaluate response  - Consider cultural and social influences on pain and pain management  - Notify physician/advanced practitioner if interventions unsuccessful or patient reports new pain  Outcome: Progressing     Problem: INFECTION - ADULT  Goal: Absence or prevention of progression during hospitalization  Description: INTERVENTIONS:  - Assess and monitor for signs and symptoms of infection  - Monitor lab/diagnostic results  - Monitor all insertion sites, i.e. indwelling lines, tubes, and drains  - Monitor endotracheal if appropriate and nasal secretions for changes in amount and color  - [place] appropriate cooling/warming therapies per order  - Administer medications as ordered  - Instruct and encourage patient and family to use good hand hygiene technique  - Identify and instruct in appropriate isolation precautions for identified infection/condition  Outcome: Progressing     Problem: SAFETY ADULT  Goal: Patient will remain free of falls  Description: INTERVENTIONS:  - Educate patient/family on patient safety including physical limitations  - Instruct patient to call for assistance with activity   - Consult OT/PT to assist with strengthening/mobility   - Keep Call bell within reach  - Keep bed low and locked with side rails adjusted as appropriate  - Keep care items and personal belongings within reach  - Initiate and maintain comfort rounds  - Make Fall Risk Sign visible to staff  - Offer Toileting every  Hours, in advance of need  - Initiate/Maintain alarm  - Obtain necessary fall risk management equipment  - Apply yellow socks and bracelet for high fall risk patients  - Consider moving patient to room near nurses station  Outcome: Progressing  Goal: Maintain or return to baseline ADL function  Description: INTERVENTIONS:  -  Assess patient's ability to carry out ADLs; assess patient's baseline for ADL function and identify physical deficits which impact ability to perform ADLs (bathing, care of mouth/teeth, toileting, grooming, dressing, etc.)  - Assess/evaluate cause of self-care deficits   - Assess range of motion  - Assess patient's mobility; develop plan if impaired  - Assess patient's need for assistive devices and provide as appropriate  - Encourage maximum independence but intervene and supervise when necessary  - Involve family in performance of ADLs  - Assess for home care needs following discharge   - Consider OT consult to assist with ADL evaluation and planning for discharge  - Provide patient education as appropriate  Outcome: Progressing     Problem: DISCHARGE PLANNING  Goal: Discharge to home or other facility with appropriate resources  Description: INTERVENTIONS:  - Identify barriers to discharge w/patient and caregiver  - Arrange for needed discharge resources and transportation as appropriate  - Identify discharge learning needs (meds, wound care, etc.)  - Arrange for interpretive services to assist at discharge as needed  - Refer to Case Management Department for coordinating discharge planning if the patient needs post-hospital services based on physician/advanced practitioner order or complex needs related to functional status, cognitive ability, or social support system  Outcome: Progressing     Problem: Knowledge Deficit  Goal: Patient/family/caregiver demonstrates understanding of disease process, treatment plan, medications, and discharge instructions  Description: Complete learning assessment and assess knowledge base.   Interventions:  - Provide teaching at level of understanding  - Provide teaching via preferred learning methods  Outcome: Progressing     Problem: DISCHARGE PLANNING - CARE MANAGEMENT  Goal: Discharge to post-acute care or home with appropriate resources  Description: INTERVENTIONS:  - Conduct assessment to determine patient/family and health care team treatment goals, and need for post-acute services based on payer coverage, community resources, and patient preferences, and barriers to discharge  - Address psychosocial, clinical, and financial barriers to discharge as identified in assessment in conjunction with the patient/family and health care team  - Arrange appropriate level of post-acute services according to patient’s   needs and preference and payer coverage in collaboration with the physician and health care team  - Communicate with and update the patient/family, physician, and health care team regarding progress on the discharge plan  - Arrange appropriate transportation to post-acute venues  Outcome: Progressing

## 2023-06-28 NOTE — OCCUPATIONAL THERAPY NOTE
Occupational Therapy CX        Patient Name: Harleen Patel  [id number] Date: 6/28/2023 06/28/23 1517   OT Last Visit   OT Visit Date 06/28/23   Note Type   Note type Cancelled Session   Cancel Reasons Medical status   Additional Comments PT PENDING [facility]. WILL HOLD THERAPY AT THIS TIME AND CONT TO FOLLOW AS APPROPRIATE.

## 2023-06-28 NOTE — ASSESSMENT & PLAN NOTE
· PDMP reviewed by previous provider.   · Follows with palliative outpatient, they are following inpatient for acute on chronic pain management as above

## 2023-06-28 NOTE — ASSESSMENT & PLAN NOTE
· Na 132-133 at [facility]. Believed in part to be hypovolemic in nature.   Diuretics held, repeat BMP in the morning and consider further work-up if failing to improve

## 2023-06-28 NOTE — ASSESSMENT & PLAN NOTE
· Follows with urology Dr. No Nash, will likely miss immunotherapy scheduled for this week due to hospitalization

## 2023-06-28 NOTE — ASSESSMENT & PLAN NOTE
Diagnosed in 11/2022. Follows with urology Dr. Rose Palacios. Currently undergoing immunotherapy. Underwent bladder biopsy on March 17 which was inconclusive and had repeat biopsy in May which was negative for recurrent malignancy.

## 2023-06-28 NOTE — CONSULTS
[facility]  Consult  Name: Juany Busch 58 y.o. male I MRN: [id number]  [facility] Date of Admission: 6/27/2023   Date of Service: 6/28/2023 I Hospital Day: 1    Inpatient consult to Neurosurgery  Consult performed by: Sandra Agudelo PA-C  Consult ordered by: Keny Saba          Assessment/Plan   * Chronic bilateral low back pain with bilateral sciatica  Assessment & Plan  Patient p/w chronic B/L lower back pain with radiation down B/L lower extremities   • S/p L4/5 PLDF ~ 6 years ago  • Patient with chronic persistent 7/10 lower back pain since surgery, worsening with recent bladder biopsy on 3/17/2023  • [facility] following patient as an outpatient for pain management  • [facility]ed to [facility] due to worsening lower back pain and 2 episodes of urinary incontinence. Imaging:   · MRI lumbar spine without 6/27/23: stable posterior lumbar fixation hardware including bilateral transpedicular screws at the L4 and L5 levels with susceptibility artifacts. Bilateral laminectomies are again noted at the L4-5 and L5-S1 levels with patency of the central canal. A chronic fluid collection is noted within the L4-5 laminectomy bed, decreased in size compared to the prior 10/17/2017 MRI examination. New diffuse disc bulge at the L3-4 level with increased bilateral ligamentum flavum and facet hypertrophy causing moderate to severe central canal and bilateral subarticular/lateral recess narrowing with impingement of the cauda equina. · Lumbar spine bending x-rays 6/16/23: Evidence of prior hardware L4-L5 without any complication or loosening. No dynamic instability noted. Plan:   • Continue to monitor neurological exam.    o Currently no focal motor deficits. Mild decrease sensation lateral right foot. • Pain management per palliative.   o Could consider increasing frequency of gabapentin to 3 times daily if patient able to tolerate.   o Could consider scheduling Tylenol 975 mg every 8 hours. • Discussed inpatient surgical management vs. outpatient surgical/medical  management of lumbar stenosis based on the patients concurrent immunotherapy treatment of his bladder cancer  • Discussed baseline bending x-rays to determine if if fixation would be indicated. It appears these x-rays have been completed on June 16 without concern for dynamic instability. Will discuss with team if decompression alone without extension of fusion would be option. • Options:   o lumbar decompression with or without fixation as inpatient, holding immunotherapy until 2 weeks post op  o lumbar decompression with or without fixation as outpatient, prior to or after completion of immunotherapies  o Consideration for maximizing conservative management including therapy and injections  • Briefly discussed with patient expected postoperative course and hospitalization. • Will discuss with immunotherapy plan with patient's urologist, Dr. Terry Griffith. Discussed with patient need to decide if it is acceptable to hold immunotherapies for surgical intervention and its impact on wound healing and risk of infection. We will clarify disease status. We will continue to follow closely. Call with questions or concerns.       Acute hyponatremia  Assessment & Plan  · Resolved  · Na 133 -> 137  · Management per primary team    RODO (acute kidney injury) [facility])  Assessment & Plan  resolved    Chronic diastolic heart failure (HCC)  Assessment & Plan  Wt Readings from Last 3 Encounters:   06/28/23 110 kg (242 lb 8.1 oz)   06/27/23 111 kg (244 lb 6.4 oz)   06/27/23 111 kg (244 lb)     · ECHO 11/25/22 -> EF of 65% with intermediate diastolic function  · Monitor I/Os and weights  · Management per primary team      Hypokalemia  Assessment & Plan  · 2.9 -> 3.3  · Continue to trend  · Replete as needed  · Management per primary team    Malignant neoplasm of urinary bladder ([address])  Assessment & Plan  Diagnosed in 11/2022. Follows with urology Dr. Haley Fajardo. Currently undergoing immunotherapy. Underwent bladder biopsy on March 17 which was inconclusive and had repeat biopsy in May which was negative for recurrent malignancy. History of Present Illness     HPI: Gus Washington is a 58 y.o. male with PMH including chronic bilateral lower back pain with bilateral sciatic pain, bladder cancer on immunotherapy, diastolic heart failure who presents with complaint of acute on chronic bilateral lower back pain since march 17th. Patient reports that the pain has been constant since his L4-L5 fusion 6 years ago for significant back pain with mechanical component. It has recently worsened after a bladder biopsy on march 17th. He states that the pain is an sharp pain localized to bilateral hips and radiates down the lateral portion of both legs. The pain is worse with bending and is a 8/10 in severity. He follows with  palliative outpatient for pain management. He is receiving Oxycodone 30 mg PO q 8 hrs and Oxycontin 40 mg PO q 8 hrs without relief of his pain. He admits to urinary incontinence last Thursday. He was sitting in a reclining chair all day due to pain and had two episodes of incontinence. He has never had these symptoms before or since last Thursday. He admits to an associated weakness in B/L knees and ankles due to pain. Patient states overall his back pain is significantly worse than his leg symptoms. Endorses intermittent tingling in various areas of his legs which is mild and inconsistent. He denies falls, urinary frequency/urgency, saddle anesthesia, numbness. Review of Systems   Constitutional: Positive for activity change. Negative for fever. Respiratory: Negative for shortness of breath. Cardiovascular: Negative for chest pain and leg swelling. Gastrointestinal: Negative for abdominal pain, nausea and vomiting.    Genitourinary: Negative for difficulty urinating, dysuria, frequency and urgency. Musculoskeletal: Positive for back pain. Neurological: Positive for weakness. Negative for dizziness, light-headedness, numbness and headaches. Historical Information   Past Medical History:   Diagnosis Date   • Arthritis    • Bladder cancer ([address])    • Chronic narcotic dependence ([address])    • Chronic pain disorder     lower back and down both legs   • Colon polyp    • Coronary artery disease    • CPAP (continuous positive airway pressure) dependence    • Does use hearing aid     will wear DOS   • Full dentures    • Heart failure (HCC)     and "fluid retention" [facility] Daryl cardio PA"   • High cholesterol    • Hypertension    • Mild ankle edema     and feet   • Obstructive sleep apnea on CPAP    • Prediabetes    • Shortness of breath     per pt "with just exertion"   • Sleep apnea    • Wears glasses      Past Surgical History:   Procedure Laterality Date   • BACK SURGERY      titanium rods implanted   • COLONOSCOPY     • CYSTOSCOPY W/ URETERAL STENT PLACEMENT Bilateral 3/17/2023    Procedure: bilateral retrograde;  Surgeon: Raffy Cespedes MD;  Location:  MAIN OR;  Service: Urology   • HERNIA REPAIR      x5   • CO CYSTO W/REMOVAL OF LESIONS SMALL N/A 2023    Procedure: CYSTOSCOPY TURBT;  Surgeon: Raffy Cespedes MD;  Location:  MAIN OR;  Service: Urology   • TRANSURETHRAL RESECTION OF BLADDER TUMOR N/A 3/17/2023    Procedure: TRANSURETHRAL RESECTION OF BLADDER TUMOR (TURBT);   Surgeon: Raffy Cespedes MD;  Location:  MAIN OR;  Service: Urology     Social History     Substance and Sexual Activity   Alcohol Use Not Currently    Comment: 3 per year     Social History     Substance and Sexual Activity   Drug Use Not Currently   • Types: Marijuana     Social History     Tobacco Use   Smoking Status Former   • Packs/day: 1.00   • Years: 35.00   • Total pack years: 35.00   • Types: Cigarettes   • Start date: 2023   • Quit date: 2016   • Years since quittin.4   Smokeless Tobacco Never     Family History   Problem Relation Age of Onset   • Cancer Father    • Aortic aneurysm Father    • Leukemia Father    • Hypertension Mother        Meds/Allergies   all current active meds have been reviewed, current meds:   Current Facility-Administered Medications   Medication Dose Route Frequency   • acetaminophen (TYLENOL) tablet 650 mg  650 mg Oral Q6H PRN   • atorvastatin (LIPITOR) tablet 40 mg  40 mg Oral Daily With Dinner   • co-enzyme Q-10 capsule 100 mg  100 mg Oral Daily   • dexamethasone (DECADRON) tablet 2 mg  2 mg Oral BID With Meals   • docusate sodium (COLACE) capsule 100 mg  100 mg Oral BID   • gabapentin (NEURONTIN) capsule 600 mg  600 mg Oral BID   • heparin (porcine) subcutaneous injection 5,000 Units  5,000 Units Subcutaneous Q8H [address]   • oxyCODONE (ROXICODONE) immediate release tablet 20 mg  20 mg Oral Q3H PRN    Or   • HYDROmorphone (DILAUDID) injection 1 mg  1 mg Intravenous Q3H PRN   • insulin lispro (HumaLOG) 100 units/mL subcutaneous injection 1-6 Units  1-6 Units Subcutaneous TID AC   • insulin lispro (HumaLOG) 100 units/mL subcutaneous injection 1-6 Units  1-6 Units Subcutaneous HS   • lidocaine (LIDODERM) 5 % patch 1 patch  1 patch Topical Daily   • metoprolol succinate (TOPROL-XL) 24 hr tablet 25 mg  25 mg Oral Daily   • naloxone (NARCAN) 0.04 mg/mL syringe 0.04 mg  0.04 mg Intravenous Q1MIN PRN   • ondansetron (ZOFRAN) injection 4 mg  4 mg Intravenous Q6H PRN   • oxyCODONE (OxyCONTIN) 12 hr tablet 40 mg  40 mg Oral Q8H [address]   • pantoprazole (PROTONIX) EC tablet 40 mg  40 mg Oral Early Morning   • polyethylene glycol (MIRALAX) packet 17 g  17 g Oral Daily   • tamsulosin (FLOMAX) capsule 0.4 mg  0.4 mg Oral Daily With Dinner   • tiZANidine (ZANAFLEX) tablet 4 mg  4 mg Oral TID    and PTA meds:   Prior to Admission Medications   Prescriptions Last Dose Informant Patient Reported? Taking?    Farxiga 5 MG TABS  Self Yes No   Si mg daily 5mg alternating with 2.5mg for fluid retention   aspirin 81 mg chewable tablet  Self Yes No   Sig: Chew 81 mg daily   atorvastatin (LIPITOR) 40 mg tablet  Self Yes No   co-enzyme Q-10 30 MG capsule  Self Yes No   Sig: Take 100 mg by mouth daily    dexamethasone (DECADRON) 2 mg tablet   No No   Sig: Take 1 tablet (2 mg total) by mouth 2 (two) times a day with meals 1 tab po at breakfast and at lunch   Patient not taking: Reported on 2023   gabapentin (NEURONTIN) 300 mg capsule   No No   Sig: Take 1 capsule (300 mg total) by mouth 2 (two) times a day   Patient taking differently: Take 600 mg by mouth 2 (two) times a day   lisinopril (ZESTRIL) 2.5 mg tablet  Self Yes No   methylPREDNISolone 4 MG tablet therapy pack   No No   Sig: Use as directed on package   metolazone (ZAROXOLYN) 2.5 mg tablet  Self Yes No   Sig: Take 2.5 mg by mouth 3 (three) times a week    metoprolol succinate (TOPROL-XL) 25 mg 24 hr tablet  Self Yes No   Sig: Take 25 mg by mouth daily   multivitamin-iron-minerals-folic acid (CENTRUM) chewable tablet  Self Yes No   Sig: Chew 1 tablet daily   oxyCODONE (OxyCONTIN) 40 mg 12 hr tablet   No No   Sig: Take 1 tablet (40 mg total) by mouth every 8 (eight) hours Max Daily Amount: 120 mg   oxyCODONE (ROXICODONE) 30 MG immediate release tablet   No No   Sig: Take 0.5 tablets (15mg) by mouth q4h PRN for moderate to severe cancer pain. Max daily amount 120mgs   potassium chloride (K-DUR,KLOR-CON) 20 mEq tablet  Self Yes No   Si (two) times a day   tamsulosin (FLOMAX) 0.4 mg   No No   Sig: Take 1 capsule (0.4 mg total) by mouth daily with dinner   torsemide (DEMADEX) 20 mg tablet  Self Yes No   Sig: Take 60 mg by mouth daily      Facility-Administered Medications: None     Allergies   Allergen Reactions   • Mirtazapine Other (See Comments) and Confusion     Pt drove without knowing and woke up at a new destination       Objective   I/O        07 07 07 07 07 07    P. O.  840     Total Intake(mL/kg)  840 (7.6)     Net  +840            Unmeasured Urine Occurrence  3 x           Physical Exam  Constitutional:       General: He is not in acute distress. Appearance: Normal appearance. He is not ill-appearing. HENT:      Head: Normocephalic and atraumatic. Right Ear: External ear normal.      Left Ear: External ear normal.      Nose: Nose normal.      Mouth/Throat:      Mouth: Mucous membranes are moist.      Pharynx: Oropharynx is clear. Eyes:      General: No scleral icterus. Right eye: No discharge. Left eye: No discharge. Extraocular Movements: Extraocular movements intact and EOM normal.      Conjunctiva/sclera: Conjunctivae normal.      Pupils: Pupils are equal, round, and reactive to light. Cardiovascular:      Rate and Rhythm: Bradycardia present. Pulmonary:      Effort: Pulmonary effort is normal. No respiratory distress. Musculoskeletal:      Right lower leg: No edema. Left lower leg: No edema. Skin:     General: Skin is warm and dry. Findings: No bruising or lesion. Neurological:      Mental Status: He is alert. Coordination: Heel to Shin Test normal.   Psychiatric:         Mood and Affect: Mood normal.         Speech: Speech normal.         Behavior: Behavior normal.         Thought Content: Thought content normal.         Judgment: Judgment normal.       Neurologic Exam     Mental Status   Oriented to person. Oriented to place. Follows 3 step commands. Attention: normal. Concentration: normal.   Speech: speech is normal   Level of consciousness: alert  Knowledge: good. Normal comprehension. Cranial Nerves     CN II   Visual fields full to confrontation. CN III, IV, VI   Pupils are equal, round, and reactive to light. Extraocular motions are normal.   Conjugate gaze: present    CN V   Facial sensation intact. CN VII   Facial expression full, symmetric.      CN VIII   Hearing: intact    CN XI   CN XI normal.     CN XII   CN XII normal.     Motor Exam   Muscle bulk: normal  Overall muscle tone: normal  Right arm pronator drift: absent  Left arm pronator drift: absent    Strength   Strength 5/5 except as noted. Right strength: Minimal pain inhibition RLE    Sensory Exam   Right arm light touch: normal  Left arm light touch: normal  Right leg light touch: slight decrease lateral foot. Left leg light touch: normal  Pinprick normal.     Gait, Coordination, and Reflexes     Gait  Gait: (use of cane)    Coordination   Heel to shin coordination: normal    Tremor   Resting tremor: absent  Intention tremor: absent  Action tremor: absent    Reflexes   Right Macedo: absent  Left Macedo: absent        Vitals:Blood pressure 147/61, pulse 58, temperature 97.6 °F (36.4 °C), weight 110 kg (242 lb 8.1 oz), SpO2 99 %. ,Body mass index is 36.87 kg/m². Lab Results:   Results from last 7 days   Lab Units 06/27/23  0109 06/26/23  0857   WBC Thousand/uL 10.89* 10.72*   HEMOGLOBIN g/dL 11.9* 14.5   HEMATOCRIT % 35.1* 42.8   PLATELETS Thousands/uL 188 241   NEUTROS PCT %  --  71   MONOS PCT %  --  6   EOS PCT %  --  1     Results from last 7 days   Lab Units 06/28/23  0711 06/27/23  1220 06/27/23  0237 06/26/23  2018 06/26/23  1347 06/26/23  0857   POTASSIUM mmol/L 3.3* 3.3* 2.9* 2.5*   < > 2.8*   CHLORIDE mmol/L 106  --  94* 90*   < > 86*   CO2 mmol/L 28  --  29 31   < > 35*   BUN mg/dL 28*  --  61* 67*   < > 64*   CREATININE mg/dL 0.83  --  1.32* 1.73*   < > 1.79*   CALCIUM mg/dL 9.2  --  9.2 9.4   < > 10.3*   ALK PHOS U/L  --   --  75  --   --  90   ALT U/L  --   --  25  --   --  30   AST U/L  --   --  12*  --   --  12*    < > = values in this interval not displayed.      Results from last 7 days   Lab Units 06/28/23  0711 06/26/23  0857   MAGNESIUM mg/dL 2.3 2.6             No results found for: "TROPONINT"  ABG:No results found for: "PHART", "PZP9DAW", "PO2ART", "UYF4CZH", "B3RYPLXU", "BEART", "SOURCE"    Imaging Studies: I have personally reviewed pertinent reports. and I have personally reviewed pertinent films in PACS    MRI lumbar spine wo contrast    Result Date: 6/27/2023  Impression: 1. Stable posterior lumbar fixation hardware including bilateral transpedicular screws at the L4 and L5 levels with susceptibility artifacts. Bilateral laminectomies are again noted at the L4-5 and L5-S1 levels with patency of the central canal. A chronic fluid collection is noted within the L4-5 laminectomy bed ,decreased in size compared to the prior 10/17/2017 MRI examination. 2. New diffuse disc bulge at the L3-4 level with increased bilateral ligamentum flavum and facet hypertrophy causing moderate to severe central canal and bilateral subarticular/lateral recess narrowing with impingement of the cauda equina. The study was marked in [facility] for immediate notification. Workstation performed: [id number]     VTE Prophylaxis: Sequential compression device (Venodyne)  and Heparin    Code Status: Level 3 - DNAR and DNI  Advance Directive and Living Will: Yes    Power of :    POLST:      Counseling / Coordination of Care  I spent 30 minutes with the patient.

## 2023-06-28 NOTE — ASSESSMENT & PLAN NOTE
Presented to [facility] on 6/26 due to acute on chronic low back pain. Patient with history of L4/L5 fusion. Follows with palliative care in the outpatient setting for pain management. Transferred to [facility] given MRI findings as below. · MRI showed Stable posterior lumbar fixation hardware including bilateral transpedicular screws at the L4 and L5 levels with susceptibility artifacts. Bilateral laminectomies are again noted at the L4-5 and L5-S1 levels with patency of the central canal. A chronic fluid collection is noted within the L4-5 laminectomy bed ,decreased in size compared to the prior 10/17/2017 MRI examination. New diffuse disc bulge at the L3-4 level with increased bilateral ligamentum flavum and facet hypertrophy causing moderate to severe central canal and bilateral subarticular/lateral recess narrowing with impingement of the cauda equina. · NSX following,  · Considerations for possible lumbar decompression tomorrow   · Appreciate ongoing neurosurgical recommendations   · Hold ASA pending OR  · Monitor PVR, follow retention protocol.   · Palliative care following for pain control,  · Continue home Oxycontin 40 mg Q8 hours and PRN Oxycodone 20 mg PO Q3H for moderate pain with IV Dilaudid 1 mg Q3H PRN for severe pain   · PT/OT evaluations, currently recommending home with home health

## 2023-06-28 NOTE — ASSESSMENT & PLAN NOTE
Admission creatinine 1.79 from baseline of around 0.9-1.15  · Suspected to be hypovolemic so diuretics and lisinopril held, now improved. Would continue to hold, can likely resume diuretics tomorrow if stable.    · US kidney bladder negative for hydro  · Avoid nephrotoxins and hypotension  · Monitor PVR  · BMP in AM

## 2023-06-28 NOTE — ASSESSMENT & PLAN NOTE
· Follows with urology Dr. Carolin Heredia, will likely miss immunotherapy scheduled for this week due to hospitalization

## 2023-06-28 NOTE — ASSESSMENT & PLAN NOTE
· Admitted 6/26 for acute on chronic low back pain  · Follows with palliative for his chronic low back pain as well as bladder cancer  · Patient made medical team aware of lower extremity paresthesias and occasional incontinence, so MRI was ordered which shows: "New diffuse disc bulge at the L3-4 level with increased bilateral ligamentum flavum and facet hypertrophy causing moderate to severe central canal and bilateral subarticular/lateral recess narrowing with impingement of the cauda equina"  · Started on Decadron and case discussed with neurology. Accepted in transfer and neurology to see in the morning  · Patient's pain is currently 8 out of 10 which she states is baseline for him.   He states he had 3 episodes of urinary incontinence over the past week and they all seem to occur when patient is laying in the same supine position

## 2023-06-28 NOTE — CASE MANAGEMENT
Case Management Assessment & Discharge Planning Note    Patient name Laila Aleman  Location [address] 6[address]/[facility] 696-44 MRN [id number]  : 1960 Date 2023       Current Admission Date: 2023  Current Admission Diagnosis:Chronic bilateral low back pain with bilateral sciatica   Patient Active Problem List    Diagnosis Date Noted   • Chronic, continuous use of opioids 2023   • Hyperglycemia 2023   • Acute hyponatremia 2023   • RODO (acute kidney injury) ([address]) 2023   • Intractable low back pain 2023   • Chronic diastolic heart failure ([address])    • Opioid withdrawal ([address])    • Hypokalemia 2023   • Anxiety 2023   • Chronic bilateral low back pain with bilateral sciatica 2023   • History of gross hematuria 2023   • Malignant neoplasm of urinary bladder ([address]) 2023   • Obstructive sleep apnea on CPAP    • Hypertension    • Bladder mass 2023   • Benign prostatic hyperplasia with urinary frequency 2023      LOS (days): 1  Geometric Mean LOS (GMLOS) (days): 2.80  Days to GMLOS:2     OBJECTIVE:    Risk of Unplanned Readmission Score: 30.2         Current admission status: Inpatient       Preferred Pharmacy:   [address] [facility], [address] [address]  Phone: [phone] Fax: [phone], [address] [facility]  [address] [place] [address]  Phone: [phone] Fax: [phone]    Primary Care Provider: Servando Menezes DO    Primary Insurance: MEDICARE  Secondary Insurance: BLUE CROSS    ASSESSMENT:  [address] [facility], [address] Representative - Spouse   Primary Phone: [phone] (Mobile)               Patient Information  Admitted from[de-identified] Home  Mental Status: Alert  During Assessment patient was accompanied by: Not accompanied during assessment  Assessment information provided by[de-identified] Patient  Primary Caregiver: Self  Support Systems: Spouse/significant other, Children  Number of steps to enter home.: 2  Type of Current Residence: 3 story home  Upon entering residence, is there a bedroom on the main floor (no further steps)?: No  A bedroom is located on the following floor levels of residence (select all that apply):: 2nd Floor  Upon entering residence, is there a bathroom on the main floor (no further steps)?: Yes  Number of steps to 2nd floor from main floor: One Flight  In the last 12 months, was there a time when you were not able to pay the mortgage or rent on time?: No  In the last 12 months, how many places have you lived?: 1  In the last 12 months, was there a time when you did not have a steady place to sleep or slept in a shelter (including now)?: No  Homeless/housing insecurity resource given?: N/A  Living Arrangements: Lives w/ Spouse/significant other  Is patient a ?: No    Activities of Daily Living Prior to Admission  Functional Status: Independent  Completes ADLs independently?: Yes  Ambulates independently?: Yes  Does patient use assisted devices?: Yes  Assisted Devices (DME) used: Straight Cane  What DME does the patient currently own?: Bedside [employer], [employer]  Does patient have a history of Outpatient Therapy (PT/OT)?: Yes  Does the patient have a history of Short-Term Rehab?: No  Does patient have a history of HHC?: No  Does patient currently have [facility]?: No    Patient Information Continued  Income Source: SSI/SSD  Does patient have prescription coverage?: Yes  Within the past 12 months, you worried that your food would run out before you got the money to buy more.: Never true  Within the past 12 months, the food you bought just didn't last and you didn't have money to get more.: Never true  Food insecurity resource given?: N/A  Does patient receive dialysis treatments?: No  Does patient have a history of substance abuse?: No  Does patient have a history of Mental Health Diagnosis?: No    Means of Transportation  Means of Transport to Appts[de-identified] Drives Self  In the past 12 months, has lack of transportation kept you from medical appointments or from getting medications?: No  In the past 12 months, has lack of transportation kept you from meetings, work, or from getting things needed for daily living?: No  Was application for public transport provided?: N/A        DISCHARGE DETAILS:    Discharge planning discussed with[de-identified] Patient  Freedom of Choice: Yes  Comments - Freedom of Choice: Discussed FOC  CM contacted family/caregiver?: No- see comments (Declined, A + O x4)  Were Treatment Team discharge recommendations reviewed with patient/caregiver?: Yes  Did patient/caregiver verbalize understanding of patient care needs?: Yes  Were patient/caregiver advised of the risks associated with not following Treatment Team discharge recommendations?: Yes     CM reviewed d/c planning process including the following: identifying help at home, patient preference for d/c planning needs, Discharge Lounge, [facility] Meds to Bed program, availability of treatment team to discuss questions or concerns patient and/or family may have regarding understanding medications and recognizing signs and symptoms once discharged. CM also encouraged patient to follow up with all recommended appointments after discharge. Patient advised of importance for patient and family to participate in managing patient’s medical well being. Information obtained from patient, lives with spouse in 3 story home, 2 WES, has handicapped equipped bathroom on the first floor and a room can be converted to a bedroom on the first floor if needed.   IADLS, on disability, drives self, vaccinated for covid

## 2023-06-28 NOTE — PROGRESS NOTES
MSW received a call from the pt wife, Saida Vargas  She states the pt has been in a lot of pain and was taken to the ED by his son  She states the pt was experiencing pain after his immunotherapy treatments and was prescribed steroids to relieve the pain  She states they were helpful but the steroid was changed after his last treatment and the pain required him to go to the hospital     The pt had an MRI and was told he had spinal stenosis and transferred to [facility]  The pt told her he was in pain and not sure what is going on  He asked her to call the MSW which she did  The pt also informed her that he has an RODO which is giving him anxiety  The pt has asked his wife if the new diagnosis means he is going to die? The MSW explained she is at another location seeing patients but will reach out to the pt via phone  MSW will reach out to the team for additional support for the pt  The pt wife was thankful for the return call

## 2023-06-28 NOTE — ASSESSMENT & PLAN NOTE
Na 132-133 at [facility]. Believed in part to be hypovolemic in nature.     · Diuretics held and NA resolved   · Monitor BMP

## 2023-06-28 NOTE — ASSESSMENT & PLAN NOTE
Wt Readings from Last 3 Encounters:   06/28/23 110 kg (242 lb 8.1 oz)   06/27/23 111 kg (244 lb 6.4 oz)   06/27/23 111 kg (244 lb)     · Noted Echo from 11/15 with preserved LVEF  · Diuretics currently held, can likely resume tomorrow. · I/O, daily weight.   Fluid restriction and low-sodium diet  · Monitor volume status closely

## 2023-06-28 NOTE — ASSESSMENT & PLAN NOTE
Wt Readings from Last 3 Encounters:   06/28/23 110 kg (242 lb 8.1 oz)   06/27/23 111 kg (244 lb 6.4 oz)   06/27/23 111 kg (244 lb)     · Noted Echo from 11/2022 with preserved LVEF of 65%  · Diuretics initially held in setting of RODO which is now resolved   · Cardiology following, resumed torsemide 60 mg daily on 6/29, monitor renal function   · I/O, daily weight.   Fluid restriction and low-sodium diet  · Monitor volume status closely

## 2023-06-28 NOTE — ASSESSMENT & PLAN NOTE
Na 132-133 at [facility]. Believed in part to be hypovolemic in nature. · Diuretics held and NA now improved.    · Monitor BMP

## 2023-06-28 NOTE — ASSESSMENT & PLAN NOTE
Noted, possibly steroid-induced. No prior history of diabetes.   · However hgbA1c at 6.7%  · Encourage dietary changes and outpatient PCP follow up once stable for discharge  · While inpatient continue SSI coverage  · QID glucose checks   · Diabetic diet

## 2023-06-29 ENCOUNTER — ANESTHESIA EVENT (INPATIENT)
Dept: PERIOP | Facility: HOSPITAL | Age: 63
DRG: 519 | End: 2023-06-29
Payer: MEDICARE

## 2023-06-29 ENCOUNTER — APPOINTMENT (OUTPATIENT)
Dept: RADIOLOGY | Facility: HOSPITAL | Age: 63
DRG: 519 | End: 2023-06-29
Payer: MEDICARE

## 2023-06-29 PROBLEM — E87.6 HYPOKALEMIA: Status: RESOLVED | Noted: 2023-05-01 | Resolved: 2023-06-29

## 2023-06-29 PROBLEM — R00.1 BRADYCARDIA: Chronic | Status: ACTIVE | Noted: 2023-06-29

## 2023-06-29 PROBLEM — E87.1 ACUTE HYPONATREMIA: Status: RESOLVED | Noted: 2023-06-27 | Resolved: 2023-06-29

## 2023-06-29 LAB
ABO GROUP BLD: NORMAL
ANION GAP SERPL CALCULATED.3IONS-SCNC: 4 MMOL/L
APTT PPP: 26 SECONDS (ref 23–37)
BLD GP AB SCN SERPL QL: NEGATIVE
BUN SERPL-MCNC: 27 MG/DL (ref 5–25)
CALCIUM SERPL-MCNC: 9.2 MG/DL (ref 8.3–10.1)
CHLORIDE SERPL-SCNC: 106 MMOL/L (ref 96–108)
CO2 SERPL-SCNC: 25 MMOL/L (ref 21–32)
CREAT SERPL-MCNC: 0.94 MG/DL (ref 0.6–1.3)
EST. AVERAGE GLUCOSE BLD GHB EST-MCNC: 146 MG/DL
GFR SERPL CREATININE-BSD FRML MDRD: 85 ML/MIN/1.73SQ M
GLUCOSE SERPL-MCNC: 109 MG/DL (ref 65–140)
GLUCOSE SERPL-MCNC: 120 MG/DL (ref 65–140)
GLUCOSE SERPL-MCNC: 156 MG/DL (ref 65–140)
GLUCOSE SERPL-MCNC: 163 MG/DL (ref 65–140)
GLUCOSE SERPL-MCNC: 210 MG/DL (ref 65–140)
HBA1C MFR BLD: 6.7 %
INR PPP: 0.9 (ref 0.84–1.19)
POTASSIUM SERPL-SCNC: 4.3 MMOL/L (ref 3.5–5.3)
PROTHROMBIN TIME: 12.3 SECONDS (ref 11.6–14.5)
RH BLD: POSITIVE
SODIUM SERPL-SCNC: 135 MMOL/L (ref 135–147)
SPECIMEN EXPIRATION DATE: NORMAL

## 2023-06-29 PROCEDURE — 83036 HEMOGLOBIN GLYCOSYLATED A1C: CPT | Performed by: NURSE PRACTITIONER

## 2023-06-29 PROCEDURE — 87081 CULTURE SCREEN ONLY: CPT | Performed by: PHYSICIAN ASSISTANT

## 2023-06-29 PROCEDURE — 86850 RBC ANTIBODY SCREEN: CPT | Performed by: PHYSICIAN ASSISTANT

## 2023-06-29 PROCEDURE — 99223 1ST HOSP IP/OBS HIGH 75: CPT | Performed by: INTERNAL MEDICINE

## 2023-06-29 PROCEDURE — 99233 SBSQ HOSP IP/OBS HIGH 50: CPT | Performed by: NEUROLOGICAL SURGERY

## 2023-06-29 PROCEDURE — 85730 THROMBOPLASTIN TIME PARTIAL: CPT | Performed by: PHYSICIAN ASSISTANT

## 2023-06-29 PROCEDURE — 93005 ELECTROCARDIOGRAM TRACING: CPT

## 2023-06-29 PROCEDURE — 99232 SBSQ HOSP IP/OBS MODERATE 35: CPT | Performed by: PHYSICIAN ASSISTANT

## 2023-06-29 PROCEDURE — 86900 BLOOD TYPING SEROLOGIC ABO: CPT | Performed by: PHYSICIAN ASSISTANT

## 2023-06-29 PROCEDURE — 85610 PROTHROMBIN TIME: CPT | Performed by: PHYSICIAN ASSISTANT

## 2023-06-29 PROCEDURE — NC001 PR NO CHARGE: Performed by: NEUROLOGICAL SURGERY

## 2023-06-29 PROCEDURE — 71046 X-RAY EXAM CHEST 2 VIEWS: CPT

## 2023-06-29 PROCEDURE — 80048 BASIC METABOLIC PNL TOTAL CA: CPT | Performed by: NURSE PRACTITIONER

## 2023-06-29 PROCEDURE — 99232 SBSQ HOSP IP/OBS MODERATE 35: CPT | Performed by: INTERNAL MEDICINE

## 2023-06-29 PROCEDURE — 86901 BLOOD TYPING SEROLOGIC RH(D): CPT | Performed by: PHYSICIAN ASSISTANT

## 2023-06-29 PROCEDURE — 82948 REAGENT STRIP/BLOOD GLUCOSE: CPT

## 2023-06-29 RX ORDER — CHLORHEXIDINE GLUCONATE 0.12 MG/ML
15 RINSE ORAL ONCE
Status: COMPLETED | OUTPATIENT
Start: 2023-06-29 | End: 2023-06-30

## 2023-06-29 RX ORDER — ACETAMINOPHEN 325 MG/1
975 TABLET ORAL EVERY 8 HOURS SCHEDULED
Status: DISCONTINUED | OUTPATIENT
Start: 2023-06-29 | End: 2023-07-01 | Stop reason: HOSPADM

## 2023-06-29 RX ORDER — SODIUM CHLORIDE 9 MG/ML
125 INJECTION, SOLUTION INTRAVENOUS CONTINUOUS
Status: DISCONTINUED | OUTPATIENT
Start: 2023-06-29 | End: 2023-06-30

## 2023-06-29 RX ORDER — HYDROMORPHONE HYDROCHLORIDE 2 MG/1
8 TABLET ORAL
Status: DISCONTINUED | OUTPATIENT
Start: 2023-06-29 | End: 2023-07-01 | Stop reason: HOSPADM

## 2023-06-29 RX ORDER — GABAPENTIN 400 MG/1
400 CAPSULE ORAL 3 TIMES DAILY
Status: DISCONTINUED | OUTPATIENT
Start: 2023-06-29 | End: 2023-07-01 | Stop reason: HOSPADM

## 2023-06-29 RX ORDER — TORSEMIDE 20 MG/1
20 TABLET ORAL DAILY
Status: DISCONTINUED | OUTPATIENT
Start: 2023-06-29 | End: 2023-06-29

## 2023-06-29 RX ORDER — POTASSIUM CHLORIDE 20 MEQ/1
20 TABLET, EXTENDED RELEASE ORAL DAILY
Status: DISCONTINUED | OUTPATIENT
Start: 2023-06-30 | End: 2023-06-30

## 2023-06-29 RX ORDER — CEFAZOLIN SODIUM 2 G/50ML
2000 SOLUTION INTRAVENOUS ONCE
Status: DISCONTINUED | OUTPATIENT
Start: 2023-06-30 | End: 2023-07-01 | Stop reason: HOSPADM

## 2023-06-29 RX ORDER — HYDROMORPHONE HCL/PF 1 MG/ML
1 SYRINGE (ML) INJECTION
Status: DISCONTINUED | OUTPATIENT
Start: 2023-06-29 | End: 2023-07-01 | Stop reason: HOSPADM

## 2023-06-29 RX ORDER — ALPRAZOLAM 0.25 MG/1
0.25 TABLET ORAL 3 TIMES DAILY PRN
Status: DISCONTINUED | OUTPATIENT
Start: 2023-06-29 | End: 2023-07-01 | Stop reason: HOSPADM

## 2023-06-29 RX ORDER — TORSEMIDE 20 MG/1
60 TABLET ORAL DAILY
Status: DISCONTINUED | OUTPATIENT
Start: 2023-06-29 | End: 2023-07-01 | Stop reason: HOSPADM

## 2023-06-29 RX ADMIN — ACETAMINOPHEN 975 MG: 325 TABLET ORAL at 13:47

## 2023-06-29 RX ADMIN — PANTOPRAZOLE SODIUM 40 MG: 40 TABLET, DELAYED RELEASE ORAL at 09:00

## 2023-06-29 RX ADMIN — OXYCODONE HYDROCHLORIDE 40 MG: 40 TABLET, FILM COATED, EXTENDED RELEASE ORAL at 13:46

## 2023-06-29 RX ADMIN — GABAPENTIN 400 MG: 400 CAPSULE ORAL at 20:26

## 2023-06-29 RX ADMIN — DOCUSATE SODIUM 100 MG: 100 CAPSULE, LIQUID FILLED ORAL at 16:42

## 2023-06-29 RX ADMIN — INSULIN LISPRO 1 UNITS: 100 INJECTION, SOLUTION INTRAVENOUS; SUBCUTANEOUS at 16:42

## 2023-06-29 RX ADMIN — Medication 100 MG: at 08:57

## 2023-06-29 RX ADMIN — HEPARIN SODIUM 5000 UNITS: 5000 INJECTION INTRAVENOUS; SUBCUTANEOUS at 05:33

## 2023-06-29 RX ADMIN — TIZANIDINE 4 MG: 4 TABLET ORAL at 08:57

## 2023-06-29 RX ADMIN — INSULIN LISPRO 2 UNITS: 100 INJECTION, SOLUTION INTRAVENOUS; SUBCUTANEOUS at 12:04

## 2023-06-29 RX ADMIN — SODIUM CHLORIDE 125 ML/HR: 0.9 INJECTION, SOLUTION INTRAVENOUS at 22:58

## 2023-06-29 RX ADMIN — HYDROMORPHONE HYDROCHLORIDE 8 MG: 2 TABLET ORAL at 15:02

## 2023-06-29 RX ADMIN — OXYCODONE HYDROCHLORIDE 20 MG: 10 TABLET ORAL at 12:03

## 2023-06-29 RX ADMIN — LIDOCAINE 1 PATCH: 50 PATCH CUTANEOUS at 08:57

## 2023-06-29 RX ADMIN — DEXAMETHASONE 2 MG: 2 TABLET ORAL at 12:04

## 2023-06-29 RX ADMIN — HYDROMORPHONE HYDROCHLORIDE 8 MG: 2 TABLET ORAL at 19:09

## 2023-06-29 RX ADMIN — GABAPENTIN 600 MG: 300 CAPSULE ORAL at 08:57

## 2023-06-29 RX ADMIN — ACETAMINOPHEN 975 MG: 325 TABLET ORAL at 22:54

## 2023-06-29 RX ADMIN — POLYETHYLENE GLYCOL 3350 17 G: 17 POWDER, FOR SOLUTION ORAL at 08:57

## 2023-06-29 RX ADMIN — HEPARIN SODIUM 5000 UNITS: 5000 INJECTION INTRAVENOUS; SUBCUTANEOUS at 13:46

## 2023-06-29 RX ADMIN — GABAPENTIN 400 MG: 400 CAPSULE ORAL at 15:02

## 2023-06-29 RX ADMIN — OXYCODONE HYDROCHLORIDE 20 MG: 10 TABLET ORAL at 01:26

## 2023-06-29 RX ADMIN — HEPARIN SODIUM 5000 UNITS: 5000 INJECTION INTRAVENOUS; SUBCUTANEOUS at 22:55

## 2023-06-29 RX ADMIN — TIZANIDINE 4 MG: 4 TABLET ORAL at 20:26

## 2023-06-29 RX ADMIN — ALPRAZOLAM 0.25 MG: 0.25 TABLET ORAL at 22:54

## 2023-06-29 RX ADMIN — TIZANIDINE 4 MG: 4 TABLET ORAL at 15:02

## 2023-06-29 RX ADMIN — ALPRAZOLAM 0.25 MG: 0.25 TABLET ORAL at 15:02

## 2023-06-29 RX ADMIN — HYDROMORPHONE HYDROCHLORIDE 1 MG: 1 INJECTION, SOLUTION INTRAMUSCULAR; INTRAVENOUS; SUBCUTANEOUS at 08:59

## 2023-06-29 RX ADMIN — DEXAMETHASONE 2 MG: 2 TABLET ORAL at 08:57

## 2023-06-29 RX ADMIN — TAMSULOSIN HYDROCHLORIDE 0.4 MG: 0.4 CAPSULE ORAL at 16:42

## 2023-06-29 RX ADMIN — ATORVASTATIN CALCIUM 40 MG: 40 TABLET, FILM COATED ORAL at 16:42

## 2023-06-29 RX ADMIN — OXYCODONE HYDROCHLORIDE 40 MG: 40 TABLET, FILM COATED, EXTENDED RELEASE ORAL at 22:54

## 2023-06-29 RX ADMIN — TORSEMIDE 60 MG: 20 TABLET ORAL at 13:47

## 2023-06-29 RX ADMIN — OXYCODONE HYDROCHLORIDE 40 MG: 40 TABLET, FILM COATED, EXTENDED RELEASE ORAL at 05:34

## 2023-06-29 RX ADMIN — ALPRAZOLAM 0.25 MG: 0.25 TABLET ORAL at 06:42

## 2023-06-29 RX ADMIN — DOCUSATE SODIUM 100 MG: 100 CAPSULE, LIQUID FILLED ORAL at 08:57

## 2023-06-29 NOTE — PROGRESS NOTES
[address] [facility]  Progress Note  Name: Martine Ahn I  MRN: [id number]  Unit/Bed#: [facility]PHP 037-75 I Date of Admission: 6/27/2023   Date of Service: 6/29/2023 I Hospital Day: 2  DOS: 6/29/2023  Assessment/Plan   * Chronic bilateral low back pain with bilateral sciatica  Assessment & Plan  Presented to [facility] on 6/26 due to acute on chronic low back pain. Patient with history of L4/L5 fusion. Follows with palliative care in the outpatient setting for pain management. Transferred to [facility] given MRI findings as below. · MRI showed Stable posterior lumbar fixation hardware including bilateral transpedicular screws at the L4 and L5 levels with susceptibility artifacts. Bilateral laminectomies are again noted at the L4-5 and L5-S1 levels with patency of the central canal. A chronic fluid collection is noted within the L4-5 laminectomy bed ,decreased in size compared to the prior 10/17/2017 MRI examination. New diffuse disc bulge at the L3-4 level with increased bilateral ligamentum flavum and facet hypertrophy causing moderate to severe central canal and bilateral subarticular/lateral recess narrowing with impingement of the cauda equina. · NSX following,  · Considerations for possible lumbar decompression tomorrow   · Appreciate ongoing neurosurgical recommendations   · Hold ASA pending OR  · Monitor PVR, follow retention protocol.   · Palliative care following for pain control,  · Continue home Oxycontin 40 mg Q8 hours and PRN Oxycodone 20 mg PO Q3H for moderate pain with IV Dilaudid 1 mg Q3H PRN for severe pain   · PT/OT evaluations, currently recommending home with home health    Bradycardia  Assessment & Plan  · Sinus bradycardia noted on telemetry   · HR currently in the 40s  · TSH WNL  · Cardiology following,  · Home Toprol XL currently on hold   · Pt is asymptomatic at this time  · Continue to monitor on telemetry     Hyperglycemia  Assessment & Plan  Noted, possibly steroid-induced. No prior history of diabetes. · However hgbA1c at 6.7%  · Encourage dietary changes and outpatient PCP follow up once stable for discharge  · While inpatient continue SSI coverage  · QID glucose checks   · Diabetic diet    Chronic, continuous use of opioids  Assessment & Plan  · PDMP reviewed by previous provider. · Follows with palliative outpatient, they are following inpatient for acute on chronic pain management as above     RODO (acute kidney injury) [facility])  Assessment & Plan  Admission creatinine 1.79 from baseline of around 0.9-1.15  · Suspected to be hypovolemic so diuretics and lisinopril held, with resolution. · Cardiology resumed home torsemide 60 mg daily on 6/29, monitor   · US kidney bladder negative for hydro  · Avoid nephrotoxins and hypotension  · Monitor PVR  · BMP in AM    Chronic diastolic heart failure ([address])  Assessment & Plan  Wt Readings from Last 3 Encounters:   06/28/23 110 kg (242 lb 8.1 oz)   06/27/23 111 kg (244 lb 6.4 oz)   06/27/23 111 kg (244 lb)     · Noted Echo from 11/2022 with preserved LVEF of 65%  · Diuretics initially held in setting of RODO which is now resolved   · Cardiology following, resumed torsemide 60 mg daily on 6/29, monitor renal function   · I/O, daily weight. Fluid restriction and low-sodium diet  · Monitor volume status closely        Malignant neoplasm of urinary bladder ([address])  Assessment & Plan  · Follows with urology Dr. Abigail Koenig, will likely miss immunotherapy scheduled for this week due to hospitalization  · Neurosurgery to discuss with urology regarding possible surgical intervention and immunotherapy schedule    Obstructive sleep apnea on CPAP  Assessment & Plan  · Continue QHS Cpap    Acute hyponatremia-resolved as of 6/29/2023  Assessment & Plan  Na 132-133 at [facility]. Believed in part to be hypovolemic in nature.     · Diuretics held and NA resolved   · Monitor BMP    Hypokalemia-resolved as of 6/29/2023  Assessment & Plan  · Resolved with repletion   Continue to trend BMP       VTE Pharmacologic Prophylaxis:   Moderate Risk (Score 3-4) - Pharmacological DVT Prophylaxis Ordered: heparin. Patient Centered Rounds: I evaluated the patient without nursing staff present due to speaking to nurse outside patient's room   Discussions with Specialists or Other Care Team Provider: Discussed with neurosurgery, RN, CM and reviewed previous notes     Education and Discussions with Family / Patient: Patient declined call to . Total Time Spent on Date of Encounter in care of patient: 35 minutes This time was spent on one or more of the following: performing physical exam; counseling and coordination of care; obtaining or reviewing history; documenting in the medical record; reviewing/ordering tests, medications or procedures; communicating with other healthcare professionals and discussing with patient's family/caregivers. Current Length of Stay: 2 day(s)  Current Patient Status: Inpatient   Certification Statement: The patient will continue to require additional inpatient hospital stay due to OR tomorrow with neurosurgery, pain management  Discharge Plan: Anticipate discharge in >72 hrs to home with home services. Code Status: Level 3 - DNAR and DNI    Subjective:   Pt reports that he is doing okay today. States that his pain is not very well controlled and he is looking forward to possible surgical intervention tomorrow. States that he has had increased edema of the bilateral lower extremities today. Reports poor sleep here. Appetite is stable. Objective:     Vitals:   Temp (24hrs), Av.6 °F (36.4 °C), Min:97.4 °F (36.3 °C), Max:98 °F (36.7 °C)    Temp:  [97.4 °F (36.3 °C)-98 °F (36.7 °C)] 97.4 °F (36.3 °C)  HR:  [38-49] 49  Resp:  [18] 18  BP: (115-127)/(54-62) 115/60  SpO2:  [94 %-96 %] 96 %  Body mass index is 37.01 kg/m². Input and Output Summary (last 24 hours):      Intake/Output Summary (Last 24 hours) at 6/29/2023 1430  Last data filed at 6/29/2023 0901  Gross per 24 hour   Intake 880 ml   Output --   Net 880 ml       Physical Exam:   Physical Exam  Vitals reviewed. Constitutional:       General: He is not in acute distress. Appearance: He is not toxic-appearing. Comments: Pt is in no acute distress sitting in his hospital chair    HENT:      Head: Normocephalic and atraumatic. Cardiovascular:      Rate and Rhythm: Normal rate and regular rhythm. Pulmonary:      Effort: No respiratory distress. Breath sounds: Normal breath sounds. No wheezing. Abdominal:      General: Bowel sounds are normal.      Palpations: Abdomen is soft. Musculoskeletal:      Right lower leg: Edema present. Comments: Trace to 1+ pitting edema lower extremities bilaterally      Skin:     General: Skin is warm and dry. Neurological:      Mental Status: He is alert. Psychiatric:         Mood and Affect: Mood normal.          Additional Data:     Labs:  Results from last 7 days   Lab Units 06/27/23  0109 06/26/23  0857   WBC Thousand/uL 10.89* 10.72*   HEMOGLOBIN g/dL 11.9* 14.5   HEMATOCRIT % 35.1* 42.8   PLATELETS Thousands/uL 188 241   NEUTROS PCT %  --  71   LYMPHS PCT %  --  21   MONOS PCT %  --  6   EOS PCT %  --  1     Results from last 7 days   Lab Units 06/29/23  0528 06/27/23  1220 06/27/23  0237   SODIUM mmol/L 135   < > 133*   POTASSIUM mmol/L 4.3   < > 2.9*   CHLORIDE mmol/L 106   < > 94*   CO2 mmol/L 25   < > 29   BUN mg/dL 27*   < > 61*   CREATININE mg/dL 0.94   < > 1.32*   ANION GAP mmol/L 4   < > 10   CALCIUM mg/dL 9.2   < > 9.2   ALBUMIN g/dL  --   --  4.0   TOTAL BILIRUBIN mg/dL  --   --  0.35   ALK PHOS U/L  --   --  75   ALT U/L  --   --  25   AST U/L  --   --  12*   GLUCOSE RANDOM mg/dL 109   < > 152*    < > = values in this interval not displayed.          Results from last 7 days   Lab Units 06/29/23  1140 06/29/23  0734 06/28/23  2042 06/28/23  1700 06/28/23  1145   POC GLUCOSE mg/dl 210* 120 142* 138 142*     Results from last 7 days   Lab Units 06/29/23  0528   HEMOGLOBIN A1C % 6.7*           Lines/Drains:  Invasive Devices     Peripheral Intravenous Line  Duration           Peripheral IV 06/26/23 Right Antecubital 3 days                  Telemetry:  Telemetry Orders (From admission, onward)             24 Hour Telemetry Monitoring  Continuous x 24 Hours (Telem)        Question:  Reason for 24 Hour Telemetry  Answer:  Arrhythmias requiring acute medical intervention / PPM or ICD malfunction                 Telemetry Reviewed: Sinus Bradycardia  Indication for Continued Telemetry Use: Arrthymias requiring medical therapy             Imaging: Reviewed radiology reports from this admission including: MRI spine    Recent Cultures (last 7 days):         Last 24 Hours Medication List:   Current Facility-Administered Medications   Medication Dose Route Frequency Provider Last Rate   • acetaminophen  975 mg Oral Q8H [address], DO     • ALPRAZolam  0.25 mg Oral TID PRN DIA Nichols     • atorvastatin  40 mg Oral Daily With Dinner Doristine Daily, PA-C     • co-enzyme Q-10  100 mg Oral Daily Doristine Daily, PA-C     • docusate sodium  100 mg Oral BID Doristine Daily, PA-C     • gabapentin  400 mg Oral TID Laura Deshpande DO     • heparin (porcine)  5,000 Units Subcutaneous [facility] Doristine Daily, PA-C     • HYDROmorphone  8 mg Oral Q3H PRN Mark Howard DO      Or   • HYDROmorphone  1 mg Intravenous Q3H PRN Mark Howard DO     • insulin lispro  1-6 Units Subcutaneous TID AC DIA Michele     • insulin lispro  1-6 Units Subcutaneous HS DIA Michele     • lidocaine  1 patch Topical Daily Doristine Daily, PABenC     • naloxone  0.04 mg Intravenous Q1MIN PRN Doristine Daily, PABenC     • ondansetron  4 mg Intravenous Q6H PRN Doristine Daily, PA-C     • oxyCODONE  40 mg Oral Q8H [address] Doristine Daily, PABenC     • pantoprazole  40 mg Oral Early Morning DIA Michele     • polyethylene glycol  17 g Oral Daily Jeniffer Quiros PA-C     • tamsulosin  0.4 mg Oral Daily [facility]     • tiZANidine  4 mg Oral TID Jeniffer Quiros PA-C     • torsemide  60 mg Oral Daily DIA Hayes          Today, Patient Was Seen By: Kojo Haywood PA-C    **Please Note: This note may have been constructed using a voice recognition system. **

## 2023-06-29 NOTE — PROGRESS NOTES
[facility]  Progress Note  Name: Loly Cardenas I  MRN: [id number]  Unit/Bed#: PPHP 145-29 I Date of Admission: 6/27/2023   Date of Service: 6/29/2023 I Hospital Day: 2    Assessment/Plan   * Chronic bilateral low back pain with bilateral sciatica  Assessment & Plan  Patient p/w chronic B/L lower back pain with radiation down B/L lower extremities (lateral leg to foot)  • S/p L4/5 PLDF ~ 6 years ago  • Patient with chronic persistent 7/10 lower back pain since surgery, worsening with recent bladder biopsy on 3/17/2023  • [facility] following patient as an outpatient for pain management  • Presented to [address] [place] [address] on 6/26 due to worsening lower back pain and 2 episodes of urinary incontinence. Imaging:   · MRI lumbar spine without 6/27/23: stable posterior lumbar fixation hardware including bilateral transpedicular screws at the L4 and L5 levels with susceptibility artifacts. Bilateral laminectomies are again noted at the L4-5 and L5-S1 levels with patency of the central canal. A chronic fluid collection is noted within the L4-5 laminectomy bed, decreased in size compared to the prior 10/17/2017 MRI examination. New diffuse disc bulge at the L3-4 level with increased bilateral ligamentum flavum and facet hypertrophy causing moderate to severe central canal and bilateral subarticular/lateral recess narrowing with impingement of the cauda equina. · Lumbar spine bending x-rays 6/16/23: Evidence of prior hardware L4-L5 without any complication or loosening. No dynamic instability noted. Plan:   • Continue to monitor neurological exam.    o Currently no focal motor deficits. • Pain management per palliative. · Discussed case with patient's urologist, Dr. Terry Griffith who stated that it would be acceptable to hold the patients immunotherapy for the expected amount of time to allow for surgery and adequate wound healing.     · Again discussed with patient findings of adjacent level stenosis and option for surgical intervention with a posterior L3-4 decompression with or without extension of fixation. • Reviewed with patient expected postoperative course and hospitalization. • Tentative plan for surgical intervention tomorrow. We will complete preoperative labs as well as nose to toes protocol. N.p.o. after midnight. Hold heparin. We will continue to follow closely. Call with questions or concerns. Bradycardia  Assessment & Plan  · HR in mid 40's overnight. · Hx of bradycardia during sleep noted by patient. · EKG and Cards consult for pre-operative clearance. RODO (acute kidney injury) [facility])  Assessment & Plan  · Cr (6/29) - 0.94  · Resolved    Chronic diastolic heart failure (HCC)  Assessment & Plan  Wt Readings from Last 3 Encounters:   06/29/23 110 kg (243 lb 6.2 oz)   06/27/23 111 kg (244 lb 6.4 oz)   06/27/23 111 kg (244 lb)     · ECHO 11/25/22 -> EF of 65% with intermediate diastolic function  · Monitor I/Os and weights  · Management per primary team      Malignant neoplasm of urinary bladder ([address])  Assessment & Plan  · Diagnosed in 11/2022. · Follows with urology Dr. Robert Morel. Currently undergoing immunotherapy. · Underwent bladder biopsy on March 17 which was inconclusive and had repeat biopsy in May which was negative for recurrent malignancy. Acute hyponatremia-resolved as of 6/29/2023  Assessment & Plan  · Resolved  · Na -> 135  · Management per primary team    Hypokalemia-resolved as of 6/29/2023  Assessment & Plan  · 3.3 -> 4.3  · Continue to trend  · Replete as needed  · Management per primary team         Subjective/Objective   Chief Complaint: "lower back pain"    Subjective: The patient noted this morning that he had his past PCDF done at [facility] in [facility] by Dr. Alma Delia Mejia. The patient admits to continuous 8/10 lower back pain with radiation to the bilateral lateral aspect of legs and lateral 2 toes.  He denies relief with his current pain regimen, noting that Oxycodone does not provide any relief and Dilaudid works for 10 minutes and then wears off. He admits to unchanged weakness in the knee/ankles bilaterally. He denies numbness, urinary incontinence, or saddle anesthesia. Mild SOB. No CP. No N/V. Voiding well. + BM. Objective: This is a well appearing elderly male sitting upright in a chair. He is in no acute distress. I/O       06/27 0701 06/28 0700 06/28 0701 06/29 0700 06/29 0701  06/30 0700    P. O. 840 1420 180    Total Intake(mL/kg) 840 (7.6) 1420 (12.9) 180 (1.6)    Net +840 +1420 +180           Unmeasured Urine Occurrence 3 x 7 x           Invasive Devices     Peripheral Intravenous Line  Duration           Peripheral IV 06/26/23 Right Antecubital 3 days                Physical Exam:  Vitals: Blood pressure 115/60, pulse (!) 49, temperature (!) 97.4 °F (36.3 °C), resp. rate 18, weight 110 kg (243 lb 6.2 oz), SpO2 96 %. ,Body mass index is 37.01 kg/m². General appearance: alert, appears stated age, cooperative and no distress  Head: Normocephalic, without obvious abnormality, atraumatic  Eyes: EOMI, PERRL  Neck: supple, symmetrical, trachea midline and NT  Back: low lumbar TTP, well healed midline lumbar incision  Lungs: non labored breathing  Heart: bradycardia  Neurologic:   Mental status: Alert, oriented x3, thought content appropriate  Cranial nerves: grossly intact (Cranial nerves II-XII)  Sensory: normal sensation, equal and intact in bilateral UE and LE. Motor: moving all extremities without focal weakness. Mild limitations due to pain.   LUE: 5/5 delt abd/add, 5/5 , 5/5 elbow flex/ext  RUE: 5/5 delt abd/add, 5/5 , 5/5 elbow flex/ext  LLE: 5/5 DF/PF, 5/5 knee ext/flex, 5/5 hip flex/ext  RLE: 5/5 DF/PF, 5/5 knee ext/flex, 5/5 hip flex/ext  Reflexes: 2+ and symmetric  Coordination: heel to shin and finger to nose normal bilaterally, no drift bilaterally       Lab Results:  Results from last 7 days   Lab Units 06/27/23  0109 06/26/23  0857   WBC Thousand/uL 10.89* 10.72*   HEMOGLOBIN g/dL 11.9* 14.5   HEMATOCRIT % 35.1* 42.8   PLATELETS Thousands/uL 188 241   NEUTROS PCT %  --  71   MONOS PCT %  --  6   EOS PCT %  --  1     Results from last 7 days   Lab Units 06/29/23  0528 06/28/23  0711 06/27/23  1220 06/27/23  0237 06/26/23  1347 06/26/23  0857   POTASSIUM mmol/L 4.3 3.3* 3.3* 2.9*   < > 2.8*   CHLORIDE mmol/L 106 106  --  94*   < > 86*   CO2 mmol/L 25 28  --  29   < > 35*   BUN mg/dL 27* 28*  --  61*   < > 64*   CREATININE mg/dL 0.94 0.83  --  1.32*   < > 1.79*   CALCIUM mg/dL 9.2 9.2  --  9.2   < > 10.3*   ALK PHOS U/L  --   --   --  75  --  90   ALT U/L  --   --   --  25  --  30   AST U/L  --   --   --  12*  --  12*    < > = values in this interval not displayed. Results from last 7 days   Lab Units 06/28/23  0711 06/26/23  0857   MAGNESIUM mg/dL 2.3 2.6             No results found for: "TROPONINT"  ABG:No results found for: "PHART", "PNL5UMN", "PO2ART", "PMZ1HTE", "H8IKYSBA", "BEART", "SOURCE"    Imaging Studies: I have personally reviewed pertinent reports. and I have personally reviewed pertinent films in PACS    MRI lumbar spine wo contrast    Result Date: 6/27/2023  Impression: 1. Stable posterior lumbar fixation hardware including bilateral transpedicular screws at the L4 and L5 levels with susceptibility artifacts. Bilateral laminectomies are again noted at the L4-5 and L5-S1 levels with patency of the central canal. A chronic fluid collection is noted within the L4-5 laminectomy bed ,decreased in size compared to the prior 10/17/2017 MRI examination. 2. New diffuse disc bulge at the L3-4 level with increased bilateral ligamentum flavum and facet hypertrophy causing moderate to severe central canal and bilateral subarticular/lateral recess narrowing with impingement of the cauda equina. The study was marked in [facility] for immediate notification.  Workstation performed: [id number]       EKG, Pathology, and Other Studies: I have personally reviewed pertinent reports.       VTE Pharmacologic Prophylaxis: Sequential compression device (Venodyne)  and Heparin

## 2023-06-29 NOTE — ASSESSMENT & PLAN NOTE
· HR in mid 40's overnight. · Hx of bradycardia during sleep noted by patient. · EKG and Cards consult for pre-operative clearance.

## 2023-06-29 NOTE — PROGRESS NOTES
Progress note - Palliative and Supportive Care   Gina Little 58 y.o. male [id number]    Patient Active Problem List   Diagnosis   • Benign prostatic hyperplasia with urinary frequency   • Bladder mass   • Obstructive sleep apnea on CPAP   • Hypertension   • Malignant neoplasm of urinary bladder (HCC)   • History of gross hematuria   • Anxiety   • Chronic bilateral low back pain with bilateral sciatica   • Hypokalemia   • Chronic diastolic heart failure (HCC)   • Opioid withdrawal (HCC)   • RODO (acute kidney injury) ([address])   • Intractable low back pain   • Acute hyponatremia   • Chronic, continuous use of opioids   • Hyperglycemia     Active issues specifically addressed today include:   Bladder cancer  Acute on chronic low back pain  Uncomplicated opioid dependence  Chronic diastolic heart failure  Anxiety related to health  Palliative care patient  Goals of care       Plan:  1. Symptom management -    -Maintain OxyContin 40 mg 3 times daily   -Change as needed oxycodone to p.o. Dilaudid 8 mg and link to order for IV Dilaudid for moderate to severe pain respectively   -Add scheduled Tylenol   -Increase gabapentin to 400 mg 3 times daily   -Remainder of symptom regimen as it is   -     2. Goals -continue DNR/DNI status. Patient being worked up for possible surgical intervention during this hospitalization.   -    3. Psychosocial support-support provided   -    4. [address]ata [id number]       Code Status: Full- Level 1   Decisional apparatus:  Patient is competent on my exam today. If competence is lost, patient's substitute decision maker would default to spouse by [place] Act 169. Advance Directive / Living Will / POLST: Yes    Interval history:       Patient still complaining of 8 out of 10 pain. He is unsure if any of the changes made has helped him at all. He does continue to describe 2 separate pains, his back pain and then bladder pain.   He gets the best relief with the IV Dilaudid but states it only works for about 15 minutes. Otherwise, he also notes that he had trouble sleeping last night because the alarms were going off in his room. Offers no other complaints at this time.     MEDICATIONS / ALLERGIES:     all current active meds have been reviewed and current meds:   Current Facility-Administered Medications   Medication Dose Route Frequency   • acetaminophen (TYLENOL) tablet 975 mg  975 mg Oral Q8H [address]   • ALPRAZolam (XANAX) tablet 0.25 mg  0.25 mg Oral TID PRN   • atorvastatin (LIPITOR) tablet 40 mg  40 mg Oral Daily With Dinner   • co-enzyme Q-10 capsule 100 mg  100 mg Oral Daily   • docusate sodium (COLACE) capsule 100 mg  100 mg Oral BID   • gabapentin (NEURONTIN) capsule 400 mg  400 mg Oral TID   • heparin (porcine) subcutaneous injection 5,000 Units  5,000 Units Subcutaneous Q8H [address]   • HYDROmorphone (DILAUDID) tablet 8 mg  8 mg Oral Q3H PRN    Or   • HYDROmorphone (DILAUDID) injection 1 mg  1 mg Intravenous Q3H PRN   • insulin lispro (HumaLOG) 100 units/mL subcutaneous injection 1-6 Units  1-6 Units Subcutaneous TID AC   • insulin lispro (HumaLOG) 100 units/mL subcutaneous injection 1-6 Units  1-6 Units Subcutaneous HS   • lidocaine (LIDODERM) 5 % patch 1 patch  1 patch Topical Daily   • naloxone (NARCAN) 0.04 mg/mL syringe 0.04 mg  0.04 mg Intravenous Q1MIN PRN   • ondansetron (ZOFRAN) injection 4 mg  4 mg Intravenous Q6H PRN   • oxyCODONE (OxyCONTIN) 12 hr tablet 40 mg  40 mg Oral Q8H [address]   • pantoprazole (PROTONIX) EC tablet 40 mg  40 mg Oral Early Morning   • polyethylene glycol (MIRALAX) packet 17 g  17 g Oral Daily   • tamsulosin (FLOMAX) capsule 0.4 mg  0.4 mg Oral Daily With Dinner   • tiZANidine (ZANAFLEX) tablet 4 mg  4 mg Oral TID   • torsemide (DEMADEX) tablet 60 mg  60 mg Oral Daily       Allergies   Allergen Reactions   • Mirtazapine Other (See Comments) and Confusion     Pt drove without knowing and woke up at a new destination       OBJECTIVE:    Physical Exam  Physical Exam  Vitals and nursing note reviewed. Constitutional:       General: He is not in acute distress. HENT:      Head: Normocephalic and atraumatic. Right Ear: External ear normal.      Left Ear: External ear normal.      Nose: Nose normal.   Eyes:      General: No scleral icterus. Right eye: No discharge. Left eye: No discharge. Cardiovascular:      Rate and Rhythm: Normal rate and regular rhythm. Pulmonary:      Effort: Pulmonary effort is normal. No respiratory distress. Breath sounds: Normal breath sounds. Abdominal:      General: Bowel sounds are normal. There is no distension. Palpations: Abdomen is soft. Skin:     General: Skin is warm and dry. Coloration: Skin is pale. Neurological:      Mental Status: He is alert and oriented to person, place, and time. Lab Results:   I have personally reviewed pertinent labs. , CBC: No results found for: "WBC", "HGB", "HCT", "MCV", "PLT", "ADJUSTEDWBC", "RBC", "MCH", "MCHC", "RDW", "MPV", "NRBC", CMP:   Lab Results   Component Value Date    SODIUM 135 06/29/2023    K 4.3 06/29/2023     06/29/2023    CO2 25 06/29/2023    BUN 27 (H) 06/29/2023    CREATININE 0.94 06/29/2023    CALCIUM 9.2 06/29/2023    EGFR 85 06/29/2023   , PT/PTT:No results found for: "PT", "PTT"  Imaging Studies: Reviewed  EKG, Pathology, and Other Studies: Reviewed    Counseling / Coordination of Care    Total floor / unit time spent today 25+ minutes. Greater than 50% of total time was spent with the patient and / or family counseling and / or coordination of care. A description of the counseling / coordination of care: Chart review, medication review, medication adjustments, discussion of care plan, supportive listening    Portions of this document may have been created using dictation software and as such some "sound alike" terms may have been generated by the system. Do not hesitate to contact me with any questions or clarifications.

## 2023-06-29 NOTE — PROGRESS NOTES
Attempted to follow up visit. Patient is eating his lunch.   will plan for next visit.   06/29/23 0900   Clinical Encounter Type   Visited With Patient not available

## 2023-06-29 NOTE — ASSESSMENT & PLAN NOTE
Wt Readings from Last 3 Encounters:   06/29/23 110 kg (243 lb 6.2 oz)   06/27/23 111 kg (244 lb 6.4 oz)   06/27/23 111 kg (244 lb)     · ECHO 11/25/22 -> EF of 65% with intermediate diastolic function  · Monitor I/Os and weights  · Management per primary team

## 2023-06-29 NOTE — CONSULTS
Consultation - Cardiology Team One  Dale Wang 58 y.o. male MRN: [id number]  Unit/Bed#: [address] 622-01 Encounter: [id number]    Inpatient consult to Cardiology  Consult performed by: DIA Neves  Consult ordered by: Radu Liu PA-C      Physician Requesting Consult: Jaylen Ramirez MD  Reason for Consult / Principal Problem: Pre operative risk assessment       Assessment    1. Pre operative risk assessment for surgical intervention of lumbar stenosis  2. Bradycardia   3. Chronic diastolic heart failure  4. Hypertension BP average: 124/78  5. Hyperlipidemia  6, Type II diabetes hemoglobin A1C 6.7  7. ERICA recommend CPAP at HS. Patient non compliant at home with CPAP      Plan    Reviewed telemetry. Showing sinus bradycardia. No high degree AV block or pauses. Holding home medication: metoprolol XL 25 mg PO daily. Last dose yesterday. Patient asymptomatic. No dizziness or lightheaded. BP stable: 115/60. Holding Metoprolol XL and lisinopril (home medications)   Patient appears volume overload on exam.   He takes torsemide 60 mg PO daily at home and metolazone 2.5 mg PO three times a week. Diuretics held since admission due to RODO POA. Creatinine on admission was 1.73. Will resume diuretics. Creatinine 0.94 today  Monitor I/Os  Daily weights: 243 lbs today. He reports dry weight 240 lbs. Continue 2 gram Na diet and 1800 ml fluid restriction   Echocardiogram 11/2022 showed EF 65%, L and R atrium dilated and mild TR. Will await neurosurgical recommendations regarding surgery and timing. History of Present Illness   HPI: Dale Wang is a 58y.o. year old male who has chronic diastolic heart failure, hypertension, hyperlipidemia, bladder cancer, type II diabetes and chronic lower back pain. He follows with cardiologist at [facility]. He originally presented to [facility] ER 6/26/2023 with severe back pain.  He reports chronic lower back pain but severe since March 17, 2023. He was transferred to [facility] AND [facility] for neurosurgery evaluation. Neurosurgery discussed lumbar decompression with or without fixation. Timing on surgery is TBD due to needing to hold immunotherapy. Cardiology consulted for bradycardia and pre operative risk assessment. Patient reports he is feeling okay but experiencing 8/10 lower back pain. Telemetry reviewed showing sinus bradycardia without high degree AV block or pauses. He is asymptomatic and reports no dizziness or lightheaded. On arrival to ER, labs revealed RODO with creatinine 1.7. Home dose diuretics: torsemide 60 mg PO daily and metolazone 2.5 mg PO three times a week held. On exam today, he appears volume overload with + 1-2 edema bilateral LE, abdominal bloating and weight gain. He lives at home with his wife. He uses a cane to walk. He is very sedentary and activity is limited for several months due to his severe back pain. He reports no chest pain or SOB at rest or with exertion. He denies tobacco use and no alcohol use. He reports no family history of heart disease. EKG reviewed personally:  Ordered     Telemetry reviewed personally:   Sinus bradycardia HR 40s with HR 35 bpm at 0230 not sustained     Review of Systems   Constitutional: Positive for weight gain. Negative for chills and fever. HENT: Negative for congestion. Cardiovascular: Positive for leg swelling. Negative for chest pain, dyspnea on exertion and palpitations. Respiratory: Negative for shortness of breath. Musculoskeletal: Positive for back pain. Gastrointestinal: Positive for bloating. Negative for nausea and vomiting. Neurological: Positive for light-headedness. Negative for dizziness. Psychiatric/Behavioral: Negative for altered mental status. All other systems reviewed and are negative.     Historical Information   Past Medical History:   Diagnosis Date   • Arthritis    • Bladder cancer [facility])    • Chronic narcotic dependence ([address])    • Chronic pain disorder     lower back and down both legs   • Colon polyp    • Coronary artery disease    • CPAP (continuous positive airway pressure) dependence    • Does use hearing aid     will wear DOS   • Full dentures    • Heart failure (HCC)     and "fluid retention" [facility] Daryl cardio PA"   • High cholesterol    • Hypertension    • Mild ankle edema     and feet   • Obstructive sleep apnea on CPAP    • Prediabetes    • Shortness of breath     per pt "with just exertion"   • Sleep apnea    • Wears glasses      Past Surgical History:   Procedure Laterality Date   • BACK SURGERY      titanium rods implanted   • COLONOSCOPY     • CYSTOSCOPY W/ URETERAL STENT PLACEMENT Bilateral 3/17/2023    Procedure: bilateral retrograde;  Surgeon: Kya Barrera MD;  Location:  MAIN OR;  Service: Urology   • HERNIA REPAIR      x5   • AR CYSTO W/REMOVAL OF LESIONS SMALL N/A 2023    Procedure: CYSTOSCOPY TURBT;  Surgeon: Kya Barrera MD;  Location:  MAIN OR;  Service: Urology   • TRANSURETHRAL RESECTION OF BLADDER TUMOR N/A 3/17/2023    Procedure: TRANSURETHRAL RESECTION OF BLADDER TUMOR (TURBT);   Surgeon: Kya Barrera MD;  Location:  MAIN OR;  Service: Urology     Social History     Substance and Sexual Activity   Alcohol Use Not Currently    Comment: 3 per year     Social History     Substance and Sexual Activity   Drug Use Not Currently   • Types: Marijuana     Social History     Tobacco Use   Smoking Status Former   • Packs/day: 1.00   • Years: 35.00   • Total pack years: 35.00   • Types: Cigarettes   • Start date: 2023   • Quit date: 2016   • Years since quittin.4   Smokeless Tobacco Never     Family History:   Family History   Problem Relation Age of Onset   • Cancer Father    • Aortic aneurysm Father    • Leukemia Father    • Hypertension Mother        Meds/Allergies   all current active meds have been reviewed and current meds:   Current Facility-Administered Medications   Medication Dose Route Frequency • acetaminophen (TYLENOL) tablet 650 mg  650 mg Oral Q6H PRN   • ALPRAZolam (XANAX) tablet 0.25 mg  0.25 mg Oral TID PRN   • atorvastatin (LIPITOR) tablet 40 mg  40 mg Oral Daily With Dinner   • co-enzyme Q-10 capsule 100 mg  100 mg Oral Daily   • dexamethasone (DECADRON) tablet 2 mg  2 mg Oral BID With Meals   • docusate sodium (COLACE) capsule 100 mg  100 mg Oral BID   • gabapentin (NEURONTIN) capsule 600 mg  600 mg Oral BID   • heparin (porcine) subcutaneous injection 5,000 Units  5,000 Units Subcutaneous Q8H [address]   • oxyCODONE (ROXICODONE) immediate release tablet 20 mg  20 mg Oral Q3H PRN    Or   • HYDROmorphone (DILAUDID) injection 1 mg  1 mg Intravenous Q3H PRN   • insulin lispro (HumaLOG) 100 units/mL subcutaneous injection 1-6 Units  1-6 Units Subcutaneous TID AC   • insulin lispro (HumaLOG) 100 units/mL subcutaneous injection 1-6 Units  1-6 Units Subcutaneous HS   • lidocaine (LIDODERM) 5 % patch 1 patch  1 patch Topical Daily   • metoprolol succinate (TOPROL-XL) 24 hr tablet 25 mg  25 mg Oral Daily   • naloxone (NARCAN) 0.04 mg/mL syringe 0.04 mg  0.04 mg Intravenous Q1MIN PRN   • ondansetron (ZOFRAN) injection 4 mg  4 mg Intravenous Q6H PRN   • oxyCODONE (OxyCONTIN) 12 hr tablet 40 mg  40 mg Oral Q8H [address]   • pantoprazole (PROTONIX) EC tablet 40 mg  40 mg Oral Early Morning   • polyethylene glycol (MIRALAX) packet 17 g  17 g Oral Daily   • tamsulosin (FLOMAX) capsule 0.4 mg  0.4 mg Oral Daily With Dinner   • tiZANidine (ZANAFLEX) tablet 4 mg  4 mg Oral TID          Allergies   Allergen Reactions   • Mirtazapine Other (See Comments) and Confusion     Pt drove without knowing and woke up at a new destination       Objective   Vitals: Blood pressure 115/60, pulse (!) 43, temperature (!) 97.4 °F (36.3 °C), resp. rate 18, weight 110 kg (243 lb 6.2 oz), SpO2 95 %. ,     Body mass index is 37.01 kg/m². ,     Systolic (00CTL), NUX:809 , Min:115 , DPL:135     Diastolic (30BDH), PXB:84, Min:54, Max:62      Intake/Output Summary (Last 24 hours) at 6/29/2023 1048  Last data filed at 6/29/2023 0901  Gross per 24 hour   Intake 1360 ml   Output --   Net 1360 ml     Weight (last 2 days)     Date/Time Weight    06/29/23 0600 110 (243.39)    06/28/23 0600 110 (242.51)        Invasive Devices     Peripheral Intravenous Line  Duration           Peripheral IV 06/26/23 Right Antecubital 3 days              Physical Exam  Constitutional:       Appearance: He is obese. HENT:      Head: Normocephalic. Mouth/Throat:      Mouth: Mucous membranes are moist.   Cardiovascular:      Rate and Rhythm: Regular rhythm. Bradycardia present. Pulses: Normal pulses. Pulmonary:      Effort: Pulmonary effort is normal. No respiratory distress. Breath sounds: Normal breath sounds. Abdominal:      General: Bowel sounds are normal. There is distension. Palpations: Abdomen is soft. Musculoskeletal:         General: Swelling present. Normal range of motion. Cervical back: Neck supple. Comments: + 1-2 edema bilateral LE    Skin:     General: Skin is warm and dry. Neurological:      Mental Status: He is alert and oriented to person, place, and time.    Psychiatric:         Mood and Affect: Mood normal.         LABORATORY RESULTS:      CBC with diff:   Results from last 7 days   Lab Units 06/27/23  0109 06/26/23  0857   WBC Thousand/uL 10.89* 10.72*   HEMOGLOBIN g/dL 11.9* 14.5   HEMATOCRIT % 35.1* 42.8   MCV fL 94 93   PLATELETS Thousands/uL 188 241   RBC Million/uL 3.74* 4.62   MCH pg 31.8 31.4   MCHC g/dL 33.9 33.9   RDW % 14.8 14.7   MPV fL 10.8 10.9   NRBC AUTO /100 WBCs  --  0       CMP:  Results from last 7 days   Lab Units 06/29/23  0528 06/28/23  0711 06/27/23  1220 06/27/23  0237 06/26/23 2018 06/26/23  1347 06/26/23  0857   POTASSIUM mmol/L 4.3 3.3* 3.3* 2.9* 2.5* 2.6* 2.8*   CHLORIDE mmol/L 106 106  --  94* 90* 87* 86*   CO2 mmol/L 25 28  --  29 31 34* 35*   BUN mg/dL 27* 28*  --  61* 67* 64* 64*   CREATININE mg/dL 0.94 0.83  --  1.32* 1.73* 1.69* 1.79*   CALCIUM mg/dL 9.2 9.2  --  9.2 9.4 9.6 10.3*   AST U/L  --   --   --  12*  --   --  12*   ALT U/L  --   --   --  25  --   --  30   ALK PHOS U/L  --   --   --  75  --   --  90   EGFR ml/min/1.73sq m 85 92  --  56 40 41 39       BMP:  Results from last 7 days   Lab Units 06/29/23  0528 06/28/23  0711 06/27/23  1220 06/27/23  0237 06/26/23 2018 06/26/23  1347 06/26/23  0857   POTASSIUM mmol/L 4.3 3.3* 3.3* 2.9* 2.5* 2.6* 2.8*   CHLORIDE mmol/L 106 106  --  94* 90* 87* 86*   CO2 mmol/L 25 28  --  29 31 34* 35*   BUN mg/dL 27* 28*  --  61* 67* 64* 64*   CREATININE mg/dL 0.94 0.83  --  1.32* 1.73* 1.69* 1.79*   CALCIUM mg/dL 9.2 9.2  --  9.2 9.4 9.6 10.3*          Lab Results   Component Value Date    NTBNP 39 01/11/2021    NTBNP 499 (H) 07/26/2020            Results from last 7 days   Lab Units 06/28/23  0711 06/26/23  0857   MAGNESIUM mg/dL 2.3 2.6          Results from last 7 days   Lab Units 06/29/23  0528   HEMOGLOBIN A1C % 6.7*          Results from last 7 days   Lab Units 06/26/23  0857   TSH 3RD GENERATON uIU/mL 0.854           Lipid Profile:   No results found for: "CHOL"  No results found for: "HDL"  No results found for: "LDLCALC"  No results found for: "TRIG"      Cardiac testing:   No results found for this or any previous visit. No results found for this or any previous visit. No valid procedures specified. No results found for this or any previous visit. Imaging:   MRI lumbar spine wo contrast    Result Date: 6/27/2023  Narrative: MRI LUMBAR SPINE WITHOUT CONTRAST INDICATION: back pain. COMPARISON: Lumbar radiographs 6/16/2023. MRI lumbar spine 10/17/2017 TECHNIQUE:  Multiplanar, multisequence imaging of the lumbar spine was performed. . IMAGE QUALITY:  Diagnostic FINDINGS: PRIOR SURGERY: Stable posterior lumbar fixation hardware including bilateral transpedicular screws at the L4 and L5 levels with susceptibility artifacts. Bilateral laminectomies are again noted at the L4-5 and L5-S1 levels with patency of the central canal. A chronic fluid collection is noted within the laminectomy bed measuring 1.3 x 3.0 x 2.6 cm, mildly decreased in size. VERTEBRAL BODIES:  There are 5 lumbar type vertebral bodies. Normal alignment of the lumbar spine. No spondylolysis or spondylolisthesis. No scoliosis. No compression fracture. Normal marrow signal is identified within the visualized bony structures. No discrete marrow lesion. SACRUM:  Normal signal within the sacrum. No evidence of insufficiency or stress fracture. DISTAL CORD AND CONUS:  Normal size and signal within the distal cord and conus. The conus terminates at the L1-2 level. The cauda equina nerve roots demonstrate crowding at the L3-4 level secondary to thecal sac narrowing. PARASPINAL SOFT TISSUES:  Paraspinal soft tissues are unremarkable. LOWER THORACIC DISC SPACES:  Normal disc height and signal.  No disc herniation, canal stenosis or foraminal narrowing. LUMBAR DISC SPACES: L1-2: No focal disc herniation, central canal stenosis, or neural foraminal narrowing. L2-3: Stable small left foraminal disc protrusion. Bilateral ligamentum flavum and facet hypertrophy. No central canal or  subarticular/lateral recess narrowing. Mild left neural foraminal stenosis. L3-4: New broad-based central disc protrusion with increased bilateral ligamentum flavum and facet hypertrophy. Moderate to severe central canal and bilateral subarticular/lateral recess narrowing, new since 2017 with impingement of the cauda equina. Mild bilateral neural foraminal stenosis, progressed. L4-5: Stable disc osteophyte complex and small left paracentral disc protrusion associated with an annular tear. The central canal is patent status post bilateral laminectomies. Mild bilateral neural foraminal stenosis. L5-S1: Stable diffuse disc osteophyte complex, eccentric to the left with new posterior annular fissures. The central canal is patent status post bilateral laminectomies. Mild left neural foraminal stenosis. Impression: 1. Stable posterior lumbar fixation hardware including bilateral transpedicular screws at the L4 and L5 levels with susceptibility artifacts. Bilateral laminectomies are again noted at the L4-5 and L5-S1 levels with patency of the central canal. A chronic fluid collection is noted within the L4-5 laminectomy bed ,decreased in size compared to the prior 10/17/2017 MRI examination. 2. New diffuse disc bulge at the L3-4 level with increased bilateral ligamentum flavum and facet hypertrophy causing moderate to severe central canal and bilateral subarticular/lateral recess narrowing with impingement of the cauda equina. The study was marked in [facility] for immediate notification. Workstation performed: [id number]     US kidney and bladder with pvr    Result Date: 6/27/2023  Narrative: RENAL ULTRASOUND WITH PVR INDICATION:   RODO. COMPARISON: CTA chest CT abdomen pelvis 2/17/2023. Renal/bladder ultrasound 9/20/2022. TECHNIQUE:   Ultrasound of the retroperitoneum was performed with a curvilinear transducer utilizing volumetric sweeps and still imaging techniques. FINDINGS: KIDNEYS: Symmetric and normal size. Right kidney:  11.5 x 6.4 x 5.3 cm. Volume 202.5 mL Left kidney:  12.3 x 5.7 x 5.0 cm. Volume 185.7 mL Right kidney Normal echogenicity and contour. No mass is identified. No hydronephrosis. No shadowing calculi. No perinephric fluid collections. Left kidney Normal echogenicity and contour. No mass is identified. No hydronephrosis. No shadowing calculi. No perinephric fluid collections. URETERS: Nonvisualized. BLADDER: Normally distended. No focal thickening or mass lesions. Bilateral ureteral jets detected. Prevoid: 122.7 mL No significant post void volume. Measured post void volume in mL: 13.4     Impression: No hydronephrosis. A bladder mass is not visualized.  Workstation performed: [id number]     XR spine lumbar complete w bending minimum 6 views    Result Date: 6/21/2023  Narrative: LUMBAR SPINE INDICATION:   M54.50: Low back pain, unspecified. COMPARISON: 12/1/2016 VIEWS:  XR SPINE LUMBAR COMPLETE W BENDING MINIMUM 6 VIEWS FINDINGS: There are 5 non rib bearing lumbar vertebral bodies. Status post L4-5 posterior fixation without hardware complication. Slight levoscoliosis with apex at L3. No dynamic instability. Age-appropriate lumbar degenerative changes are seen. The pedicles appear intact. There are atherosclerotic calcifications. Soft tissues are otherwise unremarkable. Impression: Stable postoperative change after L4-5 posterior fixation without hardware complication. Age-appropriate degenerative change. No dynamic instability. Workstation performed: [id number]     Thank you for allowing us to participate in this patient's care. Counseling / Coordination of Care  Total floor / unit time spent today 45 minutes. Greater than 50% of total time was spent with the patient and / or family counseling and / or coordination of care. A description of the counseling / coordination of care: Review of history, current assessment, development of a plan. Code Status: Level 3 - DNAR and DNI    ** Please Note: Dragon 360 Dictation voice to text software may have been used in the creation of this document.  **

## 2023-06-29 NOTE — OCCUPATIONAL THERAPY NOTE
Occupational Therapy cx        Patient Name: Jeff Bowden  TKOKX'M Date: 6/29/2023 06/29/23 1430   OT Last Visit   OT Visit Date 06/29/23   Note Type   Note type Evaluation   Cancel Reasons Patient to operating room   Additional Comments Per neurosx, pt w options for surgery, however awaiting decision regarding immunotherapy course and its impact on sx. Will hold and address as clinical course is further determined.          Star Hodgkin, DAYSI, OTR/L

## 2023-06-29 NOTE — QUICK NOTE
Contacted by nursing since the patient's daughter Kelli Mtz wants to talk to me. By the time I was able to get upstairs to the room she left already. Discussed with the patient.   He reported that iron Hydrocoll antibody and he is aware of what is happening

## 2023-06-29 NOTE — PROGRESS NOTES
I was asked to review this patient's chart and imaging studies    Briefly this is a 59-year-old male with severe spinal stenosis and an adjacent level from a previous decompression fixation fusion. He has intractable pain and neurogenic claudication. Imaging studies demonstrate severe spinal stenosis at L3-4 with bilateral foraminal stenosis. I have recommended that he undergo a minimally invasive approach for a decompressive hemilaminectomy with bilateral foraminotomies.

## 2023-06-29 NOTE — PLAN OF CARE
Problem: Prexisting or High Potential for Compromised Skin Integrity  Goal: Skin integrity is maintained or improved  Description: INTERVENTIONS:  - Identify patients at risk for skin breakdown  - Assess and monitor skin integrity  - Assess and monitor nutrition and hydration status  - Monitor labs   - Assess for incontinence   - Turn and reposition patient  - Assist with mobility/ambulation  - Relieve pressure over bony prominences  - Avoid friction and shearing  - Provide appropriate hygiene as needed including keeping skin clean and dry  - Evaluate need for skin moisturizer/barrier cream  - Collaborate with interdisciplinary team   - Patient/family teaching  - Consider wound care consult   6/28/2023 2111 by Yovani Adames RN  Outcome: Progressing  6/28/2023 1147 by Yovani Adames RN  Outcome: Progressing     Problem: PAIN - ADULT  Goal: Verbalizes/displays adequate comfort level or baseline comfort level  Description: Interventions:  - Encourage patient to monitor pain and request assistance  - Assess pain using appropriate pain scale  - Administer analgesics based on type and severity of pain and evaluate response  - Implement non-pharmacological measures as appropriate and evaluate response  - Consider cultural and social influences on pain and pain management  - Notify physician/advanced practitioner if interventions unsuccessful or patient reports new pain  6/28/2023 2111 by Yovani Adames RN  Outcome: Progressing  6/28/2023 1147 by Yovani Adames RN  Outcome: Progressing     Problem: INFECTION - ADULT  Goal: Absence or prevention of progression during hospitalization  Description: INTERVENTIONS:  - Assess and monitor for signs and symptoms of infection  - Monitor lab/diagnostic results  - Monitor all insertion sites, i.e. indwelling lines, tubes, and drains  - Monitor endotracheal if appropriate and nasal secretions for changes in amount and color  - [place] appropriate cooling/warming therapies per order  - Administer medications as ordered  - Instruct and encourage patient and family to use good hand hygiene technique  - Identify and instruct in appropriate isolation precautions for identified infection/condition  6/28/2023 2111 by Marion Cespedes RN  Outcome: Progressing  6/28/2023 1147 by Marion Cespedes RN  Outcome: Progressing     Problem: SAFETY ADULT  Goal: Patient will remain free of falls  Description: INTERVENTIONS:  - Educate patient/family on patient safety including physical limitations  - Instruct patient to call for assistance with activity   - Consult OT/PT to assist with strengthening/mobility   - Keep Call bell within reach  - Keep bed low and locked with side rails adjusted as appropriate  - Keep care items and personal belongings within reach  - Initiate and maintain comfort rounds  - Make Fall Risk Sign visible to staff  - Offer Toileting every  Hours, in advance of need  - Initiate/Maintain alarm  - Obtain necessary fall risk management equipment  - Apply yellow socks and bracelet for high fall risk patients  - Consider moving patient to room near nurses station  6/28/2023 2111 by Marion Cespedes RN  Outcome: Progressing  6/28/2023 1147 by Marion Cespedes RN  Outcome: Progressing  Goal: Maintain or return to baseline ADL function  Description: INTERVENTIONS:  -  Assess patient's ability to carry out ADLs; assess patient's baseline for ADL function and identify physical deficits which impact ability to perform ADLs (bathing, care of mouth/teeth, toileting, grooming, dressing, etc.)  - Assess/evaluate cause of self-care deficits   - Assess range of motion  - Assess patient's mobility; develop plan if impaired  - Assess patient's need for assistive devices and provide as appropriate  - Encourage maximum independence but intervene and supervise when necessary  - Involve family in performance of ADLs  - Assess for home care needs following discharge   - Consider OT consult to assist with ADL evaluation and planning for discharge  - Provide patient education as appropriate  6/28/2023 2111 by Thomas Viramontes RN  Outcome: Progressing  6/28/2023 1147 by Thomas Viramontes RN  Outcome: Progressing     Problem: DISCHARGE PLANNING  Goal: Discharge to home or other facility with appropriate resources  Description: INTERVENTIONS:  - Identify barriers to discharge w/patient and caregiver  - Arrange for needed discharge resources and transportation as appropriate  - Identify discharge learning needs (meds, wound care, etc.)  - Arrange for interpretive services to assist at discharge as needed  - Refer to Case Management Department for coordinating discharge planning if the patient needs post-hospital services based on physician/advanced practitioner order or complex needs related to functional status, cognitive ability, or social support system  6/28/2023 2111 by Thomas Viramontes RN  Outcome: Progressing  6/28/2023 1147 by Thomas Viramontes RN  Outcome: Progressing     Problem: Knowledge Deficit  Goal: Patient/family/caregiver demonstrates understanding of disease process, treatment plan, medications, and discharge instructions  Description: Complete learning assessment and assess knowledge base.   Interventions:  - Provide teaching at level of understanding  - Provide teaching via preferred learning methods  6/28/2023 2111 by Thomas Viramontes RN  Outcome: Progressing  6/28/2023 1147 by Thomas Viramontes RN  Outcome: Progressing     Problem: DISCHARGE PLANNING - CARE MANAGEMENT  Goal: Discharge to post-acute care or home with appropriate resources  Description: INTERVENTIONS:  - Conduct assessment to determine patient/family and health care team treatment goals, and need for post-acute services based on payer coverage, community resources, and patient preferences, and barriers to discharge  - Address psychosocial, clinical, and financial barriers to discharge as identified in assessment in conjunction with the patient/family and health care team  - Arrange appropriate level of post-acute services according to patient’s   needs and preference and payer coverage in collaboration with the physician and health care team  - Communicate with and update the patient/family, physician, and health care team regarding progress on the discharge plan  - Arrange appropriate transportation to post-acute venues  6/28/2023 2111 by Jackie Gary RN  Outcome: Progressing  6/28/2023 1147 by Jackie Gary RN  Outcome: Progressing

## 2023-06-29 NOTE — PHYSICAL THERAPY NOTE
Physical Therapy Cancellation Note        PT orders received and chart reviewed. Per neurosurgery patient potentially pending OR. Will defer PT eval at this time and continue to follow and evaluate as appropriate.     Vanesa Godoy, PT, DPT

## 2023-06-29 NOTE — ASSESSMENT & PLAN NOTE
· Diagnosed in 11/2022. · Follows with urology Dr. Gabby Kasper. Currently undergoing immunotherapy. · Underwent bladder biopsy on March 17 which was inconclusive and had repeat biopsy in May which was negative for recurrent malignancy.

## 2023-06-29 NOTE — ASSESSMENT & PLAN NOTE
· Sinus bradycardia noted on telemetry   · HR currently in the 40s  · TSH WNL  · Cardiology following,  · Home Toprol XL currently on hold   · Pt is asymptomatic at this time  · Continue to monitor on telemetry

## 2023-06-29 NOTE — ASSESSMENT & PLAN NOTE
Patient p/w chronic B/L lower back pain with radiation down B/L lower extremities (lateral leg to foot)  • S/p L4/5 PLDF ~ 6 years ago  • Patient with chronic persistent 7/10 lower back pain since surgery, worsening with recent bladder biopsy on 3/17/2023  • [facility] following patient as an outpatient for pain management  • Presented to [address] [place] [address] on 6/26 due to worsening lower back pain and 2 episodes of urinary incontinence. Imaging:   · MRI lumbar spine without 6/27/23: stable posterior lumbar fixation hardware including bilateral transpedicular screws at the L4 and L5 levels with susceptibility artifacts. Bilateral laminectomies are again noted at the L4-5 and L5-S1 levels with patency of the central canal. A chronic fluid collection is noted within the L4-5 laminectomy bed, decreased in size compared to the prior 10/17/2017 MRI examination. New diffuse disc bulge at the L3-4 level with increased bilateral ligamentum flavum and facet hypertrophy causing moderate to severe central canal and bilateral subarticular/lateral recess narrowing with impingement of the cauda equina. · Lumbar spine bending x-rays 6/16/23: Evidence of prior hardware L4-L5 without any complication or loosening. No dynamic instability noted. Plan:   • Continue to monitor neurological exam.    o Currently no focal motor deficits. • Pain management per palliative. · Discussed case with patient's urologist, Dr. Rosa Salgado who stated that it would be acceptable to hold the patients immunotherapy for the expected amount of time to allow for surgery and adequate wound healing. · Again discussed with patient findings of adjacent level stenosis and option for surgical intervention with a posterior L3-4 decompression with or without extension of fixation. • Reviewed with patient expected postoperative course and hospitalization. • Tentative plan for surgical intervention tomorrow.   We will complete preoperative labs as well as nose to toes protocol. N.p.o. after midnight. Hold heparin. We will continue to follow closely. Call with questions or concerns.

## 2023-06-29 NOTE — PROGRESS NOTES
23 1800   Clinical Encounter Type   Visited With Patient   Routine Visit Introduction   Referral From Merged with [facility] Progress Note    2023  Patient: Laila Aleman : 1960  Admission Date & Time: 2023  MRN: [id number] CSN: [id number]           intro visit. Parekh Party and I visited with Ray Whit as he asked for prayers. He expressed prayers for his wife, his surgery tomorrow, his diagnoses, and for pain relief and strength. His demeanor was calm and welcoming, affable considering all he expressed going through. Prayers gratefully accepted and chaplains remain available.

## 2023-06-30 ENCOUNTER — ANESTHESIA (INPATIENT)
Dept: PERIOP | Facility: HOSPITAL | Age: 63
DRG: 519 | End: 2023-06-30
Payer: MEDICARE

## 2023-06-30 ENCOUNTER — APPOINTMENT (INPATIENT)
Dept: RADIOLOGY | Facility: HOSPITAL | Age: 63
DRG: 519 | End: 2023-06-30
Payer: MEDICARE

## 2023-06-30 DIAGNOSIS — C67.3 MALIGNANT NEOPLASM OF ANTERIOR WALL OF URINARY BLADDER (HCC): ICD-10-CM

## 2023-06-30 DIAGNOSIS — M54.42 CHRONIC BILATERAL LOW BACK PAIN WITH BILATERAL SCIATICA: ICD-10-CM

## 2023-06-30 DIAGNOSIS — M54.41 CHRONIC BILATERAL LOW BACK PAIN WITH BILATERAL SCIATICA: ICD-10-CM

## 2023-06-30 DIAGNOSIS — G89.29 CHRONIC BILATERAL LOW BACK PAIN WITH BILATERAL SCIATICA: ICD-10-CM

## 2023-06-30 PROBLEM — E87.8 ELECTROLYTE ABNORMALITY: Status: ACTIVE | Noted: 2023-06-30

## 2023-06-30 LAB
ABO GROUP BLD: NORMAL
ANION GAP SERPL CALCULATED.3IONS-SCNC: 3 MMOL/L
ATRIAL RATE: 43 BPM
BUN SERPL-MCNC: 31 MG/DL (ref 5–25)
CALCIUM SERPL-MCNC: 9.1 MG/DL (ref 8.3–10.1)
CHLORIDE SERPL-SCNC: 100 MMOL/L (ref 96–108)
CO2 SERPL-SCNC: 30 MMOL/L (ref 21–32)
CREAT SERPL-MCNC: 1.17 MG/DL (ref 0.6–1.3)
ERYTHROCYTE [DISTWIDTH] IN BLOOD BY AUTOMATED COUNT: 14.7 % (ref 11.6–15.1)
GFR SERPL CREATININE-BSD FRML MDRD: 65 ML/MIN/1.73SQ M
GLUCOSE SERPL-MCNC: 103 MG/DL (ref 65–140)
GLUCOSE SERPL-MCNC: 108 MG/DL (ref 65–140)
GLUCOSE SERPL-MCNC: 111 MG/DL (ref 65–140)
GLUCOSE SERPL-MCNC: 116 MG/DL (ref 65–140)
GLUCOSE SERPL-MCNC: 149 MG/DL (ref 65–140)
GLUCOSE SERPL-MCNC: 183 MG/DL (ref 65–140)
GLUCOSE SERPL-MCNC: 183 MG/DL (ref 65–140)
HCT VFR BLD AUTO: 39.2 % (ref 36.5–49.3)
HGB BLD-MCNC: 12.9 G/DL (ref 12–17)
MCH RBC QN AUTO: 31.4 PG (ref 26.8–34.3)
MCHC RBC AUTO-ENTMCNC: 32.9 G/DL (ref 31.4–37.4)
MCV RBC AUTO: 95 FL (ref 82–98)
P AXIS: 4 DEGREES
PLATELET # BLD AUTO: 170 THOUSANDS/UL (ref 149–390)
PMV BLD AUTO: 11.3 FL (ref 8.9–12.7)
POTASSIUM SERPL-SCNC: 3.4 MMOL/L (ref 3.5–5.3)
PR INTERVAL: 156 MS
QRS AXIS: 14 DEGREES
QRSD INTERVAL: 90 MS
QT INTERVAL: 456 MS
QTC INTERVAL: 385 MS
RBC # BLD AUTO: 4.11 MILLION/UL (ref 3.88–5.62)
RH BLD: POSITIVE
SODIUM SERPL-SCNC: 133 MMOL/L (ref 135–147)
T WAVE AXIS: 69 DEGREES
VENTRICULAR RATE: 43 BPM
WBC # BLD AUTO: 7.98 THOUSAND/UL (ref 4.31–10.16)

## 2023-06-30 PROCEDURE — 85027 COMPLETE CBC AUTOMATED: CPT | Performed by: PHYSICIAN ASSISTANT

## 2023-06-30 PROCEDURE — 80048 BASIC METABOLIC PNL TOTAL CA: CPT | Performed by: PHYSICIAN ASSISTANT

## 2023-06-30 PROCEDURE — 93010 ELECTROCARDIOGRAM REPORT: CPT | Performed by: INTERNAL MEDICINE

## 2023-06-30 PROCEDURE — 72020 X-RAY EXAM OF SPINE 1 VIEW: CPT

## 2023-06-30 PROCEDURE — 99233 SBSQ HOSP IP/OBS HIGH 50: CPT | Performed by: NEUROLOGICAL SURGERY

## 2023-06-30 PROCEDURE — 63047 LAM FACETEC & FORAMOT LUMBAR: CPT | Performed by: NEUROLOGICAL SURGERY

## 2023-06-30 PROCEDURE — 82948 REAGENT STRIP/BLOOD GLUCOSE: CPT

## 2023-06-30 PROCEDURE — 99232 SBSQ HOSP IP/OBS MODERATE 35: CPT | Performed by: PHYSICIAN ASSISTANT

## 2023-06-30 PROCEDURE — 00NY0ZZ RELEASE LUMBAR SPINAL CORD, OPEN APPROACH: ICD-10-PCS | Performed by: FAMILY MEDICINE

## 2023-06-30 PROCEDURE — 0QB00ZZ EXCISION OF LUMBAR VERTEBRA, OPEN APPROACH: ICD-10-PCS | Performed by: FAMILY MEDICINE

## 2023-06-30 RX ORDER — HYDROMORPHONE HYDROCHLORIDE 1 MG/ML
INJECTION, SOLUTION INTRAMUSCULAR; INTRAVENOUS; SUBCUTANEOUS AS NEEDED
Status: DISCONTINUED | OUTPATIENT
Start: 2023-06-30 | End: 2023-06-30

## 2023-06-30 RX ORDER — SODIUM CHLORIDE, SODIUM LACTATE, POTASSIUM CHLORIDE, CALCIUM CHLORIDE 600; 310; 30; 20 MG/100ML; MG/100ML; MG/100ML; MG/100ML
INJECTION, SOLUTION INTRAVENOUS CONTINUOUS PRN
Status: DISCONTINUED | OUTPATIENT
Start: 2023-06-30 | End: 2023-06-30

## 2023-06-30 RX ORDER — HYDROMORPHONE HCL IN WATER/PF 6 MG/30 ML
0.2 PATIENT CONTROLLED ANALGESIA SYRINGE INTRAVENOUS
Status: DISCONTINUED | OUTPATIENT
Start: 2023-06-30 | End: 2023-06-30 | Stop reason: HOSPADM

## 2023-06-30 RX ORDER — CHLORHEXIDINE GLUCONATE 0.12 MG/ML
15 RINSE ORAL ONCE
Status: COMPLETED | OUTPATIENT
Start: 2023-06-30 | End: 2023-06-30

## 2023-06-30 RX ORDER — LIDOCAINE HYDROCHLORIDE 20 MG/ML
INJECTION, SOLUTION EPIDURAL; INFILTRATION; INTRACAUDAL; PERINEURAL AS NEEDED
Status: DISCONTINUED | OUTPATIENT
Start: 2023-06-30 | End: 2023-06-30

## 2023-06-30 RX ORDER — BUPIVACAINE HYDROCHLORIDE AND EPINEPHRINE 5; 5 MG/ML; UG/ML
INJECTION, SOLUTION EPIDURAL; INTRACAUDAL; PERINEURAL AS NEEDED
Status: DISCONTINUED | OUTPATIENT
Start: 2023-06-30 | End: 2023-06-30 | Stop reason: HOSPADM

## 2023-06-30 RX ORDER — LIDOCAINE HYDROCHLORIDE AND EPINEPHRINE 10; 10 MG/ML; UG/ML
INJECTION, SOLUTION INFILTRATION; PERINEURAL AS NEEDED
Status: DISCONTINUED | OUTPATIENT
Start: 2023-06-30 | End: 2023-06-30 | Stop reason: HOSPADM

## 2023-06-30 RX ORDER — SODIUM CHLORIDE 9 MG/ML
INJECTION, SOLUTION INTRAVENOUS CONTINUOUS PRN
Status: DISCONTINUED | OUTPATIENT
Start: 2023-06-30 | End: 2023-06-30

## 2023-06-30 RX ORDER — CEFAZOLIN SODIUM 2 G/50ML
2000 SOLUTION INTRAVENOUS ONCE
Status: COMPLETED | OUTPATIENT
Start: 2023-06-30 | End: 2023-06-30

## 2023-06-30 RX ORDER — ONDANSETRON 2 MG/ML
INJECTION INTRAMUSCULAR; INTRAVENOUS AS NEEDED
Status: DISCONTINUED | OUTPATIENT
Start: 2023-06-30 | End: 2023-06-30

## 2023-06-30 RX ORDER — POTASSIUM CHLORIDE 20 MEQ/1
20 TABLET, EXTENDED RELEASE ORAL 2 TIMES DAILY
Status: DISCONTINUED | OUTPATIENT
Start: 2023-06-30 | End: 2023-07-01 | Stop reason: HOSPADM

## 2023-06-30 RX ORDER — FENTANYL CITRATE 50 UG/ML
INJECTION, SOLUTION INTRAMUSCULAR; INTRAVENOUS AS NEEDED
Status: DISCONTINUED | OUTPATIENT
Start: 2023-06-30 | End: 2023-06-30

## 2023-06-30 RX ORDER — GLYCOPYRROLATE 0.2 MG/ML
INJECTION INTRAMUSCULAR; INTRAVENOUS AS NEEDED
Status: DISCONTINUED | OUTPATIENT
Start: 2023-06-30 | End: 2023-06-30

## 2023-06-30 RX ORDER — MIDAZOLAM HYDROCHLORIDE 2 MG/2ML
INJECTION, SOLUTION INTRAMUSCULAR; INTRAVENOUS AS NEEDED
Status: DISCONTINUED | OUTPATIENT
Start: 2023-06-30 | End: 2023-06-30

## 2023-06-30 RX ORDER — PROPOFOL 10 MG/ML
INJECTION, EMULSION INTRAVENOUS AS NEEDED
Status: DISCONTINUED | OUTPATIENT
Start: 2023-06-30 | End: 2023-06-30

## 2023-06-30 RX ORDER — ONDANSETRON 2 MG/ML
4 INJECTION INTRAMUSCULAR; INTRAVENOUS ONCE AS NEEDED
Status: DISCONTINUED | OUTPATIENT
Start: 2023-06-30 | End: 2023-06-30 | Stop reason: HOSPADM

## 2023-06-30 RX ORDER — EPHEDRINE SULFATE 50 MG/ML
INJECTION INTRAVENOUS AS NEEDED
Status: DISCONTINUED | OUTPATIENT
Start: 2023-06-30 | End: 2023-06-30

## 2023-06-30 RX ORDER — MAGNESIUM HYDROXIDE 1200 MG/15ML
LIQUID ORAL AS NEEDED
Status: DISCONTINUED | OUTPATIENT
Start: 2023-06-30 | End: 2023-06-30 | Stop reason: HOSPADM

## 2023-06-30 RX ORDER — ROCURONIUM BROMIDE 10 MG/ML
INJECTION, SOLUTION INTRAVENOUS AS NEEDED
Status: DISCONTINUED | OUTPATIENT
Start: 2023-06-30 | End: 2023-06-30

## 2023-06-30 RX ORDER — FENTANYL CITRATE/PF 50 MCG/ML
25 SYRINGE (ML) INJECTION
Status: DISCONTINUED | OUTPATIENT
Start: 2023-06-30 | End: 2023-06-30 | Stop reason: HOSPADM

## 2023-06-30 RX ORDER — KETAMINE HCL IN NACL, ISO-OSM 100MG/10ML
SYRINGE (ML) INJECTION AS NEEDED
Status: DISCONTINUED | OUTPATIENT
Start: 2023-06-30 | End: 2023-06-30

## 2023-06-30 RX ADMIN — FENTANYL CITRATE 50 MCG: 50 INJECTION INTRAMUSCULAR; INTRAVENOUS at 09:43

## 2023-06-30 RX ADMIN — GABAPENTIN 400 MG: 400 CAPSULE ORAL at 21:32

## 2023-06-30 RX ADMIN — Medication 50 MG: at 08:57

## 2023-06-30 RX ADMIN — EPHEDRINE SULFATE 15 MG: 50 INJECTION INTRAVENOUS at 08:57

## 2023-06-30 RX ADMIN — OXYCODONE HYDROCHLORIDE 40 MG: 40 TABLET, FILM COATED, EXTENDED RELEASE ORAL at 12:46

## 2023-06-30 RX ADMIN — OXYCODONE HYDROCHLORIDE 40 MG: 40 TABLET, FILM COATED, EXTENDED RELEASE ORAL at 05:28

## 2023-06-30 RX ADMIN — FENTANYL CITRATE 50 MCG: 50 INJECTION INTRAMUSCULAR; INTRAVENOUS at 09:53

## 2023-06-30 RX ADMIN — ACETAMINOPHEN 975 MG: 325 TABLET ORAL at 21:32

## 2023-06-30 RX ADMIN — DOCUSATE SODIUM 100 MG: 100 CAPSULE, LIQUID FILLED ORAL at 12:45

## 2023-06-30 RX ADMIN — FENTANYL CITRATE 100 MCG: 50 INJECTION INTRAMUSCULAR; INTRAVENOUS at 08:52

## 2023-06-30 RX ADMIN — ALPRAZOLAM 0.25 MG: 0.25 TABLET ORAL at 21:33

## 2023-06-30 RX ADMIN — ROCURONIUM BROMIDE 50 MG: 10 INJECTION, SOLUTION INTRAVENOUS at 08:58

## 2023-06-30 RX ADMIN — Medication 100 MG: at 12:46

## 2023-06-30 RX ADMIN — TIZANIDINE 4 MG: 4 TABLET ORAL at 15:36

## 2023-06-30 RX ADMIN — CHLORHEXIDINE GLUCONATE 15 ML: 1.2 SOLUTION ORAL at 06:45

## 2023-06-30 RX ADMIN — CHLORHEXIDINE GLUCONATE 15 ML: 1.2 SOLUTION ORAL at 07:40

## 2023-06-30 RX ADMIN — HEPARIN SODIUM 5000 UNITS: 5000 INJECTION INTRAVENOUS; SUBCUTANEOUS at 21:32

## 2023-06-30 RX ADMIN — HYDROMORPHONE HYDROCHLORIDE 1 MG: 1 INJECTION, SOLUTION INTRAMUSCULAR; INTRAVENOUS; SUBCUTANEOUS at 22:40

## 2023-06-30 RX ADMIN — ACETAMINOPHEN 975 MG: 325 TABLET ORAL at 05:28

## 2023-06-30 RX ADMIN — SODIUM CHLORIDE: 0.9 INJECTION, SOLUTION INTRAVENOUS at 08:50

## 2023-06-30 RX ADMIN — HYDROMORPHONE HYDROCHLORIDE 1 MG: 1 INJECTION, SOLUTION INTRAMUSCULAR; INTRAVENOUS; SUBCUTANEOUS at 15:36

## 2023-06-30 RX ADMIN — DOCUSATE SODIUM 100 MG: 100 CAPSULE, LIQUID FILLED ORAL at 17:36

## 2023-06-30 RX ADMIN — TAMSULOSIN HYDROCHLORIDE 0.4 MG: 0.4 CAPSULE ORAL at 15:36

## 2023-06-30 RX ADMIN — ACETAMINOPHEN 975 MG: 325 TABLET ORAL at 12:47

## 2023-06-30 RX ADMIN — SUGAMMADEX 220 MG: 100 INJECTION, SOLUTION INTRAVENOUS at 10:51

## 2023-06-30 RX ADMIN — TIZANIDINE 4 MG: 4 TABLET ORAL at 21:33

## 2023-06-30 RX ADMIN — GLYCOPYRROLATE 0.2 MG: 0.2 INJECTION, SOLUTION INTRAMUSCULAR; INTRAVENOUS at 08:57

## 2023-06-30 RX ADMIN — PROPOFOL 150 MG: 10 INJECTION, EMULSION INTRAVENOUS at 08:57

## 2023-06-30 RX ADMIN — INSULIN LISPRO 1 UNITS: 100 INJECTION, SOLUTION INTRAVENOUS; SUBCUTANEOUS at 21:37

## 2023-06-30 RX ADMIN — ATORVASTATIN CALCIUM 40 MG: 40 TABLET, FILM COATED ORAL at 15:36

## 2023-06-30 RX ADMIN — FENTANYL CITRATE 25 MCG: 50 INJECTION, SOLUTION INTRAMUSCULAR; INTRAVENOUS at 11:20

## 2023-06-30 RX ADMIN — LIDOCAINE HYDROCHLORIDE 100 MG: 20 INJECTION, SOLUTION EPIDURAL; INFILTRATION; INTRACAUDAL; PERINEURAL at 08:57

## 2023-06-30 RX ADMIN — ROCURONIUM BROMIDE 20 MG: 10 INJECTION, SOLUTION INTRAVENOUS at 10:39

## 2023-06-30 RX ADMIN — ROCURONIUM BROMIDE 30 MG: 10 INJECTION, SOLUTION INTRAVENOUS at 09:27

## 2023-06-30 RX ADMIN — GABAPENTIN 400 MG: 400 CAPSULE ORAL at 15:36

## 2023-06-30 RX ADMIN — ROCURONIUM BROMIDE 20 MG: 10 INJECTION, SOLUTION INTRAVENOUS at 10:09

## 2023-06-30 RX ADMIN — HYDROMORPHONE HYDROCHLORIDE 1 MG: 1 INJECTION, SOLUTION INTRAMUSCULAR; INTRAVENOUS; SUBCUTANEOUS at 10:41

## 2023-06-30 RX ADMIN — FENTANYL CITRATE 25 MCG: 50 INJECTION, SOLUTION INTRAMUSCULAR; INTRAVENOUS at 11:37

## 2023-06-30 RX ADMIN — CEFAZOLIN SODIUM 2000 MG: 2 SOLUTION INTRAVENOUS at 09:08

## 2023-06-30 RX ADMIN — GLYCOPYRROLATE 0.2 MG: 0.2 INJECTION, SOLUTION INTRAMUSCULAR; INTRAVENOUS at 08:53

## 2023-06-30 RX ADMIN — POTASSIUM CHLORIDE 20 MEQ: 1500 TABLET, EXTENDED RELEASE ORAL at 17:36

## 2023-06-30 RX ADMIN — ONDANSETRON 4 MG: 2 INJECTION INTRAMUSCULAR; INTRAVENOUS at 10:50

## 2023-06-30 RX ADMIN — MIDAZOLAM 1 MG: 1 INJECTION INTRAMUSCULAR; INTRAVENOUS at 08:50

## 2023-06-30 RX ADMIN — HYDROMORPHONE HYDROCHLORIDE 1 MG: 1 INJECTION, SOLUTION INTRAMUSCULAR; INTRAVENOUS; SUBCUTANEOUS at 19:25

## 2023-06-30 RX ADMIN — OXYCODONE HYDROCHLORIDE 40 MG: 40 TABLET, FILM COATED, EXTENDED RELEASE ORAL at 21:32

## 2023-06-30 NOTE — PHYSICAL THERAPY NOTE
Physical Therapy Cancellation Note      PT orders received and chart reviewed. Pt pending OR today with neurosx. Will defer PT eval at this time and continue to follow and evaluate as appropriate post op.     Dwight Harrison, PT, DPT

## 2023-06-30 NOTE — PROGRESS NOTES
[facility]  Progress Note  Name: Kait Sampson I  MRN: [id number]  Unit/Bed#: OR POOL I Date of Admission: 6/27/2023   Date of Service: 6/30/2023 I Hospital Day: 3  DOS: 6/30/2023  Assessment/Plan   * Chronic bilateral low back pain with bilateral sciatica  Assessment & Plan  Presented to [facility] on 6/26 due to acute on chronic low back pain. Patient with history of L4/L5 fusion. Follows with palliative care in the outpatient setting for pain management. Transferred to [facility] given MRI findings as below. · MRI showed Stable posterior lumbar fixation hardware including bilateral transpedicular screws at the L4 and L5 levels with susceptibility artifacts. Bilateral laminectomies are again noted at the L4-5 and L5-S1 levels with patency of the central canal. A chronic fluid collection is noted within the L4-5 laminectomy bed ,decreased in size compared to the prior 10/17/2017 MRI examination. New diffuse disc bulge at the L3-4 level with increased bilateral ligamentum flavum and facet hypertrophy causing moderate to severe central canal and bilateral subarticular/lateral recess narrowing with impingement of the cauda equina. · NSX following,  · Plan for left L3/4 hemilaminectomy and bilateral foraminotomies today   · Appreciate ongoing neurosurgical recommendations   · Hold ASA for OR  · Monitor PVR, follow retention protocol.   · Palliative care following for pain control,  · Continue home Oxycontin 40 mg Q8 hours and PRN Oxycodone 20 mg PO Q3H for moderate pain with IV Dilaudid 1 mg Q3H PRN for severe pain   · PT/OT evaluations, currently recommending home with home health    Electrolyte abnormality  Assessment & Plan  · Sodium 133  · Could be in setting of restarting diuretics  · Trend BMP  · K+ 3.4  · Likely in setting of diuresis  · Resume patient's home KDUR 20 mEq BID dosing     Bradycardia  Assessment & Plan  · Sinus bradycardia noted on telemetry   · HR currently in the 30s-40s  · TSH WNL  · Cardiology following,  · Home Toprol XL currently on hold   · Pt is asymptomatic at this time  · Continue to monitor on telemetry     Hyperglycemia  Assessment & Plan  Noted, possibly steroid-induced. No prior history of diabetes. · However hgbA1c at 6.7%  · Encourage dietary changes and outpatient PCP follow up once stable for discharge  · While inpatient continue SSI coverage  · QID glucose checks   · Diabetic diet    Chronic, continuous use of opioids  Assessment & Plan  · PDMP reviewed by previous provider. · Follows with palliative outpatient, they are following inpatient for acute on chronic pain management as above     RODO (acute kidney injury) [facility])  Assessment & Plan  Admission creatinine 1.79 from baseline of around 0.9-1.15  · Suspected to be hypovolemic so diuretics and lisinopril held, with resolution. · Cardiology resumed home torsemide 60 mg daily on 6/29, monitor   · US kidney bladder negative for hydro  · Avoid nephrotoxins and hypotension  · Monitor PVR  · BMP in AM    Chronic diastolic heart failure ([address])  Assessment & Plan  Wt Readings from Last 3 Encounters:   06/30/23 110 kg (241 lb 6.5 oz)   06/27/23 111 kg (244 lb 6.4 oz)   06/27/23 111 kg (244 lb)     · Noted Echo from 11/2022 with preserved LVEF of 65%  · Diuretics initially held in setting of RODO which is now resolved   · Cardiology following,   · Resumed torsemide 60 mg daily on 6/29, monitor renal function   · Caution with IVF in the OR  · I/O, daily weight.   Fluid restriction and low-sodium diet  · Monitor volume status closely        Malignant neoplasm of urinary bladder [facility])  Assessment & Plan  · Follows with urology Dr. Terry Griffith, currently immunotherapy is on hold secondary to patient's surgical procedure and hospitalization   · Neurosurgery to discuss with urology regarding resuming immunotherapy schedule post operatively  · Continue flomax    Obstructive sleep apnea on CPAP  Assessment & Plan  · Continue QHS Cpap         VTE Pharmacologic Prophylaxis:   Moderate Risk (Score 3-4) - Pharmacological DVT Prophylaxis Ordered: heparin. Patient Centered Rounds: I evaluated the patient without nursing staff present due to speaking to nurse outside patient's room   Discussions with Specialists or Other Care Team Provider: Discussed with RN, CM and neurosurgery     Education and Discussions with Family / Patient: Patient declined call to . Total Time Spent on Date of Encounter in care of patient: 25 minutes This time was spent on one or more of the following: performing physical exam; counseling and coordination of care; obtaining or reviewing history; documenting in the medical record; reviewing/ordering tests, medications or procedures; communicating with other healthcare professionals and discussing with patient's family/caregivers. Current Length of Stay: 3 day(s)  Current Patient Status: Inpatient   Certification Statement: The patient will continue to require additional inpatient hospital stay due to Neurosurgical intervention today, PT/OT, pain control   Discharge Plan: Anticipate discharge in 48-72 hrs to discharge location to be determined pending rehab evaluations. Code Status: Level 3 - DNAR and DNI    Subjective:   Pt reports that he is doing well post operatively. States that his pain is currently very well under control. Denies any shortness of breath, urinating well after surgery. Ate all of his lunch. He is anxious to be getting up and ambulating once he is able. Objective:     Vitals:   Temp (24hrs), Av.2 °F (36.2 °C), Min:96.1 °F (35.6 °C), Max:97.8 °F (36.6 °C)    Temp:  [96.1 °F (35.6 °C)-97.8 °F (36.6 °C)] 96.1 °F (35.6 °C)  HR:  [37-50] 50  Resp:  [16] 16  BP: (121-133)/(61-65) 123/61  SpO2:  [95 %-98 %] 96 %  Body mass index is 36.71 kg/m². Input and Output Summary (last 24 hours):      Intake/Output Summary (Last 24 hours) at 2023 0263  Last data filed at 6/29/2023 1800  Gross per 24 hour   Intake 1040 ml   Output --   Net 1040 ml       Physical Exam:   Physical Exam  Vitals reviewed. Constitutional:       General: He is not in acute distress. Appearance: He is not toxic-appearing. Comments: Pt is in no acute distress lying in his hospital bed resting comfortably. On 2 L NC saturating mid 90s   HENT:      Head: Normocephalic and atraumatic. Cardiovascular:      Rate and Rhythm: Regular rhythm. Bradycardia present. Pulmonary:      Effort: No respiratory distress. Breath sounds: Normal breath sounds. No wheezing. Abdominal:      General: Bowel sounds are normal. There is no distension. Palpations: Abdomen is soft. Tenderness: There is no abdominal tenderness. Musculoskeletal:      Right lower leg: Edema present. Left lower leg: Edema present. Comments: Trace lower extremity edema bilaterally      Skin:     General: Skin is warm and dry. Neurological:      Mental Status: He is alert. Psychiatric:         Mood and Affect: Mood normal.          Additional Data:     Labs:  Results from last 7 days   Lab Units 06/30/23  0543 06/27/23  0109 06/26/23  0857   WBC Thousand/uL 7.98   < > 10.72*   HEMOGLOBIN g/dL 12.9   < > 14.5   HEMATOCRIT % 39.2   < > 42.8   PLATELETS Thousands/uL 170   < > 241   NEUTROS PCT %  --   --  71   LYMPHS PCT %  --   --  21   MONOS PCT %  --   --  6   EOS PCT %  --   --  1    < > = values in this interval not displayed.      Results from last 7 days   Lab Units 06/30/23  0543 06/27/23  1220 06/27/23  0237   SODIUM mmol/L 133*   < > 133*   POTASSIUM mmol/L 3.4*   < > 2.9*   CHLORIDE mmol/L 100   < > 94*   CO2 mmol/L 30   < > 29   BUN mg/dL 31*   < > 61*   CREATININE mg/dL 1.17   < > 1.32*   ANION GAP mmol/L 3   < > 10   CALCIUM mg/dL 9.1   < > 9.2   ALBUMIN g/dL  --   --  4.0   TOTAL BILIRUBIN mg/dL  --   --  0.35   ALK PHOS U/L  --   --  75   ALT U/L  --   --  25   AST U/L  --   --  12* GLUCOSE RANDOM mg/dL 103   < > 152*    < > = values in this interval not displayed.      Results from last 7 days   Lab Units 06/29/23  1604   INR  0.90     Results from last 7 days   Lab Units 06/30/23  0536 06/29/23  2058 06/29/23  1557 06/29/23  1140 06/29/23  0734 06/28/23  2042 06/28/23  1700 06/28/23  1145   POC GLUCOSE mg/dl 116 156* 163* 210* 120 142* 138 142*     Results from last 7 days   Lab Units 06/29/23  0528   HEMOGLOBIN A1C % 6.7*           Lines/Drains:  Invasive Devices     Peripheral Intravenous Line  Duration           Peripheral IV 06/26/23 Right Antecubital 4 days    Peripheral IV 06/30/23 Right Wrist <1 day          Airway  Duration           ETT  Cuffed;Oral 8 mm <1 day                  Telemetry:  Telemetry Orders (From admission, onward)             24 Hour Telemetry Monitoring  Continuous x 24 Hours (Telem)        Question:  Reason for 24 Hour Telemetry  Answer:  Arrhythmias requiring acute medical intervention / PPM or ICD malfunction                 Telemetry Reviewed: Sinus Bradycardia  Indication for Continued Telemetry Use: Arrthymias requiring medical therapy             Imaging: Reviewed radiology reports from this admission including: chest xray    Recent Cultures (last 7 days):         Last 24 Hours Medication List:   Current Facility-Administered Medications   Medication Dose Route Frequency Provider Last Rate   • [MAR Hold] acetaminophen  975 mg Oral Q8H [address], DO     • [MAR Hold] ALPRAZolam  0.25 mg Oral TID PRN DIA Allen     • [facility] Hold] atorvastatin  40 mg Oral Daily With Dinner Vinod Baig PA-C     • [facility] Hold] cefazolin  2,000 mg Intravenous Once Rupal Yi PA-C     • [MAR Hold] co-enzyme Q-10  100 mg Oral Daily Vinod Baig PA-C     • [facility] Hold] docusate sodium  100 mg Oral BID Vinod Baig PA-C     • [facility] Hold] gabapentin  400 mg Oral TID Hetal Howard,      • [facility] Hold] heparin (porcine)  5,000 Units Subcutaneous Q8H [facility] Ana Hernandez PA-C     • [facility] Hold] HYDROmorphone  8 mg Oral Q3H PRN Jestine Krabbe Bendas, DO      Or   • [MAR Hold] HYDROmorphone  1 mg Intravenous Q3H PRN Jestine Krabbe Bendas, DO     • [MAR Hold] insulin lispro  1-6 Units Subcutaneous TID AC DIA Michele     • [facility] Hold] insulin lispro  1-6 Units Subcutaneous HS DIA Moncada     • [facility] Hold] lidocaine  1 patch Topical Daily Ana Hernandez PA-C     • [facility] Hold] naloxone  0.04 mg Intravenous Q1MIN PRN Ana Hernandez PA-C     • [facility] Hold] ondansetron  4 mg Intravenous Q6H PRN Ana Hernandez PA-C     • [facility] Hold] oxyCODONE  40 mg Oral Q8H [facility] Ana Hernandez PA-C     • [facility] Hold] pantoprazole  40 mg Oral Early Morning DIA Moncada     • [facility] Hold] polyethylene glycol  17 g Oral Daily Ana Hernandez PA-C     • potassium chloride  20 mEq Oral BID Nas Hunter PA-C     • sodium chloride  125 mL/hr Intravenous Continuous Latoya BiMOHAN [place] 125 mL/hr (06/29/23 3593)   • [MAR Hold] tamsulosin  0.4 mg Oral Daily With [facility][1]MOHAN     • [facility] Hold] tiZANidine  4 mg Oral TID Ana Hernandez PA-C     • [facility] Hold] torsemide  60 mg Oral Daily DIA Neves       Facility-Administered Medications Ordered in Other Encounters   Medication Dose Route Frequency Provider Last Rate   • ePHEDrine   Intravenous PRN Max [place], CRNA     • glycopyrrolate   Intravenous PRN Max [place], CRNA          Today, Patient Was Seen By: Adam Barker PA-C    **Please Note: This note may have been constructed using a voice recognition system. **

## 2023-06-30 NOTE — ASSESSMENT & PLAN NOTE
Wt Readings from Last 3 Encounters:   06/30/23 110 kg (241 lb 6.5 oz)   06/27/23 111 kg (244 lb 6.4 oz)   06/27/23 111 kg (244 lb)     · Noted Echo from 11/2022 with preserved LVEF of 65%  · Diuretics initially held in setting of RODO which is now resolved   · Cardiology following,   · Resumed torsemide 60 mg daily on 6/29, monitor renal function   · Caution with IVF in the OR  · I/O, daily weight.   Fluid restriction and low-sodium diet  · Monitor volume status closely

## 2023-06-30 NOTE — PLAN OF CARE
Problem: SAFETY ADULT  Goal: Patient will remain free of falls  Description: INTERVENTIONS:  - Educate patient/family on patient safety including physical limitations  - Instruct patient to call for assistance with activity   - Consult OT/PT to assist with strengthening/mobility   - Keep Call bell within reach  - Keep bed low and locked with side rails adjusted as appropriate  - Keep care items and personal belongings within reach  - Initiate and maintain comfort rounds  - Make Fall Risk Sign visible to staff  - Offer Toileting every 4 Hours, in advance of need  - Initiate/Maintain bed alarm  - Obtain necessary fall risk management equipment: call bell   - Apply yellow socks and bracelet for high fall risk patients  - Consider moving patient to room near nurses station  Outcome: Progressing  Goal: Maintain or return to baseline ADL function  Description: INTERVENTIONS:  -  Assess patient's ability to carry out ADLs; assess patient's baseline for ADL function and identify physical deficits which impact ability to perform ADLs (bathing, care of mouth/teeth, toileting, grooming, dressing, etc.)  - Assess/evaluate cause of self-care deficits   - Assess range of motion  - Assess patient's mobility; develop plan if impaired  - Assess patient's need for assistive devices and provide as appropriate  - Encourage maximum independence but intervene and supervise when necessary  - Involve family in performance of ADLs  - Assess for home care needs following discharge   - Consider OT consult to assist with ADL evaluation and planning for discharge  - Provide patient education as appropriate  Outcome: Progressing

## 2023-06-30 NOTE — ANESTHESIA PREPROCEDURE EVALUATION
Procedure:  Left L3-4 Metrix hemilaminectomy and bilateral foraminotomies (Left: Spine Lumbar)    Relevant Problems   ANESTHESIA (within normal limits)      CARDIO   (+) Coronary artery disease   (+) Hypertension      ENDO (within normal limits)      GI/HEPATIC (within normal limits)      /RENAL   (+) RODO (acute kidney injury) (HCC)      HEMATOLOGY (within normal limits)      MUSCULOSKELETAL   (+) Chronic bilateral low back pain with bilateral sciatica   (+) Intractable low back pain      NEURO/PSYCH   (+) Anxiety   (+) Chronic bilateral low back pain with bilateral sciatica   (+) Chronic, continuous use of opioids   (+) Intractable low back pain      PULMONARY   (+) Obstructive sleep apnea on CPAP      Cardiovascular and Mediastinum   (+) Chronic diastolic heart failure (HCC)      Genitourinary   (+) Malignant neoplasm of urinary bladder (HCC)      Other   (+) Hyperglycemia      MRI Lumbar Spine 6/27/2023:  1  Stable posterior lumbar fixation hardware including bilateral transpedicular screws at the L4 and L5 levels with susceptibility artifacts  Bilateral laminectomies are again noted at the L4-5 and L5-S1 levels with patency of the central canal  A   chronic fluid collection is noted within the L4-5 laminectomy bed ,decreased in size compared to the prior 10/17/2017 MRI examination      2  New diffuse disc bulge at the L3-4 level with increased bilateral ligamentum flavum and facet hypertrophy causing moderate to severe central canal and bilateral subarticular/lateral recess narrowing with impingement of the cauda equina  EKG 3/28/2023:  Normal sinus rhythm with sinus arrhythmia  Minimal voltage criteria for LVH, may be normal variant ( R in aVL )  Borderline ECG  When compared with ECG of 14-FEB-2023 08:32,  No significant change was found    TTE 11/2022:  •  Left Ventricle: Left ventricular cavity size is normal  Wall thickness is mildly increased  The left ventricular ejection fraction is 65%   Systolic function is normal  Wall motion is normal  Indeterminate diastolic function  •  Right Ventricle: Right ventricular cavity size is mildly dilated  Systolic function is normal   •  Left Atrium: The atrium is dilated  •  Right Atrium: The atrium is dilated  •  Tricuspid Valve: There is mild regurgitation  The estimated right ventricular systolic pressure is 28 22 mmHg  •  Aorta: The aortic root is upper normal in size  The ascending aorta is normal in size  The aortic root is 3 70 cm  The ascending aorta is 3 1 cm  •  Prior TTE study available for comparison  Prior study date: 7/31/2020  Changes noted when compared to prior study  Changes include: RV on prior study noted to be upper normal in size and right atrium noted to be normal in size  Trace tricuspid regurgitation noted on prior study  Nuclear Stress 8/2020: Abnormal study after pharmacologic stress with a fixed inferior wall defect with a mild degree of ischemia noted in the apical inferior wall as well as a small fixed defect in the basal to mid anterior wall with mild ischemia  in the anterolateral wall  Study quality severely reduces accuracy of the test  Significant patient motion noted on raw images   Left ventricular systolic function was normal     Lab Results   Component Value Date    WBC 7 98 06/30/2023    HGB 12 9 06/30/2023    HCT 39 2 06/30/2023    MCV 95 06/30/2023     06/30/2023     Lab Results   Component Value Date    SODIUM 133 (L) 06/30/2023    K 3 4 (L) 06/30/2023     06/30/2023    CO2 30 06/30/2023    BUN 31 (H) 06/30/2023    CREATININE 1 17 06/30/2023    GLUC 103 06/30/2023    CALCIUM 9 1 06/30/2023     Lab Results   Component Value Date    INR 0 90 06/29/2023    INR 0 93 03/28/2023    PROTIME 12 3 06/29/2023    PROTIME 12 5 03/28/2023     Lab Results   Component Value Date    HGBA1C 6 7 (H) 06/29/2023          Physical Exam    Airway    Mallampati score: II  TM Distance: >3 FB  Neck ROM: full     Dental Cardiovascular  Cardiovascular exam normal    Pulmonary  Pulmonary exam normal     Other Findings        Anesthesia Plan  ASA Score- 3     Anesthesia Type- general with ASA Monitors  Additional Monitors:   Airway Plan: ETT  Comment: Discussed with patient plan for anesthesia, as well as risks/benefits, including likely chance of PONV and sore throat, as well as rare possibilities of dental/oropharyngeal/ocular injuries, aspiration, and severe/life-threatening surgical and anesthetic emergencies  Patient expressed understanding and agreement  Discussed patient's DNR status  He states he wishes to have DNR held for perioperative period  He understands he will require intubation for surgery, and thus consents to hold DNI for perioperative period  DNR/DNI to be HELD for perioperative period          Plan Factors-    Chart reviewed  EKG reviewed  Imaging results reviewed  Existing labs reviewed  Patient summary reviewed  Induction- intravenous  Postoperative Plan- Plan for postoperative opioid use  Planned trial extubation    Informed Consent- Anesthetic plan and risks discussed with patient  I personally reviewed this patient with the CRNA  Discussed and agreed on the Anesthesia Plan with the CRNA  Marya Huynh

## 2023-06-30 NOTE — ANESTHESIA POSTPROCEDURE EVALUATION
Post-Op Assessment Note    CV Status:  Stable  Pain Score: 0    Pain management: adequate     Mental Status:  Sleepy   Hydration Status:  Euvolemic   PONV Controlled:  Controlled   Airway Patency:  Patent      Post Op Vitals Reviewed: Yes      Staff: Anesthesiologist, CRNA         No notable events documented      BP   142/61   Temp  97 3   Pulse 50   Resp   18   SpO2   100

## 2023-06-30 NOTE — ASSESSMENT & PLAN NOTE
· Sinus bradycardia noted on telemetry   · HR currently in the 30s-40s  · TSH WNL  · Cardiology following,  · Home Toprol XL currently on hold   · Pt is asymptomatic at this time  · Continue to monitor on telemetry

## 2023-06-30 NOTE — ASSESSMENT & PLAN NOTE
Presented to [facility] on 6/26 due to acute on chronic low back pain. Patient with history of L4/L5 fusion. Follows with palliative care in the outpatient setting for pain management. Transferred to [facility] given MRI findings as below. · MRI showed Stable posterior lumbar fixation hardware including bilateral transpedicular screws at the L4 and L5 levels with susceptibility artifacts. Bilateral laminectomies are again noted at the L4-5 and L5-S1 levels with patency of the central canal. A chronic fluid collection is noted within the L4-5 laminectomy bed ,decreased in size compared to the prior 10/17/2017 MRI examination. New diffuse disc bulge at the L3-4 level with increased bilateral ligamentum flavum and facet hypertrophy causing moderate to severe central canal and bilateral subarticular/lateral recess narrowing with impingement of the cauda equina. · NSX following,  · Plan for left L3/4 hemilaminectomy and bilateral foraminotomies today   · Appreciate ongoing neurosurgical recommendations   · Hold ASA for OR  · Monitor PVR, follow retention protocol.   · Palliative care following for pain control,  · Continue home Oxycontin 40 mg Q8 hours and PRN Oxycodone 20 mg PO Q3H for moderate pain with IV Dilaudid 1 mg Q3H PRN for severe pain   · PT/OT evaluations, currently recommending home with home health

## 2023-06-30 NOTE — ASSESSMENT & PLAN NOTE
· Sodium 133  · Could be in setting of restarting diuretics  · Trend BMP  · K+ 3.4  · Likely in setting of diuresis  · Resume patient's home KDUR 20 mEq BID dosing

## 2023-06-30 NOTE — PLAN OF CARE
Problem: Prexisting or High Potential for Compromised Skin Integrity  Goal: Skin integrity is maintained or improved  Description: INTERVENTIONS:  - Identify patients at risk for skin breakdown  - Assess and monitor skin integrity  - Assess and monitor nutrition and hydration status  - Monitor labs   - Assess for incontinence   - Turn and reposition patient  - Assist with mobility/ambulation  - Relieve pressure over bony prominences  - Avoid friction and shearing  - Provide appropriate hygiene as needed including keeping skin clean and dry  - Evaluate need for skin moisturizer/barrier cream  - Collaborate with interdisciplinary team   - Patient/family teaching  - Consider wound care consult   Outcome: Progressing     Problem: PAIN - ADULT  Goal: Verbalizes/displays adequate comfort level or baseline comfort level  Description: Interventions:  - Encourage patient to monitor pain and request assistance  - Assess pain using appropriate pain scale  - Administer analgesics based on type and severity of pain and evaluate response  - Implement non-pharmacological measures as appropriate and evaluate response  - Consider cultural and social influences on pain and pain management  - Notify physician/advanced practitioner if interventions unsuccessful or patient reports new pain  Outcome: Progressing     Problem: INFECTION - ADULT  Goal: Absence or prevention of progression during hospitalization  Description: INTERVENTIONS:  - Assess and monitor for signs and symptoms of infection  - Monitor lab/diagnostic results  - Monitor all insertion sites, i.e. indwelling lines, tubes, and drains  - Monitor endotracheal if appropriate and nasal secretions for changes in amount and color  - [place] appropriate cooling/warming therapies per order  - Administer medications as ordered  - Instruct and encourage patient and family to use good hand hygiene technique  - Identify and instruct in appropriate isolation precautions for identified infection/condition  Outcome: Progressing     Problem: SAFETY ADULT  Goal: Patient will remain free of falls  Description: INTERVENTIONS:  - Educate patient/family on patient safety including physical limitations  - Instruct patient to call for assistance with activity   - Consult OT/PT to assist with strengthening/mobility   - Keep Call bell within reach  - Keep bed low and locked with side rails adjusted as appropriate  - Keep care items and personal belongings within reach  - Initiate and maintain comfort rounds  - Make Fall Risk Sign visible to staff  - Apply yellow socks and bracelet for high fall risk patients  - Consider moving patient to room near nurses station  Outcome: Progressing  Goal: Maintain or return to baseline ADL function  Description: INTERVENTIONS:  -  Assess patient's ability to carry out ADLs; assess patient's baseline for ADL function and identify physical deficits which impact ability to perform ADLs (bathing, care of mouth/teeth, toileting, grooming, dressing, etc.)  - Assess/evaluate cause of self-care deficits   - Assess range of motion  - Assess patient's mobility; develop plan if impaired  - Assess patient's need for assistive devices and provide as appropriate  - Encourage maximum independence but intervene and supervise when necessary  - Involve family in performance of ADLs  - Assess for home care needs following discharge   - Consider OT consult to assist with ADL evaluation and planning for discharge  - Provide patient education as appropriate  Outcome: Progressing     Problem: DISCHARGE PLANNING  Goal: Discharge to home or other facility with appropriate resources  Description: INTERVENTIONS:  - Identify barriers to discharge w/patient and caregiver  - Arrange for needed discharge resources and transportation as appropriate  - Identify discharge learning needs (meds, wound care, etc.)  - Arrange for interpretive services to assist at discharge as needed  - Refer to Case Management Department for coordinating discharge planning if the patient needs post-hospital services based on physician/advanced practitioner order or complex needs related to functional status, cognitive ability, or social support system  Outcome: Progressing     Problem: Knowledge Deficit  Goal: Patient/family/caregiver demonstrates understanding of disease process, treatment plan, medications, and discharge instructions  Description: Complete learning assessment and assess knowledge base.   Interventions:  - Provide teaching at level of understanding  - Provide teaching via preferred learning methods  Outcome: Progressing     Problem: DISCHARGE PLANNING - CARE MANAGEMENT  Goal: Discharge to post-acute care or home with appropriate resources  Description: INTERVENTIONS:  - Conduct assessment to determine patient/family and health care team treatment goals, and need for post-acute services based on payer coverage, community resources, and patient preferences, and barriers to discharge  - Address psychosocial, clinical, and financial barriers to discharge as identified in assessment in conjunction with the patient/family and health care team  - Arrange appropriate level of post-acute services according to patient’s   needs and preference and payer coverage in collaboration with the physician and health care team  - Communicate with and update the patient/family, physician, and health care team regarding progress on the discharge plan  - Arrange appropriate transportation to post-acute venues  Outcome: Progressing     Problem: NEUROSENSORY - ADULT  Goal: Achieves stable or improved neurological status  Description: INTERVENTIONS  - Monitor and report changes in neurological status  - Monitor vital signs such as temperature, blood pressure, glucose, and any other labs ordered   - Initiate measures to prevent increased intracranial pressure  - Monitor for seizure activity and implement precautions if appropriate Outcome: Progressing  Goal: Remains free of injury related to seizures activity  Description: INTERVENTIONS  - Maintain airway, patient safety  and administer oxygen as ordered  - Monitor patient for seizure activity, document and report duration and description of seizure to physician/advanced practitioner  - If seizure occurs,  ensure patient safety during seizure  - Reorient patient post seizure  - Seizure pads on all 4 side rails  - Instruct patient/family to notify RN of any seizure activity including if an aura is experienced  - Instruct patient/family to call for assistance with activity based on nursing assessment  - Administer anti-seizure medications if ordered    Outcome: Progressing  Goal: Achieves maximal functionality and self care  Description: INTERVENTIONS  - Monitor swallowing and airway patency with patient fatigue and changes in neurological status  - Encourage and assist patient to increase activity and self care.    - Encourage visually impaired, hearing impaired and aphasic patients to use assistive/communication devices  Outcome: Progressing     Problem: CARDIOVASCULAR - ADULT  Goal: Maintains optimal cardiac output and hemodynamic stability  Description: INTERVENTIONS:  - Monitor I/O, vital signs and rhythm  - Monitor for S/S and trends of decreased cardiac output  - Administer and titrate ordered vasoactive medications to optimize hemodynamic stability  - Assess quality of pulses, skin color and temperature  - Assess for signs of decreased coronary artery perfusion  - Instruct patient to report change in severity of symptoms  Outcome: Progressing  Goal: Absence of cardiac dysrhythmias or at baseline rhythm  Description: INTERVENTIONS:  - Continuous cardiac monitoring, vital signs, obtain 12 lead EKG if ordered  - Administer antiarrhythmic and heart rate control medications as ordered  - Monitor electrolytes and administer replacement therapy as ordered  Outcome: Progressing

## 2023-06-30 NOTE — OP NOTE
OPERATIVE REPORT  PATIENT NAME: Hima Coelho    :  1960  MRN: [id number]  Pt Location:  OR ROOM 09    SURGERY DATE: 2023    Surgeon(s) and Role:     * Peter Jaimes MD - Primary    Preop Diagnosis:  Intractable low back pain [M54.59]  Spinal stenosis of lumbar region with neurogenic claudication [M48.062]    Post-Op Diagnosis Codes:     * Intractable low back pain [M54.59]     * Spinal stenosis of lumbar region with neurogenic claudication [M48.062]    Procedure(s):  Left L3-4 Metrx hemilaminectomy and bilateral foraminotomies    Specimen(s):  * No specimens in log *    Estimated Blood Loss:   Minimal    Drains:  * No LDAs found *    Anesthesia Type:   General    Operative Indications:  Intractable low back pain [M54.59]  Spinal stenosis of lumbar region with neurogenic claudication [M48.062]      Operative Findings:  Ligamentous and facet hypertrophy producing spinal stenosis    Complications:   None    Procedure and Technique:  After adequate general endotracheal anesthesia the patient was placed prone on the operating room table. The back was prepped with Betadine soap and then DuraPrep double layer drapes placed normal fashion and 3 and Betadine impregnated sticky drape was placed over these. A timeout was called all parameters the timeout were followed. The K wire was placed to the facet overlying the L3-4 interspace on the left side using a superior to inferior trajectory. Intraoperative fluoroscopy was used throughout the case to either identification of level. There was radiology over-read for level verification. The skin was then infiltrated with 1% lidocaine with 1 100,000 epinephrine. A #15 blade was used to incise the skin. Bovie and bipolar were used throughout the procedure to maintain operative exposure.  Intraoperative microscope was used at various degrees of magnification to aid in the Identification, illumination, and microdissection necessary to perform this procedure. Progressive dilators were placed, the sheath was placed and this was affixed to the bedside connector. The [employer] drill was utilized to perform a hemilaminotomy. A medial facetectomy and decompressive foraminotomy were performed with the use of Kerrison rongeurs, foraminotomy rongeurs and curved curettes. A complete decompressive foraminotomy was performed bilaterally utilizing the foraminotomy rongeurs and Kerrison rongeurs. Copious quantities of irrigation were used to cleanse out the region. There were no CSF leaks. No Depo-Medrol was used because of the patient's immunosuppressants. The retractors were removed. The fascia was approximated with interrupted 3-0 Vicryl suture. The skin was closed with interrupted inverted 3-0 Vicryl suture. 0.5% bupivacaine with 1: 200,000 epinephrine was infiltrated into the surrounding tissues. Benzoin and Steri-Strips are placed and sterile dressing was placed. The patient was taken to the recovery room having tolerated procedure well   I was present for the entire procedure.     Patient Disposition:  PACU         SIGNATURE: Orville Pepe MD  DATE: June 30, 2023  TIME: 11:04 AM

## 2023-06-30 NOTE — OCCUPATIONAL THERAPY NOTE
Occupational Therapy         Patient Name: Krish Feliz  ZOWFZ'Y Date: 6/30/2023 06/30/23 0700   OT Last Visit   OT Visit Date 06/30/23   Note Type   Note type Evaluation   Cancel Reasons Patient to operating room   Additional Comments Pending NS intervention - will defer and address OT needs post op     [facility]

## 2023-06-30 NOTE — ASSESSMENT & PLAN NOTE
· Follows with urology Dr. Gulshan Portillo, currently immunotherapy is on hold secondary to patient's surgical procedure and hospitalization   · Neurosurgery to discuss with urology regarding resuming immunotherapy schedule post operatively  · Continue flomax

## 2023-06-30 NOTE — TELEPHONE ENCOUNTER
Spoke with Dr Tres Gomez on 6/29/23  Patient will be having neurosurgery and need BCG postponed until further notice  Dr Tres Gomez will coordinate with neurosurgery when he can resume

## 2023-06-30 NOTE — PROGRESS NOTES
Patient seen and examined    Briefly this is a 70-year-old gentleman who has a long history of back problems and who now has a severe spinal stenosis which became symptomatic following positioning necessary for surgical procedure. He is on a hiatus from immunotherapy but will need to restart this again. I have recommended a minimally invasive approach to decompress his severe spinal stenosis at the L3-4 level. I did speak about a fluid collection which is posterior to his previous surgical intervention and was associated with a low pressure headache. This likely pseudomeningocele increases the risk of the surgical procedure but I will make every attempt to stay away from it. The risks, benefits and complications of surgery were explained in detail to Nadia Sanchez and his wife including hemorrhage, infection, CSF leakage, wound problems, pain, weakness, numbness, dysesthesiae, paralysis, coma, and death. Also, the possibility  of further surgery being required was emphasized. Other potential medical complications were outlined, including deep venous thrombosis, pulmonary embolism, pneumonia, urinary tract infection, myocardial infarction,  and stroke. The need for physical therapy, occupational therapy, and rehabilitation was also mentioned. The alternatives to surgery were discussed. Nadia Sanchez and his wife asked relevant questions and asked that we proceed with arrangements for surgery.      Plan: Metrics left L3-4 hemilaminectomy and bilateral foraminotomies

## 2023-07-01 ENCOUNTER — HOME CARE VISIT (OUTPATIENT)
Dept: HOME HEALTH SERVICES | Facility: HOME HEALTHCARE | Age: 63
End: 2023-07-01

## 2023-07-01 ENCOUNTER — HOME HEALTH ADMISSION (OUTPATIENT)
Dept: HOME HEALTH SERVICES | Facility: HOME HEALTHCARE | Age: 63
End: 2023-07-01

## 2023-07-01 VITALS
RESPIRATION RATE: 18 BRPM | TEMPERATURE: 98 F | OXYGEN SATURATION: 96 % | DIASTOLIC BLOOD PRESSURE: 53 MMHG | HEART RATE: 59 BPM | BODY MASS INDEX: 36.71 KG/M2 | WEIGHT: 241.4 LBS | SYSTOLIC BLOOD PRESSURE: 126 MMHG

## 2023-07-01 LAB
ANION GAP SERPL CALCULATED.3IONS-SCNC: 1 MMOL/L
BUN SERPL-MCNC: 18 MG/DL (ref 5–25)
CALCIUM SERPL-MCNC: 8.5 MG/DL (ref 8.3–10.1)
CHLORIDE SERPL-SCNC: 107 MMOL/L (ref 96–108)
CO2 SERPL-SCNC: 30 MMOL/L (ref 21–32)
CREAT SERPL-MCNC: 0.88 MG/DL (ref 0.6–1.3)
GFR SERPL CREATININE-BSD FRML MDRD: 90 ML/MIN/1.73SQ M
GLUCOSE SERPL-MCNC: 99 MG/DL (ref 65–140)
MAGNESIUM SERPL-MCNC: 2.3 MG/DL (ref 1.6–2.6)
MRSA NOSE QL CULT: NORMAL
POTASSIUM SERPL-SCNC: 3.9 MMOL/L (ref 3.5–5.3)
SODIUM SERPL-SCNC: 138 MMOL/L (ref 135–147)

## 2023-07-01 PROCEDURE — 97163 PT EVAL HIGH COMPLEX 45 MIN: CPT

## 2023-07-01 PROCEDURE — 83735 ASSAY OF MAGNESIUM: CPT | Performed by: PHYSICIAN ASSISTANT

## 2023-07-01 PROCEDURE — 99024 POSTOP FOLLOW-UP VISIT: CPT | Performed by: PHYSICIAN ASSISTANT

## 2023-07-01 PROCEDURE — 99232 SBSQ HOSP IP/OBS MODERATE 35: CPT | Performed by: INTERNAL MEDICINE

## 2023-07-01 PROCEDURE — 80048 BASIC METABOLIC PNL TOTAL CA: CPT | Performed by: PHYSICIAN ASSISTANT

## 2023-07-01 PROCEDURE — 99239 HOSP IP/OBS DSCHRG MGMT >30: CPT | Performed by: PHYSICIAN ASSISTANT

## 2023-07-01 PROCEDURE — 97166 OT EVAL MOD COMPLEX 45 MIN: CPT

## 2023-07-01 RX ORDER — OXYCODONE HCL 40 MG/1
40 TABLET, FILM COATED, EXTENDED RELEASE ORAL EVERY 8 HOURS SCHEDULED
Qty: 45 TABLET | Refills: 0 | Status: SHIPPED | OUTPATIENT
Start: 2023-07-01 | End: 2023-07-11 | Stop reason: DRUGHIGH

## 2023-07-01 RX ORDER — GABAPENTIN 300 MG/1
600 CAPSULE ORAL 2 TIMES DAILY
Start: 2023-07-01 | End: 2023-07-14

## 2023-07-01 RX ORDER — OXYCODONE HCL 40 MG/1
40 TABLET, FILM COATED, EXTENDED RELEASE ORAL EVERY 8 HOURS SCHEDULED
Qty: 90 TABLET | Refills: 0 | Status: CANCELLED | OUTPATIENT
Start: 2023-07-01

## 2023-07-01 RX ADMIN — OXYCODONE HYDROCHLORIDE 40 MG: 40 TABLET, FILM COATED, EXTENDED RELEASE ORAL at 05:27

## 2023-07-01 RX ADMIN — HEPARIN SODIUM 5000 UNITS: 5000 INJECTION INTRAVENOUS; SUBCUTANEOUS at 13:16

## 2023-07-01 RX ADMIN — GABAPENTIN 400 MG: 400 CAPSULE ORAL at 09:01

## 2023-07-01 RX ADMIN — HYDROMORPHONE HYDROCHLORIDE 1 MG: 1 INJECTION, SOLUTION INTRAMUSCULAR; INTRAVENOUS; SUBCUTANEOUS at 09:01

## 2023-07-01 RX ADMIN — HYDROMORPHONE HYDROCHLORIDE 1 MG: 1 INJECTION, SOLUTION INTRAMUSCULAR; INTRAVENOUS; SUBCUTANEOUS at 02:04

## 2023-07-01 RX ADMIN — Medication 100 MG: at 09:00

## 2023-07-01 RX ADMIN — TORSEMIDE 60 MG: 20 TABLET ORAL at 09:03

## 2023-07-01 RX ADMIN — OXYCODONE HYDROCHLORIDE 40 MG: 40 TABLET, FILM COATED, EXTENDED RELEASE ORAL at 13:16

## 2023-07-01 RX ADMIN — ACETAMINOPHEN 975 MG: 325 TABLET ORAL at 05:27

## 2023-07-01 RX ADMIN — POTASSIUM CHLORIDE 20 MEQ: 1500 TABLET, EXTENDED RELEASE ORAL at 08:59

## 2023-07-01 RX ADMIN — ACETAMINOPHEN 975 MG: 325 TABLET ORAL at 13:16

## 2023-07-01 RX ADMIN — DOCUSATE SODIUM 100 MG: 100 CAPSULE, LIQUID FILLED ORAL at 09:00

## 2023-07-01 RX ADMIN — INSULIN LISPRO 1 UNITS: 100 INJECTION, SOLUTION INTRAVENOUS; SUBCUTANEOUS at 09:01

## 2023-07-01 RX ADMIN — TIZANIDINE 4 MG: 4 TABLET ORAL at 09:01

## 2023-07-01 RX ADMIN — PANTOPRAZOLE SODIUM 40 MG: 40 TABLET, DELAYED RELEASE ORAL at 09:00

## 2023-07-01 RX ADMIN — HEPARIN SODIUM 5000 UNITS: 5000 INJECTION INTRAVENOUS; SUBCUTANEOUS at 05:27

## 2023-07-01 RX ADMIN — POLYETHYLENE GLYCOL 3350 17 G: 17 POWDER, FOR SOLUTION ORAL at 08:59

## 2023-07-01 NOTE — ASSESSMENT & PLAN NOTE
Presented to [facility] on 6/26 due to acute on chronic low back pain. Patient with history of L4/L5 fusion. Follows with palliative care in the outpatient setting for pain management. Transferred to [facility] given MRI findings as below. · MRI showed Stable posterior lumbar fixation hardware including bilateral transpedicular screws at the L4 and L5 levels with susceptibility artifacts. Bilateral laminectomies are again noted at the L4-5 and L5-S1 levels with patency of the central canal. A chronic fluid collection is noted within the L4-5 laminectomy bed ,decreased in size compared to the prior 10/17/2017 MRI examination. New diffuse disc bulge at the L3-4 level with increased bilateral ligamentum flavum and facet hypertrophy causing moderate to severe central canal and bilateral subarticular/lateral recess narrowing with impingement of the cauda equina.   · NSX following,  · s/p left L3/4 hemilaminectomy and bilateral foraminotomies 6/30   · Stable per Neurosurgery   · Hold ASA for OR - restart on discharge  · Palliative care following for pain control,  · Continue home Oxycontin 40 mg Q8 hours and PRN Oxycodone 20 mg PO Q3H for moderate pain    · PT/OT evaluations - recommending home with home health

## 2023-07-01 NOTE — ASSESSMENT & PLAN NOTE
Admission creatinine 1.79 from baseline of around 0.9-1.15  · Suspected to be hypovolemic so diuretics and lisinopril held, with resolution. · Cardiology resumed home torsemide 60 mg daily on 6/29 -plan is to discharge only on Demadex. Continue to hold metolazone. BP is stable and EF is preserved, therefore we will continue to hold ACE inhibitor for now as well.   · US kidney bladder negative for hydro

## 2023-07-01 NOTE — CASE COMMUNICATION
DAISY C   to  attempt  to schedule  [facility]  visit  No  answer  Message  left  requesting  return  call  to  my cell  Awaiting  return  call

## 2023-07-01 NOTE — CASE MANAGEMENT
Case Management Discharge Planning Note    Patient name aNsh Aguilar  [facility] [id number]  : 1960 Date 2023       Current Admission Date: 2023  Current Admission Diagnosis:Chronic bilateral low back pain with bilateral sciatica   Patient Active Problem List    Diagnosis Date Noted   • Electrolyte abnormality 2023   • Bradycardia 2023   • Coronary artery disease    • Chronic, continuous use of opioids 2023   • Hyperglycemia 2023   • RODO (acute kidney injury) ([address]) 2023   • Intractable low back pain 2023   • Chronic diastolic heart failure ([address])    • Opioid withdrawal ([address])    • Anxiety 2023   • Chronic bilateral low back pain with bilateral sciatica 2023   • History of gross hematuria 2023   • Malignant neoplasm of urinary bladder ([address]) 2023   • Obstructive sleep apnea on CPAP    • Hypertension    • Bladder mass 2023   • Benign prostatic hyperplasia with urinary frequency 2023      LOS (days): 4  Geometric Mean LOS (GMLOS) (days): 2.80  Days to GMLOS:-0.9     OBJECTIVE:  Risk of Unplanned Readmission Score: 24.9         Current admission status: Inpatient   Preferred Pharmacy:   [address] [facility], [address] [address]  Phone: [phone] Fax: [phone], [address] [facility]  [address] [place], [place] [address]  Phone: [phone] Fax: [phone]    Primary Care Provider: Chelo Arnett DO    Primary Insurance: MEDICARE  Secondary Insurance: BLUE CROSS    DISCHARGE DETAILS:    Discharge planning discussed with[de-identified] Patient  Freedom of Choice: Yes  Comments - Freedom of Choice: therapy recommendations discussed, pt interested in blanket [address] referrals  CM contacted family/caregiver?: No- see comments (A&0 x4)  Were Treatment Team discharge recommendations reviewed with patient/caregiver?: Yes  Did patient/caregiver verbalize understanding of patient care needs?: N/A- going to facility  Were patient/caregiver advised of the risks associated with not following Treatment Team discharge recommendations?: Yes    Requested [address] [facility]         Is the patient interested in [address] at discharge?: Yes  [facility] requested[de-identified] Occupational Therapy, Physical [facility] Name[de-identified] [facility] Provider[de-identified] PCP  Home Health Services Needed[de-identified] Gait/ADL Training, Evaluate Functional Status and Safety, Strengthening/Theraputic Exercises to Improve Function  Homebound Criteria Met[de-identified] Requires the Assistance of Another Person for Safe Ambulation or to Leave the Home, Uses an Assist Device (i.e. cane, walker, etc)  Supporting Clincal Findings[de-identified] Limited Endurance    DME Referral Provided  Referral made for DME?: No (pt already owns a RW)    Other Referral/Resources/Interventions Provided:  Interventions: HHC  Referral Comments: Accepting [facility] agencies reviewed with pt. Pt requested to accept [facility] for [facility] for PT/OT services post d/c. -HHC reserved via [facility] and added to AVS. Pt already owns a RW and informed his family will provide transport home. No further CM needs reported at this time.  SLIM updated on same    Treatment Team Recommendation: Home with [address]   Discharge Destination Plan[de-identified] [facility] Autom[facility], Family

## 2023-07-01 NOTE — PLAN OF CARE
Problem: PHYSICAL THERAPY ADULT  Goal: Performs mobility at highest level of function for planned discharge setting. See evaluation for individualized goals. Description: Treatment/Interventions: LE strengthening/ROM, Therapeutic exercise, Endurance training, Bed mobility, Equipment eval/education, Gait training  Equipment Recommended: Maximino Alberts       See flowsheet documentation for full assessment, interventions and recommendations. Note: Prognosis: Good  Problem List: Decreased strength, Decreased endurance, Impaired balance, Decreased mobility, Pain  Assessment: Pt is a 57yo male seen for PT eval following admission to [facility] s/p left L3-4 matrix hemilaminectomy and bilateral foraminotomies on 6/30/23 secondary to chronic LBP with bilateral sciatica. PMH is significant for RODO, CHF, L4-5 posterior fixation, CAD, HTN, and prediabetes. Pt lvies with his wife in a 3 SH with 2 WES. He reports being independent with all functional mobility, ADLs, and IADLs PTA, and that he has been using a cane recently due to increases in his low back pain. He reports also owning 2 walkers. Pt required min A/CGA for all mobility today due to pain. All movements (sit<>stand, ambulation, etc.) required increased time due to pain in low back and down R leg. Pt reported 7/10 pain at beginning of session that decreased to 4/10 after movement; required slightly less CGA towards end of session with more movement, as pt began displaying more normal gait pattern and decreased instability. Pt ambulated approx. 150ft with RW, and negotiated 3 stairs with BL handrails and nonreciprocal gait pattern due to RLE pain. The patient's AM-PAC Basic Mobility Inpatient Short Form Raw Score is 20. A Raw score of greater than or equal to 16 suggests the patient may benefit from discharge to home. Please also refer to the recommendation of the Physical Therapist for safe discharge planning.  Recommend d/c home with OP PT to manage pain, increase strength, and improve overall post-op mobility. Pt would benefit from skilled PT intervention to address the aforementioned impairments to allow for optimal functional mobility, safety, and independence upon discharge. At end of session, pt was left sitting comfortably in recliner with call bell in reach and all current needs met. PT Discharge Recommendation: Home with outpatient rehabilitation    See flowsheet documentation for full assessment.

## 2023-07-01 NOTE — ASSESSMENT & PLAN NOTE
· Sinus bradycardia noted on telemetry   · HR currently in the 30s-40s  · TSH WNL  · Cardiology following,  · Keep off Toprol XL  · Follow up with Cardiology as outpatient

## 2023-07-01 NOTE — PLAN OF CARE
Problem: Prexisting or High Potential for Compromised Skin Integrity  Goal: Skin integrity is maintained or improved  Description: INTERVENTIONS:  - Identify patients at risk for skin breakdown  - Assess and monitor skin integrity  - Assess and monitor nutrition and hydration status  - Monitor labs   - Assess for incontinence   - Turn and reposition patient  - Assist with mobility/ambulation  - Relieve pressure over bony prominences  - Avoid friction and shearing  - Provide appropriate hygiene as needed including keeping skin clean and dry  - Evaluate need for skin moisturizer/barrier cream  - Collaborate with interdisciplinary team   - Patient/family teaching  - Consider wound care consult   Outcome: Adequate for Discharge     Problem: PAIN - ADULT  Goal: Verbalizes/displays adequate comfort level or baseline comfort level  Description: Interventions:  - Encourage patient to monitor pain and request assistance  - Assess pain using appropriate pain scale  - Administer analgesics based on type and severity of pain and evaluate response  - Implement non-pharmacological measures as appropriate and evaluate response  - Consider cultural and social influences on pain and pain management  - Notify physician/advanced practitioner if interventions unsuccessful or patient reports new pain  Outcome: Adequate for Discharge     Problem: INFECTION - ADULT  Goal: Absence or prevention of progression during hospitalization  Description: INTERVENTIONS:  - Assess and monitor for signs and symptoms of infection  - Monitor lab/diagnostic results  - Monitor all insertion sites, i.e. indwelling lines, tubes, and drains  - Monitor endotracheal if appropriate and nasal secretions for changes in amount and color  - [place] appropriate cooling/warming therapies per order  - Administer medications as ordered  - Instruct and encourage patient and family to use good hand hygiene technique  - Identify and instruct in appropriate isolation precautions for identified infection/condition  Outcome: Adequate for Discharge     Problem: SAFETY ADULT  Goal: Patient will remain free of falls  Description: INTERVENTIONS:  - Educate patient/family on patient safety including physical limitations  - Instruct patient to call for assistance with activity   - Consult OT/PT to assist with strengthening/mobility   - Keep Call bell within reach  - Keep bed low and locked with side rails adjusted as appropriate  - Keep care items and personal belongings within reach  - Initiate and maintain comfort rounds  - Make Fall Risk Sign visible to staff  - Apply yellow socks and bracelet for high fall risk patients  - Consider moving patient to room near nurses station  Outcome: Adequate for Discharge  Goal: Maintain or return to baseline ADL function  Description: INTERVENTIONS:  -  Assess patient's ability to carry out ADLs; assess patient's baseline for ADL function and identify physical deficits which impact ability to perform ADLs (bathing, care of mouth/teeth, toileting, grooming, dressing, etc.)  - Assess/evaluate cause of self-care deficits   - Assess range of motion  - Assess patient's mobility; develop plan if impaired  - Assess patient's need for assistive devices and provide as appropriate  - Encourage maximum independence but intervene and supervise when necessary  - Involve family in performance of ADLs  - Assess for home care needs following discharge   - Consider OT consult to assist with ADL evaluation and planning for discharge  - Provide patient education as appropriate  Outcome: Adequate for Discharge     Problem: DISCHARGE PLANNING  Goal: Discharge to home or other facility with appropriate resources  Description: INTERVENTIONS:  - Identify barriers to discharge w/patient and caregiver  - Arrange for needed discharge resources and transportation as appropriate  - Identify discharge learning needs (meds, wound care, etc.)  - Arrange for interpretive services to assist at discharge as needed  - Refer to Case Management Department for coordinating discharge planning if the patient needs post-hospital services based on physician/advanced practitioner order or complex needs related to functional status, cognitive ability, or social support system  Outcome: Adequate for Discharge     Problem: Knowledge Deficit  Goal: Patient/family/caregiver demonstrates understanding of disease process, treatment plan, medications, and discharge instructions  Description: Complete learning assessment and assess knowledge base.   Interventions:  - Provide teaching at level of understanding  - Provide teaching via preferred learning methods  Outcome: Adequate for Discharge     Problem: DISCHARGE PLANNING - CARE MANAGEMENT  Goal: Discharge to post-acute care or home with appropriate resources  Description: INTERVENTIONS:  - Conduct assessment to determine patient/family and health care team treatment goals, and need for post-acute services based on payer coverage, community resources, and patient preferences, and barriers to discharge  - Address psychosocial, clinical, and financial barriers to discharge as identified in assessment in conjunction with the patient/family and health care team  - Arrange appropriate level of post-acute services according to patient’s   needs and preference and payer coverage in collaboration with the physician and health care team  - Communicate with and update the patient/family, physician, and health care team regarding progress on the discharge plan  - Arrange appropriate transportation to post-acute venues  Outcome: Adequate for Discharge     Problem: NEUROSENSORY - ADULT  Goal: Achieves stable or improved neurological status  Description: INTERVENTIONS  - Monitor and report changes in neurological status  - Monitor vital signs such as temperature, blood pressure, glucose, and any other labs ordered   - Initiate measures to prevent increased intracranial pressure  - Monitor for seizure activity and implement precautions if appropriate      Outcome: Adequate for Discharge  Goal: Remains free of injury related to seizures activity  Description: INTERVENTIONS  - Maintain airway, patient safety  and administer oxygen as ordered  - Monitor patient for seizure activity, document and report duration and description of seizure to physician/advanced practitioner  - If seizure occurs,  ensure patient safety during seizure  - Reorient patient post seizure  - Seizure pads on all 4 side rails  - Instruct patient/family to notify RN of any seizure activity including if an aura is experienced  - Instruct patient/family to call for assistance with activity based on nursing assessment  - Administer anti-seizure medications if ordered    Outcome: Adequate for Discharge  Goal: Achieves maximal functionality and self care  Description: INTERVENTIONS  - Monitor swallowing and airway patency with patient fatigue and changes in neurological status  - Encourage and assist patient to increase activity and self care.    - Encourage visually impaired, hearing impaired and aphasic patients to use assistive/communication devices  Outcome: Adequate for Discharge     Problem: CARDIOVASCULAR - ADULT  Goal: Maintains optimal cardiac output and hemodynamic stability  Description: INTERVENTIONS:  - Monitor I/O, vital signs and rhythm  - Monitor for S/S and trends of decreased cardiac output  - Administer and titrate ordered vasoactive medications to optimize hemodynamic stability  - Assess quality of pulses, skin color and temperature  - Assess for signs of decreased coronary artery perfusion  - Instruct patient to report change in severity of symptoms  Outcome: Adequate for Discharge  Goal: Absence of cardiac dysrhythmias or at baseline rhythm  Description: INTERVENTIONS:  - Continuous cardiac monitoring, vital signs, obtain 12 lead EKG if ordered  - Administer antiarrhythmic and heart rate control medications as ordered  - Monitor electrolytes and administer replacement therapy as ordered  Outcome: Adequate for Discharge     Problem: MOBILITY - ADULT  Goal: Maintain or return to baseline ADL function  Description: INTERVENTIONS:  -  Assess patient's ability to carry out ADLs; assess patient's baseline for ADL function and identify physical deficits which impact ability to perform ADLs (bathing, care of mouth/teeth, toileting, grooming, dressing, etc.)  - Assess/evaluate cause of self-care deficits   - Assess range of motion  - Assess patient's mobility; develop plan if impaired  - Assess patient's need for assistive devices and provide as appropriate  - Encourage maximum independence but intervene and supervise when necessary  - Involve family in performance of ADLs  - Assess for home care needs following discharge   - Consider OT consult to assist with ADL evaluation and planning for discharge  - Provide patient education as appropriate  Outcome: Adequate for Discharge  Goal: Maintains/Returns to pre admission functional level  Description: INTERVENTIONS:    Outcome: Adequate for Discharge

## 2023-07-01 NOTE — PHYSICAL THERAPY NOTE
Physical Therapy Evaluation     Patient's Name: Krish Feliz    Admitting Diagnosis  RODO (acute kidney injury) ([address]) [N17.9]    Problem List  Patient Active Problem List   Diagnosis    Benign prostatic hyperplasia with urinary frequency    Bladder mass    Obstructive sleep apnea on CPAP    Hypertension    Malignant neoplasm of urinary bladder (HCC)    History of gross hematuria    Anxiety    Chronic bilateral low back pain with bilateral sciatica    Chronic diastolic heart failure (HCC)    Opioid withdrawal (HCC)    RODO (acute kidney injury) ([address])    Intractable low back pain    Chronic, continuous use of opioids    Hyperglycemia    Bradycardia    Coronary artery disease    Electrolyte abnormality       Past Medical History  Past Medical History:   Diagnosis Date    Arthritis     Bladder cancer (HCC)     Chronic narcotic dependence (HCC)     Chronic pain disorder     lower back and down both legs    Colon polyp     Coronary artery disease     CPAP (continuous positive airway pressure) dependence     Does use hearing aid     will wear DOS    Full dentures     Heart failure ([address])     and "fluid retention" [facility] PA"    High cholesterol     Hypertension     Mild ankle edema     and feet    Obstructive sleep apnea on CPAP     Prediabetes     Shortness of breath     per pt "with just exertion"    Sleep apnea     Wears glasses        Past Surgical History  Past Surgical History:   Procedure Laterality Date    BACK SURGERY      titanium rods implanted    COLONOSCOPY      CYSTOSCOPY W/ URETERAL STENT PLACEMENT Bilateral 3/17/2023    Procedure: bilateral retrograde;  Surgeon: Fracisco Montiel MD;  Location[facility];  Service: Urology    HERNIA REPAIR      x5    [place] CYSTO W/REMOVAL OF LESIONS SMALL N/A 5/1/2023    Procedure: CYSTOSCOPY TURBT;  Surgeon: Fracisco Montiel MD;  [facility];  Service: Urology    TRANSURETHRAL RESECTION OF BLADDER TUMOR N/A 3/17/2023    Procedure: TRANSURETHRAL RESECTION OF BLADDER TUMOR (TURBT); Surgeon: Dharmesh Shore MD;  Location[facility];  Service: Urology          07/01/23 1010   PT Last Visit   PT Visit Date 07/01/23   Pain Assessment   Pain Assessment Tool 0-10   Pain Score 7   Pain Location/Orientation Orientation: Lower; Location: Back   Pain Onset/Description Onset: Ongoing   Patient's Stated Pain Goal No pain   Hospital Pain Intervention(s) Repositioned; Ambulation/increased activity   Multiple Pain Sites Yes   Pain 2   Pain Score 2 7   Pain Location/Orientation 2   (Posterior buttock to posterior knee)   Pain Onset/Description 2 Descriptor: Discomfort; Descriptor: Burning   Patient's Stated Pain Goal 2 No pain   Restrictions/Precautions   Weight Bearing Precautions Per Order No   Other Precautions Telemetry; Fall Risk;Pain;Spinal precautions   Home Living   Type of Home House   Home Layout Multi-level; Able to live on main level with bedroom/bathroom;Stairs to enter without rails  (3 SH with 2 "short" WES; pt able to live on 1st floor)   Bathroom Shower/Tub Walk-in shower   Bathroom Toilet Raised   [address] chair   Home Equipment Walker;Cane   Prior Function   Level of [place] Independent with ADLs; Independent with functional mobility; Independent with IADLS   Lives With Spouse   Receives Help From Family   IADLs Independent with driving; Independent with meal prep; Independent with medication management   Cognition   Overall Cognitive Status WFL   Attention Within functional limits   Orientation Level Oriented X4   Memory Within functional limits   Following Commands Follows one step commands without difficulty   RLE Assessment   RLE Assessment   (grossly with mobility, at least 3/5)   LLE Assessment   LLE Assessment   (grossly with mobility, at least 3/5)   Bed Mobility   Additional Comments unable to assess, as pt was greeted sitting EOB and ended session sitting in recliner with call bell in reach and all needs met.    Transfers   Sit to Stand 4 Minimal assistance   Additional items Assist x 1; Increased time required   Stand to Sit 4  Minimal assistance   Additional items Assist x 1; Increased time required   Ambulation/Elevation   Gait pattern Antalgic; Forward Flexion;Decreased foot clearance; Short stride; Excessively slow   Gait Assistance 4  Minimal assist   Additional items Assist x 1   Assistive Device Rolling walker   Distance approx. 150ft   Stair Management Assistance 4  Minimal assist   Additional items Assist x 1   Stair Management Technique Two rails; Nonreciprocal   Number of Stairs 3   Ambulation/Elevation Additional Comments all mobility min A/CGA due to pain   Balance   Static Sitting Good   Dynamic Sitting Fair +   Static Standing Fair   Dynamic Standing Fair -   Ambulatory Poor +   Activity Tolerance   Activity Tolerance Patient limited by pain; Patient tolerated treatment well   Medical Staff Made Aware Co-eval with OT   Assessment   Prognosis Good   Problem List Decreased strength;Decreased endurance; Impaired balance;Decreased mobility;Pain   Assessment Pt is a 57yo male seen for PT eval following admission to [facility] s/p left L3-4 matrix hemilaminectomy and bilateral foraminotomies on 6/30/23 secondary to chronic LBP with bilateral sciatica. PMH is significant for RODO, CHF, L4-5 posterior fixation, CAD, HTN, and prediabetes. Pt lvies with his wife in a 3 SH with 2 WES. He reports being independent with all functional mobility, ADLs, and IADLs PTA, and that he has been using a cane recently due to increases in his low back pain. He reports also owning 2 walkers. Pt required min A/CGA for all mobility today due to pain. All movements (sit<>stand, ambulation, etc.) required increased time due to pain in low back and down R leg.  Pt reported 7/10 pain at beginning of session that decreased to 4/10 after movement; required slightly less CGA towards end of session with more movement, as pt began displaying more normal gait pattern and decreased instability. Pt ambulated approx. 150ft with RW, and negotiated 3 stairs with BL handrails and nonreciprocal gait pattern due to RLE pain. The patient's AM-PAC Basic Mobility Inpatient Short Form Raw Score is 20. A Raw score of greater than or equal to 16 suggests the patient may benefit from discharge to home. Please also refer to the recommendation of the Physical Therapist for safe discharge planning. Recommend d/c home with OP PT to manage pain, increase strength, and improve overall post-op mobility. Pt would benefit from skilled PT intervention to address the aforementioned impairments to allow for optimal functional mobility, safety, and independence upon discharge. At end of session, pt was left sitting comfortably in recliner with call bell in reach and all current needs met. Goals   Patient Goals to go home   STG Expiration Date 07/15/23   Short Term Goal #1 1. In 2 weeks, pt will be independent with aspects of bed mobility to aid in optimizing independence and safety upon discharge. 2. In 2 weeks, pt will increase BLE muscle strength by at least 1/2 grade to aid in improving safety, independence, and overall mechanics with functional mobility, ambulation, and other physical activities. 3. In 2 weeks, pt will report no greater than 2/10 pain with mobility to aid in improving mechanics and safety with ambulation, stair negotiation, and other functional activities. 4. In 2 weeks, pt will demonstrate improved balance in stance and during gait, as evidenced by decreased postural sway and no losses of balance, to allow for improved independence and decreased risk of falls. 5. In 2 weeks, pt will be able to ambulate 250 ft with least restrictive AD and no greater than supervision level assist to aid in optimal functional independence and safety when ambulating household and community distances.  6. In 2 weeks, pt will be able to successfully negotiate at least 3 stairs with reciprocal pattern and no use of handrail to allow for safe and effective stair/curb negotiation at home and in the community. Plan   Treatment/Interventions LE strengthening/ROM; Therapeutic exercise; Endurance training;Bed mobility; Equipment eval/education;Gait training   PT Frequency 2-3x/wk   Recommendation   PT Discharge Recommendation Home with outpatient rehabilitation   Equipment Recommended Walker   AM-PAC Basic Mobility Inpatient   Turning in Flat Bed Without Bedrails 4   Lying on Back to Sitting on Edge of Flat Bed Without Bedrails 3   Moving Bed to Chair 3   Standing Up From Chair Using Arms 4   Walk in Room 3   Climb 3-5 Stairs With Railing 3   Basic Mobility Inpatient Raw Score 20   Basic Mobility Standardized Score 43.99   Highest Level Of Mobility   -HLM Goal 6: Walk 10 steps or more         James Rodriguez, SPT

## 2023-07-01 NOTE — CASE MANAGEMENT
Case Management Discharge Planning Note    Patient name Dinah Weldon  Location [facility] 622/[facility] 744-42 MRN [id number]  : 1960 Date 2023       Current Admission Date: 2023  Current Admission Diagnosis:Chronic bilateral low back pain with bilateral sciatica   Patient Active Problem List    Diagnosis Date Noted   • Electrolyte abnormality 2023   • Bradycardia 2023   • Coronary artery disease    • Chronic, continuous use of opioids 2023   • Hyperglycemia 2023   • RODO (acute kidney injury) ([address]) 2023   • Intractable low back pain 2023   • Chronic diastolic heart failure ([address])    • Opioid withdrawal ([address])    • Anxiety 2023   • Chronic bilateral low back pain with bilateral sciatica 2023   • History of gross hematuria 2023   • Malignant neoplasm of urinary bladder ([address]) 2023   • Obstructive sleep apnea on CPAP    • Hypertension    • Bladder mass 2023   • Benign prostatic hyperplasia with urinary frequency 2023      LOS (days): 4  Geometric Mean LOS (GMLOS) (days): 2.80  Days to GMLOS:-0.9     OBJECTIVE:  Risk of Unplanned Readmission Score: 24.84         Current admission status: Inpatient   Preferred Pharmacy:   [address] [place] [address]  Phone: [phone] Fax: [address] [place] [address] [id number]  [address] [facility] [address]  Phone: [phone] Fax: [phone]    Primary Care Provider: Lisa Roman DO    Primary Insurance: MEDICARE  Secondary Insurance: BLUE CROSS    DISCHARGE DETAILS:    Discharge planning discussed with[de-identified] patient  Freedom of Choice: Yes  Comments - Freedom of Choice: therapy recommendations discussed, pt interested in [facility] referrals  CM contacted family/caregiver?: No- see comments (A&0 x4)  Were Treatment Team discharge recommendations reviewed with patient/caregiver?: Yes  Did patient/caregiver verbalize understanding of patient care needs?: N/A- going to facility  Were patient/caregiver advised of the risks associated with not following Treatment Team discharge recommendations?: Yes    DME Referral Provided  Referral made for DME?: No (pt already owns a RW)    Other Referral/Resources/Interventions Provided:  Interventions: C  Referral Comments: Per communication w/ [facility] pt is cleared for d/c home today. Chart reviewed: pt recommended for home with [facility] therapy services. Pt already owns a RW. Therapy recommendations discussed with pt. Pt interested in [facility] services and requested blanket referrals. [facility] referrals placed and currently pending. CM will follow for accepting [facility] agencies.     Treatment Team Recommendation: Home with [address] [facility]  Discharge Destination Plan[de-identified] Home with [address] at Discharge : Automobile, Family

## 2023-07-01 NOTE — PROGRESS NOTES
Progress Note - Cardiology   Santiago Lynne 58 y.o. male MRN: [id number]  Unit/Bed#: [facility] 622-01 Encounter: [id number]    Assessment:  Principal Problem:    Chronic bilateral low back pain with bilateral sciatica  Active Problems:    Obstructive sleep apnea on CPAP    Malignant neoplasm of urinary bladder (HCC)    Chronic diastolic heart failure (HCC)    RODO (acute kidney injury) (HCC)    Chronic, continuous use of opioids    Hyperglycemia    Bradycardia    Coronary artery disease    Electrolyte abnormality     Status post hemilaminectomy and foraminotomies. Chronic diastolic heart failure. Was mildly volume overloaded preoperatively. Remains slightly overloaded currently. RODO initially. Now improved. Plan:    Recommend continuing the torsemide. I would not restart him on metolazone he was on a pretty high dose as an outpatient and did have an RODO. Hold beta-blocker given his sinus bradycardia overnight. His LVEF is preserved, no recent MI. No major indication for beta-blocker. Currently, his ACE inhibitor is also on hold. This could be resumed if necessary from blood pressure standpoint, but again, his EF is preserved. We will need outpatient follow-up arranged with cardiology closer to his home after discharge. If plan for discharge, would recommend doing 60 mg of torsemide daily. If still admitted, will see again 7/3. Subjective/Objective     Subjective:  Patient tolerated surgery well. He had the diuretic held yesterday but he did receive it on Thursday. Reports increase in urination. Edema is gradually decreasing. Denies any shortness of breath. Overnight on telemetry, sinus bradycardia. Denies dizziness or lightheadedness. Blood pressure remained stable despite being off of his low doses of antihypertensives. creatinine stable.     Objective:    Vitals: /53   Pulse 59   Temp 98 °F (36.7 °C)   Resp 18   Wt 110 kg (241 lb 6.5 oz)   SpO2 96%   BMI 36.71 kg/m²   Vitals: 06/30/23 0528 06/30/23 0538   Weight: 110 kg (241 lb 6.5 oz) 110 kg (241 lb 6.5 oz)     Orthostatic Blood Pressures    Flowsheet Row Most Recent Value   Blood Pressure 126/53 filed at 07/01/2023 0902   Patient Position - Orthostatic VS Sitting filed at 06/29/2023 1826            Intake/Output Summary (Last 24 hours) at 7/1/2023 1035  Last data filed at 7/1/2023 1022  Gross per 24 hour   Intake 1720 ml   Output 1675 ml   Net 45 ml       Physical Exam:   General appearance: alert and in no acute distress  Head: Normocephalic, without obvious abnormality, atraumatic  Neck: no carotid bruit, no JVD and supple, symmetrical, trachea midline  Lungs: clear to auscultation bilaterally. Normal air entry. Normal effort. Heart: S1, S2 normal and no S3 or S4. No murmurs. Abdomen: soft, non-tender; bowel sounds normal; no masses,  no organomegaly  Extremities: 1-2+ edema bilaterally. Pulses: 2+ and symmetric bilaterally  Skin: Skin color, texture, turgor normal. No rashes or lesions  Neurologic: Grossly normal. Alert and oriented.     Medications:    Current Facility-Administered Medications:   •  acetaminophen (TYLENOL) tablet 975 mg, 975 mg, Oral, Q8H [address], Neptali Lofton PA-C, 975 mg at 07/01/23 0956  •  ALPRAZolam Krish Ally) tablet 0.25 mg, 0.25 mg, Oral, TID PRN, Neptali Lofton PA-C, 0.25 mg at 06/30/23 2133  •  atorvastatin (LIPITOR) tablet 40 mg, 40 mg, Oral, Daily With Oskar Tomas PA-C, 40 mg at 06/30/23 1536  •  ceFAZolin (ANCEF) IVPB (premix in dextrose) 2,000 mg 50 mL, 2,000 mg, Intravenous, Once, Neptali Lofton PA-C  •  co-enzyme Q-10 capsule 100 mg, 100 mg, Oral, Daily, Neptali Lofton PA-C, 100 mg at 07/01/23 0900  •  docusate sodium (COLACE) capsule 100 mg, 100 mg, Oral, BID, Neptali Lofton PA-C, 100 mg at 07/01/23 0900  •  gabapentin (NEURONTIN) capsule 400 mg, 400 mg, Oral, TID, Neptali Lofton PA-C, 400 mg at 07/01/23 0901  •  heparin (porcine) subcutaneous injection 5,000 Units, 5,000 Units, Subcutaneous, Q8H [address], Benjamin Kirkpatrick PA-C, 5,000 Units at 07/01/23 7019  •  HYDROmorphone (DILAUDID) tablet 8 mg, 8 mg, Oral, Q3H PRN, 8 mg at 06/29/23 1909 **OR** HYDROmorphone (DILAUDID) injection 1 mg, 1 mg, Intravenous, Q3H PRN, Benjamin Kirkpatrick PA-C, 1 mg at 07/01/23 0901  •  insulin lispro (HumaLOG) 100 units/mL subcutaneous injection 1-6 Units, 1-6 Units, Subcutaneous, TID AC, 1 Units at 07/01/23 0901 **AND** Fingerstick Glucose (POCT), , , TID AC, Benjamin Kirkpatrick PA-C  •  insulin lispro (HumaLOG) 100 units/mL subcutaneous injection 1-6 Units, 1-6 Units, Subcutaneous, HS, Benjamin Kirkpatrick PA-C, 1 Units at 06/30/23 2137  •  lidocaine (LIDODERM) 5 % patch 1 patch, 1 patch, Topical, Daily, Benjamin Kirkpatrick PA-C, 1 patch at 06/29/23 0857  •  naloxone (NARCAN) 0.04 mg/mL syringe 0.04 mg, 0.04 mg, Intravenous, Q1MIN PRN, Benjamin Kirkpatrick PA-C  •  ondansetron [facility]) injection 4 mg, 4 mg, Intravenous, Q6H PRN, Benjamin Kirkpatrick PA-C  •  oxyCODONE (OxyCONTIN) 12 hr tablet 40 mg, 40 mg, Oral, Q8H [address] , Benjamin Kirkpatrick PA-C, 40 mg at 07/01/23 1260  •  pantoprazole (PROTONIX) EC tablet 40 mg, 40 mg, Oral, Early Morning, Benjamin Kirkpatrick PA-C, 40 mg at 07/01/23 0900  •  polyethylene glycol (MIRALAX) packet 17 g, 17 g, Oral, Daily, Benjamin Kirkpatrick PA-C, 17 g at 07/01/23 3770  •  potassium chloride (K-DUR,KLOR-CON) CR tablet 20 mEq, 20 mEq, Oral, BID, Elisha Cardozo PA-C, 20 mEq at 07/01/23 5186  •  tamsulosin (FLOMAX) capsule 0.4 mg, 0.4 mg, Oral, Daily With Dinner, Benjamin Kirkpatrick PA-C, 0.4 mg at 06/30/23 1536  •  tiZANidine (ZANAFLEX) tablet 4 mg, 4 mg, Oral, TID, Benjamin Kirkpatrick PA-C, 4 mg at 07/01/23 0901  •  torsemide (DEMADEX) tablet 60 mg, 60 mg, Oral, Daily, Benjamin Kirkpatrick PA-C, 60 mg at 07/01/23 0236    Lab Results:      Results from last 7 days   Lab Units 06/30/23  0543 06/27/23  0109 06/26/23  0857   WBC Thousand/uL 7.98 10.89* 10.72*   HEMOGLOBIN g/dL 12.9 11.9* 14.5   HEMATOCRIT % 39.2 35.1* 42.8   PLATELETS Thousands/uL 170 188 241         Results from last 7 days   Lab Units 07/01/23  0534 06/30/23  0543 06/29/23  0528 06/27/23  1220 06/27/23  0237 06/26/23  1347 06/26/23  0857   SODIUM mmol/L 138 133* 135   < > 133*   < > 132*   POTASSIUM mmol/L 3.9 3.4* 4.3   < > 2.9*   < > 2.8*   CHLORIDE mmol/L 107 100 106   < > 94*   < > 86*   CO2 mmol/L 30 30 25   < > 29   < > 35*   BUN mg/dL 18 31* 27*   < > 61*   < > 64*   CREATININE mg/dL 0.88 1.17 0.94   < > 1.32*   < > 1.79*   CALCIUM mg/dL 8.5 9.1 9.2   < > 9.2   < > 10.3*   ALK PHOS U/L  --   --   --   --  75  --  90   ALT U/L  --   --   --   --  25  --  30   AST U/L  --   --   --   --  12*  --  12*    < > = values in this interval not displayed. Results from last 7 days   Lab Units 06/29/23  1604   INR  0.90   PTT seconds 26     Results from last 7 days   Lab Units 07/01/23  0534 06/28/23  0711 06/26/23  0857   MAGNESIUM mg/dL 2.3 2.3 2.6       Telemetry: Personally reviewed. Sinus bradycardia    Echo:  Left Ventricle: Left ventricular cavity size is normal. Wall thickness is mildly increased. The left ventricular ejection fraction is 65%. Systolic function is normal. Wall motion is normal. Indeterminate diastolic function. •  Right Ventricle: Right ventricular cavity size is mildly dilated. Systolic function is normal.  •  Left Atrium: The atrium is dilated. •  Right Atrium: The atrium is dilated. •  Tricuspid Valve: There is mild regurgitation. The estimated right ventricular systolic pressure is 58.47 mmHg. •  Aorta: The aortic root is upper normal in size. The ascending aorta is normal in size. The aortic root is 3.70 cm. The ascending aorta is 3.1 cm. •  Prior TTE study available for comparison. Prior study date: 7/31/2020. Changes noted when compared to prior study. Changes include: RV on prior study noted to be upper normal in size and right atrium noted to be normal in size.  Trace tricuspid regurgitation noted on prior study.

## 2023-07-01 NOTE — CASE COMMUNICATION
Return  call  received  from  patient  Patient  mildred  he  was  discharged  this  afternoon  and  feels  he  is  doing  well  Patient  reports  his  pain  is  well  controlled  and  states  he  went  for  a  short  walk  this  afternoon  and  generaally feels  good  Patient  is  declining  a  visit  until  either  Wednesday 7/5  or  Thursday 7/6  Reviewed  back  precautions  and  safe  use  of  MH  and  or  cold  for  assist  with   pain  relief  as  needed  Also  reviewed  proper  body  mechanics  especially  when  transferring  Patient  instructed  if  he  had  any  questions  or  changed  his  mind  regarding  visits  to  call  my  cell    SOC  to  be  7/5  or  7/6

## 2023-07-01 NOTE — ASSESSMENT & PLAN NOTE
Wt Readings from Last 3 Encounters:   06/30/23 110 kg (241 lb 6.5 oz)   06/27/23 111 kg (244 lb 6.4 oz)   06/27/23 111 kg (244 lb)     · Noted Echo from 11/2022 with preserved LVEF of 65%  · Diuretics initially held in setting of RODO which is now resolved   · Cardiology following,   · Resumed torsemide 60 mg daily on 6/29, continue to hold metolazone  · Keeping metoprolol on hold secondary to bradycardia and lisinopril on hold secondary to RODO. Can resume lisinopril as outpatient if needed for BP control.   Otherwise, EF is preserved  · Outpatient cardiology follow-up

## 2023-07-01 NOTE — PROGRESS NOTES
[address] [facility]  Progress Note  Name: Juany Busch I  MRN: [id number]  Unit/Bed#: [facility] 649-51 I Date of Admission: 6/27/2023   Date of Service: 7/1/2023 I Hospital Day: 4    Assessment/Plan   * Chronic bilateral low back pain with bilateral sciatica  Assessment & Plan  POD 1 Left L3-4 Metrx hemilaminectomy and bilateral foraminotomies (DKO, 7/1/23)  · Patient p/w chronic B/L lower back pain with radiation down B/L lower extremities (lateral leg to foot)  • S/p L4/5 PLDF ~ 6 years ago  • Patient with chronic persistent 7/10 lower back pain since surgery, worsening with recent bladder biopsy on 3/17/2023  • [facility] following patient as an outpatient for pain management  • [facility] to [facility] due to worsening lower back pain and 2 episodes of urinary incontinence. Imaging:   · MRI lumbar spine without 6/27/23: stable posterior lumbar fixation hardware including bilateral transpedicular screws at the L4 and L5 levels with susceptibility artifacts. Bilateral laminectomies are again noted at the L4-5 and L5-S1 levels with patency of the central canal. A chronic fluid collection is noted within the L4-5 laminectomy bed, decreased in size compared to the prior 10/17/2017 MRI examination. New diffuse disc bulge at the L3-4 level with increased bilateral ligamentum flavum and facet hypertrophy causing moderate to severe central canal and bilateral subarticular/lateral recess narrowing with impingement of the cauda equina. Plan:   • Continue to monitor neurological exam.    o Currently no focal motor deficits. • Pain management per palliative care  · Discussed case with patient's urologist, Dr. Carolin Heredia who stated that it would be acceptable to hold the patients immunotherapy for the expected amount of time to allow for surgery and adequate wound healing.     • No further imaging is indicated  • PT OT evaluation, okay to mobilize as tolerated  • Medical management per primary team  • DVT ppx: SCDs, ok for pharm ppx today    Neurosurgery will sign off at this time. Patient will follow-up in 2 weeks for an incision check in 6 weeks for postop appointment with Dr. Vic Marcus. Please call questions or concerns. Subjective/Objective   Chief Complaint: " I feel great"    Subjective: Patient with no acute events overnight. He states that his symptoms have improved since surgery. I personally observed him ambulating with PT and he did very well. He states that he has an aching pain in his low back radiating to his anterior right thigh to his knee. All other pain has resolved. He is urinating without difficulty. He is tolerating an oral diet. He denies any new neurological symptoms. Objective: Sitting in chair no acute distress. Vital signs stable. Invasive Devices     Peripheral Intravenous Line  Duration           Peripheral IV 06/26/23 Right Antecubital 5 days    Peripheral IV 06/30/23 Right Wrist 1 day                Vitals: Blood pressure 126/53, pulse 59, temperature 98 °F (36.7 °C), resp. rate 18, weight 110 kg (241 lb 6.5 oz), SpO2 96 %. ,Body mass index is 36.71 kg/m². General appearance: alert, appears stated age, cooperative and no distress  Head: Normocephalic, without obvious abnormality, atraumatic  Eyes: Tracks appropriately, conjugate gaze  Neck: supple, symmetrical, trachea midline   Back: Incision CDI, covered with Band-Aid  Lungs: non labored breathing  Heart: regular heart rate  Neurologic:   Mental status: Alert, oriented, thought content appropriate, speech clear and fluent  Cranial nerves: grossly intact (Cranial nerves II-XII)  Sensory: normal to LT  Motor: moving all extremities without focal weakness     Lab Results: I have personally reviewed pertinent results.       Results from last 7 days   Lab Units 06/30/23  0543 06/27/23  0109 06/26/23  0857   WBC Thousand/uL 7.98 10.89* 10.72*   HEMOGLOBIN g/dL 12.9 11.9* 14.5 HEMATOCRIT % 39.2 35.1* 42.8   PLATELETS Thousands/uL 170 188 241   NEUTROS PCT %  --   --  71   MONOS PCT %  --   --  6   EOS PCT %  --   --  1     Results from last 7 days   Lab Units 07/01/23  0534 06/30/23  0543 06/29/23  0528 06/27/23  1220 06/27/23  0237 06/26/23  1347 06/26/23  0857   POTASSIUM mmol/L 3.9 3.4* 4.3   < > 2.9*   < > 2.8*   CHLORIDE mmol/L 107 100 106   < > 94*   < > 86*   CO2 mmol/L 30 30 25   < > 29   < > 35*   BUN mg/dL 18 31* 27*   < > 61*   < > 64*   CREATININE mg/dL 0.88 1.17 0.94   < > 1.32*   < > 1.79*   CALCIUM mg/dL 8.5 9.1 9.2   < > 9.2   < > 10.3*   ALK PHOS U/L  --   --   --   --  75  --  90   ALT U/L  --   --   --   --  25  --  30   AST U/L  --   --   --   --  12*  --  12*    < > = values in this interval not displayed. Results from last 7 days   Lab Units 07/01/23  0534 06/28/23  0711 06/26/23  0857   MAGNESIUM mg/dL 2.3 2.3 2.6         Results from last 7 days   Lab Units 06/29/23  1604   INR  0.90   PTT seconds 26     No results found for: "TROPONINT"  ABG:No results found for: "PHART", "EQS9EJH", "PO2ART", "THC7BMF", "P0QKNGZR", "BEART", "SOURCE"    Imaging Studies: I have personally reviewed pertinent reports. and I have personally reviewed pertinent films in PACS     XR chest pa & lateral    Result Date: 6/30/2023  Narrative: CHEST INDICATION:   pre-op. COMPARISON: 3/28/2023 EXAM PERFORMED/VIEWS:  XR CHEST PA & LATERAL The frontal view was performed utilizing dual energy radiographic technique. FINDINGS: Cardiomediastinal silhouette appears unremarkable. The lungs are clear. No pneumothorax or pleural effusion. Osseous structures appear within normal limits for patient age. Impression: No acute cardiopulmonary disease. Workstation performed: [id number]     MRI lumbar spine wo contrast    Result Date: 6/27/2023  Narrative: MRI LUMBAR SPINE WITHOUT CONTRAST INDICATION: back pain. COMPARISON: Lumbar radiographs 6/16/2023.  MRI lumbar spine 10/17/2017 TECHNIQUE: Multiplanar, multisequence imaging of the lumbar spine was performed. . IMAGE QUALITY:  Diagnostic FINDINGS: PRIOR SURGERY: Stable posterior lumbar fixation hardware including bilateral transpedicular screws at the L4 and L5 levels with susceptibility artifacts. Bilateral laminectomies are again noted at the L4-5 and L5-S1 levels with patency of the central canal. A chronic fluid collection is noted within the laminectomy bed measuring 1.3 x 3.0 x 2.6 cm, mildly decreased in size. VERTEBRAL BODIES:  There are 5 lumbar type vertebral bodies. Normal alignment of the lumbar spine. No spondylolysis or spondylolisthesis. No scoliosis. No compression fracture. Normal marrow signal is identified within the visualized bony structures. No discrete marrow lesion. SACRUM:  Normal signal within the sacrum. No evidence of insufficiency or stress fracture. DISTAL CORD AND CONUS:  Normal size and signal within the distal cord and conus. The conus terminates at the L1-2 level. The cauda equina nerve roots demonstrate crowding at the L3-4 level secondary to thecal sac narrowing. PARASPINAL SOFT TISSUES:  Paraspinal soft tissues are unremarkable. LOWER THORACIC DISC SPACES:  Normal disc height and signal.  No disc herniation, canal stenosis or foraminal narrowing. LUMBAR DISC SPACES: L1-2: No focal disc herniation, central canal stenosis, or neural foraminal narrowing. L2-3: Stable small left foraminal disc protrusion. Bilateral ligamentum flavum and facet hypertrophy. No central canal or  subarticular/lateral recess narrowing. Mild left neural foraminal stenosis. L3-4: New broad-based central disc protrusion with increased bilateral ligamentum flavum and facet hypertrophy. Moderate to severe central canal and bilateral subarticular/lateral recess narrowing, new since 2017 with impingement of the cauda equina. Mild bilateral neural foraminal stenosis, progressed.  L4-5: Stable disc osteophyte complex and small left paracentral disc protrusion associated with an annular tear. The central canal is patent status post bilateral laminectomies. Mild bilateral neural foraminal stenosis. L5-S1: Stable diffuse disc osteophyte complex, eccentric to the left with new posterior annular fissures. The central canal is patent status post bilateral laminectomies. Mild left neural foraminal stenosis. Impression: 1. Stable posterior lumbar fixation hardware including bilateral transpedicular screws at the L4 and L5 levels with susceptibility artifacts. Bilateral laminectomies are again noted at the L4-5 and L5-S1 levels with patency of the central canal. A chronic fluid collection is noted within the L4-5 laminectomy bed ,decreased in size compared to the prior 10/17/2017 MRI examination. 2. New diffuse disc bulge at the L3-4 level with increased bilateral ligamentum flavum and facet hypertrophy causing moderate to severe central canal and bilateral subarticular/lateral recess narrowing with impingement of the cauda equina. The study was marked in [facility] for immediate notification. Workstation performed: [id number]     US kidney and bladder with pvr    Result Date: 6/27/2023  Narrative: RENAL ULTRASOUND WITH PVR INDICATION:   RODO. COMPARISON: CTA chest CT abdomen pelvis 2/17/2023. Renal/bladder ultrasound 9/20/2022. TECHNIQUE:   Ultrasound of the retroperitoneum was performed with a curvilinear transducer utilizing volumetric sweeps and still imaging techniques. FINDINGS: KIDNEYS: Symmetric and normal size. Right kidney:  11.5 x 6.4 x 5.3 cm. Volume 202.5 mL Left kidney:  12.3 x 5.7 x 5.0 cm. Volume 185.7 mL Right kidney Normal echogenicity and contour. No mass is identified. No hydronephrosis. No shadowing calculi. No perinephric fluid collections. Left kidney Normal echogenicity and contour. No mass is identified. No hydronephrosis. No shadowing calculi. No perinephric fluid collections. URETERS: Nonvisualized. BLADDER: Normally distended. No focal thickening or mass lesions. Bilateral ureteral jets detected. Prevoid: 122.7 mL No significant post void volume. Measured post void volume in mL: 13.4     Impression: No hydronephrosis. A bladder mass is not visualized. Workstation performed: [id number]     XR spine lumbar complete w bending minimum 6 views    Result Date: 6/21/2023  Narrative: LUMBAR SPINE INDICATION:   M54.50: Low back pain, unspecified. COMPARISON: 12/1/2016 VIEWS:  XR SPINE LUMBAR COMPLETE W BENDING MINIMUM 6 VIEWS FINDINGS: There are 5 non rib bearing lumbar vertebral bodies. Status post L4-5 posterior fixation without hardware complication. Slight levoscoliosis with apex at L3. No dynamic instability. Age-appropriate lumbar degenerative changes are seen. The pedicles appear intact. There are atherosclerotic calcifications. Soft tissues are otherwise unremarkable. Impression: Stable postoperative change after L4-5 posterior fixation without hardware complication. Age-appropriate degenerative change. No dynamic instability. Workstation performed: [id number]     EKG, Pathology, and Other Studies: I have personally reviewed pertinent reports.       VTE Pharmacologic Prophylaxis: Heparin    VTE Mechanical Prophylaxis: sequential compression device

## 2023-07-01 NOTE — ASSESSMENT & PLAN NOTE
Noted, possibly steroid-induced. No prior history of diabetes.   · However hgbA1c at 6.7%  · Encourage dietary changes and outpatient PCP follow up once stable for discharge

## 2023-07-01 NOTE — DISCHARGE INSTR - AVS FIRST PAGE
Discharge Instructions  Lumbar decompression  (laminectomy/laminotomy/foraminotomy/discectomy)      Surgical incisional care:  Maintain dressing in place for 3 days. On postoperative day 3, dressing may be removed and incision may be left open to air. Keep incision clean and dry. Avoid applying creams, lotion or antiseptic to incision area. Check the wound daily. If the incision becomes red, swollen, tender, warm, or has increased drainage please notify physician immediately. If steri-strips are in place, allow them to fall off. If still in place at two week postoperative visit, we will remove them. May shower 3 days after surgery, but do not soak in a tub and no swimming. Incision may be cleaned with water and a mild antimicrobial soap using a clean washcloth. Incision is to be gently patted dry with a clean towel. Continue to change bed linens and pajamas more frequently. Wear clean clothes daily. Activity Restrictions:  No heavy lifting greater than 10lbs and no strenuous activities until cleared. No bending or twisting. No BLTs (bending, lifting, twisting). Avoid pushing/pulling movements. May walk as tolerated. Encourage at least 4 short walks per day. No driving for at least 2 weeks or until cleared by physician. Postoperative medication:  Take pain medications to relieve incision pain, and muscle relaxants to prevent spasms as directed. Please see after visit summary (AVS) for details. Do not operate heavy machinery or vehicles while taking sedating medications. Use a bowel regimen while on opioids as they induce constipation. Ie. Senokot-S, Miralax, Colace, etc. Increase fiber and water intake. Do not take ibuprofen, Naproxen/Aleve or any NSAID until cleared by surgeon. May take Tylenol instead. If taking Coumadin, Aspirin, or Plavix, you may resume these medications when cleared by Neurosurgery. Follow-up as scheduled for a 2 week incision check.  Follow-up as scheduled for 6 week postoperative visit.      **Please notify MD if you experience a fever 101F, chills or have increased pain, numbness, tingling, or weakness in your legs and/or bowel/bladder dysfunction/incontinence**

## 2023-07-01 NOTE — DISCHARGE SUMMARY
[address] [facility]  Discharge- Maria Antonia Jarrell 1960, 58 y.o. male MRN: [id number]  [facility]: [id number]  Primary Care Provider: Darren Sinha DO   Date and time admitted to hospital: 6/27/2023  7:27 PM    * Chronic bilateral low back pain with bilateral sciatica  Assessment & Plan  Presented to [facility] on 6/26 due to acute on chronic low back pain. Patient with history of L4/L5 fusion. Follows with palliative care in the outpatient setting for pain management. Transferred to [facility] given MRI findings as below. · MRI showed Stable posterior lumbar fixation hardware including bilateral transpedicular screws at the L4 and L5 levels with susceptibility artifacts. Bilateral laminectomies are again noted at the L4-5 and L5-S1 levels with patency of the central canal. A chronic fluid collection is noted within the L4-5 laminectomy bed ,decreased in size compared to the prior 10/17/2017 MRI examination. New diffuse disc bulge at the L3-4 level with increased bilateral ligamentum flavum and facet hypertrophy causing moderate to severe central canal and bilateral subarticular/lateral recess narrowing with impingement of the cauda equina. · NSX following,  · s/p left L3/4 hemilaminectomy and bilateral foraminotomies 6/30   · Stable per Neurosurgery   · Hold ASA for OR - restart on discharge  · Palliative care following for pain control,  · Continue home Oxycontin 40 mg Q8 hours and PRN Oxycodone 20 mg PO Q3H for moderate pain    · PT/OT evaluations - recommending home with home health    RODO (acute kidney injury) ([address] [facility])  Assessment & Plan  Admission creatinine 1.79 from baseline of around 0.9-1.15  · Suspected to be hypovolemic so diuretics and lisinopril held, with resolution. · Cardiology resumed home torsemide 60 mg daily on 6/29 -plan is to discharge only on Demadex. Continue to hold metolazone.   BP is stable and EF is preserved, therefore we will continue to hold ACE inhibitor for now as well. · US kidney bladder negative for hydro    Chronic diastolic heart failure ([address])  Assessment & Plan  Wt Readings from Last 3 Encounters:   06/30/23 110 kg (241 lb 6.5 oz)   06/27/23 111 kg (244 lb 6.4 oz)   06/27/23 111 kg (244 lb)     · Noted Echo from 11/2022 with preserved LVEF of 65%  · Diuretics initially held in setting of RODO which is now resolved   · Cardiology following,   · Resumed torsemide 60 mg daily on 6/29, continue to hold metolazone  · Keeping metoprolol on hold secondary to bradycardia and lisinopril on hold secondary to RODO. Can resume lisinopril as outpatient if needed for BP control. Otherwise, EF is preserved  · Outpatient cardiology follow-up        Bradycardia  Assessment & Plan  · Sinus bradycardia noted on telemetry   · HR currently in the 30s-40s  · TSH WNL  · Cardiology following,  · Keep off Toprol XL  · Follow up with Cardiology as outpatient     Chronic, continuous use of opioids  Assessment & Plan  · PDMP reviewed by previous provider. · Follows with palliative outpatient, they are following inpatient for acute on chronic pain management as above     Malignant neoplasm of urinary bladder [facility])  Assessment & Plan  · Follows with urology Dr. Rose Palacios, currently immunotherapy is on hold secondary to patient's surgical procedure and hospitalization   · Neurosurgery to discuss with urology regarding resuming immunotherapy schedule post operatively  · Continue flomax    Obstructive sleep apnea on CPAP  Assessment & Plan  · Continue QHS Cpap    Hyperglycemia  Assessment & Plan  Noted, possibly steroid-induced. No prior history of diabetes.   · However hgbA1c at 6.7%  · Encourage dietary changes and outpatient PCP follow up once stable for discharge    Electrolyte abnormality  Assessment & Plan  · Sodium 133 - now 138  · Could be in setting of restarting diuretics  · Trend BMP  · K+ 3.4 - now 3.9  · Likely in setting of diuresis  · Resume patient's home KDUR 20 mEq BID dosing       Medical Problems     Resolved Problems  Date Reviewed: 7/1/2023          Resolved    Hypokalemia 6/29/2023     Resolved by  Gee Carolina PA-C    Acute hyponatremia 6/29/2023     Resolved by  Gee Carolina PA-C        Discharging Physician / Practitioner: Bharath Elliott PA-C  PCP: Shlomo Valdez DO  Admission Date:   Admission Orders (From admission, onward)     Ordered        06/27/23 2033  Inpatient Admission  Once                      Discharge Date: 07/01/23    Consultations During Hospital Stay:  · Dr. Codie Burleson  · Palliative care  · Neurosurgery    Procedures Performed:     6/30 - left L3-4 Metrx hemilaminectomy and bilateral foraminotomies    6/30 lumbosacral spine XR    CXR  No acute cardiopulmonary disease    MRI lumbar spine  1. Stable posterior lumbar fixation hardware including bilateral transpedicular screws at the L4 and L5 levels with susceptibility artifacts. Bilateral laminectomies are again noted at the L4-5 and L5-S1 levels with patency of the central canal. A   chronic fluid collection is noted within the L4-5 laminectomy bed ,decreased in size compared to the prior 10/17/2017 MRI examination.     2. New diffuse disc bulge at the L3-4 level with increased bilateral ligamentum flavum and facet hypertrophy causing moderate to severe central canal and bilateral subarticular/lateral recess narrowing with impingement of the cauda equina. Renal US  No hydronephrosis. A bladder mass is not visualized. Significant Findings / Test Results:   · See above    Incidental Findings:   · none     Test Results Pending at Discharge (will require follow up):   · none     Outpatient Tests Requested:  · BMP in 1 week    Complications:  none    Reason for Admission: back pain    Hospital Course:   Vitor Armstrong is a 58 y.o. male patient who originally presented to the hospital on 6/27/2023 due to back pain.   Patient has a past medical history of bladder cancer, chronic low back pain, and CHF who [facility]. MRI was obtained and showed severe lumbar stenosis with concern for cauda equina syndrome. Patient was transferred to [address] [place] [address] for neurosurgery intervention. Patient underwent left L3-4 Metrx hemilaminectomy and bilateral foraminotomies on 0/92 without complications. Patient will be discharged home with home PT/OT. Patient was also noted to be in acute kidney injury. Diuretics were held with improvement. Plan per cardiology is to restart his oral Demadex. Keep metolazone on hold. We will also keep ACE inhibitor on hold as BP in normal range. Patient was also noted to be bradycardic. Cardiology recommends keeping patient off his beta-blocker. EF is preserved, so patient does not urgently need a beta-blocker or ACE inhibitor at this time. Patient will follow-up with his regular outpatient cardiologist.      Please see above list of diagnoses and related plan for additional information. Condition at Discharge: good    Discharge Day Visit / Exam:   Subjective: Offers no complaints  Vitals: Blood Pressure: 126/53 (07/01/23 0902)  Pulse: 59 (07/01/23 0902)  Temperature: 98 °F (36.7 °C) (07/01/23 0716)  Temp Source: Tympanic (06/30/23 0738)  Respirations: 18 (07/01/23 0208)  Weight - Scale: 110 kg (241 lb 6.5 oz) (06/30/23 0538)  SpO2: 96 % (07/01/23 0902)  Exam:   Physical Exam  Vitals and nursing note reviewed. Constitutional:       General: He is not in acute distress. Appearance: He is well-developed. HENT:      Head: Normocephalic and atraumatic. Eyes:      Conjunctiva/sclera: Conjunctivae normal.   Cardiovascular:      Rate and Rhythm: Normal rate and regular rhythm. Heart sounds: No murmur heard. Pulmonary:      Effort: Pulmonary effort is normal. No respiratory distress. Breath sounds: Normal breath sounds. Abdominal:      Palpations: Abdomen is soft. Tenderness: There is no abdominal tenderness. Musculoskeletal:         General: No swelling. Cervical back: Neck supple. Skin:     General: Skin is warm and dry. Capillary Refill: Capillary refill takes less than 2 seconds. Neurological:      Mental Status: He is alert. Psychiatric:         Mood and Affect: Mood normal.          Discussion with Family: Patient declined call to . Discharge instructions/Information to patient and family:   See after visit summary for information provided to patient and family. Provisions for Follow-Up Care:  See after visit summary for information related to follow-up care and any pertinent home health orders. Disposition:   Home with [facility] Services (Reminder: Complete face to face encounter)    Planned Readmission: none     Discharge Statement:  I spent 45 minutes discharging the patient. This time was spent on the day of discharge. I had direct contact with the patient on the day of discharge. Greater than 50% of the total time was spent examining patient, answering all patient questions, arranging and discussing plan of care with patient as well as directly providing post-discharge instructions. Additional time then spent on discharge activities. Discharge Medications:  See after visit summary for reconciled discharge medications provided to patient and/or family.       **Please Note: This note may have been constructed using a voice recognition system**

## 2023-07-01 NOTE — ASSESSMENT & PLAN NOTE
POD 1 Left L3-4 Metrx hemilaminectomy and bilateral foraminotomies (DKO, 7/1/23)  · Patient p/w chronic B/L lower back pain with radiation down B/L lower extremities (lateral leg to foot)  • S/p L4/5 PLDF ~ 6 years ago  • Patient with chronic persistent 7/10 lower back pain since surgery, worsening with recent bladder biopsy on 3/17/2023  • [facility] following patient as an outpatient for pain management  • Presented to [address] [place] [address] on 6/26 due to worsening lower back pain and 2 episodes of urinary incontinence. Imaging:   · MRI lumbar spine without 6/27/23: stable posterior lumbar fixation hardware including bilateral transpedicular screws at the L4 and L5 levels with susceptibility artifacts. Bilateral laminectomies are again noted at the L4-5 and L5-S1 levels with patency of the central canal. A chronic fluid collection is noted within the L4-5 laminectomy bed, decreased in size compared to the prior 10/17/2017 MRI examination. New diffuse disc bulge at the L3-4 level with increased bilateral ligamentum flavum and facet hypertrophy causing moderate to severe central canal and bilateral subarticular/lateral recess narrowing with impingement of the cauda equina. Plan:   • Continue to monitor neurological exam.    o Currently no focal motor deficits. • Pain management per palliative care  · Discussed case with patient's urologist, Dr. Sasha Lucas who stated that it would be acceptable to hold the patients immunotherapy for the expected amount of time to allow for surgery and adequate wound healing. • No further imaging is indicated  • PT OT evaluation, okay to mobilize as tolerated  • Medical management per primary team  • DVT ppx: SCDs, ok for pharm ppx today    Neurosurgery will sign off at this time. Patient will follow-up in 2 weeks for an incision check in 6 weeks for postop appointment with Dr. Maxwell Moura. Please call questions or concerns.

## 2023-07-01 NOTE — OCCUPATIONAL THERAPY NOTE
Occupational Therapy Evaluation     Patient Name: Maria Antonia Jarrell  ASJVL'T Date: 7/1/2023  Problem List  Principal Problem:    Chronic bilateral low back pain with bilateral sciatica  Active Problems:    Obstructive sleep apnea on CPAP    Malignant neoplasm of urinary bladder (HCC)    Chronic diastolic heart failure (HCC)    RODO (acute kidney injury) (HCC)    Chronic, continuous use of opioids    Hyperglycemia    Bradycardia    Coronary artery disease    Electrolyte abnormality    Past Medical History  Past Medical History:   Diagnosis Date    Arthritis     Bladder cancer (HCC)     Chronic narcotic dependence (HCC)     Chronic pain disorder     lower back and down both legs    Colon polyp     Coronary artery disease     CPAP (continuous positive airway pressure) dependence     Does use hearing aid     will wear DOS    Full dentures     Heart failure ([address])     and "fluid retention" [facility] PA"    High cholesterol     Hypertension     Mild ankle edema     and feet    Obstructive sleep apnea on CPAP     Prediabetes     Shortness of breath     per pt "with just exertion"    Sleep apnea     Wears glasses      Past Surgical History  Past Surgical History:   Procedure Laterality Date    BACK SURGERY      titanium rods implanted    COLONOSCOPY      CYSTOSCOPY W/ URETERAL STENT PLACEMENT Bilateral 3/17/2023    Procedure: bilateral retrograde;  Surgeon: Kim Moritz, MD;  Location: [facility];  Service: Urology    HERNIA REPAIR      x5    FL CYSTO W/REMOVAL OF LESIONS SMALL N/A 5/1/2023    Procedure: CYSTOSCOPY TURBT;  Surgeon: Kim Moritz, MD;  Location: [facility];  Service: Urology    TRANSURETHRAL RESECTION OF BLADDER TUMOR N/A 3/17/2023    Procedure: TRANSURETHRAL RESECTION OF BLADDER TUMOR (TURBT);   Surgeon: Kim Moritz, MD;  Location: [facility];  Service: Urology         07/01/23 1000   OT Last Visit   OT Visit Date 07/01/23   Note Type   Note type Evaluation   Pain Assessment   Pain Assessment Tool 0-10   Pain Score 7   Pain Location/Orientation Orientation: Lower; Location: Back   Pain Radiating Towards RLE towards knee   Hospital Pain Intervention(s) Repositioned; Ambulation/increased activity; Emotional support;Relaxation technique   Restrictions/Precautions   Weight Bearing Precautions Per Order No   Other Precautions Pain;Spinal precautions   Home Living   Type of Home House   Home Layout Multi-level; Able to live on main level with bedroom/bathroom;Stairs to enter with rails   Bathroom Shower/Tub Walk-in shower   Bathroom Toilet Raised   Bathroom Equipment Shower chair   Home Equipment Walker;Cane   Prior Function   Level of [place] Independent with ADLs; Independent with functional mobility; Independent with IADLS   Lives With Spouse   Receives Help From Family   IADLs Independent with driving; Independent with meal prep; Independent with medication management   Falls in the last 6 months >10   Vocational Retired   Lifestyle   Autonomy I adls and mobility - i iadls - shares homemaking with family   Reciprocal Relationships supportive family -reports wife is able to provide assist prn   Service to Others retired   Intrinsic Gratification mostly sedentary   Subjective   Subjective "It hurts when I step on this R leg"   ADL   Eating Assistance 7  [address] 5  Supervision/Setup   LB [address] [place] [address] 5  [facility] to Stand 4  Minimal assistance   Stand to Sit 4  Minimal assistance   Functional Mobility   Functional Mobility 5  Supervision   Additional items Rolling walker   Balance   Static Sitting Good   Dynamic Sitting Fair +   Static Standing Fair   Dynamic Standing [address] -   Activity Tolerance   Activity Tolerance Patient limited by fatigue;Patient limited by pain   RUE Assessment   RUE Assessment WFL   LUE Assessment   LUE Assessment WFL   Cognition   Overall Cognitive Status WFL   Assessment   Limitation Decreased ADL status; Decreased endurance;Decreased self-care trans;Decreased high-level ADLs   Prognosis Good   Assessment Pt is a 58 y.o. male who was admitted to [address] [place] [address] on 6/27/2023 with Chronic bilateral low back pain with bilateral sciatica s/p POD 1 Left L3-4 Metrx hemilaminectomy and bilateral foraminotomies . Pt's problem list also includes PMH of bradycardia, CAD, chronic opioid use, hyperglycemia, RODO, intractable LBP, CHF,anxiety, bladder CA, BPH. At baseline pt was completing adls and mobility - I iadls -shares homemaking with spouse. Pt lives with spouse in 2 story home with [address] [place] [address]. Currently pt requires min assist for overall ADLS and sba for functional mobility/transfers. Pt currently presents with impairments in the following categories -steps to enter environment, difficulty performing ADLS, difficulty performing IADLS  and environment activity tolerance, endurance and standing balance/tolerance. These impairments, as well as pt's fatigue, pain, spinal precautions and home environment  limit pt's ability to safely engage in all baseline areas of occupation, includingbathing, dressing, toileting, functional mobility/transfers, community mobility, laundry , driving, house maintenance, meal prep, cleaning, social participation  and leisure activities however has supportive family who are able to provide assist prn - reviewed spinal precautions/use of proper body mechanics with good understanding -  From OT standpoint, recommend home with home OT upon D/C.  No immediate acute OT needs indicated at this time- d/c from caseload   Goals   Patient Goals go home   Recommendation   OT Discharge Recommendation Home with home health rehabilitation   AM-PAC Daily Activity Inpatient   Lower Body Dressing 3   Bathing 3   Toileting 4   Upper Body Dressing 4   Grooming 4   Eating 4   Daily Activity Raw Score 22   Daily Activity Standardized Score (Calc for Raw Score >=11) 47. 1   AM-PAC Applied Cognition Inpatient   Following a Speech/Presentation 4   Understanding Ordinary Conversation 4   Taking Medications 4   Remembering Where Things Are Placed or Put Away 4   Remembering List of 4-5 Errands 4   Taking Care of Complicated Tasks 4   Applied Cognition Raw Score 24   Applied Cognition Standardized Score 62.21   End of Consult   Education Provided Yes   Patient Position at End of Consult Bedside chair; All needs within reach   Nurse Communication Nurse aware of consult       The patient's raw score on the AM-PAC Daily Activity Inpatient Short Form is 22. A raw score of greater than or equal to 19 suggests the patient may benefit from discharge to home. Please refer to the recommendation of the Occupational Therapist for safe discharge planning.       [facility]

## 2023-07-01 NOTE — ASSESSMENT & PLAN NOTE
· Follows with urology Dr. Fredi Huerta, currently immunotherapy is on hold secondary to patient's surgical procedure and hospitalization   · Neurosurgery to discuss with urology regarding resuming immunotherapy schedule post operatively  · Continue flomax

## 2023-07-01 NOTE — ASSESSMENT & PLAN NOTE
· Sodium 133 - now 138  · Could be in setting of restarting diuretics  · Trend BMP  · K+ 3.4 - now 3.9  · Likely in setting of diuresis  · Resume patient's home KDUR 20 mEq BID dosing

## 2023-07-03 ENCOUNTER — TELEPHONE (OUTPATIENT)
Dept: NEUROSURGERY | Facility: CLINIC | Age: 63
End: 2023-07-03

## 2023-07-03 ENCOUNTER — HOSPITAL ENCOUNTER (INPATIENT)
Facility: HOSPITAL | Age: 63
LOS: 2 days | Discharge: HOME | DRG: 682 | End: 2023-07-06
Attending: EMERGENCY MEDICINE | Admitting: FAMILY MEDICINE
Payer: MEDICARE

## 2023-07-03 ENCOUNTER — HOME CARE VISIT (OUTPATIENT)
Dept: HOME HEALTH SERVICES | Facility: HOME HEALTHCARE | Age: 63
End: 2023-07-03

## 2023-07-03 ENCOUNTER — APPOINTMENT (EMERGENCY)
Dept: CT IMAGING | Facility: HOSPITAL | Age: 63
DRG: 682 | End: 2023-07-03
Payer: MEDICARE

## 2023-07-03 DIAGNOSIS — I50.9 CHF (CONGESTIVE HEART FAILURE) (HCC): ICD-10-CM

## 2023-07-03 DIAGNOSIS — C67.3 MALIGNANT NEOPLASM OF ANTERIOR WALL OF URINARY BLADDER (HCC): ICD-10-CM

## 2023-07-03 DIAGNOSIS — G89.3 CANCER-RELATED PAIN: ICD-10-CM

## 2023-07-03 DIAGNOSIS — N17.9 AKI (ACUTE KIDNEY INJURY) (HCC): ICD-10-CM

## 2023-07-03 DIAGNOSIS — M54.41 CHRONIC BILATERAL LOW BACK PAIN WITH BILATERAL SCIATICA: ICD-10-CM

## 2023-07-03 DIAGNOSIS — M54.42 CHRONIC BILATERAL LOW BACK PAIN WITH BILATERAL SCIATICA: ICD-10-CM

## 2023-07-03 DIAGNOSIS — G89.29 CHRONIC BILATERAL LOW BACK PAIN WITH BILATERAL SCIATICA: ICD-10-CM

## 2023-07-03 DIAGNOSIS — J18.9 PNEUMONIA: Primary | ICD-10-CM

## 2023-07-03 LAB
ALBUMIN SERPL BCP-MCNC: 3.9 G/DL (ref 3.5–5)
ALP SERPL-CCNC: 67 U/L (ref 34–104)
ALT SERPL W P-5'-P-CCNC: 26 U/L (ref 7–52)
ANION GAP SERPL CALCULATED.3IONS-SCNC: 10 MMOL/L
AST SERPL W P-5'-P-CCNC: 17 U/L (ref 13–39)
BASOPHILS # BLD AUTO: 0.03 THOUSANDS/ÂΜL (ref 0–0.1)
BASOPHILS NFR BLD AUTO: 0 % (ref 0–1)
BILIRUB SERPL-MCNC: 0.82 MG/DL (ref 0.2–1)
BNP SERPL-MCNC: 31 PG/ML (ref 0–100)
BUN SERPL-MCNC: 21 MG/DL (ref 5–25)
CALCIUM SERPL-MCNC: 9.2 MG/DL (ref 8.4–10.2)
CHLORIDE SERPL-SCNC: 93 MMOL/L (ref 96–108)
CO2 SERPL-SCNC: 29 MMOL/L (ref 21–32)
CREAT SERPL-MCNC: 1.51 MG/DL (ref 0.6–1.3)
EOSINOPHIL # BLD AUTO: 0.03 THOUSAND/ÂΜL (ref 0–0.61)
EOSINOPHIL NFR BLD AUTO: 0 % (ref 0–6)
ERYTHROCYTE [DISTWIDTH] IN BLOOD BY AUTOMATED COUNT: 15.1 % (ref 11.6–15.1)
GFR SERPL CREATININE-BSD FRML MDRD: 48 ML/MIN/1.73SQ M
GLUCOSE SERPL-MCNC: 135 MG/DL (ref 65–140)
HCT VFR BLD AUTO: 37.1 % (ref 36.5–49.3)
HGB BLD-MCNC: 12.6 G/DL (ref 12–17)
IMM GRANULOCYTES # BLD AUTO: 0.07 THOUSAND/UL (ref 0–0.2)
IMM GRANULOCYTES NFR BLD AUTO: 1 % (ref 0–2)
LYMPHOCYTES # BLD AUTO: 1.27 THOUSANDS/ÂΜL (ref 0.6–4.47)
LYMPHOCYTES NFR BLD AUTO: 11 % (ref 14–44)
MCH RBC QN AUTO: 31.7 PG (ref 26.8–34.3)
MCHC RBC AUTO-ENTMCNC: 34 G/DL (ref 31.4–37.4)
MCV RBC AUTO: 94 FL (ref 82–98)
MONOCYTES # BLD AUTO: 0.99 THOUSAND/ÂΜL (ref 0.17–1.22)
MONOCYTES NFR BLD AUTO: 8 % (ref 4–12)
NEUTROPHILS # BLD AUTO: 9.68 THOUSANDS/ÂΜL (ref 1.85–7.62)
NEUTS SEG NFR BLD AUTO: 80 % (ref 43–75)
NRBC BLD AUTO-RTO: 0 /100 WBCS
PLATELET # BLD AUTO: 158 THOUSANDS/UL (ref 149–390)
PMV BLD AUTO: 11.1 FL (ref 8.9–12.7)
POTASSIUM SERPL-SCNC: 3.6 MMOL/L (ref 3.5–5.3)
PROT SERPL-MCNC: 7.1 G/DL (ref 6.4–8.4)
RBC # BLD AUTO: 3.97 MILLION/UL (ref 3.88–5.62)
SODIUM SERPL-SCNC: 132 MMOL/L (ref 135–147)
WBC # BLD AUTO: 12.07 THOUSAND/UL (ref 4.31–10.16)

## 2023-07-03 PROCEDURE — 96374 THER/PROPH/DIAG INJ IV PUSH: CPT

## 2023-07-03 PROCEDURE — 85025 COMPLETE CBC W/AUTO DIFF WBC: CPT | Performed by: EMERGENCY MEDICINE

## 2023-07-03 PROCEDURE — 83880 ASSAY OF NATRIURETIC PEPTIDE: CPT | Performed by: EMERGENCY MEDICINE

## 2023-07-03 PROCEDURE — 71275 CT ANGIOGRAPHY CHEST: CPT

## 2023-07-03 PROCEDURE — 80053 COMPREHEN METABOLIC PANEL: CPT | Performed by: EMERGENCY MEDICINE

## 2023-07-03 PROCEDURE — 99285 EMERGENCY DEPT VISIT HI MDM: CPT | Performed by: EMERGENCY MEDICINE

## 2023-07-03 PROCEDURE — 36415 COLL VENOUS BLD VENIPUNCTURE: CPT | Performed by: EMERGENCY MEDICINE

## 2023-07-03 PROCEDURE — 83735 ASSAY OF MAGNESIUM: CPT

## 2023-07-03 PROCEDURE — 84484 ASSAY OF TROPONIN QUANT: CPT | Performed by: EMERGENCY MEDICINE

## 2023-07-03 PROCEDURE — G1004 CDSM NDSC: HCPCS

## 2023-07-03 PROCEDURE — 93005 ELECTROCARDIOGRAM TRACING: CPT

## 2023-07-03 PROCEDURE — 99285 EMERGENCY DEPT VISIT HI MDM: CPT

## 2023-07-03 RX ORDER — FUROSEMIDE 10 MG/ML
80 INJECTION INTRAMUSCULAR; INTRAVENOUS ONCE
Status: COMPLETED | OUTPATIENT
Start: 2023-07-03 | End: 2023-07-03

## 2023-07-03 RX ADMIN — FUROSEMIDE 80 MG: 10 INJECTION, SOLUTION INTRAMUSCULAR; INTRAVENOUS at 23:26

## 2023-07-03 NOTE — CASE COMMUNICATION
This is for informational purposes only. Patient is agreeable to a home PT [facility] visit this Thursday around 1 pm. New SOC date zoey webber 7/6/23.  Thank you

## 2023-07-03 NOTE — TELEPHONE ENCOUNTER
7/3/23- PT DISCHARGED HOME  2WK POV SCHEDULED 7/17/23  6WK POV SCHEDULED 8/8/23      ----- Message from Joseluis Tabor PA-C sent at 7/1/2023 10:20 AM EDT -----  Regarding: POV  Please schedule 2 week incision check with nurse  Please schedule 6-week postop visit with ELIAS, no imaging  Surgery 6/30

## 2023-07-03 NOTE — TELEPHONE ENCOUNTER
Called patient to see how he is doing after surgery. Patient reports he is doing well overall and denies any incisional issues or fevers. Patient is able to ambulate around the house and complete ADLs. Educated the patient about the importance of preventing blood clots and provided measures how to prevent them. Patient has moved his bowels since the surgery. Encouraged patient to take an over the counter stool softener, if he is taking narcotic pain medication. Encouraged fiber intake and fluids. Reviewed incision care with the patient. Advised that he may take a shower and gently wash the surgical site with soap and water. Use clean wash cloth, towels, and clothing. Do not submerge in water until cleared by the surgeon. Do not apply any creams, ointments, or lotions to the site. Patient is aware to call the office if any redness, swelling, drainage, dehiscence of incision, or fever >100 F occurs. Patient is aware to call the office if any concerns or questions may arise. Reminded patient of his upcoming appointments with the date/time/location. Patient was appreciative for the call.

## 2023-07-04 PROBLEM — J96.01 ACUTE RESPIRATORY FAILURE WITH HYPOXIA (HCC): Status: ACTIVE | Noted: 2023-07-04

## 2023-07-04 PROBLEM — J18.9 CAP (COMMUNITY ACQUIRED PNEUMONIA): Status: ACTIVE | Noted: 2023-07-04

## 2023-07-04 PROBLEM — E87.1 HYPONATREMIA: Status: ACTIVE | Noted: 2023-07-04

## 2023-07-04 LAB
2HR DELTA HS TROPONIN: 0 NG/L
4HR DELTA HS TROPONIN: 3 NG/L
ALBUMIN SERPL BCP-MCNC: 3.9 G/DL (ref 3.5–5)
ALP SERPL-CCNC: 67 U/L (ref 34–104)
ALT SERPL W P-5'-P-CCNC: 26 U/L (ref 7–52)
ANION GAP SERPL CALCULATED.3IONS-SCNC: 10 MMOL/L
AST SERPL W P-5'-P-CCNC: 28 U/L (ref 13–39)
BACTERIA UR QL AUTO: ABNORMAL /HPF
BASOPHILS # BLD AUTO: 0.02 THOUSANDS/ÂΜL (ref 0–0.1)
BASOPHILS NFR BLD AUTO: 0 % (ref 0–1)
BILIRUB SERPL-MCNC: 0.71 MG/DL (ref 0.2–1)
BILIRUB UR QL STRIP: NEGATIVE
BUN SERPL-MCNC: 20 MG/DL (ref 5–25)
CALCIUM SERPL-MCNC: 9.1 MG/DL (ref 8.4–10.2)
CARDIAC TROPONIN I PNL SERPL HS: 10 NG/L
CARDIAC TROPONIN I PNL SERPL HS: 10 NG/L
CARDIAC TROPONIN I PNL SERPL HS: 13 NG/L
CHLORIDE SERPL-SCNC: 91 MMOL/L (ref 96–108)
CLARITY UR: CLEAR
CO2 SERPL-SCNC: 31 MMOL/L (ref 21–32)
COLOR UR: YELLOW
CREAT SERPL-MCNC: 1.47 MG/DL (ref 0.6–1.3)
EOSINOPHIL # BLD AUTO: 0.04 THOUSAND/ÂΜL (ref 0–0.61)
EOSINOPHIL NFR BLD AUTO: 0 % (ref 0–6)
ERYTHROCYTE [DISTWIDTH] IN BLOOD BY AUTOMATED COUNT: 15.3 % (ref 11.6–15.1)
GFR SERPL CREATININE-BSD FRML MDRD: 49 ML/MIN/1.73SQ M
GLUCOSE SERPL-MCNC: 110 MG/DL (ref 65–140)
GLUCOSE UR STRIP-MCNC: NEGATIVE MG/DL
HCT VFR BLD AUTO: 37.6 % (ref 36.5–49.3)
HGB BLD-MCNC: 12.5 G/DL (ref 12–17)
HGB UR QL STRIP.AUTO: ABNORMAL
HYALINE CASTS #/AREA URNS LPF: ABNORMAL /LPF
IMM GRANULOCYTES # BLD AUTO: 0.1 THOUSAND/UL (ref 0–0.2)
IMM GRANULOCYTES NFR BLD AUTO: 1 % (ref 0–2)
KETONES UR STRIP-MCNC: NEGATIVE MG/DL
L PNEUMO1 AG UR QL IA.RAPID: NEGATIVE
LEUKOCYTE ESTERASE UR QL STRIP: NEGATIVE
LYMPHOCYTES # BLD AUTO: 1.28 THOUSANDS/ÂΜL (ref 0.6–4.47)
LYMPHOCYTES NFR BLD AUTO: 11 % (ref 14–44)
MAGNESIUM SERPL-MCNC: 1.9 MG/DL (ref 1.9–2.7)
MCH RBC QN AUTO: 31.3 PG (ref 26.8–34.3)
MCHC RBC AUTO-ENTMCNC: 33.2 G/DL (ref 31.4–37.4)
MCV RBC AUTO: 94 FL (ref 82–98)
MONOCYTES # BLD AUTO: 0.96 THOUSAND/ÂΜL (ref 0.17–1.22)
MONOCYTES NFR BLD AUTO: 9 % (ref 4–12)
MUCOUS THREADS UR QL AUTO: ABNORMAL
NEUTROPHILS # BLD AUTO: 8.8 THOUSANDS/ÂΜL (ref 1.85–7.62)
NEUTS SEG NFR BLD AUTO: 79 % (ref 43–75)
NITRITE UR QL STRIP: NEGATIVE
NON-SQ EPI CELLS URNS QL MICRO: ABNORMAL /HPF
NRBC BLD AUTO-RTO: 0 /100 WBCS
PH UR STRIP.AUTO: 7.5 [PH]
PLATELET # BLD AUTO: 117 THOUSANDS/UL (ref 149–390)
PMV BLD AUTO: 11.5 FL (ref 8.9–12.7)
POTASSIUM SERPL-SCNC: 3.7 MMOL/L (ref 3.5–5.3)
PROCALCITONIN SERPL-MCNC: 0.49 NG/ML
PROT SERPL-MCNC: 7.1 G/DL (ref 6.4–8.4)
PROT UR STRIP-MCNC: NEGATIVE MG/DL
RBC # BLD AUTO: 3.99 MILLION/UL (ref 3.88–5.62)
RBC #/AREA URNS AUTO: ABNORMAL /HPF
S PNEUM AG UR QL: NEGATIVE
SARS-COV-2 RNA RESP QL NAA+PROBE: NEGATIVE
SODIUM SERPL-SCNC: 132 MMOL/L (ref 135–147)
SP GR UR STRIP.AUTO: 1.01 (ref 1–1.03)
UROBILINOGEN UR QL STRIP.AUTO: 0.2 E.U./DL
WBC # BLD AUTO: 11.2 THOUSAND/UL (ref 4.31–10.16)
WBC #/AREA URNS AUTO: ABNORMAL /HPF

## 2023-07-04 PROCEDURE — 99223 1ST HOSP IP/OBS HIGH 75: CPT | Performed by: FAMILY MEDICINE

## 2023-07-04 PROCEDURE — 36415 COLL VENOUS BLD VENIPUNCTURE: CPT | Performed by: EMERGENCY MEDICINE

## 2023-07-04 PROCEDURE — 85025 COMPLETE CBC W/AUTO DIFF WBC: CPT

## 2023-07-04 PROCEDURE — 84145 PROCALCITONIN (PCT): CPT

## 2023-07-04 PROCEDURE — 80053 COMPREHEN METABOLIC PANEL: CPT

## 2023-07-04 PROCEDURE — 87040 BLOOD CULTURE FOR BACTERIA: CPT | Performed by: EMERGENCY MEDICINE

## 2023-07-04 PROCEDURE — 84484 ASSAY OF TROPONIN QUANT: CPT | Performed by: EMERGENCY MEDICINE

## 2023-07-04 PROCEDURE — 81001 URINALYSIS AUTO W/SCOPE: CPT | Performed by: EMERGENCY MEDICINE

## 2023-07-04 PROCEDURE — 87449 NOS EACH ORGANISM AG IA: CPT

## 2023-07-04 PROCEDURE — 87635 SARS-COV-2 COVID-19 AMP PRB: CPT | Performed by: EMERGENCY MEDICINE

## 2023-07-04 RX ORDER — GUAIFENESIN 600 MG/1
600 TABLET, EXTENDED RELEASE ORAL 2 TIMES DAILY
Status: DISCONTINUED | OUTPATIENT
Start: 2023-07-04 | End: 2023-07-06 | Stop reason: HOSPADM

## 2023-07-04 RX ORDER — HEPARIN SODIUM 5000 [USP'U]/ML
5000 INJECTION, SOLUTION INTRAVENOUS; SUBCUTANEOUS EVERY 8 HOURS SCHEDULED
Status: DISCONTINUED | OUTPATIENT
Start: 2023-07-04 | End: 2023-07-06 | Stop reason: HOSPADM

## 2023-07-04 RX ORDER — GABAPENTIN 300 MG/1
600 CAPSULE ORAL 2 TIMES DAILY
Status: DISCONTINUED | OUTPATIENT
Start: 2023-07-04 | End: 2023-07-06 | Stop reason: HOSPADM

## 2023-07-04 RX ORDER — ONDANSETRON 2 MG/ML
4 INJECTION INTRAMUSCULAR; INTRAVENOUS EVERY 6 HOURS PRN
Status: DISCONTINUED | OUTPATIENT
Start: 2023-07-04 | End: 2023-07-06 | Stop reason: HOSPADM

## 2023-07-04 RX ORDER — BISACODYL 10 MG
10 SUPPOSITORY, RECTAL RECTAL DAILY PRN
Status: DISCONTINUED | OUTPATIENT
Start: 2023-07-04 | End: 2023-07-06 | Stop reason: HOSPADM

## 2023-07-04 RX ORDER — SENNOSIDES 8.6 MG
1 TABLET ORAL 2 TIMES DAILY
Status: DISCONTINUED | OUTPATIENT
Start: 2023-07-04 | End: 2023-07-06 | Stop reason: HOSPADM

## 2023-07-04 RX ORDER — CEFTRIAXONE 1 G/50ML
1000 INJECTION, SOLUTION INTRAVENOUS EVERY 24 HOURS
Status: DISCONTINUED | OUTPATIENT
Start: 2023-07-04 | End: 2023-07-06 | Stop reason: HOSPADM

## 2023-07-04 RX ORDER — ACETAMINOPHEN 325 MG/1
650 TABLET ORAL EVERY 6 HOURS PRN
Status: DISCONTINUED | OUTPATIENT
Start: 2023-07-04 | End: 2023-07-06 | Stop reason: HOSPADM

## 2023-07-04 RX ORDER — ATORVASTATIN CALCIUM 40 MG/1
40 TABLET, FILM COATED ORAL
Status: DISCONTINUED | OUTPATIENT
Start: 2023-07-04 | End: 2023-07-06 | Stop reason: HOSPADM

## 2023-07-04 RX ORDER — OXYCODONE HCL 20 MG/1
40 TABLET, FILM COATED, EXTENDED RELEASE ORAL EVERY 8 HOURS SCHEDULED
Status: DISCONTINUED | OUTPATIENT
Start: 2023-07-04 | End: 2023-07-06 | Stop reason: HOSPADM

## 2023-07-04 RX ORDER — ASPIRIN 81 MG/1
81 TABLET, CHEWABLE ORAL DAILY
Status: DISCONTINUED | OUTPATIENT
Start: 2023-07-04 | End: 2023-07-06 | Stop reason: HOSPADM

## 2023-07-04 RX ORDER — TAMSULOSIN HYDROCHLORIDE 0.4 MG/1
0.4 CAPSULE ORAL
Status: DISCONTINUED | OUTPATIENT
Start: 2023-07-04 | End: 2023-07-06 | Stop reason: HOSPADM

## 2023-07-04 RX ORDER — LEVOFLOXACIN 5 MG/ML
750 INJECTION, SOLUTION INTRAVENOUS ONCE
Status: COMPLETED | OUTPATIENT
Start: 2023-07-04 | End: 2023-07-04

## 2023-07-04 RX ORDER — POTASSIUM CHLORIDE 20 MEQ/1
20 TABLET, EXTENDED RELEASE ORAL 2 TIMES DAILY
Status: DISCONTINUED | OUTPATIENT
Start: 2023-07-04 | End: 2023-07-05

## 2023-07-04 RX ORDER — POLYETHYLENE GLYCOL 3350 17 G/17G
17 POWDER, FOR SOLUTION ORAL DAILY
Status: DISCONTINUED | OUTPATIENT
Start: 2023-07-04 | End: 2023-07-06 | Stop reason: HOSPADM

## 2023-07-04 RX ORDER — OXYCODONE HCL 20 MG/1
40 TABLET, FILM COATED, EXTENDED RELEASE ORAL EVERY 8 HOURS SCHEDULED
Status: DISCONTINUED | OUTPATIENT
Start: 2023-07-04 | End: 2023-07-04

## 2023-07-04 RX ADMIN — GUAIFENESIN 600 MG: 600 TABLET ORAL at 18:03

## 2023-07-04 RX ADMIN — POTASSIUM CHLORIDE 20 MEQ: 1500 TABLET, EXTENDED RELEASE ORAL at 08:55

## 2023-07-04 RX ADMIN — GABAPENTIN 600 MG: 300 CAPSULE ORAL at 18:03

## 2023-07-04 RX ADMIN — TAMSULOSIN HYDROCHLORIDE 0.4 MG: 0.4 CAPSULE ORAL at 15:40

## 2023-07-04 RX ADMIN — ATORVASTATIN CALCIUM 40 MG: 40 TABLET, FILM COATED ORAL at 15:40

## 2023-07-04 RX ADMIN — OXYCODONE HYDROCHLORIDE 40 MG: 20 TABLET, FILM COATED, EXTENDED RELEASE ORAL at 08:55

## 2023-07-04 RX ADMIN — HEPARIN SODIUM 5000 UNITS: 5000 INJECTION INTRAVENOUS; SUBCUTANEOUS at 23:13

## 2023-07-04 RX ADMIN — IOHEXOL 140 ML: 350 INJECTION, SOLUTION INTRAVENOUS at 00:22

## 2023-07-04 RX ADMIN — SENNOSIDES 8.6 MG: 8.6 TABLET, FILM COATED ORAL at 18:03

## 2023-07-04 RX ADMIN — CEFTRIAXONE 1000 MG: 1 INJECTION, SOLUTION INTRAVENOUS at 23:14

## 2023-07-04 RX ADMIN — HEPARIN SODIUM 5000 UNITS: 5000 INJECTION INTRAVENOUS; SUBCUTANEOUS at 05:22

## 2023-07-04 RX ADMIN — VANCOMYCIN HYDROCHLORIDE 750 MG: 750 INJECTION, SOLUTION INTRAVENOUS at 18:04

## 2023-07-04 RX ADMIN — VANCOMYCIN HYDROCHLORIDE 1500 MG: 1 INJECTION, POWDER, LYOPHILIZED, FOR SOLUTION INTRAVENOUS at 11:04

## 2023-07-04 RX ADMIN — ASPIRIN 81 MG 81 MG: 81 TABLET ORAL at 08:55

## 2023-07-04 RX ADMIN — OXYCODONE HYDROCHLORIDE 40 MG: 20 TABLET, FILM COATED, EXTENDED RELEASE ORAL at 15:40

## 2023-07-04 RX ADMIN — LEVOFLOXACIN 750 MG: 750 INJECTION, SOLUTION INTRAVENOUS at 01:29

## 2023-07-04 RX ADMIN — GABAPENTIN 600 MG: 300 CAPSULE ORAL at 08:54

## 2023-07-04 RX ADMIN — POTASSIUM CHLORIDE 20 MEQ: 1500 TABLET, EXTENDED RELEASE ORAL at 18:03

## 2023-07-04 RX ADMIN — HEPARIN SODIUM 5000 UNITS: 5000 INJECTION INTRAVENOUS; SUBCUTANEOUS at 15:40

## 2023-07-04 RX ADMIN — OXYCODONE HYDROCHLORIDE 40 MG: 20 TABLET, FILM COATED, EXTENDED RELEASE ORAL at 23:14

## 2023-07-04 RX ADMIN — GUAIFENESIN 600 MG: 600 TABLET ORAL at 08:55

## 2023-07-04 RX ADMIN — OXYCODONE HYDROCHLORIDE 15 MG: 5 TABLET ORAL at 18:19

## 2023-07-04 NOTE — PROGRESS NOTES
Odin Johnston is a 58 y.o. male who is currently ordered Vancomycin IV with management by the Pharmacy Consult service. Relevant clinical data and objective / subjective history reviewed. Vancomycin Assessment:  Indication and Goal AUC/Trough: Pneumonia (goal -600, trough >10)  Clinical Status: stable  Micro:     Renal Function:  SCr: 1.47 mg/dL  CrCl: 61 mL/min  Renal replacement: Not on dialysis  Days of Therapy: 1  Current Dose: 1500 mg IV once  Vancomycin Plan:  New Dosin mg IV every 12 hours  Estimated AUC: 455 mcg*hr/mL  Estimated Trough: 15.9 mcg/mL  Next Level: 23 @ 0600  Renal Function Monitoring: Daily BMP and [place] will continue to follow closely for s/sx of nephrotoxicity, infusion reactions and appropriateness of therapy. BMP and CBC will be ordered per protocol. We will continue to follow the patient’s culture results and clinical progress daily.     Saeed Brenner, Pharmacist

## 2023-07-04 NOTE — ASSESSMENT & PLAN NOTE
Wt Readings from Last 3 Encounters:   07/03/23 110 kg (243 lb 9.7 oz)   06/30/23 110 kg (241 lb 6.5 oz)   06/27/23 111 kg (244 lb 6.4 oz)     · BNP 31, does have +1 bilateral lower extremity edema. ?questionable hypervolemia. · Last echo in November 2022 shows EF 65%  · Takes torsemide 60 mg daily ( recently taken off of Metolazone) . · Received 80 mg IV Lasix x 1 in the ED, assess response   · Update echocardiogram   · Monitor daily weights, I&O's. Monitor renal function closely due to RODO .

## 2023-07-04 NOTE — ASSESSMENT & PLAN NOTE
· Baseline creatinine 0.9-1.1, creatinine 1.5  · Borderline RODO. · Has been on torsemide 60mg daily. Was taken off Lisinopril due to recent RODO .    ·  Status post 80 mg IV Lasix in the ED  · Trend labs  · Urine retention protocol  · Avoid nephrotoxins, hypotension

## 2023-07-04 NOTE — ASSESSMENT & PLAN NOTE
· PDMP reviewed follows with palliative care for pain management  · Continue home OxyContin 40 mg every 8 hours and as needed oxycodone 20 mg every 3 hours

## 2023-07-04 NOTE — ASSESSMENT & PLAN NOTE
· Recently discharged from [facility] status post left  L3 /4 hemilaminectomy and bilateral foraminotomies on 6/30/23  · Patient follows with palliative care for pain control, continue home OxyContin 40 mg every 8 hours and as needed oxycodone 20 mg every 3 hours   · Reports doing well post-op , pain is controlled. No paresthesias.    · Due to start outpatient PT/OT this coming week   · continue outpatient neurosurgery follow-up

## 2023-07-04 NOTE — ASSESSMENT & PLAN NOTE
· Follows with outpatient urology Dr. Gracie Camarena . History of high-grade papillary urothelial carcinoma. · Immunotherapy has been on hold since back surgery per patient   · Continue flomax.    · Continue outpatient urology follow-up

## 2023-07-04 NOTE — ASSESSMENT & PLAN NOTE
· Noted to have bradycardia on recent admission, metoprolol discontinued   · Continue to avoid AVN blockers

## 2023-07-04 NOTE — ED PROVIDER NOTES
History  Chief Complaint   Patient presents with   • Leg Swelling     Patient had back surgery . Currently getting immunotherapy for cancer. Has h/o CHF. Has swelling in b/l LE, SOB with movement and at rest, cramping in b/l hands, orthopnea. Symptoms started today as per patient. History provided by:  Medical records and patient  Shortness of Breath  Severity:  Moderate  Onset quality:  Gradual  Duration:  2 days  Timing:  Constant  Progression:  Unchanged  Chronicity:  New  Context comment:  Status post hemilaminectomy lumbar spine 4 days prior to arrival, complains of increasing leg swelling and shortness of breath. History of CHF per chart, on diuretics. Patient states his diuretics were paused for short period during surgery  Relieved by:  Nothing  Worsened by:  Nothing  Ineffective treatments: torsemide, metalazone. Associated symptoms: no abdominal pain, no chest pain, no cough, no ear pain, no headaches, no rash, no sore throat and no vomiting        Prior to Admission Medications   Prescriptions Last Dose Informant Patient Reported? Taking? Farxiga 5 MG TABS  Self Yes No   Si mg daily 5mg alternating with 2.5mg for fluid retention   aspirin 81 mg chewable tablet  Self Yes No   Sig: Chew 81 mg daily   atorvastatin (LIPITOR) 40 mg tablet  Self Yes No   co-enzyme Q-10 30 MG capsule  Self Yes No   Sig: Take 100 mg by mouth daily    gabapentin (NEURONTIN) 300 mg capsule   No No   Sig: Take 2 capsules (600 mg total) by mouth 2 (two) times a day   multivitamin-iron-minerals-folic acid (CENTRUM) chewable tablet  Self Yes No   Sig: Chew 1 tablet daily   oxyCODONE (OxyCONTIN) 40 mg 12 hr tablet   No No   Sig: Take 1 tablet (40 mg total) by mouth every 8 (eight) hours Max Daily Amount: 120 mg   oxyCODONE (ROXICODONE) 30 MG immediate release tablet   No No   Sig: Take 0.5 tablets (15mg) by mouth q4h PRN for moderate to severe cancer pain.  Max daily amount 120mgs   potassium chloride (K-DUR,KLOR-CON) 20 mEq tablet  Self Yes No   Si (two) times a day   tamsulosin (FLOMAX) 0.4 mg   No No   Sig: Take 1 capsule (0.4 mg total) by mouth daily with dinner   torsemide (DEMADEX) 20 mg tablet  Self Yes No   Sig: Take 60 mg by mouth daily      Facility-Administered Medications: None       Past Medical History:   Diagnosis Date   • Arthritis    • Bladder cancer ([address])    • Chronic narcotic dependence (HCC)    • Chronic pain disorder     lower back and down both legs   • Colon polyp    • Coronary artery disease    • CPAP (continuous positive airway pressure) dependence    • Does use hearing aid     will wear DOS   • Full dentures    • Heart failure ([address])     and "fluid retention" [facility] PA"   • High cholesterol    • Hypertension    • Mild ankle edema     and feet   • Obstructive sleep apnea on CPAP    • Prediabetes    • Shortness of breath     per pt "with just exertion"   • Sleep apnea    • Wears glasses        Past Surgical History:   Procedure Laterality Date   • BACK SURGERY      titanium rods implanted   • COLONOSCOPY     • CYSTOSCOPY W/ URETERAL STENT PLACEMENT Bilateral 3/17/2023    Procedure: bilateral retrograde;  Surgeon: Jing Galvez MD;  Location:  MAIN OR;  Service: Urology   • HERNIA REPAIR      x5   • LUMBAR LAMINECTOMY N/A 2023    Procedure: Left L3-4 [employer] hemilaminectomy and bilateral foraminotomies;  Surgeon: Mildred Cabrera MD;  Location:  MAIN OR;  Service: Neurosurgery   • RI CYSTO W/REMOVAL OF LESIONS SMALL N/A 2023    Procedure: CYSTOSCOPY TURBT;  Surgeon: Jing Galvez MD;  Location: [facility];  Service: Urology   • TRANSURETHRAL RESECTION OF BLADDER TUMOR N/A 3/17/2023    Procedure: TRANSURETHRAL RESECTION OF BLADDER TUMOR (TURBT);   Surgeon: Jing Galvez MD;  Location: [facility];  Service: Urology       Family History   Problem Relation Age of Onset   • Cancer Father    • Aortic aneurysm Father    • Leukemia Father    • Hypertension Mother I have reviewed and agree with the history as documented. E-Cigarette/Vaping   • E-Cigarette Use Never User      E-Cigarette/Vaping Substances   • Nicotine No    • THC No    • CBD No    • Flavoring No    • Other No    • Unknown No      Social History     Tobacco Use   • Smoking status: Former     Packs/day: 1.00     Years: 35.00     Total pack years: 35.00     Types: Cigarettes     Start date: 2023     Quit date: 2016     Years since quittin.5   • Smokeless tobacco: Never   Vaping Use   • Vaping Use: Never used   Substance Use Topics   • Alcohol use: Not Currently     Comment: 3 per year   • Drug use: Not Currently     Types: Marijuana       Review of Systems   Constitutional: Negative for appetite change and chills. HENT: Negative for ear pain, rhinorrhea, sore throat and trouble swallowing. Eyes: Negative for pain, discharge and visual disturbance. Respiratory: Positive for shortness of breath. Negative for cough and chest tightness. Cardiovascular: Positive for leg swelling. Negative for chest pain and palpitations. Gastrointestinal: Negative for abdominal pain, nausea and vomiting. Endocrine: Negative for polydipsia, polyphagia and polyuria. Genitourinary: Negative for difficulty urinating, dysuria, hematuria and testicular pain. Musculoskeletal: Negative for arthralgias. Skin: Negative for color change and rash. Allergic/Immunologic: Negative for immunocompromised state. Neurological: Negative for dizziness, seizures, syncope, weakness and headaches. Hematological: Negative for adenopathy. Psychiatric/Behavioral: Negative for confusion and dysphoric mood. All other systems reviewed and are negative. Physical Exam  Physical Exam  Vitals and nursing note reviewed. Constitutional:       General: He is not in acute distress. Appearance: Normal appearance. He is obese. He is not ill-appearing, toxic-appearing or diaphoretic.    HENT:      Head: Normocephalic and atraumatic. Nose: Nose normal. No congestion or rhinorrhea. Mouth/Throat:      Mouth: Mucous membranes are moist.      Pharynx: Oropharynx is clear. No oropharyngeal exudate or posterior oropharyngeal erythema. Eyes:      General:         Right eye: No discharge. Left eye: No discharge. Cardiovascular:      Rate and Rhythm: Normal rate and regular rhythm. Pulses: Normal pulses. Heart sounds: Normal heart sounds. No murmur heard. No gallop. Pulmonary:      Effort: Pulmonary effort is normal. No respiratory distress. Breath sounds: Normal breath sounds. No stridor. No wheezing, rhonchi or rales. Chest:      Chest wall: No tenderness. Abdominal:      General: Bowel sounds are normal. There is no distension. Palpations: Abdomen is soft. There is no mass. Tenderness: There is no abdominal tenderness. There is no right CVA tenderness, left CVA tenderness, guarding or rebound. Hernia: No hernia is present. Musculoskeletal:         General: Normal range of motion. Cervical back: Normal range of motion and neck supple. Right lower leg: Edema present. Left lower leg: Edema present. Comments: +2 pitting edema bilateral LEs. There is a lower lumbar scar well-healed. Just superior to the scar is a small incision with a dressing over top, there is some tenderness to palpation around this incision but there is no evidence of swelling or erythema, no warmth. Skin:     General: Skin is warm and dry. Capillary Refill: Capillary refill takes less than 2 seconds. Neurological:      General: No focal deficit present. Mental Status: He is alert and oriented to person, place, and time. Cranial Nerves: No cranial nerve deficit. Sensory: No sensory deficit. Motor: No weakness.       Coordination: Coordination normal.      Gait: Gait normal.      Deep Tendon Reflexes: Reflexes normal.   Psychiatric:         Mood and Affect: Mood normal.         Behavior: Behavior normal.         Thought Content: Thought content normal.         Judgment: Judgment normal.         Vital Signs  ED Triage Vitals [07/03/23 2257]   Temperature Pulse Respirations Blood Pressure SpO2   (!) 97.3 °F (36.3 °C) 86 20 122/69 96 %      Temp Source Heart Rate Source Patient Position - Orthostatic VS BP Location FiO2 (%)   Temporal Monitor Sitting Left arm --      Pain Score       7           Vitals:    07/03/23 2257 07/03/23 2330 07/04/23 0030   BP: 122/69 121/56 155/66   Pulse: 86 76 81   Patient Position - Orthostatic VS: Sitting Sitting Sitting         Visual Acuity      ED Medications  Medications   levofloxacin (LEVAQUIN) IVPB (premix in dextrose) 750 mg 150 mL (750 mg Intravenous New Bag 7/4/23 0129)   furosemide (LASIX) injection 80 mg (80 mg Intravenous Given 7/3/23 2326)   iohexol (OMNIPAQUE) 350 MG/ML injection (SINGLE-DOSE) 140 mL (140 mL Intravenous Given 7/4/23 0022)       Diagnostic Studies  Results Reviewed     Procedure Component Value Units Date/Time    UA w Reflex to Microscopic w Reflex to Culture [[id number]]  (Abnormal) Collected: 07/04/23 0128    Lab Status: Final result Specimen: Urine, Clean Catch Updated: 07/04/23 0139     Color, UA Yellow     Clarity, UA Clear     Specific Gravity, UA 1.010     pH, UA 7.5     Leukocytes, UA Negative     Nitrite, UA Negative     Protein, UA Negative mg/dl      Glucose, UA Negative mg/dl      Ketones, UA Negative mg/dl      Urobilinogen, UA 0.2 E.U./dl      Bilirubin, UA Negative     Occult Blood, UA Small    Urine Microscopic [[id number]] Collected: 07/04/23 0128    Lab Status: In process Specimen: Urine, Clean Catch Updated: 07/04/23 0139    HS Troponin I 2hr [[id number]] Collected: 07/04/23 0117    Lab Status: In process Specimen: Blood from Arm, Right Updated: 07/04/23 0126    COVID only [[id number]] Collected: 07/04/23 0117    Lab Status:  In process Specimen: Nares from Nasopharyngeal Swab Updated: 07/04/23 0126    Blood culture #1 [[id number]] Collected: 07/04/23 0117    Lab Status: In process Specimen: Blood from Arm, Right Updated: 07/04/23 0126    Blood culture #2 [[id number]] Collected: 07/04/23 0123    Lab Status:  In process Specimen: Blood from Arm, Left Updated: 07/04/23 0126    HS Troponin I 4hr [[id number]]     Lab Status: No result Specimen: Blood     HS Troponin 0hr (reflex protocol) [[id number]]  (Normal) Collected: 07/03/23 2324    Lab Status: Final result Specimen: Blood from Arm, Right Updated: 07/04/23 0000     hs TnI 0hr 10 ng/L     B-Type Natriuretic Peptide(BNP) [[id number]]  (Normal) Collected: 07/03/23 2324    Lab Status: Final result Specimen: Blood from Arm, Right Updated: 07/03/23 2358     BNP 31 pg/mL     Comprehensive metabolic panel [[id number]]  (Abnormal) Collected: 07/03/23 2324    Lab Status: Final result Specimen: Blood from Arm, Right Updated: 07/03/23 2352     Sodium 132 mmol/L      Potassium 3.6 mmol/L      Chloride 93 mmol/L      CO2 29 mmol/L      ANION GAP 10 mmol/L      BUN 21 mg/dL      Creatinine 1.51 mg/dL      Glucose 135 mg/dL      Calcium 9.2 mg/dL      AST 17 U/L      ALT 26 U/L      Alkaline Phosphatase 67 U/L      Total Protein 7.1 g/dL      Albumin 3.9 g/dL      Total Bilirubin 0.82 mg/dL      eGFR 48 ml/min/1.73sq m     Narrative:      [place] guidelines for Chronic Kidney Disease (CKD):   •  Stage 1 with normal or high GFR (GFR > 90 mL/min/1.73 square meters)  •  Stage 2 Mild CKD (GFR = 60-89 mL/min/1.73 square meters)  •  Stage 3A Moderate CKD (GFR = 45-59 mL/min/1.73 square meters)  •  Stage 3B Moderate CKD (GFR = 30-44 mL/min/1.73 square meters)  •  Stage 4 Severe CKD (GFR = 15-29 mL/min/1.73 square meters)  •  Stage 5 End Stage CKD (GFR <15 mL/min/1.73 square meters)  Note: GFR calculation is accurate only with a steady state creatinine    CBC and differential [[id number]]  (Abnormal) Collected: 07/03/23 3438    Lab Status: Final result Specimen: Blood from Arm, Right Updated: 07/03/23 2334     WBC 12.07 Thousand/uL      RBC 3.97 Million/uL      Hemoglobin 12.6 g/dL      Hematocrit 37.1 %      MCV 94 fL      MCH 31.7 pg      MCHC 34.0 g/dL      RDW 15.1 %      MPV 11.1 fL      Platelets 789 Thousands/uL      nRBC 0 /100 WBCs      Neutrophils Relative 80 %      Immat GRANS % 1 %      Lymphocytes Relative 11 %      Monocytes Relative 8 %      Eosinophils Relative 0 %      Basophils Relative 0 %      Neutrophils Absolute 9.68 Thousands/µL      Immature Grans Absolute 0.07 Thousand/uL      Lymphocytes Absolute 1.27 Thousands/µL      Monocytes Absolute 0.99 Thousand/µL      Eosinophils Absolute 0.03 Thousand/µL      Basophils Absolute 0.03 Thousands/µL                  CTA ED chest PE Study   Final Result by Tali House MD (07/04 2569)      No evidence of pulmonary embolus      Airspace disease at left upper lobe along the left major fissure consistent with pneumonia                  Workstation performed: [id number]                    Procedures  Procedures         ED Course                               SBIRT 20yo+    Flowsheet Row Most Recent Value   Initial Alcohol Screen: US AUDIT-C     1. How often do you have a drink containing alcohol? 0 Filed at: 07/03/2023 2320   2. How many drinks containing alcohol do you have on a typical day you are drinking? 0 Filed at: 07/03/2023 2320   3a. Male UNDER 65: How often do you have five or more drinks on one occasion? 0 Filed at: 07/03/2023 2320   Audit-C Score 0 Filed at: 07/03/2023 2320   ANN: How many times in the past year have you. .. Used an illegal drug or used a prescription medication for non-medical reasons? Never Filed at: 07/03/2023 2320                    Medical Decision Making  2257: Patient appears well, vital signs reviewed. Patient has some borderline hypoxia at 90% at times, responds with deep breaths to 95%. Normal cardiopulmonary exams. Recent surgery.   Plan to complete basic labs including cardiac enzymes, BNP and CTA chest PE protocol. EKG nonischemic.    0115: CT chest and labs reviewed. Patient still requires observation of his intermittent hypoxia. Findings of left pneumonia. Plan to start on antibiotics. I will consult medicine for admission. RODO (acute kidney injury) [facility]): acute illness or injury  CHF (congestive heart failure) ([address]): chronic illness or injury  Pneumonia: acute illness or injury  Amount and/or Complexity of Data Reviewed  Labs: ordered. Radiology: ordered. Details: CTA chest--no PE, left upper lobe pneumonia  ECG/medicine tests: ordered and independent interpretation performed. Details: Normal sinus rhythm 79 bpm, no acute ischemia. Risk  Prescription drug management. Decision regarding hospitalization. Disposition  Final diagnoses:   Pneumonia   RODO (acute kidney injury) ([address])   CHF (congestive heart failure) ([address])     Time reflects when diagnosis was documented in both MDM as applicable and the Disposition within this note     Time User Action Codes Description Comment    7/4/2023  1:10 AM Roland Haney [J18.9] Pneumonia     7/4/2023  1:10 AM Roland Haney [N17.9] RODO (acute kidney injury) ([address])     7/4/2023  1:10 AM Roland Sal Add [I50.9] CHF (congestive heart failure) [facility])       ED Disposition     ED Disposition   Admit    Condition   Stable    Date/Time   Tue Jul 4, 2023  1:09 AM    Comment              Follow-up Information    None         Patient's Medications   Discharge Prescriptions    No medications on file       No discharge procedures on file.     PDMP Review       Value Time User    PDMP Reviewed  Yes 7/1/2023 10:27 AM Lynne Jack MD          ED Provider  Electronically Signed by           Arjun Lopes MD  07/04/23 7254

## 2023-07-04 NOTE — PLAN OF CARE
Problem: Potential for Falls  Goal: Patient will remain free of falls  Description: INTERVENTIONS:  - Educate patient/family on patient safety including physical limitations  - Instruct patient to call for assistance with activity   - Consult OT/PT to assist with strengthening/mobility   - Keep Call bell within reach  - Keep bed low and locked with side rails adjusted as appropriate  - Keep care items and personal belongings within reach  - Initiate and maintain comfort rounds  - Make Fall Risk Sign visible to staff    - Apply yellow socks and bracelet for high fall risk patients  - Consider moving patient to room near nurses station  Outcome: Progressing     Problem: RESPIRATORY - ADULT  Goal: Achieves optimal ventilation and oxygenation  Description: INTERVENTIONS:  - Assess for changes in respiratory status  - Assess for changes in mentation and behavior  - Position to facilitate oxygenation and minimize respiratory effort  - Oxygen administered by appropriate delivery if ordered  - Initiate smoking cessation education as indicated  - Encourage broncho-pulmonary hygiene including cough, deep breathe, Incentive Spirometry  - Assess the need for suctioning and aspirate as needed  - Assess and instruct to report SOB or any respiratory difficulty  - Respiratory Therapy support as indicated  Outcome: Progressing

## 2023-07-04 NOTE — ASSESSMENT & PLAN NOTE
· Noted to be hypoxic in 80's on room air, currently on 4L NC  · . Does not wear home O2 at baseline   · CTA negative for PE, + TIMOTHY pneumonia   · Suspect multifactorial pneumonia +/- CHF exacerbation - see plans   · Incentive spirometry, OOB to chair.    · Wean O2 to keep SpO2> 90%

## 2023-07-04 NOTE — ASSESSMENT & PLAN NOTE
· Patient presents with leg swelling and shortness of breath. Recently admitted at [facility] s/p L 3-4 hemilaminectomy and bilateral foraminotomies on 6/30/23   · CTA pe study negative for pulmonary embolism, showing left upper lobe pneumonia   · WBC 12 on admission . Does not meet sepsis criteria.    · Continue Rocephin   · Check sputum cx, MRSA, strep/legionella  · Trend CBC and procalcitonin

## 2023-07-04 NOTE — H&P
[address] [facility]  H&P  Name: Rosalee Elmore 58 y.o. male I MRN: [id number]  Unit/Bed#: -01 I Date of Admission: 7/3/2023   Date of Service: 7/4/2023 I Hospital Day: 0      Assessment/Plan   * CAP (community acquired pneumonia)  Assessment & Plan  · Patient presents with leg swelling and shortness of breath. Recently admitted at [facility] s/p L 3-4 hemilaminectomy and bilateral foraminotomies on 6/30/23   · CTA pe study negative for pulmonary embolism, showing left upper lobe pneumonia   · WBC 12 on admission . Does not meet sepsis criteria. · Continue Rocephin   · Check sputum cx, MRSA, strep/legionella  · Trend CBC and procalcitonin    Acute on chronic diastolic heart failure (HCC)  Assessment & Plan  Wt Readings from Last 3 Encounters:   07/03/23 110 kg (243 lb 9.7 oz)   06/30/23 110 kg (241 lb 6.5 oz)   06/27/23 111 kg (244 lb 6.4 oz)     · BNP 31, does have +1 bilateral lower extremity edema. ?questionable hypervolemia. · Last echo in November 2022 shows EF 65%  · Takes torsemide 60 mg daily ( recently taken off of Metolazone) . · Received 80 mg IV Lasix x 1 in the ED, assess response   · Update echocardiogram   · Monitor daily weights, I&O's. Monitor renal function closely due to RODO . RODO (acute kidney injury) ([address] [facility])  Assessment & Plan  · Baseline creatinine 0.9-1.1, creatinine 1.5  · Borderline RODO. · Has been on torsemide 60mg daily. Was taken off Lisinopril due to recent RODO . ·  Status post 80 mg IV Lasix in the ED  · Trend labs  · Urine retention protocol  · Avoid nephrotoxins, hypotension    Malignant neoplasm of urinary bladder [facility])  Assessment & Plan  · Follows with outpatient urology Dr. Aj Clark . History of high-grade papillary urothelial carcinoma. · Immunotherapy has been on hold since back surgery per patient   · Continue flomax.    · Continue outpatient urology follow-up    Acute respiratory failure with hypoxia [facility])  Assessment & Plan  · Noted to be hypoxic in 80's on room air, currently on 4L NC  · . Does not wear home O2 at baseline   · CTA negative for PE, + TIMOTHY pneumonia   · Suspect multifactorial pneumonia +/- CHF exacerbation - see plans   · Incentive spirometry, OOB to chair. · Wean O2 to keep SpO2> 90%    Hyponatremia  Assessment & Plan  · Na 132 on admission  · S/p IV lasix   · Trend labs     Bradycardia  Assessment & Plan  · Noted to have bradycardia on recent admission, metoprolol discontinued   · Continue to avoid AVN blockers      Chronic, continuous use of opioids  Assessment & Plan  · PDMP reviewed follows with palliative care for pain management  · Continue home OxyContin 40 mg every 8 hours and as needed oxycodone 20 mg every 3 hours    Chronic bilateral low back pain with bilateral sciatica  Assessment & Plan  · Recently discharged from [facility] status post left  L3 /4 hemilaminectomy and bilateral foraminotomies on 6/30/23  · Patient follows with palliative care for pain control, continue home OxyContin 40 mg every 8 hours and as needed oxycodone 20 mg every 3 hours   · Reports doing well post-op , pain is controlled. No paresthesias. · Due to start outpatient PT/OT this coming week   · continue outpatient neurosurgery follow-up    Obstructive sleep apnea on CPAP  Assessment & Plan  · CPAP at bedtime       VTE Pharmacologic Prophylaxis:   Moderate Risk (Score 3-4) - Pharmacological DVT Prophylaxis Ordered: heparin. Code Status: Level 3 - DNAR and DNI   Discussion with family: Patient declined call to . Anticipated Length of Stay: Patient will be admitted on an inpatient basis with an anticipated length of stay of greater than 2 midnights secondary to cap, acute chf, moreno - iv abx, iv diuresis , labs .     Total Time Spent on Date of Encounter in care of patient: 75 minutes This time was spent on one or more of the following: performing physical exam; counseling and coordination of care; obtaining or reviewing history; documenting in the medical record; reviewing/ordering tests, medications or procedures; communicating with other healthcare professionals and discussing with patient's family/caregivers. Chief Complaint: shortness of breath, leg swelling     History of Present Illness:  Jeff Bowden is a 58 y.o. male with a PMH of bladder cancer, chronic pain, ERICA, CHF, CAD, status post L3-L4 hemilaminectomy who presents from home with shortness of breath and leg swelling over the last 24 hours . Reports he had back surgery on 6/30 , discharged from [facility] on 7/1. Over the last 24 hours developed leg swelling and shortness of breath. Denies any new or worsening back pain , no paresthesias. Reports he has not started outpatient PT yet, has been less active but still doing 4 walks per day. Reports he has been taking torsemide 60 mg daily since discharge has not missed any doses. Urinating adequately. No home 02. Follows with urology for bladder cancer, supposed to be restarting immunotherapy in the next week. Denies fevers, chills, chest pain , GI complaints. Review of Systems:  Review of Systems   Constitutional: Positive for activity change. Negative for chills and fever. Respiratory: Positive for shortness of breath. Negative for cough. Cardiovascular: Positive for leg swelling. Negative for chest pain and palpitations. Gastrointestinal: Positive for constipation. Negative for abdominal pain, diarrhea, nausea and vomiting. Genitourinary: Negative for difficulty urinating and dysuria. Musculoskeletal: Positive for arthralgias and back pain. Chronic pain . Mild tenderness at surgical site . Skin: Negative for color change. Neurological: Negative for dizziness, weakness, light-headedness, numbness and headaches.        Past Medical and Surgical History:   Past Medical History:   Diagnosis Date   • Arthritis    • Bladder cancer [facility])    • Chronic narcotic dependence ([address] )    • Chronic pain disorder lower back and down both legs   • Colon polyp    • Coronary artery disease    • CPAP (continuous positive airway pressure) dependence    • Does use hearing aid     will wear DOS   • Full dentures    • Heart failure (HCC)     and "fluid retention" [facility] Daryl cardio PA"   • High cholesterol    • Hypertension    • Mild ankle edema     and feet   • Obstructive sleep apnea on CPAP    • Prediabetes    • Shortness of breath     per pt "with just exertion"   • Sleep apnea    • Wears glasses        Past Surgical History:   Procedure Laterality Date   • BACK SURGERY      titanium rods implanted   • COLONOSCOPY     • CYSTOSCOPY W/ URETERAL STENT PLACEMENT Bilateral 3/17/2023    Procedure: bilateral retrograde;  Surgeon: Serena Mcpherson MD;  Location: [facility];  Service: Urology   • HERNIA REPAIR      x5   • LUMBAR LAMINECTOMY N/A 6/30/2023    Procedure: Left L3-4 Metrx hemilaminectomy and bilateral foraminotomies;  Surgeon: Rosetta López MD;  Location: [facility];  Service: Neurosurgery   • OH CYSTO W/REMOVAL OF LESIONS SMALL N/A 5/1/2023    Procedure: CYSTOSCOPY TURBT;  Surgeon: Serena Mcpherson MD;  Location: [facility];  Service: Urology   • TRANSURETHRAL RESECTION OF BLADDER TUMOR N/A 3/17/2023    Procedure: TRANSURETHRAL RESECTION OF BLADDER TUMOR (TURBT); Surgeon: Serena Mcpherson MD;  Location: [facility];  Service: Urology       Meds/Allergies:  Prior to Admission medications    Medication Sig Start Date End Date Taking?  Authorizing Provider   aspirin 81 mg chewable tablet Chew 81 mg daily    Historical Provider, MD   atorvastatin (LIPITOR) 40 mg tablet  10/3/22   Historical Provider, MD   co-enzyme Q-10 30 MG capsule Take 100 mg by mouth daily     Historical Provider, MD   Farxiga 5 MG TABS 5 mg daily 5mg alternating with 2.5mg for fluid retention 10/26/22   Historical Provider, MD   gabapentin (NEURONTIN) 300 mg capsule Take 2 capsules (600 mg total) by mouth 2 (two) times a day 7/1/23   Nick Ulloa PA-C multivitamin-iron-minerals-folic acid (CENTRUM) chewable tablet Chew 1 tablet daily    Historical Provider, MD   oxyCODONE (OxyCONTIN) 40 mg 12 hr tablet Take 1 tablet (40 mg total) by mouth every 8 (eight) hours Max Daily Amount: 120 mg 23   Matt Weinberg MD   oxyCODONE (ROXICODONE) 30 MG immediate release tablet Take 0.5 tablets (15mg) by mouth q4h PRN for moderate to severe cancer pain. Max daily amount 120mgs 23   Irasema Schofield PA-C   potassium chloride (K-DUR,KLOR-CON) 20 mEq tablet 2 (two) times a day 10/26/22   Historical Provider, MD   tamsulosin (FLOMAX) 0.4 mg Take 1 capsule (0.4 mg total) by mouth daily with dinner 23   Kim Moritz, MD   torsemide [facility]) 20 mg tablet Take 60 mg by mouth daily    Historical Provider, MD     I have reviewed home medications with patient personally. Allergies:    Allergies   Allergen Reactions   • Mirtazapine Other (See Comments) and Confusion     Pt drove without knowing and woke up at a new destination       Social History:  Marital Status: /Civil Union   Patient Pre-hospital Living Situation: Home  Patient Pre-hospital Level of Mobility: walks    Substance Use History:   Social History     Substance and Sexual Activity   Alcohol Use Not Currently    Comment: 3 per year     Social History     Tobacco Use   Smoking Status Former   • Packs/day: 1.00   • Years: 35.00   • Total pack years: 35.00   • Types: Cigarettes   • Start date: 2023   • Quit date: 2016   • Years since quittin.5   Smokeless Tobacco Never     Social History     Substance and Sexual Activity   Drug Use Not Currently   • Types: Marijuana       Family History:  Family History   Problem Relation Age of Onset   • Cancer Father    • Aortic aneurysm Father    • Leukemia Father    • Hypertension Mother        Physical Exam:     Vitals:   Blood Pressure: 147/81 (23 0158)  Pulse: 82 (23 015)  Temperature: (!) 97.3 °F (36.3 °C) (23 2257)  Temp Source: Temporal (07/03/23 2257)  Respirations: 18 (07/04/23 0158)  Height: 5' 8" (172.7 cm) (07/03/23 2257)  Weight - Scale: 110 kg (243 lb 9.7 oz) (07/03/23 2257)  SpO2: 95 % (07/04/23 0214)    Physical Exam  Vitals and nursing note reviewed. Constitutional:       General: He is not in acute distress. Appearance: He is obese. He is not toxic-appearing. Comments: Chronically ill appearing   HENT:      Head: Normocephalic and atraumatic. Cardiovascular:      Rate and Rhythm: Normal rate and regular rhythm. Pulses: Normal pulses. Heart sounds: Normal heart sounds. Pulmonary:      Effort: Pulmonary effort is normal. No respiratory distress. Breath sounds: No wheezing, rhonchi or rales. Comments: Clear, diminished . No accessory muscle use. Abdominal:      General: Bowel sounds are normal. There is no distension. Palpations: Abdomen is soft. Tenderness: There is no abdominal tenderness. There is no guarding or rebound. Musculoskeletal:         General: Tenderness present. Right lower leg: Edema present. Left lower leg: Edema present. Comments: +1 bilateral lower extremity edema. Mild tenderness posterior back near surgical site. bandaid in place to posterior back, no drainage or fluctuance    Skin:     General: Skin is warm and dry. Neurological:      General: No focal deficit present. Mental Status: He is alert and oriented to person, place, and time.          Additional Data:     Lab Results:  Results from last 7 days   Lab Units 07/03/23  2324   WBC Thousand/uL 12.07*   HEMOGLOBIN g/dL 12.6   HEMATOCRIT % 37.1   PLATELETS Thousands/uL 158   NEUTROS PCT % 80*   LYMPHS PCT % 11*   MONOS PCT % 8   EOS PCT % 0     Results from last 7 days   Lab Units 07/03/23  2324   SODIUM mmol/L 132*   POTASSIUM mmol/L 3.6   CHLORIDE mmol/L 93*   CO2 mmol/L 29   BUN mg/dL 21   CREATININE mg/dL 1.51*   ANION GAP mmol/L 10   CALCIUM mg/dL 9.2   ALBUMIN g/dL 3.9   TOTAL BILIRUBIN mg/dL 0.82   ALK PHOS U/L 67   ALT U/L 26   AST U/L 17   GLUCOSE RANDOM mg/dL 135     Results from last 7 days   Lab Units 06/29/23  1604   INR  0.90     Results from last 7 days   Lab Units 06/30/23  2136 06/30/23  2120 06/30/23  1631 06/30/23  1241 06/30/23  1114 06/30/23  0536 06/29/23  2058 06/29/23  1557 06/29/23  1140 06/29/23  0734 06/28/23  2042 06/28/23  1700   POC GLUCOSE mg/dl 183* 183* 149* 108 111 116 156* 163* 210* 120 142* 138     Results from last 7 days   Lab Units 06/29/23  0528   HEMOGLOBIN A1C % 6.7*           Lines/Drains:  Invasive Devices     Peripheral Intravenous Line  Duration           Peripheral IV 07/03/23 Right Antecubital <1 day                    Imaging: Reviewed radiology reports from this admission including: chest CT scan  CTA ED chest PE Study   Final Result by Pradip Witt MD (07/04 0059)      No evidence of pulmonary embolus      Airspace disease at left upper lobe along the left major fissure consistent with pneumonia                  Workstation performed: [id number]             EKG and Other Studies Reviewed on Admission:   · EKG: NSR. HR 79.    ** Please Note: This note has been constructed using a voice recognition system.  **

## 2023-07-05 LAB
ALBUMIN SERPL BCP-MCNC: 3.5 G/DL (ref 3.5–5)
ALP SERPL-CCNC: 66 U/L (ref 34–104)
ALT SERPL W P-5'-P-CCNC: 34 U/L (ref 7–52)
ANION GAP SERPL CALCULATED.3IONS-SCNC: 9 MMOL/L
AST SERPL W P-5'-P-CCNC: 35 U/L (ref 13–39)
BASOPHILS # BLD AUTO: 0.02 THOUSANDS/ÂΜL (ref 0–0.1)
BASOPHILS NFR BLD AUTO: 0 % (ref 0–1)
BILIRUB SERPL-MCNC: 0.43 MG/DL (ref 0.2–1)
BUN SERPL-MCNC: 28 MG/DL (ref 5–25)
CALCIUM SERPL-MCNC: 9 MG/DL (ref 8.4–10.2)
CHLORIDE SERPL-SCNC: 92 MMOL/L (ref 96–108)
CO2 SERPL-SCNC: 29 MMOL/L (ref 21–32)
CREAT SERPL-MCNC: 1.25 MG/DL (ref 0.6–1.3)
EOSINOPHIL # BLD AUTO: 0.16 THOUSAND/ÂΜL (ref 0–0.61)
EOSINOPHIL NFR BLD AUTO: 2 % (ref 0–6)
ERYTHROCYTE [DISTWIDTH] IN BLOOD BY AUTOMATED COUNT: 15.2 % (ref 11.6–15.1)
GFR SERPL CREATININE-BSD FRML MDRD: 60 ML/MIN/1.73SQ M
GLUCOSE SERPL-MCNC: 112 MG/DL (ref 65–140)
HCT VFR BLD AUTO: 34 % (ref 36.5–49.3)
HGB BLD-MCNC: 11.5 G/DL (ref 12–17)
IMM GRANULOCYTES # BLD AUTO: 0.02 THOUSAND/UL (ref 0–0.2)
IMM GRANULOCYTES NFR BLD AUTO: 0 % (ref 0–2)
LYMPHOCYTES # BLD AUTO: 1.4 THOUSANDS/ÂΜL (ref 0.6–4.47)
LYMPHOCYTES NFR BLD AUTO: 18 % (ref 14–44)
MCH RBC QN AUTO: 31.9 PG (ref 26.8–34.3)
MCHC RBC AUTO-ENTMCNC: 33.8 G/DL (ref 31.4–37.4)
MCV RBC AUTO: 94 FL (ref 82–98)
MONOCYTES # BLD AUTO: 0.76 THOUSAND/ÂΜL (ref 0.17–1.22)
MONOCYTES NFR BLD AUTO: 10 % (ref 4–12)
NEUTROPHILS # BLD AUTO: 5.3 THOUSANDS/ÂΜL (ref 1.85–7.62)
NEUTS SEG NFR BLD AUTO: 70 % (ref 43–75)
NRBC BLD AUTO-RTO: 0 /100 WBCS
PLATELET # BLD AUTO: 152 THOUSANDS/UL (ref 149–390)
PMV BLD AUTO: 10.7 FL (ref 8.9–12.7)
POTASSIUM SERPL-SCNC: 3.3 MMOL/L (ref 3.5–5.3)
PROT SERPL-MCNC: 6.8 G/DL (ref 6.4–8.4)
RBC # BLD AUTO: 3.61 MILLION/UL (ref 3.88–5.62)
SODIUM SERPL-SCNC: 130 MMOL/L (ref 135–147)
WBC # BLD AUTO: 7.66 THOUSAND/UL (ref 4.31–10.16)

## 2023-07-05 PROCEDURE — 80053 COMPREHEN METABOLIC PANEL: CPT | Performed by: FAMILY MEDICINE

## 2023-07-05 PROCEDURE — 85025 COMPLETE CBC W/AUTO DIFF WBC: CPT | Performed by: FAMILY MEDICINE

## 2023-07-05 PROCEDURE — 99232 SBSQ HOSP IP/OBS MODERATE 35: CPT | Performed by: FAMILY MEDICINE

## 2023-07-05 PROCEDURE — 97162 PT EVAL MOD COMPLEX 30 MIN: CPT

## 2023-07-05 RX ORDER — POTASSIUM CHLORIDE 20 MEQ/1
40 TABLET, EXTENDED RELEASE ORAL 2 TIMES DAILY
Status: DISCONTINUED | OUTPATIENT
Start: 2023-07-05 | End: 2023-07-06 | Stop reason: HOSPADM

## 2023-07-05 RX ORDER — SODIUM CHLORIDE 9 MG/ML
100 INJECTION, SOLUTION INTRAVENOUS CONTINUOUS
Status: DISCONTINUED | OUTPATIENT
Start: 2023-07-05 | End: 2023-07-05

## 2023-07-05 RX ADMIN — HEPARIN SODIUM 5000 UNITS: 5000 INJECTION INTRAVENOUS; SUBCUTANEOUS at 13:00

## 2023-07-05 RX ADMIN — OXYCODONE HYDROCHLORIDE 15 MG: 5 TABLET ORAL at 02:44

## 2023-07-05 RX ADMIN — HEPARIN SODIUM 5000 UNITS: 5000 INJECTION INTRAVENOUS; SUBCUTANEOUS at 21:33

## 2023-07-05 RX ADMIN — ATORVASTATIN CALCIUM 40 MG: 40 TABLET, FILM COATED ORAL at 17:15

## 2023-07-05 RX ADMIN — CEFTRIAXONE 1000 MG: 1 INJECTION, SOLUTION INTRAVENOUS at 21:33

## 2023-07-05 RX ADMIN — GABAPENTIN 600 MG: 300 CAPSULE ORAL at 09:00

## 2023-07-05 RX ADMIN — OXYCODONE HYDROCHLORIDE 40 MG: 20 TABLET, FILM COATED, EXTENDED RELEASE ORAL at 17:15

## 2023-07-05 RX ADMIN — POTASSIUM CHLORIDE 40 MEQ: 1500 TABLET, EXTENDED RELEASE ORAL at 17:15

## 2023-07-05 RX ADMIN — SENNOSIDES 8.6 MG: 8.6 TABLET, FILM COATED ORAL at 17:15

## 2023-07-05 RX ADMIN — TAMSULOSIN HYDROCHLORIDE 0.4 MG: 0.4 CAPSULE ORAL at 17:15

## 2023-07-05 RX ADMIN — SENNOSIDES 8.6 MG: 8.6 TABLET, FILM COATED ORAL at 09:00

## 2023-07-05 RX ADMIN — VANCOMYCIN HYDROCHLORIDE 750 MG: 750 INJECTION, SOLUTION INTRAVENOUS at 06:34

## 2023-07-05 RX ADMIN — GUAIFENESIN 600 MG: 600 TABLET ORAL at 17:15

## 2023-07-05 RX ADMIN — HEPARIN SODIUM 5000 UNITS: 5000 INJECTION INTRAVENOUS; SUBCUTANEOUS at 06:34

## 2023-07-05 RX ADMIN — POLYETHYLENE GLYCOL 3350 17 G: 17 POWDER, FOR SOLUTION ORAL at 09:03

## 2023-07-05 RX ADMIN — VANCOMYCIN HYDROCHLORIDE 750 MG: 750 INJECTION, SOLUTION INTRAVENOUS at 18:18

## 2023-07-05 RX ADMIN — OXYCODONE HYDROCHLORIDE 40 MG: 20 TABLET, FILM COATED, EXTENDED RELEASE ORAL at 09:00

## 2023-07-05 RX ADMIN — ASPIRIN 81 MG 81 MG: 81 TABLET ORAL at 09:00

## 2023-07-05 RX ADMIN — OXYCODONE HYDROCHLORIDE 15 MG: 5 TABLET ORAL at 21:33

## 2023-07-05 RX ADMIN — GABAPENTIN 600 MG: 300 CAPSULE ORAL at 17:15

## 2023-07-05 RX ADMIN — POTASSIUM CHLORIDE 40 MEQ: 1500 TABLET, EXTENDED RELEASE ORAL at 09:00

## 2023-07-05 RX ADMIN — GUAIFENESIN 600 MG: 600 TABLET ORAL at 09:00

## 2023-07-05 RX ADMIN — OXYCODONE HYDROCHLORIDE 15 MG: 5 TABLET ORAL at 13:00

## 2023-07-05 NOTE — ASSESSMENT & PLAN NOTE
· Noted to be hypoxic in 80's on room air, currently on 4L NC-currently using 3 L of oxygen  · Titrate oxygen as tolerated  · . Does not wear home O2 at baseline   · CTA negative for PE, + TIMOTHY pneumonia   · Suspect multifactorial pneumonia +/- CHF exacerbation - see plans   · Incentive spirometry, OOB to chair.    · Wean O2 to keep SpO2> 90%  · Pending echocardiogram

## 2023-07-05 NOTE — PHYSICAL THERAPY NOTE
PHYSICAL THERAPY EVALUATION  NAME:  Jeff Bowden  DATE: 07/05/23    AGE:   58 y.o. Mrn:   [id number]  ADMIT DX:  CHF (congestive heart failure) ([facility]) [I50.9]  Pneumonia [J18.9]  Leg swelling [M79.89]  RODO (acute kidney injury) ([address]) [N17.9]    Past Medical History:   Diagnosis Date    Arthritis     Bladder cancer ([address])     Chronic narcotic dependence ([facility])     Chronic pain disorder     lower back and down both legs    Colon polyp     Coronary artery disease     CPAP (continuous positive airway pressure) dependence     Does use hearing aid     will wear DOS    Full dentures     Heart failure ([address])     and "fluid retention" [facility] Daryl cardio PA"    High cholesterol     Hypertension     Mild ankle edema     and feet    Obstructive sleep apnea on CPAP     Prediabetes     Shortness of breath     per pt "with just exertion"    Sleep apnea     Wears glasses      Length Of Stay: 1  Performed at least 2 patient identifiers during session: Name and Birthday  PHYSICAL THERAPY EVALUATION :        07/05/23 1043   Note Type   Note type Evaluation   Pain Assessment   Pain Assessment Tool 0-10   Pain Score 5   Pain Location/Orientation Orientation: Lower; Location: Back  (R hip and knee)   Restrictions/Precautions   Other Precautions Pain;Spinal precautions   Home Living   Type of Home House  (2 WES no HR)   Home Layout Multi-level;Performs ADLs on one level; Able to live on main level with bedroom/bathroom  (2nd floor bedroom, full bath first floor)   Bathroom Shower/Tub Walk-in shower   Bathroom Toilet Raised   Bathroom Equipment Shower chair   Home Equipment Walker;Cane   Additional Comments Pt reports living in 3  with spouse 2 WES, recently using RW 2/2 increased back pain/surgery, SPC prior   Prior Function   Level of [place] Independent with ADLs; Independent with functional mobility; Independent with IADLS   Lives With Spouse   Receives Help From Family   IADLs Independent with driving   Falls in the last 6 months   ("4-5")   Comments Pt reports completing ADLs, IADLs, mobility with RW, and drives at mod I   Cognition   Overall Cognitive Status WFL   Orientation Level Oriented X4   Following Commands Follows all commands and directions without difficulty   RLE Assessment   RLE Assessment WFL  (4/5 strength)   LLE Assessment   LLE Assessment WFL  (4/5 strength)   Light Touch   RLE Light Touch Grossly intact   LLE Light Touch Grossly intact   Bed Mobility   Additional Comments Pt seated OOB in recliner at start and end of session; bed mobility not assessed this sesison   Transfers   Sit to Stand 6  Modified independent   Stand to Sit 6  Modified independent   Stand pivot 6  Modified independent   Additional Comments RW for transfers   Ambulation/Elevation   Gait pattern Wide YOLI   Gait Assistance 6  Modified independent   Assistive Device Rolling walker   Distance 400' fair gait speed   Stair Management Assistance 5  Supervision   Additional items Verbal cues   Stair Management Technique Two rails; Step to pattern   Number of Stairs 2   Balance   Static Sitting Normal   Dynamic Sitting Good   Static Standing Good   Dynamic Standing Fair +   Ambulatory Fair +   Endurance Deficit   Endurance Deficit No   Activity Tolerance   Activity Tolerance Patient tolerated treatment well   Nurse Made Aware RN, Sera   Assessment   Prognosis Good   Problem List Obesity;Pain   Barriers to Discharge None   Goals   Patient Goals Go home   STG Expiration Date 07/19/23   Recommendation   PT Discharge Recommendation Home with outpatient rehabilitation   Equipment Recommended   (pt owns RW)   Additional Comments Pt reporting spouse can drive him to OPPT, should she be unable recommend [address]   Turning in Flat Bed Without Bedrails 4   Lying on Back to Sitting on Edge of Flat Bed Without Bedrails 4   Moving Bed to Chair 4   Standing Up From Chair Using Arms 4   Walk in Room 4   Climb 3-5 Stairs With Railing 3 Basic Mobility Inpatient Raw Score 23   Basic Mobility Standardized Score 50.88   Highest Level Of Mobility   -[facility] Goal 7: Walk 25 feet or more   -HLM Achieved 8: Walk 250 feet ot more   End of Consult   Patient Position at End of Consult Bedside chair; All needs within reach     The patient's AM-PAC Basic Mobility Inpatient Short Form Raw Score is 23  . A Raw score of greater than 16 suggests the patient may benefit from discharge to home. Please also refer to the recommendation of the Physical Therapist for safe discharge planning. (Please find full objective findings from PT assessment regarding body systems outlined above). Pt is 58 y.o. male seen for PT evaluation s/p admit to  [facility]  on 7/3/2023 w/ Acute respiratory failure with hypoxia ([address] PT consulted to assess pt's functional mobility and d/c needs. Order placed for PT eval and tx. Comorbidities affecting pt's physical performance at time of assessment include: history of cancer, CHF, bradycardia, CAD, HTN, and obesity. PTA, pt was independent w/ all functional mobility w/ RW, ambulates community distances and elevations, lives in multi-level home, has 2 WES, and lives w/ spouse in 3 level home . Personal factors affecting pt at time of IE include: lives in 3 story house, stairs to enter home, inability to navigate level surfaces w/o external assistance, and positive fall history. Please find objective findings from PT assessment regarding body systems outlined above. The following objective measures performed on IE: AM-PAC 6-Clicks: 34/30. Pt's clinical presentation is currently evolving seen in pt's presentation of acute respiratory failure. From PT/mobility standpoint, pt appears to be functioning close to or at mobility baseline, therefore no further immediate skilled PT needs are warranted at this time. Pt currently performing all phases of functional mobility at independent level with use of RW safely w/ good carryover of such.  Recommend pt continue to mobilize ad cornelia while here. Recommend pt return to previous living environment once medically cleared and with continued family support. Will sign off, D/C PT. Please reconsult if further immediate skilled PT needs are warranted.          Marylene Quill, PT,DPT

## 2023-07-05 NOTE — CONSULTS
Consult received for CHF Ed. Pt reports he does the grocery shopping/cooking at home. Does not purchase takeout foods at all. Reports eating red meat such as steak once per week, limited intake of processed meats though does eat a variety of cheeses. Reports avoiding the salt shaker entirely. Reviewed low Na diet with pt, how to read nutrition labels. Encouraged swiss cheese instead of American cheeses. Reviewed low Na foods to include in diet and aim for 1895-4859 mg restriction at home. Discussed current fluid restriction/foods that are considered primarily as fluids and encouraged continued daily weights. Provided with HF Nutrition Therapy, Fluid Restriction Nutrition Therapy, Salt Free Seasoning Tips and RD contact information. Pt was somewhat emotional during discussion. Appears somewhat overwhelmed with medical care.  Pt stating "It's a lot."

## 2023-07-05 NOTE — ASSESSMENT & PLAN NOTE
· Also suspect could be component of healthcare associated since patient had recent history of hospitalization. · Patient presents with leg swelling and shortness of breath. Recently admitted at [facility] s/p L 3-4 hemilaminectomy and bilateral foraminotomies on 6/30/23   · CTA pe study negative for pulmonary embolism, showing left upper lobe pneumonia   · WBC 12 on admission . Does not meet sepsis criteria.    · Continue Rocephin   · Urine Legionella pneumonia negative, MRSA negative so discontinue vancomycin  · Pending blood culture

## 2023-07-05 NOTE — ASSESSMENT & PLAN NOTE
· Follows with outpatient urology Dr. Mami Moore . History of high-grade papillary urothelial carcinoma. · Immunotherapy has been on hold since back surgery per patient   · Continue flomax.    · Continue outpatient urology follow-up

## 2023-07-05 NOTE — PLAN OF CARE
Problem: Potential for Falls  Goal: Patient will remain free of falls  Description: INTERVENTIONS:  - Educate patient/family on patient safety including physical limitations  - Instruct patient to call for assistance with activity   - Consult OT/PT to assist with strengthening/mobility   - Keep Call bell within reach  - Keep bed low and locked with side rails adjusted as appropriate  - Keep care items and personal belongings within reach  - Initiate and maintain comfort rounds  - Make Fall Risk Sign visible to staff  - Obtain necessary fall risk management equipment: walker  - Apply yellow socks and bracelet for high fall risk patients  - Consider moving patient to room near nurses station  Outcome: Progressing     Problem: RESPIRATORY - ADULT  Goal: Achieves optimal ventilation and oxygenation  Description: INTERVENTIONS:  - Assess for changes in respiratory status  - Assess for changes in mentation and behavior  - Position to facilitate oxygenation and minimize respiratory effort  - Oxygen administered by appropriate delivery if ordered  - Initiate smoking cessation education as indicated  - Encourage broncho-pulmonary hygiene including cough, deep breathe, Incentive Spirometry  - Assess the need for suctioning and aspirate as needed  - Assess and instruct to report SOB or any respiratory difficulty  - Respiratory Therapy support as indicated  Outcome: Progressing

## 2023-07-05 NOTE — ASSESSMENT & PLAN NOTE
Wt Readings from Last 3 Encounters:   07/05/23 110 kg (242 lb 8.1 oz)   06/30/23 110 kg (241 lb 6.5 oz)   06/27/23 111 kg (244 lb 6.4 oz)     · BNP 31, does have +1 bilateral lower extremity edema. ?questionable hypervolemia. · Last echo in November 2022 shows EF 65%  · Takes torsemide 60 mg daily ( recently taken off of Metolazone) .    · Received 80 mg IV Lasix x 1 in the ED, assess response   · Pending echocardiogram

## 2023-07-05 NOTE — NURSING NOTE
Patient got urine on himself and told PCA he did not need helping getting bathed. This RN also asked patient if he needed help and he also reused. Urine hat cleaned, emptied, and recorded in chart. Call bell within reach if he changes his mind.

## 2023-07-05 NOTE — OCCUPATIONAL THERAPY NOTE
Occupational Therapy Screen    Patient Name: Maria Antonia HALEY Date: 7/5/2023 07/05/23 1510   Note Type   Note type Screen     OT orders received. Chart review completed. Patient admitted to [address] [place] [address] on 7/3/2023 with Dx: acute respiratory failure with hypoxia. Spoke with pt's nursing staff who reported patient was I in room. Spoke with pt who reported no concerns regarding ADLs, IADLs or functional mobility upon return to home. Pt appears to be at prior level of functioning at this time and does not require acute OT services. D/C OT effective 7/3/2023. If new concerns arise, please re-consult.     Andreea Alva, OTR/L

## 2023-07-05 NOTE — PROGRESS NOTES
[facility]  Progress Note  Name: Gardenia Mitchell I  MRN: [id number]  Unit/Bed#: -01 I Date of Admission: 7/3/2023   Date of Service: 7/5/2023 I Hospital Day: 1    Assessment/Plan   Malignant neoplasm of urinary bladder [facility])  Assessment & Plan  · Follows with outpatient urology Dr. George Noble . History of high-grade papillary urothelial carcinoma. · Immunotherapy has been on hold since back surgery per patient   · Continue flomax. · Continue outpatient urology follow-up    Obstructive sleep apnea on CPAP  Assessment & Plan  · CPAP at bedtime    * Acute respiratory failure with hypoxia (HCC)  Assessment & Plan  · Noted to be hypoxic in 80's on room air, currently on 4L NC-currently using 3 L of oxygen  · Titrate oxygen as tolerated  · . Does not wear home O2 at baseline   · CTA negative for PE, + TIMOTHY pneumonia   · Suspect multifactorial pneumonia +/- CHF exacerbation - see plans   · Incentive spirometry, OOB to chair. · Wean O2 to keep SpO2> 90%  · Pending echocardiogram    RODO (acute kidney injury) ([address] )  Assessment & Plan  · Baseline creatinine 0.9-1.1, creatinine 1.5  · Condition resolved    Hyponatremia  Assessment & Plan  · Na 132 on admission-sodium dropped to 130, continue to monitor, fluid restriction. · S/p IV lasix   · Trend BMP, if continue to drop, consider to hold Lasix and consider fluid therapy. CAP (community acquired pneumonia)  Assessment & Plan  · Also suspect could be component of healthcare associated since patient had recent history of hospitalization. · Patient presents with leg swelling and shortness of breath. Recently admitted at [facility] s/p L 3-4 hemilaminectomy and bilateral foraminotomies on 6/30/23   · CTA pe study negative for pulmonary embolism, showing left upper lobe pneumonia   · WBC 12 on admission . Does not meet sepsis criteria.    · Continue Rocephin   · Urine Legionella pneumonia negative, MRSA negative so discontinue vancomycin  · Pending blood culture    Bradycardia  Assessment & Plan  · Noted to have bradycardia on recent admission, metoprolol discontinued   · Continue to avoid AVN blockers  · Heart rate remain in normal range      Chronic, continuous use of opioids  Assessment & Plan  · PDMP reviewed follows with palliative care for pain management  · Continue home OxyContin 40 mg every 8 hours and as needed oxycodone 20 mg every 3 hours    Acute on chronic diastolic heart failure (HCC)  Assessment & Plan  Wt Readings from Last 3 Encounters:   07/05/23 110 kg (242 lb 8.1 oz)   06/30/23 110 kg (241 lb 6.5 oz)   06/27/23 111 kg (244 lb 6.4 oz)     · BNP 31, does have +1 bilateral lower extremity edema. ?questionable hypervolemia. · Last echo in November 2022 shows EF 65%  · Takes torsemide 60 mg daily ( recently taken off of Metolazone) . · Received 80 mg IV Lasix x 1 in the ED, assess response   · Pending echocardiogram      Chronic bilateral low back pain with bilateral sciatica  Assessment & Plan  · Recently discharged from hospitals status post left  L3 /4 hemilaminectomy and bilateral foraminotomies on 6/30/23  · Patient follows with palliative care for pain control, continue home OxyContin 40 mg every 8 hours and as needed oxycodone 20 mg every 3 hours   · Reports doing well post-op , pain is controlled. No paresthesias. · Follow PT OT  · continue outpatient neurosurgery follow-up             VTE Pharmacologic Prophylaxis:   Moderate Risk (Score 3-4) - Pharmacological DVT Prophylaxis Ordered: heparin. Patient Centered Rounds: I performed bedside rounds with nursing staff today. Discussions with Specialists or Other Care Team Provider: None    Education and Discussions with Family / Patient: Updated  (wife) via phone.     Total Time Spent on Date of Encounter in care of patient: 10 minutes This time was spent on one or more of the following: performing physical exam; counseling and coordination of care; obtaining or reviewing history; documenting in the medical record; reviewing/ordering tests, medications or procedures; communicating with other healthcare professionals and discussing with patient's family/caregivers. Current Length of Stay: 1 day(s)  Current Patient Status: Inpatient   Certification Statement: The patient will continue to require additional inpatient hospital stay due to Monitor above conditions  Discharge Plan: Anticipate discharge in 48 hrs to home. Code Status: Level 3 - DNAR and DNI    Subjective:   Seen and evaluated and examined. Resting comfortably. Denies any significant complaint    Objective:     Vitals:   Temp (24hrs), Av.8 °F (36.6 °C), Min:97.3 °F (36.3 °C), Max:98.1 °F (36.7 °C)    Temp:  [97.3 °F (36.3 °C)-98.1 °F (36.7 °C)] 97.3 °F (36.3 °C)  HR:  [57-69] 69  Resp:  [18-20] 18  BP: (101-156)/(65-71) 156/71  SpO2:  [90 %-98 %] 94 %  Body mass index is 36.87 kg/m². Input and Output Summary (last 24 hours): Intake/Output Summary (Last 24 hours) at 2023 1018  Last data filed at 2023 1009  Gross per 24 hour   Intake 600 ml   Output 1925 ml   Net -1325 ml       Physical Exam:   Physical Exam  Vitals and nursing note reviewed. Constitutional:       Appearance: He is obese. He is not ill-appearing. HENT:      Nose: Nose normal.      Mouth/Throat:      Mouth: Mucous membranes are moist.      Pharynx: Oropharynx is clear. No oropharyngeal exudate. Eyes:      General: No scleral icterus. Left eye: No discharge. Extraocular Movements: Extraocular movements intact. Conjunctiva/sclera: Conjunctivae normal.      Pupils: Pupils are equal, round, and reactive to light. Cardiovascular:      Rate and Rhythm: Normal rate. Heart sounds: Normal heart sounds. No murmur heard. No gallop. Pulmonary:      Effort: Pulmonary effort is normal. No respiratory distress. Breath sounds: No stridor. No wheezing or rhonchi. Abdominal:      General: Abdomen is flat. Bowel sounds are normal. There is no distension. Palpations: There is no mass. Tenderness: There is no abdominal tenderness. Hernia: No hernia is present. Musculoskeletal:      Cervical back: Normal range of motion. Right lower leg: No edema. Left lower leg: No edema. Skin:     General: Skin is warm. Capillary Refill: Capillary refill takes less than 2 seconds. Findings: No lesion. Neurological:      General: No focal deficit present. Mental Status: He is alert and oriented to person, place, and time. Cranial Nerves: No cranial nerve deficit. Sensory: No sensory deficit. Motor: No weakness.       Coordination: Coordination normal.         Additional Data:     Labs:  Results from last 7 days   Lab Units 07/05/23  0624   WBC Thousand/uL 7.66   HEMOGLOBIN g/dL 11.5*   HEMATOCRIT % 34.0*   PLATELETS Thousands/uL 152   NEUTROS PCT % 70   LYMPHS PCT % 18   MONOS PCT % 10   EOS PCT % 2     Results from last 7 days   Lab Units 07/05/23  0624   SODIUM mmol/L 130*   POTASSIUM mmol/L 3.3*   CHLORIDE mmol/L 92*   CO2 mmol/L 29   BUN mg/dL 28*   CREATININE mg/dL 1.25   ANION GAP mmol/L 9   CALCIUM mg/dL 9.0   ALBUMIN g/dL 3.5   TOTAL BILIRUBIN mg/dL 0.43   ALK PHOS U/L 66   ALT U/L 34   AST U/L 35   GLUCOSE RANDOM mg/dL 112     Results from last 7 days   Lab Units 06/29/23  1604   INR  0.90     Results from last 7 days   Lab Units 06/30/23  2136 06/30/23  2120 06/30/23  1631 06/30/23  1241 06/30/23  1114 06/30/23  0536 06/29/23  2058 06/29/23  1557 06/29/23  1140 06/29/23  0734 06/28/23  2042 06/28/23  1700   POC GLUCOSE mg/dl 183* 183* 149* 108 111 116 156* 163* 210* 120 142* 138     Results from last 7 days   Lab Units 06/29/23  0528   HEMOGLOBIN A1C % 6.7*     Results from last 7 days   Lab Units 07/04/23  0507   PROCALCITONIN ng/ml 0.49*       Lines/Drains:  Invasive Devices     Peripheral Intravenous Line  Duration           Peripheral IV 07/03/23 Left Antecubital 2 days    Peripheral IV 07/03/23 Right Antecubital 1 day                      Imaging: No pertinent imaging reviewed. Recent Cultures (last 7 days):   Results from last 7 days   Lab Units 07/04/23  0507 07/04/23  0123 07/04/23  0117   BLOOD CULTURE   --  Received in Microbiology Lab. Culture in Progress. Received in Microbiology Lab. Culture in Progress. LEGIONELLA URINARY ANTIGEN  Negative  --   --        Last 24 Hours Medication List:   Current Facility-Administered Medications   Medication Dose Route Frequency Provider Last Rate   • acetaminophen  650 mg Oral Q6H PRN Tresia Ahumada Dimler, CRNP     • aspirin  81 mg Oral Daily Tresia Ahumada Dimler, CRNP     • atorvastatin  40 mg Oral Daily With DIA Kang     • bisacodyl  10 mg Rectal Daily PRN Tresia Ahumada Dimler, CRNP     • cefTRIAXone  1,000 mg Intravenous Q24H Tresia Ahumada Dimler, CRNP 1,000 mg (07/04/23 2314)   • gabapentin  600 mg Oral BID Tresia Ahumada Dimler, CRNP     • guaiFENesin  600 mg Oral BID Tresia Ahumada Dimler, CRNP     • heparin (porcine)  5,000 Units Subcutaneous Q8H [address] Tresia Ahumada Dimler, CRNP     • ondansetron  4 mg Intravenous Q6H PRN Tresia Ahumada Dimler, CRNP     • oxyCODONE  40 mg Oral [facility] Tresia Ahumada Dimler, CRNP     • oxyCODONE  15 mg Oral Q4H PRN Tresia Ahumada Dimler, CRNP     • polyethylene glycol  17 g Oral Daily Tresia Ahumada Dimler, CRNP     • potassium chloride  40 mEq Oral BID Emerita Giron MD     • senna  1 tablet Oral BID Tresia Ahumada Dimler, CRNP     • tamsulosin  0.4 mg Oral Daily With DIA Kang     • vancomycin  750 mg Intravenous Q12H Emerita Giron MD Stopped (07/05/23 0845)        Today, Patient Was Seen By: Emerita Giron MD    **Please Note: This note may have been constructed using a voice recognition system. **

## 2023-07-05 NOTE — PROGRESS NOTES
Laila Aleman is a 58 y.o. male who is currently ordered Vancomycin IV with management by the Pharmacy Consult service. Relevant clinical data and objective / subjective history reviewed. Vancomycin Assessment:  Indication and Goal AUC/Trough: Pneumonia (goal -600, trough >10)  Clinical Status: improving  Micro:     Renal Function:  SCr: 1.25 mg/dL  CrCl: 71.8 mL/min  Renal replacement: Not on dialysis  Days of Therapy: 2  Current Dose: 750mg IV Q12H  Vancomycin Plan:  New Dosing: continue 750mg IV Q12H  Estimated AUC: 406 mcg*hr/mL  Estimated Trough: 13.9 mcg/mL  Next Level: 7/6/23 @ 0600  Renal Function Monitoring: Daily BMP and [place] will continue to follow closely for s/sx of nephrotoxicity, infusion reactions and appropriateness of therapy. BMP and CBC will be ordered per protocol. We will continue to follow the patient’s culture results and clinical progress daily.     Diallo Melissa, Pharmacist

## 2023-07-05 NOTE — ASSESSMENT & PLAN NOTE
· Noted to have bradycardia on recent admission, metoprolol discontinued   · Continue to avoid AVN blockers  · Heart rate remain in normal range

## 2023-07-05 NOTE — ASSESSMENT & PLAN NOTE
· Recently discharged from B status post left  L3 /4 hemilaminectomy and bilateral foraminotomies on 6/30/23  · Patient follows with palliative care for pain control, continue home OxyContin 40 mg every 8 hours and as needed oxycodone 20 mg every 3 hours   · Reports doing well post-op , pain is controlled. No paresthesias.    · Follow PT OT  · continue outpatient neurosurgery follow-up

## 2023-07-05 NOTE — CASE MANAGEMENT
Case Management Assessment & Discharge Planning Note    Patient name Odin Johnston  Location [address] [facility] [place] 223/-18 MRN [id number]  : 1960 Date 2023       Current Admission Date: 7/3/2023  Current Admission Diagnosis:Acute respiratory failure with hypoxia [facility])   Patient Active Problem List    Diagnosis Date Noted   • CAP (community acquired pneumonia) 2023   • Hyponatremia 2023   • Acute respiratory failure with hypoxia ([address]) 2023   • Electrolyte abnormality 2023   • Bradycardia 2023   • Coronary artery disease    • Chronic, continuous use of opioids 2023   • Hyperglycemia 2023   • RODO (acute kidney injury) ([address]) 2023   • Intractable low back pain 2023   • Acute on chronic diastolic heart failure ([address])    • Opioid withdrawal ([address])    • Anxiety 2023   • Chronic bilateral low back pain with bilateral sciatica 2023   • History of gross hematuria 2023   • Malignant neoplasm of urinary bladder ([address]) 2023   • Obstructive sleep apnea on CPAP    • Hypertension    • Bladder mass 2023   • Benign prostatic hyperplasia with urinary frequency 2023      LOS (days): 1  Geometric Mean LOS (GMLOS) (days): 4.30  Days to GMLOS:2.7     OBJECTIVE:  PATIENT READMITTED TO HOSPITAL  Risk of Unplanned Readmission Score: 30.38     CM met with patient at the bedside,baseline information was obtained. CM discussed the role of CM in helping the patient develop a discharge plan and assist the patient in carrying out their plan.      Current admission status: Inpatient  Referral Reason: Other (Post Acute Home Needs (VNA/DME/Infusion))    Preferred Pharmacy:   [address] [facility], [address] [place] [address]  Phone: [phone] Fax: [phone]    Primary Care Provider: Joey Pickard DO    Primary Insurance: MEDICARE  Secondary Insurance: BLUE CROSS    ASSESSMENT:  [employer]     Robbi English, [address] [facility] Representative - Spouse   Primary Phone: [phone] (Mobile)                         Readmission Root Cause  30 Day Readmission: Yes  Who directed you to return to the hospital?: Self  Did you understand whom to contact if you had questions or problems?: Yes  Did you get your prescriptions before you left the hospital?: Yes  Were you able to get your prescriptions filled when you left the hospital?: Yes  Did you take your medications as prescribed?: Yes  Were you able to get to your follow-up appointments?: Yes  During previous admission, was a post-acute recommendation made?: Yes  What post-acute resources were offered?: [facility]  Patient was readmitted due to: Pneumonia    Patient Information  Admitted from[de-identified] Home  Mental Status: Alert  During Assessment patient was accompanied by: Not accompanied during assessment  Assessment information provided by[de-identified] Patient  Primary Caregiver: Self  Support Systems: Self, Spouse/significant other, [address] of Residence: [address] [place] [address] do you live in?: [address] [place] [address] entry access options.  Select all that apply.: Stairs  Number of steps to enter home.: 2  Do the steps have railings?: Yes  Type of Current Residence: [address] [place] home  Upon entering residence, is there a bedroom on the main floor (no further steps)?: No (Pt stated that he is set up to stay on the first floor when needed)  A bedroom is located on the following floor levels of residence (select all that apply):: 2nd Floor  Upon entering residence, is there a bathroom on the main floor (no further steps)?: Yes  Number of steps to 2nd floor from main floor: One Flight  In the last 12 months, was there a time when you were not able to pay the mortgage or rent on time?: No  In the last 12 months, how many places have you lived?: 1  In the last 12 months, was there a time when you did not have a steady place to sleep or slept in a shelter (including now)?: No  Homeless/housing insecurity resource given?: N/A  Living Arrangements: Lives w/ Spouse/significant other  Is patient a ?: No    Activities of Daily Living Prior to Admission  Functional Status: Independent  Completes ADLs independently?: Yes  Ambulates independently?: Yes  Does patient use assisted devices?: Yes  Assisted Devices (DME) used: [employer]  Does patient currently own DME?: Yes  What DME does the patient currently own?: [employer], Shower Chair  Does patient have a history of Outpatient Therapy (PT/OT)?: Yes  Does the patient have a history of Short-Term Rehab?: No  Does patient have a history of HHC?: Yes  Does patient currently have [address] [facility]?: No    Current Home Health Care  Type of Current Home Care Services: Home PT, Home OT, Nurse visit  [address][de[address]identified] [address] [place] [address] Provider[de-identified] PCP    Patient Information Continued  Income Source: SSI/SSD  Does patient have prescription coverage?: Yes  Within the past 12 months, you worried that your food would run out before you got the money to buy more.: Never true  Within the past 12 months, the food you bought just didn't last and you didn't have money to get more.: Never true  Food insecurity resource given?: N/A  Does patient receive dialysis treatments?: No  Does patient have a history of substance abuse?: No  Does patient have a history of Mental Health Diagnosis?: No         Means of Transportation  Means of Transport to Appts[de-identified] Family transport  In the past 12 months, has lack of transportation kept you from medical appointments or from getting medications?: No  In the past 12 months, has lack of transportation kept you from meetings, work, or from getting things needed for daily living?: No  Was application for public transport provided?: N/A        DISCHARGE DETAILS:    Discharge planning discussed with[de-identified] pt  Freedom of Choice: Yes  Comments - Freedom of Choice: pt stated that he has no needs at this point     Were Treatment Team discharge recommendations reviewed with patient/caregiver?: Yes  Did patient/caregiver verbalize understanding of patient care needs?: Yes  Were patient/caregiver advised of the risks associated with not following Treatment Team discharge recommendations?: Yes         Requested [address] [facility]         Is the patient interested in [address] at discharge?: No    DME Referral Provided  Referral made for DME?: No    Other Referral/Resources/Interventions Provided:  Interventions: Outpatient PT, Outpatient OT         Treatment Team Recommendation: Home  Discharge Destination Plan[de-identified] Home         CM at bedside to discuss post discharge options. Pt stated that he has no needs at this point. Pt stated that he uses a walker for stability after his back surgery. Pt stated that his bladder cancer treatments are suspended for right now but will resume when he is discharged. PT/OT stated that they are recommending OP PT/OT. Pt sated that he is ambulating frequently in the [place] to help with his back rehabilitation.  CM to follow

## 2023-07-05 NOTE — ASSESSMENT & PLAN NOTE
· Na 132 on admission-sodium dropped to 130, continue to monitor, fluid restriction. · S/p IV lasix   · Trend BMP, if continue to drop, consider to hold Lasix and consider fluid therapy.

## 2023-07-06 ENCOUNTER — APPOINTMENT (INPATIENT)
Dept: NON INVASIVE DIAGNOSTICS | Facility: HOSPITAL | Age: 63
DRG: 682 | End: 2023-07-06
Payer: MEDICARE

## 2023-07-06 VITALS
SYSTOLIC BLOOD PRESSURE: 135 MMHG | TEMPERATURE: 97.5 F | WEIGHT: 240.3 LBS | OXYGEN SATURATION: 90 % | HEART RATE: 56 BPM | DIASTOLIC BLOOD PRESSURE: 56 MMHG | HEIGHT: 68 IN | RESPIRATION RATE: 19 BRPM | BODY MASS INDEX: 36.42 KG/M2

## 2023-07-06 LAB
ALBUMIN SERPL BCP-MCNC: 3.4 G/DL (ref 3.5–5)
ALP SERPL-CCNC: 73 U/L (ref 34–104)
ALT SERPL W P-5'-P-CCNC: 79 U/L (ref 7–52)
ANION GAP SERPL CALCULATED.3IONS-SCNC: 8 MMOL/L
AORTIC ROOT: 3.6 CM
AORTIC VALVE MEAN VELOCITY: 10.9 M/S
APICAL FOUR CHAMBER EJECTION FRACTION: 53 %
ASCENDING AORTA: 3.3 CM
AST SERPL W P-5'-P-CCNC: 66 U/L (ref 13–39)
AV AREA BY CONTINUOUS VTI: 1.2 CM2
AV AREA PEAK VELOCITY: 1.3 CM2
AV LVOT MEAN GRADIENT: 1 MMHG
AV LVOT PEAK GRADIENT: 3 MMHG
AV MEAN GRADIENT: 6 MMHG
AV PEAK GRADIENT: 12 MMHG
AV VALVE AREA: 1.23 CM2
AV VELOCITY RATIO: 0.34
BASOPHILS # BLD AUTO: 0.02 THOUSANDS/ÂΜL (ref 0–0.1)
BASOPHILS NFR BLD AUTO: 0 % (ref 0–1)
BILIRUB SERPL-MCNC: 0.39 MG/DL (ref 0.2–1)
BUN SERPL-MCNC: 24 MG/DL (ref 5–25)
CALCIUM ALBUM COR SERPL-MCNC: 9.3 MG/DL (ref 8.3–10.1)
CALCIUM SERPL-MCNC: 8.8 MG/DL (ref 8.4–10.2)
CHLORIDE SERPL-SCNC: 95 MMOL/L (ref 96–108)
CO2 SERPL-SCNC: 27 MMOL/L (ref 21–32)
CREAT SERPL-MCNC: 1.05 MG/DL (ref 0.6–1.3)
DOP CALC AO PEAK VEL: 1.74 M/S
DOP CALC AO VTI: 38.1 CM
DOP CALC LVOT AREA: 3.8 CM2
DOP CALC LVOT DIAMETER: 2.2 CM
DOP CALC LVOT PEAK VEL VTI: 12.3 CM
DOP CALC LVOT PEAK VEL: 0.59 M/S
DOP CALC LVOT STROKE INDEX: 20.8 ML/M2
DOP CALC LVOT STROKE VOLUME: 46.73
E WAVE DECELERATION TIME: 297 MS
EOSINOPHIL # BLD AUTO: 0.16 THOUSAND/ÂΜL (ref 0–0.61)
EOSINOPHIL NFR BLD AUTO: 3 % (ref 0–6)
ERYTHROCYTE [DISTWIDTH] IN BLOOD BY AUTOMATED COUNT: 15.1 % (ref 11.6–15.1)
FRACTIONAL SHORTENING: 25 (ref 28–44)
GFR SERPL CREATININE-BSD FRML MDRD: 74 ML/MIN/1.73SQ M
GLUCOSE SERPL-MCNC: 104 MG/DL (ref 65–140)
HCT VFR BLD AUTO: 33.5 % (ref 36.5–49.3)
HGB BLD-MCNC: 11.1 G/DL (ref 12–17)
IMM GRANULOCYTES # BLD AUTO: 0.02 THOUSAND/UL (ref 0–0.2)
IMM GRANULOCYTES NFR BLD AUTO: 0 % (ref 0–2)
INTERVENTRICULAR SEPTUM IN DIASTOLE (PARASTERNAL SHORT AXIS VIEW): 1.3 CM
INTERVENTRICULAR SEPTUM: 1.3 CM (ref 0.6–1.1)
LEFT ATRIUM SIZE: 3.4 CM
LEFT INTERNAL DIMENSION IN SYSTOLE: 3.3 CM (ref 2.1–4)
LEFT VENTRICLE DIASTOLIC VOLUME (MOD BIPLANE): 130 ML
LEFT VENTRICLE SYSTOLIC VOLUME (MOD BIPLANE): 63 ML
LEFT VENTRICULAR INTERNAL DIMENSION IN DIASTOLE: 4.4 CM (ref 3.5–6)
LEFT VENTRICULAR POSTERIOR WALL IN END DIASTOLE: 1.4 CM
LEFT VENTRICULAR STROKE VOLUME: 46 ML
LV EF: 52 %
LVSV (TEICH): 46 ML
LYMPHOCYTES # BLD AUTO: 1.66 THOUSANDS/ÂΜL (ref 0.6–4.47)
LYMPHOCYTES NFR BLD AUTO: 31 % (ref 14–44)
MCH RBC QN AUTO: 30.9 PG (ref 26.8–34.3)
MCHC RBC AUTO-ENTMCNC: 33.1 G/DL (ref 31.4–37.4)
MCV RBC AUTO: 93 FL (ref 82–98)
MONOCYTES # BLD AUTO: 0.66 THOUSAND/ÂΜL (ref 0.17–1.22)
MONOCYTES NFR BLD AUTO: 12 % (ref 4–12)
MV E'TISSUE VEL-LAT: 15 CM/S
MV E'TISSUE VEL-SEP: 13 CM/S
MV PEAK A VEL: 0.51 M/S
MV PEAK E VEL: 93 CM/S
MV STENOSIS PRESSURE HALF TIME: 86 MS
MV VALVE AREA P 1/2 METHOD: 2.56
NEUTROPHILS # BLD AUTO: 2.93 THOUSANDS/ÂΜL (ref 1.85–7.62)
NEUTS SEG NFR BLD AUTO: 54 % (ref 43–75)
NRBC BLD AUTO-RTO: 0 /100 WBCS
PLATELET # BLD AUTO: 152 THOUSANDS/UL (ref 149–390)
PMV BLD AUTO: 10.8 FL (ref 8.9–12.7)
POTASSIUM SERPL-SCNC: 3.4 MMOL/L (ref 3.5–5.3)
PROCALCITONIN SERPL-MCNC: 0.27 NG/ML
PROT SERPL-MCNC: 6.8 G/DL (ref 6.4–8.4)
PV PEAK GRADIENT: 4 MMHG
RA PRESSURE ESTIMATED: 8 MMHG
RBC # BLD AUTO: 3.59 MILLION/UL (ref 3.88–5.62)
RIGHT ATRIUM AREA SYSTOLE A4C: 22.4 CM2
RIGHT VENTRICLE ID DIMENSION: 4.2 CM
SL CV LV EF: 70
SL CV PED ECHO LEFT VENTRICLE DIASTOLIC VOLUME (MOD BIPLANE) 2D: 90 ML
SL CV PED ECHO LEFT VENTRICLE SYSTOLIC VOLUME (MOD BIPLANE) 2D: 44 ML
SODIUM SERPL-SCNC: 130 MMOL/L (ref 135–147)
TRICUSPID ANNULAR PLANE SYSTOLIC EXCURSION: 2.9 CM
VANCOMYCIN TROUGH SERPL-MCNC: 10.7 UG/ML (ref 10–20)
WBC # BLD AUTO: 5.45 THOUSAND/UL (ref 4.31–10.16)

## 2023-07-06 PROCEDURE — 93306 TTE W/DOPPLER COMPLETE: CPT | Performed by: INTERNAL MEDICINE

## 2023-07-06 PROCEDURE — 85025 COMPLETE CBC W/AUTO DIFF WBC: CPT | Performed by: FAMILY MEDICINE

## 2023-07-06 PROCEDURE — 99239 HOSP IP/OBS DSCHRG MGMT >30: CPT | Performed by: INTERNAL MEDICINE

## 2023-07-06 PROCEDURE — C8929 TTE W OR WO FOL WCON,DOPPLER: HCPCS

## 2023-07-06 PROCEDURE — 84145 PROCALCITONIN (PCT): CPT | Performed by: INTERNAL MEDICINE

## 2023-07-06 PROCEDURE — 80202 ASSAY OF VANCOMYCIN: CPT | Performed by: FAMILY MEDICINE

## 2023-07-06 PROCEDURE — 80053 COMPREHEN METABOLIC PANEL: CPT | Performed by: FAMILY MEDICINE

## 2023-07-06 RX ORDER — TORSEMIDE 20 MG/1
60 TABLET ORAL DAILY
Status: DISCONTINUED | OUTPATIENT
Start: 2023-07-06 | End: 2023-07-06 | Stop reason: HOSPADM

## 2023-07-06 RX ORDER — DOXYCYCLINE HYCLATE 100 MG/1
100 CAPSULE ORAL EVERY 12 HOURS SCHEDULED
Qty: 10 CAPSULE | Refills: 0 | Status: SHIPPED | OUTPATIENT
Start: 2023-07-06 | End: 2023-07-11

## 2023-07-06 RX ORDER — CEFDINIR 300 MG/1
300 CAPSULE ORAL EVERY 12 HOURS SCHEDULED
Qty: 10 CAPSULE | Refills: 0 | Status: SHIPPED | OUTPATIENT
Start: 2023-07-06 | End: 2023-07-11

## 2023-07-06 RX ORDER — VANCOMYCIN HYDROCHLORIDE 1 G/200ML
1000 INJECTION, SOLUTION INTRAVENOUS EVERY 12 HOURS
Status: DISCONTINUED | OUTPATIENT
Start: 2023-07-06 | End: 2023-07-06 | Stop reason: HOSPADM

## 2023-07-06 RX ORDER — OXYCODONE HYDROCHLORIDE 30 MG/1
15 TABLET ORAL EVERY 4 HOURS PRN
Qty: 28 TABLET | Refills: 0 | Status: SHIPPED | OUTPATIENT
Start: 2023-07-06 | End: 2023-07-11 | Stop reason: SDUPTHER

## 2023-07-06 RX ADMIN — TORSEMIDE 60 MG: 20 TABLET ORAL at 09:38

## 2023-07-06 RX ADMIN — PERFLUTREN 2 ML/MIN: 6.52 INJECTION, SUSPENSION INTRAVENOUS at 09:07

## 2023-07-06 RX ADMIN — HEPARIN SODIUM 5000 UNITS: 5000 INJECTION INTRAVENOUS; SUBCUTANEOUS at 06:33

## 2023-07-06 RX ADMIN — HEPARIN SODIUM 5000 UNITS: 5000 INJECTION INTRAVENOUS; SUBCUTANEOUS at 13:25

## 2023-07-06 RX ADMIN — GUAIFENESIN 600 MG: 600 TABLET ORAL at 08:06

## 2023-07-06 RX ADMIN — ASPIRIN 81 MG 81 MG: 81 TABLET ORAL at 08:06

## 2023-07-06 RX ADMIN — POTASSIUM CHLORIDE 40 MEQ: 1500 TABLET, EXTENDED RELEASE ORAL at 08:06

## 2023-07-06 RX ADMIN — OXYCODONE HYDROCHLORIDE 40 MG: 20 TABLET, FILM COATED, EXTENDED RELEASE ORAL at 00:07

## 2023-07-06 RX ADMIN — OXYCODONE HYDROCHLORIDE 40 MG: 20 TABLET, FILM COATED, EXTENDED RELEASE ORAL at 08:06

## 2023-07-06 RX ADMIN — OXYCODONE HYDROCHLORIDE 15 MG: 5 TABLET ORAL at 01:59

## 2023-07-06 RX ADMIN — OXYCODONE HYDROCHLORIDE 15 MG: 5 TABLET ORAL at 09:39

## 2023-07-06 RX ADMIN — VANCOMYCIN HYDROCHLORIDE 750 MG: 750 INJECTION, SOLUTION INTRAVENOUS at 06:33

## 2023-07-06 RX ADMIN — GABAPENTIN 600 MG: 300 CAPSULE ORAL at 08:06

## 2023-07-06 RX ADMIN — SENNOSIDES 8.6 MG: 8.6 TABLET, FILM COATED ORAL at 08:06

## 2023-07-06 NOTE — ASSESSMENT & PLAN NOTE
· Hyponatremia likely secondary to acute pulmonary process. Stable.     Results from last 7 days   Lab Units 07/06/23  0628 07/05/23  0624 07/04/23  0507 07/03/23  2324   SODIUM mmol/L 130* 130* 132* 132*

## 2023-07-06 NOTE — ASSESSMENT & PLAN NOTE
Wt Readings from Last 3 Encounters:   07/06/23 109 kg (240 lb 4.8 oz)   06/30/23 110 kg (241 lb 6.5 oz)   06/27/23 111 kg (244 lb 6.4 oz)     · Probable acute on chronic diastolic CHF symptoms improved after 1 dose of furosemide IV 80 mg  · Echocardiogram was checked during his hospitalization awaiting interpretation  · He was resumed on his torsemide 60 mg daily

## 2023-07-06 NOTE — ASSESSMENT & PLAN NOTE
· Cancer pain on OxyContin 40 mg every 8 hours and oxycodone 15 mg as needed  · Patient has upcoming palliative care appointment 7/11/2023 however currently needs a 7-day refill. Sent 28 tablets of oxycodone 30 mg to take half tablet as previously instructed.

## 2023-07-06 NOTE — DISCHARGE SUMMARY
[address] [facility]  Discharge- Vitor Armstrong 1960, 58 y.o. male MRN: [id number]  Unit/Bed#: -01 Encounter: [id number]  Primary Care Provider: Shlomo Valdez DO   Date and time admitted to hospital: 7/3/2023 11:04 PM    Admitting Provider:  Mayelin Panchal MD  Discharge Provider:  Rasta Palma DO  Admission Date: 7/3/2023       Discharge Date: 07/06/23   LOS: 2  Primary Care Physician at Discharge: Shlomo Valdez [phone]    DISCHARGE DIAGNOSES  * Acute respiratory failure with hypoxia [facility])  Assessment & Plan  Background: History of bladder cancer CHF chronic pain ERICA and recent back surgery presented to the hospital with worsening shortness of breath  · PE study was negative but was noted to have left-sided pneumonia  · He was on ceftriaxone and vancomycin during hospitalization. He has been weaned to room air and doing well. · Discharge: Cefdinir and doxycycline for 5 more days to complete a 7-day course of antibiotics. · Of note: He was hypoxic at night and was placed on supplemental oxygen but this was because he could not tolerate CPAP machine equipment here. He will wear his equipment when he gets home. Results from last 7 days   Lab Units 07/06/23  0628 07/04/23  0507   PROCALCITONIN ng/ml 0.27* 0.49*       Hyponatremia  Assessment & Plan  · Hyponatremia likely secondary to acute pulmonary process. Stable. Results from last 7 days   Lab Units 07/06/23  0628 07/05/23  0624 07/04/23  0507 07/03/23  2324   SODIUM mmol/L 130* 130* 132* 132*       Chronic, continuous use of opioids  Assessment & Plan  · Cancer pain on OxyContin 40 mg every 8 hours and oxycodone 15 mg as needed  · Patient has upcoming palliative care appointment 7/11/2023 however currently needs a 7-day refill. Sent 28 tablets of oxycodone 30 mg to take half tablet as previously instructed.     Acute on chronic diastolic heart failure (HCC)  Assessment & Plan  Wt Readings from Last 3 Encounters:   07/06/23 109 kg (240 lb 4.8 oz)   06/30/23 110 kg (241 lb 6.5 oz)   06/27/23 111 kg (244 lb 6.4 oz)     · Probable acute on chronic diastolic CHF symptoms improved after 1 dose of furosemide IV 80 mg  · Echocardiogram was checked during his hospitalization awaiting interpretation  · He was resumed on his torsemide 60 mg daily    Chronic bilateral low back pain with bilateral sciatica  Assessment & Plan  · Recently underwent lumbar spinal surgery 6/30/2023 at [facility]  · Has follow-up with neurosurgery as an outpatient    Malignant neoplasm of urinary bladder ([address])  Assessment & Plan  · Follows with Dr. Gracie Camarena as an outpatient    Obstructive sleep apnea on CPAP  Assessment & Plan  · Hypoxic here when not wearing CPAP. He did not like the fullface mask here  · Patient reports he will wear his own equipment when he gets home    REASON FOR ADMISSION  Leg Swelling (Patient had back surgery Friday 6/30. Currently getting immunotherapy for cancer. Has h/o CHF. Has swelling in b/l LE, SOB with movement and at rest, cramping in b/l hands, orthopnea. Symptoms started today as per patient. )    HOSPITAL COURSE:  Hima Coelho is a 58 y.o. male with a history of diastolic CHF urinary bladder cancer and back pain who presented to the hospital with leg swelling and worsening shortness of breath. He received IV furosemide in the ED and was started on antibiotics for pneumonia. PE study was negative. He was able to be weaned off supplemental oxygen during the day. He did require oxygen at night but that was because he could not tolerate the equipment here for his ERICA. He reports he will wear his equipment when he gets home. He was continued on his torsemide at home dosing 60 mg daily. He will be discharged with 5 more days to complete a 7-day course of antibiotics. Please see problem list listed above.     CONSULTING PROVIDERS   IP CONSULT TO NUTRITION SERVICES  IP CONSULT TO CASE MANAGEMENT  IP CONSULT TO PHARMACY    PROCEDURES PERFORMED  Echo complete w/ contrast if indicated    Result Date: 7/6/2023  Narrative: •  Left Ventricle: Left ventricular cavity size is normal. Wall thickness is mildly increased. The left ventricular ejection fraction is > 70%. Systolic function is vigorous. Wall motion is normal. •  Right Ventricle: Right ventricular cavity size is upper normal. Normal tricuspid annular plane systolic excursion (TAPSE) > 1.7 cm. •  Left Atrium: The atrium is mildly dilated. •  Mitral Valve: There is annular calcification. There is mild regurgitation. There is no evidence of stenosis. •  Tricuspid Valve: There is trace to mild regurgitation. There is no evidence of stenosis. •  Pulmonic Valve: There is trace regurgitation. There is no evidence of stenosis. •  Prior TTE study available for comparison. Prior study date: 11/25/2022. No significant changes noted compared to the prior study.        RADIOLOGY RESULTS  CTA ED chest PE Study    Result Date: 7/4/2023  Impression: No evidence of pulmonary embolus Airspace disease at left upper lobe along the left major fissure consistent with pneumonia Workstation performed: [id number]       LABS  Results from last 7 days   Lab Units 07/06/23  0628 07/05/23  0624 07/04/23  0507 07/03/23 2324 06/30/23  0543 06/29/23  1604   WBC Thousand/uL 5.45 7.66 11.20* 12.07* 7.98  --    HEMOGLOBIN g/dL 11.1* 11.5* 12.5 12.6 12.9  --    HEMATOCRIT % 33.5* 34.0* 37.6 37.1 39.2  --    MCV fL 93 94 94 94 95  --    PLATELETS Thousands/uL 152 152 117* 158 170  --    INR   --   --   --   --   --  0.90     Results from last 7 days   Lab Units 07/06/23  0628 07/05/23  0624 07/04/23  0507 07/03/23 2324 07/01/23  0534 06/30/23  0543   SODIUM mmol/L 130* 130* 132* 132* 138 133*   POTASSIUM mmol/L 3.4* 3.3* 3.7 3.6 3.9 3.4*   CHLORIDE mmol/L 95* 92* 91* 93* 107 100   CO2 mmol/L 27 29 31 29 30 30   BUN mg/dL 24 28* 20 21 18 31*   CREATININE mg/dL 1.05 1.25 1.47* 1.51* 0.88 1.17 CALCIUM mg/dL 8.8 9.0 9.1 9.2 8.5 9.1   ALBUMIN g/dL 3.4* 3.5 3.9 3.9  --   --    TOTAL BILIRUBIN mg/dL 0.39 0.43 0.71 0.82  --   --    ALK PHOS U/L 73 66 67 67  --   --    ALT U/L 79* 34 26 26  --   --    AST U/L 66* 35 28 17  --   --    EGFR ml/min/1.73sq m 74 60 49 48 90 65   GLUCOSE RANDOM mg/dL 104 112 110 135 99 103         Results from last 7 days   Lab Units 07/04/23  0337 07/04/23  0117 07/03/23  2324   HS TNI 0HR ng/L  --   --  10   HS TNI 2HR ng/L  --  10  --    HS TNI 4HR ng/L 13  --   --               Results from last 7 days   Lab Units 06/30/23  2136 06/30/23  2120 06/30/23  1631 06/30/23  1241 06/30/23  1114 06/30/23  0536 06/29/23  2058 06/29/23  1557   POC GLUCOSE mg/dl 183* 183* 149* 108 111 116 156* 163*             Results from last 7 days   Lab Units 07/06/23  0628   PROCALCITONIN ng/ml 0.27*           Cultures:   Results from last 7 days   Lab Units 07/04/23  0128   COLOR UA  Yellow   CLARITY UA  Clear   SPEC GRAV UA  1.010   PH UA  7.5   LEUKOCYTES UA  Negative   NITRITE UA  Negative   GLUCOSE UA mg/dl Negative   KETONES UA mg/dl Negative   BILIRUBIN UA  Negative   BLOOD UA  Small*      Results from last 7 days   Lab Units 07/04/23  0128   RBC UA /hpf 0-1   WBC UA /hpf 0-5   EPITHELIAL CELLS WET PREP /hpf Occasional   BACTERIA UA /hpf None Seen      Results from last 7 days   Lab Units 07/04/23  0507 07/04/23  0123 07/04/23  0117   BLOOD CULTURE   --  No Growth at 24 hrs. No Growth at 24 hrs. LEGIONELLA URINARY ANTIGEN  Negative  --   --    STREP PNEUMONIAE ANTIGEN, URINE  Negative  --   --      Results from last 7 days   Lab Units 07/04/23  0117   SARS-COV-2  Negative     DISCHARGE DAY VISIT AND PHYSICAL EXAM:  Subjective: Patient seen and examined. Feeling much better today. Asking about discharge home.     Vitals:   Blood Pressure: 135/56 (07/06/23 0834)  Pulse: 56 (07/06/23 0834)  Temperature: 97.5 °F (36.4 °C) (07/06/23 0711)  Temp Source: Temporal (07/04/23 2224)  Respirations: 19 (07/06/23 0471)  Height: 5' 8" (172.7 cm) (07/06/23 0834)  Weight - Scale: 109 kg (240 lb 4.8 oz) (07/06/23 0834)  SpO2: 90 % (07/06/23 0834)    Physical Exam  Vitals reviewed. Constitutional:       General: He is not in acute distress. Appearance: Normal appearance. He is obese. HENT:      Head: Atraumatic. Cardiovascular:      Rate and Rhythm: Regular rhythm. Pulmonary:      Effort: Pulmonary effort is normal.      Breath sounds: Decreased breath sounds present. No wheezing. Abdominal:      General: Bowel sounds are normal.      Palpations: Abdomen is soft. Tenderness: There is no abdominal tenderness. There is no rebound. Musculoskeletal:         General: No swelling or tenderness. Skin:     General: Skin is warm and dry. Neurological:      General: No focal deficit present. Mental Status: He is alert. Cranial Nerves: No cranial nerve deficit. Psychiatric:         Mood and Affect: Mood normal.       Planned Re-admission: No  Discharge Disposition: Home with [facility]:     Test Results Pending at Discharge:   Pending Labs     Order Current Status    Blood culture #1 Preliminary result    Blood culture #2 Preliminary result           Incidental findings: Pneumonia on CT. Would benefit from repeat imaging to ensure resolution    Medications   · Discharge Medication List: See after visit summary for reconciled discharge medications. Diet restrictions: Fluid restricted cardiac diet  Activity restrictions: No strenuous activity  Discharge Condition: stable    Outpatient Follow-Up and Discharge Instructions  See after visit summary section titled Discharge Instructions for information provided to patient and family. Code Status: Level 3 - DNAR and DNI  Discharge Statement   I spent 35 minutes discharging the patient. This time was spent on the day of discharge.  Greater than 50% of total time was spent with the patient and / or family counseling and / or coordination of care. ** Please Note: This note has been constructed using a voice recognition system.  **

## 2023-07-06 NOTE — ASSESSMENT & PLAN NOTE
Background: History of bladder cancer CHF chronic pain ERICA and recent back surgery presented to the hospital with worsening shortness of breath  · PE study was negative but was noted to have left-sided pneumonia  · He was on ceftriaxone and vancomycin during hospitalization. He has been weaned to room air and doing well. · Discharge: Cefdinir and doxycycline for 5 more days to complete a 7-day course of antibiotics. · Of note: He was hypoxic at night and was placed on supplemental oxygen but this was because he could not tolerate CPAP machine equipment here. He will wear his equipment when he gets home.     Results from last 7 days   Lab Units 07/06/23  0628 07/04/23  0507   PROCALCITONIN ng/ml 0.27* 0.49*

## 2023-07-06 NOTE — ASSESSMENT & PLAN NOTE
· Recently underwent lumbar spinal surgery 6/30/2023 at [facility]  · Has follow-up with neurosurgery as an outpatient

## 2023-07-06 NOTE — PROGRESS NOTES
Octavio Calhoun is a 58 y.o. male who is currently ordered Vancomycin IV with management by the Pharmacy Consult service. Relevant clinical data and objective / subjective history reviewed. Vancomycin Assessment:  Indication and Goal AUC/Trough: Pneumonia (goal -600, trough >10)  Clinical Status: improving  Micro:     Renal Function:  SCr: 1.05 mg/dL  CrCl: 85 mL/min  Renal replacement: Not on dialysis  Days of Therapy: 3  Current Dose: 750 mg IV q 12 hours  Vancomycin Plan:  New Dosin mg IV q 12 hours  Estimated AUC: 431 mcg*hr/mL  Estimated Trough: 14.1 mcg/mL  Next Level: 23 at 1430  Renal Function Monitoring: Daily BMP and [place] will continue to follow closely for s/sx of nephrotoxicity, infusion reactions and appropriateness of therapy. BMP and CBC will be ordered per protocol. We will continue to follow the patient’s culture results and clinical progress daily.     Alice Burton, Pharmacist

## 2023-07-06 NOTE — ASSESSMENT & PLAN NOTE
· Hypoxic here when not wearing CPAP.   He did not like the fullface mask here  · Patient reports he will wear his own equipment when he gets home

## 2023-07-06 NOTE — PLAN OF CARE
Problem: Potential for Falls  Goal: Patient will remain free of falls  Description: INTERVENTIONS:  - Educate patient/family on patient safety including physical limitations  - Instruct patient to call for assistance with activity   - Consult OT/PT to assist with strengthening/mobility   - Keep Call bell within reach  - Keep bed low and locked with side rails adjusted as appropriate  - Keep care items and personal belongings within reach  - Initiate and maintain comfort rounds  - Make Fall Risk Sign visible to staff  - Offer Toileting every 2 Hours, in advance of need  - Initiate/Maintain bed alarm  - Obtain necessary fall risk management equipment  - Apply yellow socks and bracelet for high fall risk patients  - Consider moving patient to room near nurses station  7/5/2023 2056 by Georges Frausto RN  Outcome: Progressing  7/5/2023 2055 by Georges Frausto RN  Outcome: Progressing     Problem: RESPIRATORY - ADULT  Goal: Achieves optimal ventilation and oxygenation  Description: INTERVENTIONS:  - Assess for changes in respiratory status  - Assess for changes in mentation and behavior  - Position to facilitate oxygenation and minimize respiratory effort  - Oxygen administered by appropriate delivery if ordered  - Initiate smoking cessation education as indicated  - Encourage broncho-pulmonary hygiene including cough, deep breathe, Incentive Spirometry  - Assess the need for suctioning and aspirate as needed  - Assess and instruct to report SOB or any respiratory difficulty  - Respiratory Therapy support as indicated  7/5/2023 2056 by Georges Frausto RN  Outcome: Progressing  7/5/2023 2055 by Georges Frausto RN  Outcome: Progressing     Problem: PAIN - ADULT  Goal: Verbalizes/displays adequate comfort level or baseline comfort level  Description: Interventions:  - Encourage patient to monitor pain and request assistance  - Assess pain using appropriate pain scale  - Administer analgesics based on type and severity of pain and evaluate response  - Implement non-pharmacological measures as appropriate and evaluate response  - Consider cultural and social influences on pain and pain management  - Notify physician/advanced practitioner if interventions unsuccessful or patient reports new pain  7/5/2023 2056 by Brown Shafer RN  Outcome: Progressing  7/5/2023 2055 by Brown Shafer RN  Outcome: Progressing     Problem: INFECTION - ADULT  Goal: Absence or prevention of progression during hospitalization  Description: INTERVENTIONS:  - Assess and monitor for signs and symptoms of infection  - Monitor lab/diagnostic results  - Monitor all insertion sites, i.e. indwelling lines, tubes, and drains  - Monitor endotracheal if appropriate and nasal secretions for changes in amount and color  - [place] appropriate cooling/warming therapies per order  - Administer medications as ordered  - Instruct and encourage patient and family to use good hand hygiene technique  - Identify and instruct in appropriate isolation precautions for identified infection/condition  7/5/2023 2056 by Brown Shafer RN  Outcome: Progressing  7/5/2023 2055 by Brwon Shafer RN  Outcome: Progressing  Goal: Absence of fever/infection during neutropenic period  Description: INTERVENTIONS:  - Monitor WBC    7/5/2023 2056 by Brown Shafer RN  Outcome: Progressing  7/5/2023 2055 by Brown Shafer RN  Outcome: Progressing     Problem: SAFETY ADULT  Goal: Patient will remain free of falls  Description: INTERVENTIONS:  - Educate patient/family on patient safety including physical limitations  - Instruct patient to call for assistance with activity   - Consult OT/PT to assist with strengthening/mobility   - Keep Call bell within reach  - Keep bed low and locked with side rails adjusted as appropriate  - Keep care items and personal belongings within reach  - Initiate and maintain comfort rounds  - Make Fall Risk Sign visible to staff  - Offer Toileting every 2 Hours, in advance of need  - Initiate/Maintain bed alarm  - Obtain necessary fall risk management equipment  - Apply yellow socks and bracelet for high fall risk patients  - Consider moving patient to room near nurses station  7/5/2023 2056 by Jesica Guerrero RN  Outcome: Progressing  7/5/2023 2055 by Jesica Guerrero RN  Outcome: Progressing  Goal: Maintain or return to baseline ADL function  Description: INTERVENTIONS:  -  Assess patient's ability to carry out ADLs; assess patient's baseline for ADL function and identify physical deficits which impact ability to perform ADLs (bathing, care of mouth/teeth, toileting, grooming, dressing, etc.)  - Assess/evaluate cause of self-care deficits   - Assess range of motion  - Assess patient's mobility; develop plan if impaired  - Assess patient's need for assistive devices and provide as appropriate  - Encourage maximum independence but intervene and supervise when necessary  - Involve family in performance of ADLs  - Assess for home care needs following discharge   - Consider OT consult to assist with ADL evaluation and planning for discharge  - Provide patient education as appropriate  7/5/2023 2056 by Jesica Guerrero RN  Outcome: Progressing  7/5/2023 2055 by Jesica Guerrero RN  Outcome: Progressing  Goal: Maintains/Returns to pre admission functional level  Description: INTERVENTIONS:  - Perform BMAT or MOVE assessment daily.   - Set and communicate daily mobility goal to care team and patient/family/caregiver. - Collaborate with rehabilitation services on mobility goals if consulted  - Perform Range of Motion 3 times a day. - Reposition patient every 2 hours.   - Dangle patient 3 times a day  - Stand patient 3 times a day  - Ambulate patient 3 times a day  - Out of bed to chair 3 times a day   - Out of bed for meals 3 times a day  - Out of bed for toileting  - Record patient progress and toleration of activity level   7/5/2023 2056 by Jesica Guerrero RN  Outcome: Progressing  7/5/2023 2055 by Karissa Graham RN  Outcome: Progressing     Problem: DISCHARGE PLANNING  Goal: Discharge to home or other facility with appropriate resources  Description: INTERVENTIONS:  - Identify barriers to discharge w/patient and caregiver  - Arrange for needed discharge resources and transportation as appropriate  - Identify discharge learning needs (meds, wound care, etc.)  - Arrange for interpretive services to assist at discharge as needed  - Refer to Case Management Department for coordinating discharge planning if the patient needs post-hospital services based on physician/advanced practitioner order or complex needs related to functional status, cognitive ability, or social support system  7/5/2023 2056 by Karissa Graham RN  Outcome: Progressing  7/5/2023 2055 by Karissa Graham RN  Outcome: Progressing     Problem: Knowledge Deficit  Goal: Patient/family/caregiver demonstrates understanding of disease process, treatment plan, medications, and discharge instructions  Description: Complete learning assessment and assess knowledge base.   Interventions:  - Provide teaching at level of understanding  - Provide teaching via preferred learning methods  7/5/2023 2056 by Karissa Graham RN  Outcome: Progressing  7/5/2023 2055 by Karissa Graham RN  Outcome: Progressing

## 2023-07-09 LAB
BACTERIA BLD CULT: NORMAL
BACTERIA BLD CULT: NORMAL

## 2023-07-11 ENCOUNTER — OFFICE VISIT (OUTPATIENT)
Age: 63
End: 2023-07-11
Payer: MEDICARE

## 2023-07-11 VITALS
HEIGHT: 68 IN | DIASTOLIC BLOOD PRESSURE: 80 MMHG | TEMPERATURE: 98.4 F | BODY MASS INDEX: 36.98 KG/M2 | SYSTOLIC BLOOD PRESSURE: 150 MMHG | WEIGHT: 244 LBS | HEART RATE: 59 BPM

## 2023-07-11 DIAGNOSIS — Z51.5 PALLIATIVE CARE BY SPECIALIST: ICD-10-CM

## 2023-07-11 DIAGNOSIS — I50.32 CHRONIC DIASTOLIC HEART FAILURE (HCC): Primary | ICD-10-CM

## 2023-07-11 DIAGNOSIS — C67.3 MALIGNANT NEOPLASM OF ANTERIOR WALL OF URINARY BLADDER (HCC): ICD-10-CM

## 2023-07-11 DIAGNOSIS — G89.3 CANCER-RELATED PAIN: ICD-10-CM

## 2023-07-11 DIAGNOSIS — R20.2 PARESTHESIA OF BILATERAL LEGS: ICD-10-CM

## 2023-07-11 DIAGNOSIS — G89.29 CHRONIC BILATERAL LOW BACK PAIN WITH BILATERAL SCIATICA: ICD-10-CM

## 2023-07-11 DIAGNOSIS — M54.42 CHRONIC BILATERAL LOW BACK PAIN WITH BILATERAL SCIATICA: ICD-10-CM

## 2023-07-11 DIAGNOSIS — M54.41 CHRONIC BILATERAL LOW BACK PAIN WITH BILATERAL SCIATICA: ICD-10-CM

## 2023-07-11 DIAGNOSIS — Z71.89 COUNSELING AND COORDINATION OF CARE: ICD-10-CM

## 2023-07-11 PROCEDURE — 99214 OFFICE O/P EST MOD 30 MIN: CPT | Performed by: PHYSICIAN ASSISTANT

## 2023-07-11 RX ORDER — OXYCODONE HYDROCHLORIDE 30 MG/1
15 TABLET ORAL EVERY 4 HOURS PRN
Qty: 90 TABLET | Refills: 0 | Status: SHIPPED | OUTPATIENT
Start: 2023-07-11 | End: 2023-07-12 | Stop reason: SDUPTHER

## 2023-07-11 RX ORDER — OXYCODONE HCL 40 MG/1
40 TABLET, FILM COATED, EXTENDED RELEASE ORAL EVERY 12 HOURS SCHEDULED
Qty: 45 TABLET | Refills: 0
Start: 2023-07-11

## 2023-07-11 RX ORDER — OXYCODONE HYDROCHLORIDE 30 MG/1
15 TABLET ORAL EVERY 4 HOURS PRN
Qty: 90 TABLET | Refills: 0 | Status: SHIPPED | OUTPATIENT
Start: 2023-07-11 | End: 2023-07-11 | Stop reason: CLARIF

## 2023-07-11 NOTE — PROGRESS NOTES
Follow-up with Palliative and Supportive Care  Roselia Zuleta 58 y.o. male [id number]    ASSESSMENT & PLAN:  1. Chronic diastolic heart failure ([address])    2. Malignant neoplasm of anterior wall of urinary bladder (HCC)    3. Cancer-related pain    4. Chronic bilateral low back pain with bilateral sciatica    5. Paresthesia of bilateral legs    6. Counseling and coordination of care    7. Palliative care by specialist      • Pain/neuropathy  o Significantly improved since back surgery. Patient requesting tapering down of opiate medications with long-term goal of no longer being dependent on opiates for pain control  o Decrease OxyCONTIN 40mg from TID to BID. Patient has recently refilled this medication. o Continue Oxycodone 30mg Tabs, 1/2 to 1 full tab q4hrs prn mod-sec pain  o Continue Gabapentin 600mg BID   o Withdrawal symptoms and management reviewed with patient, advised to call with any symptoms or concerns  • Patient to cancel outpatient Ortho consult appointment on 07/14/23 as he will be following up with neurosurgery team in [facility] on 07/17/23. Return in about 4 weeks (around 8/8/2023) for Next scheduled follow up. • Emotional support provided. • Medication safety issues addressed - no driving under the influence of narcotics (including opioids), watch for adverse effects including AMS or respiratory depression (slowed breathing), keep medications stored in a safe/locked environment, do not use alcohol while opioids or other narcotics are in one's system. • Reviewed recent notes [facility], ED, neurosurgery), labs (07/06/23 CBC - RBC 3.59[L], H / H 11.1 / 33.5; CMP - Na 130[L], K+ 3.4 [L], BUN / Cr 28 / 1.25, AST / ALT 66 / 79), imaging (06/27/23 MRI spine).     =    Requested Prescriptions     Signed Prescriptions Disp Refills   • oxyCODONE (ROXICODONE) 30 MG immediate release tablet 90 tablet 0     Sig: Take 0.5 tablets (15 mg total) by mouth every 4 (four) hours as needed for moderate pain Take 0.5 tablets (15mg) by mouth q4h PRN for moderate to severe cancer pain. Max daily amount 120mgs Max Daily Amount: 90 mg   • oxyCODONE (OxyCONTIN) 40 mg 12 hr tablet 45 tablet 0     Sig: Take 1 tablet (40 mg total) by mouth every 12 (twelve) hours Max Daily Amount: 80 mg       Medications Discontinued During This Encounter   Medication Reason   • oxyCODONE (OxyCONTIN) 40 mg 12 hr tablet Dose adjustment   • oxyCODONE (ROXICODONE) 30 MG immediate release tablet Reorder         Dinah Weldon was seen today for symptoms and planning cares related to above illnesses. I have reviewed the patient's controlled substance dispensing history in the Prescription Drug Monitoring Program in compliance with the [facility] regulations before prescribing any controlled substances. They are invited to continue to follow with us. If there are questions or concerns, please contact us through our clinic/answering service 24 hours a day, seven days a week. 45 minutes were spent in this ambulatory visit with greater than 50% of the time spent face to face with patient in counseling or coordination of care including symptom assessment and management, medication review, medication adjustment, psychosocial support, chart review, imaging review, lab review, supportive listening and anticipatory guidance. All of the patient's questions were answered during this discussion.     Visit Information    Accompanied By: No one    Source of History: Self    History Limitations: None    Contacts: Extended Emergency Contact Information  Primary Emergency Contact: Cami Irving  Mobile Phone: [phone]  Relation: Spouse  Secondary Emergency Contact: [facility]  Mobile Phone: [phone]  Relation: Son       SUBJECTIVE:  Chief Complaint   Patient presents with   • Follow-up   • Pain   • Medication Refill   • Counseling        HPI    Dinah Weldon is a 58 y.o. male with hx of high-grade papillary urothelial carcinoma and chronic low back pain presents for follow up for symptom management. Follows with Dr. No Nash (urology), Dr. Atul Ortiz (PCP). Today, the patient reports feeling great. He states that his back pain is the best it has been in eight years. Recently, he underwent Left L3-4 Metrx hemilaminectomy and bilateral foraminotomies at [facility] after visiting the emergency department for worsening back pain, bilateral leg pain, and progressive weakness. With the significant improvement in his back pain following the surgery, he feels invigorated and fortunate to have been given this opportunity to enhance his health. The patient eagerly anticipates commencing physical therapy and is excited to see the progress he can achieve in terms of mobility and function. He also expresses a desire to reduce his opioid usage and eventually eliminate his dependency on any opiate medications. In the short term, he aims to decrease his long-acting opioid usage and intends to transition from taking OxyContin three times daily to twice daily. Withdrawal symptoms and management options were discussed. The patient requests a follow-up appointment in four weeks. Additionally, he requests a refill of his oxycodone prescription. Although he has some extra medication from his hospital stay, he will soon require a refill and prefers to  both prescriptions in a single trip to the pharmacy rather than making two separate visits. PDMP shows no concerns.   Filled  Written  Sold  ID  Drug  QTY  Days  Prescriber  RX #  Dispenser  Refill  Daily Dose*  Pymt Type      07/01/2023 07/01/2023   1  Oxycontin Er 40 Mg Tablet 45.00  15  Mi Pip  [id number]   Tho (0343)   180.00 MME  Comm Ins  [place]    06/21/2023 06/20/2023   1  Oxycodone Hcl (Ir) 30 Mg Tab 90.00  23  An Mcm  [id number]   Tho (0343)   176.09 MME  Comm Ins  [place]    06/15/2023  06/14/2023   1  Oxycodone Hcl (Ir) 30 Mg Tab 28.00  7  An Mcm  [id number]   Tho (2123)   180.00 MME  Comm Ins  [place]          The following portions of the medical history were reviewed: past medical history, surgical history, problem list, medication list, family history, and social history. Current Outpatient Medications:   •  aspirin 81 mg chewable tablet, Chew 81 mg daily, Disp: , Rfl:   •  atorvastatin (LIPITOR) 40 mg tablet, , Disp: , Rfl:   •  co-enzyme Q-10 30 MG capsule, Take 100 mg by mouth daily , Disp: , Rfl:   •  Farxiga 5 MG TABS, 5 mg daily 5mg alternating with 2.5mg for fluid retention, Disp: , Rfl:   •  gabapentin (NEURONTIN) 300 mg capsule, Take 2 capsules (600 mg total) by mouth 2 (two) times a day, Disp: , Rfl:   •  multivitamin-iron-minerals-folic acid (CENTRUM) chewable tablet, Chew 1 tablet daily, Disp: , Rfl:   •  oxyCODONE (OxyCONTIN) 40 mg 12 hr tablet, Take 1 tablet (40 mg total) by mouth every 12 (twelve) hours Max Daily Amount: 80 mg, Disp: 45 tablet, Rfl: 0  •  oxyCODONE (ROXICODONE) 30 MG immediate release tablet, Take 0.5 tablets (15 mg total) by mouth every 4 (four) hours as needed for moderate pain Take 0.5 tablets (15mg) by mouth q4h PRN for moderate to severe cancer pain. Max daily amount 120mgs Max Daily Amount: 90 mg, Disp: 90 tablet, Rfl: 0  •  potassium chloride (K-DUR,KLOR-CON) 20 mEq tablet, 2 (two) times a day, Disp: , Rfl:   •  tamsulosin (FLOMAX) 0.4 mg, Take 1 capsule (0.4 mg total) by mouth daily with dinner, Disp: 90 capsule, Rfl: 3  •  torsemide (DEMADEX) 20 mg tablet, Take 60 mg by mouth daily, Disp: , Rfl:   •  cefdinir (OMNICEF) 300 mg capsule, Take 1 capsule (300 mg total) by mouth every 12 (twelve) hours for 5 days, Disp: 10 capsule, Rfl: 0  •  doxycycline hyclate (VIBRAMYCIN) 100 mg capsule, Take 1 capsule (100 mg total) by mouth every 12 (twelve) hours for 5 days, Disp: 10 capsule, Rfl: 0    Past medical, surgical, social, and family histories are reviewed and pertinent updates are made. Review of Systems   Constitutional: Negative for fatigue and unexpected weight change.    HENT: Negative for hearing loss and trouble swallowing. Respiratory: Negative for shortness of breath. Cardiovascular: Negative for chest pain. Gastrointestinal: Negative for abdominal pain. Genitourinary: Negative. Musculoskeletal: Positive for back pain, gait problem and myalgias. Negative for arthralgias. Neurological: Negative for dizziness, seizures, syncope, weakness, numbness and headaches. Psychiatric/Behavioral: Negative. OBJECTIVE:  /80 (BP Location: Left arm)   Pulse 59   Temp 98.4 °F (36.9 °C) (Temporal)   Ht 5' 8" (1.727 m)   Wt 111 kg (244 lb)   BMI 37.10 kg/m²   Physical Exam  Vitals and nursing note reviewed. Constitutional:       General: He is not in acute distress. Appearance: He is obese. Comments: Ambulates with cane   HENT:      Head: Normocephalic and atraumatic. Right Ear: External ear normal.      Left Ear: External ear normal.      Mouth/Throat:      Pharynx: Oropharynx is clear. Eyes:      Conjunctiva/sclera: Conjunctivae normal.   Cardiovascular:      Rate and Rhythm: Normal rate. Pulmonary:      Effort: Pulmonary effort is normal.   Musculoskeletal:         General: No deformity. Skin:     Coloration: Skin is not pale. Findings: No rash. Neurological:      Mental Status: He is alert and oriented to person, place, and time. Psychiatric:         Mood and Affect: Mood normal.         Behavior: Behavior normal.          Katherin Hernandez PA-C  [facility]  [phone]    Portions of this document may have been created using dictation software and as such some "sound alike" terms may have been generated by the system. Do not hesitate to contact me with any questions or clarifications.

## 2023-07-11 NOTE — PATIENT INSTRUCTIONS
Decrease OxyCONTIN use to twice daily  Continue Oxycodone as prescribed. Continue Gabapentin 600mg twice daily  Monitor for symptoms of withdrawal as discussed and call our office if symptoms should arise. Narcotic Withdrawal   WHAT YOU NEED TO KNOW:   Withdrawal is a response to a sudden lack of narcotics in your body. Withdrawal happens when you suddenly decrease or stop taking a narcotic you are dependent on. Dependence means you feel you need the narcotic to function mentally or physically. This happens after you have used the narcotic regularly for a long time. Withdrawal can happen with an illegal narcotic such as heroin, or a prescription narcotic such as oxycodone or fentanyl. DISCHARGE INSTRUCTIONS:   Call your local emergency number (911 in the [place]), or have someone else call if:   You have a seizure. You cannot be woken. Call your doctor if:   You have a fast heartbeat. You have nausea and are vomiting, or you cannot stop vomiting. You have the following signs and symptoms of dehydration:     Dry eyes or mouth    Increased thirst    Dark yellow urine, or urinating little or not at all    Headache, dizziness, or confusion    Irregular or fast breathing, fast or pounding heartbeat    You have questions or concerns about your condition or care. Medicines: You may  need any of the following:  NSAIDs , such as ibuprofen, help decrease swelling, pain, and fever. This medicine is available with or without a doctor's order. NSAIDs can cause stomach bleeding or kidney problems in certain people. If you take blood thinner medicine, always ask your healthcare provider if NSAIDs are safe for you. Always read the medicine label and follow directions. Blood pressure medicine  decreases symptoms of withdrawal, such as nausea, vomiting, muscle tension, and anxiety. Antianxiety medicine  decreases anxiety and helps you feel calm and relaxed.     Antinausea medicine  helps calm your stomach and prevent vomiting. Take your medicine as directed. Contact your healthcare provider if you think your medicine is not helping or if you have side effects. Tell your provider if you are allergic to any medicine. Keep a list of the medicines, vitamins, and herbs you take. Include the amounts, and when and why you take them. Bring the list or the pill bottles to follow-up visits. Carry your medicine list with you in case of an emergency. Prevent withdrawal from a prescription narcotic:  The best way to prevent withdrawal is to prevent tolerance. You may need to take a different kind of pain medicine after a surgery or injury. You can also talk to your healthcare provider about ways to manage pain without medicine. If you do need to take a narcotic medicine, the following can help prevent withdrawal:  Do not suddenly stop using the narcotic. If you have been using the narcotic for longer than 2 weeks, a sudden stop may cause dangerous side effects. Work with your healthcare provider to decrease your dose slowly. Take a prescribed narcotic exactly as directed. Do not take more than the recommended amount. Do not take it more often or for longer than recommended. If you use a pain patch, be sure to remove the old patch before you place a new one. Talk to your doctor or a pharmacist if you have any questions about your medicine. Narcotics often come with a Medication Guide to help you use it safely. Ask your pharmacists for a copy if you do not get one when you fill the prescription. Talk to your provider about signs or symptoms of a problem. Tell your provider if you think you are developing narcotic tolerance or dependence. Work with your provider to stop or lower the amount safely. Narcotic safety:   Do not take narcotics that belong to someone else. The kind or amount that person takes may not be right for you. Do not mix narcotics with other medicines or alcohol.   The combination can cause an overdose, or cause you to stop breathing. Alcohol, sleeping pills, and medicines such as antihistamines can make you sleepy. A combination with narcotics can lead to a coma. Learn about the signs of an overdose so you know how to respond. Tell others about these signs so they will know what to do if needed. Talk to your healthcare provider about naloxone. You may be able to keep naloxone at home in case of an overdose. Your family and friends can also be trained on how to give it to you if needed. Keep narcotics out of the reach of children. Store narcotics in a locked cabinet or in a location that children cannot get to. Follow instructions for what to do with prescription narcotics you do not use. Your healthcare provider will give you instructions for how to dispose of it safely. This helps make sure no one else takes it. Follow up with your healthcare provider as directed: You may need to return for other tests. You may also be referred to a specialty clinic to receive maintenance therapy medicine on a regular basis. Write down your questions so you remember to ask them during your visits. © Copyright Lauro Baron 2022 Information is for End User's use only and may not be sold, redistributed or otherwise used for commercial purposes. The above information is an  only. It is not intended as medical advice for individual conditions or treatments. Talk to your doctor, nurse or pharmacist before following any medical regimen to see if it is safe and effective for you.

## 2023-07-12 ENCOUNTER — TELEPHONE (OUTPATIENT)
Dept: PALLIATIVE MEDICINE | Facility: CLINIC | Age: 63
End: 2023-07-12

## 2023-07-12 DIAGNOSIS — M54.41 CHRONIC BILATERAL LOW BACK PAIN WITH BILATERAL SCIATICA: ICD-10-CM

## 2023-07-12 DIAGNOSIS — M54.42 CHRONIC BILATERAL LOW BACK PAIN WITH BILATERAL SCIATICA: ICD-10-CM

## 2023-07-12 DIAGNOSIS — G89.3 CANCER-RELATED PAIN: ICD-10-CM

## 2023-07-12 DIAGNOSIS — G89.29 CHRONIC BILATERAL LOW BACK PAIN WITH BILATERAL SCIATICA: ICD-10-CM

## 2023-07-12 DIAGNOSIS — C67.3 MALIGNANT NEOPLASM OF ANTERIOR WALL OF URINARY BLADDER (HCC): ICD-10-CM

## 2023-07-12 RX ORDER — OXYCODONE HYDROCHLORIDE 30 MG/1
15-30 TABLET ORAL EVERY 4 HOURS PRN
Qty: 90 TABLET | Refills: 0 | Status: SHIPPED | OUTPATIENT
Start: 2023-07-12

## 2023-07-12 NOTE — TELEPHONE ENCOUNTER
Received call from patient pharmacy regarding oxycodone 30 mg prescription that was sent in yesterday. Per the pharmacist a new script will have to be sent in for sig clarification with 1 max daily amount and 1 direction.

## 2023-07-12 NOTE — TELEPHONE ENCOUNTER
7/12/2023 4:16 PM -  Clarified with [facility] that previous orders' sig, daily max, disp, did not all match. Clarfiied by notes that this is likely first step in a taper of pt's meds, and re-wrote the order to reflect pt's usage, for next two weeks, as first anticipated step in opioid taper. [facility] and I in agreement with plan; Rx sent; may 'done' task.

## 2023-07-14 DIAGNOSIS — G89.3 CANCER-RELATED PAIN: ICD-10-CM

## 2023-07-14 RX ORDER — GABAPENTIN 300 MG/1
600 CAPSULE ORAL 2 TIMES DAILY
Qty: 120 CAPSULE | Refills: 0 | Status: SHIPPED | OUTPATIENT
Start: 2023-07-14

## 2023-07-17 ENCOUNTER — CLINICAL SUPPORT (OUTPATIENT)
Dept: NEUROSURGERY | Facility: CLINIC | Age: 63
End: 2023-07-17

## 2023-07-17 VITALS — SYSTOLIC BLOOD PRESSURE: 138 MMHG | DIASTOLIC BLOOD PRESSURE: 68 MMHG | TEMPERATURE: 98.1 F

## 2023-07-17 DIAGNOSIS — Z48.89 POSTOPERATIVE VISIT: Primary | ICD-10-CM

## 2023-07-17 LAB
ATRIAL RATE: 79 BPM
P AXIS: 53 DEGREES
PR INTERVAL: 148 MS
QRS AXIS: 29 DEGREES
QRSD INTERVAL: 82 MS
QT INTERVAL: 372 MS
QTC INTERVAL: 426 MS
T WAVE AXIS: 45 DEGREES
VENTRICULAR RATE: 79 BPM

## 2023-07-17 PROCEDURE — 99024 POSTOP FOLLOW-UP VISIT: CPT

## 2023-07-17 PROCEDURE — 93010 ELECTROCARDIOGRAM REPORT: CPT | Performed by: INTERNAL MEDICINE

## 2023-07-17 NOTE — PROGRESS NOTES
Post-Op Visit- Neurosurgery    Roselia Zuleta 58 y.o. male MRN: [id number]    Chief Complaint:  Patient presents post: Left L3-4 Metrx hemilaminectomy and bilateral foraminotomies    History of Present Illness:  Patient presents for 2 week POV for incision check accompanied by spouse and ambulating with an assistive device. Patient reports he is doing well overall and denies any incisional issues or fevers. he denies any new weakness, numbness or tingling since the surgery. Patient admits to much improved surgical pain at this time and rates their pain as a Pain Score:   3/10. Patient is currently taking gabapentin and oxycontin with complete resolution of pain symptoms. Assessment:   Vitals:    07/17/23 1106   BP: 138/68   Temp: 98.1 °F (36.7 °C)       Wound Exam: Incision well approximated. No erythema, edema or drainage present. Location: back           Discussion/Summary:  Doing well postoperatively. Reviewed incision care with patient including daily observation for s/s infection including: increased erythema, edema, drainage, dehiscence of incision or fever >101. Should these be observed, he understands that he is to call and/or return immediately for reassessment. Advised patient to continue cleansing area with mild soap and water and pat dry. Not to apply any lotions, creams, or ointments, & not to submerge in any water for 4 more weeks. He is to maintain activity restrictions until cleared by the surgeon. Activity levels were also reviewed with the patient in detail, he is to lift no greater than 10 pounds and ambulation is encouraged as tolerated. Patient is questioning when he can resume immunotherapy for his cancer treatment, he is hoping he can begin this Wednesday 7/19. This RN will touch base with Dr. Kathi Nagel for clearance    Verified date/time/location of upcoming POV and reminded.  He is to call the office with any further questions or concerns, or if any incisional issues or fevers would arise.

## 2023-07-18 ENCOUNTER — TELEPHONE (OUTPATIENT)
Dept: NEUROSURGERY | Facility: CLINIC | Age: 63
End: 2023-07-18

## 2023-07-18 NOTE — TELEPHONE ENCOUNTER
Per Dr. Aquiles Arroyo, Patient is ok to resume his immunotherapy treatments. This RN made Patient's Urologist Dr. Terry Griffith aware, and placed phone call to patient. He as appreciative of the assistance.

## 2023-07-19 ENCOUNTER — TELEPHONE (OUTPATIENT)
Dept: UROLOGY | Facility: AMBULATORY SURGERY CENTER | Age: 63
End: 2023-07-19

## 2023-07-19 NOTE — TELEPHONE ENCOUNTER
Pt under care of: George Noble     Last Seen: 6/7/23     Pt calling due to:    Patient calling in stating he had to put immunotherapy/BCG on hold due to needing emergency back surgery. Patient states he received a call from nurse coordinator regarding the start of this again. Patient requesting call back regarding restarting this process.      Pt can be reached at: [phone]

## 2023-07-21 PROBLEM — C67.2 MALIGNANT NEOPLASM OF LATERAL WALL OF URINARY BLADDER (HCC): Status: ACTIVE | Noted: 2023-03-17

## 2023-07-21 PROBLEM — R35.1 BENIGN PROSTATIC HYPERPLASIA WITH NOCTURIA: Status: ACTIVE | Noted: 2023-03-05

## 2023-07-21 PROBLEM — Z87.448 HISTORY OF HEMATURIA: Status: ACTIVE | Noted: 2023-07-21

## 2023-07-22 NOTE — PROGRESS NOTES
UROLOGY PROGRESS NOTE         NAME: Rosalee Elmore  AGE: 58 y.o. SEX: male  : 1960   MRN: [id number]    DATE: 2023  TIME: 8:10 PM    Assessment and Plan         Bladder instillation     Date/Time 2023 1:45 PM     Performed by  Amalia Salas MD   Authorized by Amalia Salas MD     Procedure Details   Procedure Notes: Patient was prepped and draped usual fashion using a 16 Romansh catheter we instilled a single vial of BCG. This is #4 of 6 induction therapy treatment. Install without complication. Total of 50 cc were installed.  Impression:   1. Malignant neoplasm of lateral wall of urinary bladder (HCC)    2. Benign prostatic hyperplasia with nocturia    3. History of hematuria         Plan: #4 6 BCG today without complication. He received 1 p.o. Cipro. Postprocedure instruction were discussed with the patient hold for 2 hours call if fever greater than 102 increased bleeding or pain. Patient understands and agrees. Follow-up in 1 week      Chief Complaint   No chief complaint on file. History of Present Illness     HPI: Rosalee Elmore is a 58y.o. year old male who presents with resumption of induction therapy of BCG for T1 high-grade bladder cancer. Patient is here today for his fourth of 6 BCG treatment. His treatment had to be interrupted because he was hospitalized and underwent back surgery. While he was having that process he was having no irritative or obstructive voiding complaints. He did have a renal ultrasound that I reviewed on 2023 that showed a PVR of 13 and normal upper tracts no hydronephrosis no bladder mass. Patient with a history of BPH currently on Flomax. States his back pain is improved since his recent back surgery. He is not having any bothersome irritative or obstructive voiding complaints no gross hematuria UA looks clear today.           The following portions of the patient's history were reviewed and updated as appropriate: allergies, current medications, past family history, past medical history, past social history, past surgical history and problem list.  Past Medical History:   Diagnosis Date   • Arthritis    • Bladder cancer ([address])    • Chronic narcotic dependence ([address])    • Chronic pain disorder     lower back and down both legs   • Colon polyp    • Coronary artery disease    • CPAP (continuous positive airway pressure) dependence    • Does use hearing aid     will wear DOS   • Full dentures    • Heart failure ([address])     and "fluid retention" SL OW YEIMI DUMAS"   • High cholesterol    • Hypertension    • Mild ankle edema     and feet   • Obstructive sleep apnea on CPAP    • Prediabetes    • Shortness of breath     per pt "with just exertion"   • Sleep apnea    • Wears glasses      Past Surgical History:   Procedure Laterality Date   • BACK SURGERY      titanium rods implanted   • COLONOSCOPY     • CYSTOSCOPY W/ URETERAL STENT PLACEMENT Bilateral 3/17/2023    Procedure: bilateral retrograde;  Surgeon: Mitul Costello MD;  Location:  MAIN OR;  Service: Urology   • HERNIA REPAIR      x5   • LUMBAR LAMINECTOMY N/A 6/30/2023    Procedure: Left L3-4 Metrx hemilaminectomy and bilateral foraminotomies;  Surgeon: Loan Carrizales MD;  Location:  MAIN OR;  Service: Neurosurgery   • AL CYSTO W/REMOVAL OF LESIONS SMALL N/A 5/1/2023    Procedure: CYSTOSCOPY TURBT;  Surgeon: Mitul Costello MD;  Location:  MAIN OR;  Service: Urology   • TRANSURETHRAL RESECTION OF BLADDER TUMOR N/A 3/17/2023    Procedure: TRANSURETHRAL RESECTION OF BLADDER TUMOR (TURBT); Surgeon: Mitul Costello MD;  Location:  MAIN OR;  Service: Urology     shoulder  Review of Systems     Const: Denies chills, fever and weight loss. CV: Denies chest pain. Resp: Denies SOB. GI: Denies abdominal pain, nausea and vomiting. : Denies symptoms other than stated above. Musculo: Denies back pain.     Objective   There were no vitals taken for this visit.    Physical Exam  Const: Appears healthy and well developed. No signs of acute distress present. Resp: Respirations are regular and unlabored. CV: Rate is regular. Rhythm is regular. Abdomen: Abdomen is soft, nontender, and nondistended. Kidneys are not palpable. : nl  Psych: Patient's attitude is cooperative. Mood is normal. Affect is normal.    Current Medications     Current Outpatient Medications:   •  aspirin 81 mg chewable tablet, Chew 81 mg daily, Disp: , Rfl:   •  atorvastatin (LIPITOR) 40 mg tablet, , Disp: , Rfl:   •  co-enzyme Q-10 30 MG capsule, Take 100 mg by mouth daily , Disp: , Rfl:   •  Farxiga 5 MG TABS, 5 mg daily 5mg alternating with 2.5mg for fluid retention, Disp: , Rfl:   •  gabapentin (NEURONTIN) 300 mg capsule, Take 2 capsules (600 mg total) by mouth 2 (two) times a day, Disp: 120 capsule, Rfl: 0  •  multivitamin-iron-minerals-folic acid (CENTRUM) chewable tablet, Chew 1 tablet daily, Disp: , Rfl:   •  oxyCODONE (OxyCONTIN) 40 mg 12 hr tablet, Take 1 tablet (40 mg total) by mouth every 12 (twelve) hours Max Daily Amount: 80 mg, Disp: 45 tablet, Rfl: 0  •  oxyCODONE (ROXICODONE) 30 MG immediate release tablet, Take 0.5-1 tablets (15-30 mg total) by mouth every 4 (four) hours as needed (15mg for moderate pain, 30mg for severe pain) Max 6 tabs/day.  Max Daily Amount: 180 mg, Disp: 90 tablet, Rfl: 0  •  potassium chloride (K-DUR,KLOR-CON) 20 mEq tablet, 2 (two) times a day, Disp: , Rfl:   •  tamsulosin (FLOMAX) 0.4 mg, Take 1 capsule (0.4 mg total) by mouth daily with dinner, Disp: 90 capsule, Rfl: 3  •  torsemide (DEMADEX) 20 mg tablet, Take 60 mg by mouth daily, Disp: , Rfl:         Antwon Madrigal MD

## 2023-07-25 ENCOUNTER — OFFICE VISIT (OUTPATIENT)
Dept: UROLOGY | Facility: CLINIC | Age: 63
End: 2023-07-25

## 2023-07-25 VITALS
TEMPERATURE: 97.3 F | WEIGHT: 258 LBS | DIASTOLIC BLOOD PRESSURE: 70 MMHG | OXYGEN SATURATION: 94 % | SYSTOLIC BLOOD PRESSURE: 142 MMHG | BODY MASS INDEX: 39.1 KG/M2 | HEIGHT: 68 IN | HEART RATE: 68 BPM

## 2023-07-25 DIAGNOSIS — Z87.448 HISTORY OF HEMATURIA: ICD-10-CM

## 2023-07-25 DIAGNOSIS — E66.01 MORBID (SEVERE) OBESITY DUE TO EXCESS CALORIES (HCC): ICD-10-CM

## 2023-07-25 DIAGNOSIS — N40.1 BENIGN PROSTATIC HYPERPLASIA WITH NOCTURIA: ICD-10-CM

## 2023-07-25 DIAGNOSIS — E11.29 TYPE 2 DIABETES MELLITUS WITH OTHER DIABETIC KIDNEY COMPLICATION (HCC): ICD-10-CM

## 2023-07-25 DIAGNOSIS — C67.2 MALIGNANT NEOPLASM OF LATERAL WALL OF URINARY BLADDER (HCC): Primary | ICD-10-CM

## 2023-07-25 DIAGNOSIS — R35.1 BENIGN PROSTATIC HYPERPLASIA WITH NOCTURIA: ICD-10-CM

## 2023-07-25 LAB
SL AMB  POCT GLUCOSE, UA: NORMAL
SL AMB LEUKOCYTE ESTERASE,UA: NORMAL
SL AMB POCT BILIRUBIN,UA: NORMAL
SL AMB POCT BLOOD,UA: NORMAL
SL AMB POCT CLARITY,UA: CLEAR
SL AMB POCT COLOR,UA: YELLOW
SL AMB POCT KETONES,UA: NORMAL
SL AMB POCT NITRITE,UA: NORMAL
SL AMB POCT PH,UA: 5.5
SL AMB POCT SPECIFIC GRAVITY,UA: 1.01
SL AMB POCT URINE PROTEIN: NORMAL
SL AMB POCT UROBILINOGEN: 1

## 2023-07-26 DIAGNOSIS — M54.42 CHRONIC BILATERAL LOW BACK PAIN WITH BILATERAL SCIATICA: ICD-10-CM

## 2023-07-26 DIAGNOSIS — C67.3 MALIGNANT NEOPLASM OF ANTERIOR WALL OF URINARY BLADDER (HCC): ICD-10-CM

## 2023-07-26 DIAGNOSIS — M54.41 CHRONIC BILATERAL LOW BACK PAIN WITH BILATERAL SCIATICA: ICD-10-CM

## 2023-07-26 DIAGNOSIS — G89.29 CHRONIC BILATERAL LOW BACK PAIN WITH BILATERAL SCIATICA: ICD-10-CM

## 2023-07-27 RX ORDER — OXYCODONE HCL 40 MG/1
40 TABLET, FILM COATED, EXTENDED RELEASE ORAL EVERY 12 HOURS SCHEDULED
Qty: 45 TABLET | Refills: 0 | Status: SHIPPED | OUTPATIENT
Start: 2023-07-27

## 2023-07-28 ENCOUNTER — APPOINTMENT (EMERGENCY)
Dept: RADIOLOGY | Facility: HOSPITAL | Age: 63
End: 2023-07-28
Payer: MEDICARE

## 2023-07-28 ENCOUNTER — HOSPITAL ENCOUNTER (EMERGENCY)
Facility: HOSPITAL | Age: 63
Discharge: HOME | End: 2023-07-28
Attending: EMERGENCY MEDICINE
Payer: MEDICARE

## 2023-07-28 VITALS
BODY MASS INDEX: 37.66 KG/M2 | RESPIRATION RATE: 15 BRPM | WEIGHT: 247.7 LBS | OXYGEN SATURATION: 96 % | HEART RATE: 61 BPM | TEMPERATURE: 97.1 F | SYSTOLIC BLOOD PRESSURE: 124 MMHG | DIASTOLIC BLOOD PRESSURE: 58 MMHG

## 2023-07-28 DIAGNOSIS — R06.00 DYSPNEA: ICD-10-CM

## 2023-07-28 DIAGNOSIS — I50.9 CHRONIC CONGESTIVE HEART FAILURE, UNSPECIFIED HEART FAILURE TYPE (HCC): Primary | ICD-10-CM

## 2023-07-28 DIAGNOSIS — E87.6 HYPOKALEMIA: ICD-10-CM

## 2023-07-28 LAB
ALBUMIN SERPL BCP-MCNC: 4.1 G/DL (ref 3.5–5)
ALP SERPL-CCNC: 91 U/L (ref 34–104)
ALT SERPL W P-5'-P-CCNC: 20 U/L (ref 7–52)
ANION GAP SERPL CALCULATED.3IONS-SCNC: 9 MMOL/L
APTT PPP: 31 SECONDS (ref 23–37)
AST SERPL W P-5'-P-CCNC: 29 U/L (ref 13–39)
ATRIAL RATE: 56 BPM
BASOPHILS # BLD AUTO: 0.03 THOUSANDS/ÂΜL (ref 0–0.1)
BASOPHILS NFR BLD AUTO: 0 % (ref 0–1)
BILIRUB SERPL-MCNC: 0.51 MG/DL (ref 0.2–1)
BNP SERPL-MCNC: 26 PG/ML (ref 0–100)
BUN SERPL-MCNC: 29 MG/DL (ref 5–25)
CALCIUM SERPL-MCNC: 9.7 MG/DL (ref 8.4–10.2)
CARDIAC TROPONIN I PNL SERPL HS: 11 NG/L
CHLORIDE SERPL-SCNC: 87 MMOL/L (ref 96–108)
CO2 SERPL-SCNC: 37 MMOL/L (ref 21–32)
CREAT SERPL-MCNC: 1.34 MG/DL (ref 0.6–1.3)
EOSINOPHIL # BLD AUTO: 0.17 THOUSAND/ÂΜL (ref 0–0.61)
EOSINOPHIL NFR BLD AUTO: 2 % (ref 0–6)
ERYTHROCYTE [DISTWIDTH] IN BLOOD BY AUTOMATED COUNT: 14.9 % (ref 11.6–15.1)
GFR SERPL CREATININE-BSD FRML MDRD: 55 ML/MIN/1.73SQ M
GLUCOSE SERPL-MCNC: 141 MG/DL (ref 65–140)
HCT VFR BLD AUTO: 37.7 % (ref 36.5–49.3)
HGB BLD-MCNC: 12.8 G/DL (ref 12–17)
IMM GRANULOCYTES # BLD AUTO: 0.03 THOUSAND/UL (ref 0–0.2)
IMM GRANULOCYTES NFR BLD AUTO: 0 % (ref 0–2)
INR PPP: 0.95 (ref 0.84–1.19)
LYMPHOCYTES # BLD AUTO: 1.9 THOUSANDS/ÂΜL (ref 0.6–4.47)
LYMPHOCYTES NFR BLD AUTO: 21 % (ref 14–44)
MCH RBC QN AUTO: 30.9 PG (ref 26.8–34.3)
MCHC RBC AUTO-ENTMCNC: 34 G/DL (ref 31.4–37.4)
MCV RBC AUTO: 91 FL (ref 82–98)
MONOCYTES # BLD AUTO: 0.61 THOUSAND/ÂΜL (ref 0.17–1.22)
MONOCYTES NFR BLD AUTO: 7 % (ref 4–12)
NEUTROPHILS # BLD AUTO: 6.36 THOUSANDS/ÂΜL (ref 1.85–7.62)
NEUTS SEG NFR BLD AUTO: 70 % (ref 43–75)
NRBC BLD AUTO-RTO: 0 /100 WBCS
P AXIS: 82 DEGREES
PLATELET # BLD AUTO: 193 THOUSANDS/UL (ref 149–390)
PMV BLD AUTO: 10.8 FL (ref 8.9–12.7)
POTASSIUM SERPL-SCNC: 3.1 MMOL/L (ref 3.5–5.3)
PR INTERVAL: 180 MS
PROT SERPL-MCNC: 7.7 G/DL (ref 6.4–8.4)
PROTHROMBIN TIME: 12.7 SECONDS (ref 11.6–14.5)
QRS AXIS: 28 DEGREES
QRSD INTERVAL: 102 MS
QT INTERVAL: 466 MS
QTC INTERVAL: 449 MS
RBC # BLD AUTO: 4.14 MILLION/UL (ref 3.88–5.62)
SODIUM SERPL-SCNC: 133 MMOL/L (ref 135–147)
T WAVE AXIS: 33 DEGREES
VENTRICULAR RATE: 56 BPM
WBC # BLD AUTO: 9.1 THOUSAND/UL (ref 4.31–10.16)

## 2023-07-28 PROCEDURE — 84484 ASSAY OF TROPONIN QUANT: CPT | Performed by: EMERGENCY MEDICINE

## 2023-07-28 PROCEDURE — 85610 PROTHROMBIN TIME: CPT | Performed by: EMERGENCY MEDICINE

## 2023-07-28 PROCEDURE — 71046 X-RAY EXAM CHEST 2 VIEWS: CPT

## 2023-07-28 PROCEDURE — 93005 ELECTROCARDIOGRAM TRACING: CPT

## 2023-07-28 PROCEDURE — 85025 COMPLETE CBC W/AUTO DIFF WBC: CPT | Performed by: EMERGENCY MEDICINE

## 2023-07-28 PROCEDURE — 85730 THROMBOPLASTIN TIME PARTIAL: CPT | Performed by: EMERGENCY MEDICINE

## 2023-07-28 PROCEDURE — 83880 ASSAY OF NATRIURETIC PEPTIDE: CPT | Performed by: EMERGENCY MEDICINE

## 2023-07-28 PROCEDURE — 80053 COMPREHEN METABOLIC PANEL: CPT | Performed by: EMERGENCY MEDICINE

## 2023-07-28 PROCEDURE — 36415 COLL VENOUS BLD VENIPUNCTURE: CPT | Performed by: EMERGENCY MEDICINE

## 2023-07-28 RX ORDER — POTASSIUM CHLORIDE 20 MEQ/1
40 TABLET, EXTENDED RELEASE ORAL ONCE
Status: COMPLETED | OUTPATIENT
Start: 2023-07-28 | End: 2023-07-28

## 2023-07-28 RX ADMIN — POTASSIUM CHLORIDE 40 MEQ: 1500 TABLET, EXTENDED RELEASE ORAL at 09:09

## 2023-07-28 NOTE — PROGRESS NOTES
UROLOGY PROGRESS NOTE         NAME: Odin Johnston  AGE: 58 y.o. SEX: male  : 1960   MRN: [id number]    DATE: 2023  TIME: 5:50 PM    Assessment and Plan         Bladder instillation     Date/Time 2023 1:30 PM     Performed by  Korina Pratt MD   Authorized by Korina Pratt MD     Procedure Details   Procedure Notes: 55 cc of BCG were instilled via a 16 Telugu catheter. Patient knows to hold for 2 hours. He will receive a Cipro.  Impression:   1. Benign prostatic hyperplasia with nocturia    2. Malignant neoplasm of lateral wall of urinary bladder (HCC)    3. History of hematuria         Plan: #5 and 6 BCG today. Received 1 Cipro. Next week of follow-up to complete induction therapy. Then in early October we will plan to do a cystoscopy relook in his bladder possible biopsies. Patient agrees with this plan. Patient to continue Flomax for BPH. Chief Complaint   No chief complaint on file. History of Present Illness     HPI: Odin Johnston is a 58y.o. year old male who presents with continuing with induction therapy with BCG for T1 bladder cancer. This is treatment #5 of 6. He has tolerated well. He is on Flomax for BPH. Was in the ER on 2023 for shortness of breath but was discharged by the medical team.  The patient doing well no irritable or obstructive voiding complaints no fever chills nausea vomiting.             The following portions of the patient's history were reviewed and updated as appropriate: allergies, current medications, past family history, past medical history, past social history, past surgical history and problem list.  Past Medical History:   Diagnosis Date   • Arthritis    • Bladder cancer ([address])    • Chronic narcotic dependence ([address])    • Chronic pain disorder     lower back and down both legs   • Colon polyp    • Coronary artery disease    • CPAP (continuous positive airway pressure) dependence    • Does use hearing aid     will wear DOS   • Full dentures    • Heart failure ([address])     and "fluid retention" [facility]"   • High cholesterol    • Hypertension    • Mild ankle edema     and feet   • Obstructive sleep apnea on CPAP    • Prediabetes    • Shortness of breath     per pt "with just exertion"   • Sleep apnea    • Wears glasses      Past Surgical History:   Procedure Laterality Date   • BACK SURGERY      titanium rods implanted   • COLONOSCOPY     • CYSTOSCOPY W/ URETERAL STENT PLACEMENT Bilateral 3/17/2023    Procedure: bilateral retrograde;  Surgeon: Fracisco Montiel MD;  [facility];  Service: Urology   • HERNIA REPAIR      x5   • LUMBAR LAMINECTOMY N/A 6/30/2023    Procedure: Left L3-4 Metrx hemilaminectomy and bilateral foraminotomies;  Surgeon: Sara Hewitt MD;  [facility];  Service: Neurosurgery   • PA CYSTO W/REMOVAL OF LESIONS SMALL N/A 5/1/2023    Procedure: CYSTOSCOPY TURBT;  Surgeon: Fracisco Montiel MD;  [facility];  Service: Urology   • TRANSURETHRAL RESECTION OF BLADDER TUMOR N/A 3/17/2023    Procedure: TRANSURETHRAL RESECTION OF BLADDER TUMOR (TURBT); Surgeon: Fracisco Montiel MD;  [facility];  Service: Urology     shoulder  Review of Systems     Const: Denies chills, fever and weight loss. CV: Denies chest pain. Resp: Denies SOB. GI: Denies abdominal pain, nausea and vomiting. : Denies symptoms other than stated above. Musculo: Denies back pain. Objective   There were no vitals taken for this visit. Physical Exam  Const: Appears healthy and well developed. No signs of acute distress present. Resp: Respirations are regular and unlabored. CV: Rate is regular. Rhythm is regular. Abdomen: Abdomen is soft, nontender, and nondistended. Kidneys are not palpable. : nl  Psych: Patient's attitude is cooperative.  Mood is normal. Affect is normal.    Current Medications     Current Outpatient Medications:   •  aspirin 81 mg chewable tablet, Chew 81 mg daily, Disp: , Rfl:   •  atorvastatin (LIPITOR) 40 mg tablet, , Disp: , Rfl:   •  co-enzyme Q-10 30 MG capsule, Take 100 mg by mouth daily , Disp: , Rfl:   •  Farxiga 5 MG TABS, 5 mg daily 5mg alternating with 2.5mg for fluid retention, Disp: , Rfl:   •  gabapentin (NEURONTIN) 300 mg capsule, Take 2 capsules (600 mg total) by mouth 2 (two) times a day, Disp: 120 capsule, Rfl: 0  •  multivitamin-iron-minerals-folic acid (CENTRUM) chewable tablet, Chew 1 tablet daily, Disp: , Rfl:   •  oxyCODONE (OxyCONTIN) 40 mg 12 hr tablet, Take 1 tablet (40 mg total) by mouth every 12 (twelve) hours Max Daily Amount: 80 mg, Disp: 45 tablet, Rfl: 0  •  oxyCODONE (ROXICODONE) 30 MG immediate release tablet, Take 0.5-1 tablets (15-30 mg total) by mouth every 4 (four) hours as needed (15mg for moderate pain, 30mg for severe pain) Max 6 tabs/day. Max Daily Amount: 180 mg, Disp: 90 tablet, Rfl: 0  •  potassium chloride (K-DUR,KLOR-CON) 20 mEq tablet, 2 (two) times a day, Disp: , Rfl:   •  tamsulosin (FLOMAX) 0.4 mg, Take 1 capsule (0.4 mg total) by mouth daily with dinner, Disp: 90 capsule, Rfl: 3  •  torsemide (DEMADEX) 20 mg tablet, Take 60 mg by mouth daily, Disp: , Rfl:   No current facility-administered medications for this visit.         Kim Moritz, MD

## 2023-07-28 NOTE — ED PROVIDER NOTES
History  Chief Complaint   Patient presents with   • Shortness of Breath     For past 2-3 days, dx with pneumonia at beginning of the month. Also receives immunotherapy for bladder cancer. History provided by:  Medical records and patient  Shortness of Breath  Severity:  Moderate  Onset quality:  Gradual  Duration:  1 week  Timing:  Constant  Progression:  Unchanged  Chronicity:  Chronic  Context comment:  History of CHF, on torsemide 60 mg daily, took this morning, 1 week of dyspnea on exertion and increased leg swelling, had a 16 pound weight gain over the last couple weeks, however has had 18 pound weight loss after resuming metolazone, SOB continues  Relieved by:  Nothing  Worsened by:  Exertion  Ineffective treatments:  Diuretics  Associated symptoms: no abdominal pain, no chest pain, no cough, no ear pain, no fever, no headaches, no rash, no sore throat and no vomiting        Prior to Admission Medications   Prescriptions Last Dose Informant Patient Reported? Taking? Farxiga 5 MG TABS  Self Yes No   Si mg daily 5mg alternating with 2.5mg for fluid retention   aspirin 81 mg chewable tablet  Self Yes No   Sig: Chew 81 mg daily   atorvastatin (LIPITOR) 40 mg tablet  Self Yes No   co-enzyme Q-10 30 MG capsule  Self Yes No   Sig: Take 100 mg by mouth daily    gabapentin (NEURONTIN) 300 mg capsule   No No   Sig: Take 2 capsules (600 mg total) by mouth 2 (two) times a day   multivitamin-iron-minerals-folic acid (CENTRUM) chewable tablet  Self Yes No   Sig: Chew 1 tablet daily   oxyCODONE (OxyCONTIN) 40 mg 12 hr tablet   No No   Sig: Take 1 tablet (40 mg total) by mouth every 12 (twelve) hours Max Daily Amount: 80 mg   oxyCODONE (ROXICODONE) 30 MG immediate release tablet   No No   Sig: Take 0.5-1 tablets (15-30 mg total) by mouth every 4 (four) hours as needed (15mg for moderate pain, 30mg for severe pain) Max 6 tabs/day.  Max Daily Amount: 180 mg   potassium chloride (K-DUR,KLOR-CON) 20 mEq tablet  Self Yes No   Si (two) times a day   tamsulosin (FLOMAX) 0.4 mg   No No   Sig: Take 1 capsule (0.4 mg total) by mouth daily with dinner   torsemide (DEMADEX) 20 mg tablet  Self Yes No   Sig: Take 60 mg by mouth daily      Facility-Administered Medications: None       Past Medical History:   Diagnosis Date   • Arthritis    • Bladder cancer ([address])    • Chronic narcotic dependence (HCC)    • Chronic pain disorder     lower back and down both legs   • Colon polyp    • Coronary artery disease    • CPAP (continuous positive airway pressure) dependence    • Does use hearing aid     will wear DOS   • Full dentures    • Heart failure ([address])     and "fluid retention" [facility]"   • High cholesterol    • Hypertension    • Mild ankle edema     and feet   • Obstructive sleep apnea on CPAP    • Prediabetes    • Shortness of breath     per pt "with just exertion"   • Sleep apnea    • Wears glasses        Past Surgical History:   Procedure Laterality Date   • BACK SURGERY      titanium rods implanted   • COLONOSCOPY     • CYSTOSCOPY W/ URETERAL STENT PLACEMENT Bilateral 3/17/2023    Procedure: bilateral retrograde;  Surgeon: Ann Wise MD;  [facility];  Service: Urology   • HERNIA REPAIR      x5   • LUMBAR LAMINECTOMY N/A 2023    Procedure: Left L3-4 Metrx hemilaminectomy and bilateral foraminotomies;  Surgeon: Oscar Christianson MD;  Location:  MAIN OR;  Service: Neurosurgery   • OR CYSTO W/REMOVAL OF LESIONS SMALL N/A 2023    Procedure: CYSTOSCOPY TURBT;  Surgeon: Ann Wise MD;  [facility];  Service: Urology   • TRANSURETHRAL RESECTION OF BLADDER TUMOR N/A 3/17/2023    Procedure: TRANSURETHRAL RESECTION OF BLADDER TUMOR (TURBT);   Surgeon: Ann Wise MD;  [facility];  Service: Urology       Family History   Problem Relation Age of Onset   • Cancer Father    • Aortic aneurysm Father    • Leukemia Father    • Hypertension Mother      I have reviewed and agree with the history as documented. E-Cigarette/Vaping   • E-Cigarette Use Never User      E-Cigarette/Vaping Substances   • Nicotine No    • THC No    • CBD No    • Flavoring No    • Other No    • Unknown No      Social History     Tobacco Use   • Smoking status: Former     Packs/day: 1.00     Years: 35.00     Total pack years: 35.00     Types: Cigarettes     Start date: 2023     Quit date: 2016     Years since quittin.5   • Smokeless tobacco: Never   Vaping Use   • Vaping Use: Never used   Substance Use Topics   • Alcohol use: Not Currently     Comment: 3 per year   • Drug use: Not Currently     Types: Marijuana       Review of Systems   Constitutional: Negative for appetite change, chills, fatigue and fever. HENT: Negative for ear pain, rhinorrhea, sore throat and trouble swallowing. Eyes: Negative for pain, discharge and visual disturbance. Respiratory: Positive for shortness of breath. Negative for cough and chest tightness. Cardiovascular: Positive for leg swelling. Negative for chest pain and palpitations. Gastrointestinal: Negative for abdominal pain, nausea and vomiting. Endocrine: Negative for polydipsia, polyphagia and polyuria. Genitourinary: Negative for difficulty urinating, dysuria, hematuria and testicular pain. Musculoskeletal: Negative for arthralgias and back pain. Skin: Negative for color change and rash. Allergic/Immunologic: Negative for immunocompromised state. Neurological: Negative for dizziness, seizures, syncope, weakness and headaches. Hematological: Negative for adenopathy. Psychiatric/Behavioral: Negative for confusion and dysphoric mood. All other systems reviewed and are negative. Physical Exam  Physical Exam  Vitals reviewed. Constitutional:       General: He is not in acute distress. Appearance: Normal appearance. He is obese. He is not ill-appearing, toxic-appearing or diaphoretic. HENT:      Head: Normocephalic and atraumatic. Nose: Nose normal. No congestion or rhinorrhea. Mouth/Throat:      Mouth: Mucous membranes are moist.      Pharynx: Oropharynx is clear. No oropharyngeal exudate or posterior oropharyngeal erythema. Eyes:      General:         Right eye: No discharge. Left eye: No discharge. Cardiovascular:      Rate and Rhythm: Normal rate and regular rhythm. Pulses: Normal pulses. Heart sounds: Normal heart sounds. No murmur heard. No gallop. Pulmonary:      Effort: Pulmonary effort is normal. No accessory muscle usage or respiratory distress. Breath sounds: No stridor. Examination of the left-lower field reveals rales. Rales present. No wheezing or rhonchi. Comments: Fine crackles left base  Chest:      Chest wall: No tenderness. Abdominal:      General: Bowel sounds are normal. There is no distension. Palpations: Abdomen is soft. There is no mass. Tenderness: There is no abdominal tenderness. There is no right CVA tenderness, left CVA tenderness, guarding or rebound. Hernia: No hernia is present. Musculoskeletal:         General: Normal range of motion. Cervical back: Normal range of motion and neck supple. Right lower leg: Edema present. Left lower leg: Edema present. Comments: +2 pitting edema bilateral lower extremities, chronic rubor bilateral lower legs   Skin:     General: Skin is warm and dry. Capillary Refill: Capillary refill takes less than 2 seconds. Neurological:      General: No focal deficit present. Mental Status: He is alert and oriented to person, place, and time. Cranial Nerves: No cranial nerve deficit. Sensory: No sensory deficit. Motor: No weakness. Coordination: Coordination normal.      Gait: Gait normal.      Deep Tendon Reflexes: Reflexes normal.   Psychiatric:         Behavior: Behavior normal.         Thought Content:  Thought content normal.         Judgment: Judgment normal.      Comments: Depressed mood         Vital Signs  ED Triage Vitals [07/28/23 0747]   Temperature Pulse Respirations Blood Pressure SpO2   (!) 97.1 °F (36.2 °C) 57 18 145/65 97 %      Temp Source Heart Rate Source Patient Position - Orthostatic VS BP Location FiO2 (%)   Temporal Monitor Lying Left arm --      Pain Score       5           Vitals:    07/28/23 0845 07/28/23 0900 07/28/23 0915 07/28/23 0930   BP: 133/61  122/74 124/58   Pulse: (!) 45 74 59 61   Patient Position - Orthostatic VS:             Visual Acuity      ED Medications  Medications   potassium chloride (K-DUR,KLOR-CON) CR tablet 40 mEq (40 mEq Oral Given 7/28/23 0909)       Diagnostic Studies  Results Reviewed     Procedure Component Value Units Date/Time    Comprehensive metabolic panel [[id number]]  (Abnormal) Collected: 07/28/23 0802    Lab Status: Final result Specimen: Blood from Arm, Right Updated: 07/28/23 0846     Sodium 133 mmol/L      Potassium 3.1 mmol/L      Chloride 87 mmol/L      CO2 37 mmol/L      ANION GAP 9 mmol/L      BUN 29 mg/dL      Creatinine 1.34 mg/dL      Glucose 141 mg/dL      Calcium 9.7 mg/dL      AST 29 U/L      ALT 20 U/L      Alkaline Phosphatase 91 U/L      Total Protein 7.7 g/dL      Albumin 4.1 g/dL      Total Bilirubin 0.51 mg/dL      eGFR 55 ml/min/1.73sq m     Narrative:      [facility] guidelines for Chronic Kidney Disease (CKD):   •  Stage 1 with normal or high GFR (GFR > 90 mL/min/1.73 square meters)  •  Stage 2 Mild CKD (GFR = 60-89 mL/min/1.73 square meters)  •  Stage 3A Moderate CKD (GFR = 45-59 mL/min/1.73 square meters)  •  Stage 3B Moderate CKD (GFR = 30-44 mL/min/1.73 square meters)  •  Stage 4 Severe CKD (GFR = 15-29 mL/min/1.73 square meters)  •  Stage 5 End Stage CKD (GFR <15 mL/min/1.73 square meters)  Note: GFR calculation is accurate only with a steady state creatinine    HS Troponin 0hr (reflex protocol) [[id number]]  (Normal) Collected: 07/28/23 0802    Lab Status: Final result Specimen: Blood from Arm, Right Updated: 07/28/23 0839     hs TnI 0hr 11 ng/L     B-Type Natriuretic Peptide(BNP) [[id number]]  (Normal) Collected: 07/28/23 0802    Lab Status: Final result Specimen: Blood from Arm, Right Updated: 07/28/23 0839     BNP 26 pg/mL     Protime-INR [[id number]]  (Normal) Collected: 07/28/23 0802    Lab Status: Final result Specimen: Blood from Arm, Right Updated: 07/28/23 0829     Protime 12.7 seconds      INR 0.95    APTT [[id number]]  (Normal) Collected: 07/28/23 0802    Lab Status: Final result Specimen: Blood from Arm, Right Updated: 07/28/23 0829     PTT 31 seconds     CBC and differential [[id number]] Collected: 07/28/23 0802    Lab Status: Final result Specimen: Blood from Arm, Right Updated: 07/28/23 0817     WBC 9.10 Thousand/uL      RBC 4.14 Million/uL      Hemoglobin 12.8 g/dL      Hematocrit 37.7 %      MCV 91 fL      MCH 30.9 pg      MCHC 34.0 g/dL      RDW 14.9 %      MPV 10.8 fL      Platelets 248 Thousands/uL      nRBC 0 /100 WBCs      Neutrophils Relative 70 %      Immat GRANS % 0 %      Lymphocytes Relative 21 %      Monocytes Relative 7 %      Eosinophils Relative 2 %      Basophils Relative 0 %      Neutrophils Absolute 6.36 Thousands/µL      Immature Grans Absolute 0.03 Thousand/uL      Lymphocytes Absolute 1.90 Thousands/µL      Monocytes Absolute 0.61 Thousand/µL      Eosinophils Absolute 0.17 Thousand/µL      Basophils Absolute 0.03 Thousands/µL                  XR chest 2 views   Final Result by Moses Kilpatrick MD (07/28 1443)      Emphysema with no acute disease. Workstation performed: [id number]                    Procedures  Procedures         ED Course                               SBIRT 22yo+    Flowsheet Row Most Recent Value   Initial Alcohol Screen: US AUDIT-C     1. How often do you have a drink containing alcohol? 0 Filed at: 07/28/2023 0747   2. How many drinks containing alcohol do you have on a typical day you are drinking?   0 Filed at: 07/28/2023 0747   3a. Male UNDER 65: How often do you have five or more drinks on one occasion? 0 Filed at: 07/28/2023 0747   Audit-C Score 0 Filed at: 07/28/2023 7508   ANN: How many times in the past year have you. .. Used an illegal drug or used a prescription medication for non-medical reasons? Never Filed at: 07/28/2023 [facility]  2528: Patient appears chronically ill but in no distress. History of CHF. Progressive dyspnea on exertion and leg swelling. Fluid balance has been maintained after recent 18 pound loss. Recent CT chest reviewed. Recent hospitalization reviewed. Plan to complete basic labs including cardiac enzymes, BNP. Check chest x-ray and EKG. 0900: Chest x-ray and labs reviewed. Findings discussed with cardiology Robert Steinberg, knows the patient well, no further recommendations given, reassurance given, close follow-up with cardiology as an outpatient. Chronic congestive heart failure, unspecified heart failure type [facility]): chronic illness or injury  Dyspnea: acute illness or injury  Hypokalemia: acute illness or injury  Amount and/or Complexity of Data Reviewed  Labs: ordered. Radiology: ordered and independent interpretation performed. Details: Chest x-ray  ECG/medicine tests: ordered and independent interpretation performed. Details: Sinus bradycardia 56 bpm, motion artifact, no acute ischemia      Risk  Prescription drug management.           Disposition  Final diagnoses:   Chronic congestive heart failure, unspecified heart failure type ([address])   Dyspnea   Hypokalemia     Time reflects when diagnosis was documented in both MDM as applicable and the Disposition within this note     Time User Action Codes Description Comment    7/28/2023  9:06 AM Roland Sal Add [I50.9] Chronic congestive heart failure, unspecified heart failure type ([address])     7/28/2023  9:06 AM Roland Sal Add [R06.00] Dyspnea     7/28/2023  9:06 AM Kimmie Titiraghu Add [E87.6] Hypokalemia       ED Disposition     ED Disposition   Discharge    Condition   Stable    Date/Time   Fri Jul 28, 2023  9:06 AM    Comment   Kait Sampson discharge to home/self care. Follow-up Information     Follow up With Specialties Details Why [address], [facility], Physician Assistant Schedule an appointment as soon as possible for a visit   [address] [id number]  [phone]            Discharge Medication List as of 7/28/2023  9:07 AM      CONTINUE these medications which have NOT CHANGED    Details   aspirin 81 mg chewable tablet Chew 81 mg daily, Historical Med      atorvastatin (LIPITOR) 40 mg tablet Starting Mon 10/3/2022, Historical Med      co-enzyme Q-10 30 MG capsule Take 100 mg by mouth daily , Historical Med      Farxiga 5 MG TABS 5 mg daily 5mg alternating with 2.5mg for fluid retention, Starting Wed 10/26/2022, Historical Med      gabapentin (NEURONTIN) 300 mg capsule Take 2 capsules (600 mg total) by mouth 2 (two) times a day, Starting Fri 7/14/2023, Normal      multivitamin-iron-minerals-folic acid (CENTRUM) chewable tablet Chew 1 tablet daily, Historical Med      oxyCODONE (OxyCONTIN) 40 mg 12 hr tablet Take 1 tablet (40 mg total) by mouth every 12 (twelve) hours Max Daily Amount: 80 mg, Starting Thu 7/27/2023, Normal      oxyCODONE (ROXICODONE) 30 MG immediate release tablet Take 0.5-1 tablets (15-30 mg total) by mouth every 4 (four) hours as needed (15mg for moderate pain, 30mg for severe pain) Max 6 tabs/day.  Max Daily Amount: 180 mg, Starting Wed 7/12/2023, Normal      potassium chloride (K-DUR,KLOR-CON) 20 mEq tablet 2 (two) times a day, Starting Wed 10/26/2022, Historical Med      tamsulosin (FLOMAX) 0.4 mg Take 1 capsule (0.4 mg total) by mouth daily with dinner, Starting Wed 6/7/2023, Normal      torsemide (DEMADEX) 20 mg tablet Take 60 mg by mouth daily, Historical Med             No discharge procedures on file.     PDMP Review       Value Time User    PDMP Reviewed  Yes 7/12/2023  4:23 PM Arjun Noriega MD          ED Provider  Electronically Signed by           Zen Bourgeois MD  07/28/23 4893

## 2023-08-01 ENCOUNTER — OFFICE VISIT (OUTPATIENT)
Dept: UROLOGY | Facility: CLINIC | Age: 63
End: 2023-08-01
Payer: MEDICARE

## 2023-08-01 VITALS
OXYGEN SATURATION: 94 % | WEIGHT: 243 LBS | SYSTOLIC BLOOD PRESSURE: 136 MMHG | HEART RATE: 83 BPM | BODY MASS INDEX: 36.83 KG/M2 | TEMPERATURE: 98 F | HEIGHT: 68 IN | DIASTOLIC BLOOD PRESSURE: 72 MMHG

## 2023-08-01 DIAGNOSIS — N40.1 BENIGN PROSTATIC HYPERPLASIA WITH NOCTURIA: Primary | ICD-10-CM

## 2023-08-01 DIAGNOSIS — Z87.448 HISTORY OF HEMATURIA: ICD-10-CM

## 2023-08-01 DIAGNOSIS — C67.2 MALIGNANT NEOPLASM OF LATERAL WALL OF URINARY BLADDER (HCC): ICD-10-CM

## 2023-08-01 DIAGNOSIS — R35.1 BENIGN PROSTATIC HYPERPLASIA WITH NOCTURIA: Primary | ICD-10-CM

## 2023-08-01 PROCEDURE — 81003 URINALYSIS AUTO W/O SCOPE: CPT | Performed by: UROLOGY

## 2023-08-01 PROCEDURE — 99213 OFFICE O/P EST LOW 20 MIN: CPT | Performed by: UROLOGY

## 2023-08-03 ENCOUNTER — TELEPHONE (OUTPATIENT)
Dept: UROLOGY | Facility: AMBULATORY SURGERY CENTER | Age: 63
End: 2023-08-03

## 2023-08-03 ENCOUNTER — APPOINTMENT (EMERGENCY)
Dept: CT IMAGING | Facility: HOSPITAL | Age: 63
End: 2023-08-03
Payer: MEDICARE

## 2023-08-03 ENCOUNTER — HOSPITAL ENCOUNTER (EMERGENCY)
Facility: HOSPITAL | Age: 63
Discharge: HOME | End: 2023-08-03
Attending: EMERGENCY MEDICINE
Payer: MEDICARE

## 2023-08-03 VITALS
SYSTOLIC BLOOD PRESSURE: 128 MMHG | OXYGEN SATURATION: 96 % | DIASTOLIC BLOOD PRESSURE: 66 MMHG | WEIGHT: 245.81 LBS | TEMPERATURE: 97.6 F | HEIGHT: 68 IN | BODY MASS INDEX: 37.25 KG/M2 | RESPIRATION RATE: 20 BRPM | HEART RATE: 73 BPM

## 2023-08-03 DIAGNOSIS — E87.6 HYPOKALEMIA: ICD-10-CM

## 2023-08-03 DIAGNOSIS — R10.31 RIGHT GROIN PAIN: Primary | ICD-10-CM

## 2023-08-03 DIAGNOSIS — R10.30 LOWER ABDOMINAL PAIN: ICD-10-CM

## 2023-08-03 LAB
ALBUMIN SERPL BCP-MCNC: 3.9 G/DL (ref 3.5–5)
ALP SERPL-CCNC: 80 U/L (ref 34–104)
ALT SERPL W P-5'-P-CCNC: 21 U/L (ref 7–52)
ANION GAP SERPL CALCULATED.3IONS-SCNC: 10 MMOL/L
AST SERPL W P-5'-P-CCNC: 18 U/L (ref 13–39)
BACTERIA UR QL AUTO: NORMAL /HPF
BASOPHILS # BLD AUTO: 0.04 THOUSANDS/ÂΜL (ref 0–0.1)
BASOPHILS NFR BLD AUTO: 1 % (ref 0–1)
BILIRUB SERPL-MCNC: 0.47 MG/DL (ref 0.2–1)
BILIRUB UR QL STRIP: NEGATIVE
BUN SERPL-MCNC: 30 MG/DL (ref 5–25)
CALCIUM SERPL-MCNC: 9.2 MG/DL (ref 8.4–10.2)
CHLORIDE SERPL-SCNC: 86 MMOL/L (ref 96–108)
CLARITY UR: CLEAR
CO2 SERPL-SCNC: 37 MMOL/L (ref 21–32)
COLOR UR: YELLOW
CREAT SERPL-MCNC: 1.13 MG/DL (ref 0.6–1.3)
EOSINOPHIL # BLD AUTO: 0.08 THOUSAND/ÂΜL (ref 0–0.61)
EOSINOPHIL NFR BLD AUTO: 1 % (ref 0–6)
ERYTHROCYTE [DISTWIDTH] IN BLOOD BY AUTOMATED COUNT: 15.1 % (ref 11.6–15.1)
GFR SERPL CREATININE-BSD FRML MDRD: 68 ML/MIN/1.73SQ M
GLUCOSE SERPL-MCNC: 181 MG/DL (ref 65–140)
GLUCOSE UR STRIP-MCNC: ABNORMAL MG/DL
HCT VFR BLD AUTO: 35.9 % (ref 36.5–49.3)
HGB BLD-MCNC: 12.3 G/DL (ref 12–17)
HGB UR QL STRIP.AUTO: ABNORMAL
IMM GRANULOCYTES # BLD AUTO: 0.02 THOUSAND/UL (ref 0–0.2)
IMM GRANULOCYTES NFR BLD AUTO: 0 % (ref 0–2)
KETONES UR STRIP-MCNC: NEGATIVE MG/DL
LEUKOCYTE ESTERASE UR QL STRIP: NEGATIVE
LYMPHOCYTES # BLD AUTO: 1.78 THOUSANDS/ÂΜL (ref 0.6–4.47)
LYMPHOCYTES NFR BLD AUTO: 27 % (ref 14–44)
MAGNESIUM SERPL-MCNC: 2.1 MG/DL (ref 1.9–2.7)
MCH RBC QN AUTO: 31 PG (ref 26.8–34.3)
MCHC RBC AUTO-ENTMCNC: 34.3 G/DL (ref 31.4–37.4)
MCV RBC AUTO: 90 FL (ref 82–98)
MONOCYTES # BLD AUTO: 0.78 THOUSAND/ÂΜL (ref 0.17–1.22)
MONOCYTES NFR BLD AUTO: 12 % (ref 4–12)
NEUTROPHILS # BLD AUTO: 3.87 THOUSANDS/ÂΜL (ref 1.85–7.62)
NEUTS SEG NFR BLD AUTO: 59 % (ref 43–75)
NITRITE UR QL STRIP: NEGATIVE
NON-SQ EPI CELLS URNS QL MICRO: NORMAL /HPF
NRBC BLD AUTO-RTO: 0 /100 WBCS
PH UR STRIP.AUTO: 7 [PH]
PLATELET # BLD AUTO: 164 THOUSANDS/UL (ref 149–390)
PMV BLD AUTO: 10.8 FL (ref 8.9–12.7)
POTASSIUM SERPL-SCNC: 2.4 MMOL/L (ref 3.5–5.3)
PROT SERPL-MCNC: 7.4 G/DL (ref 6.4–8.4)
PROT UR STRIP-MCNC: NEGATIVE MG/DL
RBC # BLD AUTO: 3.97 MILLION/UL (ref 3.88–5.62)
RBC #/AREA URNS AUTO: NORMAL /HPF
SODIUM SERPL-SCNC: 133 MMOL/L (ref 135–147)
SP GR UR STRIP.AUTO: 1.01 (ref 1–1.03)
UROBILINOGEN UR QL STRIP.AUTO: 0.2 E.U./DL
WBC # BLD AUTO: 6.57 THOUSAND/UL (ref 4.31–10.16)
WBC #/AREA URNS AUTO: NORMAL /HPF

## 2023-08-03 PROCEDURE — 81001 URINALYSIS AUTO W/SCOPE: CPT | Performed by: EMERGENCY MEDICINE

## 2023-08-03 PROCEDURE — 83735 ASSAY OF MAGNESIUM: CPT | Performed by: EMERGENCY MEDICINE

## 2023-08-03 PROCEDURE — 36415 COLL VENOUS BLD VENIPUNCTURE: CPT | Performed by: EMERGENCY MEDICINE

## 2023-08-03 PROCEDURE — 93005 ELECTROCARDIOGRAM TRACING: CPT

## 2023-08-03 PROCEDURE — G1004 CDSM NDSC: HCPCS

## 2023-08-03 PROCEDURE — 85025 COMPLETE CBC W/AUTO DIFF WBC: CPT | Performed by: EMERGENCY MEDICINE

## 2023-08-03 PROCEDURE — 74177 CT ABD & PELVIS W/CONTRAST: CPT

## 2023-08-03 PROCEDURE — 80053 COMPREHEN METABOLIC PANEL: CPT | Performed by: EMERGENCY MEDICINE

## 2023-08-03 RX ORDER — POTASSIUM CHLORIDE 20 MEQ/1
40 TABLET, EXTENDED RELEASE ORAL ONCE
Status: COMPLETED | OUTPATIENT
Start: 2023-08-03 | End: 2023-08-03

## 2023-08-03 RX ORDER — POTASSIUM CHLORIDE 14.9 MG/ML
20 INJECTION INTRAVENOUS ONCE
Status: COMPLETED | OUTPATIENT
Start: 2023-08-03 | End: 2023-08-03

## 2023-08-03 RX ADMIN — POTASSIUM CHLORIDE 20 MEQ: 200 INJECTION, SOLUTION INTRAVENOUS at 14:30

## 2023-08-03 RX ADMIN — POTASSIUM CHLORIDE 20 MEQ: 14.9 INJECTION, SOLUTION INTRAVENOUS at 12:28

## 2023-08-03 RX ADMIN — IOHEXOL 100 ML: 350 INJECTION, SOLUTION INTRAVENOUS at 12:23

## 2023-08-03 RX ADMIN — POTASSIUM CHLORIDE 40 MEQ: 1500 TABLET, EXTENDED RELEASE ORAL at 12:28

## 2023-08-03 NOTE — TELEPHONE ENCOUNTER
Call transferred by [facility]. Patient reports pain constant in groin area. Patient reports increase of pain radiating in lower pelvic area, increasing with movement. Pain is rated 8/10 with movement . 6-7/10 with rest. Sensitive to touch. Patient is able to void. Urine is reported as clear to yellow in color . No fevers or chills. Patient is taking oxycodone, gabapentin  as part of his current pain regiment . Medication is not helping with the pain. Pharmacy confirmed as:  [id number] patient will reach out to provider for recommendation . TT provider on call . Response pending.

## 2023-08-03 NOTE — TELEPHONE ENCOUNTER
Patient had an appt with Dr Gulshan Portillo in [place] on 8/1 for bladder instillation    Patient is calling for recommendations. Patient is having extreme pain in his groin that started on Tuesday night and has been increasing since. Patient is having difficulty walking. This is his first time having this reaction with bladder instillation. Transferred to triage nurse [address] [place] [address].

## 2023-08-03 NOTE — ED PROVIDER NOTES
History  Chief Complaint   Patient presents with   • Groin Pain     Patient reports right groin pain radiating across lower abdomen, states pain has worsening since immunotherapy treatment via urology on Tuesday. History provided by:  Medical records and patient  Groin Pain  Presenting symptoms: no dysuria and no swelling    Presenting symptoms comment:  Bilateral inguinal discomfort  Context comment:  Bilateral inguinal discomfort starting the day after BCG bladder instillation 2 days prior to arrival  Relieved by:  Nothing  Worsened by: Movement (Ambulation)  Ineffective treatments:  None tried  Associated symptoms: abdominal pain and groin pain    Associated symptoms: no fever, no flank pain, no genital itching, no genital lesions, no genital rash, no hematuria, no nausea, no penile redness, no penile swelling, no scrotal swelling, no urinary frequency, no urinary hesitation, no urinary incontinence, no urinary retention and no vomiting    Risk factors: bladder surgery        Prior to Admission Medications   Prescriptions Last Dose Informant Patient Reported? Taking?    Farxiga 5 MG TABS 8/3/2023 Self Yes Yes   Si mg daily 5mg alternating with 2.5mg for fluid retention   aspirin 81 mg chewable tablet 8/3/2023 Self Yes Yes   Sig: Chew 81 mg daily   atorvastatin (LIPITOR) 40 mg tablet 8/3/2023 Self Yes Yes   co-enzyme Q-10 30 MG capsule 8/3/2023 Self Yes Yes   Sig: Take 100 mg by mouth daily    gabapentin (NEURONTIN) 300 mg capsule 8/3/2023 at 0800  No Yes   Sig: Take 2 capsules (600 mg total) by mouth 2 (two) times a day   multivitamin-iron-minerals-folic acid (CENTRUM) chewable tablet 8/3/2023 Self Yes Yes   Sig: Chew 1 tablet daily   oxyCODONE (OxyCONTIN) 40 mg 12 hr tablet 8/3/2023 at 0500  No Yes   Sig: Take 1 tablet (40 mg total) by mouth every 12 (twelve) hours Max Daily Amount: 80 mg   oxyCODONE (ROXICODONE) 30 MG immediate release tablet 8/3/2023 at 0800  No Yes   Sig: Take 0.5-1 tablets (15-30 mg total) by mouth every 4 (four) hours as needed (15mg for moderate pain, 30mg for severe pain) Max 6 tabs/day. Max Daily Amount: 180 mg   potassium chloride (K-DUR,KLOR-CON) 20 mEq tablet 8/3/2023 Self Yes Yes   Si (two) times a day   tamsulosin (FLOMAX) 0.4 mg 8/3/2023  No Yes   Sig: Take 1 capsule (0.4 mg total) by mouth daily with dinner   torsemide (DEMADEX) 20 mg tablet 8/3/2023 Self Yes Yes   Sig: Take 60 mg by mouth daily      Facility-Administered Medications: None       Past Medical History:   Diagnosis Date   • Arthritis    • Bladder cancer ([address])    • Chronic narcotic dependence (HCC)    • Chronic pain disorder     lower back and down both legs   • Colon polyp    • Coronary artery disease    • CPAP (continuous positive airway pressure) dependence    • Does use hearing aid     will wear DOS   • Full dentures    • Heart failure ([address])     and "fluid retention" [facility]"   • High cholesterol    • Hypertension    • Mild ankle edema     and feet   • Obstructive sleep apnea on CPAP    • Prediabetes    • Shortness of breath     per pt "with just exertion"   • Sleep apnea    • Wears glasses        Past Surgical History:   Procedure Laterality Date   • BACK SURGERY      titanium rods implanted   • COLONOSCOPY     • CYSTOSCOPY W/ URETERAL STENT PLACEMENT Bilateral 3/17/2023    Procedure: bilateral retrograde;  Surgeon: Aliya Quigley MD;  Location:  MAIN OR;  Service: Urology   • HERNIA REPAIR      x5   • LUMBAR LAMINECTOMY N/A 2023    Procedure: Left L3-4 Metrx hemilaminectomy and bilateral foraminotomies;  Surgeon: Zulma Pak MD;  Location:  MAIN OR;  Service: Neurosurgery   • CT CYSTO W/REMOVAL OF LESIONS SMALL N/A 2023    Procedure: CYSTOSCOPY TURBT;  Surgeon: Aliya Quigley MD;  Location:  MAIN OR;  Service: Urology   • TRANSURETHRAL RESECTION OF BLADDER TUMOR N/A 3/17/2023    Procedure: TRANSURETHRAL RESECTION OF BLADDER TUMOR (TURBT);   Surgeon: Aliya Quigley MD; Location:  MAIN OR;  Service: Urology       Family History   Problem Relation Age of Onset   • Cancer Father    • Aortic aneurysm Father    • Leukemia Father    • Hypertension Mother      I have reviewed and agree with the history as documented. E-Cigarette/Vaping   • E-Cigarette Use Never User      E-Cigarette/Vaping Substances   • Nicotine No    • THC No    • CBD No    • Flavoring No    • Other No    • Unknown No      Social History     Tobacco Use   • Smoking status: Former     Packs/day: 1.00     Years: 35.00     Total pack years: 35.00     Types: Cigarettes     Start date: 2023     Quit date: 2016     Years since quittin.5   • Smokeless tobacco: Never   Vaping Use   • Vaping Use: Never used   Substance Use Topics   • Alcohol use: Not Currently     Comment: 3 per year   • Drug use: Not Currently     Types: Marijuana       Review of Systems   Constitutional: Negative for appetite change, chills, fatigue and fever. HENT: Negative for ear pain, rhinorrhea, sore throat and trouble swallowing. Eyes: Negative for pain, discharge and visual disturbance. Respiratory: Negative for cough, chest tightness and shortness of breath. Cardiovascular: Negative for chest pain and palpitations. Gastrointestinal: Positive for abdominal pain. Negative for nausea and vomiting. Endocrine: Negative for polydipsia, polyphagia and polyuria. Genitourinary: Negative for bladder incontinence, difficulty urinating, dysuria, flank pain, frequency, hematuria, hesitancy, penile swelling, scrotal swelling and testicular pain. Musculoskeletal: Negative for arthralgias and back pain. Skin: Negative for color change and rash. Allergic/Immunologic: Negative for immunocompromised state. Neurological: Negative for dizziness, seizures, syncope, weakness and headaches. Hematological: Negative for adenopathy. Psychiatric/Behavioral: Negative for confusion and dysphoric mood.    All other systems reviewed and are negative. Physical Exam  Physical Exam  Constitutional:       General: He is not in acute distress. Appearance: Normal appearance. He is not ill-appearing, toxic-appearing or diaphoretic. HENT:      Head: Normocephalic and atraumatic. Nose: Nose normal. No congestion or rhinorrhea. Mouth/Throat:      Mouth: Mucous membranes are moist.      Pharynx: Oropharynx is clear. No oropharyngeal exudate or posterior oropharyngeal erythema. Eyes:      General:         Right eye: No discharge. Left eye: No discharge. Cardiovascular:      Rate and Rhythm: Normal rate and regular rhythm. Pulses: Normal pulses. Heart sounds: Normal heart sounds. No murmur heard. No gallop. Pulmonary:      Effort: Pulmonary effort is normal. No respiratory distress. Breath sounds: Normal breath sounds. No stridor. No wheezing, rhonchi or rales. Chest:      Chest wall: No tenderness. Abdominal:      General: Bowel sounds are normal. There is no distension. Palpations: Abdomen is soft. There is no mass. Tenderness: There is no abdominal tenderness. There is no right CVA tenderness, left CVA tenderness, guarding or rebound. Hernia: No hernia is present. Musculoskeletal:         General: Normal range of motion. Cervical back: Normal range of motion and neck supple. Skin:     General: Skin is warm and dry. Capillary Refill: Capillary refill takes less than 2 seconds. Neurological:      General: No focal deficit present. Mental Status: He is alert and oriented to person, place, and time. Cranial Nerves: No cranial nerve deficit. Sensory: No sensory deficit. Motor: No weakness. Coordination: Coordination normal.      Gait: Gait normal.      Deep Tendon Reflexes: Reflexes normal.   Psychiatric:         Mood and Affect: Mood normal.         Behavior: Behavior normal.         Thought Content:  Thought content normal.         Judgment: Judgment normal.         Vital Signs  ED Triage Vitals [08/03/23 1059]   Temperature Pulse Respirations Blood Pressure SpO2   97.6 °F (36.4 °C) 67 20 138/62 95 %      Temp src Heart Rate Source Patient Position - Orthostatic VS BP Location FiO2 (%)   -- Monitor Lying Right arm --      Pain Score       9           Vitals:    08/03/23 1059 08/03/23 1230 08/03/23 1245 08/03/23 1300   BP: 138/62 144/66 141/66 150/76   Pulse: 67 66 73    Patient Position - Orthostatic VS: Lying            Visual Acuity      ED Medications  Medications   potassium chloride 20 mEq IVPB (premix) (20 mEq Intravenous New Bag 8/3/23 1430)   potassium chloride (K-DUR,KLOR-CON) CR tablet 40 mEq (40 mEq Oral Given 8/3/23 1228)   potassium chloride 20 mEq IVPB (premix) (0 mEq Intravenous Stopped 8/3/23 1430)   iohexol (OMNIPAQUE) 350 MG/ML injection (SINGLE-DOSE) 100 mL (100 mL Intravenous Given 8/3/23 1223)       Diagnostic Studies  Results Reviewed     Procedure Component Value Units Date/Time    Magnesium [[id number]]  (Normal) Collected: 08/03/23 1134    Lab Status: Final result Specimen: Blood from Arm, Right Updated: 08/03/23 1244     Magnesium 2.1 mg/dL     Comprehensive metabolic panel [[id number]]  (Abnormal) Collected: 08/03/23 1134    Lab Status: Final result Specimen: Blood from Arm, Right Updated: 08/03/23 1207     Sodium 133 mmol/L      Potassium 2.4 mmol/L      Chloride 86 mmol/L      CO2 37 mmol/L      ANION GAP 10 mmol/L      BUN 30 mg/dL      Creatinine 1.13 mg/dL      Glucose 181 mg/dL      Calcium 9.2 mg/dL      AST 18 U/L      ALT 21 U/L      Alkaline Phosphatase 80 U/L      Total Protein 7.4 g/dL      Albumin 3.9 g/dL      Total Bilirubin 0.47 mg/dL      eGFR 68 ml/min/1.73sq m     Narrative:      [place] guidelines for Chronic Kidney Disease (CKD):   •  Stage 1 with normal or high GFR (GFR > 90 mL/min/1.73 square meters)  •  Stage 2 Mild CKD (GFR = 60-89 mL/min/1.73 square meters)  •  Stage 3A Moderate CKD (GFR = 45-59 mL/min/1.73 square meters)  •  Stage 3B Moderate CKD (GFR = 30-44 mL/min/1.73 square meters)  •  Stage 4 Severe CKD (GFR = 15-29 mL/min/1.73 square meters)  •  Stage 5 End Stage CKD (GFR <15 mL/min/1.73 square meters)  Note: GFR calculation is accurate only with a steady state creatinine    Urine Microscopic [[id number]]  (Normal) Collected: 08/03/23 1134    Lab Status: Final result Specimen: Urine, Other Updated: 08/03/23 1152     RBC, UA 0-1 /hpf      WBC, UA None Seen /hpf      Epithelial Cells None Seen /hpf      Bacteria, UA None Seen /hpf     UA w Reflex to Microscopic w Reflex to Culture [[id number]]  (Abnormal) Collected: 08/03/23 1134    Lab Status: Final result Specimen: Urine, Other Updated: 08/03/23 1145     Color, UA Yellow     Clarity, UA Clear     Specific Gravity, UA 1.010     pH, UA 7.0     Leukocytes, UA Negative     Nitrite, UA Negative     Protein, UA Negative mg/dl      Glucose,  (1/2%) mg/dl      Ketones, UA Negative mg/dl      Urobilinogen, UA 0.2 E.U./dl      Bilirubin, UA Negative     Occult Blood, UA Trace-Intact    CBC and differential [[id number]]  (Abnormal) Collected: 08/03/23 1134    Lab Status: Final result Specimen: Blood from Arm, Right Updated: 08/03/23 1142     WBC 6.57 Thousand/uL      RBC 3.97 Million/uL      Hemoglobin 12.3 g/dL      Hematocrit 35.9 %      MCV 90 fL      MCH 31.0 pg      MCHC 34.3 g/dL      RDW 15.1 %      MPV 10.8 fL      Platelets 730 Thousands/uL      nRBC 0 /100 WBCs      Neutrophils Relative 59 %      Immat GRANS % 0 %      Lymphocytes Relative 27 %      Monocytes Relative 12 %      Eosinophils Relative 1 %      Basophils Relative 1 %      Neutrophils Absolute 3.87 Thousands/µL      Immature Grans Absolute 0.02 Thousand/uL      Lymphocytes Absolute 1.78 Thousands/µL      Monocytes Absolute 0.78 Thousand/µL      Eosinophils Absolute 0.08 Thousand/µL      Basophils Absolute 0.04 Thousands/µL                  CT abdomen pelvis with contrast Final Result by Zafar Horta MD (08/03 1254)      Mild thickening and hyperemia of the inferior bladder may be post therapy changes. Correlate with UA to exclude UTI. Otherwise, no evidence of acute abdominopelvic process. Chronic findings and negatives as above. Workstation performed: [id number]                    Procedures  Procedures         ED Course                               SBIRT 20yo+    Flowsheet Row Most Recent Value   Initial Alcohol Screen: US AUDIT-C     1. How often do you have a drink containing alcohol? 0 Filed at: 08/03/2023 1101   2. How many drinks containing alcohol do you have on a typical day you are drinking? 0 Filed at: 08/03/2023 1101   3a. Male UNDER 65: How often do you have five or more drinks on one occasion? 0 Filed at: 08/03/2023 1101   Audit-C Score 0 Filed at: 08/03/2023 1101   ANN: How many times in the past year have you. .. Used an illegal drug or used a prescription medication for non-medical reasons? Never Filed at: 08/03/2023 1101                    Medical Decision Making  2256: Patient appears well, vital signs reviewed. Patient has bilateral lower quadrant abdominal tenderness worse on the right-hand side. Recent BCG bladder instillation. Plan to check urinalysis, if there is no evidence of gross infection, plan to complete CT abdomen pelvis. 1307: CT and labs reviewed. Patient has remained stable throughout ED course. Patient was noted again to have hypokalemia. Discussed with hospitalist for potential observational admission, they preferred to have supplementation here in the emergency room and if safe for discharge will allow to have follow-up with PCP for repeat. Patient asymptomatic of the hypokalemia. I will encourage strict use of his standing potassium regimen.     Hypokalemia: chronic illness or injury  Lower abdominal pain: acute illness or injury  Right groin pain: acute illness or injury  Amount and/or Complexity of Data Reviewed  Labs: ordered. Radiology: ordered and independent interpretation performed. Details: CT abdomen pelvis      Risk  Prescription drug management. Disposition  Final diagnoses:   Right groin pain   Lower abdominal pain   Hypokalemia     Time reflects when diagnosis was documented in both MDM as applicable and the Disposition within this note     Time User Action Codes Description Comment    8/3/2023  1:03 PM Gilles Lowers [R10.31] Right groin pain     8/3/2023  1:03 PM Cyrilla Bitters Add [R10.30] Lower abdominal pain     8/3/2023  1:03 PM Cyrilla Bitters Add [E87.6] Hypokalemia       ED Disposition     ED Disposition   Discharge    Condition   Stable    Date/Time   Thu Aug 3, 2023  1:08 PM    Comment   Bipin Reza discharge to home/self care. Follow-up Information     Follow up With Specialties Details Why Contact Rachel Yeh DO Family Medicine Schedule an appointment as soon as possible for a visit  for repeat potassium level in 1 week [address] [facility]  [phone]            Patient's Medications   Discharge Prescriptions    No medications on file       No discharge procedures on file.     PDMP Review       Value Time User    PDMP Reviewed  Yes 8/1/2023 12:43 PM Haider Sharma PA-C          ED Provider  Electronically Signed by           Liset Fernandez MD  08/03/23 5131

## 2023-08-04 ENCOUNTER — TELEPHONE (OUTPATIENT)
Dept: UROLOGY | Facility: CLINIC | Age: 63
End: 2023-08-04

## 2023-08-04 ENCOUNTER — PATIENT OUTREACH (OUTPATIENT)
Dept: HEMATOLOGY ONCOLOGY | Facility: CLINIC | Age: 63
End: 2023-08-04

## 2023-08-04 ENCOUNTER — DOCUMENTATION (OUTPATIENT)
Dept: HEMATOLOGY ONCOLOGY | Facility: CLINIC | Age: 63
End: 2023-08-04

## 2023-08-04 LAB
ATRIAL RATE: 66 BPM
P AXIS: 74 DEGREES
PR INTERVAL: 190 MS
QRS AXIS: 33 DEGREES
QRSD INTERVAL: 90 MS
QT INTERVAL: 422 MS
QTC INTERVAL: 442 MS
T WAVE AXIS: 26 DEGREES
VENTRICULAR RATE: 66 BPM

## 2023-08-04 PROCEDURE — 93010 ELECTROCARDIOGRAM REPORT: CPT | Performed by: INTERNAL MEDICINE

## 2023-08-04 NOTE — PROGRESS NOTES
Received voicemail from pt:  Rupa Pendleton. This is Noemi Davidson, [employer] [facility] [employer] calling. You would help me out at the very beginning of this process. And at this time, I'm a little bit lost and I'm struggling a little bit as far as getting some direction with where I'm at and what I'm dealing on a weekly basis. And I just don't know who to reach out to. So I'm reaching back to you and hoping that you can give me the name of that magic person who can help me coordinate things and answer my questions. Thank you so much. My phone number is [phone]. I'm calling you Tuesday evening and hopefully I mean Thursday evening. And hopefully I can speak to you sometime tomorrow. Thank you again. Bye, bye. Incoming call from pt. Pt very tearful and overwhelmed. States after every treatment he receives, he ends up with some type of reaction and landing in the ER at least once a week. Has been having issues with his potassium dropping frequently and abnormal labs. Doesn't have anyone to contact regarding concerns and looking for direction on how to proceed and be proactive to not end up in ER again. Inquiring about having standing lab orders and questioning frequency. C/o Severe groin pain and went to ER yesterday due to severity but was sent home. Pain persists. Pt overwhelmed with all these complications and issues. Assured him I would send a message to the Clinical team at [facility] and also Lisa Guardado, Practice Clinical Coordinator, requesting outreach to him to address concerns. Reassured him that he can always call me.

## 2023-08-04 NOTE — PROGRESS NOTES
Incoming call from pt. States after every treatment he receives, he ends up with some type of reaction and landing in the ER at least once a week. Has been having issues with his potassium dropping frequently and abnormal labs. Doesn't have anyone to contact regarding concerns and looking for direction on how to proceed and be proactive to not end up in ER again. Inquiring about having standing lab orders and questioning frequency. C/o Severe groin pain and went to ER yesterday due to severity but was sent home and pain is persistent. Pt overwhelmed with all these complications and issues. Has 1 more treatment with BCG and is extremely fearful due to all complications. Does not know who to contact when he is having these complications. Please reach out to pt, provide recommendations and contact information for who to contact regarding symptoms from treatment.

## 2023-08-04 NOTE — PROGRESS NOTES
Spoke with pt and he is still having severe pain over pubic area. He does want to finish his last BCG treatment on Wednesday 8/9/23, but is nervous due to on going side effects. He would like Dr. David Jc to advise on his issues. He also has an appt. with Palliative Care on Tuesday, for his back pain and low potassium levels.

## 2023-08-06 PROBLEM — R10.2 SUPRAPUBIC PRESSURE: Status: ACTIVE | Noted: 2023-08-06

## 2023-08-06 PROBLEM — R10.31 RIGHT GROIN PAIN: Status: ACTIVE | Noted: 2023-08-06

## 2023-08-06 NOTE — PROGRESS NOTES
UROLOGY PROGRESS NOTE         NAME: Aliza Choi  AGE: 58 y.o. SEX: male  : 1960   MRN: [id number]    DATE: 2023  TIME: 10:53 AM    Assessment and Plan         Bladder instillation     Date/Time 2023 1:45 PM     Performed by  John Benoit MD   Authorized by oJhn Benoit MD     Procedure Details   Procedure Notes: Patient was placed supine and prepped and draped in usual sterile fashion using a 14 red rubber catheter we then instilled 50 cc of BCG without difficulty patient tolerated well              Impression:   1. Malignant neoplasm of lateral wall of urinary bladder (HCC)    2. Benign prostatic hyperplasia with nocturia    3. History of gross hematuria    4. Suprapubic pressure    5. Right groin pain         Plan: Will see him back towards the end of September for an H&P update and sign the consent form and get him set up for cystoscopy bladder biopsies at [address] in 2023. Patient received the Cipro and will hold medicine in his bladder for 2 hours. All questions were answered. Chief Complaint   No chief complaint on file. History of Present Illness     HPI: Aliza Choi is a 58y.o. year old male who presents with follow-up for final induction therapy treatment for his bladder cancer with BCG. This would be his 6 treatment. Apparently the patient was having some suprapubic pressure and groin pain since his last office visit and treatment #5. Patient was seen in the ER on 8/3/2023 potassium level was 2.4. His creatinine level was normal.    His urinalysis showed trace blood but no sign of infection leukocyte nitrites negative. Had a normal white count. In the ER they did a CT scan that showed some mild thickening and hyperemia of the inferior bladder wall likely postsurgical changes. They sent the patient home after him receiving potassium. He also complained again of right groin pain and lower abdominal pain. Currently patient voiding well.   Has some bilateral groin discomfort and SP discomfort. Patient states that the Motrin I recommended and a heating pad have not really helped much. Patient is seeing neurology for further evaluation. It is possible this is neuropathic pain. In the absence of voiding symptoms I really do not think this has anything to do with his BCG which she is on his last treatment today.       The following portions of the patient's history were reviewed and updated as appropriate: allergies, current medications, past family history, past medical history, past social history, past surgical history and problem list.  Past Medical History:   Diagnosis Date   • Arthritis    • Bladder cancer ([address])    • Chronic narcotic dependence ([address])    • Chronic pain disorder     lower back and down both legs   • Colon polyp    • Coronary artery disease    • CPAP (continuous positive airway pressure) dependence    • Does use hearing aid     will wear DOS   • Full dentures    • Heart failure ([address])     and "fluid retention" [facility] YEIMI DUMAS"   • High cholesterol    • Hypertension    • Mild ankle edema     and feet   • Obstructive sleep apnea on CPAP    • Prediabetes    • Shortness of breath     per pt "with just exertion"   • Sleep apnea    • Wears glasses      Past Surgical History:   Procedure Laterality Date   • BACK SURGERY      titanium rods implanted   • COLONOSCOPY     • CYSTOSCOPY W/ URETERAL STENT PLACEMENT Bilateral 3/17/2023    Procedure: bilateral retrograde;  Surgeon: Raffy Cespedes MD;  Location:  MAIN OR;  Service: Urology   • HERNIA REPAIR      x5   • LUMBAR LAMINECTOMY N/A 6/30/2023    Procedure: Left L3-4 Metrx hemilaminectomy and bilateral foraminotomies;  Surgeon: Norberto Acevedo MD;  Location:  MAIN OR;  Service: Neurosurgery   • CO CYSTO W/REMOVAL OF LESIONS SMALL N/A 5/1/2023    Procedure: CYSTOSCOPY TURBT;  Surgeon: Raffy Cespedes MD;  Location: [facility];  Service: Urology   • TRANSURETHRAL RESECTION OF BLADDER TUMOR N/A 3/17/2023    Procedure: TRANSURETHRAL RESECTION OF BLADDER TUMOR (TURBT); Surgeon: Amando Lyon MD;  Location: [facility];  Service: Urology     shoulder  Review of Systems     Const: Denies chills, fever and weight loss. CV: Denies chest pain. Resp: Denies SOB. GI: Denies abdominal pain, nausea and vomiting. : Denies symptoms other than stated above. Musculo: Denies back pain. Objective   There were no vitals taken for this visit. Physical Exam  Const: Appears healthy and well developed. No signs of acute distress present. Resp: Respirations are regular and unlabored. CV: Rate is regular. Rhythm is regular. Abdomen: Abdomen is soft, nontender, and nondistended. Kidneys are not palpable. : nl  Psych: Patient's attitude is cooperative. Mood is normal. Affect is normal.    Current Medications     Current Outpatient Medications:   •  aspirin 81 mg chewable tablet, Chew 81 mg daily, Disp: , Rfl:   •  atorvastatin (LIPITOR) 40 mg tablet, , Disp: , Rfl:   •  co-enzyme Q-10 30 MG capsule, Take 100 mg by mouth daily , Disp: , Rfl:   •  Farxiga 5 MG TABS, 5 mg daily 5mg alternating with 2.5mg for fluid retention, Disp: , Rfl:   •  gabapentin (NEURONTIN) 300 mg capsule, Take 2 capsules (600 mg total) by mouth 2 (two) times a day, Disp: 120 capsule, Rfl: 0  •  multivitamin-iron-minerals-folic acid (CENTRUM) chewable tablet, Chew 1 tablet daily, Disp: , Rfl:   •  oxyCODONE (OxyCONTIN) 40 mg 12 hr tablet, Take 1 tablet (40 mg total) by mouth every 12 (twelve) hours Max Daily Amount: 80 mg, Disp: 45 tablet, Rfl: 0  •  oxyCODONE (ROXICODONE) 30 MG immediate release tablet, Take 0.5-1 tablets (15-30 mg total) by mouth every 4 (four) hours as needed (15mg for moderate pain, 30mg for severe pain) Max 6 tabs/day.  Max Daily Amount: 180 mg, Disp: 90 tablet, Rfl: 0  •  potassium chloride (K-DUR,KLOR-CON) 20 mEq tablet, 2 (two) times a day, Disp: , Rfl:   •  tamsulosin (FLOMAX) 0.4 mg, Take 1 capsule (0.4 mg total) by mouth daily with dinner, Disp: 90 capsule, Rfl: 3  •  torsemide (DEMADEX) 20 mg tablet, Take 60 mg by mouth daily, Disp: , Rfl:         Amando Lyon MD

## 2023-08-08 ENCOUNTER — OFFICE VISIT (OUTPATIENT)
Age: 63
End: 2023-08-08
Payer: MEDICARE

## 2023-08-08 ENCOUNTER — OFFICE VISIT (OUTPATIENT)
Dept: NEUROSURGERY | Facility: CLINIC | Age: 63
End: 2023-08-08

## 2023-08-08 VITALS
SYSTOLIC BLOOD PRESSURE: 127 MMHG | HEIGHT: 68 IN | TEMPERATURE: 98.1 F | HEART RATE: 67 BPM | DIASTOLIC BLOOD PRESSURE: 73 MMHG | WEIGHT: 244 LBS | BODY MASS INDEX: 36.98 KG/M2

## 2023-08-08 VITALS
HEART RATE: 71 BPM | WEIGHT: 244 LBS | BODY MASS INDEX: 36.98 KG/M2 | SYSTOLIC BLOOD PRESSURE: 130 MMHG | DIASTOLIC BLOOD PRESSURE: 83 MMHG | HEIGHT: 68 IN | TEMPERATURE: 98.3 F

## 2023-08-08 DIAGNOSIS — E11.29 TYPE 2 DIABETES MELLITUS WITH OTHER DIABETIC KIDNEY COMPLICATION (HCC): ICD-10-CM

## 2023-08-08 DIAGNOSIS — I10 PRIMARY HYPERTENSION: ICD-10-CM

## 2023-08-08 DIAGNOSIS — F41.9 ANXIETY: ICD-10-CM

## 2023-08-08 DIAGNOSIS — Z71.89 COUNSELING AND COORDINATION OF CARE: ICD-10-CM

## 2023-08-08 DIAGNOSIS — J96.01 ACUTE RESPIRATORY FAILURE WITH HYPOXIA (HCC): ICD-10-CM

## 2023-08-08 DIAGNOSIS — C67.3 MALIGNANT NEOPLASM OF ANTERIOR WALL OF URINARY BLADDER (HCC): ICD-10-CM

## 2023-08-08 DIAGNOSIS — G47.33 OBSTRUCTIVE SLEEP APNEA ON CPAP: ICD-10-CM

## 2023-08-08 DIAGNOSIS — R10.2 SUPRAPUBIC PRESSURE: ICD-10-CM

## 2023-08-08 DIAGNOSIS — Z99.89 OBSTRUCTIVE SLEEP APNEA ON CPAP: ICD-10-CM

## 2023-08-08 DIAGNOSIS — Z51.5 PALLIATIVE CARE BY SPECIALIST: ICD-10-CM

## 2023-08-08 DIAGNOSIS — I50.33 ACUTE ON CHRONIC DIASTOLIC HEART FAILURE (HCC): ICD-10-CM

## 2023-08-08 DIAGNOSIS — I25.10 CORONARY ARTERY DISEASE: ICD-10-CM

## 2023-08-08 DIAGNOSIS — M54.42 CHRONIC BILATERAL LOW BACK PAIN WITH BILATERAL SCIATICA: ICD-10-CM

## 2023-08-08 DIAGNOSIS — I50.32 CHRONIC DIASTOLIC HEART FAILURE (HCC): ICD-10-CM

## 2023-08-08 DIAGNOSIS — Z48.811 ENCOUNTER FOR SURGICAL AFTERCARE FOLLOWING SURGERY OF NERVOUS SYSTEM: ICD-10-CM

## 2023-08-08 DIAGNOSIS — G89.29 CHRONIC BILATERAL LOW BACK PAIN WITH BILATERAL SCIATICA: ICD-10-CM

## 2023-08-08 DIAGNOSIS — Z09 POSTOPERATIVE EXAMINATION: ICD-10-CM

## 2023-08-08 DIAGNOSIS — R10.9 ABDOMINAL PAIN: Primary | ICD-10-CM

## 2023-08-08 DIAGNOSIS — R20.2 PARESTHESIA OF BILATERAL LEGS: ICD-10-CM

## 2023-08-08 DIAGNOSIS — G89.3 CANCER-RELATED PAIN: ICD-10-CM

## 2023-08-08 DIAGNOSIS — M54.41 CHRONIC BILATERAL LOW BACK PAIN WITH BILATERAL SCIATICA: ICD-10-CM

## 2023-08-08 DIAGNOSIS — C67.2 MALIGNANT NEOPLASM OF LATERAL WALL OF URINARY BLADDER (HCC): Primary | ICD-10-CM

## 2023-08-08 PROCEDURE — 99215 OFFICE O/P EST HI 40 MIN: CPT | Performed by: PHYSICIAN ASSISTANT

## 2023-08-08 PROCEDURE — 99024 POSTOP FOLLOW-UP VISIT: CPT | Performed by: NEUROLOGICAL SURGERY

## 2023-08-08 RX ORDER — OXYCODONE HYDROCHLORIDE 30 MG/1
15-30 TABLET ORAL EVERY 4 HOURS PRN
Qty: 90 TABLET | Refills: 0 | Status: SHIPPED | OUTPATIENT
Start: 2023-08-08

## 2023-08-08 RX ORDER — METOLAZONE 2.5 MG/1
2.5 TABLET ORAL DAILY
COMMUNITY

## 2023-08-08 RX ORDER — GABAPENTIN 300 MG/1
CAPSULE ORAL
Qty: 150 CAPSULE | Refills: 0 | Status: SHIPPED | OUTPATIENT
Start: 2023-08-08

## 2023-08-08 NOTE — PATIENT INSTRUCTIONS
Call for refill of OxyContin when running low, will decrease to 30mg twice daily if symptoms remain controlled. Refill Oxycodone today. Increase gabapentin. Take an extra capsule at lunch. Refer to palliative LCSW. Follow up with cardiology for management of hypokalemia  Follow up with Urology tomorrow  Can contact Yvette Hurtado, or Patrick Salvador (oncology nurse coordinators)  Follow up with neuro today. Referral to [facility] PCP to establish care  [facility]. [address]   [phone]  Hypokalemia   WHAT YOU NEED TO KNOW:   Hypokalemia is a low level of potassium in your blood. Potassium helps control how your muscles, heart, and digestive system work. Hypokalemia occurs when your body loses too much potassium or does not absorb enough from food. DISCHARGE INSTRUCTIONS:   Return to the emergency department if:   You cannot move your arm or leg. You have a fast or irregular heartbeat. You are too tired or weak to stand up. Contact your healthcare provider if:   You are vomiting, or you have diarrhea. You have numbness or tingling in your arms or legs. Your symptoms do not go away or they get worse. You have questions or concerns about your condition or care. Medicines:   Potassium  will be given to bring your potassium levels back to normal.    Take your medicine as directed. Contact your healthcare provider if you think your medicine is not helping or if you have side effects. Tell your provider if you are allergic to any medicine. Keep a list of the medicines, vitamins, and herbs you take. Include the amounts, and when and why you take them. Bring the list or the pill bottles to follow-up visits. Carry your medicine list with you in case of an emergency. Eat foods that are high in potassium:  Foods that are high in potassium include bananas, oranges, tomatoes, potatoes, and avocado.  Ashraf beans, turkey, salmon, lean beef, yogurt, and milk are also high in potassium. Ask your healthcare provider or dietitian for more information about foods that are high in potassium. Follow up with your healthcare provider as directed:  Write down your questions so you remember to ask them during your visits. © [facility] Darrell Lai 2022 Information is for End User's use only and may not be sold, redistributed or otherwise used for commercial purposes. The above information is an  only. It is not intended as medical advice for individual conditions or treatments. Talk to your doctor, nurse or pharmacist before following any medical regimen to see if it is safe and effective for you.

## 2023-08-08 NOTE — PROGRESS NOTES
DISCUSSION SUMMARY   This is a 58 y.o. male who complains of anterior abdominal pain radiating into the inguinal region bilaterally. He says that this pain began following infusion of BCG through a Barragan catheter. He believes the pain is worsening. His low back pain and leg pain which was present previously are completely gone following his surgery. He is concerned that this represents a worsening of his lumbar spine in someway. I believe that this is likely is secondary to his urologic procedure but in an abundance of caution I have recommended checking routine laboratory studies and getting an MRI of the LS spine. Return for virtual visit to discuss labs and follow up in person after completion of MRI lumbar spine. Diagnosis ICD-10-CM Associated Orders   1. Abdominal pain  R10.9 MRI lumbar spine with and without contrast     CBC and differential     C-reactive protein     Sedimentation rate, automated      2. Encounter for surgical aftercare following surgery of nervous system  Z48.811 MRI lumbar spine with and without contrast     CBC and differential     C-reactive protein     Sedimentation rate, automated      3. Suprapubic pressure  R10.2 MRI lumbar spine with and without contrast     CBC and differential     C-reactive protein     Sedimentation rate, automated      4. Postoperative examination  Z09            Chief Complaint   Patient presents with   • Post-op     6 weeks POV, no imaging per Rodrigo, s/p Lt L3-4 hemilami and b/l shavon 6/30/23 DKO        History of Present Illness   Patient presented to the hospital with long history of back problems and found to have severe spinal stenosis which became symptomatic following positioning necessary for surgical procedure. Patient underwent a Left L3-4 Metrx hemilaminectomy and bilateral foraminotomies  (6/30/23 DKO). Today, patient reports that he was doing well after surgery but he had cancer treatment and has been having new pain ever since.  He c/o pain in bilateral groin into the hip joint on both sides. Sometimes the pain will radiate to the knees and on rare occasions to the feet/toes. He states that he started PT 2 weeks ago but with recent ED visit the appts were postponed. Patient began immediately after BCG infusion with bilateral lower abdominal pain and inguinal pain. He had a complete resolution of his low back pain and leg pain following a surgical endeavor. Review of Systems   Constitutional: Positive for activity change (decreased). HENT: Negative. Eyes: Negative. Respiratory: Negative. Cardiovascular: Negative. Gastrointestinal: Negative. Endocrine: Negative. Genitourinary: Negative. Musculoskeletal: Positive for gait problem (using a cane). Negative for back pain (Patient reports pain in bilateral groin into the hip joint on both sides occasionally down to the knees and rarely to the feet/toes). Skin: Negative. Allergic/Immunologic: Negative. Hematological: Negative. Psychiatric/Behavioral: Positive for sleep disturbance (chronic issue). All other systems reviewed and are negative. I reviewed his ROS.     Vitals:    /73 (BP Location: Other (Comment), Patient Position: Sitting, Cuff Size: Standard) Comment (BP Location): left wrist  Pulse 67   Temp 98.1 °F (36.7 °C) (Temporal)   Ht 5' 8" (1.727 m)   Wt 111 kg (244 lb)   BMI 37.10 kg/m²     MEDICAL HISTORY  Past Medical History:   Diagnosis Date   • Arthritis    • Bladder cancer ([address])    • Cancer ([address]) 3/17/2023   • Cervical disc disorder 1/2016   • Chronic narcotic dependence (HCC)    • Chronic pain disorder     lower back and down both legs   • Colon polyp    • Coronary artery disease    • CPAP (continuous positive airway pressure) dependence    • Does use hearing aid     will wear DOS   • Full dentures    • Heart failure ([address])     and "fluid retention" [facility] cardio PA"   • High cholesterol    • Hypertension    • Low back pain    • Mild ankle edema     and feet   • Obstructive sleep apnea on CPAP    • Prediabetes    • Shortness of breath     per pt "with just exertion"   • Sleep apnea    • Wears glasses      Past Surgical History:   Procedure Laterality Date   • BACK SURGERY      titanium rods implanted   • COLONOSCOPY     • CYSTOSCOPY W/ URETERAL STENT PLACEMENT Bilateral 3/17/2023    Procedure: bilateral retrograde;  Surgeon: Korina Pratt MD;  Location: [facility];  Service: Urology   • HERNIA REPAIR      x5   • LUMBAR LAMINECTOMY N/A 2023    Procedure: Left L3-4 Metrx hemilaminectomy and bilateral foraminotomies;  Surgeon: Danielle Bustillos MD;  Location:  MAIN OR;  Service: Neurosurgery   • AK CYSTO W/REMOVAL OF LESIONS SMALL N/A 2023    Procedure: CYSTOSCOPY TURBT;  Surgeon: Korina Pratt MD;  Location: [facility];  Service: Urology   • TRANSURETHRAL RESECTION OF BLADDER TUMOR N/A 3/17/2023    Procedure: TRANSURETHRAL RESECTION OF BLADDER TUMOR (TURBT);   Surgeon: Korina Pratt MD;  Location: [facility];  Service: Urology     Social History     Tobacco Use   • Smoking status: Former     Packs/day: 1.00     Years: 35.00     Total pack years: 35.00     Types: Cigarettes     Start date: 2023     Quit date: 2016     Years since quittin.6   • Smokeless tobacco: Never   Vaping Use   • Vaping Use: Never used   Substance Use Topics   • Alcohol use: Not Currently     Comment: 3 per year   • Drug use: Not Currently     Types: Marijuana      Family History   Problem Relation Age of Onset   • Cancer Father    • Aortic aneurysm Father    • Leukemia Father    • Hypertension Mother         Current Outpatient Medications:   •  aspirin 81 mg chewable tablet, Chew 81 mg daily, Disp: , Rfl:   •  atorvastatin (LIPITOR) 40 mg tablet, , Disp: , Rfl:   •  co-enzyme Q-10 30 MG capsule, Take 100 mg by mouth daily , Disp: , Rfl:   •  Farxiga 5 MG TABS, 5 mg daily 5mg alternating with 2.5mg for fluid retention, Disp: , Rfl:   • gabapentin (NEURONTIN) 300 mg capsule, Take 2 caps (600mg) in the morning, 1 cap (300mg) in the afternoon, and 2 caps (600mg) at bedtime. , Disp: 150 capsule, Rfl: 0  •  metolazone (ZAROXOLYN) 2.5 mg tablet, Take 2.5 mg by mouth daily Patient reports use of this medication daily, prescribed by cardiology. , Disp: , Rfl:   •  multivitamin-iron-minerals-folic acid (CENTRUM) chewable tablet, Chew 1 tablet daily, Disp: , Rfl:   •  oxyCODONE (OxyCONTIN) 40 mg 12 hr tablet, Take 1 tablet (40 mg total) by mouth every 12 (twelve) hours Max Daily Amount: 80 mg, Disp: 45 tablet, Rfl: 0  •  oxyCODONE (ROXICODONE) 30 MG immediate release tablet, Take 0.5-1 tablets (15-30 mg total) by mouth every 4 (four) hours as needed (15mg for moderate pain, 30mg for severe pain) Max 6 tabs/day. Max Daily Amount: 180 mg, Disp: 90 tablet, Rfl: 0  •  tamsulosin (FLOMAX) 0.4 mg, Take 1 capsule (0.4 mg total) by mouth daily with dinner, Disp: 90 capsule, Rfl: 3  •  torsemide (DEMADEX) 20 mg tablet, Take 60 mg by mouth daily, Disp: , Rfl:   •  methylPREDNISolone 4 MG tablet therapy pack, Use as directed on package, Disp: 1 each, Rfl: 0  •  potassium chloride (K-DUR,KLOR-CON) 10 mEq tablet, , Disp: , Rfl:   No current facility-administered medications for this visit. Allergies   Allergen Reactions   • Mirtazapine Other (See Comments) and Confusion     Pt drove without knowing and woke up at a new destination        The following portions of the patient's history were updated by MA and reviewed by MD: allergies, current medications, past family history, past medical history, past social history, past surgical history and problem list.    Physical Exam  Awake alert and orient  No pain to palpation of the LS spine  Incision is clean and dry and without signs of erythema or edema  His power appears to be 5 out of 5 however he elicits pain with standing erect.   Pain is reproduced by pressure on the abdominal region  RESULTS/DATA  I have personally reviewed pertinent films in PACS

## 2023-08-08 NOTE — PROGRESS NOTES
Follow-up with Palliative and Supportive Care  Pedro Amin 58 y.o. male [id number]    ASSESSMENT & PLAN:  1. Malignant neoplasm of lateral wall of urinary bladder (HCC)    2. Type 2 diabetes mellitus with other diabetic kidney complication ([address])    3. Obstructive sleep apnea on CPAP    4. Acute respiratory failure with hypoxia (HCC)    5. Primary hypertension    6. Coronary artery disease    7. Acute on chronic diastolic heart failure ([address])    8. Chronic bilateral low back pain with bilateral sciatica    9. Anxiety    10. Malignant neoplasm of anterior wall of urinary bladder (HCC)    11. Cancer-related pain    12. Counseling and coordination of care    13. Chronic diastolic heart failure ([address])    14. Palliative care by specialist    15. Paresthesia of bilateral legs      · Symptom Management  · Continue taking Oxycontin at the current dose of 40mg twice daily. Has 2 weeks supply at home. · If symptoms remain well-controlled during this period, we will proceed with a taper down to 30mg twice daily. · Refilled Oxycodone 15mg to 30mg every 4 hours as needed for moderate to severe pain. · Increase Gabapentin, continue 600mg in the morning and at bedtime. Added a 300mg dose in the afternoon. · Emotional Support  · Referral to palliative LCSW to provide emotional support. · Primary Care  · Referral to establish care with a [facility] PCP, preferably in [facility]. · Patient would like family practice care coordinator to help manage overall healthcare needs. · Cardiology  · Recommend urgent follow-up appointment with cardiology to discuss current diuretic use and to address the monitoring and management of hypokalemia. · Neurosurgery  · Scheduled follow-up appointment today with neurosurgery for a 6-week post-operative evaluation. · This appointment will also address concerns about recent leg weakness and the ongoing suprapubic and abdominal groin pain.     · Urology  · Upcoming follow-up appointment with urology tomorrow. Has one final BCG treatment. · With any questions or concerns about treatments, he can continue to reach out to Seth Taylor or Marcus Cantrell, who are the oncology care coordinators. · Vacation Preparation  · Upcoming trip to the [place] from September 9th to 23rd, we recommend discussing travel plans with your other doctors to ensure you have sufficient medication for the duration of your trip. Return in about 4 weeks (around 9/5/2023) for Next scheduled follow up. • Emotional support provided. • Medication safety issues addressed - no driving under the influence of narcotics (including opioids), watch for adverse effects including AMS or respiratory depression (slowed breathing), keep medications stored in a safe/locked environment, do not use alcohol while opioids or other narcotics are in one's system. • Reviewed recent notes (ED, Uro), labs (08/03/23 CBC - RBC 3.97, H/H 12.3/35.9; CMP - Na 133, K+ 2.4[L], BUN/Cr 30/1.13, eGFR 68 ), imaging (08/03/23 CT abd pe - reviewed with patient and spouse). Requested Prescriptions     Signed Prescriptions Disp Refills   • oxyCODONE (ROXICODONE) 30 MG immediate release tablet 90 tablet 0     Sig: Take 0.5-1 tablets (15-30 mg total) by mouth every 4 (four) hours as needed (15mg for moderate pain, 30mg for severe pain) Max 6 tabs/day. Max Daily Amount: 180 mg   • gabapentin (NEURONTIN) 300 mg capsule 150 capsule 0     Sig: Take 2 caps (600mg) in the morning, 1 cap (300mg) in the afternoon, and 2 caps (600mg) at bedtime. Medications Discontinued During This Encounter   Medication Reason   • oxyCODONE (ROXICODONE) 30 MG immediate release tablet Reorder   • gabapentin (NEURONTIN) 300 mg capsule Reorder       Bipin Reza was seen today for symptoms and planning cares related to above illnesses.   I have reviewed the patient's controlled substance dispensing history in the Prescription Drug Monitoring Program in compliance with the [facility] regulations before prescribing any controlled substances. They are invited to continue to follow with us. If there are questions or concerns, please contact us through our clinic/answering service 24 hours a day, seven days a week. 75 minutes were spent in this ambulatory visit with greater than 50% of the time spent face to face with patient and family in counseling or coordination of care including symptom assessment and management, medication review, medication adjustment, psychosocial support, chart review, imaging review, lab review, supportive listening and anticipatory guidance. All of the patient's questions were answered during this discussion. Visit Information    Accompanied By: Spouse    Source of History: Self, Spouse    History Limitations: None    Contacts: Extended Emergency Contact Information  Primary Emergency Contact: Cami Irving  Mobile Phone: [phone]  Relation: Spouse  Secondary Emergency Contact: [address] [place] [address]  Mobile Phone: [phone]  Relation: Son     SUBJECTIVE:  Chief Complaint   Patient presents with   • Follow-up   • Pain   • Abdominal Pain   • Counseling   • Medication Refill   • lower extremity weakness   • Groin Pain      HPI  Dominick Irving is a 58 y. o. male with hx of high-grade papillary urothelial carcinoma and chronic low back pain presents for follow up for symptom management. Follows with Dr. Kain Tiwari (urology), Dr. Jessica Fitzpatrick (PCP), and [facility]. The patient has been feeling lost and overwhelmed recently, due to health issues and confusion about who to contact for assistance. He has had recent visits to the emergency department for pain and shortness of breath. He has been struggling with chronic low potassium levels. He believes that close monitoring through regular labs is necessary given his medical history. He is currently taking supplemental potassium.  He is also on Metolazone daily for fluid retention that is not currently on record in the chart, probably because this was on hold during his recent back surgery. He takes this daily. He is advised to follow closely with his cardiologist for regular labs and verify correct usage of this medication as it is contributing to his hypokalemia. While initially receiving cancer care at [facility], he had a care coordinator who he could contact for questions or concerns, and he now seeks a similar person to oversee his overall health in his current location. We verified today that he could continue to contact the oncology nurse coordinators, Seth Taylor and Marcus Cantrell. We discussed use of a care coordinator through his PCP as well. Discussed the importance of primary care in the approach to management of his overall health. He is interested in switching to [facility] better coordination and location convenience in [place]. He's also seeking someone to talk to about his health challenges and is open to meeting with a palliative LCWS for therapy. His pain seems to worsen after his BCG treatment, complaining of suprapubic and groin pain. He also reports leg weakness. He had reviewed his symptoms with his Urologist which had raised concerns about complications from recent back surgery and ongoing chronic back problems. He has an upcoming follow-up with a neurosurgeon to review these complaints. Despite recent pain complaints, he's managed to decrease his oxycontin dose to 40mg twice daily (from TID) and continues same dose of oxycodone for pain control. Although he's ready to taper down further, he's agreed to maintain his current dose for now. He has two weeks of supply left, and if pain remains controlled, discussed decreasing OxyER to 30mg BID. He's found relief in increased gabapentin dosage, denying any adverse effects, and has agreed to an addition of a 300mg dose in the middle of the day. He visited the clinic today with his supportive wife.  They're planning a vacation to the [place] from September 9th to 23rd and will coordinate with his doctor’s to ensure they have necessary medications during their trip. They plan to contact the office at least one week prior to leaving for any medication refills. PDMP shows no concerns. Filled  Written  Sold  ID  Drug  QTY  Days  Prescriber  RX #  Dispenser  Refill  Daily Dose*  Pymt Type      07/31/2023 07/29/2023   1  Oxycontin Er 40 Mg Tablet 45.00  23  Mi Pip  [id number]   Tho (8005)  0  117.39 MME  Comm Ins  [place]    07/13/2023 07/12/2023   1  Oxycodone Hcl (Ir) 30 Mg Tab 90.00  23  Mi Pip  [id number]   Tho (0343)  0  176.09 MME  Comm Ins  [place]      The following portions of the medical history were reviewed: past medical history, surgical history, problem list, medication list, family history, and social history. Current Outpatient Medications:   •  aspirin 81 mg chewable tablet, Chew 81 mg daily, Disp: , Rfl:   •  atorvastatin (LIPITOR) 40 mg tablet, , Disp: , Rfl:   •  co-enzyme Q-10 30 MG capsule, Take 100 mg by mouth daily , Disp: , Rfl:   •  Farxiga 5 MG TABS, 5 mg daily 5mg alternating with 2.5mg for fluid retention, Disp: , Rfl:   •  gabapentin (NEURONTIN) 300 mg capsule, Take 2 caps (600mg) in the morning, 1 cap (300mg) in the afternoon, and 2 caps (600mg) at bedtime. , Disp: 150 capsule, Rfl: 0  •  multivitamin-iron-minerals-folic acid (CENTRUM) chewable tablet, Chew 1 tablet daily, Disp: , Rfl:   •  oxyCODONE (OxyCONTIN) 40 mg 12 hr tablet, Take 1 tablet (40 mg total) by mouth every 12 (twelve) hours Max Daily Amount: 80 mg, Disp: 45 tablet, Rfl: 0  •  oxyCODONE (ROXICODONE) 30 MG immediate release tablet, Take 0.5-1 tablets (15-30 mg total) by mouth every 4 (four) hours as needed (15mg for moderate pain, 30mg for severe pain) Max 6 tabs/day.  Max Daily Amount: 180 mg, Disp: 90 tablet, Rfl: 0  •  potassium chloride (K-DUR,KLOR-CON) 20 mEq tablet, 2 (two) times a day, Disp: , Rfl:   •  tamsulosin (FLOMAX) 0.4 mg, Take 1 capsule (0.4 mg total) by mouth daily with dinner, Disp: 90 capsule, Rfl: 3  •  torsemide (DEMADEX) 20 mg tablet, Take 60 mg by mouth daily, Disp: , Rfl:   •  metolazone (ZAROXOLYN) 2.5 mg tablet, Take 2.5 mg by mouth daily Patient reports use of this medication daily, prescribed by cardiology. , Disp: , Rfl:     Past medical, surgical, social, and family histories are reviewed and pertinent updates are made. Review of Systems   Constitutional: Negative for activity change, appetite change, fatigue and unexpected weight change. HENT: Negative for mouth sores, trouble swallowing and voice change. Eyes: Negative. Respiratory: Negative for shortness of breath. Cardiovascular: Negative for chest pain. Gastrointestinal: Positive for abdominal pain. Negative for constipation, diarrhea, nausea and vomiting. Genitourinary: Negative. Musculoskeletal: Positive for arthralgias, back pain and gait problem. Negative for myalgias. Skin: Negative for color change, pallor and wound. Neurological: Positive for weakness. Negative for dizziness, light-headedness and headaches. Hematological: Negative. Psychiatric/Behavioral: Negative for confusion and sleep disturbance. The patient is not nervous/anxious. OBJECTIVE:  /83 (BP Location: Left arm)   Pulse 71   Temp 98.3 °F (36.8 °C) (Temporal)   Ht 5' 8" (1.727 m)   Wt 111 kg (244 lb)   BMI 37.10 kg/m²   Physical Exam  Vitals and nursing note reviewed. Constitutional:       General: He is in acute distress (moderate discomfort sitting/changin postions). Appearance: He is well-developed. He is obese. Comments: Ambulates with cane   HENT:      Head: Normocephalic and atraumatic. Right Ear: External ear normal.      Left Ear: External ear normal.      Nose: Nose normal.   Eyes:      Conjunctiva/sclera: Conjunctivae normal.   Cardiovascular:      Rate and Rhythm: Normal rate.    Pulmonary:      Effort: Pulmonary effort is normal. No respiratory distress. Musculoskeletal:         General: No swelling or deformity. Cervical back: Neck supple. Skin:     Capillary Refill: Capillary refill takes less than 2 seconds. Findings: No rash. Neurological:      Mental Status: He is alert and oriented to person, place, and time. Psychiatric:         Attention and Perception: Attention normal.         Mood and Affect: Affect is tearful. Speech: Speech normal.         Behavior: Behavior is cooperative. Thought Content: Thought content normal.         Cognition and Memory: Cognition normal.        Christie Hurst PA-C  [facility]  [phone]    Portions of this document may have been created using dictation software and as such some "sound alike" terms may have been generated by the system. Do not hesitate to contact me with any questions or clarifications.

## 2023-08-09 ENCOUNTER — OFFICE VISIT (OUTPATIENT)
Dept: UROLOGY | Facility: CLINIC | Age: 63
End: 2023-08-09
Payer: MEDICARE

## 2023-08-09 ENCOUNTER — APPOINTMENT (OUTPATIENT)
Dept: LAB | Facility: HOSPITAL | Age: 63
End: 2023-08-09
Payer: MEDICARE

## 2023-08-09 DIAGNOSIS — Z48.811 ENCOUNTER FOR SURGICAL AFTERCARE FOLLOWING SURGERY OF NERVOUS SYSTEM: ICD-10-CM

## 2023-08-09 DIAGNOSIS — Z87.898 HISTORY OF GROSS HEMATURIA: ICD-10-CM

## 2023-08-09 DIAGNOSIS — R10.31 RIGHT GROIN PAIN: ICD-10-CM

## 2023-08-09 DIAGNOSIS — R10.2 SUPRAPUBIC PRESSURE: ICD-10-CM

## 2023-08-09 DIAGNOSIS — R10.9 ABDOMINAL PAIN: ICD-10-CM

## 2023-08-09 DIAGNOSIS — R35.1 BENIGN PROSTATIC HYPERPLASIA WITH NOCTURIA: ICD-10-CM

## 2023-08-09 DIAGNOSIS — C67.2 MALIGNANT NEOPLASM OF LATERAL WALL OF URINARY BLADDER (HCC): Primary | ICD-10-CM

## 2023-08-09 DIAGNOSIS — N40.1 BENIGN PROSTATIC HYPERPLASIA WITH NOCTURIA: ICD-10-CM

## 2023-08-09 LAB
BASOPHILS # BLD AUTO: 0.04 THOUSANDS/ÂΜL (ref 0–0.1)
BASOPHILS NFR BLD AUTO: 0 % (ref 0–1)
CRP SERPL QL: 45.7 MG/L
EOSINOPHIL # BLD AUTO: 0.3 THOUSAND/ÂΜL (ref 0–0.61)
EOSINOPHIL NFR BLD AUTO: 3 % (ref 0–6)
ERYTHROCYTE [DISTWIDTH] IN BLOOD BY AUTOMATED COUNT: 15.7 % (ref 11.6–15.1)
ERYTHROCYTE [SEDIMENTATION RATE] IN BLOOD: 44 MM/HOUR (ref 0–19)
HCT VFR BLD AUTO: 38.6 % (ref 36.5–49.3)
HGB BLD-MCNC: 12.5 G/DL (ref 12–17)
IMM GRANULOCYTES # BLD AUTO: 0.04 THOUSAND/UL (ref 0–0.2)
IMM GRANULOCYTES NFR BLD AUTO: 0 % (ref 0–2)
LYMPHOCYTES # BLD AUTO: 2.53 THOUSANDS/ÂΜL (ref 0.6–4.47)
LYMPHOCYTES NFR BLD AUTO: 27 % (ref 14–44)
MCH RBC QN AUTO: 30.6 PG (ref 26.8–34.3)
MCHC RBC AUTO-ENTMCNC: 32.4 G/DL (ref 31.4–37.4)
MCV RBC AUTO: 95 FL (ref 82–98)
MONOCYTES # BLD AUTO: 0.7 THOUSAND/ÂΜL (ref 0.17–1.22)
MONOCYTES NFR BLD AUTO: 8 % (ref 4–12)
NEUTROPHILS # BLD AUTO: 5.68 THOUSANDS/ÂΜL (ref 1.85–7.62)
NEUTS SEG NFR BLD AUTO: 62 % (ref 43–75)
NRBC BLD AUTO-RTO: 0 /100 WBCS
PLATELET # BLD AUTO: 255 THOUSANDS/UL (ref 149–390)
PMV BLD AUTO: 11.5 FL (ref 8.9–12.7)
RBC # BLD AUTO: 4.08 MILLION/UL (ref 3.88–5.62)
WBC # BLD AUTO: 9.29 THOUSAND/UL (ref 4.31–10.16)

## 2023-08-09 PROCEDURE — 36415 COLL VENOUS BLD VENIPUNCTURE: CPT

## 2023-08-09 PROCEDURE — 99213 OFFICE O/P EST LOW 20 MIN: CPT | Performed by: UROLOGY

## 2023-08-09 PROCEDURE — 85025 COMPLETE CBC W/AUTO DIFF WBC: CPT

## 2023-08-09 PROCEDURE — 86140 C-REACTIVE PROTEIN: CPT

## 2023-08-09 PROCEDURE — 88112 CYTOPATH CELL ENHANCE TECH: CPT | Performed by: PATHOLOGY

## 2023-08-09 PROCEDURE — 85652 RBC SED RATE AUTOMATED: CPT

## 2023-08-10 ENCOUNTER — TELEMEDICINE (OUTPATIENT)
Dept: NEUROSURGERY | Facility: CLINIC | Age: 63
End: 2023-08-10

## 2023-08-10 DIAGNOSIS — R10.31 RIGHT GROIN PAIN: ICD-10-CM

## 2023-08-10 DIAGNOSIS — R10.2 SUPRAPUBIC PRESSURE: Primary | ICD-10-CM

## 2023-08-10 DIAGNOSIS — Z48.811 ENCOUNTER FOR SURGICAL AFTERCARE FOLLOWING SURGERY OF NERVOUS SYSTEM: ICD-10-CM

## 2023-08-10 PROCEDURE — 99024 POSTOP FOLLOW-UP VISIT: CPT | Performed by: NEUROLOGICAL SURGERY

## 2023-08-10 RX ORDER — METHYLPREDNISOLONE 4 MG/1
TABLET ORAL
Qty: 1 EACH | Refills: 0 | Status: SHIPPED | OUTPATIENT
Start: 2023-08-10 | End: 2023-08-18

## 2023-08-10 RX ORDER — POTASSIUM CHLORIDE 750 MG/1
TABLET, EXTENDED RELEASE ORAL
COMMUNITY
Start: 2023-08-08 | End: 2023-08-18 | Stop reason: SDUPTHER

## 2023-08-10 NOTE — PROGRESS NOTES
Virtual Regular Visit    Verification of patient location:    Patient is located at Home in the following state in which I hold an active license [place]      Assessment/Plan:    Problem List Items Addressed This Visit        Other    Encounter for surgical aftercare following surgery of nervous system    Relevant Medications    methylPREDNISolone 4 MG tablet therapy pack    Right groin pain    Relevant Medications    methylPREDNISolone 4 MG tablet therapy pack    Suprapubic pressure - Primary    Relevant Medications    methylPREDNISolone 4 MG tablet therapy pack            Reason for visit is   Chief Complaint   Patient presents with   • Virtual Regular Visit        Encounter provider Abran Yi MD    Provider located at [address] [id number] [phone]      Recent Visits  Date Type Provider Dept   08/08/23 Office Visit Abran Yi MD [address] [place] [address] recent visits within past 7 days and meeting all other requirements  Today's Visits  Date Type Provider Dept   08/10/23 Telemedicine Abran Yi MD [address] [place] [address] today's visits and meeting all other requirements  Future Appointments  No visits were found meeting these conditions. Showing future appointments within next 150 days and meeting all other requirements       The patient was identified by name and date of birth. Nadia Sanchez was informed that this is a telemedicine visit and that the visit is being conducted through the [address] [place] [address] Now platform. He agrees to proceed. .  My office door was closed. No one else was in the room. He acknowledged consent and understanding of privacy and security of the video platform. The patient has agreed to participate and understands they can discontinue the visit at any time. Patient is aware this is a billable service.      Subjective  Nadia Sanchez is a 58 y.o. male bilateral foraminotomies on June 30 he complains of pain in his abdominal region and inguinal region bilaterally. He does have a malignant bladder cancer on the wall of his bladder which is being treated with BCG. He was well until he received his second to the last chemotherapy via a Barragan catheter when he developed this terrible pain. The pain has been unrelenting and actually worsening since that time. He went through his final treatment yesterday which has made the symptoms slightly worse. His laboratory values were reviewed and his CRP sedimentation rate were increased however his white blood cell count was within normal limits. I did communicate directly with Dr. Haley Fajardo and he felt that the risk of a Medrol Dosepak was low and did not have a problem with a trial of this medication I believe this is most appropriate and wrote for the medication. He is currently on OxyContin as well as oxycodone chronically. An MRI of the LS spine is scheduled for tomorrow evening.       Past Medical History:   Diagnosis Date   • Arthritis    • Bladder cancer ([address])    • Cancer ([address]) 3/17/2023   • Cervical disc disorder 1/2016   • Chronic narcotic dependence (HCC)    • Chronic pain disorder     lower back and down both legs   • Colon polyp    • Coronary artery disease    • CPAP (continuous positive airway pressure) dependence    • Does use hearing aid     will wear DOS   • Full dentures    • Heart failure ([address])     and "fluid retention" SL ABDULAZIZ [place]"   • High cholesterol    • Hypertension    • Low back pain 2003   • Mild ankle edema     and feet   • Obstructive sleep apnea on CPAP    • Prediabetes    • Shortness of breath     per pt "with just exertion"   • Sleep apnea    • Wears glasses        Past Surgical History:   Procedure Laterality Date   • BACK SURGERY      titanium rods implanted   • COLONOSCOPY     • CYSTOSCOPY W/ URETERAL STENT PLACEMENT Bilateral 3/17/2023    Procedure: bilateral retrograde;  Surgeon: Joellen Torres MD;  Location: [facility];  Service: Urology   • HERNIA REPAIR      x5   • LUMBAR LAMINECTOMY N/A 6/30/2023    Procedure: Left L3-4 Metrx hemilaminectomy and bilateral foraminotomies;  Surgeon: Orville Pepe MD;  [facility];  Service: Neurosurgery   • MS CYSTO W/REMOVAL OF LESIONS SMALL N/A 5/1/2023    Procedure: CYSTOSCOPY TURBT;  Surgeon: Joellen Torres MD;  Location: [facility];  Service: Urology   • TRANSURETHRAL RESECTION OF BLADDER TUMOR N/A 3/17/2023    Procedure: TRANSURETHRAL RESECTION OF BLADDER TUMOR (TURBT); Surgeon: Joellen Torres MD;  Location: [facility];  Service: Urology       Current Outpatient Medications   Medication Sig Dispense Refill   • aspirin 81 mg chewable tablet Chew 81 mg daily     • atorvastatin (LIPITOR) 40 mg tablet      • co-enzyme Q-10 30 MG capsule Take 100 mg by mouth daily      • Farxiga 5 MG TABS 5 mg daily 5mg alternating with 2.5mg for fluid retention     • gabapentin (NEURONTIN) 300 mg capsule Take 2 caps (600mg) in the morning, 1 cap (300mg) in the afternoon, and 2 caps (600mg) at bedtime. 150 capsule 0   • methylPREDNISolone 4 MG tablet therapy pack Use as directed on package 1 each 0   • metolazone (ZAROXOLYN) 2.5 mg tablet Take 2.5 mg by mouth daily Patient reports use of this medication daily, prescribed by cardiology. • multivitamin-iron-minerals-folic acid (CENTRUM) chewable tablet Chew 1 tablet daily     • oxyCODONE (OxyCONTIN) 40 mg 12 hr tablet Take 1 tablet (40 mg total) by mouth every 12 (twelve) hours Max Daily Amount: 80 mg 45 tablet 0   • oxyCODONE (ROXICODONE) 30 MG immediate release tablet Take 0.5-1 tablets (15-30 mg total) by mouth every 4 (four) hours as needed (15mg for moderate pain, 30mg for severe pain) Max 6 tabs/day.  Max Daily Amount: 180 mg 90 tablet 0   • tamsulosin (FLOMAX) 0.4 mg Take 1 capsule (0.4 mg total) by mouth daily with dinner 90 capsule 3   • torsemide (DEMADEX) 20 mg tablet Take 60 mg by mouth daily     • potassium chloride (K-DUR,KLOR-CON) 10 mEq tablet        No current facility-administered medications for this visit. Allergies   Allergen Reactions   • Mirtazapine Other (See Comments) and Confusion     Pt drove without knowing and woke up at a new destination       Review of Systems   Constitutional: Positive for activity change (decreased). HENT: Negative. Eyes: Negative. Respiratory: Negative. Cardiovascular: Negative. Gastrointestinal: Negative. Endocrine: Negative. Genitourinary: Negative. Musculoskeletal: Positive for back pain (Patient reports pain in bilateral groin into the hip joint on both sides occasionally down to the knees and rarely to the feet/toes) and gait problem (using a cane). Skin: Negative. Allergic/Immunologic: Negative. Hematological: Negative. Psychiatric/Behavioral: Positive for sleep disturbance (chronic issue). All other systems reviewed and are negative. I reviewed the ROS    Video Exam    There were no vitals filed for this visit.     Physical Exam   Awake and alert  Speech is clear and comprehensible  Sitting in a chair leaning forward to reduce the discomfort    Visit Time  Total Visit Duration: 20 minutes

## 2023-08-11 ENCOUNTER — HOSPITAL ENCOUNTER (OUTPATIENT)
Dept: MRI IMAGING | Facility: HOSPITAL | Age: 63
End: 2023-08-11
Attending: NEUROLOGICAL SURGERY
Payer: MEDICARE

## 2023-08-11 DIAGNOSIS — R10.9 ABDOMINAL PAIN: ICD-10-CM

## 2023-08-11 DIAGNOSIS — R10.2 SUPRAPUBIC PRESSURE: ICD-10-CM

## 2023-08-11 DIAGNOSIS — Z48.811 ENCOUNTER FOR SURGICAL AFTERCARE FOLLOWING SURGERY OF NERVOUS SYSTEM: ICD-10-CM

## 2023-08-11 PROCEDURE — A9585 GADOBUTROL INJECTION: HCPCS | Performed by: NEUROLOGICAL SURGERY

## 2023-08-11 PROCEDURE — G1004 CDSM NDSC: HCPCS

## 2023-08-11 PROCEDURE — 88112 CYTOPATH CELL ENHANCE TECH: CPT | Performed by: PATHOLOGY

## 2023-08-11 PROCEDURE — 72158 MRI LUMBAR SPINE W/O & W/DYE: CPT

## 2023-08-11 RX ORDER — GADOBUTROL 604.72 MG/ML
10 INJECTION INTRAVENOUS
Status: COMPLETED | OUTPATIENT
Start: 2023-08-11 | End: 2023-08-11

## 2023-08-11 RX ADMIN — GADOBUTROL 10 ML: 604.72 INJECTION INTRAVENOUS at 18:09

## 2023-08-14 ENCOUNTER — TELEPHONE (OUTPATIENT)
Dept: PALLIATIVE MEDICINE | Facility: CLINIC | Age: 63
End: 2023-08-14

## 2023-08-18 ENCOUNTER — APPOINTMENT (OUTPATIENT)
Dept: LAB | Facility: CLINIC | Age: 63
End: 2023-08-18
Payer: MEDICARE

## 2023-08-18 ENCOUNTER — OFFICE VISIT (OUTPATIENT)
Dept: FAMILY MEDICINE CLINIC | Facility: CLINIC | Age: 63
End: 2023-08-18
Payer: MEDICARE

## 2023-08-18 VITALS
BODY MASS INDEX: 36.01 KG/M2 | HEIGHT: 68 IN | WEIGHT: 237.6 LBS | HEART RATE: 80 BPM | SYSTOLIC BLOOD PRESSURE: 180 MMHG | TEMPERATURE: 97.5 F | DIASTOLIC BLOOD PRESSURE: 78 MMHG

## 2023-08-18 DIAGNOSIS — E87.6 HYPOKALEMIA: ICD-10-CM

## 2023-08-18 DIAGNOSIS — G89.29 OTHER CHRONIC PAIN: ICD-10-CM

## 2023-08-18 DIAGNOSIS — R10.30 LOWER ABDOMINAL PAIN: ICD-10-CM

## 2023-08-18 DIAGNOSIS — M25.50 ARTHRALGIA, UNSPECIFIED JOINT: ICD-10-CM

## 2023-08-18 DIAGNOSIS — C67.9 MALIGNANT NEOPLASM OF URINARY BLADDER, UNSPECIFIED SITE (HCC): Primary | ICD-10-CM

## 2023-08-18 LAB
ANA SER QL IA: POSITIVE
ANION GAP SERPL CALCULATED.3IONS-SCNC: 2 MMOL/L
BUN SERPL-MCNC: 22 MG/DL (ref 5–25)
CALCIUM SERPL-MCNC: 9.7 MG/DL (ref 8.3–10.1)
CHLORIDE SERPL-SCNC: 96 MMOL/L (ref 96–108)
CO2 SERPL-SCNC: 36 MMOL/L (ref 21–32)
CREAT SERPL-MCNC: 1.31 MG/DL (ref 0.6–1.3)
GFR SERPL CREATININE-BSD FRML MDRD: 57 ML/MIN/1.73SQ M
GLUCOSE SERPL-MCNC: 154 MG/DL (ref 65–140)
POTASSIUM SERPL-SCNC: 3.3 MMOL/L (ref 3.5–5.3)
SODIUM SERPL-SCNC: 134 MMOL/L (ref 135–147)

## 2023-08-18 PROCEDURE — 86038 ANTINUCLEAR ANTIBODIES: CPT

## 2023-08-18 PROCEDURE — 80048 BASIC METABOLIC PNL TOTAL CA: CPT

## 2023-08-18 PROCEDURE — 36415 COLL VENOUS BLD VENIPUNCTURE: CPT

## 2023-08-18 PROCEDURE — 86618 LYME DISEASE ANTIBODY: CPT

## 2023-08-18 PROCEDURE — 86039 ANTINUCLEAR ANTIBODIES (ANA): CPT

## 2023-08-18 PROCEDURE — 86430 RHEUMATOID FACTOR TEST QUAL: CPT

## 2023-08-18 PROCEDURE — 99204 OFFICE O/P NEW MOD 45 MIN: CPT | Performed by: FAMILY MEDICINE

## 2023-08-18 RX ORDER — AMITRIPTYLINE HYDROCHLORIDE 25 MG/1
25 TABLET, FILM COATED ORAL
Qty: 30 TABLET | Refills: 3 | Status: SHIPPED | OUTPATIENT
Start: 2023-08-18

## 2023-08-18 RX ORDER — POTASSIUM CHLORIDE 750 MG/1
TABLET, EXTENDED RELEASE ORAL
Qty: 90 TABLET | Refills: 3
Start: 2023-08-18

## 2023-08-18 NOTE — PROGRESS NOTES
Assessment/Plan:       1. Malignant neoplasm of urinary bladder, unspecified site [facility])  Comments:  completed immunotherapy    2. Arthralgia, unspecified joint  Comments:  check labs  start eleavil  Orders:  -     Lyme Disease Serology w/Reflex; Future  -     RF Screen w/ Reflex to Titer; Future  -     Basic metabolic panel; Future  -     STEPHANIE Screen w/ Reflex to Titer/Pattern; Future    3. Hypokalemia  Comments:  on metalozone  continue potassium  Orders:  -     potassium chloride (K-DUR,KLOR-CON) 10 mEq tablet; 20mg bid    4. Lower abdominal pain  Comments:  cause undetermined  increase gabapentin    5. Other chronic pain  Comments:  start elavil  Orders:  -     amitriptyline (ELAVIL) 25 mg tablet; Take 1 tablet (25 mg total) by mouth daily at bedtime          Subjective:      Patient ID: Izabel Jenkins is a 58 y.o. male. Iris Diaz is here for his initial visit. He just completed immunotherapy for bladder cancer. This was interrupted by spinal stenosis surgery. He did have a complication of low potassium levels. He was getting abdominal pain from the immunotherapy tx. He has a lot of pain in his low abdomen. He can't even walk well. It is severe groin and lower abdominal pain. He is shuffling to walk. He had a back mri and ct abdomen without an obvious cause of this. The pain is better with sitting. He can change positions to improve things. It is definitely worse with walking. He gets numbness in his legs. The pain jumps to his joints. Abdominal Pain  This is a new problem. The current episode started 1 to 4 weeks ago. The onset quality is sudden. The problem occurs constantly. The most recent episode lasted 17 days. The problem has been waxing and waning. The pain is located in the RLQ. The pain is at a severity of 6/10. The quality of the pain is cramping. The abdominal pain radiates to the left flank and right flank. Associated symptoms include anorexia, arthralgias and myalgias.  Pertinent negatives include no belching, constipation, diarrhea, dysuria, fever, flatus, frequency, headaches, hematochezia, hematuria, melena, nausea, vomiting or weight loss. The pain is aggravated by coughing. The pain is relieved by nothing. The following portions of the patient's history were reviewed and updated as appropriate: allergies, current medications, past family history, past medical history, past social history, past surgical history, and problem list.    Review of Systems   Constitutional: Negative for fever and weight loss. Gastrointestinal: Positive for abdominal pain and anorexia. Negative for constipation, diarrhea, flatus, hematochezia, melena, nausea and vomiting. Genitourinary: Negative for dysuria, frequency and hematuria. Musculoskeletal: Positive for arthralgias and myalgias. Neurological: Negative for headaches.          Objective:      BP (!) 180/78 (BP Location: Left arm, Patient Position: Sitting, Cuff Size: Standard)   Pulse 80   Temp 97.5 °F (36.4 °C)   Ht 5' 8" (1.727 m)   Wt 108 kg (237 lb 9.6 oz)   BMI 36.13 kg/m²          Physical Exam

## 2023-08-19 LAB — B BURGDOR IGG+IGM SER QL IA: NEGATIVE

## 2023-08-20 LAB — RHEUMATOID FACT SER QL LA: NEGATIVE

## 2023-08-21 ENCOUNTER — TELEPHONE (OUTPATIENT)
Dept: NEUROSURGERY | Facility: CLINIC | Age: 63
End: 2023-08-21

## 2023-08-21 DIAGNOSIS — R76.8 POSITIVE ANA (ANTINUCLEAR ANTIBODY): Primary | ICD-10-CM

## 2023-08-21 LAB
ANA HOMOGEN SER QL IF: NORMAL
ANA HOMOGEN TITR SER: NORMAL {TITER}

## 2023-08-21 NOTE — TELEPHONE ENCOUNTER
8/21  PT called requesting to cancel appt on the 24th, states he had no findings  from test per conversation with Dr Pat Horn and would rather not come in.

## 2023-08-24 ENCOUNTER — TELEPHONE (OUTPATIENT)
Dept: NEUROSURGERY | Facility: CLINIC | Age: 63
End: 2023-08-24

## 2023-08-24 NOTE — TELEPHONE ENCOUNTER
Spoke with pt, confirmed that we took action on his request to cancel for 8/24/23 and if he needs anything in the future please contact us.

## 2023-08-29 ENCOUNTER — OFFICE VISIT (OUTPATIENT)
Age: 63
End: 2023-08-29
Payer: MEDICARE

## 2023-08-29 VITALS
DIASTOLIC BLOOD PRESSURE: 76 MMHG | WEIGHT: 245 LBS | HEIGHT: 68 IN | BODY MASS INDEX: 37.13 KG/M2 | OXYGEN SATURATION: 97 % | TEMPERATURE: 96.8 F | SYSTOLIC BLOOD PRESSURE: 136 MMHG | HEART RATE: 65 BPM | RESPIRATION RATE: 20 BRPM

## 2023-08-29 DIAGNOSIS — M54.42 CHRONIC BILATERAL LOW BACK PAIN WITH BILATERAL SCIATICA: ICD-10-CM

## 2023-08-29 DIAGNOSIS — C67.3 MALIGNANT NEOPLASM OF ANTERIOR WALL OF URINARY BLADDER (HCC): Primary | ICD-10-CM

## 2023-08-29 DIAGNOSIS — Z51.5 PALLIATIVE CARE BY SPECIALIST: ICD-10-CM

## 2023-08-29 DIAGNOSIS — M54.41 CHRONIC BILATERAL LOW BACK PAIN WITH BILATERAL SCIATICA: ICD-10-CM

## 2023-08-29 DIAGNOSIS — G89.29 CHRONIC BILATERAL LOW BACK PAIN WITH BILATERAL SCIATICA: ICD-10-CM

## 2023-08-29 DIAGNOSIS — C67.2 MALIGNANT NEOPLASM OF LATERAL WALL OF URINARY BLADDER (HCC): ICD-10-CM

## 2023-08-29 DIAGNOSIS — Z72.820 POOR SLEEP: ICD-10-CM

## 2023-08-29 DIAGNOSIS — R20.2 PARESTHESIA OF BILATERAL LEGS: ICD-10-CM

## 2023-08-29 DIAGNOSIS — Z71.89 COUNSELING AND COORDINATION OF CARE: ICD-10-CM

## 2023-08-29 DIAGNOSIS — I50.33 ACUTE ON CHRONIC DIASTOLIC HEART FAILURE (HCC): ICD-10-CM

## 2023-08-29 DIAGNOSIS — G89.3 CANCER-RELATED PAIN: ICD-10-CM

## 2023-08-29 DIAGNOSIS — M79.2 NEUROPATHIC PAIN: Primary | ICD-10-CM

## 2023-08-29 PROCEDURE — 99215 OFFICE O/P EST HI 40 MIN: CPT | Performed by: PHYSICIAN ASSISTANT

## 2023-08-29 RX ORDER — OXYCODONE HYDROCHLORIDE 30 MG/1
15-30 TABLET ORAL EVERY 4 HOURS PRN
Qty: 120 TABLET | Refills: 0 | Status: SHIPPED | OUTPATIENT
Start: 2023-08-29

## 2023-08-29 RX ORDER — OXYCODONE HCL 40 MG/1
40 TABLET, FILM COATED, EXTENDED RELEASE ORAL EVERY 12 HOURS SCHEDULED
Qty: 60 TABLET | Refills: 0 | Status: SHIPPED | OUTPATIENT
Start: 2023-08-29

## 2023-08-29 RX ORDER — METHYLPREDNISOLONE 4 MG/1
TABLET ORAL
Qty: 21 TABLET | Refills: 0 | Status: SHIPPED | OUTPATIENT
Start: 2023-08-29

## 2023-08-29 RX ORDER — GABAPENTIN 100 MG/1
100 CAPSULE ORAL 3 TIMES DAILY
Qty: 90 CAPSULE | Refills: 3 | Status: SHIPPED | OUTPATIENT
Start: 2023-08-29

## 2023-08-29 RX ORDER — GABAPENTIN 300 MG/1
CAPSULE ORAL
Qty: 180 CAPSULE | Refills: 0 | Status: SHIPPED | OUTPATIENT
Start: 2023-08-29

## 2023-08-29 NOTE — PROGRESS NOTES
Follow-up with Palliative and Supportive Care  Ronnell Cochran 58 y.o. male [id number]    ASSESSMENT & PLAN:  1. 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 Chronic bilateral low back pain with bilateral sciatica    3. Cancer-related pain    4. Acute on chronic diastolic heart failure ([address])    5. Malignant neoplasm of lateral wall of urinary bladder ([address])    6. Palliative care by specialist    7. Paresthesia of bilateral legs    8. Counseling and coordination of care    9. Poor sleep      • Pain  o Has persistent right groin and thigh pain. 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 Return in about 4 weeks (around 9/26/2023) for Next scheduled follow up. • Emotional support provided. • Medication safety issues addressed - no driving under the influence of narcotics (including opioids), watch for adverse effects including AMS or respiratory depression (slowed breathing), keep medications stored in a safe/locked environment, do not use alcohol while opioids or other narcotics are in one's system. Requested Prescriptions     Signed Prescriptions Disp Refills   • oxyCODONE (OxyCONTIN) 40 mg 12 hr tablet 60 tablet 0     Sig: Take 1 tablet (40 mg total) by mouth every 12 (twelve) hours Max Daily Amount: 80 mg   • oxyCODONE (ROXICODONE) 30 MG immediate release tablet 120 tablet 0     Sig: Take 0.5-1 tablets (15-30 mg total) by mouth every 4 (four) hours as needed (15mg for moderate pain, 30mg for severe pain) Max 6 tabs/day.  Max Daily Amount: 180 mg   • gabapentin (NEURONTIN) 300 mg capsule 180 capsule 0     Sig: Take 2 caps (600mg) three times daily. • methylPREDNISolone 4 MG tablet therapy pack 21 tablet 0     Sig: Use as directed on package       Medications Discontinued During This Encounter   Medication Reason   • oxyCODONE (OxyCONTIN) 40 mg 12 hr tablet Reorder   • oxyCODONE (ROXICODONE) 30 MG immediate release tablet Reorder   • gabapentin (NEURONTIN) 300 mg capsule Reorder       Tahira Fernandez was seen today for symptoms and planning cares related to above illnesses. I have reviewed the patient's controlled substance dispensing history in the Prescription Drug Monitoring Program in compliance with the [facility] regulations before prescribing any controlled substances. They are invited to continue to follow with us. If there are questions or concerns, please contact us through our clinic/answering service 24 hours a day, seven days a week. 60 minutes were spent in this ambulatory visit with greater than 50% of the time spent face to face with patient and family in counseling or coordination of care including symptom assessment and management, medication review, medication adjustment, psychosocial support, chart review, imaging review, lab review, supportive listening and anticipatory guidance. All of the patient's questions were answered during this discussion. Visit Information    Accompanied By: Spouse    Source of History: Self    History Limitations: None    Contacts: Extended Emergency Contact Information  Primary Emergency Contact: Cami Irving  Mobile Phone: [phone]  Relation: Spouse  Secondary Emergency Contact: Jhon Irving  Mobile Phone: [phone]  Relation: Son     SUBJECTIVE:  Chief Complaint   Patient presents with   • Pain   • Medication Refill   • Counseling   • Follow-up   • Insomnia        HPI  Dominick Irving is a 58 y. o. male with hx of high-grade papillary urothelial carcinoma and chronic low back pain presents for follow up for symptom management. Follows with Dr. Cleophus Gowers (urology), Dr. Claire Cook (PCP), and [facility].     Today, the patient has reported experiencing persistent pain in his right groin x 7weeks. Symptoms developed after receiving BCG treatment. This pain extends down his anterior thigh to his knee. At times, the pain worsens when he puts weight on his right foot or while walking. He characterizes the pain as a sharp, shocking sensation, which is intense enough to bring him to tears. Despite increasing his intake of gabapentin and adding amitriptyline to his regimen, he hasn't found any relief. However, he does note that his opioid medication continues to provide some relief. He has noticed an improvement in his sleep since starting amitriptyline at night. The patient has established a new primary care relationship with Dr. Claire Cook. The patient is planning to leave for a three-week vacation in the [place] in a week's time and is requesting prescription refills to cover his medications during that period. PDMP shows no concerns. The following portions of the medical history were reviewed: past medical history, surgical history, problem list, medication list, family history, and social history. Current Outpatient Medications:   •  amitriptyline (ELAVIL) 25 mg tablet, Take 1 tablet (25 mg total) by mouth daily at bedtime, Disp: 30 tablet, Rfl: 3  •  aspirin 81 mg chewable tablet, Chew 81 mg daily, Disp: , Rfl:   •  atorvastatin (LIPITOR) 40 mg tablet, , Disp: , Rfl:   •  co-enzyme Q-10 30 MG capsule, Take 100 mg by mouth daily , Disp: , Rfl:   •  Farxiga 5 MG TABS, 5 mg daily 5mg alternating with 2.5mg for fluid retention, Disp: , Rfl:   •  gabapentin (NEURONTIN) 300 mg capsule, Take 2 caps (600mg) three times daily. , Disp: 180 capsule, Rfl: 0  •  methylPREDNISolone 4 MG tablet therapy pack, Use as directed on package, Disp: 21 tablet, Rfl: 0  •  metolazone (ZAROXOLYN) 2.5 mg tablet, Take 2.5 mg by mouth daily Patient reports use of this medication daily, prescribed by cardiology. , Disp: , Rfl:   •  multivitamin-iron-minerals-folic acid (CENTRUM) chewable tablet, Chew 1 tablet daily, Disp: , Rfl:   •  oxyCODONE (OxyCONTIN) 40 mg 12 hr tablet, Take 1 tablet (40 mg total) by mouth every 12 (twelve) hours Max Daily Amount: 80 mg, Disp: 60 tablet, Rfl: 0  •  oxyCODONE (ROXICODONE) 30 MG immediate release tablet, Take 0.5-1 tablets (15-30 mg total) by mouth every 4 (four) hours as needed (15mg for moderate pain, 30mg for severe pain) Max 6 tabs/day. Max Daily Amount: 180 mg, Disp: 120 tablet, Rfl: 0  •  potassium chloride (K-DUR,KLOR-CON) 10 mEq tablet, 20mg bid, Disp: 90 tablet, Rfl: 3  •  tamsulosin (FLOMAX) 0.4 mg, Take 1 capsule (0.4 mg total) by mouth daily with dinner, Disp: 90 capsule, Rfl: 3  •  torsemide (DEMADEX) 20 mg tablet, Take 60 mg by mouth daily, Disp: , Rfl:   •  gabapentin (Neurontin) 100 mg capsule, Take 1 capsule (100 mg total) by mouth 3 (three) times a day With 300mg dose, Disp: 90 capsule, Rfl: 3    Past medical, surgical, social, and family histories are reviewed and pertinent updates are made. Review of Systems   Constitutional: Negative for activity change, appetite change, fatigue and unexpected weight change. HENT: Negative for mouth sores, trouble swallowing and voice change. Eyes: Negative. Respiratory: Negative for shortness of breath. Cardiovascular: Negative for chest pain. Gastrointestinal: Negative for abdominal pain, constipation, diarrhea, nausea and vomiting. Genitourinary: Negative. Negative for decreased urine volume, difficulty urinating, flank pain, frequency, hematuria, testicular pain and urgency. Musculoskeletal: Positive for arthralgias and gait problem. Negative for back pain and myalgias. Skin: Negative for color change, pallor and wound. Neurological: Negative for dizziness, weakness, light-headedness and headaches.

## 2023-09-04 PROBLEM — J18.9 CAP (COMMUNITY ACQUIRED PNEUMONIA): Status: RESOLVED | Noted: 2023-07-04 | Resolved: 2023-09-04

## 2023-09-05 DIAGNOSIS — G89.29 OTHER CHRONIC PAIN: ICD-10-CM

## 2023-09-05 DIAGNOSIS — M54.40 ACUTE RIGHT-SIDED LOW BACK PAIN WITH SCIATICA, SCIATICA LATERALITY UNSPECIFIED: Primary | ICD-10-CM

## 2023-09-05 RX ORDER — AMITRIPTYLINE HYDROCHLORIDE 25 MG/1
25 TABLET, FILM COATED ORAL
Qty: 30 TABLET | Refills: 3 | Status: SHIPPED | OUTPATIENT
Start: 2023-09-05

## 2023-09-05 RX ORDER — CELECOXIB 200 MG/1
200 CAPSULE ORAL DAILY
Qty: 30 CAPSULE | Refills: 1 | Status: SHIPPED | OUTPATIENT
Start: 2023-09-05

## 2023-09-08 ENCOUNTER — OFFICE VISIT (OUTPATIENT)
Dept: URGENT CARE | Facility: CLINIC | Age: 63
End: 2023-09-08
Payer: MEDICARE

## 2023-09-08 VITALS
WEIGHT: 238 LBS | HEART RATE: 70 BPM | OXYGEN SATURATION: 96 % | TEMPERATURE: 98.6 F | RESPIRATION RATE: 18 BRPM | DIASTOLIC BLOOD PRESSURE: 71 MMHG | SYSTOLIC BLOOD PRESSURE: 154 MMHG | BODY MASS INDEX: 36.07 KG/M2 | HEIGHT: 68 IN

## 2023-09-08 DIAGNOSIS — T16.2XXA ACUTE FOREIGN BODY OF LEFT EAR CANAL, INITIAL ENCOUNTER: Primary | ICD-10-CM

## 2023-09-08 PROCEDURE — 69200 CLEAR OUTER EAR CANAL: CPT

## 2023-09-08 PROCEDURE — 99213 OFFICE O/P EST LOW 20 MIN: CPT

## 2023-09-08 PROCEDURE — G0463 HOSPITAL OUTPT CLINIC VISIT: HCPCS

## 2023-09-08 NOTE — PROGRESS NOTES
[facility]        NAME: Shelley Stanford is a 58 y.o. male  : 1960    MRN: [id number]  DATE: 2023  TIME: 3:15 PM    Assessment and Plan   Acute foreign body of left ear canal, initial encounter [T16. 2XXA]  1. Acute foreign body of left ear canal, initial encounter  Foreign body removal        Discussed problem with patient. Foreign body left ear was successfully removed had no complications afterward. Patient Instructions       Follow up with PCP in 3-5 days. Proceed to  ER if symptoms worsen. Chief Complaint     Chief Complaint   Patient presents with   • Foreign Body in Ear     Hearing aid plug stuck in left ear cant hear both sides seem to be blocked up trouble hearing          History of Present Illness       57 y/o male presents with bilateral ear blockage. States he was taking out his hearing aid when he noted the plug part was missing. States it is still in his left ear. Denies pain complaints. Review of Systems   Review of Systems   Constitutional: Negative for appetite change, chills, fatigue and fever. HENT: Positive for hearing loss. Negative for congestion, ear pain, postnasal drip, rhinorrhea, sinus pressure, sinus pain and sore throat. Respiratory: Negative for cough, shortness of breath, wheezing and stridor. Cardiovascular: Negative for chest pain and palpitations.          Current Medications       Current Outpatient Medications:   •  amitriptyline (ELAVIL) 25 mg tablet, Take 1 tablet (25 mg total) by mouth daily at bedtime, Disp: 30 tablet, Rfl: 3  •  aspirin 81 mg chewable tablet, Chew 81 mg daily, Disp: , Rfl:   •  atorvastatin (LIPITOR) 40 mg tablet, , Disp: , Rfl:   •  celecoxib (CeleBREX) 200 mg capsule, Take 1 capsule (200 mg total) by mouth daily, Disp: 30 capsule, Rfl: 1  •  co-enzyme Q-10 30 MG capsule, Take 100 mg by mouth daily , Disp: , Rfl:   •  Farxiga 5 MG TABS, 5 mg daily 5mg alternating with 2.5mg for fluid retention, Disp: , Rfl: •  gabapentin (Neurontin) 100 mg capsule, Take 1 capsule (100 mg total) by mouth 3 (three) times a day With 300mg dose, Disp: 90 capsule, Rfl: 3  •  gabapentin (NEURONTIN) 300 mg capsule, Take 2 caps (600mg) three times daily. , Disp: 180 capsule, Rfl: 0  •  methylPREDNISolone 4 MG tablet therapy pack, Use as directed on package, Disp: 21 tablet, Rfl: 0  •  metolazone (ZAROXOLYN) 2.5 mg tablet, Take 2.5 mg by mouth daily Patient reports use of this medication daily, prescribed by cardiology. , Disp: , Rfl:   •  multivitamin-iron-minerals-folic acid (CENTRUM) chewable tablet, Chew 1 tablet daily, Disp: , Rfl:   •  oxyCODONE (OxyCONTIN) 40 mg 12 hr tablet, Take 1 tablet (40 mg total) by mouth every 12 (twelve) hours Max Daily Amount: 80 mg, Disp: 60 tablet, Rfl: 0  •  oxyCODONE (ROXICODONE) 30 MG immediate release tablet, Take 0.5-1 tablets (15-30 mg total) by mouth every 4 (four) hours as needed (15mg for moderate pain, 30mg for severe pain) Max 6 tabs/day.  Max Daily Amount: 180 mg, Disp: 120 tablet, Rfl: 0  •  potassium chloride (K-DUR,KLOR-CON) 10 mEq tablet, 20mg bid, Disp: 90 tablet, Rfl: 3  •  tamsulosin (FLOMAX) 0.4 mg, Take 1 capsule (0.4 mg total) by mouth daily with dinner, Disp: 90 capsule, Rfl: 3  •  torsemide (DEMADEX) 20 mg tablet, Take 60 mg by mouth daily, Disp: , Rfl:     Current Allergies     Allergies as of 09/08/2023 - Reviewed 09/08/2023   Allergen Reaction Noted   • Mirtazapine Other (See Comments) and Confusion 04/13/2023            The following portions of the patient's history were reviewed and updated as appropriate: allergies, current medications, past family history, past medical history, past social history, past surgical history and problem list.     Past Medical History:   Diagnosis Date   • Arthritis    • Bladder cancer ([address])    • Cancer ([address]) 3/17/2023   • Cervical disc disorder 1/2016   • Chronic narcotic dependence ([address])    • Chronic pain disorder     lower back and down both legs • Colon polyp    • Coronary artery disease    • CPAP (continuous positive airway pressure) dependence    • Does use hearing aid     will wear DOS   • Full dentures    • Heart failure (HCC)     and "fluid retention" [facility] PA"   • High cholesterol    • Hypertension    • Low back pain 2003   • Mild ankle edema     and feet   • Obstructive sleep apnea on CPAP    • Prediabetes    • Shortness of breath     per pt "with just exertion"   • Sleep apnea    • Wears glasses        Past Surgical History:   Procedure Laterality Date   • BACK SURGERY      titanium rods implanted   • COLONOSCOPY     • CYSTOSCOPY W/ URETERAL STENT PLACEMENT Bilateral 3/17/2023    Procedure: bilateral retrograde;  Surgeon: Emil Diaz MD;  Location: [facility];  Service: Urology   • HERNIA REPAIR      x5   • LUMBAR LAMINECTOMY N/A 6/30/2023    Procedure: Left L3-4 Metrx hemilaminectomy and bilateral foraminotomies;  Surgeon: Dulce Goldberg MD;  Location:  MAIN OR;  Service: Neurosurgery   • IL CYSTO W/REMOVAL OF LESIONS SMALL N/A 5/1/2023    Procedure: CYSTOSCOPY TURBT;  Surgeon: Emil Diaz MD;  [facility];  Service: Urology   • TRANSURETHRAL RESECTION OF BLADDER TUMOR N/A 3/17/2023    Procedure: TRANSURETHRAL RESECTION OF BLADDER TUMOR (TURBT); Surgeon: Emil Diaz MD;  [facility];  Service: Urology       Family History   Problem Relation Age of Onset   • Cancer Father    • Aortic aneurysm Father    • Leukemia Father    • Hypertension Mother          Medications have been verified. Objective   /71   Pulse 70   Temp 98.6 °F (37 °C)   Resp 18   Ht 5' 8" (1.727 m)   Wt 108 kg (238 lb)   SpO2 96%   BMI 36.19 kg/m²        Physical Exam     Physical Exam  Vitals and nursing note reviewed. Constitutional:       General: He is not in acute distress. Appearance: Normal appearance. He is normal weight. He is not ill-appearing, toxic-appearing or diaphoretic.    HENT:      Head: Normocephalic. Right Ear: Tympanic membrane, ear canal and external ear normal.      Left Ear: Tympanic membrane, ear canal and external ear normal.      Ears:      Comments: Left clear plastic plug. Post removal had no complications  Cardiovascular:      Rate and Rhythm: Normal rate and regular rhythm. Pulses: Normal pulses. Heart sounds: Normal heart sounds. No murmur heard. No friction rub. No gallop. Pulmonary:      Effort: Pulmonary effort is normal. No respiratory distress. Breath sounds: Normal breath sounds. No stridor. No wheezing, rhonchi or rales. Chest:      Chest wall: No tenderness. Neurological:      Mental Status: He is alert. Universal Protocol:  Consent: Verbal consent obtained. Written consent not obtained. Risks and benefits: risks, benefits and alternatives were discussed  Consent given by: patient  Time out: Immediately prior to procedure a "time out" was called to verify the correct patient, procedure, equipment, support staff and site/side marked as required. Timeout called at: 9/8/2023 3:14 PM.  Patient understanding: patient states understanding of the procedure being performed  Patient consent: the patient's understanding of the procedure matches consent given  Patient identity confirmed: verbally with patient    Foreign body removal    Date/Time: 9/8/2023 2:00 PM    Performed by: Pacheco Lassiter PA-C  Authorized by: Pacheco Lassiter PA-C  Body area: ear  Location details: left ear    Sedation:  Patient sedated: no  Patient restrained: no  Patient cooperative: yes  Localization method: probed and visualized  Removal mechanism: forceps and alligator forceps  Complexity: simple  1 objects recovered.   Objects recovered: Plastic earplug  Post-procedure assessment: foreign body removed  Patient tolerance: patient tolerated the procedure well with no immediate complications

## 2023-09-21 ENCOUNTER — RA CDI HCC (OUTPATIENT)
Dept: OTHER | Facility: HOSPITAL | Age: 63
End: 2023-09-21

## 2023-09-21 NOTE — PROGRESS NOTES
[address] [facility] coding opportunities          Chart Reviewed number of suggestions sent to Provider: 1     Patients Insurance     Medicare Insurance: Medicare        [id number]

## 2023-09-23 PROBLEM — N40.0 BENIGN PROSTATIC HYPERPLASIA: Status: ACTIVE | Noted: 2023-09-23

## 2023-09-23 NOTE — PROGRESS NOTES
UROLOGY PROGRESS NOTE         NAME: Ronnell Cochran  AGE: 58 y.o. SEX: male  : 1960   MRN: [id number]    DATE: 2023  TIME: 12:00 PM    Assessment and Plan   Procedures     Impression:   1. Benign prostatic hyperplasia, unspecified whether lower urinary tract symptoms present    2. Malignant neoplasm of lateral wall of urinary bladder (HCC)    3. History of gross hematuria         Plan: We will set patient up for cystoscopy possible TURBT and bladder biopsy at [facility] as a 23-hour observation. The risk and benefits of the procedure were explained to the patient including bleeding, infection, bladder perforation and death. He wished to proceed. Patient asked me if he did not have cancer what the follow-up would be and that will depend on any maintenance BCG G after discussing with my nurse coordinator in [facility] and the [facility]. There is a national shortage of BCG so we will have to figure out a plan regarding that. I suspect some of his pain is possibly neuropathic but I appreciate his new PCP and will respect their evaluation and treatment options. We will send a preop urine culture and get patient set up hopefully within the next 4 to 5 weeks at [address]. Chief Complaint   No chief complaint on file. History of Present Illness     HPI: Ronnell Cochran is a 58y.o. year old male who presents with history of T1 high-grade bladder cancer diagnosed last spring. Patient recently completed 6 weeks induction therapy of BCG. He is here today for an H&P update and to set him up for a cystoscopy, bladder biopsy possible TURBT. The risk and benefits of the procedure were explained to the patient he wishes to proceed. The risk include bleeding infection and bladder perforation and death. Patient has been voiding well and tolerated BCG well from an irritable and obstructive voiding complaint. Patient is on Flomax. So do a urine culture and urine cytology today.   CT scan 8/3/2023 showed normal upper tracts. Bladder wall thickening that is expected. Patient is still having a lot of right groin pain seeing a new PCP per patient was placed on Celebrex. The following portions of the patient's history were reviewed and updated as appropriate: allergies, current medications, past family history, past medical history, past social history, past surgical history and problem list.  Past Medical History:   Diagnosis Date   • Arthritis    • Bladder cancer ([address])    • Cancer ([address]) 3/17/2023   • Cervical disc disorder 1/2016   • Chronic narcotic dependence ([address])    • Chronic pain disorder     lower back and down both legs   • Colon polyp    • Coronary artery disease    • CPAP (continuous positive airway pressure) dependence    • Does use hearing aid     will wear DOS   • Full dentures    • Heart failure ([address])     and "fluid retention" [facility] PA"   • High cholesterol    • Hypertension    • Low back pain 2003   • Mild ankle edema     and feet   • Obstructive sleep apnea on CPAP    • Prediabetes    • Shortness of breath     per pt "with just exertion"   • Sleep apnea    • Wears glasses      Past Surgical History:   Procedure Laterality Date   • BACK SURGERY      titanium rods implanted   • COLONOSCOPY     • CYSTOSCOPY W/ URETERAL STENT PLACEMENT Bilateral 3/17/2023    Procedure: bilateral retrograde;  Surgeon: Miguel Collado MD;  Location:  MAIN OR;  Service: Urology   • HERNIA REPAIR      x5   • LUMBAR LAMINECTOMY N/A 6/30/2023    Procedure: Left L3-4 Metrx hemilaminectomy and bilateral foraminotomies;  Surgeon: Nika Knowles MD;  Location:  MAIN OR;  Service: Neurosurgery   • NM CYSTO W/REMOVAL OF LESIONS SMALL N/A 5/1/2023    Procedure: CYSTOSCOPY TURBT;  Surgeon: Miguel Collado MD;  Location:  MAIN OR;  Service: Urology   • TRANSURETHRAL RESECTION OF BLADDER TUMOR N/A 3/17/2023    Procedure: TRANSURETHRAL RESECTION OF BLADDER TUMOR (TURBT);   Surgeon: Miguel Collado MD;  [facility];  Service: Urology     shoulder  Review of Systems     Const: Denies chills, fever and weight loss. CV: Denies chest pain. Resp: Denies SOB. GI: Denies abdominal pain, nausea and vomiting. : Denies symptoms other than stated above. Musculo: Denies back pain. Objective   There were no vitals taken for this visit. Physical Exam  Const: Appears healthy and well developed. No signs of acute distress present. Resp: Respirations are regular and unlabored. CV: Rate is regular. Rhythm is regular. Abdomen: Abdomen is soft, nontender, and nondistended. Kidneys are not palpable. : nl  Psych: Patient's attitude is cooperative. Mood is normal. Affect is normal.    Current Medications     Current Outpatient Medications:   •  amitriptyline (ELAVIL) 25 mg tablet, Take 1 tablet (25 mg total) by mouth daily at bedtime, Disp: 30 tablet, Rfl: 3  •  aspirin 81 mg chewable tablet, Chew 81 mg daily, Disp: , Rfl:   •  atorvastatin (LIPITOR) 40 mg tablet, , Disp: , Rfl:   •  celecoxib (CeleBREX) 200 mg capsule, Take 1 capsule (200 mg total) by mouth daily, Disp: 30 capsule, Rfl: 1  •  co-enzyme Q-10 30 MG capsule, Take 100 mg by mouth daily , Disp: , Rfl:   •  Farxiga 5 MG TABS, 5 mg daily 5mg alternating with 2.5mg for fluid retention, Disp: , Rfl:   •  gabapentin (Neurontin) 100 mg capsule, Take 1 capsule (100 mg total) by mouth 3 (three) times a day With 300mg dose, Disp: 90 capsule, Rfl: 3  •  gabapentin (NEURONTIN) 300 mg capsule, Take 2 caps (600mg) three times daily. , Disp: 180 capsule, Rfl: 0  •  methylPREDNISolone 4 MG tablet therapy pack, Use as directed on package, Disp: 21 tablet, Rfl: 0  •  metolazone (ZAROXOLYN) 2.5 mg tablet, Take 2.5 mg by mouth daily Patient reports use of this medication daily, prescribed by cardiology. , Disp: , Rfl:   •  multivitamin-iron-minerals-folic acid (CENTRUM) chewable tablet, Chew 1 tablet daily, Disp: , Rfl:   •  oxyCODONE (OxyCONTIN) 40 mg 12 hr tablet, Take 1 tablet (40 mg total) by mouth every 12 (twelve) hours Max Daily Amount: 80 mg, Disp: 60 tablet, Rfl: 0  •  oxyCODONE (ROXICODONE) 30 MG immediate release tablet, Take 0.5-1 tablets (15-30 mg total) by mouth every 4 (four) hours as needed (15mg for moderate pain, 30mg for severe pain) Max 6 tabs/day.  Max Daily Amount: 180 mg, Disp: 120 tablet, Rfl: 0  •  potassium chloride (K-DUR,KLOR-CON) 10 mEq tablet, 20mg bid, Disp: 90 tablet, Rfl: 3  •  tamsulosin (FLOMAX) 0.4 mg, Take 1 capsule (0.4 mg total) by mouth daily with dinner, Disp: 90 capsule, Rfl: 3  •  torsemide (DEMADEX) 20 mg tablet, Take 60 mg by mouth daily, Disp: , Rfl:         Ashanti Hannon MD

## 2023-09-26 DIAGNOSIS — G89.3 CANCER-RELATED PAIN: ICD-10-CM

## 2023-09-26 RX ORDER — GABAPENTIN 300 MG/1
CAPSULE ORAL
Qty: 180 CAPSULE | Refills: 0 | Status: SHIPPED | OUTPATIENT
Start: 2023-09-26 | End: 2023-10-07

## 2023-09-27 ENCOUNTER — PREP FOR PROCEDURE (OUTPATIENT)
Dept: UROLOGY | Facility: CLINIC | Age: 63
End: 2023-09-27

## 2023-09-27 ENCOUNTER — OFFICE VISIT (OUTPATIENT)
Dept: UROLOGY | Facility: CLINIC | Age: 63
End: 2023-09-27
Payer: MEDICARE

## 2023-09-27 VITALS
HEIGHT: 68 IN | SYSTOLIC BLOOD PRESSURE: 140 MMHG | BODY MASS INDEX: 36.53 KG/M2 | TEMPERATURE: 98 F | HEART RATE: 69 BPM | OXYGEN SATURATION: 96 % | DIASTOLIC BLOOD PRESSURE: 68 MMHG | WEIGHT: 241 LBS

## 2023-09-27 DIAGNOSIS — M54.42 CHRONIC BILATERAL LOW BACK PAIN WITH BILATERAL SCIATICA: ICD-10-CM

## 2023-09-27 DIAGNOSIS — Z01.818 ENCOUNTER FOR PREADMISSION TESTING: Primary | ICD-10-CM

## 2023-09-27 DIAGNOSIS — C67.2 MALIGNANT NEOPLASM OF LATERAL WALL OF URINARY BLADDER (HCC): ICD-10-CM

## 2023-09-27 DIAGNOSIS — N40.0 BENIGN PROSTATIC HYPERPLASIA, UNSPECIFIED WHETHER LOWER URINARY TRACT SYMPTOMS PRESENT: Primary | ICD-10-CM

## 2023-09-27 DIAGNOSIS — G89.3 CANCER-RELATED PAIN: ICD-10-CM

## 2023-09-27 DIAGNOSIS — G89.29 CHRONIC BILATERAL LOW BACK PAIN WITH BILATERAL SCIATICA: ICD-10-CM

## 2023-09-27 DIAGNOSIS — C67.3 MALIGNANT NEOPLASM OF ANTERIOR WALL OF URINARY BLADDER (HCC): ICD-10-CM

## 2023-09-27 DIAGNOSIS — M54.41 CHRONIC BILATERAL LOW BACK PAIN WITH BILATERAL SCIATICA: ICD-10-CM

## 2023-09-27 DIAGNOSIS — Z87.898 HISTORY OF GROSS HEMATURIA: ICD-10-CM

## 2023-09-27 PROCEDURE — 87086 URINE CULTURE/COLONY COUNT: CPT | Performed by: UROLOGY

## 2023-09-27 PROCEDURE — 99214 OFFICE O/P EST MOD 30 MIN: CPT | Performed by: UROLOGY

## 2023-09-27 RX ORDER — OXYCODONE HCL 40 MG/1
40 TABLET, FILM COATED, EXTENDED RELEASE ORAL EVERY 8 HOURS SCHEDULED
Qty: 90 TABLET | Refills: 0 | Status: ON HOLD | OUTPATIENT
Start: 2023-09-27 | End: 2023-10-07 | Stop reason: SDUPTHER

## 2023-09-27 RX ORDER — OXYCODONE HYDROCHLORIDE 30 MG/1
15-30 TABLET ORAL EVERY 4 HOURS PRN
Qty: 120 TABLET | Refills: 0 | Status: ON HOLD | OUTPATIENT
Start: 2023-09-27 | End: 2023-10-07 | Stop reason: SDUPTHER

## 2023-09-27 RX ORDER — OXYCODONE HCL 40 MG/1
40 TABLET, FILM COATED, EXTENDED RELEASE ORAL EVERY 8 HOURS SCHEDULED
Qty: 90 TABLET | Refills: 0 | Status: SHIPPED | OUTPATIENT
Start: 2023-09-27 | End: 2023-09-27 | Stop reason: SDUPTHER

## 2023-09-27 NOTE — TELEPHONE ENCOUNTER
Follow-up as outpatient.     Last appointment: 8/29/2023    Next scheduled appointment: 10/10/2023    Pharmacy:  [id number]

## 2023-09-27 NOTE — TELEPHONE ENCOUNTER
Controlled Substance Review    [place] PDMP reviewed: No red flags were identified; safe to proceed with prescription. Chani Alberts

## 2023-09-28 ENCOUNTER — OFFICE VISIT (OUTPATIENT)
Dept: FAMILY MEDICINE CLINIC | Facility: CLINIC | Age: 63
End: 2023-09-28
Payer: MEDICARE

## 2023-09-28 ENCOUNTER — APPOINTMENT (OUTPATIENT)
Dept: LAB | Facility: CLINIC | Age: 63
End: 2023-09-28
Payer: MEDICARE

## 2023-09-28 VITALS
WEIGHT: 238.2 LBS | HEART RATE: 64 BPM | TEMPERATURE: 97.5 F | DIASTOLIC BLOOD PRESSURE: 60 MMHG | SYSTOLIC BLOOD PRESSURE: 134 MMHG | HEIGHT: 68 IN | OXYGEN SATURATION: 100 % | BODY MASS INDEX: 36.1 KG/M2

## 2023-09-28 DIAGNOSIS — Z98.890 S/P LAMINECTOMY: ICD-10-CM

## 2023-09-28 DIAGNOSIS — E78.2 MIXED HYPERLIPIDEMIA: ICD-10-CM

## 2023-09-28 DIAGNOSIS — Z23 ENCOUNTER FOR IMMUNIZATION: ICD-10-CM

## 2023-09-28 DIAGNOSIS — Z00.00 MEDICARE ANNUAL WELLNESS VISIT, SUBSEQUENT: Primary | ICD-10-CM

## 2023-09-28 DIAGNOSIS — M54.16 LUMBAR RADICULOPATHY: ICD-10-CM

## 2023-09-28 DIAGNOSIS — Z01.818 ENCOUNTER FOR PREADMISSION TESTING: ICD-10-CM

## 2023-09-28 DIAGNOSIS — R76.8 POSITIVE ANA (ANTINUCLEAR ANTIBODY): ICD-10-CM

## 2023-09-28 LAB
ANA SER QL IA: NEGATIVE
ANION GAP SERPL CALCULATED.3IONS-SCNC: 9 MMOL/L
BACTERIA UR CULT: NORMAL
BASOPHILS # BLD AUTO: 0.07 THOUSANDS/ÂΜL (ref 0–0.1)
BASOPHILS NFR BLD AUTO: 1 % (ref 0–1)
BUN SERPL-MCNC: 22 MG/DL (ref 5–25)
CALCIUM SERPL-MCNC: 9.8 MG/DL (ref 8.4–10.2)
CHLORIDE SERPL-SCNC: 98 MMOL/L (ref 96–108)
CHOLEST SERPL-MCNC: 161 MG/DL
CO2 SERPL-SCNC: 33 MMOL/L (ref 21–32)
CREAT SERPL-MCNC: 1.17 MG/DL (ref 0.6–1.3)
EOSINOPHIL # BLD AUTO: 0.32 THOUSAND/ÂΜL (ref 0–0.61)
EOSINOPHIL NFR BLD AUTO: 3 % (ref 0–6)
ERYTHROCYTE [DISTWIDTH] IN BLOOD BY AUTOMATED COUNT: 16.1 % (ref 11.6–15.1)
GFR SERPL CREATININE-BSD FRML MDRD: 65 ML/MIN/1.73SQ M
GLUCOSE P FAST SERPL-MCNC: 151 MG/DL (ref 65–99)
HCT VFR BLD AUTO: 38.4 % (ref 36.5–49.3)
HDLC SERPL-MCNC: 37 MG/DL
HGB BLD-MCNC: 12.8 G/DL (ref 12–17)
IMM GRANULOCYTES # BLD AUTO: 0.06 THOUSAND/UL (ref 0–0.2)
IMM GRANULOCYTES NFR BLD AUTO: 1 % (ref 0–2)
LDLC SERPL CALC-MCNC: 75 MG/DL (ref 0–100)
LYMPHOCYTES # BLD AUTO: 3.74 THOUSANDS/ÂΜL (ref 0.6–4.47)
LYMPHOCYTES NFR BLD AUTO: 32 % (ref 14–44)
MCH RBC QN AUTO: 31.3 PG (ref 26.8–34.3)
MCHC RBC AUTO-ENTMCNC: 33.3 G/DL (ref 31.4–37.4)
MCV RBC AUTO: 94 FL (ref 82–98)
MONOCYTES # BLD AUTO: 0.59 THOUSAND/ÂΜL (ref 0.17–1.22)
MONOCYTES NFR BLD AUTO: 5 % (ref 4–12)
NEUTROPHILS # BLD AUTO: 6.9 THOUSANDS/ÂΜL (ref 1.85–7.62)
NEUTS SEG NFR BLD AUTO: 58 % (ref 43–75)
NONHDLC SERPL-MCNC: 124 MG/DL
NRBC BLD AUTO-RTO: 0 /100 WBCS
PLATELET # BLD AUTO: 221 THOUSANDS/UL (ref 149–390)
PMV BLD AUTO: 12.3 FL (ref 8.9–12.7)
POTASSIUM SERPL-SCNC: 4.1 MMOL/L (ref 3.5–5.3)
RBC # BLD AUTO: 4.09 MILLION/UL (ref 3.88–5.62)
SODIUM SERPL-SCNC: 140 MMOL/L (ref 135–147)
TRIGL SERPL-MCNC: 243 MG/DL
WBC # BLD AUTO: 11.68 THOUSAND/UL (ref 4.31–10.16)

## 2023-09-28 PROCEDURE — 80048 BASIC METABOLIC PNL TOTAL CA: CPT

## 2023-09-28 PROCEDURE — 86038 ANTINUCLEAR ANTIBODIES: CPT

## 2023-09-28 PROCEDURE — 36415 COLL VENOUS BLD VENIPUNCTURE: CPT

## 2023-09-28 PROCEDURE — 90686 IIV4 VACC NO PRSV 0.5 ML IM: CPT | Performed by: FAMILY MEDICINE

## 2023-09-28 PROCEDURE — 80061 LIPID PANEL: CPT

## 2023-09-28 PROCEDURE — G0438 PPPS, INITIAL VISIT: HCPCS | Performed by: FAMILY MEDICINE

## 2023-09-28 PROCEDURE — G0008 ADMIN INFLUENZA VIRUS VAC: HCPCS | Performed by: FAMILY MEDICINE

## 2023-09-28 PROCEDURE — 99213 OFFICE O/P EST LOW 20 MIN: CPT | Performed by: FAMILY MEDICINE

## 2023-09-28 PROCEDURE — 85025 COMPLETE CBC W/AUTO DIFF WBC: CPT

## 2023-09-28 RX ORDER — GABAPENTIN 100 MG/1
100 CAPSULE ORAL 3 TIMES DAILY
COMMUNITY
Start: 2023-09-26

## 2023-09-28 NOTE — PROGRESS NOTES
Annual [facility] [address]   Assessment and Plan:     Problem List Items Addressed This Visit    None  Visit Diagnoses       Need for hepatitis C screening test    -  Primary    Screening for HIV (human immunodeficiency virus)        Encounter for immunization              BMI Counseling: Body mass index is 36.22 kg/m². The BMI is above normal. Nutrition recommendations include moderation in carbohydrate intake. Rationale for BMI follow-up plan is due to patient being overweight or obese. Preventive health issues were discussed with patient, and age appropriate screening tests were ordered as noted in patient's After Visit Summary. Personalized health advice and appropriate referrals for health education or preventive services given if needed, as noted in patient's After Visit Summary. History of Present Illness:     Patient presents for a Medicare Wellness Visit    Skip Gatica is stable from a Medicare standpoint he is getting a flu shot today I ordered a lipid panel he understands importance of healthy diet exercise and weight loss. He is sleeping okay. The chronic pain is severe I think it has to be nerve related his CAT scan was negative his MRI postsurgery looks great. Honora Records refer him to pain management can increase his pain medication. He cannot live this way is brought to tears by his pain. The pain is sharp stabbing 10 out of 10 in the right groin region but seems to be radicular from his spine. Patient Care Team:  Maricarmen Astorga MD as PCP - General (Family Medicine)  JUAN Baker as  Care Manager (Oncology)  Twila Berg PA-C as Physician Assistant ([address])     Review of Systems:     Review of Systems   Respiratory: Negative. Cardiovascular: Negative.          Problem List:     Patient Active Problem List   Diagnosis   • Benign prostatic hyperplasia with nocturia   • Bladder mass   • Obstructive sleep apnea on CPAP   • Hypertension   • Malignant neoplasm of lateral wall of urinary bladder (HCC)   • History of gross hematuria   • Anxiety   • Chronic bilateral low back pain with bilateral sciatica   • Acute on chronic diastolic heart failure (HCC)   • Opioid withdrawal (HCC)   • RODO (acute kidney injury) ([address])   • Intractable low back pain   • Chronic, continuous use of opioids   • Hyperglycemia   • Bradycardia   • Coronary artery disease   • Electrolyte abnormality   • Hyponatremia   • Acute respiratory failure with hypoxia (HCC)   • History of hematuria   • Morbid (severe) obesity due to excess calories [facility])   • Suprapubic pressure   • Right groin pain   • Encounter for surgical aftercare following surgery of nervous system   • Abdominal pain   • Benign prostatic hyperplasia      Past Medical and Surgical History:     Past Medical History:   Diagnosis Date   • Arthritis    • Bladder cancer ([address])    • Cancer ([address]) 3/17/2023   • Cervical disc disorder 1/2016   • Chronic narcotic dependence ([address])    • Chronic pain disorder     lower back and down both legs   • Colon polyp    • Coronary artery disease    • CPAP (continuous positive airway pressure) dependence    • Does use hearing aid     will wear DOS   • Full dentures    • Heart failure ([address])     and "fluid retention" SL  B Daryl cardio PA"   • High cholesterol    • Hypertension    • Low back pain 2003   • Mild ankle edema     and feet   • Obstructive sleep apnea on CPAP    • Prediabetes    • Shortness of breath     per pt "with just exertion"   • Sleep apnea    • Wears glasses      Past Surgical History:   Procedure Laterality Date   • BACK SURGERY      titanium rods implanted   • COLONOSCOPY     • CYSTOSCOPY W/ URETERAL STENT PLACEMENT Bilateral 3/17/2023    Procedure: bilateral retrograde;  Surgeon: Trista Wells MD;  [facility]: [facility];  Service: Urology   • HERNIA REPAIR      x5   • LUMBAR LAMINECTOMY N/A 6/30/2023    Procedure: Left L3-4 Metrx hemilaminectomy and bilateral foraminotomies;  Surgeon: Casandra Costa Efrain Rogers MD;  [facility];  Service: Neurosurgery   • LA CYSTO W/REMOVAL OF LESIONS SMALL N/A 2023    Procedure: CYSTOSCOPY TURBT;  Surgeon: Moose Rene MD;  Location: [facility];  Service: Urology   • TRANSURETHRAL RESECTION OF BLADDER TUMOR N/A 3/17/2023    Procedure: TRANSURETHRAL RESECTION OF BLADDER TUMOR (TURBT); Surgeon: Moose Rene MD;  Location: [facility];  Service: Urology      Family History:     Family History   Problem Relation Age of Onset   • Cancer Father    • Aortic aneurysm Father    • Leukemia Father    • Hypertension Mother       Social History:     Social History     Socioeconomic History   • Marital status: /Civil Union     Spouse name: Not on file   • Number of children: Not on file   • Years of education: Not on file   • Highest education level: Not on file   Occupational History   • Not on file   Tobacco Use   • Smoking status: Former     Packs/day: 1.00     Years: 35.00     Total pack years: 35.00     Types: Cigarettes     Start date: 2023     Quit date: 2016     Years since quittin.7   • Smokeless tobacco: Never   Vaping Use   • Vaping Use: Never used   Substance and Sexual Activity   • Alcohol use: Not Currently     Comment: 3 per year   • Drug use: Not Currently     Types: Marijuana   • Sexual activity: Not Currently   Other Topics Concern   • Not on file   Social History Narrative   • Not on file     Social Determinants of Health     Financial Resource Strain: Low Risk  (2023)    Overall Financial Resource Strain (CARDIA)    • Difficulty of Paying Living Expenses: Not hard at all   Food Insecurity: No Food Insecurity (2023)    Hunger Vital Sign    • Worried About Running Out of Food in the Last Year: Never true    • Ran Out of Food in the Last Year: Never true   Transportation Needs: No Transportation Needs (2023)    PRAPARE - Transportation    • Lack of Transportation (Medical): No    • Lack of Transportation (Non-Medical):  No Physical Activity: Not on file   Stress: Not on file   Social Connections: Not on file   Intimate Partner Violence: Not on file   Housing Stability: Low Risk  (7/5/2023)    Housing Stability Vital Sign    • Unable to Pay for Housing in the Last Year: No    • Number of Places Lived in the Last Year: 1    • Unstable Housing in the Last Year: No      Medications and Allergies:     Current Outpatient Medications   Medication Sig Dispense Refill   • amitriptyline (ELAVIL) 25 mg tablet Take 1 tablet (25 mg total) by mouth daily at bedtime 30 tablet 3   • aspirin 81 mg chewable tablet Chew 81 mg daily     • atorvastatin (LIPITOR) 40 mg tablet      • celecoxib (CeleBREX) 200 mg capsule Take 1 capsule (200 mg total) by mouth daily 30 capsule 1   • co-enzyme Q-10 30 MG capsule Take 100 mg by mouth daily      • Farxiga 5 MG TABS 5 mg daily 5mg alternating with 2.5mg for fluid retention     • gabapentin (NEURONTIN) 300 mg capsule Take 2 capsules (600mg) three times daily. 180 capsule 0   • methylPREDNISolone 4 MG tablet therapy pack Use as directed on package 21 tablet 0   • metolazone (ZAROXOLYN) 2.5 mg tablet Take 2.5 mg by mouth daily Patient reports use of this medication daily, prescribed by cardiology. • multivitamin-iron-minerals-folic acid (CENTRUM) chewable tablet Chew 1 tablet daily     • oxyCODONE (OxyCONTIN) 40 mg 12 hr tablet Take 1 tablet (40 mg total) by mouth every 8 (eight) hours Max Daily Amount: 120 mg 90 tablet 0   • oxyCODONE (ROXICODONE) 30 MG immediate release tablet Take 0.5-1 tablets (15-30 mg total) by mouth every 4 (four) hours as needed (15mg for moderate pain, 30mg for severe pain) Max 6 tabs/day.  Max Daily Amount: 180 mg 120 tablet 0   • potassium chloride (K-DUR,KLOR-CON) 10 mEq tablet 20mg bid 90 tablet 3   • tamsulosin (FLOMAX) 0.4 mg Take 1 capsule (0.4 mg total) by mouth daily with dinner 90 capsule 3   • torsemide (DEMADEX) 20 mg tablet Take 60 mg by mouth daily       No current facility-administered medications for this visit. Allergies   Allergen Reactions   • Mirtazapine Other (See Comments) and Confusion     Pt drove without knowing and woke up at a new destination      Immunizations:     Immunization History   Administered Date(s) Administered   • INFLUENZA 11/09/2015, 01/04/2017, 09/29/2017, 09/05/2018, 11/05/2019, 09/29/2020   • Pneumococcal Polysaccharide PPV23 07/22/2011   • Tdap 07/22/2011   • Tuberculin Skin Test-PPD Intradermal 07/14/2017   • Zoster Vaccine Recombinant 12/14/2020      Health Maintenance:         Topic Date Due   • Hepatitis C Screening  Never done   • HIV Screening  Never done   • Colorectal Cancer Screening  Never done   • Lung Cancer Screening  07/04/2024         Topic Date Due   • COVID-19 Vaccine (1) Never done   • Pneumococcal Vaccine: Pediatrics (0 to 5 Years) and At-Risk Patients (6 to 59 Years) (2 - PCV) 07/22/2012   • Influenza Vaccine (1) 09/01/2023      Medicare Screening Tests and Risk Assessments:     Last Medicare Wellness visit information reviewed, patient interviewed, no change since last AWV. Health Risk Assessment:   Patient rates overall health as good. Patient feels that their physical health rating is slightly better. Patient is dissatisfied with their life. Eyesight was rated as slightly worse. Hearing was rated as slightly worse. Patient feels that their emotional and mental health rating is slightly worse. Patients states they are never, rarely angry. Patient states they are often unusually tired/fatigued. Pain experienced in the last 7 days has been a lot. Patient's pain rating has been 8/10. Patient states that he has experienced no weight loss or gain in last 6 months. Fall Risk Screening: In the past year, patient has experienced: history of falling in past year      Home Safety:  Patient does not have trouble with stairs inside or outside of their home.  Patient has working smoke alarms and has working carbon monoxide detector. Home safety hazards include: loose rugs on the floor. Nutrition:   Current diet is Regular. Medications:   Patient is not currently taking any over-the-counter supplements. Patient is able to manage medications. Activities of Daily Living (ADLs)/Instrumental Activities of Daily Living (IADLs):   Walk and transfer into and out of bed and chair?: Yes  Dress and groom yourself?: Yes    Bathe or shower yourself?: Yes    Feed yourself? Yes  Do your laundry/housekeeping?: Yes  Manage your money, pay your bills and track your expenses?: Yes  Make your own meals?: Yes    Do your own shopping?: No    Previous Hospitalizations:   Any hospitalizations or ED visits within the last 12 months?: Yes    How many hospitalizations have you had in the last year?: 3-4    Advance Care Planning:   Living will: No    Durable POA for healthcare: No    Advanced directive: Yes      PREVENTIVE SCREENINGS      Cardiovascular Screening:    General: Screening Not Indicated and History Lipid Disorder      Diabetes Screening:     General: Screening Not Indicated and History Diabetes      Prostate Cancer Screening:    General: Screening Current      Abdominal Aortic Aneurysm (AAA) Screening:    Risk factors include: tobacco use        Lung Cancer Screening:     General: Screening Current    Screening, Brief Intervention, and Referral to Treatment (SBIRT)    Screening  Typical number of drinks in a day: 0  Typical number of drinks in a week: 0  Interpretation: Low risk drinking behavior. AUDIT-C Screenin) How often did you have a drink containing alcohol in the past year? monthly or less  2) How many drinks did you have on a typical day when you were drinking in the past year?  1 to 2  3) How often did you have 6 or more drinks on one occasion in the past year? never    AUDIT-C Score: 1  Interpretation: Score 0-3 (male): Negative screen for alcohol misuse    Single Item Drug Screening:  How often have you used an illegal drug (including marijuana) or a prescription medication for non-medical reasons in the past year? never    Single Item Drug Screen Score: 0  Interpretation: Negative screen for possible drug use disorder    Review of Current Opioid Use    Opioid Risk Tool (ORT) Interpretation: Complete Opioid Risk Tool (ORT)    No results found. Physical Exam:     There were no vitals taken for this visit. Physical Exam  Vitals and nursing note reviewed. Constitutional:       General: He is not in acute distress. Appearance: He is well-developed. HENT:      Head: Normocephalic and atraumatic. Eyes:      Conjunctiva/sclera: Conjunctivae normal.   Cardiovascular:      Rate and Rhythm: Normal rate and regular rhythm. Heart sounds: No murmur heard. Pulmonary:      Effort: Pulmonary effort is normal. No respiratory distress. Breath sounds: Normal breath sounds. Abdominal:      Palpations: Abdomen is soft. Tenderness: There is no abdominal tenderness. Musculoskeletal:         General: No swelling. Cervical back: Neck supple. Skin:     General: Skin is warm and dry. Capillary Refill: Capillary refill takes less than 2 seconds. Neurological:      Mental Status: He is alert.    Psychiatric:         Mood and Affect: Mood normal.          Cynthia Mazariegos, Terry visit

## 2023-10-05 ENCOUNTER — HOSPITAL ENCOUNTER (INPATIENT)
Facility: HOSPITAL | Age: 63
LOS: 2 days | Discharge: HOME | DRG: 291 | End: 2023-10-07
Attending: FAMILY MEDICINE | Admitting: FAMILY MEDICINE
Payer: MEDICARE

## 2023-10-05 ENCOUNTER — APPOINTMENT (INPATIENT)
Dept: MRI IMAGING | Facility: HOSPITAL | Age: 63
DRG: 291 | End: 2023-10-05
Payer: MEDICARE

## 2023-10-05 DIAGNOSIS — G89.3 CANCER-RELATED PAIN: ICD-10-CM

## 2023-10-05 DIAGNOSIS — C67.3 MALIGNANT NEOPLASM OF ANTERIOR WALL OF URINARY BLADDER (HCC): ICD-10-CM

## 2023-10-05 DIAGNOSIS — G89.29 CHRONIC BILATERAL LOW BACK PAIN WITH BILATERAL SCIATICA: ICD-10-CM

## 2023-10-05 DIAGNOSIS — I50.33 ACUTE ON CHRONIC DIASTOLIC HEART FAILURE (HCC): ICD-10-CM

## 2023-10-05 DIAGNOSIS — M54.40 ACUTE RIGHT-SIDED LOW BACK PAIN WITH SCIATICA, SCIATICA LATERALITY UNSPECIFIED: ICD-10-CM

## 2023-10-05 DIAGNOSIS — M54.42 CHRONIC BILATERAL LOW BACK PAIN WITH BILATERAL SCIATICA: ICD-10-CM

## 2023-10-05 DIAGNOSIS — R00.1 BRADYCARDIA: Primary | Chronic | ICD-10-CM

## 2023-10-05 DIAGNOSIS — M54.40 ACUTE LOW BACK PAIN WITH SCIATICA: ICD-10-CM

## 2023-10-05 DIAGNOSIS — M54.41 CHRONIC BILATERAL LOW BACK PAIN WITH BILATERAL SCIATICA: ICD-10-CM

## 2023-10-05 LAB
2HR DELTA HS TROPONIN: -1 NG/L
4HR DELTA HS TROPONIN: -1 NG/L
ALBUMIN SERPL BCP-MCNC: 3.6 G/DL (ref 3.5–5)
ALP SERPL-CCNC: 87 U/L (ref 34–104)
ALT SERPL W P-5'-P-CCNC: 21 U/L (ref 7–52)
ANION GAP SERPL CALCULATED.3IONS-SCNC: 8 MMOL/L
AST SERPL W P-5'-P-CCNC: 16 U/L (ref 13–39)
BASOPHILS # BLD AUTO: 0.04 THOUSANDS/ÂΜL (ref 0–0.1)
BASOPHILS NFR BLD AUTO: 1 % (ref 0–1)
BILIRUB SERPL-MCNC: 0.38 MG/DL (ref 0.2–1)
BUN SERPL-MCNC: 21 MG/DL (ref 5–25)
CALCIUM SERPL-MCNC: 9.2 MG/DL (ref 8.4–10.2)
CARDIAC TROPONIN I PNL SERPL HS: 3 NG/L
CARDIAC TROPONIN I PNL SERPL HS: 3 NG/L
CARDIAC TROPONIN I PNL SERPL HS: 4 NG/L
CHLORIDE SERPL-SCNC: 93 MMOL/L (ref 96–108)
CO2 SERPL-SCNC: 31 MMOL/L (ref 21–32)
CREAT SERPL-MCNC: 1.24 MG/DL (ref 0.6–1.3)
EOSINOPHIL # BLD AUTO: 0.38 THOUSAND/ÂΜL (ref 0–0.61)
EOSINOPHIL NFR BLD AUTO: 5 % (ref 0–6)
ERYTHROCYTE [DISTWIDTH] IN BLOOD BY AUTOMATED COUNT: 15.9 % (ref 11.6–15.1)
GFR SERPL CREATININE-BSD FRML MDRD: 61 ML/MIN/1.73SQ M
GLUCOSE SERPL-MCNC: 192 MG/DL (ref 65–140)
HCT VFR BLD AUTO: 36 % (ref 36.5–49.3)
HGB BLD-MCNC: 11.8 G/DL (ref 12–17)
IMM GRANULOCYTES # BLD AUTO: 0.02 THOUSAND/UL (ref 0–0.2)
IMM GRANULOCYTES NFR BLD AUTO: 0 % (ref 0–2)
INR PPP: 0.93 (ref 0.84–1.19)
LYMPHOCYTES # BLD AUTO: 1.98 THOUSANDS/ÂΜL (ref 0.6–4.47)
LYMPHOCYTES NFR BLD AUTO: 27 % (ref 14–44)
MAGNESIUM SERPL-MCNC: 2.1 MG/DL (ref 1.9–2.7)
MCH RBC QN AUTO: 30.6 PG (ref 26.8–34.3)
MCHC RBC AUTO-ENTMCNC: 32.8 G/DL (ref 31.4–37.4)
MCV RBC AUTO: 94 FL (ref 82–98)
MONOCYTES # BLD AUTO: 0.5 THOUSAND/ÂΜL (ref 0.17–1.22)
MONOCYTES NFR BLD AUTO: 7 % (ref 4–12)
NEUTROPHILS # BLD AUTO: 4.43 THOUSANDS/ÂΜL (ref 1.85–7.62)
NEUTS SEG NFR BLD AUTO: 60 % (ref 43–75)
NRBC BLD AUTO-RTO: 0 /100 WBCS
PLATELET # BLD AUTO: 201 THOUSANDS/UL (ref 149–390)
PMV BLD AUTO: 10.5 FL (ref 8.9–12.7)
POTASSIUM SERPL-SCNC: 3.9 MMOL/L (ref 3.5–5.3)
PROT SERPL-MCNC: 7.3 G/DL (ref 6.4–8.4)
PROTHROMBIN TIME: 12.8 SECONDS (ref 11.6–14.5)
RBC # BLD AUTO: 3.85 MILLION/UL (ref 3.88–5.62)
SODIUM SERPL-SCNC: 132 MMOL/L (ref 135–147)
WBC # BLD AUTO: 7.35 THOUSAND/UL (ref 4.31–10.16)

## 2023-10-05 PROCEDURE — A9585 GADOBUTROL INJECTION: HCPCS | Performed by: FAMILY MEDICINE

## 2023-10-05 PROCEDURE — 80053 COMPREHEN METABOLIC PANEL: CPT | Performed by: FAMILY MEDICINE

## 2023-10-05 PROCEDURE — 72158 MRI LUMBAR SPINE W/O & W/DYE: CPT

## 2023-10-05 PROCEDURE — 99223 1ST HOSP IP/OBS HIGH 75: CPT | Performed by: FAMILY MEDICINE

## 2023-10-05 PROCEDURE — 83735 ASSAY OF MAGNESIUM: CPT | Performed by: FAMILY MEDICINE

## 2023-10-05 PROCEDURE — 93005 ELECTROCARDIOGRAM TRACING: CPT

## 2023-10-05 PROCEDURE — 85025 COMPLETE CBC W/AUTO DIFF WBC: CPT | Performed by: FAMILY MEDICINE

## 2023-10-05 PROCEDURE — 85610 PROTHROMBIN TIME: CPT | Performed by: FAMILY MEDICINE

## 2023-10-05 PROCEDURE — 84484 ASSAY OF TROPONIN QUANT: CPT | Performed by: FAMILY MEDICINE

## 2023-10-05 RX ORDER — HYDROMORPHONE HYDROCHLORIDE 2 MG/ML
2 INJECTION, SOLUTION INTRAMUSCULAR; INTRAVENOUS; SUBCUTANEOUS EVERY 6 HOURS PRN
Status: DISCONTINUED | OUTPATIENT
Start: 2023-10-05 | End: 2023-10-07 | Stop reason: HOSPADM

## 2023-10-05 RX ORDER — AMOXICILLIN 250 MG
2 CAPSULE ORAL
Status: DISCONTINUED | OUTPATIENT
Start: 2023-10-05 | End: 2023-10-07 | Stop reason: HOSPADM

## 2023-10-05 RX ORDER — GADOBUTROL 604.72 MG/ML
10 INJECTION INTRAVENOUS
Status: COMPLETED | OUTPATIENT
Start: 2023-10-05 | End: 2023-10-05

## 2023-10-05 RX ORDER — OXYCODONE HCL 20 MG/1
40 TABLET, FILM COATED, EXTENDED RELEASE ORAL EVERY 6 HOURS
Status: DISCONTINUED | OUTPATIENT
Start: 2023-10-05 | End: 2023-10-06

## 2023-10-05 RX ORDER — METHYLPREDNISOLONE SODIUM SUCCINATE 40 MG/ML
40 INJECTION, POWDER, LYOPHILIZED, FOR SOLUTION INTRAMUSCULAR; INTRAVENOUS EVERY 8 HOURS SCHEDULED
Status: DISCONTINUED | OUTPATIENT
Start: 2023-10-05 | End: 2023-10-06

## 2023-10-05 RX ORDER — OXYCODONE HYDROCHLORIDE 10 MG/1
30 TABLET ORAL EVERY 6 HOURS PRN
Status: DISCONTINUED | OUTPATIENT
Start: 2023-10-05 | End: 2023-10-07 | Stop reason: HOSPADM

## 2023-10-05 RX ORDER — ATORVASTATIN CALCIUM 40 MG/1
40 TABLET, FILM COATED ORAL
Status: DISCONTINUED | OUTPATIENT
Start: 2023-10-05 | End: 2023-10-07 | Stop reason: HOSPADM

## 2023-10-05 RX ORDER — TAMSULOSIN HYDROCHLORIDE 0.4 MG/1
0.4 CAPSULE ORAL
Status: DISCONTINUED | OUTPATIENT
Start: 2023-10-05 | End: 2023-10-07 | Stop reason: HOSPADM

## 2023-10-05 RX ORDER — POTASSIUM CHLORIDE 20 MEQ/1
20 TABLET, EXTENDED RELEASE ORAL 2 TIMES DAILY
Status: DISCONTINUED | OUTPATIENT
Start: 2023-10-05 | End: 2023-10-07 | Stop reason: HOSPADM

## 2023-10-05 RX ORDER — POLYETHYLENE GLYCOL 3350 17 G/17G
17 POWDER, FOR SOLUTION ORAL DAILY
Status: DISCONTINUED | OUTPATIENT
Start: 2023-10-05 | End: 2023-10-07 | Stop reason: HOSPADM

## 2023-10-05 RX ORDER — HEPARIN SODIUM 5000 [USP'U]/ML
5000 INJECTION, SOLUTION INTRAVENOUS; SUBCUTANEOUS EVERY 8 HOURS SCHEDULED
Status: DISCONTINUED | OUTPATIENT
Start: 2023-10-05 | End: 2023-10-07 | Stop reason: HOSPADM

## 2023-10-05 RX ORDER — AMITRIPTYLINE HYDROCHLORIDE 25 MG/1
25 TABLET, FILM COATED ORAL
Status: DISCONTINUED | OUTPATIENT
Start: 2023-10-05 | End: 2023-10-07 | Stop reason: HOSPADM

## 2023-10-05 RX ORDER — FUROSEMIDE 10 MG/ML
80 INJECTION INTRAMUSCULAR; INTRAVENOUS
Status: DISCONTINUED | OUTPATIENT
Start: 2023-10-05 | End: 2023-10-07

## 2023-10-05 RX ORDER — ASPIRIN 81 MG/1
81 TABLET, CHEWABLE ORAL DAILY
Status: DISCONTINUED | OUTPATIENT
Start: 2023-10-05 | End: 2023-10-07 | Stop reason: HOSPADM

## 2023-10-05 RX ORDER — METHOCARBAMOL 500 MG/1
500 TABLET, FILM COATED ORAL EVERY 6 HOURS SCHEDULED
Status: DISCONTINUED | OUTPATIENT
Start: 2023-10-05 | End: 2023-10-07 | Stop reason: HOSPADM

## 2023-10-05 RX ADMIN — ATORVASTATIN CALCIUM 40 MG: 40 TABLET, FILM COATED ORAL at 15:30

## 2023-10-05 RX ADMIN — HYDROMORPHONE HYDROCHLORIDE 2 MG: 2 INJECTION, SOLUTION INTRAMUSCULAR; INTRAVENOUS; SUBCUTANEOUS at 21:20

## 2023-10-05 RX ADMIN — METHOCARBAMOL TABLETS 500 MG: 500 TABLET, COATED ORAL at 18:05

## 2023-10-05 RX ADMIN — HYDROMORPHONE HYDROCHLORIDE 2 MG: 2 INJECTION, SOLUTION INTRAMUSCULAR; INTRAVENOUS; SUBCUTANEOUS at 15:37

## 2023-10-05 RX ADMIN — GABAPENTIN 700 MG: 400 CAPSULE ORAL at 21:19

## 2023-10-05 RX ADMIN — FUROSEMIDE 80 MG: 10 INJECTION, SOLUTION INTRAMUSCULAR; INTRAVENOUS at 15:30

## 2023-10-05 RX ADMIN — OXYCODONE HYDROCHLORIDE 40 MG: 20 TABLET, FILM COATED, EXTENDED RELEASE ORAL at 18:04

## 2023-10-05 RX ADMIN — POTASSIUM CHLORIDE 20 MEQ: 1500 TABLET, EXTENDED RELEASE ORAL at 18:05

## 2023-10-05 RX ADMIN — METHOCARBAMOL TABLETS 500 MG: 500 TABLET, COATED ORAL at 14:03

## 2023-10-05 RX ADMIN — ASPIRIN 81 MG 81 MG: 81 TABLET ORAL at 14:03

## 2023-10-05 RX ADMIN — OXYCODONE HYDROCHLORIDE 40 MG: 20 TABLET, FILM COATED, EXTENDED RELEASE ORAL at 14:02

## 2023-10-05 RX ADMIN — GADOBUTROL 10 ML: 604.72 INJECTION INTRAVENOUS at 16:11

## 2023-10-05 RX ADMIN — TAMSULOSIN HYDROCHLORIDE 0.4 MG: 0.4 CAPSULE ORAL at 15:30

## 2023-10-05 RX ADMIN — DOCUSATE SODIUM AND SENNOSIDES 2 TABLET: 8.6; 5 TABLET, FILM COATED ORAL at 21:19

## 2023-10-05 RX ADMIN — POLYETHYLENE GLYCOL 3350 17 G: 17 POWDER, FOR SOLUTION ORAL at 14:03

## 2023-10-05 RX ADMIN — HEPARIN SODIUM 5000 UNITS: 5000 INJECTION INTRAVENOUS; SUBCUTANEOUS at 21:20

## 2023-10-05 RX ADMIN — HEPARIN SODIUM 5000 UNITS: 5000 INJECTION INTRAVENOUS; SUBCUTANEOUS at 14:02

## 2023-10-05 RX ADMIN — GABAPENTIN 700 MG: 400 CAPSULE ORAL at 15:30

## 2023-10-05 RX ADMIN — OXYCODONE HYDROCHLORIDE 30 MG: 10 TABLET ORAL at 19:58

## 2023-10-05 RX ADMIN — METHYLPREDNISOLONE SODIUM SUCCINATE 40 MG: 40 INJECTION, POWDER, FOR SOLUTION INTRAMUSCULAR; INTRAVENOUS at 21:20

## 2023-10-05 RX ADMIN — AMITRIPTYLINE HYDROCHLORIDE 25 MG: 25 TABLET, FILM COATED ORAL at 21:20

## 2023-10-05 RX ADMIN — METHYLPREDNISOLONE SODIUM SUCCINATE 40 MG: 40 INJECTION, POWDER, FOR SOLUTION INTRAMUSCULAR; INTRAVENOUS at 14:28

## 2023-10-05 NOTE — PLAN OF CARE
Problem: MUSCULOSKELETAL - ADULT  Goal: Maintain or return mobility to safest level of function  Description: INTERVENTIONS:  - Assess patient's ability to carry out ADLs; assess patient's baseline for ADL function and identify physical deficits which impact ability to perform ADLs (bathing, care of mouth/teeth, toileting, grooming, dressing, etc.)  - Assess/evaluate cause of self-care deficits   - Assess range of motion  - Assess patient's mobility  - Assess patient's need for assistive devices and provide as appropriate  - Encourage maximum independence but intervene and supervise when necessary  - Involve family in performance of ADLs  - Assess for home care needs following discharge   - Consider OT consult to assist with ADL evaluation and planning for discharge  - Provide patient education as appropriate  Outcome: Progressing  Goal: Maintain proper alignment of affected body part  Description: INTERVENTIONS:  - Support, maintain and protect limb and body alignment  - Provide patient/ family with appropriate education  Outcome: Progressing     Problem: PAIN - ADULT  Goal: Verbalizes/displays adequate comfort level or baseline comfort level  Description: Interventions:  - Encourage patient to monitor pain and request assistance  - Assess pain using appropriate pain scale  - Administer analgesics based on type and severity of pain and evaluate response  - Implement non-pharmacological measures as appropriate and evaluate response  - Consider cultural and social influences on pain and pain management  - Notify physician/advanced practitioner if interventions unsuccessful or patient reports new pain  Outcome: Progressing     Problem: SAFETY ADULT  Goal: Patient will remain free of falls  Description: INTERVENTIONS:  - Educate patient/family on patient safety including physical limitations  - Instruct patient to call for assistance with activity   - Consult OT/PT to assist with strengthening/mobility   - Keep Call bell within reach  - Keep bed low and locked with side rails adjusted as appropriate  - Keep care items and personal belongings within reach  - Initiate and maintain comfort rounds  - Make Fall Risk Sign visible to staff  - Apply yellow socks and bracelet for high fall risk patients  - Consider moving patient to room near nurses station  Outcome: Progressing  Goal: Maintain or return to baseline ADL function  Description: INTERVENTIONS:  -  Assess patient's ability to carry out ADLs; assess patient's baseline for ADL function and identify physical deficits which impact ability to perform ADLs (bathing, care of mouth/teeth, toileting, grooming, dressing, etc.)  - Assess/evaluate cause of self-care deficits   - Assess range of motion  - Assess patient's mobility; develop plan if impaired  - Assess patient's need for assistive devices and provide as appropriate  - Encourage maximum independence but intervene and supervise when necessary  - Involve family in performance of ADLs  - Assess for home care needs following discharge   - Consider OT consult to assist with ADL evaluation and planning for discharge  - Provide patient education as appropriate  Outcome: Progressing  Goal: Maintains/Returns to pre admission functional level  Description: INTERVENTIONS:  - Perform BMAT or MOVE assessment daily.   - Set and communicate daily mobility goal to care team and patient/family/caregiver.    - Out of bed for toileting  - Record patient progress and toleration of activity level   Outcome: Progressing

## 2023-10-05 NOTE — ASSESSMENT & PLAN NOTE
· Status post immunotherapy he is scheduled for cystoscopy and TURP in October follows with Dr. Mihaela Izaguirre urology

## 2023-10-05 NOTE — ASSESSMENT & PLAN NOTE
Wt Readings from Last 3 Encounters:   10/05/23 113 kg (248 lb 11.2 oz)   09/28/23 108 kg (238 lb 3.2 oz)   09/27/23 109 kg (241 lb)     · Since September 28 he looks like he gained 10 pounds he has +3 pitting edema's are clear.   He had a 2D echo done 7/2023 with EF of 70%  · 7.8 L fluid restriction start Lasix 80 mg IV twice daily check lab works EKG  · Start KCl 20 mg p.o. twice daily  · Daily weights I's and O's

## 2023-10-05 NOTE — ASSESSMENT & PLAN NOTE
· Acute on chronic lower back pain with further history see the H&P. But since their last MRI which she had for the worsening pain that started a month ago. His pain has gotten worse to the point that his quality of life has decreased he is unable to ambulate a lot secondary to the pain is radiating down his right leg and now left leg with paresthesias right lower extremity weakness and now starting with urinary incontinence. · We will obtain a stat MRI received approval from radiology in [facility]  · He had a CT abdomen and pelvis done at the same time when the pain started which was negative I do not suspect this is etiology that has to do with bladder issues  · We will start him on Solu-Medrol 40 mg IV every 8 hours continue his OxyContin 40 mg 4 times a day continue oxycodone 30 mg 4 times a day as needed for moderate pain we will add Dilaudid 2 mg IV every 6 hours as needed for severe and breakthrough start him on Robaxin 500 mg around-the-clock 4 times a day. Heat. · PT OT  · We will follow-up on the MRI we will follow-up on an pain improvement or not further need for adjustment might benefit from pain management injections of anything. If something is abnormal with an MRI will reach out to neurosurgery continue his home gabapentin naloxone has been ordered as well.   · He had a recent STEPHANIE day which was negative recent urine culture was negative in September 27  · Venous duplex r/o dvt

## 2023-10-05 NOTE — H&P
[facility]  H&P  Name: Isabela Rodriguez 58 y.o. male I MRN: [id number]  Unit/Bed#: -[facility] I Date of Admission: 10/5/2023   Date of Service: 10/5/2023 I Hospital Day: 0      Assessment/Plan   * Acute low back pain with sciatica  Assessment & Plan  · Acute on chronic lower back pain with further history see the H&P. But since their last MRI which she had for the worsening pain that started a month ago. His pain has gotten worse to the point that his quality of life has decreased he is unable to ambulate a lot secondary to the pain is radiating down his right leg and now left leg with paresthesias right lower extremity weakness and now starting with urinary incontinence. · We will obtain a stat MRI received approval from radiology in [facility]  · He had a CT abdomen and pelvis done at the same time when the pain started which was negative I do not suspect this is etiology that has to do with bladder issues  · We will start him on Solu-Medrol 40 mg IV every 8 hours continue his OxyContin 40 mg 4 times a day continue oxycodone 30 mg 4 times a day as needed for moderate pain we will add Dilaudid 2 mg IV every 6 hours as needed for severe and breakthrough start him on Robaxin 500 mg around-the-clock 4 times a day. Heat. · PT OT  · We will follow-up on the MRI we will follow-up on an pain improvement or not further need for adjustment might benefit from pain management injections of anything. If something is abnormal with an MRI will reach out to neurosurgery continue his home gabapentin naloxone has been ordered as well.   · He had a recent STEPHANIE day which was negative recent urine culture was negative in September 27  · Venous duplex r/o dvt    Morbid (severe) obesity due to excess calories [facility])  Assessment & Plan  · Counseled     Acute on chronic diastolic heart failure (HCC)  Assessment & Plan  Wt Readings from Last 3 Encounters:   10/05/23 113 kg (248 lb 11.2 oz)   09/28/23 108 kg (238 lb 3.2 oz)   09/27/23 109 kg (241 lb)     · Since September 28 he looks like he gained 10 pounds he has +3 pitting edema's are clear. He had a 2D echo done 7/2023 with EF of 70%  · 7.8 L fluid restriction start Lasix 80 mg IV twice daily check lab works EKG  · Start KCl 20 mg p.o. twice daily  · Daily weights I's and O's        Malignant neoplasm of lateral wall of urinary bladder ([address])  Assessment & Plan  · Status post immunotherapy he is scheduled for cystoscopy and TURP in October follows with Dr. Chew [facility] urology    Obstructive sleep apnea on CPAP  Assessment & Plan  · Will order cpap       VTE Pharmacologic Prophylaxis: VTE Score: 3 Moderate Risk (Score 3-4) - Pharmacological DVT Prophylaxis Ordered: heparin. Code Status: Level 1 - Full Code   Discussion with family: patient     Anticipated Length of Stay: Patient will be admitted on an inpatient basis with an anticipated length of stay of greater than 2 midnights secondary to worseing lumbar pain . Total Time Spent on Date of Encounter in care of patient: >45 mins. This time was spent on one or more of the following: performing physical exam; counseling and coordination of care; obtaining or reviewing history; documenting in the medical record; reviewing/ordering tests, medications or procedures; communicating with other healthcare professionals and discussing with patient's family/caregivers.     Chief Complaint: worsening lower back pain     History of Present Illness:  Geraldo Velazquez is a 58 y.o. male with a PMH of back pain with a cold equina compression back in June who was transferred to [facility] and undergone a surgical procedure with neurosurgery left L3/L4 hemilaminectomy and bilateral foraminal not any who presents with with worsening groin pain that initially started on the right radiating to the left shooting down the right lower extremity that when he steps feels pressures his quality of life has diminished as he is unable to walk much due to worsening pain he is seeing pain management palliative and his primary care physician he has also been evaluated by neurosurgery a week after the surgery that was done he felt complete relief and was great and he started his immunotherapy for the prostate and every time he underwent the pain got worse he was evaluated by his urologist that stated that this is not related to any urological problem. He was evaluated by neurosurgery had an MRI done in the beginning of August and his post surgical changes and nothing has worsened. Since having the MRI the pain has continued to worsen he is having intermittent light lower extremity numbness and tingling in the past couple of days he has been having urinary incontinence no fecal incontinence he states his tone of the rectum is good. He has been voiding without any dysuria but he does feel incomplete emptying. He has been having bowel movement just not today. He recently his oxycodone and OxyContin has been increased he is now up to OxyContin 40 mg 4 times a day and oxycodone 30 mg 4 times a day as needed and also his Neurontin has been increased to 700 mg 3 times a day without any relief. He is taking diuretics in terms of lower extremity he states his edema is the same. Denies any chest pain or shortness of breath. The pain is shooting pain and now it starting to radiate from the right to the left he is unable to  his right lower extremity from the bed secondary to the pain that rates that starting at his right upper thigh. Review of Systems:  Review of Systems   Constitutional: Negative for chills and fever. HENT: Negative for ear pain and sore throat. Eyes: Negative for pain and visual disturbance. Respiratory: Negative for cough and shortness of breath. Cardiovascular: Positive for leg swelling. Negative for chest pain and palpitations. Gastrointestinal: Negative for abdominal pain and vomiting.    Genitourinary: Negative for dysuria and hematuria. Musculoskeletal: Positive for back pain. Negative for arthralgias. Skin: Negative for color change and rash. Neurological: Negative for seizures and syncope. All other systems reviewed and are negative. Past Medical and Surgical History:   Past Medical History:   Diagnosis Date   • Arthritis    • Bladder cancer ([address])    • Cancer ([address]) 3/17/2023   • Cervical disc disorder 1/2016   • Chronic narcotic dependence ([address])    • Chronic pain disorder     lower back and down both legs   • Colon polyp    • Coronary artery disease    • CPAP (continuous positive airway pressure) dependence    • Does use hearing aid     will wear DOS   • Full dentures    • Heart failure ([address])     and "fluid retention" [facility] PA"   • High cholesterol    • Hypertension    • Low back pain 2003   • Mild ankle edema     and feet   • Obstructive sleep apnea on CPAP    • Prediabetes    • Shortness of breath     per pt "with just exertion"   • Sleep apnea    • Wears glasses        Past Surgical History:   Procedure Laterality Date   • BACK SURGERY      titanium rods implanted   • COLONOSCOPY     • CYSTOSCOPY W/ URETERAL STENT PLACEMENT Bilateral 3/17/2023    Procedure: bilateral retrograde;  Surgeon: Cecilia Reeves MD;  Location:  MAIN OR;  Service: Urology   • HERNIA REPAIR      x5   • LUMBAR LAMINECTOMY N/A 6/30/2023    Procedure: Left L3-4 Metrx hemilaminectomy and bilateral foraminotomies;  Surgeon: Whitney Holley MD;  Location:  MAIN OR;  Service: Neurosurgery   • SD CYSTO W/REMOVAL OF LESIONS SMALL N/A 5/1/2023    Procedure: CYSTOSCOPY TURBT;  Surgeon: Cecilia Reeves MD;  Location:  MAIN OR;  Service: Urology   • TRANSURETHRAL RESECTION OF BLADDER TUMOR N/A 3/17/2023    Procedure: TRANSURETHRAL RESECTION OF BLADDER TUMOR (TURBT);   Surgeon: Cecilia Reeves MD;  Location: [facility];  Service: Urology       Meds/Allergies:  Prior to Admission medications    Medication Sig Start Date End Date Taking? Authorizing Provider   amitriptyline (ELAVIL) 25 mg tablet Take 1 tablet (25 mg total) by mouth daily at bedtime 9/5/23  Yes Maricarmen Astorga MD   aspirin 81 mg chewable tablet Chew 81 mg daily   Yes Historical Provider, MD   atorvastatin (LIPITOR) 40 mg tablet  10/3/22  Yes Historical Provider, MD   celecoxib (CeleBREX) 200 mg capsule Take 1 capsule (200 mg total) by mouth daily 9/5/23  Yes Maricarmen Astorga MD   co-enzyme Q-10 30 MG capsule Take 100 mg by mouth daily    Yes Historical Provider, MD   Amy 5 MG TABS 5 mg daily 5mg alternating with 2.5mg for fluid retention 10/26/22  Yes Historical Provider, MD   gabapentin (NEURONTIN) 100 mg capsule 100 mg 3 (three) times a day Takes a total of 700 mg three times daily 9/26/23  Yes Historical Provider, MD   metolazone (ZAROXOLYN) 2.5 mg tablet Take 2.5 mg by mouth daily Patient reports use of this medication daily, prescribed by cardiology. Yes Historical Provider, MD   multivitamin-iron-minerals-folic acid (CENTRUM) chewable tablet Chew 1 tablet daily   Yes Historical Provider, MD   oxyCODONE (OxyCONTIN) 40 mg 12 hr tablet Take 1 tablet (40 mg total) by mouth every 8 (eight) hours Max Daily Amount: 120 mg  Patient taking differently: Take 40 mg by mouth every 6 (six) hours as needed 9/27/23  Yes DIA Brown   oxyCODONE (ROXICODONE) 30 MG immediate release tablet Take 0.5-1 tablets (15-30 mg total) by mouth every 4 (four) hours as needed (15mg for moderate pain, 30mg for severe pain) Max 6 tabs/day. Max Daily Amount: 180 mg  Patient taking differently: Take 15-30 mg by mouth every 6 (six) hours as needed (15mg for moderate pain, 30mg for severe pain) Max 6 tabs/day. 9/27/23  Yes DIA Brown   gabapentin (NEURONTIN) 300 mg capsule Take 2 capsules (600mg) three times daily.  9/26/23   DIA Brown   methylPREDNISolone 4 MG tablet therapy pack Use as directed on package  Patient not taking: Reported on 10/5/2023 8/29/23   Norval Hodgkin, PA-C   potassium chloride (K-DUR,KLOR-CON) 10 mEq tablet 20mg bid 23   Junior Vargas MD   tamsulosin [facility]) 0.4 mg Take 1 capsule (0.4 mg total) by mouth daily with dinner 23   Alcides Figueroa MD   torsemide [facility]) 20 mg tablet Take 60 mg by mouth daily    Historical Provider, MD     I have reviewed home medications with patient personally. Allergies: Allergies   Allergen Reactions   • Mirtazapine Other (See Comments) and Confusion     Pt drove without knowing and woke up at a new destination       Social History:  Marital Status: /Civil Union   Substance Use History:   Social History     Substance and Sexual Activity   Alcohol Use Not Currently    Comment: 3 per year     Social History     Tobacco Use   Smoking Status Former   • Packs/day: 1.00   • Years: 35.00   • Total pack years: 35.00   • Types: Cigarettes   • Start date: 2023   • Quit date: 2016   • Years since quittin.7   Smokeless Tobacco Never     Social History     Substance and Sexual Activity   Drug Use Not Currently   • Types: Marijuana       Family History:  Family History   Problem Relation Age of Onset   • Cancer Father    • Aortic aneurysm Father    • Leukemia Father    • Hypertension Mother        Physical Exam:     Vitals:   Blood Pressure: 141/72 (10/05/23 1131)  Pulse: 72 (10/05/23 1131)  Temperature: (!) 97.3 °F (36.3 °C) (10/05/23 1131)  Temp Source: Tympanic (10/05/23 1131)  Respirations: 18 (10/05/23 1131)  Height: 5' 8" (172.7 cm) (10/05/23 1131)  Weight - Scale: 113 kg (248 lb 11.2 oz) (10/05/23 1131)  SpO2: 93 % (10/05/23 113)    Physical Exam  Vitals and nursing note reviewed. Constitutional:       General: He is not in acute distress. Appearance: He is well-developed. HENT:      Head: Normocephalic and atraumatic. Eyes:      Conjunctiva/sclera: Conjunctivae normal.   Cardiovascular:      Rate and Rhythm: Normal rate and regular rhythm. Heart sounds: No murmur heard. Pulmonary:      Effort: Pulmonary effort is normal. No respiratory distress. Breath sounds: Normal breath sounds. No wheezing or rales. Abdominal:      General: Bowel sounds are normal. There is no distension. Palpations: Abdomen is soft. Tenderness: There is no abdominal tenderness. Musculoskeletal:         General: Swelling (+3 from the feet up to the knees) present. No tenderness. Cervical back: Neck supple. Left lower leg: No edema. Comments: He is back examined he has tenderness on touch at the lumbar paravertebral area he does not have any tenderness on the surgery side of the lumbar. He also has tenderness of lower abdomen and men across and right and left inguinal area. Straight leg is positive actually bilaterally he does have weakness of the right lower extremity he is unable to lift it off the bed left lower extremity he is not able to lift it off and keep it against gravity. He does have although very hard to elicit reflexes negative Babinski. He has feeling equally on the lower extremities the abdomen the pelvis   Skin:     General: Skin is warm and dry. Capillary Refill: Capillary refill takes less than 2 seconds. Neurological:      Mental Status: He is alert and oriented to person, place, and time. Psychiatric:         Mood and Affect: Mood normal.         Additional Data:     Lab Results:                            Lines/Drains:  Invasive Devices     None                     Imaging: No pertinent imaging reviewed. Stat MRI has been ordered  VAS lower limb venous duplex study, complete bilateral    (Results Pending)   MRI inpatient order    (Results Pending)       EKG and Other Studies Reviewed on Admission:   · EKG: No EKG obtained. Has been ordered    ** Please Note: This note has been constructed using a voice recognition system.  **

## 2023-10-06 ENCOUNTER — APPOINTMENT (INPATIENT)
Dept: NON INVASIVE DIAGNOSTICS | Facility: HOSPITAL | Age: 63
DRG: 291 | End: 2023-10-06
Payer: MEDICARE

## 2023-10-06 LAB
ANION GAP SERPL CALCULATED.3IONS-SCNC: 7 MMOL/L
ATRIAL RATE: 67 BPM
BASOPHILS # BLD AUTO: 0.01 THOUSANDS/ÂΜL (ref 0–0.1)
BASOPHILS NFR BLD AUTO: 0 % (ref 0–1)
BUN SERPL-MCNC: 27 MG/DL (ref 5–25)
CALCIUM SERPL-MCNC: 9 MG/DL (ref 8.4–10.2)
CHLORIDE SERPL-SCNC: 95 MMOL/L (ref 96–108)
CHOLEST SERPL-MCNC: 141 MG/DL
CO2 SERPL-SCNC: 31 MMOL/L (ref 21–32)
CREAT SERPL-MCNC: 1.01 MG/DL (ref 0.6–1.3)
EOSINOPHIL # BLD AUTO: 0 THOUSAND/ÂΜL (ref 0–0.61)
EOSINOPHIL NFR BLD AUTO: 0 % (ref 0–6)
ERYTHROCYTE [DISTWIDTH] IN BLOOD BY AUTOMATED COUNT: 15.6 % (ref 11.6–15.1)
GFR SERPL CREATININE-BSD FRML MDRD: 78 ML/MIN/1.73SQ M
GLUCOSE SERPL-MCNC: 182 MG/DL (ref 65–140)
HCT VFR BLD AUTO: 32.5 % (ref 36.5–49.3)
HDLC SERPL-MCNC: 33 MG/DL
HGB BLD-MCNC: 10.6 G/DL (ref 12–17)
IMM GRANULOCYTES # BLD AUTO: 0.04 THOUSAND/UL (ref 0–0.2)
IMM GRANULOCYTES NFR BLD AUTO: 1 % (ref 0–2)
LDLC SERPL CALC-MCNC: 87 MG/DL (ref 0–100)
LYMPHOCYTES # BLD AUTO: 0.97 THOUSANDS/ÂΜL (ref 0.6–4.47)
LYMPHOCYTES NFR BLD AUTO: 14 % (ref 14–44)
MAGNESIUM SERPL-MCNC: 2.3 MG/DL (ref 1.9–2.7)
MCH RBC QN AUTO: 30 PG (ref 26.8–34.3)
MCHC RBC AUTO-ENTMCNC: 32.6 G/DL (ref 31.4–37.4)
MCV RBC AUTO: 92 FL (ref 82–98)
MONOCYTES # BLD AUTO: 0.24 THOUSAND/ÂΜL (ref 0.17–1.22)
MONOCYTES NFR BLD AUTO: 4 % (ref 4–12)
NEUTROPHILS # BLD AUTO: 5.51 THOUSANDS/ÂΜL (ref 1.85–7.62)
NEUTS SEG NFR BLD AUTO: 81 % (ref 43–75)
NONHDLC SERPL-MCNC: 108 MG/DL
NRBC BLD AUTO-RTO: 0 /100 WBCS
P AXIS: 0 DEGREES
PLATELET # BLD AUTO: 203 THOUSANDS/UL (ref 149–390)
PMV BLD AUTO: 10.7 FL (ref 8.9–12.7)
POTASSIUM SERPL-SCNC: 4 MMOL/L (ref 3.5–5.3)
PR INTERVAL: 174 MS
QRS AXIS: 26 DEGREES
QRSD INTERVAL: 82 MS
QT INTERVAL: 412 MS
QTC INTERVAL: 435 MS
RBC # BLD AUTO: 3.53 MILLION/UL (ref 3.88–5.62)
SODIUM SERPL-SCNC: 133 MMOL/L (ref 135–147)
T WAVE AXIS: 38 DEGREES
TRIGL SERPL-MCNC: 105 MG/DL
VENTRICULAR RATE: 67 BPM
WBC # BLD AUTO: 6.77 THOUSAND/UL (ref 4.31–10.16)

## 2023-10-06 PROCEDURE — 85025 COMPLETE CBC W/AUTO DIFF WBC: CPT | Performed by: FAMILY MEDICINE

## 2023-10-06 PROCEDURE — 80061 LIPID PANEL: CPT | Performed by: FAMILY MEDICINE

## 2023-10-06 PROCEDURE — 93970 EXTREMITY STUDY: CPT

## 2023-10-06 PROCEDURE — 80048 BASIC METABOLIC PNL TOTAL CA: CPT | Performed by: FAMILY MEDICINE

## 2023-10-06 PROCEDURE — 97165 OT EVAL LOW COMPLEX 30 MIN: CPT

## 2023-10-06 PROCEDURE — 83735 ASSAY OF MAGNESIUM: CPT | Performed by: FAMILY MEDICINE

## 2023-10-06 PROCEDURE — 99232 SBSQ HOSP IP/OBS MODERATE 35: CPT | Performed by: FAMILY MEDICINE

## 2023-10-06 PROCEDURE — 93970 EXTREMITY STUDY: CPT | Performed by: SURGERY

## 2023-10-06 RX ORDER — METHYLPREDNISOLONE SODIUM SUCCINATE 40 MG/ML
40 INJECTION, POWDER, LYOPHILIZED, FOR SOLUTION INTRAMUSCULAR; INTRAVENOUS EVERY 12 HOURS SCHEDULED
Status: DISCONTINUED | OUTPATIENT
Start: 2023-10-06 | End: 2023-10-07

## 2023-10-06 RX ORDER — OXYCODONE HCL 20 MG/1
40 TABLET, FILM COATED, EXTENDED RELEASE ORAL EVERY 6 HOURS
Status: DISCONTINUED | OUTPATIENT
Start: 2023-10-06 | End: 2023-10-07 | Stop reason: HOSPADM

## 2023-10-06 RX ADMIN — METHOCARBAMOL TABLETS 500 MG: 500 TABLET, COATED ORAL at 00:34

## 2023-10-06 RX ADMIN — TAMSULOSIN HYDROCHLORIDE 0.4 MG: 0.4 CAPSULE ORAL at 15:54

## 2023-10-06 RX ADMIN — HEPARIN SODIUM 5000 UNITS: 5000 INJECTION INTRAVENOUS; SUBCUTANEOUS at 22:08

## 2023-10-06 RX ADMIN — METHOCARBAMOL TABLETS 500 MG: 500 TABLET, COATED ORAL at 06:00

## 2023-10-06 RX ADMIN — METHOCARBAMOL TABLETS 500 MG: 500 TABLET, COATED ORAL at 12:23

## 2023-10-06 RX ADMIN — OXYCODONE HYDROCHLORIDE 40 MG: 20 TABLET, FILM COATED, EXTENDED RELEASE ORAL at 12:23

## 2023-10-06 RX ADMIN — OXYCODONE HYDROCHLORIDE 30 MG: 10 TABLET ORAL at 20:28

## 2023-10-06 RX ADMIN — HEPARIN SODIUM 5000 UNITS: 5000 INJECTION INTRAVENOUS; SUBCUTANEOUS at 06:00

## 2023-10-06 RX ADMIN — GABAPENTIN 700 MG: 400 CAPSULE ORAL at 15:54

## 2023-10-06 RX ADMIN — DOCUSATE SODIUM AND SENNOSIDES 2 TABLET: 8.6; 5 TABLET, FILM COATED ORAL at 22:08

## 2023-10-06 RX ADMIN — FUROSEMIDE 80 MG: 10 INJECTION, SOLUTION INTRAMUSCULAR; INTRAVENOUS at 15:54

## 2023-10-06 RX ADMIN — GABAPENTIN 700 MG: 400 CAPSULE ORAL at 20:28

## 2023-10-06 RX ADMIN — FUROSEMIDE 80 MG: 10 INJECTION, SOLUTION INTRAMUSCULAR; INTRAVENOUS at 08:32

## 2023-10-06 RX ADMIN — OXYCODONE HYDROCHLORIDE 40 MG: 20 TABLET, FILM COATED, EXTENDED RELEASE ORAL at 06:20

## 2023-10-06 RX ADMIN — METHOCARBAMOL TABLETS 500 MG: 500 TABLET, COATED ORAL at 17:49

## 2023-10-06 RX ADMIN — ATORVASTATIN CALCIUM 40 MG: 40 TABLET, FILM COATED ORAL at 15:54

## 2023-10-06 RX ADMIN — POTASSIUM CHLORIDE 20 MEQ: 1500 TABLET, EXTENDED RELEASE ORAL at 08:32

## 2023-10-06 RX ADMIN — METHYLPREDNISOLONE SODIUM SUCCINATE 40 MG: 40 INJECTION, POWDER, FOR SOLUTION INTRAMUSCULAR; INTRAVENOUS at 20:29

## 2023-10-06 RX ADMIN — METHYLPREDNISOLONE SODIUM SUCCINATE 40 MG: 40 INJECTION, POWDER, FOR SOLUTION INTRAMUSCULAR; INTRAVENOUS at 06:00

## 2023-10-06 RX ADMIN — POTASSIUM CHLORIDE 20 MEQ: 1500 TABLET, EXTENDED RELEASE ORAL at 17:49

## 2023-10-06 RX ADMIN — HEPARIN SODIUM 5000 UNITS: 5000 INJECTION INTRAVENOUS; SUBCUTANEOUS at 14:26

## 2023-10-06 RX ADMIN — OXYCODONE HYDROCHLORIDE 30 MG: 10 TABLET ORAL at 08:32

## 2023-10-06 RX ADMIN — OXYCODONE HYDROCHLORIDE 40 MG: 20 TABLET, FILM COATED, EXTENDED RELEASE ORAL at 00:34

## 2023-10-06 RX ADMIN — OXYCODONE HYDROCHLORIDE 30 MG: 10 TABLET ORAL at 14:26

## 2023-10-06 RX ADMIN — GABAPENTIN 700 MG: 400 CAPSULE ORAL at 08:32

## 2023-10-06 RX ADMIN — ASPIRIN 81 MG 81 MG: 81 TABLET ORAL at 08:32

## 2023-10-06 RX ADMIN — AMITRIPTYLINE HYDROCHLORIDE 25 MG: 25 TABLET, FILM COATED ORAL at 22:08

## 2023-10-06 RX ADMIN — HYDROMORPHONE HYDROCHLORIDE 2 MG: 2 INJECTION, SOLUTION INTRAMUSCULAR; INTRAVENOUS; SUBCUTANEOUS at 22:08

## 2023-10-06 RX ADMIN — OXYCODONE HYDROCHLORIDE 40 MG: 20 TABLET, FILM COATED, EXTENDED RELEASE ORAL at 17:49

## 2023-10-06 NOTE — ASSESSMENT & PLAN NOTE
Wt Readings from Last 3 Encounters:   10/06/23 111 kg (244 lb 9.6 oz)   10/05/23 113 kg (248 lb 11.2 oz)   09/28/23 108 kg (238 lb 3.2 oz)     · Since September 28 he looks like he gained 10 pounds he has +3 pitting edema's are clear.   He had a 2D echo done 7/2023 with EF of 70%  · 1.8 L fluid restriction start Lasix 80 mg IV twice ekg sinus   · continue KCl 20 mg p.o. twice daily  · Daily weights I's and O's- no urine output recorded but lost 4 lb and leg edema down

## 2023-10-06 NOTE — PHYSICAL THERAPY NOTE
Physical Therapy Screen    Patient Name: Petros STEPHENS Date: 10/6/2023     Problem List  Principal Problem:    Acute low back pain with sciatica  Active Problems:    Obstructive sleep apnea on CPAP    Malignant neoplasm of lateral wall of urinary bladder (HCC)    Acute on chronic diastolic heart failure (HCC)    Morbid (severe) obesity due to excess calories [facility])       Past Medical History  Past Medical History:   Diagnosis Date   • Arthritis    • Bladder cancer ([address])    • Cancer ([address]) 3/17/2023   • Cervical disc disorder 1/2016   • Chronic narcotic dependence ([address])    • Chronic pain disorder     lower back and down both legs   • Colon polyp    • Coronary artery disease    • CPAP (continuous positive airway pressure) dependence    • Does use hearing aid     will wear DOS   • Full dentures    • Heart failure ([address])     and "fluid retention" [facility] YEIMI DUM[facility]"   • High cholesterol    • Hypertension    • Low back pain 2003   • Mild ankle edema     and feet   • Obstructive sleep apnea on CPAP    • Prediabetes    • Shortness of breath     per pt "with just exertion"   • Sleep apnea    • Wears glasses         Past Surgical History  Past Surgical History:   Procedure Laterality Date   • BACK SURGERY      titanium rods implanted   • COLONOSCOPY     • CYSTOSCOPY W/ URETERAL STENT PLACEMENT Bilateral 3/17/2023    Procedure: bilateral retrograde;  Surgeon: Ramakrishna Thomas MD;  Location:  MAIN OR;  Service: Urology   • HERNIA REPAIR      x5   • LUMBAR LAMINECTOMY N/A 6/30/2023    Procedure: Left L3-4 Metrx hemilaminectomy and bilateral foraminotomies;  Surgeon: Mark Ribeiro MD;  Location: [facility];  Service: Neurosurgery   • RI CYSTO W/REMOVAL OF LESIONS SMALL N/A 5/1/2023    Procedure: CYSTOSCOPY TURBT;  Surgeon: Ramakrishna Thomas MD;  Location: [facility];  Service: Urology   • TRANSURETHRAL RESECTION OF BLADDER TUMOR N/A 3/17/2023 Procedure: TRANSURETHRAL RESECTION OF BLADDER TUMOR (TURBT); Surgeon: Berto Arreguin MD;  Location:  MAIN OR;  Service: Urology           10/06/23 1027   PT Last Visit   PT Visit Date 10/06/23   Note Type   Note type Screen       Received order for PT consult. Chart reviewed. Pt admitted with diagnosis acute LBP with sciatica. Upon arrival to room, patient not in room. Patient found ambulating in hallway with use of RW at modified independent level and then completed 2 steps with left HR modified independently (did discuss typical sequence for completion up with less painful leg and down with more painful or weaker leg). Pt then ambulated with spc modified independently without LOB or path deviation. Spoke with patient who reports he is at his PLOF of independent at this time requiring increased time to complete mobility and use of walker and cane prn. He reports ambulating in room and hallway without difficulty. Discussed recommendation for OPPT services for pain management. Pt agreeable. Handout for [facility] locations and services provided with patient aware of freedom of choice. Pt reports no concerns with returning home upon discharge. Will D/C PT services at this time as patient is at his PLOF of independent without acute care PT services warranted. Should patient's status change, please re-consult.     Mira Gramajo, PT,DPT

## 2023-10-06 NOTE — CONSULTS
Consult received for CHF Ed. Pt reports good appetite currently/PTA. Reports doing his own grocery shopping/cooking. Reports trying to follow nutritious diet at home though reporting confusion with how to read nutrition labels and selection of foods for low Na diet. Breakast: shake made with kale, fruit, dairy. Lunch: did not describe. Dinner: [employer] every Sunday, steak once per week, otherwise mostly chicken or fish options. Does not use salt shaker at all. Discussed with pt how to read nutrition labels, avoid foods > 300 mg Na per serving, aim for < 140mg Na per serving. Discussed foods that are appropriate to consume and those to limit/avoid. Discussed current fluid restriction, total volume and to include foods that are considered fluids such as soups. Provided with HF Nutrition Therapy, Salt Free Seasoning Tips, Fluid Restriction Nutrition Therapy handouts and RD contact information. Pt to obtain referral from PCP if desiring further diet instruction as OP otherwise will reach out if he has further questions. Pt appreciative.

## 2023-10-06 NOTE — PROGRESS NOTES
[address] [facility]  Progress Note  Name: Marla Holley I  MRN: [id number]  Unit/Bed#: -01 I Date of Admission: 10/5/2023   Date of Service: 10/6/2023 I Hospital Day: 1    Assessment/Plan   * Acute low back pain with sciatica  Assessment & Plan  · Acute on chronic lower back pain with further history see the H&P. But since their last MRI which she had for the worsening pain that started a month ago. His pain has gotten worse to the point that his quality of life has decreased he is unable to ambulate a lot secondary to the pain is radiating down his right leg and now left leg with paresthesias right lower extremity weakness and now starting with urinary incontinence. · We will obtain a stat MRI received approval from radiology in [facility]  · He had a CT abdomen and pelvis done at the same time when the pain started which was negative I do not suspect this is etiology that has to do with bladder issues  · I as prior without any acute changes. · He had a recent STEPHANIE day which was negative recent urine culture was negative in September 27  · It is duplex negative for acute DVT  · Patient has been ambulating today with a walker his pain is much better he was able to lift the right lower extremity no tenderness in touch of the pelvic region inguinal or the back. Will reduce Solu-Medrol to 40 mg IV every 12 hours continue OxyContin 40 mg 4 times a day oxycodone 30 mg 4 times a day as needed Dilaudid 2 mg IV acute 6 hours as needed for severe pain he has not been utilizing a lot at all. Continue Robaxin around-the-clock heating.   PT has signed off if continues to improve will discharge home tomorrow with a taper of prednisone    Morbid (severe) obesity due to excess calories [facility])  Assessment & Plan  · Counseled     Acute on chronic diastolic heart failure (HCC)  Assessment & Plan  Wt Readings from Last 3 Encounters:   10/06/23 111 kg (244 lb 9.6 oz)   10/05/23 113 kg (248 lb 11.2 oz) 23 108 kg (238 lb 3.2 oz)     · Since  he looks like he gained 10 pounds he has +3 pitting edema's are clear. He had a 2D echo done 2023 with EF of 70%  · 1.8 L fluid restriction start Lasix 80 mg IV twice ekg sinus   · continue KCl 20 mg p.o. twice daily  · Daily weights I's and O's- no urine output recorded but lost 4 lb and leg edema down         Malignant neoplasm of lateral wall of urinary bladder ([address])  Assessment & Plan  · Status post immunotherapy he is scheduled for cystoscopy and TURP in October follows with Dr. Ashlyn Santamaria urology    Obstructive sleep apnea on CPAP  Assessment & Plan  · Does not wear cpap               VTE Pharmacologic Prophylaxis: VTE Score: 3 Moderate Risk (Score 3-4) - Pharmacological DVT Prophylaxis Ordered: heparin. Patient Centered Rounds: I performed bedside rounds with nursing staff today. Discussions with Specialists or Other Care Team Provider: discussed with cm     Education and Discussions with Family / Patient: pt  Total Time Spent on Date of Encounter in care of patient: >35 mins. This time was spent on one or more of the following: performing physical exam; counseling and coordination of care; obtaining or reviewing history; documenting in the medical record; reviewing/ordering tests, medications or procedures; communicating with other healthcare professionals and discussing with patient's family/caregivers. Current Length of Stay: 1 day(s)  Current Patient Status: Inpatient   Certification Statement: The patient will continue to require additional inpatient hospital stay due to 2/2 low back pain  Discharge Plan: Anticipate discharge tomorrow to home.     Code Status: Level 1 - Full Code    Subjective:   Seen and examine dback pain is better and walking better and able to  right lower extremity     Objective:     Vitals:   Temp (24hrs), Av.1 °F (36.2 °C), Min:97 °F (36.1 °C), Max:97.2 °F (36.2 °C)    Temp:  [97 °F (36.1 °C)-97.2 °F (36.2 °C)] 97 °F (36.1 °C)  HR:  [49-64] 53  Resp:  [12-16] 12  BP: (120-134)/(54-62) 128/62  SpO2:  [90 %-94 %] 90 %  Body mass index is 37.19 kg/m². Input and Output Summary (last 24 hours): Intake/Output Summary (Last 24 hours) at 10/6/2023 1244  Last data filed at 10/6/2023 1227  Gross per 24 hour   Intake 921 ml   Output 1175 ml   Net -254 ml       Physical Exam:   Physical Exam  Vitals and nursing note reviewed. Constitutional:       General: He is not in acute distress. Appearance: He is well-developed. HENT:      Head: Normocephalic and atraumatic. Eyes:      Conjunctiva/sclera: Conjunctivae normal.   Cardiovascular:      Rate and Rhythm: Normal rate and regular rhythm. Heart sounds: No murmur heard. Pulmonary:      Effort: Pulmonary effort is normal. No respiratory distress. Breath sounds: Normal breath sounds. No wheezing or rales. Abdominal:      General: There is no distension. Palpations: Abdomen is soft. Tenderness: There is no abdominal tenderness. Musculoskeletal:         General: Swelling present. Cervical back: Neck supple. Comments: No lumbar paravertebral tenderness. No abdominal pelvic tenderness or inguinal tenderness able to lift right upper extremity   Skin:     General: Skin is warm and dry. Capillary Refill: Capillary refill takes less than 2 seconds. Neurological:      Mental Status: He is alert and oriented to person, place, and time.    Psychiatric:         Mood and Affect: Mood normal.          Additional Data:     Labs:  Results from last 7 days   Lab Units 10/06/23  0601   WBC Thousand/uL 6.77   HEMOGLOBIN g/dL 10.6*   HEMATOCRIT % 32.5*   PLATELETS Thousands/uL 203   NEUTROS PCT % 81*   LYMPHS PCT % 14   MONOS PCT % 4   EOS PCT % 0     Results from last 7 days   Lab Units 10/06/23  0601 10/05/23  1524   SODIUM mmol/L 133* 132*   POTASSIUM mmol/L 4.0 3.9   CHLORIDE mmol/L 95* 93*   CO2 mmol/L 31 31   BUN mg/dL 27* 21   CREATININE mg/dL 1.01 1.24   ANION GAP mmol/L 7 8   CALCIUM mg/dL 9.0 9.2   ALBUMIN g/dL  --  3.6   TOTAL BILIRUBIN mg/dL  --  0.38   ALK PHOS U/L  --  87   ALT U/L  --  21   AST U/L  --  16   GLUCOSE RANDOM mg/dL 182* 192*     Results from last 7 days   Lab Units 10/05/23  1524   INR  0.93                   Lines/Drains:  Invasive Devices     Peripheral Intravenous Line  Duration           Peripheral IV 10/05/23 Dorsal (posterior); Right Hand <1 day                      Imaging: Reviewed radiology reports from this admission including: MRI spine    Recent Cultures (last 7 days):         Last 24 Hours Medication List:   Current Facility-Administered Medications   Medication Dose Route Frequency Provider Last Rate   • amitriptyline  25 mg Oral HS Jo Bernard MD     • aspirin  81 mg Oral Daily Jo Bernard MD     • atorvastatin  40 mg Oral Daily With Whitney Sagastume MD     • furosemide  80 mg Intravenous BID (diuretic) Jo Bernard MD     • gabapentin  700 mg Oral TID Jo Bernard MD     • heparin (porcine)  5,000 Units Subcutaneous [facility] Jo Bernard MD     • HYDROmorphone  2 mg Intravenous Q6H PRN Jo Bernard MD     • methocarbamol  500 mg Oral Q6H [address] Jo Bernard MD     • methylPREDNISolone sodium succinate  40 mg Intravenous Q12H [address] Jo Bernard MD     • naloxone  0.04 mg Intravenous Q1MIN PRN Jo Bernard MD     • oxyCODONE  40 mg Oral Q6H Jo Bernard MD     • oxyCODONE  30 mg Oral Q6H PRN Jo Bernard MD     • polyethylene glycol  17 g Oral Daily Jo Bernard MD     • potassium chloride  20 mEq Oral BID Jo Bernard MD     • senna-docusate sodium  2 tablet Oral HS Jo Bernard MD     • tamsulosin  0.4 mg Oral Daily With Whitney Sagastume MD          Today, Patient Was Seen By: Jo Bernard MD    **Please Note: This note may have been constructed using a voice recognition system. **

## 2023-10-06 NOTE — PROGRESS NOTES
Patient was alert and oriented, care assumed. Ready to go for a walk down the [place]reno.  SAKSHI Batista/Elisa Leigh RN

## 2023-10-06 NOTE — PLAN OF CARE
I agree with the assessment and treatment plan as outlined by the advanced practice provider.  Dx renal colic at ED, refused CT   Problem: MUSCULOSKELETAL - ADULT  Goal: Maintain or return mobility to safest level of function  Description: INTERVENTIONS:  - Assess patient's ability to carry out ADLs; assess patient's baseline for ADL function and identify physical deficits which impact ability to perform ADLs (bathing, care of mouth/teeth, toileting, grooming, dressing, etc.)  - Assess/evaluate cause of self-care deficits   - Assess range of motion  - Assess patient's mobility  - Assess patient's need for assistive devices and provide as appropriate  - Encourage maximum independence but intervene and supervise when necessary  - Involve family in performance of ADLs  - Assess for home care needs following discharge   - Consider OT consult to assist with ADL evaluation and planning for discharge  - Provide patient education as appropriate  Outcome: Progressing  Goal: Maintain proper alignment of affected body part  Description: INTERVENTIONS:  - Support, maintain and protect limb and body alignment  - Provide patient/ family with appropriate education  Outcome: Progressing     Problem: PAIN - ADULT  Goal: Verbalizes/displays adequate comfort level or baseline comfort level  Description: Interventions:  - Encourage patient to monitor pain and request assistance  - Assess pain using appropriate pain scale  - Administer analgesics based on type and severity of pain and evaluate response  - Implement non-pharmacological measures as appropriate and evaluate response  - Consider cultural and social influences on pain and pain management  - Notify physician/advanced practitioner if interventions unsuccessful or patient reports new pain  Outcome: Progressing     Problem: SAFETY ADULT  Goal: Patient will remain free of falls  Description: INTERVENTIONS:  - Educate patient/family on patient safety including physical limitations  - Instruct patient to call for assistance with activity   - Consult OT/PT to assist with strengthening/mobility   - Keep Call bell within reach  - Keep bed low and locked with side rails adjusted as appropriate  - Keep care items and personal belongings within reach  - Initiate and maintain comfort rounds  - Make Fall Risk Sign visible to staff  - Offer Toileting every 2 Hours, in advance of need  - Apply yellow socks and bracelet for high fall risk patients  Outcome: Progressing  Goal: Maintain or return to baseline ADL function  Description: INTERVENTIONS:  -  Assess patient's ability to carry out ADLs; assess patient's baseline for ADL function and identify physical deficits which impact ability to perform ADLs (bathing, care of mouth/teeth, toileting, grooming, dressing, etc.)  - Assess/evaluate cause of self-care deficits   - Assess range of motion  - Assess patient's mobility; develop plan if impaired  - Assess patient's need for assistive devices and provide as appropriate  - Encourage maximum independence but intervene and supervise when necessary  - Involve family in performance of ADLs  - Assess for home care needs following discharge   - Consider OT consult to assist with ADL evaluation and planning for discharge  - Provide patient education as appropriate  Outcome: Progressing  Goal: Maintains/Returns to pre admission functional level  Description: INTERVENTIONS:  - Perform BMAT or MOVE assessment daily.   - Set and communicate daily mobility goal to care team and patient/family/caregiver.    - Collaborate with rehabilitation services on mobility goals if consulted  - Ambulate patient 6 times a day  - Out of bed for toileting  - Record patient progress and toleration of activity level   Outcome: Progressing     Problem: DISCHARGE PLANNING  Goal: Discharge to home or other facility with appropriate resources  Description: INTERVENTIONS:  - Identify barriers to discharge w/patient and caregiver  - Arrange for needed discharge resources and transportation as appropriate  - Identify discharge learning needs (meds, wound care, etc.)  - Arrange for interpretive services to assist at discharge as needed  - Refer to Case Management Department for coordinating discharge planning if the patient needs post-hospital services based on physician/advanced practitioner order or complex needs related to functional status, cognitive ability, or social support system  Outcome: Progressing     Problem: Knowledge Deficit  Goal: Patient/family/caregiver demonstrates understanding of disease process, treatment plan, medications, and discharge instructions  Description: Complete learning assessment and assess knowledge base.   Interventions:  - Provide teaching at level of understanding  - Provide teaching via preferred learning methods  Outcome: Progressing

## 2023-10-06 NOTE — ASSESSMENT & PLAN NOTE
· Acute on chronic lower back pain with further history see the H&P. But since their last MRI which she had for the worsening pain that started a month ago. His pain has gotten worse to the point that his quality of life has decreased he is unable to ambulate a lot secondary to the pain is radiating down his right leg and now left leg with paresthesias right lower extremity weakness and now starting with urinary incontinence. · We will obtain a stat MRI received approval from radiology in [facility]  · He had a CT abdomen and pelvis done at the same time when the pain started which was negative I do not suspect this is etiology that has to do with bladder issues  · I as prior without any acute changes. · He had a recent STEPHANIE day which was negative recent urine culture was negative in September 27  · It is duplex negative for acute DVT  · Patient has been ambulating today with a walker his pain is much better he was able to lift the right lower extremity no tenderness in touch of the pelvic region inguinal or the back. Will reduce Solu-Medrol to 40 mg IV every 12 hours continue OxyContin 40 mg 4 times a day oxycodone 30 mg 4 times a day as needed Dilaudid 2 mg IV acute 6 hours as needed for severe pain he has not been utilizing a lot at all. Continue Robaxin around-the-clock heating.   PT has signed off if continues to improve will discharge home tomorrow with a taper of prednisone

## 2023-10-06 NOTE — PLAN OF CARE
Problem: MUSCULOSKELETAL - ADULT  Goal: Maintain or return mobility to safest level of function  Description: INTERVENTIONS:  - Assess patient's ability to carry out ADLs; assess patient's baseline for ADL function and identify physical deficits which impact ability to perform ADLs (bathing, care of mouth/teeth, toileting, grooming, dressing, etc.)  - Assess/evaluate cause of self-care deficits   - Assess range of motion  - Assess patient's mobility  - Assess patient's need for assistive devices and provide as appropriate  - Encourage maximum independence but intervene and supervise when necessary  - Involve family in performance of ADLs  - Assess for home care needs following discharge   - Consider OT consult to assist with ADL evaluation and planning for discharge  - Provide patient education as appropriate  Outcome: Progressing     Problem: SAFETY ADULT  Goal: Maintain or return to baseline ADL function  Description: INTERVENTIONS:  -  Assess patient's ability to carry out ADLs; assess patient's baseline for ADL function and identify physical deficits which impact ability to perform ADLs (bathing, care of mouth/teeth, toileting, grooming, dressing, etc.)  - Assess/evaluate cause of self-care deficits   - Assess range of motion  - Assess patient's mobility; develop plan if impaired  - Assess patient's need for assistive devices and provide as appropriate  - Encourage maximum independence but intervene and supervise when necessary  - Involve family in performance of ADLs  - Assess for home care needs following discharge   - Consider OT consult to assist with ADL evaluation and planning for discharge  - Provide patient education as appropriate  Outcome: Progressing

## 2023-10-06 NOTE — OCCUPATIONAL THERAPY NOTE
Occupational Therapy Screen     Patient Name: Yamil DURANT Date: 10/6/2023  Problem List  Principal Problem:    Acute low back pain with sciatica  Active Problems:    Obstructive sleep apnea on CPAP    Malignant neoplasm of lateral wall of urinary bladder (HCC)    Acute on chronic diastolic heart failure (HCC)    Morbid (severe) obesity due to excess calories [facility])    Past Medical History  Past Medical History:   Diagnosis Date    Arthritis     Bladder cancer ([address])     Cancer ([address]) 3/17/2023    Cervical disc disorder 1/2016    Chronic narcotic dependence (HCC)     Chronic pain disorder     lower back and down both legs    Colon polyp     Coronary artery disease     CPAP (continuous positive airway pressure) dependence     Does use hearing aid     will wear DOS    Full dentures     Heart failure ([address])     and "fluid retention" [facility]"    High cholesterol     Hypertension     Low back pain 2003    Mild ankle edema     and feet    Obstructive sleep apnea on CPAP     Prediabetes     Shortness of breath     per pt "with just exertion"    Sleep apnea     Wears glasses      Past Surgical History  Past Surgical History:   Procedure Laterality Date    BACK SURGERY      titanium rods implanted    COLONOSCOPY      CYSTOSCOPY W/ URETERAL STENT PLACEMENT Bilateral 3/17/2023    Procedure: bilateral retrograde;  Surgeon: Emil Diaz MD;  Location: [facility];  Service: Urology    HERNIA REPAIR      x5    LUMBAR LAMINECTOMY N/A 6/30/2023    Procedure: Left L3-4 [employer] hemilaminectomy and bilateral foraminotomies;  Surgeon: Dulce Goldberg MD;  Location: [facility];  Service: Neurosurgery    CT CYSTO W/REMOVAL OF LESIONS SMALL N/A 5/1/2023    Procedure: CYSTOSCOPY TURBT;  Surgeon: Emil Diaz MD;  Location: [facility];  Service: Urology    TRANSURETHRAL RESECTION OF BLADDER TUMOR N/A 3/17/2023    Procedure: TRANSURETHRAL RESECTION OF BLADDER TUMOR (TURBT);   Surgeon: Emil Diaz MD; Location:  MAIN OR;  Service: Urology           10/06/23 1719   Note Type   Note type Screen       OT orders received. Chart review completed. Patient admitted to [address] [place] [address] on 10/5 with Dx: Acute low back pain with sciatica. Found pt to be participating in functional mobility independently in hallway. Spoke with pt who reported no concerns regarding ADLs, IADLs or functional mobility upon return to home. Pt appears to be at prior level of functioning at this time and does not require acute OT services. D/C OT effective this date. If new concerns arise, please re-consult.       [employer], MS, OTR/L

## 2023-10-06 NOTE — PROGRESS NOTES
Assumed pt care at 0245. Pt resting comfortably in bed. Pt is not exhibiting any signs of distress or discomfort. VSS on masimo. Pt breathing is normal and symmetrical while sleeping. Plan of care updated.

## 2023-10-06 NOTE — CASE MANAGEMENT
Case Management Assessment & Discharge Planning Note    Patient name Dontae Lopez  Location [address] [facility] 324/-[id number] MRN [id number]  : 1960 Date 10/6/2023       Current Admission Date: 10/5/2023  Current Admission Diagnosis:Acute low back pain with sciatica   Patient Active Problem List    Diagnosis Date Noted   • Acute low back pain with sciatica 10/05/2023   • Medicare annual wellness visit, subsequent 2023   • Benign prostatic hyperplasia 2023   • Encounter for surgical aftercare following surgery of nervous system 2023   • Abdominal pain 2023   • Suprapubic pressure 2023   • Right groin pain 2023   • Morbid (severe) obesity due to excess calories ([address]) 2023   • History of hematuria 2023   • Hyponatremia 2023   • Acute respiratory failure with hypoxia ([address]) 2023   • Electrolyte abnormality 2023   • Bradycardia 2023   • Coronary artery disease    • Chronic, continuous use of opioids 2023   • Hyperglycemia 2023   • RODO (acute kidney injury) ([address]) 2023   • Intractable low back pain 2023   • Acute on chronic diastolic heart failure ([address])    • Opioid withdrawal ([address])    • Anxiety 2023   • Chronic bilateral low back pain with bilateral sciatica 2023   • History of gross hematuria 2023   • Malignant neoplasm of lateral wall of urinary bladder ([address]) 2023   • Obstructive sleep apnea on CPAP    • Hypertension    • Bladder mass 2023   • Benign prostatic hyperplasia with nocturia 2023      LOS (days): 1  Geometric Mean LOS (GMLOS) (days): 3.90  Days to GMLOS:2.7     OBJECTIVE:    Risk of Unplanned Readmission Score: 28.97         Current admission status: Inpatient       Preferred Pharmacy:   [address] , [address] [facility] [address]  Phone: [phone] Fax: [phone]    Primary Care Provider: Maricarmen Astorga MD    Primary Insurance: MEDICARE  Secondary Insurance: BLUE CROSS    ASSESSMENT:  [facility], [address] Representative - Spouse   Primary Phone: [phone] (Mobile)               Advance Directives  Does patient have a [address]?: Yes  Does patient have Advance Directives?: Yes  Advance Directives: Power of  for health care, Living will  Primary Contact: Rashawn Gale, spouse         Readmission Root Cause  30 Day Readmission: No    Patient Information  Admitted from[de-identified] Home  Mental Status: Alert  During Assessment patient was accompanied by: Not accompanied during assessment  Assessment information provided by[de-identified] Patient  Primary Caregiver: Self  Support Systems: Spouse/significant other  [place] of Residence: [address] [place] [address]. do you live in?: [address] entry access options.  Select all that apply.: Stairs  Number of steps to enter home.: 5  Do the steps have railings?: Yes  Type of Current Residence: [address] [place] [address] home  Upon entering residence, is there a bedroom on the main floor (no further steps)?: No (patient sleeps on sofa)  A bedroom is located on the following floor levels of residence (select all that apply):: 2nd Floor  Upon entering residence, is there a bathroom on the main floor (no further steps)?: Yes  Number of steps to 2nd floor from main floor: One Flight  In the last 12 months, was there a time when you were not able to pay the mortgage or rent on time?: No  In the last 12 months, how many places have you lived?: 1  In the last 12 months, was there a time when you did not have a steady place to sleep or slept in a shelter (including now)?: No  Homeless/housing insecurity resource given?: N/A  Living Arrangements: Lives w/ Spouse/significant other  Is patient a ?: No    Activities of Daily Living Prior to Admission  Functional Status: Independent  Completes ADLs independently?: Yes  Ambulates independently?: Yes  Does patient use assisted devices?: Yes  Assisted Devices (DME) used:  BiPAP  DME Company Name (respiratory supplies): patient unsure of company  Does patient currently own DME?: Yes  What DME does the patient currently own?: Shower Chair, Walker, Straight Cane, BiPAP  Does patient have a history of Outpatient Therapy (PT/OT)?: Yes ([place] [facility] [place])  Does the patient have a history of Short-Term Rehab?: No  Does patient have a history of HHC?: No  Does patient currently have [address]?: No         Patient Information Continued  Income Source: SSI/SSD  Does patient have prescription coverage?: Yes  Within the past 12 months, you worried that your food would run out before you got the money to buy more.: Never true  Within the past 12 months, the food you bought just didn't last and you didn't have money to get more.: Never true  Food insecurity resource given?: N/A  Does patient receive dialysis treatments?: No  Does patient have a history of substance abuse?: No  Does patient have a history of Mental Health Diagnosis?: No         Means of Transportation  Means of Transport to Appts[de-identified] Drives Self  In the past 12 months, has lack of transportation kept you from medical appointments or from getting medications?: No  In the past 12 months, has lack of transportation kept you from meetings, work, or from getting things needed for daily living?: No  Was application for public transport provided?: N/A        DISCHARGE DETAILS:    Discharge planning discussed with[de-identified] patient  Freedom of Choice: Yes     CM contacted family/caregiver?: No- see comments (patient declined)  Were Treatment Team discharge recommendations reviewed with patient/caregiver?: Yes  Did patient/caregiver verbalize understanding of patient care needs?: Yes  Were patient/caregiver advised of the risks associated with not following Treatment Team discharge recommendations?: Yes         [address]         Is the patient interested in [address] at discharge?: No    DME Referral Provided  Referral made for DME?: No         Would you like to participate in our [address] service program?  : No - Declined    Treatment Team Recommendation: Home  Discharge Destination Plan[de-identified] Home  Transport at Discharge : Family            CM met with patient at the bedside, baseline information was obtained. CM discussed the role of CM in helping the patient develop a discharge plan and assist the patient in carry out their plan. Patient lives with spouse in 3 story home, independent at baseline. Patients son lives one block away and assists as needed. CM to follow for CM discharge referral needs, if any.

## 2023-10-06 NOTE — OCCUPATIONAL THERAPY NOTE
Occupational Therapy Evaluation     Patient Name: Yasmin Varghese  [facility]'S Date: 10/6/2023  Problem List  Principal Problem:    Acute low back pain with sciatica  Active Problems:    Obstructive sleep apnea on CPAP    Malignant neoplasm of lateral wall of urinary bladder (HCC)    Acute on chronic diastolic heart failure (HCC)    Morbid (severe) obesity due to excess calories [facility])    Past Medical History  Past Medical History:   Diagnosis Date   • Arthritis    • Bladder cancer ([address])    • Cancer ([address]) 3/17/2023   • Cervical disc disorder 1/2016   • Chronic narcotic dependence ([address])    • Chronic pain disorder     lower back and down both legs   • Colon polyp    • Coronary artery disease    • CPAP (continuous positive airway pressure) dependence    • Does use hearing aid     will wear DOS   • Full dentures    • Heart failure ([address])     and "fluid retention" SL OW B Daryl cardio PA"   • High cholesterol    • Hypertension    • Low back pain 2003   • Mild ankle edema     and feet   • Obstructive sleep apnea on CPAP    • Prediabetes    • Shortness of breath     per pt "with just exertion"   • Sleep apnea    • Wears glasses      Past Surgical History  Past Surgical History:   Procedure Laterality Date   • BACK SURGERY      titanium rods implanted   • COLONOSCOPY     • CYSTOSCOPY W/ URETERAL STENT PLACEMENT Bilateral 3/17/2023    Procedure: bilateral retrograde;  Surgeon: Tery Hatchet, MD;  Location:  MAIN OR;  Service: Urology   • HERNIA REPAIR      x5   • LUMBAR LAMINECTOMY N/A 6/30/2023    Procedure: Left L3-4 Metrx hemilaminectomy and bilateral foraminotomies;  Surgeon: Binta Alonzo MD;  Location:  MAIN OR;  Service: Neurosurgery   • NM CYSTO W/REMOVAL OF LESIONS SMALL N/A 5/1/2023    Procedure: CYSTOSCOPY TURBT;  Surgeon: Tery Hatchet, MD;  Location:  MAIN OR;  Service: Urology   • TRANSURETHRAL RESECTION OF BLADDER TUMOR N/A 3/17/2023    Procedure: TRANSURETHRAL RESECTION OF BLADDER TUMOR (TURBT); Surgeon: Linda Alcantara MD;  Location:  MAIN OR;  Service: Urology           10/06/23 3365   Note Type   Note type Evaluation   Pain Assessment   Pain Assessment Tool FLACC   Pain Location/Orientation Location: Back   Pain Rating: FLACC (Rest) - Face 0   Pain Rating: FLACC (Rest) - Legs 0   Pain Rating: FLACC (Rest) - Activity 0   Pain Rating: FLACC (Rest) - Cry 0   Pain Rating: FLACC (Rest) - Consolability 0   Score: FLACC (Rest) 0   Pain Rating: FLACC (Activity) - Face 1   Pain Rating: FLACC (Activity) - Legs 0   Pain Rating: FLACC (Activity) - Activity 0   Pain Rating: FLACC (Activity) - Cry 1   Pain Rating: FLACC (Activity) - Consolability 0   Score: FLACC (Activity) 2   Home Living   Type of Home House  (2 WES no HR)   Home Layout Two level;Bed/bath upstairs  (Full bath on first floor. FF R HR to 2nd floor to bedroom)   Bathroom Shower/Tub Walk-in shower   Bathroom Toilet Raised   Bathroom Equipment Shower chair   Home Equipment Walker;Cane   Additional Comments Pt reports living in a [facility] with 2 WES no HR and FF to bedroom R HR. Pt reports using cane/RW for functional mobility. Prior Function   Level of [place] Independent with ADLs; Independent with functional mobility; Independent with IADLS   Lives With Spouse   Receives Help From Family   IADLs Independent with driving; Independent with meal prep; Independent with medication management   Comments PTA, pt reports independence with ADLs, IADLs, and functional mobility. Pt does report that he does not ever wear socks at home due to back pain that occurs when attempting to don. ADL   UB Dressing Assistance 6  Modified independent   LB Dressing Assistance 2  Maximal Assistance   LB Dressing Deficit Don/doff R sock; Don/doff L sock   Additional Comments Pt received education and demonstration on sock aid. Pt verbalizes and demonstrates understanding as he was able to don socks while seated in chair with mod I.  Pt reporting interest in purchasing sock aid, OT explains options for purchase. Pt reporting that he just purchased sock aid on [facility]. Bed Mobility   Additional Comments Not assessed at this time, pt received participating in functional mobility in hallway. Transfers   Sit to Stand 7  Independent   Stand to Sit 7  Independent   Stand pivot 7  Independent   Additional Comments All functional transfers with RW. Functional Mobility   Functional Mobility 7  Independent   Additional Comments Pt observed to be participating in functional mobility independently in hallway with RW. Balance   Static Sitting Normal   Dynamic Sitting Normal   Static Standing Good   Dynamic Standing Good   Ambulatory Good   Activity Tolerance   Activity Tolerance Patient tolerated treatment well   Medical Staff Made Aware PT, Sabine   RUE Assessment   RUE Assessment WFL   LUE Assessment   LUE Assessment WFL   Hand Function   Gross Motor Coordination Functional   Fine Motor Coordination Functional   Hand Function Comments Pt is L hand dominant. Sensation   Light Touch No apparent deficits   Vision-Basic Assessment   Current Vision Wears glasses all the time   Cognition   Overall Cognitive Status [facility]   Arousal/Participation Alert; Cooperative   Attention Within functional limits   Orientation Level Oriented X4   Memory Within functional limits   Following Commands Follows all commands and directions without difficulty   Assessment   Limitation Decreased ADL status   Assessment Pt is a 58 y.o. male, admitted to [address] [place] [address] 10/5/2023 d/t experiencing Dx: Acute low back pain with sciatica. Pt with PMHx impacting their performance during ADL tasks, including: obesity, chronic diastolic heart failure, malignant neoplasm of lateral wall of urinary bladder, obstructive sleep apnea on CPAP. Prior to admission to the hospital Pt was performing ADLs without physical assistance. IADLs without physical assistance. Functional transfers/ambulation without physical assistance. Cognitive status PTA was WNL. OT order placed to assess Pt's ADLs, cognitive status, and performance during functional tasks in order to maximize safety and independence while making most appropriate d/c recommendations. Pt's clinical presentation is currently stable  given new onset deficits that affect Pt's occupational performance and ability to safely return to OF including decrease performance during ADL tasks. Upon education and demonstration of sock aid, pt progressing to modified independent in LB dressing. Pt reporting they are near their baseline function and have no concerns regarding ADLs, IADLs or functional mobility upon return to home, therefore do not require acute OT services at this time. D/C OT effective this date. If new concerns arise, please re-consult. Plan   Treatment Interventions ADL retraining;Equipment evaluation/education; Compensatory technique education   OT Frequency Eval only   Recommendation   OT Discharge Recommendation No rehabilitation needs   AM-PAC Daily Activity Inpatient   Lower Body Dressing 4  (with use of sock aid)   Bathing 4   Toileting 4   Upper Body Dressing 4   Grooming 4   Eating 4   Daily Activity Raw Score 24   Daily Activity Standardized Score (Calc for Raw Score >=11) 57.54   AM-PAC Applied Cognition Inpatient   Following a Speech/Presentation 4   Understanding Ordinary Conversation 4   Taking Medications 4   Remembering Where Things Are Placed or Put Away 4   Remembering List of 4-5 Errands 4   Taking Care of Complicated Tasks 4   Applied Cognition Raw Score 24   Applied Cognition Standardized Score 62.21     Eugenio Fields MS, OTR/L

## 2023-10-06 NOTE — ASSESSMENT & PLAN NOTE
· Status post immunotherapy he is scheduled for cystoscopy and TURP in October follows with Dr. Pedrito Hale urology

## 2023-10-07 VITALS
HEIGHT: 68 IN | TEMPERATURE: 97.2 F | RESPIRATION RATE: 18 BRPM | DIASTOLIC BLOOD PRESSURE: 57 MMHG | OXYGEN SATURATION: 100 % | BODY MASS INDEX: 37.1 KG/M2 | HEART RATE: 52 BPM | WEIGHT: 244.8 LBS | SYSTOLIC BLOOD PRESSURE: 137 MMHG

## 2023-10-07 LAB
ANION GAP SERPL CALCULATED.3IONS-SCNC: 7 MMOL/L
BUN SERPL-MCNC: 32 MG/DL (ref 5–25)
CALCIUM SERPL-MCNC: 8.7 MG/DL (ref 8.4–10.2)
CHLORIDE SERPL-SCNC: 100 MMOL/L (ref 96–108)
CO2 SERPL-SCNC: 28 MMOL/L (ref 21–32)
CREAT SERPL-MCNC: 0.99 MG/DL (ref 0.6–1.3)
GFR SERPL CREATININE-BSD FRML MDRD: 80 ML/MIN/1.73SQ M
GLUCOSE SERPL-MCNC: 231 MG/DL (ref 65–140)
POTASSIUM SERPL-SCNC: 4.5 MMOL/L (ref 3.5–5.3)
SODIUM SERPL-SCNC: 135 MMOL/L (ref 135–147)

## 2023-10-07 PROCEDURE — 80048 BASIC METABOLIC PNL TOTAL CA: CPT | Performed by: FAMILY MEDICINE

## 2023-10-07 PROCEDURE — 99239 HOSP IP/OBS DSCHRG MGMT >30: CPT | Performed by: FAMILY MEDICINE

## 2023-10-07 RX ORDER — CELECOXIB 200 MG/1
200 CAPSULE ORAL DAILY PRN
Qty: 30 CAPSULE | Refills: 0
Start: 2023-10-07

## 2023-10-07 RX ORDER — OXYCODONE HYDROCHLORIDE 30 MG/1
30 TABLET ORAL EVERY 6 HOURS PRN
Refills: 0
Start: 2023-10-07 | End: 2023-10-18 | Stop reason: SDUPTHER

## 2023-10-07 RX ORDER — AMOXICILLIN 250 MG
2 CAPSULE ORAL
Qty: 60 TABLET | Refills: 0 | Status: SHIPPED | OUTPATIENT
Start: 2023-10-07

## 2023-10-07 RX ORDER — TORSEMIDE 20 MG/1
40 TABLET ORAL 2 TIMES DAILY
Refills: 0
Start: 2023-10-07

## 2023-10-07 RX ORDER — PREDNISONE 20 MG/1
60 TABLET ORAL DAILY
Status: DISCONTINUED | OUTPATIENT
Start: 2023-10-07 | End: 2023-10-07 | Stop reason: HOSPADM

## 2023-10-07 RX ORDER — METHOCARBAMOL 500 MG/1
500 TABLET, FILM COATED ORAL EVERY 6 HOURS SCHEDULED
Qty: 120 TABLET | Refills: 0 | Status: SHIPPED | OUTPATIENT
Start: 2023-10-07 | End: 2023-11-06

## 2023-10-07 RX ORDER — POLYETHYLENE GLYCOL 3350 17 G/17G
17 POWDER, FOR SOLUTION ORAL DAILY
Qty: 510 G | Refills: 0 | Status: SHIPPED | OUTPATIENT
Start: 2023-10-08 | End: 2023-11-07

## 2023-10-07 RX ORDER — OXYCODONE HCL 40 MG/1
40 TABLET, FILM COATED, EXTENDED RELEASE ORAL 4 TIMES DAILY
Refills: 0
Start: 2023-10-07

## 2023-10-07 RX ORDER — TORSEMIDE 20 MG/1
40 TABLET ORAL ONCE
Status: COMPLETED | OUTPATIENT
Start: 2023-10-07 | End: 2023-10-07

## 2023-10-07 RX ORDER — PREDNISONE 10 MG/1
TABLET ORAL
Qty: 63 TABLET | Refills: 0 | Status: SHIPPED | OUTPATIENT
Start: 2023-10-08 | End: 2023-10-28

## 2023-10-07 RX ADMIN — METHOCARBAMOL TABLETS 500 MG: 500 TABLET, COATED ORAL at 06:47

## 2023-10-07 RX ADMIN — TORSEMIDE 40 MG: 20 TABLET ORAL at 10:14

## 2023-10-07 RX ADMIN — ASPIRIN 81 MG 81 MG: 81 TABLET ORAL at 08:26

## 2023-10-07 RX ADMIN — PREDNISONE 60 MG: 20 TABLET ORAL at 10:14

## 2023-10-07 RX ADMIN — OXYCODONE HYDROCHLORIDE 40 MG: 20 TABLET, FILM COATED, EXTENDED RELEASE ORAL at 06:47

## 2023-10-07 RX ADMIN — POTASSIUM CHLORIDE 20 MEQ: 1500 TABLET, EXTENDED RELEASE ORAL at 08:26

## 2023-10-07 RX ADMIN — HEPARIN SODIUM 5000 UNITS: 5000 INJECTION INTRAVENOUS; SUBCUTANEOUS at 06:47

## 2023-10-07 RX ADMIN — OXYCODONE HYDROCHLORIDE 30 MG: 10 TABLET ORAL at 03:22

## 2023-10-07 RX ADMIN — GABAPENTIN 700 MG: 400 CAPSULE ORAL at 08:26

## 2023-10-07 RX ADMIN — OXYCODONE HYDROCHLORIDE 30 MG: 10 TABLET ORAL at 10:14

## 2023-10-07 NOTE — ASSESSMENT & PLAN NOTE
· Status post immunotherapy he is scheduled for cystoscopy and TURP in October follows with Dr. Kinney Rosalia urology

## 2023-10-07 NOTE — ASSESSMENT & PLAN NOTE
Wt Readings from Last 3 Encounters:   10/07/23 111 kg (244 lb 12.8 oz)   10/05/23 113 kg (248 lb 11.2 oz)   09/28/23 108 kg (238 lb 3.2 oz)     · Since September 28 he looks like he gained 10 pounds he has +3 pitting edema's are clear.   He had a 2D echo done 7/2023 with EF of 70%  · Leg edema has improved he is down 4 pounds on Lasix 80 mg IV twice daily I discussed with him that we will increase torsemide to 40 mg p.o. twice daily continue his KCl 20 mg p.o. twice daily metolazone as needed if gains 3 pounds also discussed with him to do leg elevation if he ambulates as prednisone could cause fluid retention and wear compression stockings we will check a BMP next week for renal function and electrolytes

## 2023-10-07 NOTE — DISCHARGE SUMMARY
[address] [facility]  Discharge- Mike Iraheta 1960, 58 y.o. male MRN: [id number]  Unit/Bed#: -Bisi Encounter: [id number]  Primary Care Provider: Sedrick Yousif MD   Date and time admitted to hospital: 10/5/2023 11:23 AM    * Acute low back pain with sciatica  Assessment & Plan  · Acute on chronic lower back pain with further history see the H&P. But since their last MRI which she had for the worsening pain that started a month ago. His pain has gotten worse to the point that his quality of life has decreased he is unable to ambulate a lot secondary to the pain is radiating down his right leg and now left leg with paresthesias right lower extremity weakness and now starting with urinary incontinence. · We will obtain a stat MRI received approval from radiology in [facility]  · He had a CT abdomen and pelvis done at the same time when the pain started which was negative I do not suspect this is etiology that has to do with bladder issues  · I as prior without any acute changes. · He had a recent STEPHANIE day which was negative recent urine culture was negative in September 27  · It is duplex negative for acute DVT  · Patient has been ambulating today with a walker his pain is much better he was able to lift the right lower extremity no tenderness in touch of the pelvic region inguinal or the back. Will reduce Solu-Medrol to 40 mg IV every 12 hours continue OxyContin 40 mg 4 times a day oxycodone 30 mg 4 times a day as needed Dilaudid 2 mg IV acute 6 hours as needed for severe pain he has not been utilizing a lot at all. Continue Robaxin around-the-clock heating. · Patient continues to ambulate around the floor with his walker without any issues PT has signed off his pain continues to improve he is able to lift leg without any incontinence. States he is ready to go home.   We will discharge him on a very slow taper of prednisone starting with prednisone 60 mg daily for 3 days and then taper down by 10 mg every 3 days continue Robaxin around-the-clock and he will continue the OxyContin and oxycodone he does not want to change to Dilaudid. He will follow-up with his pain management as well. Morbid (severe) obesity due to excess calories [facility])  Assessment & Plan  · Counseled     Acute on chronic diastolic heart failure [facility])  Assessment & Plan  Wt Readings from Last 3 Encounters:   10/07/23 111 kg (244 lb 12.8 oz)   10/05/23 113 kg (248 lb 11.2 oz)   09/28/23 108 kg (238 lb 3.2 oz)     · Since September 28 he looks like he gained 10 pounds he has +3 pitting edema's are clear. He had a 2D echo done 7/2023 with EF of 70%  · Leg edema has improved he is down 4 pounds on Lasix 80 mg IV twice daily I discussed with him that we will increase torsemide to 40 mg p.o. twice daily continue his KCl 20 mg p.o. twice daily metolazone as needed if gains 3 pounds also discussed with him to do leg elevation if he ambulates as prednisone could cause fluid retention and wear compression stockings we will check a BMP next week for renal function and electrolytes        Malignant neoplasm of lateral wall of urinary bladder ([address])  Assessment & Plan  · Status post immunotherapy he is scheduled for cystoscopy and TURP in October follows [facility] urology    Obstructive sleep apnea on CPAP  Assessment & Plan  · Does not wear cpap        Medical Problems     Resolved Problems  Date Reviewed: 10/7/2023   None       Discharging Physician / Practitioner: Yesi Kim MD  PCP: Zora Deshpande MD  Admission Date:   Admission Orders (From admission, onward)     Ordered        10/05/23 1146  Inpatient Admission  Once                      Discharge Date: 10/07/23    Consultations During Hospital Stay:  · none    Procedures Performed:   · none    Significant Findings / Test Results:   · MRI lumbar- Similar appearance of the lumbar spine to most recent prior study from 8/11/2023.  Degenerative and postoperative changes as described above.     No new disc herniation, high-grade stenosis or evidence of conus or nerve root impingement. · Venous duplex negative for acute DVT  Incidental Findings:   · none    Test Results Pending at Discharge (will require follow up):   · none     Outpatient Tests Requested:  · Bmp wed    Complications:  none    Reason for Admission: Acute on chronic lower back pain    Hospital Course:   Leonor Sebastian is a 58 y.o. male patient who originally presented to the hospital on 10/5/2023 due to acute on chronic lower back pain and previous history of cauda equina compression and surgery back in June. He started to have worsening right lower extremity weakness unbearable pain with inability to ambulate he is on OxyContin 40 mg 4 times a day oxycodone 30 mg 4 times a day as needed without any help. He actually finished a Medrol pack as well MRI stat was done without any acute changes. Suspect this is nerve compression started on high-dose steroids and Robaxin around-the-clock with improvement of pain and ability to ambulate for the next 2 days around the whole hospital floor with his walker with ability to raise his right lower extremity that physical and Occupational Therapy have signed off his back pain improved and he was ready to go home. Also found to have some diastolic heart failure exacerbation with increased lower extremity edema started on IV Lasix of 80 mg iv bid, diuresed 4 pounds with improvement of leg edema we will switch to torsemide 40 mg p.o. twice daily continue his potassium recheck a BMP next week discussed with him leg elevation and compression stockings. Also follow-up with your pain management on discharge he is can to be on a slow taper of prednisone starting from 60 mg and titrate by 10 every 3 days. Continue Robaxin around-the-clock and continue his other pain medications. Please see above list of diagnoses and related plan for additional information. Condition at Discharge: stable    Discharge Day Visit / Exam:   Subjective: Patient seen and examined he denies any chest pain or shortness of breath back pain improved and he is ready to go home  Vitals: Blood Pressure: 137/57 (10/07/23 0657)  Pulse: (!) 52 (10/07/23 0657)  Temperature: (!) 97.2 °F (36.2 °C) (10/07/23 0657)  Temp Source: Temporal (10/06/23 0725)  Respirations: 18 (10/07/23 0657)  Height: 5' 8" (172.7 cm) (10/05/23 1131)  Weight - Scale: 111 kg (244 lb 12.8 oz) (10/07/23 0323)  SpO2: 100 % (10/07/23 0657)  Exam:   Physical Exam  Vitals and nursing note reviewed. Constitutional:       General: He is not in acute distress. Appearance: He is well-developed. HENT:      Head: Normocephalic and atraumatic. Eyes:      Conjunctiva/sclera: Conjunctivae normal.   Cardiovascular:      Rate and Rhythm: Normal rate and regular rhythm. Heart sounds: No murmur heard. Pulmonary:      Effort: Pulmonary effort is normal. No respiratory distress. Breath sounds: Normal breath sounds. No wheezing or rales. Abdominal:      General: There is no distension. Palpations: Abdomen is soft. Tenderness: There is no abdominal tenderness. Musculoskeletal:         General: No swelling. Cervical back: Neck supple. Comments: No paravertebral tenderness at the lumbar and able to raise right lower extremity    Skin:     General: Skin is warm and dry. Capillary Refill: Capillary refill takes less than 2 seconds. Neurological:      Mental Status: He is alert and oriented to person, place, and time. Psychiatric:         Mood and Affect: Mood normal.          Discussion with Family: Patient declined call to . Discharge instructions/Information to patient and family:   See after visit summary for information provided to patient and family.       Provisions for Follow-Up Care:  See after visit summary for information related to follow-up care and any pertinent home health orders. Disposition:   Home    Planned Readmission: anticipate no     Discharge Statement:  I spent >35 minutes discharging the patient. This time was spent on the day of discharge. I had direct contact with the patient on the day of discharge. Greater than 50% of the total time was spent examining patient, answering all patient questions, arranging and discussing plan of care with patient as well as directly providing post-discharge instructions. Additional time then spent on discharge activities. Discharge Medications:  See after visit summary for reconciled discharge medications provided to patient and/or family.       **Please Note: This note may have been constructed using a voice recognition system**

## 2023-10-07 NOTE — ASSESSMENT & PLAN NOTE
· Acute on chronic lower back pain with further history see the H&P. But since their last MRI which she had for the worsening pain that started a month ago. His pain has gotten worse to the point that his quality of life has decreased he is unable to ambulate a lot secondary to the pain is radiating down his right leg and now left leg with paresthesias right lower extremity weakness and now starting with urinary incontinence. · We will obtain a stat MRI received approval from radiology in [facility] and the [facility]  · He had a CT abdomen and pelvis done at the same time when the pain started which was negative I do not suspect this is etiology that has to do with bladder issues  · I as prior without any acute changes. · He had a recent STEPHANIE day which was negative recent urine culture was negative in September 27  · It is duplex negative for acute DVT  · Patient has been ambulating today with a walker his pain is much better he was able to lift the right lower extremity no tenderness in touch of the pelvic region inguinal or the back. Will reduce Solu-Medrol to 40 mg IV every 12 hours continue OxyContin 40 mg 4 times a day oxycodone 30 mg 4 times a day as needed Dilaudid 2 mg IV acute 6 hours as needed for severe pain he has not been utilizing a lot at all. Continue Robaxin around-the-clock heating. · Patient continues to ambulate around the floor with his walker without any issues PT has signed off his pain continues to improve he is able to lift leg without any incontinence. States he is ready to go home. We will discharge him on a very slow taper of prednisone starting with prednisone 60 mg daily for 3 days and then taper down by 10 mg every 3 days continue Robaxin around-the-clock and he will continue the OxyContin and oxycodone he does not want to change to Dilaudid. He will follow-up with his pain management as well.

## 2023-10-07 NOTE — PLAN OF CARE
Problem: SAFETY ADULT  Goal: Patient will remain free of falls  Description: INTERVENTIONS:  - Educate patient/family on patient safety including physical limitations  - Instruct patient to call for assistance with activity   - Consult OT/PT to assist with strengthening/mobility   - Keep Call bell within reach  - Keep bed low and locked with side rails adjusted as appropriate  - Keep care items and personal belongings within reach  - Initiate and maintain comfort rounds  - Make Fall Risk Sign visible to staff  - Offer Toileting every 2 Hours, in advance of need  - Apply yellow socks and bracelet for high fall risk patients  Outcome: Progressing

## 2023-10-09 ENCOUNTER — TRANSITIONAL CARE MANAGEMENT (OUTPATIENT)
Dept: FAMILY MEDICINE CLINIC | Facility: CLINIC | Age: 63
End: 2023-10-09

## 2023-10-10 ENCOUNTER — OFFICE VISIT (OUTPATIENT)
Age: 63
End: 2023-10-10
Payer: MEDICARE

## 2023-10-10 ENCOUNTER — OFFICE VISIT (OUTPATIENT)
Dept: FAMILY MEDICINE CLINIC | Facility: CLINIC | Age: 63
End: 2023-10-10
Payer: MEDICARE

## 2023-10-10 VITALS
OXYGEN SATURATION: 95 % | BODY MASS INDEX: 36.77 KG/M2 | HEART RATE: 63 BPM | HEIGHT: 68 IN | DIASTOLIC BLOOD PRESSURE: 59 MMHG | SYSTOLIC BLOOD PRESSURE: 124 MMHG | WEIGHT: 242.6 LBS

## 2023-10-10 VITALS
OXYGEN SATURATION: 95 % | BODY MASS INDEX: 37.13 KG/M2 | WEIGHT: 245 LBS | SYSTOLIC BLOOD PRESSURE: 150 MMHG | HEIGHT: 68 IN | TEMPERATURE: 98.4 F | DIASTOLIC BLOOD PRESSURE: 80 MMHG | HEART RATE: 62 BPM

## 2023-10-10 DIAGNOSIS — G89.4 CHRONIC PAIN SYNDROME: ICD-10-CM

## 2023-10-10 DIAGNOSIS — Z12.12 SCREENING FOR COLORECTAL CANCER: ICD-10-CM

## 2023-10-10 DIAGNOSIS — Z51.5 PALLIATIVE CARE BY SPECIALIST: ICD-10-CM

## 2023-10-10 DIAGNOSIS — M54.42 CHRONIC BILATERAL LOW BACK PAIN WITH BILATERAL SCIATICA: Primary | ICD-10-CM

## 2023-10-10 DIAGNOSIS — I50.32 CHRONIC DIASTOLIC HEART FAILURE (HCC): ICD-10-CM

## 2023-10-10 DIAGNOSIS — R20.2 PARESTHESIA OF BILATERAL LEGS: ICD-10-CM

## 2023-10-10 DIAGNOSIS — G89.29 CHRONIC BILATERAL LOW BACK PAIN WITH BILATERAL SCIATICA: Primary | ICD-10-CM

## 2023-10-10 DIAGNOSIS — M54.42 CHRONIC BILATERAL LOW BACK PAIN WITH BILATERAL SCIATICA: ICD-10-CM

## 2023-10-10 DIAGNOSIS — I50.33 ACUTE ON CHRONIC DIASTOLIC HEART FAILURE (HCC): ICD-10-CM

## 2023-10-10 DIAGNOSIS — Z71.89 COUNSELING AND COORDINATION OF CARE: ICD-10-CM

## 2023-10-10 DIAGNOSIS — M54.41 CHRONIC BILATERAL LOW BACK PAIN WITH BILATERAL SCIATICA: Primary | ICD-10-CM

## 2023-10-10 DIAGNOSIS — M54.41 CHRONIC BILATERAL LOW BACK PAIN WITH BILATERAL SCIATICA: ICD-10-CM

## 2023-10-10 DIAGNOSIS — G89.29 CHRONIC BILATERAL LOW BACK PAIN WITH BILATERAL SCIATICA: ICD-10-CM

## 2023-10-10 DIAGNOSIS — C67.2 MALIGNANT NEOPLASM OF LATERAL WALL OF URINARY BLADDER (HCC): ICD-10-CM

## 2023-10-10 DIAGNOSIS — C67.3 MALIGNANT NEOPLASM OF ANTERIOR WALL OF URINARY BLADDER (HCC): Primary | ICD-10-CM

## 2023-10-10 DIAGNOSIS — R45.89 DEPRESSED MOOD: ICD-10-CM

## 2023-10-10 DIAGNOSIS — Z12.11 SCREENING FOR COLORECTAL CANCER: ICD-10-CM

## 2023-10-10 PROCEDURE — 99214 OFFICE O/P EST MOD 30 MIN: CPT | Performed by: PHYSICIAN ASSISTANT

## 2023-10-10 PROCEDURE — 99496 TRANSJ CARE MGMT HIGH F2F 7D: CPT | Performed by: FAMILY MEDICINE

## 2023-10-10 NOTE — PATIENT INSTRUCTIONS
One acupuncture provider is "[facility]"  [address] [place] [address] Ellis Guzman [address] 0, [address] [place] [id number] . .. [phone]  They take some insurers  They offer: CBD oils, Acupuncture, Herbal therapy, Nutritional counseling  Website: [employer]/    Some patients have success with integrative programs like the [facility]:  [employer]  [phone]  They offer massage in addition to usual certified Physical Therapy modalities for pain, and "guide the body to healing"     Consider online support groups for chronic pain:  EntrepreneurPulse.com.au - the American Chronic Pain Association  https://painconnection. org/support-groups/ - Pain Connection  http://www.ciara-rueda.org/ - My Chronic Pain Team  Some patients have success with integrative programs like the [facility]:  [employer]  [phone]  They offer massage in addition to usual certified Physical Therapy modalities for pain, and "guide the body to healing"

## 2023-10-10 NOTE — PROGRESS NOTES
Assessment & Plan     1. Chronic bilateral low back pain with bilateral sciatica  Assessment & Plan:  Continue meds, no changes    Orders:  -     Ambulatory referral to Spine & Pain Management; Future    2. Chronic pain syndrome  Comments:  referral placed  Orders:  -     Ambulatory referral to Spine & Pain Management; Future    3. Screening for colorectal cancer  -     Ambulatory Referral to Gastroenterology; Future       Subjective     Transitional Care Management Review:   Dane Jacobs is a 58 y.o. male here for TCM follow up. During the TCM phone call patient stated:  TCM Call     Date and time call was made  10/9/2023 10:02 AM    Patient was hospitialized at  [address] [place] [address]    Date of Admission  10/05/23    Date of discharge  10/07/23    Disposition  Home    Were the patients medications reviewed and updated  No    Current Symptoms  None      TCM Call     Post hospital issues  None    Should patient be enrolled in anticoag monitoring? No    Scheduled for follow up? Yes    Patients specialists  Other (comment)    Other specialists names  Palliative Care    Did you obtain your prescribed medications  Yes    Do you need help managing your prescriptions or medications  No    Is transportation to your appointment needed  No    I have advised the patient to call PCP with any new or worsening symptoms  Perla Wiggins MA    Living Arrangements  Spouse or Significiant other    Support System  Spouse    Are you recieving any outpatient services  No    Are you recieving home care services  No    Are you using any community resources  No    Current waiver services  No    Have you fallen in the last 12 months  No    Interperter language line needed  No    Counseling  Patient        Cj Wellington is here for a hospital follow-up I reviewed all his hospital records ancillary studies and consultations. His pain prior to admission was about eight 8-1/2 out of 10 and now leaving 7-1/2 8 out of 10.   The changes made were increasing OxyContin to 4 times a day from 3 times a day adding Robaxin and a high-dose prednisone taper. He had a repeat MRI that was unchanged. I did refer him to pain management today I had him sign a contract with our office for pain medication he would like us to prescribe the medications. He is can to be starting physical therapy as well. I will touch base with palliative care. Review of Systems   Respiratory: Negative. Cardiovascular: Negative. Objective     /59 (BP Location: Right arm, Patient Position: Sitting, Cuff Size: Large)   Pulse 63   Ht 5' 8" (1.727 m)   Wt 110 kg (242 lb 9.6 oz)   SpO2 95%   BMI 36.89 kg/m²      Physical Exam  Vitals and nursing note reviewed. Constitutional:       General: He is not in acute distress. Appearance: He is well-developed. HENT:      Head: Normocephalic and atraumatic. Eyes:      Conjunctiva/sclera: Conjunctivae normal.   Cardiovascular:      Rate and Rhythm: Normal rate and regular rhythm. Heart sounds: No murmur heard. Pulmonary:      Effort: Pulmonary effort is normal. No respiratory distress. Breath sounds: Normal breath sounds. Abdominal:      Palpations: Abdomen is soft. Tenderness: There is no abdominal tenderness. Musculoskeletal:         General: No swelling. Cervical back: Neck supple. Comments: B/l lumbar spasm   Skin:     General: Skin is warm and dry. Capillary Refill: Capillary refill takes less than 2 seconds. Neurological:      Mental Status: He is alert.    Psychiatric:         Mood and Affect: Mood normal.       Medications have been reviewed by provider in current encounter    Vickie Evans MD

## 2023-10-10 NOTE — PROGRESS NOTES
Follow-up with Palliative and Supportive Care  Sammy Frankel 58 y.o. male [id number]    ASSESSMENT & PLAN:  1. 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 Acute on chronic diastolic heart failure ([address])    3. Chronic bilateral low back pain with bilateral sciatica    4. Malignant neoplasm of lateral wall of urinary bladder ([address])    5. Palliative care by specialist    6. Paresthesia of bilateral legs    7. Counseling and coordination of care    8. Chronic diastolic heart failure ([address])    9. Depressed mood      • Chronic pain  o Oxycontin 40mg had been increased from BID to QID with some relief in symptoms but no improvement in QoL or function  o Was referred to Spine and Pain management, pending appointment  o Starts PT next week  o Reviewed chronic pain support groups and integrative medicine options such as chiropractic and accupuncture  • Depressed mood  o Refer to palliative LCSW for therapy  o Consider addition of antidepressant, such as Duloxetine which may address both depression and chronic pain  o Emotional support provided  • Coordination of care  o Pt requesting medications be managed by single provider, discussed with PCP  - Opioid agreement completed with PCP today  o [facility] team will continue to remain available to patient to provide support and treatment recommendations  o Medication prescribing solely by PCP going forward to mitigate risks of medication errors and fragmented care  Return in about 2 weeks (around 10/24/2023) for Next scheduled follow up. • Medication safety issues addressed - no driving under the influence of narcotics (including opioids), watch for adverse effects including AMS or respiratory depression (slowed breathing), keep medications stored in a safe/locked environment, do not use alcohol while opioids or other narcotics are in one's system.     Requested Prescriptions      No prescriptions requested or ordered in this encounter       There are no discontinued medications. Tahira Fernandez was seen today for symptoms and planning cares related to above illnesses. I have reviewed the patient's controlled substance dispensing history in the Prescription Drug Monitoring Program in compliance with the [facility] regulations before prescribing any controlled substances. They are invited to continue to follow with us. If there are questions or concerns, please contact us through our clinic/answering service 24 hours a day, seven days a week. 60 minutes were spent in this ambulatory visit with greater than 50% of the time spent face to face with patient in counseling or coordination of care including symptom assessment and management, medication review, psychosocial support, chart review, imaging review, lab review, goals of care, supportive listening and anticipatory guidance. All of the patient's questions were answered during this discussion. Visit Information    Accompanied By: No one    Source of History: Self    History Limitations: None    Contacts: Extended Emergency Contact Information  Primary Emergency Contact: Cami Irving  Mobile Phone: [phone]  Relation: Spouse  Secondary Emergency Contact: [address] [place] [address]  Mobile Phone: [phone]  Relation: Son     SUBJECTIVE:  Chief Complaint   Patient presents with   • Follow-up   • Fatigue   • Depression   • Counseling   • Pain   • Weakness - Generalized        HPI  Dominick Irving is a 58 y. o. male with hx of high-grade papillary urothelial carcinoma and chronic low back pain presents for follow up for symptom management. Follows with Dr. Snow Orantes (urology), Dr. Michelet Heath (PCP), and Amelia Carreon. Since our last visit, the patient has reported an increase in his pain. He recently took a trip to the [place], which he found somewhat manageable by renting a motorized scooter for mobility. However, upon returning, his pain worsened significantly.  He sought care in the emergency department and was subsequently hospitalized to manage his symptoms. An MRI revealed no new changes. The patient was started on high-dose steroids and Robaxin, which has provided some relief. Previously, the patient aimed to reduce his reliance on long-acting opioids and had requested a decrease in his OxyContin dose, which had been reduced to OxyContin 40 mg twice daily. However, due to the increased pain, he is now taking OxyContin four times daily. Although these adjustments have offered marginal relief, they haven't significantly improved his overall quality of life. Consequently, he feels quite lost about how to manage his symptoms effectively. The patient's mobility has been compromised, and he now requires a walker instead of a cane. Even simple tasks like getting up from a chair to make a cup of coffee are challenging. The long-term management of his symptoms has caused him significant stress and depression. The patient is open to exploring various treatment options, including physical therapy starting next week, and a referral to comprehensive spine and pain management. He hopes that these specialists may consider interventions like steroid injections or nerve blocks to alleviate his groin pain. Integrative medicine, such as chiropractic care and acupuncture, has been discussed as well, although it would require out-of-network options. We can provide a list of contacts for these services. Furthermore, support groups, especially those for individuals dealing with chronic pain, have been suggested to him as a means of socializing and getting out of the house more. The patient is also open to speaking with a therapist. While he was previously referred to one of our palliative LCSWs, he did not receive a follow-up call. We intend to reconnect with the LCSW and arrange an appointment soon.     When the patient initially sought palliative care, he was in dire need of care coordination as he felt overwhelmed by the complexities of the medical system. He has since been connected with a new primary care provider who he appreciates and who is actively addressing his concerns. For this reason, we would like to streamline his medication prescriptions and is interested in developing a care plan where a single provider addresses his chronic pain and manages medications to minimize risks, particularly with opioids. He has an upcoming appointment with his primary care provider today, and I have offered to reach out to facilitate an in-person discussion to coordinate his care. Despite this, the patient still values follow-up appointments with palliative care, finding them helpful in navigating his ongoing care. In addition to these considerations, we have also discussed addressing the secondary effects of his chronic pain, such as his depressive mood, and offered to make recommendations to his primary care provider regarding antidepressant therapy. PDMP shows no concerns. Filled  Written  Sold  ID  Drug  QTY  Days  Prescriber  RX #  Dispenser  Refill  Daily Dose*  Pymt Type      09/27/2023 09/27/2023   1  Oxycontin Er 40 Mg Tablet 90.00  30  Ro Bud  [id number]   Tho ([id number])  0  180.00 MME  [place]    09/27/2023 09/27/2023   1  Oxycodone Hcl (Ir) 30 Mg Tab 120.00  30  Ju Jeff  [id number]   Tho ([id number])  0  180.00 MME  [place]          The following portions of the medical history were reviewed: past medical history, surgical history, problem list, medication list, family history, and social history.       Current Outpatient Medications:   •  amitriptyline (ELAVIL) 25 mg tablet, Take 1 tablet (25 mg total) by mouth daily at bedtime, Disp: 30 tablet, Rfl: 3  •  aspirin 81 mg chewable tablet, Chew 81 mg daily, Disp: , Rfl:   •  atorvastatin (LIPITOR) 40 mg tablet, , Disp: , Rfl:   •  celecoxib (CeleBREX) 200 mg capsule, Take 1 capsule (200 mg total) by mouth daily as needed for mild pain, Disp: 30 capsule, Rfl: 0  • co-enzyme Q-10 30 MG capsule, Take 100 mg by mouth daily , Disp: , Rfl:   •  Farxiga 5 MG TABS, 5 mg daily 5mg alternating with 2.5mg for fluid retention, Disp: , Rfl:   •  gabapentin (NEURONTIN) 100 mg capsule, 100 mg 3 (three) times a day Takes a total of 700 mg three times daily, Disp: , Rfl:   •  methocarbamol (ROBAXIN) 500 mg tablet, Take 1 tablet (500 mg total) by mouth every 6 (six) hours, Disp: 120 tablet, Rfl: 0  •  metolazone (ZAROXOLYN) 2.5 mg tablet, Take 2.5 mg by mouth daily Patient reports use of this medication daily, prescribed by cardiology. , Disp: , Rfl:   •  multivitamin-iron-minerals-folic acid (CENTRUM) chewable tablet, Chew 1 tablet daily, Disp: , Rfl:   •  oxyCODONE (OxyCONTIN) 40 mg 12 hr tablet, Take 1 tablet (40 mg total) by mouth 4 (four) times a day Max Daily Amount: 160 mg, Disp: , Rfl: 0  •  oxyCODONE (ROXICODONE) 30 MG immediate release tablet, Take 1 tablet (30 mg total) by mouth every 6 (six) hours as needed (15mg for moderate pain, 30mg for severe pain) Max 6 tabs/day. Max Daily Amount: 120 mg, Disp: , Rfl: 0  •  polyethylene glycol (MIRALAX) 17 g packet, Take 17 g by mouth daily Do not start before October 8, 2023., Disp: 510 g, Rfl: 0  •  potassium chloride (K-DUR,KLOR-CON) 10 mEq tablet, 20mg bid, Disp: 90 tablet, Rfl: 3  •  predniSONE 10 mg tablet, Take 6 tablets (60 mg total) by mouth daily for 2 days, THEN 5 tablets (50 mg total) daily for 3 days, THEN 4 tablets (40 mg total) daily for 3 days, THEN 3 tablets (30 mg total) daily for 3 days, THEN 2 tablets (20 mg total) daily for 3 days, THEN 2 tablets (20 mg total) daily for 3 days, THEN 1 tablet (10 mg total) daily for 3 days.  Do not start before October 8, 2023., Disp: 63 tablet, Rfl: 0  •  senna-docusate sodium (SENOKOT S) 8.6-50 mg per tablet, Take 2 tablets by mouth daily at bedtime, Disp: 60 tablet, Rfl: 0  •  tamsulosin (FLOMAX) 0.4 mg, Take 1 capsule (0.4 mg total) by mouth daily with dinner, Disp: 90 capsule, Rfl: 3  •  torsemide (DEMADEX) 20 mg tablet, Take 2 tablets (40 mg total) by mouth 2 (two) times a day, Disp: , Rfl: 0    Past medical, surgical, social, and family histories are reviewed and pertinent updates are made. Review of Systems   Constitutional: Positive for activity change. Negative for appetite change, fatigue and unexpected weight change. HENT: Negative for mouth sores, trouble swallowing and voice change. Eyes: Negative. Respiratory: Negative for shortness of breath. Cardiovascular: Negative for chest pain. Gastrointestinal: Negative for abdominal pain, constipation, diarrhea, nausea and vomiting. Genitourinary: Positive for frequency. Musculoskeletal: Positive for arthralgias, back pain, gait problem and myalgias. Skin: Negative for color change, pallor and wound. Neurological: Negative for dizziness, weakness, light-headedness and headaches. Hematological: Negative. Psychiatric/Behavioral: Positive for dysphoric mood. Negative for confusion. The patient is not nervous/anxious. OBJECTIVE:  /80 (BP Location: Left arm)   Pulse 62   Temp 98.4 °F (36.9 °C) (Temporal)   Ht 5' 8" (1.727 m)   Wt 111 kg (245 lb)   SpO2 95%   BMI 37.25 kg/m²   Physical Exam  Nursing note reviewed. Constitutional:       Appearance: He is overweight. Comments: Ambulates with walker. Appears uncomfortable in chair, occasional mild acute distress from back spasms   HENT:      Head: Normocephalic and atraumatic. Right Ear: External ear normal.      Left Ear: External ear normal.      Nose: Nose normal.      Mouth/Throat:      Pharynx: Oropharynx is clear. Eyes:      Extraocular Movements: Extraocular movements intact. Cardiovascular:      Rate and Rhythm: Normal rate. Pulmonary:      Effort: Pulmonary effort is normal.   Musculoskeletal:         General: No deformity. Skin:     Findings: No rash.    Neurological:      Mental Status: He is alert and oriented to person, place, and time. Gait: Gait abnormal (antalgic). Psychiatric:         Mood and Affect: Mood normal. Affect is tearful. Behavior: Behavior normal.         Cognition and Memory: Cognition normal.         Judgment: Judgment normal.          Steve Carpenter PA-C  [facility]  [phone]    Portions of this document may have been created using dictation software and as such some "sound alike" terms may have been generated by the system. Do not hesitate to contact me with any questions or clarifications.

## 2023-10-11 ENCOUNTER — TELEPHONE (OUTPATIENT)
Dept: PALLIATIVE MEDICINE | Facility: CLINIC | Age: 63
End: 2023-10-11

## 2023-10-16 ENCOUNTER — TELEPHONE (OUTPATIENT)
Dept: ADMINISTRATIVE | Facility: OTHER | Age: 63
End: 2023-10-16

## 2023-10-16 ENCOUNTER — CONSULT (OUTPATIENT)
Dept: PAIN MEDICINE | Facility: CLINIC | Age: 63
End: 2023-10-16
Payer: MEDICARE

## 2023-10-16 VITALS
WEIGHT: 242.3 LBS | OXYGEN SATURATION: 98 % | HEART RATE: 67 BPM | DIASTOLIC BLOOD PRESSURE: 82 MMHG | SYSTOLIC BLOOD PRESSURE: 168 MMHG | HEIGHT: 68 IN | BODY MASS INDEX: 36.72 KG/M2 | TEMPERATURE: 97.6 F

## 2023-10-16 DIAGNOSIS — Z98.890 S/P LAMINECTOMY: ICD-10-CM

## 2023-10-16 DIAGNOSIS — G89.4 CHRONIC PAIN SYNDROME: ICD-10-CM

## 2023-10-16 DIAGNOSIS — G89.29 CHRONIC BILATERAL LOW BACK PAIN WITH BILATERAL SCIATICA: ICD-10-CM

## 2023-10-16 DIAGNOSIS — M54.41 CHRONIC BILATERAL LOW BACK PAIN WITH BILATERAL SCIATICA: ICD-10-CM

## 2023-10-16 DIAGNOSIS — M54.16 LUMBAR RADICULOPATHY: ICD-10-CM

## 2023-10-16 DIAGNOSIS — M54.42 CHRONIC BILATERAL LOW BACK PAIN WITH BILATERAL SCIATICA: ICD-10-CM

## 2023-10-16 PROCEDURE — 99204 OFFICE O/P NEW MOD 45 MIN: CPT | Performed by: ANESTHESIOLOGY

## 2023-10-16 RX ORDER — 0.9 % SODIUM CHLORIDE 0.9 %
3 VIAL (ML) INJECTION ONCE
OUTPATIENT
Start: 2023-10-16 | End: 2023-10-16

## 2023-10-16 RX ORDER — LIDOCAINE HYDROCHLORIDE 10 MG/ML
5 INJECTION, SOLUTION EPIDURAL; INFILTRATION; INTRACAUDAL; PERINEURAL ONCE
OUTPATIENT
Start: 2023-10-16 | End: 2023-10-16

## 2023-10-16 RX ORDER — METHYLPREDNISOLONE ACETATE 80 MG/ML
80 INJECTION, SUSPENSION INTRA-ARTICULAR; INTRALESIONAL; INTRAMUSCULAR; PARENTERAL; SOFT TISSUE ONCE
OUTPATIENT
Start: 2023-10-16 | End: 2023-10-16

## 2023-10-16 NOTE — PATIENT INSTRUCTIONS
Core Strengthening Exercises   WHAT YOU NEED TO KNOW:   Your core includes the muscles of your lower back, hip, pelvis, and abdomen. Core strengthening exercises help heal and strengthen these muscles. This helps prevent another injury, and keeps your pelvis, spine, and hips in the correct position. DISCHARGE INSTRUCTIONS:   Call your doctor or physical therapist if:   You have sharp or worsening pain during exercise or at rest.    You have questions or concerns about your shoulder exercises. Safety tips:  Talk to your healthcare provider before you start an exercise program. A physical therapist can teach you how to do core strengthening exercises safely. Do the exercises on a mat or firm surface. A firm surface will support your spine and prevent low back pain. Do not do these exercises on a bed. Move slowly and smoothly. Avoid fast or jerky motions. Stop if you feel pain. You may feel some discomfort at first, but you should not feel pain. Tell your provider or physical therapist if you have pain while you exercise. Regular exercise will help decrease your discomfort over time. Breathe normally during core exercises. Do not hold your breath. This may cause an increase in blood pressure and prevent muscle strengthening. Your healthcare provider will tell you when to inhale and exhale during the exercise. Begin all of your exercises with abdominal bracing. Abdominal bracing helps warm up your core muscles. You can also practice abdominal bracing throughout the day. Lie on your back with your knees bent and feet flat on the floor. Place your arms in a relaxed position beside your body. Tighten your abdominal muscles. Pull your belly button in and up toward your spine. Hold for 5 seconds. Relax your muscles. Repeat 10 times. Core strengthening exercises: Your healthcare provider will tell you how often to do these exercises.  The provider will also tell you how many repetitions of each exercise you should do. Hold each exercise for 5 seconds or as directed. As you get stronger, increase your hold to 10 to 15 seconds. You can do some of these exercises on a stability ball, or with a weight. Ask your healthcare provider how to use a stability ball or weight for these exercises:  Bridging:  Lie on your back with your knees bent and feet flat on the floor. Rest your arms at your side. Tighten your buttocks, and then lift your hips 1 inch off the floor. Hold for 5 seconds. When you can do this exercise without pain for 10 seconds, increase the distance you lift your hips. A good goal is to be able to lift your hips so that your shoulders, hips, and knees are in a straight line. Dead bug:  Lie on your back with your knees bent and feet flat on the floor. Place your arms in a relaxed position beside your body. Begin with abdominal bracing. Next, raise one leg, keeping your knee bent. Hold for 5 seconds. Repeat with the other leg. When you can do this exercise without pain for 10 to 15 seconds, you may raise one straight leg and hold. Repeat with the other leg. Quadruped:  Place your hands and knees on the floor. Keep your wrists directly below your shoulders and your knees directly below your hips. Pull your belly button in toward your spine. Do not flatten or arch your back. Tighten your abdominal muscles below your belly button. Hold for 5 seconds. When you can do this exercise without pain for 10 to 15 seconds, you may extend one arm and hold. Repeat on the other side. Side bridge exercises:      Standing side bridge:  Stand next to a wall and extend one arm toward the wall. Place your palm flat on the wall with your fingers pointing upward. Begin with abdominal bracing. Next, without moving your feet, slowly bend your arm to 90 degrees. Hold for 5 seconds. Repeat on the other side.  When you can do this exercise without pain for 10 to 15 seconds, you may do the bent leg side bridge on the floor. Bent leg side bridge:  Lie on one side with your legs, hips, and shoulders in a straight line. Prop yourself up onto your forearm so your elbow is directly below your shoulder. Bend your knees back to 90 degrees. Begin with abdominal bracing. Next, lift your hips and balance yourself on your forearm and knees. Hold for 5 seconds. Repeat on the other side. When you can do this exercise without pain for 10 to 15 seconds, you may do the straight leg side bridge on the floor. Straight leg side bridge:  Lie on one side with your legs, hips, and shoulders in a straight line. Prop yourself up onto your forearm so your elbow is directly below your shoulder. Begin with abdominal bracing. Lift your hips off the floor and balance yourself on your forearm and the outside of your flexed foot. Do not let your ankle bend sideways. Hold for 5 seconds. Repeat on the other side. When you can do this exercise without pain for 10 to 15 seconds, ask your healthcare provider for more advanced exercises. Superman:  Lie on your stomach. Extend your arms forward on the floor. Tighten your abdominal muscles and lift your right hand and left leg off the floor. Hold this position. Slowly return to the starting position. Tighten your abdominal muscles and lift your left hand and right leg off the floor. Hold this position. Slowly return to the starting position. Clam:  Lie on your side with your knees bent. Put your bottom arm under your head to keep your neck in line. Put your top hand on your hip to keep your pelvis from moving. Put your heels together, and keep them together during this exercise. Slowly raise your top knee toward the ceiling. Then lower your leg so your knees are together. Repeat this exercise 10 times. Then switch sides and do the exercise 10 times with the other leg. Curl up:  Lie on your back with your knees bent and feet flat on the floor.  Place your hands, palms down, underneath your lower back. Next, with your elbows on the floor, lift your shoulders and chest 2 to 3 inches off the floor. Keep your head in line with your shoulders. Hold this position. Slowly return to the starting position. Straight leg raises:  Lie on your back with one leg straight. Bend the other knee and place your foot flat on the floor. Tighten your abdominal muscles. Keep your leg straight and slowly lift it straight up 6 to 12 inches off the floor. Hold this position. Lower your leg slowly. Do as many repetitions as directed on this side. Repeat with the other leg. Plank:  Lie on your stomach. Bend your elbows and place your forearms flat on the floor. Lift your chest, stomach, and knees off the floor. Make sure your elbows are below your shoulders. Your body should be in a straight line. Do not let your hips or lower back sink to the ground. Squeeze your abdominal muscles together and hold for 15 seconds. To make this exercise harder, hold for 30 seconds or lift 1 leg at a time. Bicycles:  Lie on your back. Bend both knees and bring them toward your chest. Your calves should be parallel to the floor. Place the palms of your hands on the back of your head. Straighten your right leg and keep it lifted 2 inches off the floor. Raise your head and shoulders off the floor and twist towards your left. Keep your head and shoulders lifted. Bend your right knee while you straighten your left leg. Keep your left leg 2 inches off the floor. Twist your head and chest towards the left leg. Continue to straighten 1 leg at a time and twist.       Follow up with your doctor or physical therapist as directed:  Write down your questions so you remember to ask them during your visits. © Copyright [employer] 2023 Information is for End User's use only and may not be sold, redistributed or otherwise used for commercial purposes. The above information is an  only.  It is not intended as medical advice for individual conditions or treatments. Talk to your doctor, nurse or pharmacist before following any medical regimen to see if it is safe and effective for you.

## 2023-10-16 NOTE — TELEPHONE ENCOUNTER
----- Message from Kay Christie sent at 10/13/2023  3:04 PM EDT -----  Regarding: Care Gap request  10/13/23 3:04 PM    Hello, our patient attached above has had CRC: Colonoscopy completed/performed. Please assist in updating the patient chart by pulling the Care Everywhere (CE) document. The date of service is 2019.      Thank you,  Dioni RUELAS [facility]

## 2023-10-16 NOTE — LETTER
Procedure Request Form: Colonoscopy      Date Requested: 10/16/23  Patient: Corrie Cashing  Patient : 1960   Referring Provider: Inocente Chandler MD        Date of Procedure ______________________________       The above patient has informed us that they have completed their   most recent Colonoscopy at your facility. Please complete   this form and attach all corresponding procedure reports/results. Comments __________________________________________________________  ____________________________________________________________________  ____________________________________________________________________  ____________________________________________________________________    Facility Completing Procedure _________________________________________    Form Completed By (print name) _______________________________________      Signature __________________________________________________________      These reports are needed for  compliance. Please fax this completed form and a copy of the procedure report to our office located at [address] [place] [address] as soon as possible to Fax [phone] bishop Guzman Faster: Phone [phone]    We thank you for your assistance in treating our mutual patient.

## 2023-10-16 NOTE — PROGRESS NOTES
Assessment  1. Lumbar radiculopathy  Comments:  ref pain mgt  Orders:  -     Ambulatory referral to Spine [facility] Pain Management    2. S/P laminectomy  Comments:  ref pain mgt  Orders:  -     Ambulatory referral to Spine & Pain Management    3. Chronic bilateral low back pain with bilateral sciatica  -     Ambulatory referral to Spine [facility] Pain Management    4. Chronic pain syndrome  Comments:  referral placed  Orders:  -     Ambulatory referral to Spine & Pain Management    Lumbar radicular pain in the bilateral L3 dermatomal distribution accompanied by pain limited weakness numbness and paresthesias. Patient has not yet participated with PT since L3-L4 laimectomy/decompression done in June 2023. Chronic pain with decreased participation with IADLs over the past 34 months. Has been taking OTC ibuprofen and tylenol in addition to gabapentin and Robaxin with modest benefit. Pain thought to be related to cancer,chemoradiation to bladder. 5/5 strength bilaterally, positive SLR left-sided. Reflexes 2+. Additionally there is positive facet loading, eliciting pain. Endorses gait instability. On imaging moderate to severe degenerative disc disease with spondyloarthropathy in facet joints most prominently seen at L5-S1. Risks, benefits alternatives epidural steroid injections thoroughly discussed with patient. Handouts provided questions answered to patient's satisfaction. Lifestyle modifications extensively discussed including diet, exercise and weight loss in addition to core strengthening. Will proceed with multimodal pain therapy plan as noted below:    Plan  -Caudal LEILANI; f/u 2 weeks post procedure  -gabapentin 2100 mg daily previously ordered for patient; counseled regarding sedative effects of taking this medication and provided up titration calendar.   Counseled not to take medication while driving or operating heavy machinery/using stairs; counseled with regard to exacerbated sedation when taking opioid medications  -script provided for formal physical therapy for lumbar radiculopathy; Physician directed home exercise plan as per [facility] demonstrated and handouts provided that patient plans to participate with for 1 hour, twice a week for the next 6 weeks. There are risks associated with opioid medications, including dependence, addiction and tolerance. The patient understands and agrees to use these medications only as prescribed. Potential side effects of the medications include, but are not limited to, constipation, drowsiness, addiction, impaired judgment and risk of fatal overdose if not taken as prescribed. The patient was warned against driving while taking sedation medications or operating heavy machinery. The patient voiced understanding. Sharing medications is a felony. At this point in time, the patient is showing no signs of addiction, abuse, diversion or suicidal ideation. [place] Prescription Drug Monitoring Program report was reviewed and was appropriate      Complete risks and benefits including bleeding, infection, tissue reaction, nerve injury and allergic reaction were discussed. The approach was demonstrated using models and literature was provided. Verbal and written consent was obtained. My impressions and treatment recommendations were discussed in detail with the patient who verbalized understanding and had no further questions. Discharge instructions were provided. I personally saw and examined the patient and I agree with the above discussed plan of care. No orders of the defined types were placed in this encounter. History of Present Illness    Lara Saenz is a 58 y.o. male with prior L4-L5 fusion, L3-L4 laminectomy in June 2023 presenting with chronic lumbar radicular pain in the bilateral L3 and L4 dermatomal distributions. Debilitating pain limited weakness numbness and paresthesias accompany the pain.  The patient rates the pain at a 8/10 accompanied by electric shock-like shooting features and crampy burning pain in the aforementioned dermatomal distributions. The pain is worse in the mornings as well as the end of the day; exertion such as walking for long periods of time seems to exacerbate the pain. The patient can hardly walk more than a few blocks without having debilitating pain. Utilizes a walker. He tries to maintain an active lifestyle and finds that the current degree of pain seems to compromises his efforts. The pain significantly impacts independent activities of daily living and contributes to significant disability. He has not yet attempted TP formally  He has taken naproxen, tylenol as well as gabapentin and robaxin in addition to opioid therapy with limited relief of the pain as well. He has never tried epidural steroid injections since surgery. He denies any bowel or bladder dysfunction/incontinence, saddle anesthesia but endorses gait instability. I have personally reviewed and/or updated the patient's past medical history, past surgical history, family history, social history, current medications, allergies, and vital signs today. Review of Systems   Constitutional:  Positive for activity change. HENT: Negative. Eyes: Negative. Respiratory: Negative. Cardiovascular: Negative. Gastrointestinal: Negative. Endocrine: Negative. Genitourinary: Negative. Musculoskeletal:  Positive for arthralgias, back pain, gait problem and myalgias. Skin: Negative. Allergic/Immunologic: Negative. Neurological:  Positive for weakness and numbness. Hematological: Negative. Psychiatric/Behavioral: Negative. All other systems reviewed and are negative.       Patient Active Problem List   Diagnosis    Benign prostatic hyperplasia with nocturia    Bladder mass    Obstructive sleep apnea on CPAP    Hypertension    Malignant neoplasm of lateral wall of urinary bladder (HCC)    History of gross hematuria    Anxiety Chronic bilateral low back pain with bilateral sciatica    Acute on chronic diastolic heart failure (HCC)    Opioid withdrawal (HCC)    RODO (acute kidney injury) ([address])    Intractable low back pain    Chronic, continuous use of opioids    Hyperglycemia    Bradycardia    Coronary artery disease    Electrolyte abnormality    Hyponatremia    Acute respiratory failure with hypoxia (HCC)    History of hematuria    Morbid (severe) obesity due to excess calories (HCC)    Suprapubic pressure    Right groin pain    Encounter for surgical aftercare following surgery of nervous system    Abdominal pain    Benign prostatic hyperplasia    Medicare annual wellness visit, subsequent    Acute low back pain with sciatica    S/P laminectomy    Lumbar radiculopathy    Chronic pain syndrome       Past Medical History:   Diagnosis Date    Arthritis     Bladder cancer ([address])     Cancer ([address]) 3/17/2023    Cervical disc disorder 1/2016    Chronic narcotic dependence ([facility])     Chronic pain disorder     lower back and down both legs    Colon polyp     Coronary artery disease     CPAP (continuous positive airway pressure) dependence     Does use hearing aid     will wear DOS    Full dentures     Heart failure ([address])     and "fluid retention" SL ABDULAZIZ [facility] PA"    High cholesterol     Hypertension     Low back pain 2003    Mild ankle edema     and feet    Obstructive sleep apnea on CPAP     Prediabetes     Shortness of breath     per pt "with just exertion"    Sleep apnea     Wears glasses        Past Surgical History:   Procedure Laterality Date    BACK SURGERY      titanium rods implanted    COLONOSCOPY      CYSTOSCOPY W/ URETERAL STENT PLACEMENT Bilateral 3/17/2023    Procedure: bilateral retrograde;  Surgeon: Dotty Weber MD;  Location: [facility];  Service: Urology    HERNIA REPAIR      x5    LUMBAR LAMINECTOMY N/A 6/30/2023    Procedure: Left L3-4 Metrx hemilaminectomy and bilateral foraminotomies;  Surgeon: Justa White MD;  [facility];  Service: Neurosurgery    CA CYSTO W/REMOVAL OF LESIONS SMALL N/A 2023    Procedure: CYSTOSCOPY TURBT;  Surgeon: Waldo Kathleen MD;  Location: [facility];  Service: Urology    TRANSURETHRAL RESECTION OF BLADDER TUMOR N/A 3/17/2023    Procedure: TRANSURETHRAL RESECTION OF BLADDER TUMOR (TURBT); Surgeon: Waldo Kathleen MD;  Location: [facility];  Service: Urology       Family History   Problem Relation Age of Onset    Cancer Father     Aortic aneurysm Father     Leukemia Father     Hypertension Mother        Social History     Occupational History    Not on file   Tobacco Use    Smoking status: Former     Packs/day: 1.00     Years: 35.00     Total pack years: 35.00     Types: Cigarettes     Start date: 2023     Quit date: 2016     Years since quittin.7    Smokeless tobacco: Never   Vaping Use    Vaping Use: Former   Substance and Sexual Activity    Alcohol use: Not Currently     Comment: 3 per year    Drug use: Not Currently     Types: Marijuana    Sexual activity: Not Currently       Current Outpatient Medications on File Prior to Visit   Medication Sig    amitriptyline (ELAVIL) 25 mg tablet Take 1 tablet (25 mg total) by mouth daily at bedtime    aspirin 81 mg chewable tablet Chew 81 mg daily    atorvastatin (LIPITOR) 40 mg tablet     celecoxib (CeleBREX) 200 mg capsule Take 1 capsule (200 mg total) by mouth daily as needed for mild pain    co-enzyme Q-10 30 MG capsule Take 100 mg by mouth daily     Farxiga 5 MG TABS 5 mg daily 5mg alternating with 2.5mg for fluid retention    gabapentin (NEURONTIN) 100 mg capsule 100 mg 3 (three) times a day Takes a total of 700 mg three times daily    methocarbamol (ROBAXIN) 500 mg tablet Take 1 tablet (500 mg total) by mouth every 6 (six) hours    metolazone (ZAROXOLYN) 2.5 mg tablet Take 2.5 mg by mouth daily Patient reports use of this medication daily, prescribed by cardiology.     multivitamin-iron-minerals-folic acid (CENTRUM) chewable tablet Chew 1 tablet daily    oxyCODONE (OxyCONTIN) 40 mg 12 hr tablet Take 1 tablet (40 mg total) by mouth 4 (four) times a day Max Daily Amount: 160 mg    oxyCODONE (ROXICODONE) 30 MG immediate release tablet Take 1 tablet (30 mg total) by mouth every 6 (six) hours as needed (15mg for moderate pain, 30mg for severe pain) Max 6 tabs/day. Max Daily Amount: 120 mg    polyethylene glycol (MIRALAX) 17 g packet Take 17 g by mouth daily Do not start before October 8, 2023. potassium chloride (K-DUR,KLOR-CON) 10 mEq tablet 20mg bid    predniSONE 10 mg tablet Take 6 tablets (60 mg total) by mouth daily for 2 days, THEN 5 tablets (50 mg total) daily for 3 days, THEN 4 tablets (40 mg total) daily for 3 days, THEN 3 tablets (30 mg total) daily for 3 days, THEN 2 tablets (20 mg total) daily for 3 days, THEN 2 tablets (20 mg total) daily for 3 days, THEN 1 tablet (10 mg total) daily for 3 days. Do not start before October 8, 2023. senna-docusate sodium (SENOKOT S) 8.6-50 mg per tablet Take 2 tablets by mouth daily at bedtime    tamsulosin (FLOMAX) 0.4 mg Take 1 capsule (0.4 mg total) by mouth daily with dinner    torsemide (DEMADEX) 20 mg tablet Take 2 tablets (40 mg total) by mouth 2 (two) times a day     No current facility-administered medications on file prior to visit. Allergies   Allergen Reactions    Mirtazapine Other (See Comments) and Confusion     Pt drove without knowing and woke up at a new destination     Physical Exam    /82 (BP Location: Left arm, Patient Position: Sitting, Cuff Size: Adult)   Pulse 67   Temp 97.6 °F (36.4 °C)   Ht 5' 8" (1.727 m)   Wt 110 kg (242 lb 4.8 oz)   SpO2 98%   BMI 36.84 kg/m²     Constitutional: normal, well developed, well nourished, alert, in no distress and non-toxic and no overt pain behavior.  and obese  Eyes: anicteric  HEENT: grossly intact  Neck: supple, symmetric, trachea midline and no masses   Pulmonary:even and unlabored  Cardiovascular:No edema or pitting edema present  Skin:Normal without rashes or lesions and well hydrated  Psychiatric:Mood and affect appropriate  Neurologic:Cranial Nerves II-XII grossly intact Sensation grossly intact; no clonus negative andersen's. Reflexes 2+ and brisk. SLR negative bilaterally. Musculoskeletal:normal gait. 5/5 strength bilaterally with AROM in all extremities. Normal heel toe and tip toe walking. Significant pain with lumbar facet loading bilaterally and with lateral spine rotation. ttp over lumbar paraspinal muscles. Negative ryan's test, negative gaenslen's negative SIJ loading bilaterally. Imaging    MRI LUMBAR SPINE WITHOUT AND WITH CONTRAST     INDICATION: cauda equina. Status post surgery. Worsening pain and right lower extremity weakness/paresthesia and urinary incontinence. Acute on chronic back pain. Worsening pain started 1 month ago. Difficulty ambulating. Surgery in June 2023. COMPARISON: 8/11/2023     TECHNIQUE: Multiplanar, multisequence imaging of the lumbar spine was performed. INTRAVENOUS CONTRAST: 10 mL IV gadobutrol     IMAGE QUALITY:  Diagnostic     FINDINGS:     Vertebral bodies are numbered according to the prior MRI. 1st conical shape vertebral segment is called S1. Iliolumbar ligaments at L5. 12 nonrib-bearing vertebral bodies. VERTEBRAL BODIES:  Alignment: Similar mild levoscoliosis and mild retrolisthesis at L3-L4. Normal lordosis. Vertebral body heights: Normal.     Bone marrow:  Similar posterior transpedicular screw and juan j fusion at L4-L5 with posterior decompression and left L3 laminectomy. No new suspicious marrow edema or mass. Mild fatty degenerative endplate changes in the lower thoracic spine and at L4-L5 and L5-S1. L1 hemangioma. SACRUM: Normal.     DISTAL CORD AND CONUS:  Conus and nerve roots are within normal limits. Conus terminates at L1. No new abnormal central canal enhancement.  Similar mild enhancing scar at the surgical levels without evidence of neural compression. PARASPINAL SOFT TISSUES:  Similar posterior paraspinal and visualized gluteus riley muscle fatty infiltration with fatty atrophy at the surgical levels. Similar 2.9 x 1.1 x 2.8 cm (length X depth X width) laminectomy bed fluid collection at L5, typical of postoperative seroma. No evidence of contiguity with the intradural CSF or dural defect. LOWER THORACIC DISC SPACES: Mild degenerative change. No stenosis. LUMBAR DISC SPACES: Similar diffuse desiccation and mild loss of height at L4-L5 greater than L5-S1. L1-L2: Similar mild facet arthropathy. Patent central canal and foramen. L2-L3: Similar minimal disc bulge small left foraminal disc protrusion and mild facet arthropathy, mild left foraminal narrowing. L3-L4: Similar disc bulge, facet hypertrophy, mild central canal and foraminal narrowing. L4-L5: Similar disc osteophyte, central posterior annular fissure and facet hypertrophy. Patent central canal. Mild left greater than right foraminal narrowing. L5-S1: Similar disc osteophyte asymmetric to the left, facet hypertrophy, mild left lateral recess and moderate left foraminal narrowing. IMPRESSION:     Similar appearance of the lumbar spine to most recent prior study from 8/11/2023. Degenerative and postoperative changes as described above. No new disc herniation, high-grade stenosis or evidence of conus or nerve root impingement.

## 2023-10-16 NOTE — DISCHARGE INSTR - AVS FIRST PAGE
YOUR 2 WEEK FOLLOW UP HAS BEEN SCHEDULED; IF YOU WISH TO CHANGE THE FOLLOW UP, PLEASE CALL THE [facility] AT [place]: [phone]    Epidural Steroid Injection   WHAT YOU NEED TO KNOW:   An epidural steroid injection (LEILANI) is a procedure to inject steroid medicine into the epidural space. The epidural space is between your spinal cord and vertebrae. Steroids reduce inflammation and fluid buildup in your spine that may be causing pain. You may be given pain medicine along with the steroids. DISCHARGE INSTRUCTIONS:   Call your local emergency number (911 in the [address]) if:   You have a seizure. You have trouble moving your legs. Seek care immediately if:   Blood soaks through your bandage. You have a fever or chills, severe back pain, and the procedure area is sensitive to the touch. You cannot control when you urinate or have a bowel movement. Call your doctor if:   You have weakness or numbness in your legs. Your wound is red, swollen, or draining pus. You have nausea or are vomiting. Your face or neck is red and you feel warm. You have more pain than you had before the procedure. You have swelling in your hands or feet. You have questions or concerns about your condition or care. Care for your wound as directed: You may remove the bandage before you go to bed the day of your procedure. You may take a shower, but do not take a bath for at least 24 hours. Self-care:   Do not drive,  use machines, or do strenuous activity for 24 hours after your procedure or as directed. Continue other treatments  as directed. Steroid injections alone will not control your pain. The injections are meant to be used with other treatments, such as physical therapy. Follow up with your doctor as directed:  Write down your questions so you remember to ask them during your visits.    © Copyright [employer]  Information is for End User's use only and may not be sold, redistributed or otherwise used for commercial purposes. The above information is an  only. It is not intended as medical advice for individual conditions or treatments. Talk to your doctor, nurse or pharmacist before following any medical regimen to see if it is safe and effective for you.

## 2023-10-16 NOTE — TELEPHONE ENCOUNTER
Upon review of the In Basket request and the patient's chart, initial outreach has been made via fax to facility. Please see Contacts section for details.      Thank you  Franky Nathan MA

## 2023-10-16 NOTE — H&P (VIEW-ONLY)
Assessment  1. Lumbar radiculopathy  Comments:  ref pain mgt  Orders:  -     Ambulatory referral to Spine [facility] Pain Management    2. S/P laminectomy  Comments:  ref pain mgt  Orders:  -     Ambulatory referral to Spine & Pain Management    3. Chronic bilateral low back pain with bilateral sciatica  -     Ambulatory referral to Spine & Pain Management    4. Chronic pain syndrome  Comments:  referral placed  Orders:  -     Ambulatory referral to Spine & Pain Management    Lumbar radicular pain in the bilateral L3 dermatomal distribution accompanied by pain limited weakness numbness and paresthesias. Patient has not yet participated with PT since L3-L4 laimectomy/decompression done in June 2023. Chronic pain with decreased participation with IADLs over the past 34 months. Has been taking OTC ibuprofen and tylenol in addition to gabapentin and Robaxin with modest benefit. Pain thought to be related to cancer,chemoradiation to bladder. 5/5 strength bilaterally, positive SLR left-sided. Reflexes 2+. Additionally there is positive facet loading, eliciting pain. Endorses gait instability. On imaging moderate to severe degenerative disc disease with spondyloarthropathy in facet joints most prominently seen at L5-S1. Risks, benefits alternatives epidural steroid injections thoroughly discussed with patient. Handouts provided questions answered to patient's satisfaction. Lifestyle modifications extensively discussed including diet, exercise and weight loss in addition to core strengthening. Will proceed with multimodal pain therapy plan as noted below:    Plan  -Caudal LEILANI; f/u 2 weeks post procedure  -gabapentin 2100 mg daily previously ordered for patient; counseled regarding sedative effects of taking this medication and provided up titration calendar.   Counseled not to take medication while driving or operating heavy machinery/using stairs; counseled with regard to exacerbated sedation when taking opioid medications  -script provided for formal physical therapy for lumbar radiculopathy; Physician directed home exercise plan as per [employer] demonstrated and handouts provided that patient plans to participate with for 1 hour, twice a week for the next 6 weeks. There are risks associated with opioid medications, including dependence, addiction and tolerance. The patient understands and agrees to use these medications only as prescribed. Potential side effects of the medications include, but are not limited to, constipation, drowsiness, addiction, impaired judgment and risk of fatal overdose if not taken as prescribed. The patient was warned against driving while taking sedation medications or operating heavy machinery. The patient voiced understanding. Sharing medications is a felony. At this point in time, the patient is showing no signs of addiction, abuse, diversion or suicidal ideation. [place] Prescription Drug Monitoring Program report was reviewed and was appropriate      Complete risks and benefits including bleeding, infection, tissue reaction, nerve injury and allergic reaction were discussed. The approach was demonstrated using models and literature was provided. Verbal and written consent was obtained. My impressions and treatment recommendations were discussed in detail with the patient who verbalized understanding and had no further questions. Discharge instructions were provided. I personally saw and examined the patient and I agree with the above discussed plan of care. No orders of the defined types were placed in this encounter. History of Present Illness    Arlene Crowley is a 58 y.o. male with prior L4-L5 fusion, L3-L4 laminectomy in June 2023 presenting with chronic lumbar radicular pain in the bilateral L3 and L4 dermatomal distributions. Debilitating pain limited weakness numbness and paresthesias accompany the pain.  The patient rates the pain at a 8/10 accompanied by electric shock-like shooting features and crampy burning pain in the aforementioned dermatomal distributions. The pain is worse in the mornings as well as the end of the day; exertion such as walking for long periods of time seems to exacerbate the pain. The patient can hardly walk more than a few blocks without having debilitating pain. Utilizes a walker. He tries to maintain an active lifestyle and finds that the current degree of pain seems to compromises his efforts. The pain significantly impacts independent activities of daily living and contributes to significant disability. He has not yet attempted TP formally  He has taken naproxen, tylenol as well as gabapentin and robaxin in addition to opioid therapy with limited relief of the pain as well. He has never tried epidural steroid injections since surgery. He denies any bowel or bladder dysfunction/incontinence, saddle anesthesia but endorses gait instability. I have personally reviewed and/or updated the patient's past medical history, past surgical history, family history, social history, current medications, allergies, and vital signs today. Review of Systems   Constitutional:  Positive for activity change. HENT: Negative. Eyes: Negative. Respiratory: Negative. Cardiovascular: Negative. Gastrointestinal: Negative. Endocrine: Negative. Genitourinary: Negative. Musculoskeletal:  Positive for arthralgias, back pain, gait problem and myalgias. Skin: Negative. Allergic/Immunologic: Negative. Neurological:  Positive for weakness and numbness. Hematological: Negative. Psychiatric/Behavioral: Negative. All other systems reviewed and are negative.       Patient Active Problem List   Diagnosis    Benign prostatic hyperplasia with nocturia    Bladder mass    Obstructive sleep apnea on CPAP    Hypertension    Malignant neoplasm of lateral wall of urinary bladder (HCC)    History of gross hematuria    Anxiety Chronic bilateral low back pain with bilateral sciatica    Acute on chronic diastolic heart failure (HCC)    Opioid withdrawal (HCC)    RODO (acute kidney injury) ([address])    Intractable low back pain    Chronic, continuous use of opioids    Hyperglycemia    Bradycardia    Coronary artery disease    Electrolyte abnormality    Hyponatremia    Acute respiratory failure with hypoxia (HCC)    History of hematuria    Morbid (severe) obesity due to excess calories (HCC)    Suprapubic pressure    Right groin pain    Encounter for surgical aftercare following surgery of nervous system    Abdominal pain    Benign prostatic hyperplasia    Medicare annual wellness visit, subsequent    Acute low back pain with sciatica    S/P laminectomy    Lumbar radiculopathy    Chronic pain syndrome       Past Medical History:   Diagnosis Date    Arthritis     Bladder cancer ([address])     Cancer ([address]) 3/17/2023    Cervical disc disorder 1/2016    Chronic narcotic dependence (HCC)     Chronic pain disorder     lower back and down both legs    Colon polyp     Coronary artery disease     CPAP (continuous positive airway pressure) dependence     Does use hearing aid     will wear DOS    Full dentures     Heart failure ([address])     and "fluid retention" [facility]"    High cholesterol     Hypertension     Low back pain 2003    Mild ankle edema     and feet    Obstructive sleep apnea on CPAP     Prediabetes     Shortness of breath     per pt "with just exertion"    Sleep apnea     Wears glasses        Past Surgical History:   Procedure Laterality Date    BACK SURGERY      titanium rods implanted    COLONOSCOPY      CYSTOSCOPY W/ URETERAL STENT PLACEMENT Bilateral 3/17/2023    Procedure: bilateral retrograde;  Surgeon: William Wang MD;  Location: [facility];  Service: Urology    HERNIA REPAIR      x5    LUMBAR LAMINECTOMY N/A 6/30/2023    Procedure: Left L3-4 Metrx hemilaminectomy and bilateral foraminotomies;  Surgeon: Chavo Perry MD;  [facility];  Service: Neurosurgery    MI CYSTO W/REMOVAL OF LESIONS SMALL N/A 2023    Procedure: CYSTOSCOPY TURBT;  Surgeon: Emil Diaz MD;  Location[facility];  Service: Urology    TRANSURETHRAL RESECTION OF BLADDER TUMOR N/A 3/17/2023    Procedure: TRANSURETHRAL RESECTION OF BLADDER TUMOR (TURBT); Surgeon: Emil Diaz MD;  Location: [facility];  Service: Urology       Family History   Problem Relation Age of Onset    Cancer Father     Aortic aneurysm Father     Leukemia Father     Hypertension Mother        Social History     Occupational History    Not on file   Tobacco Use    Smoking status: Former     Packs/day: 1.00     Years: 35.00     Total pack years: 35.00     Types: Cigarettes     Start date: 2023     Quit date: 2016     Years since quittin.7    Smokeless tobacco: Never   Vaping Use    Vaping Use: Former   Substance and Sexual Activity    Alcohol use: Not Currently     Comment: 3 per year    Drug use: Not Currently     Types: Marijuana    Sexual activity: Not Currently       Current Outpatient Medications on File Prior to Visit   Medication Sig    amitriptyline (ELAVIL) 25 mg tablet Take 1 tablet (25 mg total) by mouth daily at bedtime    aspirin 81 mg chewable tablet Chew 81 mg daily    atorvastatin (LIPITOR) 40 mg tablet     celecoxib (CeleBREX) 200 mg capsule Take 1 capsule (200 mg total) by mouth daily as needed for mild pain    co-enzyme Q-10 30 MG capsule Take 100 mg by mouth daily     Farxiga 5 MG TABS 5 mg daily 5mg alternating with 2.5mg for fluid retention    gabapentin (NEURONTIN) 100 mg capsule 100 mg 3 (three) times a day Takes a total of 700 mg three times daily    methocarbamol (ROBAXIN) 500 mg tablet Take 1 tablet (500 mg total) by mouth every 6 (six) hours    metolazone (ZAROXOLYN) 2.5 mg tablet Take 2.5 mg by mouth daily Patient reports use of this medication daily, prescribed by cardiology.     multivitamin-iron-minerals-folic acid (CENTRUM) chewable tablet Chew 1 tablet daily    oxyCODONE (OxyCONTIN) 40 mg 12 hr tablet Take 1 tablet (40 mg total) by mouth 4 (four) times a day Max Daily Amount: 160 mg    oxyCODONE (ROXICODONE) 30 MG immediate release tablet Take 1 tablet (30 mg total) by mouth every 6 (six) hours as needed (15mg for moderate pain, 30mg for severe pain) Max 6 tabs/day. Max Daily Amount: 120 mg    polyethylene glycol (MIRALAX) 17 g packet Take 17 g by mouth daily Do not start before October 8, 2023. potassium chloride (K-DUR,KLOR-CON) 10 mEq tablet 20mg bid    predniSONE 10 mg tablet Take 6 tablets (60 mg total) by mouth daily for 2 days, THEN 5 tablets (50 mg total) daily for 3 days, THEN 4 tablets (40 mg total) daily for 3 days, THEN 3 tablets (30 mg total) daily for 3 days, THEN 2 tablets (20 mg total) daily for 3 days, THEN 2 tablets (20 mg total) daily for 3 days, THEN 1 tablet (10 mg total) daily for 3 days. Do not start before October 8, 2023. senna-docusate sodium (SENOKOT S) 8.6-50 mg per tablet Take 2 tablets by mouth daily at bedtime    tamsulosin (FLOMAX) 0.4 mg Take 1 capsule (0.4 mg total) by mouth daily with dinner    torsemide (DEMADEX) 20 mg tablet Take 2 tablets (40 mg total) by mouth 2 (two) times a day     No current facility-administered medications on file prior to visit. Allergies   Allergen Reactions    Mirtazapine Other (See Comments) and Confusion     Pt drove without knowing and woke up at a new destination     Physical Exam    /82 (BP Location: Left arm, Patient Position: Sitting, Cuff Size: Adult)   Pulse 67   Temp 97.6 °F (36.4 °C)   Ht 5' 8" (1.727 m)   Wt 110 kg (242 lb 4.8 oz)   SpO2 98%   BMI 36.84 kg/m²     Constitutional: normal, well developed, well nourished, alert, in no distress and non-toxic and no overt pain behavior.  and obese  Eyes: anicteric  HEENT: grossly intact  Neck: supple, symmetric, trachea midline and no masses   Pulmonary:even and unlabored  Cardiovascular:No edema or pitting edema present  Skin:Normal without rashes or lesions and well hydrated  Psychiatric:Mood and affect appropriate  Neurologic:Cranial Nerves II-XII grossly intact Sensation grossly intact; no clonus negative andersen's. Reflexes 2+ and brisk. SLR negative bilaterally. Musculoskeletal:normal gait. 5/5 strength bilaterally with AROM in all extremities. Normal heel toe and tip toe walking. Significant pain with lumbar facet loading bilaterally and with lateral spine rotation. ttp over lumbar paraspinal muscles. Negative ryan's test, negative gaenslen's negative SIJ loading bilaterally. Imaging    MRI LUMBAR SPINE WITHOUT AND WITH CONTRAST     INDICATION: cauda equina. Status post surgery. Worsening pain and right lower extremity weakness/paresthesia and urinary incontinence. Acute on chronic back pain. Worsening pain started 1 month ago. Difficulty ambulating. Surgery in June 2023. COMPARISON: 8/11/2023     TECHNIQUE: Multiplanar, multisequence imaging of the lumbar spine was performed. INTRAVENOUS CONTRAST: 10 mL IV gadobutrol     IMAGE QUALITY:  Diagnostic     FINDINGS:     Vertebral bodies are numbered according to the prior MRI. 1st conical shape vertebral segment is called S1. Iliolumbar ligaments at L5. 12 nonrib-bearing vertebral bodies. VERTEBRAL BODIES:  Alignment: Similar mild levoscoliosis and mild retrolisthesis at L3-L4. Normal lordosis. Vertebral body heights: Normal.     Bone marrow:  Similar posterior transpedicular screw and juan j fusion at L4-L5 with posterior decompression and left L3 laminectomy. No new suspicious marrow edema or mass. Mild fatty degenerative endplate changes in the lower thoracic spine and at L4-L5 and L5-S1. L1 hemangioma. SACRUM: Normal.     DISTAL CORD AND CONUS:  Conus and nerve roots are within normal limits. Conus terminates at L1. No new abnormal central canal enhancement.  Similar mild enhancing scar at the surgical levels without evidence of neural compression. PARASPINAL SOFT TISSUES:  Similar posterior paraspinal and visualized gluteus riley muscle fatty infiltration with fatty atrophy at the surgical levels. Similar 2.9 x 1.1 x 2.8 cm (length X depth X width) laminectomy bed fluid collection at L5, typical of postoperative seroma. No evidence of contiguity with the intradural CSF or dural defect. LOWER THORACIC DISC SPACES: Mild degenerative change. No stenosis. LUMBAR DISC SPACES: Similar diffuse desiccation and mild loss of height at L4-L5 greater than L5-S1. L1-L2: Similar mild facet arthropathy. Patent central canal and foramen. L2-L3: Similar minimal disc bulge small left foraminal disc protrusion and mild facet arthropathy, mild left foraminal narrowing. L3-L4: Similar disc bulge, facet hypertrophy, mild central canal and foraminal narrowing. L4-L5: Similar disc osteophyte, central posterior annular fissure and facet hypertrophy. Patent central canal. Mild left greater than right foraminal narrowing. L5-S1: Similar disc osteophyte asymmetric to the left, facet hypertrophy, mild left lateral recess and moderate left foraminal narrowing. IMPRESSION:     Similar appearance of the lumbar spine to most recent prior study from 8/11/2023. Degenerative and postoperative changes as described above. No new disc herniation, high-grade stenosis or evidence of conus or nerve root impingement.

## 2023-10-17 ENCOUNTER — HOSPITAL ENCOUNTER (OUTPATIENT)
Facility: HOSPITAL | Age: 63
Setting detail: OUTPATIENT SURGERY
Discharge: HOME | End: 2023-10-17
Attending: ANESTHESIOLOGY | Admitting: ANESTHESIOLOGY
Payer: MEDICARE

## 2023-10-17 ENCOUNTER — APPOINTMENT (OUTPATIENT)
Dept: RADIOLOGY | Facility: HOSPITAL | Age: 63
End: 2023-10-17
Payer: MEDICARE

## 2023-10-17 VITALS
HEART RATE: 66 BPM | WEIGHT: 242 LBS | BODY MASS INDEX: 36.68 KG/M2 | DIASTOLIC BLOOD PRESSURE: 65 MMHG | SYSTOLIC BLOOD PRESSURE: 146 MMHG | HEIGHT: 68 IN | RESPIRATION RATE: 18 BRPM | TEMPERATURE: 98.6 F | OXYGEN SATURATION: 95 %

## 2023-10-17 RX ORDER — LIDOCAINE HYDROCHLORIDE 10 MG/ML
INJECTION, SOLUTION EPIDURAL; INFILTRATION; INTRACAUDAL; PERINEURAL AS NEEDED
Status: DISCONTINUED | OUTPATIENT
Start: 2023-10-17 | End: 2023-10-17 | Stop reason: HOSPADM

## 2023-10-17 RX ORDER — SODIUM CHLORIDE 9 MG/ML
INJECTION INTRAVENOUS AS NEEDED
Status: DISCONTINUED | OUTPATIENT
Start: 2023-10-17 | End: 2023-10-17 | Stop reason: HOSPADM

## 2023-10-17 RX ORDER — METHYLPREDNISOLONE ACETATE 80 MG/ML
INJECTION, SUSPENSION INTRA-ARTICULAR; INTRALESIONAL; INTRAMUSCULAR; SOFT TISSUE AS NEEDED
Status: DISCONTINUED | OUTPATIENT
Start: 2023-10-17 | End: 2023-10-17 | Stop reason: HOSPADM

## 2023-10-17 NOTE — OP NOTE
OPERATIVE REPORT  PATIENT NAME: Michele Barron    :  1960  MRN: [id number]  Pt Location:  GI ROOM 01    SURGERY DATE: 10/17/2023    Surgeon(s) and Role: Lenore Coreas MD - Primary    Preop Diagnosis:  Lumbar radiculopathy [M54.16]  S/P laminectomy [Z98.890]  Chronic bilateral low back pain with bilateral sciatica [M54.42, M54.41, G89.29]    Post-Op Diagnosis Codes:     * Lumbar radiculopathy [M54.16]     * S/P laminectomy [Z98.890]     * Chronic bilateral low back pain with bilateral sciatica [M54.42, M54.41, G89.29]    Procedure(s):  BLOCK / INJECTION CAUDAL    Specimen(s):  * No specimens in log *    Estimated Blood Loss:   Minimal    Drains:  * No LDAs found *    Anesthesia Type:   Local    Operative Indications:  Lumbar radiculopathy [M54.16]  S/P laminectomy [Z98.890]  Chronic bilateral low back pain with bilateral sciatica [M54.42, M54.41, G89.29]    Operative Findings:  Caudal Epidurogram    Complications:   None    Procedure and Technique:  Please see detailed procedure note    I was present for the entire procedure.     Patient Disposition:  PACU     SIGNATURE: Lenore Coreas MD  DATE: 2023  TIME: 8:59 AM

## 2023-10-17 NOTE — INTERVAL H&P NOTE
H&P reviewed. After examining the patient I find no changes in the patients condition since the H&P had been written.     Vitals:    10/17/23 0807   BP: 139/60   Pulse: 67   Resp: 20   Temp: 99.6 °F (37.6 °C)   SpO2: 93%

## 2023-10-17 NOTE — PROCEDURES
Pre-procedure Diagnosis:       Lumbar Radiculopathy  Post-procedure Diagnosis:     Lumbar Radiculopathy  Procedure Title(s):                  1. Caudal epidural steroid injection                                                  2. Intraoperative fluoroscopy  Attending Surgeon:                 Kelvin Cabrera MD  Anesthesia:                             Local      Indications: The patient is a 58 y.o. male with a diagnosis of Lumbar Radiculopathy. The patient's history and physical exam were reviewed. The risks, benefits and alternatives to the procedure were discussed, and all questions were answered to the patient's satisfaction. The patient agreed to proceed, and written informed consent was obtained. Procedure in Detail: The patient was brought into the procedure room and placed in the prone position on the fluoroscopy table. The area of the lumbar spine was prepped with chlorhexidine gluconate solution times one and draped in a sterile manner. The sacral hiatus was identified and marked under AP fluoroscopy. The skin and subcutaneous tissues in the area were anesthetized with 1% lidocaine. A 22-gauge 3.5 inch spinal needle was directed toward the sacrococcygeal interspace under fluoroscopic guidance until it was penetrated. Needle passed to S4 level;  2 ml Omnipaque 240 contrast solution was injected. An appropriate epidurogram was noted. Then, after negative aspiration, a solution consisting of 1-mL depo-medrol (80mg/mL) and 9-mL preservative-free saline was easily injected. The needle was removed with a 1% lidocaine flush. The patient's back was cleaned and a bandage was placed over the site of needle insertion. Disposition: The patient tolerated the procedure well, and there were no apparent complications. The patient was taken to the recovery area where written discharge instructions for the procedure were given.       Estimated Blood Loss: None  Specimens Obtained: N/A

## 2023-10-18 DIAGNOSIS — M54.41 CHRONIC BILATERAL LOW BACK PAIN WITH BILATERAL SCIATICA: ICD-10-CM

## 2023-10-18 DIAGNOSIS — M54.42 CHRONIC BILATERAL LOW BACK PAIN WITH BILATERAL SCIATICA: ICD-10-CM

## 2023-10-18 DIAGNOSIS — G89.29 CHRONIC BILATERAL LOW BACK PAIN WITH BILATERAL SCIATICA: ICD-10-CM

## 2023-10-18 DIAGNOSIS — C67.3 MALIGNANT NEOPLASM OF ANTERIOR WALL OF URINARY BLADDER (HCC): ICD-10-CM

## 2023-10-18 DIAGNOSIS — G89.3 CANCER-RELATED PAIN: ICD-10-CM

## 2023-10-18 RX ORDER — OXYCODONE HYDROCHLORIDE 30 MG/1
30 TABLET ORAL EVERY 4 HOURS PRN
Qty: 120 TABLET | Refills: 0 | Status: SHIPPED | OUTPATIENT
Start: 2023-10-18 | End: 2023-10-30

## 2023-10-19 ENCOUNTER — TELEPHONE (OUTPATIENT)
Dept: FAMILY MEDICINE CLINIC | Facility: CLINIC | Age: 63
End: 2023-10-19

## 2023-10-19 NOTE — TELEPHONE ENCOUNTER
[facility] regarding pts oxycodone. Stated anything over 4 pills a day requires a prior Auth. That they cannot fill. Asking if we can do prior Auth on med?

## 2023-10-19 NOTE — TELEPHONE ENCOUNTER
Upon review of the In Basket request we were able to locate, review, and update the patient chart as requested for CRC: Colonoscopy. Any additional questions or concerns should be emailed to the Practice Liaisons via the appropriate education email address, please do not reply via In Basket.     Thank you  Tennille Lopes MA

## 2023-10-20 ENCOUNTER — PATIENT MESSAGE (OUTPATIENT)
Dept: FAMILY MEDICINE CLINIC | Facility: CLINIC | Age: 63
End: 2023-10-20

## 2023-10-20 ENCOUNTER — TELEPHONE (OUTPATIENT)
Dept: UROLOGY | Facility: CLINIC | Age: 63
End: 2023-10-20

## 2023-10-20 DIAGNOSIS — R39.89 SUSPECTED UTI: ICD-10-CM

## 2023-10-20 DIAGNOSIS — Z01.818 ENCOUNTER FOR PREADMISSION TESTING: ICD-10-CM

## 2023-10-20 DIAGNOSIS — F51.01 PRIMARY INSOMNIA: Primary | ICD-10-CM

## 2023-10-20 NOTE — TELEPHONE ENCOUNTER
Received message from SOC/PAT, pt has the flu. Called pt and r/s to 11/6. Ordered new ucx to be done next week.  Provider to be notified

## 2023-10-20 NOTE — PATIENT COMMUNICATION
I spoke with whitney  Will consider pain pump through pain mgt. Sleep is a big concern. Will double the dose of amitriptyline.   No SI

## 2023-10-23 ENCOUNTER — RA CDI HCC (OUTPATIENT)
Dept: OTHER | Facility: HOSPITAL | Age: 63
End: 2023-10-23

## 2023-10-23 DIAGNOSIS — C67.3 MALIGNANT NEOPLASM OF ANTERIOR WALL OF URINARY BLADDER (HCC): ICD-10-CM

## 2023-10-23 DIAGNOSIS — M54.42 CHRONIC BILATERAL LOW BACK PAIN WITH BILATERAL SCIATICA: ICD-10-CM

## 2023-10-23 DIAGNOSIS — M54.41 CHRONIC BILATERAL LOW BACK PAIN WITH BILATERAL SCIATICA: ICD-10-CM

## 2023-10-23 DIAGNOSIS — G89.29 CHRONIC BILATERAL LOW BACK PAIN WITH BILATERAL SCIATICA: ICD-10-CM

## 2023-10-23 RX ORDER — OXYCODONE HCL 40 MG/1
40 TABLET, FILM COATED, EXTENDED RELEASE ORAL EVERY 8 HOURS SCHEDULED
Qty: 90 TABLET | Refills: 0 | Status: SHIPPED | OUTPATIENT
Start: 2023-10-23

## 2023-10-23 NOTE — PROGRESS NOTES
[address] [facility] coding opportunities          Chart Reviewed number of suggestions sent to Provider: 1     Patients Insurance     Medicare Insurance: Medicare        [id number]

## 2023-10-24 ENCOUNTER — TELEPHONE (OUTPATIENT)
Dept: PAIN MEDICINE | Facility: CLINIC | Age: 63
End: 2023-10-24

## 2023-10-24 ENCOUNTER — OFFICE VISIT (OUTPATIENT)
Age: 63
End: 2023-10-24
Payer: MEDICARE

## 2023-10-24 VITALS
SYSTOLIC BLOOD PRESSURE: 158 MMHG | DIASTOLIC BLOOD PRESSURE: 88 MMHG | BODY MASS INDEX: 36.16 KG/M2 | WEIGHT: 238.6 LBS | OXYGEN SATURATION: 98 % | HEIGHT: 68 IN | TEMPERATURE: 96.3 F | HEART RATE: 50 BPM

## 2023-10-24 DIAGNOSIS — M54.41 CHRONIC BILATERAL LOW BACK PAIN WITH BILATERAL SCIATICA: ICD-10-CM

## 2023-10-24 DIAGNOSIS — G89.3 CANCER-RELATED PAIN: ICD-10-CM

## 2023-10-24 DIAGNOSIS — G89.29 CHRONIC BILATERAL LOW BACK PAIN WITH BILATERAL SCIATICA: ICD-10-CM

## 2023-10-24 DIAGNOSIS — C67.2 MALIGNANT NEOPLASM OF LATERAL WALL OF URINARY BLADDER (HCC): ICD-10-CM

## 2023-10-24 DIAGNOSIS — Z51.5 PALLIATIVE CARE BY SPECIALIST: ICD-10-CM

## 2023-10-24 DIAGNOSIS — M54.42 CHRONIC BILATERAL LOW BACK PAIN WITH BILATERAL SCIATICA: ICD-10-CM

## 2023-10-24 DIAGNOSIS — I50.32 CHRONIC DIASTOLIC HEART FAILURE (HCC): ICD-10-CM

## 2023-10-24 DIAGNOSIS — I50.33 ACUTE ON CHRONIC DIASTOLIC HEART FAILURE (HCC): ICD-10-CM

## 2023-10-24 DIAGNOSIS — C67.3 MALIGNANT NEOPLASM OF ANTERIOR WALL OF URINARY BLADDER (HCC): Primary | ICD-10-CM

## 2023-10-24 DIAGNOSIS — R53.81 PHYSICAL DECONDITIONING: ICD-10-CM

## 2023-10-24 PROCEDURE — 99214 OFFICE O/P EST MOD 30 MIN: CPT | Performed by: PHYSICIAN ASSISTANT

## 2023-10-24 NOTE — TELEPHONE ENCOUNTER
Pt reports no improvement post inj   Pain level 8/10  He said he is having a very difficult time being mobile

## 2023-10-24 NOTE — PROGRESS NOTES
Follow-up with Palliative and Supportive Care  Geraldo Velazquez 58 y.o. male [id number]    ASSESSMENT & PLAN:  1. 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 Acute on chronic diastolic heart failure ([address])    3. Chronic bilateral low back pain with bilateral sciatica    4. Malignant neoplasm of lateral wall of urinary bladder ([address])    5. Palliative care by specialist    6. Chronic diastolic heart failure (HCC)    7. Cancer-related pain    8. Physical deconditioning      Pain - poorly controlled  Previous escalation of his opioid regimen have not shown to have appreciable benefits to the patient, potentially due to tolerance to Oxycodone  Could consider rotation to another opioid, such as dilaudid   Dilaudid 8mg q4h PRN (adjusted for incomplete cross-tolerance by 25% of equianalgesic dosing)  Reviewed concept of Total pain with patient and strongly recommended seeking out Palliative SW support, online chronic pain support groups, and integrative medicine  approach. Discussed local acupuncture, chiropractor and massage therapy practices  Diet and exercise, lifestyle modifications  Physical deconditioning - patient to start PT soon  Return in about 4 weeks (around 11/21/2023) for Next scheduled follow up. Emotional support provided. Medication safety issues addressed - no driving under the influence of narcotics (including opioids), watch for adverse effects including AMS or respiratory depression (slowed breathing), keep medications stored in a safe/locked environment, do not use alcohol while opioids or other narcotics are in one's system. Reviewed recent notes, labs, imaging. Requested Prescriptions      No prescriptions requested or ordered in this encounter       There are no discontinued medications. Geraldo Velazquez was seen today for symptoms and planning cares related to above illnesses.   I have reviewed the patient's controlled substance dispensing history in the Prescription Drug Monitoring Program in compliance with the [facility] regulations before prescribing any controlled substances. They are invited to continue to follow with us. If there are questions or concerns, please contact us through our clinic/answering service 24 hours a day, seven days a week. 60 minutes were spent in this ambulatory visit with greater than 50% of the time spent face to face with patient in counseling or coordination of care including symptom assessment and management, medication review, psychosocial support, chart review, imaging review, lab review, supportive listening, and anticipatory guidance. All of the patient's questions were answered during this discussion. Visit Information    Accompanied By: No one    Source of History: Self    History Limitations: None    Contacts: Extended Emergency Contact Information  Primary Emergency Contact: [address] [facility]  Mobile Phone: [phone]  Relation: Spouse  Secondary Emergency Contact: [address] [facility]  Mobile Phone: [phone]  Relation: Son     SUBJECTIVE:  Chief Complaint   Patient presents with    Follow-up    Pain    Counseling    Weakness - Generalized        HPI  Corazon Amado is a 58 y.o. male with hx of high-grade papillary urothelial carcinoma and chronic low back pain presents for follow up for symptom management. Follows with Dr. Alli Carolina (urology), Dr. Robert Jones (PCP), and Dr. Magali Lewis (Pain Mgmt)    The patient reported not feeling well today. He mentioned that the past two weeks have been particularly challenging for him, both in terms of pain and emotionally. Additionally, he is in the process of recovering from the flu. Since our previous visit, the patient sought help from pain management, who conducted an epidural steroid injection. However, the patient found this procedure to be very painful and did not experience any significant improvement in his pain level.  He was also referred to physical therapy, but he has not started these sessions yet. In the upcoming weeks, the patient has follow-up appointments with urology for cystoscopy and biopsy, potentially including a TURBT. His primary care physician is currently managing his pain medications. The patient requested a reduction in the frequency of taking his long-acting medication from four times a day to three times a day, as he has not noticed a substantial difference with the additional dose. During our last visit, we discussed the possibility of therapy through our social work team. The patient mentioned that he has been contacted by them, but he has not been able to return their call due to recent health issues. He also expressed concerns about a decrease in his stamina and a slight weight loss in the past few weeks, despite no changes in his diet and reduced physical activity. The patient feels somewhat lost and uncertain about what other options are available to him for managing his health. We spent some time discussing the concept of total pain and the importance of addressing not only physical pain but also spiritual, emotional, and social aspects of his life. The patient has expressed motivation to follow up with the social work team for emotional support. We also explored various online resources, such as chronic pain support networks that may be beneficial to the patient. In addition, we discussed the potential benefits of integrative medicine, including chiropractic care, acupuncture, and massage therapy as complementary approaches to his treatment, which the patient is interested in pursuing. Looking ahead, the patient is eagerly anticipating a visit from his sister this week. He is also looking forward to his follow-ups with pain management and urology to further investigate his health issues. PDMP shows no concerns.   Filled  Written  ID  Drug  QTY  Days  Prescriber  RX #  Dispenser  Refill  Daily Dose*  Pymt Type      10/19/2023 10/18/2023 1 Oxycodone Hcl (Ir) 30 Mg Tab 120.00 20 Ro Bud [id number] Tho (9520) 0 270.00 MME Comm [place]   09/27/2023 09/27/2023 1 Oxycontin Er 40 Mg Tablet 90.00 30 Ro Bud [id number] Tho (0343) 0 180.00 [place]ME Comm [place]     The following portions of the medical history were reviewed: past medical history, surgical history, problem list, medication list, family history, and social history. Current Outpatient Medications:     amitriptyline (ELAVIL) 25 mg tablet, Take 1 tablet (25 mg total) by mouth daily at bedtime, Disp: 30 tablet, Rfl: 3    aspirin 81 mg chewable tablet, Chew 81 mg daily, Disp: , Rfl:     atorvastatin (LIPITOR) 40 mg tablet, , Disp: , Rfl:     celecoxib (CeleBREX) 200 mg capsule, Take 1 capsule (200 mg total) by mouth daily as needed for mild pain, Disp: 30 capsule, Rfl: 0    co-enzyme Q-10 30 MG capsule, Take 100 mg by mouth daily , Disp: , Rfl:     Farxiga 5 MG TABS, 5 mg daily 5mg alternating with 2.5mg for fluid retention, Disp: , Rfl:     gabapentin (NEURONTIN) 100 mg capsule, 100 mg 3 (three) times a day Takes a total of 700 mg three times daily, Disp: , Rfl:     methocarbamol (ROBAXIN) 500 mg tablet, Take 1 tablet (500 mg total) by mouth every 6 (six) hours, Disp: 120 tablet, Rfl: 0    metolazone (ZAROXOLYN) 2.5 mg tablet, Take 2.5 mg by mouth daily Patient reports use of this medication daily, prescribed by cardiology. , Disp: , Rfl:     multivitamin-iron-minerals-folic acid (CENTRUM) chewable tablet, Chew 1 tablet daily, Disp: , Rfl:     oxyCODONE (OxyCONTIN) 40 mg 12 hr tablet, Take 1 tablet (40 mg total) by mouth every 8 (eight) hours Max Daily Amount: 120 mg, Disp: 90 tablet, Rfl: 0    oxyCODONE (ROXICODONE) 30 MG immediate release tablet, Take 1 tablet (30 mg total) by mouth every 4 (four) hours as needed (15mg for moderate pain, 30mg for severe pain) Max 6 tabs/day.  Max Daily Amount: 180 mg, Disp: 120 tablet, Rfl: 0    potassium chloride (K-DUR,KLOR-CON) 10 mEq tablet, 20mg bid, Disp: 90 tablet, Rfl: 3 senna-docusate sodium (SENOKOT S) 8.6-50 mg per tablet, Take 2 tablets by mouth daily at bedtime, Disp: 60 tablet, Rfl: 0    tamsulosin (FLOMAX) 0.4 mg, Take 1 capsule (0.4 mg total) by mouth daily with dinner, Disp: 90 capsule, Rfl: 3    torsemide (DEMADEX) 20 mg tablet, Take 2 tablets (40 mg total) by mouth 2 (two) times a day, Disp: , Rfl: 0    Past medical, surgical, social, and family histories are reviewed and pertinent updates are made. Review of Systems   Constitutional:  Positive for fatigue. Negative for appetite change. HENT: Negative. Eyes: Negative. Respiratory:  Negative for shortness of breath. Cardiovascular:  Negative for chest pain. Gastrointestinal:  Negative for abdominal distention, abdominal pain, constipation, diarrhea, nausea and vomiting. Genitourinary: Negative. Musculoskeletal:  Positive for back pain and myalgias. Negative for arthralgias and gait problem. Skin:  Negative for color change and wound. Neurological:  Positive for weakness. Negative for dizziness, light-headedness and headaches. Psychiatric/Behavioral:  Negative for confusion, dysphoric mood and sleep disturbance. The patient is not nervous/anxious. OBJECTIVE:  /88 (BP Location: Left arm, Patient Position: Sitting, Cuff Size: Adult)   Pulse (!) 50   Temp (!) 96.3 °F (35.7 °C)   Ht 5' 8" (1.727 m)   Wt 108 kg (238 lb 9.6 oz)   SpO2 98%   BMI 36.28 kg/m²   Physical Exam  Nursing note reviewed. Constitutional:       General: He is not in acute distress. Appearance: He is overweight. Comments: walker   HENT:      Head: Normocephalic and atraumatic. Right Ear: External ear normal.      Left Ear: External ear normal.      Nose: Nose normal.      Mouth/Throat:      Pharynx: Oropharynx is clear. Eyes:      Extraocular Movements: Extraocular movements intact. Cardiovascular:      Rate and Rhythm: Normal rate.    Pulmonary:      Effort: Pulmonary effort is normal. Abdominal:      General: Abdomen is flat. Musculoskeletal:         General: No deformity. Skin:     Findings: No rash. Neurological:      Mental Status: He is alert and oriented to person, place, and time. Psychiatric:         Mood and Affect: Mood normal.         Behavior: Behavior normal.          Snehal Amaya PA-C  [facility]  [phone]    Portions of this document may have been created using dictation software and as such some "sound alike" terms may have been generated by the system. Do not hesitate to contact me with any questions or clarifications.

## 2023-10-25 DIAGNOSIS — G62.9 NEUROPATHY: Primary | ICD-10-CM

## 2023-10-25 NOTE — TELEPHONE ENCOUNTER
Last appointment:  10/24/2023    Next scheduled appointment: 11/21/2023    Pharmacy: [facility]

## 2023-10-26 ENCOUNTER — APPOINTMENT (OUTPATIENT)
Dept: LAB | Facility: HOSPITAL | Age: 63
End: 2023-10-26
Payer: MEDICARE

## 2023-10-26 DIAGNOSIS — Z01.818 ENCOUNTER FOR PREADMISSION TESTING: ICD-10-CM

## 2023-10-26 DIAGNOSIS — M54.40 ACUTE LOW BACK PAIN WITH SCIATICA: ICD-10-CM

## 2023-10-26 LAB
ANION GAP SERPL CALCULATED.3IONS-SCNC: 7 MMOL/L
BASOPHILS # BLD AUTO: 0.02 THOUSANDS/ÂΜL (ref 0–0.1)
BASOPHILS NFR BLD AUTO: 0 % (ref 0–1)
BUN SERPL-MCNC: 23 MG/DL (ref 5–25)
CALCIUM SERPL-MCNC: 9.2 MG/DL (ref 8.4–10.2)
CHLORIDE SERPL-SCNC: 93 MMOL/L (ref 96–108)
CO2 SERPL-SCNC: 33 MMOL/L (ref 21–32)
CREAT SERPL-MCNC: 1.2 MG/DL (ref 0.6–1.3)
EOSINOPHIL # BLD AUTO: 0.16 THOUSAND/ÂΜL (ref 0–0.61)
EOSINOPHIL NFR BLD AUTO: 2 % (ref 0–6)
ERYTHROCYTE [DISTWIDTH] IN BLOOD BY AUTOMATED COUNT: 16.5 % (ref 11.6–15.1)
GFR SERPL CREATININE-BSD FRML MDRD: 63 ML/MIN/1.73SQ M
GLUCOSE P FAST SERPL-MCNC: 211 MG/DL (ref 65–99)
HCT VFR BLD AUTO: 41.8 % (ref 36.5–49.3)
HGB BLD-MCNC: 13.2 G/DL (ref 12–17)
IMM GRANULOCYTES # BLD AUTO: 0.01 THOUSAND/UL (ref 0–0.2)
IMM GRANULOCYTES NFR BLD AUTO: 0 % (ref 0–2)
LYMPHOCYTES # BLD AUTO: 2.11 THOUSANDS/ÂΜL (ref 0.6–4.47)
LYMPHOCYTES NFR BLD AUTO: 26 % (ref 14–44)
MCH RBC QN AUTO: 29.6 PG (ref 26.8–34.3)
MCHC RBC AUTO-ENTMCNC: 31.6 G/DL (ref 31.4–37.4)
MCV RBC AUTO: 94 FL (ref 82–98)
MONOCYTES # BLD AUTO: 0.45 THOUSAND/ÂΜL (ref 0.17–1.22)
MONOCYTES NFR BLD AUTO: 5 % (ref 4–12)
NEUTROPHILS # BLD AUTO: 5.54 THOUSANDS/ÂΜL (ref 1.85–7.62)
NEUTS SEG NFR BLD AUTO: 67 % (ref 43–75)
NRBC BLD AUTO-RTO: 0 /100 WBCS
PLATELET # BLD AUTO: 268 THOUSANDS/UL (ref 149–390)
PMV BLD AUTO: 10 FL (ref 8.9–12.7)
POTASSIUM SERPL-SCNC: 3.8 MMOL/L (ref 3.5–5.3)
RBC # BLD AUTO: 4.46 MILLION/UL (ref 3.88–5.62)
SODIUM SERPL-SCNC: 133 MMOL/L (ref 135–147)
WBC # BLD AUTO: 8.29 THOUSAND/UL (ref 4.31–10.16)

## 2023-10-26 PROCEDURE — 80048 BASIC METABOLIC PNL TOTAL CA: CPT

## 2023-10-26 PROCEDURE — 36415 COLL VENOUS BLD VENIPUNCTURE: CPT

## 2023-10-26 PROCEDURE — 85025 COMPLETE CBC W/AUTO DIFF WBC: CPT

## 2023-10-26 RX ORDER — METHOCARBAMOL 500 MG/1
500 TABLET, FILM COATED ORAL EVERY 6 HOURS SCHEDULED
Qty: 120 TABLET | Refills: 0 | Status: SHIPPED | OUTPATIENT
Start: 2023-10-26 | End: 2023-11-25

## 2023-10-26 NOTE — PRE-PROCEDURE INSTRUCTIONS
Pre-Surgery Instructions:   Medication Instructions    amitriptyline (ELAVIL) 25 mg tablet Take night before surgery    aspirin 81 mg chewable tablet Stop taking 7 days prior to surgery. atorvastatin (LIPITOR) 40 mg tablet Take night before surgery    co-enzyme Q-10 30 MG capsule Take night before surgery    Farxiga 5 MG TABS Stop taking 4 days prior to surgery. gabapentin (NEURONTIN) 100 mg capsule Take day of surgery. methocarbamol (ROBAXIN) 500 mg tablet Take day of surgery. metolazone (ZAROXOLYN) 2.5 mg tablet Hold day of surgery. multivitamin-iron-minerals-folic acid (CENTRUM) chewable tablet Stop taking 7 days prior to surgery. oxyCODONE (OxyCONTIN) 40 mg 12 hr tablet Take day of surgery. oxyCODONE (ROXICODONE) 30 MG immediate release tablet Take day of surgery. potassium chloride (K-DUR,KLOR-CON) 10 mEq tablet Take night before surgery    senna-docusate sodium (SENOKOT S) 8.6-50 mg per tablet Take night before surgery    tamsulosin (FLOMAX) 0.4 mg Take night before surgery    torsemide (DEMADEX) 20 mg tablet Hold day of surgery. Medication instructions for day surgery reviewed. Please use only a sip of water to take your instructed medications. Avoid all over the counter vitamins, supplements and NSAIDS for one week prior to surgery per anesthesia guidelines. Tylenol is ok to take as needed. You will receive a call one business day prior to surgery with an arrival time and hospital directions. If your surgery is scheduled on a Monday, the hospital will be calling you on the Friday prior to your surgery. If you have not heard from anyone by 8pm, please call the hospital supervisor through the hospital  at [phone]. Bettie Kilgore [phone]). Do not eat or drink anything after midnight the night before your surgery, including candy, mints, lifesavers, or chewing gum. Do not drink alcohol 24hrs before your surgery. Try not to smoke at least 24hrs before your surgery. Follow the pre surgery showering instructions as listed in the [facility] Surgical Experience Booklet” or otherwise provided by your surgeon's office. Do not use a blade to shave the surgical area 1 week before surgery. It is okay to use a clean electric clippers up to 24 hours before surgery. Do not apply any lotions, creams, including makeup, cologne, deodorant, or perfumes after showering on the day of your surgery. Do not use dry shampoo, hair spray, hair gel, or any type of hair products. No contact lenses, eye make-up, or artificial eyelashes. Remove nail polish, including gel polish, and any artificial, gel, or acrylic nails if possible. Remove all jewelry including rings and body piercing jewelry. Wear causal clothing that is easy to take on and off. Consider your type of surgery. Keep any valuables, jewelry, piercings at home. Please bring any specially ordered equipment (sling, braces) if indicated. Arrange for a responsible person to drive you to and from the hospital on the day of your surgery. Visitor Guidelines discussed. Call the surgeon's office with any new illnesses, exposures, or additional questions prior to surgery. Please reference your [facility] Surgical Experience Booklet” for additional information to prepare for your upcoming surgery.

## 2023-10-27 LAB — BACTERIA UR CULT: NORMAL

## 2023-10-27 RX ORDER — GABAPENTIN 100 MG/1
100 CAPSULE ORAL 3 TIMES DAILY
Qty: 90 CAPSULE | Refills: 3 | Status: SHIPPED | OUTPATIENT
Start: 2023-10-27

## 2023-10-30 ENCOUNTER — OFFICE VISIT (OUTPATIENT)
Dept: FAMILY MEDICINE CLINIC | Facility: CLINIC | Age: 63
End: 2023-10-30
Payer: MEDICARE

## 2023-10-30 VITALS
WEIGHT: 241 LBS | BODY MASS INDEX: 36.53 KG/M2 | OXYGEN SATURATION: 93 % | SYSTOLIC BLOOD PRESSURE: 142 MMHG | HEART RATE: 59 BPM | DIASTOLIC BLOOD PRESSURE: 80 MMHG | HEIGHT: 68 IN

## 2023-10-30 DIAGNOSIS — F11.20 CONTINUOUS OPIOID DEPENDENCE (HCC): ICD-10-CM

## 2023-10-30 DIAGNOSIS — G89.29 INSOMNIA SECONDARY TO CHRONIC PAIN: ICD-10-CM

## 2023-10-30 DIAGNOSIS — B35.6 TINEA CRURIS: ICD-10-CM

## 2023-10-30 DIAGNOSIS — G47.01 INSOMNIA SECONDARY TO CHRONIC PAIN: ICD-10-CM

## 2023-10-30 DIAGNOSIS — G89.4 CHRONIC PAIN SYNDROME: Primary | ICD-10-CM

## 2023-10-30 DIAGNOSIS — G89.4 CHRONIC PAIN SYNDROME: ICD-10-CM

## 2023-10-30 PROCEDURE — 99215 OFFICE O/P EST HI 40 MIN: CPT | Performed by: FAMILY MEDICINE

## 2023-10-30 RX ORDER — HYDROMORPHONE HYDROCHLORIDE 8 MG/1
8 TABLET ORAL EVERY 4 HOURS PRN
Qty: 180 TABLET | Refills: 0 | Status: SHIPPED | OUTPATIENT
Start: 2023-10-30

## 2023-10-30 RX ORDER — AMITRIPTYLINE HYDROCHLORIDE 50 MG/1
50 TABLET, FILM COATED ORAL
Qty: 30 TABLET | Refills: 3 | Status: SHIPPED | OUTPATIENT
Start: 2023-10-30

## 2023-10-30 RX ORDER — NYSTATIN 100000 [USP'U]/G
POWDER TOPICAL 4 TIMES DAILY
Qty: 60 G | Refills: 3 | Status: SHIPPED | OUTPATIENT
Start: 2023-10-30

## 2023-10-30 NOTE — PROGRESS NOTES
Assessment/Plan:    I have spent a total time of 45 minutes on 10/31/23 in caring for this patient including Diagnostic results, Prognosis, Risks and benefits of tx options, Instructions for management, Patient and family education, Importance of tx compliance, and Risk factor reductions. 1. Chronic pain syndrome  Comments:  will ocnsider alternative tx  pump/ketamine/accupuncture  will decrease narcotics  pain seems radicular/neuropathic  switch to dilaudid  Orders:  -     HYDROmorphone (DILAUDID) 8 MG tablet; Take 1 tablet (8 mg total) by mouth every 4 (four) hours as needed for moderate pain Max Daily Amount: 48 mg  -     amitriptyline (ELAVIL) 50 mg tablet; Take 1 tablet (50 mg total) by mouth daily at bedtime  -     [facility] Prescribed Meds and Special Instructions  -     Amphetamines, Methamphetamines  -     Butalbital  -     Phenobarbital  -     Secobarbital  -     Alprazolam  -     Clonazepam  -     Diazepam, Temazepam, Oxazepam  -     Lorazepam  -     Gabapentin  -     Pregabalin  -     Cocaine  -     Heroin  -     Buprenorphine  -     Levorphanol  -     Meperidine  -     Naltrexone  -     Fentanyl  -     Methadone  -     Oxycodone, Oxymorphone  -     Tapentadol  -     THC  -     Tramadol  -     Codeine, Hydrocodone, Hydropmorphone, Morphine  -     Bath Salts  -     Ethyl Glucuronide/Ethyl Sulfate  -     Kratom  -     Spice  -     Methylphenidate  -     Phentermine  -     Validity Oxidant  -     Validity Creatinine  -     Validity pH  -     Validity Specific    2. Insomnia secondary to chronic pain  Comments:  refill elevil    3. Tinea cruris  Comments:  start mycostatin  Orders:  -     nystatin (MYCOSTATIN) powder; Apply topically 4 (four) times a day    4. Continuous opioid dependence ([address])    5. Chronic pain syndrome  -     HYDROmorphone (DILAUDID) 8 MG tablet;  Take 1 tablet (8 mg total) by mouth every 4 (four) hours as needed for moderate pain Max Daily Amount: 48 mg  -     amitriptyline (ELAVIL) 50 mg tablet; Take 1 tablet (50 mg total) by mouth daily at bedtime  -     [facility] Prescribed Meds and Special Instructions  -     Amphetamines, Methamphetamines  -     Butalbital  -     Phenobarbital  -     Secobarbital  -     Alprazolam  -     Clonazepam  -     Diazepam, Temazepam, Oxazepam  -     Lorazepam  -     Gabapentin  -     Pregabalin  -     Cocaine  -     Heroin  -     Buprenorphine  -     Levorphanol  -     Meperidine  -     Naltrexone  -     Fentanyl  -     Methadone  -     Oxycodone, Oxymorphone  -     Tapentadol  -     THC  -     Tramadol  -     Codeine, Hydrocodone, Hydropmorphone, Morphine  -     Bath Salts  -     Ethyl Glucuronide/Ethyl Sulfate  -     Kratom  -     Spice  -     Methylphenidate  -     Phentermine  -     Validity Oxidant  -     Validity Creatinine  -     Validity pH  -     Validity Specific          Subjective:      Patient ID: Debbie Mendoza is a 58 y.o. male. The patient is still in extreme constant 8 out of 10 pain I did review the notes from palliative medicine I did speak with the physician there. Working to try Dilaudid instead of Roxicodone he also has all the contacts that were provided to him by palliative care for other treatment options I did refer him to an acupuncturist as well. His quality of life has been greatly and severely suffering. The following portions of the patient's history were reviewed and updated as appropriate: allergies, current medications, past family history, past medical history, past social history, past surgical history, and problem list.    Review of Systems   Respiratory: Negative. Cardiovascular: Negative. Gastrointestinal: Negative. Objective:      /80 (BP Location: Right arm, Patient Position: Sitting, Cuff Size: Large)   Pulse 59   Ht 5' 8" (1.727 m)   Wt 109 kg (241 lb)   SpO2 93%   BMI 36.64 kg/m²          Physical Exam  Vitals and nursing note reviewed.    Constitutional: Appearance: Normal appearance. HENT:      Head: Normocephalic and atraumatic. Nose: Nose normal.      Mouth/Throat:      Mouth: Mucous membranes are moist.   Eyes:      Extraocular Movements: Extraocular movements intact. Pupils: Pupils are equal, round, and reactive to light. Cardiovascular:      Rate and Rhythm: Normal rate and regular rhythm. Pulses: Normal pulses. Pulmonary:      Effort: Pulmonary effort is normal.      Breath sounds: Normal breath sounds. Abdominal:      General: Bowel sounds are normal.      Palpations: Abdomen is soft. Musculoskeletal:      Cervical back: Normal range of motion. Skin:     General: Skin is warm and dry. Capillary Refill: Capillary refill takes less than 2 seconds. Neurological:      General: No focal deficit present. Mental Status: He is alert.    Psychiatric:         Mood and Affect: Mood normal.

## 2023-11-02 LAB
6MAM UR QL CFM: NEGATIVE NG/ML
7AMINOCLONAZEPAM UR QL CFM: NEGATIVE NG/ML
A-OH ALPRAZ UR QL CFM: NEGATIVE NG/ML
ACCEPTABLE CREAT UR QL: ABNORMAL MG/DL
ACCEPTIBLE SP GR UR QL: NORMAL
AMPHET UR QL CFM: NEGATIVE NG/ML
BUPRENORPHINE UR QL CFM: NEGATIVE NG/ML
BUTALBITAL UR QL CFM: NEGATIVE NG/ML
BZE UR QL CFM: NEGATIVE NG/ML
CODEINE UR QL CFM: NEGATIVE NG/ML
EDDP UR QL CFM: NEGATIVE NG/ML
ETHYL GLUCURONIDE UR QL CFM: NEGATIVE NG/ML
ETHYL SULFATE UR QL SCN: NEGATIVE NG/ML
EUTYLONE UR QL: NEGATIVE NG/ML
FENTANYL UR QL CFM: NEGATIVE NG/ML
GLIADIN IGG SER IA-ACNC: NEGATIVE NG/ML
HYDROCODONE UR QL CFM: NEGATIVE NG/ML
HYDROMORPHONE UR QL CFM: ABNORMAL NG/ML
LORAZEPAM UR QL CFM: NEGATIVE NG/ML
ME-PHENIDATE UR QL CFM: NEGATIVE NG/ML
MEPERIDINE UR QL CFM: NEGATIVE NG/ML
METHADONE UR QL CFM: NEGATIVE NG/ML
METHAMPHET UR QL CFM: NEGATIVE NG/ML
MORPHINE UR QL CFM: NEGATIVE NG/ML
NALTREXONE UR QL CFM: NEGATIVE NG/ML
NITRITE UR QL: NORMAL UG/ML
NORBUPRENORPHINE UR QL CFM: NEGATIVE NG/ML
NORDIAZEPAM UR QL CFM: NEGATIVE NG/ML
NORFENTANYL UR QL CFM: NEGATIVE NG/ML
NORHYDROCODONE UR QL CFM: NEGATIVE NG/ML
NORMEPERIDINE UR QL CFM: NEGATIVE NG/ML
NOROXYCODONE UR QL CFM: NORMAL NG/ML
OXAZEPAM UR QL CFM: NEGATIVE NG/ML
OXYCODONE UR QL CFM: NORMAL NG/ML
OXYMORPHONE UR QL CFM: NORMAL NG/ML
PARA-FLUOROFENTANYL QUANTIFICATION: NORMAL NG/ML
PHENOBARB UR QL CFM: NEGATIVE NG/ML
RESULT ALL_PRESCRIBED MEDS AND SPECIAL INSTRUCTIONS: NORMAL
SECOBARBITAL UR QL CFM: NEGATIVE NG/ML
SL AMB 4-ANPP QUANTIFICATION: NORMAL NG/ML
SL AMB 5F-ADB-M7 METABOLITE QUANTIFICATION: NEGATIVE NG/ML
SL AMB 7-OH-MITRAGYNINE (KRATOM ALKALOID) QUANTIFICATION: NEGATIVE NG/ML
SL AMB AB-FUBINACA-M3 METABOLITE QUANTIFICATION: NEGATIVE NG/ML
SL AMB ACETYL FENTANYL QUANTIFICATION: NORMAL NG/ML
SL AMB ACETYL NORFENTANYL QUANTIFICATION: NORMAL NG/ML
SL AMB ACRYL FENTANYL QUANTIFICATION: NORMAL NG/ML
SL AMB CARFENTANIL QUANTIFICATION: NORMAL NG/ML
SL AMB CTHC (MARIJUANA METABOLITE) QUANTIFICATION: NEGATIVE NG/ML
SL AMB DEXTRORPHAN (DEXTROMETHORPHAN METABOLITE) QUANT: ABNORMAL NG/ML
SL AMB GABAPENTIN QUANTIFICATION: NORMAL
SL AMB JWH018 METABOLITE QUANTIFICATION: NEGATIVE NG/ML
SL AMB JWH073 METABOLITE QUANTIFICATION: NEGATIVE NG/ML
SL AMB MDMB-FUBINACA-M1 METABOLITE QUANTIFICATION: NEGATIVE NG/ML
SL AMB METHYLONE QUANTIFICATION: NEGATIVE NG/ML
SL AMB N-DESMETHYL-TRAMADOL QUANTIFICATION: NEGATIVE NG/ML
SL AMB PHENTERMINE QUANTIFICATION: NEGATIVE NG/ML
SL AMB PREGABALIN QUANTIFICATION: NEGATIVE
SL AMB RCS4 METABOLITE QUANTIFICATION: NEGATIVE NG/ML
SL AMB RITALINIC ACID QUANTIFICATION: NEGATIVE NG/ML
SMOOTH MUSCLE AB TITR SER IF: NEGATIVE NG/ML
SPECIMEN DRAWN SERPL: NEGATIVE NG/ML
SPECIMEN PH ACCEPTABLE UR: NORMAL
TAPENTADOL UR QL CFM: NEGATIVE NG/ML
TEMAZEPAM UR QL CFM: NEGATIVE NG/ML
TRAMADOL UR QL CFM: NEGATIVE NG/ML
URATE/CREAT 24H UR: NEGATIVE NG/ML

## 2023-11-03 ENCOUNTER — TELEPHONE (OUTPATIENT)
Dept: RADIOLOGY | Facility: CLINIC | Age: 63
End: 2023-11-03

## 2023-11-03 ENCOUNTER — OFFICE VISIT (OUTPATIENT)
Dept: PAIN MEDICINE | Facility: CLINIC | Age: 63
End: 2023-11-03
Payer: MEDICARE

## 2023-11-03 VITALS
HEIGHT: 68 IN | WEIGHT: 240 LBS | HEART RATE: 58 BPM | DIASTOLIC BLOOD PRESSURE: 64 MMHG | OXYGEN SATURATION: 96 % | SYSTOLIC BLOOD PRESSURE: 130 MMHG | BODY MASS INDEX: 36.37 KG/M2 | TEMPERATURE: 98.2 F

## 2023-11-03 DIAGNOSIS — R10.30 LOWER ABDOMINAL PAIN: ICD-10-CM

## 2023-11-03 DIAGNOSIS — M79.2 NEURITIS: ICD-10-CM

## 2023-11-03 DIAGNOSIS — R10.2 SUPRAPUBIC PRESSURE: ICD-10-CM

## 2023-11-03 DIAGNOSIS — Z98.890 S/P LAMINECTOMY: ICD-10-CM

## 2023-11-03 DIAGNOSIS — R10.31 RIGHT GROIN PAIN: ICD-10-CM

## 2023-11-03 DIAGNOSIS — M96.1 POSTLAMINECTOMY SYNDROME: ICD-10-CM

## 2023-11-03 DIAGNOSIS — C67.2 MALIGNANT NEOPLASM OF LATERAL WALL OF URINARY BLADDER (HCC): ICD-10-CM

## 2023-11-03 DIAGNOSIS — G89.4 CHRONIC PAIN SYNDROME: ICD-10-CM

## 2023-11-03 DIAGNOSIS — M54.16 LUMBAR RADICULOPATHY: Primary | ICD-10-CM

## 2023-11-03 DIAGNOSIS — Z96.89 SPINAL CORD STIMULATOR STATUS: ICD-10-CM

## 2023-11-03 PROCEDURE — 99215 OFFICE O/P EST HI 40 MIN: CPT | Performed by: ANESTHESIOLOGY

## 2023-11-03 NOTE — PATIENT INSTRUCTIONS
Spinal Cord Stimulator Placement   WHAT YOU NEED TO KNOW:   What do I need to know about spinal cord stimulator placement? A spinal cord stimulator (SCS) is a device used to control pain after other treatments have not worked. The SCS delivers a small amount of electrical current to your spinal cord to block pain. SCS placement surgery is done in 2 stages. In the first stage, a temporary SCS is placed and left in for about a week. In the second stage, a permanent SCS is placed if the temporary device reduced your pain. You will get a remote control to turn the pulse generator on and off and adjust the pulses. How do I prepare for surgery? Your surgeon will tell you how to prepare. He or she may tell you not to eat or drink anything after midnight on the day of surgery. Arrange to have someone drive you home when you are discharged from the hospital.    Tell your surgeon about all medicines you currently take. Be sure to include any medicine that may cause you to bleed more, such as aspirin or blood thinners. Your surgeon will tell you if you need to stop any of your medicine for the surgery, and when to stop. He or she will tell you which medicines to take or not take on the day of surgery. Tell your surgeon if you have a blood disorder or had a bleeding problem. You may need blood or urine tests to check for infection and make sure your body is okay for surgery. You may also need an MRI to check your spine before your healthcare provider places the SCS. Ask your surgeon for more information about these and other tests you may need. What will happen during surgery? Temporary lead placement:      You may get general anesthesia to keep you asleep during surgery. You may get local anesthesia to numb the surgery area. Local anesthesia allows you to stay awake during the surgery so you can tell your surgeon when your pain decreases. This helps him or her make sure the SCS is in the best spot.     Your surgeon will place electrical leads through a small incision or a needle inserted into your back. He or she may need to remove a small piece of bone to insert the leads along your spine. He or she will use an x-ray to make sure the leads are in the correct place. The incision will be covered with bandages. The leads will be connected to wires and attached to a pulse generator placed outside your body. Permanent lead placement:      Your surgeon will remove the temporary lead and replace it with a new, permanent lead through an incision or needle in your back. He or she will make another incision in your abdomen or buttocks. Then he or she will make a small pocket under your skin and place the SCS. Wires will be attached to the SCS. The wires will be tunneled under your skin. Your surgeon will connect the wires to the leads placed in your spine. The incisions will be closed with stitches and covered with a bandage. What should I expect after surgery? You will be taken to a room to rest until you are fully awake. Healthcare providers will monitor you closely for any problems. Do not get out of bed until your healthcare provider says it is okay. SCS settings  on the remote control can be changed to help relieve your pain. Your healthcare provider will show you how to adjust the settings. Medicines  may be given for surgery pain or to prevent a bacterial infection. Ice packs  may be used to decrease the pain from the incisions. What are the risks of spinal cord stimulator placement? The spinal cord stimulator may not work correctly and you may need to have it replaced. You may develop a headache, or your pain may get worse. Surgery may cause you to bleed or to leak spinal fluid. After surgery, you may get an infection at the incision site. You may also get a serious infection near where the leads are placed. This may cause paralysis or become life-threatening.   CARE AGREEMENT:   You have the right to help plan your care. Learn about your health condition and how it may be treated. Discuss treatment options with your healthcare providers to decide what care you want to receive. You always have the right to refuse treatment. The above information is an  only. It is not intended as medical advice for individual conditions or treatments. Talk to your doctor, nurse or pharmacist before following any medical regimen to see if it is safe and effective for you. © Copyright [employer] 2023 Information is for End User's use only and may not be sold, redistributed or otherwise used for commercial purposes.

## 2023-11-03 NOTE — PROGRESS NOTES
Assessment  1. Lumbar radiculopathy    2. Chronic pain syndrome  -     Ambulatory referral to Spine & Pain Management; Future    3. S/P laminectomy    4. Spinal cord stimulator status  -     MRI thoracic spine without contrast; Future; Expected date: 11/03/2023  -     Ambulatory referral to [facility] [facility]; Future    5. Lower abdominal pain    6. Suprapubic pressure    7. Right groin pain    8. Neuritis    9. Postlaminectomy syndrome    10. Malignant neoplasm of lateral wall of urinary bladder (HCC)    Additionally with bladder cancer, receiving immunotherapy; describes intense right greater than left sided groin that is sharp and shooting in nature; immunotherapy sessions reportedly exacerbate this pain. Lancinating and shock like shooting pain down the medial side of leg and into the groin. 10/10 constant pain refractory to opioid therapy    Lumbar radicular pain in the bilateral L1, L2 dermatomal distribution accompanied by pain limited weakness numbness and paresthesias. Patient has not yet participated with PT since L3-L4 laimectomy/decompression done in June 2023. Chronic pain with decreased participation with IADLs over the past 3-4 months. Has been taking OTC ibuprofen and tylenol in addition to gabapentin and Robaxin with modest benefit. Pain thought to be related to cancer,chemoradiation to bladder. 5/5 strength bilaterally, positive SLR left-sided. Reflexes 2+. Additionally there is positive facet loading, eliciting pain. Endorses gait instability. On imaging moderate to severe degenerative disc disease with spondyloarthropathy in facet joints most prominently seen at L5-S1. Risks, benefits alternatives epidural steroid injections thoroughly discussed with patient. Handouts provided questions answered to patient's satisfaction. Lifestyle modifications extensively discussed including diet, exercise and weight loss in addition to core strengthening.   Will proceed with multimodal pain therapy plan as noted below:    Plan  -referral for intrathecal pain pump  -discussed risks, benefits and alternatives discussed with regard to dual lead SCS trial with [facility]VIS  -AWA, PROMIS inventories, consents completed today  -MRI thoracic spine script provided  -psychological evaluation for SCS trial  -gabapentin 2100 mg daily previously ordered for patient; counseled regarding sedative effects of taking this medication and provided up titration calendar. Counseled not to take medication while driving or operating heavy machinery/using stairs; counseled with regard to exacerbated sedation when taking opioid medications  -script provided for formal physical therapy for lumbar radiculopathy; Physician directed home exercise plan as per AAOS demonstrated and handouts provided that patient plans to participate with for 1 hour, twice a week for the next 6 weeks. There are risks associated with opioid medications, including dependence, addiction and tolerance. The patient understands and agrees to use these medications only as prescribed. Potential side effects of the medications include, but are not limited to, constipation, drowsiness, addiction, impaired judgment and risk of fatal overdose if not taken as prescribed. The patient was warned against driving while taking sedation medications or operating heavy machinery. The patient voiced understanding. Sharing medications is a felony. At this point in time, the patient is showing no signs of addiction, abuse, diversion or suicidal ideation. [address] Prescription Drug Monitoring Program report was reviewed and was appropriate      Complete risks and benefits including bleeding, infection, tissue reaction, nerve injury and allergic reaction were discussed. The approach was demonstrated using models and literature was provided. Verbal and written consent was obtained.      My impressions and treatment recommendations were discussed in detail with the patient who verbalized understanding and had no further questions. Discharge instructions were provided. I personally saw and examined the patient and I agree with the above discussed plan of care. No orders of the defined types were placed in this encounter. History of Present Illness    Sammy Frankel is a 58 y.o. male with prior L4-L5 fusion, L3-L4 laminectomy in June 2023 presenting with chronic lumbar radicular pain in the bilateral L1 and L2 dermatomal distributions. Debilitating pain limited weakness numbness and paresthesias accompany the pain. The patient rates the pain at a 8/10 accompanied by electric shock-like shooting features and crampy burning pain in the aforementioned dermatomal distributions. The pain is worse in the mornings as well as the end of the day; exertion such as walking for long periods of time seems to exacerbate the pain. The patient can hardly walk more than a few blocks without having debilitating pain. Utilizes a walker. He tries to maintain an active lifestyle and finds that the current degree of pain seems to compromises his efforts. The pain significantly impacts independent activities of daily living and contributes to significant disability. He has not yet attempted TP formally  He has taken naproxen, tylenol as well as gabapentin and robaxin in addition to opioid therapy with limited relief of the pain as well. He has never tried epidural steroid injections since surgery. He denies any bowel or bladder dysfunction/incontinence, saddle anesthesia but endorses gait instability. Additionally with bladder cancer, receiving immunotherapy; describes intense right greater than left sided groin that is sharp and shooting in nature; immunotherapy sessions reportedly exacerbate this pain. Lancinating and shock like shooting pain down the medial side of leg and into the groin.  10/10 constant pain refractory to opioid therapy    I have personally reviewed and/or updated the patient's past medical history, past surgical history, family history, social history, current medications, allergies, and vital signs today. Review of Systems   Constitutional:  Positive for activity change. HENT: Negative. Eyes: Negative. Respiratory: Negative. Cardiovascular: Negative. Gastrointestinal: Negative. Endocrine: Negative. Genitourinary: Negative. Musculoskeletal:  Positive for arthralgias, back pain, gait problem and myalgias. Skin: Negative. Allergic/Immunologic: Negative. Neurological:  Positive for weakness and numbness. Hematological: Negative. Psychiatric/Behavioral: Negative. All other systems reviewed and are negative.       Patient Active Problem List   Diagnosis    Benign prostatic hyperplasia with nocturia    Bladder mass    Obstructive sleep apnea on CPAP    Hypertension    Malignant neoplasm of lateral wall of urinary bladder (HCC)    History of gross hematuria    Anxiety    Chronic bilateral low back pain with bilateral sciatica    Acute on chronic diastolic heart failure (HCC)    Opioid withdrawal (HCC)    RODO (acute kidney injury) ([address])    Intractable low back pain    Chronic, continuous use of opioids    Hyperglycemia    Bradycardia    Coronary artery disease    Electrolyte abnormality    Hyponatremia    Acute respiratory failure with hypoxia (HCC)    History of hematuria    Morbid (severe) obesity due to excess calories ([address])    Suprapubic pressure    Right groin pain    Encounter for surgical aftercare following surgery of nervous system    Abdominal pain    Benign prostatic hyperplasia    Medicare annual wellness visit, subsequent    Acute low back pain with sciatica    S/P laminectomy    Lumbar radiculopathy    Chronic pain syndrome       Past Medical History:   Diagnosis Date    Arthritis     Bladder cancer ([address])     Cancer ([address]) 3/17/2023    Cervical disc disorder 1/2016    Chronic narcotic dependence (HCC)     Chronic pain disorder lower back and down both legs    Colon polyp     Coronary artery disease     CPAP (continuous positive airway pressure) dependence     bi-pap    Does use hearing aid     will wear DOS    Full dentures     Heart failure ([address])     and "fluid retention" [facility]"    High cholesterol     Hypertension     Low back pain     Mild ankle edema     and feet    Obstructive sleep apnea on CPAP     Prediabetes     Shortness of breath     per pt "with just exertion"    Sleep apnea     Wears glasses        Past Surgical History:   Procedure Laterality Date    BACK SURGERY      titanium rods implanted    CAUDAL BLOCK N/A 10/17/2023    Procedure: BLOCK / INJECTION CAUDAL;  Surgeon: Candy Lindo MD;  Location: [facility];  Service: Pain Management     COLONOSCOPY      CYSTOSCOPY W/ URETERAL STENT PLACEMENT Bilateral 3/17/2023    Procedure: bilateral retrograde;  Surgeon: Tery Hatchet, MD;  Location: [facility];  Service: Urology    HERNIA REPAIR      x5    LUMBAR LAMINECTOMY N/A 2023    Procedure: Left L3-4 Metrx hemilaminectomy and bilateral foraminotomies;  Surgeon: Binta Alonzo MD;  Location[facility];  Service: Neurosurgery    NE CYSTO W/REMOVAL OF LESIONS SMALL N/A 2023    Procedure: CYSTOSCOPY TURBT;  Surgeon: Tery Hatchet, MD;  [facility];  Service: Urology    TRANSURETHRAL RESECTION OF BLADDER TUMOR N/A 3/17/2023    Procedure: TRANSURETHRAL RESECTION OF BLADDER TUMOR (TURBT);   Surgeon: Tery Hatchet, MD;  Location[facility];  Service: Urology       Family History   Problem Relation Age of Onset    Cancer Father     Aortic aneurysm Father     Leukemia Father     Hypertension Mother        Social History     Occupational History    Not on file   Tobacco Use    Smoking status: Former     Packs/day: 1.00     Years: 35.00     Total pack years: 35.00     Types: Cigarettes     Start date: 2023     Quit date: 2016     Years since quittin.8    Smokeless tobacco: Never   Vaping Use    Vaping Use: Never used   Substance and Sexual Activity    Alcohol use: Not Currently     Comment: 3 per year    Drug use: Not Currently     Types: Marijuana    Sexual activity: Not Currently       Current Outpatient Medications on File Prior to Visit   Medication Sig    amitriptyline (ELAVIL) 50 mg tablet Take 1 tablet (50 mg total) by mouth daily at bedtime    aspirin 81 mg chewable tablet Chew 81 mg daily    atorvastatin (LIPITOR) 40 mg tablet     celecoxib (CeleBREX) 200 mg capsule Take 1 capsule (200 mg total) by mouth daily as needed for mild pain    co-enzyme Q-10 30 MG capsule Take 100 mg by mouth daily     Farxiga 5 MG TABS 5 mg daily 5mg alternating with 2.5mg for fluid retention    gabapentin (NEURONTIN) 100 mg capsule Take 1 capsule (100 mg total) by mouth 3 (three) times a day Takes a total of 700 mg three times daily    HYDROmorphone (DILAUDID) 8 MG tablet Take 1 tablet (8 mg total) by mouth every 4 (four) hours as needed for moderate pain Max Daily Amount: 48 mg    methocarbamol (ROBAXIN) 500 mg tablet Take 1 tablet (500 mg total) by mouth every 6 (six) hours    metolazone (ZAROXOLYN) 2.5 mg tablet Take 2.5 mg by mouth daily as needed Patient reports use of this medication daily, prescribed by cardiology.     multivitamin-iron-minerals-folic acid (CENTRUM) chewable tablet Chew 1 tablet daily    nystatin (MYCOSTATIN) powder Apply topically 4 (four) times a day    oxyCODONE (OxyCONTIN) 40 mg 12 hr tablet Take 1 tablet (40 mg total) by mouth every 8 (eight) hours Max Daily Amount: 120 mg    potassium chloride (K-DUR,KLOR-CON) 10 mEq tablet 20mg bid (Patient taking differently: 2 (two) times a day 20mg bid)    senna-docusate sodium (SENOKOT S) 8.6-50 mg per tablet Take 2 tablets by mouth daily at bedtime (Patient taking differently: Take 2 tablets by mouth daily at bedtime as needed)    tamsulosin (FLOMAX) 0.4 mg Take 1 capsule (0.4 mg total) by mouth daily with dinner    torsemide (DEMADEX) 20 mg tablet Take 2 tablets (40 mg total) by mouth 2 (two) times a day (Patient taking differently: Take 60 mg by mouth daily)     No current facility-administered medications on file prior to visit. Allergies   Allergen Reactions    Mirtazapine Other (See Comments) and Confusion     Pt drove without knowing and woke up at a new destination     Physical Exam    /64 (BP Location: Left arm, Patient Position: Sitting, Cuff Size: Adult)   Pulse 58   Temp 98.2 °F (36.8 °C)   Ht 5' 8" (1.727 m)   Wt 109 kg (240 lb)   SpO2 96%   BMI 36.49 kg/m²     Constitutional: normal, well developed, well nourished, alert, in no distress and non-toxic and no overt pain behavior. and obese  Eyes: anicteric  HEENT: grossly intact  Neck: supple, symmetric, trachea midline and no masses   Pulmonary:even and unlabored  Cardiovascular:No edema or pitting edema present  Skin:Normal without rashes or lesions and well hydrated  Psychiatric:Mood and affect appropriate  Neurologic:Cranial Nerves II-XII grossly intact Sensation grossly intact; no clonus negative andersen's. Reflexes 2+ and brisk. SLR negative bilaterally. Musculoskeletal:normal gait. 5/5 strength bilaterally with AROM in all extremities. Normal heel toe and tip toe walking. Significant pain with lumbar facet loading bilaterally and with lateral spine rotation. ttp over lumbar paraspinal muscles. Negative ryan's test, negative gaenslen's negative SIJ loading bilaterally. Imaging    MRI LUMBAR SPINE WITHOUT AND WITH CONTRAST     INDICATION: cauda equina. Status post surgery. Worsening pain and right lower extremity weakness/paresthesia and urinary incontinence. Acute on chronic back pain. Worsening pain started 1 month ago. Difficulty ambulating. Surgery in June 2023. COMPARISON: 8/11/2023     TECHNIQUE: Multiplanar, multisequence imaging of the lumbar spine was performed.   INTRAVENOUS CONTRAST: 10 mL IV gadobutrol     IMAGE QUALITY: Diagnostic     FINDINGS:     Vertebral bodies are numbered according to the prior MRI. 1st conical shape vertebral segment is called S1. Iliolumbar ligaments at L5. 12 nonrib-bearing vertebral bodies. VERTEBRAL BODIES:  Alignment: Similar mild levoscoliosis and mild retrolisthesis at L3-L4. Normal lordosis. Vertebral body heights: Normal.     Bone marrow:  Similar posterior transpedicular screw and juan j fusion at L4-L5 with posterior decompression and left L3 laminectomy. No new suspicious marrow edema or mass. Mild fatty degenerative endplate changes in the lower thoracic spine and at L4-L5 and L5-S1. L1 hemangioma. SACRUM: Normal.     DISTAL CORD AND CONUS:  Conus and nerve roots are within normal limits. Conus terminates at L1. No new abnormal central canal enhancement. Similar mild enhancing scar at the surgical levels without evidence of neural compression. PARASPINAL SOFT TISSUES:  Similar posterior paraspinal and visualized gluteus riley muscle fatty infiltration with fatty atrophy at the surgical levels. Similar 2.9 x 1.1 x 2.8 cm (length X depth X width) laminectomy bed fluid collection at L5, typical of postoperative seroma. No evidence of contiguity with the intradural CSF or dural defect. LOWER THORACIC DISC SPACES: Mild degenerative change. No stenosis. LUMBAR DISC SPACES: Similar diffuse desiccation and mild loss of height at L4-L5 greater than L5-S1. L1-L2: Similar mild facet arthropathy. Patent central canal and foramen. L2-L3: Similar minimal disc bulge small left foraminal disc protrusion and mild facet arthropathy, mild left foraminal narrowing. L3-L4: Similar disc bulge, facet hypertrophy, mild central canal and foraminal narrowing. L4-L5: Similar disc osteophyte, central posterior annular fissure and facet hypertrophy. Patent central canal. Mild left greater than right foraminal narrowing.      L5-S1: Similar disc osteophyte asymmetric to the left, facet hypertrophy, mild left lateral recess and moderate left foraminal narrowing. IMPRESSION:     Similar appearance of the lumbar spine to most recent prior study from 8/11/2023. Degenerative and postoperative changes as described above. No new disc herniation, high-grade stenosis or evidence of conus or nerve root impingement.

## 2023-11-03 NOTE — TELEPHONE ENCOUNTER
----- Message from Shruthi Domingo MD sent at 11/3/2023 10:00 AM EDT -----  Douglas Panchal thanks!  ----- Message -----  From: Estefany Salcedo  Sent: 11/3/2023   9:55 AM EDT  To: Trish Campos, RN; Shruthi Domingo MD; #    Good morning Dr. Jesus Alberto Sosa! Which company will you be using? Thanks!     [address]  ----- Message -----  From: Shruthi Domingo MD  Sent: 11/3/2023   9:35 AM EDT  To: Estefany Salcedo; Trish Campos, RN; #    Please evaluate candidacy for scs trial thanks

## 2023-11-06 ENCOUNTER — ANESTHESIA (OUTPATIENT)
Dept: PERIOP | Facility: HOSPITAL | Age: 63
End: 2023-11-06
Payer: MEDICARE

## 2023-11-06 ENCOUNTER — HOSPITAL ENCOUNTER (OUTPATIENT)
Facility: HOSPITAL | Age: 63
Setting detail: OUTPATIENT SURGERY
Discharge: HOME | End: 2023-11-06
Attending: UROLOGY | Admitting: UROLOGY
Payer: MEDICARE

## 2023-11-06 ENCOUNTER — ANESTHESIA EVENT (OUTPATIENT)
Dept: PERIOP | Facility: HOSPITAL | Age: 63
End: 2023-11-06
Payer: MEDICARE

## 2023-11-06 ENCOUNTER — TELEPHONE (OUTPATIENT)
Dept: PSYCHIATRY | Facility: CLINIC | Age: 63
End: 2023-11-06

## 2023-11-06 VITALS
RESPIRATION RATE: 20 BRPM | TEMPERATURE: 97.2 F | HEART RATE: 64 BPM | DIASTOLIC BLOOD PRESSURE: 67 MMHG | SYSTOLIC BLOOD PRESSURE: 136 MMHG | OXYGEN SATURATION: 96 %

## 2023-11-06 DIAGNOSIS — G89.18 POST-OP PAIN: Primary | ICD-10-CM

## 2023-11-06 DIAGNOSIS — C67.2 MALIGNANT NEOPLASM OF LATERAL WALL OF URINARY BLADDER (HCC): ICD-10-CM

## 2023-11-06 PROCEDURE — 88341 IMHCHEM/IMCYTCHM EA ADD ANTB: CPT | Performed by: PATHOLOGY

## 2023-11-06 PROCEDURE — 88360 TUMOR IMMUNOHISTOCHEM/MANUAL: CPT | Performed by: PATHOLOGY

## 2023-11-06 PROCEDURE — NC001 PR NO CHARGE: Performed by: UROLOGY

## 2023-11-06 PROCEDURE — 88305 TISSUE EXAM BY PATHOLOGIST: CPT | Performed by: PATHOLOGY

## 2023-11-06 PROCEDURE — 52000 CYSTOURETHROSCOPY: CPT | Performed by: UROLOGY

## 2023-11-06 PROCEDURE — 88342 IMHCHEM/IMCYTCHM 1ST ANTB: CPT | Performed by: PATHOLOGY

## 2023-11-06 RX ORDER — PROPOFOL 10 MG/ML
INJECTION, EMULSION INTRAVENOUS AS NEEDED
Status: DISCONTINUED | OUTPATIENT
Start: 2023-11-06 | End: 2023-11-06

## 2023-11-06 RX ORDER — PHENAZOPYRIDINE HYDROCHLORIDE 100 MG/1
200 TABLET, FILM COATED ORAL ONCE AS NEEDED
Status: DISCONTINUED | OUTPATIENT
Start: 2023-11-06 | End: 2023-11-06 | Stop reason: HOSPADM

## 2023-11-06 RX ORDER — ONDANSETRON 2 MG/ML
4 INJECTION INTRAMUSCULAR; INTRAVENOUS ONCE AS NEEDED
Status: DISCONTINUED | OUTPATIENT
Start: 2023-11-06 | End: 2023-11-06 | Stop reason: HOSPADM

## 2023-11-06 RX ORDER — SODIUM CHLORIDE, SODIUM LACTATE, POTASSIUM CHLORIDE, CALCIUM CHLORIDE 600; 310; 30; 20 MG/100ML; MG/100ML; MG/100ML; MG/100ML
100 INJECTION, SOLUTION INTRAVENOUS CONTINUOUS
Status: DISCONTINUED | OUTPATIENT
Start: 2023-11-06 | End: 2023-11-06 | Stop reason: HOSPADM

## 2023-11-06 RX ORDER — FENTANYL CITRATE 50 UG/ML
INJECTION, SOLUTION INTRAMUSCULAR; INTRAVENOUS AS NEEDED
Status: DISCONTINUED | OUTPATIENT
Start: 2023-11-06 | End: 2023-11-06

## 2023-11-06 RX ORDER — HYDROMORPHONE HCL IN WATER/PF 6 MG/30 ML
0.2 PATIENT CONTROLLED ANALGESIA SYRINGE INTRAVENOUS
Status: DISCONTINUED | OUTPATIENT
Start: 2023-11-06 | End: 2023-11-06 | Stop reason: HOSPADM

## 2023-11-06 RX ORDER — SODIUM CHLORIDE, SODIUM LACTATE, POTASSIUM CHLORIDE, CALCIUM CHLORIDE 600; 310; 30; 20 MG/100ML; MG/100ML; MG/100ML; MG/100ML
INJECTION, SOLUTION INTRAVENOUS CONTINUOUS PRN
Status: DISCONTINUED | OUTPATIENT
Start: 2023-11-06 | End: 2023-11-06

## 2023-11-06 RX ORDER — PHENAZOPYRIDINE HYDROCHLORIDE 200 MG/1
200 TABLET, FILM COATED ORAL
Qty: 10 TABLET | Refills: 0 | Status: SHIPPED | OUTPATIENT
Start: 2023-11-06 | End: 2023-11-22

## 2023-11-06 RX ORDER — MIDAZOLAM HYDROCHLORIDE 2 MG/2ML
INJECTION, SOLUTION INTRAMUSCULAR; INTRAVENOUS AS NEEDED
Status: DISCONTINUED | OUTPATIENT
Start: 2023-11-06 | End: 2023-11-06

## 2023-11-06 RX ORDER — KETAMINE HCL IN NACL, ISO-OSM 100MG/10ML
SYRINGE (ML) INJECTION AS NEEDED
Status: DISCONTINUED | OUTPATIENT
Start: 2023-11-06 | End: 2023-11-06

## 2023-11-06 RX ORDER — ONDANSETRON 2 MG/ML
INJECTION INTRAMUSCULAR; INTRAVENOUS AS NEEDED
Status: DISCONTINUED | OUTPATIENT
Start: 2023-11-06 | End: 2023-11-06

## 2023-11-06 RX ORDER — CEFUROXIME AXETIL 500 MG/1
500 TABLET ORAL EVERY 12 HOURS SCHEDULED
Qty: 6 TABLET | Refills: 0 | Status: SHIPPED | OUTPATIENT
Start: 2023-11-06 | End: 2023-11-09

## 2023-11-06 RX ORDER — CEFAZOLIN SODIUM 2 G/50ML
2000 SOLUTION INTRAVENOUS ONCE
Status: COMPLETED | OUTPATIENT
Start: 2023-11-06 | End: 2023-11-06

## 2023-11-06 RX ORDER — FENTANYL CITRATE/PF 50 MCG/ML
25 SYRINGE (ML) INJECTION
Status: DISCONTINUED | OUTPATIENT
Start: 2023-11-06 | End: 2023-11-06 | Stop reason: HOSPADM

## 2023-11-06 RX ADMIN — PROPOFOL 170 MG: 10 INJECTION, EMULSION INTRAVENOUS at 07:36

## 2023-11-06 RX ADMIN — ONDANSETRON 4 MG: 2 INJECTION INTRAMUSCULAR; INTRAVENOUS at 07:47

## 2023-11-06 RX ADMIN — Medication 10 MG: at 07:46

## 2023-11-06 RX ADMIN — FENTANYL CITRATE 100 MCG: 50 INJECTION INTRAMUSCULAR; INTRAVENOUS at 07:36

## 2023-11-06 RX ADMIN — PROPOFOL 30 MG: 10 INJECTION, EMULSION INTRAVENOUS at 07:45

## 2023-11-06 RX ADMIN — CEFAZOLIN SODIUM 2000 MG: 2 SOLUTION INTRAVENOUS at 07:34

## 2023-11-06 RX ADMIN — SODIUM CHLORIDE, SODIUM LACTATE, POTASSIUM CHLORIDE, AND CALCIUM CHLORIDE: .6; .31; .03; .02 INJECTION, SOLUTION INTRAVENOUS at 07:31

## 2023-11-06 RX ADMIN — MIDAZOLAM 2 MG: 1 INJECTION INTRAMUSCULAR; INTRAVENOUS at 07:35

## 2023-11-06 RX ADMIN — Medication 20 MG: at 07:36

## 2023-11-06 RX ADMIN — Medication 20 MG: at 07:45

## 2023-11-06 NOTE — INTERVAL H&P NOTE
H&P reviewed. After examining the patient I find no changes in the patients condition since the H&P had been written.     Vitals:    11/06/23 0706   BP: 133/63   Pulse: 64   Resp: 14   Temp: 97.6 °F (36.4 °C)   SpO2: 94%

## 2023-11-06 NOTE — ANESTHESIA PREPROCEDURE EVALUATION
Procedure:  CYSTOSCOPY and possible bladder biopsies and TURBT (Bladder)  TRANSURETHRAL RESECTION OF BLADDER TUMOR (TURBT) (Bladder)    Relevant Problems   ANESTHESIA (within normal limits)      CARDIO   (+) Coronary artery disease   (+) Hypertension      /RENAL   (+) RODO (acute kidney injury) (HCC)   (+) Benign prostatic hyperplasia      MUSCULOSKELETAL   (+) Acute low back pain with sciatica   (+) Chronic bilateral low back pain with bilateral sciatica   (+) Intractable low back pain      NEURO/PSYCH  Patient takes OxyContin and hydromorphone for chronic pain. Took both this AM.   (+) Anxiety   (+) Chronic bilateral low back pain with bilateral sciatica   (+) Chronic pain syndrome   (+) Chronic, continuous use of opioids   (+) Intractable low back pain      PULMONARY   (+) Acute respiratory failure with hypoxia (HCC)   (+) Obstructive sleep apnea on CPAP      Nervous and Auditory   (+) Neuritis      Genitourinary   (+) Malignant neoplasm of lateral wall of urinary bladder (HCC)      Other   (+) Bladder mass   (+) Postlaminectomy syndrome      Normal sinus rhythm  Baseline artifact  Confirmed by Anita Sierra ([id number]) on 10/6/2023 3:41:37 PM      TTE SUMMARY (2023):    Left Ventricle: Left ventricular cavity size is normal. Wall thickness is mildly increased. The left ventricular ejection fraction is > 70%. Systolic function is vigorous. Wall motion is normal.    Right Ventricle: Right ventricular cavity size is upper normal. Normal tricuspid annular plane systolic excursion (TAPSE) > 1.7 cm. Left Atrium: The atrium is mildly dilated. Mitral Valve: There is annular calcification. There is mild regurgitation. There is no evidence of stenosis. Tricuspid Valve: There is trace to mild regurgitation. There is no evidence of stenosis. Pulmonic Valve: There is trace regurgitation. There is no evidence of stenosis. Prior TTE study available for comparison. Prior study date: 11/25/2022.  No significant changes noted compared to the prior study. Anesthesia Plan  ASA Score- 3     Anesthesia Type- general with ASA Monitors. Additional Monitors:     Airway Plan: LMA. Plan Factors-Exercise tolerance (METS): >4 METS. Exercise comment: Uses walker due to back pain. Chart reviewed. EKG reviewed. Patient summary reviewed. Patient is not a current smoker. Induction-     Postoperative Plan- Plan for postoperative opioid use. Informed Consent- Anesthetic plan and risks discussed with patient and spouse. I personally reviewed this patient with the CRNA. Discussed and agreed on the Anesthesia Plan with the CRNA. Dilma Lindo

## 2023-11-06 NOTE — DISCHARGE INSTR - AVS FIRST PAGE
Please restart your Celebrex and aspirin in 48 hours please okay to resume all other outpatient meds. Prescription sent in for Pyridium turns urine orange as well as Ceftin antibiotic to reduce the risk of postop infection. Heating pad of the penis and bladder is helpful for bladder discomfort. Call the office fever greater than 102 increased bleeding or pain. Report to the ER for chest pain shortness of breath leg swelling or medical concerns. Follow-up with Dr. Estuardo Pierre to be arranged he will call you with the pathology report.

## 2023-11-06 NOTE — ANESTHESIA POSTPROCEDURE EVALUATION
Post-Op Assessment Note    CV Status:  Stable    Pain management: satisfactory to patient     Mental Status:  Sleepy   Hydration Status:  Stable   PONV Controlled:  None      Post Op Vitals Reviewed: Yes      Staff: CRNA         No notable events documented.     /65 (11/06/23 0802)    Temp 97.6 °F (36.4 °C) (11/06/23 0802)    Pulse 69 (11/06/23 0802)   Resp 16 (11/06/23 0802)    SpO2 98 % (11/06/23 0802)

## 2023-11-06 NOTE — H&P
Consultation - Urology   Nabila Rodgers 58 y.o. male MRN: [id number]  Unit/Bed#: OR [place] Encounter: [id number]      Assessment/Plan      Assessment:  History of bladder cancer status post induction therapy BCG recently completed. Plan:  Cystoscopy possible bladder biopsy possible TURBT risk and benefit explained to the patient he wished to proceed    History of Present Illness   Attending: Won Adams MD  HPI: Nabila Rodgers is a 58y.o. year old male who presents with follow-up BCG therapy for T1 high-grade bladder cancer. REVIEW OF SYSTEMS  Const: Denies chills, fever and weight loss. CV: Denies chest pain. Resp: Denies SOB. GI: Denies abdominal pain, nausea and vomiting. : Denies symptoms other than stated above. Musculo: Denies back pain.     Historical Information   Past Medical History:   Diagnosis Date    Arthritis     Bladder cancer ([address])     Cancer ([address]) 3/17/2023    Cervical disc disorder 1/2016    Chronic narcotic dependence (HCC)     Chronic pain disorder     lower back and down both legs    Colon polyp     Coronary artery disease     CPAP (continuous positive airway pressure) dependence     bi-pap    Does use hearing aid     will wear DOS    Full dentures     Heart failure ([address])     and "fluid retention" [facility]"    High cholesterol     Hypertension     Low back pain 2003    Mild ankle edema     and feet    Obstructive sleep apnea on CPAP     Prediabetes     Shortness of breath     per pt "with just exertion"    Sleep apnea     Wears glasses      Past Surgical History:   Procedure Laterality Date    BACK SURGERY      titanium rods implanted    CAUDAL BLOCK N/A 10/17/2023    Procedure: BLOCK / INJECTION CAUDAL;  Surgeon: Farhat Lopez MD;  Location: [facility];  Service: Pain Management     COLONOSCOPY      CYSTOSCOPY W/ URETERAL STENT PLACEMENT Bilateral 3/17/2023    Procedure: bilateral retrograde;  Surgeon: Won Adams MD;  Location:  MAIN OR;  Service: Urology    HERNIA REPAIR      x5    LUMBAR LAMINECTOMY N/A 2023    Procedure: Left L3-4 [employer]rx hemilaminectomy and bilateral foraminotomies;  Surgeon: Wero Fisher MD;  Location: [facility];  Service: Neurosurgery    OH CYSTO W/REMOVAL OF LESIONS SMALL N/A 2023    Procedure: CYSTOSCOPY TURBT;  Surgeon: Waldo Kathleen MD;  Location: [facility];  Service: Urology    TRANSURETHRAL RESECTION OF BLADDER TUMOR N/A 3/17/2023    Procedure: TRANSURETHRAL RESECTION OF BLADDER TUMOR (TURBT); Surgeon: Waldo Kathleen MD;  Location: [facility];  Service: Urology     Social History   Social History     Substance and Sexual Activity   Alcohol Use Not Currently    Comment: 3 per year     E-Cigarette/Vaping    E-Cigarette Use Never User      E-Cigarette/Vaping Substances    Nicotine No     THC No     CBD No     Flavoring No     Other No     Unknown No      Social History     Tobacco Use   Smoking Status Former    Packs/day: 1.00    Years: 35.00    Total pack years: 35.00    Types: Cigarettes    Start date: 2023    Quit date: 2016    Years since quittin.8   Smokeless Tobacco Never         Meds/Allergies   all current active meds have been reviewed, current meds:   Current Facility-Administered Medications   Medication Dose Route Frequency    ceFAZolin (ANCEF) IVPB (premix in dextrose) 2,000 mg 50 mL  2,000 mg Intravenous Once   , and PTA meds:   Prior to Admission Medications   Prescriptions Last Dose Informant Patient Reported? Taking?    Farxiga 5 MG TABS 10/31/2023 Self Yes Yes   Si mg daily 5mg alternating with 2.5mg for fluid retention   HYDROmorphone (DILAUDID) 8 MG tablet 2023  No Yes   Sig: Take 1 tablet (8 mg total) by mouth every 4 (four) hours as needed for moderate pain Max Daily Amount: 48 mg   amitriptyline (ELAVIL) 50 mg tablet 2023  No Yes   Sig: Take 1 tablet (50 mg total) by mouth daily at bedtime   aspirin 81 mg chewable tablet 10/31/2023 Self Yes Yes   Sig: Chew 81 mg daily atorvastatin (LIPITOR) 40 mg tablet 2023 Self Yes Yes   celecoxib (CeleBREX) 200 mg capsule 2023  No Yes   Sig: Take 1 capsule (200 mg total) by mouth daily as needed for mild pain   co-enzyme Q-10 30 MG capsule 10/31/2023 Self Yes Yes   Sig: Take 100 mg by mouth daily    gabapentin (NEURONTIN) 100 mg capsule 2023  No Yes   Sig: Take 1 capsule (100 mg total) by mouth 3 (three) times a day Takes a total of 700 mg three times daily   methocarbamol (ROBAXIN) 500 mg tablet 2023  No Yes   Sig: Take 1 tablet (500 mg total) by mouth every 6 (six) hours   metolazone (ZAROXOLYN) 2.5 mg tablet 10/29/2023 Self Yes Yes   Sig: Take 2.5 mg by mouth daily as needed Patient reports use of this medication daily, prescribed by cardiology.    multivitamin-iron-minerals-folic acid (CENTRUM) chewable tablet 10/31/2023 Self Yes Yes   Sig: Chew 1 tablet daily   nystatin (MYCOSTATIN) powder Past Week  No Yes   Sig: Apply topically 4 (four) times a day   oxyCODONE (OxyCONTIN) 40 mg 12 hr tablet 2023  No Yes   Sig: Take 1 tablet (40 mg total) by mouth every 8 (eight) hours Max Daily Amount: 120 mg   potassium chloride (K-DUR,KLOR-CON) 10 mEq tablet 2023 Self No Yes   Simg bid   Patient taking differently: 2 (two) times a day 20mg bid   senna-docusate sodium (SENOKOT S) 8.6-50 mg per tablet Past Month  No Yes   Sig: Take 2 tablets by mouth daily at bedtime   Patient taking differently: Take 2 tablets by mouth daily at bedtime as needed   tamsulosin (FLOMAX) 0.4 mg 2023 Self No Yes   Sig: Take 1 capsule (0.4 mg total) by mouth daily with dinner   torsemide (DEMADEX) 20 mg tablet 2023  No Yes   Sig: Take 2 tablets (40 mg total) by mouth 2 (two) times a day   Patient taking differently: Take 60 mg by mouth daily      Facility-Administered Medications: None     Allergies   Allergen Reactions    Mirtazapine Other (See Comments) and Confusion     Pt drove without knowing and woke up at a new destination Objective   Vitals: There were no vitals taken for this visit. No intake/output data recorded. Invasive Devices       None                   PHYSICAL EXAM  Const: Appears healthy and well developed. No signs of acute distress present. Resp: Respirations are regular and unlabored. CV: Rate is regular. Rhythm is regular. Abdomen: Abdomen is soft, nontender, and nondistended. Kidneys are not palpable. : nl  Psych: Patient's attitude is cooperative. Mood is normal. Affect is normal.    Lab Results: I have personally reviewed pertinent reports. CBC: No results found for: "WBC", "HGB", "HCT", "MCV", "PLT", "ADJUSTEDWBC", "RBC", "MCH", "MCHC", "RDW", "MPV", "NRBC"  CMP: No results found for: "SODIUM", "CL", "CO2", "ANIONGAP", "BUN", "CREATININE", "GLUCOSE", "CALCIUM", "AST", "ALT", "ALKPHOS", "PROT", "BILITOT", "EGFR"  Urinalysis: No results found for: "COLORU", "CLARITYU", "[place]we", "PHUR", "LEUKOCYTESUR", "NITRITE", "PROTEINUA", "GLUCOSEU", "Tasia Rigo", "Mendoza Mario", "BLOODU"  Urine Culture: No results found for: "Christy Rom"  PSA: No results found for: "PSA"  Imaging Studies: I have personally reviewed pertinent reports. and I have personally reviewed pertinent films in PACS  EKG, Pathology, and Other Studies: I have personally reviewed pertinent reports. and I have personally reviewed pertinent films in PACS    Code Status: Prior  Advance Directive and Living Will: Yes    Power of :    POLST:      Counseling / Coordination of Care  Total floor / unit time spent today    minutes. Greater than 50% of total time was spent with the patient and / or family counseling and / or coordination of care.  A description of the counseling / coordination of care:

## 2023-11-06 NOTE — OP NOTE
OPERATIVE REPORT  PATIENT NAME: Sid Sampson    :  1960  MRN: [id number]  Pt Location: [facility] OR ROOM 01    SURGERY DATE: 2023    Surgeon(s) and Role:     * Sun Bolivar MD - Primary    Preop Diagnosis:  Malignant neoplasm of lateral wall of urinary bladder ([address]) [C67.2]    Post-Op Diagnosis Codes:     * Malignant neoplasm of lateral wall of urinary bladder ([address]) [C67.2]    Procedure(s):  CYSTOSCOPY and possible bladder biopsies and TURBT    Specimen(s):  * No specimens in log *    Estimated Blood Loss:   Minimal    Drains:  * No LDAs found *    Anesthesia Type:   General    Operative Indications:  Malignant neoplasm of lateral wall of urinary bladder (HCC) [C67.2]      Operative Findings:  Normal anterior and posterior urethra average size prostate minimally obstructing. The bladder showed no gross evidence of tumors there is just some mild erythema over the trigone region left greater than right. There is mild trabeculation no stones. Complications:   None    Procedure and Technique:  Patient was placed in dorsolithotomy position while awake then prepped and draped in usual sterile fashion after sedation. The bladder was inspected using a 22 [place] scope and a 30 and 70 degree lens above finding noted. The erythema was biopsied in 3 areas over the left and right trigone regions and they were sent as a specimen. Hemostasis was then achieved with electrocautery. The bladder was emptied patient tolerated well   I was present for the entire procedure.     Patient Disposition:  hemodynamically stable        SIGNATURE: Sun Bolivar MD  DATE: 2023  TIME: 7:55 AM

## 2023-11-13 PROCEDURE — 88360 TUMOR IMMUNOHISTOCHEM/MANUAL: CPT | Performed by: PATHOLOGY

## 2023-11-13 PROCEDURE — 88305 TISSUE EXAM BY PATHOLOGIST: CPT | Performed by: PATHOLOGY

## 2023-11-13 PROCEDURE — 88341 IMHCHEM/IMCYTCHM EA ADD ANTB: CPT | Performed by: PATHOLOGY

## 2023-11-13 PROCEDURE — 88342 IMHCHEM/IMCYTCHM 1ST ANTB: CPT | Performed by: PATHOLOGY

## 2023-11-14 ENCOUNTER — TELEPHONE (OUTPATIENT)
Dept: UROLOGY | Facility: CLINIC | Age: 63
End: 2023-11-14

## 2023-11-14 ENCOUNTER — TELEPHONE (OUTPATIENT)
Dept: RADIOLOGY | Facility: CLINIC | Age: 63
End: 2023-11-14

## 2023-11-14 NOTE — TELEPHONE ENCOUNTER
Patient returned call. The above message was relayed to the patient. Patient verbalized understanding and will await call back with further instructions, like the message above states.

## 2023-11-14 NOTE — TELEPHONE ENCOUNTER
Spoke with patient he stated someone from  reached out to him and told him that he is not a candidate for a STIM- no reason provided. He said he want exactly sure what doctors office they were from. He said he was told that  is the only place that does the permanent implant from Dr. Sheila Santoyo so it might have been whoever that might be. Please advise- should I have Rich reach out to patient to see if he has any recommendations?

## 2023-11-14 NOTE — TELEPHONE ENCOUNTER
Regarding: Stimulator education  Contact: [phone]  ----- Message from Sunni Claudio RN sent at 11/14/2023  1:16 PM EST -----       ----- Message from Corrie Hernandez to Dominic Lopez RN sent at 11/14/2023 11:34 AM -----   Dear Ora Ocampo,    I have not received a call from this company. However, I did receive a call from [facility] which told me that I did not qualify for a stimulator. I could not receive a specific reason from [facility] except that after reviewing my medical records, that I would not qualify. Thank you for following up, it is appreciated.      ----- Message -----       From: (proxy for Dominic Lopez RN)       Sent:11/13/2023 10:18 AM EST         To:Dominick Irving    Subject:Stimulator education    Luis Fernando Tan,  The rep from [facility] [address] [address] has been trying to reach out to set up an education session. Have you received his calls?   Thanks  Sanjeev Brink, [facility]

## 2023-11-14 NOTE — TELEPHONE ENCOUNTER
Cecilia Reeves MD  [facility] [place] [facility]  Let patient know that I am out of town however his path report came back no evidence of malignancy that is a very good report. Let him know I will talk to the nurse coordinator in [facility] regarding the option for maintenance BCG and will get back to him when I am back in town later in the week thanks      Left messaged for pt to call office back. Please relay message above.

## 2023-11-16 ENCOUNTER — TELEPHONE (OUTPATIENT)
Dept: CARDIOLOGY CLINIC | Facility: CLINIC | Age: 63
End: 2023-11-16

## 2023-11-16 NOTE — TELEPHONE ENCOUNTER
----- Message from Felix Oliveira MD sent at 11/16/2023  8:39 AM EST -----  Spoke with the patient regarding his pathology, please get patient in with me for a EDWIN PSA cytology and local cystoscopy in early March 2024 thanks

## 2023-11-21 ENCOUNTER — OFFICE VISIT (OUTPATIENT)
Age: 63
End: 2023-11-21
Payer: MEDICARE

## 2023-11-21 VITALS
SYSTOLIC BLOOD PRESSURE: 158 MMHG | WEIGHT: 233 LBS | OXYGEN SATURATION: 95 % | TEMPERATURE: 97.7 F | DIASTOLIC BLOOD PRESSURE: 85 MMHG | BODY MASS INDEX: 35.31 KG/M2 | HEIGHT: 68 IN | HEART RATE: 90 BPM

## 2023-11-21 DIAGNOSIS — I50.33 ACUTE ON CHRONIC DIASTOLIC HEART FAILURE (HCC): ICD-10-CM

## 2023-11-21 DIAGNOSIS — M54.42 CHRONIC BILATERAL LOW BACK PAIN WITH BILATERAL SCIATICA: Primary | ICD-10-CM

## 2023-11-21 DIAGNOSIS — R20.2 PARESTHESIA OF BILATERAL LEGS: ICD-10-CM

## 2023-11-21 DIAGNOSIS — Z71.89 COUNSELING AND COORDINATION OF CARE: ICD-10-CM

## 2023-11-21 DIAGNOSIS — C67.3 MALIGNANT NEOPLASM OF ANTERIOR WALL OF URINARY BLADDER (HCC): ICD-10-CM

## 2023-11-21 DIAGNOSIS — M54.41 CHRONIC BILATERAL LOW BACK PAIN WITH BILATERAL SCIATICA: Primary | ICD-10-CM

## 2023-11-21 DIAGNOSIS — M54.42 CHRONIC BILATERAL LOW BACK PAIN WITH BILATERAL SCIATICA: ICD-10-CM

## 2023-11-21 DIAGNOSIS — G89.29 CHRONIC BILATERAL LOW BACK PAIN WITH BILATERAL SCIATICA: ICD-10-CM

## 2023-11-21 DIAGNOSIS — R53.81 PHYSICAL DECONDITIONING: ICD-10-CM

## 2023-11-21 DIAGNOSIS — G89.3 CANCER-RELATED PAIN: ICD-10-CM

## 2023-11-21 DIAGNOSIS — M54.41 CHRONIC BILATERAL LOW BACK PAIN WITH BILATERAL SCIATICA: ICD-10-CM

## 2023-11-21 DIAGNOSIS — I50.32 CHRONIC DIASTOLIC HEART FAILURE (HCC): ICD-10-CM

## 2023-11-21 DIAGNOSIS — Z51.5 PALLIATIVE CARE BY SPECIALIST: ICD-10-CM

## 2023-11-21 DIAGNOSIS — G89.29 CHRONIC BILATERAL LOW BACK PAIN WITH BILATERAL SCIATICA: Primary | ICD-10-CM

## 2023-11-21 PROCEDURE — 99214 OFFICE O/P EST MOD 30 MIN: CPT | Performed by: PHYSICIAN ASSISTANT

## 2023-11-21 RX ORDER — AMITRIPTYLINE HYDROCHLORIDE 50 MG/1
50 TABLET, FILM COATED ORAL
COMMUNITY

## 2023-11-21 RX ORDER — OXYCODONE HCL 40 MG/1
40 TABLET, FILM COATED, EXTENDED RELEASE ORAL EVERY 8 HOURS SCHEDULED
Qty: 90 TABLET | Refills: 0 | Status: SHIPPED | OUTPATIENT
Start: 2023-11-21

## 2023-11-21 NOTE — PROGRESS NOTES
Follow-up with Palliative and Supportive Care  Ethan Correa 58 y.o. male [id number]    ASSESSMENT & PLAN:  1. Chronic bilateral low back pain with bilateral sciatica    2. Paresthesia of bilateral legs    3. Physical deconditioning    4. Chronic diastolic heart failure ([address])    5. Malignant neoplasm of anterior wall of urinary bladder (HCC)    6. Acute on chronic diastolic heart failure ([address])    7. Palliative care by specialist    8. Counseling and coordination of care    9. Cancer-related pain      Pain is poorly controlled -   Unfortunately his pain has been refractory to opioid therapy. Follow up with spine and pain specialist is highly recommended for review of additional interventions. If escalating opioid regimen, would increase long acting oxycodone and keep dilaudid the same. Could consider methadone therapy for pain - though he had tried years back and had insomnia as side effect. Continue integrative medicine - acupuncture, chiropractor  Refer to Physical therapy. They are to discus EMG/NCS with PCP. Return in about 4 weeks (around 12/19/2023). Emotional support provided. Medication safety issues addressed - no driving under the influence of narcotics (including opioids), watch for adverse effects including AMS or respiratory depression (slowed breathing), keep medications stored in a safe/locked environment, do not use alcohol while opioids or other narcotics are in one's system. Reviewed recent notes, labs, imaging. Requested Prescriptions      No prescriptions requested or ordered in this encounter       There are no discontinued medications. Ethan Correa was seen today for symptoms and planning cares related to above illnesses. I have reviewed the patient's controlled substance dispensing history in the Prescription Drug Monitoring Program in compliance with the [facility] regulations before prescribing any controlled substances. They are invited to continue to follow with us.   If there are questions or concerns, please contact us through our clinic/answering service 24 hours a day, seven days a week. 60 minutes were spent in this ambulatory visit with greater than 50% of the time spent face to face with patient and family in counseling or coordination of care including symptom assessment and management, medication review, psychosocial support, chart review, imaging review, lab review, supportive listening, and anticipatory guidance. All of the patient's questions were answered during this discussion. Visit Information    Accompanied By: Spouse    Source of History: Self, Spouse    History Limitations: None    Contacts: Extended Emergency Contact Information  Primary Emergency Contact: Cami Irving  Mobile Phone: [phone]  Relation: Spouse  Secondary Emergency Contact: [address] [facility]  Mobile Phone: [phone]  Relation: Son     SUBJECTIVE:  Chief Complaint   Patient presents with    Follow-up        HPI  Petros Laboy is a 58 y.o. male with hx of high-grade papillary urothelial carcinoma and chronic low back pain presents for follow up for symptom management. Follows with Dr. Matt Taylor (urology), Dr. Sailaja Burt (PCP), and Dr. Earlene Salcido (Pain Mgmt). Patient reports good news and bad news today. He says he is officially cancer-free! He is entering a period of monitoring going forward. The bad news is that he continues to struggle with pain and has worsening mobility and function. He was seen by pain management who had recommended spinal cord stimulator that had to be done through [facility]. He was told that he did not qualify for reasons unknown. He had a failed epidural injection as well recently and due to this he feels discouraged in continuing to follow with pain management. His wife was inquiring about  EMG/NCS to better localize the source of his pain. He has been rotated to dilaudid since our last visit and he does not appreciate a significant difference.  Escalation of opioids with this patient in the past has always been unsuccessful in managing his symptoms. He has had 2 visits with acupuncturist in [facility] and feels this provides a small benefit and intends to continue to give it a try for now. He has been eating healthier with intention of losing some weight. PDMP shows no concerns. The following portions of the medical history were reviewed: past medical history, surgical history, problem list, medication list, family history, and social history. Current Outpatient Medications:     amitriptyline (ELAVIL) 50 mg tablet, Take 50 mg by mouth daily at bedtime, Disp: , Rfl:     aspirin 81 mg chewable tablet, Chew 81 mg daily, Disp: , Rfl:     atorvastatin (LIPITOR) 40 mg tablet, , Disp: , Rfl:     co-enzyme Q-10 30 MG capsule, Take 100 mg by mouth daily , Disp: , Rfl:     Farxiga 5 MG TABS, 5 mg daily 5mg alternating with 2.5mg for fluid retention, Disp: , Rfl:     gabapentin (NEURONTIN) 100 mg capsule, Take 1 capsule (100 mg total) by mouth 3 (three) times a day Takes a total of 700 mg three times daily, Disp: 90 capsule, Rfl: 3    HYDROmorphone (DILAUDID) 8 MG tablet, Take 1 tablet (8 mg total) by mouth every 4 (four) hours as needed for moderate pain Max Daily Amount: 48 mg, Disp: 180 tablet, Rfl: 0    methocarbamol (ROBAXIN) 500 mg tablet, Take 1 tablet (500 mg total) by mouth every 6 (six) hours, Disp: 120 tablet, Rfl: 0    metolazone (ZAROXOLYN) 2.5 mg tablet, Take 2.5 mg by mouth daily as needed Patient reports use of this medication daily, prescribed by cardiology. , Disp: , Rfl:     multivitamin-iron-minerals-folic acid (CENTRUM) chewable tablet, Chew 1 tablet daily, Disp: , Rfl:     nystatin (MYCOSTATIN) powder, Apply topically 4 (four) times a day, Disp: 60 g, Rfl: 3    phenazopyridine (PYRIDIUM) 200 mg tablet, Take 1 tablet (200 mg total) by mouth 3 (three) times a day with meals, Disp: 10 tablet, Rfl: 0    potassium chloride (K-DUR,KLOR-CON) 10 mEq tablet, 20mg bid (Patient taking differently: 2 (two) times a day 20mg bid), Disp: 90 tablet, Rfl: 3    senna-docusate sodium (SENOKOT S) 8.6-50 mg per tablet, Take 2 tablets by mouth daily at bedtime (Patient taking differently: Take 2 tablets by mouth daily at bedtime as needed), Disp: 60 tablet, Rfl: 0    tamsulosin (FLOMAX) 0.4 mg, Take 1 capsule (0.4 mg total) by mouth daily with dinner, Disp: 90 capsule, Rfl: 3    torsemide (DEMADEX) 20 mg tablet, Take 2 tablets (40 mg total) by mouth 2 (two) times a day (Patient taking differently: Take 60 mg by mouth daily), Disp: , Rfl: 0    oxyCODONE (OxyCONTIN) 40 mg 12 hr tablet, Take 1 tablet (40 mg total) by mouth every 8 (eight) hours Max Daily Amount: 120 mg, Disp: 90 tablet, Rfl: 0    Past medical, surgical, social, and family histories are reviewed and pertinent updates are made. Review of Systems   Constitutional:  Negative for activity change, appetite change, fatigue and unexpected weight change. HENT:  Negative for mouth sores, trouble swallowing and voice change. Eyes: Negative. Respiratory:  Negative for shortness of breath. Cardiovascular:  Negative for chest pain. Gastrointestinal:  Negative for abdominal pain, constipation, diarrhea, nausea and vomiting. Genitourinary: Negative. Musculoskeletal:  Positive for arthralgias, back pain, gait problem and myalgias. Skin:  Negative for color change, pallor and wound. Neurological:  Negative for dizziness, weakness, light-headedness and headaches. Hematological: Negative. Psychiatric/Behavioral:  Negative for confusion and sleep disturbance. The patient is not nervous/anxious. OBJECTIVE:  /85   Pulse 90   Temp 97.7 °F (36.5 °C)   Ht 5' 8" (1.727 m)   Wt 106 kg (233 lb)   SpO2 95%   BMI 35.43 kg/m²   Physical Exam  Nursing note reviewed. Constitutional:       General: He is in acute distress (mild discomfort, intermittent back spasms). Appearance: He is overweight. Comments: walker   HENT:      Head: Normocephalic and atraumatic. Right Ear: External ear normal.      Left Ear: External ear normal.      Nose: Nose normal.      Mouth/Throat:      Pharynx: Oropharynx is clear. Eyes:      Extraocular Movements: Extraocular movements intact. Cardiovascular:      Rate and Rhythm: Normal rate. Pulmonary:      Effort: Pulmonary effort is normal.   Abdominal:      General: Abdomen is flat. Musculoskeletal:         General: No deformity. Skin:     Findings: No rash. Neurological:      Mental Status: He is alert and oriented to person, place, and time. Psychiatric:         Mood and Affect: Mood normal.         Behavior: Behavior normal.          Orquidea Phillip PA-C  [facility] Palliative and Supportive Care  [phone]    Portions of this document may have been created using dictation software and as such some "sound alike" terms may have been generated by the system. Do not hesitate to contact me with any questions or clarifications.

## 2023-11-22 ENCOUNTER — TELEPHONE (OUTPATIENT)
Dept: PSYCHIATRY | Facility: CLINIC | Age: 63
End: 2023-11-22

## 2023-11-22 ENCOUNTER — OFFICE VISIT (OUTPATIENT)
Dept: FAMILY MEDICINE CLINIC | Facility: CLINIC | Age: 63
End: 2023-11-22
Payer: MEDICARE

## 2023-11-22 VITALS
HEIGHT: 68 IN | DIASTOLIC BLOOD PRESSURE: 57 MMHG | OXYGEN SATURATION: 97 % | WEIGHT: 237 LBS | HEART RATE: 76 BPM | BODY MASS INDEX: 35.92 KG/M2 | SYSTOLIC BLOOD PRESSURE: 125 MMHG

## 2023-11-22 DIAGNOSIS — M79.605 BILATERAL LEG PAIN: ICD-10-CM

## 2023-11-22 DIAGNOSIS — G89.29 CHRONIC INTRACTABLE PAIN: Primary | ICD-10-CM

## 2023-11-22 DIAGNOSIS — M79.604 BILATERAL LEG PAIN: ICD-10-CM

## 2023-11-22 DIAGNOSIS — G89.4 CHRONIC PAIN SYNDROME: ICD-10-CM

## 2023-11-22 PROCEDURE — 99214 OFFICE O/P EST MOD 30 MIN: CPT | Performed by: FAMILY MEDICINE

## 2023-11-22 RX ORDER — GABAPENTIN 600 MG/1
600 TABLET ORAL 3 TIMES DAILY
Qty: 90 TABLET | Refills: 3 | Status: SHIPPED | OUTPATIENT
Start: 2023-11-22

## 2023-11-22 NOTE — PROGRESS NOTES
Assessment/Plan:       1. Chronic intractable pain  Comments:  unacceptable level of pain control  is doing alternative tx  pain meds have not helped  will increase long acting  Orders:  -     EMG 2 limb lower extremity; Future  -     Ambulatory referral to Spine & Pain Management; Future  -     gabapentin (Neurontin) 600 MG tablet; Take 1 tablet (600 mg total) by mouth 3 (three) times a day    2. Bilateral leg pain  Comments:  check an emg  possible radiculopathy  Orders:  -     EMG 2 limb lower extremity; Future  -     Ambulatory referral to Spine & Pain Management; Future  -     gabapentin (Neurontin) 600 MG tablet; Take 1 tablet (600 mg total) by mouth 3 (three) times a day    3. Chronic pain syndrome  Comments:  will ocnsider alternative tx  pump/ketamine/accupuncture  will decrease narcotics  pain seems radicular/neuropathic  switch to dilaudid  Orders:  -     EMG 2 limb lower extremity; Future  -     Ambulatory referral to Spine & Pain Management; Future  -     gabapentin (Neurontin) 600 MG tablet; Take 1 tablet (600 mg total) by mouth 3 (three) times a day          Subjective:      Patient ID: Petros Laboy is a 58 y.o. male. Shiva Todd is still in a lot of pain. I reviewed the note from palliative care. Organ to check an EMG. He is going to acupuncture which is somewhat helpful. He seems tolerant to the pain medication. I am can refer him to a different pain management for a second opinion. Abdominal Pain  This is a recurrent problem. The current episode started more than 1 month ago. The onset quality is sudden. The problem occurs every several days. The most recent episode lasted 3 months. The problem has been gradually worsening. The pain is located in the RLQ. The pain is at a severity of 8/10. The quality of the pain is aching. The abdominal pain radiates to the RLQ, back and right flank. Associated symptoms include anorexia, arthralgias, myalgias and weight loss.  Pertinent negatives include no belching, constipation, diarrhea, dysuria, fever, flatus, frequency, headaches, hematochezia, hematuria, melena, nausea or vomiting. The pain is aggravated by movement. The pain is relieved by Activity and being still. The following portions of the patient's history were reviewed and updated as appropriate: allergies, current medications, past family history, past medical history, past social history, past surgical history, and problem list.    Review of Systems   Constitutional:  Positive for weight loss. Negative for fever. Respiratory: Negative. Cardiovascular: Negative. Gastrointestinal:  Positive for abdominal pain and anorexia. Negative for constipation, diarrhea, flatus, hematochezia, melena, nausea and vomiting. Genitourinary:  Negative for dysuria, frequency and hematuria. Musculoskeletal:  Positive for arthralgias, back pain and myalgias. Neurological:  Negative for headaches. Psychiatric/Behavioral:  Positive for depression. Objective:      /57 (BP Location: Left arm, Patient Position: Sitting, Cuff Size: Large)   Pulse 76   Ht 5' 8" (1.727 m)   Wt 108 kg (237 lb)   SpO2 97%   BMI 36.04 kg/m²          Physical Exam  Vitals and nursing note reviewed. Constitutional:       Appearance: Normal appearance. HENT:      Head: Normocephalic and atraumatic. Nose: Nose normal.      Mouth/Throat:      Mouth: Mucous membranes are moist.   Eyes:      Extraocular Movements: Extraocular movements intact. Pupils: Pupils are equal, round, and reactive to light. Cardiovascular:      Rate and Rhythm: Normal rate and regular rhythm. Pulses: Normal pulses. Pulmonary:      Effort: Pulmonary effort is normal.      Breath sounds: Normal breath sounds. Abdominal:      General: Bowel sounds are normal.      Palpations: Abdomen is soft. Musculoskeletal:      Cervical back: Normal range of motion. Skin:     General: Skin is warm and dry.       Capillary Refill: Capillary refill takes less than 2 seconds. Neurological:      General: No focal deficit present. Mental Status: He is alert.    Psychiatric:         Mood and Affect: Mood normal.

## 2023-11-22 NOTE — TELEPHONE ENCOUNTER
Called spoke with pt about referral for services. Pt reported that services are not needed at this time, so referral will be closed.

## 2023-11-27 DIAGNOSIS — M54.41 CHRONIC BILATERAL LOW BACK PAIN WITH BILATERAL SCIATICA: Primary | ICD-10-CM

## 2023-11-27 DIAGNOSIS — M54.42 CHRONIC BILATERAL LOW BACK PAIN WITH BILATERAL SCIATICA: Primary | ICD-10-CM

## 2023-11-27 DIAGNOSIS — G89.29 CHRONIC BILATERAL LOW BACK PAIN WITH BILATERAL SCIATICA: Primary | ICD-10-CM

## 2023-11-27 PROBLEM — Z00.00 MEDICARE ANNUAL WELLNESS VISIT, SUBSEQUENT: Status: RESOLVED | Noted: 2023-09-28 | Resolved: 2023-11-27

## 2023-11-28 DIAGNOSIS — G89.4 CHRONIC PAIN SYNDROME: ICD-10-CM

## 2023-11-28 DIAGNOSIS — F11.90 OPIATE USE: Primary | ICD-10-CM

## 2023-11-28 RX ORDER — HYDROMORPHONE HYDROCHLORIDE 8 MG/1
8 TABLET ORAL EVERY 4 HOURS PRN
Qty: 180 TABLET | Refills: 0 | Status: SHIPPED | OUTPATIENT
Start: 2023-11-28

## 2023-11-28 RX ORDER — NALOXONE HYDROCHLORIDE 4 MG/.1ML
SPRAY NASAL
Qty: 1 EACH | Refills: 1 | Status: SHIPPED | OUTPATIENT
Start: 2023-11-28 | End: 2024-11-27

## 2023-11-28 RX ORDER — METOLAZONE 2.5 MG/1
TABLET ORAL
Qty: 30 TABLET | Refills: 0 | Status: SHIPPED | OUTPATIENT
Start: 2023-11-28

## 2023-12-11 ENCOUNTER — OFFICE VISIT (OUTPATIENT)
Dept: FAMILY MEDICINE CLINIC | Facility: CLINIC | Age: 63
End: 2023-12-11
Payer: MEDICARE

## 2023-12-11 VITALS
HEIGHT: 68 IN | WEIGHT: 241 LBS | OXYGEN SATURATION: 98 % | SYSTOLIC BLOOD PRESSURE: 135 MMHG | DIASTOLIC BLOOD PRESSURE: 63 MMHG | BODY MASS INDEX: 36.53 KG/M2 | HEART RATE: 64 BPM

## 2023-12-11 DIAGNOSIS — G89.29 CHRONIC BILATERAL LOW BACK PAIN WITH BILATERAL SCIATICA: ICD-10-CM

## 2023-12-11 DIAGNOSIS — M54.12 CERVICAL RADICULOPATHY: Primary | ICD-10-CM

## 2023-12-11 DIAGNOSIS — M54.41 CHRONIC BILATERAL LOW BACK PAIN WITH BILATERAL SCIATICA: ICD-10-CM

## 2023-12-11 DIAGNOSIS — G89.4 CHRONIC PAIN SYNDROME: ICD-10-CM

## 2023-12-11 DIAGNOSIS — M54.42 CHRONIC BILATERAL LOW BACK PAIN WITH BILATERAL SCIATICA: ICD-10-CM

## 2023-12-11 PROCEDURE — 99214 OFFICE O/P EST MOD 30 MIN: CPT | Performed by: FAMILY MEDICINE

## 2023-12-11 RX ORDER — HYDROMORPHONE HYDROCHLORIDE 2 MG/1
2 TABLET ORAL EVERY 4 HOURS PRN
Qty: 180 TABLET | Refills: 0 | Status: SHIPPED | OUTPATIENT
Start: 2023-12-11

## 2023-12-11 NOTE — PROGRESS NOTES
Assessment/Plan:       1. Cervical radiculopathy  Comments:  check xray and mri  Orders:  -     XR spine cervical complete 4 or 5 vw non injury; Future; Expected date: 12/11/2023  -     HYDROmorphone (DILAUDID) 2 mg tablet; Take 1 tablet (2 mg total) by mouth every 4 (four) hours as needed for moderate pain Max Daily Amount: 12 mg    2. Chronic bilateral low back pain with bilateral sciatica  Assessment & Plan:  Med adjustment    Orders:  -     HYDROmorphone (DILAUDID) 2 mg tablet; Take 1 tablet (2 mg total) by mouth every 4 (four) hours as needed for moderate pain Max Daily Amount: 12 mg    3. Chronic pain syndrome  Comments:  will ocnsider alternative tx  pump/ketamine/accupuncture  will decrease narcotics  pain seems radicular/neuropathic  switch to dilaudid  Orders:  -     HYDROmorphone (DILAUDID) 2 mg tablet; Take 1 tablet (2 mg total) by mouth every 4 (four) hours as needed for moderate pain Max Daily Amount: 12 mg          Subjective:      Patient ID: Sid Sampson is a 58 y.o. male. Patient is now better his pain is a pulling I can increase his Dilaudid slightly he is seeing acupuncture and alternative means. Can you schedule. He tried to get a new appointment with a different pain management specialist to consider ketamine or a pump. He was unable to get an appointment. He is now having numbness tingling in his arms. We will pursue imaging of his cervical spine which could potentially be causing a lot of his symptoms.         The following portions of the patient's history were reviewed and updated as appropriate: allergies, current medications, past family history, past medical history, past social history, past surgical history, and problem list.    Review of Systems      Objective:      /63 (BP Location: Right arm, Patient Position: Sitting, Cuff Size: Large)   Pulse 64   Ht 5' 8" (1.727 m)   Wt 109 kg (241 lb)   SpO2 98%   BMI 36.64 kg/m²          Physical Exam

## 2023-12-12 ENCOUNTER — TELEPHONE (OUTPATIENT)
Dept: PAIN MEDICINE | Facility: CLINIC | Age: 63
End: 2023-12-12

## 2023-12-12 NOTE — TELEPHONE ENCOUNTER
My power went out when I was assisting patient he got letter in the mail that we have been trying to reach him. Please apologize on my behalf     Why did we send letter is what he is calling about.      Thanks

## 2023-12-12 NOTE — TELEPHONE ENCOUNTER
Caller: patient    Doctor: Serafin Pearson    Reason for call: returning missed call, transfer to RN    Call back#:

## 2023-12-18 ENCOUNTER — TELEPHONE (OUTPATIENT)
Dept: FAMILY MEDICINE CLINIC | Facility: CLINIC | Age: 63
End: 2023-12-18

## 2023-12-18 DIAGNOSIS — M54.41 CHRONIC BILATERAL LOW BACK PAIN WITH BILATERAL SCIATICA: ICD-10-CM

## 2023-12-18 DIAGNOSIS — E87.6 HYPOKALEMIA: ICD-10-CM

## 2023-12-18 DIAGNOSIS — G89.29 CHRONIC BILATERAL LOW BACK PAIN WITH BILATERAL SCIATICA: ICD-10-CM

## 2023-12-18 DIAGNOSIS — M54.42 CHRONIC BILATERAL LOW BACK PAIN WITH BILATERAL SCIATICA: ICD-10-CM

## 2023-12-18 DIAGNOSIS — C67.3 MALIGNANT NEOPLASM OF ANTERIOR WALL OF URINARY BLADDER (HCC): ICD-10-CM

## 2023-12-18 DIAGNOSIS — I50.33 ACUTE ON CHRONIC DIASTOLIC HEART FAILURE (HCC): ICD-10-CM

## 2023-12-18 RX ORDER — METOLAZONE 2.5 MG/1
2.5 TABLET ORAL DAILY PRN
Qty: 90 TABLET | Refills: 3 | Status: SHIPPED | OUTPATIENT
Start: 2023-12-18

## 2023-12-18 RX ORDER — POTASSIUM CHLORIDE 750 MG/1
TABLET, EXTENDED RELEASE ORAL
Qty: 90 TABLET | Refills: 0 | Status: SHIPPED | OUTPATIENT
Start: 2023-12-18 | End: 2023-12-19 | Stop reason: SDUPTHER

## 2023-12-18 RX ORDER — OXYCODONE HCL 40 MG/1
40 TABLET, FILM COATED, EXTENDED RELEASE ORAL EVERY 8 HOURS SCHEDULED
Qty: 90 TABLET | Refills: 0 | Status: SHIPPED | OUTPATIENT
Start: 2023-12-18 | End: 2023-12-27 | Stop reason: SDUPTHER

## 2023-12-18 RX ORDER — AMITRIPTYLINE HYDROCHLORIDE 50 MG/1
50 TABLET, FILM COATED ORAL
Qty: 90 TABLET | Refills: 1 | Status: SHIPPED | OUTPATIENT
Start: 2023-12-18

## 2023-12-18 RX ORDER — TORSEMIDE 20 MG/1
40 TABLET ORAL 2 TIMES DAILY
Qty: 120 TABLET | Refills: 3 | Status: SHIPPED | OUTPATIENT
Start: 2023-12-18

## 2023-12-18 NOTE — TELEPHONE ENCOUNTER
Telephone call from pharmacy questioning the potassium chloride that was sent in. They said it should be put as 20 conchis not 20 mg. Please change and resend back in.

## 2023-12-19 ENCOUNTER — OFFICE VISIT (OUTPATIENT)
Age: 63
End: 2023-12-19
Payer: MEDICARE

## 2023-12-19 VITALS
WEIGHT: 235.4 LBS | HEIGHT: 68 IN | OXYGEN SATURATION: 94 % | DIASTOLIC BLOOD PRESSURE: 70 MMHG | BODY MASS INDEX: 35.68 KG/M2 | SYSTOLIC BLOOD PRESSURE: 128 MMHG | TEMPERATURE: 98.8 F | HEART RATE: 78 BPM

## 2023-12-19 DIAGNOSIS — G89.29 CHRONIC BILATERAL LOW BACK PAIN WITH BILATERAL SCIATICA: ICD-10-CM

## 2023-12-19 DIAGNOSIS — R26.2 UNABLE TO AMBULATE: ICD-10-CM

## 2023-12-19 DIAGNOSIS — C67.3 MALIGNANT NEOPLASM OF ANTERIOR WALL OF URINARY BLADDER (HCC): ICD-10-CM

## 2023-12-19 DIAGNOSIS — R53.81 PHYSICAL DECONDITIONING: ICD-10-CM

## 2023-12-19 DIAGNOSIS — M54.41 CHRONIC BILATERAL LOW BACK PAIN WITH BILATERAL SCIATICA: ICD-10-CM

## 2023-12-19 DIAGNOSIS — C67.2 MALIGNANT NEOPLASM OF LATERAL WALL OF URINARY BLADDER (HCC): Primary | ICD-10-CM

## 2023-12-19 DIAGNOSIS — I50.32 CHRONIC DIASTOLIC HEART FAILURE (HCC): ICD-10-CM

## 2023-12-19 DIAGNOSIS — G89.3 CANCER-RELATED PAIN: ICD-10-CM

## 2023-12-19 DIAGNOSIS — F32.89 OTHER DEPRESSION: Primary | ICD-10-CM

## 2023-12-19 DIAGNOSIS — E87.6 HYPOKALEMIA: ICD-10-CM

## 2023-12-19 DIAGNOSIS — Z51.5 PALLIATIVE CARE BY SPECIALIST: ICD-10-CM

## 2023-12-19 DIAGNOSIS — R45.89 DEPRESSED MOOD: ICD-10-CM

## 2023-12-19 DIAGNOSIS — M54.42 CHRONIC BILATERAL LOW BACK PAIN WITH BILATERAL SCIATICA: ICD-10-CM

## 2023-12-19 DIAGNOSIS — Z71.89 COUNSELING AND COORDINATION OF CARE: ICD-10-CM

## 2023-12-19 DIAGNOSIS — I25.10 CORONARY ARTERY DISEASE: ICD-10-CM

## 2023-12-19 PROCEDURE — 99214 OFFICE O/P EST MOD 30 MIN: CPT | Performed by: PHYSICIAN ASSISTANT

## 2023-12-19 RX ORDER — POTASSIUM CHLORIDE 750 MG/1
20 TABLET, EXTENDED RELEASE ORAL 2 TIMES DAILY
Qty: 120 TABLET | Refills: 3 | Status: SHIPPED | OUTPATIENT
Start: 2023-12-19

## 2023-12-19 RX ORDER — VENLAFAXINE HYDROCHLORIDE 37.5 MG/1
37.5 CAPSULE, EXTENDED RELEASE ORAL
Qty: 30 CAPSULE | Refills: 5 | Status: SHIPPED | OUTPATIENT
Start: 2023-12-19

## 2023-12-19 NOTE — Clinical Note
Scheduled for:  Colonoscopy - 20157  Provider Name:  Dr. Yelitza Alcaraz  Date:  8/10/18  Location:  [facility]  Sedation:  IV  Time:  1:30 pm (pt is aware to arrive at 12:30 pm)  Prep:  Colyte, Prep instructions were given to pt over the phone, pt verbalized understanding. Shannon, update on Mr. Irving, we discussed revisiting SNRI therapy and he has agreed, would like to try Venlafaxine. Offered to send initial rx however he would like to continue to get all prescriptions from you. Besides this we had discussed improving his mobility with assistive devices. He could benefit from use of an electric scooter when outside the home, are you able to do the appropriate evals to get this covered? I also reached out to hospitals to do home assessment for modifications/chair lift. Lastly I referred him again to our LCSW for therapy. We discussed exhausting all other non-opioid related options to address his pain and mental health. I think a PCA pump is really the next step as far as his opioid regimen and looks like he has MRI tomorrow for this.  Best, [place]

## 2023-12-19 NOTE — PROGRESS NOTES
Follow-up with Palliative and Supportive Care  Dominick Irving 63 y.o. male [id number]    ASSESSMENT & PLAN:  1. Malignant neoplasm of lateral wall of urinary bladder (HCC)    2. Chronic bilateral low back pain with bilateral sciatica    3. Chronic diastolic heart failure (HCC)    4. Palliative care by specialist    5. Malignant neoplasm of anterior wall of urinary bladder (HCC)    6. Coronary artery disease    7. Counseling and coordination of care    8. Cancer-related pain    9. Physical deconditioning    10. Depressed mood        Called [facility] - they will assess for home safety, home modifications/chair lift and other assistive devices.   PCP follow up to discuss assessment for coverage of power wheelchair/scooter  Refer again to palliative LCSW for therapy  SNRI therapy for depression and chronic pain. - Recommend starting venlafaxine 37.5mg QD, can titrate up by 37.5mg every week depending on tolerance  Follow up with pain management, MRI tomorrow, needs psych eval for consideration of PCA pump.   Acupuncture provides short-term relief, pt to continue going for treatments, has 6th tx today.   Return in about 4 weeks (around 1/16/2024) for Next scheduled follow up.  Emotional support provided.  Reviewed recent notes, labs, imaging.    Requested Prescriptions      No prescriptions requested or ordered in this encounter       There are no discontinued medications.    Dominick Irving was seen today for symptoms and planning cares related to above illnesses.  I have reviewed the patient's controlled substance dispensing history in the Prescription Drug Monitoring Program in compliance with the [place] regulations before prescribing any controlled substances.    They are invited to continue to follow with us.  If there are questions or concerns, please contact us through our clinic/answering service 24 hours a day, seven days a week.    60 minutes were spent in this ambulatory visit with greater than 50% of the  time spent face to face with patient and family in counseling or coordination of care including symptom assessment and management, medication review, psychosocial support, chart review, imaging review, lab review, supportive listening, and anticipatory guidance. All of the patient's questions were answered during this discussion.    Visit Information    Accompanied By: Spouse    Source of History: Self    History Limitations: None    Contacts: Extended Emergency Contact Information  Primary Emergency Contact: Cami Irving  Mobile Phone: [phone]  Relation: Spouse  Secondary Emergency Contact: Jhon Irving  Mobile Phone: [phone]  Relation: Son     SUBJECTIVE:  Chief Complaint   Patient presents with    Follow-up    Pain     Lower back    Depression    Counseling        HPI  Dominick Irving is a 63 y.o. male with hx of high-grade papillary urothelial carcinoma and chronic low back pain presents for follow up for symptom management. Follows with Dr. Sanabria (urology), Dr. Budinetz (PCP), and Dr. Rolon (Pain Mgmt).     Since last visit, he has increased dilaudid by 2mg with no significant effect on his pain levels. He has never had significant change to his pain level or function with escalation of his opioid regimen in the past, or with rotation to new opioids. He may benefit more from a PCA pump which he has to get an MRI tomorrow and psych eval prior to consideration for placement. He still is doing acupuncture with some small benefit. He gets about 1hr of pain relief after each session which is enough value for him to continue this therapy. He was ordered to have an EMG but is unsure if this is scheduled anytime soon.     Apart from his pain, he struggles with his mental well-being lately. He finds it challenging to cope with his loss of function and independence, and feeling like a burden to others. He was previously referred for therapy to our LCSW, but had never scheduled appointment due to  confusion with multiple doctor visits/phone calls at the time. JOSELINE eis requesting another referral. He was also on a starting dose of Cymbalta in the past which was discontinued as it did not provide any relief. We reviewed SNRI therapy in length and patient agrees that it would be worth trying again for a longer course to allow up-titration to an effective dose. He would like to continue to receive all of his rxs through his PCP and requests this recommendation be forwarded to him for consideration.    We had discussed exploring assistive devices to help improve mobility and QoL, such as electric scooter/wheelchair, chair lift at home, ramps and handles, etc... [facility] was contacted and will be calling patient to set up a home evaluation for home modifications and assistive devices. Patient will follow up with PCP to discuss wheelchair/electric scooter assessment as well.     PDMP shows no concerns.    The following portions of the medical history were reviewed: past medical history, surgical history, problem list, medication list, family history, and social history.      Current Outpatient Medications:     amitriptyline (ELAVIL) 50 mg tablet, Take 1 tablet (50 mg total) by mouth daily at bedtime, Disp: 90 tablet, Rfl: 1    aspirin 81 mg chewable tablet, Chew 81 mg daily, Disp: , Rfl:     atorvastatin (LIPITOR) 40 mg tablet, , Disp: , Rfl:     co-enzyme Q-10 30 MG capsule, Take 100 mg by mouth daily , Disp: , Rfl:     Farxiga 5 MG TABS, 5 mg daily 5mg alternating with 2.5mg for fluid retention, Disp: , Rfl:     gabapentin (NEURONTIN) 100 mg capsule, Take 1 capsule (100 mg total) by mouth 3 (three) times a day Takes a total of 700 mg three times daily, Disp: 90 capsule, Rfl: 3    gabapentin (Neurontin) 600 MG tablet, Take 1 tablet (600 mg total) by mouth 3 (three) times a day, Disp: 90 tablet, Rfl: 3    HYDROmorphone (DILAUDID) 2 mg tablet, Take 1 tablet (2 mg total) by mouth every 4 (four) hours as  needed for moderate pain Max Daily Amount: 12 mg, Disp: 180 tablet, Rfl: 0    HYDROmorphone (DILAUDID) 8 MG tablet, Take 1 tablet (8 mg total) by mouth every 4 (four) hours as needed for moderate pain Max Daily Amount: 48 mg, Disp: 180 tablet, Rfl: 0    metolazone (ZAROXOLYN) 2.5 mg tablet, Take 1 tablet (2.5 mg total) by mouth daily as needed (edema), Disp: 90 tablet, Rfl: 3    multivitamin-iron-minerals-folic acid (CENTRUM) chewable tablet, Chew 1 tablet daily, Disp: , Rfl:     naloxone (NARCAN) 4 mg/0.1 mL nasal spray, Administer 1 spray into a nostril. If no response after 2-3 minutes, give another dose in the other nostril using a new spray., Disp: 1 each, Rfl: 1    oxyCODONE (OxyCONTIN) 40 mg 12 hr tablet, Take 1 tablet (40 mg total) by mouth every 8 (eight) hours Max Daily Amount: 120 mg, Disp: 90 tablet, Rfl: 0    tamsulosin (FLOMAX) 0.4 mg, Take 1 capsule (0.4 mg total) by mouth daily with dinner, Disp: 90 capsule, Rfl: 3    torsemide (DEMADEX) 20 mg tablet, Take 2 tablets (40 mg total) by mouth 2 (two) times a day, Disp: 120 tablet, Rfl: 3    potassium chloride (K-DUR,KLOR-CON) 10 mEq tablet, Take 2 tablets (20 mEq total) by mouth 2 (two) times a day, Disp: 120 tablet, Rfl: 3    Past medical, surgical, social, and family histories are reviewed and pertinent updates are made.    Review of Systems   Constitutional:  Positive for activity change and fatigue. Negative for appetite change and unexpected weight change.   HENT:  Negative for mouth sores, trouble swallowing and voice change.    Eyes: Negative.    Respiratory:  Negative for shortness of breath.    Cardiovascular:  Negative for chest pain.   Gastrointestinal:  Negative for abdominal pain, constipation, diarrhea, nausea and vomiting.   Genitourinary: Negative.    Musculoskeletal:  Positive for arthralgias, back pain, gait problem and myalgias.   Skin:  Negative for color change, pallor and wound.   Neurological:  Negative for dizziness, weakness,  "light-headedness and headaches.   Hematological: Negative.    Psychiatric/Behavioral:  Positive for dysphoric mood. Negative for confusion and sleep disturbance. The patient is not nervous/anxious.          OBJECTIVE:  /70   Pulse 78   Temp 98.8 °F (37.1 °C)   Ht 5' 8\" (1.727 m)   Wt 107 kg (235 lb 6.4 oz)   SpO2 94%   BMI 35.79 kg/m²   Physical Exam  Nursing note reviewed.   Constitutional:       General: He is in acute distress (intermittent back spasms).      Appearance: He is overweight.      Comments: walker   HENT:      Head: Normocephalic and atraumatic.      Right Ear: External ear normal.      Left Ear: External ear normal.      Nose: Nose normal.      Mouth/Throat:      Pharynx: Oropharynx is clear.   Eyes:      Extraocular Movements: Extraocular movements intact.   Cardiovascular:      Rate and Rhythm: Normal rate.   Pulmonary:      Effort: Pulmonary effort is normal.   Abdominal:      General: Abdomen is flat.   Musculoskeletal:         General: No deformity.   Skin:     Findings: No rash.   Neurological:      Mental Status: He is alert and oriented to person, place, and time.   Psychiatric:         Mood and Affect: Mood normal. Affect is tearful.         Behavior: Behavior normal.          Lake Connors PA-C  [facility]  [phone]    Portions of this document may have been created using dictation software and as such some \"sound alike\" terms may have been generated by the system. Do not hesitate to contact me with any questions or clarifications.     "

## 2023-12-20 ENCOUNTER — EVALUATION (OUTPATIENT)
Dept: PHYSICAL THERAPY | Facility: CLINIC | Age: 63
End: 2023-12-20
Payer: MEDICARE

## 2023-12-20 ENCOUNTER — TELEMEDICINE (OUTPATIENT)
Dept: PALLIATIVE MEDICINE | Facility: CLINIC | Age: 63
End: 2023-12-20

## 2023-12-20 ENCOUNTER — HOSPITAL ENCOUNTER (OUTPATIENT)
Dept: MRI IMAGING | Facility: HOSPITAL | Age: 63
Discharge: HOME | End: 2023-12-20
Attending: ANESTHESIOLOGY
Payer: MEDICARE

## 2023-12-20 DIAGNOSIS — R53.81 PHYSICAL DECONDITIONING: ICD-10-CM

## 2023-12-20 DIAGNOSIS — R20.2 PARESTHESIA OF BILATERAL LEGS: ICD-10-CM

## 2023-12-20 DIAGNOSIS — M54.42 CHRONIC BILATERAL LOW BACK PAIN WITH BILATERAL SCIATICA: Primary | ICD-10-CM

## 2023-12-20 DIAGNOSIS — Z71.89 COUNSELING AND COORDINATION OF CARE: Primary | ICD-10-CM

## 2023-12-20 DIAGNOSIS — Z96.89 SPINAL CORD STIMULATOR STATUS: ICD-10-CM

## 2023-12-20 DIAGNOSIS — M54.41 CHRONIC BILATERAL LOW BACK PAIN WITH BILATERAL SCIATICA: Primary | ICD-10-CM

## 2023-12-20 DIAGNOSIS — R26.9 GAIT ABNORMALITY: ICD-10-CM

## 2023-12-20 DIAGNOSIS — G89.29 CHRONIC BILATERAL LOW BACK PAIN WITH BILATERAL SCIATICA: Primary | ICD-10-CM

## 2023-12-20 PROCEDURE — 99024 POSTOP FOLLOW-UP VISIT: CPT

## 2023-12-20 PROCEDURE — 97535 SELF CARE MNGMENT TRAINING: CPT

## 2023-12-20 PROCEDURE — 97163 PT EVAL HIGH COMPLEX 45 MIN: CPT

## 2023-12-20 PROCEDURE — G1004 CDSM NDSC: HCPCS

## 2023-12-20 PROCEDURE — 72146 MRI CHEST SPINE W/O DYE: CPT

## 2023-12-20 NOTE — PROGRESS NOTES
Palliative Outpatient Assessment of Need    LCSW completed an assessment of need which was completed with (patient, family, or both) in the office or via phone/video conference    Relationship status:   Duration of relationship:  33 years  Name of significant other: Brenda  Children and Ages:  1 son, emily, 2 step sons  Pets: 1 dog  Other important family information: son lives 1 block away  Living situation (where and whom): W/ wife   Patient's primary caregiver:  wife and himself, needs help dressing and showering  Any limitations of caregiver:  Environmental concerns or barriers: mobility and pain   history: none  Employment history/source of income: social security income, used to be  for [employer]  Disability:  yes  Concerns regarding literacy:   Spirituality/ Hoahaoism:  spiritual, raised Sabianism revisiting spirituality now as older  Patient's strengths, social supports, and resources: organizing, planning, honest, introspective, brenda and emily good supports and youngest sister  Cultural information:   Mental Health current or previous: depression, anxiety  Substance use or history:   Sleep: sleeps 1 1/2 hour at a time on and off  Exercise:  Diet/nutrition: good appetite, eating and cooking healthier meals  Durable Medical Equipment needs: walker, cane  Transportation: wife drives  Financial concerns:  Advanced Directive:  Other medical or social work providers involved:  Patient/caregiver current level of coping:  Understanding:  Patient/family concerns and areas of need:  Patient's interests: likes cooking but harder now    Dominick spoke about having a hard time lately. He has been struggling with depression and anxiety about his health. He has bladder cancer and chronic pain. He has limited mobility due to pain and has not been able to manage the pain leaving him at an 8/10 most of the day without relief. He is trying to stay hopeful but worries about what the future  could hold.  He said quality of life is very important to him and as he learns more about what his future may hold he plans to have more conversations about quality over quantity as he does not believe living with severe pain that is not managed is a quality of life for him. He said he is not suicidal or experiencing suicidal thoughts though. He said he thinks talking to LCSW may be helpful for him during this hard time. Meeting set for 12/26 1pm online. LCSW asked Dominick to explore for homework what his therapy goals are.       I have spent 60 minutes with Patient  today in which greater than 50% of this time was spent in counseling/coordination of care.    *All questions may not be answered due to constraints.  Follow-up discussions may need to occur

## 2023-12-26 ENCOUNTER — TELEMEDICINE (OUTPATIENT)
Dept: PALLIATIVE MEDICINE | Facility: CLINIC | Age: 63
End: 2023-12-26

## 2023-12-26 ENCOUNTER — RA CDI HCC (OUTPATIENT)
Dept: OTHER | Facility: HOSPITAL | Age: 63
End: 2023-12-26

## 2023-12-26 DIAGNOSIS — Z71.89 COUNSELING AND COORDINATION OF CARE: Primary | ICD-10-CM

## 2023-12-26 PROCEDURE — 99024 POSTOP FOLLOW-UP VISIT: CPT

## 2023-12-26 NOTE — PROGRESS NOTES
HCC coding opportunities          Chart Reviewed number of suggestions sent to Provider: 1  I11.0     Patients Insurance     Medicare Insurance: Medicare

## 2023-12-26 NOTE — PROGRESS NOTES
Palliative Supportive Care  met with patient/family to continue to provide emotional support and guidance.      Updated biopsychosocial information relevant to support:    The patient was identified by name and date of birth.  Dominick  was informed that this is a telemedicine visit and that the visit is being conducted through the Epic Embedded platform. They agree to proceed..  My office door was closed. No one else was in the room. They acknowledged consent and understanding of privacy and security of the video platform. The patient has agreed to participate and understands they can discontinue the visit at any time.     Dominick spoke about things he would like to discuss and work on in therapy. He said that he feels his quality of life is suffering and without making changes to reduce his pain and depressed mood, he cannot tolerate this long term. He discussed his pain management provider not being as helpful as he hoped and considering trying a new provider. He is starting PT and acupuncture helps some. He is having trouble accepting where his is in his health journey in this moment and also feels he is lacking purpose and a productivity level he needs to feel good about himself. He feels increasing his mobility or at least maintaining this would help his mood and getting any answers about his pain so he could understand what is causing it. He would like to feel he is contributing to his household as well and wants to be able to clean, cook, maintain order. Next meeting 1/3 at 3pm online.      Identified areas of need include: emotional support    Resources provided:    Areas that need future follow-up include: reduce depressed mood/anxiety    I have spent 60 minutes with Patient  today in which greater than 50% of this time was spent in counseling/coordination of care     Palliative  will follow-up as requested by patient, family, and primary team.  Please contact with any specific  requests

## 2023-12-26 NOTE — PROGRESS NOTES
"Daily Note     Today's date: 2023  Patient name: Dominick Irving  : 1960  MRN: [id number]  Referring provider: Lake Connors PA-C  Dx:   Encounter Diagnosis     ICD-10-CM    1. Paresthesia of bilateral legs  R20.2       2. Chronic bilateral low back pain with bilateral sciatica  M54.42     M54.41     G89.29       3. Physical deconditioning  R53.81       4. Gait abnormality  R26.9           Start Time: 0800  Stop Time: 08  Total time in clinic (min): 55 minutes    Subjective: Patient reports he started a new medication about a week ago that was giving him bad side effects and causing him to fall more so he stopped taking it. He reports he feels pins and needles in both UE and LE this morning and has 7/10 pain but is willing to try whatever it takes to get better.       Objective: See treatment diary below      Assessment: Tolerated treatment fairly well. Patient demonstrated fatigue post treatment, exhibited good technique with therapeutic exercises, and would benefit from continued PT to improve LBP and reduce pain into BLE to improve functional mobility and QOL. Increased time required to complete all activities due to high pain levels to start session and poor muscular endurance t/o session.      Plan: Continue per plan of care.      Precautions: PMH HTN, CAD, Bladder Cancer, CHF, RODO, Lumbar fusion L4-5 in , L3-4 hemilaminectomy 2023      Manuals       B HS, piriformis, hip abd, gastroc stretch w/ traction   20' c STM to L paraspinal       Nerve glides                         Neuro Re-Ed        Seated trunk rotation on TB        Seated PNF on TB        Bridge w/ hip ABD  2x10 no hip ABD      PPT  10x5\" hold       PPU w/ exhale        Sit to stand unsupported   2x5               Ther Ex        SRC for ROM/ cardiovascular endurance   Nustep 10' L1      Lumbar rotation c TB  10x5\" hold bilat      SL clamshells  2x10 ea RTB      DKTC  10x5\" hold                             "   HEP instruction/ education        Ther Activity                        Gait Training                        Modalities  12/27      MHP PRN declined

## 2023-12-27 ENCOUNTER — OFFICE VISIT (OUTPATIENT)
Dept: PHYSICAL THERAPY | Facility: CLINIC | Age: 63
End: 2023-12-27
Payer: MEDICARE

## 2023-12-27 DIAGNOSIS — M54.41 CHRONIC BILATERAL LOW BACK PAIN WITH BILATERAL SCIATICA: ICD-10-CM

## 2023-12-27 DIAGNOSIS — R20.2 PARESTHESIA OF BILATERAL LEGS: Primary | ICD-10-CM

## 2023-12-27 DIAGNOSIS — G89.29 CHRONIC BILATERAL LOW BACK PAIN WITH BILATERAL SCIATICA: ICD-10-CM

## 2023-12-27 DIAGNOSIS — R26.9 GAIT ABNORMALITY: ICD-10-CM

## 2023-12-27 DIAGNOSIS — C67.3 MALIGNANT NEOPLASM OF ANTERIOR WALL OF URINARY BLADDER (HCC): ICD-10-CM

## 2023-12-27 DIAGNOSIS — M54.42 CHRONIC BILATERAL LOW BACK PAIN WITH BILATERAL SCIATICA: ICD-10-CM

## 2023-12-27 DIAGNOSIS — R53.81 PHYSICAL DECONDITIONING: ICD-10-CM

## 2023-12-27 PROCEDURE — 97140 MANUAL THERAPY 1/> REGIONS: CPT

## 2023-12-27 PROCEDURE — 97112 NEUROMUSCULAR REEDUCATION: CPT

## 2023-12-27 PROCEDURE — 97110 THERAPEUTIC EXERCISES: CPT

## 2023-12-27 RX ORDER — OXYCODONE HCL 40 MG/1
40 TABLET, FILM COATED, EXTENDED RELEASE ORAL EVERY 8 HOURS SCHEDULED
Qty: 90 TABLET | Refills: 0 | Status: SHIPPED | OUTPATIENT
Start: 2023-12-27

## 2023-12-27 RX ORDER — OXYCODONE HCL 40 MG/1
40 TABLET, FILM COATED, EXTENDED RELEASE ORAL EVERY 8 HOURS SCHEDULED
Qty: 90 TABLET | Refills: 0 | Status: SHIPPED | OUTPATIENT
Start: 2023-12-27 | End: 2023-12-27 | Stop reason: SDUPTHER

## 2023-12-28 ENCOUNTER — HOSPITAL ENCOUNTER (EMERGENCY)
Facility: HOSPITAL | Age: 63
Discharge: HOME | End: 2023-12-28
Attending: EMERGENCY MEDICINE
Payer: MEDICARE

## 2023-12-28 ENCOUNTER — APPOINTMENT (EMERGENCY)
Dept: RADIOLOGY | Facility: HOSPITAL | Age: 63
End: 2023-12-28
Payer: MEDICARE

## 2023-12-28 VITALS
OXYGEN SATURATION: 98 % | HEART RATE: 66 BPM | RESPIRATION RATE: 18 BRPM | DIASTOLIC BLOOD PRESSURE: 67 MMHG | TEMPERATURE: 97.3 F | SYSTOLIC BLOOD PRESSURE: 128 MMHG

## 2023-12-28 DIAGNOSIS — S82.409A FIBULA FRACTURE: Primary | ICD-10-CM

## 2023-12-28 PROCEDURE — 73610 X-RAY EXAM OF ANKLE: CPT

## 2023-12-28 PROCEDURE — 99284 EMERGENCY DEPT VISIT MOD MDM: CPT | Performed by: PHYSICIAN ASSISTANT

## 2023-12-28 PROCEDURE — 99284 EMERGENCY DEPT VISIT MOD MDM: CPT

## 2023-12-28 PROCEDURE — 73630 X-RAY EXAM OF FOOT: CPT

## 2023-12-28 NOTE — ED PROVIDER NOTES
History  Chief Complaint   Patient presents with    Fall     Patient states that he got dizzy and fell at 0300 this morning. C/o pain in his right foot. +hit head, -LOC, -blood thinners. Feels that the dizziness could be due to medication.     63-year-old male presents the emergency department for evaluation of right foot and ankle pain.  Patient states he has chronic pain in his back and therefore takes oxycodone and Dilaudid at baseline.  Reports he was recently started on Effexor and has noticed after taking these medication he has increased dizziness.  He woke up in the middle of the night due to his back pain which is not abnormal for him and when he stood up he felt dizzy.  States this caused him to twist his ankle and fall down to the ground.  Reports he did hit his head but did not lose consciousness.  Was able to get himself up and return to bed.  Patient states he has been trying to go throughout the day but continues with pain in the right foot/ankle.  He denies any headaches, visual changes dizziness or confusion.  No neck pain.  Reports no new back pain.  States he is going to stop taking the Effexor.  He denies any chest pain, palpitations or shortness of breath.        Prior to Admission Medications   Prescriptions Last Dose Informant Patient Reported? Taking?   Farxiga 5 MG TABS  Self Yes No   Si mg daily 5mg alternating with 2.5mg for fluid retention   HYDROmorphone (DILAUDID) 2 mg tablet   No No   Sig: Take 1 tablet (2 mg total) by mouth every 4 (four) hours as needed for moderate pain Max Daily Amount: 12 mg   HYDROmorphone (DILAUDID) 8 MG tablet   No No   Sig: Take 1 tablet (8 mg total) by mouth every 4 (four) hours as needed for moderate pain Max Daily Amount: 48 mg   amitriptyline (ELAVIL) 50 mg tablet   No No   Sig: Take 1 tablet (50 mg total) by mouth daily at bedtime   aspirin 81 mg chewable tablet  Self Yes No   Sig: Chew 81 mg daily   atorvastatin (LIPITOR) 40 mg tablet  Self Yes No  "  co-enzyme Q-10 30 MG capsule  Self Yes No   Sig: Take 100 mg by mouth daily    gabapentin (NEURONTIN) 100 mg capsule   No No   Sig: Take 1 capsule (100 mg total) by mouth 3 (three) times a day Takes a total of 700 mg three times daily   gabapentin (Neurontin) 600 MG tablet   No No   Sig: Take 1 tablet (600 mg total) by mouth 3 (three) times a day   metolazone (ZAROXOLYN) 2.5 mg tablet   No No   Sig: Take 1 tablet (2.5 mg total) by mouth daily as needed (edema)   multivitamin-iron-minerals-folic acid (CENTRUM) chewable tablet  Self Yes No   Sig: Chew 1 tablet daily   naloxone (NARCAN) 4 mg/0.1 mL nasal spray   No No   Sig: Administer 1 spray into a nostril. If no response after 2-3 minutes, give another dose in the other nostril using a new spray.   oxyCODONE (OxyCONTIN) 40 mg 12 hr tablet   No No   Sig: Take 1 tablet (40 mg total) by mouth every 8 (eight) hours Max Daily Amount: 120 mg   potassium chloride (K-DUR,KLOR-CON) 10 mEq tablet   No No   Sig: Take 2 tablets (20 mEq total) by mouth 2 (two) times a day   tamsulosin (FLOMAX) 0.4 mg  Self No No   Sig: Take 1 capsule (0.4 mg total) by mouth daily with dinner   torsemide (DEMADEX) 20 mg tablet   No No   Sig: Take 2 tablets (40 mg total) by mouth 2 (two) times a day   venlafaxine (EFFEXOR-XR) 37.5 mg 24 hr capsule   No No   Sig: Take 1 capsule (37.5 mg total) by mouth daily with breakfast      Facility-Administered Medications: None       Past Medical History:   Diagnosis Date    Anxiety 3/01/2023    Arthritis     Bladder cancer (HCC)     Cancer (HCC) 3/17/2023    Cervical disc disorder 1/2016    Chronic narcotic dependence (HCC)     Chronic pain disorder     lower back and down both legs    Colon polyp     Coronary artery disease     CPAP (continuous positive airway pressure) dependence     bi-pap    Depression 3/17/2023    Does use hearing aid     will wear DOS    Full dentures     Heart failure (HCC)     and \"fluid retention\" [facility]\"    High " "cholesterol     Hypertension     Low back pain 2003    Mild ankle edema     and feet    Obstructive sleep apnea on CPAP     Prediabetes     Shortness of breath     per pt \"with just exertion\"    Sleep apnea     Wears glasses        Past Surgical History:   Procedure Laterality Date    BACK SURGERY      titanium rods implanted    CAUDAL BLOCK N/A 10/17/2023    Procedure: BLOCK / INJECTION CAUDAL;  Surgeon: Minh Rolon MD;  Location: [facility];  Service: Pain Management     COLONOSCOPY      CYSTOSCOPY W/ URETERAL STENT PLACEMENT Bilateral 03/17/2023    Procedure: bilateral retrograde;  Surgeon: Shan Sanabria MD;  Location[facility];  Service: Urology    HERNIA REPAIR      x5    LUMBAR LAMINECTOMY N/A 06/30/2023    Procedure: Left L3-4 Metrx hemilaminectomy and bilateral foraminotomies;  Surgeon: Leland Aguilar MD;  [facility];  Service: Neurosurgery    WA CYSTO W/REMOVAL OF LESIONS SMALL N/A 05/01/2023    Procedure: CYSTOSCOPY TURBT;  Surgeon: Shan Sanabria MD;  [facility];  Service: Urology    WA CYSTOURETHROSCOPY N/A 11/06/2023    Procedure: CYSTOSCOPY with  bladder biopsies and fulgeration;  Surgeon: Shan Sanabria MD;  [facility];  Service: Urology    SPINE SURGERY  2017    TRANSURETHRAL RESECTION OF BLADDER TUMOR N/A 03/17/2023    Procedure: TRANSURETHRAL RESECTION OF BLADDER TUMOR (TURBT);  Surgeon: Shan Sanabria MD;  [facility];  Service: Urology       Family History   Problem Relation Age of Onset    Cancer Father         Adult leukemia    Aortic aneurysm Father     Leukemia Father     Alcohol abuse Father     Hypertension Mother     Colon cancer Maternal Grandfather      I have reviewed and agree with the history as documented.    E-Cigarette/Vaping    E-Cigarette Use Never User      E-Cigarette/Vaping Substances    Nicotine No     THC No     CBD No     Flavoring No     Other No     Unknown No      Social History     Tobacco Use    Smoking status: Former    "  Current packs/day: 1.00     Average packs/day: 1 pack/day for 50.0 years (50.0 ttl pk-yrs)     Types: Cigarettes     Start date: 2/1/2023    Smokeless tobacco: Never   Vaping Use    Vaping status: Never Used   Substance Use Topics    Alcohol use: Not Currently     Comment: 3 per year    Drug use: Not Currently     Types: Marijuana       Review of Systems   Constitutional: Negative.    Respiratory: Negative.     Cardiovascular: Negative.    Musculoskeletal:  Positive for arthralgias and joint swelling.   Skin:  Positive for color change.   Neurological: Negative.    All other systems reviewed and are negative.      Physical Exam  Physical Exam  Vitals and nursing note reviewed.   Constitutional:       General: He is not in acute distress.     Appearance: Normal appearance. He is not ill-appearing, toxic-appearing or diaphoretic.   HENT:      Head: Normocephalic and atraumatic. No raccoon eyes, Jarrett's sign or contusion.   Eyes:      Conjunctiva/sclera: Conjunctivae normal.   Pulmonary:      Effort: Pulmonary effort is normal.   Musculoskeletal:         General: Swelling and tenderness present.      Cervical back: Normal range of motion. No tenderness.      Comments: Tenderness throughout the right foot and ankle worsened over the lateral malleolus   Skin:     General: Skin is warm and dry.      Findings: Bruising (Right ankle) present.   Neurological:      General: No focal deficit present.      Mental Status: He is alert.         Vital Signs  ED Triage Vitals [12/28/23 1404]   Temperature Pulse Respirations Blood Pressure SpO2   (!) 97.3 °F (36.3 °C) 66 18 128/67 98 %      Temp Source Heart Rate Source Patient Position - Orthostatic VS BP Location FiO2 (%)   Temporal -- Sitting Left arm --      Pain Score       8           Vitals:    12/28/23 1404   BP: 128/67   Pulse: 66   Patient Position - Orthostatic VS: Sitting         Visual Acuity  Visual Acuity      Flowsheet Row Most Recent Value   L Pupil Size (mm) 4   R  Pupil Size (mm) 4            ED Medications  Medications - No data to display    Diagnostic Studies  Results Reviewed       None                   XR ankle 3+ views RIGHT   Final Result by Pia Kunz MD (12/28 1551)      Acute, oblique, nondisplaced distal fibular fracture.         Workstation performed: [id number]         XR foot 3+ vw right   Final Result by Pia Kunz MD (12/28 1384)      No acute osseous abnormality.      Workstation performed: [id number]                    Procedures  Procedures   Patient was placed in a Prevalon boot by nursing staff      ED Course                               SBIRT 22yo+      Flowsheet Row Most Recent Value   Initial Alcohol Screen: US AUDIT-C     1. How often do you have a drink containing alcohol? 0 Filed at: 12/28/2023 1405   2. How many drinks containing alcohol do you have on a typical day you are drinking?  0 Filed at: 12/28/2023 1405   3a. Male UNDER 65: How often do you have five or more drinks on one occasion? 0 Filed at: 12/28/2023 1405   Audit-C Score 0 Filed at: 12/28/2023 1405   ANN: How many times in the past year have you...    Used an illegal drug or used a prescription medication for non-medical reasons? Never Filed at: 12/28/2023 1405                      Medical Decision Making  63-year-old male presents to the emergency department for evaluation of right ankle pain status post fall.  Patient at risk for falling but not limited to fracture, contusion, sprain, dislocation.  ED interpretation of x-ray was concerning for fibular fracture.  He was placed in Prevalon boot and will use his walker.  He will follow-up with orthopedics.  Return precautions were discussed and he verbalized understanding.  He is clinically and hemodynamically stable for discharge    Problems Addressed:  Fibula fracture: acute illness or injury    Amount and/or Complexity of Data Reviewed  Radiology: ordered.             Disposition  Final diagnoses:   Fibula fracture     Time  reflects when diagnosis was documented in both MDM as applicable and the Disposition within this note       Time User Action Codes Description Comment    12/28/2023  3:01 PM Cristiane Zepeda Add [S82.409A] Fibula fracture           ED Disposition       ED Disposition   Discharge    Condition   --    Date/Time   u Dec 28, 2023 1528    Comment   Dominick JUAN Irving discharge to home/self care.                   Follow-up Information       Follow up With Specialties Details Why Contact Info    Robert Budinetz, MD Family Medicine   [address] [facility] [address]  [phone]      [facility]   [address] [address]  [phone]              Discharge Medication List as of 12/28/2023  3:07 PM        CONTINUE these medications which have NOT CHANGED    Details   amitriptyline (ELAVIL) 50 mg tablet Take 1 tablet (50 mg total) by mouth daily at bedtime, Starting Mon 12/18/2023, Normal      aspirin 81 mg chewable tablet Chew 81 mg daily, Historical Med      atorvastatin (LIPITOR) 40 mg tablet Starting Mon 10/3/2022, Historical Med      co-enzyme Q-10 30 MG capsule Take 100 mg by mouth daily , Historical Med      Farxiga 5 MG TABS 5 mg daily 5mg alternating with 2.5mg for fluid retention, Starting Wed 10/26/2022, Historical Med      gabapentin (NEURONTIN) 100 mg capsule Take 1 capsule (100 mg total) by mouth 3 (three) times a day Takes a total of 700 mg three times daily, Starting Fri 10/27/2023, Normal      gabapentin (Neurontin) 600 MG tablet Take 1 tablet (600 mg total) by mouth 3 (three) times a day, Starting Wed 11/22/2023, Normal      !! HYDROmorphone (DILAUDID) 2 mg tablet Take 1 tablet (2 mg total) by mouth every 4 (four) hours as needed for moderate pain Max Daily Amount: 12 mg, Starting Mon 12/11/2023, Normal      !! HYDROmorphone (DILAUDID) 8 MG tablet Take 1 tablet (8 mg total) by mouth every 4 (four) hours as needed for moderate pain Max Daily Amount: 48 mg,  Starting Tue 11/28/2023, Normal      metolazone (ZAROXOLYN) 2.5 mg tablet Take 1 tablet (2.5 mg total) by mouth daily as needed (edema), Starting Mon 12/18/2023, Normal      multivitamin-iron-minerals-folic acid (CENTRUM) chewable tablet Chew 1 tablet daily, Historical Med      naloxone (NARCAN) 4 mg/0.1 mL nasal spray Administer 1 spray into a nostril. If no response after 2-3 minutes, give another dose in the other nostril using a new spray., Normal      oxyCODONE (OxyCONTIN) 40 mg 12 hr tablet Take 1 tablet (40 mg total) by mouth every 8 (eight) hours Max Daily Amount: 120 mg, Starting Wed 12/27/2023, Normal      potassium chloride (K-DUR,KLOR-CON) 10 mEq tablet Take 2 tablets (20 mEq total) by mouth 2 (two) times a day, Starting Tue 12/19/2023, Normal      tamsulosin (FLOMAX) 0.4 mg Take 1 capsule (0.4 mg total) by mouth daily with dinner, Starting Wed 6/7/2023, Normal      torsemide (DEMADEX) 20 mg tablet Take 2 tablets (40 mg total) by mouth 2 (two) times a day, Starting Mon 12/18/2023, Normal      venlafaxine (EFFEXOR-XR) 37.5 mg 24 hr capsule Take 1 capsule (37.5 mg total) by mouth daily with breakfast, Starting Tue 12/19/2023, Normal       !! - Potential duplicate medications found. Please discuss with provider.              PDMP Review         Value Time User    PDMP Reviewed  Yes 12/27/2023  6:19 PM Robert Budinetz, MD            ED Provider  Electronically Signed by             Cristiane Zepeda PA-C  12/28/23 6045

## 2023-12-29 ENCOUNTER — OFFICE VISIT (OUTPATIENT)
Dept: PHYSICAL THERAPY | Facility: CLINIC | Age: 63
End: 2023-12-29
Payer: MEDICARE

## 2023-12-29 DIAGNOSIS — R53.81 PHYSICAL DECONDITIONING: ICD-10-CM

## 2023-12-29 DIAGNOSIS — M54.42 CHRONIC BILATERAL LOW BACK PAIN WITH BILATERAL SCIATICA: Primary | ICD-10-CM

## 2023-12-29 DIAGNOSIS — M54.41 CHRONIC BILATERAL LOW BACK PAIN WITH BILATERAL SCIATICA: Primary | ICD-10-CM

## 2023-12-29 DIAGNOSIS — G89.29 CHRONIC BILATERAL LOW BACK PAIN WITH BILATERAL SCIATICA: Primary | ICD-10-CM

## 2023-12-29 DIAGNOSIS — R26.9 GAIT ABNORMALITY: ICD-10-CM

## 2023-12-29 DIAGNOSIS — R20.2 PARESTHESIA OF BILATERAL LEGS: ICD-10-CM

## 2023-12-29 PROCEDURE — 97110 THERAPEUTIC EXERCISES: CPT

## 2023-12-29 PROCEDURE — 97140 MANUAL THERAPY 1/> REGIONS: CPT

## 2023-12-29 NOTE — PROGRESS NOTES
"Daily Note     Today's date: 2023  Patient name: Dominick Irving  : 1960  MRN: [id number]  Referring provider: Lake Connors PA-C  Dx:   Encounter Diagnosis     ICD-10-CM    1. Chronic bilateral low back pain with bilateral sciatica  M54.42     M54.41     G89.29       2. Paresthesia of bilateral legs  R20.2       3. Physical deconditioning  R53.81       4. Gait abnormality  R26.9           Start Time: 08  Stop Time: 850  Total time in clinic (min): 45 minutes    Subjective: Patient reports he had a fall at home yesterday during the middle of the night. He states he stood up and felt light headed, causing him to faint momentarily and fall on his R ankle. He went to the ED where he had x-rays completed, revealing a nondisplaced closed fracture of the fibula. He presented to PT with a CAM boot and WBAT precaution. He will be following up with Dr. Flores on Wednesday.       Objective: See treatment diary below      Assessment: Tolerated treatment fair. Patient demonstrated fatigue post treatment, exhibited good technique with therapeutic exercises, and would benefit from continued PT to improve core strengthening and reduce radiating pain into BLE. Pts tolerance was impacted by recent fracture, limiting standing tolerance and weight bearing through RLE.       Plan: Continue per plan of care.      Precautions: PMH HTN, CAD, Bladder Cancer, CHF, RODO, Lumbar fusion L4-5 in , L3-4 hemilaminectomy 2023      Manuals      B HS, piriformis, hip abd, gastroc stretch w/ traction   20' c STM to L paraspinal  15'      Nerve glides                         Neuro Re-Ed        Seated trunk rotation on TB        Seated PNF on TB        Bridge w/ hip ABD  2x10 no hip ABD NT     PPT  10x5\" hold  NT     PPU w/ exhale        Sit to stand unsupported   2x5  NT             Ther Ex        SRC for ROM/ cardiovascular endurance   Nustep 10' L1 Nustep 10' L1     Lumbar rotation c TB  10x5\" hold " "bilat 10x5\" hold bilat     SL clamshells  2x10 ea RTB 2x10 ea RTB     DKTC  10x5\" hold  10x 5\" hold     Supine SLR   10x bilat hip flex/ABD                     HEP instruction/ education        Ther Activity                        Gait Training                        Modalities  12/27 12/29     MHP PRN declined declined                    "

## 2024-01-02 ENCOUNTER — OFFICE VISIT (OUTPATIENT)
Dept: FAMILY MEDICINE CLINIC | Facility: CLINIC | Age: 64
End: 2024-01-02
Payer: MEDICARE

## 2024-01-02 VITALS
HEIGHT: 68 IN | DIASTOLIC BLOOD PRESSURE: 56 MMHG | SYSTOLIC BLOOD PRESSURE: 121 MMHG | HEART RATE: 62 BPM | BODY MASS INDEX: 34.71 KG/M2 | WEIGHT: 229 LBS | OXYGEN SATURATION: 97 %

## 2024-01-02 DIAGNOSIS — E66.01 MORBID (SEVERE) OBESITY DUE TO EXCESS CALORIES (HCC): ICD-10-CM

## 2024-01-02 DIAGNOSIS — S82.891D CLOSED FRACTURE OF RIGHT ANKLE WITH ROUTINE HEALING: Primary | ICD-10-CM

## 2024-01-02 DIAGNOSIS — G89.4 CHRONIC PAIN SYNDROME: ICD-10-CM

## 2024-01-02 DIAGNOSIS — I50.32 CHRONIC DIASTOLIC HEART FAILURE (HCC): ICD-10-CM

## 2024-01-02 DIAGNOSIS — C67.2 MALIGNANT NEOPLASM OF LATERAL WALL OF URINARY BLADDER (HCC): ICD-10-CM

## 2024-01-02 DIAGNOSIS — J44.1 COPD WITH ACUTE EXACERBATION (HCC): ICD-10-CM

## 2024-01-02 DIAGNOSIS — N32.89 BLADDER MASS: ICD-10-CM

## 2024-01-02 DIAGNOSIS — F11.20 CONTINUOUS OPIOID DEPENDENCE (HCC): ICD-10-CM

## 2024-01-02 DIAGNOSIS — E11.9 TYPE 2 DIABETES MELLITUS WITHOUT COMPLICATION, WITHOUT LONG-TERM CURRENT USE OF INSULIN (HCC): ICD-10-CM

## 2024-01-02 DIAGNOSIS — J96.01 ACUTE RESPIRATORY FAILURE WITH HYPOXIA (HCC): ICD-10-CM

## 2024-01-02 PROCEDURE — 99214 OFFICE O/P EST MOD 30 MIN: CPT | Performed by: FAMILY MEDICINE

## 2024-01-02 RX ORDER — HYDROMORPHONE HYDROCHLORIDE 8 MG/1
8 TABLET ORAL EVERY 4 HOURS PRN
Qty: 180 TABLET | Refills: 0 | Status: SHIPPED | OUTPATIENT
Start: 2024-01-02

## 2024-01-02 NOTE — PROGRESS NOTES
Assessment/Plan:       1. Closed fracture of right ankle with routine healing  Assessment & Plan:  F/u ortho      2. Bladder mass  Assessment & Plan:  Continue tx with urology      3. Chronic pain syndrome  Assessment & Plan:  Continue meds  Mri reviewed    Orders:  -     HYDROmorphone (DILAUDID) 8 MG tablet; Take 1 tablet (8 mg total) by mouth every 4 (four) hours as needed for moderate pain Max Daily Amount: 48 mg    4. Chronic pain syndrome  Comments:  will ocnsider alternative tx  pump/ketamine/accupuncture  will decrease narcotics  pain seems radicular/neuropathic  switch to dilaudid  Assessment & Plan:  Continue meds  Mri reviewed    Orders:  -     HYDROmorphone (DILAUDID) 8 MG tablet; Take 1 tablet (8 mg total) by mouth every 4 (four) hours as needed for moderate pain Max Daily Amount: 48 mg    5. COPD with acute exacerbation (HCC)    6. Type 2 diabetes mellitus without complication, without long-term current use of insulin (HCC)    7. Chronic diastolic heart failure (HCC)    8. Continuous opioid dependence (HCC)    9. Malignant neoplasm of lateral wall of urinary bladder (HCC)    10. Acute respiratory failure with hypoxia (HCC)    11. Morbid (severe) obesity due to excess calories (HCC)          Subjective:      Patient ID: Dominick Irving is a 63 y.o. male.    Dominick unfortunately got very dizzy from effexor and fell and broke his right ankle.  He was on the medicine 4 days.  He stopped it obviously.  He was seen in the ER.  He is in a boot.  He will f/u with ortho.  He is doing ok with his urologic tx for the bladder cancer.  The extra 2mg of dilaudid did help a bit and the accupuncture is helping for a brief period.  He is in PT which is helpful.  He needs  refill on dilaudid 8mg.        The following portions of the patient's history were reviewed and updated as appropriate: allergies, current medications, past family history, past medical history, past social history, past surgical history, and  "problem list.    Review of Systems   Respiratory: Negative.     Cardiovascular: Negative.          Objective:      /56 (BP Location: Right arm, Patient Position: Sitting, Cuff Size: Large)   Pulse 62   Ht 5' 8\" (1.727 m)   Wt 104 kg (229 lb)   SpO2 97%   BMI 34.82 kg/m²          Physical Exam  Vitals and nursing note reviewed.   Constitutional:       Appearance: Normal appearance.   HENT:      Head: Normocephalic and atraumatic.      Nose: Nose normal.      Mouth/Throat:      Mouth: Mucous membranes are moist.   Eyes:      Extraocular Movements: Extraocular movements intact.      Pupils: Pupils are equal, round, and reactive to light.   Cardiovascular:      Rate and Rhythm: Normal rate and regular rhythm.      Pulses: Normal pulses.   Pulmonary:      Effort: Pulmonary effort is normal.      Breath sounds: Normal breath sounds.   Abdominal:      General: Bowel sounds are normal.      Palpations: Abdomen is soft.   Musculoskeletal:      Cervical back: Normal range of motion.      Comments: Right foot in a boot   Skin:     General: Skin is warm and dry.      Capillary Refill: Capillary refill takes less than 2 seconds.   Neurological:      General: No focal deficit present.      Mental Status: He is alert.   Psychiatric:         Mood and Affect: Mood normal.         "

## 2024-01-02 NOTE — PROGRESS NOTES
"Daily Note     Today's date: 2024  Patient name: Dominick Irving  : 1960  MRN: [id number]  Referring provider: Lake Connors PA-C  Dx:   Encounter Diagnosis     ICD-10-CM    1. Chronic bilateral low back pain with bilateral sciatica  M54.42     M54.41     G89.29       2. Paresthesia of bilateral legs  R20.2       3. Gait abnormality  R26.9       4. Physical deconditioning  R53.81                      Subjective: \"I'm having an off day today.\"  Patient reports that he is having a rough morning.  Patient reports that he is sore/stiff this am.      Objective: See treatment diary below      Assessment: Tolerated treatment fair.  PT notes continuation of decrease ROM and strength t/o the lumbar spine as well as the bilateral hip and LE with trunk/core weakness with need for continuation of skilled therapy.  PT notes patient with f/u with ortho MD latera today for further evaluation of the right fibular fracture from earlier fall.  PT will adapt/update POC as needed if any orders from the ortho MD.       Plan: Continue per plan of care.      Precautions: PMH HTN, CAD, Bladder Cancer, CHF, RODO, Lumbar fusion L4-5 in , L3-4 hemilaminectomy 2023      Manuals 12/20 12/27 12/29 1/3    B HS, piriformis, hip abd, gastroc stretch w/ traction   20' c STM to L paraspinal  15'  20' c STM    Nerve glides                         Neuro Re-Ed    1/3    Seated trunk rotation on TB        Seated PNF on TB        Bridge w/ hip ABD  2x10 no hip ABD NT     PPT  10x5\" hold  NT     PPU w/ exhale        Sit to stand unsupported   2x5  NT             Ther Ex    1/3    SRC for ROM/ cardiovascular endurance   Nustep 10' L1 Nustep 10' L1 NuStep 10'L1    Lumbar rotation c TB  10x5\" hold bilat 10x5\" hold bilat     SL clamshells  2x10 ea RTB 2x10 ea RTB [id number] RTB    DKTC  10x5\" hold  10x 5\" hold 10x5\"    Supine SLR   10x bilat hip flex/ABD AAROM  10xea bilat   Hip Flex/ABD                    HEP instruction/ education     "    Ther Activity    1/3                    Gait Training    1/3                    Modalities  12/27 12/29 1/3    MHP PRN declined declined 15'MH to LB & bilat thigh in seated

## 2024-01-03 ENCOUNTER — HOSPITAL ENCOUNTER (OUTPATIENT)
Dept: RADIOLOGY | Facility: CLINIC | Age: 64
Discharge: HOME | End: 2024-01-03
Payer: MEDICARE

## 2024-01-03 ENCOUNTER — OFFICE VISIT (OUTPATIENT)
Dept: OBGYN CLINIC | Facility: CLINIC | Age: 64
End: 2024-01-03
Payer: MEDICARE

## 2024-01-03 ENCOUNTER — OFFICE VISIT (OUTPATIENT)
Dept: PHYSICAL THERAPY | Facility: CLINIC | Age: 64
End: 2024-01-03
Payer: MEDICARE

## 2024-01-03 ENCOUNTER — TELEMEDICINE (OUTPATIENT)
Dept: PALLIATIVE MEDICINE | Facility: CLINIC | Age: 64
End: 2024-01-03

## 2024-01-03 VITALS
HEIGHT: 68 IN | BODY MASS INDEX: 34.86 KG/M2 | DIASTOLIC BLOOD PRESSURE: 74 MMHG | HEART RATE: 76 BPM | SYSTOLIC BLOOD PRESSURE: 160 MMHG | WEIGHT: 230 LBS | TEMPERATURE: 97.5 F

## 2024-01-03 DIAGNOSIS — M54.42 CHRONIC BILATERAL LOW BACK PAIN WITH BILATERAL SCIATICA: Primary | ICD-10-CM

## 2024-01-03 DIAGNOSIS — G89.29 CHRONIC BILATERAL LOW BACK PAIN WITH BILATERAL SCIATICA: Primary | ICD-10-CM

## 2024-01-03 DIAGNOSIS — S82.409A FIBULA FRACTURE: ICD-10-CM

## 2024-01-03 DIAGNOSIS — M25.572 ACUTE LEFT ANKLE PAIN: ICD-10-CM

## 2024-01-03 DIAGNOSIS — M54.41 CHRONIC BILATERAL LOW BACK PAIN WITH BILATERAL SCIATICA: Primary | ICD-10-CM

## 2024-01-03 DIAGNOSIS — R20.2 PARESTHESIA OF BILATERAL LEGS: ICD-10-CM

## 2024-01-03 DIAGNOSIS — R53.81 PHYSICAL DECONDITIONING: ICD-10-CM

## 2024-01-03 DIAGNOSIS — S82.831A OTHER CLOSED FRACTURE OF DISTAL END OF RIGHT FIBULA, INITIAL ENCOUNTER: Primary | ICD-10-CM

## 2024-01-03 DIAGNOSIS — R26.9 GAIT ABNORMALITY: ICD-10-CM

## 2024-01-03 DIAGNOSIS — Z71.89 COUNSELING AND COORDINATION OF CARE: Primary | ICD-10-CM

## 2024-01-03 PROCEDURE — 97110 THERAPEUTIC EXERCISES: CPT

## 2024-01-03 PROCEDURE — 27786 TREATMENT OF ANKLE FRACTURE: CPT | Performed by: ORTHOPAEDIC SURGERY

## 2024-01-03 PROCEDURE — 99024 POSTOP FOLLOW-UP VISIT: CPT

## 2024-01-03 PROCEDURE — 99204 OFFICE O/P NEW MOD 45 MIN: CPT | Performed by: ORTHOPAEDIC SURGERY

## 2024-01-03 PROCEDURE — 73610 X-RAY EXAM OF ANKLE: CPT

## 2024-01-03 PROCEDURE — 97140 MANUAL THERAPY 1/> REGIONS: CPT

## 2024-01-03 NOTE — PROGRESS NOTES
"63 y.o.male presents for ER follow-up of right distal fib fracture with injury date of 12/28/23 when he fell getting up in the middle of the night.  Patient has a history of bladder cancer and chronic opioid dependence with chronic pain secondary to lumbar disease. He was seen in the emergency room at [facility] on 12/28/2023 with x-rays documenting the non-displaced distal fibular fracture.  Patient has been weightbearing some on the foot.  He is also been out of the cam boot at times, stating he cannot wear the boot at night to sleep due to his chronic pain issues.  Denies any gross numbness or tingling of his toes.  Denies any calf pain.  He has a history of bladder CA and spinal stenosis and is on chronic opioids and is involved in palliative care.    Review of Systems  Review of systems negative unless otherwise specified in HPI    Past Medical History  Past Medical History:   Diagnosis Date    Anxiety 3/01/2023    Arthritis     Bladder cancer (HCC)     Cancer (HCC) 3/17/2023    Cervical disc disorder 1/2016    Chronic narcotic dependence (HCC)     Chronic pain disorder     lower back and down both legs    Colon polyp     Coronary artery disease     CPAP (continuous positive airway pressure) dependence     bi-pap    Depression 3/17/2023    Does use hearing aid     will wear DOS    Full dentures     Heart failure (HCC)     and \"fluid retention\" SL ABDULAZIZ Brito cardio PA\"    High cholesterol     Hypertension     Low back pain 2003    Mild ankle edema     and feet    Obstructive sleep apnea on CPAP     Prediabetes     Shortness of breath     per pt \"with just exertion\"    Sleep apnea     Wears glasses      Past Surgical History  Past Surgical History:   Procedure Laterality Date    BACK SURGERY      titanium rods implanted    CAUDAL BLOCK N/A 10/17/2023    Procedure: BLOCK / INJECTION CAUDAL;  Surgeon: Minh Rolon MD;  Location: [facility];  Service: Pain Management     COLONOSCOPY      CYSTOSCOPY W/ " URETERAL STENT PLACEMENT Bilateral 03/17/2023    Procedure: bilateral retrograde;  Surgeon: Shan Sanabria MD;  Location: [facility];  Service: Urology    HERNIA REPAIR      x5    LUMBAR LAMINECTOMY N/A 06/30/2023    Procedure: Left L3-4 Metrx hemilaminectomy and bilateral foraminotomies;  Surgeon: Leland Aguilar MD;  Location: [facility];  Service: Neurosurgery    LA CYSTO W/REMOVAL OF LESIONS SMALL N/A 05/01/2023    Procedure: CYSTOSCOPY TURBT;  Surgeon: Shan Sanabria MD;  Location: [facility];  Service: Urology    LA CYSTOURETHROSCOPY N/A 11/06/2023    Procedure: CYSTOSCOPY with  bladder biopsies and fulgeration;  Surgeon: Shan Sanabria MD;  Location: [facility];  Service: Urology    SPINE SURGERY  2017    TRANSURETHRAL RESECTION OF BLADDER TUMOR N/A 03/17/2023    Procedure: TRANSURETHRAL RESECTION OF BLADDER TUMOR (TURBT);  Surgeon: Shan Sanabria MD;  Location: [facility];  Service: Urology     Current Medications  Current Outpatient Medications on File Prior to Visit   Medication Sig Dispense Refill    amitriptyline (ELAVIL) 50 mg tablet Take 1 tablet (50 mg total) by mouth daily at bedtime 90 tablet 1    aspirin 81 mg chewable tablet Chew 81 mg daily      atorvastatin (LIPITOR) 40 mg tablet       co-enzyme Q-10 30 MG capsule Take 100 mg by mouth daily       Farxiga 5 MG TABS 5 mg daily 5mg alternating with 2.5mg for fluid retention      gabapentin (NEURONTIN) 100 mg capsule Take 1 capsule (100 mg total) by mouth 3 (three) times a day Takes a total of 700 mg three times daily 90 capsule 3    gabapentin (Neurontin) 600 MG tablet Take 1 tablet (600 mg total) by mouth 3 (three) times a day 90 tablet 3    HYDROmorphone (DILAUDID) 2 mg tablet Take 1 tablet (2 mg total) by mouth every 4 (four) hours as needed for moderate pain Max Daily Amount: 12 mg 180 tablet 0    HYDROmorphone (DILAUDID) 8 MG tablet Take 1 tablet (8 mg total) by mouth every 4 (four) hours as needed for moderate pain Max Daily Amount: 48 mg  180 tablet 0    metolazone (ZAROXOLYN) 2.5 mg tablet Take 1 tablet (2.5 mg total) by mouth daily as needed (edema) 90 tablet 3    multivitamin-iron-minerals-folic acid (CENTRUM) chewable tablet Chew 1 tablet daily      naloxone (NARCAN) 4 mg/0.1 mL nasal spray Administer 1 spray into a nostril. If no response after 2-3 minutes, give another dose in the other nostril using a new spray. 1 each 1    oxyCODONE (OxyCONTIN) 40 mg 12 hr tablet Take 1 tablet (40 mg total) by mouth every 8 (eight) hours Max Daily Amount: 120 mg 90 tablet 0    potassium chloride (K-DUR,KLOR-CON) 10 mEq tablet Take 2 tablets (20 mEq total) by mouth 2 (two) times a day 120 tablet 3    tamsulosin (FLOMAX) 0.4 mg Take 1 capsule (0.4 mg total) by mouth daily with dinner 90 capsule 3    torsemide (DEMADEX) 20 mg tablet Take 2 tablets (40 mg total) by mouth 2 (two) times a day 120 tablet 3     No current facility-administered medications on file prior to visit.     Recent Labs (HCT,HGB,PT,INR,ESR,CRP,GLU,HgA1C)  0   Lab Value Date/Time    HCT 41.8 10/26/2023 0753    HGB 13.2 10/26/2023 0753    WBC 8.29 10/26/2023 0753    INR 0.93 10/05/2023 1524    ESR 44 (H) 08/09/2023 0723    CRP 45.7 (H) 08/09/2023 0723    HGBA1C 6.7 (H) 06/29/2023 0528     Physical exam  Body mass index is 34.97 kg/m².   General: Awake, Alert, Oriented  Eyes: Pupils equal, round and reactive to light  Heart: regular rate and rhythm  Lungs: No audible wheezing  Abdomen: soft  right Ankle  There is no gross ecchymosis about the ankle.  There is slight discoloration still medial ankle.  There is significant swelling of the dorsum of the foot.  Faint ecchymosis on the proximal aspect of the second toe.  He has tenderness over the dorsum of the foot and over the distal fibula to palpation.  He is able to plantar and dorsiflex the ankle without much discomfort.  There is no pain with palpation of the proximal fibula.  He has no pain with palpation over the medial malleolus, posterior  or anterior ankle.  No calf pain to palpation.  The remainder of the right lower extremity exam is benign.    Imaging  X-rays from 12/28/2023 were reviewed and demonstrate a nondisplaced distal fibular fracture.    Weight bearing x-rays of the right ankle taken in the office today were personally reviewed and note maintained position and alignment of the nondisplaced distal fibular fracture without any change in the position of the mortise.    The x-ray report was reviewed dated 12/28/2023.  The ER note was reviewed.      Assessment:     1. Other closed fracture of distal end of right fibula, initial encounter    2. Acute left ankle pain        Fracture / Dislocation Treatment    Date/Time: 1/3/2024 4:45 PM    Performed by: Mani Flores  Authorized by: Mani Flores    Patient Location:  [facility] Protocol:  Consent: Verbal consent obtained. Written consent not obtained.  Risks and benefits: risks, benefits and alternatives were discussed  Consent given by: patient  Patient understanding: patient states understanding of the procedure being performed  Test results: test results available and properly labeled  Radiology Images displayed and confirmed. If images not available, report reviewed: imaging studies available    Injury location:  Ankle  Location details:  Right ankle  Injury type:  Fracture  Fracture type: lateral malleolus    Fracture type: lateral malleolus    Neurovascular status: Neurovascularly intact    Local anesthesia used?: No    General anesthesia used?: No    Manipulation performed?: No    Immobilization:  Other (comment) (Cam boot)  Neurovascular status: Neurovascularly intact    Patient tolerance:  Patient tolerated the procedure well with no immediate complications        Plan: Recommend non- weightbearing in the cam boot much as possible.  Encouraged to wear the cam boot as much as possible as this will help to minimize the potential risk for displacement of the fracture.  He and his  wife were both informed that weightbearing would typically be avoided with this injury, ideally, but if he is unable to tolerate complete nonweightbearing, he is to minimize weightbearing is much as possible.  Precautions have been reviewed, instructions provided and questions answered.  I will see him in 1 week for reevaluation with repeat weightbearing x-rays of his right ankle out of the cam boot.  He is to contact me if any questions or concerns arise prior to follow-up.    This note was created with voice recognition software.

## 2024-01-03 NOTE — PROGRESS NOTES
Palliative Supportive Care  met with patient/family to continue to provide emotional support and guidance.      Updated biopsychosocial information relevant to support:    The patient was identified by name and date of birth.  Dominick was informed that this is a telemedicine visit and that the visit is being conducted through the Epic Embedded platform. They agree to proceed..  My office door was closed. No one else was in the room. They acknowledged consent and understanding of privacy and security of the video platform. The patient has agreed to participate and understands they can discontinue the visit at any time.      Dominick spoke about having a fall that resulted in breaking his ankle. He said this incident has caused him so much distress, frustration, and depression. He feels unable to get ahead or move forward with his chronic illness/pain. LCSW attempted to support Dominick in exploring that this is a set back but not permanent from what he has been told so far and sometimes set backs can be a part of chronic illness/pain, also explored startegies to be able to modify his routine to have some normalcy such as sitting at the table to prep veggies for dinner as he enjoys cooking, and also coping strategies such as deep breathing and journaling. Dominick said he would try journaling as he likes to write but said he did not feel he could make any modifications to his routine and can only sit in a chair and not help with anything which makes him feel horrible. He spoke about feeling burdensome to his family even though he admits they have never voiced this. He said he would like to end early as he was overwhelmed and not feeling well and would meet next week 1/11 at 1pm online to further explore journaling and processing his chronic illness journey.       Identified areas of need include: emotional support    Resources provided:    Areas that need future follow-up include: reduce anxiety/depressed  mood, symptom journaling, self care, coming to terms with set back and chronic illness/pain    I have spent 40 minutes with Patient  today in which greater than 50% of this time was spent in counseling/coordination of care     Palliative  will follow-up as requested by patient, family, and primary team.  Please contact with any specific requests

## 2024-01-03 NOTE — PATIENT INSTRUCTIONS
Recommend non- weightbearing in the cam boot much as possible.  Encouraged to wear the cam boot at all times, except for icing and bathing.  He can elevate the ankle and apply ice for 20  minutes for pain and swelling control.  Elevation of the ankle above the heart level is critical for removing the swelling of the foot/ankle region.  Patient to follow up with orthopedics in 1 week for repeat x-ray.

## 2024-01-04 NOTE — PROGRESS NOTES
"Daily Note     Today's date: 2024  Patient name: Dominick Irving  : 1960  MRN: [id number]  Referring provider: Lake Connors PA-C  Dx:   Encounter Diagnosis     ICD-10-CM    1. Chronic bilateral low back pain with bilateral sciatica  M54.42     M54.41     G89.29       2. Paresthesia of bilateral legs  R20.2       3. Gait abnormality  R26.9       4. Physical deconditioning  R53.81           Start Time: 0750  Stop Time: 0900  Total time in clinic (min): 70 minutes    Subjective: Patient states he saw the orthopedic surgeon for recent fibula fracture who placed him on NWB restrictions with plan to f/u in two weeks to reevaluate need for surgery to repair fracture.       Objective: See treatment diary below      Assessment: Tolerated treatment fair. Patient demonstrated fatigue post treatment, exhibited good technique with therapeutic exercises, and would benefit from continued PT to improve BLE strength and mobility while increasing core strengthening to reduce pain with function.       Plan: Continue per plan of care.      Precautions: PMH HTN, CAD, Bladder Cancer, CHF, RODO, Lumbar fusion L4-5 in , L3-4 hemilaminectomy 2023      Manuals 12/20 12/27 12/29 1/3 1/5   B HS, piriformis, hip abd, gastroc stretch w/ traction   20' c STM to L paraspinal  15'  20' c STM 20' c STM   Nerve glides                         Neuro Re-Ed    1/3    Seated trunk rotation on TB        Seated PNF on TB        Bridge w/ hip ABD  2x10 no hip ABD NT  NT   PPT  10x5\" hold  NT  NT   PPU w/ exhale        Sit to stand unsupported   2x5  NT  NT           Ther Ex    1/3    SRC for ROM/ cardiovascular endurance   Nustep 10' L1 Nustep 10' L1 NuStep 10'L1 Nustep 10' L1   Lumbar rotation c TB  10x5\" hold bilat 10x5\" hold bilat  10x5\" hold bilat   SL clamshells  2x10 ea RTB 2x10 ea RTB [id number] RTB 2x10 bilat RTB   DKTC  10x5\" hold  10x 5\" hold 10x5\" 10x5\"     Supine SLR   10x bilat hip flex/ABD AAROM  10xea bilat   Hip " Flex/ABD AAROM 10x ea bilat hip flex only   LAQ     2x10 bilat            HEP instruction/ education        Ther Activity    1/3                    Gait Training    1/3                    Modalities  12/27 12/29 1/3 1/5   MHP PRN declined declined 15'MH to LB & bilat thigh in seated 15' MHP to LB & bilat thighs in seated

## 2024-01-05 ENCOUNTER — OFFICE VISIT (OUTPATIENT)
Dept: PHYSICAL THERAPY | Facility: CLINIC | Age: 64
End: 2024-01-05
Payer: MEDICARE

## 2024-01-05 DIAGNOSIS — R20.2 PARESTHESIA OF BILATERAL LEGS: ICD-10-CM

## 2024-01-05 DIAGNOSIS — G89.29 CHRONIC BILATERAL LOW BACK PAIN WITH BILATERAL SCIATICA: Primary | ICD-10-CM

## 2024-01-05 DIAGNOSIS — R53.81 PHYSICAL DECONDITIONING: ICD-10-CM

## 2024-01-05 DIAGNOSIS — M54.42 CHRONIC BILATERAL LOW BACK PAIN WITH BILATERAL SCIATICA: Primary | ICD-10-CM

## 2024-01-05 DIAGNOSIS — R26.9 GAIT ABNORMALITY: ICD-10-CM

## 2024-01-05 DIAGNOSIS — M54.41 CHRONIC BILATERAL LOW BACK PAIN WITH BILATERAL SCIATICA: Primary | ICD-10-CM

## 2024-01-05 PROCEDURE — 97110 THERAPEUTIC EXERCISES: CPT

## 2024-01-05 PROCEDURE — 97140 MANUAL THERAPY 1/> REGIONS: CPT

## 2024-01-10 ENCOUNTER — HOSPITAL ENCOUNTER (OUTPATIENT)
Dept: RADIOLOGY | Facility: CLINIC | Age: 64
Discharge: HOME | End: 2024-01-10
Payer: MEDICARE

## 2024-01-10 ENCOUNTER — OFFICE VISIT (OUTPATIENT)
Dept: OBGYN CLINIC | Facility: CLINIC | Age: 64
End: 2024-01-10

## 2024-01-10 ENCOUNTER — OFFICE VISIT (OUTPATIENT)
Dept: PHYSICAL THERAPY | Facility: CLINIC | Age: 64
End: 2024-01-10
Payer: MEDICARE

## 2024-01-10 VITALS
HEART RATE: 64 BPM | WEIGHT: 230 LBS | TEMPERATURE: 97.6 F | DIASTOLIC BLOOD PRESSURE: 80 MMHG | BODY MASS INDEX: 34.86 KG/M2 | SYSTOLIC BLOOD PRESSURE: 140 MMHG | HEIGHT: 68 IN

## 2024-01-10 DIAGNOSIS — S82.821D CLOSED TORUS FRACTURE OF DISTAL END OF RIGHT FIBULA WITH ROUTINE HEALING, SUBSEQUENT ENCOUNTER: ICD-10-CM

## 2024-01-10 DIAGNOSIS — R26.9 GAIT ABNORMALITY: ICD-10-CM

## 2024-01-10 DIAGNOSIS — M54.41 CHRONIC BILATERAL LOW BACK PAIN WITH BILATERAL SCIATICA: Primary | ICD-10-CM

## 2024-01-10 DIAGNOSIS — G89.29 CHRONIC BILATERAL LOW BACK PAIN WITH BILATERAL SCIATICA: Primary | ICD-10-CM

## 2024-01-10 DIAGNOSIS — S82.821D CLOSED TORUS FRACTURE OF DISTAL END OF RIGHT FIBULA WITH ROUTINE HEALING, SUBSEQUENT ENCOUNTER: Primary | ICD-10-CM

## 2024-01-10 DIAGNOSIS — M54.42 CHRONIC BILATERAL LOW BACK PAIN WITH BILATERAL SCIATICA: Primary | ICD-10-CM

## 2024-01-10 DIAGNOSIS — R53.81 PHYSICAL DECONDITIONING: ICD-10-CM

## 2024-01-10 DIAGNOSIS — R20.2 PARESTHESIA OF BILATERAL LEGS: ICD-10-CM

## 2024-01-10 PROCEDURE — 97140 MANUAL THERAPY 1/> REGIONS: CPT

## 2024-01-10 PROCEDURE — 97112 NEUROMUSCULAR REEDUCATION: CPT

## 2024-01-10 PROCEDURE — 99024 POSTOP FOLLOW-UP VISIT: CPT | Performed by: ORTHOPAEDIC SURGERY

## 2024-01-10 PROCEDURE — 73610 X-RAY EXAM OF ANKLE: CPT

## 2024-01-10 PROCEDURE — 97110 THERAPEUTIC EXERCISES: CPT

## 2024-01-10 NOTE — PROGRESS NOTES
"Patient Name:  Dominick Irving  MRN:  [id number]    Assessment     1. Closed torus fracture of distal end of right fibula with routine healing, subsequent encounter  XR ankle 3+ vw right        Plan     Patient is to continue in the cam boot weightbearing as tolerated in the cam boot.  Elevate for pain and swelling.  May ice as needed for discomfort.  Over-the-counter pain meds as needed for pain control.  Follow-up in orthopedic office in 4 weeks for repeat weight bearing x-ray right ankle.    Return in about 4 weeks (around 2/7/2024) for Right bearing x-rays right ankle.    Subjective   Dominick Irving returns for follow-up of distal fib fracture, DOI 12/28/2023.  The patient is 13 day(s) post injury and returns for routine follow-up and repeat weightbearing x-rays. Patient complains of expected pain in the lateral ankle and slightly over the dorsum of the foot.  Denies calf pain chest pain shortness of breath.  Denies numbness or tingling.  States he has not been weightbearing on the foot.    Objective     /80   Pulse 64   Temp 97.6 °F (36.4 °C)   Ht 5' 8\" (1.727 m)   Wt 104 kg (230 lb)   BMI 34.97 kg/m²     Right ankle presents with swelling of the dorsum of the foot.  Resolving lateral ankle ecchymosis which is very mild.  There is no calf pain no palpable cords.  Negative Homans' sign.  Light touch sensation is intact the toes.  Gross range of motion with flexion extension of the ankle is intact.  No cutaneous breakdown.    Data Review     I have personally reviewed pertinent films in PACS documenting no change in position and alignment of the fracture or ankle mortise on weightbearing x-rays.    Evelio Gonzalez PA-C     "

## 2024-01-10 NOTE — PROGRESS NOTES
"Daily Note     Today's date: 1/10/2024  Patient name: Dominick Irving  : 1960  MRN: [id number]  Referring provider: Lake Connors PA-C  Dx:   Encounter Diagnosis     ICD-10-CM    1. Chronic bilateral low back pain with bilateral sciatica  M54.42     M54.41     G89.29       2. Paresthesia of bilateral legs  R20.2       3. Physical deconditioning  R53.81       4. Gait abnormality  R26.9           Start Time: 0755  Stop Time: 0900  Total time in clinic (min): 65 minutes    Subjective: Patient reports he has 8/10 pain to start session and feels throbbing in his R ankle. He is scheduled for f/u with ortho later this afternoon to assess status of ankle fx.       Objective: See treatment diary below      Assessment: Tolerated treatment well. Patient demonstrated fatigue post treatment, exhibited good technique with therapeutic exercises, and would benefit from continued PT to improve BLE strengthening and lumbar mobility to reduce pain with function and improve QOL. Pt displayed onset of muscle spasms in L quad/hip flexor w/ active movement of hip flexion and was very TTP. Some improvement was seen with STM and MHP.       Plan: Continue per plan of care.      Precautions: PMH HTN, CAD, Bladder Cancer, CHF, RODO, Lumbar fusion L4-5 in , L3-4 hemilaminectomy 2023      Manuals 1/10 12/27 12/29 1/3 1/5   B HS, piriformis, hip abd, gastroc stretch w/ traction  20' c STM to hip flexor/quad  20' c STM to L paraspinal  15'  20' c STM 20' c STM   Nerve glides                         Neuro Re-Ed    1/3    Seated 3 way trunk flexion 10x ea        Seated PNF on TB 10x ea bilat no Tball       Bridge w/ hip ABD NT 2x10 no hip ABD NT  NT   PPT 10x5\" hold  10x5\" hold  NT  NT   PPU w/ exhale        Sit to stand unsupported  NT 2x5  NT  NT   Supine Tball ABD crunch  10x5\" hold       Ther Ex    1/3    SRC for ROM/ cardiovascular endurance  Nustep 10' L2 Nustep 10' L1 Nustep 10' L1 NuStep 10'L1 Nustep 10' L1   Lumbar " "rotation c TB 10x5\" hold bilat 10x5\" hold bilat 10x5\" hold bilat  10x5\" hold bilat   SL clamshells 2x10 ea RTB 2x10 ea RTB 2x10 ea RTB [id number] RTB 2x10 bilat RTB   DKTC 10x5\"  10x5\" hold  10x 5\" hold 10x5\" 10x5\"     Supine SLR NT  10x bilat hip flex/ABD AAROM  10xea bilat   Hip Flex/ABD AAROM 10x ea bilat hip flex only   LAQ     2x10 bilat            HEP instruction/ education        Ther Activity    1/3                    Gait Training    1/3                    Modalities 1/10 12/27 12/29 1/3 1/5   MHP 10' MHP to LB and bilat thighs in seated declined declined 15'MH to LB & bilat thigh in seated 15' MHP to LB & bilat thighs in seated                         "

## 2024-01-11 NOTE — PROGRESS NOTES
"Daily Note     Today's date: 2024  Patient name: Dominick Irving  : 1960  MRN: [id number]  Referring provider: Lake Connors PA-C  Dx:   Encounter Diagnosis     ICD-10-CM    1. Chronic bilateral low back pain with bilateral sciatica  M54.42     M54.41     G89.29       2. Paresthesia of bilateral legs  R20.2       3. Physical deconditioning  R53.81       4. Gait abnormality  R26.9           Start Time: 800  Stop Time: 905  Total time in clinic (min): 65 minutes    Subjective: Patient had f/u with ortho and states his fx is stable and surgery is not needed. He also reports that after previous session, he had relief from hip flexor pain for a full 24 hours which is the first time in months. He continues to have high pain levels in back and BLE but his R ankle is feeling better today.       Objective: See treatment diary below      Assessment: Tolerated treatment well. Patient demonstrated fatigue post treatment, exhibited good technique with therapeutic exercises, and would benefit from continued PT to improve BLE strengthening and lumbar mobility to reduce pain with function and improve overall QOL. Pt showing improvement w/ BLE mobility w/ extensive stretching and STM to hip flexor/quads.       Plan: Continue per plan of care.      Precautions: PMH HTN, CAD, Bladder Cancer, CHF, RODO, Lumbar fusion L4-5 in , L3-4 hemilaminectomy 2023, WBAT on RLE      Manuals 1/10 1/12 12/29 1/3 1/5   B HS, piriformis, hip abd, gastroc stretch w/ traction  20' c STM to hip flexor/quad  20' c STM to hip flexor/quad  15'  20' c STM 20' c STM   Nerve glides                         Neuro Re-Ed    1/3    Seated 3 way trunk flexion 10x ea  10x ea       Seated PNF on TB 10x ea bilat no Tball 10x ea bilat no Tball       Bridge w/ hip ABD NT 2x10  NT  NT   PPT 10x5\" hold  10x5\" hold NT  NT   PPU w/ exhale        Sit to stand unsupported  NT NT  NT  NT   Supine Tball ABD crunch  10x5\" hold NT      Ther Ex    1/3  " "  SRC for ROM/ cardiovascular endurance  Nustep 10' L2 Nustep 10' L3 Nustep 10' L1 NuStep 10'L1 Nustep 10' L1   Lumbar rotation c TB 10x5\" hold bilat 10x5\" hold bilat 10x5\" hold bilat  10x5\" hold bilat   SL clamshells 2x10 ea RTB 2x10 ea RTB  2x10 ea RTB [id number] RTB 2x10 bilat RTB   DKTC 10x5\"  10x5\" hold  10x 5\" hold 10x5\" 10x5\"     Supine SLR NT Standing 3way SLR 10x ea bilat  10x bilat hip flex/ABD AAROM  10xea bilat   Hip Flex/ABD AAROM 10x ea bilat hip flex only   LAQ     2x10 bilat            HEP instruction/ education        Ther Activity    1/3                    Gait Training    1/3                    Modalities 1/10 1/12 12/29 1/3 1/5   MHP 10' MHP to LB and bilat thighs in seated 10' MHP to LB and bilat thighs in seated  declined 15'MH to LB & bilat thigh in seated 15' MHP to LB & bilat thighs in seated                           "

## 2024-01-12 ENCOUNTER — OFFICE VISIT (OUTPATIENT)
Dept: PHYSICAL THERAPY | Facility: CLINIC | Age: 64
End: 2024-01-12
Payer: MEDICARE

## 2024-01-12 DIAGNOSIS — M54.42 CHRONIC BILATERAL LOW BACK PAIN WITH BILATERAL SCIATICA: Primary | ICD-10-CM

## 2024-01-12 DIAGNOSIS — R53.81 PHYSICAL DECONDITIONING: ICD-10-CM

## 2024-01-12 DIAGNOSIS — M54.41 CHRONIC BILATERAL LOW BACK PAIN WITH BILATERAL SCIATICA: Primary | ICD-10-CM

## 2024-01-12 DIAGNOSIS — G89.29 CHRONIC BILATERAL LOW BACK PAIN WITH BILATERAL SCIATICA: Primary | ICD-10-CM

## 2024-01-12 DIAGNOSIS — R26.9 GAIT ABNORMALITY: ICD-10-CM

## 2024-01-12 DIAGNOSIS — R20.2 PARESTHESIA OF BILATERAL LEGS: ICD-10-CM

## 2024-01-12 PROCEDURE — 97110 THERAPEUTIC EXERCISES: CPT

## 2024-01-12 PROCEDURE — 97112 NEUROMUSCULAR REEDUCATION: CPT

## 2024-01-12 PROCEDURE — 97140 MANUAL THERAPY 1/> REGIONS: CPT

## 2024-01-14 DIAGNOSIS — G89.29 CHRONIC BILATERAL LOW BACK PAIN WITH BILATERAL SCIATICA: ICD-10-CM

## 2024-01-14 DIAGNOSIS — M54.12 CERVICAL RADICULOPATHY: ICD-10-CM

## 2024-01-14 DIAGNOSIS — M54.41 CHRONIC BILATERAL LOW BACK PAIN WITH BILATERAL SCIATICA: ICD-10-CM

## 2024-01-14 DIAGNOSIS — C67.3 MALIGNANT NEOPLASM OF ANTERIOR WALL OF URINARY BLADDER (HCC): ICD-10-CM

## 2024-01-14 DIAGNOSIS — M54.42 CHRONIC BILATERAL LOW BACK PAIN WITH BILATERAL SCIATICA: ICD-10-CM

## 2024-01-14 DIAGNOSIS — G89.4 CHRONIC PAIN SYNDROME: ICD-10-CM

## 2024-01-16 ENCOUNTER — TELEMEDICINE (OUTPATIENT)
Dept: PALLIATIVE MEDICINE | Facility: CLINIC | Age: 64
End: 2024-01-16

## 2024-01-16 DIAGNOSIS — Z71.89 COUNSELING AND COORDINATION OF CARE: Primary | ICD-10-CM

## 2024-01-16 PROCEDURE — NC001 PR NO CHARGE

## 2024-01-16 RX ORDER — OXYCODONE HCL 40 MG/1
40 TABLET, FILM COATED, EXTENDED RELEASE ORAL EVERY 8 HOURS SCHEDULED
Qty: 90 TABLET | Refills: 0 | Status: SHIPPED | OUTPATIENT
Start: 2024-01-16 | End: 2024-01-19

## 2024-01-16 RX ORDER — HYDROMORPHONE HYDROCHLORIDE 2 MG/1
2 TABLET ORAL EVERY 4 HOURS PRN
Qty: 180 TABLET | Refills: 0 | Status: SHIPPED | OUTPATIENT
Start: 2024-01-16

## 2024-01-16 NOTE — PROGRESS NOTES
Palliative Supportive Care  met with patient/family to continue to provide emotional support and guidance.      Updated biopsychosocial information relevant to support:    The patient was identified by name and date of birth.  Dominick was informed that this is a telemedicine visit and that the visit is being conducted through the Epic Embedded platform. They agree to proceed..  My office door was closed. No one else was in the room. They acknowledged consent and understanding of privacy and security of the video platform. The patient has agreed to participate and understands they can discontinue the visit at any time.    Dominick spoke about feeling more positive about PT and his acupuncture. He said having something to channel as a goal towards increasing his quality life has been good for him and giving him more hope. He said in some ways he feels a little better and in some ways he still feels he is falling down a hole. LCSW supported Dominick in processing his complicated emotions about his current health issues and also supported him in exploring that he is grieving his old function and life without health issues. He said he does feel he is grieving and its hard to accept where he is with his pain and function. He said that he has this library he built in his home on the second floor that has been a major project and was supposed to his happy place when completed. He said not being able to get up there and put the books on the shelf has been painful. MARIA ALEJANDRAW discussed OT and how this may be something they or PT could help him work on strengthening his muscles if this is a very important goal. He said that he will discuss this with his providers because he thinks it would be great for his mental health to get back upstairs. He said that he is going to work on journaling this week but feels stuck. LCSW provided some prompt ideas to get him started such as triggers for anxiety and negative core beliefs  that pop up. He said this was helpful and he thanked LCSW for visit. Next meeting 1/24 at 2pm online.      Identified areas of need include: emotional support    Resources provided:    Areas that need future follow-up include: reduce anxiety/depressed mood, improve quality of life, process grief    I have spent 60 minutes with Patient  today in which greater than 50% of this time was spent in counseling/coordination of care     Palliative  will follow-up as requested by patient, family, and primary team.  Please contact with any specific requests

## 2024-01-16 NOTE — PROGRESS NOTES
"Daily Note     Today's date: 2024  Patient name: Dominick Irving  : 1960  MRN: [id number]  Referring provider: Lake Connors PA-C  Dx:   Encounter Diagnosis     ICD-10-CM    1. Chronic bilateral low back pain with bilateral sciatica  M54.42     M54.41     G89.29       2. Paresthesia of bilateral legs  R20.2       3. Gait abnormality  R26.9       4. Physical deconditioning  R53.81                      Subjective: ***      Objective: See treatment diary below      Assessment: Tolerated treatment {Tolerated treatment :[id number]}. Patient demonstrated fatigue post treatment, exhibited good technique with therapeutic exercises, and would benefit from continued PT to improve BLE/core strength and mobility to reduce pain with function.       Plan: Continue per plan of care.      Precautions: PMH HTN, CAD, Bladder Cancer, CHF, RODO, Lumbar fusion L4-5 in , L3-4 hemilaminectomy 2023, WBAT on RLE      Manuals 1/10 1/12 1/17 1/3 1/5   B HS, piriformis, hip abd, gastroc stretch w/ traction  20' c STM to hip flexor/quad  20' c STM to hip flexor/quad   20' c STM 20' c STM   Nerve glides                         Neuro Re-Ed    1/3    Seated 3 way trunk flexion 10x ea  10x ea       Seated PNF on TB 10x ea bilat no Tball 10x ea bilat no Tball       Bridge w/ hip ABD NT 2x10    NT   PPT 10x5\" hold  10x5\" hold   NT   PPU w/ exhale        Sit to stand unsupported  NT NT    NT   Supine Tball ABD crunch  10x5\" hold NT      Ther Ex    1/3    SRC for ROM/ cardiovascular endurance  Nustep 10' L2 Nustep 10' L3  NuStep 10'L1 Nustep 10' L1   Lumbar rotation c TB 10x5\" hold bilat 10x5\" hold bilat   10x5\" hold bilat   SL clamshells 2x10 ea RTB 2x10 ea RTB   [id number] RTB 2x10 bilat RTB   DKTC 10x5\"  10x5\" hold   10x5\" 10x5\"     Supine SLR NT Standing 3way SLR 10x ea bilat   AAROM  10xea bilat   Hip Flex/ABD AAROM 10x ea bilat hip flex only   LAQ     2x10 bilat            HEP instruction/ education        Ther Activity   "  1/3                    Gait Training    1/3                    Modalities 1/10 1/12  1/3 1/5   MHP 10' MHP to LB and bilat thighs in seated 10' MHP to LB and bilat thighs in seated   15'MH to LB & bilat thigh in seated 15' MHP to LB & bilat thighs in seated

## 2024-01-17 ENCOUNTER — TELEPHONE (OUTPATIENT)
Age: 64
End: 2024-01-17

## 2024-01-17 ENCOUNTER — APPOINTMENT (OUTPATIENT)
Dept: PHYSICAL THERAPY | Facility: CLINIC | Age: 64
End: 2024-01-17
Payer: MEDICARE

## 2024-01-17 DIAGNOSIS — R26.9 GAIT ABNORMALITY: ICD-10-CM

## 2024-01-17 DIAGNOSIS — G89.29 CHRONIC BILATERAL LOW BACK PAIN WITH BILATERAL SCIATICA: Primary | ICD-10-CM

## 2024-01-17 DIAGNOSIS — R20.2 PARESTHESIA OF BILATERAL LEGS: ICD-10-CM

## 2024-01-17 DIAGNOSIS — M54.41 CHRONIC BILATERAL LOW BACK PAIN WITH BILATERAL SCIATICA: Primary | ICD-10-CM

## 2024-01-17 DIAGNOSIS — M54.42 CHRONIC BILATERAL LOW BACK PAIN WITH BILATERAL SCIATICA: Primary | ICD-10-CM

## 2024-01-17 DIAGNOSIS — R53.81 PHYSICAL DECONDITIONING: ICD-10-CM

## 2024-01-17 NOTE — TELEPHONE ENCOUNTER
PA for oxyCODONE (OxyCONTIN) 40 mg 12 hr tablet     Submitted via  []CMM-KEY   [x][employer]-Case ID # [id number]  []Faxed to plan   []Other website   []Phone call Case ID #     Office notes sent, clinical questions answered. Awaiting determination

## 2024-01-18 NOTE — TELEPHONE ENCOUNTER
PA for oxyCODONE (OxyCONTIN) 40 mg 12 hr tablet  Denied    Reason:            Message sent to provider pool Yes    Denial letter scanned into Media Yes    Appeal started No

## 2024-01-19 ENCOUNTER — TELEPHONE (OUTPATIENT)
Age: 64
End: 2024-01-19

## 2024-01-19 ENCOUNTER — TELEPHONE (OUTPATIENT)
Dept: FAMILY MEDICINE CLINIC | Facility: CLINIC | Age: 64
End: 2024-01-19

## 2024-01-19 ENCOUNTER — APPOINTMENT (OUTPATIENT)
Dept: PHYSICAL THERAPY | Facility: CLINIC | Age: 64
End: 2024-01-19
Payer: MEDICARE

## 2024-01-19 DIAGNOSIS — M54.42 CHRONIC BILATERAL LOW BACK PAIN WITH BILATERAL SCIATICA: ICD-10-CM

## 2024-01-19 DIAGNOSIS — C67.3 MALIGNANT NEOPLASM OF ANTERIOR WALL OF URINARY BLADDER (HCC): ICD-10-CM

## 2024-01-19 DIAGNOSIS — G89.29 OTHER CHRONIC PAIN: Primary | ICD-10-CM

## 2024-01-19 DIAGNOSIS — M54.41 CHRONIC BILATERAL LOW BACK PAIN WITH BILATERAL SCIATICA: ICD-10-CM

## 2024-01-19 DIAGNOSIS — G89.29 CHRONIC BILATERAL LOW BACK PAIN WITH BILATERAL SCIATICA: ICD-10-CM

## 2024-01-19 RX ORDER — HYDROCODONE BITARTRATE 80 MG/1
80 TABLET, EXTENDED RELEASE ORAL EVERY MORNING
Qty: 30 TABLET | Refills: 0 | Status: SHIPPED | OUTPATIENT
Start: 2024-01-19

## 2024-01-19 RX ORDER — OXYCODONE HCL 40 MG/1
40 TABLET, FILM COATED, EXTENDED RELEASE ORAL EVERY 8 HOURS SCHEDULED
Qty: 90 TABLET | Refills: 0 | Status: CANCELLED | OUTPATIENT
Start: 2024-01-19

## 2024-01-19 NOTE — TELEPHONE ENCOUNTER
Patient is not able to find the new pain medication at any local pharmacy. I suggested they also call the insurance to find out if there are any other medications on his formulary. Is there any other pain medication that could be sent in?     I also asked if they were able to pay out of pocket for the original medication but it is $800. So that is not an option.     Please advise

## 2024-01-19 NOTE — TELEPHONE ENCOUNTER
Spoke to patient and his wife. New script was sent to [facility]. They do not have the new medication in stock. Patient will call other pharmacies to find out who has the medication. He will then call us back to have the script resent to the pharmacy of his choice. Hydrocodone Bitartrate 80mg.

## 2024-01-19 NOTE — TELEPHONE ENCOUNTER
Patient's wife called questioning the prior authorization prescription frequency of the Medication.Contacted practice/clinical, no response. Contacted practice/clerical Patient warm transferred to practice.

## 2024-01-19 NOTE — TELEPHONE ENCOUNTER
Spoke to patient. They are paying out of pocket for the original script. It will complete the deductible for the year.     Pharmacy is preparing the medication for pickup

## 2024-01-19 NOTE — TELEPHONE ENCOUNTER
Patient called to check status on Prior authorization on his medication refill oxyCODONE (OxyCONTIN) . Patient states pharmacy is awaiting the prior authorization. Upon checking encounter/messages, contacted practice/ clinical for update. Advised prior authorization would be presented to Dr Budinetz and fulfilled. Please advise Patient if any further questions at [phone]

## 2024-01-19 NOTE — TELEPHONE ENCOUNTER
Oxycodone is not on insurance plan formulary.     He is covered under the plan for the following medications:    Hydrocode Bitartrate ER or Hysingla ER    Will either of these medications work for the patient? He is only able to have enough medication for 30 days. Not to exceed any more.

## 2024-01-22 NOTE — PROGRESS NOTES
PT Re-Evaluation     Today's date: 2024  Patient name: Dominick Irving  : 1960  MRN: [id number]  Referring provider: Lake Connors PA-C  Dx:   Encounter Diagnosis     ICD-10-CM    1. Chronic bilateral low back pain with bilateral sciatica  M54.42     M54.41     G89.29       2. Paresthesia of bilateral legs  R20.2       3. Physical deconditioning  R53.81       4. Gait abnormality  R26.9             Start Time: 0745  Stop Time: 0840  Total time in clinic (min): 55 minutes    Assessment  Assessment details: Pt has been seen by OP PT for 7 visits and is showing minimal improvement in BLE and LB mobility and strength. He displayed significant tenderness in hip flexor and quadriceps bilaterally, limiting his AROM. He has also had some recent setbacks including a fall with a R fibular fracture and resultant cam boot w/ WBAT restrictions. He displays improvements with tolerance to activity and willingness to participate in activities, however, mobility is still significantly limited. Pt is also showing decrease in radicular symptoms bilaterally. He would benefit from continued skilled PT in order to further improve BLE strength and mobility to improve QoL and overall functional mobility.   Impairments: abnormal gait, abnormal or restricted ROM, abnormal movement, activity intolerance, impaired physical strength, lacks appropriate home exercise program, pain with function, poor posture  and poor body mechanics    Symptom irritability: highUnderstanding of Dx/Px/POC: good   Prognosis: good    Goals  STG to be met within 4 weeks:  1. Pt will increase BLE strength by 1/2 muscle grade to improve functional mobility and reduce pain.   2. Pt will be independent with HEP to decrease pain and improve quality of life.   3. Pt will report 3/10 pain at worst to improve functional mobility and quality of life.   4. Pt will increase FOTO score by 10 points in order to improve lumbar spine mobility and reduce pain with  function.     LTG to be met within 8 weeks:  1. Pt will improve BLE strength to WNL to improve functional mobility and quality of life.   2. Pt will restore lumbar spine AROM to WNL to improve functional mobility and quality of life.   3. Pt will meet FOTO score goal at DC to improve lumbar spine mobility in pain free range.        Plan  Plan details: Continue with POC.   Patient would benefit from: skilled physical therapy  Planned modality interventions: cryotherapy and thermotherapy: hydrocollator packs  Planned therapy interventions: flexibility, functional ROM exercises, graded activity, graded exercise, home exercise program, joint mobilization, manual therapy, neuromuscular re-education, nerve gliding, patient education, self care, strengthening, stretching, therapeutic activities and therapeutic exercise  Frequency: 2x week  Duration in weeks: 6  Treatment plan discussed with: patient        Subjective Evaluation    History of Present Illness  Mechanism of injury: Pt reports he is definitely feeling some improvement in his BLE mobility and strength but has so many recent set backs that it is hard to tell what is causing him the most pain. He states his LBP has reduced, however, it has migrated to groin/anterior thigh pain. He is also having high pain levels with R foot/ankle since most recent fracture.   Quality of life: fair    Patient Goals  Patient goals for therapy: decreased pain, improved balance, increased motion, increased strength, independence with ADLs/IADLs and return to sport/leisure activities  Patient goal: walk without walker  Pain  Current pain ratin  At best pain ratin  At worst pain ratin  Quality: radiating  Relieving factors: rest and medications  Aggravating factors: sitting, stair climbing, lifting and standing (twisting, bending down)  Progression: worsening    Social Support  Stairs in house: yes   Lives in: multiple-level home  Lives with: spouse    Treatments  Previous  treatment: medication  Current treatment comments: accupuncture.         Objective     Postural Observations  Seated posture: fair  Standing posture: fair      Palpation   Left   Tenderness of the erector spinae, iliopsoas and lumbar paraspinals.     Right   Tenderness of the erector spinae, iliopsoas and lumbar paraspinals.     Tenderness     Lumbar Spine  Tenderness in the spinous process.     Left Hip   Tenderness in the PSIS.     Right Hip   Tenderness in the PSIS.     Neurological Testing     Sensation     Lumbar   Left   Diminished: light touch    Right   Intact: light touch    Reflexes   Left   Patellar (L4): trace (1+)  Achilles (S1): trace (1+)    Right   Patellar (L4): trace (1+)  Achilles (S1): trace (1+)    Active Range of Motion     Lumbar   Flexion:  Restriction level: minimal  Extension:  with pain Restriction level: maximal  Left lateral flexion:  Restriction level: moderate  Right lateral flexion:  Restriction level: moderate  Left rotation:  Restriction level: moderate  Right rotation:  Restriction level: moderate    Strength/Myotome Testing     Left Hip   Planes of Motion   Flexion: 3-  Extension: 3-  Abduction: 3  Adduction: 3  External rotation: 3  Internal rotation: 3    Right Hip   Planes of Motion   Flexion: 3-  Extension: 3-  Abduction: 3  Adduction: 3  External rotation: 3  Internal rotation: 3    Left Knee   Flexion: 4-  Extension: 4-    Right Knee   Flexion: 4-  Extension: 4-    Left Ankle/Foot   Dorsiflexion: 3+  Plantar flexion: 3+    Right Ankle/Foot   Dorsiflexion: 3+  Plantar flexion: 3+    Additional Strength Details  Pain with all muscle testing, limiting full AROM specifically in b hips    Tests     Lumbar     Left   Positive crossed SLR and passive SLR.   Negative slump test.     Right   Positive crossed SLR and passive SLR.   Negative slump test.     Left Pelvic Girdle/Sacrum   Positive: active SLR test.     Right Pelvic Girdle/Sacrum   Positive: active SLR test.     Left Hip  "  Positive KEDAR.                Precautions: PMH HTN, CAD, Bladder Cancer, CHF, RODO, Lumbar fusion L4-5 in 2016, L3-4 hemilaminectomy June 2023  POC Expiration: 3/6/24    Manuals 1/10 1/12 1/24  1/5   B HS, piriformis, hip abd, gastroc stretch w/ traction  20' c STM to hip flexor/quad  20' c STM to hip flexor/quad  20' c STM to hip flexor/quad   20' c STM   Nerve glides                         Neuro Re-Ed        Seated 3 way trunk flexion 10x ea  10x ea  10x ea     Seated PNF on TB 10x ea bilat no Tball 10x ea bilat no Tball  10x ea bilat no Tball     Bridge w/ hip ABD NT 2x10  2x10 RTB  NT   PPT 10x5\" hold  10x5\" hold NT  NT   PPU w/ exhale        Sit to stand unsupported  NT NT  NT  NT   Supine Tball ABD crunch  10x5\" hold NT NT     Ther Ex        SRC for ROM/ cardiovascular endurance  Nustep 10' L2 Nustep 10' L3 Nustep 10' L4  Nustep 10' L1   Lumbar rotation c TB 10x5\" hold bilat 10x5\" hold bilat 10x5\" hold bilat  10x5\" hold bilat   SL clamshells 2x10 ea RTB 2x10 ea RTB  NT  2x10 bilat RTB   DKTC 10x5\"  10x5\" hold  10x5\" hold  10x5\"     Supine SLR NT Standing 3way SLR 10x ea bilat  NT  AAROM 10x ea bilat hip flex only   LAQ     2x10 bilat            HEP instruction/ education        Ther Activity                        Gait Training                        Modalities 1/10 1/12 1/24  1/5   MHP 10' MHP to LB and bilat thighs in seated 10' MHP to LB and bilat thighs in seated  declined  15' MHP to LB & bilat thighs in seated                 "

## 2024-01-24 ENCOUNTER — TELEMEDICINE (OUTPATIENT)
Dept: PALLIATIVE MEDICINE | Facility: CLINIC | Age: 64
End: 2024-01-24

## 2024-01-24 ENCOUNTER — EVALUATION (OUTPATIENT)
Dept: PHYSICAL THERAPY | Facility: CLINIC | Age: 64
End: 2024-01-24
Payer: MEDICARE

## 2024-01-24 DIAGNOSIS — M54.41 CHRONIC BILATERAL LOW BACK PAIN WITH BILATERAL SCIATICA: Primary | ICD-10-CM

## 2024-01-24 DIAGNOSIS — Z71.89 COUNSELING AND COORDINATION OF CARE: Primary | ICD-10-CM

## 2024-01-24 DIAGNOSIS — M54.42 CHRONIC BILATERAL LOW BACK PAIN WITH BILATERAL SCIATICA: Primary | ICD-10-CM

## 2024-01-24 DIAGNOSIS — R26.9 GAIT ABNORMALITY: ICD-10-CM

## 2024-01-24 DIAGNOSIS — R53.81 PHYSICAL DECONDITIONING: ICD-10-CM

## 2024-01-24 DIAGNOSIS — R20.2 PARESTHESIA OF BILATERAL LEGS: ICD-10-CM

## 2024-01-24 DIAGNOSIS — G89.29 CHRONIC BILATERAL LOW BACK PAIN WITH BILATERAL SCIATICA: Primary | ICD-10-CM

## 2024-01-24 PROCEDURE — 97112 NEUROMUSCULAR REEDUCATION: CPT

## 2024-01-24 PROCEDURE — 97140 MANUAL THERAPY 1/> REGIONS: CPT

## 2024-01-24 PROCEDURE — NC001 PR NO CHARGE

## 2024-01-24 PROCEDURE — 97110 THERAPEUTIC EXERCISES: CPT

## 2024-01-25 NOTE — PROGRESS NOTES
"Daily Note     Today's date: 2024  Patient name: Dominick Irving  : 1960  MRN: [id number]  Referring provider: Lake Connors PA-C  Dx:   Encounter Diagnosis     ICD-10-CM    1. Chronic bilateral low back pain with bilateral sciatica  M54.42     M54.41     G89.29       2. Paresthesia of bilateral legs  R20.2       3. Physical deconditioning  R53.81       4. Gait abnormality  R26.9           Start Time: 0750  Stop Time: 0855  Total time in clinic (min): 65 minutes    Subjective: Patient reports he had severe pain in both hips that radiated to both knees that made it difficult for him to sleep last night. He also reports having dull ache in R foot/ankle that seems to be progressively getting better.       Objective: See treatment diary below      Assessment: Tolerated treatment fair. Patient demonstrated fatigue post treatment, exhibited good technique with therapeutic exercises, and would benefit from continued PT to improve core and BLE strength to reduce pain with mobility and improve overall QoL. He continues to display significant TTP of B quads and hip flexors that responds fairly to STM and stretching.      Plan: Continue per plan of care.      Precautions: PMH HTN, CAD, Bladder Cancer, CHF, RODO, Lumbar fusion L4-5 in , L3-4 hemilaminectomy 2023  POC Expiration: 3/6/24    Manuals 1/10 1/12 1/24 1/26 1/5   B HS, piriformis, hip abd, gastroc stretch w/ traction  20' c STM to hip flexor/quad  20' c STM to hip flexor/quad  20' c STM to hip flexor/quad  20' c STM to hip flex/quad 20' c STM   Nerve glides                         Neuro Re-Ed        Seated 3 way trunk flexion 10x ea  10x ea  10x ea 10x ea     Seated PNF on TB 10x ea bilat no Tball 10x ea bilat no Tball  10x ea bilat no Tball NT    Bridge w/ hip ABD NT 2x10  2x10 RTB 2x10 RTB NT   PPT 10x5\" hold  10x5\" hold NT  NT   PPU w/ exhale        Sit to stand unsupported  NT NT  NT  NT   Supine Tball ABD crunch  10x5\" hold NT NT     Ther " "Ex        SRC for ROM/ cardiovascular endurance  Nustep 10' L2 Nustep 10' L3 Nustep 10' L4 Nustep 10' L5 Nustep 10' L1   Lumbar rotation c TB 10x5\" hold bilat 10x5\" hold bilat 10x5\" hold bilat 10x5\" hold bilat 10x5\" hold bilat   SL clamshells 2x10 ea RTB 2x10 ea RTB  NT 2x10 ea RTB 2x10 bilat RTB   DKTC 10x5\"  10x5\" hold  10x5\" hold 10x5\" hold  10x5\"     Supine SLR NT Standing 3way SLR 10x ea bilat  NT NT AAROM 10x ea bilat hip flex only   LAQ     2x10 bilat            HEP instruction/ education        Ther Activity                        Gait Training                        Modalities 1/10 1/12 1/24 1/26 1/5   MHP 10' MHP to LB and bilat thighs in seated 10' MHP to LB and bilat thighs in seated  declined 10' LB and Bilat thighs in seated 15' MHP to LB & bilat thighs in seated                 "

## 2024-01-26 ENCOUNTER — OFFICE VISIT (OUTPATIENT)
Dept: PHYSICAL THERAPY | Facility: CLINIC | Age: 64
End: 2024-01-26
Payer: MEDICARE

## 2024-01-26 DIAGNOSIS — R26.9 GAIT ABNORMALITY: ICD-10-CM

## 2024-01-26 DIAGNOSIS — R20.2 PARESTHESIA OF BILATERAL LEGS: ICD-10-CM

## 2024-01-26 DIAGNOSIS — M54.42 CHRONIC BILATERAL LOW BACK PAIN WITH BILATERAL SCIATICA: Primary | ICD-10-CM

## 2024-01-26 DIAGNOSIS — G89.29 CHRONIC BILATERAL LOW BACK PAIN WITH BILATERAL SCIATICA: Primary | ICD-10-CM

## 2024-01-26 DIAGNOSIS — R53.81 PHYSICAL DECONDITIONING: ICD-10-CM

## 2024-01-26 DIAGNOSIS — M54.41 CHRONIC BILATERAL LOW BACK PAIN WITH BILATERAL SCIATICA: Primary | ICD-10-CM

## 2024-01-26 PROCEDURE — 97112 NEUROMUSCULAR REEDUCATION: CPT

## 2024-01-26 PROCEDURE — 97140 MANUAL THERAPY 1/> REGIONS: CPT

## 2024-01-26 PROCEDURE — 97110 THERAPEUTIC EXERCISES: CPT

## 2024-01-30 ENCOUNTER — TELEPHONE (OUTPATIENT)
Dept: FAMILY MEDICINE CLINIC | Facility: CLINIC | Age: 64
End: 2024-01-30

## 2024-01-30 ENCOUNTER — RA CDI HCC (OUTPATIENT)
Dept: OTHER | Facility: HOSPITAL | Age: 64
End: 2024-01-30

## 2024-01-30 ENCOUNTER — TELEMEDICINE (OUTPATIENT)
Dept: PALLIATIVE MEDICINE | Facility: CLINIC | Age: 64
End: 2024-01-30

## 2024-01-30 DIAGNOSIS — G89.4 CHRONIC PAIN SYNDROME: ICD-10-CM

## 2024-01-30 DIAGNOSIS — Z71.89 COUNSELING AND COORDINATION OF CARE: Primary | ICD-10-CM

## 2024-01-30 DIAGNOSIS — G89.29 OTHER CHRONIC PAIN: ICD-10-CM

## 2024-01-30 PROCEDURE — 99024 POSTOP FOLLOW-UP VISIT: CPT

## 2024-01-30 RX ORDER — HYDROMORPHONE HYDROCHLORIDE 8 MG/1
8 TABLET ORAL EVERY 4 HOURS PRN
Qty: 180 TABLET | Refills: 0 | Status: SHIPPED | OUTPATIENT
Start: 2024-01-30

## 2024-01-30 RX ORDER — HYDROCODONE BITARTRATE 80 MG/1
80 TABLET, EXTENDED RELEASE ORAL EVERY MORNING
Qty: 30 TABLET | Refills: 0 | Status: SHIPPED | OUTPATIENT
Start: 2024-01-30 | End: 2024-02-06

## 2024-01-30 NOTE — PROGRESS NOTES
Palliative Supportive Care  met with patient/family to continue to provide emotional support and guidance.      Updated biopsychosocial information relevant to support:    The patient was identified by name and date of birth.  Dominick was informed that this is a telemedicine visit and that the visit is being conducted through the Epic Embedded platform. They agree to proceed..  My office door was closed. No one else was in the room. They acknowledged consent and understanding of privacy and security of the video platform. The patient has agreed to participate and understands they can discontinue the visit at any time.    Dominick spoke about being able to get upstairs to the library which was a win for him but it has been overshadowed by a new issue of spasms in his muscles. He said he plans to talk to his PCP and PT about this and hopes it is just that maybe he worked his body too hard at PT. He said tomorrow is his 64th birthday but he has complicated feelings about the day due to being diagnosed with cancer on this day last year and thinking about all of the trauma he has been through in the past year. LCSW supported Dominick in processing his emotions related to the trauma of the last year, provided education on mindfulness, and supported him in reframing some of his negative thoughts.      Identified areas of need include: emotional support    Resources provided:    Areas that need future follow-up include: reduce anxiety/depressed mood, challenging negative thoughts, mindfulness     I have spent 50 minutes with Patient  today in which greater than 50% of this time was spent in counseling/coordination of care     Palliative  will follow-up as requested by patient, family, and primary team.  Please contact with any specific requests

## 2024-01-30 NOTE — TELEPHONE ENCOUNTER
Patients pharmacy called -   Needs clarification on medication.     Oxycontin 40 mg and hydromorphone 2mg was just picked up    The hydrocodone Bitartrate ER 80mg and Hydromorphone 8mg were called in today -   Patient stated he did not want hydrocone bitartrate..    Pharmacy needs clarification on what he should be taking. Please advise    [facility] - [phone]

## 2024-01-31 ENCOUNTER — OFFICE VISIT (OUTPATIENT)
Dept: PHYSICAL THERAPY | Facility: CLINIC | Age: 64
End: 2024-01-31
Payer: MEDICARE

## 2024-01-31 DIAGNOSIS — M54.42 CHRONIC BILATERAL LOW BACK PAIN WITH BILATERAL SCIATICA: Primary | ICD-10-CM

## 2024-01-31 DIAGNOSIS — R20.2 PARESTHESIA OF BILATERAL LEGS: ICD-10-CM

## 2024-01-31 DIAGNOSIS — R53.81 PHYSICAL DECONDITIONING: ICD-10-CM

## 2024-01-31 DIAGNOSIS — R26.9 GAIT ABNORMALITY: ICD-10-CM

## 2024-01-31 DIAGNOSIS — M54.41 CHRONIC BILATERAL LOW BACK PAIN WITH BILATERAL SCIATICA: Primary | ICD-10-CM

## 2024-01-31 DIAGNOSIS — G89.29 CHRONIC BILATERAL LOW BACK PAIN WITH BILATERAL SCIATICA: Primary | ICD-10-CM

## 2024-01-31 DIAGNOSIS — Z00.6 ENCOUNTER FOR EXAMINATION FOR NORMAL COMPARISON OR CONTROL IN CLINICAL RESEARCH PROGRAM: ICD-10-CM

## 2024-01-31 PROCEDURE — 97112 NEUROMUSCULAR REEDUCATION: CPT

## 2024-01-31 PROCEDURE — 97110 THERAPEUTIC EXERCISES: CPT

## 2024-01-31 PROCEDURE — 97535 SELF CARE MNGMENT TRAINING: CPT

## 2024-01-31 PROCEDURE — 97140 MANUAL THERAPY 1/> REGIONS: CPT

## 2024-01-31 NOTE — PROGRESS NOTES
Daily Note     Today's date: 2024  Patient name: Dominick Irving  : 1960  MRN: [id number]  Referring provider: Lake Connors PA-C  Dx:   Encounter Diagnosis     ICD-10-CM    1. Chronic bilateral low back pain with bilateral sciatica  M54.42     M54.41     G89.29       2. Paresthesia of bilateral legs  R20.2       3. Physical deconditioning  R53.81       4. Gait abnormality  R26.9           Start Time: 0755  Stop Time: 0900  Total time in clinic (min): 65 minutes    Subjective: Patient reports that after his previous therapy session, he began experiencing whole body involuntary twitches that have been progressively getting better but continue to happen.       Objective: See treatment diary below      Assessment: Tolerated treatment well. Patient demonstrated fatigue post treatment, exhibited good technique with therapeutic exercises, and would benefit from continued PT to improve core and BLE strengthening to reduce pain with movement and improve gait pattern. Pt was educated in pain control techniques to be done at home, including deep breathing and muscle relaxation techniques. Pts POC was adjusted to include more functional activities to improve his ability to be more functional in his home. Pt tolerated exercise change well.       Plan: Continue per plan of care.      Precautions: PMH HTN, CAD, Bladder Cancer, CHF, RODO, Lumbar fusion L4-5 in , L3-4 hemilaminectomy 2023  POC Expiration: 3/6/24    Manuals 1/10 1/12 1/24 1/26 1/31   B HS, piriformis, hip abd, gastroc stretch w/ traction  20' c STM to hip flexor/quad  20' c STM to hip flexor/quad  20' c STM to hip flexor/quad  20' c STM to hip flex/quad 20' c STM to hip flexor/ quad   Nerve glides                         Neuro Re-Ed        Seated 3 way trunk flexion 10x ea  10x ea  10x ea 10x ea     Seated PNF on TB 10x ea bilat no Tball 10x ea bilat no Tball  10x ea bilat no Tball NT    Bridge w/ hip ABD NT 2x10  2x10 RTB 2x10 RTB    PPT  "10x5\" hold  10x5\" hold NT  Seated nerve glides 10x ea bilat   PPU w/ exhale     Side step c squat 2x10'    Sit to stand unsupported  NT NT  NT  10x    Supine Tball ABD crunch  10x5\" hold NT NT  Step ups front and lateral 10x ea    Ther Ex        SRC for ROM/ cardiovascular endurance  Nustep 10' L2 Nustep 10' L3 Nustep 10' L4 Nustep 10' L5 Nustep 10' L5   Lumbar rotation c TB 10x5\" hold bilat 10x5\" hold bilat 10x5\" hold bilat 10x5\" hold bilat    SL clamshells 2x10 ea RTB 2x10 ea RTB  NT 2x10 ea RTB    DKTC 10x5\"  10x5\" hold  10x5\" hold 10x5\" hold     Supine SLR NT Standing 3way SLR 10x ea bilat  NT NT    LAQ                HEP instruction/ education     10'   Ther Activity                        Gait Training                        Modalities 1/10 1/12 1/24 1/26 1/31   MHP 10' MHP to LB and bilat thighs in seated 10' MHP to LB and bilat thighs in seated  declined 10' LB and Bilat thighs in seated 10' LB and bilat thighs in seated                  "

## 2024-01-31 NOTE — TELEPHONE ENCOUNTER
Spoke to pharmacy. The hydromorphone and Hydrocodone Bititrate are what is covered under insurance plan. The patient will have to be self pay if he wishes to continue the Oxycodone.     Pharmacist is aware and will discuss this with the patient. To call back if new script needs to be sent.

## 2024-01-31 NOTE — PROGRESS NOTES
"Daily Note     Today's date: 2024  Patient name: Dominick Irving  : 1960  MRN: [id number]  Referring provider: Lake Connors PA-C  Dx:   Encounter Diagnosis     ICD-10-CM    1. Chronic bilateral low back pain with bilateral sciatica  M54.42     M54.41     G89.29       2. Physical deconditioning  R53.81       3. Paresthesia of bilateral legs  R20.2       4. Gait abnormality  R26.9           Start Time: 0755  Stop Time: 0855  Total time in clinic (min): 60 minutes    Subjective: Patient reports, \"I was having really intense pain in my groin yesterday and today to the point where I could not get myself dressed and that's not me, I can always get myself dressed.\"      Objective: See treatment diary below      Assessment: Tolerated treatment fair. Patient demonstrated fatigue post treatment, exhibited good technique with therapeutic exercises, and would benefit from continued PT to improve core and BLE strength to improve functional mobility and QoL. Pt was unable to complete as many exercises due to groin pain and anterior hip pain.       Plan: Continue per plan of care.      Precautions: PMH HTN, CAD, Bladder Cancer, CHF, RODO, Lumbar fusion L4-5 in , L3-4 hemilaminectomy 2023  POC Expiration: 3/6/24    Manuals    B HS, piriformis, hip abd, gastroc stretch w/ traction  20' c STM to hip flexor/quad  20' c STM to hip flexor/quad  20' c STM to hip flex/quad 20' c STM to hip flexor/ quad 20' c STM to hip flexor/ quad   Nerve glides                         Neuro Re-Ed        Seated 3 way trunk flexion 10x ea  10x ea 10x ea      Seated PNF on TB 10x ea bilat no Tball  10x ea bilat no Tball NT     Bridge w/ hip ABD 2x10  2x10 RTB 2x10 RTB  10x RTB   PPT 10x5\" hold NT  Seated nerve glides 10x ea bilat    PPU w/ exhale    Side step c squat 2x10'     Sit to stand unsupported  NT  NT  10x     Supine Tball ABD crunch  NT NT  Step ups front and lateral 10x ea     Ther Ex        SRC " "for ROM/ cardiovascular endurance  Nustep 10' L3 Nustep 10' L4 Nustep 10' L5 Nustep 10' L5 Nustep 10' L5   Lumbar rotation c TB 10x5\" hold bilat 10x5\" hold bilat 10x5\" hold bilat  10x5\" hold   SL clamshells 2x10 ea RTB  NT 2x10 ea RTB     DKTC 10x5\" hold  10x5\" hold 10x5\" hold   10x5\" hold    Supine SLR Standing 3way SLR 10x ea bilat  NT NT     LAQ                HEP instruction/ education    10'    Ther Activity                        Gait Training                        Modalities 1/12 1/24 1/26 1/31 2/2   MHP 10' MHP to LB and bilat thighs in seated  declined 10' LB and Bilat thighs in seated 10' LB and bilat thighs in seated 10' LB and bilat thighs in seated                    "

## 2024-02-02 ENCOUNTER — OFFICE VISIT (OUTPATIENT)
Dept: PHYSICAL THERAPY | Facility: CLINIC | Age: 64
End: 2024-02-02
Payer: MEDICARE

## 2024-02-02 DIAGNOSIS — M54.41 CHRONIC BILATERAL LOW BACK PAIN WITH BILATERAL SCIATICA: Primary | ICD-10-CM

## 2024-02-02 DIAGNOSIS — R20.2 PARESTHESIA OF BILATERAL LEGS: ICD-10-CM

## 2024-02-02 DIAGNOSIS — M54.42 CHRONIC BILATERAL LOW BACK PAIN WITH BILATERAL SCIATICA: Primary | ICD-10-CM

## 2024-02-02 DIAGNOSIS — R53.81 PHYSICAL DECONDITIONING: ICD-10-CM

## 2024-02-02 DIAGNOSIS — R26.9 GAIT ABNORMALITY: ICD-10-CM

## 2024-02-02 DIAGNOSIS — G89.29 CHRONIC BILATERAL LOW BACK PAIN WITH BILATERAL SCIATICA: Primary | ICD-10-CM

## 2024-02-02 PROCEDURE — 97140 MANUAL THERAPY 1/> REGIONS: CPT

## 2024-02-02 PROCEDURE — 97110 THERAPEUTIC EXERCISES: CPT

## 2024-02-05 RX ORDER — METOPROLOL SUCCINATE 25 MG/1
TABLET, EXTENDED RELEASE ORAL
COMMUNITY
Start: 2024-02-01

## 2024-02-06 ENCOUNTER — TELEPHONE (OUTPATIENT)
Dept: FAMILY MEDICINE CLINIC | Facility: CLINIC | Age: 64
End: 2024-02-06

## 2024-02-06 ENCOUNTER — OFFICE VISIT (OUTPATIENT)
Dept: FAMILY MEDICINE CLINIC | Facility: CLINIC | Age: 64
End: 2024-02-06
Payer: MEDICARE

## 2024-02-06 VITALS
HEIGHT: 68 IN | SYSTOLIC BLOOD PRESSURE: 140 MMHG | WEIGHT: 240.6 LBS | BODY MASS INDEX: 36.46 KG/M2 | HEART RATE: 69 BPM | DIASTOLIC BLOOD PRESSURE: 63 MMHG | OXYGEN SATURATION: 94 %

## 2024-02-06 DIAGNOSIS — E11.9 TYPE 2 DIABETES MELLITUS WITHOUT COMPLICATION, WITHOUT LONG-TERM CURRENT USE OF INSULIN (HCC): Primary | ICD-10-CM

## 2024-02-06 DIAGNOSIS — I50.33 ACUTE ON CHRONIC DIASTOLIC HEART FAILURE (HCC): ICD-10-CM

## 2024-02-06 DIAGNOSIS — G89.4 CHRONIC PAIN SYNDROME: ICD-10-CM

## 2024-02-06 LAB
LEFT EYE DIABETIC RETINOPATHY: NORMAL
LEFT EYE IMAGE QUALITY: NORMAL
LEFT EYE MACULAR EDEMA: NORMAL
LEFT EYE OTHER RETINOPATHY: NORMAL
RIGHT EYE DIABETIC RETINOPATHY: NORMAL
RIGHT EYE IMAGE QUALITY: NORMAL
RIGHT EYE MACULAR EDEMA: NORMAL
RIGHT EYE OTHER RETINOPATHY: NORMAL
SEVERITY (EYE EXAM): NORMAL
SL AMB POCT HEMOGLOBIN AIC: 6 (ref ?–6.5)

## 2024-02-06 PROCEDURE — 99214 OFFICE O/P EST MOD 30 MIN: CPT | Performed by: FAMILY MEDICINE

## 2024-02-06 PROCEDURE — 92250 FUNDUS PHOTOGRAPHY W/I&R: CPT | Performed by: FAMILY MEDICINE

## 2024-02-06 PROCEDURE — 83036 HEMOGLOBIN GLYCOSYLATED A1C: CPT | Performed by: FAMILY MEDICINE

## 2024-02-06 NOTE — PROGRESS NOTES
Assessment/Plan     Problem List Items Addressed This Visit       Acute on chronic diastolic heart failure (HCC)    Chronic pain syndrome     Reduce oxycontin to bid         Type 2 diabetes mellitus without complication, without long-term current use of insulin ([facility]) - Primary     Continue diet   Well controlled  Lab Results   Component Value Date    HGBA1C 6.7 (H) 06/29/2023            Relevant Orders    POCT hemoglobin A1c (Completed)    IRIS Diabetic eye exam (Completed)      Treatment Plan: Contniue meds/PT/accupuncture    Treatment Goals: Improve pain and qol    Opiate risks  There are risks associated with opioid medications, including dependence, addiction and tolerance. The patient understands and agrees to use these medications only as prescribed. Potential side effects of the medications include, but are not limited to, constipation, drowsiness, addiction, impaired judgment and risk of fatal overdose if not taken as prescribed. The patient was warned against driving while taking sedation medications.  Sharing medications is a felony. At this point in time, the patient is showing no signs of addiction, abuse, diversion or suicidal ideation.      PDMP review  [place] or [place]  reviewed. No red flags were identified; safe to proceed with prescription      instructions for management and counseling/coordination of care.       Depression Screening and Follow-up Plan: Patient's depression screening was positive with a PHQ-2 score of 6. Their PHQ-9 score was 15. Patient with underlying depression and was advised to continue current medications as prescribed.        Subjective     Opioid Management:   Type of visit: Follow-up    Pain related diagnoses: cancer related  chronic pain  neuropathic pain    Interval history: Stable in PT/accupuncture  Pain meds have provided minimal relief  Pain daily    Aberrant behavior?: No      Adverse effects from medication?: No      Screening Tools/Assessments:    PHQ-2/9:  PHQ-2  score: 6  PHQ-9 score: 15    Brief Pain Inventory (BPI):  1) Throughout our lives, most of us have had pain from time to time (such as minor headaches, sprains, and toothaches). Have you had pain other than these everyday kinds of pain today? Yes  2) Where is your pain located? back/both legs/abdomen  3) Rate your pain at its worst in the last 24 hours: 8  4) Rate your pain at its least in the last 24 hours: 7  5) Rate your average level of pain: 7  6) Rate your pain right now: 8  7) What treatments or medications are you receiving for your pain? as above  8) In the past 24 hours, how much relief have pain treatments or medication provided? 20%  9) During the past 24 hours, pain has interfered with your:     A) General activity: 9     B) Mood: 9     C) Walking ability: 9     D) Normal work (work outside the home & housework): 9     E) Relations with other people: 8     F) Sleep: 10     G) Enjoyment of life: 10    COMM:  Current COMM Score: 12 (positive, at risk patient)    Old records  Old records received and reviewed from: chart    Drug Screen:  Date of last drug screen: 11/2/2023  Drug screen result based off current prescriptions: consistent    Opioid agreement:  Active Opioid agreement on file?: Yes    Opioid agreement signed date: 1/4/2024  Opioid agreement expiration date: 1/3/2025    Naloxone:  Currently prescribed Naloxone (Narcan): Yes      High risk medications  High risk meds taken in last 72 hours: no    See opioid hpi.  Has questions with elavil.      Pain Medications               amitriptyline (ELAVIL) 50 mg tablet Take 1 tablet (50 mg total) by mouth daily at bedtime    aspirin 81 mg chewable tablet Chew 81 mg daily    gabapentin (NEURONTIN) 100 mg capsule Take 1 capsule (100 mg total) by mouth 3 (three) times a day Takes a total of 700 mg three times daily    gabapentin (Neurontin) 600 MG tablet Take 1 tablet (600 mg total) by mouth 3 (three) times a day    HYDROmorphone (DILAUDID) 2 mg tablet Take  "1 tablet (2 mg total) by mouth every 4 (four) hours as needed for moderate pain Max Daily Amount: 12 mg    HYDROmorphone (DILAUDID) 8 MG tablet Take 1 tablet (8 mg total) by mouth every 4 (four) hours as needed for moderate pain Max Daily Amount: 48 mg    OxyCONTIN 40 MG 12 hr tablet            Outpatient Morphine Milligram Equivalents Per Day       2/6/24 and after Unknown (at least 240 MME/Day)      Order Name Dose Route Frequency Maximum MME/Day     HYDROmorphone (DILAUDID) 2 mg tablet 2 mg Oral Every 4 hours PRN 48 MME/Day     OxyCONTIN 40 MG 12 hr tablet     Unknown     HYDROmorphone (DILAUDID) 8 MG tablet 8 mg Oral Every 4 hours  MME/Day    Total Potential Morphine Milligram Equivalents Per Day Unknown (at least 240 MME/Day)    An error was encountered while attempting to calculate the morphine milligram equivalents per day.       Calculation Information          HYDROmorphone (DILAUDID) 2 mg tablet    HYDROmorphone 2 mg Tabs: maximum daily dose of 12 mg * morphine equivalence factor of 4 = 48 MME/Day       OxyCONTIN 40 MG 12 hr tablet    Not enough information to calculate morphine milligram equivalents per day.       HYDROmorphone (DILAUDID) 8 MG tablet    HYDROmorphone 8 MG Tabs: maximum daily dose of 48 mg * morphine equivalence factor of 4 = 192 MME/Day                                 PDMP Review         Value Time User    PDMP Reviewed  Yes 1/30/2024  8:47 AM Robert Budinetz, MD           Review of Systems   Respiratory: Negative.     Cardiovascular: Negative.    Gastrointestinal: Negative.      Objective     /63 (BP Location: Left arm, Patient Position: Sitting, Cuff Size: Large)   Pulse 69   Ht 5' 8\" (1.727 m)   Wt 109 kg (240 lb 9.6 oz)   SpO2 94%   BMI 36.58 kg/m²     Physical Exam  Vitals and nursing note reviewed.   Constitutional:       General: He is not in acute distress.     Appearance: He is well-developed.   HENT:      Head: Normocephalic and atraumatic.   Eyes:      " Conjunctiva/sclera: Conjunctivae normal.   Cardiovascular:      Rate and Rhythm: Normal rate and regular rhythm.      Pulses: no weak pulses          Dorsalis pedis pulses are 2+ on the right side and 2+ on the left side.        Posterior tibial pulses are 2+ on the right side and 2+ on the left side.      Heart sounds: No murmur heard.  Pulmonary:      Effort: Pulmonary effort is normal. No respiratory distress.      Breath sounds: Normal breath sounds.   Abdominal:      Palpations: Abdomen is soft.      Tenderness: There is no abdominal tenderness.   Musculoskeletal:         General: No swelling.      Cervical back: Neck supple.   Feet:      Right foot:      Skin integrity: No ulcer, skin breakdown, erythema, warmth, callus or dry skin.      Left foot:      Skin integrity: No ulcer, skin breakdown, erythema, warmth, callus or dry skin.   Skin:     General: Skin is warm and dry.      Capillary Refill: Capillary refill takes less than 2 seconds.   Neurological:      Mental Status: He is alert.   Psychiatric:         Mood and Affect: Mood normal.     Diabetic Foot Exam    Patient's shoes and socks removed.    Right Foot/Ankle   Right Foot Inspection  Skin Exam: skin normal and skin intact. No dry skin, no warmth, no callus, no erythema, no maceration, no abnormal color, no pre-ulcer, no ulcer and no callus.     Toe Exam: ROM and strength within normal limits.     Vascular  Capillary refills: < 3 seconds  The right DP pulse is 2+. The right PT pulse is 2+.     Left Foot/Ankle  Left Foot Inspection  Skin Exam: skin normal and skin intact. No dry skin, no warmth, no erythema, no maceration, normal color, no pre-ulcer, no ulcer and no callus.     Toe Exam: ROM and strength within normal limits.     Vascular  Capillary refills: < 3 seconds  The left DP pulse is 2+. The left PT pulse is 2+.     Assign Risk Category  No deformity present  No loss of protective sensation  No weak pulses  Risk: 0      Robert Budinetz,  MD

## 2024-02-06 NOTE — PROGRESS NOTES
Daily Note     Today's date: 2024  Patient name: Dominick Irving  : 1960  MRN: [id number]  Referring provider: Lake Connors PA-C  Dx:   Encounter Diagnosis     ICD-10-CM    1. Chronic bilateral low back pain with bilateral sciatica  M54.42     M54.41     G89.29       2. Physical deconditioning  R53.81       3. Paresthesia of bilateral legs  R20.2       4. Gait abnormality  R26.9           Start Time: 0755  Stop Time: 0900  Total time in clinic (min): 65 minutes    Subjective: Patient reports he feels that his previous PT session was really good and he actually had some relief from pain in his LB and B hips. He still reports high pain levels today but is feeling some hope and improvement.       Objective: See treatment diary below      Assessment: Tolerated treatment fair. Patient demonstrated fatigue post treatment, exhibited good technique with therapeutic exercises, and would benefit from continued PT to improve BLE and core strengthening to reduce pain with function and improve overall QoL.       Plan: Continue per plan of care.      Precautions: PMH HTN, CAD, Bladder Cancer, CHF, RODO, Lumbar fusion L4-5 in , L3-4 hemilaminectomy 2023  POC Expiration: 3/6/24    Manuals    B HS, piriformis, hip abd, gastroc stretch w/ traction  20' c STM to hip flexor/quad  20' c STM to hip flex/quad 20' c STM to hip flexor/ quad 20' c STM to hip flexor/ quad 20' c STM to hip flexor/ quad    Nerve glides                         Neuro Re-Ed        Seated 3 way trunk flexion 10x ea 10x ea       Seated PNF on TB 10x ea bilat no Tball NT      Bridge w/ hip ABD 2x10 RTB 2x10 RTB  10x RTB 2x10 RTB   PPT NT  Seated nerve glides 10x ea bilat     PPU w/ exhale   Side step c squat 2x10'      Sit to stand unsupported  NT  10x   10x    Supine Tball ABD crunch  NT  Step ups front and lateral 10x ea      Ther Ex        SRC for ROM/ cardiovascular endurance  Nustep 10' L4 Nustep 10' L5 Nustep 10'  "L5 Nustep 10' L5 Nustep 10' L5   Lumbar rotation c TB 10x5\" hold bilat 10x5\" hold bilat  10x5\" hold 10x5\" hold   SL clamshells NT 2x10 ea RTB      DKTC 10x5\" hold 10x5\" hold   10x5\" hold  10x5\" hold   Supine SLR NT NT      LAQ                HEP instruction/ education   10'     Ther Activity                        Gait Training                        Modalities 1/24 1/26 1/31 2/2    MHP declined 10' LB and Bilat thighs in seated 10' LB and bilat thighs in seated 10' LB and bilat thighs in seated 10' LB and bilat thighs in seated                       "

## 2024-02-06 NOTE — ASSESSMENT & PLAN NOTE
Continue diet   Well controlled  Lab Results   Component Value Date    HGBA1C 6.7 (H) 06/29/2023

## 2024-02-06 NOTE — TELEPHONE ENCOUNTER
----- Message from Robert Budinetz, MD sent at 2/6/2024 10:00 AM EST -----  Labs reviewed and are within normal limits.  Please inform the patient.

## 2024-02-07 ENCOUNTER — HOSPITAL ENCOUNTER (OUTPATIENT)
Dept: RADIOLOGY | Facility: CLINIC | Age: 64
Discharge: HOME | End: 2024-02-07
Payer: MEDICARE

## 2024-02-07 ENCOUNTER — OFFICE VISIT (OUTPATIENT)
Dept: OBGYN CLINIC | Facility: CLINIC | Age: 64
End: 2024-02-07

## 2024-02-07 ENCOUNTER — OFFICE VISIT (OUTPATIENT)
Dept: PHYSICAL THERAPY | Facility: CLINIC | Age: 64
End: 2024-02-07
Payer: MEDICARE

## 2024-02-07 VITALS
TEMPERATURE: 98.1 F | BODY MASS INDEX: 36.07 KG/M2 | HEIGHT: 68 IN | HEART RATE: 76 BPM | SYSTOLIC BLOOD PRESSURE: 150 MMHG | DIASTOLIC BLOOD PRESSURE: 80 MMHG | WEIGHT: 238 LBS

## 2024-02-07 DIAGNOSIS — S82.891D CLOSED FRACTURE OF RIGHT ANKLE WITH ROUTINE HEALING: Primary | ICD-10-CM

## 2024-02-07 DIAGNOSIS — S82.891D CLOSED FRACTURE OF RIGHT ANKLE WITH ROUTINE HEALING: ICD-10-CM

## 2024-02-07 DIAGNOSIS — R20.2 PARESTHESIA OF BILATERAL LEGS: ICD-10-CM

## 2024-02-07 DIAGNOSIS — M54.41 CHRONIC BILATERAL LOW BACK PAIN WITH BILATERAL SCIATICA: Primary | ICD-10-CM

## 2024-02-07 DIAGNOSIS — R53.81 PHYSICAL DECONDITIONING: ICD-10-CM

## 2024-02-07 DIAGNOSIS — R26.9 GAIT ABNORMALITY: ICD-10-CM

## 2024-02-07 DIAGNOSIS — M54.42 CHRONIC BILATERAL LOW BACK PAIN WITH BILATERAL SCIATICA: Primary | ICD-10-CM

## 2024-02-07 DIAGNOSIS — G89.29 CHRONIC BILATERAL LOW BACK PAIN WITH BILATERAL SCIATICA: Primary | ICD-10-CM

## 2024-02-07 PROCEDURE — 97112 NEUROMUSCULAR REEDUCATION: CPT

## 2024-02-07 PROCEDURE — 73610 X-RAY EXAM OF ANKLE: CPT

## 2024-02-07 PROCEDURE — 97140 MANUAL THERAPY 1/> REGIONS: CPT

## 2024-02-07 PROCEDURE — 97110 THERAPEUTIC EXERCISES: CPT

## 2024-02-07 PROCEDURE — 99024 POSTOP FOLLOW-UP VISIT: CPT | Performed by: ORTHOPAEDIC SURGERY

## 2024-02-07 NOTE — PROGRESS NOTES
"Patient Name:  Dominick Irving  MRN:  [id number]    Assessment     1. Closed fracture of right ankle with routine healing  XR ankle 3+ vw right    Brace        Plan     Patient will go into a lace up ankle brace to be worn for weightbearing activities.  He may follow-up in orthopedic office in 3-4 weeks.  Is to continue in physical therapy working on range of motion.    Return 3-4 weeks, for Recheck.    Subjective   Dominick Irving returns for follow-up of right distal fib fracture, DOI 12/28/2023. The patient is 5+ week(s) post injury and returns for routine follow-up.  Patient has attended 6 PT sessions since his last visit on 1/10/2024.  Patient denies any significant ankle pain and states that feeling a lot better.  He is anticipating coming out of the cam boot.    Objective     /80   Pulse 76   Temp 98.1 °F (36.7 °C)   Ht 5' 8\" (1.727 m)   Wt 108 kg (238 lb)   BMI 36.19 kg/m²     Right ankle shows no swelling or redness.  There is dry skin over the sole of the foot and toes.  There is minimal swelling of the dorsum of the foot.  She has mild tenderness to palpation approximately the 2 fingerbreadths above the tip of the lateral malleolus but does not have any at the proximal edge of the fracture.  He has negative squeeze test to the shin.  He has negative percussion pain to the medial and lateral malleolus.  He has negative heel tap test for reproducible pain.  He has no palpable calf pain.  He has excellent plantar and dorsiflexion of the ankle but has limited inversion and eversion of the ankle as yet.  Light touch sensation is intact.    Data Review     I have personally reviewed pertinent weightbearing films in PACS documenting callus and healing of the distal fibular fracture without widening of the mortise.    Evelio Gonzalez PA-C     "

## 2024-02-07 NOTE — PATIENT INSTRUCTIONS
Patient will go into a lace up ankle brace to be worn for weightbearing activities.  He may follow-up in orthopedic office in 3-4 weeks.  Is to continue in physical therapy working on range of motion.

## 2024-02-08 ENCOUNTER — TELEMEDICINE (OUTPATIENT)
Dept: PALLIATIVE MEDICINE | Facility: CLINIC | Age: 64
End: 2024-02-08

## 2024-02-08 DIAGNOSIS — Z71.89 COUNSELING AND COORDINATION OF CARE: Primary | ICD-10-CM

## 2024-02-08 PROCEDURE — NC001 PR NO CHARGE

## 2024-02-08 NOTE — PROGRESS NOTES
Daily Note     Today's date: 2024  Patient name: Dominick Irving  : 1960  MRN: [id number]  Referring provider: Lake Connors PA-C  Dx:   Encounter Diagnosis     ICD-10-CM    1. Chronic bilateral low back pain with bilateral sciatica  M54.42     M54.41     G89.29       2. Physical deconditioning  R53.81       3. Paresthesia of bilateral legs  R20.2       4. Gait abnormality  R26.9           Start Time: 0755  Stop Time: 0855  Total time in clinic (min): 60 minutes    Subjective: Patient presents to PT wearing functional ankle brace vs CAM boot and walking with SPC vs WW. He reports he is feeling a little sore in R ankle but states he was on his feet for at least 6 hours yesterday. He is happy with the progress he is making and is hopeful for continued success with PT.      Objective: See treatment diary below      Assessment: Tolerated treatment well. Patient completed increased standing exercises in todays session and displayed minimal increase in R ankle pain. He demonstrated improved ambulation with SPC w/ decreased antalgic pattern. Patient demonstrated fatigue post treatment, exhibited good technique with therapeutic exercises, and would benefit from continued PT to improve core and BLE strength to reduce pain with functional mobility.      Plan: Continue per plan of care.      Precautions: PMH HTN, CAD, Bladder Cancer, CHF, RODO, Lumbar fusion L4-5 in , L3-4 hemilaminectomy 2023  POC Expiration: 3/6/24    Manuals    B HS, piriformis, hip abd, gastroc stretch w/ traction  20' c STM to hip flex/quad 20' c STM to hip flexor/ quad 20' c STM to hip flexor/ quad 20' c STM to hip flexor/ quad  25' c STM to hip flexor/ quad    Nerve glides                         Neuro Re-Ed        Seated 3 way trunk flexion 10x ea        Seated PNF on TB NT       Bridge w/ hip ABD 2x10 RTB  10x RTB 2x10 RTB 2x10 BTB   PPT  Seated nerve glides 10x ea bilat      PPU w/ exhale  Side step c  "squat 2x10'    Front and lateral lunges 10x ea    Sit to stand unsupported   10x   10x  10x    Supine Tball ABD crunch   Step ups front and lateral 10x ea    Step ups front and later 10x ea 6\" step    Ther Ex        SRC for ROM/ cardiovascular endurance  Nustep 10' L5 Nustep 10' L5 Nustep 10' L5 Nustep 10' L5 Nustep 10' L6   Lumbar rotation c TB 10x5\" hold bilat  10x5\" hold 10x5\" hold Leg press  55#   65#  75#  DL 10x ea    SL clamshells 2x10 ea RTB       DKTC 10x5\" hold   10x5\" hold  10x5\" hold    Supine SLR NT       LAQ                HEP instruction/ education  10'      Ther Activity                        Gait Training                        Modalities 1/26 1/31 2/2     MHP 10' LB and Bilat thighs in seated 10' LB and bilat thighs in seated 10' LB and bilat thighs in seated 10' LB and bilat thighs in seated  declined                        "

## 2024-02-08 NOTE — PROGRESS NOTES
Palliative Supportive Care  met with patient/family to continue to provide emotional support and guidance.      Updated biopsychosocial information relevant to support:    The patient was identified by name and date of birth.  Dominick was informed that this is a telemedicine visit and that the visit is being conducted through the Epic Embedded platform. They agree to proceed..  My office door was closed. No one else was in the room. They acknowledged consent and understanding of privacy and security of the video platform. The patient has agreed to participate and understands they can discontinue the visit at any time.    Dominick spoke about having a much better week and feeling hopeful for the future. He has stepped down from a boot to ankle brace. He is navigating how to walk again properly and taking precautions. 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 He discussed being excited to navigate his relationship with his wife with his growing independence and her semi [facility]. He said he feels they will get to begin to explore their relationship as  and wife in [facility] this year. He thanked [facility] and his care team for all the support and he feels he is headed in a better direction though he stated he understands it won't come without set backs or issues as he continues on his health journey.       Identified areas of need include: emotional support    Resources provided:    Areas that need future follow-up include: reduce anxiety/depressed mood, find positives in daily life    I have spent 40 minutes with Patient  today in which greater than 50% of this time was spent in counseling/coordination of care     Palliative  will follow-up as requested by patient, family, and primary team.  Please contact with any specific requests

## 2024-02-09 ENCOUNTER — OFFICE VISIT (OUTPATIENT)
Dept: PHYSICAL THERAPY | Facility: CLINIC | Age: 64
End: 2024-02-09
Payer: MEDICARE

## 2024-02-09 DIAGNOSIS — R20.2 PARESTHESIA OF BILATERAL LEGS: ICD-10-CM

## 2024-02-09 DIAGNOSIS — M54.41 CHRONIC BILATERAL LOW BACK PAIN WITH BILATERAL SCIATICA: Primary | ICD-10-CM

## 2024-02-09 DIAGNOSIS — R53.81 PHYSICAL DECONDITIONING: ICD-10-CM

## 2024-02-09 DIAGNOSIS — M54.42 CHRONIC BILATERAL LOW BACK PAIN WITH BILATERAL SCIATICA: Primary | ICD-10-CM

## 2024-02-09 DIAGNOSIS — G89.29 CHRONIC BILATERAL LOW BACK PAIN WITH BILATERAL SCIATICA: Primary | ICD-10-CM

## 2024-02-09 DIAGNOSIS — R26.9 GAIT ABNORMALITY: ICD-10-CM

## 2024-02-09 PROCEDURE — 97110 THERAPEUTIC EXERCISES: CPT

## 2024-02-09 PROCEDURE — 97140 MANUAL THERAPY 1/> REGIONS: CPT

## 2024-02-09 PROCEDURE — 97112 NEUROMUSCULAR REEDUCATION: CPT

## 2024-02-14 ENCOUNTER — TELEMEDICINE (OUTPATIENT)
Dept: PALLIATIVE MEDICINE | Facility: CLINIC | Age: 64
End: 2024-02-14

## 2024-02-14 ENCOUNTER — OFFICE VISIT (OUTPATIENT)
Dept: PHYSICAL THERAPY | Facility: CLINIC | Age: 64
End: 2024-02-14
Payer: MEDICARE

## 2024-02-14 DIAGNOSIS — M54.41 CHRONIC BILATERAL LOW BACK PAIN WITH BILATERAL SCIATICA: Primary | ICD-10-CM

## 2024-02-14 DIAGNOSIS — R20.2 PARESTHESIA OF BILATERAL LEGS: ICD-10-CM

## 2024-02-14 DIAGNOSIS — R26.9 GAIT ABNORMALITY: ICD-10-CM

## 2024-02-14 DIAGNOSIS — M54.42 CHRONIC BILATERAL LOW BACK PAIN WITH BILATERAL SCIATICA: Primary | ICD-10-CM

## 2024-02-14 DIAGNOSIS — G89.29 CHRONIC BILATERAL LOW BACK PAIN WITH BILATERAL SCIATICA: Primary | ICD-10-CM

## 2024-02-14 DIAGNOSIS — R53.81 PHYSICAL DECONDITIONING: ICD-10-CM

## 2024-02-14 DIAGNOSIS — Z71.89 COUNSELING AND COORDINATION OF CARE: Primary | ICD-10-CM

## 2024-02-14 PROCEDURE — NC001 PR NO CHARGE

## 2024-02-14 PROCEDURE — 97140 MANUAL THERAPY 1/> REGIONS: CPT

## 2024-02-14 PROCEDURE — 97110 THERAPEUTIC EXERCISES: CPT

## 2024-02-14 PROCEDURE — 97112 NEUROMUSCULAR REEDUCATION: CPT

## 2024-02-14 NOTE — PROGRESS NOTES
Daily Note     Today's date: 2024  Patient name: Dominick Irving  : 1960  MRN: [id number]  Referring provider: Lake Connors PA-C  Dx:   Encounter Diagnosis     ICD-10-CM    1. Chronic bilateral low back pain with bilateral sciatica  M54.42     M54.41     G89.29       2. Physical deconditioning  R53.81       3. Paresthesia of bilateral legs  R20.2       4. Gait abnormality  R26.9           Start Time: 0755  Stop Time: 905  Total time in clinic (min): 70 minutes    Subjective: Patient presents to PT walking with SPC and improved gait pattern and speed. He states he is trying to be more active but by the afternoon, he is very sore in R ankle and has increased swelling.       Objective: See treatment diary below      Assessment: Tolerated treatment well. Patient is better able to tolerate standing exercises and is showing positive response to STM to hip flexors with decreased muscle spasms. Patient demonstrated fatigue post treatment, exhibited good technique with therapeutic exercises, and would benefit from continued PT to improve core and BLE strength to reduce pain with mobility.       Plan: Continue per plan of care.      Precautions: PMH HTN, CAD, Bladder Cancer, CHF, RODO, Lumbar fusion L4-5 in , L3-4 hemilaminectomy 2023  POC Expiration: 3/6/24    Manuals    B HS, piriformis, hip abd, gastroc stretch w/ traction  20' c STM to hip flexor/ quad 20' c STM to hip flexor/ quad 20' c STM to hip flexor/ quad  25' c STM to hip flexor/ quad  20' c STM to hip flexor/ quad    Nerve glides                         Neuro Re-Ed        Seated 3 way trunk flexion        Seated PNF on TB        Bridge w/ hip ABD  10x RTB 2x10 RTB 2x10 BTB Standing 3way SLR no BOSU   PPT Seated nerve glides 10x ea bilat       PPU w/ exhale Side step c squat 2x10'    Front and lateral lunges 10x ea  Front and lateral lunges 10x ea    Sit to stand unsupported  10x   10x  10x  2x10    Supine Tball ABD  "crunch  Step ups front and lateral 10x ea    Step ups front and later 10x ea 6\" step  Step ups front and lateral 10x ea 6\" step   Ther Ex        SRC for ROM/ cardiovascular endurance  Nustep 10' L5 Nustep 10' L5 Nustep 10' L5 Nustep 10' L6 Nustep 10' L6   Lumbar rotation c TB  10x5\" hold 10x5\" hold Leg press  55#   65#  75#  DL 10x ea  Leg press   65#  75#  85#  DL   10x ea    SL clamshells        DKTC  10x5\" hold  10x5\" hold     Supine SLR        LAQ                HEP instruction/ education 10'       Ther Activity                        Gait Training                        Modalities 1/31 2/2   2/14   MHP 10' LB and bilat thighs in seated 10' LB and bilat thighs in seated 10' LB and bilat thighs in seated  declined 10' MHP LB and thighs, CP R ankle                          "

## 2024-02-15 NOTE — PROGRESS NOTES
Daily Note     Today's date: 2024  Patient name: Dominick Irving  : 1960  MRN: [id number]  Referring provider: Lake Connors PA-C  Dx:   Encounter Diagnosis     ICD-10-CM    1. Chronic bilateral low back pain with bilateral sciatica  M54.42     M54.41     G89.29       2. Physical deconditioning  R53.81       3. Paresthesia of bilateral legs  R20.2       4. Gait abnormality  R26.9           Start Time: 0750  Stop Time: 0900  Total time in clinic (min): 70 minutes    Subjective: Patient reports his pain is at about a 7/10 this morning. He also states that he is finding that he can tolerate bearing weight through his RLE for the morning, but by lunch time, he needs long rest breaks to relieve pain and throbbing.       Objective: See treatment diary below      Assessment: Tolerated treatment well. Patient is showing improvement in L hip flexor mobility and decreased muscle spasms/guarding during manual therapy. Patient demonstrated fatigue post treatment, exhibited good technique with therapeutic exercises, and would benefit from continued PT to improve core and BLE strength to improve functional mobility and overall QoL.       Plan: Continue per plan of care.      Precautions: PMH HTN, CAD, Bladder Cancer, CHF, RODO, Lumbar fusion L4-5 in , L3-4 hemilaminectomy 2023  POC Expiration: 3/6/24    Manuals  2   B HS, piriformis, hip abd, gastroc stretch w/ traction  20' c STM to hip flexor/ quad 20' c STM to hip flexor/ quad  25' c STM to hip flexor/ quad  20' c STM to hip flexor/ quad  25' c STM to hip flexor/ quad   Nerve glides                         Neuro Re-Ed        Seated 3 way trunk flexion        Seated PNF on TB        Bridge w/ hip ABD 10x RTB 2x10 RTB 2x10 BTB Standing 3way SLR no BOSU Standing 3 way SLR no BOSU    PPT        PPU w/ exhale   Front and lateral lunges 10x ea  Front and lateral lunges 10x ea  Front and lateral lunges 10x ea    Sit to stand unsupported    "10x  10x  2x10  Squats 2x10    Supine Tball ABD crunch    Step ups front and later 10x ea 6\" step  Step ups front and lateral 10x ea 6\" step Step ups front and lateral 10x ea 8\" step   Ther Ex        SRC for ROM/ cardiovascular endurance  Nustep 10' L5 Nustep 10' L5 Nustep 10' L6 Nustep 10' L6 Nustep 10' L6   Lumbar rotation c TB 10x5\" hold 10x5\" hold Leg press  55#   65#  75#  DL 10x ea  Leg press   65#  75#  85#  DL   10x ea     SL clamshells        DKTC 10x5\" hold  10x5\" hold      Supine SLR        LAQ                HEP instruction/ education        Ther Activity                        Gait Training                        Modalities 2/2 2/14 2/16   MHP 10' LB and bilat thighs in seated 10' LB and bilat thighs in seated  declined 10' MHP LB and thighs, CP R ankle 10' MHP LB and thighs, CP R ankle                            "

## 2024-02-16 ENCOUNTER — OFFICE VISIT (OUTPATIENT)
Dept: PHYSICAL THERAPY | Facility: CLINIC | Age: 64
End: 2024-02-16
Payer: MEDICARE

## 2024-02-16 DIAGNOSIS — R53.81 PHYSICAL DECONDITIONING: ICD-10-CM

## 2024-02-16 DIAGNOSIS — R26.9 GAIT ABNORMALITY: ICD-10-CM

## 2024-02-16 DIAGNOSIS — R20.2 PARESTHESIA OF BILATERAL LEGS: ICD-10-CM

## 2024-02-16 DIAGNOSIS — G89.29 CHRONIC BILATERAL LOW BACK PAIN WITH BILATERAL SCIATICA: Primary | ICD-10-CM

## 2024-02-16 DIAGNOSIS — M54.41 CHRONIC BILATERAL LOW BACK PAIN WITH BILATERAL SCIATICA: Primary | ICD-10-CM

## 2024-02-16 DIAGNOSIS — M54.42 CHRONIC BILATERAL LOW BACK PAIN WITH BILATERAL SCIATICA: Primary | ICD-10-CM

## 2024-02-16 PROCEDURE — 97140 MANUAL THERAPY 1/> REGIONS: CPT

## 2024-02-16 PROCEDURE — 97110 THERAPEUTIC EXERCISES: CPT

## 2024-02-16 PROCEDURE — 97112 NEUROMUSCULAR REEDUCATION: CPT

## 2024-02-18 DIAGNOSIS — G89.29 CHRONIC BILATERAL LOW BACK PAIN WITH BILATERAL SCIATICA: ICD-10-CM

## 2024-02-18 DIAGNOSIS — M54.42 CHRONIC BILATERAL LOW BACK PAIN WITH BILATERAL SCIATICA: ICD-10-CM

## 2024-02-18 DIAGNOSIS — M54.41 CHRONIC BILATERAL LOW BACK PAIN WITH BILATERAL SCIATICA: ICD-10-CM

## 2024-02-18 DIAGNOSIS — G89.4 CHRONIC PAIN SYNDROME: ICD-10-CM

## 2024-02-18 DIAGNOSIS — M54.12 CERVICAL RADICULOPATHY: ICD-10-CM

## 2024-02-19 RX ORDER — HYDROMORPHONE HYDROCHLORIDE 2 MG/1
2 TABLET ORAL EVERY 4 HOURS PRN
Qty: 180 TABLET | Refills: 0 | Status: SHIPPED | OUTPATIENT
Start: 2024-02-19

## 2024-02-20 ENCOUNTER — TELEMEDICINE (OUTPATIENT)
Dept: PALLIATIVE MEDICINE | Facility: CLINIC | Age: 64
End: 2024-02-20

## 2024-02-20 DIAGNOSIS — Z71.89 COUNSELING AND COORDINATION OF CARE: Primary | ICD-10-CM

## 2024-02-20 PROCEDURE — 99024 POSTOP FOLLOW-UP VISIT: CPT

## 2024-02-20 RX ORDER — HYDROCODONE BITARTRATE 80 MG/1
TABLET, EXTENDED RELEASE ORAL
COMMUNITY
Start: 2024-02-16

## 2024-02-20 NOTE — PROGRESS NOTES
Palliative Supportive Care  met with patient/family to continue to provide emotional support and guidance.      Updated biopsychosocial information relevant to support:    The patient was identified by name and date of birth.  Dominick was informed that this is a telemedicine visit and that the visit is being conducted through the Epic Embedded platform. They agree to proceed..  My office door was closed. No one else was in the room. They acknowledged consent and understanding of privacy and security of the video platform. The patient has agreed to participate and understands they can discontinue the visit at any time.    Dominick  spoke about having a bad week. He said he started having a new pain in his legs and it makes him worry he is going to go through the same issue he did with his back where he cannot get answers about what is wrong. He also is worried about his mom who is in the hospital and trying to refuse rehab when the family agrees she needs to get strong before returning home. LCSW supported Dominick in processing his frustrations but also challenging and reframing his negative thinking. LCSW also discussed revisiting journaling. Dominick agreed. He said another issue is struggling with feeling disingenuous with him family because he has to hide how he feels to help his mom. He said he had to end the call as his sister needed to talk to him about a family meeting they are having in their mom's hospital room. Next meeting 2/27 1pm online.       Identified areas of need include: emotional support    Resources provided:    Areas that need future follow-up include: reduce anxiety/depressed mood, coping strategies, self care, challenging negative thinking    I have spent 60 minutes with Patient  today in which greater than 50% of this time was spent in counseling/coordination of care     Palliative  will follow-up as requested by patient, family, and primary team.  Please contact with  any specific requests

## 2024-02-20 NOTE — PROGRESS NOTES
PT Re-Evaluation     Today's date: 2024  Patient name: Dominick Irving  : 1960  MRN: [id number]  Referring provider: Lake Connors PA-C  Dx:   Encounter Diagnosis     ICD-10-CM    1. Chronic bilateral low back pain with bilateral sciatica  M54.42     M54.41     G89.29       2. Physical deconditioning  R53.81       3. Paresthesia of bilateral legs  R20.2       4. Gait abnormality  R26.9               Start Time: 0755  Stop Time: 0850  Total time in clinic (min): 55 minutes    Assessment  Assessment details: Patient has been seen by OP PT for 15 visits and has made good progress towards therapy goals. Objectively, he is showing great progress in BLE strength and mobility with ability to complete full hip AROM and resist force during MMT, increased lumbar spine mobility, decreased radiating pain in posterior BLE, and improved gait pattern and use of SPC vs WW. Subjectively, he continues to report high pain levels and is concerned about new onset of pain in R hip and anterior thigh. Pt has been educated on pain neuroscience to improve understanding of chronic pain and conservative ways to manage high pain levels at home. He verbalizes understanding and is demonstrating improved outlook on his condition. Pt would benefit from continued skilled PT in order to further improve mobility, balance, and strength.  Impairments: abnormal gait, abnormal or restricted ROM, abnormal movement, activity intolerance, impaired physical strength, lacks appropriate home exercise program, pain with function, poor posture  and poor body mechanics    Symptom irritability: highUnderstanding of Dx/Px/POC: good   Prognosis: good    Goals  STG to be met within 4 weeks:  1. Pt will increase BLE strength by 1/2 muscle grade to improve functional mobility and reduce pain. - met  2. Pt will be independent with HEP to decrease pain and improve quality of life. - met  3. Pt will report 3/10 pain at worst to improve functional  mobility and quality of life. - in progress  4. Pt will increase FOTO score by 10 points in order to improve lumbar spine mobility and reduce pain with function. - met    LTG to be met within 8 weeks:  1. Pt will improve BLE strength to WNL to improve functional mobility and quality of life. - in progress  2. Pt will restore lumbar spine AROM to WNL to improve functional mobility and quality of life. - met  3. Pt will meet FOTO score goal at DC to improve lumbar spine mobility in pain free range. - met  4. Pt will demonstrate stability with unsupported gait to improve QoL. - in progress       Plan  Plan details: Continue with POC.   Patient would benefit from: skilled physical therapy  Planned modality interventions: cryotherapy and thermotherapy: hydrocollator packs  Planned therapy interventions: flexibility, functional ROM exercises, graded activity, graded exercise, home exercise program, joint mobilization, manual therapy, neuromuscular re-education, nerve gliding, patient education, self care, strengthening, stretching, therapeutic activities and therapeutic exercise  Frequency: 2x week  Duration in weeks: 4  Treatment plan discussed with: patient        Subjective Evaluation    History of Present Illness  Mechanism of injury: Patient reports he feels about 25% improvement and is still having significant pain in B hips. He states that Friday night, he was woken up by a new throbbing and aching pain in R thigh radiating from anterior hip to knee and it has not eased much since then. He is feeling discouraged and wants to get better but feels it wont ever happen.   Quality of life: fair    Patient Goals  Patient goals for therapy: decreased pain, improved balance, increased motion, increased strength, independence with ADLs/IADLs and return to sport/leisure activities  Patient goal: walk without walker  Pain  Current pain ratin  At best pain ratin  At worst pain ratin  Quality: radiating  Relieving  factors: rest and medications  Aggravating factors: sitting, stair climbing, lifting and standing (twisting, bending down)  Progression: worsening    Social Support  Stairs in house: yes   Lives in: multiple-level home  Lives with: spouse    Treatments  Previous treatment: medication  Current treatment comments: accupuncture.         Objective     Postural Observations  Seated posture: fair  Standing posture: fair      Palpation   Left   Tenderness of the erector spinae, iliopsoas, lumbar paraspinals and rectus femoris.     Right   Tenderness of the erector spinae, iliopsoas, lumbar paraspinals and rectus femoris.     Neurological Testing     Sensation     Lumbar   Left   Diminished: light touch    Right   Intact: light touch    Reflexes   Left   Patellar (L4): trace (1+)  Achilles (S1): trace (1+)    Right   Patellar (L4): trace (1+)  Achilles (S1): trace (1+)    Active Range of Motion     Lumbar   Flexion:  WFL  Extension:  with pain Restriction level: moderate  Left lateral flexion:  WFL  Right lateral flexion:  WFL  Left rotation:  WFL  Right rotation:  WFL    Strength/Myotome Testing     Left Hip   Planes of Motion   Flexion: 4  Extension: 4  Abduction: 4  Adduction: 4  External rotation: 4  Internal rotation: 4    Right Hip   Planes of Motion   Flexion: 4  Extension: 4  Abduction: 4  Adduction: 4  External rotation: 4  Internal rotation: 4    Left Knee   Flexion: 4+  Extension: 4+    Right Knee   Flexion: 4+  Extension: 4+    Left Ankle/Foot   Dorsiflexion: 3+  Plantar flexion: 5    Right Ankle/Foot   Dorsiflexion: 3+  Plantar flexion: 5    Tests     Lumbar     Left   Negative crossed SLR, passive SLR and slump test.     Right   Negative crossed SLR, passive SLR and slump test.     Left Pelvic Girdle/Sacrum   Negative: active SLR test.     Right Pelvic Girdle/Sacrum   Negative: active SLR test.     Left Hip   Negative KEDAR, FADIR and piriformis.     Right Hip   Negative KEDAR, FADIR and piriformis.  "               Precautions: PMH HTN, CAD, Bladder Cancer, CHF, RODO, Lumbar fusion L4-5 in 2016, L3-4 hemilaminectomy June 2023  POC Expiration: 3/20/24    Manuals 2/7 2/9 2/14 2/16 2/21   B HS, piriformis, hip abd, gastroc stretch w/ traction  20' c STM to hip flexor/ quad  25' c STM to hip flexor/ quad  20' c STM to hip flexor/ quad  25' c STM to hip flexor/ quad 20'    Nerve glides                         Neuro Re-Ed        Seated 3 way trunk flexion     Monster walk 2x10' RTB   Seated PNF on TB     Unsupported marching 2x10'   Bridge w/ hip ABD 2x10 RTB 2x10 BTB Standing 3way SLR no BOSU Standing 3 way SLR no BOSU     PPT     Unsupported side stepping 2x10'   PPU w/ exhale  Front and lateral lunges 10x ea  Front and lateral lunges 10x ea  Front and lateral lunges 10x ea     Sit to stand unsupported  10x  10x  2x10  Squats 2x10     Supine Tball ABD crunch   Step ups front and later 10x ea 6\" step  Step ups front and lateral 10x ea 6\" step Step ups front and lateral 10x ea 8\" step    Ther Ex        SRC for ROM/ cardiovascular endurance  Nustep 10' L5 Nustep 10' L6 Nustep 10' L6 Nustep 10' L6 Nustep 10' L6   Lumbar rotation c TB 10x5\" hold Leg press  55#   65#  75#  DL 10x ea  Leg press   65#  75#  85#  DL   10x ea      SL [facility] 10x5\" hold       Supine SLR        LAQ                HEP instruction/ education     5'   Ther Activity                        Gait Training                        Modalities   2/14 2/16 2/21   MHP 10' LB and bilat thighs in seated  declined 10' MHP LB and thighs, CP R ankle 10' MHP LB and thighs, CP R ankle declined                "

## 2024-02-21 ENCOUNTER — EVALUATION (OUTPATIENT)
Dept: PHYSICAL THERAPY | Facility: CLINIC | Age: 64
End: 2024-02-21
Payer: MEDICARE

## 2024-02-21 DIAGNOSIS — R53.81 PHYSICAL DECONDITIONING: ICD-10-CM

## 2024-02-21 DIAGNOSIS — R26.9 GAIT ABNORMALITY: ICD-10-CM

## 2024-02-21 DIAGNOSIS — R20.2 PARESTHESIA OF BILATERAL LEGS: ICD-10-CM

## 2024-02-21 DIAGNOSIS — M54.42 CHRONIC BILATERAL LOW BACK PAIN WITH BILATERAL SCIATICA: Primary | ICD-10-CM

## 2024-02-21 DIAGNOSIS — M54.41 CHRONIC BILATERAL LOW BACK PAIN WITH BILATERAL SCIATICA: Primary | ICD-10-CM

## 2024-02-21 DIAGNOSIS — G89.29 CHRONIC BILATERAL LOW BACK PAIN WITH BILATERAL SCIATICA: Primary | ICD-10-CM

## 2024-02-21 PROCEDURE — 97112 NEUROMUSCULAR REEDUCATION: CPT

## 2024-02-21 PROCEDURE — 97110 THERAPEUTIC EXERCISES: CPT

## 2024-02-21 PROCEDURE — 97140 MANUAL THERAPY 1/> REGIONS: CPT

## 2024-02-22 NOTE — PROGRESS NOTES
Daily Note     Today's date: 2024  Patient name: Dominick Irving  : 1960  MRN: [id number]  Referring provider: Lake Connors PA-C  Dx:   Encounter Diagnosis     ICD-10-CM    1. Chronic bilateral low back pain with bilateral sciatica  M54.42     M54.41     G89.29       2. Physical deconditioning  R53.81       3. Paresthesia of bilateral legs  R20.2       4. Gait abnormality  R26.9           Start Time: 0755  Stop Time: 0900  Total time in clinic (min): 65 minutes    Subjective: Patient reports he is noticing some low back pain that he hasn't felt in a while but has improvement in anterior thigh pain this morning.       Objective: See treatment diary below      Assessment: Tolerated treatment well. Patient demonstrated fatigue post treatment, exhibited good technique with therapeutic exercises, and would benefit from continued PT to improve core and BLE strengthening to improve functional mobility and QoL.       Plan: Continue per plan of care.      Precautions: PMH HTN, CAD, Bladder Cancer, CHF, RODO, Lumbar fusion L4-5 in , L3-4 hemilaminectomy 2023  POC Expiration: 3/20/24    Manuals    B HS, piriformis, hip abd, gastroc stretch w/ traction  25' c STM to hip flexor/ quad  20' c STM to hip flexor/ quad  25' c STM to hip flexor/ quad 20'  20'   Nerve glides                         Neuro Re-Ed        Seated 3 way trunk flexion    Monster walk 2x10' RTB Monster walk 4x10' RTB   Seated PNF on TB    Unsupported marching 2x10' Unsupported marching fwd/bwd 2x10' ea   Bridge w/ hip ABD 2x10 BTB Standing 3way SLR no BOSU Standing 3 way SLR no BOSU   Side step c squat 2x10' RTB   PPT    Unsupported side stepping 2x10' Unsupported side stepping 4x10'   PPU w/ exhale Front and lateral lunges 10x ea  Front and lateral lunges 10x ea  Front and lateral lunges 10x ea   Front and lateral lunges 10x ea    Sit to stand unsupported  10x  2x10  Squats 2x10      Supine Tball ABD crunch   "Step ups front and later 10x ea 6\" step  Step ups front and lateral 10x ea 6\" step Step ups front and lateral 10x ea 8\" step     Ther Ex        SRC for ROM/ cardiovascular endurance  Nustep 10' L6 Nustep 10' L6 Nustep 10' L6 Nustep 10' L6 Nustep 10' L6   Lumbar rotation c TB Leg press  55#   65#  75#  DL 10x ea  Leg press   65#  75#  85#  DL   10x ea       SL clamshells        DKTC        Supine SLR        LAQ                HEP instruction/ education    5'    Ther Activity                        Gait Training                        Modalities  2/14 2/16 2/21    MHP declined 10' MHP LB and thighs, CP R ankle 10' MHP LB and thighs, CP R ankle declined                 "

## 2024-02-23 ENCOUNTER — OFFICE VISIT (OUTPATIENT)
Dept: PHYSICAL THERAPY | Facility: CLINIC | Age: 64
End: 2024-02-23
Payer: MEDICARE

## 2024-02-23 DIAGNOSIS — G89.29 CHRONIC BILATERAL LOW BACK PAIN WITH BILATERAL SCIATICA: Primary | ICD-10-CM

## 2024-02-23 DIAGNOSIS — R26.9 GAIT ABNORMALITY: ICD-10-CM

## 2024-02-23 DIAGNOSIS — R53.81 PHYSICAL DECONDITIONING: ICD-10-CM

## 2024-02-23 DIAGNOSIS — M54.41 CHRONIC BILATERAL LOW BACK PAIN WITH BILATERAL SCIATICA: Primary | ICD-10-CM

## 2024-02-23 DIAGNOSIS — R20.2 PARESTHESIA OF BILATERAL LEGS: ICD-10-CM

## 2024-02-23 DIAGNOSIS — M54.42 CHRONIC BILATERAL LOW BACK PAIN WITH BILATERAL SCIATICA: Primary | ICD-10-CM

## 2024-02-23 PROCEDURE — 97110 THERAPEUTIC EXERCISES: CPT

## 2024-02-23 PROCEDURE — 97112 NEUROMUSCULAR REEDUCATION: CPT

## 2024-02-23 PROCEDURE — 97140 MANUAL THERAPY 1/> REGIONS: CPT

## 2024-02-27 ENCOUNTER — TELEMEDICINE (OUTPATIENT)
Dept: PALLIATIVE MEDICINE | Facility: CLINIC | Age: 64
End: 2024-02-27

## 2024-02-27 ENCOUNTER — OFFICE VISIT (OUTPATIENT)
Age: 64
End: 2024-02-27
Payer: MEDICARE

## 2024-02-27 VITALS
SYSTOLIC BLOOD PRESSURE: 155 MMHG | DIASTOLIC BLOOD PRESSURE: 85 MMHG | HEART RATE: 54 BPM | TEMPERATURE: 97.4 F | WEIGHT: 239 LBS | OXYGEN SATURATION: 92 % | HEIGHT: 68 IN | BODY MASS INDEX: 36.22 KG/M2

## 2024-02-27 DIAGNOSIS — G47.33 OBSTRUCTIVE SLEEP APNEA ON CPAP: ICD-10-CM

## 2024-02-27 DIAGNOSIS — G89.29 CHRONIC BILATERAL LOW BACK PAIN WITH BILATERAL SCIATICA: ICD-10-CM

## 2024-02-27 DIAGNOSIS — R45.89 DEPRESSED MOOD: ICD-10-CM

## 2024-02-27 DIAGNOSIS — Z71.89 COUNSELING AND COORDINATION OF CARE: Primary | ICD-10-CM

## 2024-02-27 DIAGNOSIS — Z51.5 PALLIATIVE CARE BY SPECIALIST: ICD-10-CM

## 2024-02-27 DIAGNOSIS — Z72.820 POOR SLEEP: ICD-10-CM

## 2024-02-27 DIAGNOSIS — Z71.89 COUNSELING AND COORDINATION OF CARE: ICD-10-CM

## 2024-02-27 DIAGNOSIS — I50.33 ACUTE ON CHRONIC DIASTOLIC HEART FAILURE (HCC): ICD-10-CM

## 2024-02-27 DIAGNOSIS — M54.41 CHRONIC BILATERAL LOW BACK PAIN WITH BILATERAL SCIATICA: ICD-10-CM

## 2024-02-27 DIAGNOSIS — I50.32 CHRONIC DIASTOLIC HEART FAILURE (HCC): ICD-10-CM

## 2024-02-27 DIAGNOSIS — C67.3 MALIGNANT NEOPLASM OF ANTERIOR WALL OF URINARY BLADDER (HCC): Primary | ICD-10-CM

## 2024-02-27 DIAGNOSIS — M54.42 CHRONIC BILATERAL LOW BACK PAIN WITH BILATERAL SCIATICA: ICD-10-CM

## 2024-02-27 DIAGNOSIS — R53.81 PHYSICAL DECONDITIONING: ICD-10-CM

## 2024-02-27 DIAGNOSIS — G89.4 CHRONIC PAIN SYNDROME: ICD-10-CM

## 2024-02-27 DIAGNOSIS — G62.9 NEUROPATHY: ICD-10-CM

## 2024-02-27 DIAGNOSIS — F41.9 ANXIETY: ICD-10-CM

## 2024-02-27 PROBLEM — Z85.51 HISTORY OF BLADDER CANCER: Status: ACTIVE | Noted: 2024-02-27

## 2024-02-27 PROCEDURE — 99214 OFFICE O/P EST MOD 30 MIN: CPT | Performed by: PHYSICIAN ASSISTANT

## 2024-02-27 PROCEDURE — 99024 POSTOP FOLLOW-UP VISIT: CPT

## 2024-02-27 RX ORDER — TORSEMIDE 20 MG/1
40 TABLET ORAL 2 TIMES DAILY
Qty: 360 TABLET | Refills: 1 | Status: SHIPPED | OUTPATIENT
Start: 2024-02-27

## 2024-02-27 RX ORDER — HYDROMORPHONE HYDROCHLORIDE 8 MG/1
8 TABLET ORAL EVERY 4 HOURS PRN
Qty: 180 TABLET | Refills: 0 | Status: SHIPPED | OUTPATIENT
Start: 2024-02-27

## 2024-02-27 NOTE — PROGRESS NOTES
Palliative Supportive Care  met with patient/family to continue to provide emotional support and guidance.      Updated biopsychosocial information relevant to support:    The patient was identified by name and date of birth. Dominick was informed that this is a telemedicine visit and that the visit is being conducted through the Epic Embedded platform. They agree to proceed..  My office door was closed. No one else was in the room. They acknowledged consent and understanding of privacy and security of the video platform. The patient has agreed to participate and understands they can discontinue the visit at any time.    Dominick spoke about his leg pain being worse and not being able to do anything for himself again this week, even dressing or walking unassisted. He said that he wishes he knew what was wrong and if it is just that he hasn't used this muscles in so long or if this is another issue. He doesn't feel his pain medications are touching the pain but hasn't had much luck with pain medications and has thought of getting off them in the future. He said he feels frustrated and emotional due to his current situation. LCSW attempted to challenge and reframe Dominick's view of his current situation and discussed progress and positives in his life. He said he usually is able to do this but feels very down and feels he as soon as something good happens something else negative happens. He said that he hates relying on his wife and son. He spoke about being a very productive and supportive person and doesn't know who he is now that he has health issues that limit him. He is concerned about his mother's health and upset he cannot be there. LCSW and Dominick discussed journaling as homework about the frustration and lack of self worth he is feeling due to his limitations. He agreed and asked to meet 3/5 at 1pm online.        Identified areas of need include: emotional support    Resources provided:    Areas  that need future follow-up include: reduce anxiety/depressed mood,     I have spent 60 minutes with Patient  today in which greater than 50% of this time was spent in counseling/coordination of care     Palliative  will follow-up as requested by patient, family, and primary team.  Please contact with any specific requests

## 2024-02-27 NOTE — Clinical Note
Dr. Budinetz,  Patient does not seem to find any benefit from gabapentin. There is still technically room to increase but not sure there will be any more of a benefit from a further increase. Could consider rotating to Lyrica. His wife had looked it up during our visit and appears to be covered by their insurance.   As for the Hysingla ER, he does not feel this is doing anything and not as effective as the oxycontin. I believe it would still be worth titrating this up to see if it has a better effect and can reduce his hydromorphone use. Though I do not have much yordan in opioids for his pain as previous increases have not shown to help much.   Best,  Lake

## 2024-02-27 NOTE — PROGRESS NOTES
Follow-up with Palliative and Supportive Care  Dominick Irving 64 y.o. male [id number]    ASSESSMENT & PLAN:  1. 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 Chronic diastolic heart failure (HCC)    3. Counseling and coordination of care    4. Chronic bilateral low back pain with bilateral sciatica    5. Palliative care by specialist    6. Physical deconditioning    7. Depressed mood    8. Poor sleep    9. Neuropathy    10. Obstructive sleep apnea on CPAP    11. Anxiety      Continue acupuncture and PT  Gabapentin  - Could consider further increase since he is experiencing no adverse effects or rotation to Lyrica   Hysingla ER - reports no benefit in pain. Consider increasing dose and/or BID dosing  Historically, patient has not had appreciable benefits to increases in opioids for pain, though the hope would be to decrease dosing of his hydromorphone  Continue dietary and lifestyle changes. Consider dietician or weight loss referral. Pt not interested in this today.   Return in about 3 months (around 5/27/2024) for Next scheduled follow up.  Emotional support provided.  Medication safety issues addressed - no driving under the influence of narcotics (including opioids), watch for adverse effects including AMS or respiratory depression (slowed breathing), keep medications stored in a safe/locked environment, do not use alcohol while opioids or other narcotics are in one's system.  Reviewed recent notes, labs, imaging.    Requested Prescriptions      No prescriptions requested or ordered in this encounter       There are no discontinued medications.    Dominick Irving was seen today for symptoms and planning cares related to above illnesses.  I have reviewed the patient's controlled substance dispensing history in the Prescription Drug Monitoring Program in compliance with the [place] regulations before prescribing any controlled substances.    They are invited to continue to follow with us.  If  there are questions or concerns, please contact us through our clinic/answering service 24 hours a day, seven days a week.    60 minutes were spent in this ambulatory visit with greater than 50% of the time spent face to face with patient and spouse in counseling or coordination of care including symptom assessment and management, medication review, psychosocial support, chart review, imaging review, lab review, supportive listening, and anticipatory guidance. All of the patient's questions were answered during this discussion.    Visit Information    Accompanied By: Spouse    Source of History: Self, Spouse    History Limitations: None    Contacts: Extended Emergency Contact Information  Primary Emergency Contact: Cami Irving  Mobile Phone: [phone]  Relation: Spouse  Secondary Emergency Contact: Jhon Irving  Mobile Phone: [phone]  Relation: Son     SUBJECTIVE:  Chief Complaint   Patient presents with    Follow-up    Pain    Counseling    Anxiety    Depression        HPI  Dominick Irving is a 64 y.o. male with hx of high-grade papillary urothelial carcinoma and chronic low back pain presents for follow up for symptom management. Follows with Dr. Sanabria (urology), Dr. Budinetz (PCP), and Dr. Rolon (Pain Mgmt).    Reports not doing so well today. Continues to have back and lower extremity pain. He is finding acupuncture to be beneficial, providing abot 1.5 days of symptom relief after treatments. Due to insurance reasons he has been rotated from oxycontin to Hysingla ER which he does not seem to get any appreciable benefit from. Last visit we had tried venlafaxine which he had a poor reaction to, leading to dizziness that resulted in a fall and fibula fx. He follows with Ortho and is due to have his splint discontinued soon. He has an upcoming Urology follow up in 2 weeks and is unsure of what the plan is as far as his cancer treatments.       Patient was considering a request to decrease his  gabapentin. He has not noticed any improvement with the last increase to 700mg TID. We discussed that there is still some room to increase the medication to see if it will have any more benefit since he is tolerating the medication. A rotation to Lyrica could be considered if maximum gabapentin therapy yields no improvement in symptoms.       PDMP shows no concerns.  Filled  Written  ID  Drug  QTY  Days  Prescriber  RX #  Dispenser  Refill  Daily Dose*  Pymt Type      02/19/2024 02/19/2024 1 Hydromorphone 2 Mg Tablet 180.00 30 Ro Bud [id number] Tho (4168) 0 60.00 MME Comm Ins [place]   02/16/2024 01/30/2024 1 Hysingla Er 80 Mg Tablet 30.00 30 Ro Bud [id number] Tho (4168) 0 80.00 MME Comm Ins [place]   02/01/2024 01/30/2024 1 Hydromorphone 8 Mg Tablet 180.00 30 Ro Bud [id number] Tho (4168) 0 240.00 MME [place]   01/19/2024 12/27/2023 1 Oxycontin Er 40 Mg Tablet 90.00 30 Ro Bud [id number] Tho (4168) 0 180.00 MME Comm Ins [place]     The following portions of the medical history were reviewed: past medical history, surgical history, problem list, medication list, family history, and social history.      Current Outpatient Medications:     amitriptyline (ELAVIL) 50 mg tablet, Take 1 tablet (50 mg total) by mouth daily at bedtime, Disp: 90 tablet, Rfl: 1    aspirin 81 mg chewable tablet, Chew 81 mg daily, Disp: , Rfl:     atorvastatin (LIPITOR) 40 mg tablet, , Disp: , Rfl:     co-enzyme Q-10 30 MG capsule, Take 100 mg by mouth daily , Disp: , Rfl:     Farxiga 5 MG TABS, 5 mg daily 5mg alternating with 2.5mg for fluid retention, Disp: , Rfl:     gabapentin (NEURONTIN) 100 mg capsule, Take 1 capsule (100 mg total) by mouth 3 (three) times a day Takes a total of 700 mg three times daily, Disp: 90 capsule, Rfl: 3    gabapentin (Neurontin) 600 MG tablet, Take 1 tablet (600 mg total) by mouth 3 (three) times a day, Disp: 90 tablet, Rfl: 3    HYDROmorphone (DILAUDID) 2 mg tablet, Take 1 tablet (2 mg total) by mouth every 4 (four) hours as  needed for moderate pain Max Daily Amount: 12 mg, Disp: 180 tablet, Rfl: 0    HYDROmorphone (DILAUDID) 8 MG tablet, Take 1 tablet (8 mg total) by mouth every 4 (four) hours as needed for moderate pain Max Daily Amount: 48 mg, Disp: 180 tablet, Rfl: 0    Hysingla ER 80 MG T24A, , Disp: , Rfl:     metolazone (ZAROXOLYN) 2.5 mg tablet, Take 1 tablet (2.5 mg total) by mouth daily as needed (edema), Disp: 90 tablet, Rfl: 3    metoprolol succinate (TOPROL-XL) 25 mg 24 hr tablet, , Disp: , Rfl:     multivitamin-iron-minerals-folic acid (CENTRUM) chewable tablet, Chew 1 tablet daily, Disp: , Rfl:     naloxone (NARCAN) 4 mg/0.1 mL nasal spray, Administer 1 spray into a nostril. If no response after 2-3 minutes, give another dose in the other nostril using a new spray., Disp: 1 each, Rfl: 1    OxyCONTIN 40 MG 12 hr tablet, , Disp: , Rfl:     potassium chloride (K-DUR,KLOR-CON) 10 mEq tablet, Take 2 tablets (20 mEq total) by mouth 2 (two) times a day, Disp: 120 tablet, Rfl: 3    torsemide (DEMADEX) 20 mg tablet, Take 2 tablets (40 mg total) by mouth 2 (two) times a day, Disp: 120 tablet, Rfl: 3    Past medical, surgical, social, and family histories are reviewed and pertinent updates are made.    Review of Systems   Constitutional:  Negative for activity change, appetite change, fatigue and unexpected weight change.   HENT:  Negative for mouth sores, trouble swallowing and voice change.    Eyes: Negative.    Respiratory:  Negative for shortness of breath.    Cardiovascular:  Negative for chest pain.   Gastrointestinal:  Negative for abdominal pain, constipation, diarrhea, nausea and vomiting.   Genitourinary: Negative.    Musculoskeletal:  Positive for arthralgias, back pain, gait problem and myalgias.   Skin:  Negative for color change, pallor and wound.   Neurological:  Positive for weakness. Negative for dizziness, light-headedness and headaches.   Hematological: Negative.    Psychiatric/Behavioral:  Negative for confusion  "and sleep disturbance. The patient is not nervous/anxious.          OBJECTIVE:  /85   Pulse (!) 54   Temp (!) 97.4 °F (36.3 °C)   Ht 5' 8\" (1.727 m)   Wt 108 kg (239 lb)   SpO2 92%   BMI 36.34 kg/m²   Physical Exam  Nursing note reviewed.   Constitutional:       General: He is not in acute distress.     Appearance: He is overweight.      Comments: Uses cane   HENT:      Head: Normocephalic and atraumatic.      Right Ear: External ear normal.      Left Ear: External ear normal.      Nose: Nose normal.      Mouth/Throat:      Pharynx: Oropharynx is clear.   Eyes:      Extraocular Movements: Extraocular movements intact.   Cardiovascular:      Rate and Rhythm: Normal rate.   Pulmonary:      Effort: Pulmonary effort is normal.   Abdominal:      General: Abdomen is flat.   Musculoskeletal:         General: No deformity.   Skin:     Findings: No rash.   Neurological:      Mental Status: He is alert and oriented to person, place, and time.   Psychiatric:         Mood and Affect: Mood normal.         Behavior: Behavior normal.          Lake Connors PA-C  [facility]  [phone]    Portions of this document may have been created using dictation software and as such some \"sound alike\" terms may have been generated by the system. Do not hesitate to contact me with any questions or clarifications.       "

## 2024-02-27 NOTE — PROGRESS NOTES
UROLOGY PROGRESS NOTE         NAME: Dominick Irving  AGE: 64 y.o. SEX: male  : 1960   MRN: [id number]    DATE: 2024  TIME: 7:28 AM    Assessment and Plan      Cystoscopy     Date/Time  3/8/2024 8:30 AM     Performed by  Shan Sanabria MD   Authorized by  Shan Sanabria MD         Procedure Details:    Additional Procedure Details: She had a EDWIN exam that was within normal limits.  He was then placed supine and prepped draped usual sterile fashion flexible cystoscopy was carried out.  Patient have a normal anterior posterior urethra.  He has slightly enlarged prostate with some mild bullous edema.  The bladder there was no gross tumors seen.  There is some mild trabeculation and some small diverticuli throughout.  Patient tolerated well and received his Cipro.       Impression:   1. History of bladder cancer    2. Benign prostatic hyperplasia with urinary frequency    3. Nocturia         Plan: Our plan is cytology today and follow-up in 4 months with a repeat cystoscopy cytology we will have the patient get a PSA a week prior to his appointment.  Patient agrees with plan.    Certainly if BCG was available health system wise at [facility] we would give him maintenance therapy but unfortunately it is not available.      Chief Complaint   No chief complaint on file.    History of Present Illness     HPI: Dominick Irving is a 64 y.o. year old male who presents with follow-up from surgical procedure on 2023 for cystoscopy bladder biopsy pathology report came back benign.  Reactive and chronic inflammation no sign of malignancy.  Patient with a history of high-grade T1 bladder cancer diagnosed in the spring 2023.  He completed 6 treatments of BCG.    Patient did well with BCG with no irritable or obstructive voiding complaints and he was on Flomax.    Patient is here today for his yearly EDWIN PSA and a follow-up cystoscopy and cytology which will be every 4 months for the next year.            The  "following portions of the patient's history were reviewed and updated as appropriate: allergies, current medications, past family history, past medical history, past social history, past surgical history and problem list.  Past Medical History:   Diagnosis Date    Anxiety 3/01/2023    Arthritis     Bladder cancer (HCC)     Cancer (HCC) 3/17/2023    Cervical disc disorder 1/2016    Chronic narcotic dependence (HCC)     Chronic pain disorder     lower back and down both legs    Colon polyp     Coronary artery disease     CPAP (continuous positive airway pressure) dependence     bi-pap    Depression 3/17/2023    Does use hearing aid     will wear DOS    Full dentures     Heart failure (HCC)     and \"fluid retention\" [facility]\"    High cholesterol     Hypertension     Low back pain 2003    Mild ankle edema     and feet    Obstructive sleep apnea on CPAP     Prediabetes     Shortness of breath     per pt \"with just exertion\"    Sleep apnea     Wears glasses      Past Surgical History:   Procedure Laterality Date    BACK SURGERY      titanium rods implanted    CAUDAL BLOCK N/A 10/17/2023    Procedure: BLOCK / INJECTION CAUDAL;  Surgeon: Minh Rolon MD;  Location: [facility];  Service: Pain Management     COLONOSCOPY      CYSTOSCOPY W/ URETERAL STENT PLACEMENT Bilateral 03/17/2023    Procedure: bilateral retrograde;  Surgeon: Shan Sanabria MD;  Location: [facility];  Service: Urology    HERNIA REPAIR      x5    LUMBAR LAMINECTOMY N/A 06/30/2023    Procedure: Left L3-4 Metrx hemilaminectomy and bilateral foraminotomies;  Surgeon: Leland Aguilar MD;  [facility];  Service: Neurosurgery    LA CYSTO W/REMOVAL OF LESIONS SMALL N/A 05/01/2023    Procedure: CYSTOSCOPY TURBT;  Surgeon: Shan Sanabria MD;  [facility];  Service: Urology    LA CYSTOURETHROSCOPY N/A 11/06/2023    Procedure: CYSTOSCOPY with  bladder biopsies and fulgeration;  Surgeon: Shan Sanabria MD;  Location: [facility];  " Service: Urology    SPINE SURGERY  2017    TRANSURETHRAL RESECTION OF BLADDER TUMOR N/A 03/17/2023    Procedure: TRANSURETHRAL RESECTION OF BLADDER TUMOR (TURBT);  Surgeon: Shan Sanabria MD;  Location: [facility];  Service: Urology     shoulder  Review of Systems     Const: Denies chills, fever and weight loss.  CV: Denies chest pain.  Resp: Denies SOB.  GI: Denies abdominal pain, nausea and vomiting.  : Denies symptoms other than stated above.  Musculo: Denies back pain.    Objective   There were no vitals taken for this visit.    Physical Exam  Const: Appears healthy and well developed. No signs of acute distress present.  Resp: Respirations are regular and unlabored.   CV: Rate is regular. Rhythm is regular.  Abdomen: Abdomen is soft, nontender, and nondistended. Kidneys are not palpable.  : nl berto  Psych: Patient's attitude is cooperative. Mood is normal. Affect is normal.    Current Medications     Current Outpatient Medications:     amitriptyline (ELAVIL) 50 mg tablet, Take 1 tablet (50 mg total) by mouth daily at bedtime, Disp: 90 tablet, Rfl: 1    aspirin 81 mg chewable tablet, Chew 81 mg daily, Disp: , Rfl:     atorvastatin (LIPITOR) 40 mg tablet, , Disp: , Rfl:     co-enzyme Q-10 30 MG capsule, Take 100 mg by mouth daily , Disp: , Rfl:     Farxiga 5 MG TABS, 5 mg daily 5mg alternating with 2.5mg for fluid retention, Disp: , Rfl:     gabapentin (NEURONTIN) 100 mg capsule, Take 1 capsule (100 mg total) by mouth 3 (three) times a day Takes a total of 700 mg three times daily, Disp: 90 capsule, Rfl: 3    gabapentin (Neurontin) 600 MG tablet, Take 1 tablet (600 mg total) by mouth 3 (three) times a day, Disp: 90 tablet, Rfl: 3    HYDROmorphone (DILAUDID) 2 mg tablet, Take 1 tablet (2 mg total) by mouth every 4 (four) hours as needed for moderate pain Max Daily Amount: 12 mg, Disp: 180 tablet, Rfl: 0    HYDROmorphone (DILAUDID) 8 MG tablet, Take 1 tablet (8 mg total) by mouth every 4 (four) hours as needed  for moderate pain Max Daily Amount: 48 mg, Disp: 180 tablet, Rfl: 0    Hysingla ER 80 MG T24A, , Disp: , Rfl:     metolazone (ZAROXOLYN) 2.5 mg tablet, Take 1 tablet (2.5 mg total) by mouth daily as needed (edema), Disp: 90 tablet, Rfl: 3    metoprolol succinate (TOPROL-XL) 25 mg 24 hr tablet, , Disp: , Rfl:     multivitamin-iron-minerals-folic acid (CENTRUM) chewable tablet, Chew 1 tablet daily, Disp: , Rfl:     naloxone (NARCAN) 4 mg/0.1 mL nasal spray, Administer 1 spray into a nostril. If no response after 2-3 minutes, give another dose in the other nostril using a new spray., Disp: 1 each, Rfl: 1    OxyCONTIN 40 MG 12 hr tablet, , Disp: , Rfl:     potassium chloride (K-DUR,KLOR-CON) 10 mEq tablet, Take 2 tablets (20 mEq total) by mouth 2 (two) times a day, Disp: 120 tablet, Rfl: 3    torsemide (DEMADEX) 20 mg tablet, Take 2 tablets (40 mg total) by mouth 2 (two) times a day, Disp: 120 tablet, Rfl: 3        Shan Sanabria MD

## 2024-02-27 NOTE — PROGRESS NOTES
"Daily Note     Today's date: 2024  Patient name: Dominick Irving  : 1960  MRN: [id number]  Referring provider: Lake Connors PA-C  Dx:   Encounter Diagnosis     ICD-10-CM    1. Chronic bilateral low back pain with bilateral sciatica  M54.42     M54.41     G89.29       2. Physical deconditioning  R53.81       3. Paresthesia of bilateral legs  R20.2       4. Gait abnormality  R26.9           Start Time: 0755  Stop Time: 0845  Total time in clinic (min): 50 minutes    Subjective: Patient reports he is not doing good today. He states he has increased pain in B hips and back that radiates to his groin.       Objective: See treatment diary below      Assessment: Tolerated treatment fair. POC adjusted due to patients reports of increased symptoms prior to start of therapy sessionPatient demonstrated fatigue post treatment, exhibited good technique with therapeutic exercises, and would benefit from continued PT to improve core and BLE strengthening to improve overall functional mobility and QoL.       Plan: Continue per plan of care.      Precautions: PMH HTN, CAD, Bladder Cancer, CHF, RODO, Lumbar fusion L4-5 in , L3-4 hemilaminectomy 2023  POC Expiration: 3/20/24    Manuals    B HS, piriformis, hip abd, gastroc stretch w/ traction  20' c STM to hip flexor/ quad  25' c STM to hip flexor/ quad 20'  20' 25' c quad stretch in prone   Nerve glides                         Neuro Re-Ed        Seated 3 way trunk flexion   Monster walk 2x10' RTB Monster walk 4x10' RTB Seated 3 way trunk flexion 10x 5\" hold ea   Seated PNF on TB   Unsupported marching 2x10' Unsupported marching fwd/bwd 2x10' ea    Bridge w/ hip ABD Standing 3way SLR no BOSU Standing 3 way SLR no BOSU   Side step c squat 2x10' RTB    PPT   Unsupported side stepping 2x10' Unsupported side stepping 4x10'    PPU w/ exhale Front and lateral lunges 10x ea  Front and lateral lunges 10x ea   Front and lateral lunges 10x ea   " "  Sit to stand unsupported  2x10  Squats 2x10       Supine Tball ABD crunch  Step ups front and lateral 10x ea 6\" step Step ups front and lateral 10x ea 8\" step   Supine Tball ABD crunch 10x5\" hold   Ther Ex        SRC for ROM/ cardiovascular endurance  Nustep 10' L6 Nustep 10' L6 Nustep 10' L6 Nustep 10' L6 Nustep 10' L1   Lumbar rotation c TB Leg press   65#  75#  85#  DL   10x ea     LTR c pball 10x5\" hold   SL clamshells        DKTC     10x5\" hold    Supine SLR        LAQ                HEP instruction/ education   5'     Ther Activity                        Gait Training                        Modalities 2/14 2/16 2/21 2/28   MHP 10' MHP LB and thighs, CP R ankle 10' MHP LB and thighs, CP R ankle declined  declined                  "

## 2024-02-28 ENCOUNTER — OFFICE VISIT (OUTPATIENT)
Dept: PHYSICAL THERAPY | Facility: CLINIC | Age: 64
End: 2024-02-28
Payer: MEDICARE

## 2024-02-28 ENCOUNTER — OFFICE VISIT (OUTPATIENT)
Dept: OBGYN CLINIC | Facility: CLINIC | Age: 64
End: 2024-02-28

## 2024-02-28 VITALS
WEIGHT: 239 LBS | TEMPERATURE: 97.5 F | BODY MASS INDEX: 36.22 KG/M2 | SYSTOLIC BLOOD PRESSURE: 160 MMHG | HEART RATE: 70 BPM | HEIGHT: 68 IN | DIASTOLIC BLOOD PRESSURE: 88 MMHG

## 2024-02-28 DIAGNOSIS — S82.891D CLOSED FRACTURE OF RIGHT ANKLE WITH ROUTINE HEALING: Primary | ICD-10-CM

## 2024-02-28 DIAGNOSIS — R26.9 GAIT ABNORMALITY: ICD-10-CM

## 2024-02-28 DIAGNOSIS — M54.42 CHRONIC BILATERAL LOW BACK PAIN WITH BILATERAL SCIATICA: Primary | ICD-10-CM

## 2024-02-28 DIAGNOSIS — M54.41 CHRONIC BILATERAL LOW BACK PAIN WITH BILATERAL SCIATICA: Primary | ICD-10-CM

## 2024-02-28 DIAGNOSIS — R20.2 PARESTHESIA OF BILATERAL LEGS: ICD-10-CM

## 2024-02-28 DIAGNOSIS — G89.29 CHRONIC BILATERAL LOW BACK PAIN WITH BILATERAL SCIATICA: Primary | ICD-10-CM

## 2024-02-28 DIAGNOSIS — R53.81 PHYSICAL DECONDITIONING: ICD-10-CM

## 2024-02-28 PROCEDURE — 97110 THERAPEUTIC EXERCISES: CPT

## 2024-02-28 PROCEDURE — 97140 MANUAL THERAPY 1/> REGIONS: CPT

## 2024-02-28 PROCEDURE — 99024 POSTOP FOLLOW-UP VISIT: CPT | Performed by: ORTHOPAEDIC SURGERY

## 2024-02-28 NOTE — PROGRESS NOTES
"Patient Name:  Dominick Irving  MRN:  [id number]    Assessment     1. Closed fracture of right ankle with routine healing  Ambulatory Referral to Physical Therapy          Plan     I would recommend follow-up in 3 to 4 weeks.  A new prescription for therapy was provided to increase his therapy program.  Weight bearing x-rays of the ankle will be obtained at follow-up.  He may begin to wean himself from the brace when he is in the house and it during therapy sessions as recommended by therapy.  However, outside the home, he is to have the brace in place.  The office should be contacted if questions or concerns arise.    Return for 3 to 4 weeks.      Subjective   Dominick Irving returns for follow-up of his distal fibular fracture.  The patient is 2 month(s) post injury and returns for routine follow-up. Patient denies pain.  He does complain of intermittent numbness over the anterior lower third of the right leg radiating to the dorsum of the ankle and foot.  He notes that the symptoms occur a few times daily and last for no more than a minute or 2.  He uses his brace essentially full-time but has done some ambulating in the house without his brace on and denies any pain when doing so.  He has been attending physical therapy as prescribed.      Objective     /88   Pulse 70   Temp 97.5 °F (36.4 °C)   Ht 5' 8\" (1.727 m)   Wt 108 kg (239 lb)   BMI 36.34 kg/m²     Exam today demonstrates the brace in place.  The brace was removed without difficulty.  He has no significant swelling and good range of motion is noted.  He has no tenderness over the distal tibia or fibula.  Sensation is intact throughout all distributions.  He ambulates without significant difficulty with a cane for assist.    Data Review     Physical therapy notes were reviewed.    Mani Flores"

## 2024-02-29 NOTE — PROGRESS NOTES
"Daily Note     Today's date: 2024  Patient name: Dominick Irving  : 1960  MRN: [id number]  Referring provider: Lake Connors PA-C  Dx:   Encounter Diagnosis     ICD-10-CM    1. Chronic bilateral low back pain with bilateral sciatica  M54.42     M54.41     G89.29       2. Physical deconditioning  R53.81       3. Paresthesia of bilateral legs  R20.2       4. Gait abnormality  R26.9                      Subjective: ***      Objective: See treatment diary below      Assessment: Tolerated treatment {Tolerated treatment :[id number]}. Patient demonstrated fatigue post treatment, exhibited good technique with therapeutic exercises, and would benefit from continued PT to improve core and BLE strength to reduce pain with function and improve overall QoL.      Plan: Continue per plan of care.      Precautions: PMH HTN, CAD, Bladder Cancer, CHF, RODO, Lumbar fusion L4-5 in , L3-4 hemilaminectomy 2023  POC Expiration: 3/20/24    Manuals 2/16 2/21 2/23 2/28 3/1   B HS, piriformis, hip abd, gastroc stretch w/ traction  25' c STM to hip flexor/ quad 20'  20' 25' c quad stretch in prone    Nerve glides                         Neuro Re-Ed        Seated 3 way trunk flexion  Monster walk 2x10' RTB Monster walk 4x10' RTB Seated 3 way trunk flexion 10x 5\" hold ea    Seated PNF on TB  Unsupported marching 2x10' Unsupported marching fwd/bwd 2x10' ea     Bridge w/ hip ABD Standing 3 way SLR no BOSU   Side step c squat 2x10' RTB     PPT  Unsupported side stepping 2x10' Unsupported side stepping 4x10'     PPU w/ exhale Front and lateral lunges 10x ea   Front and lateral lunges 10x ea      Sit to stand unsupported  Squats 2x10        Supine Tball ABD crunch  Step ups front and lateral 10x ea 8\" step   Supine Tball ABD crunch 10x5\" hold    Ther Ex        SRC for ROM/ cardiovascular endurance  Nustep 10' L6 Nustep 10' L6 Nustep 10' L6 Nustep 10' L1    Lumbar rotation c TB    LTR c pball 10x5\" hold    SL clamshells      " "  DK    10x5\" hold     Supine SLR        LAQ                HEP instruction/ education  5'      Ther Activity                        Gait Training                        Modalities 2/16 2/21 2/28    MHP 10' MHP LB and thighs, CP R ankle declined  declined                     "

## 2024-03-01 ENCOUNTER — APPOINTMENT (OUTPATIENT)
Dept: PHYSICAL THERAPY | Facility: CLINIC | Age: 64
End: 2024-03-01
Payer: MEDICARE

## 2024-03-01 ENCOUNTER — TELEPHONE (OUTPATIENT)
Age: 64
End: 2024-03-01

## 2024-03-01 NOTE — TELEPHONE ENCOUNTER
PT managed BY Daniele.    PT scheduled for a cysto on 3/8/24.  Pt asking for a call back to explain procedure. And to let him know if he should stop any medications.      PT can be reached at [phone]

## 2024-03-05 ENCOUNTER — TELEMEDICINE (OUTPATIENT)
Dept: PALLIATIVE MEDICINE | Facility: CLINIC | Age: 64
End: 2024-03-05

## 2024-03-05 DIAGNOSIS — Z71.89 COUNSELING AND COORDINATION OF CARE: Primary | ICD-10-CM

## 2024-03-05 PROCEDURE — NC001 PR NO CHARGE

## 2024-03-05 RX ORDER — POTASSIUM CHLORIDE 750 MG/1
TABLET, FILM COATED, EXTENDED RELEASE ORAL
COMMUNITY
Start: 2024-02-29

## 2024-03-05 NOTE — PROGRESS NOTES
PT Re-Evaluation     Today's date: 3/6/2024  Patient name: Dominick Irving  : 1960  MRN: [id number]  Referring provider: Lake Connors PA-C  Dx:   Encounter Diagnosis     ICD-10-CM    1. Chronic bilateral low back pain with bilateral sciatica  M54.42     M54.41     G89.29       2. Closed fracture of right ankle with routine healing  S82.891D       3. Physical deconditioning  R53.81       4. Paresthesia of bilateral legs  R20.2       5. Gait abnormality  R26.9                 Start Time: 0750  Stop Time: 0855  Total time in clinic (min): 65 minutes    Assessment  Assessment details: Pt has been seen by OP PT for low back pain and presents today for re-evaluation of R ankle mobility and strength since recent distal fibula fracture. Upon evaluation, he presents with significant R ankle AROM deficits especially with inversion and eversion and subsequent strength deficits, hypomobility in midfoot, talocrural joint, and distal tib-fib joint. Pt does not have much pain with mobility except for end range inversion and eversion. Pt would benefit from skilled PT to improve R ankle mobility and strength to improve gait pattern, standing tolerance, and overall QoL.   Impairments: abnormal gait, abnormal or restricted ROM, abnormal movement, activity intolerance, impaired physical strength, lacks appropriate home exercise program, pain with function, poor posture  and poor body mechanics    Symptom irritability: highUnderstanding of Dx/Px/POC: good   Prognosis: good    Goals  STG to be met within 4 weeks:  1. Pt will increase BLE strength by 1/2 muscle grade to improve functional mobility and reduce pain. - met  2. Pt will be independent with HEP to decrease pain and improve quality of life. - met  3. Pt will report 3/10 pain at worst to improve functional mobility and quality of life. - in progress  4. Pt will increase FOTO score by 10 points in order to improve lumbar spine mobility and reduce pain with function.  - met  5. Pt will improve R ankle strength by 1/2 muscle grade to improve gait pattern. - in progress  6. Pt will improve R ankle AROM by 25-50% to improve gait pattern. - in progress    LTG to be met within 12 weeks:  1. Pt will improve BLE strength to WNL to improve functional mobility and quality of life. - in progress  2. Pt will restore lumbar spine AROM to WNL to improve functional mobility and quality of life. - met  3. Pt will meet FOTO score goal at DC to improve lumbar spine mobility in pain free range. - met  4. Pt will demonstrate stability with unsupported gait to improve QoL. - in progress  5. Pt will restore R ankle AROM to equal L to improve gait pattern. - in progress  6. Pt will restore R ankle strength to WFL to improve gait pattern and standing tolerance. - in progress       Plan  Plan details: Continue with POC.   Patient would benefit from: skilled physical therapy  Planned modality interventions: cryotherapy and thermotherapy: hydrocollator packs  Planned therapy interventions: flexibility, functional ROM exercises, graded activity, graded exercise, home exercise program, joint mobilization, manual therapy, neuromuscular re-education, nerve gliding, patient education, self care, strengthening, stretching, therapeutic activities and therapeutic exercise  Frequency: 2x week  Duration in weeks: 8  Treatment plan discussed with: patient        Subjective Evaluation    History of Present Illness  Mechanism of injury: Pt reports to PT this morning stating he feels so much better than he did before his previous therapy session. He has mild groin pain and is feeling more lower back today. He states his R ankle is feeling pretty good and he was cleared by his doctor to begin strengthening and ROM activities.   Quality of life: fair    Patient Goals  Patient goals for therapy: decreased pain, improved balance, increased motion, increased strength, independence with ADLs/IADLs and return to sport/leisure  activities  Patient goal: walk without walker  Pain  Current pain ratin  At best pain ratin  At worst pain ratin  Location: low back and groin  Quality: radiating  Relieving factors: rest and medications  Aggravating factors: sitting, stair climbing, lifting and standing (twisting, bending down)  Progression: worsening    Social Support  Stairs in house: yes   Lives in: multiple-level home  Lives with: spouse    Treatments  Previous treatment: medication  Current treatment comments: accupuncture.         Objective     Postural Observations  Seated posture: fair  Standing posture: fair      Observations     Right Ankle/Foot   Positive for edema.     Additional Observation Details  Pitting edema noted on dorsum of R foot.     Palpation   Left   Tenderness of the erector spinae, iliopsoas, lumbar paraspinals and rectus femoris.     Right   Tenderness of the erector spinae, iliopsoas, lumbar paraspinals and rectus femoris.     Neurological Testing     Sensation     Lumbar   Left   Diminished: light touch    Right   Intact: light touch    Reflexes   Left   Patellar (L4): trace (1+)  Achilles (S1): trace (1+)    Right   Patellar (L4): trace (1+)  Achilles (S1): trace (1+)    Active Range of Motion     Lumbar   Flexion:  WFL  Extension:  with pain Restriction level: moderate  Left lateral flexion:  WFL  Right lateral flexion:  WFL  Left rotation:  WFL  Right rotation:  WFL    Right Ankle/Foot   Dorsiflexion (ke): 5 degrees   Plantar flexion: 37 degrees   Inversion: 4 degrees   Eversion: 6 degrees     Joint Play     Right Ankle/Foot  Joints within functional limits are the forefoot. Hypomobile in the distal tibiofibular joint, talocrural joint and midfoot.     Strength/Myotome Testing     Left Hip   Planes of Motion   Flexion: 4  Extension: 4  Abduction: 4  Adduction: 4  External rotation: 4  Internal rotation: 4    Right Hip   Planes of Motion   Flexion: 4  Extension: 4  Abduction: 4  Adduction: 4  External  "rotation: 4  Internal rotation: 4    Left Knee   Flexion: 4+  Extension: 4+    Right Knee   Flexion: 4+  Extension: 4+    Left Ankle/Foot   Dorsiflexion: 3+  Plantar flexion: 5  Inversion: 3+  Eversion: 3+    Right Ankle/Foot   Dorsiflexion: 3+  Plantar flexion: 4+  Inversion: 3-  Eversion: 3-    Tests     Lumbar     Left   Negative crossed SLR, passive SLR and slump test.     Right   Negative crossed SLR, passive SLR and slump test.     Left Pelvic Girdle/Sacrum   Negative: active SLR test.     Right Pelvic Girdle/Sacrum   Negative: active SLR test.     Left Hip   Negative KEDAR, FADIR and piriformis.     Right Hip   Negative KEDAR, FADIR and piriformis.     Swelling   Left Ankle/Foot   Malleoli: 28.5 cm    Right Ankle/Foot   Malleoli: 31 cm               Precautions: PMH HTN, CAD, Bladder Cancer, CHF, RODO, Lumbar fusion L4-5 in 2016, L3-4 hemilaminectomy June 2023  POC Expiration: 3/20/24    Manuals 2/16 2/21 2/23 2/28 3/6   B HS, piriformis, hip abd, gastroc stretch w/ traction  25' c STM to hip flexor/ quad 20'  20' 25' c quad stretch in prone 10'   Nerve glides         R ankle stretching/ mobs     10'           Neuro Re-Ed        Seated 3 way trunk flexion  Monster walk 2x10' RTB Monster walk 4x10' RTB Seated 3 way trunk flexion 10x 5\" hold ea    Seated PNF on TB  Unsupported marching 2x10' Unsupported marching fwd/bwd 2x10' ea     Bridge w/ hip ABD Standing 3 way SLR no BOSU   Side step c squat 2x10' RTB  2x10 GTB   PPT  Unsupported side stepping 2x10' Unsupported side stepping 4x10'     PPU w/ exhale Front and lateral lunges 10x ea   Front and lateral lunges 10x ea   Front and lateral lunges 10x ea    Sit to stand unsupported  Squats 2x10     DL HR 2x10 4\" step    Supine Tball ABD crunch  Step ups front and lateral 10x ea 8\" step   Supine Tball ABD crunch 10x5\" hold    Ther Ex        SRC for ROM/ cardiovascular endurance  Nustep 10' L6 Nustep 10' L6 Nustep 10' L6 Nustep 10' L1 Nustep 10' L5   Lumbar rotation " "c TB    LTR c pball 10x5\" hold    SL clamshells        DKTC    10x5\" hold     Supine SLR        LAQ        Standing inversion/ eversion on half roll     10x5\" hold ea    HEP instruction/ education  5'      Ther Activity                        Gait Training                        Modalities 2/16 2/21 2/28 3/6   MHP 10' MHP LB and thighs, CP R ankle declined  declined 10' MHP Low back                 "

## 2024-03-05 NOTE — PROGRESS NOTES
Palliative Supportive Care  met with patient/family to continue to provide emotional support and guidance.      Updated biopsychosocial information relevant to support:    The patient was identified by name and date of birth.  Dominick was informed that this is a telemedicine visit and that the visit is being conducted through the Epic Embedded platform. They agree to proceed..  My office door was closed. No one else was in the room. They acknowledged consent and understanding of privacy and security of the video platform. The patient has agreed to participate and understands they can discontinue the visit at any time.    Dominick spoke about feeling better this week. He said his PT focused on pain relief on Friday and it helped him for a couple days and now the pain is not gone but more manageable. He said that he found things to look forward to such as wanting to visit his mom for her 93rd bday 4/8. He said that he has been working on cleaning out his attic and going through memories as they come up with his family which has been nice. He is also having more energy and mobility so he can cook dinner again which is important to him. He said he gets in a dark place where he said he is not suicidal but feels hopeless and cannot get out of his head. LCSW encouraged Dominick to explore some of the tools he has used when he gets in that place to get out of his head and bring down his mental health symptomology. He said he will start speaking up at PT when he needs support with pain instead of mobility as his pain is a big factor. LCSW discussed an activity journal where he can begin to explore patterns so he knows what may happen on a day he does more or how he may organize his tasks so he doesn't over do it. Ex: day he takes out trash, don't do dishes, etc. He said he wants to find a better balance and will start taking journal notes of this because he thinks the data could be helpful. Next meeting 3/13 at  2pm online.       Identified areas of need include: emotional support    Resources provided:    Areas that need future follow-up include: reduce anxiety/depressed mood    I have spent 60 minutes with Patient  today in which greater than 50% of this time was spent in counseling/coordination of care     Palliative  will follow-up as requested by patient, family, and primary team.  Please contact with any specific requests

## 2024-03-06 ENCOUNTER — EVALUATION (OUTPATIENT)
Dept: PHYSICAL THERAPY | Facility: CLINIC | Age: 64
End: 2024-03-06
Payer: MEDICARE

## 2024-03-06 DIAGNOSIS — M54.42 CHRONIC BILATERAL LOW BACK PAIN WITH BILATERAL SCIATICA: Primary | ICD-10-CM

## 2024-03-06 DIAGNOSIS — G89.29 CHRONIC BILATERAL LOW BACK PAIN WITH BILATERAL SCIATICA: Primary | ICD-10-CM

## 2024-03-06 DIAGNOSIS — M54.41 CHRONIC BILATERAL LOW BACK PAIN WITH BILATERAL SCIATICA: Primary | ICD-10-CM

## 2024-03-06 DIAGNOSIS — R26.9 GAIT ABNORMALITY: ICD-10-CM

## 2024-03-06 DIAGNOSIS — S82.891D CLOSED FRACTURE OF RIGHT ANKLE WITH ROUTINE HEALING: ICD-10-CM

## 2024-03-06 DIAGNOSIS — R53.81 PHYSICAL DECONDITIONING: ICD-10-CM

## 2024-03-06 DIAGNOSIS — R20.2 PARESTHESIA OF BILATERAL LEGS: ICD-10-CM

## 2024-03-06 PROCEDURE — 97110 THERAPEUTIC EXERCISES: CPT

## 2024-03-06 PROCEDURE — 97164 PT RE-EVAL EST PLAN CARE: CPT

## 2024-03-06 PROCEDURE — 97140 MANUAL THERAPY 1/> REGIONS: CPT

## 2024-03-06 PROCEDURE — 97112 NEUROMUSCULAR REEDUCATION: CPT

## 2024-03-08 ENCOUNTER — PROCEDURE VISIT (OUTPATIENT)
Dept: UROLOGY | Facility: CLINIC | Age: 64
End: 2024-03-08
Payer: MEDICARE

## 2024-03-08 VITALS
HEIGHT: 68 IN | OXYGEN SATURATION: 99 % | BODY MASS INDEX: 37.31 KG/M2 | DIASTOLIC BLOOD PRESSURE: 68 MMHG | SYSTOLIC BLOOD PRESSURE: 148 MMHG | TEMPERATURE: 97 F | WEIGHT: 246.2 LBS | HEART RATE: 70 BPM

## 2024-03-08 DIAGNOSIS — Z85.51 HISTORY OF BLADDER CANCER: Primary | ICD-10-CM

## 2024-03-08 DIAGNOSIS — N40.1 BENIGN PROSTATIC HYPERPLASIA WITH URINARY FREQUENCY: ICD-10-CM

## 2024-03-08 DIAGNOSIS — R35.0 BENIGN PROSTATIC HYPERPLASIA WITH URINARY FREQUENCY: ICD-10-CM

## 2024-03-08 DIAGNOSIS — R35.1 NOCTURIA: ICD-10-CM

## 2024-03-08 LAB
SL AMB  POCT GLUCOSE, UA: ABNORMAL
SL AMB LEUKOCYTE ESTERASE,UA: ABNORMAL
SL AMB POCT BILIRUBIN,UA: ABNORMAL
SL AMB POCT BLOOD,UA: ABNORMAL
SL AMB POCT CLARITY,UA: ABNORMAL
SL AMB POCT COLOR,UA: YELLOW
SL AMB POCT KETONES,UA: ABNORMAL
SL AMB POCT NITRITE,UA: ABNORMAL
SL AMB POCT PH,UA: 6
SL AMB POCT SPECIFIC GRAVITY,UA: 1.01
SL AMB POCT URINE PROTEIN: ABNORMAL
SL AMB POCT UROBILINOGEN: 0.2

## 2024-03-08 PROCEDURE — 52000 CYSTOURETHROSCOPY: CPT | Performed by: UROLOGY

## 2024-03-08 PROCEDURE — 88112 CYTOPATH CELL ENHANCE TECH: CPT | Performed by: PATHOLOGY

## 2024-03-08 PROCEDURE — 81003 URINALYSIS AUTO W/O SCOPE: CPT | Performed by: UROLOGY

## 2024-03-12 ENCOUNTER — TELEPHONE (OUTPATIENT)
Dept: UROLOGY | Facility: CLINIC | Age: 64
End: 2024-03-12

## 2024-03-12 PROCEDURE — 88112 CYTOPATH CELL ENHANCE TECH: CPT | Performed by: PATHOLOGY

## 2024-03-12 NOTE — TELEPHONE ENCOUNTER
----- Message from Shan Sanabria MD sent at 3/12/2024  1:11 PM EDT -----  Let patient know urine cytology negative which is excellent.

## 2024-03-12 NOTE — PROGRESS NOTES
"Daily Note     Today's date: 3/13/2024  Patient name: Dominick Irving  : 1960  MRN: [id number]  Referring provider: Lake Connors PA-C  Dx:   Encounter Diagnosis     ICD-10-CM    1. Chronic bilateral low back pain with bilateral sciatica  M54.42     M54.41     G89.29       2. Closed fracture of right ankle with routine healing  S82.891D       3. Physical deconditioning  R53.81       4. Paresthesia of bilateral legs  R20.2       5. Gait abnormality  R26.9           Start Time: 0800  Stop Time: 09  Total time in clinic (min): 60 minutes    Subjective: Patient reports he is having more pain today because he drove home from [place] yesterday and was unable to take his diuretic. He states he gained 5lbs since yesterday morning.       Objective: See treatment diary below      Assessment: Tolerated treatment fair. Patients POC was adjusted due to current symptoms and increased soreness. Patient demonstrated fatigue post treatment, exhibited good technique with therapeutic exercises, and would benefit from continued PT to improve core, BLE and ankle strength and mobility to improve functional mobility.       Plan: Continue per plan of care.      Precautions: PMH HTN, CAD, Bladder Cancer, CHF, RODO, Lumbar fusion L4-5 in , L3-4 hemilaminectomy 2023  POC Expiration: 3/20/24    Manuals 2/21 2/23 2/28 3/6 3/13   B HS, piriformis, hip abd, gastroc stretch w/ traction  20'  20' 25' c quad stretch in prone 10' 15' c ankle stretching and quads in prone   Nerve glides         R ankle stretching/ mobs    10'            Neuro Re-Ed        Seated 3 way trunk flexion Monster walk 2x10' RTB Monster walk 4x10' RTB Seated 3 way trunk flexion 10x 5\" hold ea     Seated PNF on TB Unsupported marching 2x10' Unsupported marching fwd/bwd 2x10' ea      Bridge w/ hip ABD  Side step c squat 2x10' RTB  2x10 GTB 2x10 GTB   PPT Unsupported side stepping 2x10' Unsupported side stepping 4x10'      PPU w/ exhale  Front and lateral " "lunges 10x ea   Front and lateral lunges 10x ea  Front and lateral lunges 10x ea    Sit to stand unsupported     DL HR 2x10 4\" step     Supine Tball ABD crunch    Supine Tball ABD crunch 10x5\" hold     Ther Ex        SRC for ROM/ cardiovascular endurance  Nustep 10' L6 Nustep 10' L6 Nustep 10' L1 Nustep 10' L5 Nustep 10' L5   Lumbar rotation c TB   LTR c pball 10x5\" hold     SL clamshells     2x10 GTB    DKTC   10x5\" hold      Supine SLR        LAQ        Standing inversion/ eversion on half roll    10x5\" hold ea     HEP instruction/ education 5'       Ther Activity                        Gait Training                        Modalities 2/21  2/28 3/6 3/13   MHP declined  declined 10' MHP Low back  10' LB and thighs                "

## 2024-03-13 ENCOUNTER — OFFICE VISIT (OUTPATIENT)
Dept: PHYSICAL THERAPY | Facility: CLINIC | Age: 64
End: 2024-03-13
Payer: MEDICARE

## 2024-03-13 DIAGNOSIS — G89.29 CHRONIC BILATERAL LOW BACK PAIN WITH BILATERAL SCIATICA: ICD-10-CM

## 2024-03-13 DIAGNOSIS — S82.891D CLOSED FRACTURE OF RIGHT ANKLE WITH ROUTINE HEALING: ICD-10-CM

## 2024-03-13 DIAGNOSIS — M54.42 CHRONIC BILATERAL LOW BACK PAIN WITH BILATERAL SCIATICA: Primary | ICD-10-CM

## 2024-03-13 DIAGNOSIS — G89.29 CHRONIC BILATERAL LOW BACK PAIN WITH BILATERAL SCIATICA: Primary | ICD-10-CM

## 2024-03-13 DIAGNOSIS — M54.41 CHRONIC BILATERAL LOW BACK PAIN WITH BILATERAL SCIATICA: Primary | ICD-10-CM

## 2024-03-13 DIAGNOSIS — R26.9 GAIT ABNORMALITY: ICD-10-CM

## 2024-03-13 DIAGNOSIS — R53.81 PHYSICAL DECONDITIONING: ICD-10-CM

## 2024-03-13 DIAGNOSIS — M54.41 CHRONIC BILATERAL LOW BACK PAIN WITH BILATERAL SCIATICA: ICD-10-CM

## 2024-03-13 DIAGNOSIS — M54.42 CHRONIC BILATERAL LOW BACK PAIN WITH BILATERAL SCIATICA: ICD-10-CM

## 2024-03-13 DIAGNOSIS — G89.4 CHRONIC PAIN SYNDROME: ICD-10-CM

## 2024-03-13 DIAGNOSIS — M54.12 CERVICAL RADICULOPATHY: ICD-10-CM

## 2024-03-13 DIAGNOSIS — R20.2 PARESTHESIA OF BILATERAL LEGS: ICD-10-CM

## 2024-03-13 DIAGNOSIS — C67.3 MALIGNANT NEOPLASM OF ANTERIOR WALL OF URINARY BLADDER (HCC): ICD-10-CM

## 2024-03-13 PROCEDURE — 97110 THERAPEUTIC EXERCISES: CPT

## 2024-03-13 PROCEDURE — 97140 MANUAL THERAPY 1/> REGIONS: CPT

## 2024-03-13 PROCEDURE — 97112 NEUROMUSCULAR REEDUCATION: CPT

## 2024-03-13 RX ORDER — HYDROMORPHONE HYDROCHLORIDE 2 MG/1
2 TABLET ORAL EVERY 4 HOURS PRN
Qty: 180 TABLET | Refills: 0 | Status: CANCELLED | OUTPATIENT
Start: 2024-03-13

## 2024-03-13 RX ORDER — AMITRIPTYLINE HYDROCHLORIDE 50 MG/1
50 TABLET, FILM COATED ORAL
Qty: 90 TABLET | Refills: 1 | Status: SHIPPED | OUTPATIENT
Start: 2024-03-13

## 2024-03-13 RX ORDER — HYDROCODONE BITARTRATE 80 MG/1
TABLET, EXTENDED RELEASE ORAL
Refills: 0 | Status: CANCELLED | OUTPATIENT
Start: 2024-03-13

## 2024-03-13 RX ORDER — HYDROMORPHONE HYDROCHLORIDE 2 MG/1
2 TABLET ORAL EVERY 4 HOURS PRN
Qty: 180 TABLET | Refills: 0 | Status: SHIPPED | OUTPATIENT
Start: 2024-03-13 | End: 2024-03-14

## 2024-03-14 ENCOUNTER — OFFICE VISIT (OUTPATIENT)
Dept: FAMILY MEDICINE CLINIC | Facility: CLINIC | Age: 64
End: 2024-03-14
Payer: MEDICARE

## 2024-03-14 VITALS
HEART RATE: 72 BPM | BODY MASS INDEX: 37.04 KG/M2 | DIASTOLIC BLOOD PRESSURE: 66 MMHG | HEIGHT: 68 IN | WEIGHT: 244.4 LBS | OXYGEN SATURATION: 98 % | SYSTOLIC BLOOD PRESSURE: 132 MMHG

## 2024-03-14 DIAGNOSIS — E11.9 TYPE 2 DIABETES MELLITUS WITHOUT COMPLICATION, WITHOUT LONG-TERM CURRENT USE OF INSULIN (HCC): Primary | ICD-10-CM

## 2024-03-14 DIAGNOSIS — G89.4 CHRONIC PAIN SYNDROME: ICD-10-CM

## 2024-03-14 DIAGNOSIS — F33.9 DEPRESSION, RECURRENT (HCC): ICD-10-CM

## 2024-03-14 DIAGNOSIS — M79.604 BILATERAL LEG PAIN: ICD-10-CM

## 2024-03-14 DIAGNOSIS — G89.29 CHRONIC INTRACTABLE PAIN: ICD-10-CM

## 2024-03-14 DIAGNOSIS — M79.605 BILATERAL LEG PAIN: ICD-10-CM

## 2024-03-14 DIAGNOSIS — G89.29 OTHER CHRONIC PAIN: ICD-10-CM

## 2024-03-14 PROCEDURE — 99214 OFFICE O/P EST MOD 30 MIN: CPT | Performed by: FAMILY MEDICINE

## 2024-03-14 PROCEDURE — G2211 COMPLEX E/M VISIT ADD ON: HCPCS | Performed by: FAMILY MEDICINE

## 2024-03-14 RX ORDER — GABAPENTIN 600 MG/1
600 TABLET ORAL 4 TIMES DAILY
Qty: 120 TABLET | Refills: 3 | Status: SHIPPED | OUTPATIENT
Start: 2024-03-14

## 2024-03-14 RX ORDER — HYDROCODONE BITARTRATE 80 MG/1
80 TABLET, EXTENDED RELEASE ORAL EVERY MORNING
Qty: 30 TABLET | Refills: 0 | Status: SHIPPED | OUTPATIENT
Start: 2024-03-14

## 2024-03-14 NOTE — PROGRESS NOTES
"Daily Note     Today's date: 3/15/2024  Patient name: Dominick Irving  : 1960  MRN: [id number]  Referring provider: Lake Connors PA-C  Dx:   Encounter Diagnosis     ICD-10-CM    1. Chronic bilateral low back pain with bilateral sciatica  M54.42     M54.41     G89.29       2. Closed fracture of right ankle with routine healing  S82.891D       3. Physical deconditioning  R53.81       4. Paresthesia of bilateral legs  R20.2       5. Gait abnormality  R26.9           Start Time: 0750  Stop Time: 0850  Total time in clinic (min): 60 minutes    Subjective: Patient reports he lost 9 pounds since his previous visit and is feeling better now that he has lost the fluid on his body. He also states he is not having as much thigh pain and its more central to his back.       Objective: See treatment diary below      Assessment: Tolerated treatment well. Patient displayed improved tolerance with standing exercises and reported decreased pain post tx. Patient demonstrated fatigue post treatment, exhibited good technique with therapeutic exercises, and would benefit from continued PT to improve BLE strength and mobility to improve QoL and gait pattern.       Plan: Continue per plan of care.      Precautions: PMH HTN, CAD, Bladder Cancer, CHF, RODO, Lumbar fusion L4-5 in , L3-4 hemilaminectomy 2023  POC Expiration: 3/20/24    Manuals 2/23 2/28 3/6 3/13 3/15   B HS, piriformis, hip abd, gastroc stretch w/ traction  20' 25' c quad stretch in prone 10' 15' c ankle stretching and quads in prone 20' c STM to lumbar region   Nerve glides         R ankle stretching/ mobs   10'  5'           Neuro Re-Ed        Seated 3 way trunk flexion Monster walk 4x10' RTB Seated 3 way trunk flexion 10x 5\" hold ea      Seated PNF on TB Unsupported marching fwd/bwd 2x10' ea    Side step c squat 4x10' RTB    Bridge w/ hip ABD Side step c squat 2x10' RTB  2x10 GTB 2x10 GTB    PPT Unsupported side stepping 4x10'    Step ups front and " "lateral 10x ea bilat   PPU w/ exhale Front and lateral lunges 10x ea   Front and lateral lunges 10x ea  Front and lateral lunges 10x ea  Front and lateral lunges 10x ea    Sit to stand unsupported    DL HR 2x10 4\" step   DL HR 2x10 4\" step   Supine Tball ABD crunch   Supine Tball ABD crunch 10x5\" hold      Ther Ex        SRC for ROM/ cardiovascular endurance  Nustep 10' L6 Nustep 10' L1 Nustep 10' L5 Nustep 10' L5 Nustep 10' L5   Lumbar rotation c TB  LTR c pball 10x5\" hold      SL clamshells    2x10 GTB     DKTC  10x5\" hold       Supine SLR        LAQ     Ankle eversion c DF 2x10 RTB   Standing inversion/ eversion on half roll   10x5\" hold ea      HEP instruction/ education        Ther Activity                        Gait Training                        Modalities  2/28 3/6 3/13 3/15   MHP  declined 10' MHP Low back  10' LB and thighs declined                  "

## 2024-03-14 NOTE — PROGRESS NOTES
"Assessment/Plan:       1. Type 2 diabetes mellitus without complication, without long-term current use of insulin ([facility])  Assessment & Plan:  Continue meds, no changes  Lab Results   Component Value Date    HGBA1C 6.0 02/06/2024         2. Other chronic pain    3. Chronic intractable pain  Comments:  unacceptable level of pain control  is doing alternative tx  pain meds have not helped  will increase long acting    4. Bilateral leg pain  Comments:  check an emg  possible radiculopathy    5. Chronic pain syndrome  Comments:  will ocnsider alternative tx  pump/ketamine/accupuncture  will decrease narcotics  pain seems radicular/neuropathic  switch to dilaudid  Assessment & Plan:  Will keep trying  Gabapentin recently increased  2mg dilaudid discontinued            Subjective:      Patient ID: Dominick Ivring is a 64 y.o. male.    HPI    The following portions of the patient's history were reviewed and updated as appropriate: allergies, current medications, past family history, past medical history, past social history, past surgical history, and problem list.    Review of Systems   Respiratory: Negative.     Cardiovascular: Negative.    Gastrointestinal: Negative.          Objective:      /66 (BP Location: Right arm, Patient Position: Sitting, Cuff Size: Large)   Pulse 72   Ht 5' 8\" (1.727 m)   Wt 111 kg (244 lb 6.4 oz)   SpO2 98%   BMI 37.16 kg/m²          Physical Exam    "

## 2024-03-14 NOTE — ASSESSMENT & PLAN NOTE
1   Get your [facility] prescription filled today  2   Follow up with your family doctor this week for recheck  Acute Bronchitis   WHAT YOU NEED TO KNOW:   Acute bronchitis is swelling and irritation in the air passages of your lungs  This irritation may cause you to cough or have other breathing problems  Acute bronchitis often starts because of another illness, such as a cold or the flu  The illness spreads from your nose and throat to your windpipe and airways  Bronchitis is often called a chest cold  Acute bronchitis lasts about 3 to 6 weeks and is usually not a serious illness  Your cough can last for several weeks  DISCHARGE INSTRUCTIONS:   Return to the emergency department if:   · You cough up blood  · Your lips or fingernails turn blue  · You feel like you are not getting enough air when you breathe  Contact your healthcare provider if:   · You have a fever  · Your breathing problems do not go away or get worse  · Your cough does not get better within 4 weeks  · You have questions or concerns about your condition or care  Self-care:   · Get more rest   Rest helps your body to heal  Slowly start to do more each day  Rest when you feel it is needed  · Avoid irritants in the air  Avoid chemicals, fumes, and dust  Wear a face mask if you must work around dust or fumes  Stay inside on days when air pollution levels are high  If you have allergies, stay inside when pollen counts are high  Do not use aerosol products, such as spray-on deodorant, bug spray, and hair spray  · Do not smoke or be around others who smoke  Nicotine and other chemicals in cigarettes and cigars damages the cilia that move mucus out of your lungs  Ask your healthcare provider for information if you currently smoke and need help to quit  E-cigarettes or smokeless tobacco still contain nicotine  Talk to your healthcare provider before you use these products  · Drink liquids as directed    Liquids help keep your air Continue meds, no changes  Lab Results   Component Value Date    HGBA1C 6.0 02/06/2024      passages moist and help you cough up mucus  You may need to drink more liquids when you have acute bronchitis  Ask how much liquid to drink each day and which liquids are best for you  · Use a humidifier or vaporizer  Use a cool mist humidifier or a vaporizer to increase air moisture in your home  This may make it easier for you to breathe and help decrease your cough  Decrease risk for acute bronchitis:   · Get the vaccinations you need  Ask your healthcare provider if you should get vaccinated against the flu or pneumonia  · Prevent the spread of germs  You can decrease your risk of acute bronchitis and other illnesses by doing the following:     [facility] your hands often with soap and water  Carry germ-killing hand lotion or gel with you  You can use the lotion or gel to clean your hands when soap and water are not available  ¨ Do not touch your eyes, nose, or mouth unless you have washed your hands first     ¨ Always cover your mouth when you cough to prevent the spread of germs  It is best to cough into a tissue or your shirt sleeve instead of into your hand  Ask those around you cover their mouths when they cough  ¨ Try to avoid people who have a cold or the flu  If you are sick, stay away from others as much as possible  Medicines: Your healthcare provider may  give you any of the following:  · Ibuprofen or acetaminophen  are medicines that help lower your fever  They are available without a doctor's order  Ask your healthcare provider which medicine is right for you  Ask how much to take and how often to take it  Follow directions  These medicines can cause stomach bleeding if not taken correctly  Ibuprofen can cause kidney damage  Do not take ibuprofen if you have kidney disease, an ulcer, or allergies to aspirin  Acetaminophen can cause liver damage  Do not take more than 4,000 milligrams in 24 hours  · Decongestants  help loosen mucus in your lungs and make it easier to cough up   This can help you breathe easier  · Cough suppressants  decrease your urge to cough  If your cough produces mucus, do not take a cough suppressant unless your healthcare provider tells you to  Your healthcare provider may suggest that you take a cough suppressant at night so you can rest     · Inhalers  may be given  Your healthcare provider may give you one or more inhalers to help you breathe easier and cough less  An inhaler gives your medicine to open your airways  Ask your healthcare provider to show you how to use your inhaler correctly  · Take your medicine as directed  Contact your healthcare provider if you think your medicine is not helping or if you have side effects  Tell him of her if you are allergic to any medicine  Keep a list of the medicines, vitamins, and herbs you take  Include the amounts, and when and why you take them  Bring the list or the pill bottles to follow-up visits  Carry your medicine list with you in case of an emergency  Follow up with your healthcare provider as directed:  Write down questions you have so you will remember to ask them during your follow-up visits  © 2017 [employer] Information is for End User's use only and may not be sold, redistributed or otherwise used for commercial purposes  All illustrations and images included in CareNotes® are the copyrighted property of [employer]  or [employer]  The above information is an  only  It is not intended as medical advice for individual conditions or treatments  Talk to your doctor, nurse or pharmacist before following any medical regimen to see if it is safe and effective for you

## 2024-03-15 ENCOUNTER — OFFICE VISIT (OUTPATIENT)
Dept: PHYSICAL THERAPY | Facility: CLINIC | Age: 64
End: 2024-03-15
Payer: MEDICARE

## 2024-03-15 DIAGNOSIS — R26.9 GAIT ABNORMALITY: ICD-10-CM

## 2024-03-15 DIAGNOSIS — R20.2 PARESTHESIA OF BILATERAL LEGS: ICD-10-CM

## 2024-03-15 DIAGNOSIS — G89.29 CHRONIC BILATERAL LOW BACK PAIN WITH BILATERAL SCIATICA: Primary | ICD-10-CM

## 2024-03-15 DIAGNOSIS — R53.81 PHYSICAL DECONDITIONING: ICD-10-CM

## 2024-03-15 DIAGNOSIS — M54.41 CHRONIC BILATERAL LOW BACK PAIN WITH BILATERAL SCIATICA: Primary | ICD-10-CM

## 2024-03-15 DIAGNOSIS — S82.891D CLOSED FRACTURE OF RIGHT ANKLE WITH ROUTINE HEALING: ICD-10-CM

## 2024-03-15 DIAGNOSIS — M54.42 CHRONIC BILATERAL LOW BACK PAIN WITH BILATERAL SCIATICA: Primary | ICD-10-CM

## 2024-03-15 PROCEDURE — 97110 THERAPEUTIC EXERCISES: CPT

## 2024-03-15 PROCEDURE — 97112 NEUROMUSCULAR REEDUCATION: CPT

## 2024-03-15 PROCEDURE — 97140 MANUAL THERAPY 1/> REGIONS: CPT

## 2024-03-19 ENCOUNTER — TELEMEDICINE (OUTPATIENT)
Dept: PALLIATIVE MEDICINE | Facility: CLINIC | Age: 64
End: 2024-03-19

## 2024-03-19 DIAGNOSIS — Z71.89 COUNSELING AND COORDINATION OF CARE: Primary | ICD-10-CM

## 2024-03-19 PROCEDURE — NC001 PR NO CHARGE

## 2024-03-19 NOTE — PROGRESS NOTES
"Palliative Supportive Care  met with patient/family to continue to provide emotional support and guidance.      Updated biopsychosocial information relevant to support:    The patient was identified by name and date of birth. Dominick  was informed that this is a telemedicine visit and that the visit is being conducted through the Epic Embedded platform. They agree to proceed..  My office door was closed. No one else was in the room. They acknowledged consent and understanding of privacy and security of the video platform. The patient has agreed to participate and understands they can discontinue the visit at any time.    Dominick spoke about having to go out to [place] because things were going on with his mom and the family had to make decisions about her care going forward. They were encouraged she was hospice appropriate and learned she could get a lot more in home care so they agreed to hospice. He said she is weak but in good spirits. He spoke about how much the drive and trip took out of him physically though and needing to make another plan if he needs to go back in the near future. He talked about feeling upset this week due to his pain levels and feeling like he can never get to a place where his pain is manageable. LCSW supported Dominick in exploring his tool belt of strategies that were discussed last session when he was in a better place emotionally to think about what he could do when he goes through these kind of moods. He said he would try to use some of his new skills and said he will \"get there\" as he always finds a way to push through obstacles. Next meeting 3/28 at 11am online.      Identified areas of need include: emotional support    Resources provided:    Areas that need future follow-up include: reduce anxiety/depressed mood    I have spent 60 minutes with Patient  today in which greater than 50% of this time was spent in counseling/coordination of care     Palliative  " will follow-up as requested by patient, family, and primary team.  Please contact with any specific requests

## 2024-03-19 NOTE — PROGRESS NOTES
"Daily Note     Today's date: 3/20/2024  Patient name: Dominick Irving  : 1960  MRN: [id number]  Referring provider: Lake Connors PA-C  Dx:   Encounter Diagnosis     ICD-10-CM    1. Chronic bilateral low back pain with bilateral sciatica  M54.42     M54.41     G89.29       2. Closed fracture of right ankle with routine healing  S82.891D       3. Physical deconditioning  R53.81       4. Paresthesia of bilateral legs  R20.2       5. Gait abnormality  R26.9           Start Time: 0755  Stop Time: 0900  Total time in clinic (min): 65 minutes    Subjective: Patient reports he has no pain in his R ankle and all of his pain is in his low back today. He states he would like to increased PT frequency to 3x/week since he has symptom relief post therapy sessions and feels there is too much rest time between sessions.        Objective: See treatment diary below      Assessment: Tolerated treatment well. Patient demonstrated fatigue post treatment, exhibited good technique with therapeutic exercises, and would benefit from continued PT to improve core and BLE strengthening to improve gait pattern and overall QoL.       Plan: Continue per plan of care.  Due to patient requests and symptom improvement with PT sessions, frequency to be increased to 3x/week.      Precautions: PMH HTN, CAD, Bladder Cancer, CHF, RODO, Lumbar fusion L4-5 in , L3-4 hemilaminectomy 2023  POC Expiration: 3/20/24    Manuals 2/28 3/6 3/13 3/15 3/20   B HS, piriformis, hip abd, gastroc stretch w/ traction  25' c quad stretch in prone 10' 15' c ankle stretching and quads in prone 20' c STM to lumbar region 20' c STM to lumbar    Nerve glides         R ankle stretching/ mobs  10'  5' 5'           Neuro Re-Ed        Seated 3 way trunk flexion Seated 3 way trunk flexion 10x 5\" hold ea    Monster walks 4x10' RTB   Seated PNF on TB    Side step c squat 4x10' RTB  Side step c squat 4x10' RTB   Bridge w/ hip ABD  2x10 GTB 2x10 GTB     PPT    Step " "ups front and lateral 10x ea bilat    PPU w/ exhale  Front and lateral lunges 10x ea  Front and lateral lunges 10x ea  Front and lateral lunges 10x ea  Front and lateral lunges 10x ea    Sit to stand unsupported   DL HR 2x10 4\" step   DL HR 2x10 4\" step DL HR 2x10 4\" step    Supine Tball ABD crunch  Supine Tball ABD crunch 10x5\" hold       Ther Ex        SRC for ROM/ cardiovascular endurance  Nustep 10' L1 Nustep 10' L5 Nustep 10' L5 Nustep 10' L5 Nustep 10' L5   Lumbar rotation c TB LTR c pball 10x5\" hold       SL clamshells   2x10 GTB      DKTC 10x5\" hold        Supine SLR        LAQ    Ankle eversion c DF 2x10 RTB HEP   Standing inversion/ eversion on half roll  10x5\" hold ea       HEP instruction/ education        Ther Activity                        Gait Training                        Modalities 2/28 3/6 3/13 3/15 3/20   MHP declined 10' MHP Low back  10' LB and thighs declined 10' LB                    "

## 2024-03-20 ENCOUNTER — OFFICE VISIT (OUTPATIENT)
Dept: PHYSICAL THERAPY | Facility: CLINIC | Age: 64
End: 2024-03-20
Payer: MEDICARE

## 2024-03-20 DIAGNOSIS — G89.29 CHRONIC BILATERAL LOW BACK PAIN WITH BILATERAL SCIATICA: Primary | ICD-10-CM

## 2024-03-20 DIAGNOSIS — R20.2 PARESTHESIA OF BILATERAL LEGS: ICD-10-CM

## 2024-03-20 DIAGNOSIS — M54.41 CHRONIC BILATERAL LOW BACK PAIN WITH BILATERAL SCIATICA: Primary | ICD-10-CM

## 2024-03-20 DIAGNOSIS — S82.891D CLOSED FRACTURE OF RIGHT ANKLE WITH ROUTINE HEALING: ICD-10-CM

## 2024-03-20 DIAGNOSIS — R53.81 PHYSICAL DECONDITIONING: ICD-10-CM

## 2024-03-20 DIAGNOSIS — R26.9 GAIT ABNORMALITY: ICD-10-CM

## 2024-03-20 DIAGNOSIS — M54.42 CHRONIC BILATERAL LOW BACK PAIN WITH BILATERAL SCIATICA: Primary | ICD-10-CM

## 2024-03-20 PROCEDURE — 97110 THERAPEUTIC EXERCISES: CPT

## 2024-03-20 PROCEDURE — 97140 MANUAL THERAPY 1/> REGIONS: CPT

## 2024-03-20 PROCEDURE — 97112 NEUROMUSCULAR REEDUCATION: CPT

## 2024-03-20 RX ORDER — HYDROMORPHONE HYDROCHLORIDE 2 MG/1
TABLET ORAL
COMMUNITY
Start: 2024-03-18 | End: 2024-03-29

## 2024-03-22 ENCOUNTER — OFFICE VISIT (OUTPATIENT)
Dept: PHYSICAL THERAPY | Facility: CLINIC | Age: 64
End: 2024-03-22
Payer: MEDICARE

## 2024-03-22 ENCOUNTER — HOSPITAL ENCOUNTER (OUTPATIENT)
Dept: RADIOLOGY | Facility: CLINIC | Age: 64
End: 2024-03-22
Payer: MEDICARE

## 2024-03-22 ENCOUNTER — OFFICE VISIT (OUTPATIENT)
Dept: OBGYN CLINIC | Facility: CLINIC | Age: 64
End: 2024-03-22

## 2024-03-22 VITALS
BODY MASS INDEX: 36.98 KG/M2 | HEIGHT: 68 IN | DIASTOLIC BLOOD PRESSURE: 80 MMHG | SYSTOLIC BLOOD PRESSURE: 126 MMHG | HEART RATE: 63 BPM | WEIGHT: 244 LBS | TEMPERATURE: 97.7 F

## 2024-03-22 DIAGNOSIS — S82.891D CLOSED FRACTURE OF RIGHT ANKLE WITH ROUTINE HEALING: ICD-10-CM

## 2024-03-22 DIAGNOSIS — R53.81 PHYSICAL DECONDITIONING: ICD-10-CM

## 2024-03-22 DIAGNOSIS — M54.41 CHRONIC BILATERAL LOW BACK PAIN WITH BILATERAL SCIATICA: Primary | ICD-10-CM

## 2024-03-22 DIAGNOSIS — S82.891D CLOSED FRACTURE OF RIGHT ANKLE WITH ROUTINE HEALING: Primary | ICD-10-CM

## 2024-03-22 DIAGNOSIS — M54.42 CHRONIC BILATERAL LOW BACK PAIN WITH BILATERAL SCIATICA: Primary | ICD-10-CM

## 2024-03-22 DIAGNOSIS — G89.29 CHRONIC BILATERAL LOW BACK PAIN WITH BILATERAL SCIATICA: Primary | ICD-10-CM

## 2024-03-22 DIAGNOSIS — R20.2 PARESTHESIA OF BILATERAL LEGS: ICD-10-CM

## 2024-03-22 DIAGNOSIS — R26.9 GAIT ABNORMALITY: ICD-10-CM

## 2024-03-22 PROCEDURE — 97140 MANUAL THERAPY 1/> REGIONS: CPT

## 2024-03-22 PROCEDURE — 99024 POSTOP FOLLOW-UP VISIT: CPT | Performed by: ORTHOPAEDIC SURGERY

## 2024-03-22 PROCEDURE — 97112 NEUROMUSCULAR REEDUCATION: CPT

## 2024-03-22 PROCEDURE — 97110 THERAPEUTIC EXERCISES: CPT

## 2024-03-22 PROCEDURE — 73610 X-RAY EXAM OF ANKLE: CPT

## 2024-03-22 NOTE — PROGRESS NOTES
Daily Note     Today's date: 3/22/2024  Patient name: Dominick Irving  : 1960  MRN: [id number]  Referring provider: Lake Connors PA-C  Dx:   Encounter Diagnosis     ICD-10-CM    1. Chronic bilateral low back pain with bilateral sciatica  M54.42     M54.41     G89.29       2. Closed fracture of right ankle with routine healing  S82.891D       3. Physical deconditioning  R53.81       4. Paresthesia of bilateral legs  R20.2       5. Gait abnormality  R26.9           Start Time: 0755  Stop Time: 0910  Total time in clinic (min): 75 minutes    Subjective: Patient reports he is feeling very tight this morning. He has about 25% relief from pain post therapy sessions but wishes that relief would be longer lasting.       Objective: See treatment diary below      Assessment: Tolerated treatment well. Contract relax techniques utilized to improve hip flexor lengthening with successful outcome. Patient demonstrated fatigue post treatment, exhibited good technique with therapeutic exercises, and would benefit from continued PT to improve core and BLE strength to reduce pain with function and improve overall QoL.      Plan: Continue per plan of care.      Precautions: PMH HTN, CAD, Bladder Cancer, CHF, RODO, Lumbar fusion L4-5 in , L3-4 hemilaminectomy 2023  POC Expiration: 3/20/24    Manuals 3/6 3/13 3/15 3/20 3/22   B HS, piriformis, hip abd, gastroc stretch w/ traction  10' 15' c ankle stretching and quads in prone 20' c STM to lumbar region 20' c STM to lumbar  25' c STM to lumbar    Nerve glides         R ankle stretching/ mobs 10'  5' 5'            Neuro Re-Ed        Seated 3 way trunk flexion    Monster walks 4x10' RTB Monster walks 6x10' RTB   Seated PNF on TB   Side step c squat 4x10' RTB  Side step c squat 4x10' RTB Side step c squat 6x10' RTB   Bridge w/ hip ABD 2x10 GTB 2x10 GTB   BOSU marches 3'   PPT   Step ups front and lateral 10x ea bilat     PPU w/ exhale Front and lateral lunges 10x ea   "Front and lateral lunges 10x ea  Front and lateral lunges 10x ea  Front and lateral lunges 10x ea  Front and lateral lunges 2x10 ea    Sit to stand unsupported  DL HR 2x10 4\" step   DL HR 2x10 4\" step DL HR 2x10 4\" step  DL HR  2x10 4\" step   Supine Tball ABD crunch         Ther Ex        SRC for ROM/ cardiovascular endurance  Nustep 10' L5 Nustep 10' L5 Nustep 10' L5 Nustep 10' L5 Nustep L5 10'   Lumbar rotation c TB        SL clamshells  2x10 GTB       DKTC        Supine SLR        LAQ   Ankle eversion c DF 2x10 RTB HEP    Standing inversion/ eversion on half roll 10x5\" hold ea        HEP instruction/ education        Ther Activity                        Gait Training                        Modalities 3/6 3/13 3/15 3/20 3/22   MHP 10' MHP Low back  10' LB and thighs declined 10' LB 15' LB                      "

## 2024-03-22 NOTE — PROGRESS NOTES
"Patient Name:  Dominick Irving  MRN:  [id number]    Assessment     1. Closed fracture of right ankle with routine healing  XR ankle 3+ vw right          Plan     I discussed follow-up in 6 to 8 weeks with repeat x-rays to confirm completion of healing versus follow-up as needed.  At this time, he would prefer to return only as needed since he is completely asymptomatic.  However, he does understand that if he begins to develop any symptoms, reevaluation would be warranted.  Alternatively, if he just does decide to have x-rays to ensure completion of healing radiographically, the office should be contacted and he can be rechecked again.    Return if symptoms worsen or fail to improve.      Subjective   Dominick Irving returns for follow-up of his right distal fibular fracture.  The patient is nearly 3 month(s) post injury and returns for routine follow-up. Patient denies pain.  He uses a cane for balance but denies any pain with ambulating without the cane in the home.      Objective     /80   Pulse 63   Temp 97.7 °F (36.5 °C) (Temporal)   Ht 5' 8\" (1.727 m)   Wt 111 kg (244 lb)   BMI 37.10 kg/m²     Exam today demonstrates no tenderness over the distal fibula.  He has no complaints with inversion or eversion stress of the ankle.  He has excellent range of motion in dorsiflexion and plantarflexion.  He ambulates without significant difficulty utilizing a cane for assistance.  Distal sensation is grossly intact.  There is some residual swelling noted.      Data Review     I have personally reviewed pertinent films in PACS demonstrating progressive healing.    Mani Flores    "

## 2024-03-25 ENCOUNTER — OFFICE VISIT (OUTPATIENT)
Dept: PHYSICAL THERAPY | Facility: CLINIC | Age: 64
End: 2024-03-25
Payer: MEDICARE

## 2024-03-25 DIAGNOSIS — M54.42 CHRONIC BILATERAL LOW BACK PAIN WITH BILATERAL SCIATICA: Primary | ICD-10-CM

## 2024-03-25 DIAGNOSIS — G89.29 CHRONIC BILATERAL LOW BACK PAIN WITH BILATERAL SCIATICA: Primary | ICD-10-CM

## 2024-03-25 DIAGNOSIS — R26.9 GAIT ABNORMALITY: ICD-10-CM

## 2024-03-25 DIAGNOSIS — S82.891D CLOSED FRACTURE OF RIGHT ANKLE WITH ROUTINE HEALING: ICD-10-CM

## 2024-03-25 DIAGNOSIS — R20.2 PARESTHESIA OF BILATERAL LEGS: ICD-10-CM

## 2024-03-25 DIAGNOSIS — M54.41 CHRONIC BILATERAL LOW BACK PAIN WITH BILATERAL SCIATICA: Primary | ICD-10-CM

## 2024-03-25 DIAGNOSIS — R53.81 PHYSICAL DECONDITIONING: ICD-10-CM

## 2024-03-25 PROCEDURE — 97112 NEUROMUSCULAR REEDUCATION: CPT

## 2024-03-25 PROCEDURE — 97140 MANUAL THERAPY 1/> REGIONS: CPT

## 2024-03-25 PROCEDURE — 97110 THERAPEUTIC EXERCISES: CPT

## 2024-03-25 NOTE — PROGRESS NOTES
Daily Note     Today's date: 3/25/2024  Patient name: Dominick Irving  : 1960  MRN: [id number]  Referring provider: Lake Connors PA-C  Dx:   Encounter Diagnosis     ICD-10-CM    1. Chronic bilateral low back pain with bilateral sciatica  M54.42     M54.41     G89.29       2. Closed fracture of right ankle with routine healing  S82.891D       3. Physical deconditioning  R53.81       4. Paresthesia of bilateral legs  R20.2       5. Gait abnormality  R26.9           Start Time: 800  Stop Time: 900  Total time in clinic (min): 60 minutes    Subjective: Patient reports he feeling very tight today and gets very tight by Saturday night after having PT on Friday mornings. By the end of the visit, he had decreased pain in B hips.       Objective: See treatment diary below      Assessment: Tolerated treatment well. Patient demonstrated fatigue post treatment, exhibited good technique with therapeutic exercises, and would benefit from continued PT to improve core and BLE strength to reduce pain with function and improve QoL.       Plan: Continue per plan of care.      Precautions: PMH HTN, CAD, Bladder Cancer, CHF, RODO, Lumbar fusion L4-5 in , L3-4 hemilaminectomy 2023  POC Expiration: 3/20/24    Manuals 3/13 3/15 3/20 3/22 3/25   B HS, piriformis, hip abd, gastroc stretch w/ traction  15' c ankle stretching and quads in prone 20' c STM to lumbar region 20' c STM to lumbar  25' c STM to lumbar  25' c STM to lumbar   Nerve glides         R ankle stretching/ mobs  5' 5'             Neuro Re-Ed        Seated 3 way trunk flexion   Monster walks 4x10' RTB Monster walks 6x10' RTB Monster walks 6x10' RTB   Seated PNF on TB  Side step c squat 4x10' RTB  Side step c squat 4x10' RTB Side step c squat 6x10' RTB Side step c squat 6x10' RTB   Bridge w/ hip ABD 2x10 GTB   BOSU march 3'    PPT  Step ups front and lateral 10x ea bilat   Step ups front and lateral 10x ea bilat   PPU w/ exhale Front and lateral lunges  "10x ea  Front and lateral lunges 10x ea  Front and lateral lunges 10x ea  Front and lateral lunges 2x10 ea  Front and lateral lunges 2x10 ea   Sit to stand unsupported   DL HR 2x10 4\" step DL HR 2x10 4\" step  DL HR  2x10 4\" step DL HR 2x10 4\" step   Supine Tball ABD crunch         Ther Ex        SRC for ROM/ cardiovascular endurance  Nustep 10' L5 Nustep 10' L5 Nustep 10' L5 Nustep L5 10' Nustep 10' L5   Lumbar rotation c TB        SL clamshells 2x10 GTB        DKTC        Supine SLR        LAQ  Ankle eversion c DF 2x10 RTB HEP     Standing inversion/ eversion on half roll        HEP instruction/ education        Ther Activity                        Gait Training                        Modalities 3/13 3/15 3/20 3/22 3/25   MHP 10' LB and thighs declined 10' LB 15' LB declined                        "

## 2024-03-26 NOTE — PROGRESS NOTES
Daily Note     Today's date: 3/27/2024  Patient name: Dominick Irving  : 1960  MRN: [id number]  Referring provider: Lake Connors PA-C  Dx:   Encounter Diagnosis     ICD-10-CM    1. Chronic bilateral low back pain with bilateral sciatica  M54.42     M54.41     G89.29       2. Closed fracture of right ankle with routine healing  S82.891D       3. Physical deconditioning  R53.81       4. Paresthesia of bilateral legs  R20.2       5. Gait abnormality  R26.9           Start Time: 0755  Stop Time: 08  Total time in clinic (min): 35 minutes    Subjective: Patient reports he is not having a good day. He states that he has had bad pain in his lower back and anterior hips since Monday afternoon.       Objective: See treatment diary below      Assessment: Tolerated treatment poor. PT session was limited to warm up and manual therapy only due to patients increase in pain and need for rest period. Pt was educated to take time to allow body to recover from therapy sessions due to his nature to push himself to and past his limits. Patient demonstrated fatigue post treatment and would benefit from continued PT to improve core and BLE strength to reduce pain with mobility and improve overall QoL.       Plan: Continue per plan of care.      Precautions: PMH HTN, CAD, Bladder Cancer, CHF, RODO, Lumbar fusion L4-5 in , L3-4 hemilaminectomy 2023  POC Expiration: 3/20/24    Manuals 3/15 3/20 3/22 3/25 3/27   B HS, piriformis, hip abd, gastroc stretch w/ traction  20' c STM to lumbar region 20' c STM to lumbar  25' c STM to lumbar  25' c STM to lumbar 25' no STM   Nerve glides         R ankle stretching/ mobs 5' 5'              Neuro Re-Ed        Seated 3 way trunk flexion  Monster walks 4x10' RTB Monster walks 6x10' RTB Monster walks 6x10' RTB NT   Seated PNF on TB Side step c squat 4x10' RTB  Side step c squat 4x10' RTB Side step c squat 6x10' RTB Side step c squat 6x10' RTB NT   Bridge w/ hip ABD   RENUKA gamble  "3'     PPT Step ups front and lateral 10x ea bilat   Step ups front and lateral 10x ea bilat NT   PPU w/ exhale Front and lateral lunges 10x ea  Front and lateral lunges 10x ea  Front and lateral lunges 2x10 ea  Front and lateral lunges 2x10 ea NT   Sit to stand unsupported  DL HR 2x10 4\" step DL HR 2x10 4\" step  DL HR  2x10 4\" step DL HR 2x10 4\" step    Supine Tball ABD crunch         Ther Ex        SRC for ROM/ cardiovascular endurance  Nustep 10' L5 Nustep 10' L5 Nustep L5 10' Nustep 10' L5 Nustep 10' L5   Lumbar rotation c TB        SL clamshells        DKTC        Supine SLR        LAQ Ankle eversion c DF 2x10 RTB HEP      Standing inversion/ eversion on half roll        HEP instruction/ education        Ther Activity                        Gait Training                        Modalities 3/15 3/20 3/22 3/25 3/27   MHP declined 10' LB 15' LB declined 10' LB only                          "

## 2024-03-27 ENCOUNTER — OFFICE VISIT (OUTPATIENT)
Dept: PHYSICAL THERAPY | Facility: CLINIC | Age: 64
End: 2024-03-27
Payer: MEDICARE

## 2024-03-27 DIAGNOSIS — R26.9 GAIT ABNORMALITY: ICD-10-CM

## 2024-03-27 DIAGNOSIS — R20.2 PARESTHESIA OF BILATERAL LEGS: ICD-10-CM

## 2024-03-27 DIAGNOSIS — M54.41 CHRONIC BILATERAL LOW BACK PAIN WITH BILATERAL SCIATICA: Primary | ICD-10-CM

## 2024-03-27 DIAGNOSIS — G89.29 CHRONIC BILATERAL LOW BACK PAIN WITH BILATERAL SCIATICA: Primary | ICD-10-CM

## 2024-03-27 DIAGNOSIS — M54.42 CHRONIC BILATERAL LOW BACK PAIN WITH BILATERAL SCIATICA: Primary | ICD-10-CM

## 2024-03-27 DIAGNOSIS — S82.891D CLOSED FRACTURE OF RIGHT ANKLE WITH ROUTINE HEALING: ICD-10-CM

## 2024-03-27 DIAGNOSIS — R53.81 PHYSICAL DECONDITIONING: ICD-10-CM

## 2024-03-27 PROCEDURE — 97110 THERAPEUTIC EXERCISES: CPT

## 2024-03-27 PROCEDURE — 97140 MANUAL THERAPY 1/> REGIONS: CPT

## 2024-03-28 NOTE — PROGRESS NOTES
"Daily Note     Today's date: 3/28/2024  Patient name: Dominick Irving  : 1960  MRN: [id number]  Referring provider: Lake Connors PA-C  Dx:   Encounter Diagnosis     ICD-10-CM    1. Chronic bilateral low back pain with bilateral sciatica  M54.42     M54.41     G89.29       2. Closed fracture of right ankle with routine healing  S82.891D       3. Physical deconditioning  R53.81       4. Paresthesia of bilateral legs  R20.2       5. Gait abnormality  R26.9                      Subjective: ***      Objective: See treatment diary below      Assessment: Tolerated treatment {Tolerated treatment :[id number]}. Patient demonstrated fatigue post treatment, exhibited good technique with therapeutic exercises, and would benefit from continued PT to improve core and BLE strength and improve overall QoL.      Plan: Continue per plan of care.      Precautions: PMH HTN, CAD, Bladder Cancer, CHF, RODO, Lumbar fusion L4-5 in , L3-4 hemilaminectomy 2023  POC Expiration: 3/20/24    Manuals 3/20 3/22 3/25 3/27 3/29   B HS, piriformis, hip abd, gastroc stretch w/ traction  20' c STM to lumbar  25' c STM to lumbar  25' c STM to lumbar 25' no STM    Nerve glides         R ankle stretching/ mobs 5'               Neuro Re-Ed        Seated 3 way trunk flexion Monster walks 4x10' RTB Monster walks 6x10' RTB Monster walks 6x10' RTB NT    Seated PNF on TB Side step c squat 4x10' RTB Side step c squat 6x10' RTB Side step c squat 6x10' RTB NT    Bridge w/ hip ABD  BOSU  3'      PPT   Step ups front and lateral 10x ea bilat NT    PPU w/ exhale Front and lateral lunges 10x ea  Front and lateral lunges 2x10 ea  Front and lateral lunges 2x10 ea NT    Sit to stand unsupported  DL HR 2x10 4\" step  DL HR  2x10 4\" step DL HR 2x10 4\" step     Supine Tball ABD crunch         Ther Ex        SRC for ROM/ cardiovascular endurance  Nustep 10' L5 Nustep L5 10' Nustep 10' L5 Nustep 10' L5    Lumbar rotation c TB        SL clamshells    "     DKTC        Supine SLR        LAQ HEP       Standing inversion/ eversion on half roll        HEP instruction/ education        Ther Activity                        Gait Training                        Modalities 3/20 3/22 3/25 3/27    MHP 10' LB 15' LB declined 10' LB only

## 2024-03-28 NOTE — PROGRESS NOTES
"Daily Note     Today's date: 3/28/2024  Patient name: Dominick Irving  : 1960  MRN: [id number]  Referring provider: Lake Connors PA-C  Dx:   Encounter Diagnosis     ICD-10-CM    1. Chronic bilateral low back pain with bilateral sciatica  M54.42     M54.41     G89.29       2. Closed fracture of right ankle with routine healing  S82.891D       3. Physical deconditioning  R53.81       4. Paresthesia of bilateral legs  R20.2       5. Gait abnormality  R26.9                      Subjective: ***      Objective: See treatment diary below      Assessment: Tolerated treatment {Tolerated treatment :[id number]}. Patient demonstrated fatigue post treatment, exhibited good technique with therapeutic exercises, and would benefit from continued PT to improve core and BLE strength to reduce pain with function and improve QoL.      Plan: Continue per plan of care.      Precautions: PMH HTN, CAD, Bladder Cancer, CHF, RODO, Lumbar fusion L4-5 in , L3-4 hemilaminectomy 2023  POC Expiration: 3/20/24    Manuals 3/20 3/22 3/25 3/27 3/29   B HS, piriformis, hip abd, gastroc stretch w/ traction  20' c STM to lumbar  25' c STM to lumbar  25' c STM to lumbar 25' no STM    Nerve glides         R ankle stretching/ mobs 5'               Neuro Re-Ed        Seated 3 way trunk flexion Monster walks 4x10' RTB Monster walks 6x10' RTB Monster walks 6x10' RTB NT    Seated PNF on TB Side step c squat 4x10' RTB Side step c squat 6x10' RTB Side step c squat 6x10' RTB NT    Bridge w/ hip ABD  BOSU marches 3'      PPT   Step ups front and lateral 10x ea bilat NT    PPU w/ exhale Front and lateral lunges 10x ea  Front and lateral lunges 2x10 ea  Front and lateral lunges 2x10 ea NT    Sit to stand unsupported  DL HR 2x10 4\" step  DL HR  2x10 4\" step DL HR 2x10 4\" step     Supine Tball ABD crunch         Ther Ex        SRC for ROM/ cardiovascular endurance  Nustep 10' L5 Nustep L5 10' Nustep 10' L5 Nustep 10' L5    Lumbar rotation c TB    "     SL kathy        DKTC        Supine SLR        LAQ HEP       Standing inversion/ eversion on half roll        HEP instruction/ education        Ther Activity                        Gait Training                        Modalities 3/20 3/22 3/25 3/27    MHP 10' LB 15' LB declined 10' LB only

## 2024-03-29 ENCOUNTER — APPOINTMENT (OUTPATIENT)
Dept: PHYSICAL THERAPY | Facility: CLINIC | Age: 64
End: 2024-03-29
Payer: MEDICARE

## 2024-03-29 ENCOUNTER — APPOINTMENT (EMERGENCY)
Dept: RADIOLOGY | Facility: HOSPITAL | Age: 64
End: 2024-03-29
Payer: MEDICARE

## 2024-03-29 ENCOUNTER — HOSPITAL ENCOUNTER (EMERGENCY)
Facility: HOSPITAL | Age: 64
Discharge: HOME | End: 2024-03-29
Attending: EMERGENCY MEDICINE
Payer: MEDICARE

## 2024-03-29 VITALS
OXYGEN SATURATION: 93 % | SYSTOLIC BLOOD PRESSURE: 142 MMHG | RESPIRATION RATE: 20 BRPM | TEMPERATURE: 97.6 F | BODY MASS INDEX: 37.59 KG/M2 | HEART RATE: 61 BPM | WEIGHT: 248.02 LBS | HEIGHT: 68 IN | DIASTOLIC BLOOD PRESSURE: 67 MMHG

## 2024-03-29 DIAGNOSIS — G89.29 CHRONIC BILATERAL LOW BACK PAIN WITH BILATERAL SCIATICA: Primary | ICD-10-CM

## 2024-03-29 DIAGNOSIS — G89.4 CHRONIC PAIN SYNDROME: ICD-10-CM

## 2024-03-29 DIAGNOSIS — R20.2 PARESTHESIA OF BILATERAL LEGS: ICD-10-CM

## 2024-03-29 DIAGNOSIS — G89.29 OTHER CHRONIC PAIN: ICD-10-CM

## 2024-03-29 DIAGNOSIS — M54.50 ACUTE LOW BACK PAIN: Primary | ICD-10-CM

## 2024-03-29 DIAGNOSIS — R26.9 GAIT ABNORMALITY: ICD-10-CM

## 2024-03-29 DIAGNOSIS — R53.81 PHYSICAL DECONDITIONING: ICD-10-CM

## 2024-03-29 DIAGNOSIS — M54.41 CHRONIC BILATERAL LOW BACK PAIN WITH BILATERAL SCIATICA: Primary | ICD-10-CM

## 2024-03-29 DIAGNOSIS — M54.42 CHRONIC BILATERAL LOW BACK PAIN WITH BILATERAL SCIATICA: Primary | ICD-10-CM

## 2024-03-29 DIAGNOSIS — S82.891D CLOSED FRACTURE OF RIGHT ANKLE WITH ROUTINE HEALING: ICD-10-CM

## 2024-03-29 LAB
ALBUMIN SERPL BCP-MCNC: 3.9 G/DL (ref 3.5–5)
ALP SERPL-CCNC: 118 U/L (ref 34–104)
ALT SERPL W P-5'-P-CCNC: 17 U/L (ref 7–52)
ANION GAP SERPL CALCULATED.3IONS-SCNC: 7 MMOL/L (ref 4–13)
APTT PPP: 31 SECONDS (ref 23–37)
AST SERPL W P-5'-P-CCNC: 16 U/L (ref 13–39)
BASOPHILS # BLD AUTO: 0.02 THOUSANDS/ÂΜL (ref 0–0.1)
BASOPHILS NFR BLD AUTO: 0 % (ref 0–1)
BILIRUB SERPL-MCNC: 0.35 MG/DL (ref 0.2–1)
BUN SERPL-MCNC: 21 MG/DL (ref 5–25)
CALCIUM SERPL-MCNC: 9.2 MG/DL (ref 8.4–10.2)
CHLORIDE SERPL-SCNC: 98 MMOL/L (ref 96–108)
CO2 SERPL-SCNC: 32 MMOL/L (ref 21–32)
CREAT SERPL-MCNC: 0.9 MG/DL (ref 0.6–1.3)
EOSINOPHIL # BLD AUTO: 0.13 THOUSAND/ÂΜL (ref 0–0.61)
EOSINOPHIL NFR BLD AUTO: 2 % (ref 0–6)
ERYTHROCYTE [DISTWIDTH] IN BLOOD BY AUTOMATED COUNT: 16.1 % (ref 11.6–15.1)
GFR SERPL CREATININE-BSD FRML MDRD: 89 ML/MIN/1.73SQ M
GLUCOSE SERPL-MCNC: 149 MG/DL (ref 65–140)
HCT VFR BLD AUTO: 37.3 % (ref 36.5–49.3)
HGB BLD-MCNC: 12.3 G/DL (ref 12–17)
IMM GRANULOCYTES # BLD AUTO: 0.03 THOUSAND/UL (ref 0–0.2)
IMM GRANULOCYTES NFR BLD AUTO: 0 % (ref 0–2)
INR PPP: 0.97 (ref 0.84–1.19)
LACTATE SERPL-SCNC: 1.4 MMOL/L (ref 0.5–2)
LYMPHOCYTES # BLD AUTO: 1.57 THOUSANDS/ÂΜL (ref 0.6–4.47)
LYMPHOCYTES NFR BLD AUTO: 22 % (ref 14–44)
MCH RBC QN AUTO: 29.8 PG (ref 26.8–34.3)
MCHC RBC AUTO-ENTMCNC: 33 G/DL (ref 31.4–37.4)
MCV RBC AUTO: 90 FL (ref 82–98)
MONOCYTES # BLD AUTO: 0.42 THOUSAND/ÂΜL (ref 0.17–1.22)
MONOCYTES NFR BLD AUTO: 6 % (ref 4–12)
NEUTROPHILS # BLD AUTO: 4.98 THOUSANDS/ÂΜL (ref 1.85–7.62)
NEUTS SEG NFR BLD AUTO: 70 % (ref 43–75)
NRBC BLD AUTO-RTO: 0 /100 WBCS
PLATELET # BLD AUTO: 229 THOUSANDS/UL (ref 149–390)
PMV BLD AUTO: 11.2 FL (ref 8.9–12.7)
POTASSIUM SERPL-SCNC: 3.4 MMOL/L (ref 3.5–5.3)
PROT SERPL-MCNC: 7.6 G/DL (ref 6.4–8.4)
PROTHROMBIN TIME: 13.2 SECONDS (ref 11.6–14.5)
RBC # BLD AUTO: 4.13 MILLION/UL (ref 3.88–5.62)
SODIUM SERPL-SCNC: 137 MMOL/L (ref 135–147)
WBC # BLD AUTO: 7.15 THOUSAND/UL (ref 4.31–10.16)

## 2024-03-29 PROCEDURE — 85610 PROTHROMBIN TIME: CPT | Performed by: PHYSICIAN ASSISTANT

## 2024-03-29 PROCEDURE — 72100 X-RAY EXAM L-S SPINE 2/3 VWS: CPT

## 2024-03-29 PROCEDURE — 80053 COMPREHEN METABOLIC PANEL: CPT | Performed by: PHYSICIAN ASSISTANT

## 2024-03-29 PROCEDURE — 85025 COMPLETE CBC W/AUTO DIFF WBC: CPT | Performed by: PHYSICIAN ASSISTANT

## 2024-03-29 PROCEDURE — 36415 COLL VENOUS BLD VENIPUNCTURE: CPT | Performed by: PHYSICIAN ASSISTANT

## 2024-03-29 PROCEDURE — 99285 EMERGENCY DEPT VISIT HI MDM: CPT | Performed by: PHYSICIAN ASSISTANT

## 2024-03-29 PROCEDURE — 83605 ASSAY OF LACTIC ACID: CPT | Performed by: PHYSICIAN ASSISTANT

## 2024-03-29 PROCEDURE — 85730 THROMBOPLASTIN TIME PARTIAL: CPT | Performed by: PHYSICIAN ASSISTANT

## 2024-03-29 RX ORDER — POTASSIUM CHLORIDE 20 MEQ/1
20 TABLET, EXTENDED RELEASE ORAL ONCE
Status: COMPLETED | OUTPATIENT
Start: 2024-03-29 | End: 2024-03-29

## 2024-03-29 RX ORDER — HYDROMORPHONE HCL/PF 1 MG/ML
0.5 SYRINGE (ML) INJECTION ONCE
Status: DISCONTINUED | OUTPATIENT
Start: 2024-03-29 | End: 2024-03-29

## 2024-03-29 RX ORDER — FENTANYL CITRATE 50 UG/ML
50 INJECTION, SOLUTION INTRAMUSCULAR; INTRAVENOUS ONCE
Status: COMPLETED | OUTPATIENT
Start: 2024-03-29 | End: 2024-03-29

## 2024-03-29 RX ORDER — LIDOCAINE 50 MG/G
1 PATCH TOPICAL ONCE
Status: DISCONTINUED | OUTPATIENT
Start: 2024-03-29 | End: 2024-03-29 | Stop reason: HOSPADM

## 2024-03-29 RX ORDER — HYDROMORPHONE HYDROCHLORIDE 2 MG/1
TABLET ORAL
OUTPATIENT
Start: 2024-03-29

## 2024-03-29 RX ADMIN — LIDOCAINE 1 PATCH: 50 PATCH TOPICAL at 11:16

## 2024-03-29 RX ADMIN — POTASSIUM CHLORIDE 20 MEQ: 1500 TABLET, EXTENDED RELEASE ORAL at 11:50

## 2024-03-29 RX ADMIN — FENTANYL CITRATE 50 MCG: 50 INJECTION INTRAMUSCULAR; INTRAVENOUS at 12:04

## 2024-03-29 NOTE — ED PROVIDER NOTES
"History  Chief Complaint   Patient presents with    Back Pain     History of sciatica     64-year-old male presented to the emergency department for evaluation of lower back pain. Patient states he has chronic pain in his back and therefore takes oxycodone and Dilaudid at baseline. Reports recently pain has been worsening.  Denies any trauma or injury.  States he has follow-up with management for improvement of symptoms.  States he has previously received back injections without improvement of symptoms.  Reports chronic numbness in bilateral shins.  Denies any pain or saddle paresthesias or rectal numbness.  Denies any IV drug use.  No fevers or chills.  Patient denies redness, wounds, rashes. Patient does report a past medical history of bladder cancer and states he was recently given a \"clear bill of health\" in regards to the CA.         Prior to Admission Medications   Prescriptions Last Dose Informant Patient Reported? Taking?   Farxiga 5 MG TABS  Self Yes No   Si mg daily 5mg alternating with 2.5mg for fluid retention   HYDROcodone Bitartrate ER (Hysingla ER) 80 MG T24A 3/29/2024 at 0230  No Yes   Sig: Take 80 mg by mouth every morning Max Daily Amount: 80 mg   HYDROmorphone (DILAUDID) 8 MG tablet 3/29/2024 at 0230  No Yes   Sig: Take 1 tablet (8 mg total) by mouth every 4 (four) hours as needed for moderate pain Max Daily Amount: 48 mg   Hysingla ER 80 MG T24A   Yes No   amitriptyline (ELAVIL) 50 mg tablet   No No   Sig: Take 1 tablet (50 mg total) by mouth daily at bedtime   aspirin 81 mg chewable tablet  Self Yes No   Sig: Chew 81 mg daily   atorvastatin (LIPITOR) 40 mg tablet  Self Yes No   co-enzyme Q-10 30 MG capsule  Self Yes No   Sig: Take 100 mg by mouth daily    gabapentin (Neurontin) 600 MG tablet   No No   Sig: Take 1 tablet (600 mg total) by mouth 4 (four) times a day   metolazone (ZAROXOLYN) 2.5 mg tablet   No No   Sig: Take 1 tablet (2.5 mg total) by mouth daily as needed (edema)   metoprolol " "succinate (TOPROL-XL) 25 mg 24 hr tablet   Yes No   multivitamin-iron-minerals-folic acid (CENTRUM) chewable tablet  Self Yes No   Sig: Chew 1 tablet daily   naloxone (NARCAN) 4 mg/0.1 mL nasal spray   No No   Sig: Administer 1 spray into a nostril. If no response after 2-3 minutes, give another dose in the other nostril using a new spray.   potassium chloride (K-DUR,KLOR-CON) 10 mEq tablet   No No   Sig: Take 2 tablets (20 mEq total) by mouth 2 (two) times a day   potassium chloride (Klor-Con) 10 mEq tablet   Yes No   torsemide (DEMADEX) 20 mg tablet   No No   Sig: Take 2 tablets (40 mg total) by mouth 2 (two) times a day      Facility-Administered Medications: None       Past Medical History:   Diagnosis Date    Anxiety 3/01/2023    Arthritis     Bladder cancer (HCC)     Cancer (HCC) 3/17/2023    Cervical disc disorder 1/2016    Chronic narcotic dependence (HCC)     Chronic pain disorder     lower back and down both legs    Colon polyp     Coronary artery disease     CPAP (continuous positive airway pressure) dependence     bi-pap    Depression 3/17/2023    Does use hearing aid     will wear DOS    Full dentures     Heart failure (HCC)     and \"fluid retention\" [facility]\"    High cholesterol     Hypertension     Low back pain 2003    Mild ankle edema     and feet    Obstructive sleep apnea on CPAP     Prediabetes     Shortness of breath     per pt \"with just exertion\"    Sleep apnea     Wears glasses        Past Surgical History:   Procedure Laterality Date    BACK SURGERY      titanium rods implanted    CAUDAL BLOCK N/A 10/17/2023    Procedure: BLOCK / INJECTION CAUDAL;  Surgeon: Minh Rolon MD;  Location:  [facility];  Service: Pain Management     COLONOSCOPY      CYSTOSCOPY W/ URETERAL STENT PLACEMENT Bilateral 03/17/2023    Procedure: bilateral retrograde;  Surgeon: Shan Sanabria MD;  Location:  MAIN OR;  Service: Urology    HERNIA REPAIR      x5    LUMBAR LAMINECTOMY N/A 06/30/2023    " Procedure: Left L3-4 Metrx hemilaminectomy and bilateral foraminotomies;  Surgeon: Leland Aguilar MD;  Location: [facility];  Service: Neurosurgery    NJ CYSTO W/REMOVAL OF LESIONS SMALL N/A 05/01/2023    Procedure: CYSTOSCOPY TURBT;  Surgeon: Shan Sanabria MD;  Location: [facility];  Service: Urology    NJ CYSTOURETHROSCOPY N/A 11/06/2023    Procedure: CYSTOSCOPY with  bladder biopsies and fulgeration;  Surgeon: Shan Sanabria MD;  Location: [facility];  Service: Urology    SPINE SURGERY  2017    TRANSURETHRAL RESECTION OF BLADDER TUMOR N/A 03/17/2023    Procedure: TRANSURETHRAL RESECTION OF BLADDER TUMOR (TURBT);  Surgeon: Shan Sanabria MD;  Location: [facility];  Service: Urology       Family History   Problem Relation Age of Onset    Cancer Father         Adult leukemia    Aortic aneurysm Father     Leukemia Father     Alcohol abuse Father     Hypertension Mother     Colon cancer Maternal Grandfather      I have reviewed and agree with the history as documented.    E-Cigarette/Vaping    E-Cigarette Use Never User      E-Cigarette/Vaping Substances    Nicotine No     THC No     CBD No     Flavoring No     Other No     Unknown No      Social History     Tobacco Use    Smoking status: Former     Current packs/day: 1.00     Average packs/day: 1 pack/day for 50.3 years (50.3 ttl pk-yrs)     Types: Cigarettes     Start date: 2/1/2023    Smokeless tobacco: Never   Vaping Use    Vaping status: Never Used   Substance Use Topics    Alcohol use: Not Currently     Comment: 3 per year    Drug use: Not Currently     Types: Marijuana       Review of Systems   Constitutional: Negative.    Respiratory: Negative.     Cardiovascular: Negative.    Musculoskeletal:  Positive for back pain.   Skin: Negative.    All other systems reviewed and are negative.      Physical Exam  Physical Exam  Vitals and nursing note reviewed.   Constitutional:       Appearance: Normal appearance.      Comments: Elevated BMI   HENT:      Head:  Normocephalic.      Nose: Nose normal.   Eyes:      Conjunctiva/sclera: Conjunctivae normal.   Cardiovascular:      Rate and Rhythm: Normal rate and regular rhythm.   Pulmonary:      Effort: Pulmonary effort is normal.      Breath sounds: Normal breath sounds. No stridor. No wheezing, rhonchi or rales.   Chest:      Chest wall: No tenderness.   Musculoskeletal:         General: Tenderness present.      Cervical back: Normal.      Thoracic back: Normal.        Back:    Skin:     General: Skin is warm and dry.   Neurological:      General: No focal deficit present.      Mental Status: He is alert.      Sensory: Sensation is intact.      Deep Tendon Reflexes:      Reflex Scores:       Patellar reflexes are 2+ on the right side and 2+ on the left side.     Comments: Able to stand and ambulate from bed to door of room          Vital Signs  ED Triage Vitals [03/29/24 1048]   Temperature Pulse Respirations Blood Pressure SpO2   97.6 °F (36.4 °C) 61 20 142/67 93 %      Temp Source Heart Rate Source Patient Position - Orthostatic VS BP Location FiO2 (%)   Temporal Monitor Lying Left arm --      Pain Score       10 - Worst Possible Pain           Vitals:    03/29/24 1048   BP: 142/67   Pulse: 61   Patient Position - Orthostatic VS: Lying         Visual Acuity      ED Medications  Medications   potassium chloride (Klor-Con M20) CR tablet 20 mEq (20 mEq Oral Given 3/29/24 1150)   fentaNYL injection 50 mcg (50 mcg Intravenous Given 3/29/24 1204)       Diagnostic Studies  Results Reviewed       Procedure Component Value Units Date/Time    Comprehensive metabolic panel [[id number]]  (Abnormal) Collected: 03/29/24 1113    Lab Status: Final result Specimen: Blood from Arm, Right Updated: 03/29/24 1138     Sodium 137 mmol/L      Potassium 3.4 mmol/L      Chloride 98 mmol/L      CO2 32 mmol/L      ANION GAP 7 mmol/L      BUN 21 mg/dL      Creatinine 0.90 mg/dL      Glucose 149 mg/dL      Calcium 9.2 mg/dL      AST 16 U/L      ALT 17  U/L      Alkaline Phosphatase 118 U/L      Total Protein 7.6 g/dL      Albumin 3.9 g/dL      Total Bilirubin 0.35 mg/dL      eGFR 89 ml/min/1.73sq m     Narrative:      National Kidney Disease Foundation guidelines for Chronic Kidney Disease (CKD):     Stage 1 with normal or high GFR (GFR > 90 mL/min/1.73 square meters)    Stage 2 Mild CKD (GFR = 60-89 mL/min/1.73 square meters)    Stage 3A Moderate CKD (GFR = 45-59 mL/min/1.73 square meters)    Stage 3B Moderate CKD (GFR = 30-44 mL/min/1.73 square meters)    Stage 4 Severe CKD (GFR = 15-29 mL/min/1.73 square meters)    Stage 5 End Stage CKD (GFR <15 mL/min/1.73 square meters)  Note: GFR calculation is accurate only with a steady state creatinine    Lactic acid, plasma (w/reflex if result > 2.0) [[id number]]  (Normal) Collected: 03/29/24 1113    Lab Status: Final result Specimen: Blood from Arm, Right Updated: 03/29/24 1138     LACTIC ACID 1.4 mmol/L     Narrative:      Result may be elevated if tourniquet was used during collection.    Protime-INR [[id number]]  (Normal) Collected: 03/29/24 1113    Lab Status: Final result Specimen: Blood from Arm, Right Updated: 03/29/24 1132     Protime 13.2 seconds      INR 0.97    APTT [[id number]]  (Normal) Collected: 03/29/24 1113    Lab Status: Final result Specimen: Blood from Arm, Right Updated: 03/29/24 1132     PTT 31 seconds     CBC and differential [[id number]]  (Abnormal) Collected: 03/29/24 1113    Lab Status: Final result Specimen: Blood from Arm, Right Updated: 03/29/24 1117     WBC 7.15 Thousand/uL      RBC 4.13 Million/uL      Hemoglobin 12.3 g/dL      Hematocrit 37.3 %      MCV 90 fL      MCH 29.8 pg      MCHC 33.0 g/dL      RDW 16.1 %      MPV 11.2 fL      Platelets 229 Thousands/uL      nRBC 0 /100 WBCs      Neutrophils Relative 70 %      Immature Grans % 0 %      Lymphocytes Relative 22 %      Monocytes Relative 6 %      Eosinophils Relative 2 %      Basophils Relative 0 %      Neutrophils Absolute 4.98  Thousands/µL      Absolute Immature Grans 0.03 Thousand/uL      Absolute Lymphocytes 1.57 Thousands/µL      Absolute Monocytes 0.42 Thousand/µL      Eosinophils Absolute 0.13 Thousand/µL      Basophils Absolute 0.02 Thousands/µL                    XR spine lumbar 2 or 3 views injury   ED Interpretation by Jg Murray MD (03/29 1142)   No acute fracture.  Hardware in place.      Final Result by Chris Marte DO (03/29 1541)      No acute osseous abnormality.      Stable postoperative appearance L4-5 posterior fusion without hardware complication.      Degenerative changes as described.         Resident: JIA TAVAREZ I, the attending radiologist, have reviewed the images and agree with the final report above.      Workstation performed: [id number]                    Procedures  Procedures         ED Course  ED Course as of 03/29/24 2033   Fri Mar 29, 2024   1141 WBC: 7.15   1141 LACTIC ACID: 1.4   1141 CMP relatively unremarkable.  Potassium minimally low will replete   1150 I had a long discussion with the patient and spouse (via phone) regarding results and findings.  No signs of infection.  ED interpretation of x-ray was negative for acute osseous injury.  I offered the patient pain medication while in the emergency department and follow-up with the specialist for admission for rehab placement.  Patient does not want admission for rehab placement at this time.  States he would like to try the pain medication here discharge home.  I was additionally able to call patient's PCP and update them on patient's status.  Patient has follow-up appointment  next week.  Also discussed patient's request for medication refills and  verbalized understanding.                 Medical Decision Making  64-year-old male presents the emergency department for evaluation acute on chronic low back pain.  Vitals and medical record reviewed.  Patient at risk for the following but not limited to infection,  fracture, spasm, sciatica, degenerative disc disease, arthritis, cauda equina syndrome.  Patient's laboratory findings unremarkable, low concern for infectious process.  Low concern for cauda equina syndrome. ED interpretation of x-ray was negative for acute osseous injury.  This is acute on chronic back pain.  Was given options of pain control in the emergency department with discharge home and close outpatient follow-up with specialist and PCP versus admission for rehab.  Patient requested discharge.  Was given dose of fentanyl in the emergency department.  Touch base with patient's PCP and placed a spine referral.  Patient agreed history and plan he is clinically hemodynamically stable for discharge    Problems Addressed:  Acute low back pain: acute illness or injury    Amount and/or Complexity of Data Reviewed  Labs: ordered. Decision-making details documented in ED Course.  Radiology: ordered and independent interpretation performed.    Risk  Prescription drug management.             Disposition  Final diagnoses:   Acute low back pain     Time reflects when diagnosis was documented in both MDM as applicable and the Disposition within this note       Time User Action Codes Description Comment    3/29/2024 11:43 AM Cristiane Zepeda Add [M54.50] Acute low back pain           ED Disposition       ED Disposition   Discharge    Condition   Stable    Date/Time   Fri Mar 29, 2024 11:42 AM    Comment   Dominick Irving discharge to home/self care.                   Follow-up Information       Follow up With Specialties Details Why Contact Info    Robert Budinetz, MD Family Medicine   [address] [id number] [phone]      Minh Rolon MD Pain Medicine, Interventional Radiology   [address]  [address]  [facility] [place] [facility] [address]  [phone]              Discharge Medication List as of 3/29/2024 11:43 AM        CONTINUE these medications which have NOT CHANGED    Details   !! HYDROcodone Bitartrate ER  (Hysingla ER) 80 MG T24A Take 80 mg by mouth every morning Max Daily Amount: 80 mg, Starting Thu 3/14/2024, Normal      !! HYDROmorphone (DILAUDID) 8 MG tablet Take 1 tablet (8 mg total) by mouth every 4 (four) hours as needed for moderate pain Max Daily Amount: 48 mg, Starting Tue 2/27/2024, Normal      amitriptyline (ELAVIL) 50 mg tablet Take 1 tablet (50 mg total) by mouth daily at bedtime, Starting Wed 3/13/2024, Normal      aspirin 81 mg chewable tablet Chew 81 mg daily, Historical Med      atorvastatin (LIPITOR) 40 mg tablet Starting Mon 10/3/2022, Historical Med      co-enzyme Q-10 30 MG capsule Take 100 mg by mouth daily , Historical Med      Farxiga 5 MG TABS 5 mg daily 5mg alternating with 2.5mg for fluid retention, Starting Wed 10/26/2022, Historical Med      gabapentin (Neurontin) 600 MG tablet Take 1 tablet (600 mg total) by mouth 4 (four) times a day, Starting Thu 3/14/2024, Normal      !! Hysingla ER 80 MG T24A Historical Med      metolazone (ZAROXOLYN) 2.5 mg tablet Take 1 tablet (2.5 mg total) by mouth daily as needed (edema), Starting Mon 12/18/2023, Normal      metoprolol succinate (TOPROL-XL) 25 mg 24 hr tablet Historical Med      multivitamin-iron-minerals-folic acid (CENTRUM) chewable tablet Chew 1 tablet daily, Historical Med      naloxone (NARCAN) 4 mg/0.1 mL nasal spray Administer 1 spray into a nostril. If no response after 2-3 minutes, give another dose in the other nostril using a new spray., Normal      potassium chloride (K-DUR,KLOR-CON) 10 mEq tablet Take 2 tablets (20 mEq total) by mouth 2 (two) times a day, Starting Tue 12/19/2023, Normal      potassium chloride (Klor-Con) 10 mEq tablet Historical Med      torsemide (DEMADEX) 20 mg tablet Take 2 tablets (40 mg total) by mouth 2 (two) times a day, Starting Tue 2/27/2024, Normal      !! HYDROmorphone (DILAUDID) 2 mg tablet Historical Med       !! - Potential duplicate medications found. Please discuss with provider.              PDMP  Review         Value Time User    PDMP Reviewed  Yes 3/29/2024 11:06 AM Cristiane Zepeda PA-C            ED Provider  Electronically Signed by             Cristiane Zepeda PA-C  03/29/24 2023       Cristiane Zepeda PA-C  03/29/24 2033

## 2024-03-29 NOTE — TELEPHONE ENCOUNTER
Requested medication(s) are due for refill today: No  Patient has already received a courtesy refill: Yes  Other reason request has been forwarded to provider: possible duplicate

## 2024-03-29 NOTE — DISCHARGE INSTRUCTIONS
Please continue on your daily pain medications.  Avoid any straining movements.  Follow-up with our spine specialist Dr. Rolon.  Please return with any new or worsening symptoms  Your x-ray was reviewed today in the emergency department and there was no obvious abnormalities found, however, the imaging will be reviewed by the radiologist later this evening or the following day and if there is any abnormalities found you will get a phone call with those results.

## 2024-04-01 ENCOUNTER — VBI (OUTPATIENT)
Dept: FAMILY MEDICINE CLINIC | Facility: CLINIC | Age: 64
End: 2024-04-01

## 2024-04-01 ENCOUNTER — APPOINTMENT (OUTPATIENT)
Dept: PHYSICAL THERAPY | Facility: CLINIC | Age: 64
End: 2024-04-01
Payer: MEDICARE

## 2024-04-01 RX ORDER — HYDROMORPHONE HYDROCHLORIDE 8 MG/1
8 TABLET ORAL EVERY 4 HOURS PRN
Qty: 180 TABLET | Refills: 0 | Status: SHIPPED | OUTPATIENT
Start: 2024-04-01

## 2024-04-01 RX ORDER — HYDROCODONE BITARTRATE 80 MG/1
80 TABLET, EXTENDED RELEASE ORAL EVERY MORNING
Qty: 30 TABLET | Refills: 0 | Status: SHIPPED | OUTPATIENT
Start: 2024-04-01 | End: 2024-04-02

## 2024-04-01 NOTE — TELEPHONE ENCOUNTER
04/01/24 10:59 AM    Patient contacted post ED visit, first outreach attempt made. Message was left for patient to return a call to the VBI Department at [facility]: Phone [phone].    Thank you.  Claudine Celeste MA  [facility]

## 2024-04-02 NOTE — TELEPHONE ENCOUNTER
04/02/24 9:39 AM    Patient contacted post ED visit, VBI department spoke with patient/caregiver and outreach was successful.    Thank you.  Claudine Celeste MA  [facility]

## 2024-04-02 NOTE — PROGRESS NOTES
Daily Note     Today's date: 4/3/2024  Patient name: Dominick Irving  : 1960  MRN: [id number]  Referring provider: Lake Connors PA-C  Dx:   Encounter Diagnosis     ICD-10-CM    1. Chronic bilateral low back pain with bilateral sciatica  M54.42     M54.41     G89.29       2. Closed fracture of right ankle with routine healing  S82.891D       3. Physical deconditioning  R53.81       4. Paresthesia of bilateral legs  R20.2       5. Gait abnormality  R26.9           Start Time: 0755  Stop Time: 0900  Total time in clinic (min): 65 minutes    Subjective: Patient reports he feels like he is back to [facility] and has been having pain in the anterior portion of his hips and groin. He was in the hospital on Friday because his low back pain was so severe, he could not do anything at home.       Objective: See treatment diary below      Assessment: Tolerated treatment fair. Patient demonstrated fatigue post treatment, exhibited good technique with therapeutic exercises, and would benefit from continued PT to improve core and BLE strength to improve functional mobility  and QoL.      Plan: Continue per plan of care.      Precautions: PMH HTN, CAD, Bladder Cancer, CHF, RODO, Lumbar fusion L4-5 in , L3-4 hemilaminectomy 2023  POC Expiration: 3/20/24    Manuals 4/3 3/20 3/22 3/25 3/27   B HS, piriformis, hip abd, gastroc stretch w/ traction  25' c STM  20' c STM to lumbar  25' c STM to lumbar  25' c STM to lumbar 25' no STM   Nerve glides         R ankle stretching/ mobs  5'              Neuro Re-Ed        Seated 3 way trunk flexion  Monster walks 4x10' RTB Monster walks 6x10' RTB Monster walks 6x10' RTB NT   Seated PNF on TB  Side step c squat 4x10' RTB Side step c squat 6x10' RTB Side step c squat 6x10' RTB NT   Bridge w/ hip ABD   BOSU march 3'     PPT    Step ups front and lateral 10x ea bilat NT   PPU w/ exhale  Front and lateral lunges 10x ea  Front and lateral lunges 2x10 ea  Front and lateral  "lunges 2x10 ea NT   Sit to stand unsupported   DL HR 2x10 4\" step  DL HR  2x10 4\" step DL HR 2x10 4\" step    Supine Tball ABD crunch         Ther Ex        SRC for ROM/ cardiovascular endurance  Nustep L5 10' Nustep 10' L5 Nustep L5 10' Nustep 10' L5 Nustep 10' L5   Lumbar rotation c TB 10x5\" hold        SL clamshells        DKTC 10x5\" hold        Supine SLR        LAQ  HEP      Standing inversion/ eversion on half roll Standing trunk flexion c pball 10x5\" bilat       HEP instruction/ education        Ther Activity                        Gait Training                        Modalities 4/3 3/20 3/22 3/25 3/27   MHP 15' LB 10' LB 15' LB declined 10' LB only                            "

## 2024-04-03 ENCOUNTER — OFFICE VISIT (OUTPATIENT)
Dept: PHYSICAL THERAPY | Facility: CLINIC | Age: 64
End: 2024-04-03
Payer: MEDICARE

## 2024-04-03 ENCOUNTER — TELEMEDICINE (OUTPATIENT)
Dept: PALLIATIVE MEDICINE | Facility: CLINIC | Age: 64
End: 2024-04-03

## 2024-04-03 DIAGNOSIS — R20.2 PARESTHESIA OF BILATERAL LEGS: ICD-10-CM

## 2024-04-03 DIAGNOSIS — R53.81 PHYSICAL DECONDITIONING: ICD-10-CM

## 2024-04-03 DIAGNOSIS — G89.29 CHRONIC BILATERAL LOW BACK PAIN WITH BILATERAL SCIATICA: Primary | ICD-10-CM

## 2024-04-03 DIAGNOSIS — M54.42 CHRONIC BILATERAL LOW BACK PAIN WITH BILATERAL SCIATICA: Primary | ICD-10-CM

## 2024-04-03 DIAGNOSIS — S82.891D CLOSED FRACTURE OF RIGHT ANKLE WITH ROUTINE HEALING: ICD-10-CM

## 2024-04-03 DIAGNOSIS — Z71.89 COUNSELING AND COORDINATION OF CARE: Primary | ICD-10-CM

## 2024-04-03 DIAGNOSIS — M54.41 CHRONIC BILATERAL LOW BACK PAIN WITH BILATERAL SCIATICA: Primary | ICD-10-CM

## 2024-04-03 DIAGNOSIS — R26.9 GAIT ABNORMALITY: ICD-10-CM

## 2024-04-03 PROCEDURE — NC001 PR NO CHARGE

## 2024-04-03 PROCEDURE — 97140 MANUAL THERAPY 1/> REGIONS: CPT

## 2024-04-03 PROCEDURE — 97110 THERAPEUTIC EXERCISES: CPT

## 2024-04-03 NOTE — PROGRESS NOTES
Palliative Supportive Care  met with patient/family to continue to provide emotional support and guidance.      Updated biopsychosocial information relevant to support:    The patient was identified by name and date of birth.  Dominick was informed that this is a telemedicine visit and that the visit is being conducted through the Epic Embedded platform. They agree to proceed..  My office door was closed. No one else was in the room. They acknowledged consent and understanding of privacy and security of the video platform. The patient has agreed to participate and understands they can discontinue the visit at any time.      Dominick spoke about having a very bad week with his pain. He ended up going to the ER and they were only really able to give him medication and send him home. He said he was not able to go to , had to cancel his [facility] plans, and will not be going to [place] for his mother's birthday. He is very upset about this and feels set back again. He said he plans to talk to his PCP and [facility] provider about what his options are for pain management because he is willing to try most anything to get this under control. He discussed trying to deep breath and use distractions. He has not been able to go to acupuncture recently. He is upset because he feels this could be his mom's last birthday and also he was hoping to go there and provide some respite to his loved ones caring for his mom. LCSW validated Dominick's feelings of disappointment and wanting to be able to care for his mother and help his family but reframed that these are internalized thoughts and not a reflection of what his family feels or expects from him. He agreed. He denied suicidal ideation and said he said he is trying to stay positive but sometimes it is really hard and he feels hopeless. He he hopes his team can help him find some new avenues to try because he feels he has exhausted the options he knows of.       Identified areas  of need include: emotional support    Resources provided:    Areas that need future follow-up include: reduce anxiety/depressed mood,     I have spent 60 minutes with Patient  today in which greater than 50% of this time was spent in counseling/coordination of care     Palliative  will follow-up as requested by patient, family, and primary team.  Please contact with any specific requests

## 2024-04-04 ENCOUNTER — OFFICE VISIT (OUTPATIENT)
Dept: FAMILY MEDICINE CLINIC | Facility: CLINIC | Age: 64
End: 2024-04-04
Payer: MEDICARE

## 2024-04-04 VITALS
SYSTOLIC BLOOD PRESSURE: 151 MMHG | HEART RATE: 66 BPM | DIASTOLIC BLOOD PRESSURE: 66 MMHG | HEIGHT: 68 IN | BODY MASS INDEX: 36.53 KG/M2 | WEIGHT: 241 LBS | OXYGEN SATURATION: 100 %

## 2024-04-04 DIAGNOSIS — M54.59 INTRACTABLE LOW BACK PAIN: Primary | ICD-10-CM

## 2024-04-04 DIAGNOSIS — E11.9 TYPE 2 DIABETES MELLITUS WITHOUT COMPLICATION, WITHOUT LONG-TERM CURRENT USE OF INSULIN (HCC): ICD-10-CM

## 2024-04-04 DIAGNOSIS — G89.4 CHRONIC PAIN SYNDROME: ICD-10-CM

## 2024-04-04 PROCEDURE — 99214 OFFICE O/P EST MOD 30 MIN: CPT | Performed by: FAMILY MEDICINE

## 2024-04-04 PROCEDURE — G2211 COMPLEX E/M VISIT ADD ON: HCPCS | Performed by: FAMILY MEDICINE

## 2024-04-04 RX ORDER — HYDROCODONE BITARTRATE 100 MG/1
TABLET, EXTENDED RELEASE ORAL
Qty: 30 TABLET | Refills: 0 | Status: SHIPPED | OUTPATIENT
Start: 2024-04-04

## 2024-04-04 NOTE — PROGRESS NOTES
PT Re-Evaluation     Today's date: 2024  Patient name: Dominick Irving  : 1960  MRN: [id number]  Referring provider: Lake Connors PA-C  Dx:   Encounter Diagnosis     ICD-10-CM    1. Chronic bilateral low back pain with bilateral sciatica  M54.42     M54.41     G89.29       2. Closed fracture of right ankle with routine healing  S82.891D       3. Physical deconditioning  R53.81       4. Paresthesia of bilateral legs  R20.2       5. Gait abnormality  R26.9                 Start Time: 0755  Stop Time: 0900  Total time in clinic (min): 65 minutes    Assessment  Assessment details: Pt has been seen by OP PT for 25 visits and has been making minimal progress with therapy. He has inconsistencies with him progress with major setbacks including falls, significant increase in pain, and hospital stays. He has demonstrated improved BLE strength to WFL, improved R ankle mobility and strength since his recent fracture, and improved ambulation independence to walking with SPC vs WW. He has also begun driving for the first time in over a year. Despite his setbacks, he feels he is benefiting from PT and wishes to continue. Pt was educated on functional plateau and possibly stopping therapy dependent on his response in the next few weeks. Pt verbalize understanding and is agreeable to plan. Pt would benefit from continued skilled PT for 2-3 weeks with plan to transition to HEP.   Impairments: abnormal gait, abnormal or restricted ROM, abnormal movement, activity intolerance, impaired physical strength, lacks appropriate home exercise program, pain with function, poor posture  and poor body mechanics    Symptom irritability: highUnderstanding of Dx/Px/POC: good   Prognosis: good    Goals  STG to be met within 4 weeks:  1. Pt will increase BLE strength by 1/2 muscle grade to improve functional mobility and reduce pain. - met  2. Pt will be independent with HEP to decrease pain and improve quality of life. - met  3. Pt  will report 3/10 pain at worst to improve functional mobility and quality of life. - in progress  4. Pt will increase FOTO score by 10 points in order to improve lumbar spine mobility and reduce pain with function. - met  5. Pt will improve R ankle strength by 1/2 muscle grade to improve gait pattern. - met  6. Pt will improve R ankle AROM by 25-50% to improve gait pattern. - met    LTG to be met within 12 weeks:  1. Pt will improve BLE strength to WNL to improve functional mobility and quality of life. - met  2. Pt will restore lumbar spine AROM to WNL to improve functional mobility and quality of life. - met  3. Pt will meet FOTO score goal at DC to improve lumbar spine mobility in pain free range. - met  4. Pt will demonstrate stability with unsupported gait to improve QoL. - in progress  5. Pt will restore R ankle AROM to equal L to improve gait pattern. - met  6. Pt will restore R ankle strength to WFL to improve gait pattern and standing tolerance. - in progress       Plan  Plan details: Continue with POC and plan to transition to HEP in 2-3 weeks.  Patient would benefit from: skilled physical therapy  Planned modality interventions: cryotherapy and thermotherapy: hydrocollator packs  Planned therapy interventions: flexibility, functional ROM exercises, graded activity, graded exercise, home exercise program, joint mobilization, manual therapy, neuromuscular re-education, nerve gliding, patient education, self care, strengthening, stretching, therapeutic activities and therapeutic exercise  Frequency: 2x week  Duration in weeks: 3  Treatment plan discussed with: patient        Subjective Evaluation    History of Present Illness  Mechanism of injury: Pt reports he has made about 30%-40% of improvement since IE and feels that he is benefiting from therapy. He continues to have severe pain levels that he is currently working through with his PCP and Palliative care team and is hopeful that recent medication  changes will take the edge off of his pain. He has most difficulty with functional mobility at home, bending to  objects from the floor, and kneel down.   Quality of life: fair    Patient Goals  Patient goals for therapy: decreased pain, improved balance, increased motion, increased strength, independence with ADLs/IADLs and return to sport/leisure activities  Patient goal: walk without walker  Pain  Current pain ratin  At best pain ratin  At worst pain rating: 10  Location: low back and groin  Quality: radiating  Relieving factors: rest and medications  Aggravating factors: sitting, stair climbing, lifting and standing (twisting, bending down)  Progression: worsening    Social Support  Stairs in house: yes   Lives in: multiple-level home  Lives with: spouse    Treatments  Previous treatment: medication  Current treatment comments: accupuncture.         Objective     Postural Observations  Seated posture: fair  Standing posture: fair      Observations     Right Ankle/Foot   Positive for edema.     Additional Observation Details  Pitting edema noted on dorsum of R foot.     Palpation   Left   Muscle spasm in the quadratus lumborum.   Tenderness of the erector spinae, iliopsoas, lumbar paraspinals, quadratus lumborum and rectus femoris.     Right   Muscle spasm in the quadratus lumborum.   Tenderness of the erector spinae, iliopsoas, lumbar paraspinals, quadratus lumborum and rectus femoris.     Tenderness     Lumbar Spine  Tenderness in the spinous process.     Left Hip   Tenderness in the PSIS.     Right Hip   Tenderness in the PSIS.     Neurological Testing     Sensation     Lumbar   Left   Diminished: light touch    Right   Intact: light touch    Reflexes   Left   Patellar (L4): trace (1+)  Achilles (S1): trace (1+)    Right   Patellar (L4): trace (1+)  Achilles (S1): trace (1+)    Active Range of Motion     Lumbar   Flexion:  WFL  Extension:  with pain Restriction level: moderate  Left lateral  flexion:  WFL  Right lateral flexion:  WFL  Left rotation:  WFL  Right rotation:  WFL    Right Ankle/Foot   Normal active range of motion    Joint Play     Right Ankle/Foot  Joints within functional limits are the forefoot. Hypomobile in the distal tibiofibular joint, talocrural joint and midfoot.     Strength/Myotome Testing     Left Hip   Planes of Motion   Flexion: 5  Extension: 4+  Abduction: 4+  Adduction: 5  External rotation: 4+  Internal rotation: 4+    Right Hip   Planes of Motion   Flexion: 5  Extension: 4+  Abduction: 4+  Adduction: 5  External rotation: 4+  Internal rotation: 4+    Left Knee   Flexion: 4+  Extension: 4+    Right Knee   Flexion: 4+  Extension: 4+    Left Ankle/Foot   Dorsiflexion: 4+  Plantar flexion: 5  Inversion: 4+  Eversion: 4+    Right Ankle/Foot   Dorsiflexion: 4+  Plantar flexion: 4+  Inversion: 4+  Eversion: 4+    Tests     Lumbar     Left   Negative crossed SLR, passive SLR and slump test.     Right   Negative crossed SLR, passive SLR and slump test.     Left Pelvic Girdle/Sacrum   Negative: active SLR test.     Right Pelvic Girdle/Sacrum   Negative: active SLR test.     Left Hip   Negative KEDAR, FADIR and piriformis.     Right Hip   Negative KEDAR, FADIR and piriformis.     Swelling   Left Ankle/Foot   Malleoli: 28.5 cm    Right Ankle/Foot   Malleoli: 31 cm               Precautions: PMH HTN, CAD, Bladder Cancer, CHF, RODO, Lumbar fusion L4-5 in 2016, L3-4 hemilaminectomy June 2023  POC Expiration: 4/26/24    Manuals 4/3 4/5 3/22 3/25 3/27   B HS, piriformis, hip abd, gastroc stretch w/ traction  25' c STM  25' c STM to low back 25' c STM to lumbar  25' c STM to lumbar 25' no STM   Nerve glides         R ankle stretching/ mobs                Neuro Re-Ed        Seated 3 way trunk flexion   Monster walks 6x10' RTB Monster walks 6x10' RTB NT   Seated PNF on TB   Side step c squat 6x10' RTB Side step c squat 6x10' RTB NT   Bridge w/ hip ABD   YOLIU tye 3'     PPT    Step ups  "front and lateral 10x ea bilat NT   PPU w/ exhale   Front and lateral lunges 2x10 ea  Front and lateral lunges 2x10 ea NT   Sit to stand unsupported    DL HR  2x10 4\" step DL HR 2x10 4\" step    Supine Tball ABD crunch         Ther Ex        SRC for ROM/ cardiovascular endurance  Nustep L5 10' Nustep L5 10' Nustep L5 10' Nustep 10' L5 Nustep 10' L5   Lumbar rotation c TB 10x5\" hold  10x5\" hold standing      SL clamshells  Standing OAL 10x5\" hold bilat      DKTC 10x5\" hold        Supine SLR        LAQ        Standing inversion/ eversion on half roll Standing trunk flexion c pball 10x5\" bilat       HEP instruction/ education        Ther Activity                        Gait Training                        Modalities 4/3 4/5 3/22 3/25 3/27   MHP 15' LB 10' LB 15' LB declined 10' LB only                "

## 2024-04-04 NOTE — PROGRESS NOTES
"Assessment/Plan:       1. Intractable low back pain  Comments:  adjust hysingla up  dilaudid was decreased  Orders:  -     Ambulatory Referral to Gastroenterology; Future; Expected date: 05/07/2024  -     HYDROcodone Bitartrate ER (Hysingla ER) 100 MG T24A; Take 1 po qd  -     Ambulatory referral to Spine & Pain Management; Future    2. Chronic pain syndrome  Comments:  adjust hysingla up and dilaudid has gone down  Orders:  -     Ambulatory Referral to Gastroenterology; Future; Expected date: 05/07/2024  -     HYDROcodone Bitartrate ER (Hysingla ER) 100 MG T24A; Take 1 po qd  -     Ambulatory referral to Spine & Pain Management; Future    3. Screening for HIV (human immunodeficiency virus)    4. Type 2 diabetes mellitus without complication, without long-term current use of insulin (Formerly [facility])          Subjective:      Patient ID: Dominick Irving is a 64 y.o. male.    Dominick ended up in the emergency room with worsening low back pain.  They did an x-ray to rule out infection it was negative.  He was offered an admission to rehab for the pain.  He is already on high-dose narcotics.  He has seen pain management in the past.  He does see palliative care as well.  We have not determine the source of his severe low back pain.  He is on hydrocodone and hydromorphone.  We agreed to make a dosage adjustment.  He is also getting acupuncture and alternative means of trying to control his pain.        The following portions of the patient's history were reviewed and updated as appropriate: allergies, current medications, past family history, past medical history, past social history, past surgical history, and problem list.    Review of Systems   Respiratory: Negative.     Cardiovascular: Negative.    Gastrointestinal: Negative.          Objective:      /66 (BP Location: Left arm, Patient Position: Sitting, Cuff Size: Large)   Pulse 66   Ht 5' 8\" (1.727 m)   Wt 109 kg (241 lb)   SpO2 100%   BMI 36.64 kg/m²          " Physical Exam  Vitals and nursing note reviewed.   Constitutional:       Appearance: Normal appearance.   HENT:      Head: Normocephalic and atraumatic.      Nose: Nose normal.      Mouth/Throat:      Mouth: Mucous membranes are moist.   Eyes:      Extraocular Movements: Extraocular movements intact.      Pupils: Pupils are equal, round, and reactive to light.   Cardiovascular:      Rate and Rhythm: Normal rate and regular rhythm.      Pulses: Normal pulses.   Pulmonary:      Effort: Pulmonary effort is normal.      Breath sounds: Normal breath sounds.   Abdominal:      General: Bowel sounds are normal.      Palpations: Abdomen is soft.   Musculoskeletal:      Cervical back: Normal range of motion.   Skin:     General: Skin is warm and dry.      Capillary Refill: Capillary refill takes less than 2 seconds.   Neurological:      General: No focal deficit present.      Mental Status: He is alert.   Psychiatric:         Mood and Affect: Mood normal.

## 2024-04-05 ENCOUNTER — EVALUATION (OUTPATIENT)
Dept: PHYSICAL THERAPY | Facility: CLINIC | Age: 64
End: 2024-04-05
Payer: MEDICARE

## 2024-04-05 DIAGNOSIS — G89.29 CHRONIC BILATERAL LOW BACK PAIN WITH BILATERAL SCIATICA: Primary | ICD-10-CM

## 2024-04-05 DIAGNOSIS — R26.9 GAIT ABNORMALITY: ICD-10-CM

## 2024-04-05 DIAGNOSIS — R20.2 PARESTHESIA OF BILATERAL LEGS: ICD-10-CM

## 2024-04-05 DIAGNOSIS — S82.891D CLOSED FRACTURE OF RIGHT ANKLE WITH ROUTINE HEALING: ICD-10-CM

## 2024-04-05 DIAGNOSIS — M54.42 CHRONIC BILATERAL LOW BACK PAIN WITH BILATERAL SCIATICA: Primary | ICD-10-CM

## 2024-04-05 DIAGNOSIS — M54.41 CHRONIC BILATERAL LOW BACK PAIN WITH BILATERAL SCIATICA: Primary | ICD-10-CM

## 2024-04-05 DIAGNOSIS — R53.81 PHYSICAL DECONDITIONING: ICD-10-CM

## 2024-04-05 PROCEDURE — 97110 THERAPEUTIC EXERCISES: CPT

## 2024-04-05 PROCEDURE — 97140 MANUAL THERAPY 1/> REGIONS: CPT

## 2024-04-05 NOTE — PROGRESS NOTES
"Daily Note     Today's date: 2024  Patient name: Dominick Irving  : 1960  MRN: [id number]  Referring provider: Lake Connors PA-C  Dx:   Encounter Diagnosis     ICD-10-CM    1. Chronic bilateral low back pain with bilateral sciatica  M54.42     M54.41     G89.29       2. Closed fracture of right ankle with routine healing  S82.891D       3. Physical deconditioning  R53.81       4. Paresthesia of bilateral legs  R20.2       5. Gait abnormality  R26.9           Start Time: 0755  Stop Time: 0855  Total time in clinic (min): 60 minutes    Subjective: Patient reports he had a decent weekend and was able to get more done around the house.       Objective: See treatment diary below      Assessment: Tolerated treatment well. Patient demonstrated fatigue post treatment, exhibited good technique with therapeutic exercises, and would benefit from continued PT to improve core and BLE strength to improve gait pattern and overall QoL.       Plan: Continue per plan of care.      Precautions: PMH HTN, CAD, Bladder Cancer, CHF, RODO, Lumbar fusion L4-5 in , L3-4 hemilaminectomy 2023  POC Expiration: 24    Manuals 4/3 4/5 4/8 3/25 3/27   B HS, piriformis, hip abd, gastroc stretch w/ traction  25' c STM  25' c STM to low back 15' no STM 25' c STM to lumbar 25' no STM   Nerve glides         R ankle stretching/ mobs                Neuro Re-Ed        Seated 3 way trunk flexion   10x 5\" hold Monster walks 6x10' RTB NT   Seated PNF on TB    Side step c squat 6x10' RTB NT   Bridge w/ hip ABD        PPT    Step ups front and lateral 10x ea bilat NT   PPU w/ exhale    Front and lateral lunges 2x10 ea NT   Sit to stand unsupported     DL HR 2x10 4\" step    Supine Tball ABD crunch         Ther Ex        SRC for ROM/ cardiovascular endurance  Nustep L5 10' Nustep L5 10' Nustep L5 10' Nustep 10' L5 Nustep 10' L5   Lumbar rotation c TB 10x5\" hold  10x5\" hold standing 10x5\" hold standing     SL clamshells  Standing OAL " "10x5\" hold bilat Standing OAL 10x5\" hold bilat     DKTC 10x5\" hold   10x5\" hold     Supine SLR        LAQ        Standing inversion/ eversion on half roll Standing trunk flexion c pball 10x5\" bilat       HEP instruction/ education        Ther Activity                        Gait Training                        Modalities 4/3 4/5 4/8 3/25 3/27   MHP 15' LB 10' LB 10' LB declined 10' LB only                "

## 2024-04-08 ENCOUNTER — OFFICE VISIT (OUTPATIENT)
Dept: PHYSICAL THERAPY | Facility: CLINIC | Age: 64
End: 2024-04-08
Payer: MEDICARE

## 2024-04-08 DIAGNOSIS — G89.29 CHRONIC BILATERAL LOW BACK PAIN WITH BILATERAL SCIATICA: Primary | ICD-10-CM

## 2024-04-08 DIAGNOSIS — R20.2 PARESTHESIA OF BILATERAL LEGS: ICD-10-CM

## 2024-04-08 DIAGNOSIS — R53.81 PHYSICAL DECONDITIONING: ICD-10-CM

## 2024-04-08 DIAGNOSIS — M54.41 CHRONIC BILATERAL LOW BACK PAIN WITH BILATERAL SCIATICA: Primary | ICD-10-CM

## 2024-04-08 DIAGNOSIS — R26.9 GAIT ABNORMALITY: ICD-10-CM

## 2024-04-08 DIAGNOSIS — S82.891D CLOSED FRACTURE OF RIGHT ANKLE WITH ROUTINE HEALING: ICD-10-CM

## 2024-04-08 DIAGNOSIS — M54.42 CHRONIC BILATERAL LOW BACK PAIN WITH BILATERAL SCIATICA: Primary | ICD-10-CM

## 2024-04-08 PROCEDURE — 97140 MANUAL THERAPY 1/> REGIONS: CPT

## 2024-04-08 PROCEDURE — 97110 THERAPEUTIC EXERCISES: CPT

## 2024-04-09 NOTE — PROGRESS NOTES
"Daily Note     Today's date: 4/10/2024  Patient name: Dominick Irving  : 1960  MRN: [id number]  Referring provider: Lake Connors PA-C  Dx:   Encounter Diagnosis     ICD-10-CM    1. Chronic bilateral low back pain with bilateral sciatica  M54.42     M54.41     G89.29       2. Closed fracture of right ankle with routine healing  S82.891D       3. Paresthesia of bilateral legs  R20.2       4. Gait abnormality  R26.9       5. Physical deconditioning  R53.81                      Subjective:  Patient reports having a better day today with no symptoms in the hips or thighs but continuation of tightness in the LB to 4/10 level.  Patient spoke with PT about continuing with OPPT to continue to address weakness and LE and LB.       Objective: See treatment diary below      Assessment: Tolerated treatment well.  PT notes continuation of decrease ROM and strength t/o the lumbar spine as well as the bilateral hip and LE with trunk/core weakness and demonstration of impaired gait pattern and balance leading to increase pain levels and functional limitations with LE radicular symptoms and need for continuation of skilled therapy.        Plan: Continue per plan of care.      Precautions: PMH HTN, CAD, Bladder Cancer, CHF, RODO, Lumbar fusion L4-5 in , L3-4 hemilaminectomy 2023  POC Expiration: 24    Manuals 4/3 4/5 4/8 4/10    B HS, piriformis, hip abd, gastroc stretch w/ traction  25' c STM  25' c STM to low back 15' no STM 15 min with manual lumbar traction     Nerve glides         R ankle stretching/ mobs                Neuro Re-Ed        Seated 3 way trunk flexion   10x 5\" hold 10x5\" Hold Each     Seated PNF on TB        Bridge w/ hip ABD    Bridge Only   2x10     PPT        PPU w/ exhale        Sit to stand unsupported         Supine Tball ABD crunch         Ther Ex        SRC for ROM/ cardiovascular endurance  Nustep L5 10' Nustep L5 10' Nustep L5 10' 10 min L5   Nu-step     Lumbar rotation c TB " "10x5\" hold  10x5\" hold standing 10x5\" hold standing 10x5\" Hold Standing    SL clamshells  Standing OAL 10x5\" hold bilat Standing OAL 10x5\" hold bilat 10x5\" Hold Stand OAL     DKTC 10x5\" hold   10x5\" hold 10x5\" Hold     Supine SLR        LAQ        Standing inversion/ eversion on half roll Standing trunk flexion c pball 10x5\" bilat       HEP instruction/ education        Ther Activity                        Gait Training                        Modalities 4/3 4/5 4/8     MHP 15' LB 10' LB 10' LB 15 min LB in seated                    "

## 2024-04-10 ENCOUNTER — TELEMEDICINE (OUTPATIENT)
Dept: PALLIATIVE MEDICINE | Facility: CLINIC | Age: 64
End: 2024-04-10

## 2024-04-10 ENCOUNTER — OFFICE VISIT (OUTPATIENT)
Dept: PHYSICAL THERAPY | Facility: CLINIC | Age: 64
End: 2024-04-10
Payer: MEDICARE

## 2024-04-10 DIAGNOSIS — S82.891D CLOSED FRACTURE OF RIGHT ANKLE WITH ROUTINE HEALING: ICD-10-CM

## 2024-04-10 DIAGNOSIS — R53.81 PHYSICAL DECONDITIONING: ICD-10-CM

## 2024-04-10 DIAGNOSIS — R20.2 PARESTHESIA OF BILATERAL LEGS: ICD-10-CM

## 2024-04-10 DIAGNOSIS — M54.41 CHRONIC BILATERAL LOW BACK PAIN WITH BILATERAL SCIATICA: Primary | ICD-10-CM

## 2024-04-10 DIAGNOSIS — Z71.89 COUNSELING AND COORDINATION OF CARE: Primary | ICD-10-CM

## 2024-04-10 DIAGNOSIS — M54.42 CHRONIC BILATERAL LOW BACK PAIN WITH BILATERAL SCIATICA: Primary | ICD-10-CM

## 2024-04-10 DIAGNOSIS — G89.29 CHRONIC BILATERAL LOW BACK PAIN WITH BILATERAL SCIATICA: Primary | ICD-10-CM

## 2024-04-10 DIAGNOSIS — R26.9 GAIT ABNORMALITY: ICD-10-CM

## 2024-04-10 PROCEDURE — 97140 MANUAL THERAPY 1/> REGIONS: CPT | Performed by: PHYSICAL THERAPIST

## 2024-04-10 PROCEDURE — 97110 THERAPEUTIC EXERCISES: CPT | Performed by: PHYSICAL THERAPIST

## 2024-04-10 PROCEDURE — 97112 NEUROMUSCULAR REEDUCATION: CPT | Performed by: PHYSICAL THERAPIST

## 2024-04-10 PROCEDURE — NC001 PR NO CHARGE

## 2024-04-10 NOTE — PROGRESS NOTES
Palliative Supportive Care  met with patient/family to continue to provide emotional support and guidance.      Updated biopsychosocial information relevant to support:    The patient was identified by name and date of birth. Dominick  was informed that this is a telemedicine visit and that the visit is being conducted through the Epic Embedded platform. They agree to proceed..  My office door was closed. No one else was in the room. They acknowledged consent and understanding of privacy and security of the video platform. The patient has agreed to participate and understands they can discontinue the visit at any time.    Dominick spoke about having a pretty good week compared to the last few. He had a good talk with PT about his needs and a plan moving forward. He increased medications with his PCP. He feels both PT and medication management have been helping him. He also has been getting outside on his patio more with the warm weather and it is helping his mental health and pain levels. He is upset he could not see his mom for her birthday but understands it could have set him back further. He is planning to try to see her for mother's day. He is also planning to try to get upstairs to his library if possible. He said he worries about getting his hopes up because he can get hurt being hopeful. LCSW supported Dominick in exploring more balanced thinking between being hopeful and being logical about his journey, healing, and overall quality of life. LCSW and  Dominick discussed homework where he will write down his small victories and progress he notices each week so he has records to look back on when he feels he is not progressing and remembering that slow progress forward and some relapse in symptomology are part of the chronic illness process.       Identified areas of need include: emotional support    Resources provided:    Areas that need future follow-up include: reduce anxiety/depressed mood,     I  have spent 60 minutes with Patient  today in which greater than 50% of this time was spent in counseling/coordination of care     Palliative  will follow-up as requested by patient, family, and primary team.  Please contact with any specific requests

## 2024-04-11 NOTE — PROGRESS NOTES
"Daily Note     Today's date: 2024  Patient name: Dominick Irving  : 1960  MRN: [id number]  Referring provider: Lake Connors PA-C  Dx:   Encounter Diagnosis     ICD-10-CM    1. Chronic bilateral low back pain with bilateral sciatica  M54.42     M54.41     G89.29       2. Closed fracture of right ankle with routine healing  S82.891D       3. Paresthesia of bilateral legs  R20.2       4. Gait abnormality  R26.9       5. Physical deconditioning  R53.81           Start Time: 0750  Stop Time: 0855  Total time in clinic (min): 65 minutes    Subjective: Patient reports he is having a good week and yesterday was the first day in a long time that he was happy all day. He reports his pain is not bad today.      Objective: See treatment diary below      Assessment: Tolerated treatment well. Patient demonstrated fatigue post treatment, exhibited good technique with therapeutic exercises, and would benefit from continued PT to improve core and BLE strength to improve functional mobility and QoL.       Plan: Continue per plan of care.      Precautions: PMH HTN, CAD, Bladder Cancer, CHF, RODO, Lumbar fusion L4-5 in , L3-4 hemilaminectomy 2023  POC Expiration: 24    Manuals 4/3 4/5 4/8 4/10 4/12   B HS, piriformis, hip abd, gastroc stretch w/ traction  25' c STM  25' c STM to low back 15' no STM 15 min with manual lumbar traction  20'    Nerve glides         R ankle stretching/ mobs                Neuro Re-Ed        Seated 3 way trunk flexion   10x 5\" hold 10x5\" Hold Each  10x5\" hold ea    Seated PNF on TB        Bridge w/ hip ABD    Bridge Only   2x10     PPT        PPU w/ exhale        Sit to stand unsupported         Supine Tball ABD crunch         Ther Ex        SRC for ROM/ cardiovascular endurance  Nustep L5 10' Nustep L5 10' Nustep L5 10' 10 min L5   Nu-step  Nustep 10' L5   Lumbar rotation c TB 10x5\" hold  10x5\" hold standing 10x5\" hold standing 10x5\" Hold Standing 10x5\" hold supine   SL " "clamshells  Standing OAL 10x5\" hold bilat Standing OAL 10x5\" hold bilat 10x5\" Hold Stand OAL  Standing OALs 10x5\"   DKTC 10x5\" hold   10x5\" hold 10x5\" Hold  10x5\" hold   Supine SLR     QL doorway stretch 5x15\" bilat    LAQ        Standing inversion/ eversion on half roll Standing trunk flexion c pball 10x5\" bilat       HEP instruction/ education        Ther Activity                        Gait Training                        Modalities 4/3 4/5 4/8  4/12   MHP 15' LB 10' LB 10' LB 15 min LB in seated  10' LB in seated                     "

## 2024-04-12 ENCOUNTER — OFFICE VISIT (OUTPATIENT)
Dept: PHYSICAL THERAPY | Facility: CLINIC | Age: 64
End: 2024-04-12
Payer: MEDICARE

## 2024-04-12 DIAGNOSIS — M54.42 CHRONIC BILATERAL LOW BACK PAIN WITH BILATERAL SCIATICA: Primary | ICD-10-CM

## 2024-04-12 DIAGNOSIS — S82.891D CLOSED FRACTURE OF RIGHT ANKLE WITH ROUTINE HEALING: ICD-10-CM

## 2024-04-12 DIAGNOSIS — R53.81 PHYSICAL DECONDITIONING: ICD-10-CM

## 2024-04-12 DIAGNOSIS — R20.2 PARESTHESIA OF BILATERAL LEGS: ICD-10-CM

## 2024-04-12 DIAGNOSIS — R26.9 GAIT ABNORMALITY: ICD-10-CM

## 2024-04-12 DIAGNOSIS — M54.41 CHRONIC BILATERAL LOW BACK PAIN WITH BILATERAL SCIATICA: Primary | ICD-10-CM

## 2024-04-12 DIAGNOSIS — G89.29 CHRONIC BILATERAL LOW BACK PAIN WITH BILATERAL SCIATICA: Primary | ICD-10-CM

## 2024-04-12 PROCEDURE — 97110 THERAPEUTIC EXERCISES: CPT

## 2024-04-12 PROCEDURE — 97140 MANUAL THERAPY 1/> REGIONS: CPT

## 2024-04-12 NOTE — PROGRESS NOTES
"Daily Note     Today's date: 4/15/2024  Patient name: Dominick Irving  : 1960  MRN: [id number]  Referring provider: Lake Connors PA-C  Dx:   Encounter Diagnosis     ICD-10-CM    1. Chronic bilateral low back pain with bilateral sciatica  M54.42     M54.41     G89.29       2. Closed fracture of right ankle with routine healing  S82.891D       3. Paresthesia of bilateral legs  R20.2       4. Gait abnormality  R26.9       5. Physical deconditioning  R53.81           Start Time: 0750  Stop Time: 0900  Total time in clinic (min): 70 minutes    Subjective: Patient reports he is doing ok today but has lingering pain in the small of his back and some radiating pain into his L hip.       Objective: See treatment diary below      Assessment: Tolerated treatment well. Patient demonstrated fatigue post treatment, exhibited good technique with therapeutic exercises, and would benefit from continued PT to improve core and BLE strength to improve functional mobility and gait pattern.       Plan: Continue per plan of care.      Precautions: PMH HTN, CAD, Bladder Cancer, CHF, RODO, Lumbar fusion L4-5 in , L3-4 hemilaminectomy 2023  POC Expiration: 24    Manuals 4/5 4/8 4/10 4/12 4/15   B HS, piriformis, hip abd, gastroc stretch w/ traction  25' c STM to low back 15' no STM 15 min with manual lumbar traction  20'  20'   Nerve glides         R ankle stretching/ mobs                Neuro Re-Ed        Seated 3 way trunk flexion  10x 5\" hold 10x5\" Hold Each  10x5\" hold ea  10x5\" hold ea    Seated PNF on TB     Quadruped OAL 10x ea bilat    Bridge w/ hip ABD   Bridge Only   2x10      PPT        PPU w/ exhale        Sit to stand unsupported         Supine Tball ABD crunch         Ther Ex        SRC for ROM/ cardiovascular endurance  Nustep L5 10' Nustep L5 10' 10 min L5   Nu-step  Nustep 10' L5 Nustep 10' L5   Lumbar rotation c TB 10x5\" hold standing 10x5\" hold standing 10x5\" Hold Standing 10x5\" hold supine 10x5\" " "hold supine   SL clamshells Standing OAL 10x5\" hold bilat Standing OAL 10x5\" hold bilat 10x5\" Hold Stand OAL  Standing OALs 10x5\" Standing OALs 10x5\"1   DKTC  10x5\" hold 10x5\" Hold  10x5\" hold 0x5\" hold   Supine SLR    QL doorway stretch 5x15\" bilat     LAQ        Standing inversion/ eversion on half roll        HEP instruction/ education        Ther Activity                        Gait Training                        Modalities 4/5 4/8  4/12 4/15   MHP 10' LB 10' LB 15 min LB in seated  10' LB in seated  10' LB in seated                      "

## 2024-04-15 ENCOUNTER — OFFICE VISIT (OUTPATIENT)
Dept: PHYSICAL THERAPY | Facility: CLINIC | Age: 64
End: 2024-04-15
Payer: MEDICARE

## 2024-04-15 DIAGNOSIS — S82.891D CLOSED FRACTURE OF RIGHT ANKLE WITH ROUTINE HEALING: ICD-10-CM

## 2024-04-15 DIAGNOSIS — M54.41 CHRONIC BILATERAL LOW BACK PAIN WITH BILATERAL SCIATICA: Primary | ICD-10-CM

## 2024-04-15 DIAGNOSIS — M54.42 CHRONIC BILATERAL LOW BACK PAIN WITH BILATERAL SCIATICA: Primary | ICD-10-CM

## 2024-04-15 DIAGNOSIS — R26.9 GAIT ABNORMALITY: ICD-10-CM

## 2024-04-15 DIAGNOSIS — R53.81 PHYSICAL DECONDITIONING: ICD-10-CM

## 2024-04-15 DIAGNOSIS — R20.2 PARESTHESIA OF BILATERAL LEGS: ICD-10-CM

## 2024-04-15 DIAGNOSIS — G89.29 CHRONIC BILATERAL LOW BACK PAIN WITH BILATERAL SCIATICA: Primary | ICD-10-CM

## 2024-04-15 PROCEDURE — 97110 THERAPEUTIC EXERCISES: CPT

## 2024-04-15 PROCEDURE — 97140 MANUAL THERAPY 1/> REGIONS: CPT

## 2024-04-17 ENCOUNTER — OFFICE VISIT (OUTPATIENT)
Dept: PHYSICAL THERAPY | Facility: CLINIC | Age: 64
End: 2024-04-17
Payer: MEDICARE

## 2024-04-17 ENCOUNTER — TELEMEDICINE (OUTPATIENT)
Dept: PALLIATIVE MEDICINE | Facility: CLINIC | Age: 64
End: 2024-04-17

## 2024-04-17 DIAGNOSIS — S82.891D CLOSED FRACTURE OF RIGHT ANKLE WITH ROUTINE HEALING: ICD-10-CM

## 2024-04-17 DIAGNOSIS — G89.29 CHRONIC BILATERAL LOW BACK PAIN WITH BILATERAL SCIATICA: Primary | ICD-10-CM

## 2024-04-17 DIAGNOSIS — M54.42 CHRONIC BILATERAL LOW BACK PAIN WITH BILATERAL SCIATICA: Primary | ICD-10-CM

## 2024-04-17 DIAGNOSIS — Z71.89 COUNSELING AND COORDINATION OF CARE: Primary | ICD-10-CM

## 2024-04-17 DIAGNOSIS — R53.81 PHYSICAL DECONDITIONING: ICD-10-CM

## 2024-04-17 DIAGNOSIS — M54.41 CHRONIC BILATERAL LOW BACK PAIN WITH BILATERAL SCIATICA: Primary | ICD-10-CM

## 2024-04-17 DIAGNOSIS — R26.9 GAIT ABNORMALITY: ICD-10-CM

## 2024-04-17 DIAGNOSIS — R20.2 PARESTHESIA OF BILATERAL LEGS: ICD-10-CM

## 2024-04-17 PROCEDURE — 97140 MANUAL THERAPY 1/> REGIONS: CPT

## 2024-04-17 PROCEDURE — NC001 PR NO CHARGE

## 2024-04-17 PROCEDURE — 97110 THERAPEUTIC EXERCISES: CPT

## 2024-04-17 NOTE — PROGRESS NOTES
"Daily Note     Today's date: 2024  Patient name: Dominick Irving  : 1960  MRN: [id number]  Referring provider: Lake Connors PA-C  Dx:   Encounter Diagnosis     ICD-10-CM    1. Chronic bilateral low back pain with bilateral sciatica  M54.42     M54.41     G89.29       2. Closed fracture of right ankle with routine healing  S82.891D       3. Paresthesia of bilateral legs  R20.2       4. Gait abnormality  R26.9       5. Physical deconditioning  R53.81           Start Time: 0750  Stop Time: 0900  Total time in clinic (min): 70 minutes    Subjective: Patient reports he is having more pain in his low back since his previous therapy session.       Objective: See treatment diary below      Assessment: Tolerated treatment fair. Patients POC was adjusted due to increase in pain this date. Patient demonstrated fatigue post treatment, exhibited good technique with therapeutic exercises, and would benefit from continued PT to improve core and BLE strength to improve functional mobility.      Plan: Continue per plan of care.      Precautions: PMH HTN, CAD, Bladder Cancer, CHF, RODO, Lumbar fusion L4-5 in , L3-4 hemilaminectomy 2023  POC Expiration: 24    Manuals 4/8 4/10 4/12 4/15 4/17   B HS, piriformis, hip abd, gastroc stretch w/ traction  15' no STM 15 min with manual lumbar traction  20'  20' 20'   Nerve glides         R ankle stretching/ mobs                Neuro Re-Ed        Seated 3 way trunk flexion 10x 5\" hold 10x5\" Hold Each  10x5\" hold ea  10x5\" hold ea  10x5\" hold ea    Seated PNF on TB    Quadruped OAL 10x ea bilat     Bridge w/ hip ABD  Bridge Only   2x10    Bridge only 2x10    PPT        PPU w/ exhale        Sit to stand unsupported         Supine Tball ABD crunch         Ther Ex        SRC for ROM/ cardiovascular endurance  Nustep L5 10' 10 min L5   Nu-step  Nustep 10' L5 Nustep 10' L5 Nustep 10' L5   Lumbar rotation c TB 10x5\" hold standing 10x5\" Hold Standing 10x5\" hold supine " "10x5\" hold supine 10x5\" hold supine   SL clamshells Standing OAL 10x5\" hold bilat 10x5\" Hold Stand OAL  Standing OALs 10x5\" Standing OALs 10x5\"1    DKTC 10x5\" hold 10x5\" Hold  10x5\" hold 10x5\" hold 10x5\" hold   Supine SLR   QL doorway stretch 5x15\" bilat      LAQ        Standing inversion/ eversion on half roll        HEP instruction/ education        Ther Activity                        Gait Training                        Modalities 4/8  4/12 4/15 4/17   MHP 10' LB 15 min LB in seated  10' LB in seated  10' LB in seated 15' LB in seated                         "

## 2024-04-17 NOTE — PROGRESS NOTES
"Palliative Supportive Care  met with patient/family to continue to provide emotional support and guidance.      Updated biopsychosocial information relevant to support:     The patient was identified by name and date of birth.  Dominick was informed that this is a telemedicine visit and that the visit is being conducted through the Epic Embedded platform. They agree to proceed..  My office door was closed. No one else was in the room. They acknowledged consent and understanding of privacy and security of the video platform. The patient has agreed to participate and understands they can discontinue the visit at any time.    Dominick spoke about having very bad pain this week again. He was tearful and said that his mother is also most likely going to be passing away in less than 6 weeks if things go the way they currently are. He said not being able to physically get up to see her is really hurting him. He said he feels he is at his wits end because even going to the hospital they sent him home and didn't help with the pain. He said he does not have a plan or intention to harm himself but feels he \"cannot take this\" if he continues to have this quality life for \"10 years maybe even 5 or 2 years\". MARIA ALEJANDRAW encouraged Dominick that even if he is not currently having suicidal thoughts it may benefit him to go to the hospital for support with his mental health and medication adjustments. He said he did not think this was helpful or necessary at this time. BISI validated Dominick's anticipatory grief and his goal to see his mother before she dies if possible and possible changes and steps he can take to work towards this goal. BISI discussed working with PT for pain relief with the goal being to get to his mother and also ways to modify his trip to accommodate his limitations such as flying instead of driving, etc. He said he liked this idea and would think about how he could do work towards this if possible. He asked " to end early due to pain levels and thanked LCSW for support. Next meeting 4/24 at 2pm online.     Identified areas of need include: emotional support    Resources provided:    Areas that need future follow-up include: reduce anxiety/depressed mood/frustration, grief work,     I have spent 40 minutes with Patient  today in which greater than 50% of this time was spent in counseling/coordination of care     Palliative  will follow-up as requested by patient, family, and primary team.  Please contact with any specific requests

## 2024-04-18 NOTE — PROGRESS NOTES
PT Discharge    Today's date: 2024  Patient name: Dominick Irving  : 1960  MRN: [id number]  Referring provider: Lake Connors PA-C  Dx:   Encounter Diagnosis     ICD-10-CM    1. Chronic bilateral low back pain with bilateral sciatica  M54.42     M54.41     G89.29       2. Closed fracture of right ankle with routine healing  S82.891D       3. Paresthesia of bilateral legs  R20.2       4. Gait abnormality  R26.9       5. Physical deconditioning  R53.81                 Start Time: 0755  Stop Time: 0850  Total time in clinic (min): 55 minutes    Assessment  Assessment details: Patient has been seen by OP PT for 33 visits and has made minimal progress with therapy. His greatest accomplishments since IE have been improved gait pattern with LRAD, however, this has also been variable. His R ankle improved significantly since his fall with subsequent fracture. Due to unchanging functional status and need to focus on family emergency, patient was agreeable to DC therapy with plan to resume in the future if he feels the need arises. Patient will be Dc'd this date.   Impairments: abnormal gait, abnormal or restricted ROM, abnormal movement, activity intolerance, impaired physical strength, lacks appropriate home exercise program, pain with function, poor posture  and poor body mechanics    Symptom irritability: highUnderstanding of Dx/Px/POC: good   Prognosis: good    Goals  STG to be met within 4 weeks:  1. Pt will increase BLE strength by 1/2 muscle grade to improve functional mobility and reduce pain. - met  2. Pt will be independent with HEP to decrease pain and improve quality of life. - met  3. Pt will report 3/10 pain at worst to improve functional mobility and quality of life. - not met  4. Pt will increase FOTO score by 10 points in order to improve lumbar spine mobility and reduce pain with function. - met  5. Pt will improve R ankle strength by 1/2 muscle grade to improve gait pattern. - met  6. Pt  will improve R ankle AROM by 25-50% to improve gait pattern. - met    LTG to be met within 12 weeks:  1. Pt will improve BLE strength to WNL to improve functional mobility and quality of life. - met  2. Pt will restore lumbar spine AROM to WNL to improve functional mobility and quality of life. - met  3. Pt will meet FOTO score goal at DC to improve lumbar spine mobility in pain free range. - met  4. Pt will demonstrate stability with unsupported gait to improve QoL. - partially met  5. Pt will restore R ankle AROM to equal L to improve gait pattern. - met  6. Pt will restore R ankle strength to WFL to improve gait pattern and standing tolerance. - met       Plan  Plan details: Transition to [facility].  Planned therapy interventions: home exercise program  Treatment plan discussed with: patient        Subjective Evaluation    History of Present Illness  Mechanism of injury: Patient reports he has had severe pain in his lower back since Monday afternoon and is having more difficulty with walking and moving around his house. He also states that he will be traveling to [place] tomorrow morning and is unsure when he will be returning. His pain levels have only slightly variable with lowest pain rating being 7/10 in recent weeks.   Quality of life: fair    Patient Goals  Patient goals for therapy: decreased pain, improved balance, increased motion, increased strength, independence with ADLs/IADLs and return to sport/leisure activities  Patient goal: walk without walker  Pain  Current pain ratin  At best pain ratin  At worst pain rating: 10  Location: low back and groin  Quality: radiating  Relieving factors: rest and medications  Aggravating factors: sitting, stair climbing, lifting and standing (twisting, bending down)  Progression: worsening    Social Support  Stairs in house: yes   Lives in: multiple-level home  Lives with: spouse    Treatments  Previous treatment: medication  Current treatment comments:  accupuncture.         Objective     Postural Observations  Seated posture: fair  Standing posture: fair      Observations     Right Ankle/Foot   Positive for edema.     Additional Observation Details  Pitting edema noted on dorsum of R foot.     Palpation   Left   Muscle spasm in the quadratus lumborum.   Tenderness of the erector spinae, iliopsoas, lumbar paraspinals, quadratus lumborum and rectus femoris.     Right   Muscle spasm in the quadratus lumborum.   Tenderness of the erector spinae, iliopsoas, lumbar paraspinals, quadratus lumborum and rectus femoris.     Tenderness     Lumbar Spine  Tenderness in the spinous process.     Left Hip   Tenderness in the PSIS.     Right Hip   Tenderness in the PSIS.     Neurological Testing     Sensation     Lumbar   Left   Diminished: light touch    Right   Intact: light touch    Reflexes   Left   Patellar (L4): trace (1+)  Achilles (S1): trace (1+)    Right   Patellar (L4): trace (1+)  Achilles (S1): trace (1+)    Active Range of Motion     Lumbar   Flexion:  WFL  Extension:  with pain Restriction level: moderate  Left lateral flexion:  WFL  Right lateral flexion:  WFL  Left rotation:  WFL  Right rotation:  WFL    Right Ankle/Foot   Normal active range of motion    Joint Play     Right Ankle/Foot  Joints within functional limits are the forefoot. Hypomobile in the distal tibiofibular joint, talocrural joint and midfoot.     Strength/Myotome Testing     Left Hip   Planes of Motion   Flexion: 5  Extension: 4+  Abduction: 4+  Adduction: 5  External rotation: 4+  Internal rotation: 4+    Right Hip   Planes of Motion   Flexion: 5  Extension: 4+  Abduction: 4+  Adduction: 5  External rotation: 4+  Internal rotation: 4+    Left Knee   Flexion: 4+  Extension: 4+    Right Knee   Flexion: 4+  Extension: 4+    Left Ankle/Foot   Dorsiflexion: 4+  Plantar flexion: 5  Inversion: 4+  Eversion: 4+    Right Ankle/Foot   Dorsiflexion: 4+  Plantar flexion: 4+  Inversion: 4+  Eversion:  "4+    Tests     Lumbar     Left   Negative crossed SLR, passive SLR and slump test.     Right   Negative crossed SLR, passive SLR and slump test.     Left Pelvic Girdle/Sacrum   Negative: active SLR test.     Right Pelvic Girdle/Sacrum   Negative: active SLR test.     Left Hip   Negative KEDAR, FADIR and piriformis.     Right Hip   Negative KEDAR, FADIR and piriformis.     Swelling   Left Ankle/Foot   Malleoli: 28.5 cm    Right Ankle/Foot   Malleoli: 31 cm               Precautions: PMH HTN, CAD, Bladder Cancer, CHF, RODO, Lumbar fusion L4-5 in 2016, L3-4 hemilaminectomy June 2023  POC Expiration: 4/26/24    Manuals 4/10 4/12 4/15 4/17 4/19   B HS, piriformis, hip abd, gastroc stretch w/ traction  15 min with manual lumbar traction  20'  20' 20' 20'   Nerve glides         R ankle stretching/ mobs                Neuro Re-Ed        Seated 3 way trunk flexion 10x5\" Hold Each  10x5\" hold ea  10x5\" hold ea  10x5\" hold ea  10x5\" hold ea    Seated PNF on TB   Quadruped OAL 10x ea bilat      Bridge w/ hip ABD Bridge Only   2x10    Bridge only 2x10     PPT        PPU w/ exhale        Sit to stand unsupported         Supine Tball ABD crunch         Ther Ex        SRC for ROM/ cardiovascular endurance  10 min L5   Nu-step  Nustep 10' L5 Nustep 10' L5 Nustep 10' L5 Nustep 10' L5   Lumbar rotation c TB 10x5\" Hold Standing 10x5\" hold supine 10x5\" hold supine 10x5\" hold supine 10x5\" hold supine   SL clamshells 10x5\" Hold Stand OAL  Standing OALs 10x5\" Standing OALs 10x5\"1     DKTC 10x5\" Hold  10x5\" hold 10x5\" hold 10x5\" hold 10x5\" hold   Supine SLR  QL doorway stretch 5x15\" bilat       LAQ        Standing inversion/ eversion on half roll        HEP instruction/ education        Ther Activity                        Gait Training                        Modalities  4/12 4/15 4/17 4/19   MHP 15 min LB in seated  10' LB in seated  10' LB in seated 15' LB in seated  10' LB in seated                "

## 2024-04-19 ENCOUNTER — OFFICE VISIT (OUTPATIENT)
Dept: PHYSICAL THERAPY | Facility: CLINIC | Age: 64
End: 2024-04-19
Payer: MEDICARE

## 2024-04-19 DIAGNOSIS — R26.9 GAIT ABNORMALITY: ICD-10-CM

## 2024-04-19 DIAGNOSIS — M54.42 CHRONIC BILATERAL LOW BACK PAIN WITH BILATERAL SCIATICA: Primary | ICD-10-CM

## 2024-04-19 DIAGNOSIS — G89.29 CHRONIC BILATERAL LOW BACK PAIN WITH BILATERAL SCIATICA: Primary | ICD-10-CM

## 2024-04-19 DIAGNOSIS — R20.2 PARESTHESIA OF BILATERAL LEGS: ICD-10-CM

## 2024-04-19 DIAGNOSIS — M54.41 CHRONIC BILATERAL LOW BACK PAIN WITH BILATERAL SCIATICA: Primary | ICD-10-CM

## 2024-04-19 DIAGNOSIS — R53.81 PHYSICAL DECONDITIONING: ICD-10-CM

## 2024-04-19 DIAGNOSIS — S82.891D CLOSED FRACTURE OF RIGHT ANKLE WITH ROUTINE HEALING: ICD-10-CM

## 2024-04-19 PROCEDURE — 97140 MANUAL THERAPY 1/> REGIONS: CPT

## 2024-04-19 PROCEDURE — 97110 THERAPEUTIC EXERCISES: CPT

## 2024-04-24 ENCOUNTER — TELEMEDICINE (OUTPATIENT)
Dept: PALLIATIVE MEDICINE | Facility: CLINIC | Age: 64
End: 2024-04-24

## 2024-04-24 DIAGNOSIS — Z71.89 COUNSELING AND COORDINATION OF CARE: Primary | ICD-10-CM

## 2024-04-24 PROCEDURE — NC001 PR NO CHARGE
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
## 2024-04-25 ENCOUNTER — RA CDI HCC (OUTPATIENT)
Dept: OTHER | Facility: HOSPITAL | Age: 64
End: 2024-04-25

## 2024-05-01 ENCOUNTER — TELEMEDICINE (OUTPATIENT)
Dept: PALLIATIVE MEDICINE | Facility: CLINIC | Age: 64
End: 2024-05-01

## 2024-05-01 DIAGNOSIS — Z71.89 COUNSELING AND COORDINATION OF CARE: Primary | ICD-10-CM

## 2024-05-01 PROCEDURE — NC001 PR NO CHARGE

## 2024-05-01 NOTE — PROGRESS NOTES
Dominick arrived online and said he forgot this appointment. He was in [place] the last few days and his mom passed away this afternoon. He said that he was glad to have gotten 3 days with her before she passed away. He is surrounded by family but obviously still in shock. He asked to meet next week usual time. [facility] offered all of her condolences to him and his family and encouraged him to reach out sooner than appointment if he needs support.

## 2024-05-08 ENCOUNTER — OFFICE VISIT (OUTPATIENT)
Dept: FAMILY MEDICINE CLINIC | Facility: CLINIC | Age: 64
End: 2024-05-08
Payer: MEDICARE

## 2024-05-08 VITALS
OXYGEN SATURATION: 98 % | HEART RATE: 69 BPM | WEIGHT: 241.2 LBS | BODY MASS INDEX: 36.56 KG/M2 | SYSTOLIC BLOOD PRESSURE: 139 MMHG | DIASTOLIC BLOOD PRESSURE: 64 MMHG | HEIGHT: 68 IN

## 2024-05-08 DIAGNOSIS — G89.4 CHRONIC PAIN SYNDROME: ICD-10-CM

## 2024-05-08 DIAGNOSIS — M54.59 INTRACTABLE LOW BACK PAIN: ICD-10-CM

## 2024-05-08 DIAGNOSIS — G89.29 CHRONIC BILATERAL LOW BACK PAIN WITH BILATERAL SCIATICA: ICD-10-CM

## 2024-05-08 DIAGNOSIS — M54.41 CHRONIC BILATERAL LOW BACK PAIN WITH BILATERAL SCIATICA: ICD-10-CM

## 2024-05-08 DIAGNOSIS — I50.33 ACUTE ON CHRONIC DIASTOLIC HEART FAILURE (HCC): ICD-10-CM

## 2024-05-08 DIAGNOSIS — M54.42 CHRONIC BILATERAL LOW BACK PAIN WITH BILATERAL SCIATICA: ICD-10-CM

## 2024-05-08 DIAGNOSIS — E11.69 TYPE 2 DIABETES MELLITUS WITH OTHER SPECIFIED COMPLICATION, WITHOUT LONG-TERM CURRENT USE OF INSULIN (HCC): ICD-10-CM

## 2024-05-08 DIAGNOSIS — F41.9 ANXIETY: Primary | ICD-10-CM

## 2024-05-08 PROCEDURE — 99215 OFFICE O/P EST HI 40 MIN: CPT | Performed by: FAMILY MEDICINE

## 2024-05-08 PROCEDURE — G2211 COMPLEX E/M VISIT ADD ON: HCPCS | Performed by: FAMILY MEDICINE

## 2024-05-08 RX ORDER — HYDROCODONE BITARTRATE 100 MG/1
TABLET, EXTENDED RELEASE ORAL
Qty: 30 TABLET | Refills: 0 | Status: SHIPPED | OUTPATIENT
Start: 2024-05-08

## 2024-05-08 RX ORDER — HYDROCODONE BITARTRATE 80 MG/1
TABLET, EXTENDED RELEASE ORAL
Status: CANCELLED | OUTPATIENT
Start: 2024-05-08

## 2024-05-08 RX ORDER — HYDROMORPHONE HYDROCHLORIDE 8 MG/1
8 TABLET ORAL EVERY 4 HOURS PRN
Qty: 180 TABLET | Refills: 0 | Status: SHIPPED | OUTPATIENT
Start: 2024-05-08 | End: 2024-06-03 | Stop reason: SDUPTHER

## 2024-05-08 RX ORDER — POTASSIUM CHLORIDE 750 MG/1
10 TABLET, FILM COATED, EXTENDED RELEASE ORAL DAILY
Qty: 90 TABLET | Refills: 1 | Status: SHIPPED | OUTPATIENT
Start: 2024-05-08 | End: 2024-05-08

## 2024-05-08 RX ORDER — DULOXETIN HYDROCHLORIDE 30 MG/1
30 CAPSULE, DELAYED RELEASE ORAL DAILY
Qty: 30 CAPSULE | Refills: 5 | Status: SHIPPED | OUTPATIENT
Start: 2024-05-08

## 2024-05-08 RX ORDER — HYDROMORPHONE HYDROCHLORIDE 8 MG/1
8 TABLET ORAL EVERY 4 HOURS PRN
Qty: 180 TABLET | Refills: 0 | Status: CANCELLED | OUTPATIENT
Start: 2024-05-08

## 2024-05-08 RX ORDER — TORSEMIDE 20 MG/1
40 TABLET ORAL 2 TIMES DAILY
Qty: 360 TABLET | Refills: 1 | Status: SHIPPED | OUTPATIENT
Start: 2024-05-08

## 2024-05-08 NOTE — PROGRESS NOTES
Assessment/Plan:  I have spent a total time of 41 minutes on 05/08/24 in caring for this patient including Risk factor reductions, Impressions, Counseling / Coordination of care, Documenting in the medical record, Reviewing / ordering tests, medicine, procedures  , and Obtaining or reviewing history  .      1. Anxiety  Comments:  start cymbalta given comorbidities  Orders:  -     DULoxetine (CYMBALTA) 30 mg delayed release capsule; Take 1 capsule (30 mg total) by mouth daily    2. Chronic bilateral low back pain with bilateral sciatica  Assessment & Plan:  Meds refilled  Pdmp reviewed    Orders:  -     DULoxetine (CYMBALTA) 30 mg delayed release capsule; Take 1 capsule (30 mg total) by mouth daily          Subjective:      Patient ID: Dominick Irving is a 64 y.o. male.    Dominick is still struggling with chronic pain I reviewed his chart and the history of all of this.  This is very familiar to me.  He is very frustrated he continues with acupuncture the meds are doing minimal.  We will refill what is due today.  He is under a lot of stress is working on him mentally.  He feels depressed without suicidal ideation.  He is feels very anxious.  He states for over a year he passes out for 1 second at a time.  No cardiac symptoms or palpitations.  EKGs have all been within normal limits.  Blood sugar today is 8.6 with his A1c from 6 point open I sent him for lab because there was problems with her machine I am not sure that it is completely accurate.  Blood pressure seems stable.  I think he is very emotionally drained and stressed and worked up.    Back Pain  This is a chronic problem. The current episode started more than 1 year ago. The problem occurs constantly. The problem has been gradually worsening since onset. The pain is present in the sacro-iliac. The quality of the pain is described as aching. The pain radiates to the left foot, left thigh, right foot and right thigh. The pain is at a severity of 8/10. The  "pain is The same all the time. The symptoms are aggravated by bending and twisting. Stiffness is present All day. Associated symptoms include leg pain, numbness, paresthesias, tingling and weakness. Pertinent negatives include no abdominal pain, bladder incontinence, bowel incontinence, chest pain, dysuria, fever, headaches, paresis, pelvic pain, perianal numbness or weight loss. Risk factors include history of cancer, obesity and sedentary lifestyle.       The following portions of the patient's history were reviewed and updated as appropriate: allergies, current medications, past family history, past medical history, past social history, past surgical history, and problem list.    Review of Systems   Constitutional:  Negative for fever and weight loss.   Cardiovascular:  Negative for chest pain.   Gastrointestinal:  Negative for abdominal pain and bowel incontinence.   Genitourinary:  Negative for bladder incontinence, dysuria and pelvic pain.   Musculoskeletal:  Positive for back pain.   Neurological:  Positive for tingling, weakness, numbness and paresthesias. Negative for headaches.         Objective:      /64 (BP Location: Right arm, Patient Position: Sitting, Cuff Size: Large)   Pulse 69   Ht 5' 8\" (1.727 m)   Wt 109 kg (241 lb 3.2 oz)   SpO2 98%   BMI 36.67 kg/m²          Physical Exam  Vitals and nursing note reviewed.   Constitutional:       Appearance: Normal appearance.   HENT:      Head: Normocephalic and atraumatic.      Nose: Nose normal.      Mouth/Throat:      Mouth: Mucous membranes are moist.   Eyes:      Extraocular Movements: Extraocular movements intact.      Pupils: Pupils are equal, round, and reactive to light.   Cardiovascular:      Rate and Rhythm: Normal rate and regular rhythm.      Pulses: Normal pulses.   Pulmonary:      Effort: Pulmonary effort is normal.      Breath sounds: Normal breath sounds.   Abdominal:      General: Bowel sounds are normal.      Palpations: Abdomen " is soft.   Musculoskeletal:      Cervical back: Normal range of motion.   Skin:     General: Skin is warm and dry.      Capillary Refill: Capillary refill takes less than 2 seconds.   Neurological:      General: No focal deficit present.      Mental Status: He is alert.   Psychiatric:         Mood and Affect: Mood normal.

## 2024-05-09 ENCOUNTER — APPOINTMENT (OUTPATIENT)
Dept: LAB | Facility: HOSPITAL | Age: 64
End: 2024-05-09
Payer: MEDICARE

## 2024-05-09 DIAGNOSIS — E11.69 TYPE 2 DIABETES MELLITUS WITH OTHER SPECIFIED COMPLICATION, WITHOUT LONG-TERM CURRENT USE OF INSULIN (HCC): ICD-10-CM

## 2024-05-09 DIAGNOSIS — Z00.6 ENCOUNTER FOR EXAMINATION FOR NORMAL COMPARISON OR CONTROL IN CLINICAL RESEARCH PROGRAM: ICD-10-CM

## 2024-05-09 LAB
EST. AVERAGE GLUCOSE BLD GHB EST-MCNC: 203 MG/DL
HBA1C MFR BLD: 8.7 %

## 2024-05-09 PROCEDURE — 83036 HEMOGLOBIN GLYCOSYLATED A1C: CPT

## 2024-05-09 PROCEDURE — 36415 COLL VENOUS BLD VENIPUNCTURE: CPT

## 2024-05-21 ENCOUNTER — TELEPHONE (OUTPATIENT)
Dept: PALLIATIVE MEDICINE | Facility: CLINIC | Age: 64
End: 2024-05-21

## 2024-05-22 ENCOUNTER — TELEMEDICINE (OUTPATIENT)
Dept: PALLIATIVE MEDICINE | Facility: CLINIC | Age: 64
End: 2024-05-22

## 2024-05-22 DIAGNOSIS — Z71.89 COUNSELING AND COORDINATION OF CARE: Primary | ICD-10-CM

## 2024-05-22 PROCEDURE — NC001 PR NO CHARGE

## 2024-05-22 NOTE — PROGRESS NOTES
"Palliative Supportive Care  met with patient/family to continue to provide emotional support and guidance.       Updated biopsychosocial information relevant to support:     The patient was identified by name and date of birth.  Dominick was informed that this is a telemedicine visit and that the visit is being conducted through the Epic Embedded platform. They agree to proceed..  My office door was closed. No one else was in the room. They acknowledged consent and understanding of privacy and security of the video platform. The patient has agreed to participate and understands they can discontinue the visit at any time.     Dominick spoke about being sorry he missed last week due to falling asleep. He spoke about his 3 days with his mother prior to her passing and her death. The  and wake took a toll on him and he felt relieved when it was all over. He and his siblings will next be sorting out the matter of selling their mom's house and dealing with her estate next. He said this experience has given him a new and more positive perspective. He said he feels like he doesn't want to get upset or frustrated over little things and is trying to let things go. He feels like life is short and uncertain and he wants to make the most of the time he has with his family because the memories are important. He decided once they settle the estate he wants to get his kids and their partners together for a big family vacation and really relax and make memories. He said that he realizes how important his family is and doesn't want the only time they get all together to be weddings and funerals. [facility] encouraged [facility] to write down all of his thoughts and feelings regarding this new perspective so when he has bad days he can look back on it for inspiration and to remember how he feels right now when he is in the \"dark place\". He said his wife said the same thing and he will try to do this.      Identified areas of " need include: emotional support     Resources provided:     Areas that need future follow-up include: reduce anxiety/depressed mood,    I have spent 60 minutes with Patient  today in which greater than 50% of this time was spent in counseling/coordination of care      Palliative  will follow-up as requested by patient, family, and primary team.  Please contact with any specific requests

## 2024-05-28 LAB
APOB+LDLR+PCSK9 GENE MUT ANL BLD/T: NOT DETECTED
BRCA1+BRCA2 DEL+DUP + FULL MUT ANL BLD/T: NOT DETECTED
MLH1+MSH2+MSH6+PMS2 GN DEL+DUP+FUL M: NOT DETECTED

## 2024-05-29 ENCOUNTER — TELEMEDICINE (OUTPATIENT)
Dept: PALLIATIVE MEDICINE | Facility: CLINIC | Age: 64
End: 2024-05-29

## 2024-05-29 DIAGNOSIS — Z71.89 COUNSELING AND COORDINATION OF CARE: Primary | ICD-10-CM

## 2024-05-29 PROCEDURE — NC001 PR NO CHARGE

## 2024-05-29 NOTE — PROGRESS NOTES
"Palliative Supportive Care  met with patient/family to continue to provide emotional support and guidance.      Updated biopsychosocial information relevant to support:    The patient was identified by name and date of birth.  Dominick was informed that this is a telemedicine visit and that the visit is being conducted through the Epic Embedded platform. They agree to proceed..  My office door was closed. No one else was in the room. They acknowledged consent and understanding of privacy and security of the video platform. The patient has agreed to participate and understands they can discontinue the visit at any time.      Dominick spoke about having a decent week with baseline level pain. He said that he was able to mow his own lawn and is able to walk a block which are both new accomplishments. He said that he had one bad day but instead of letting that become a string of days he got some rest and accepted that it was just one bad day, not a set back. LCSW supported Dominick in exploring what has influenced this change in perspective. He said he read a quote that said, \" Defiantly compete for for every moment in your life to be the greatest it can be\". He said this resonated with him and he decided that he would try to live with this thought in mind. He said he knows logically not every moment can be great but its a reminder not to add negativity or ruminate when possible. He has a family conference call June 1st to discuss progress with the house and his brother moving out. He said he worries that this could become contentious but is hoping to go in with a positive attitude. hospitalsW discussed communication techniques to support keeping conversation open and solution focused. He also has a trip to [place] next week so he asked to cancel his next appointment until the following week.      Identified areas of need include: emotional support    Resources provided:    Areas that need future follow-up include: " reduce anxiety/depressed mood    I have spent 60 minutes with Patient  today in which greater than 50% of this time was spent in counseling/coordination of care     Palliative  will follow-up as requested by patient, family, and primary team.  Please contact with any specific requests

## 2024-06-03 ENCOUNTER — PATIENT MESSAGE (OUTPATIENT)
Dept: FAMILY MEDICINE CLINIC | Facility: CLINIC | Age: 64
End: 2024-06-03

## 2024-06-03 ENCOUNTER — OFFICE VISIT (OUTPATIENT)
Dept: FAMILY MEDICINE CLINIC | Facility: CLINIC | Age: 64
End: 2024-06-03
Payer: MEDICARE

## 2024-06-03 VITALS
HEIGHT: 68 IN | SYSTOLIC BLOOD PRESSURE: 126 MMHG | OXYGEN SATURATION: 97 % | BODY MASS INDEX: 36.74 KG/M2 | DIASTOLIC BLOOD PRESSURE: 68 MMHG | WEIGHT: 242.4 LBS | HEART RATE: 75 BPM

## 2024-06-03 DIAGNOSIS — G89.4 CHRONIC PAIN SYNDROME: ICD-10-CM

## 2024-06-03 DIAGNOSIS — B02.9 HERPES ZOSTER WITHOUT COMPLICATION: Primary | ICD-10-CM

## 2024-06-03 DIAGNOSIS — M54.59 INTRACTABLE LOW BACK PAIN: ICD-10-CM

## 2024-06-03 DIAGNOSIS — S51.001A OPEN WOUND OF RIGHT ELBOW, INITIAL ENCOUNTER: ICD-10-CM

## 2024-06-03 DIAGNOSIS — E87.6 HYPOKALEMIA: ICD-10-CM

## 2024-06-03 PROCEDURE — G2211 COMPLEX E/M VISIT ADD ON: HCPCS | Performed by: FAMILY MEDICINE

## 2024-06-03 PROCEDURE — 99214 OFFICE O/P EST MOD 30 MIN: CPT | Performed by: FAMILY MEDICINE

## 2024-06-03 RX ORDER — HYDROMORPHONE HYDROCHLORIDE 8 MG/1
8 TABLET ORAL EVERY 4 HOURS PRN
Qty: 180 TABLET | Refills: 0 | OUTPATIENT
Start: 2024-06-03

## 2024-06-03 RX ORDER — HYDROCODONE BITARTRATE 100 MG/1
TABLET, EXTENDED RELEASE ORAL
Qty: 30 TABLET | Refills: 0 | Status: SHIPPED | OUTPATIENT
Start: 2024-06-03

## 2024-06-03 RX ORDER — POTASSIUM CHLORIDE 750 MG/1
TABLET, FILM COATED, EXTENDED RELEASE ORAL
Status: CANCELLED | OUTPATIENT
Start: 2024-06-03

## 2024-06-03 RX ORDER — POTASSIUM CHLORIDE 750 MG/1
20 TABLET, EXTENDED RELEASE ORAL 2 TIMES DAILY
Qty: 120 TABLET | Refills: 3 | Status: SHIPPED | OUTPATIENT
Start: 2024-06-03

## 2024-06-03 RX ORDER — CEPHALEXIN 500 MG/1
500 CAPSULE ORAL 3 TIMES DAILY
Qty: 21 CAPSULE | Refills: 0 | Status: SHIPPED | OUTPATIENT
Start: 2024-06-03 | End: 2024-06-10

## 2024-06-03 RX ORDER — FAMCICLOVIR 500 MG/1
500 TABLET ORAL 3 TIMES DAILY
Qty: 21 TABLET | Refills: 0 | Status: SHIPPED | OUTPATIENT
Start: 2024-06-03 | End: 2024-06-10

## 2024-06-03 RX ORDER — HYDROMORPHONE HYDROCHLORIDE 8 MG/1
8 TABLET ORAL EVERY 4 HOURS PRN
Qty: 180 TABLET | Refills: 0 | Status: SHIPPED | OUTPATIENT
Start: 2024-06-03

## 2024-06-03 RX ORDER — HYDROCODONE BITARTRATE 100 MG/1
TABLET, EXTENDED RELEASE ORAL
Qty: 30 TABLET | Refills: 0 | OUTPATIENT
Start: 2024-06-03

## 2024-06-03 NOTE — PROGRESS NOTES
"Assessment/Plan:       1. Herpes zoster without complication  Comments:  this is hard to pin down  no rash, he had a vaccine  sx could be sciatica vs meralgia paresthetica vs zoster without rash at this point  he is on high dose n  Orders:  -     famciclovir (FAMVIR) 500 mg tablet; Take 1 tablet (500 mg total) by mouth 3 (three) times a day for 7 days      High dose narcotics with these breakthrough sx.  Will try anti viral for possible shingles  Subjective:      Patient ID: Dominick Irving is a 64 y.o. male.    Dominick had a great 10 days he was more active the pain was better controlled he was walking a lot then developed exquisite tenderness in the right hip to the medial knee on the skin pain.  Significant pain just to the touch or to clothing laying on the skin.  This is consistent with nerve pain.  Possibly the increased activity caused sciatica versus meralgia paresthetica versus zoster.  He does not have a rash but did have a vaccine.  Symptoms seem to be consistent with potential zoster but again there is no rash.    Hip Pain     Arm Injury         The following portions of the patient's history were reviewed and updated as appropriate: allergies, current medications, past family history, past medical history, past social history, past surgical history, and problem list.    Review of Systems   Respiratory: Negative.     Cardiovascular: Negative.          Objective:      /68 (BP Location: Right arm, Patient Position: Sitting, Cuff Size: Large)   Pulse 75   Ht 5' 8\" (1.727 m)   Wt 110 kg (242 lb 6.4 oz)   SpO2 97%   BMI 36.86 kg/m²          Physical Exam  Vitals and nursing note reviewed.   Constitutional:       Appearance: Normal appearance.   HENT:      Head: Normocephalic and atraumatic.      Nose: Nose normal.      Mouth/Throat:      Mouth: Mucous membranes are moist.   Eyes:      Extraocular Movements: Extraocular movements intact.      Pupils: Pupils are equal, round, and reactive to light. "   Cardiovascular:      Rate and Rhythm: Normal rate and regular rhythm.      Pulses: Normal pulses.   Pulmonary:      Effort: Pulmonary effort is normal.      Breath sounds: Normal breath sounds.   Abdominal:      General: Bowel sounds are normal.      Palpations: Abdomen is soft.   Musculoskeletal:      Cervical back: Normal range of motion.   Skin:     General: Skin is warm and dry.      Capillary Refill: Capillary refill takes less than 2 seconds.      Comments: Marked tenderness to palpation of skin right lateral thigh   Neurological:      General: No focal deficit present.      Mental Status: He is alert.   Psychiatric:         Mood and Affect: Mood normal.

## 2024-06-11 ENCOUNTER — OFFICE VISIT (OUTPATIENT)
Facility: CLINIC | Age: 64
End: 2024-06-11
Payer: MEDICARE

## 2024-06-11 VITALS
HEART RATE: 56 BPM | BODY MASS INDEX: 38.3 KG/M2 | TEMPERATURE: 95.9 F | RESPIRATION RATE: 18 BRPM | SYSTOLIC BLOOD PRESSURE: 148 MMHG | WEIGHT: 244 LBS | DIASTOLIC BLOOD PRESSURE: 67 MMHG | HEIGHT: 67 IN

## 2024-06-11 DIAGNOSIS — S51.001A OPEN WOUND OF RIGHT ELBOW, INITIAL ENCOUNTER: Primary | ICD-10-CM

## 2024-06-11 DIAGNOSIS — E11.9 TYPE 2 DIABETES MELLITUS WITHOUT COMPLICATION, WITHOUT LONG-TERM CURRENT USE OF INSULIN (HCC): ICD-10-CM

## 2024-06-11 PROCEDURE — 99213 OFFICE O/P EST LOW 20 MIN: CPT | Performed by: FAMILY MEDICINE

## 2024-06-11 PROCEDURE — 99203 OFFICE O/P NEW LOW 30 MIN: CPT | Performed by: FAMILY MEDICINE

## 2024-06-11 RX ORDER — LIDOCAINE 40 MG/G
CREAM TOPICAL ONCE
Status: COMPLETED | OUTPATIENT
Start: 2024-06-11 | End: 2024-06-11

## 2024-06-11 RX ADMIN — LIDOCAINE 1 APPLICATION: 40 CREAM TOPICAL at 09:14

## 2024-06-11 NOTE — PROGRESS NOTES
Patient ID: Dominick Irving is a 64 y.o. male Date of Birth 1960       Chief Complaint   Patient presents with    New Patient Visit     Patient present for evaluation of right elbow wound following a fall on May 1st.        Allergies:  Mirtazapine and Venlafaxine    Diagnosis:      Diagnosis ICD-10-CM Associated Orders   1. Open wound of right elbow, initial encounter  S51.001A Wound cleansing and dressings Posterior;Right Elbow     lidocaine (LMX) 4 % cream     Wound Procedure Treatment Posterior;Right Elbow      2. Type 2 diabetes mellitus without complication, without long-term current use of insulin ([facility])  E11.9                 Assessment & Plan:  Open wound R elbow:  Wound bed with thick slough, necrotic debri, fibrin. Rim of wound w/ dry eschar. Periwound w/ hyperpigmentation w/out acute erythema.  No induration, fluctuance, crepitus. No effusion near elbow noted..  No malodor purulent drainage. Do not suspect current or continued soft tissue infection at this time.   Keep wound covered at all times, remove dressing prior to shower. When bathing please use a clean washcloth each time you cleanse the wound and make sure does not the same 1 that is used on the body.  Make sure that the wound is the last area that is cleansed.  For gentle autolytic debridement support and considering tenderness / tissue within wound bed described above, will trial application of Medihoney to wound bed followed by bordered foam or gauze and rolled gauze  T2DM:  A1C results reviewed with the patient today. 8.7 (May '24).  Patient reports he is not diabetic.  The reason for his elevated A1c is secondary to recent fast food intake and overall unhealthy habits during a time of extreme stress with the recent loss of his mother.  He is working on this.  Reviewed with patient that though he reported no prior history of diabetes, his A1c is consistent with a diagnosis of type 2 diabetes.  He reports   Strict ER precautions reviewed  "with patient, he expresses understanding  Follow up in 1 week or sooner if needed     Portions of the record may have been created with voice recognition software. Occasional wrong word or \"sound alike\" substitutions may have occurred due to the inherent limitations of voice recognition software. Read the chart carefully and recognize, using context, where substitutions have occurred.    Subjective:   Mr. Irving is a 64-year-old male with a past medical history including but limited to COPD, type 2 diabetes, bladder CA, CAD, chronic pain syndrome, who is presenting to wound center today for evaluation of R elbow wound.  He reports that on 5/1/24 with he sustained a fall while walking up a ramp and injured his elbow.  He is concerned about the prolonged time to heal. He states that though it has been a while that he's had wound, he feels it has significantly improved. States that when it first occurred, it was \"so deep you could fit 3 stacked quarters into it.\" He recently saw his PCP for shingles of low back/right leg and discussed the right elbow wound.  At that visit, he was prescribed a course of Keflex for the right elbow wound which he completed yesterday.  Thus far for his home wound treatment, he reports that he has been utilizing antibacterial soap to clean the wound and leaving it open to air.  Sometimes applies hydrocortisone when he feels it gets itchy.  He has not had fever and/or chills.  As stated above, recently saw PCP for shingles 6/3 - he states that he feels the rash is continued near his knee and is in an extreme amount of pain with regards to this. He has been on antiviral therapy for the shingles since 6/3.  He continues to follow with PCP for pain control and shingles treatment.  Please see below for detailed ROS        The following portions of the patient's history were reviewed and updated as appropriate:   Patient Active Problem List   Diagnosis    Nocturia    Bladder mass    Obstructive " "sleep apnea on CPAP    Hypertension    Malignant neoplasm of lateral wall of urinary bladder ([facility])    History of gross hematuria    Anxiety    Chronic bilateral low back pain with bilateral sciatica    Acute on chronic diastolic heart failure ([facility])    Opioid withdrawal (HCC)    RODO (acute kidney injury) ([facility])    Intractable low back pain    Chronic, continuous use of opioids    Hyperglycemia    Bradycardia    Coronary artery disease    Electrolyte abnormality    Hyponatremia    Acute respiratory failure with hypoxia ([facility])    History of hematuria    Morbid (severe) obesity due to excess calories ([facility])    Suprapubic pressure    Right groin pain    Encounter for surgical aftercare following surgery of nervous system    Abdominal pain    Benign prostatic hyperplasia    Acute low back pain with sciatica    S/P laminectomy    Lumbar radiculopathy    Chronic pain syndrome    Neuritis    Postlaminectomy syndrome    Unable to ambulate    Closed fracture of right ankle with routine healing    COPD with acute exacerbation ([facility])    Type 2 diabetes mellitus without complication, without long-term current use of insulin ([facility])    History of bladder cancer    Depression, recurrent ([facility])     Past Medical History:   Diagnosis Date    Anxiety 3/01/2023    Arthritis     Bladder cancer ([facility])     Cancer ([facility]) 3/17/2023    Cervical disc disorder 1/2016    Chronic narcotic dependence ([facility])     Chronic pain disorder     lower back and down both legs    Colon polyp     Coronary artery disease     CPAP (continuous positive airway pressure) dependence     bi-pap    Depression 3/17/2023    Does use hearing aid     will wear DOS    Full dentures     Heart failure ([facility])     and \"fluid retention\" SL ABDULAZIZ Brito cardio PA\"    High cholesterol     Hypertension     Low back pain 2003    Mild ankle edema     and feet    Obstructive sleep apnea on CPAP     Prediabetes     Shortness of breath     per pt \"with just exertion\"    Sleep apnea     Wears " glasses      Past Surgical History:   Procedure Laterality Date    BACK SURGERY      titanium rods implanted    CAUDAL BLOCK N/A 10/17/2023    Procedure: BLOCK / INJECTION CAUDAL;  Surgeon: Minh Rolon MD;  Location: [facility];  Service: Pain Management     COLONOSCOPY      CYSTOSCOPY W/ URETERAL STENT PLACEMENT Bilateral 03/17/2023    Procedure: bilateral retrograde;  Surgeon: Shan Sanabria MD;  Location: [facility];  Service: Urology    HERNIA REPAIR      x5    LUMBAR LAMINECTOMY N/A 06/30/2023    Procedure: Left L3-4 Metrx hemilaminectomy and bilateral foraminotomies;  Surgeon: Leland Aguilar MD;  [facility];  Service: Neurosurgery    SD CYSTO W/REMOVAL OF LESIONS SMALL N/A 05/01/2023    Procedure: CYSTOSCOPY TURBT;  Surgeon: Shan Sanabria MD;  [facility];  Service: Urology    SD CYSTOURETHROSCOPY N/A 11/06/2023    Procedure: CYSTOSCOPY with  bladder biopsies and fulgeration;  Surgeon: Shan Sanabria MD;  [facility];  Service: Urology    SPINE SURGERY  2017    TRANSURETHRAL RESECTION OF BLADDER TUMOR N/A 03/17/2023    Procedure: TRANSURETHRAL RESECTION OF BLADDER TUMOR (TURBT);  Surgeon: Shan Sanabria MD;  [facility];  Service: Urology     Family History   Problem Relation Age of Onset    Cancer Father         Adult leukemia    Aortic aneurysm Father     Leukemia Father     Alcohol abuse Father     Hypertension Mother     Colon cancer Maternal Grandfather       Social History     Socioeconomic History    Marital status: /Civil Union     Spouse name: None    Number of children: None    Years of education: None    Highest education level: None   Occupational History    None   Tobacco Use    Smoking status: Former     Current packs/day: 1.00     Average packs/day: 1 pack/day for 50.5 years (50.5 ttl pk-yrs)     Types: Cigarettes     Start date: 2/1/2023     Passive exposure: Never    Smokeless tobacco: Never   Vaping Use    Vaping status: Never Used    Substance and Sexual Activity    Alcohol use: Not Currently     Comment: 3 per year    Drug use: Not Currently     Types: Marijuana    Sexual activity: Not Currently     Partners: Female   Other Topics Concern    None   Social History Narrative    None     Social Determinants of Health     Financial Resource Strain: Low Risk  (9/21/2023)    Overall Financial Resource Strain (CARDIA)     Difficulty of Paying Living Expenses: Not hard at all   Food Insecurity: No Food Insecurity (10/6/2023)    Hunger Vital Sign     Worried About Running Out of Food in the Last Year: Never true     Ran Out of Food in the Last Year: Never true   Transportation Needs: No Transportation Needs (10/6/2023)    PRAPARE - Transportation     Lack of Transportation (Medical): No     Lack of Transportation (Non-Medical): No   Physical Activity: Not on file   Stress: Not on file   Social Connections: Not on file   Intimate Partner Violence: Not on file   Housing Stability: Low Risk  (10/6/2023)    Housing Stability Vital Sign     Unable to Pay for Housing in the Last Year: No     Number of Times Moved in the Last Year: 1     Homeless in the Last Year: No        Current Outpatient Medications:     amitriptyline (ELAVIL) 50 mg tablet, Take 1 tablet (50 mg total) by mouth daily at bedtime, Disp: 90 tablet, Rfl: 1    aspirin 81 mg chewable tablet, Chew 81 mg daily, Disp: , Rfl:     atorvastatin (LIPITOR) 40 mg tablet, , Disp: , Rfl:     co-enzyme Q-10 30 MG capsule, Take 100 mg by mouth daily , Disp: , Rfl:     DULoxetine (CYMBALTA) 30 mg delayed release capsule, Take 1 capsule (30 mg total) by mouth daily, Disp: 30 capsule, Rfl: 5    Farxiga 5 MG TABS, 5 mg daily 5mg alternating with 2.5mg for fluid retention, Disp: , Rfl:     gabapentin (Neurontin) 600 MG tablet, Take 1 tablet (600 mg total) by mouth 4 (four) times a day, Disp: 120 tablet, Rfl: 3    HYDROcodone Bitartrate ER (Hysingla ER) 100 MG T24A, Take 1 po qd, Disp: 30 tablet, Rfl: 0     "HYDROmorphone (DILAUDID) 8 MG tablet, Take 1 tablet (8 mg total) by mouth every 4 (four) hours as needed for moderate pain Max Daily Amount: 48 mg, Disp: 180 tablet, Rfl: 0    metolazone (ZAROXOLYN) 2.5 mg tablet, Take 1 tablet (2.5 mg total) by mouth daily as needed (edema), Disp: 90 tablet, Rfl: 3    metoprolol succinate (TOPROL-XL) 25 mg 24 hr tablet, , Disp: , Rfl:     multivitamin-iron-minerals-folic acid (CENTRUM) chewable tablet, Chew 1 tablet daily, Disp: , Rfl:     naloxone (NARCAN) 4 mg/0.1 mL nasal spray, Administer 1 spray into a nostril. If no response after 2-3 minutes, give another dose in the other nostril using a new spray., Disp: 1 each, Rfl: 1    potassium chloride (Klor-Con M10) 10 mEq tablet, Take 2 tablets (20 mEq total) by mouth 2 (two) times a day, Disp: 120 tablet, Rfl: 3    torsemide (DEMADEX) 20 mg tablet, Take 2 tablets (40 mg total) by mouth 2 (two) times a day, Disp: 360 tablet, Rfl: 1    famciclovir (FAMVIR) 500 mg tablet, Take 1 tablet (500 mg total) by mouth 3 (three) times a day for 7 days, Disp: 21 tablet, Rfl: 0    potassium chloride (Klor-Con) 10 mEq tablet, , Disp: , Rfl:   No current facility-administered medications for this visit.    Review of Systems   Constitutional:  Negative for chills and fever.   Respiratory:  Negative for shortness of breath.    Cardiovascular:  Negative for chest pain and palpitations.   Gastrointestinal:  Negative for diarrhea, nausea and vomiting.   Musculoskeletal:  Positive for arthralgias and back pain.   Skin:  Positive for rash (shingles knee R knee per pt) and wound (R elbow).   Neurological:  Negative for dizziness and headaches.       Objective:  /67   Pulse 56   Temp (!) 95.9 °F (35.5 °C)   Resp 18   Ht 5' 7\" (1.702 m)   Wt 111 kg (244 lb)   BMI 38.22 kg/m²   Pain Score: 0-No pain     Physical Exam  Vitals reviewed.   Constitutional:       General: He is not in acute distress.     Appearance: He is not ill-appearing or " toxic-appearing.   Cardiovascular:      Comments: Hr 56  Musculoskeletal:      Comments: ROM at elbow intact   Skin:     General: Skin is warm.      Findings: Rash (a few small papules near R knee - no obvious vesicles noted) and wound (R elbow) present.             Comments: 1- Wound bed with thick slough, necrotic debri, fibrin. Rim of wound w/ dry eschar. Periwound w/ hyperpigmentation w/out acute erythema.  No induration, fluctuance, crepitus. No effusion near elbow noted..  No malodor purulent drainage. Do not suspect current or continued soft tissue infection at this time.    Neurological:      Mental Status: He is alert and oriented to person, place, and time.   Psychiatric:         Mood and Affect: Mood normal.         Behavior: Behavior normal.           Wound 06/11/24 Elbow Posterior;Right (Active)   Wound Image   06/11/24 0818   Wound Description Brown;Yellow;Pink 06/11/24 0822   Penelope-wound Assessment Dry;Callus;Pink 06/11/24 0822   Wound Length (cm) 1.6 cm 06/11/24 0822   Wound Width (cm) 1.7 cm 06/11/24 0822   Wound Depth (cm) 0.1 cm 06/11/24 0822   Wound Surface Area (cm^2) 2.72 cm^2 06/11/24 0822   Wound Volume (cm^3) 0.272 cm^3 06/11/24 0822   Calculated Wound Volume (cm^3) 0.27 cm^3 06/11/24 0822   Drainage Amount Scant 06/11/24 0822   Drainage Description Serosanguineous 06/11/24 0822   Non-staged Wound Description Full thickness 06/11/24 0822             Lab Results   Component Value Date    HGBA1C 8.7 (H) 05/09/2024       Wound Instructions:  Orders Placed This Encounter   Procedures    Wound cleansing and dressings Posterior;Right Elbow     Wash your hands with soap and water.  Remove old dressing, discard into plastic bag and place in trash.  Cleanse the wound with a mild, unscented soap (Dove) prior to applying a clean dressing. Do not use tissue or cotton balls. Do not scrub the wound. Pat dry using gauze.    You may shower but must keep the dressing/ dressing dry. You can cover the dressing  with a cast cover that can be purchased online.    Wash the wound last and use a new washcloth.    Apply Medihoney to the elbow wound.    Cover with a boarded foam (can purchase on [employer]).    Change dressing daily.    Watch for signs of infection (redness,warmth to the touch, increased pain, odor, pus, swelling, fever, chills, nausea, vomiting). If you notice any of these call the wound center or proceed to the ER.    Stop using the cortisone cream.     Standing Status:   Future     Standing Expiration Date:   6/18/2024    Wound Procedure Treatment Posterior;Right Elbow     This order was created via procedure documentation       Mirian Horvath MD

## 2024-06-11 NOTE — PATIENT INSTRUCTIONS
Orders Placed This Encounter   Procedures    Wound cleansing and dressings Posterior;Right Elbow     Wash your hands with soap and water.  Remove old dressing, discard into plastic bag and place in trash.  Cleanse the wound with a mild, unscented soap (Dove) prior to applying a clean dressing. Do not use tissue or cotton balls. Do not scrub the wound. Pat dry using gauze.    You may shower but must keep the dressing/ dressing dry. You can cover the dressing with a cast cover that can be purchased online.    Wash the wound last and use a new washcloth.    Apply Medihoney to the elbow wound.    Cover with a boarded foam (can purchase on [employer]).    Change dressing daily.    Watch for signs of infection (redness,warmth to the touch, increased pain, odor, pus, swelling, fever, chills, nausea, vomiting). If you notice any of these call the wound center or proceed to the ER.    Stop using the cortisone cream.     Standing Status:   Future     Standing Expiration Date:   6/18/2024    Wound Procedure Treatment Posterior;Right Elbow     This order was created via procedure documentation

## 2024-06-11 NOTE — PROGRESS NOTES
Wound Procedure Treatment Posterior;Right Elbow    Performed by: Jamil Downey RN  Authorized by: Mirian Horvath MD    Associated wounds:   Wound 06/11/24 Elbow Posterior;Right  Applied Topical: Medihoney gel    Applied primary dressing:  Silicone bordered foam

## 2024-06-12 ENCOUNTER — TELEMEDICINE (OUTPATIENT)
Dept: PALLIATIVE MEDICINE | Facility: CLINIC | Age: 64
End: 2024-06-12

## 2024-06-12 DIAGNOSIS — Z71.89 COUNSELING AND COORDINATION OF CARE: Primary | ICD-10-CM

## 2024-06-12 PROCEDURE — NC001 PR NO CHARGE

## 2024-06-12 NOTE — PROGRESS NOTES
Palliative Supportive Care  met with patient/family to continue to provide emotional support and guidance.      Updated biopsychosocial information relevant to support:    The patient was identified by name and date of birth.  Dominick was informed that this is a telemedicine visit and that the visit is being conducted through the Epic Embedded platform. They agree to proceed..  My office door was closed. No one else was in the room. They acknowledged consent and understanding of privacy and security of the video platform. The patient has agreed to participate and understands they can discontinue the visit at any time.    Dominick spoke about having a bad week because he was diagnosed with Shingles which has been extremely painful for him. He said the nerve pain in his legs is so bad he cannot lay down to sleep and he has been sitting up sleeping about 2 hours or so a night. His wife has come to sit and be supportive of him a few times but it is very difficult. He was told by his doctor there is nothing they can do but wait it out. He said that he is really upset because this caused him to miss his trip to [place] to help his siblings with his mom's estate. . [facility] in exploring if anything can be done on the phone, online, etc that he could feel he is supporting his siblings while delta being able to physically be there yet. He said most all tasks are ones they must tackle physically at the home.     The video chat was interrupted so LCSW called to continue meeting. He said in positive news his phone call with his siblings about moving forward with settling the estate went well.He spoke about feeling upset that something always seems to go wrong after he has a few good weeks. He said he is trying to stay positive as he has felt good the past few weeks but it is really hard when he is in pain. He asked to end early due to not being able to tolerate sitting any longer and said he would meet  same time next week.       Identified areas of need include: emotional support    Resources provided:    Areas that need future follow-up include: reduce anxiety/depressed mood    I have spent 30 minutes with Patient  today in which greater than 50% of this time was spent in counseling/coordination of care     Palliative  will follow-up as requested by patient, family, and primary team.  Please contact with any specific requests

## 2024-06-18 ENCOUNTER — OFFICE VISIT (OUTPATIENT)
Age: 64
End: 2024-06-18
Payer: MEDICARE

## 2024-06-18 ENCOUNTER — OFFICE VISIT (OUTPATIENT)
Facility: CLINIC | Age: 64
End: 2024-06-18
Payer: MEDICARE

## 2024-06-18 VITALS
HEART RATE: 63 BPM | RESPIRATION RATE: 18 BRPM | DIASTOLIC BLOOD PRESSURE: 58 MMHG | TEMPERATURE: 96.3 F | SYSTOLIC BLOOD PRESSURE: 122 MMHG

## 2024-06-18 VITALS
WEIGHT: 246.2 LBS | HEIGHT: 67 IN | SYSTOLIC BLOOD PRESSURE: 114 MMHG | DIASTOLIC BLOOD PRESSURE: 64 MMHG | BODY MASS INDEX: 38.64 KG/M2 | OXYGEN SATURATION: 95 % | HEART RATE: 69 BPM | TEMPERATURE: 98 F

## 2024-06-18 DIAGNOSIS — S51.001A OPEN WOUND OF RIGHT ELBOW, INITIAL ENCOUNTER: Primary | ICD-10-CM

## 2024-06-18 DIAGNOSIS — G89.29 CHRONIC BILATERAL LOW BACK PAIN WITH BILATERAL SCIATICA: ICD-10-CM

## 2024-06-18 DIAGNOSIS — M54.42 CHRONIC BILATERAL LOW BACK PAIN WITH BILATERAL SCIATICA: ICD-10-CM

## 2024-06-18 DIAGNOSIS — Z51.5 PALLIATIVE CARE BY SPECIALIST: ICD-10-CM

## 2024-06-18 DIAGNOSIS — C67.2 MALIGNANT NEOPLASM OF LATERAL WALL OF URINARY BLADDER (HCC): Primary | ICD-10-CM

## 2024-06-18 DIAGNOSIS — I50.32 CHRONIC DIASTOLIC HEART FAILURE (HCC): ICD-10-CM

## 2024-06-18 DIAGNOSIS — Z71.89 COUNSELING AND COORDINATION OF CARE: ICD-10-CM

## 2024-06-18 DIAGNOSIS — M54.41 CHRONIC BILATERAL LOW BACK PAIN WITH BILATERAL SCIATICA: ICD-10-CM

## 2024-06-18 DIAGNOSIS — C67.3 MALIGNANT NEOPLASM OF ANTERIOR WALL OF URINARY BLADDER (HCC): ICD-10-CM

## 2024-06-18 PROCEDURE — 99215 OFFICE O/P EST HI 40 MIN: CPT | Performed by: PHYSICIAN ASSISTANT

## 2024-06-18 PROCEDURE — 97597 DBRDMT OPN WND 1ST 20 CM/<: CPT | Performed by: FAMILY MEDICINE

## 2024-06-18 RX ORDER — LIDOCAINE 40 MG/G
CREAM TOPICAL ONCE
Status: COMPLETED | OUTPATIENT
Start: 2024-06-18 | End: 2024-06-18

## 2024-06-18 RX ADMIN — LIDOCAINE 1 APPLICATION: 40 CREAM TOPICAL at 10:08

## 2024-06-18 NOTE — PATIENT INSTRUCTIONS
Orders Placed This Encounter   Procedures    Debridement Posterior;Right Elbow     This order was created via procedure documentation    Wound Procedure Treatment Posterior;Right Elbow     This order was created via procedure documentation    Wound cleansing and dressings Posterior;Right Elbow     Wash your hands with soap and water.  Remove old dressing, discard into plastic bag and place in trash.      Cleanse the wound with a mild, unscented soap (Dove)   and then the Prophase cleanser that we gave you, prior to applying a clean dressing. Do not scrub the wound. Pat dry using gauze.     You may shower but must keep the dressing/ dressing dry. You can cover the dressing with a cast cover that can be purchased online.    Wash the wound last and use a new washcloth.    Right elbow wound dressing change:  Rinse with prophase   Apply polymem to the elbow wound, with the words facing you  Cover with an ABD gauze and rolled gauze with tape  Do not apply tape to the skin     Change dressing every 2-3 days and as needed for excessive drainage     Watch for signs of infection (redness,warmth to the touch, increased pain, odor, pus, swelling, fever, chills, nausea, vomiting). If you notice any of these call the wound center or proceed to the ER.     Standing Status:   Future     Standing Expiration Date:   6/25/2024

## 2024-06-18 NOTE — PROGRESS NOTES
Wound Procedure Treatment Posterior;Right Elbow    Performed by: Kyra Gonzalez RN  Authorized by: Mirian Horvath MD    Associated wounds:   Wound 06/11/24 Elbow Posterior;Right  Wound cleansed with:  Soap and water and Wound cleanser  Applied primary dressing:  Polymem foam  Applied secondary dressing:  ABD  Dressing secured with:  Fredis, Tape and Tubifast

## 2024-06-18 NOTE — Clinical Note
, I met with Mr. Irving today and discussed his pain. He would like to stop the Hysingla as he feels it has no effect. He had brought up Fentanyl as a replacement for his long acting pain medications. I discussed with him that no amount of opioids have significantly helped him and may not be the best medication for his chronic pain. He was in agreement. We discused methadone therapy, which he had tried previously years ago but stopped because it was not effective and he believes he was having respiratory issues at the time, that he is unsure was related to the methadone. He is very open to the idea of getting off the opioids over the next year if possible and would like to trial the methadone. Are you familiar/comfortable with starting him on methadone, with our guidance if needed? We will be going remote-only soon at [facility] and so he would need to travel to [place] or [place] regularly for follow up if we were to take over management. Either way we could make this work, let me know your thoughts -[place]

## 2024-06-18 NOTE — PROGRESS NOTES
Ambulatory Visit  Name: Dominick Irving      : 1960      MRN: [id number]  Encounter Provider: Lake Connors PA-C  Encounter Date: 2024   Encounter department: [facility] [place]    Assessment & Plan   1. Malignant neoplasm of lateral wall of urinary bladder (HCC)  2. Chronic bilateral low back pain with bilateral sciatica  3. Chronic diastolic heart failure (HCC)  4. Malignant neoplasm of anterior wall of urinary bladder (HCC)  5. Counseling and coordination of care  6. Palliative care by specialist    No noticeable benefit from Hysingla. Has not had significant improvement with previous opioid increases in the past. Patient interested in decreasing opioid usage and would like to explore alternative therapy.  Consider methadone therapy. Discussed risks and side effects. Patient has preferred centralized prescribing from his PCP, will review with PCP first and discuss who would be managing going forward.   Follow up in 1 month.       PDMP Review: I have reviewed the patient's controlled substance dispensing history in the Prescription Drug Monitoring Program in compliance with the [facility] regulations before prescribing any controlled substances.    History of Present Illness     Dominick Irving is a 64 y.o. male with hx of high-grade papillary urothelial carcinoma and chronic low back pain presents for follow up for symptom management. Follows with Dr. Sanabria (urology), Dr. Budinetz (PCP), and Dr. Rolon (Pain Mgmt).     Patient reports dealing with shingles pain lately. He has not been able to lay down flat up until last night. Feels he is improving finally and the rash has almost gone away. He reports ongoing chronic pain. He agrees that opioids have not provided a solution to his problem and is  interested in exploring other avenues. We discussed methadone therapy in hopes to taper off opioids all together. Patient recalls trying in the past years ago but had  "stopped as it was not helping and he was having breathing issues at the time. He is not sure if this was related to the methadone or not. He would like to try this medication again.    Review of Systems   Constitutional:  Negative for activity change, appetite change, fatigue and unexpected weight change.   HENT:  Negative for mouth sores, trouble swallowing and voice change.    Eyes: Negative.    Respiratory:  Negative for shortness of breath.    Cardiovascular:  Negative for chest pain.   Gastrointestinal:  Negative for abdominal pain, constipation, diarrhea, nausea and vomiting.   Genitourinary: Negative.    Musculoskeletal:  Positive for arthralgias, back pain, gait problem and myalgias.   Skin:  Negative for color change, pallor and wound.   Neurological:  Negative for dizziness, weakness, light-headedness and headaches.   Hematological: Negative.    Psychiatric/Behavioral:  Positive for dysphoric mood and sleep disturbance. Negative for confusion. The patient is nervous/anxious.            Objective     /64   Pulse 69   Temp 98 °F (36.7 °C) (Temporal)   Ht 5' 7\" (1.702 m)   Wt 112 kg (246 lb 3.2 oz)   SpO2 95%   BMI 38.56 kg/m²     Physical Exam  Nursing note reviewed.   Constitutional:       General: He is not in acute distress.  HENT:      Head: Normocephalic and atraumatic.      Right Ear: External ear normal.      Left Ear: External ear normal.      Nose: Nose normal.      Mouth/Throat:      Pharynx: Oropharynx is clear.   Eyes:      Extraocular Movements: Extraocular movements intact.   Cardiovascular:      Rate and Rhythm: Normal rate.   Pulmonary:      Effort: Pulmonary effort is normal.   Abdominal:      General: Abdomen is flat.   Musculoskeletal:         General: No deformity.   Skin:     Findings: No rash.   Neurological:      Mental Status: He is alert and oriented to person, place, and time.   Psychiatric:         Mood and Affect: Mood normal.         Behavior: Behavior normal. "         Recent labs:  Lab Results   Component Value Date/Time    SODIUM 137 03/29/2024 11:13 AM    K 3.4 (L) 03/29/2024 11:13 AM    BUN 21 03/29/2024 11:13 AM    CREATININE 0.90 03/29/2024 11:13 AM    GLUC 149 (H) 03/29/2024 11:13 AM    CALCIUM 9.2 03/29/2024 11:13 AM    AST 16 03/29/2024 11:13 AM    ALT 17 03/29/2024 11:13 AM    ALB 3.9 03/29/2024 11:13 AM    TP 7.6 03/29/2024 11:13 AM    EGFR 89 03/29/2024 11:13 AM     Lab Results   Component Value Date/Time    HGB 12.3 03/29/2024 11:13 AM    WBC 7.15 03/29/2024 11:13 AM     03/29/2024 11:13 AM    INR 0.97 03/29/2024 11:13 AM    PTT 31 03/29/2024 11:13 AM     Lab Results   Component Value Date/Time    ELN5FLILOBCG 0.854 06/26/2023 08:57 AM       Recent Imaging:  Procedure: XR spine lumbar 2 or 3 views injury    Result Date: 3/29/2024  Narrative: XR SPINE LUMBAR 2 OR 3 VIEWS INJURY INDICATION: Severe low back pain for the past several days, no known injury. COMPARISON: Lumbar spine radiographs 6/16/2023, MRI lumbar spine 10/5/2023 Views: 3 FINDINGS: Bilateral transpedicular screw and juan j fusion at L4-L5. Hardware appears intact. Lower lumbar laminectomy defects. No acute fracture. Intact upper lumbar pedicles. Five non-rib-bearing lumbar vertebral bodies. Mild lumbar levoscoliosis. Age related lumbar and bilateral hip degenerative changes. Atherosclerotic calcifications. Otherwise unremarkable soft tissues.     Impression: No acute osseous abnormality. Stable postoperative appearance L4-5 posterior fusion without hardware complication. Degenerative changes as described. Resident: JIA TAVAREZ I, the attending radiologist, have reviewed the images and agree with the final report above. Workstation performed: [id number]     Procedure: XR ankle 3+ vw right    Result Date: 3/22/2024  Narrative: RIGHT ANKLE INDICATION:   Other fracture of right lower leg, subsequent encounter for closed fracture with routine healing. COMPARISON: 2/7/2024 VIEWS:  XR ANKLE 3+  VW RIGHT FINDINGS: Unchanged alignment of healing distal fibular fractures with increased surrounding callus formation consistent with interval healing No significant degenerative changes. No lytic or blastic osseous lesion. Unchanged soft tissue swelling of the lateral ankle     Impression: Unchanged alignment of healing distal fibular fractures with increased surrounding callus formation consistent with interval healing Electronically signed: 03/22/2024 12:42 PM Bharathi Laguerre MD       Administrative Statements   I have spent a total time of 60 minutes on 06/18/24 In caring for this patient including Risks and benefits of tx options, Instructions for management, Patient and family education, Importance of tx compliance, Risk factor reductions, Counseling / Coordination of care, Documenting in the medical record, Reviewing / ordering tests, medicine, procedures  , Obtaining or reviewing history  , and Communicating with other healthcare professionals . Topics discussed with the patient / family include symptom assessment and management, medication review, psychosocial support, supportive listening, and anticipatory guidance.

## 2024-06-18 NOTE — PROGRESS NOTES
"Patient ID: Dominick Irving is a 64 y.o. male Date of Birth 1960       Chief Complaint   Patient presents with   • Follow Up Wound Care Visit     R elbow       Allergies:  Mirtazapine and Venlafaxine    Diagnosis:      Diagnosis ICD-10-CM Associated Orders   1. Open wound of right elbow, initial encounter  S51.001A lidocaine (LMX) 4 % cream     Debridement Posterior;Right Elbow     Wound Procedure Treatment Posterior;Right Elbow     Wound cleansing and dressings Posterior;Right Elbow              Assessment & Plan:  Open wound right elbow: Wound bed with fibrin slough layer amongst areas of granulation tissue requiring selective debridement.  Slight epiboly at edges again noted but is improved when compared to initial visit. Periwound remains hyperpigmented without acute erythema, induration, fluctuance, crepitus.  There is some irritation of the skin related to location of adhesive dressing near the wound. No evidence of effusion at elbow.    Cleanse with prophase  Apply regular PolyMem to wound bed followed by ABD for cushion/support followed by rolled gauze avoiding any adhesive to prevent further irritation of skin  T2DM: As stated at his initial visit, denies history of diabetes however recent A1c 8.7 (may '24).  Continues to work on his diet.  A1c at this level is consistent with diagnosis of diabetes.  Reviewed importance of diabetic control.  ER precautions reviewed, he expressed understanding  Follow-up in 1 week or sooner if needed    Portions of the record may have been created with voice recognition software. Occasional wrong word or \"sound alike\" substitutions may have occurred due to the inherent limitations of voice recognition software. Read the chart carefully and recognize, using context, where substitutions have occurred.    Subjective:   Mr. Irving is a 64-year-old male with a past medical history including but limited to COPD, type 2 diabetes, bladder CA, CAD, chronic pain syndrome, who is " "presenting to wound center today for evaluation of R elbow wound.  He reports that on 5/1/24 with he sustained a fall while walking up a ramp and injured his elbow.  He is concerned about the prolonged time to heal. He states that though it has been a while that he's had wound, he feels it has significantly improved. States that when it first occurred, it was \"so deep you could fit 3 stacked quarters into it.\" He recently saw his PCP for shingles of low back/right leg and discussed the right elbow wound.  At that visit, he was prescribed a course of Keflex for the right elbow wound which he completed yesterday.  Thus far for his home wound treatment, he reports that he has been utilizing antibacterial soap to clean the wound and leaving it open to air.  Sometimes applies hydrocortisone when he feels it gets itchy.  He has not had fever and/or chills.  As stated above, recently saw PCP for shingles 6/3 - he states that he feels the rash is continued near his knee and is in an extreme amount of pain with regards to this. He has been on antiviral therapy for the shingles since 6/3.  He continues to follow with PCP for pain control and shingles treatment.  Please see below for detailed ROS    6/18/2024: Dominick presents today for follow-up of traumatic elbow wound.  He is been utilizing Medihoney without difficulty.  He does report the only issues he is having are related to positioning of his arm.  He often feels that he cannot find a position that is comfortable when he is resting as there is frequent shearing/pressure forces to this location.  He has not had fever and/or chills.  Though the wound remains tender when palpated, denies increased pain or new symptoms to this area.        The following portions of the patient's history were reviewed and updated as appropriate:   Patient Active Problem List   Diagnosis   • Nocturia   • Bladder mass   • Obstructive sleep apnea on CPAP   • Hypertension   • Malignant neoplasm " "of lateral wall of urinary bladder (Formerly [facility])   • History of gross hematuria   • Anxiety   • Chronic bilateral low back pain with bilateral sciatica   • Acute on chronic diastolic heart failure (Formerly [facility])   • Opioid withdrawal (Formerly [facility])   • RODO (acute kidney injury) (Formerly [facility])   • Intractable low back pain   • Chronic, continuous use of opioids   • Hyperglycemia   • Bradycardia   • Coronary artery disease   • Electrolyte abnormality   • Hyponatremia   • Acute respiratory failure with hypoxia (Formerly [facility])   • History of hematuria   • Morbid (severe) obesity due to excess calories (Formerly [facility])   • Suprapubic pressure   • Right groin pain   • Encounter for surgical aftercare following surgery of nervous system   • Abdominal pain   • Benign prostatic hyperplasia   • Acute low back pain with sciatica   • S/P laminectomy   • Lumbar radiculopathy   • Chronic pain syndrome   • Neuritis   • Postlaminectomy syndrome   • Unable to ambulate   • Closed fracture of right ankle with routine healing   • COPD with acute exacerbation ([facility]ly [facility])   • Type 2 diabetes mellitus without complication, without long-term current use of insulin (Formerly [facility])   • History of bladder cancer   • Depression, recurrent (Formerly [facility])     Past Medical History:   Diagnosis Date   • Anxiety 3/01/2023   • Arthritis    • Bladder cancer ([facility]ly [facility])    • Cancer ([facility]ly [facility]) 3/17/2023   • Cervical disc disorder 1/2016   • Chronic narcotic dependence ([facility]ly [facility])    • Chronic pain disorder     lower back and down both legs   • Colon polyp    • Coronary artery disease    • CPAP (continuous positive airway pressure) dependence     bi-pap   • Depression 3/17/2023   • Does use hearing aid     will wear DOS   • Full dentures    • Heart failure ([facility])     and \"fluid retention\" SL ABDULAZIZ JALLOH Daryl cardio PA\"   • High cholesterol    • Hypertension    • Low back pain 2003   • Mild ankle edema     and feet   • Obstructive sleep apnea on CPAP    • Prediabetes    • Shortness of breath     per pt \"with just exertion\"   • Sleep apnea    • Wears " glasses      Past Surgical History:   Procedure Laterality Date   • BACK SURGERY      titanium rods implanted   • CAUDAL BLOCK N/A 10/17/2023    Procedure: BLOCK / INJECTION CAUDAL;  Surgeon: Minh Rolon MD;  Location: [facility];  Service: Pain Management    • COLONOSCOPY     • CYSTOSCOPY W/ URETERAL STENT PLACEMENT Bilateral 03/17/2023    Procedure: bilateral retrograde;  Surgeon: Shan Sanabria MD;  Location: [facility];  Service: Urology   • HERNIA REPAIR      x5   • LUMBAR LAMINECTOMY N/A 06/30/2023    Procedure: Left L3-4 Metrx hemilaminectomy and bilateral foraminotomies;  Surgeon: Leland Aguilar MD;  [facility];  Service: Neurosurgery   • MI CYSTO W/REMOVAL OF LESIONS SMALL N/A 05/01/2023    Procedure: CYSTOSCOPY TURBT;  Surgeon: Shan Sanabria MD;  [facility];  Service: Urology   • MI CYSTOURETHROSCOPY N/A 11/06/2023    Procedure: CYSTOSCOPY with  bladder biopsies and fulgeration;  Surgeon: Shan Sanabria MD;  [facility];  Service: Urology   • SPINE SURGERY  2017   • TRANSURETHRAL RESECTION OF BLADDER TUMOR N/A 03/17/2023    Procedure: TRANSURETHRAL RESECTION OF BLADDER TUMOR (TURBT);  Surgeon: Shan Sanabria MD;  [facility];  Service: Urology     Family History   Problem Relation Age of Onset   • Cancer Father         Adult leukemia   • Aortic aneurysm Father    • Leukemia Father    • Alcohol abuse Father    • Hypertension Mother    • Colon cancer Maternal Grandfather       Social History     Socioeconomic History   • Marital status: /Civil Union     Spouse name: None   • Number of children: None   • Years of education: None   • Highest education level: None   Occupational History   • None   Tobacco Use   • Smoking status: Former     Current packs/day: 1.00     Average packs/day: 1 pack/day for 50.5 years (50.5 ttl pk-yrs)     Types: Cigarettes     Start date: 2/1/2023     Passive exposure: Never   • Smokeless tobacco: Never   Vaping Use   • Vaping  status: Never Used   Substance and Sexual Activity   • Alcohol use: Not Currently     Comment: 3 per year   • Drug use: Not Currently     Types: Marijuana   • Sexual activity: Not Currently     Partners: Female   Other Topics Concern   • None   Social History Narrative   • None     Social Determinants of Health     Financial Resource Strain: Low Risk  (9/21/2023)    Overall Financial Resource Strain (CARDIA)    • Difficulty of Paying Living Expenses: Not hard at all   Food Insecurity: No Food Insecurity (10/6/2023)    Hunger Vital Sign    • Worried About Running Out of Food in the Last Year: Never true    • Ran Out of Food in the Last Year: Never true   Transportation Needs: No Transportation Needs (10/6/2023)    PRAPARE - Transportation    • Lack of Transportation (Medical): No    • Lack of Transportation (Non-Medical): No   Physical Activity: Not on file   Stress: Not on file   Social Connections: Not on file   Intimate Partner Violence: Not on file   Housing Stability: Low Risk  (10/6/2023)    Housing Stability Vital Sign    • Unable to Pay for Housing in the Last Year: No    • Number of Times Moved in the Last Year: 1    • Homeless in the Last Year: No        Current Outpatient Medications:   •  amitriptyline (ELAVIL) 50 mg tablet, Take 1 tablet (50 mg total) by mouth daily at bedtime, Disp: 90 tablet, Rfl: 1  •  aspirin 81 mg chewable tablet, Chew 81 mg daily, Disp: , Rfl:   •  atorvastatin (LIPITOR) 40 mg tablet, , Disp: , Rfl:   •  co-enzyme Q-10 30 MG capsule, Take 100 mg by mouth daily , Disp: , Rfl:   •  DULoxetine (CYMBALTA) 30 mg delayed release capsule, Take 1 capsule (30 mg total) by mouth daily, Disp: 30 capsule, Rfl: 5  •  Farxiga 5 MG TABS, 5 mg daily 5mg alternating with 2.5mg for fluid retention, Disp: , Rfl:   •  gabapentin (Neurontin) 600 MG tablet, Take 1 tablet (600 mg total) by mouth 4 (four) times a day, Disp: 120 tablet, Rfl: 3  •  HYDROcodone Bitartrate ER (Hysingla ER) 100 MG T24A, Take 1  po qd, Disp: 30 tablet, Rfl: 0  •  HYDROmorphone (DILAUDID) 8 MG tablet, Take 1 tablet (8 mg total) by mouth every 4 (four) hours as needed for moderate pain Max Daily Amount: 48 mg, Disp: 180 tablet, Rfl: 0  •  metolazone (ZAROXOLYN) 2.5 mg tablet, Take 1 tablet (2.5 mg total) by mouth daily as needed (edema), Disp: 90 tablet, Rfl: 3  •  metoprolol succinate (TOPROL-XL) 25 mg 24 hr tablet, , Disp: , Rfl:   •  multivitamin-iron-minerals-folic acid (CENTRUM) chewable tablet, Chew 1 tablet daily, Disp: , Rfl:   •  naloxone (NARCAN) 4 mg/0.1 mL nasal spray, Administer 1 spray into a nostril. If no response after 2-3 minutes, give another dose in the other nostril using a new spray., Disp: 1 each, Rfl: 1  •  potassium chloride (Klor-Con M10) 10 mEq tablet, Take 2 tablets (20 mEq total) by mouth 2 (two) times a day, Disp: 120 tablet, Rfl: 3  •  potassium chloride (Klor-Con) 10 mEq tablet, , Disp: , Rfl:   •  torsemide (DEMADEX) 20 mg tablet, Take 2 tablets (40 mg total) by mouth 2 (two) times a day, Disp: 360 tablet, Rfl: 1  No current facility-administered medications for this visit.    Review of Systems   Constitutional:  Negative for chills and fever.   Respiratory:  Negative for shortness of breath.    Musculoskeletal:  Positive for arthralgias.   Skin:  Positive for wound (R elbow).       Objective:  /58   Pulse 63   Temp (!) 96.3 °F (35.7 °C)   Resp 18   Pain Score:   2     Physical Exam  Vitals reviewed.   Constitutional:       General: He is not in acute distress.     Appearance: He is not ill-appearing or toxic-appearing.   Cardiovascular:      Rate and Rhythm: Normal rate.   Pulmonary:      Effort: Pulmonary effort is normal. No respiratory distress.   Musculoskeletal:      Comments: ROM at elbow intact   Skin:     General: Skin is warm.      Findings: Wound (R elbow) present.             Comments: 1- Wound bed with fibrin slough layer amongst areas of granulation tissue requiring selective  "debridement.  Slight epiboly at edges again noted but is improved when compared to initial visit. Periwound remains hyperpigmented without acute erythema, induration, fluctuance, crepitus.  There is some irritation of the skin related to location of adhesive dressing near the wound. No evidence of effusion at elbow.     Neurological:      Mental Status: He is alert and oriented to person, place, and time.   Psychiatric:         Mood and Affect: Mood normal.         Behavior: Behavior normal.       Wound 06/11/24 Elbow Posterior;Right (Active)   Wound Image   06/18/24 1006   Wound Description Brown;Yellow;[place] 06/18/24 1007   [place]-wound Assessment Dry;Pink 06/18/24 1007   Wound Length (cm) 1.4 cm 06/18/24 1007   Wound Width (cm) 1.6 cm 06/18/24 1007   Wound Depth (cm) 0.1 cm 06/18/24 1007   Wound Surface Area (cm^2) 2.24 cm^2 06/18/24 1007   Wound Volume (cm^3) 0.224 cm^3 06/18/24 1007   Calculated Wound Volume (cm^3) 0.22 cm^3 06/18/24 1007   Change in Wound Size % 18.52 06/18/24 1007   Drainage Amount Scant 06/18/24 1007   Drainage Description Serosanguineous 06/18/24 1007   Non-staged Wound Description Full thickness 06/18/24 1007   Treatments Cleansed 06/18/24 1007           Debridement   Wound 06/11/24 Elbow Posterior;Right    Universal Protocol:  Consent: Verbal consent obtained. Written consent obtained.  Consent given by: patient  Time out: Immediately prior to procedure a \"time out\" was called to verify the correct patient, procedure, equipment, support staff and site/side marked as required.  Patient understanding: patient states understanding of the procedure being performed  Patient identity confirmed: verbally with patient    Debridement Details  Performed by: physician  Debridement type: selective  Pain control: lidocaine 4%      Post-debridement measurements  Length (cm): 1.4  Width (cm): 1.6  Depth (cm): 0.1  Percent debrided: 100%  Surface Area (cm^2): 2.24  Area Debrided (cm^2): 2.24  Volume (cm^3): " 0.22    Tissue and other material debrided: dermis  Devitalized tissue debrided: fibrin and slough  Instrument(s) utilized: curette  Bleeding: small  Hemostasis obtained with: pressure  Procedural pain (0-10): 0  Post-procedural pain: 0   Response to treatment: procedure was tolerated well               Lab Results   Component Value Date    HGBA1C 8.7 (H) 05/09/2024       Wound Instructions:  Orders Placed This Encounter   Procedures   • Debridement Posterior;Right Elbow     This order was created via procedure documentation   • Wound Procedure Treatment Posterior;Right Elbow     This order was created via procedure documentation   • Wound cleansing and dressings Posterior;Right Elbow     Wash your hands with soap and water.  Remove old dressing, discard into plastic bag and place in trash.      Cleanse the wound with a mild, unscented soap (Dove)   and then the Prophase cleanser that we gave you, prior to applying a clean dressing. Do not scrub the wound. Pat dry using gauze.     You may shower but must keep the dressing/ dressing dry. You can cover the dressing with a cast cover that can be purchased online.    Wash the wound last and use a new washcloth.    Right elbow wound dressing change:  Rinse with prophase   Apply polymem to the elbow wound, with the words facing you  Cover with an ABD gauze and rolled gauze with tape  Do not apply tape to the skin     Change dressing every 2-3 days and as needed for excessive drainage     Watch for signs of infection (redness,warmth to the touch, increased pain, odor, pus, swelling, fever, chills, nausea, vomiting). If you notice any of these call the wound center or proceed to the ER.     Standing Status:   Future     Standing Expiration Date:   6/25/2024         Mirian Horvath MD

## 2024-06-19 ENCOUNTER — TELEMEDICINE (OUTPATIENT)
Dept: PALLIATIVE MEDICINE | Facility: CLINIC | Age: 64
End: 2024-06-19

## 2024-06-19 DIAGNOSIS — Z71.89 COUNSELING AND COORDINATION OF CARE: Primary | ICD-10-CM

## 2024-06-19 PROCEDURE — 99024 POSTOP FOLLOW-UP VISIT: CPT

## 2024-06-19 NOTE — PROGRESS NOTES
Palliative Supportive Care  met with patient/family to continue to provide emotional support and guidance.      Updated biopsychosocial information relevant to support:    The patient was identified by name and date of birth.  Dominick was informed that this is a telemedicine visit and that the visit is being conducted through the Epic Embedded platform. They agree to proceed..  My office door was closed. No one else was in the room. They acknowledged consent and understanding of privacy and security of the video platform. The patient has agreed to participate and understands they can discontinue the visit at any time.    Dominick spoke about his shingle pain going down but his mobility being an issue. He is doing stretches and trying to use positive self talk. He has made the decision to reduce and get off of the opioid medications he is on. He feels they are not benefiting him anymore and he would like to explore other avenues for pain control. He said he spoke to his neighbor's adult son who just went through a sudden brain cancer journey and his point of view and positivity really inspired Dominick. He said that he wants to try to remain hopeful and positive, he may even want to share his story with others in the future. He is hopeful his [facility] provider and PCP will come up with a solid plan and he is hopeful he will be able to follow through with it.       Identified areas of need include: emotional support    Resources provided:    Areas that need future follow-up include: reduce anxiety/depressed mood, emotional support while reducing medications    I have spent 60 minutes with Patient  today in which greater than 50% of this time was spent in counseling/coordination of care     Palliative  will follow-up as requested by patient, family, and primary team.  Please contact with any specific requests

## 2024-06-21 ENCOUNTER — HOSPITAL ENCOUNTER (OUTPATIENT)
Dept: NEUROLOGY | Facility: CLINIC | Age: 64
End: 2024-06-21
Payer: MEDICARE

## 2024-06-21 DIAGNOSIS — G89.4 CHRONIC PAIN SYNDROME: ICD-10-CM

## 2024-06-21 DIAGNOSIS — M79.604 BILATERAL LEG PAIN: ICD-10-CM

## 2024-06-21 DIAGNOSIS — M79.605 BILATERAL LEG PAIN: ICD-10-CM

## 2024-06-21 DIAGNOSIS — G89.29 CHRONIC INTRACTABLE PAIN: ICD-10-CM

## 2024-06-21 PROBLEM — G62.9 NEUROPATHY: Status: ACTIVE | Noted: 2023-11-03

## 2024-06-21 PROCEDURE — 95886 MUSC TEST DONE W/N TEST COMP: CPT | Performed by: PSYCHIATRY & NEUROLOGY

## 2024-06-21 PROCEDURE — 95912 NRV CNDJ TEST 11-12 STUDIES: CPT | Performed by: PSYCHIATRY & NEUROLOGY

## 2024-06-23 ENCOUNTER — PATIENT MESSAGE (OUTPATIENT)
Age: 64
End: 2024-06-23

## 2024-06-23 DIAGNOSIS — G89.29 CHRONIC BILATERAL LOW BACK PAIN WITH BILATERAL SCIATICA: ICD-10-CM

## 2024-06-23 DIAGNOSIS — Z91.89 AT RISK FOR PROLONGED QT INTERVAL SYNDROME: ICD-10-CM

## 2024-06-23 DIAGNOSIS — G89.3 CANCER-RELATED PAIN: Primary | ICD-10-CM

## 2024-06-23 DIAGNOSIS — M54.41 CHRONIC BILATERAL LOW BACK PAIN WITH BILATERAL SCIATICA: ICD-10-CM

## 2024-06-23 DIAGNOSIS — M54.42 CHRONIC BILATERAL LOW BACK PAIN WITH BILATERAL SCIATICA: ICD-10-CM

## 2024-06-24 NOTE — PROGRESS NOTES
"Patient ID: Dominick Irving is a 64 y.o. male Date of Birth 1960       Chief Complaint   Patient presents with   • Follow Up Wound Care Visit     Right elbow wound       Allergies:  Mirtazapine and Venlafaxine    Diagnosis:      Diagnosis ICD-10-CM Associated Orders   1. Open wound of right elbow, initial encounter  S51.001A Wound cleansing and dressings Posterior;Right Elbow     lidocaine (LMX) 4 % cream     Debridement              Assessment & Plan:  Open wound R elbow: Measurements improved.  Wound bed with adherent slough/biofilm layer requiring selective debridement.  Remains w/ hyperpigmentation at periwound without acute erythema. No induration, fluctuance, crepitus.  No malodor or purulent drainage.  Selective debridement  Continue cleansing with prophase  Continue application of regular PolyMem to bed followed by ABD for cushion support and rolled gauze continue to avoid any adhesive  ER precautions reviewed, he expressed understanding  Follow-up in 1 week or sooner if needed      Portions of the record may have been created with voice recognition software. Occasional wrong word or \"sound alike\" substitutions may have occurred due to the inherent limitations of voice recognition software. Read the chart carefully and recognize, using context, where substitutions have occurred.    Subjective:   Mr. Irving is a 64-year-old male with a past medical history including but limited to COPD, type 2 diabetes, bladder CA, CAD, chronic pain syndrome, who is presenting to wound center today for evaluation of R elbow wound.  He reports that on 5/1/24 with he sustained a fall while walking up a ramp and injured his elbow.  He is concerned about the prolonged time to heal. He states that though it has been a while that he's had wound, he feels it has significantly improved. States that when it first occurred, it was \"so deep you could fit 3 stacked quarters into it.\" He recently saw his PCP for shingles of low " back/right leg and discussed the right elbow wound.  At that visit, he was prescribed a course of Keflex for the right elbow wound which he completed yesterday.  Thus far for his home wound treatment, he reports that he has been utilizing antibacterial soap to clean the wound and leaving it open to air.  Sometimes applies hydrocortisone when he feels it gets itchy.  He has not had fever and/or chills.  As stated above, recently saw PCP for shingles 6/3 - he states that he feels the rash is continued near his knee and is in an extreme amount of pain with regards to this. He has been on antiviral therapy for the shingles since 6/3.  He continues to follow with PCP for pain control and shingles treatment.  Please see below for detailed ROS    6/18/2024: Dominick presents today for follow-up of traumatic elbow wound.  He is been utilizing Medihoney without difficulty.  He does report the only issues he is having are related to positioning of his arm.  He often feels that he cannot find a position that is comfortable when he is resting as there is frequent shearing/pressure forces to this location.  He has not had fever and/or chills.  Though the wound remains tender when palpated, denies increased pain or new symptoms to this area.    6/25/2024: Mr. Irving presents today for follow-up of elbow wound.  He feels his wound is looking good.  Has had no issues with use of PolyMem.  Regarding his wound, he has no new acute complaints.  He denies fever, chills.      The following portions of the patient's history were reviewed and updated as appropriate:   Patient Active Problem List   Diagnosis   • Nocturia   • Bladder mass   • Obstructive sleep apnea on CPAP   • Hypertension   • Malignant neoplasm of lateral wall of urinary bladder (HCC)   • History of gross hematuria   • Anxiety   • Chronic bilateral low back pain with bilateral sciatica   • Acute on chronic diastolic heart failure (HCC)   • Opioid withdrawal (HCC)   • RODO  "(acute kidney injury) ([facility])   • Intractable low back pain   • Chronic, continuous use of opioids   • Hyperglycemia   • Bradycardia   • Coronary artery disease   • Electrolyte abnormality   • Hyponatremia   • Acute respiratory failure with hypoxia (HCC)   • History of hematuria   • Morbid (severe) obesity due to excess calories ([facility])   • Suprapubic pressure   • Right groin pain   • Encounter for surgical aftercare following surgery of nervous system   • Abdominal pain   • Benign prostatic hyperplasia   • Acute low back pain with sciatica   • S/P laminectomy   • Lumbar radiculopathy   • Chronic pain syndrome   • Neuropathy   • Postlaminectomy syndrome   • Unable to ambulate   • Closed fracture of right ankle with routine healing   • COPD with acute exacerbation ([facility])   • Type 2 diabetes mellitus without complication, without long-term current use of insulin ([facility])   • History of bladder cancer   • Depression, recurrent ([facility])     Past Medical History:   Diagnosis Date   • Anxiety 3/01/2023   • Arthritis    • Bladder cancer ([facility])    • Cancer ([facility]) 3/17/2023   • Cervical disc disorder 1/2016   • Chronic narcotic dependence ([facility])    • Chronic pain disorder     lower back and down both legs   • Colon polyp    • Coronary artery disease    • CPAP (continuous positive airway pressure) dependence     bi-pap   • Depression 3/17/2023   • Does use hearing aid     will wear DOS   • Full dentures    • Heart failure ([facility])     and \"fluid retention\" [facility] PA\"   • High cholesterol    • Hypertension    • Low back pain 2003   • Mild ankle edema     and feet   • Obstructive sleep apnea on CPAP    • Prediabetes    • Shortness of breath     per pt \"with just exertion\"   • Sleep apnea    • Wears glasses      Past Surgical History:   Procedure Laterality Date   • BACK SURGERY      titanium rods implanted   • CAUDAL BLOCK N/A 10/17/2023    Procedure: BLOCK / INJECTION CAUDAL;  Surgeon: Minh Rolon MD;  Location: [facility];  " Service: Pain Management    • COLONOSCOPY     • CYSTOSCOPY W/ URETERAL STENT PLACEMENT Bilateral 03/17/2023    Procedure: bilateral retrograde;  Surgeon: Shan Sanabria MD;  [facility];  Service: Urology   • HERNIA REPAIR      x5   • LUMBAR LAMINECTOMY N/A 06/30/2023    Procedure: Left L3-4 Metrx hemilaminectomy and bilateral foraminotomies;  Surgeon: Leland Aguilar MD;  [facility];  Service: Neurosurgery   • ME CYSTO W/REMOVAL OF LESIONS SMALL N/A 05/01/2023    Procedure: CYSTOSCOPY TURBT;  Surgeon: Shan Sanabria MD;  [facility];  Service: Urology   • ME CYSTOURETHROSCOPY N/A 11/06/2023    Procedure: CYSTOSCOPY with  bladder biopsies and fulgeration;  Surgeon: Shan Sanabria MD;  [facility];  Service: Urology   • SPINE SURGERY  2017   • TRANSURETHRAL RESECTION OF BLADDER TUMOR N/A 03/17/2023    Procedure: TRANSURETHRAL RESECTION OF BLADDER TUMOR (TURBT);  Surgeon: Shan Sanabria MD;  [facility];  Service: Urology     Family History   Problem Relation Age of Onset   • Cancer Father         Adult leukemia   • Aortic aneurysm Father    • Leukemia Father    • Alcohol abuse Father    • Hypertension Mother    • Colon cancer Maternal Grandfather       Social History     Socioeconomic History   • Marital status: /Civil Union     Spouse name: Not on file   • Number of children: Not on file   • Years of education: Not on file   • Highest education level: Not on file   Occupational History   • Not on file   Tobacco Use   • Smoking status: Former     Current packs/day: 1.00     Average packs/day: 1 pack/day for 50.5 years (50.5 ttl pk-yrs)     Types: Cigarettes     Start date: 2/1/2023     Passive exposure: Never   • Smokeless tobacco: Never   Vaping Use   • Vaping status: Never Used   Substance and Sexual Activity   • Alcohol use: Not Currently     Comment: 3 per year   • Drug use: Not Currently     Types: Marijuana   • Sexual activity: Not Currently     Partners:  Female   Other Topics Concern   • Not on file   Social History Narrative   • Not on file     Social Determinants of Health     Financial Resource Strain: Low Risk  (9/21/2023)    Overall Financial Resource Strain (CARDIA)    • Difficulty of Paying Living Expenses: Not hard at all   Food Insecurity: No Food Insecurity (10/6/2023)    Hunger Vital Sign    • Worried About Running Out of Food in the Last Year: Never true    • Ran Out of Food in the Last Year: Never true   Transportation Needs: No Transportation Needs (10/6/2023)    PRAPARE - Transportation    • Lack of Transportation (Medical): No    • Lack of Transportation (Non-Medical): No   Physical Activity: Not on file   Stress: Not on file   Social Connections: Not on file   Intimate Partner Violence: Not on file   Housing Stability: Low Risk  (10/6/2023)    Housing Stability Vital Sign    • Unable to Pay for Housing in the Last Year: No    • Number of Times Moved in the Last Year: 1    • Homeless in the Last Year: No        Current Outpatient Medications:   •  amitriptyline (ELAVIL) 50 mg tablet, Take 1 tablet (50 mg total) by mouth daily at bedtime, Disp: 90 tablet, Rfl: 1  •  aspirin 81 mg chewable tablet, Chew 81 mg daily, Disp: , Rfl:   •  atorvastatin (LIPITOR) 40 mg tablet, , Disp: , Rfl:   •  co-enzyme Q-10 30 MG capsule, Take 100 mg by mouth daily , Disp: , Rfl:   •  DULoxetine (CYMBALTA) 30 mg delayed release capsule, Take 1 capsule (30 mg total) by mouth daily, Disp: 30 capsule, Rfl: 5  •  Farxiga 5 MG TABS, 5 mg daily 5mg alternating with 2.5mg for fluid retention, Disp: , Rfl:   •  gabapentin (Neurontin) 600 MG tablet, Take 1 tablet (600 mg total) by mouth 4 (four) times a day, Disp: 120 tablet, Rfl: 3  •  HYDROcodone Bitartrate ER (Hysingla ER) 100 MG T24A, Take 1 po qd, Disp: 30 tablet, Rfl: 0  •  HYDROmorphone (DILAUDID) 8 MG tablet, Take 1 tablet (8 mg total) by mouth every 4 (four) hours as needed for moderate pain Max Daily Amount: 48 mg, Disp:  180 tablet, Rfl: 0  •  metolazone (ZAROXOLYN) 2.5 mg tablet, Take 1 tablet (2.5 mg total) by mouth daily as needed (edema), Disp: 90 tablet, Rfl: 3  •  metoprolol succinate (TOPROL-XL) 25 mg 24 hr tablet, , Disp: , Rfl:   •  multivitamin-iron-minerals-folic acid (CENTRUM) chewable tablet, Chew 1 tablet daily, Disp: , Rfl:   •  naloxone (NARCAN) 4 mg/0.1 mL nasal spray, Administer 1 spray into a nostril. If no response after 2-3 minutes, give another dose in the other nostril using a new spray., Disp: 1 each, Rfl: 1  •  potassium chloride (Klor-Con M10) 10 mEq tablet, Take 2 tablets (20 mEq total) by mouth 2 (two) times a day, Disp: 120 tablet, Rfl: 3  •  potassium chloride (Klor-Con) 10 mEq tablet, , Disp: , Rfl:   •  torsemide (DEMADEX) 20 mg tablet, Take 2 tablets (40 mg total) by mouth 2 (two) times a day, Disp: 360 tablet, Rfl: 1  No current facility-administered medications for this visit.    Review of Systems   Constitutional:  Negative for chills and fever.   Respiratory:  Negative for shortness of breath.    Musculoskeletal:  Positive for arthralgias.   Skin:  Positive for wound (Right elbow).       Objective:  /59   Pulse 56   Temp (!) 96.2 °F (35.7 °C)   Resp 18   Pain Score: 0-No pain     Physical Exam  Vitals reviewed.   Constitutional:       General: He is not in acute distress.     Appearance: He is not ill-appearing or toxic-appearing.   Cardiovascular:      Rate and Rhythm: Normal rate.   Pulmonary:      Effort: Pulmonary effort is normal. No respiratory distress.   Musculoskeletal:      Comments: ROM at elbow intact   Skin:     General: Skin is warm.      Findings: Wound (R elbow) present.             Comments: 1- Measurements improved.  Wound bed with adherent slough/biofilm layer requiring selective debridement.  Remains w/ hyperpigmentation at periwound without acute erythema. No induration, fluctuance, crepitus.  No malodor or purulent drainage.   Neurological:      Mental Status: He is  "alert and oriented to person, place, and time.   Psychiatric:         Mood and Affect: Mood normal.         Behavior: Behavior normal.           Wound 06/11/24 Elbow Posterior;Right (Active)   Wound Image   06/25/24 0808   Wound Description Yellow;Pink 06/25/24 0809   Penelope-wound Assessment Scar Tissue;Hyperpigmented 06/25/24 0809   Wound Length (cm) 1 cm 06/25/24 0809   Wound Width (cm) 1.4 cm 06/25/24 0809   Wound Depth (cm) 0.1 cm 06/25/24 0809   Wound Surface Area (cm^2) 1.4 cm^2 06/25/24 0809   Wound Volume (cm^3) 0.14 cm^3 06/25/24 0809   Calculated Wound Volume (cm^3) 0.14 cm^3 06/25/24 0809   Change in Wound Size % 48.15 06/25/24 0809   Drainage Amount Scant 06/25/24 0809   Drainage Description Serosanguineous 06/25/24 0809   Non-staged Wound Description Full thickness 06/25/24 0809   Treatments Cleansed 06/18/24 1007         Debridement    Universal Protocol:  Consent: Verbal consent obtained. Written consent obtained.  Consent given by: patient  Time out: Immediately prior to procedure a \"time out\" was called to verify the correct patient, procedure, equipment, support staff and site/side marked as required.  Patient understanding: patient states understanding of the procedure being performed  Patient identity confirmed: verbally with patient    Debridement Details  Performed by: physician  Debridement type: selective  Pain control: lidocaine 4%      Post-debridement measurements  Length (cm): 1  Width (cm): 1.4  Depth (cm): 0.1  Percent debrided: 100%  Surface Area (cm^2): 1.4  Area Debrided (cm^2): 1.4  Volume (cm^3): 0.14    Tissue and other material debrided: dermis  Devitalized tissue debrided: biofilm and slough  Instrument(s) utilized: curette  Bleeding: small  Hemostasis obtained with: pressure  Procedural pain (0-10): 0  Post-procedural pain: 0   Response to treatment: procedure was tolerated well               Lab Results   Component Value Date    HGBA1C 8.7 (H) 05/09/2024       Wound " Instructions:  Orders Placed This Encounter   Procedures   • Wound cleansing and dressings Posterior;Right Elbow     Wound cleansing and dressings Posterior;Right Elbow   Wash your hands with soap and water.  Remove old dressing, discard into plastic bag and place in trash.       Cleanse the wound with a mild, unscented soap (Dove)   and then the Prophase cleanser that we gave you, prior to applying a clean dressing. Do not scrub the wound. Pat dry using gauze.     You may shower but must keep the dressing/ dressing dry. You can cover the dressing with a cast cover that can be purchased online.    Wash the wound last and use a new washcloth.     Right elbow wound dressing change:  Rinse with prophase   Apply polymem to the elbow wound  Cover with an ABD gauze and rolled gauze with tape  Do not apply tape to the skin     Change dressing every 2-3 days and as needed for excessive drainage     Watch for signs of infection (redness,warmth to the touch, increased pain, odor, pus, swelling, fever, chills, nausea, vomiting). If you notice any of these call the wound center or proceed to the ER.     Standing Status:   Future     Standing Expiration Date:   7/2/2024   • Debridement     This order was created via procedure documentation       Mirian Horvath MD

## 2024-06-25 ENCOUNTER — TELEPHONE (OUTPATIENT)
Facility: HOSPITAL | Age: 64
End: 2024-06-25

## 2024-06-25 ENCOUNTER — OFFICE VISIT (OUTPATIENT)
Facility: CLINIC | Age: 64
End: 2024-06-25
Payer: MEDICARE

## 2024-06-25 VITALS
DIASTOLIC BLOOD PRESSURE: 59 MMHG | RESPIRATION RATE: 18 BRPM | HEART RATE: 56 BPM | SYSTOLIC BLOOD PRESSURE: 120 MMHG | TEMPERATURE: 96.2 F

## 2024-06-25 DIAGNOSIS — S51.001A OPEN WOUND OF RIGHT ELBOW, INITIAL ENCOUNTER: Primary | ICD-10-CM

## 2024-06-25 DIAGNOSIS — Z91.89 AT RISK FOR PROLONGED QT INTERVAL SYNDROME: Primary | ICD-10-CM

## 2024-06-25 DIAGNOSIS — M54.16 LUMBAR RADICULOPATHY: Primary | ICD-10-CM

## 2024-06-25 PROCEDURE — 97597 DBRDMT OPN WND 1ST 20 CM/<: CPT | Performed by: FAMILY MEDICINE

## 2024-06-25 RX ORDER — LIDOCAINE 40 MG/G
CREAM TOPICAL ONCE
Status: COMPLETED | OUTPATIENT
Start: 2024-06-25 | End: 2024-06-25

## 2024-06-25 RX ADMIN — LIDOCAINE 1 APPLICATION: 40 CREAM TOPICAL at 08:10

## 2024-06-25 NOTE — PATIENT INSTRUCTIONS
Orders Placed This Encounter   Procedures    Wound cleansing and dressings Posterior;Right Elbow     Wound cleansing and dressings Posterior;Right Elbow   Wash your hands with soap and water.  Remove old dressing, discard into plastic bag and place in trash.       Cleanse the wound with a mild, unscented soap (Dove)   and then the Prophase cleanser that we gave you, prior to applying a clean dressing. Do not scrub the wound. Pat dry using gauze.     You may shower but must keep the dressing/ dressing dry. You can cover the dressing with a cast cover that can be purchased online.    Wash the wound last and use a new washcloth.     Right elbow wound dressing change:  Rinse with prophase   Apply polymem to the elbow wound  Cover with an ABD gauze and rolled gauze with tape  Do not apply tape to the skin     Change dressing every 2-3 days and as needed for excessive drainage     Watch for signs of infection (redness,warmth to the touch, increased pain, odor, pus, swelling, fever, chills, nausea, vomiting). If you notice any of these call the wound center or proceed to the ER.     Standing Status:   Future     Standing Expiration Date:   7/2/2024    Debridement     This order was created via procedure documentation    Wound Procedure Treatment Posterior;Right Elbow     This order was created via procedure documentation

## 2024-06-25 NOTE — TELEPHONE ENCOUNTER
Discussed methadone rotation. Will order baseline EKG to check for QT prolongation.   After review, will start methadone ramp and discontinue Hysingla. Patient to have repeat EKG after 2 weeks of therapy. Follow up 07/16/24.

## 2024-06-26 ENCOUNTER — TELEMEDICINE (OUTPATIENT)
Dept: PALLIATIVE MEDICINE | Facility: CLINIC | Age: 64
End: 2024-06-26

## 2024-06-26 ENCOUNTER — OFFICE VISIT (OUTPATIENT)
Dept: LAB | Facility: HOSPITAL | Age: 64
End: 2024-06-26
Payer: MEDICARE

## 2024-06-26 DIAGNOSIS — Z71.89 COUNSELING AND COORDINATION OF CARE: Primary | ICD-10-CM

## 2024-06-26 PROCEDURE — 93005 ELECTROCARDIOGRAM TRACING: CPT

## 2024-06-26 PROCEDURE — 99024 POSTOP FOLLOW-UP VISIT: CPT

## 2024-06-26 NOTE — PROGRESS NOTES
Palliative Supportive Care  met with patient/family to continue to provide emotional support and guidance.      Updated biopsychosocial information relevant to support:    The patient was identified by name and date of birth.  Dominick was informed that this is a telemedicine visit and that the visit is being conducted through the Epic Embedded platform. They agree to proceed..  My office door was closed. No one else was in the room. They acknowledged consent and understanding of privacy and security of the video platform. The patient has agreed to participate and understands they can discontinue the visit at any time.    Dominick spoke about talking to his doctors about their plan to reduce and eventually get off opioids. He said he feels confident in this decision. He is on a journey of self discovery and feels part of that is trying new things and starting new chapters. He spoke about having a very open conversation with his wife about a few things. He said he was surprised that she reached out to him as feelings can be hard for her due to her upbringing. He said he hopes that his changes have possibly influenced her to feel more comfortable talking about hard subjects. LCSW supported Dominick in exploring his progress and noting the positive changes he has made in his mindset. He said it can be hard to see his progress himself but was grateful for the encouragement. He spoke about his cousin's memorial service this weekend and the trip to [facility] they will make on the way to [place]. He spoke about having an EMG that showed there is possible nerve issues related to his L5. LCSW supported Dominick in exploring how this news made him feel. He said he feels vindicated in a way because he has been told many times there was nothing to show on scans that could account for his pain. He said it was beginning to cause him to doubt his sanity as he knew he was in so much pain but no one could tell him why or give  him proof of an issue. He said he knows there will be ups and downs but is trying to remain positive.      Identified areas of need include: emotional support    Resources provided:    Areas that need future follow-up include: reduce depressed mood/anxiety, focusing on positives, making personal goals    I have spent 60 minutes with Patient  today in which greater than 50% of this time was spent in counseling/coordination of care     Palliative  will follow-up as requested by patient, family, and primary team.  Please contact with any specific requests

## 2024-06-27 LAB
ATRIAL RATE: 63 BPM
P AXIS: 59 DEGREES
PR INTERVAL: 168 MS
QRS AXIS: 24 DEGREES
QRSD INTERVAL: 90 MS
QT INTERVAL: 420 MS
QTC INTERVAL: 429 MS
T WAVE AXIS: 41 DEGREES
VENTRICULAR RATE: 63 BPM

## 2024-06-27 RX ORDER — METHADONE HYDROCHLORIDE 5 MG/1
TABLET ORAL
Qty: 36 TABLET | Refills: 0 | Status: SHIPPED | OUTPATIENT
Start: 2024-06-27 | End: 2024-07-10

## 2024-06-28 ENCOUNTER — TELEPHONE (OUTPATIENT)
Dept: PALLIATIVE MEDICINE | Facility: CLINIC | Age: 64
End: 2024-06-28

## 2024-06-28 ENCOUNTER — TELEPHONE (OUTPATIENT)
Age: 64
End: 2024-06-28

## 2024-06-28 ENCOUNTER — PATIENT MESSAGE (OUTPATIENT)
Age: 64
End: 2024-06-28

## 2024-06-28 DIAGNOSIS — M54.42 CHRONIC BILATERAL LOW BACK PAIN WITH BILATERAL SCIATICA: ICD-10-CM

## 2024-06-28 DIAGNOSIS — G89.29 CHRONIC BILATERAL LOW BACK PAIN WITH BILATERAL SCIATICA: ICD-10-CM

## 2024-06-28 DIAGNOSIS — M54.41 CHRONIC BILATERAL LOW BACK PAIN WITH BILATERAL SCIATICA: ICD-10-CM

## 2024-06-28 DIAGNOSIS — G89.3 CANCER-RELATED PAIN: ICD-10-CM

## 2024-06-28 NOTE — TELEPHONE ENCOUNTER
Received a call from Wendi, from [facility].  Wendi stated that she faxed over a form to be completed regarding the methadone prescription.  Form is uploaded to media in chart.  Wendi inquiring what the plan is with this medication.  Read most recent note from [place]...    Discussed methadone rotation. Will order baseline EKG to check for QT prolongation.   After review, will start methadone ramp and discontinue Hysingla. Patient to have repeat EKG after 2 weeks of therapy. Follow up 07/16/24.    Wendi stated that she is able to dispense medication but still needs the form to be completed and faxed.

## 2024-06-28 NOTE — TELEPHONE ENCOUNTER
Pharmacy called the RX Refill Line. Message is being forwarded to the office.     Pharmacy is requesting update on patient with prescriptions due to more prescriptions being added on. -treatment plan, diagnosis etc.    Please contact pharmacy at [phone]

## 2024-07-01 ENCOUNTER — PATIENT MESSAGE (OUTPATIENT)
Dept: FAMILY MEDICINE CLINIC | Facility: CLINIC | Age: 64
End: 2024-07-01

## 2024-07-02 ENCOUNTER — OFFICE VISIT (OUTPATIENT)
Facility: CLINIC | Age: 64
End: 2024-07-02
Payer: MEDICARE

## 2024-07-02 ENCOUNTER — OFFICE VISIT (OUTPATIENT)
Dept: NEUROSURGERY | Facility: CLINIC | Age: 64
End: 2024-07-02
Payer: MEDICARE

## 2024-07-02 VITALS
RESPIRATION RATE: 20 BRPM | OXYGEN SATURATION: 98 % | HEART RATE: 61 BPM | WEIGHT: 241 LBS | BODY MASS INDEX: 37.83 KG/M2 | TEMPERATURE: 98.7 F | DIASTOLIC BLOOD PRESSURE: 80 MMHG | HEIGHT: 67 IN | SYSTOLIC BLOOD PRESSURE: 140 MMHG

## 2024-07-02 VITALS
HEART RATE: 51 BPM | SYSTOLIC BLOOD PRESSURE: 124 MMHG | TEMPERATURE: 96.5 F | DIASTOLIC BLOOD PRESSURE: 60 MMHG | RESPIRATION RATE: 16 BRPM

## 2024-07-02 DIAGNOSIS — M54.42 CHRONIC BILATERAL LOW BACK PAIN WITH BILATERAL SCIATICA: Primary | ICD-10-CM

## 2024-07-02 DIAGNOSIS — M54.16 LUMBAR RADICULOPATHY: ICD-10-CM

## 2024-07-02 DIAGNOSIS — G89.29 CHRONIC BILATERAL LOW BACK PAIN WITH BILATERAL SCIATICA: Primary | ICD-10-CM

## 2024-07-02 DIAGNOSIS — M54.41 CHRONIC BILATERAL LOW BACK PAIN WITH BILATERAL SCIATICA: Primary | ICD-10-CM

## 2024-07-02 DIAGNOSIS — S51.001A OPEN WOUND OF RIGHT ELBOW, INITIAL ENCOUNTER: Primary | ICD-10-CM

## 2024-07-02 PROCEDURE — 97597 DBRDMT OPN WND 1ST 20 CM/<: CPT | Performed by: FAMILY MEDICINE

## 2024-07-02 PROCEDURE — 99215 OFFICE O/P EST HI 40 MIN: CPT | Performed by: PHYSICIAN ASSISTANT

## 2024-07-02 RX ORDER — LIDOCAINE 40 MG/G
CREAM TOPICAL ONCE
Status: COMPLETED | OUTPATIENT
Start: 2024-07-02 | End: 2024-07-02

## 2024-07-02 RX ADMIN — LIDOCAINE 1 APPLICATION: 40 CREAM TOPICAL at 08:20

## 2024-07-02 NOTE — PROGRESS NOTES
"Patient ID: Dominick Irving is a 64 y.o. male Date of Birth 1960       Chief Complaint   Patient presents with   • Follow Up Wound Care Visit     R elbow       Allergies:  Mirtazapine and Venlafaxine    Diagnosis:      Diagnosis ICD-10-CM Associated Orders   1. Open wound of right elbow, initial encounter  S51.001A Wound cleansing and dressings Posterior;Right Elbow     lidocaine (LMX) 4 % cream     Wound Procedure Treatment Posterior;Right Elbow     Debridement                Assessment & Plan:  Open wound right elbow: Measurements continue to improve.  Wound bed again with adherent slough/biofilm layer requiring selective debridement.  Periwound with new scar tissue & some hyperpigmentation without acute erythema, lymphangitic streaking, increased warmth.  No induration or fluctuance present.  No current evidence of acute soft tissue infection at this time.  Selective debridement  Continue regular PolyMem to wound bed followed by ABD for cushion secured with rolled gauze  ER precautions again reviewed, he expresses understanding  Follow-up in 1 week or sooner if needed    Portions of the record may have been created with voice recognition software. Occasional wrong word or \"sound alike\" substitutions may have occurred due to the inherent limitations of voice recognition software. Read the chart carefully and recognize, using context, where substitutions have occurred.    Subjective:   Mr. Irving is a 64-year-old male with a past medical history including but limited to COPD, type 2 diabetes, bladder CA, CAD, chronic pain syndrome, who is presenting to wound center today for evaluation of R elbow wound.  He reports that on 5/1/24 with he sustained a fall while walking up a ramp and injured his elbow.  He is concerned about the prolonged time to heal. He states that though it has been a while that he's had wound, he feels it has significantly improved. States that when it first occurred, it was \"so deep you " "could fit 3 stacked quarters into it.\" He recently saw his PCP for shingles of low back/right leg and discussed the right elbow wound.  At that visit, he was prescribed a course of Keflex for the right elbow wound which he completed yesterday.  Thus far for his home wound treatment, he reports that he has been utilizing antibacterial soap to clean the wound and leaving it open to air.  Sometimes applies hydrocortisone when he feels it gets itchy.  He has not had fever and/or chills.  As stated above, recently saw PCP for shingles 6/3 - he states that he feels the rash is continued near his knee and is in an extreme amount of pain with regards to this. He has been on antiviral therapy for the shingles since 6/3.  He continues to follow with PCP for pain control and shingles treatment.  Please see below for detailed ROS    6/18/2024: Dominick presents today for follow-up of traumatic elbow wound.  He is been utilizing Medihoney without difficulty.  He does report the only issues he is having are related to positioning of his arm.  He often feels that he cannot find a position that is comfortable when he is resting as there is frequent shearing/pressure forces to this location.  He has not had fever and/or chills.  Though the wound remains tender when palpated, denies increased pain or new symptoms to this area.    6/25/2024: Mr. Irving presents today for follow-up of elbow wound.  He feels his wound is looking good.  Has had no issues with use of PolyMem.  Regarding his wound, he has no new acute complaints.  He denies fever, chills.    7/2/2024: Mr. Irving presents today for follow-up of right elbow wound.  He arrived today without dressing on and stated it was d/t coming to wound center today but otherwise is keeping it covered.  He has not had fever and/or chills.      The following portions of the patient's history were reviewed and updated as appropriate:   Patient Active Problem List   Diagnosis   • Nocturia   • " "Bladder mass   • Obstructive sleep apnea on CPAP   • Hypertension   • Malignant neoplasm of lateral wall of urinary bladder (HCC)   • History of gross hematuria   • Anxiety   • Chronic bilateral low back pain with bilateral sciatica   • Acute on chronic diastolic heart failure (HCC)   • Opioid withdrawal (HCC)   • RODO (acute kidney injury) (HCC)   • Intractable low back pain   • Chronic, continuous use of opioids   • Hyperglycemia   • Bradycardia   • Coronary artery disease   • Electrolyte abnormality   • Hyponatremia   • Acute respiratory failure with hypoxia (HCC)   • History of hematuria   • Morbid (severe) obesity due to excess calories (HCC)   • Suprapubic pressure   • Right groin pain   • Encounter for surgical aftercare following surgery of nervous system   • Abdominal pain   • Benign prostatic hyperplasia   • Acute low back pain with sciatica   • S/P laminectomy   • Lumbar radiculopathy   • Chronic pain syndrome   • Neuropathy   • Postlaminectomy syndrome   • Unable to ambulate   • Closed fracture of right ankle with routine healing   • COPD with acute exacerbation ([facility])   • Type 2 diabetes mellitus without complication, without long-term current use of insulin ([facility])   • History of bladder cancer   • Depression, recurrent ([facility])     Past Medical History:   Diagnosis Date   • Anxiety 3/01/2023   • Arthritis    • Bladder cancer (HCC)    • Cancer (HCC) 3/17/2023   • Cervical disc disorder 1/2016   • Chronic narcotic dependence ([facility])    • Chronic pain disorder     lower back and down both legs   • Colon polyp    • Coronary artery disease    • CPAP (continuous positive airway pressure) dependence     bi-pap   • Depression 3/17/2023   • Does use hearing aid     will wear DOS   • Full dentures    • Heart failure (HCC)     and \"fluid retention\" SL ABDULAZIZ Brito cardio PA\"   • High cholesterol    • Hypertension    • Low back pain 2003   • Mild ankle edema     and feet   • Obstructive sleep apnea on CPAP    • Prediabetes " "   • Shortness of breath     per pt \"with just exertion\"   • Sleep apnea    • Wears glasses      Past Surgical History:   Procedure Laterality Date   • BACK SURGERY      titanium rods implanted   • CAUDAL BLOCK N/A 10/17/2023    Procedure: BLOCK / INJECTION CAUDAL;  Surgeon: Minh Rolon MD;  Location: [facility];  Service: Pain Management    • COLONOSCOPY     • CYSTOSCOPY W/ URETERAL STENT PLACEMENT Bilateral 03/17/2023    Procedure: bilateral retrograde;  Surgeon: Shan Sanabria MD;  [facility];  Service: Urology   • HERNIA REPAIR      x5   • LUMBAR LAMINECTOMY N/A 06/30/2023    Procedure: Left L3-4 Metrx hemilaminectomy and bilateral foraminotomies;  Surgeon: Leland Aguilar MD;  [facility];  Service: Neurosurgery   • WY CYSTO W/REMOVAL OF LESIONS SMALL N/A 05/01/2023    Procedure: CYSTOSCOPY TURBT;  Surgeon: Shan Sanabria MD;  [facility];  Service: Urology   • WY CYSTOURETHROSCOPY N/A 11/06/2023    Procedure: CYSTOSCOPY with  bladder biopsies and fulgeration;  Surgeon: Shan Sanabria MD;  [facility];  Service: Urology   • SPINE SURGERY  2017   • TRANSURETHRAL RESECTION OF BLADDER TUMOR N/A 03/17/2023    Procedure: TRANSURETHRAL RESECTION OF BLADDER TUMOR (TURBT);  Surgeon: Shan Sanabria MD;  [facility];  Service: Urology     Family History   Problem Relation Age of Onset   • Cancer Father         Adult leukemia   • Aortic aneurysm Father    • Leukemia Father    • Alcohol abuse Father    • Hypertension Mother    • Colon cancer Maternal Grandfather       Social History     Socioeconomic History   • Marital status: /Civil Union     Spouse name: None   • Number of children: None   • Years of education: None   • Highest education level: None   Occupational History   • None   Tobacco Use   • Smoking status: Former     Current packs/day: 1.00     Average packs/day: 1 pack/day for 50.5 years (50.5 ttl pk-yrs)     Types: Cigarettes     Start date: 2/1/2023 "     Passive exposure: Never   • Smokeless tobacco: Never   Vaping Use   • Vaping status: Never Used   Substance and Sexual Activity   • Alcohol use: Not Currently     Comment: 3 per year   • Drug use: Not Currently     Types: Marijuana   • Sexual activity: Not Currently     Partners: Female   Other Topics Concern   • None   Social History Narrative   • None     Social Determinants of Health     Financial Resource Strain: Low Risk  (9/21/2023)    Overall Financial Resource Strain (CARDIA)    • Difficulty of Paying Living Expenses: Not hard at all   Food Insecurity: No Food Insecurity (10/6/2023)    Hunger Vital Sign    • Worried About Running Out of Food in the Last Year: Never true    • Ran Out of Food in the Last Year: Never true   Transportation Needs: No Transportation Needs (10/6/2023)    PRAPARE - Transportation    • Lack of Transportation (Medical): No    • Lack of Transportation (Non-Medical): No   Physical Activity: Not on file   Stress: Not on file   Social Connections: Not on file   Intimate Partner Violence: Not on file   Housing Stability: Low Risk  (10/6/2023)    Housing Stability Vital Sign    • Unable to Pay for Housing in the Last Year: No    • Number of Times Moved in the Last Year: 1    • Homeless in the Last Year: No        Current Outpatient Medications:   •  amitriptyline (ELAVIL) 50 mg tablet, Take 1 tablet (50 mg total) by mouth daily at bedtime, Disp: 90 tablet, Rfl: 1  •  aspirin 81 mg chewable tablet, Chew 81 mg daily, Disp: , Rfl:   •  atorvastatin (LIPITOR) 40 mg tablet, , Disp: , Rfl:   •  co-enzyme Q-10 30 MG capsule, Take 100 mg by mouth daily , Disp: , Rfl:   •  DULoxetine (CYMBALTA) 30 mg delayed release capsule, Take 1 capsule (30 mg total) by mouth daily, Disp: 30 capsule, Rfl: 5  •  Farxiga 5 MG TABS, 5 mg daily 5mg alternating with 2.5mg for fluid retention, Disp: , Rfl:   •  gabapentin (Neurontin) 600 MG tablet, Take 1 tablet (600 mg total) by mouth 4 (four) times a day, Disp:  120 tablet, Rfl: 3  •  HYDROcodone Bitartrate ER (Hysingla ER) 100 MG T24A, Take 1 po qd, Disp: 30 tablet, Rfl: 0  •  HYDROmorphone (DILAUDID) 8 MG tablet, Take 1 tablet (8 mg total) by mouth every 4 (four) hours as needed for moderate pain Max Daily Amount: 48 mg, Disp: 180 tablet, Rfl: 0  •  methadone (DOLOPHINE) 5 mg tablet, Take 1 tablet (5 mg total) by mouth every 12 (twelve) hours for 3 days, THEN 1 tablet (5 mg total) every 8 (eight) hours for 10 days. Max Daily Amount: 15 mg., Disp: 36 tablet, Rfl: 0  •  metolazone (ZAROXOLYN) 2.5 mg tablet, Take 1 tablet (2.5 mg total) by mouth daily as needed (edema), Disp: 90 tablet, Rfl: 3  •  metoprolol succinate (TOPROL-XL) 25 mg 24 hr tablet, , Disp: , Rfl:   •  multivitamin-iron-minerals-folic acid (CENTRUM) chewable tablet, Chew 1 tablet daily, Disp: , Rfl:   •  naloxone (NARCAN) 4 mg/0.1 mL nasal spray, Administer 1 spray into a nostril. If no response after 2-3 minutes, give another dose in the other nostril using a new spray., Disp: 1 each, Rfl: 1  •  potassium chloride (Klor-Con M10) 10 mEq tablet, Take 2 tablets (20 mEq total) by mouth 2 (two) times a day, Disp: 120 tablet, Rfl: 3  •  potassium chloride (Klor-Con) 10 mEq tablet, , Disp: , Rfl:   •  torsemide (DEMADEX) 20 mg tablet, Take 2 tablets (40 mg total) by mouth 2 (two) times a day, Disp: 360 tablet, Rfl: 1  No current facility-administered medications for this visit.    Review of Systems   Constitutional:  Negative for chills and fever.   Musculoskeletal:  Positive for arthralgias.   Skin:  Positive for wound (RUE/elbow).   Psychiatric/Behavioral:  The patient is not nervous/anxious.        Objective:  /60   Pulse (!) 51   Temp (!) 96.5 °F (35.8 °C)   Resp 16         Physical Exam  Vitals reviewed.   Constitutional:       General: He is not in acute distress.     Appearance: He is not ill-appearing or toxic-appearing.   HENT:      Head: Normocephalic and atraumatic.   Eyes:      General: No  "scleral icterus.  Cardiovascular:      Rate and Rhythm: Bradycardia present.      Comments: HR noted, near baseline - takes metoprolol   Pulmonary:      Effort: Pulmonary effort is normal. No respiratory distress.   Musculoskeletal:      Comments: ROM at elbow remains intact   Skin:     General: Skin is warm.      Findings: Wound (R elbow) present.             Comments: 1- Measurements continue to improve.  Wound bed again with adherent slough/biofilm layer requiring selective debridement.  Periwound with new scar tissue & some hyperpigmentation without acute erythema, lymphangitic streaking. No induration, fluctuance, crepitus.  No malodor or purulent drainage.   Neurological:      Mental Status: He is alert and oriented to person, place, and time.   Psychiatric:         Mood and Affect: Mood normal.         Behavior: Behavior normal.         Wound 06/11/24 Elbow Posterior;Right (Active)   Wound Image   07/02/24 0801   Wound Description Pink;Epithelialization;Pale 07/02/24 0806   Penelope-wound Assessment Scar Tissue;Hyperpigmented 07/02/24 0806   Wound Length (cm) 0.8 cm 07/02/24 0806   Wound Width (cm) 1 cm 07/02/24 0806   Wound Depth (cm) 0.1 cm 07/02/24 0806   Wound Surface Area (cm^2) 0.8 cm^2 07/02/24 0806   Wound Volume (cm^3) 0.08 cm^3 07/02/24 0806   Calculated Wound Volume (cm^3) 0.08 cm^3 07/02/24 0806   Change in Wound Size % 70.37 07/02/24 0806   Drainage Amount Scant 06/25/24 0809   Drainage Description Serosanguineous 06/25/24 0809   Non-staged Wound Description Full thickness 07/02/24 0806   Treatments Cleansed 06/18/24 1007       Debridement    Universal Protocol:  Consent: Verbal consent obtained. Written consent obtained.  Consent given by: patient  Time out: Immediately prior to procedure a \"time out\" was called to verify the correct patient, procedure, equipment, support staff and site/side marked as required.  Patient understanding: patient states understanding of the procedure being " performed  Patient identity confirmed: verbally with patient    Debridement Details  Performed by: physician  Debridement type: selective  Pain control: lidocaine 4%      Post-debridement measurements  Length (cm): 0.8  Width (cm): 1  Depth (cm): 0.1  Percent debrided: 100%  Surface Area (cm^2): 0.8  Area Debrided (cm^2): 0.8  Volume (cm^3): 0.08    Tissue and other material debrided: dermis  Devitalized tissue debrided: biofilm and slough  Instrument(s) utilized: curette  Bleeding: small  Hemostasis obtained with: pressure  Procedural pain (0-10): 0  Post-procedural pain: 0   Response to treatment: procedure was tolerated well               Lab Results   Component Value Date    HGBA1C 8.7 (H) 05/09/2024       Wound Instructions:  Orders Placed This Encounter   Procedures   • Wound cleansing and dressings Posterior;Right Elbow     Wound cleansing and dressings Posterior;Right Elbow          Wash your hands with soap and water.  Remove old dressing, discard into plastic bag and place in trash.       Cleanse the wound with a mild, unscented soap (Dove)   and then the Prophase cleanser that we gave you, prior to applying a clean dressing. Do not scrub the wound. Pat dry using gauze.     You may shower but must keep the dressing/ dressing dry. You can cover the dressing with a cast cover that can be purchased online.    Wash the wound last and use a new washcloth.     Right elbow wound dressing change:  Rinse with prophase   Apply polymem to the elbow wound  Cover with an ABD gauze and rolled gauze with tape  Do not apply tape to the skin     Change dressing every 2-3 days and as needed for excessive drainage     Watch for signs of infection (redness,warmth to the touch, increased pain, odor, pus, swelling, fever, chills, nausea, vomiting). If you notice any of these call the wound center or proceed to the ER.     Standing Status:   Future     Standing Expiration Date:   7/9/2024   • Wound Procedure Treatment  Posterior;Right Elbow     This order was created via procedure documentation   • Debridement     This order was created via procedure documentation       Mirian Horvath MD

## 2024-07-02 NOTE — PATIENT INSTRUCTIONS
Orders Placed This Encounter   Procedures    Wound cleansing and dressings Posterior;Right Elbow     Wound cleansing and dressings Posterior;Right Elbow          Wash your hands with soap and water.  Remove old dressing, discard into plastic bag and place in trash.       Cleanse the wound with a mild, unscented soap (Dove)   and then the Prophase cleanser that we gave you, prior to applying a clean dressing. Do not scrub the wound. Pat dry using gauze.     You may shower but must keep the dressing/ dressing dry. You can cover the dressing with a cast cover that can be purchased online.    Wash the wound last and use a new washcloth.     Right elbow wound dressing change:  Rinse with prophase   Apply polymem to the elbow wound  Cover with an ABD gauze and rolled gauze with tape  Do not apply tape to the skin     Change dressing every 2-3 days and as needed for excessive drainage     Watch for signs of infection (redness,warmth to the touch, increased pain, odor, pus, swelling, fever, chills, nausea, vomiting). If you notice any of these call the wound center or proceed to the ER.     Standing Status:   Future     Standing Expiration Date:   7/9/2024    Wound Procedure Treatment Posterior;Right Elbow     This order was created via procedure documentation    Debridement     This order was created via procedure documentation

## 2024-07-02 NOTE — PROGRESS NOTES
Wound Procedure Treatment Posterior;Right Elbow    Performed by: Jamil Downey RN  Authorized by: Mirian Horvath MD    Associated wounds:   Wound 06/11/24 Elbow Posterior;Right  Applied primary dressing:  Polymem foam  Applied secondary dressing:  Gauze  Dressing secured with:  Fredis, Tape and Tubifast

## 2024-07-02 NOTE — ASSESSMENT & PLAN NOTE
Patient presents today for evaluation of low back pain and bilateral leg symptoms.  History of PLDF L4-5 approximately 2015 by Dr. Connor Frost ([facility])  Status post left L3-4 [facility] decompression Dr. Aguilar June 30, 2023.    Imaging:   EMG 6/21/24: EMG and nerve conduction studies are consistent with bilateral acute and chronic L4 and L5 radiculopathy with an underlying axonal neuropathy  MRI lumbar spine with without 10/5/2023: Multilevel degenerative changes with variable degree of stenosis.  Facet arthropathy.  Improved central stenosis at L3-4 compared to preoperative imaging.  Good decompression at L4-5 centrally.  Lumbar x-rays 3/29/2024: Stable postoperative changes L4-L5 fusion without evidence of hardware fracture or lucency.    Plan:   Continue to monitor neurological symptoms  Given ongoing low back pain with bilateral radiculopathy despite physical therapy as well as trial of an epidural steroid injection in the setting of EMG demonstrating acute and chronic L4-5 findings, updated MRI lumbar spine with and without contrast ordered given prior surgical intervention.  Ordered lumbar upright flexion-extension x-rays to ensure no instability.  Discussed with patient, at this time options for ongoing management with medications.  He is currently working with his family doctor as well as palliative care to wean off of opioids as he feels this is offering limited relief.  He is transition to methadone and weaning off of Hysingla tomorrow followed by weaning of oral Dilaudid.  Patient requesting follow-up with Dr. Aguilar after further workup as he completed his most recent surgical intervention.  Discussed with patient if workup does not demonstrate compressive etiology or indication for additional surgical intervention, consideration for spinal cord stimulator.  Reviewed spinal cord stimulator trial and process.  If it is an option could consider referral to . [facility] pain management.  Patient  understanding and agreeable with plan of care.  He is aware to call the office with any questions or concerns in the interim.  Plan outpatient follow-up with Dr. Aguilar after completion of MRI and x-rays.

## 2024-07-02 NOTE — PROGRESS NOTES
Neurosurgery Office Note  Dominick Irving 64 y.o. male MRN: [id number]      Assessment & Plan     Chronic bilateral low back pain with bilateral sciatica  Patient presents today for evaluation of low back pain and bilateral leg symptoms.  History of PLDF L4-5 approximately 2015 by Dr. Connor Frost ([facility] [address])  Status post left L3-4 Metrix decompression Dr. Aguilar June 30, 2023.    Imaging:   EMG 6/21/24: EMG and nerve conduction studies are consistent with bilateral acute and chronic L4 and L5 radiculopathy with an underlying axonal neuropathy  MRI lumbar spine with without 10/5/2023: Multilevel degenerative changes with variable degree of stenosis.  Facet arthropathy.  Improved central stenosis at L3-4 compared to preoperative imaging.  Good decompression at L4-5 centrally.  Lumbar x-rays 3/29/2024: Stable postoperative changes L4-L5 fusion without evidence of hardware fracture or lucency.    Plan:   Continue to monitor neurological symptoms  Given ongoing low back pain with bilateral radiculopathy despite physical therapy as well as trial of an epidural steroid injection in the setting of EMG demonstrating acute and chronic L4-5 findings, updated MRI lumbar spine with and without contrast ordered given prior surgical intervention.  Ordered lumbar upright flexion-extension x-rays to ensure no instability.  Discussed with patient, at this time options for ongoing management with medications.  He is currently working with his family doctor as well as palliative care to wean off of opioids as he feels this is offering limited relief.  He is transition to methadone and weaning off of Hysingla tomorrow followed by weaning of oral Dilaudid.  Patient requesting follow-up with Dr. Aguilar after further workup as he completed his most recent surgical intervention.  Discussed with patient if workup does not demonstrate compressive etiology or indication for additional surgical intervention, consideration for  spinal cord stimulator.  Reviewed spinal cord stimulator trial and process.  If it is an option could consider referral to [facility] pain management.  Patient understanding and agreeable with plan of care.  He is aware to call the office with any questions or concerns in the interim.  Plan outpatient follow-up with Dr. Aguilar after completion of MRI and x-rays.       Diagnoses and all orders for this visit:    Chronic bilateral low back pain with bilateral sciatica    Lumbar radiculopathy  -     Ambulatory Referral to Neurosurgery  -     MRI lumbar spine with and without contrast; Future  -     XR spine lumbar minimum 4 views non injury; Future          I have spent a total time of 53 minutes on 07/02/24 in caring for this patient including Diagnostic results, Prognosis, Instructions for management, Patient and family education, Impressions, Documenting in the medical record, Reviewing / ordering tests, medicine, procedures  , and Obtaining or reviewing history  .      CHIEF COMPLAINT    Chief Complaint   Patient presents with    Consult     Work-up (new problem)   LBP Xray- Lspine 03/29/24. no other recent imaging          HISTORY    History of Present Illness     64 y.o. year old male     This is a pleasant 64-year-old male with past medical history significant for chronic bilateral low back pain with bilateral sciatica, status post PL DF L4-L5 2015 at [facility], s/pleft L3-4 metric decompression by Dr. Aguilar June 2023, bladder cancer treated with immunotherapies and diastolic heart failure who presents today for further evaluation of low back pain and leg symptoms.  Patient states following his decompression in June 2023 he became pain-free.  He states he had 2 weeks of complete pain relief and increased mobility and activity.  Thereafter he required treatment for his bladder cancer for which he was required to be placed in a position with stirrups.  Since that time he developed recurrent and  progressive pain.  He is evaluated by neurosurgery and underwent MRI imaging which showed stable findings with improved central stenosis.  Patient states he completed physical therapy which exacerbated his symptoms for several days after his sessions.  He was reported to have plateaued in physical therapy was discontinued.  He was seen by pain management outside of [facility] and states he underwent epidural steroid injection without relief.  Patient had been awaiting EMG testing and recently underwent bilateral lower extremity EMG which per report demonstrated  bilateral acute and chronic L4 and L5 radiculopathy with an underlying axonal neuropathy.  Given reported L4 and L5 radiculopathy, patient presents to neurosurgery for reevaluation.    He states he has been suffering with constant low back pain rated 8 out of 10 with radiation more down the posterior aspect of his right leg to the ankle and left leg to approximately the knee.  He describes numbness and tingling more noted in the distal aspect of his right leg with concerns for associated right leg weakness with ambulation.  He does note increasing symptoms with mobility and requires the use of a cane around his house and roller walker when leaving the house.  He states he is unable to do stairs and is limited to only using approximately 2 rooms in his house.  Regular activities such as cooking have become extremely challenging to complete.  He states he has to start making dinner at noon secondary to the required frequent rest and increased pain during these activities.  He denies any saddle numbness.  Denies any bowel or bladder incontinence.  Patient follows with his family physician as well as palliative care for pain management.  He is currently on Hysingla and oral Dilaudid 8mg 6 times per day which he states provide no significant change in his pain.  He is working with palliative care and his PCP to transition off of these opioids and has recently  started on methadone to make the transition.          REVIEW OF SYSTEMS    Review of Systems   Constitutional:  Positive for activity change (decreased).   HENT: Negative.     Eyes: Negative.    Respiratory: Negative.     Cardiovascular: Negative.    Gastrointestinal: Negative.    Endocrine: Negative.    Genitourinary: Negative.    Musculoskeletal:  Positive for back pain (Patient reports pain in bilateral groin into the hip joint on both sides occasionally down to the knees and rarely to the feet/toes) and gait problem (using a cane).   Skin: Negative.    Allergic/Immunologic: Negative.    Neurological:  Positive for weakness (Right leg) and numbness (right leg and tingiling).   Hematological: Negative.    Psychiatric/Behavioral:  Positive for sleep disturbance (chronic issue).    All other systems reviewed and are negative.      ROS obtained by MA. Reviewed. See HPI.     Meds/Allergies     Current Outpatient Medications   Medication Sig Dispense Refill    amitriptyline (ELAVIL) 50 mg tablet Take 1 tablet (50 mg total) by mouth daily at bedtime 90 tablet 1    aspirin 81 mg chewable tablet Chew 81 mg daily      atorvastatin (LIPITOR) 40 mg tablet       co-enzyme Q-10 30 MG capsule Take 100 mg by mouth daily       DULoxetine (CYMBALTA) 30 mg delayed release capsule Take 1 capsule (30 mg total) by mouth daily 30 capsule 5    Farxiga 5 MG TABS 5 mg daily 5mg alternating with 2.5mg for fluid retention      gabapentin (Neurontin) 600 MG tablet Take 1 tablet (600 mg total) by mouth 4 (four) times a day 120 tablet 3    HYDROcodone Bitartrate ER (Hysingla ER) 100 MG T24A Take 1 po qd 30 tablet 0    HYDROmorphone (DILAUDID) 8 MG tablet Take 1 tablet (8 mg total) by mouth every 4 (four) hours as needed for moderate pain Max Daily Amount: 48 mg 180 tablet 0    methadone (DOLOPHINE) 5 mg tablet Take 1 tablet (5 mg total) by mouth every 12 (twelve) hours for 3 days, THEN 1 tablet (5 mg total) every 8 (eight) hours for 10 days.  "Max Daily Amount: 15 mg. 36 tablet 0    metolazone (ZAROXOLYN) 2.5 mg tablet Take 1 tablet (2.5 mg total) by mouth daily as needed (edema) 90 tablet 3    metoprolol succinate (TOPROL-XL) 25 mg 24 hr tablet       multivitamin-iron-minerals-folic acid (CENTRUM) chewable tablet Chew 1 tablet daily      naloxone (NARCAN) 4 mg/0.1 mL nasal spray Administer 1 spray into a nostril. If no response after 2-3 minutes, give another dose in the other nostril using a new spray. 1 each 1    potassium chloride (Klor-Con M10) 10 mEq tablet Take 2 tablets (20 mEq total) by mouth 2 (two) times a day 120 tablet 3    torsemide (DEMADEX) 20 mg tablet Take 2 tablets (40 mg total) by mouth 2 (two) times a day 360 tablet 1     No current facility-administered medications for this visit.       Allergies   Allergen Reactions    Mirtazapine Other (See Comments) and Confusion     Pt drove without knowing and woke up at a new destination    Venlafaxine Dizziness       PAST HISTORY    Past Medical History:   Diagnosis Date    Anxiety 3/01/2023    Arthritis     Bladder cancer (HCC)     Cancer (HCC) 3/17/2023    Cervical disc disorder 1/2016    Chronic narcotic dependence (HCC)     Chronic pain disorder     lower back and down both legs    Colon polyp     Coronary artery disease     CPAP (continuous positive airway pressure) dependence     bi-pap    Depression 3/17/2023    Does use hearing aid     will wear DOS    Full dentures     Heart failure (HCC)     and \"fluid retention\" [facility]\"    High cholesterol     Hypertension     Low back pain 2003    Mild ankle edema     and feet    Obstructive sleep apnea on CPAP     Prediabetes     Shortness of breath     per pt \"with just exertion\"    Sleep apnea     Wears glasses        Past Surgical History:   Procedure Laterality Date    BACK SURGERY      titanium rods implanted    CAUDAL BLOCK N/A 10/17/2023    Procedure: BLOCK / INJECTION CAUDAL;  Surgeon: Minh Rolon MD;  [facility]: [facility];  Service: Pain Management     COLONOSCOPY      CYSTOSCOPY W/ URETERAL STENT PLACEMENT Bilateral 03/17/2023    Procedure: bilateral retrograde;  Surgeon: Shan Sanabria MD;  [facility];  Service: Urology    HERNIA REPAIR      x5    LUMBAR LAMINECTOMY N/A 06/30/2023    Procedure: Left L3-4 Metrx hemilaminectomy and bilateral foraminotomies;  Surgeon: Leland Aguilar MD;  [facility];  Service: Neurosurgery    MS CYSTO W/REMOVAL OF LESIONS SMALL N/A 05/01/2023    Procedure: CYSTOSCOPY TURBT;  Surgeon: Shan Sanabria MD;  [facility];  Service: Urology    MS CYSTOURETHROSCOPY N/A 11/06/2023    Procedure: CYSTOSCOPY with  bladder biopsies and fulgeration;  Surgeon: Shan Sanabria MD;  [facility];  Service: Urology    SPINE SURGERY  2017    TRANSURETHRAL RESECTION OF BLADDER TUMOR N/A 03/17/2023    Procedure: TRANSURETHRAL RESECTION OF BLADDER TUMOR (TURBT);  Surgeon: Shan Sanabria MD;  [facility];  Service: Urology       Social History     Tobacco Use    Smoking status: Former     Current packs/day: 1.00     Average packs/day: 1 pack/day for 50.5 years (50.5 ttl pk-yrs)     Types: Cigarettes     Start date: 2/1/2023     Passive exposure: Never    Smokeless tobacco: Never   Vaping Use    Vaping status: Never Used   Substance Use Topics    Alcohol use: Not Currently     Comment: 3 per year    Drug use: Not Currently     Types: Marijuana       Family History   Problem Relation Age of Onset    Cancer Father         Adult leukemia    Aortic aneurysm Father     Leukemia Father     Alcohol abuse Father     Hypertension Mother     Colon cancer Maternal Grandfather          Above history personally reviewed.       EXAM    Vitals:Blood pressure 140/80, pulse 61, temperature 98.7 °F (37.1 °C), temperature source Temporal, resp. rate 20, height 5' 7\" (1.702 m), weight 109 kg (241 lb), SpO2 98%.,Body mass index is 37.75 kg/m².     Physical Exam  Constitutional:       General: " He is not in acute distress.     Appearance: Normal appearance. He is well-developed. He is not ill-appearing.   HENT:      Head: Normocephalic and atraumatic.      Right Ear: External ear normal.      Left Ear: External ear normal.      Nose: Nose normal.      Mouth/Throat:      Mouth: Mucous membranes are moist.   Eyes:      General: No scleral icterus.        Right eye: No discharge.         Left eye: No discharge.      Extraocular Movements: Extraocular movements intact and EOM normal.      Conjunctiva/sclera: Conjunctivae normal.   Cardiovascular:      Rate and Rhythm: Normal rate.   Pulmonary:      Effort: Pulmonary effort is normal. No respiratory distress.   Musculoskeletal:         General: Tenderness (T spine and most noted in low lumbar spine e) present.      Cervical back: Normal range of motion and neck supple.   Skin:     General: Skin is warm and dry.      Comments: Well healed midline lumbar incision   Neurological:      Mental Status: He is alert.   Psychiatric:         Mood and Affect: Mood normal.         Speech: Speech normal.         Behavior: Behavior normal.         Thought Content: Thought content normal.         Judgment: Judgment normal.         Neurologic Exam     Mental Status   Follows 3 step commands.   Attention: normal. Concentration: normal.   Speech: speech is normal   Level of consciousness: alert  Knowledge: good.   Normal comprehension.     Cranial Nerves     CN III, IV, VI   Extraocular motions are normal.   Conjugate gaze: present    CN VII   Facial expression full, symmetric.     CN VIII   Hearing: intact    Motor Exam   Muscle bulk: normal  Overall muscle tone: normal    Strength   Strength 5/5 except as noted. L EHL 4/5     Sensory Exam   Right leg light touch: normal  Left leg light touch: normal  Mild altered PP left lateral lower leg     Gait, Coordination, and Reflexes     Gait  Gait: (antalgic with cane)    Tremor   Resting tremor: absent  Intention tremor:  absent  Action tremor: absent    Reflexes   Right ankle clonus: absent  Left ankle clonus: absent        MEDICAL DECISION MAKING    Imaging Studies:     MRI 10/2023  Lumbar spine XR 3/2024    I have personally reviewed pertinent reports.   and I have personally reviewed pertinent films in PACS

## 2024-07-06 DIAGNOSIS — G89.4 CHRONIC PAIN SYNDROME: ICD-10-CM

## 2024-07-07 NOTE — TELEPHONE ENCOUNTER
Free fetal DNA testing through [facility] is NEGATIVE for Trisomies 13, 18 and 21. Predicted fetal sex: Male (ASK PT BEFORE DISCLOSING). Fetal fraction: 11.9%.     Niya also underwent carrier screening through "[employer] for CF, SMA, and beta-chain hemoglobinopathies. NO disease-causing mutations were detected. This drastically reduces but does not eliminate the chance she is a carrier of these conditions. Please see formal report for more details.     A voicemail message was left requesting callback to disclose results. A copy of the result has been sent to scanned records.     The lab quotes the follow performance:    Intended use Perfomance   Trisomy 21 Sensitivity: 99.1%, specificity: 99.9%   Trisomy 18 Sensitivity: >99.9%, specificity: 99.6%   Trisomy 13 Sensitivity: 91.7%, specificity: 99.7%        Requested medication(s) are due for refill today: Yes  Patient has already received a courtesy refill: No  Other reason request has been forwarded to provider:

## 2024-07-08 ENCOUNTER — TELEPHONE (OUTPATIENT)
Dept: UROLOGY | Facility: CLINIC | Age: 64
End: 2024-07-08

## 2024-07-08 ENCOUNTER — OFFICE VISIT (OUTPATIENT)
Dept: FAMILY MEDICINE CLINIC | Facility: CLINIC | Age: 64
End: 2024-07-08
Payer: MEDICARE

## 2024-07-08 VITALS
WEIGHT: 241.2 LBS | BODY MASS INDEX: 37.86 KG/M2 | OXYGEN SATURATION: 94 % | HEART RATE: 62 BPM | DIASTOLIC BLOOD PRESSURE: 59 MMHG | SYSTOLIC BLOOD PRESSURE: 124 MMHG | HEIGHT: 67 IN

## 2024-07-08 DIAGNOSIS — R35.1 NOCTURIA: Primary | ICD-10-CM

## 2024-07-08 DIAGNOSIS — E11.9 TYPE 2 DIABETES MELLITUS WITHOUT COMPLICATION, WITHOUT LONG-TERM CURRENT USE OF INSULIN (HCC): Primary | ICD-10-CM

## 2024-07-08 DIAGNOSIS — F33.9 DEPRESSION, RECURRENT (HCC): ICD-10-CM

## 2024-07-08 PROCEDURE — G2211 COMPLEX E/M VISIT ADD ON: HCPCS | Performed by: FAMILY MEDICINE

## 2024-07-08 PROCEDURE — 99214 OFFICE O/P EST MOD 30 MIN: CPT | Performed by: FAMILY MEDICINE

## 2024-07-08 RX ORDER — HYDROMORPHONE HYDROCHLORIDE 8 MG/1
8 TABLET ORAL EVERY 4 HOURS PRN
Qty: 180 TABLET | Refills: 0 | Status: SHIPPED | OUTPATIENT
Start: 2024-07-08

## 2024-07-08 RX ORDER — METHADONE HYDROCHLORIDE 5 MG/1
5 TABLET ORAL EVERY 8 HOURS SCHEDULED
Qty: 42 TABLET | Refills: 0 | Status: SHIPPED | OUTPATIENT
Start: 2024-07-08 | End: 2024-07-16 | Stop reason: SINTOL

## 2024-07-08 RX ORDER — METFORMIN HYDROCHLORIDE 500 MG/1
1000 TABLET, EXTENDED RELEASE ORAL
Qty: 60 TABLET | Refills: 3 | Status: SHIPPED | OUTPATIENT
Start: 2024-07-08

## 2024-07-08 RX ORDER — DULOXETIN HYDROCHLORIDE 60 MG/1
60 CAPSULE, DELAYED RELEASE ORAL DAILY
Qty: 30 CAPSULE | Refills: 5 | Status: SHIPPED | OUTPATIENT
Start: 2024-07-08

## 2024-07-08 NOTE — PATIENT COMMUNICATION
Doing okay so far with the methadone. He felt a little shaky at first when he took it but found it was from taking it too closely to his dilaudid dose. He has discontinued Hysingla and is taking now only 4 doses/day instead of 6. He finds his pain overall has been improving. He increased to 5mg three times daily. He would like to continue at this dosage for now and continue to reduce his dilaudid use.     EKG on Monday to check QT interval - order placed  Sent refill of methadone to [place] pharmacy  [facility] follow up 07/16/24

## 2024-07-08 NOTE — PROGRESS NOTES
UROLOGY PROGRESS NOTE         NAME: Dominick Irving  AGE: 64 y.o. SEX: male  : 1960   MRN: [id number]    DATE: 2024  TIME: 8:10 AM    Assessment and Plan      Cystoscopy     Date/Time  2024 8:30 AM     Performed by  Shan Sanabria MD   Authorized by  Shan Sanabria MD         Procedure Details:  Procedure type: cystoscopy    Additional Procedure Details: Was placed supine on table and prepped regular sterile fashion flexible cystoscopy was carried out.  Patient had a normal anterior posterior urethra minimally obstructing prostate and mildly trabeculated bladder but no recurrent bladder cancer.  Patient tolerated well received his Cipro.       Impression:   1. History of bladder cancer  2. History of gross hematuria  3. Benign prostatic hyperplasia with lower urinary tract symptoms, symptom details unspecified       Plan: Patient doing well urologically status post history of bladder cancer with normal follow-up cystoscopy.  Will check cytology if normal follow-up in 4 months for repeat cystoscopy and cytology.      Chief Complaint   No chief complaint on file.    History of Present Illness     HPI: Dominick Irving is a 64 y.o. year old male who presents with follow-up office visit from 3/8/2024.  Patient with history of bladder cancer high-grade T1 disease diagnosed in the spring 2023 had completed induction therapy with BCG.  His last cystoscopy showed no evidence of recurrent bladder cancer.  He is currently on Flomax for BPH.  He was to have a PSA 1 week prior to this appointment.  His urine cytology 324 was negative.    Patient here for surveillance cystoscopy today.  Please note last office visit EDWIN was normal.    PSA was excellent at 0.8 on 2024  Currently patient asymptomatic no pelvic pain no groin pain just has some back pain that is due to be seen the neurosurgeon for.  No irritable or obstructive voiding complaints no hematuria.      The following portions of the patient's  "history were reviewed and updated as appropriate: allergies, current medications, past family history, past medical history, past social history, past surgical history and problem list.  Past Medical History:   Diagnosis Date    Anxiety 3/01/2023    Arthritis     Bladder cancer (HCC)     Cancer (HCC) 3/17/2023    Cervical disc disorder 1/2016    Chronic narcotic dependence (HCC)     Chronic pain disorder     lower back and down both legs    Colon polyp     Coronary artery disease     CPAP (continuous positive airway pressure) dependence     bi-pap    Depression 3/17/2023    Does use hearing aid     will wear DOS    Full dentures     Heart failure (HCC)     and \"fluid retention\" [facility]\"    High cholesterol     Hypertension     Low back pain 2003    Mild ankle edema     and feet    Obstructive sleep apnea on CPAP     Prediabetes     Shortness of breath     per pt \"with just exertion\"    Sleep apnea     Wears glasses      Past Surgical History:   Procedure Laterality Date    BACK SURGERY      titanium rods implanted    CAUDAL BLOCK N/A 10/17/2023    Procedure: BLOCK / INJECTION CAUDAL;  Surgeon: Minh Rolon MD;  [facility]: [facility];  Service: Pain Management     COLONOSCOPY      CYSTOSCOPY W/ URETERAL STENT PLACEMENT Bilateral 03/17/2023    Procedure: bilateral retrograde;  Surgeon: Shan Sanabria MD;  [facility];  Service: Urology    HERNIA REPAIR      x5    LUMBAR LAMINECTOMY N/A 06/30/2023    Procedure: Left L3-4 Metrx hemilaminectomy and bilateral foraminotomies;  Surgeon: Leland Aguilar MD;  [facility];  Service: Neurosurgery    SD CYSTO W/REMOVAL OF LESIONS SMALL N/A 05/01/2023    Procedure: CYSTOSCOPY TURBT;  Surgeon: Shan Sanabria MD;  [facility];  Service: Urology    SD CYSTOURETHROSCOPY N/A 11/06/2023    Procedure: CYSTOSCOPY with  bladder biopsies and fulgeration;  Surgeon: Shan Sanabria MD;  [facility];  Service: Urology    SPINE SURGERY  " 2017    TRANSURETHRAL RESECTION OF BLADDER TUMOR N/A 03/17/2023    Procedure: TRANSURETHRAL RESECTION OF BLADDER TUMOR (TURBT);  Surgeon: Shan Sanabria MD;  [facility];  Service: Urology     shoulder  Review of Systems     Const: Denies chills, fever and weight loss.  CV: Denies chest pain.  Resp: Denies SOB.  GI: Denies abdominal pain, nausea and vomiting.  : Denies symptoms other than stated above.  Musculo: Denies back pain.    Objective   There were no vitals taken for this visit.    Physical Exam  Const: Appears healthy and well developed. No signs of acute distress present.  Resp: Respirations are regular and unlabored.   CV: Rate is regular. Rhythm is regular.  Abdomen: Abdomen is soft, nontender, and nondistended. Kidneys are not palpable.  : nl  Psych: Patient's attitude is cooperative. Mood is normal. Affect is normal.    Current Medications     Current Outpatient Medications:     amitriptyline (ELAVIL) 50 mg tablet, Take 1 tablet (50 mg total) by mouth daily at bedtime, Disp: 90 tablet, Rfl: 1    aspirin 81 mg chewable tablet, Chew 81 mg daily, Disp: , Rfl:     atorvastatin (LIPITOR) 40 mg tablet, , Disp: , Rfl:     co-enzyme Q-10 30 MG capsule, Take 100 mg by mouth daily , Disp: , Rfl:     DULoxetine (CYMBALTA) 30 mg delayed release capsule, Take 1 capsule (30 mg total) by mouth daily, Disp: 30 capsule, Rfl: 5    Farxiga 5 MG TABS, 5 mg daily 5mg alternating with 2.5mg for fluid retention, Disp: , Rfl:     gabapentin (Neurontin) 600 MG tablet, Take 1 tablet (600 mg total) by mouth 4 (four) times a day, Disp: 120 tablet, Rfl: 3    HYDROcodone Bitartrate ER (Hysingla ER) 100 MG T24A, Take 1 po qd, Disp: 30 tablet, Rfl: 0    HYDROmorphone (DILAUDID) 8 MG tablet, Take 1 tablet (8 mg total) by mouth every 4 (four) hours as needed for moderate pain Max Daily Amount: 48 mg, Disp: 180 tablet, Rfl: 0    methadone (DOLOPHINE) 5 mg tablet, Take 1 tablet (5 mg total) by mouth every 12 (twelve) hours  for 3 days, THEN 1 tablet (5 mg total) every 8 (eight) hours for 10 days. Max Daily Amount: 15 mg., Disp: 36 tablet, Rfl: 0    metolazone (ZAROXOLYN) 2.5 mg tablet, Take 1 tablet (2.5 mg total) by mouth daily as needed (edema), Disp: 90 tablet, Rfl: 3    metoprolol succinate (TOPROL-XL) 25 mg 24 hr tablet, , Disp: , Rfl:     multivitamin-iron-minerals-folic acid (CENTRUM) chewable tablet, Chew 1 tablet daily, Disp: , Rfl:     naloxone (NARCAN) 4 mg/0.1 mL nasal spray, Administer 1 spray into a nostril. If no response after 2-3 minutes, give another dose in the other nostril using a new spray., Disp: 1 each, Rfl: 1    potassium chloride (Klor-Con M10) 10 mEq tablet, Take 2 tablets (20 mEq total) by mouth 2 (two) times a day, Disp: 120 tablet, Rfl: 3    torsemide (DEMADEX) 20 mg tablet, Take 2 tablets (40 mg total) by mouth 2 (two) times a day, Disp: 360 tablet, Rfl: 1        Shan Sanabria MD

## 2024-07-08 NOTE — TELEPHONE ENCOUNTER
Pt returned call.  Informed pt of previous message and pt sts he will have PSA completed tomorrow.  Pt questioning if he would need to stop any of his medications prior to his Cysto on 7/18/24.  Informed pt, as per the RN, no meds need to be stopped.  Pt verbalized understanding

## 2024-07-08 NOTE — PROGRESS NOTES
"Patient ID: Dominick Irving is a 64 y.o. male Date of Birth 1960       No chief complaint on file.      Allergies:  Mirtazapine and Venlafaxine    Diagnosis:      Diagnosis ICD-10-CM Associated Orders   1. Open wound of right elbow, initial encounter  S51.001A lidocaine (LMX) 4 % cream     Wound cleansing and dressings     Debridement              Assessment & Plan:  Open wound R elbow: Measurements overall improved. Wound bed with hyperkeratotic debris, slough requiring selective debridement.  Periwound with scar tissue and hyperpigmentation.  No acute erythema, increased warmth, lymphangitic streaking, purulent drainage.  No surrounding induration or fluctuance.  Scant serous drainage.  Remains without evidence of infection.  Selective debridement  Bacitracin ointment followed by gauze and rolled gauze  Continue to offload and keep pressure / friction off of this area  ER precautions reviewed, he expressed understanding  Follow-up in 1 week or sooner if needed    Portions of the record may have been created with voice recognition software. Occasional wrong word or \"sound alike\" substitutions may have occurred due to the inherent limitations of voice recognition software. Read the chart carefully and recognize, using context, where substitutions have occurred.    Subjective:   Mr. Irving is a 64-year-old male with a past medical history including but limited to COPD, type 2 diabetes, bladder CA, CAD, chronic pain syndrome, who is presenting to wound center today for evaluation of R elbow wound.  He reports that on 5/1/24 with he sustained a fall while walking up a ramp and injured his elbow.  He is concerned about the prolonged time to heal. He states that though it has been a while that he's had wound, he feels it has significantly improved. States that when it first occurred, it was \"so deep you could fit 3 stacked quarters into it.\" He recently saw his PCP for shingles of low back/right leg and discussed " the right elbow wound.  At that visit, he was prescribed a course of Keflex for the right elbow wound which he completed yesterday.  Thus far for his home wound treatment, he reports that he has been utilizing antibacterial soap to clean the wound and leaving it open to air.  Sometimes applies hydrocortisone when he feels it gets itchy.  He has not had fever and/or chills.  As stated above, recently saw PCP for shingles 6/3 - he states that he feels the rash is continued near his knee and is in an extreme amount of pain with regards to this. He has been on antiviral therapy for the shingles since 6/3.  He continues to follow with PCP for pain control and shingles treatment.  Please see below for detailed ROS    6/18/2024: Dominick presents today for follow-up of traumatic elbow wound.  He is been utilizing Medihoney without difficulty.  He does report the only issues he is having are related to positioning of his arm.  He often feels that he cannot find a position that is comfortable when he is resting as there is frequent shearing/pressure forces to this location.  He has not had fever and/or chills.  Though the wound remains tender when palpated, denies increased pain or new symptoms to this area.    6/25/2024: Mr. Irving presents today for follow-up of elbow wound.  He feels his wound is looking good.  Has had no issues with use of PolyMem.  Regarding his wound, he has no new acute complaints.  He denies fever, chills.    7/2/2024: Mr. Irving presents today for follow-up of right elbow wound.  He arrived today without dressing on and stated it was d/t coming to wound center today but otherwise is keeping it covered.  He has not had fever and/or chills.    7/9/2024: Dominick presents to wound center today for follow-up of right elbow wound.  He reports he has no new acute complaints today.  He denies fever, chills.  Of note upon intake, heart rate noted to be 51, near baseline -takes metoprolol.  He is asymptomatic.  " See ROS.      The following portions of the patient's history were reviewed and updated as appropriate:   Patient Active Problem List   Diagnosis   • Nocturia   • Bladder mass   • Obstructive sleep apnea on CPAP   • Hypertension   • Malignant neoplasm of lateral wall of urinary bladder (HCC)   • History of gross hematuria   • Anxiety   • Chronic bilateral low back pain with bilateral sciatica   • Acute on chronic diastolic heart failure ([facility])   • Opioid withdrawal ([facility])   • RODO (acute kidney injury) ([facility])   • Intractable low back pain   • Chronic, continuous use of opioids   • Hyperglycemia   • Bradycardia   • Coronary artery disease   • Electrolyte abnormality   • Hyponatremia   • Acute respiratory failure with hypoxia ([facility])   • History of hematuria   • Morbid (severe) obesity due to excess calories ([facility])   • Suprapubic pressure   • Right groin pain   • Encounter for surgical aftercare following surgery of nervous system   • Abdominal pain   • Benign prostatic hyperplasia   • Acute low back pain with sciatica   • S/P laminectomy   • Lumbar radiculopathy   • Chronic pain syndrome   • Neuropathy   • Postlaminectomy syndrome   • Unable to ambulate   • Closed fracture of right ankle with routine healing   • COPD with acute exacerbation ([facility])   • Type 2 diabetes mellitus without complication, without long-term current use of insulin ([facility])   • History of bladder cancer   • Depression, recurrent ([facility])     Past Medical History:   Diagnosis Date   • Anxiety 3/01/2023   • Arthritis    • Bladder cancer ([facility])    • Cancer ([facility]) 3/17/2023   • Cervical disc disorder 1/2016   • Chronic narcotic dependence ([facility])    • Chronic pain disorder     lower back and down both legs   • Colon polyp    • Coronary artery disease    • CPAP (continuous positive airway pressure) dependence     bi-pap   • Depression 3/17/2023   • Does use hearing aid     will wear DOS   • Full dentures    • Heart failure ([facility])     and \"fluid retention\" [facility] " "Daryl cardio PA\"   • High cholesterol    • Hypertension    • Low back pain 2003   • Mild ankle edema     and feet   • Obstructive sleep apnea on CPAP    • Prediabetes    • Shortness of breath     per pt \"with just exertion\"   • Sleep apnea    • Wears glasses      Past Surgical History:   Procedure Laterality Date   • BACK SURGERY      titanium rods implanted   • CAUDAL BLOCK N/A 10/17/2023    Procedure: BLOCK / INJECTION CAUDAL;  Surgeon: Minh Rolon MD;  Location: [facility];  Service: Pain Management    • COLONOSCOPY     • CYSTOSCOPY W/ URETERAL STENT PLACEMENT Bilateral 03/17/2023    Procedure: bilateral retrograde;  Surgeon: Shan Sanabria MD;  Location[facility];  Service: Urology   • HERNIA REPAIR      x5   • LUMBAR LAMINECTOMY N/A 06/30/2023    Procedure: Left L3-4 Metrx hemilaminectomy and bilateral foraminotomies;  Surgeon: Leland Aguilar MD;  Location[facility];  Service: Neurosurgery   • MT CYSTO W/REMOVAL OF LESIONS SMALL N/A 05/01/2023    Procedure: CYSTOSCOPY TURBT;  Surgeon: Shan Sanabria MD;  [facility];  Service: Urology   • MT CYSTOURETHROSCOPY N/A 11/06/2023    Procedure: CYSTOSCOPY with  bladder biopsies and fulgeration;  Surgeon: Shan Sanabria MD;  [facility];  Service: Urology   • SPINE SURGERY  2017   • TRANSURETHRAL RESECTION OF BLADDER TUMOR N/A 03/17/2023    Procedure: TRANSURETHRAL RESECTION OF BLADDER TUMOR (TURBT);  Surgeon: Shan Sanabria MD;  [facility];  Service: Urology     Family History   Problem Relation Age of Onset   • Cancer Father         Adult leukemia   • Aortic aneurysm Father    • Leukemia Father    • Alcohol abuse Father    • Hypertension Mother    • Colon cancer Maternal Grandfather       Social History     Socioeconomic History   • Marital status: /Civil Union     Spouse name: Not on file   • Number of children: Not on file   • Years of education: Not on file   • Highest education level: Not on file   Occupational " History   • Not on file   Tobacco Use   • Smoking status: Former     Current packs/day: 1.00     Average packs/day: 1 pack/day for 50.5 years (50.5 ttl pk-yrs)     Types: Cigarettes     Start date: 2/1/2023     Passive exposure: Never   • Smokeless tobacco: Never   Vaping Use   • Vaping status: Never Used   Substance and Sexual Activity   • Alcohol use: Not Currently     Comment: 3 per year   • Drug use: Not Currently     Types: Marijuana   • Sexual activity: Not Currently     Partners: Female   Other Topics Concern   • Not on file   Social History Narrative   • Not on file     Social Determinants of Health     Financial Resource Strain: Low Risk  (9/21/2023)    Overall Financial Resource Strain (CARDIA)    • Difficulty of Paying Living Expenses: Not hard at all   Food Insecurity: No Food Insecurity (10/6/2023)    Hunger Vital Sign    • Worried About Running Out of Food in the Last Year: Never true    • Ran Out of Food in the Last Year: Never true   Transportation Needs: No Transportation Needs (10/6/2023)    PRAPARE - Transportation    • Lack of Transportation (Medical): No    • Lack of Transportation (Non-Medical): No   Physical Activity: Not on file   Stress: Not on file   Social Connections: Not on file   Intimate Partner Violence: Not on file   Housing Stability: Low Risk  (10/6/2023)    Housing Stability Vital Sign    • Unable to Pay for Housing in the Last Year: No    • Number of Times Moved in the Last Year: 1    • Homeless in the Last Year: No        Current Outpatient Medications:   •  amitriptyline (ELAVIL) 50 mg tablet, Take 1 tablet (50 mg total) by mouth daily at bedtime, Disp: 90 tablet, Rfl: 1  •  aspirin 81 mg chewable tablet, Chew 81 mg daily, Disp: , Rfl:   •  atorvastatin (LIPITOR) 40 mg tablet, , Disp: , Rfl:   •  co-enzyme Q-10 30 MG capsule, Take 100 mg by mouth daily , Disp: , Rfl:   •  DULoxetine (CYMBALTA) 60 mg delayed release capsule, Take 1 capsule (60 mg total) by mouth daily, Disp: 30  capsule, Rfl: 5  •  Farxiga 5 MG TABS, 5 mg daily 5mg alternating with 2.5mg for fluid retention, Disp: , Rfl:   •  gabapentin (Neurontin) 600 MG tablet, Take 1 tablet (600 mg total) by mouth 4 (four) times a day, Disp: 120 tablet, Rfl: 3  •  HYDROmorphone (DILAUDID) 8 MG tablet, Take 1 tablet (8 mg total) by mouth every 4 (four) hours as needed for moderate pain Max Daily Amount: 48 mg, Disp: 180 tablet, Rfl: 0  •  metFORMIN (GLUCOPHAGE-XR) 500 mg 24 hr tablet, Take 2 tablets (1,000 mg total) by mouth daily with dinner, Disp: 60 tablet, Rfl: 3  •  methadone (DOLOPHINE) 5 mg tablet, Take 1 tablet (5 mg total) by mouth every 8 (eight) hours Max Daily Amount: 15 mg, Disp: 42 tablet, Rfl: 0  •  metolazone (ZAROXOLYN) 2.5 mg tablet, Take 1 tablet (2.5 mg total) by mouth daily as needed (edema), Disp: 90 tablet, Rfl: 3  •  metoprolol succinate (TOPROL-XL) 25 mg 24 hr tablet, , Disp: , Rfl:   •  multivitamin-iron-minerals-folic acid (CENTRUM) chewable tablet, Chew 1 tablet daily, Disp: , Rfl:   •  naloxone (NARCAN) 4 mg/0.1 mL nasal spray, Administer 1 spray into a nostril. If no response after 2-3 minutes, give another dose in the other nostril using a new spray., Disp: 1 each, Rfl: 1  •  potassium chloride (Klor-Con M10) 10 mEq tablet, Take 2 tablets (20 mEq total) by mouth 2 (two) times a day, Disp: 120 tablet, Rfl: 3  •  torsemide (DEMADEX) 20 mg tablet, Take 2 tablets (40 mg total) by mouth 2 (two) times a day, Disp: 360 tablet, Rfl: 1  No current facility-administered medications for this visit.    Review of Systems   Constitutional:  Negative for chills and fever.   Respiratory:  Negative for shortness of breath.    Cardiovascular:  Negative for chest pain and palpitations.   Musculoskeletal:  Positive for arthralgias.   Skin:  Positive for wound (RUE/elbow).   Neurological:  Negative for dizziness and light-headedness.   Psychiatric/Behavioral:  The patient is not nervous/anxious.        Objective:  /54    Pulse (!) 51   Temp (!) 97.4 °F (36.3 °C)   Resp 16         Physical Exam  Vitals reviewed.   Constitutional:       General: He is not in acute distress.     Appearance: He is not ill-appearing or toxic-appearing.   HENT:      Head: Normocephalic and atraumatic.   Eyes:      General: No scleral icterus.  Cardiovascular:      Rate and Rhythm: Bradycardia present.      Comments: HR again noted at 51, near baseline - takes metoprolol   Pulmonary:      Effort: Pulmonary effort is normal. No respiratory distress.   Musculoskeletal:      Comments: ROM at elbow remains intact   Skin:     General: Skin is warm.      Findings: Wound (R elbow) present.             Comments: 1- Measurements overall improved. Wound bed with hyperkeratotic debris, slough requiring selective debridement.  Periwound with scar tissue and hyperpigmentation.  No acute erythema, increased warmth, lymphangitic streaking, purulent drainage.  No surrounding induration or fluctuance.  Scant serous drainage.   Neurological:      Mental Status: He is alert and oriented to person, place, and time.   Psychiatric:         Mood and Affect: Mood normal.         Behavior: Behavior normal.       Wound 06/11/24 Elbow Posterior;Right (Active)   Wound Image   07/09/24 0804   Wound Description Pink;Epithelialization;Pale 07/09/24 0807   Penelope-wound Assessment Scar Tissue;Hyperpigmented;Dry 07/09/24 0807   Wound Length (cm) 1 cm 07/09/24 0807   Wound Width (cm) 0.6 cm 07/09/24 0807   Wound Depth (cm) 0.1 cm 07/09/24 0807   Wound Surface Area (cm^2) 0.6 cm^2 07/09/24 0807   Wound Volume (cm^3) 0.06 cm^3 07/09/24 0807   Calculated Wound Volume (cm^3) 0.06 cm^3 07/09/24 0807   Change in Wound Size % 77.78 07/09/24 0807   Drainage Amount None 07/09/24 0807   Drainage Description Serosanguineous 06/25/24 0809   Non-staged Wound Description Full thickness 07/09/24 0807   Treatments Cleansed 06/18/24 1007   Dressing Other (Comment) 07/09/24 0807       Debridement   "  Universal Protocol:  Consent: Verbal consent obtained. Written consent obtained.  Consent given by: patient  Time out: Immediately prior to procedure a \"time out\" was called to verify the correct patient, procedure, equipment, support staff and site/side marked as required.  Patient understanding: patient states understanding of the procedure being performed  Patient identity confirmed: verbally with patient    Debridement Details  Performed by: physician  Debridement type: selective  Pain control: lidocaine 4%      Post-debridement measurements  Length (cm): 1  Width (cm): 0.6  Depth (cm): 0.1  Percent debrided: 100%  Surface Area (cm^2): 0.6  Area Debrided (cm^2): 0.6  Volume (cm^3): 0.06    Tissue and other material debrided: dermis  Devitalized tissue debrided: necrotic debris and slough  Instrument(s) utilized: curette  Bleeding: small  Hemostasis obtained with: pressure  Procedural pain (0-10): 0  Post-procedural pain: 0   Response to treatment: procedure was tolerated well               Lab Results   Component Value Date    HGBA1C 8.7 (H) 05/09/2024       Wound Instructions:  Orders Placed This Encounter   Procedures   • Wound cleansing and dressings     Wound cleansing and dressings Posterior;Right Elbow          Wash your hands with soap and water.  Remove old dressing, discard into plastic bag and place in trash.       Cleanse the wound with a mild, unscented soap (Dove)   and then the Prophase cleanser that we gave you, prior to applying a clean dressing. Do not scrub the wound. Pat dry using gauze.     You may shower but must keep the dressing/ dressing dry. You can cover the dressing with a cast cover that can be purchased online.    Wash the wound last and use a new washcloth.     Right elbow wound dressing change:  Rinse with prophase   Apply Bacitracin to the elbow wound  Cover with an ABD gauze and rolled gauze with tape  Do not apply tape to the skin     Change dressing every day and as needed " for excessive drainage     Watch for signs of infection (redness,warmth to the touch, increased pain, odor, pus, swelling, fever, chills, nausea, vomiting). If you notice any of these call the wound center or proceed to the ER.     Standing Status:   Future     Standing Expiration Date:   7/16/2024   • Debridement     This order was created via procedure documentation         Mirian Horvath MD

## 2024-07-08 NOTE — PROGRESS NOTES
"Assessment/Plan:       1. Type 2 diabetes mellitus without complication, without long-term current use of insulin ([facility])  Assessment & Plan:  Start metformin  Continue farxiga  Lab Results   Component Value Date    HGBA1C 8.7 (H) 05/09/2024     Orders:  -     metFORMIN (GLUCOPHAGE-XR) 500 mg 24 hr tablet; Take 2 tablets (1,000 mg total) by mouth daily with dinner  -     DULoxetine (CYMBALTA) 60 mg delayed release capsule; Take 1 capsule (60 mg total) by mouth daily  -     Basic metabolic panel; Future  2. Depression, recurrent (HCC)  Assessment & Plan:  Increase cymbalta to 60mg qd        Subjective:      Patient ID: Dominick Irving is a 64 y.o. male.    Dominick is here for a f/u.  He was able to see neurosurgery and has xrays and an mri scheduled.  The emg did show lumbar radiculopathy which finally gives us some direction with the pain.  Lake did stop the hysingla and started metahdone.  He is doing well on this.  Last appointment I did start cymbalta for mood/pain.  He is on 30mg and we will increase it today.  There is marginal improvement.  No adverse side effects.  His A1c is up to 9.3.  he is on farxiga.  Will add metformin.        The following portions of the patient's history were reviewed and updated as appropriate: allergies, current medications, past family history, past medical history, past social history, past surgical history, and problem list.    Review of Systems   Respiratory: Negative.     Cardiovascular: Negative.    Gastrointestinal: Negative.          Objective:      /59 (BP Location: Left arm, Patient Position: Sitting, Cuff Size: Large)   Pulse 62   Ht 5' 7\" (1.702 m)   Wt 109 kg (241 lb 3.2 oz)   SpO2 94%   BMI 37.78 kg/m²          Physical Exam  Vitals and nursing note reviewed.   Constitutional:       Appearance: Normal appearance.   HENT:      Head: Normocephalic and atraumatic.      Nose: Nose normal.      Mouth/Throat:      Mouth: Mucous membranes are moist.   Eyes:      " Extraocular Movements: Extraocular movements intact.      Pupils: Pupils are equal, round, and reactive to light.   Cardiovascular:      Rate and Rhythm: Normal rate and regular rhythm.      Pulses: Normal pulses.   Pulmonary:      Effort: Pulmonary effort is normal.      Breath sounds: Normal breath sounds.   Abdominal:      General: Bowel sounds are normal.      Palpations: Abdomen is soft.   Musculoskeletal:      Cervical back: Normal range of motion.      Comments: B/l lumbar spasm   Skin:     General: Skin is warm and dry.      Capillary Refill: Capillary refill takes less than 2 seconds.   Neurological:      General: No focal deficit present.      Mental Status: He is alert.   Psychiatric:         Mood and Affect: Mood normal.

## 2024-07-08 NOTE — ASSESSMENT & PLAN NOTE
Start metformin  Continue farxiga  Lab Results   Component Value Date    HGBA1C 8.7 (H) 05/09/2024

## 2024-07-09 ENCOUNTER — APPOINTMENT (OUTPATIENT)
Dept: LAB | Facility: HOSPITAL | Age: 64
End: 2024-07-09
Payer: MEDICARE

## 2024-07-09 ENCOUNTER — TELEPHONE (OUTPATIENT)
Dept: PALLIATIVE MEDICINE | Facility: CLINIC | Age: 64
End: 2024-07-09

## 2024-07-09 ENCOUNTER — OFFICE VISIT (OUTPATIENT)
Facility: CLINIC | Age: 64
End: 2024-07-09
Payer: MEDICARE

## 2024-07-09 VITALS
TEMPERATURE: 97.4 F | RESPIRATION RATE: 16 BRPM | DIASTOLIC BLOOD PRESSURE: 54 MMHG | HEART RATE: 51 BPM | SYSTOLIC BLOOD PRESSURE: 119 MMHG

## 2024-07-09 DIAGNOSIS — R35.1 NOCTURIA: ICD-10-CM

## 2024-07-09 DIAGNOSIS — S51.001A OPEN WOUND OF RIGHT ELBOW, INITIAL ENCOUNTER: Primary | ICD-10-CM

## 2024-07-09 LAB — PSA SERPL-MCNC: 0.86 NG/ML (ref 0–4)

## 2024-07-09 PROCEDURE — 97597 DBRDMT OPN WND 1ST 20 CM/<: CPT | Performed by: FAMILY MEDICINE

## 2024-07-09 PROCEDURE — 84153 ASSAY OF PSA TOTAL: CPT

## 2024-07-09 PROCEDURE — 36415 COLL VENOUS BLD VENIPUNCTURE: CPT

## 2024-07-09 RX ORDER — LIDOCAINE 40 MG/G
CREAM TOPICAL ONCE
Status: COMPLETED | OUTPATIENT
Start: 2024-07-09 | End: 2024-07-09

## 2024-07-09 RX ORDER — POTASSIUM CHLORIDE 750 MG/1
TABLET, FILM COATED, EXTENDED RELEASE ORAL
COMMUNITY
Start: 2024-07-08 | End: 2024-07-18 | Stop reason: SDUPTHER

## 2024-07-09 RX ADMIN — LIDOCAINE 1 APPLICATION: 40 CREAM TOPICAL at 08:10

## 2024-07-09 NOTE — TELEPHONE ENCOUNTER
Left message for pt to inform him his methadone 5mg tablet was refilled.    Cephalexin Counseling: I counseled the patient regarding use of cephalexin as an antibiotic for prophylactic and/or therapeutic purposes. Cephalexin (commonly prescribed under brand name Keflex) is a cephalosporin antibiotic which is active against numerous classes of bacteria, including most skin bacteria. Side effects may include nausea, diarrhea, gastrointestinal upset, rash, hives, yeast infections, and in rare cases, hepatitis, kidney disease, seizures, fever, confusion, neurologic symptoms, and others. Patients with severe allergies to penicillin medications are cautioned that there is about a 10% incidence of cross-reactivity with cephalosporins. When possible, patients with penicillin allergies should use alternatives to cephalosporins for antibiotic therapy.

## 2024-07-09 NOTE — PATIENT INSTRUCTIONS
Orders Placed This Encounter   Procedures    Wound cleansing and dressings     Wound cleansing and dressings Posterior;Right Elbow          Wash your hands with soap and water.  Remove old dressing, discard into plastic bag and place in trash.       Cleanse the wound with a mild, unscented soap (Dove)   and then the Prophase cleanser that we gave you, prior to applying a clean dressing. Do not scrub the wound. Pat dry using gauze.     You may shower but must keep the dressing/ dressing dry. You can cover the dressing with a cast cover that can be purchased online.    Wash the wound last and use a new washcloth.     Right elbow wound dressing change:  Rinse with prophase   Apply Bacitracin to the elbow wound  Cover with an ABD gauze and rolled gauze with tape  Do not apply tape to the skin     Change dressing every day and as needed for excessive drainage     Watch for signs of infection (redness,warmth to the touch, increased pain, odor, pus, swelling, fever, chills, nausea, vomiting). If you notice any of these call the wound center or proceed to the ER.     Standing Status:   Future     Standing Expiration Date:   7/16/2024    Debridement     This order was created via procedure documentation

## 2024-07-09 NOTE — PROGRESS NOTES
Wound Procedure Treatment Posterior;Right Elbow    Performed by: Jamil Downey RN  Authorized by: Mirian Horvath MD    Associated wounds:   Wound 06/11/24 Elbow Posterior;Right  Applied Topical: Other    Applied secondary dressing:  ABD  Dressing secured with:  Fredis and Tape    Bacitracin

## 2024-07-10 ENCOUNTER — TELEMEDICINE (OUTPATIENT)
Dept: PALLIATIVE MEDICINE | Facility: CLINIC | Age: 64
End: 2024-07-10

## 2024-07-10 DIAGNOSIS — Z71.89 COUNSELING AND COORDINATION OF CARE: Primary | ICD-10-CM

## 2024-07-10 PROCEDURE — NC001 PR NO CHARGE

## 2024-07-10 NOTE — PROGRESS NOTES
Palliative Supportive Care  met with patient/family to continue to provide emotional support and guidance.      Updated biopsychosocial information relevant to support:    The patient was identified by name and date of birth.  Dominick was informed that this is a telemedicine visit and that the visit is being conducted through the Epic Embedded platform. They agree to proceed..  My office door was closed. No one else was in the room. They acknowledged consent and understanding of privacy and security of the video platform. The patient has agreed to participate and understands they can discontinue the visit at any time.      Dominick spoke about being a little off balance and dizzy this week. He is not sure if it is the methadone or decreasing his opioids. He is feeling a little discouraged but plans to stay the course because he believes that the beginning of this journey to reduce his opioids will be the hardest part most likely. He is keeping his providers informed on symptomology that comes up. He did not make it to his cousin's memorial service which upset him and embarrassed him as he told family he would be there. He also has not made it back to his mom's home in [place] to make more progress on handling her estate. [facility] that he is doing all that is within his power and abilities at this time. Discussed challenging his thoughts that he is in another setback, but on part of the journey to getting of opioids after 10 years of use, which is not an easy task and admirable. [facility] also reminded him of all of his wonderful social supports and care team he has cheering him on and here for support as he needs. He was appreciative of this reminder.      Identified areas of need include: emotional support    Resources provided:    Areas that need future follow-up include: reduce anxiety/depressed mood, encouragement as he continues to reduce and get off opioids    I have spent 60 minutes with  Patient  today in which greater than 50% of this time was spent in counseling/coordination of care     Palliative  will follow-up as requested by patient, family, and primary team.  Please contact with any specific requests

## 2024-07-15 ENCOUNTER — OFFICE VISIT (OUTPATIENT)
Dept: LAB | Facility: HOSPITAL | Age: 64
End: 2024-07-15
Payer: MEDICARE

## 2024-07-15 ENCOUNTER — LAB (OUTPATIENT)
Dept: LAB | Facility: HOSPITAL | Age: 64
End: 2024-07-15
Payer: MEDICARE

## 2024-07-15 DIAGNOSIS — E11.9 TYPE 2 DIABETES MELLITUS WITHOUT COMPLICATION, WITHOUT LONG-TERM CURRENT USE OF INSULIN (HCC): ICD-10-CM

## 2024-07-15 DIAGNOSIS — Z91.89 AT RISK FOR PROLONGED QT INTERVAL SYNDROME: ICD-10-CM

## 2024-07-15 LAB
ANION GAP SERPL CALCULATED.3IONS-SCNC: 9 MMOL/L (ref 4–13)
BUN SERPL-MCNC: 31 MG/DL (ref 5–25)
CALCIUM SERPL-MCNC: 10 MG/DL (ref 8.4–10.2)
CHLORIDE SERPL-SCNC: 91 MMOL/L (ref 96–108)
CO2 SERPL-SCNC: 37 MMOL/L (ref 21–32)
CREAT SERPL-MCNC: 1.23 MG/DL (ref 0.6–1.3)
GFR SERPL CREATININE-BSD FRML MDRD: 61 ML/MIN/1.73SQ M
GLUCOSE P FAST SERPL-MCNC: 176 MG/DL (ref 65–99)
POTASSIUM SERPL-SCNC: 3.9 MMOL/L (ref 3.5–5.3)
SODIUM SERPL-SCNC: 137 MMOL/L (ref 135–147)

## 2024-07-15 PROCEDURE — 36415 COLL VENOUS BLD VENIPUNCTURE: CPT

## 2024-07-15 PROCEDURE — 80048 BASIC METABOLIC PNL TOTAL CA: CPT

## 2024-07-15 PROCEDURE — 93005 ELECTROCARDIOGRAM TRACING: CPT

## 2024-07-16 ENCOUNTER — OFFICE VISIT (OUTPATIENT)
Age: 64
End: 2024-07-16
Payer: MEDICARE

## 2024-07-16 ENCOUNTER — OFFICE VISIT (OUTPATIENT)
Facility: CLINIC | Age: 64
End: 2024-07-16
Payer: MEDICARE

## 2024-07-16 VITALS
OXYGEN SATURATION: 99 % | HEIGHT: 67 IN | HEART RATE: 52 BPM | BODY MASS INDEX: 37.2 KG/M2 | DIASTOLIC BLOOD PRESSURE: 76 MMHG | TEMPERATURE: 97.6 F | WEIGHT: 237 LBS | SYSTOLIC BLOOD PRESSURE: 120 MMHG

## 2024-07-16 VITALS
DIASTOLIC BLOOD PRESSURE: 56 MMHG | RESPIRATION RATE: 18 BRPM | SYSTOLIC BLOOD PRESSURE: 105 MMHG | HEART RATE: 64 BPM | TEMPERATURE: 97.4 F

## 2024-07-16 DIAGNOSIS — C67.2 MALIGNANT NEOPLASM OF LATERAL WALL OF URINARY BLADDER (HCC): ICD-10-CM

## 2024-07-16 DIAGNOSIS — G89.29 CHRONIC BILATERAL LOW BACK PAIN WITH BILATERAL SCIATICA: ICD-10-CM

## 2024-07-16 DIAGNOSIS — G62.9 NEUROPATHY: ICD-10-CM

## 2024-07-16 DIAGNOSIS — Z71.89 COUNSELING AND COORDINATION OF CARE: ICD-10-CM

## 2024-07-16 DIAGNOSIS — Z51.5 PALLIATIVE CARE BY SPECIALIST: ICD-10-CM

## 2024-07-16 DIAGNOSIS — I50.32 CHRONIC DIASTOLIC HEART FAILURE (HCC): ICD-10-CM

## 2024-07-16 DIAGNOSIS — C67.3 MALIGNANT NEOPLASM OF ANTERIOR WALL OF URINARY BLADDER (HCC): Primary | ICD-10-CM

## 2024-07-16 DIAGNOSIS — S51.001A OPEN WOUND OF RIGHT ELBOW, INITIAL ENCOUNTER: Primary | ICD-10-CM

## 2024-07-16 DIAGNOSIS — M54.41 CHRONIC BILATERAL LOW BACK PAIN WITH BILATERAL SCIATICA: ICD-10-CM

## 2024-07-16 DIAGNOSIS — M54.42 CHRONIC BILATERAL LOW BACK PAIN WITH BILATERAL SCIATICA: ICD-10-CM

## 2024-07-16 LAB
ATRIAL RATE: 48 BPM
P AXIS: 12 DEGREES
PR INTERVAL: 166 MS
QRS AXIS: 34 DEGREES
QRSD INTERVAL: 88 MS
QT INTERVAL: 464 MS
QTC INTERVAL: 414 MS
T WAVE AXIS: 36 DEGREES
VENTRICULAR RATE: 48 BPM

## 2024-07-16 PROCEDURE — 99214 OFFICE O/P EST MOD 30 MIN: CPT | Performed by: PHYSICIAN ASSISTANT

## 2024-07-16 PROCEDURE — 93010 ELECTROCARDIOGRAM REPORT: CPT | Performed by: INTERNAL MEDICINE

## 2024-07-16 PROCEDURE — 99212 OFFICE O/P EST SF 10 MIN: CPT | Performed by: FAMILY MEDICINE

## 2024-07-16 PROCEDURE — 99213 OFFICE O/P EST LOW 20 MIN: CPT | Performed by: FAMILY MEDICINE

## 2024-07-16 RX ORDER — LIDOCAINE 40 MG/G
CREAM TOPICAL ONCE
Status: COMPLETED | OUTPATIENT
Start: 2024-07-16 | End: 2024-07-16

## 2024-07-16 RX ADMIN — LIDOCAINE: 40 CREAM TOPICAL at 08:58

## 2024-07-16 NOTE — PROGRESS NOTES
Ambulatory Visit  Name: Dominick Irving      : 1960      MRN: [id number]  Encounter Provider: Lake Connors PA-C  Encounter Date: 2024   Encounter department: [facility] [place]    Assessment & Plan   1. 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 Malignant neoplasm of lateral wall of urinary bladder (HCC)  3. Chronic bilateral low back pain with bilateral sciatica  4. Counseling and coordination of care  5. Chronic diastolic heart failure (HCC)  6. Palliative care by specialist  7. Neuropathy    Discontinue methadone d/t side effects  Patient has discontinued amitriptyline  He has decreased dilaudid use to 3x/day with plans to further decrease  Pain is manageable at this time  Follow up with neurosurgery as scheduled  Will defer opioid management back to his PCP.  Continue acupuncture, pt considering yoga  Discussed lifestyle modification, core strengthening  Follow up in 1 month.     PDMP Review: I have reviewed the patient's controlled substance dispensing history in the Prescription Drug Monitoring Program in compliance with [facility] regulations before prescribing any controlled substances.  Filled  Written  ID  Drug  QTY  Days  Prescriber  RX #  Dispenser  Refill  Daily Dose*  Pymt Type      2024 1 Methadone Hcl 5 Mg Tablet 42.00 14 An Mcm [id number] Tho (4168) 0 45.00 MME Comm Ins [place]   2024 1 Hydromorphone 8 Mg Tablet 180.00 30 Ro [place] [id number] Tho (4168) 0 240.00 MME Comm Ins [place]       History of Present Illness     Dominick Irving is a 64 y.o. male with hx of high-grade papillary urothelial carcinoma and chronic low back pain presents for follow up for symptom management. Follows with Dr. Sanabria (urology), Dr. Budinetz (PCP), and Dr. Rolon (Pain Mgmt).      Today he reports his pain is about an 8/10. He also complains of tingling and weakness in his extremities. Since last visit patient started on  "methadone. He is not taking methadone because of feeling lightheaded/dizzy. He noticed an improvement in his dizziness immediately after stopping the methadone. He also stopped taking amitriptyline. He was recently increased on Cymbalta. Patient completed EMG recently and has been seen by neurosurgery who is currently working him up for nerve compression and possible surgical intervention. If no compressive etiology they would recommend spinal stimulator. . He is down to taking dilaudid only three times a day, down from 6. He wants to continue to decrease his dilaudid use. He feels his current pain level is manageable. He is considering yoga at home and will continue with acupuncture.       Review of Systems   Constitutional:  Negative for activity change, appetite change, fatigue and unexpected weight change.   HENT:  Negative for mouth sores, trouble swallowing and voice change.    Eyes: Negative.    Respiratory:  Negative for shortness of breath.    Cardiovascular:  Negative for chest pain.   Gastrointestinal:  Negative for abdominal pain, constipation, diarrhea, nausea and vomiting.   Genitourinary: Negative.    Musculoskeletal:  Positive for arthralgias, back pain and myalgias. Negative for gait problem.   Skin:  Negative for color change, pallor and wound.   Neurological:  Positive for weakness and numbness. Negative for dizziness, light-headedness and headaches.   Hematological: Negative.    Psychiatric/Behavioral:  Negative for confusion and sleep disturbance. The patient is not nervous/anxious.        Objective     /76 (BP Location: Left arm)   Pulse (!) 52   Temp 97.6 °F (36.4 °C)   Ht 5' 7\" (1.702 m)   Wt 108 kg (237 lb)   SpO2 99%   BMI 37.12 kg/m²     Physical Exam  Nursing note reviewed.   Constitutional:       General: He is not in acute distress.     Comments: walker   HENT:      Head: Normocephalic and atraumatic.      Right Ear: External ear normal.      Left Ear: External ear normal. "      Nose: Nose normal.      Mouth/Throat:      Pharynx: Oropharynx is clear.   Eyes:      Extraocular Movements: Extraocular movements intact.   Cardiovascular:      Rate and Rhythm: Normal rate.   Pulmonary:      Effort: Pulmonary effort is normal.   Abdominal:      General: Abdomen is flat.   Musculoskeletal:         General: No deformity.   Skin:     Findings: No rash.   Neurological:      Mental Status: He is alert and oriented to person, place, and time.   Psychiatric:         Mood and Affect: Mood normal.         Behavior: Behavior normal.         Recent labs:  Lab Results   Component Value Date/Time    SODIUM 137 07/15/2024 08:15 AM    K 3.9 07/15/2024 08:15 AM    BUN 31 (H) 07/15/2024 08:15 AM    CREATININE 1.23 07/15/2024 08:15 AM    GLUC 149 (H) 03/29/2024 11:13 AM    CALCIUM 10.0 07/15/2024 08:15 AM    AST 16 03/29/2024 11:13 AM    ALT 17 03/29/2024 11:13 AM    ALB 3.9 03/29/2024 11:13 AM    TP 7.6 03/29/2024 11:13 AM    EGFR 61 07/15/2024 08:15 AM     Lab Results   Component Value Date/Time    HGB 12.3 03/29/2024 11:13 AM    WBC 7.15 03/29/2024 11:13 AM     03/29/2024 11:13 AM    INR 0.97 03/29/2024 11:13 AM    PTT 31 03/29/2024 11:13 AM     Lab Results   Component Value Date/Time    CQE1TABIQZZL 0.854 06/26/2023 08:57 AM       Recent Imaging:  Procedure: XR spine lumbar 2 or 3 views injury    Result Date: 3/29/2024  Narrative: XR SPINE LUMBAR 2 OR 3 VIEWS INJURY INDICATION: Severe low back pain for the past several days, no known injury. COMPARISON: Lumbar spine radiographs 6/16/2023, MRI lumbar spine 10/5/2023 Views: 3 FINDINGS: Bilateral transpedicular screw and juan j fusion at L4-L5. Hardware appears intact. Lower lumbar laminectomy defects. No acute fracture. Intact upper lumbar pedicles. Five non-rib-bearing lumbar vertebral bodies. Mild lumbar levoscoliosis. Age related lumbar and bilateral hip degenerative changes. Atherosclerotic calcifications. Otherwise unremarkable soft tissues.      Impression: No acute osseous abnormality. Stable postoperative appearance L4-5 posterior fusion without hardware complication. Degenerative changes as described. Resident: JIA TAVAREZ I, the attending radiologist, have reviewed the images and agree with the final report above. Workstation performed: [id number]     Procedure: XR ankle 3+ vw right    Result Date: 3/22/2024  Narrative: RIGHT ANKLE INDICATION:   Other fracture of right lower leg, subsequent encounter for closed fracture with routine healing. COMPARISON: 2/7/2024 VIEWS:  XR ANKLE 3+ VW RIGHT FINDINGS: Unchanged alignment of healing distal fibular fractures with increased surrounding callus formation consistent with interval healing No significant degenerative changes. No lytic or blastic osseous lesion. Unchanged soft tissue swelling of the lateral ankle     Impression: Unchanged alignment of healing distal fibular fractures with increased surrounding callus formation consistent with interval healing Electronically signed: 03/22/2024 12:42 PM Bharathi Laguerre MD       Administrative Statements   I have spent a total time of 35 minutes in caring for this patient on the day of the visit/encounter including Diagnostic results, Risks and benefits of tx options, Instructions for management, Patient and family education, Importance of tx compliance, Risk factor reductions, Counseling / Coordination of care, Documenting in the medical record, Reviewing / ordering tests, medicine, procedures  , and Obtaining or reviewing history  . Topics discussed with the patient / family include symptom assessment and management, medication review, medication adjustment, psychosocial support, supportive listening, and anticipatory guidance.

## 2024-07-16 NOTE — PROGRESS NOTES
"Patient ID: Dominick Irving is a 64 y.o. male Date of Birth 1960       Chief Complaint   Patient presents with   • Follow Up Wound Care Visit       Allergies:  Mirtazapine and Venlafaxine    Diagnosis:      Diagnosis ICD-10-CM Associated Orders   1. Open wound of right elbow, initial encounter  S51.001A lidocaine (LMX) 4 % cream     Wound cleansing and dressings              Assessment & Plan:  Open wound right elbow: Measurements unchanged though drainage has slightly increased.  Wound bed with predominantly granular tissue and thin biofilm layer mechanically debrided with gauze.  No acute erythema, increased warmth, lymphangitic streaking, purulent drainage.  No induration or fluctuance.  Return to use of regular PolyMem  Reviewed importance of keeping wound covered at all times  Continue to avoid pressure and friction to this area  Bradycardia: Initial heart rate 48, repeat 64.  Baseline seems to be in the 50s -60s per chart review.  Though he has remained asymptomatic, I encouraged him to monitor this closely and follow-up with his cardiologist.  ER precautions reviewed for this as well.  ER precautions again reviewed, he expressed understanding  Follow-up in 2 weeks or sooner if needed    Portions of the record may have been created with voice recognition software. Occasional wrong word or \"sound alike\" substitutions may have occurred due to the inherent limitations of voice recognition software. Read the chart carefully and recognize, using context, where substitutions have occurred.    Subjective:   Mr. Irving is a 64-year-old male with a past medical history including but limited to COPD, type 2 diabetes, bladder CA, CAD, chronic pain syndrome, who is presenting to wound center today for evaluation of R elbow wound.  He reports that on 5/1/24 with he sustained a fall while walking up a ramp and injured his elbow.  He is concerned about the prolonged time to heal. He states that though it has been a " "while that he's had wound, he feels it has significantly improved. States that when it first occurred, it was \"so deep you could fit 3 stacked quarters into it.\" He recently saw his PCP for shingles of low back/right leg and discussed the right elbow wound.  At that visit, he was prescribed a course of Keflex for the right elbow wound which he completed yesterday.  Thus far for his home wound treatment, he reports that he has been utilizing antibacterial soap to clean the wound and leaving it open to air.  Sometimes applies hydrocortisone when he feels it gets itchy.  He has not had fever and/or chills.  As stated above, recently saw PCP for shingles 6/3 - he states that he feels the rash is continued near his knee and is in an extreme amount of pain with regards to this. He has been on antiviral therapy for the shingles since 6/3.  He continues to follow with PCP for pain control and shingles treatment.  Please see below for detailed ROS    6/18/2024: Dominick presents today for follow-up of traumatic elbow wound.  He is been utilizing Medihoney without difficulty.  He does report the only issues he is having are related to positioning of his arm.  He often feels that he cannot find a position that is comfortable when he is resting as there is frequent shearing/pressure forces to this location.  He has not had fever and/or chills.  Though the wound remains tender when palpated, denies increased pain or new symptoms to this area.    6/25/2024: Mr. Irving presents today for follow-up of elbow wound.  He feels his wound is looking good.  Has had no issues with use of PolyMem.  Regarding his wound, he has no new acute complaints.  He denies fever, chills.    7/2/2024: Mr. Irving presents today for follow-up of right elbow wound.  He arrived today without dressing on and stated it was d/t coming to wound center today but otherwise is keeping it covered.  He has not had fever and/or chills.    7/9/2024: Dominick presents " to wound center today for follow-up of right elbow wound.  He reports he has no new acute complaints today.  He denies fever, chills.  Of note upon intake, heart rate noted to be 51, near baseline -takes metoprolol.  He is asymptomatic.  See ROS.    7/16/2024: Dominick presents today for follow-up of right elbow wound.  He reports that last night he noticed the scab on his wound fell off.  He reports that he is keeping it covered and has been using the bacitracin ointment.  Of note, he did not arrive with dressing in place only with Tubifast covering the area. He denies fever, chills.  Also of note, on intake heart rate noted to be 48, per chart review baseline in the 50s.  Repeat heart rate 64.        The following portions of the patient's history were reviewed and updated as appropriate:   Patient Active Problem List   Diagnosis   • Nocturia   • Bladder mass   • Obstructive sleep apnea on CPAP   • Hypertension   • Malignant neoplasm of lateral wall of urinary bladder (HCC)   • History of gross hematuria   • Anxiety   • Chronic bilateral low back pain with bilateral sciatica   • Acute on chronic diastolic heart failure ([facility])   • Opioid withdrawal ([facility])   • RODO (acute kidney injury) ([facility])   • Intractable low back pain   • Chronic, continuous use of opioids   • Hyperglycemia   • Bradycardia   • Coronary artery disease   • Electrolyte abnormality   • Hyponatremia   • Acute respiratory failure with hypoxia ([facility])   • History of hematuria   • Morbid (severe) obesity due to excess calories ([facility])   • Suprapubic pressure   • Right groin pain   • Encounter for surgical aftercare following surgery of nervous system   • Abdominal pain   • Benign prostatic hyperplasia   • Acute low back pain with sciatica   • S/P laminectomy   • Lumbar radiculopathy   • Chronic pain syndrome   • Neuropathy   • Postlaminectomy syndrome   • Unable to ambulate   • Closed fracture of right ankle with routine healing   • COPD with acute exacerbation  "(HCC)   • Type 2 diabetes mellitus without complication, without long-term current use of insulin (HCC)   • History of bladder cancer   • Depression, recurrent (HCC)     Past Medical History:   Diagnosis Date   • Anxiety 3/01/2023   • Arthritis    • Bladder cancer (HCC)    • Cancer (HCC) 3/17/2023   • Cervical disc disorder 1/2016   • Chronic narcotic dependence (HCC)    • Chronic pain disorder     lower back and down both legs   • Colon polyp    • Coronary artery disease    • CPAP (continuous positive airway pressure) dependence     bi-pap   • Depression 3/17/2023   • Does use hearing aid     will wear DOS   • Full dentures    • Heart failure (HCC)     and \"fluid retention\" [facility]\"   • High cholesterol    • Hypertension    • Low back pain 2003   • Mild ankle edema     and feet   • Obstructive sleep apnea on CPAP    • Prediabetes    • Shortness of breath     per pt \"with just exertion\"   • Sleep apnea    • Wears glasses      Past Surgical History:   Procedure Laterality Date   • BACK SURGERY      titanium rods implanted   • CAUDAL BLOCK N/A 10/17/2023    Procedure: BLOCK / INJECTION CAUDAL;  Surgeon: Minh Rolon MD;  Location: [facility];  Service: Pain Management    • COLONOSCOPY     • CYSTOSCOPY W/ URETERAL STENT PLACEMENT Bilateral 03/17/2023    Procedure: bilateral retrograde;  Surgeon: Shan Sanabria MD;  Location: [facility];  Service: Urology   • HERNIA REPAIR      x5   • LUMBAR LAMINECTOMY N/A 06/30/2023    Procedure: Left L3-4 Metrx hemilaminectomy and bilateral foraminotomies;  Surgeon: Leland Aguilar MD;  Location:  MAIN OR;  Service: Neurosurgery   • SD CYSTO W/REMOVAL OF LESIONS SMALL N/A 05/01/2023    Procedure: CYSTOSCOPY TURBT;  Surgeon: Shan Sanabria MD;  [facility];  Service: Urology   • SD CYSTOURETHROSCOPY N/A 11/06/2023    Procedure: CYSTOSCOPY with  bladder biopsies and fulgeration;  Surgeon: Shan Sanabria MD;  [facility];  Service: Urology   • " SPINE SURGERY  2017   • TRANSURETHRAL RESECTION OF BLADDER TUMOR N/A 03/17/2023    Procedure: TRANSURETHRAL RESECTION OF BLADDER TUMOR (TURBT);  Surgeon: Shan Sanabria MD;  [facility]: [facility];  Service: Urology     Family History   Problem Relation Age of Onset   • Cancer Father         Adult leukemia   • Aortic aneurysm Father    • Leukemia Father    • Alcohol abuse Father    • Hypertension Mother    • Colon cancer Maternal Grandfather       Social History     Socioeconomic History   • Marital status: /Civil Union     Spouse name: None   • Number of children: None   • Years of education: None   • Highest education level: None   Occupational History   • None   Tobacco Use   • Smoking status: Former     Current packs/day: 1.00     Average packs/day: 1 pack/day for 50.6 years (50.6 ttl pk-yrs)     Types: Cigarettes     Start date: 2/1/2023     Passive exposure: Never   • Smokeless tobacco: Never   Vaping Use   • Vaping status: Never Used   Substance and Sexual Activity   • Alcohol use: Not Currently     Comment: 3 per year   • Drug use: Not Currently     Types: Marijuana   • Sexual activity: Not Currently     Partners: Female   Other Topics Concern   • None   Social History Narrative   • None     Social Determinants of Health     Financial Resource Strain: Low Risk  (9/21/2023)    Overall Financial Resource Strain (CARDIA)    • Difficulty of Paying Living Expenses: Not hard at all   Food Insecurity: No Food Insecurity (10/6/2023)    Hunger Vital Sign    • Worried About Running Out of Food in the Last Year: Never true    • Ran Out of Food in the Last Year: Never true   Transportation Needs: No Transportation Needs (10/6/2023)    PRAPARE - Transportation    • Lack of Transportation (Medical): No    • Lack of Transportation (Non-Medical): No   Physical Activity: Not on file   Stress: Not on file   Social Connections: Not on file   Intimate Partner Violence: Not on file   Housing Stability: Low Risk  (10/6/2023)     Housing Stability Vital Sign    • Unable to Pay for Housing in the Last Year: No    • Number of Times Moved in the Last Year: 1    • Homeless in the Last Year: No        Current Outpatient Medications:   •  aspirin 81 mg chewable tablet, Chew 81 mg daily, Disp: , Rfl:   •  atorvastatin (LIPITOR) 40 mg tablet, , Disp: , Rfl:   •  co-enzyme Q-10 30 MG capsule, Take 100 mg by mouth daily , Disp: , Rfl:   •  DULoxetine (CYMBALTA) 60 mg delayed release capsule, Take 1 capsule (60 mg total) by mouth daily, Disp: 30 capsule, Rfl: 5  •  Farxiga 5 MG TABS, 5 mg daily 5mg alternating with 2.5mg for fluid retention, Disp: , Rfl:   •  gabapentin (Neurontin) 600 MG tablet, Take 1 tablet (600 mg total) by mouth 4 (four) times a day, Disp: 120 tablet, Rfl: 3  •  HYDROmorphone (DILAUDID) 8 MG tablet, Take 1 tablet (8 mg total) by mouth every 4 (four) hours as needed for moderate pain Max Daily Amount: 48 mg, Disp: 180 tablet, Rfl: 0  •  metFORMIN (GLUCOPHAGE-XR) 500 mg 24 hr tablet, Take 2 tablets (1,000 mg total) by mouth daily with dinner, Disp: 60 tablet, Rfl: 3  •  metolazone (ZAROXOLYN) 2.5 mg tablet, Take 1 tablet (2.5 mg total) by mouth daily as needed (edema), Disp: 90 tablet, Rfl: 3  •  metoprolol succinate (TOPROL-XL) 25 mg 24 hr tablet, , Disp: , Rfl:   •  multivitamin-iron-minerals-folic acid (CENTRUM) chewable tablet, Chew 1 tablet daily, Disp: , Rfl:   •  naloxone (NARCAN) 4 mg/0.1 mL nasal spray, Administer 1 spray into a nostril. If no response after 2-3 minutes, give another dose in the other nostril using a new spray., Disp: 1 each, Rfl: 1  •  potassium chloride (Klor-Con M10) 10 mEq tablet, Take 2 tablets (20 mEq total) by mouth 2 (two) times a day, Disp: 120 tablet, Rfl: 3  •  potassium chloride (Klor-Con) 10 mEq tablet, , Disp: , Rfl:   •  torsemide (DEMADEX) 20 mg tablet, Take 2 tablets (40 mg total) by mouth 2 (two) times a day, Disp: 360 tablet, Rfl: 1  No current facility-administered medications for  this visit.    Review of Systems   Constitutional:  Negative for chills and fever.   Respiratory:  Negative for shortness of breath.    Cardiovascular:  Negative for chest pain and palpitations.   Musculoskeletal:  Positive for arthralgias.   Skin:  Positive for wound (R elbow).   Neurological:  Negative for dizziness and light-headedness.   Psychiatric/Behavioral:  The patient is not nervous/anxious.        Objective:  /56   Pulse 64   Temp (!) 97.4 °F (36.3 °C)   Resp 18   Pain Score: 0-No pain     Physical Exam  Vitals (initial hr 48, rpt 64 - he is asymptomatic) reviewed.   Constitutional:       General: He is not in acute distress.     Appearance: He is not ill-appearing or toxic-appearing.   HENT:      Head: Normocephalic and atraumatic.   Eyes:      General: No scleral icterus.  Pulmonary:      Effort: Pulmonary effort is normal. No respiratory distress.   Musculoskeletal:      Comments: ROM at elbow remains intact   Skin:     General: Skin is warm.      Findings: Wound (R elbow) present.             Comments: 1- Measurements unchanged though drainage has slightly increased.  Wound bed with predominantly granular tissue and thin biofilm layer mechanically debrided with gauze.  No acute erythema, increased warmth, lymphangitic streaking, purulent drainage.  No induration or fluctuance.   Neurological:      Mental Status: He is alert and oriented to person, place, and time.   Psychiatric:         Mood and Affect: Mood normal.         Behavior: Behavior normal.         Wound 06/11/24 Elbow Posterior;Right (Active)   Wound Image   07/16/24 0854   Wound Description Pink;Epithelialization 07/16/24 0857   Penelope-wound Assessment Scar Tissue;Hyperpigmented;Dry 07/16/24 0857   Wound Length (cm) 0.8 cm 07/16/24 0857   Wound Width (cm) 0.8 cm 07/16/24 0857   Wound Depth (cm) 0.1 cm 07/16/24 0857   Wound Surface Area (cm^2) 0.64 cm^2 07/16/24 0857   Wound Volume (cm^3) 0.064 cm^3 07/16/24 0857   Calculated Wound  Volume (cm^3) 0.06 cm^3 07/16/24 0857   Change in Wound Size % 77.78 07/16/24 0857   Drainage Amount Small 07/16/24 0857   Drainage Description Serosanguineous 07/16/24 0857   Non-staged Wound Description Full thickness 07/16/24 0857   Treatments Cleansed 06/18/24 1007   Dressing Other (Comment) 07/09/24 0807           Lab Results   Component Value Date    HGBA1C 8.7 (H) 05/09/2024       Wound Instructions:  Orders Placed This Encounter   Procedures   • Wound cleansing and dressings     Wound cleansing and dressings Posterior;Right Elbow          Wash your hands with soap and water.  Remove old dressing, discard into plastic bag and place in trash.       Cleanse the wound with a mild, unscented soap (Dove)   and then the Prophase cleanser that we gave you, prior to applying a clean dressing. Do not scrub the wound. Pat dry using gauze.     You may shower but must keep the dressing/ dressing dry. You can cover the dressing with a cast cover that can be purchased online.    Wash the wound last and use a new washcloth.     Right elbow wound dressing change:  Rinse with prophase   Apply PolyMem ( if you run out of PolyMem you may use Bacitracin) to the elbow wound  Cover with an ABD gauze and rolled gauze with tape  Do not apply tape to the skin     Change dressing every two day and as needed for excessive drainage     Watch for signs of infection (redness,warmth to the touch, increased pain, odor, pus, swelling, fever, chills, nausea, vomiting). If you notice     Standing Status:   Future     Standing Expiration Date:   7/23/2024     Mirian Horvath MD

## 2024-07-16 NOTE — Clinical Note
Good morning, I met with Mr. Irving today and he has stopped taking Methadone and Amitriptyline. States he was experiencing lightheadedness and dizziness and felt he could manage without these two medications. He is also now down to 3 doses of dilaudid daily with intent to further decrease in 1 week. Since we are no longer continuing methadone therapy I will defer his opioid management back to you as before. Let me know if you have any questions, and as always, thank you for taking care of Mr. Irivng.  [place]

## 2024-07-16 NOTE — PROGRESS NOTES
Wound Procedure Treatment Posterior;Right Elbow    Performed by: Jamil Downey RN  Authorized by: Mirian Horvath MD    Associated wounds:   Wound 06/11/24 Elbow Posterior;Right  Wound cleansed with:  NSS  Applied primary dressing:  Polymem foam  Applied secondary dressing:  ABD  Dressing secured with:  Fredis, Tape and Tubifast

## 2024-07-16 NOTE — PATIENT INSTRUCTIONS
Orders Placed This Encounter   Procedures    Wound cleansing and dressings     Wound cleansing and dressings Posterior;Right Elbow          Wash your hands with soap and water.  Remove old dressing, discard into plastic bag and place in trash.       Cleanse the wound with a mild, unscented soap (Dove)   and then the Prophase cleanser that we gave you, prior to applying a clean dressing. Do not scrub the wound. Pat dry using gauze.     You may shower but must keep the dressing/ dressing dry. You can cover the dressing with a cast cover that can be purchased online.    Wash the wound last and use a new washcloth.     Right elbow wound dressing change:  Rinse with prophase   Apply PolyMem ( if you run out of PolyMem you may use Bacitracin) to the elbow wound  Cover with an ABD gauze and rolled gauze with tape  Do not apply tape to the skin     Change dressing every two day and as needed for excessive drainage     Watch for signs of infection (redness,warmth to the touch, increased pain, odor, pus, swelling, fever, chills, nausea, vomiting). If you notice     Standing Status:   Future     Standing Expiration Date:   7/23/2024

## 2024-07-17 ENCOUNTER — TELEMEDICINE (OUTPATIENT)
Dept: PALLIATIVE MEDICINE | Facility: CLINIC | Age: 64
End: 2024-07-17

## 2024-07-17 DIAGNOSIS — Z71.89 COUNSELING AND COORDINATION OF CARE: Primary | ICD-10-CM

## 2024-07-17 PROCEDURE — NC001 PR NO CHARGE

## 2024-07-18 ENCOUNTER — PROCEDURE VISIT (OUTPATIENT)
Dept: UROLOGY | Facility: CLINIC | Age: 64
End: 2024-07-18
Payer: MEDICARE

## 2024-07-18 VITALS
DIASTOLIC BLOOD PRESSURE: 70 MMHG | OXYGEN SATURATION: 95 % | BODY MASS INDEX: 37.2 KG/M2 | TEMPERATURE: 97.2 F | WEIGHT: 237 LBS | SYSTOLIC BLOOD PRESSURE: 140 MMHG | HEART RATE: 53 BPM | HEIGHT: 67 IN

## 2024-07-18 DIAGNOSIS — Z85.51 HISTORY OF BLADDER CANCER: Primary | ICD-10-CM

## 2024-07-18 DIAGNOSIS — N40.1 BENIGN PROSTATIC HYPERPLASIA WITH LOWER URINARY TRACT SYMPTOMS, SYMPTOM DETAILS UNSPECIFIED: ICD-10-CM

## 2024-07-18 DIAGNOSIS — Z87.898 HISTORY OF GROSS HEMATURIA: ICD-10-CM

## 2024-07-18 PROCEDURE — 88112 CYTOPATH CELL ENHANCE TECH: CPT | Performed by: PATHOLOGY

## 2024-07-18 PROCEDURE — 52000 CYSTOURETHROSCOPY: CPT | Performed by: UROLOGY

## 2024-07-18 PROCEDURE — 81003 URINALYSIS AUTO W/O SCOPE: CPT | Performed by: UROLOGY

## 2024-07-19 ENCOUNTER — HOSPITAL ENCOUNTER (OUTPATIENT)
Dept: RADIOLOGY | Facility: HOSPITAL | Age: 64
End: 2024-07-19
Payer: MEDICARE

## 2024-07-19 ENCOUNTER — HOSPITAL ENCOUNTER (OUTPATIENT)
Dept: MRI IMAGING | Facility: HOSPITAL | Age: 64
End: 2024-07-19
Payer: MEDICARE

## 2024-07-19 DIAGNOSIS — M54.16 LUMBAR RADICULOPATHY: ICD-10-CM

## 2024-07-19 PROCEDURE — A9585 GADOBUTROL INJECTION: HCPCS | Performed by: RADIOLOGY

## 2024-07-19 PROCEDURE — 72158 MRI LUMBAR SPINE W/O & W/DYE: CPT

## 2024-07-19 PROCEDURE — 72110 X-RAY EXAM L-2 SPINE 4/>VWS: CPT

## 2024-07-19 RX ORDER — GADOBUTROL 604.72 MG/ML
10 INJECTION INTRAVENOUS
Status: COMPLETED | OUTPATIENT
Start: 2024-07-19 | End: 2024-07-19

## 2024-07-19 RX ADMIN — GADOBUTROL 10 ML: 604.72 INJECTION INTRAVENOUS at 08:56

## 2024-07-22 PROCEDURE — 88112 CYTOPATH CELL ENHANCE TECH: CPT | Performed by: PATHOLOGY

## 2024-07-24 ENCOUNTER — TELEMEDICINE (OUTPATIENT)
Dept: PALLIATIVE MEDICINE | Facility: CLINIC | Age: 64
End: 2024-07-24

## 2024-07-24 DIAGNOSIS — Z71.89 COUNSELING AND COORDINATION OF CARE: Primary | ICD-10-CM

## 2024-07-24 PROCEDURE — NC001 PR NO CHARGE

## 2024-07-24 NOTE — PROGRESS NOTES
Palliative Supportive Care  met with patient/family to continue to provide emotional support and guidance.      Updated biopsychosocial information relevant to support:    The patient was identified by name and date of birth.  Dominick was informed that this is a telemedicine visit and that the visit is being conducted through the Epic Embedded platform. They agree to proceed..  My office door was closed. No one else was in the room. They acknowledged consent and understanding of privacy and security of the video platform. The patient has agreed to participate and understands they can discontinue the visit at any time.    Dominick spoke about feeling discouraged this week. He feels upset that he cannot help his son  items from his mom's estate. He also feels poorly that he is only ambulating from his kitchen to his living room, sometimes outside to the porch. He spoke about the 7 months it took for him to get testing that showed issues with his nerves and spine. He spoke about feeling dismissed and unheard when he spoke about his pain by some providers. Even though he knows he should feel happy they found something because now they can explore treatment/surgery, he feels discouraged and upset. LCSW supported Dominick in processing his conflicting emotions related to his pain and back issues. LCSW also supported Dominick in exploring positives and challenged some of his negative thinking. He thanked LCSW for ongoing support.      Identified areas of need include: emotional support    Resources provided:    Areas that need future follow-up include: reduce depressed mood/anxiety, challenging negative thoughts    I have spent 40 minutes with Patient  today in which greater than 50% of this time was spent in counseling/coordination of care     Palliative  will follow-up as requested by patient, family, and primary team.  Please contact with any specific requests

## 2024-07-30 DIAGNOSIS — E78.2 MIXED HYPERLIPIDEMIA: Primary | ICD-10-CM

## 2024-07-31 ENCOUNTER — TELEMEDICINE (OUTPATIENT)
Dept: PALLIATIVE MEDICINE | Facility: CLINIC | Age: 64
End: 2024-07-31

## 2024-07-31 ENCOUNTER — TELEPHONE (OUTPATIENT)
Age: 64
End: 2024-07-31

## 2024-07-31 DIAGNOSIS — Z71.89 COUNSELING AND COORDINATION OF CARE: Primary | ICD-10-CM

## 2024-07-31 PROCEDURE — NC001 PR NO CHARGE

## 2024-07-31 RX ORDER — ATORVASTATIN CALCIUM 40 MG/1
40 TABLET, FILM COATED ORAL DAILY
Qty: 90 TABLET | Refills: 1 | Status: SHIPPED | OUTPATIENT
Start: 2024-07-31

## 2024-07-31 NOTE — TELEPHONE ENCOUNTER
Pt called to cx his virtual with Giovanna today 7/31. He had another virtual 8/6 that he will keep.  I was unable to cx 7/31 due to status of arrived.

## 2024-08-01 ENCOUNTER — RA CDI HCC (OUTPATIENT)
Dept: OTHER | Facility: HOSPITAL | Age: 64
End: 2024-08-01

## 2024-08-01 PROBLEM — I11.0 HYPERTENSIVE HEART DISEASE WITH CONGESTIVE HEART FAILURE (HCC): Status: ACTIVE | Noted: 2024-08-01

## 2024-08-01 NOTE — PROGRESS NOTES
HCC coding opportunities          Chart Reviewed number of suggestions sent to Provider: 2     Patients Insurance     Medicare Insurance: Medicare        I11.0  E11.65

## 2024-08-05 DIAGNOSIS — G89.4 CHRONIC PAIN SYNDROME: ICD-10-CM

## 2024-08-05 RX ORDER — HYDROMORPHONE HYDROCHLORIDE 8 MG/1
8 TABLET ORAL EVERY 4 HOURS PRN
Qty: 180 TABLET | Refills: 0 | Status: SHIPPED | OUTPATIENT
Start: 2024-08-05

## 2024-08-06 ENCOUNTER — TELEMEDICINE (OUTPATIENT)
Dept: PALLIATIVE MEDICINE | Facility: CLINIC | Age: 64
End: 2024-08-06

## 2024-08-06 DIAGNOSIS — Z71.89 COUNSELING AND COORDINATION OF CARE: Primary | ICD-10-CM

## 2024-08-06 PROCEDURE — NC001 PR NO CHARGE

## 2024-08-06 RX ORDER — POTASSIUM CHLORIDE 750 MG/1
TABLET, FILM COATED, EXTENDED RELEASE ORAL
COMMUNITY
Start: 2024-08-05

## 2024-08-06 NOTE — PROGRESS NOTES
"Palliative Supportive Care  met with patient/family to continue to provide emotional support and guidance.      Updated biopsychosocial information relevant to support:     The patient was identified by name and date of birth. Dominick was informed that this is a telemedicine visit and that the visit is being conducted through the Epic Embedded platform. They agree to proceed..  My office door was closed. No one else was in the room. They acknowledged consent and understanding of privacy and security of the video platform. The patient has agreed to participate and understands they can discontinue the visit at any time.    Dominick spoke about having a hard time accepting his physical limitations and pain levels. He said he took a hard fall a few days ago when he got up in the middle of the night. He said that he lost consciousness when he stood up and hit many parts of his body including his head on the way down. LCSw questioned if he had been checked out by a medical professional and encouraged him to be evaluated and he said no but he plans to tell his PCP tomorrow. He spoke about feeling depressed he cannot do most anything from his old routine. He gave an example of cooking and how he has had to have a lot more take out food recently because his wife is not a big cook and that was his job. LCSW discussed ways Dominick could try to modify tasks so he can regain some normalcy such as prepping/cooking from a chair. He said this would still require some assistance and he is tired of asking his wife to help him. LCSW supported Dominick in challenging his negative thoughts about being a \"burden\" for needing support and discussed if the pros of being able to increase some of his quality of life in small ways outweighed having to ask for help from his wife (who he self reports encourages him to ask for help). He said this discussion helped him think differently. LCSW supported Dominick in exploring how negative " thoughts and emotions can contribute to overall pain and decreased quality of life. Dominick was receptive to this it was discussed he will begin practicing a gratitude exercise daily to explore positives in his daily life.       Identified areas of need include: emotional support    Resources provided:    Areas that need future follow-up include: reduce anxiety/depressed mood, gratitude, challenging negative thoughts    I have spent 60 minutes with Patient  today in which greater than 50% of this time was spent in counseling/coordination of care     Palliative  will follow-up as requested by patient, family, and primary team.  Please contact with any specific requests

## 2024-08-08 ENCOUNTER — OFFICE VISIT (OUTPATIENT)
Dept: FAMILY MEDICINE CLINIC | Facility: CLINIC | Age: 64
End: 2024-08-08
Payer: MEDICARE

## 2024-08-08 VITALS
HEART RATE: 58 BPM | OXYGEN SATURATION: 95 % | BODY MASS INDEX: 38.61 KG/M2 | SYSTOLIC BLOOD PRESSURE: 130 MMHG | WEIGHT: 246 LBS | HEIGHT: 67 IN | DIASTOLIC BLOOD PRESSURE: 60 MMHG

## 2024-08-08 DIAGNOSIS — G89.4 CHRONIC PAIN SYNDROME: Primary | ICD-10-CM

## 2024-08-08 DIAGNOSIS — E11.9 TYPE 2 DIABETES MELLITUS WITHOUT COMPLICATION, WITHOUT LONG-TERM CURRENT USE OF INSULIN (HCC): ICD-10-CM

## 2024-08-08 PROCEDURE — 99214 OFFICE O/P EST MOD 30 MIN: CPT | Performed by: FAMILY MEDICINE

## 2024-08-08 PROCEDURE — G2211 COMPLEX E/M VISIT ADD ON: HCPCS | Performed by: FAMILY MEDICINE

## 2024-08-08 RX ORDER — HYDROMORPHONE HYDROCHLORIDE 8 MG/1
8 TABLET ORAL
Qty: 240 TABLET | Refills: 0 | Status: SHIPPED | OUTPATIENT
Start: 2024-08-08

## 2024-08-08 NOTE — PROGRESS NOTES
"Assessment/Plan:       1. Chronic pain syndrome  -     HYDROmorphone (DILAUDID) 8 MG tablet; Take 1 tablet (8 mg total) by mouth every 3 (three) hours as needed for moderate pain or severe pain Max Daily Amount: 64 mg  2. Type 2 diabetes mellitus without complication, without long-term current use of insulin ([facility])  Assessment & Plan:  POCT A1c read too high.  Will send to the lab for a test  Lab Results   Component Value Date    HGBA1C 8.7 (H) 05/09/2024           Subjective:      Patient ID: Dominick Irving is a 64 y.o. male.    Dominick's pain is no better the methadone did not help.  He came side effects of he is off that.  He is now on Dilaudid 8 mg 6 times a day.  In the past he was on 10 mg 6 times a day.  Open increase his frequency to every 3 hours instead of every 4 hours.  The pain is severe and worse than it has ever been.  It is affecting his quality of life and his ability to function at this point in time.  He does have appropriate follow-up with neurosurgery in about 10 days.  He does have a new compression fracture on MRI.  At L3-L4 it looks as though things have worsened.  Chronically he has diabetes his A1c did bump up 2 points last time and today's is too high to read him not sure what is going on there.  Working to send him to the lab for an A1c.  He does not have symptoms of polyuria polydipsia or polyphagia.  He is on metformin.        The following portions of the patient's history were reviewed and updated as appropriate: allergies, current medications, past family history, past medical history, past social history, past surgical history, and problem list.    Review of Systems   Respiratory: Negative.     Cardiovascular: Negative.          Objective:      /60 (BP Location: Left arm, Patient Position: Sitting, Cuff Size: Large)   Pulse 58   Ht 5' 7\" (1.702 m)   Wt 112 kg (246 lb)   SpO2 95%   BMI 38.53 kg/m²          Physical Exam  Vitals and nursing note reviewed.   Constitutional: "       Appearance: Normal appearance.   HENT:      Head: Normocephalic and atraumatic.      Nose: Nose normal.      Mouth/Throat:      Mouth: Mucous membranes are moist.   Eyes:      Extraocular Movements: Extraocular movements intact.      Pupils: Pupils are equal, round, and reactive to light.   Cardiovascular:      Rate and Rhythm: Normal rate and regular rhythm.      Pulses: Normal pulses.   Pulmonary:      Effort: Pulmonary effort is normal.      Breath sounds: Normal breath sounds.   Abdominal:      General: Bowel sounds are normal.      Palpations: Abdomen is soft.   Musculoskeletal:      Cervical back: Normal range of motion.   Skin:     General: Skin is warm and dry.      Capillary Refill: Capillary refill takes less than 2 seconds.   Neurological:      Mental Status: He is alert.   Psychiatric:         Mood and Affect: Mood normal.

## 2024-08-08 NOTE — ASSESSMENT & PLAN NOTE
POCT A1c read too high.  Will send to the lab for a test  Lab Results   Component Value Date    HGBA1C 8.7 (H) 05/09/2024

## 2024-08-13 ENCOUNTER — SOCIAL WORK (OUTPATIENT)
Dept: PALLIATIVE MEDICINE | Facility: CLINIC | Age: 64
End: 2024-08-13

## 2024-08-13 ENCOUNTER — OFFICE VISIT (OUTPATIENT)
Age: 64
End: 2024-08-13
Payer: MEDICARE

## 2024-08-13 VITALS
DIASTOLIC BLOOD PRESSURE: 70 MMHG | BODY MASS INDEX: 38.14 KG/M2 | WEIGHT: 243 LBS | SYSTOLIC BLOOD PRESSURE: 152 MMHG | HEIGHT: 67 IN | OXYGEN SATURATION: 98 % | HEART RATE: 63 BPM

## 2024-08-13 DIAGNOSIS — Z71.89 COUNSELING AND COORDINATION OF CARE: Primary | ICD-10-CM

## 2024-08-13 DIAGNOSIS — C67.3 MALIGNANT NEOPLASM OF ANTERIOR WALL OF URINARY BLADDER (HCC): Primary | ICD-10-CM

## 2024-08-13 DIAGNOSIS — M54.42 CHRONIC BILATERAL LOW BACK PAIN WITH BILATERAL SCIATICA: ICD-10-CM

## 2024-08-13 DIAGNOSIS — Z51.5 PALLIATIVE CARE BY SPECIALIST: ICD-10-CM

## 2024-08-13 DIAGNOSIS — C67.2 MALIGNANT NEOPLASM OF LATERAL WALL OF URINARY BLADDER (HCC): ICD-10-CM

## 2024-08-13 DIAGNOSIS — G89.29 CHRONIC BILATERAL LOW BACK PAIN WITH BILATERAL SCIATICA: ICD-10-CM

## 2024-08-13 DIAGNOSIS — I50.32 CHRONIC DIASTOLIC HEART FAILURE (HCC): ICD-10-CM

## 2024-08-13 DIAGNOSIS — M54.41 CHRONIC BILATERAL LOW BACK PAIN WITH BILATERAL SCIATICA: ICD-10-CM

## 2024-08-13 PROCEDURE — 99214 OFFICE O/P EST MOD 30 MIN: CPT | Performed by: PHYSICIAN ASSISTANT

## 2024-08-13 PROCEDURE — NC001 PR NO CHARGE

## 2024-08-13 NOTE — PROGRESS NOTES
Ambulatory Visit  Name: Dominick Irving      : 1960      MRN: [id number]  Encounter Provider: Lake Connors PA-C  Encounter Date: 2024   Encounter department: [facility] [place]    Assessment & Plan   1. 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 Chronic bilateral low back pain with bilateral sciatica  3. Chronic diastolic heart failure (HCC)  4. Malignant neoplasm of lateral wall of urinary bladder (HCC)  5. Palliative care by specialist    Continue hydromorphone (rx by PCP)  Follow up with neurosurgery as scheduled  Goals: Open to all interventions for his back pain, including surgery  Continue acupuncture  Patient would be interested in aquatic therapy but this does not appear to be available in his area. SW to reach out to discuss any options out of network  Follow up in 4 weeks in [place]    PDMP Review: I have reviewed the patient's controlled substance dispensing history in the Prescription Drug Monitoring Program in compliance with the [facility] regulations before prescribing any controlled substances.  Filled  Written  ID  Drug  QTY  Days  Prescriber  RX #  Dispenser  Refill  Daily Dose*  Pymt Type      2024 1 Hydromorphone 8 Mg Tablet 180.00 30 [facility] [place] [id number] Tho (4168) 0 240.00 [facility] [place]   2024 1 Methadone Hcl 5 Mg Tablet 42.00 14 An [place] [id number] Tho (4168) 0 45.00 [place]       History of Present Illness     Dominick Irving is a 64 y.o. male with hx of high-grade papillary urothelial carcinoma and chronic low back pain presents for follow up for symptom management.    Since last visit, patient had another MRI which showed a new compression fracture of L4, worsening spondylosis at L3-L4 and moderate canal stenosis. He is scheduled to follow up with neurosurgery on 24. Today he complains of pain in his back that is the worst it has ever been. His PCP increased his hydromorphone to  "every 3 hours due to the increased pain. He is having more difficulty walking. He gets sharp shooting pain from his heel to his hip in certain positions. He feels frustrated with his ongoing collins with back pain over the past year and a half. He is hopeful there may be a surgical fix to help reduce this pain and improve his function. He will continue with alternative therapies and counseling. He shows interest in aquatic therapy but has not found any close locations to his home.      Review of Systems   Respiratory:  Negative for shortness of breath.    Cardiovascular:  Negative for chest pain.   Gastrointestinal:  Negative for abdominal pain.   Musculoskeletal:  Positive for arthralgias, back pain and gait problem.   Neurological:  Positive for weakness.   Psychiatric/Behavioral:  Positive for dysphoric mood.          Objective     /70   Pulse 63   Ht 5' 7\" (1.702 m)   Wt 110 kg (243 lb)   SpO2 98%   BMI 38.06 kg/m²     Physical Exam  Vitals and nursing note reviewed.   Constitutional:       Comments: walker   HENT:      Head: Normocephalic and atraumatic.   Cardiovascular:      Rate and Rhythm: Normal rate.   Pulmonary:      Effort: Pulmonary effort is normal.   Neurological:      Mental Status: He is alert and oriented to person, place, and time.   Psychiatric:         Mood and Affect: Mood normal.         Behavior: Behavior normal.         Recent labs:  Lab Results   Component Value Date/Time    SODIUM 137 07/15/2024 08:15 AM    K 3.9 07/15/2024 08:15 AM    BUN 31 (H) 07/15/2024 08:15 AM    CREATININE 1.23 07/15/2024 08:15 AM    GLUC 149 (H) 03/29/2024 11:13 AM    CALCIUM 10.0 07/15/2024 08:15 AM    AST 16 03/29/2024 11:13 AM    ALT 17 03/29/2024 11:13 AM    ALB 3.9 03/29/2024 11:13 AM    TP 7.6 03/29/2024 11:13 AM    EGFR 61 07/15/2024 08:15 AM     Lab Results   Component Value Date/Time    HGB 12.3 03/29/2024 11:13 AM    WBC 7.15 03/29/2024 11:13 AM     03/29/2024 11:13 AM    INR 0.97 " 03/29/2024 11:13 AM    PTT 31 03/29/2024 11:13 AM     Lab Results   Component Value Date/Time    CFK4NQSTCQLD 0.854 06/26/2023 08:57 AM       Recent Imaging:  Procedure: MRI lumbar spine with and without contrast    Result Date: 7/24/2024  Narrative: MRI LUMBAR SPINE WITH AND WITHOUT CONTRAST INDICATION: M54.16: Radiculopathy, lumbar region.   Prior lumbar spine surgery. COMPARISON: Lumbar spine MR from 10/5/2023, 8/11/2023 TECHNIQUE:  Multiplanar, multisequence imaging of the lumbar spine was performed before and after gadolinium administration. . IV Contrast:  10 mL of Gadobutrol injection (SINGLE-DOSE) IMAGE QUALITY:  Diagnostic FINDINGS: VERTEBRAL BODIES:  There are 5 lumbar type vertebral bodies. There is mild levocurvature of the lumbar spine, without listhesis. There are postsurgical changes from posterior lumbar spinal fusion procedure with laminectomies at L4-L5, with left-sided laminectomy at L3. There is a new acute to subacute compression fracture involving the superior endplate of L4, with associated bone marrow edema and minimal height loss involving the superior endplate noted. There are additional degenerative endplate changes at L3-L4, and L5-S1. The remaining bone marrow signal intensity appears grossly normal. SACRUM:  Normal signal within the sacrum. No evidence of insufficiency or stress fracture. DISTAL CORD AND CONUS:  Normal size and signal within the distal cord and conus. PARASPINAL SOFT TISSUES: There is a stable fluid collection involving the laminectomy bed, which like represents a small postoperative seroma. No communication with the thecal sac is identified. LOWER THORACIC DISC SPACES:  Normal disc height and signal.  No disc herniation, canal stenosis or foraminal narrowing. LUMBAR DISC SPACES: L1-L2: Mild facet arthropathy. Otherwise normal. L2-L3: Mild disc bulge with stable small left foraminal disc protrusion. Mild bilateral facet arthropathy. No canal stenosis. Mild left neural  foraminal stenosis. Findings appear stable. L3-L4: Disc desiccation, disc height loss, diffuse disc bulge. Bilateral facet arthropathy. There is moderate canal stenosis. There is moderate right and mild to moderate left neural foraminal stenosis. Findings appear worse when compared to the prior examination. L4-L5: Stable posterior osteophyte. No canal stenosis. Mild bilateral neural foraminal stenosis. Findings appear stable. L5-S1: Disc osteophyte complex asymmetric to the left extending into the left foraminal and extraforaminal region. Bilateral facet arthropathy. No canal stenosis. Moderate left neural foraminal stenosis and mild left lateral recess stenosis again demonstrated. Findings appear unchanged. POSTCONTRAST IMAGING: No new abnormal enhancement is identified. There is stable enhancing granulation tissue noted along the thecal sac at the fused levels. OTHER FINDINGS:  None.     Impression: Mild new height loss involving the superior endplate of L4, with associated bone marrow edema, indicative of a subtle new acute to subacute compression fracture at this level. Prior posterior lumbar spine fusion procedure at L4-L5, with laminectomies as described above. Worsening lumbar spondylosis, at L3-L4, with a diffuse disc bulge lateral facet arthropathy, contributing to moderate canal stenosis, moderate right and mild to moderate left neural foraminal stenosis. Degenerative changes at remaining levels appear grossly stable. Workstation performed: [id number]     Procedure: XR spine lumbar minimum 4 views non injury    Result Date: 7/19/2024  Narrative: LUMBAR SPINE INDICATION:   Radiculopathy, lumbar region. COMPARISON:  None. VIEWS:  XR SPINE LUMBAR MINIMUM 4 VIEWS NON INJURY Images: 4 FINDINGS: There are 5 non rib bearing lumbar vertebral bodies. There is no evidence of acute fracture or destructive osseous lesion. Slight anterolisthesis L3/4. . No dynamic instability. Stable L4/5 posterior instrumented fusion  with stable disc height loss L3/4 and L5/S1. The pedicles appear intact. There are atherosclerotic calcifications. Soft tissues are otherwise unremarkable.     Impression: No acute osseous abnormality.  Degenerative changes as described. Electronically signed: 07/19/2024 01:47 PM Kamaljit Lynn, MPH,MD       Administrative Statements   I have spent a total time of 30 minutes in caring for this patient on the day of the visit/encounter including Diagnostic results, Risks and benefits of tx options, Instructions for management, Patient and family education, Importance of tx compliance, Risk factor reductions, Counseling / Coordination of care, Documenting in the medical record, Reviewing / ordering tests, medicine, procedures  , Obtaining or reviewing history  , and Communicating with other healthcare professionals . Topics discussed with the patient / family include symptom assessment and management, medication review, psychosocial support, goals of care, supportive listening, and anticipatory guidance.

## 2024-08-13 NOTE — PROGRESS NOTES
[facility] SW task received inquiring about aquatic PT for pt that is local to his area. LSW able to confirm [facility] and [facility] are local and will provide aquatic therapy services. Discussed further with pt. Pt would be interested in services at [facility] location, however he does not want to pursue referral until after his appt with neurosurgery next week. Reviewed with pt will request [facility] provider to place order for referral--and when pt is ready for referral to be sent he can inform LSW and referral will be placed to intake at F: [phone]. Pt agreeable and appreciative of same.     [facility] provider updated of above.

## 2024-08-14 ENCOUNTER — TELEMEDICINE (OUTPATIENT)
Dept: PALLIATIVE MEDICINE | Facility: CLINIC | Age: 64
End: 2024-08-14

## 2024-08-14 DIAGNOSIS — Z71.89 COUNSELING AND COORDINATION OF CARE: Primary | ICD-10-CM

## 2024-08-14 PROCEDURE — NC001 PR NO CHARGE

## 2024-08-14 NOTE — PROGRESS NOTES
Palliative Supportive Care  met with patient/family to continue to provide emotional support and guidance.      Updated biopsychosocial information relevant to support:     The patient was identified by name and date of birth. Dominick  was informed that this is a telemedicine visit and that the visit is being conducted through the Epic Embedded platform. They agree to proceed..  My office door was closed. No one else was in the room. They acknowledged consent and understanding of privacy and security of the video platform. The patient has agreed to participate and understands they can discontinue the visit at any time.    Dominick spoke about his upcoming appointment with neurosurgery and trying to get mentally prepared. He worries he will be told there is not anything they can do to help his pain and he discussed this concern with his PCS provider. He said they discussed alternatives in the case that this were to occur which was helpful because he feels he at least has avenues to explore. He is exploring aquatic therapy. He said he was proud of himself that he made chocolate chip cookies for the first time in a long time. He said it was very difficult but he used positive self talk to remind himself to be patient and calm. He also is going through his mom's belongings and found some interesting things of his father's he didn't know about. He said it was nice to see a different side of his dad. LCSW discussed the gratitude assignment and Dominick said he did not do it because he was so focused on his pain and the negative. LCSW supported him in identifying a few positives in his daily life and things he is grateful for.       Identified areas of need include: emotional support    Resources provided:    Areas that need future follow-up include: reduce anxiety/depressed mood, positive self talk, gratitude    I have spent 60 minutes with Patient  today in which greater than 50% of this time was spent in  counseling/coordination of care     Palliative  will follow-up as requested by patient, family, and primary team.  Please contact with any specific requests

## 2024-08-20 ENCOUNTER — OFFICE VISIT (OUTPATIENT)
Dept: NEUROSURGERY | Facility: CLINIC | Age: 64
End: 2024-08-20
Payer: MEDICARE

## 2024-08-20 VITALS
HEIGHT: 67 IN | OXYGEN SATURATION: 94 % | DIASTOLIC BLOOD PRESSURE: 72 MMHG | WEIGHT: 238.3 LBS | RESPIRATION RATE: 18 BRPM | SYSTOLIC BLOOD PRESSURE: 128 MMHG | TEMPERATURE: 97.6 F | HEART RATE: 64 BPM | BODY MASS INDEX: 37.4 KG/M2

## 2024-08-20 DIAGNOSIS — M54.9 MECHANICAL BACK PAIN: Primary | ICD-10-CM

## 2024-08-20 DIAGNOSIS — M48.061 SPINAL STENOSIS OF LUMBAR REGION: ICD-10-CM

## 2024-08-20 DIAGNOSIS — M47.816 FACET ARTHROPATHY, LUMBAR: ICD-10-CM

## 2024-08-20 DIAGNOSIS — R26.81 UNSTABLE GAIT: ICD-10-CM

## 2024-08-20 PROCEDURE — 99214 OFFICE O/P EST MOD 30 MIN: CPT | Performed by: NEUROLOGICAL SURGERY

## 2024-08-20 RX ORDER — CEFAZOLIN SODIUM 2 G/50ML
2000 SOLUTION INTRAVENOUS ONCE
OUTPATIENT
Start: 2024-08-20 | End: 2024-08-20

## 2024-08-20 RX ORDER — CHLORHEXIDINE GLUCONATE ORAL RINSE 1.2 MG/ML
15 SOLUTION DENTAL ONCE
OUTPATIENT
Start: 2024-08-20 | End: 2024-08-20

## 2024-08-20 NOTE — PROGRESS NOTES
DISCUSSION SUMMARY   This is a 64 y.o. male with a previous L4 five fusion who now has adjacent level disease from which he is symptomatic and refractory conservative measures. I've recommended that he have an incorporated fusion into his previous 45 level. We did speak carefully about his need to incorporate physical therapy into his daily life for the rest of his life. This does not mean that he needs to go to a formal physical therapy program, but rather that he needs to incorporate these principles into his daily life. If this is not done, then subsequent additional levels would need to be done in the future.    The risks, benefits and complications of surgery were explained in detail to Dominick Irving  including hemorrhage, infection, CSF leakage, wound problems, pain, weakness, numbness, dysesthesiae, paralysis, coma, and death. Also, the possibility  of further surgery being required was emphasized.     Other potential medical complications were outlined, including deep venous thrombosis, pulmonary embolism, pneumonia, urinary tract infection, myocardial infarction,  and stroke.     The need for physical therapy, occupational therapy, and rehabilitation was also mentioned. The alternatives to surgery were discussed. Dominick Irving asked relevant questions and asked that we proceed with arrangements for surgery.     Return for Surgical intervention.      Diagnosis ICD-10-CM Associated Orders   1. Mechanical back pain  M54.9 Case request operating room: Robotically assisted L3-4 decompressive hemilaminectomy and foraminotomies with transverse lumbar interbody fusion right-sided approach with add-on to an L4-5 fixation fusion     Case request operating room: Robotically assisted L3-4 decompressive hemilaminectomy and foraminotomies with transverse lumbar interbody fusion right-sided approach with add-on to an L4-5 fixation fusion     CT lumbar spine without contrast      2. Facet arthropathy, lumbar  M47.816  Case request operating room: Robotically assisted L3-4 decompressive hemilaminectomy and foraminotomies with transverse lumbar interbody fusion right-sided approach with add-on to an L4-5 fixation fusion     Case request operating room: Robotically assisted L3-4 decompressive hemilaminectomy and foraminotomies with transverse lumbar interbody fusion right-sided approach with add-on to an L4-5 fixation fusion     CT lumbar spine without contrast      3. Spinal stenosis of lumbar region  M48.061 Case request operating room: Robotically assisted L3-4 decompressive hemilaminectomy and foraminotomies with transverse lumbar interbody fusion right-sided approach with add-on to an L4-5 fixation fusion     Case request operating room: Robotically assisted L3-4 decompressive hemilaminectomy and foraminotomies with transverse lumbar interbody fusion right-sided approach with add-on to an L4-5 fixation fusion     CT lumbar spine without contrast      4. Unstable gait  R26.81 Walker rolling           Chief Complaint   Patient presents with    Follow-up     follow up after mri lumbar spine on 7/19/24 at [facility] at 930 am  placed on waitlist for a sooner follow up in the office with DKO           HPILow back pain radiating to the right hip  Numbness in his feet right worse than left  Pain radiates from heel up into the hip with stepping  Muscle spasms in the right hip  Weakness in both legs  Many falls from legs giving out.  Uses a walker out of the house and a cane at home  PT plateued and currently on a break  2 episodes of incontinence  Unable to do steps  He stopped most of his narcotics 45 days ago of his own volition.  He now uses only Dilaudid and Neurontin for pain control.  Had been on more extensive narcotic regimen  Went through a decompressive procedure 6/23 for stenosis via hemilaminectomy and bilateral foraminotomy  2015 decompresssion fixation fusion at [facility] 2015    Review of Systems   Constitutional:  "Negative.    HENT: Negative.     Eyes: Negative.    Respiratory: Negative.     Cardiovascular: Negative.    Gastrointestinal: Negative.    Endocrine: Negative.    Genitourinary:         Has had 2 episodes of incontinence of the bladder    Musculoskeletal:  Positive for back pain (constant LBP 8/10 radiates to hips R>L buttocks and down legs into both feet), gait problem (off balance, uses walker for support when out of house, uses cane at home, hx of falls, 10 since last visit) and myalgias (muscle spasms from right hip to right foot, feels jolts of pain inthe right foot that travel upwards into leg.).   Skin: Negative.    Allergic/Immunologic: Negative.    Neurological:  Positive for dizziness, weakness (bilateral leg weakness) and numbness (both in feet but right foot has been numb for about 2 months).   Hematological: Negative.    Psychiatric/Behavioral: Negative.     I reviewed the ROS    Vitals:    /72 (BP Location: Left arm, Patient Position: Sitting, Cuff Size: Adult)   Pulse 64   Temp 97.6 °F (36.4 °C) (Temporal)   Resp 18   Ht 5' 7\" (1.702 m)   Wt 108 kg (238 lb 4.8 oz)   SpO2 94%   BMI 37.32 kg/m²     MEDICAL HISTORY  Past Medical History:   Diagnosis Date    Anxiety 3/01/2023    Arthritis     Bladder cancer (HCC)     Cancer (HCC) 3/17/2023    Cervical disc disorder 1/2016    Chronic narcotic dependence (HCC)     Chronic pain disorder     lower back and down both legs    Colon polyp     Coronary artery disease     CPAP (continuous positive airway pressure) dependence     bi-pap    Depression 3/17/2023    Does use hearing aid     will wear DOS    Full dentures     Heart failure (HCC)     and \"fluid retention\" SL ABDULAZIZ Brito cardio PA\"    High cholesterol     Hypertension     Low back pain 2003    Mild ankle edema     and feet    Obstructive sleep apnea on CPAP     Prediabetes     Shortness of breath     per pt \"with just exertion\"    Sleep apnea     Wears glasses      Past Surgical History: "   Procedure Laterality Date    BACK SURGERY      titanium rods implanted    CAUDAL BLOCK N/A 10/17/2023    Procedure: BLOCK / INJECTION CAUDAL;  Surgeon: Minh Rolon MD;  Location: [facility];  Service: Pain Management     COLONOSCOPY      CYSTOSCOPY W/ URETERAL STENT PLACEMENT Bilateral 03/17/2023    Procedure: bilateral retrograde;  Surgeon: Shan Sanabria MD;  Location: [facility];  Service: Urology    HERNIA REPAIR      x5    LUMBAR LAMINECTOMY N/A 06/30/2023    Procedure: Left L3-4 Metrx hemilaminectomy and bilateral foraminotomies;  Surgeon: Leland Aguilar MD;  [facility];  Service: Neurosurgery    VA CYSTO W/REMOVAL OF LESIONS SMALL N/A 05/01/2023    Procedure: CYSTOSCOPY TURBT;  Surgeon: Shan Sanabria MD;  [facility];  Service: Urology    VA CYSTOURETHROSCOPY N/A 11/06/2023    Procedure: CYSTOSCOPY with  bladder biopsies and fulgeration;  Surgeon: Shan Sanabria MD;  [facility];  Service: Urology    SPINE SURGERY  2017    TRANSURETHRAL RESECTION OF BLADDER TUMOR N/A 03/17/2023    Procedure: TRANSURETHRAL RESECTION OF BLADDER TUMOR (TURBT);  Surgeon: Shan Sanabria MD;  [facility];  Service: Urology     Social History     Tobacco Use    Smoking status: Former     Current packs/day: 1.00     Average packs/day: 1 pack/day for 50.6 years (50.6 ttl pk-yrs)     Types: Cigarettes     Start date: 2/1/2023     Passive exposure: Never    Smokeless tobacco: Never   Vaping Use    Vaping status: Never Used   Substance Use Topics    Alcohol use: Not Currently     Comment: 3 per year    Drug use: Not Currently     Types: Marijuana      Family History   Problem Relation Age of Onset    Cancer Father         Adult leukemia    Aortic aneurysm Father     Leukemia Father     Alcohol abuse Father     Hypertension Mother     Colon cancer Maternal Grandfather         Current Outpatient Medications:     aspirin 81 mg chewable tablet, Chew 81 mg daily, Disp: , Rfl:     atorvastatin  (LIPITOR) 40 mg tablet, Take 1 tablet (40 mg total) by mouth daily, Disp: 90 tablet, Rfl: 1    co-enzyme Q-10 30 MG capsule, Take 100 mg by mouth daily , Disp: , Rfl:     DULoxetine (CYMBALTA) 60 mg delayed release capsule, Take 1 capsule (60 mg total) by mouth daily, Disp: 30 capsule, Rfl: 5    Farxiga 5 MG TABS, 5 mg daily 5mg alternating with 2.5mg for fluid retention, Disp: , Rfl:     gabapentin (Neurontin) 600 MG tablet, Take 1 tablet (600 mg total) by mouth 4 (four) times a day, Disp: 120 tablet, Rfl: 3    HYDROmorphone (DILAUDID) 8 MG tablet, Take 1 tablet (8 mg total) by mouth every 4 (four) hours as needed for moderate pain Max Daily Amount: 48 mg, Disp: 180 tablet, Rfl: 0    HYDROmorphone (DILAUDID) 8 MG tablet, Take 1 tablet (8 mg total) by mouth every 3 (three) hours as needed for moderate pain or severe pain Max Daily Amount: 64 mg, Disp: 240 tablet, Rfl: 0    metFORMIN (GLUCOPHAGE-XR) 500 mg 24 hr tablet, Take 2 tablets (1,000 mg total) by mouth daily with dinner, Disp: 60 tablet, Rfl: 3    metolazone (ZAROXOLYN) 2.5 mg tablet, Take 1 tablet (2.5 mg total) by mouth daily as needed (edema), Disp: 90 tablet, Rfl: 3    multivitamin-iron-minerals-folic acid (CENTRUM) chewable tablet, Chew 1 tablet daily, Disp: , Rfl:     potassium chloride (Klor-Con M10) 10 mEq tablet, Take 2 tablets (20 mEq total) by mouth 2 (two) times a day, Disp: 120 tablet, Rfl: 3    potassium chloride (Klor-Con) 10 mEq tablet, , Disp: , Rfl:     torsemide (DEMADEX) 20 mg tablet, Take 2 tablets (40 mg total) by mouth 2 (two) times a day, Disp: 360 tablet, Rfl: 1    metoprolol succinate (TOPROL-XL) 25 mg 24 hr tablet, , Disp: , Rfl:     naloxone (NARCAN) 4 mg/0.1 mL nasal spray, Administer 1 spray into a nostril. If no response after 2-3 minutes, give another dose in the other nostril using a new spray., Disp: 1 each, Rfl: 1     Allergies   Allergen Reactions    Mirtazapine Other (See Comments) and Confusion     Pt drove without  knowing and woke up at a new destination    Venlafaxine Dizziness        The following portions of the patient's history were updated by MA and reviewed by MD: allergies, current medications, past family history, past medical history, past social history, past surgical history, and problem list.    Physical Exam  Vitals and nursing note reviewed.   Constitutional:       General: He is not in acute distress.     Appearance: Normal appearance. He is normal weight. He is not ill-appearing, toxic-appearing or diaphoretic.   HENT:      Head: Normocephalic and atraumatic.      Nose: Nose normal.   Eyes:      Extraocular Movements: Extraocular movements intact.      Pupils: Pupils are equal, round, and reactive to light.   Cardiovascular:      Rate and Rhythm: Normal rate.   Pulmonary:      Effort: Pulmonary effort is normal.   Abdominal:      General: Abdomen is flat.   Musculoskeletal:         General: No swelling, tenderness, deformity or signs of injury.      Cervical back: Normal range of motion and neck supple.      Right lower leg: No edema.      Left lower leg: No edema.      Comments: Range of motion of the LS spine severely reduced   Skin:     General: Skin is warm and dry.   Neurological:      Mental Status: He is alert and oriented to person, place, and time. Mental status is at baseline.      Cranial Nerves: No cranial nerve deficit.      Sensory: Sensory deficit (Patchy non radicular - stocking glove - position sense poor) present.      Motor: Weakness (bilateral lower extremeties) present.      Coordination: Coordination normal.      Gait: Gait abnormal (unsteady gair.  Not able to perform tandem gait).      Deep Tendon Reflexes: Reflexes abnormal (absent at patellar and achilles).   Psychiatric:         Mood and Affect: Mood normal.         Behavior: Behavior normal.         Thought Content: Thought content normal.         Judgment: Judgment normal.     RESULTS/DATA  I have personally reviewed pertinent  films in [facility] of the LS spine demonstrates  MRI and plain xrays of the LS spine carefully reviewed.  These demonstrates a single level fusion at L4/5 and an adjacent level central stenosis and facet hypertrophy.  There is a disc osteophyte complex as well at the L3/4 level which contributes to the spinal stenosis.

## 2024-08-21 ENCOUNTER — TELEMEDICINE (OUTPATIENT)
Dept: PALLIATIVE MEDICINE | Facility: CLINIC | Age: 64
End: 2024-08-21

## 2024-08-21 ENCOUNTER — TELEPHONE (OUTPATIENT)
Dept: PALLIATIVE MEDICINE | Facility: CLINIC | Age: 64
End: 2024-08-21

## 2024-08-21 ENCOUNTER — SOCIAL WORK (OUTPATIENT)
Dept: PALLIATIVE MEDICINE | Facility: CLINIC | Age: 64
End: 2024-08-21

## 2024-08-21 DIAGNOSIS — Z71.89 COUNSELING AND COORDINATION OF CARE: Primary | ICD-10-CM

## 2024-08-21 DIAGNOSIS — R53.81 PHYSICAL DECONDITIONING: ICD-10-CM

## 2024-08-21 DIAGNOSIS — R20.2 PARESTHESIA OF BILATERAL LEGS: ICD-10-CM

## 2024-08-21 DIAGNOSIS — G62.9 NEUROPATHY: ICD-10-CM

## 2024-08-21 DIAGNOSIS — Z74.09 SLIGHTLY LIMITED MOBILITY: ICD-10-CM

## 2024-08-21 DIAGNOSIS — M54.41 CHRONIC BILATERAL LOW BACK PAIN WITH BILATERAL SCIATICA: Primary | ICD-10-CM

## 2024-08-21 DIAGNOSIS — M54.42 CHRONIC BILATERAL LOW BACK PAIN WITH BILATERAL SCIATICA: Primary | ICD-10-CM

## 2024-08-21 DIAGNOSIS — G89.29 CHRONIC BILATERAL LOW BACK PAIN WITH BILATERAL SCIATICA: Primary | ICD-10-CM

## 2024-08-21 PROCEDURE — NC001 PR NO CHARGE

## 2024-08-21 NOTE — PROGRESS NOTES
Referral placed to [facility] (F: [phone]) for aquatic therapy services per pt's request.

## 2024-08-21 NOTE — PROGRESS NOTES
Palliative Supportive Care  met with patient/family to continue to provide emotional support and guidance.      Updated biopsychosocial information relevant to support:     The patient was identified by name and date of birth. Dominick was informed that this is a telemedicine visit and that the visit is being conducted through the Epic Embedded platform. They agree to proceed..  My office door was closed. No one else was in the room. They acknowledged consent and understanding of privacy and security of the video platform. The patient has agreed to participate and understands they can discontinue the visit at any time.    Dominick spoke about seeing the neurosurgeon and learning more about what is wrong with his back. He learned that his disk fracture is pressing on a nerve every time he stands and walks. He opted to do surgery and wants to be cautiously optimistic it will get him some relief. His concern is how long the wait will be for surgery but does not want to worry about something he can't do anything about. LCSW supported Dominick in challenging his negative thoughts. He said that he is trying not to dwell on the negative but it has been a long journey. He spoke about some of the interesting things he found in his parent's belongings that he never knew about. He said it has been a nice distraction from his current health and mobility issues. He asked to end early as he was feeling very tired and talked out for today.      Identified areas of need include: emotional support    Resources provided:    Areas that need future follow-up include: reduce anxiety/depressed mood, challenging negative thoughts    I have spent 35 minutes with Patient  today in which greater than 50% of this time was spent in counseling/coordination of care     Palliative  will follow-up as requested by patient, family, and primary team.  Please contact with any specific requests

## 2024-08-21 NOTE — TELEPHONE ENCOUNTER
----- Message from Carol TREVIZO sent at 8/21/2024  9:17 AM EDT -----  Regarding: RE: Aquatic therapy  I do not see the order in the chart--Could you please place? Thanks!  ----- Message -----  From: Lake Connors PA-C  Sent: 8/21/2024   9:00 AM EDT  To: JUAN Loomis; Lake Connors PA-C  Subject: FW: Aquatic therapy                              Follow up on referral to [facility] in [place] for aquatic therapy  ----- Message -----  From: JUAN Loomis  Sent: 8/13/2024   2:51 PM EDT  To: Lake Connors PA-C  Subject: RE: Aquatic therapy                              Spoke with pt--he would like to proceed with [facility] in [place], however he does not want to schedule until after his neurosx appt next week. I told him that you could place the order and then when he is ready for me to send the referral he can call our office and let me know and I will send it over.  ----- Message -----  From: Lake Connors PA-C  Sent: 8/13/2024   2:42 PM EDT  To: JUAN Loomis  Subject: RE: Aquatic therapy                              I would appreciate that, thank you!  ----- Message -----  From: JUAN Loomis  Sent: 8/13/2024   9:38 AM EDT  To: Lake Connors PA-C  Subject: RE: Aquatic therapy                              2 closest options would be [facility] in [place]; other is [facility] in [place]. [facility] does have some criteria that pt would need to be less than 280lbs, and cannot have any wounds or incontinence. Do you want me to reach out to patient to see if he wants a referral to either?  ----- Message -----  From: Lake Connors PA-C  Sent: 8/13/2024   8:57 AM EDT  To: Palliative Care   Subject: Aquatic therapy                                  Would you be able to see if you can find Aquatic therapy in or out of network that is closest to [place]. The closes in-network I found were [facility] but these would be over an hour away for  this patient.

## 2024-08-28 ENCOUNTER — TELEPHONE (OUTPATIENT)
Age: 64
End: 2024-08-28

## 2024-08-28 DIAGNOSIS — M79.605 BILATERAL LEG PAIN: ICD-10-CM

## 2024-08-28 DIAGNOSIS — G89.29 CHRONIC INTRACTABLE PAIN: ICD-10-CM

## 2024-08-28 DIAGNOSIS — M79.604 BILATERAL LEG PAIN: ICD-10-CM

## 2024-08-28 DIAGNOSIS — G89.4 CHRONIC PAIN SYNDROME: ICD-10-CM

## 2024-08-28 NOTE — TELEPHONE ENCOUNTER
Pt called to check on surgery date, pt also requested to schedule his CT, warm transfer to [facility] in central scheduling.   Pt requesting a call back on status of date for procedure.  Thanks

## 2024-08-28 NOTE — TELEPHONE ENCOUNTER
08/28/2024-Called pt, scheduled surgery w/DKO for 10/30/2024 and pt will schedule CT [facility] 2 wks prior to sx. Went over labs and clearances with pt.

## 2024-08-29 ENCOUNTER — TELEPHONE (OUTPATIENT)
Age: 64
End: 2024-08-29

## 2024-08-29 RX ORDER — HYDROMORPHONE HYDROCHLORIDE 8 MG/1
8 TABLET ORAL
Qty: 240 TABLET | Refills: 0 | Status: SHIPPED | OUTPATIENT
Start: 2024-08-29

## 2024-08-29 RX ORDER — GABAPENTIN 600 MG/1
600 TABLET ORAL 4 TIMES DAILY
Qty: 120 TABLET | Refills: 0 | Status: SHIPPED | OUTPATIENT
Start: 2024-08-29

## 2024-08-30 DIAGNOSIS — G89.29 OTHER CHRONIC PAIN: Primary | ICD-10-CM

## 2024-08-30 RX ORDER — FENTANYL 25 UG/1
1 PATCH TRANSDERMAL
Qty: 10 PATCH | Refills: 0 | Status: SHIPPED | OUTPATIENT
Start: 2024-08-30

## 2024-08-30 NOTE — TELEPHONE ENCOUNTER
PA for HYDROmorphone HCl 8MG Tablet APPROVED     Date(s) approved: 01/01/2024 - 12/31/2024    Patient advised by          [x]MyChart Message-Communication Consent incomplete  []Phone call   []LMOM  []L/M to call office as no active Communication consent on file  []Unable to leave detailed message as VM not approved on Communication consent       Pharmacy advised by    [x]Fax  []Phone call    Approval letter scanned into Media Yes

## 2024-08-30 NOTE — TELEPHONE ENCOUNTER
PA for HYDROmorphone HCl 8MG Tablet SUBMITTED     via    []CMM-KEY:   [x][place]-Case ID # [id number]   []Faxed to plan   []Other website   []Phone call Case ID #     Office notes sent, clinical questions answered. Awaiting determination    Turnaround time for your insurance to make a decision on your Prior Authorization can take 7-21 business days.

## 2024-09-02 DIAGNOSIS — G89.4 CHRONIC PAIN SYNDROME: ICD-10-CM

## 2024-09-03 ENCOUNTER — OFFICE VISIT (OUTPATIENT)
Dept: FAMILY MEDICINE CLINIC | Facility: CLINIC | Age: 64
End: 2024-09-03
Payer: MEDICARE

## 2024-09-03 ENCOUNTER — APPOINTMENT (OUTPATIENT)
Dept: LAB | Facility: CLINIC | Age: 64
End: 2024-09-03
Payer: MEDICARE

## 2024-09-03 VITALS
SYSTOLIC BLOOD PRESSURE: 120 MMHG | WEIGHT: 246 LBS | HEART RATE: 57 BPM | BODY MASS INDEX: 38.61 KG/M2 | OXYGEN SATURATION: 97 % | HEIGHT: 67 IN | DIASTOLIC BLOOD PRESSURE: 60 MMHG

## 2024-09-03 DIAGNOSIS — F17.211 NICOTINE DEPENDENCE, CIGARETTES, IN REMISSION: ICD-10-CM

## 2024-09-03 DIAGNOSIS — G89.4 CHRONIC PAIN SYNDROME: ICD-10-CM

## 2024-09-03 DIAGNOSIS — E11.9 TYPE 2 DIABETES MELLITUS WITHOUT COMPLICATION, WITHOUT LONG-TERM CURRENT USE OF INSULIN (HCC): Primary | ICD-10-CM

## 2024-09-03 DIAGNOSIS — E11.9 TYPE 2 DIABETES MELLITUS WITHOUT COMPLICATION, WITHOUT LONG-TERM CURRENT USE OF INSULIN (HCC): ICD-10-CM

## 2024-09-03 LAB
ANION GAP SERPL CALCULATED.3IONS-SCNC: 11 MMOL/L (ref 4–13)
BUN SERPL-MCNC: 15 MG/DL (ref 5–25)
CALCIUM SERPL-MCNC: 9.4 MG/DL (ref 8.4–10.2)
CHLORIDE SERPL-SCNC: 96 MMOL/L (ref 96–108)
CO2 SERPL-SCNC: 34 MMOL/L (ref 21–32)
CREAT SERPL-MCNC: 0.96 MG/DL (ref 0.6–1.3)
GFR SERPL CREATININE-BSD FRML MDRD: 82 ML/MIN/1.73SQ M
GLUCOSE P FAST SERPL-MCNC: 240 MG/DL (ref 65–99)
POTASSIUM SERPL-SCNC: 3.8 MMOL/L (ref 3.5–5.3)
SL AMB POCT HEMOGLOBIN AIC: 9.6 (ref ?–6.5)
SODIUM SERPL-SCNC: 141 MMOL/L (ref 135–147)

## 2024-09-03 PROCEDURE — 83036 HEMOGLOBIN GLYCOSYLATED A1C: CPT | Performed by: FAMILY MEDICINE

## 2024-09-03 PROCEDURE — 36415 COLL VENOUS BLD VENIPUNCTURE: CPT

## 2024-09-03 PROCEDURE — 99214 OFFICE O/P EST MOD 30 MIN: CPT | Performed by: FAMILY MEDICINE

## 2024-09-03 PROCEDURE — 80048 BASIC METABOLIC PNL TOTAL CA: CPT

## 2024-09-03 RX ORDER — HYDROMORPHONE HYDROCHLORIDE 8 MG/1
8 TABLET ORAL
Qty: 240 TABLET | Refills: 0 | Status: SHIPPED | OUTPATIENT
Start: 2024-09-03

## 2024-09-03 NOTE — PROGRESS NOTES
"  Depression Screening and Follow-up Plan: Patient's depression screening was positive with a PHQ-9 score of 16. Patient assessed for underlying major depression. Brief counseling provided and recommend additional follow-up/re-evaluation next office visit.     Assessment/Plan:       1. Type 2 diabetes mellitus without complication, without long-term current use of insulin ([facility])  Assessment & Plan:  Add ozempic  Lab Results   Component Value Date    HGBA1C 8.7 (H) 05/09/2024     Orders:  -     POCT hemoglobin A1c  -     Basic metabolic panel; Future  -     semaglutide, 0.25 or 0.5 mg/dose, (Ozempic, 0.25 or 0.5 MG/DOSE,) 2 mg/3 mL injection pen; 0.25 mg under the skin every 7 days for 4 doses (28 days), THEN 0.5 mg under the skin every 7 days  2. Chronic pain syndrome  Comments:  He is uncontrolled with his pain.  He sees the surgeon next week.  We will increase the Dilaudid to every 3  Assessment & Plan:  Will start duragesic today  3. Nicotine dependence, cigarettes, in remission  Comments:  will do chest ct after his back surgery        Subjective:      Patient ID: Dominick Irving is a 64 y.o. male.    Dominick is here for f/u.  The pain is very intense.  See recent messages.  Will start duragesic.  His A1c is up to 9.6.  needs to focus on weight loss.  Mobility is poor.  Add ozempic.  BP is stable.  Repeat BMP.        The following portions of the patient's history were reviewed and updated as appropriate: allergies, current medications, past family history, past medical history, past social history, past surgical history, and problem list.    Review of Systems   Respiratory: Negative.     Cardiovascular: Negative.    Gastrointestinal: Negative.          Objective:      /60 (BP Location: Right arm, Patient Position: Sitting, Cuff Size: Large)   Pulse 57   Ht 5' 7\" (1.702 m)   Wt 112 kg (246 lb)   SpO2 97%   BMI 38.53 kg/m²          Physical Exam  Vitals and nursing note reviewed.   Constitutional:      " " Appearance: Normal appearance.   HENT:      Head: Normocephalic and atraumatic.      Nose: Nose normal.      Mouth/Throat:      Mouth: Mucous membranes are moist.   Eyes:      Extraocular Movements: Extraocular movements intact.      Pupils: Pupils are equal, round, and reactive to light.   Cardiovascular:      Rate and Rhythm: Normal rate and regular rhythm.      Pulses: Normal pulses.   Pulmonary:      Effort: Pulmonary effort is normal.      Breath sounds: Normal breath sounds.   Abdominal:      General: Bowel sounds are normal.      Palpations: Abdomen is soft.   Musculoskeletal:      Cervical back: Normal range of motion.   Skin:     General: Skin is warm and dry.      Capillary Refill: Capillary refill takes less than 2 seconds.   Neurological:      General: No focal deficit present.      Mental Status: He is alert.   Psychiatric:         Mood and Affect: Mood normal.         Answers submitted by the patient for this visit:  Medicare Annual Wellness Visit (Submitted on 8/30/2024)  How would you rate your overall health?: good  Compared to last year, how is your physical health?: slightly worse  In general, how satisfied are you with your life?: very dissatisfied  Compared to last year, how is your eyesight?: same  Compared to last year, how is your hearing?: slightly worse  Compared to last year, how is your emotional/mental health?: slightly worse  How often is anger a problem for you?: never, rarely  How often do you feel unusually tired/fatigued?: often  In the past 7 days, how much pain have you experienced?: a lot  If you answered \"some\" or \"a lot\", please rate the severity of your pain on a scale of 1 to 10 (1 being the least severe pain and 10 being the most intense pain).: 8/10  In the past 6 months, have you lost or gained 10 pounds without trying?: Yes  One or more falls in the last year: Yes  Do you have trouble with the stairs inside or outside your home?: Yes  Does your home have working smoke " alarms?: Yes  Does your home have a carbon monoxide monitor?: Yes  Which safety hazards (if any) have you experienced in your home? Please select all that apply.: medications that cause fatigue  How would you describe your current diet? Please select all that apply.: Regular, No Added Salt  In addition to prescription medications, are you taking any over-the-counter supplements?: No  Can you manage your medications?: Yes  Are you currently taking any opioid medications?: Yes  Can you walk and transfer into and out of your bed and chair?: Yes  Can you dress and groom yourself?: Yes  Can you bathe or shower yourself?: Yes  Can you feed yourself?: Yes  Can you do your laundry/ housekeeping?: No  Can you manage your money, pay your bills, and track your expenses?: Yes  Can you make your own meals?: Yes  Can you do your own shopping?: No  Please list your DME (Durable Medical Equipment) supplier, if you use one.: N/a  Within the last 12 months, have you had any hospitalizations or Emergency Department visits?: Yes  If yes, how many times have you been hospitalized within the past year?: 1-2  Do you have a living will?: No  Do you have a Durable POA (Power of ) for healthcare decisions?: No  Do you have an Advanced Directive for end of life decisions?: No  How often have you used an illegal drug (including marijuana) or a prescription medication for non-medical reasons in the past year?: never  What is the typical number of drinks you consume in a day?: 0  What is the typical number of drinks you consume in a week?: 0  How often did you have a drink containing alcohol in the past year?: monthly or less  How many drinks did you have on a typical day  when you were drinking in the past year?: 1 to 2  How often did you have 6 or more drinks on one occasion in the past year?: never

## 2024-09-04 ENCOUNTER — TELEPHONE (OUTPATIENT)
Dept: FAMILY MEDICINE CLINIC | Facility: CLINIC | Age: 64
End: 2024-09-04

## 2024-09-04 ENCOUNTER — TELEMEDICINE (OUTPATIENT)
Dept: PALLIATIVE MEDICINE | Facility: CLINIC | Age: 64
End: 2024-09-04

## 2024-09-04 DIAGNOSIS — Z71.89 COUNSELING AND COORDINATION OF CARE: Primary | ICD-10-CM

## 2024-09-04 PROCEDURE — NC001 PR NO CHARGE

## 2024-09-04 NOTE — TELEPHONE ENCOUNTER
----- Message from Robert Budinetz, MD sent at 9/4/2024  6:15 AM EDT -----  BMP is ok  Glucose elevated but I mad an adjustment yesterday

## 2024-09-04 NOTE — PROGRESS NOTES
"Palliative Supportive Care  met with patient/family to continue to provide emotional support and guidance.      Updated biopsychosocial information relevant to support:     The patient was identified by name and date of birth. Dominick was informed that this is a telemedicine visit and that the visit is being conducted through the Epic Embedded platform. They agree to proceed..  My office door was closed. No one else was in the room. They acknowledged consent and understanding of privacy and security of the video platform. The patient has agreed to participate and understands they can discontinue the visit at any time.    Dominick spoke about being extremely disappointed that he will not be having surgery until 10/30. He said that he is in 9/10 pain and he cannot even lift one of his legs at this time. He said that his PCP prescribed a fentanyl patch and he is going to give it a few days to see if it gives him any relief. He said he just feels defeated and like he is having the worst year imaginable. He said that if he was a pet dog someone would \"shoot my owner for animal cruelty\" for letting him be in this much pain. He said his A1C is very ellevated as well and he has been prescribed a medication to reduce this. He said he needs it to reduce so there are no issues with his surgery being postponed which he feels he cannot afford. He said he thinks about it all day and has a count down going in his head. LCSW supported Dominick in exploring the positives in his life such as having surgery scheduled which will bring relief, his supportive family, and explored his strengths. He said it is hard to feel strong right now but he appreciated the sentiment. LCSW also encouraged him to not continue his daily count down of days as it seems to be more upsetting to him than helpful and motivating. He asked to end early as he felt worn out.       Identified areas of need include: emotional support    Resources " provided:    Areas that need future follow-up include: reduce anxiety/depressed mood    I have spent 40 minutes with Patient  today in which greater than 50% of this time was spent in counseling/coordination of care     Palliative  will follow-up as requested by patient, family, and primary team.  Please contact with any specific requests

## 2024-09-11 ENCOUNTER — TELEMEDICINE (OUTPATIENT)
Dept: PALLIATIVE MEDICINE | Facility: CLINIC | Age: 64
End: 2024-09-11

## 2024-09-11 ENCOUNTER — RA CDI HCC (OUTPATIENT)
Dept: OTHER | Facility: HOSPITAL | Age: 64
End: 2024-09-11

## 2024-09-11 DIAGNOSIS — Z71.89 COUNSELING AND COORDINATION OF CARE: Primary | ICD-10-CM

## 2024-09-11 DIAGNOSIS — G89.29 OTHER CHRONIC PAIN: Primary | ICD-10-CM

## 2024-09-11 PROCEDURE — NC001 PR NO CHARGE

## 2024-09-11 RX ORDER — FENTANYL 50 UG/1
1 PATCH TRANSDERMAL
Qty: 5 PATCH | Refills: 0 | Status: SHIPPED | OUTPATIENT
Start: 2024-09-11

## 2024-09-11 NOTE — PROGRESS NOTES
"Palliative Supportive Care  met with patient/family to continue to provide emotional support and guidance.      Updated biopsychosocial information relevant to support:     The patient was identified by name and date of birth. Dominick was informed that this is a telemedicine visit and that the visit is being conducted through the Epic Embedded platform. They agree to proceed..  My office door was closed. No one else was in the room. They acknowledged consent and understanding of privacy and security of the video platform. The patient has agreed to participate and understands they can discontinue the visit at any time.    Dominick spoke about being \"miserable\" with his pain and mobility issues steadily declining as he waits for surgery. He expressed being extremely depressed because it feels like he is losing more independence and 7 weeks feels like a century away. He said his wife and son have to do a lot for him, even \"simple things like getting a glass of water\". He has discussed increasing his depression medication with his PCP but they agreed they were both weary of changing too many things at once. He said he already reached out about increasing his fentanyl patch because it \"isn't touching the pain\". LCSW supported Dominick in exploring positive self talk and challenging his belief that asking for help during this difficult time is \"burdening\" his family or that he truly has an accurate picture what his pain and/or mobility will be in the future. He said he appreciated the perspective but it is hard to stay positive. He said he is doing his best to get through each day until he gest to surgery and is keeping his team in the loop as much as possible. He asked to end early due to pain.      Identified areas of need include: emotional support    Resources provided:    Areas that need future follow-up include: reduce anxiety/depressed mood    I have spent 35 minutes with Patient  today in which greater " than 50% of this time was spent in counseling/coordination of care     Palliative  will follow-up as requested by patient, family, and primary team.  Please contact with any specific requests

## 2024-09-17 ENCOUNTER — OFFICE VISIT (OUTPATIENT)
Dept: FAMILY MEDICINE CLINIC | Facility: CLINIC | Age: 64
End: 2024-09-17
Payer: MEDICARE

## 2024-09-17 VITALS
DIASTOLIC BLOOD PRESSURE: 64 MMHG | SYSTOLIC BLOOD PRESSURE: 126 MMHG | HEIGHT: 67 IN | BODY MASS INDEX: 38.64 KG/M2 | WEIGHT: 246.2 LBS | OXYGEN SATURATION: 94 % | HEART RATE: 76 BPM

## 2024-09-17 DIAGNOSIS — E11.9 TYPE 2 DIABETES MELLITUS WITHOUT COMPLICATION, WITHOUT LONG-TERM CURRENT USE OF INSULIN (HCC): Primary | ICD-10-CM

## 2024-09-17 LAB — SL AMB POCT HEMOGLOBIN AIC: 9.6 (ref ?–6.5)

## 2024-09-17 PROCEDURE — 99214 OFFICE O/P EST MOD 30 MIN: CPT | Performed by: FAMILY MEDICINE

## 2024-09-17 PROCEDURE — 83036 HEMOGLOBIN GLYCOSYLATED A1C: CPT | Performed by: FAMILY MEDICINE

## 2024-09-17 NOTE — PROGRESS NOTES
[facility] Clinical Pharmacy Services  Nicole Cameron, Pharmacist    Assessment/ Plan     Assessment & Plan  Type 2 diabetes mellitus without complication, without long-term current use of insulin (formerly [facility])    Goal A1c <7% . Needs A1c < 9% before surgery.  Newly diagnosed diabetes during the summer 2024.  Lab Results   Component Value Date    HGBA1C 9.6 (A) 09/17/2024      Complications:  Microvascular complications: neuroapthy  Macrovascular complications: HF  Current Diabetes Regimen:  Ozempic 0.25 mg weekly  Metformin  mg -1 tab in AM 2 in PM   Farxiga 5 mg   Historical DM Meds (reason for discontinuation):  N/A  On Additional Therapies:  Statin: yes  ACEI/ARB: None     Assessment: Patient has a relatively new diagnosis of type 2 diabetes.  He was initiated on metformin which did not control his A1c.  Ozempic was recently added.  Patient is in a lot of pain which is likely contributing to his hyperglycemia.  He needs his A1c down in order to get neurosurgery at the end of October.  Likely needs insulin to get his blood sugar controlled quickly before surgery.  After surgery once pain subsides we may be able to back down or even stop insulin.    Changes to medication regimen:  Initiate Tresiba 10 units daily. Increase by 2 units every 3 days until fasting glucose is < 130 mg/dL. Educated on signs and symptoms of hypoglycemia and treatment of hypoglycemia events.  Call with any readings less than 70 mg/dL  Dexcom G7 ordered through [employer]  He also needs lancets for finger stick glucometer     Orders:    Ambulatory referral to clinical pharmacy    metFORMIN (GLUCOPHAGE-XR) 500 mg 24 hr tablet; Take 2 tablets (1,000 mg total) by mouth daily with dinner AND 1 tablet (500 mg total) daily with breakfast.    Lancet Devices (Adjustable Lancing Device) MISC; Use as directed to test glucose once daily. One touch device or may change to what insurance covers.    insulin degludec (Tresiba FlexTouch) 100 units/mL  injection pen; Inject 10 Units under the skin daily    Insulin Pen Needle 32G X 4 MM MISC; Use daily Dx E11.9    Lancets (onetouch ultrasoft) lancets; Use as instructed. Dx E11.9      Follow-up: 1 week     Subjective   HPI    Medication Adherence/ Tolerability/ Cost:  metFORMIN increased by pcp to 1 tab in am and 2 tabs in PM   semaglutide (0.25 or 0.5 mg/dose) - 0.25 mg weekly x 2 weeks. Tomorrow will increase to 0.5 mg tomorrow.     Previous Medications  N/A    Review of Systems   Gastrointestinal:  Negative for abdominal pain, constipation, diarrhea, nausea and vomiting.        2. Lifestyle:   Avoids junk food    3. Home monitoring devices  Glucometer: Yes, Brand: [employer] brand  Continuous Glucose Monitor: Yes, Brand: starting on Dexcom G7      Objective       ASCVD Risk:  The 10-year ASCVD risk score (Arvin SUN, et al., 2019) is: 25.8%    Values used to calculate the score:      Age: 64 years      Sex: Male      Is Non- : No      Diabetic: Yes      Tobacco smoker: No      Systolic Blood Pressure: 126 mmHg      Is BP treated: Yes      HDL Cholesterol: 33 mg/dL      Total Cholesterol: 141 mg/dL     Vitals:  There were no vitals filed for this visit.    Eye Exam:    Lab Results   Component Value Date    LEFTDIABRET None 02/06/2024    RIGHTDIABRET None 02/06/2024       Labs:  Lab Results   Component Value Date    SODIUM 141 09/03/2024    K 3.8 09/03/2024    EGFR 82 09/03/2024    CREATININE 0.96 09/03/2024    GLUF 240 (H) 09/03/2024       Lab Results   Component Value Date    HGBA1C 9.6 (A) 09/17/2024    HGBA1C 9.6 (A) 09/03/2024    HGBA1C 8.7 (H) 05/09/2024       Telemedicine consent  The patient was identified by name and date of birth. Dominick Irving was informed that this is a telemedicine visit and that the visit is being conducted through the Epic Embedded platform. He agrees to proceed..  My office door was closed. No one else was in the room.  He acknowledged consent and understanding of  privacy and security of the video platform. The patient has agreed to participate and understands they can discontinue the visit at any time.    Pharmacist Tracking Tool     Pharmacist Tracking Tool  Reason For Outreach: Embedded Pharmacist  Demographics:  Intervention Method: Video  Type of Intervention: New  Topics Addressed: Diabetes  Pharmacologic Interventions: Medication Initiation, Dose or Frequency Adjusted, and Med Rec  Non-Pharmacologic Interventions: Care coordination, Disease state education, Home Monitoring, Medication/Device education, and Personal CGM  Time:  Direct Patient Care:  45  mins  Care Coordination:  15  mins  Recommendation Recipient: Patient/Caregiver and Provider  Outcome: Accepted

## 2024-09-17 NOTE — ASSESSMENT & PLAN NOTE
Goal A1c <7% . Needs A1c < 9% before surgery.  Newly diagnosed diabetes during the summer 2024.  Lab Results   Component Value Date    HGBA1C 9.6 (A) 09/17/2024      Complications:  Microvascular complications: neuroapthy  Macrovascular complications: HF  Current Diabetes Regimen:  Ozempic 0.25 mg weekly  Metformin  mg -1 tab in AM 2 in PM   Farxiga 5 mg   Historical DM Meds (reason for discontinuation):  N/A  On Additional Therapies:  Statin: yes  ACEI/ARB: None     Assessment: Patient has a relatively new diagnosis of type 2 diabetes.  He was initiated on metformin which did not control his A1c.  Ozempic was recently added.  Patient is in a lot of pain which is likely contributing to his hyperglycemia.  He needs his A1c down in order to get neurosurgery at the end of October.  Likely needs insulin to get his blood sugar controlled quickly before surgery.  After surgery once pain subsides we may be able to back down or even stop insulin.    Changes to medication regimen:  Initiate Tresiba 10 units daily. Increase by 2 units every 3 days until fasting glucose is < 130 mg/dL. Educated on signs and symptoms of hypoglycemia and treatment of hypoglycemia events.  Call with any readings less than 70 mg/dL  Dexcom G7 ordered through "[employer]  He also needs lancets for finger stick glucometer     Orders:    Ambulatory referral to clinical pharmacy    metFORMIN (GLUCOPHAGE-XR) 500 mg 24 hr tablet; Take 2 tablets (1,000 mg total) by mouth daily with dinner AND 1 tablet (500 mg total) daily with breakfast.    Lancet Devices (Adjustable Lancing Device) MISC; Use as directed to test glucose once daily. One touch device or may change to what insurance covers.    insulin degludec (Tresiba FlexTouch) 100 units/mL injection pen; Inject 10 Units under the skin daily    Insulin Pen Needle 32G X 4 MM MISC; Use daily Dx E11.9    Lancets (onetouch ultrasoft) lancets; Use as instructed. Dx E11.9

## 2024-09-17 NOTE — ASSESSMENT & PLAN NOTE
Increase metformin  Continue ozempic  Lab Results   Component Value Date    HGBA1C 9.6 (A) 09/03/2024

## 2024-09-17 NOTE — PROGRESS NOTES
"Assessment/Plan:       1. Type 2 diabetes mellitus without complication, without long-term current use of insulin ([facility])  Assessment & Plan:  Increase metformin  Continue ozempic  Lab Results   Component Value Date    HGBA1C 9.6 (A) 09/03/2024     Orders:  -     POCT hemoglobin A1c  -     Ambulatory referral to clinical pharmacy; Future  -     semaglutide, 0.25 or 0.5 mg/dose, (Ozempic, 0.25 or 0.5 MG/DOSE,) 2 mg/3 mL injection pen; Inject 0.75 mL (0.5 mg total) under the skin every 7 days        Subjective:      Patient ID: Dominick Irving is a 64 y.o. male.    Dominick was diagnosed with type 2 diabetes this past summer and started on metformin.  His A1c was over 8 at that time.  It was 9.6 a few weeks ago we added Ozempic hopes of weight loss he is now on Ozempic 0.25 he is on his first dose as well as 1000 of metformin and Farxiga for cardiac reasons.  His A1c is still 9.6 and he is having neurosurgery at the end of October.  He is in a tremendous amount of pain requiring Duragesic as well as Dilaudid.  We need to get his sugars down.  I increased his metformin and his Ozempic today.  I am going to place a consult to clinical pharmacy to get their help with lowering his A1c acutely.  I am wondering if he probably has a very high cortisol level given his pain level and he probably needs 10 units of Lantus at night.        The following portions of the patient's history were reviewed and updated as appropriate: allergies, current medications, past family history, past medical history, past social history, past surgical history, and problem list.    Review of Systems      Objective:      /64 (BP Location: Left arm, Patient Position: Sitting, Cuff Size: Large)   Pulse 76   Ht 5' 7\" (1.702 m)   Wt 112 kg (246 lb 3.2 oz)   SpO2 94%   BMI 38.56 kg/m²          Physical Exam  Vitals and nursing note reviewed.   Constitutional:       Appearance: Normal appearance.   HENT:      Head: Normocephalic and atraumatic. "      Nose: Nose normal.      Mouth/Throat:      Mouth: Mucous membranes are moist.   Eyes:      Extraocular Movements: Extraocular movements intact.      Pupils: Pupils are equal, round, and reactive to light.   Cardiovascular:      Rate and Rhythm: Normal rate and regular rhythm.      Pulses: Normal pulses.   Pulmonary:      Effort: Pulmonary effort is normal.      Breath sounds: Normal breath sounds.   Abdominal:      General: Bowel sounds are normal.      Palpations: Abdomen is soft.   Musculoskeletal:      Cervical back: Normal range of motion.   Skin:     General: Skin is warm and dry.      Capillary Refill: Capillary refill takes less than 2 seconds.   Neurological:      General: No focal deficit present.      Mental Status: He is alert.   Psychiatric:         Mood and Affect: Mood normal.

## 2024-09-18 ENCOUNTER — CLINICAL SUPPORT (OUTPATIENT)
Dept: FAMILY MEDICINE CLINIC | Facility: CLINIC | Age: 64
End: 2024-09-18

## 2024-09-18 ENCOUNTER — TELEPHONE (OUTPATIENT)
Age: 64
End: 2024-09-18

## 2024-09-18 DIAGNOSIS — E11.9 TYPE 2 DIABETES MELLITUS WITHOUT COMPLICATION, WITHOUT LONG-TERM CURRENT USE OF INSULIN (HCC): Primary | ICD-10-CM

## 2024-09-18 RX ORDER — INSULIN DEGLUDEC 100 U/ML
10 INJECTION, SOLUTION SUBCUTANEOUS DAILY
Qty: 15 ML | Refills: 1 | Status: SHIPPED | OUTPATIENT
Start: 2024-09-18

## 2024-09-18 RX ORDER — LANCETS
EACH MISCELLANEOUS
Qty: 100 EACH | Refills: 1 | Status: SHIPPED | OUTPATIENT
Start: 2024-09-18

## 2024-09-18 RX ORDER — LANCING DEVICE
EACH MISCELLANEOUS
Qty: 1 EACH | Refills: 0 | Status: SHIPPED | OUTPATIENT
Start: 2024-09-18

## 2024-09-18 RX ORDER — METFORMIN HCL 500 MG
TABLET, EXTENDED RELEASE 24 HR ORAL
Qty: 60 TABLET | Refills: 3 | Status: SHIPPED | OUTPATIENT
Start: 2024-09-18

## 2024-09-18 NOTE — TELEPHONE ENCOUNTER
Patient calling to inform Giovanna Gonzales LCSW, the reason he missed his appt today at 2pm.  He fell and knocked himself out.  He states he's okay and apologizes.  He will keep his upcoming virtual visit appt 925/24 @ 3pm.

## 2024-09-20 LAB
DME PARACHUTE DELIVERY DATE ACTUAL: NORMAL
DME PARACHUTE DELIVERY DATE EXPECTED: NORMAL
DME PARACHUTE DELIVERY DATE REQUESTED: NORMAL
DME PARACHUTE ITEM DESCRIPTION: NORMAL
DME PARACHUTE ITEM DESCRIPTION: NORMAL
DME PARACHUTE ORDER STATUS: NORMAL
DME PARACHUTE SUPPLIER NAME: NORMAL
DME PARACHUTE SUPPLIER PHONE: NORMAL

## 2024-09-23 ENCOUNTER — CLINICAL SUPPORT (OUTPATIENT)
Dept: FAMILY MEDICINE CLINIC | Facility: CLINIC | Age: 64
End: 2024-09-23

## 2024-09-23 DIAGNOSIS — G89.4 CHRONIC PAIN SYNDROME: ICD-10-CM

## 2024-09-23 DIAGNOSIS — E11.9 TYPE 2 DIABETES MELLITUS WITHOUT COMPLICATION, WITHOUT LONG-TERM CURRENT USE OF INSULIN (HCC): ICD-10-CM

## 2024-09-23 RX ORDER — HYDROMORPHONE HYDROCHLORIDE 8 MG/1
8 TABLET ORAL
Qty: 240 TABLET | Refills: 0 | Status: SHIPPED | OUTPATIENT
Start: 2024-09-23

## 2024-09-23 NOTE — PROGRESS NOTES
[facility] Clinical Pharmacy Services  Nicole Cameron, Pharmacist    Assessment/ Plan     Assessment & Plan  Type 2 diabetes mellitus without complication, without long-term current use of insulin ([facility])    Lab Results   Component Value Date    HGBA1C 9.6 (A) 09/17/2024     Goal A1c <7% . Needs A1c < 9% before surgery.  Newly diagnosed diabetes during the summer 2024.  Complications:  Microvascular complications: neuroapthy  Macrovascular complications: HF  Current Diabetes Regimen:  Ozempic 0.5 mg weekly  Metformin  mg -1 tab in AM 2 in PM   Farxiga 5 mg   Tresiba 12 units   Historical DM Meds (reason for discontinuation):  N/A  On Additional Therapies:  Statin: yes  ACEI/ARB: None     Assessment: Started Tresiba last week.  He has been increasing by 2 units every 3 days until goal blood sugar fasting is less than 130.  He is currently using Tresiba 12 units.  He is waiting to get his Dexcom delivered to him and then he will reach out so that we can set that up.    Changes to medication regimen:  Continue to titrate Tresiba up by 2 units every 3 days until goal fasting glucose is less than 130 mg/dL.       Follow-up: He will call when he has Dexcom device at home to schedule visit to set up    Subjective   HPI    Medication Adherence/ Tolerability/ Cost:  metFORMIN increased by pcp to 1 tab in am and 2 tabs in PM   semaglutide (0.25 or 0.5 mg/dose) - 0.5 mg increased last week. Denies GI effects at this time.   Tresiba- newly started 10 units on 9/19. Increased to 12 units on 9/22/24.    Previous Medications  N/A    Review of Systems   Gastrointestinal:  Negative for abdominal pain, constipation, diarrhea, nausea and vomiting.        2. Lifestyle:   Avoids junk food  Home cooks meals in evening.   Smaller lunch and larger dinner.   Dinner portions are    3. Home monitoring devices  Glucometer: Yes, Brand: [employer] brand  Continuous Glucose Monitor: Yes, Brand: starting on Dexcom G7      Objective         Blood  Glucose Readings  The patient is currently checking blood glucose 1 times per day. Patient reports with SMBG logs.    Date AM Post-Breakfast Lunch Post-Lunch Dinner Post-Dinner Comments   9/21 242         9/22 162         9/23 220    After coffee         Avg              ASCVD Risk:  The 10-year ASCVD risk score (Arvin SUN, et al., 2019) is: 25.8%    Values used to calculate the score:      Age: 64 years      Sex: Male      Is Non- : No      Diabetic: Yes      Tobacco smoker: No      Systolic Blood Pressure: 126 mmHg      Is BP treated: Yes      HDL Cholesterol: 33 mg/dL      Total Cholesterol: 141 mg/dL     Vitals:  There were no vitals filed for this visit.    Eye Exam:    Lab Results   Component Value Date    LEFTDIABRET None 02/06/2024    RIGHTDIABRET None 02/06/2024       Labs:  Lab Results   Component Value Date    SODIUM 141 09/03/2024    K 3.8 09/03/2024    EGFR 82 09/03/2024    CREATININE 0.96 09/03/2024    GLUF 240 (H) 09/03/2024       Lab Results   Component Value Date    HGBA1C 9.6 (A) 09/17/2024    HGBA1C 9.6 (A) 09/03/2024    HGBA1C 8.7 (H) 05/09/2024       Telemedicine consent  The patient was identified by name and date of birth. Dominick JUAN Irving was informed that this is a telemedicine visit and that the visit is being conducted through the Epic Embedded platform. He agrees to proceed..  My office door was closed. No one else was in the room.  He acknowledged consent and understanding of privacy and security of the video platform. The patient has agreed to participate and understands they can discontinue the visit at any time.    Pharmacist Tracking Tool     Pharmacist Tracking Tool  Reason For Outreach: Embedded Pharmacist  Demographics:  Intervention Method: Video  Type of Intervention: Follow-Up  Topics Addressed: Diabetes  Pharmacologic Interventions: Dose or Frequency Adjusted  Non-Pharmacologic Interventions: Disease state education, Home Monitoring, and  Medication/Device education  Time:  Direct Patient Care:  15  mins  Care Coordination:  5  mins  Recommendation Recipient: Patient/Caregiver and Provider  Outcome: Accepted

## 2024-09-23 NOTE — ASSESSMENT & PLAN NOTE
Goal A1c <7% . Needs A1c < 9% before surgery.  Newly diagnosed diabetes during the summer 2024.  Lab Results   Component Value Date    HGBA1C 9.6 (A) 09/17/2024      Complications:  Microvascular complications: neuroapthy  Macrovascular complications: HF  Current Diabetes Regimen:  Ozempic 0.25 mg weekly  Metformin  mg -1 tab in AM 2 in PM   Farxiga 5 mg   Historical DM Meds (reason for discontinuation):  N/A  On Additional Therapies:  Statin: yes  ACEI/ARB: None     Assessment: Patient has a relatively new diagnosis of type 2 diabetes.  He was initiated on metformin which did not control his A1c.  Ozempic was recently added.  Patient is in a lot of pain which is likely contributing to his hyperglycemia.  He needs his A1c down in order to get neurosurgery at the end of October.  Likely needs insulin to get his blood sugar controlled quickly before surgery.  After surgery once pain subsides we may be able to back down or even stop insulin.    Changes to medication regimen:

## 2024-09-23 NOTE — ASSESSMENT & PLAN NOTE
Lab Results   Component Value Date    HGBA1C 9.6 (A) 09/17/2024     Goal A1c <7% . Needs A1c < 9% before surgery.  Newly diagnosed diabetes during the summer 2024.  Complications:  Microvascular complications: neuroapthy  Macrovascular complications: HF  Current Diabetes Regimen:  Ozempic 0.5 mg weekly  Metformin  mg -1 tab in AM 2 in PM   Farxiga 5 mg   Tresiba 12 units   Historical DM Meds (reason for discontinuation):  N/A  On Additional Therapies:  Statin: yes  ACEI/ARB: None     Assessment: Started Tresiba last week.  He has been increasing by 2 units every 3 days until goal blood sugar fasting is less than 130.  He is currently using Tresiba 12 units.  He is waiting to get his Dexcom delivered to him and then he will reach out so that we can set that up.    Changes to medication regimen:  Continue to titrate Tresiba up by 2 units every 3 days until goal fasting glucose is less than 130 mg/dL.

## 2024-09-26 DIAGNOSIS — G89.29 CHRONIC INTRACTABLE PAIN: ICD-10-CM

## 2024-09-26 DIAGNOSIS — G89.4 CHRONIC PAIN SYNDROME: ICD-10-CM

## 2024-09-26 DIAGNOSIS — E87.6 HYPOKALEMIA: ICD-10-CM

## 2024-09-26 DIAGNOSIS — M79.605 BILATERAL LEG PAIN: ICD-10-CM

## 2024-09-26 DIAGNOSIS — M79.604 BILATERAL LEG PAIN: ICD-10-CM

## 2024-09-26 RX ORDER — POTASSIUM CHLORIDE 750 MG/1
20 TABLET, EXTENDED RELEASE ORAL 2 TIMES DAILY
Qty: 120 TABLET | Refills: 0 | Status: SHIPPED | OUTPATIENT
Start: 2024-09-26

## 2024-09-26 RX ORDER — GABAPENTIN 600 MG/1
600 TABLET ORAL 4 TIMES DAILY
Qty: 120 TABLET | Refills: 0 | Status: SHIPPED | OUTPATIENT
Start: 2024-09-26

## 2024-09-27 ENCOUNTER — CLINICAL SUPPORT (OUTPATIENT)
Dept: FAMILY MEDICINE CLINIC | Facility: CLINIC | Age: 64
End: 2024-09-27

## 2024-09-27 DIAGNOSIS — E11.9 TYPE 2 DIABETES MELLITUS WITHOUT COMPLICATION, WITHOUT LONG-TERM CURRENT USE OF INSULIN (HCC): Primary | ICD-10-CM

## 2024-09-27 NOTE — PROGRESS NOTES
[facility] Clinical Integration Pharmacy Services  Nicole Cameron, PharmD, BCACP      Dexcom G7 CGM Training     Patient presented for virtual visit for training on the Dexcom G7 CGM sensor.     Some important things to remember regarding the DexcomG7:    1) Change the sensor every 10 days.  Always rotate arms.   2) New sensors require a 30 minute warm up period, then you can start checking your blood sugar readings  3) Sensor must be removed for x-ray, CT, MRI, diathermy, or any radiation treatment.     4) Sensor can be worn while bathing, showering, or swimming for no longer than 30 minutes and/or deeper than 3 feet.    5) To receive alarms, your reader should be turned on, within 20 feet of you, and unobstructed at all times. If your reader is out of range of your sensor, you may not receive glucose alarms     If you are severely dehydrated, the results may inaccurate. Taking ascorbic acid (vitamin C) supplements while wearing the sensor may falsely raise sensor glucose readings. You can take doses of ascorbic acid up to 500 mg per day and make treatment decisions with the sensor. Taking more than 500 mg of ascorbic acid per day may affect the sensor readings which could cause you to miss a severe low glucose event.    If your sensor ever falls off prior to 10 days, please call the Dexcom  service number at 1 [phone]  for assistance.    Patient did connect with office through Dexcom [employer]    Of note, fasting glucose readings for past 3 days have been 140, 170, and 180 mg/dL. Noted improvement.     Pharmacist Tracking Tool  Reason For Outreach: Embedded Pharmacist  Demographics:  Intervention Method: Video  Type of Intervention: Follow-Up  Topics Addressed: Diabetes  Pharmacologic Interventions: N/A  Non-Pharmacologic Interventions: Medication/Device education and Personal CGM  Time:  Direct Patient Care:  50  mins  Care Coordination:  5  mins  Recommendation Recipient: N/A  Outcome: N/A

## 2024-10-01 ENCOUNTER — APPOINTMENT (OUTPATIENT)
Dept: LAB | Facility: HOSPITAL | Age: 64
End: 2024-10-01
Payer: MEDICARE

## 2024-10-01 ENCOUNTER — OFFICE VISIT (OUTPATIENT)
Dept: FAMILY MEDICINE CLINIC | Facility: CLINIC | Age: 64
End: 2024-10-01
Payer: MEDICARE

## 2024-10-01 ENCOUNTER — LAB REQUISITION (OUTPATIENT)
Dept: LAB | Facility: HOSPITAL | Age: 64
End: 2024-10-01
Payer: MEDICARE

## 2024-10-01 VITALS
OXYGEN SATURATION: 91 % | WEIGHT: 244 LBS | DIASTOLIC BLOOD PRESSURE: 68 MMHG | BODY MASS INDEX: 38.3 KG/M2 | SYSTOLIC BLOOD PRESSURE: 138 MMHG | HEART RATE: 77 BPM | HEIGHT: 67 IN

## 2024-10-01 DIAGNOSIS — M54.42 CHRONIC BILATERAL LOW BACK PAIN WITH BILATERAL SCIATICA: Primary | ICD-10-CM

## 2024-10-01 DIAGNOSIS — G89.29 CHRONIC BILATERAL LOW BACK PAIN WITH BILATERAL SCIATICA: Primary | ICD-10-CM

## 2024-10-01 DIAGNOSIS — M54.9 MECHANICAL BACK PAIN: ICD-10-CM

## 2024-10-01 DIAGNOSIS — M47.816 FACET ARTHROPATHY, LUMBAR: ICD-10-CM

## 2024-10-01 DIAGNOSIS — E11.9 TYPE 2 DIABETES MELLITUS WITHOUT COMPLICATION, WITHOUT LONG-TERM CURRENT USE OF INSULIN (HCC): ICD-10-CM

## 2024-10-01 DIAGNOSIS — R26.81 UNSTABLE GAIT: ICD-10-CM

## 2024-10-01 DIAGNOSIS — M48.061 SPINAL STENOSIS OF LUMBAR REGION: ICD-10-CM

## 2024-10-01 DIAGNOSIS — M54.41 CHRONIC BILATERAL LOW BACK PAIN WITH BILATERAL SCIATICA: Primary | ICD-10-CM

## 2024-10-01 DIAGNOSIS — M54.9 DORSALGIA, UNSPECIFIED: ICD-10-CM

## 2024-10-01 LAB
ABO GROUP BLD: NORMAL
ALBUMIN SERPL BCG-MCNC: 4.2 G/DL (ref 3.5–5)
ALP SERPL-CCNC: 98 U/L (ref 34–104)
ALT SERPL W P-5'-P-CCNC: 19 U/L (ref 7–52)
ANION GAP SERPL CALCULATED.3IONS-SCNC: 11 MMOL/L (ref 4–13)
APTT PPP: 30 SECONDS (ref 23–34)
AST SERPL W P-5'-P-CCNC: 14 U/L (ref 13–39)
BASOPHILS # BLD AUTO: 0.05 THOUSANDS/ÂΜL (ref 0–0.1)
BASOPHILS NFR BLD AUTO: 1 % (ref 0–1)
BILIRUB SERPL-MCNC: 0.42 MG/DL (ref 0.2–1)
BILIRUB UR QL STRIP: NEGATIVE
BLD GP AB SCN SERPL QL: NEGATIVE
BUN SERPL-MCNC: 25 MG/DL (ref 5–25)
CALCIUM SERPL-MCNC: 9.9 MG/DL (ref 8.4–10.2)
CHLORIDE SERPL-SCNC: 90 MMOL/L (ref 96–108)
CLARITY UR: CLEAR
CO2 SERPL-SCNC: 36 MMOL/L (ref 21–32)
COLOR UR: YELLOW
CREAT SERPL-MCNC: 1.32 MG/DL (ref 0.6–1.3)
EOSINOPHIL # BLD AUTO: 0.33 THOUSAND/ÂΜL (ref 0–0.61)
EOSINOPHIL NFR BLD AUTO: 4 % (ref 0–6)
ERYTHROCYTE [DISTWIDTH] IN BLOOD BY AUTOMATED COUNT: 15.5 % (ref 11.6–15.1)
EST. AVERAGE GLUCOSE BLD GHB EST-MCNC: 212 MG/DL
GFR SERPL CREATININE-BSD FRML MDRD: 56 ML/MIN/1.73SQ M
GLUCOSE P FAST SERPL-MCNC: 111 MG/DL (ref 65–99)
GLUCOSE UR STRIP-MCNC: ABNORMAL MG/DL
HBA1C MFR BLD: 9 %
HCT VFR BLD AUTO: 43.6 % (ref 36.5–49.3)
HGB BLD-MCNC: 14.7 G/DL (ref 12–17)
HGB UR QL STRIP.AUTO: NEGATIVE
IMM GRANULOCYTES # BLD AUTO: 0.03 THOUSAND/UL (ref 0–0.2)
IMM GRANULOCYTES NFR BLD AUTO: 0 % (ref 0–2)
INR PPP: 0.9 (ref 0.85–1.19)
KETONES UR STRIP-MCNC: NEGATIVE MG/DL
LEUKOCYTE ESTERASE UR QL STRIP: NEGATIVE
LYMPHOCYTES # BLD AUTO: 2.49 THOUSANDS/ÂΜL (ref 0.6–4.47)
LYMPHOCYTES NFR BLD AUTO: 26 % (ref 14–44)
MCH RBC QN AUTO: 31.4 PG (ref 26.8–34.3)
MCHC RBC AUTO-ENTMCNC: 33.7 G/DL (ref 31.4–37.4)
MCV RBC AUTO: 93 FL (ref 82–98)
MONOCYTES # BLD AUTO: 0.78 THOUSAND/ÂΜL (ref 0.17–1.22)
MONOCYTES NFR BLD AUTO: 8 % (ref 4–12)
NEUTROPHILS # BLD AUTO: 5.82 THOUSANDS/ÂΜL (ref 1.85–7.62)
NEUTS SEG NFR BLD AUTO: 61 % (ref 43–75)
NITRITE UR QL STRIP: NEGATIVE
NRBC BLD AUTO-RTO: 0 /100 WBCS
PH UR STRIP.AUTO: 7 [PH]
PLATELET # BLD AUTO: 234 THOUSANDS/UL (ref 149–390)
PMV BLD AUTO: 11.1 FL (ref 8.9–12.7)
POTASSIUM SERPL-SCNC: 3.4 MMOL/L (ref 3.5–5.3)
PROT SERPL-MCNC: 8 G/DL (ref 6.4–8.4)
PROT UR STRIP-MCNC: NEGATIVE MG/DL
PROTHROMBIN TIME: 12.6 SECONDS (ref 12.3–15)
RBC # BLD AUTO: 4.68 MILLION/UL (ref 3.88–5.62)
RH BLD: POSITIVE
SL AMB POCT HEMOGLOBIN AIC: 9 (ref ?–6.5)
SODIUM SERPL-SCNC: 137 MMOL/L (ref 135–147)
SP GR UR STRIP.AUTO: 1.01 (ref 1–1.03)
SPECIMEN EXPIRATION DATE: NORMAL
UROBILINOGEN UR QL STRIP.AUTO: 0.2 E.U./DL
WBC # BLD AUTO: 9.5 THOUSAND/UL (ref 4.31–10.16)

## 2024-10-01 PROCEDURE — 85025 COMPLETE CBC W/AUTO DIFF WBC: CPT

## 2024-10-01 PROCEDURE — 83036 HEMOGLOBIN GLYCOSYLATED A1C: CPT

## 2024-10-01 PROCEDURE — 86850 RBC ANTIBODY SCREEN: CPT | Performed by: NEUROLOGICAL SURGERY

## 2024-10-01 PROCEDURE — 36415 COLL VENOUS BLD VENIPUNCTURE: CPT

## 2024-10-01 PROCEDURE — 80053 COMPREHEN METABOLIC PANEL: CPT

## 2024-10-01 PROCEDURE — 87147 CULTURE TYPE IMMUNOLOGIC: CPT

## 2024-10-01 PROCEDURE — 99214 OFFICE O/P EST MOD 30 MIN: CPT | Performed by: FAMILY MEDICINE

## 2024-10-01 PROCEDURE — 83036 HEMOGLOBIN GLYCOSYLATED A1C: CPT | Performed by: FAMILY MEDICINE

## 2024-10-01 PROCEDURE — 85610 PROTHROMBIN TIME: CPT

## 2024-10-01 PROCEDURE — 86901 BLOOD TYPING SEROLOGIC RH(D): CPT | Performed by: NEUROLOGICAL SURGERY

## 2024-10-01 PROCEDURE — 87081 CULTURE SCREEN ONLY: CPT

## 2024-10-01 PROCEDURE — 85730 THROMBOPLASTIN TIME PARTIAL: CPT

## 2024-10-01 PROCEDURE — 86900 BLOOD TYPING SEROLOGIC ABO: CPT | Performed by: NEUROLOGICAL SURGERY

## 2024-10-01 PROCEDURE — 81003 URINALYSIS AUTO W/O SCOPE: CPT

## 2024-10-01 RX ORDER — FENTANYL 75 UG/1
1 PATCH TRANSDERMAL
Qty: 5 PATCH | Refills: 0 | Status: SHIPPED | OUTPATIENT
Start: 2024-10-01

## 2024-10-01 RX ORDER — INSULIN DEGLUDEC 100 U/ML
14 INJECTION, SOLUTION SUBCUTANEOUS DAILY
Start: 2024-10-01 | End: 2024-10-07

## 2024-10-01 RX ORDER — METFORMIN HCL 500 MG
TABLET, EXTENDED RELEASE 24 HR ORAL
Qty: 90 TABLET | Refills: 3 | Status: SHIPPED | OUTPATIENT
Start: 2024-10-01

## 2024-10-01 NOTE — ASSESSMENT & PLAN NOTE
Continue metformin 1500mgqd  Continue tresiba 14u qd  Continue ozempic  Continue ana Rivera has done a great job getting him under control  Lab Results   Component Value Date    HGBA1C 9.6 (A) 09/17/2024

## 2024-10-01 NOTE — PROGRESS NOTES
Assessment/Plan:       1. Chronic bilateral low back pain with bilateral sciatica  Assessment & Plan:  Trying to get him to surgery  Will increase fentanyl  Orders:  -     fentaNYL (DURAGESIC) 75 mcg/hr; Place 1 patch on the skin over 72 hours every third day Max Daily Amount: 1 patch  2. Type 2 diabetes mellitus without complication, without long-term current use of insulin ([facility])  Assessment & Plan:  Continue metformin 1500mgqd  Continue tresiba 14u qd  Continue ozempic  Continue farxiga  Nicole has done a great job getting him under control  Lab Results   Component Value Date    HGBA1C 9.6 (A) 09/17/2024     Orders:  -     insulin degludec (Tresiba FlexTouch) 100 units/mL injection pen; Inject 14 Units under the skin daily  -     metFORMIN (GLUCOPHAGE-XR) 500 mg 24 hr tablet; Take 1 pill qam and 2 pills qpm  -     POCT hemoglobin A1c        Subjective:      Patient ID: Dominick Irving is a 64 y.o. male.    Dominick is here to follow-up.  He is having a spinal surgery October 30.  The concern was his A1c of 9.6 which needs to be better controlled prior to his surgery.  His pain is uncontrolled even on the fentanyl patch.  I am going to increase the dose slightly to 75 mcg.  He did meet with Nicole who did a great job getting him controlled his sugars look great he has a CGM now.  He is very pleased with the care she has provided.  He did request an A1c today although its only been 2 weeks he knows it will not be covered by insurance but it went from 9.6-9.0.  He is following his numbers with his CGM and they look excellent.  He feels better in general.  I will follow-up with him in 2 weeks.        The following portions of the patient's history were reviewed and updated as appropriate: allergies, current medications, past family history, past medical history, past social history, past surgical history, and problem list.    Review of Systems   Respiratory: Negative.     Cardiovascular: Negative.       "    Objective:      /68 (BP Location: Left arm, Patient Position: Sitting, Cuff Size: Large)   Pulse 77   Ht 5' 7\" (1.702 m)   Wt 111 kg (244 lb)   SpO2 91%   BMI 38.22 kg/m²          Physical Exam  Vitals and nursing note reviewed.   Constitutional:       Appearance: Normal appearance.   HENT:      Head: Normocephalic and atraumatic.      Nose: Nose normal.      Mouth/Throat:      Mouth: Mucous membranes are moist.   Eyes:      Extraocular Movements: Extraocular movements intact.      Pupils: Pupils are equal, round, and reactive to light.   Cardiovascular:      Rate and Rhythm: Normal rate and regular rhythm.      Pulses: Normal pulses.   Pulmonary:      Effort: Pulmonary effort is normal.      Breath sounds: Normal breath sounds.   Abdominal:      General: Bowel sounds are normal.      Palpations: Abdomen is soft.   Musculoskeletal:      Cervical back: Normal range of motion.   Skin:     General: Skin is warm and dry.      Capillary Refill: Capillary refill takes less than 2 seconds.   Neurological:      General: No focal deficit present.      Mental Status: He is alert.   Psychiatric:         Mood and Affect: Mood normal.         "

## 2024-10-02 ENCOUNTER — TELEMEDICINE (OUTPATIENT)
Dept: PALLIATIVE MEDICINE | Facility: CLINIC | Age: 64
End: 2024-10-02

## 2024-10-02 DIAGNOSIS — Z71.89 COUNSELING AND COORDINATION OF CARE: Primary | ICD-10-CM

## 2024-10-02 LAB
MRSA NOSE QL CULT: ABNORMAL
MRSA NOSE QL CULT: ABNORMAL

## 2024-10-02 PROCEDURE — NC001 PR NO CHARGE

## 2024-10-02 NOTE — PROGRESS NOTES
"Palliative Supportive Care  met with patient/family to continue to provide emotional support and guidance.      Updated biopsychosocial information relevant to support:     The patient was identified by name and date of birth. Dominick was informed that this is a telemedicine visit and that the visit is being conducted through the Epic Embedded platform. They agree to proceed..  My office door was closed. No one else was in the room. They acknowledged consent and understanding of privacy and security of the video platform. The patient has agreed to participate and understands they can discontinue the visit at any time.    Dominick spoke about his pain being worse over time and his mobility is suffering. He said his PCP has increased his pain medication but he has not felt much relief yet. He is working on decreasing his high A1C. He is very happy with his progress so far and the support he has been given. He spoke about not knowing if he is on a \"journey of learning about himself\" or \"shrinking\". LCSW asked for clarification. He said that he noted that he has been learning how to cope/manage better with his frustration and has been allowing himself to cry when he needs to. He noted that sometimes he feels like his world has become so small because he is stuck a home and not very mobile. He feels terrible guilt when his family helps him and doesn't want to burden them. He wishes he had more companionship but he does not want them to stop \"living their lives\" to spend time with him when he feels he already asks enough of them. LCSW supported Dominick in challenging his negative thoughts about asking for help and also discussed possible coping strategies he may use to take his mind off the pain and stress as he gets closer to surgery. He said he is trying to find things to get him through the day and counting down the days to surgery helps keep him motivated when he feels really poorly.       Identified areas " of need include: emotional support    Resources provided:    Areas that need future follow-up include: reduce anxiety/depressed mood, coping strategies, challenging negative thoughts    I have spent 60 minutes with Patient  today in which greater than 50% of this time was spent in counseling/coordination of care     Palliative  will follow-up as requested by patient, family, and primary team.  Please contact with any specific requests

## 2024-10-03 ENCOUNTER — TELEPHONE (OUTPATIENT)
Age: 64
End: 2024-10-03

## 2024-10-03 NOTE — TELEPHONE ENCOUNTER
PA for fentaNYL (DURAGESIC) 75 mcg/hr SUBMITTED     via    [x]CMM-KEY:     []Surescripts-Case ID #   []Availity-Auth ID # NDC #   []Faxed to plan   []Other website   [x]Phone call Case ID # [id number]     Called insurance since medication cannot be processed through CMM.    Office notes sent, clinical questions answered. Awaiting determination    Turnaround time for your insurance to make a decision on your Prior Authorization can take 7-21 business days.

## 2024-10-03 NOTE — TELEPHONE ENCOUNTER
PA for fentaNYL (DURAGESIC) 75 mcg/hr NOT REQUIRED     Reason (screenshot if applicable)     Called insurance. Per representative, medication does not need a PA.        Patient advised by        Pharmacy.  [] MyChart Message  [] Phone call   []LMOM  []L/M to call office as no active Communication consent on file  []Unable to leave detailed message as VM not approved on Communication consent       Pharmacy advised by    [x]Fax  []Phone call

## 2024-10-07 ENCOUNTER — CLINICAL SUPPORT (OUTPATIENT)
Dept: FAMILY MEDICINE CLINIC | Facility: CLINIC | Age: 64
End: 2024-10-07

## 2024-10-07 DIAGNOSIS — E11.9 TYPE 2 DIABETES MELLITUS WITHOUT COMPLICATION, WITHOUT LONG-TERM CURRENT USE OF INSULIN (HCC): ICD-10-CM

## 2024-10-07 RX ORDER — INSULIN DEGLUDEC 100 U/ML
10 INJECTION, SOLUTION SUBCUTANEOUS DAILY
Qty: 15 ML | Refills: 1 | Status: SHIPPED | OUTPATIENT
Start: 2024-10-07

## 2024-10-07 NOTE — PROGRESS NOTES
[facility] Clinical Pharmacy Services  Nicole Cameron, Pharmacist    Assessment/ Plan     Assessment & Plan  Type 2 diabetes mellitus without complication, without long-term current use of insulin ([facility])  Goal A1c <7% . Needs A1c < 7.5% before surgery.  Newly diagnosed diabetes during the summer 2024.  Complications:  Microvascular complications: neuroapthy  Macrovascular complications: HF  Current Diabetes Regimen:  Ozempic 0.5 mg weekly  Metformin  mg -1 tab in AM 2 in PM   Farxiga 5 mg   Tresiba 10 units   Historical DM Meds (reason for discontinuation):  N/A  On Additional Therapies:  Statin: yes  ACEI/ARB: None   Glucose control on CGM report  Within goal range of 70 - 180 mg/dL 93% of time (goal >70% of time)  Average glucose 133 mg/dL (goal <154 mg/dL)  Having hypoglycemia events <70 mg/dL between 4-5 AM     Assessment: Glucose is controlled per Dexcom report.  Patient was having hypoglycemia events less than 70 between 4 AM and 5 AM.  He has backed down on his Tresiba to 10 units and so far this seems to have resolved low blood sugar events.  I did inform him him to back down by 2 units if he has any more readings less than 70 mg/dL.     Changes to medication regimen:  Continue Tresiba 10 units daily decrease by 2 units if low glucose less than 70 mg/dL  Continue Ozempic 0.5 mg weekly, metformin 1500 mg total daily, Farxiga 5 mg daily     Follow-up: 2 weeks  Subjective   HPI    Medication Adherence/ Tolerability/ Cost:  metFORMIN increased by pcp to 1 tab in am and 2 tabs in PM   semaglutide (0.25 or 0.5 mg/dose)   Tresiba- decreased from 12 units daily to 10 units due to low events < 70 mg/dL  Dexcom G7 sensors- getting sensor failure messages. Advised patient to call dexcom directly.     Previous Medications  N/A    Review of Systems   Gastrointestinal:  Negative for abdominal pain, constipation, diarrhea, nausea and vomiting.        2. Lifestyle:   Avoids junk food  Home cooks meals in evening.    Smaller lunch and larger dinner.   Dinner portions are    3. Home monitoring devices  Glucometer: Yes, Brand: [employer] brand  Continuous Glucose Monitor: Yes, Brand: starting on Dexcom G7      Objective         Dexcom AGP Report        ASCVD Risk:  The 10-year ASCVD risk score (Arvin SUN, et al., 2019) is: 29.6%    Values used to calculate the score:      Age: 64 years      Sex: Male      Is Non- : No      Diabetic: Yes      Tobacco smoker: No      Systolic Blood Pressure: 138 mmHg      Is BP treated: Yes      HDL Cholesterol: 33 mg/dL      Total Cholesterol: 141 mg/dL     Vitals:  There were no vitals filed for this visit.    Eye Exam:    Lab Results   Component Value Date    LEFTDIABRET None 02/06/2024    RIGHTDIABRET None 02/06/2024       Labs:  Lab Results   Component Value Date    SODIUM 137 10/01/2024    K 3.4 (L) 10/01/2024    EGFR 56 10/01/2024    CREATININE 1.32 (H) 10/01/2024    GLUF 111 (H) 10/01/2024       Lab Results   Component Value Date    HGBA1C 9.0 (H) 10/01/2024    HGBA1C 9.0 (A) 10/01/2024    HGBA1C 9.6 (A) 09/17/2024       Telemedicine consent  The patient was identified by name and date of birth. Dominick JUAN Irving was informed that this is a telemedicine visit and that the visit is being conducted through the Epic Embedded platform. He agrees to proceed..  My office door was closed. No one else was in the room.  He acknowledged consent and understanding of privacy and security of the video platform. The patient has agreed to participate and understands they can discontinue the visit at any time.    Pharmacist Tracking Tool     Pharmacist Tracking Tool  Reason For Outreach: Embedded Pharmacist  Demographics:  Intervention Method: Video  Type of Intervention: Follow-Up  Topics Addressed: Diabetes  Pharmacologic Interventions: Dose or Frequency Adjusted and Prevent or Manage NICOLAS  Non-Pharmacologic Interventions: Disease state education, Home Monitoring, Medication/Device  education, and Personal CGM  Time:  Direct Patient Care:  25  mins  Care Coordination:  5  mins  Recommendation Recipient: Patient/Caregiver and Provider  Outcome: Accepted

## 2024-10-07 NOTE — ASSESSMENT & PLAN NOTE
Goal A1c <7% . Needs A1c < 7.5% before surgery.  Newly diagnosed diabetes during the summer 2024.  Complications:  Microvascular complications: neuroapthy  Macrovascular complications: HF  Current Diabetes Regimen:  Ozempic 0.5 mg weekly  Metformin  mg -1 tab in AM 2 in PM   Farxiga 5 mg   Tresiba 10 units   Historical DM Meds (reason for discontinuation):  N/A  On Additional Therapies:  Statin: yes  ACEI/ARB: None   Glucose control on CGM report  Within goal range of 70 - 180 mg/dL 93% of time (goal >70% of time)  Average glucose 133 mg/dL (goal <154 mg/dL)  Having hypoglycemia events <70 mg/dL between 4-5 AM     Assessment: Glucose is controlled per Dexcom report.  Patient was having hypoglycemia events less than 70 between 4 AM and 5 AM.  He has backed down on his Tresiba to 10 units and so far this seems to have resolved low blood sugar events.  I did inform him him to back down by 2 units if he has any more readings less than 70 mg/dL.     Changes to medication regimen:  Continue Tresiba 10 units daily decrease by 2 units if low glucose less than 70 mg/dL  Continue Ozempic 0.5 mg weekly, metformin 1500 mg total daily, Farxiga 5 mg daily

## 2024-10-09 ENCOUNTER — RA CDI HCC (OUTPATIENT)
Dept: OTHER | Facility: HOSPITAL | Age: 64
End: 2024-10-09

## 2024-10-09 NOTE — PROGRESS NOTES
HCC coding opportunities          Chart Reviewed number of suggestions sent to Provider: 3  I13.0  E11.65  E11.22     Patients Insurance     Medicare Insurance: Medicare

## 2024-10-11 ENCOUNTER — TELEPHONE (OUTPATIENT)
Age: 64
End: 2024-10-11

## 2024-10-11 ENCOUNTER — TELEPHONE (OUTPATIENT)
Dept: NEUROSURGERY | Facility: CLINIC | Age: 64
End: 2024-10-11

## 2024-10-11 ENCOUNTER — TELEPHONE (OUTPATIENT)
Dept: FAMILY MEDICINE CLINIC | Facility: CLINIC | Age: 64
End: 2024-10-11

## 2024-10-11 NOTE — TELEPHONE ENCOUNTER
Spoke with Kacy regarding patients upcoming surgery. Patients A1c is not at the level they are requesting him to be at prior to doing the surgery. They are suggesting to cancel the surgery all together since provider stated A1C is not where it needs to be. patient was upset and requesting to speak to the clinical pharmacist or his pcp prior to cancelling to see what they think. Kacy also advised for patient to have appt with PCP. Patient is not scheduled for a preop appointment currently, next upcoming appointment is an AWV.  I scheduled whitney for a preop on Tuesday 10/15 to discuss.     Kacy also wanted us to make note that the patients nasal swab came back positive for MRSA.    I spoke with Nicole the pharmacist and she is going to fax over patients latest dexcom readings to the surgeons office.     Kacy contact info  [phone]

## 2024-10-11 NOTE — TELEPHONE ENCOUNTER
Lvm for genoveva to return call to obtain fax number for ashwin so she can fax over patients latest dexcom readings.

## 2024-10-11 NOTE — TELEPHONE ENCOUNTER
Carla from  neurology was calling in reference to Pt upcoming surgery by Dr. Reed on 10/30 for his back.    Carla states that his HA1C is too high - 9/17 9.6, 10/1 9.0.  Neurology wants to cancel the surgery and the pt is resisting and wants to get the HA1C lowered with the help of the Pharmacy Nicole that he works with.    No pre op appt seen, only a AWV. 10/16    Warm transferred to Lind to assist in the call.

## 2024-10-11 NOTE — TELEPHONE ENCOUNTER
10/11/2024-Called pt, advised that 10/30 surgery will need to be rescheduled. Pt states he is work with Nicole Cameron (Diabetic Pharmacist) and PCP to lower his Hemoglobin and to call them before cancelling sx.  Called PCP office, spoke to Priyanka who will send message to Nicole PCP, and will contact pt.        MD Kacy Ramsay  Yes.  Needs to reschedule.  As always it is not necessary to get HbA1C down to less than 8 however it is required that he show a trend for better glucose control        ----- Message -----  From: Kacy Ulloa  Sent: 10/10/2024   2:49 PM EDT  To: Kacy Ulloa; Leland Aguilar MD  Subject: Please Advise                                    Hello,  Pt's Hemoglobin is 9.0.  Do you wish to reschedule 10/30/2024 surgery?  Thank You

## 2024-10-15 ENCOUNTER — CONSULT (OUTPATIENT)
Dept: FAMILY MEDICINE CLINIC | Facility: CLINIC | Age: 64
End: 2024-10-15
Payer: MEDICARE

## 2024-10-15 VITALS
BODY MASS INDEX: 38.83 KG/M2 | HEIGHT: 67 IN | HEART RATE: 65 BPM | OXYGEN SATURATION: 95 % | WEIGHT: 247.4 LBS | SYSTOLIC BLOOD PRESSURE: 128 MMHG | DIASTOLIC BLOOD PRESSURE: 66 MMHG

## 2024-10-15 DIAGNOSIS — M51.9 LUMBAR DISC DISEASE: ICD-10-CM

## 2024-10-15 DIAGNOSIS — Z01.818 PRE-OP EXAMINATION: Primary | ICD-10-CM

## 2024-10-15 DIAGNOSIS — Z87.891 STOPPED SMOKING WITH GREATER THAN 20 PACK YEAR HISTORY: ICD-10-CM

## 2024-10-15 DIAGNOSIS — G89.29 OTHER CHRONIC PAIN: ICD-10-CM

## 2024-10-15 PROCEDURE — 99214 OFFICE O/P EST MOD 30 MIN: CPT | Performed by: FAMILY MEDICINE

## 2024-10-15 PROCEDURE — G2211 COMPLEX E/M VISIT ADD ON: HCPCS | Performed by: FAMILY MEDICINE

## 2024-10-15 NOTE — PROGRESS NOTES
Assessment/Plan:       1. Pre-op examination  Comments:  cleared for surgery  2. Lumbar disc disease  Comments:  cleared for surgery  3. Other chronic pain  Comments:  continue meds, no changes  Orders:  -     [facility] All Prescribed Meds and Special Instructions  -     Amphetamines, Methamphetamines  -     Butalbital  -     Phenobarbital  -     Secobarbital  -     Alprazolam  -     Clonazepam  -     Diazepam, Temazepam, Oxazepam  -     Lorazepam  -     Gabapentin  -     Pregabalin  -     Cocaine  -     Heroin  -     Buprenorphine  -     Levorphanol  -     Meperidine  -     Naltrexone  -     Fentanyl  -     Methadone  -     Oxycodone  -     Tapentadol  -     THC  -     Tramadol  -     Codeine, Hydrocodone, Hydropmorphone, Morphine  -     Bath Salts  -     Ethyl Glucuronide/Ethyl Sulfate  -     Kratom  -     Spice  -     Methylphenidate  -     Phentermine  -     Validity Oxidant  -     Validity Creatinine  -     Validity pH  -     Validity Specific  -     Xylazine Definitive Test  -     CT lung screening program; Future; Expected date: 10/16/2024  4. Stopped smoking with greater than 20 pack year history  -     CT lung screening program; Future; Expected date: 10/16/2024        Subjective:      Patient ID: Dominick Irving is a 64 y.o. male.    The patient is here for preop exam for an upcoming laminectomy surgery of the lumbar spine on October 30 in [place] at [facility] Dr. Aguilar.  He has severe pain.  He has had surgery a year and a half ago.  His pain management past year has been very difficult and complicated.  He is on Duragesic patches as well as Dilaudid.  He has no active chest pain or shortness of breath.  No history of problems with anesthesia.  He is greater than for any coagulation.  He has his preoperative blood work later this week.  He is here for surgery.        The following portions of the patient's history were reviewed and updated as appropriate: allergies, current medications, past  "family history, past medical history, past social history, past surgical history, and problem list.    Review of Systems   Respiratory: Negative.     Cardiovascular: Negative.    Gastrointestinal: Negative.          Objective:      /66 (BP Location: Right arm, Patient Position: Sitting, Cuff Size: Large)   Pulse 65   Ht 5' 7\" (1.702 m)   Wt 112 kg (247 lb 6.4 oz)   SpO2 95%   BMI 38.75 kg/m²          Physical Exam  Vitals and nursing note reviewed.   Constitutional:       Appearance: Normal appearance.   HENT:      Head: Normocephalic and atraumatic.      Nose: Nose normal.      Mouth/Throat:      Mouth: Mucous membranes are moist.   Eyes:      Extraocular Movements: Extraocular movements intact.      Pupils: Pupils are equal, round, and reactive to light.   Cardiovascular:      Rate and Rhythm: Normal rate and regular rhythm.      Pulses: Normal pulses.   Pulmonary:      Effort: Pulmonary effort is normal.      Breath sounds: Normal breath sounds.   Abdominal:      General: Bowel sounds are normal.      Palpations: Abdomen is soft.   Musculoskeletal:      Cervical back: Normal range of motion.   Skin:     General: Skin is warm and dry.      Capillary Refill: Capillary refill takes less than 2 seconds.   Neurological:      General: No focal deficit present.      Mental Status: He is alert.   Psychiatric:         Mood and Affect: Mood normal.         "

## 2024-10-15 NOTE — PROGRESS NOTES
Palliative Supportive Care  met with patient/family to continue to provide emotional support and guidance.      Updated biopsychosocial information relevant to support:     The patient was identified by name and date of birth. Dominick was informed that this is a telemedicine visit and that the visit is being conducted through the Epic Embedded platform. They agree to proceed..  My office door was closed. No one else was in the room. They acknowledged consent and understanding of privacy and security of the video platform. The patient has agreed to participate and understands they can discontinue the visit at any time.    Dominick spoke about his surgery possibly being canceled. He said when the  called him with this news he was devastated and begged them to wait and have neurosurgery communicate with his care team about his A1C and all the work they are doing to reduce the number, also see that his glucose readings have been very good lately. He said luckily he has an amazing PCP who is going to try to advocate on his behalf to keep the surgery as scheduled due to his progress and that his A1C levels may be related to the pain he is in which will not decrease without surgery. He was very tearful and said he cannot imagine waiting another 3 months after already waiting 2 months for this date. He spoke about his terrible pain and ever decreasing mobility. He spoke about going away to [place] for his 35th anniversary and being so happy to be able to take his wife out, only to receive this news while they were on their trip and it completely ruined the mood. He said on the actual anniversary day, he and his wife were both sick and the dog got sprayed by a skunk. He said he feels like he cannot catch a break and things keep going badly. LCSw provided Dominick with emotional support and tried to help him stay hopeful but also prepare for the possibility of cancellation. LCSW encouraged Dominick to stay in  contact with his care team as a decision is made. He said he would and thanked LCSW for being there to listen to him.       Identified areas of need include: emotional support    Resources provided:    Areas that need future follow-up include: reduce anxiety/depressed mood    I have spent 60 minutes with Patient  today in which greater than 50% of this time was spent in counseling/coordination of care     Palliative  will follow-up as requested by patient, family, and primary team.  Please contact with any specific requests

## 2024-10-16 ENCOUNTER — TELEMEDICINE (OUTPATIENT)
Dept: PALLIATIVE MEDICINE | Facility: CLINIC | Age: 64
End: 2024-10-16

## 2024-10-16 ENCOUNTER — OFFICE VISIT (OUTPATIENT)
Dept: FAMILY MEDICINE CLINIC | Facility: CLINIC | Age: 64
End: 2024-10-16
Payer: MEDICARE

## 2024-10-16 ENCOUNTER — PATIENT MESSAGE (OUTPATIENT)
Dept: FAMILY MEDICINE CLINIC | Facility: CLINIC | Age: 64
End: 2024-10-16

## 2024-10-16 VITALS
WEIGHT: 250 LBS | BODY MASS INDEX: 39.24 KG/M2 | HEIGHT: 67 IN | SYSTOLIC BLOOD PRESSURE: 122 MMHG | HEART RATE: 64 BPM | DIASTOLIC BLOOD PRESSURE: 64 MMHG | OXYGEN SATURATION: 95 %

## 2024-10-16 DIAGNOSIS — M79.605 BILATERAL LEG PAIN: ICD-10-CM

## 2024-10-16 DIAGNOSIS — G89.29 CHRONIC INTRACTABLE PAIN: ICD-10-CM

## 2024-10-16 DIAGNOSIS — G89.4 CHRONIC PAIN SYNDROME: ICD-10-CM

## 2024-10-16 DIAGNOSIS — M54.41 CHRONIC BILATERAL LOW BACK PAIN WITH BILATERAL SCIATICA: ICD-10-CM

## 2024-10-16 DIAGNOSIS — M79.604 BILATERAL LEG PAIN: ICD-10-CM

## 2024-10-16 DIAGNOSIS — E87.6 HYPOKALEMIA: ICD-10-CM

## 2024-10-16 DIAGNOSIS — Z71.89 COUNSELING AND COORDINATION OF CARE: Primary | ICD-10-CM

## 2024-10-16 DIAGNOSIS — M54.42 CHRONIC BILATERAL LOW BACK PAIN WITH BILATERAL SCIATICA: ICD-10-CM

## 2024-10-16 DIAGNOSIS — G89.29 CHRONIC BILATERAL LOW BACK PAIN WITH BILATERAL SCIATICA: ICD-10-CM

## 2024-10-16 DIAGNOSIS — Z00.00 MEDICARE ANNUAL WELLNESS VISIT, SUBSEQUENT: Primary | ICD-10-CM

## 2024-10-16 PROCEDURE — G0439 PPPS, SUBSEQ VISIT: HCPCS | Performed by: FAMILY MEDICINE

## 2024-10-16 PROCEDURE — NC001 PR NO CHARGE

## 2024-10-16 RX ORDER — FENTANYL 75 UG/1
1 PATCH TRANSDERMAL
Qty: 10 PATCH | Refills: 0 | Status: SHIPPED | OUTPATIENT
Start: 2024-10-16

## 2024-10-16 NOTE — ASSESSMENT & PLAN NOTE
Orders:    fentaNYL (DURAGESIC) 75 mcg/hr; Place 1 patch on the skin over 72 hours every third day Max Daily Amount: 1 patch

## 2024-10-16 NOTE — H&P (VIEW-ONLY)
Ambulatory Visit  Name: Dominick Irving      : 1960      MRN: [id number]  Encounter Provider: Robert Budinetz, MD  Encounter Date: 10/16/2024   Encounter department: [facility] PRIMARY CARE    Assessment & Plan  Medicare annual wellness visit, subsequent         Chronic bilateral low back pain with bilateral sciatica    Orders:    fentaNYL (DURAGESIC) 75 mcg/hr; Place 1 patch on the skin over 72 hours every third day Max Daily Amount: 1 patch       Preventive health issues were discussed with patient, and age appropriate screening tests were ordered as noted in patient's After Visit Summary. Personalized health advice and appropriate referrals for health education or preventive services given if needed, as noted in patient's After Visit Summary.    History of Present Illness     Dominick is here for an annual Medicare.  He is doing well and stable in general.  No issues with falling.  Given his back issues he is ambulating with a walker.  He has no chest pain or shortness of breath.  Labs are reviewed.  Blood pressure looks good.  He is not truly depressed but depressed given his back pain and his situation.       Patient Care Team:  Robert Budinetz, MD as PCP - General (Family Medicine)  JUAN Kim as  Care Manager (Oncology)  Lake Connors PA-C as Physician Assistant (Palliative Care)  Giovanna Gonzales LCSW as  (Palliative Care)  DIA Mistry as Nurse Practitioner (Palliative Care)  Nicole Cameron, Pharmacist as Pharmacist (Pharmacy)    Review of Systems   Respiratory: Negative.     Cardiovascular: Negative.    Gastrointestinal: Negative.    Symptoms people start getting drunk.  Medical History Reviewed by provider this encounter:  Tobacco  Allergies  Meds  Problems  Med Hx  Surg Hx  Fam Hx       Annual Wellness Visit Questionnaire   Dominick is here for his Subsequent Wellness visit. Last Medicare Wellness visit information reviewed, patient  interviewed, no change since last AWV.     Health Risk Assessment:   Patient rates overall health as fair. Patient feels that their physical health rating is much worse. Patient is very dissatisfied with their life. Eyesight was rated as same. Hearing was rated as slightly worse. Patient feels that their emotional and mental health rating is slightly worse. Patients states they are never, rarely angry. Patient states they are sometimes unusually tired/fatigued. Pain experienced in the last 7 days has been a lot. Patient's pain rating has been 8/10. Patient states that he has experienced no weight loss or gain in last 6 months.     Fall Risk Screening:   In the past year, patient has experienced: history of falling in past year      Home Safety:  Patient has trouble with stairs inside or outside of their home. Patient has working smoke alarms and has working carbon monoxide detector. Home safety hazards include: none.     Nutrition:   Current diet is Low Carb and No Added Salt.     Medications:   Patient is not currently taking any over-the-counter supplements. Patient is able to manage medications.     Activities of Daily Living (ADLs)/Instrumental Activities of Daily Living (IADLs):   Walk and transfer into and out of bed and chair?: Yes  Dress and groom yourself?: Yes    Bathe or shower yourself?: Yes    Feed yourself? Yes  Do your laundry/housekeeping?: No  Manage your money, pay your bills and track your expenses?: Yes  Make your own meals?: Yes    Do your own shopping?: No    Previous Hospitalizations:   Any hospitalizations or ED visits within the last 12 months?: Yes    How many hospitalizations have you had in the last year?: 1-2    Advance Care Planning:   Living will: No    Durable POA for healthcare: Yes    Advanced directive: Yes      PREVENTIVE SCREENINGS      Cardiovascular Screening:    General: Screening Not Indicated and History Lipid Disorder      Diabetes Screening:     General: Screening Not  Indicated and History Diabetes      Colorectal Cancer Screening:     General: Screening Current      Prostate Cancer Screening:    General: Screening Current      Abdominal Aortic Aneurysm (AAA) Screening:    Risk factors include: age between 65-76 yo and tobacco use      Screening, Brief Intervention, and Referral to Treatment (SBIRT)    Screening  Typical number of drinks in a day: 0  Typical number of drinks in a week: 0  Interpretation: Low risk drinking behavior.    AUDIT-C Screenin) How often did you have a drink containing alcohol in the past year? monthly or less  2) How many drinks did you have on a typical day when you were drinking in the past year? 1 to 2  3) How often did you have 6 or more drinks on one occasion in the past year? never    AUDIT-C Score: 1  Interpretation: Score 0-3 (male): Negative screen for alcohol misuse    Single Item Drug Screening:  How often have you used an illegal drug (including marijuana) or a prescription medication for non-medical reasons in the past year? never    Single Item Drug Screen Score: 0  Interpretation: Negative screen for possible drug use disorder    Review of Current Opioid Use    Opioid Risk Tool (ORT) Interpretation: Complete Opioid Risk Tool (ORT)    Social Determinants of Health     Financial Resource Strain: Low Risk  (2023)    Overall Financial Resource Strain (CARDIA)     Difficulty of Paying Living Expenses: Not hard at all   Food Insecurity: No Food Insecurity (10/13/2024)    Hunger Vital Sign     Worried About Running Out of Food in the Last Year: Never true     Ran Out of Food in the Last Year: Never true   Transportation Needs: No Transportation Needs (10/13/2024)    PRAPARE - Transportation     Lack of Transportation (Medical): No     Lack of Transportation (Non-Medical): No   Housing Stability: Low Risk  (10/13/2024)    Housing Stability Vital Sign     Unable to Pay for Housing in the Last Year: No     Number of Times Moved in the Last  "Year: 0     Homeless in the Last Year: No   Utilities: Not At Risk (10/13/2024)    [facility] Utilities     Threatened with loss of utilities: No     No results found.    Objective     /64 (BP Location: Right arm, Patient Position: Sitting, Cuff Size: Large)   Pulse 64   Ht 5' 7\" (1.702 m)   Wt 113 kg (250 lb)   SpO2 95%   BMI 39.16 kg/m²     Physical Exam    "

## 2024-10-16 NOTE — PROGRESS NOTES
Ambulatory Visit  Name: Dominick Irving      : 1960      MRN: [id number]  Encounter Provider: Robert Budinetz, MD  Encounter Date: 10/16/2024   Encounter department: [facility] PRIMARY CARE    Assessment & Plan  Medicare annual wellness visit, subsequent         Chronic bilateral low back pain with bilateral sciatica    Orders:    fentaNYL (DURAGESIC) 75 mcg/hr; Place 1 patch on the skin over 72 hours every third day Max Daily Amount: 1 patch       Preventive health issues were discussed with patient, and age appropriate screening tests were ordered as noted in patient's After Visit Summary. Personalized health advice and appropriate referrals for health education or preventive services given if needed, as noted in patient's After Visit Summary.    History of Present Illness     Dominick is here for an annual Medicare.  He is doing well and stable in general.  No issues with falling.  Given his back issues he is ambulating with a walker.  He has no chest pain or shortness of breath.  Labs are reviewed.  Blood pressure looks good.  He is not truly depressed but depressed given his back pain and his situation.       Patient Care Team:  Robert Budinetz, MD as PCP - General (Family Medicine)  JUAN Kim as  Care Manager (Oncology)  Lake Connors PA-C as Physician Assistant (Palliative Care)  Giovanna Gonzales LCSW as  (Palliative Care)  DIA Mistry as Nurse Practitioner (Palliative Care)  Nicole Cameron, Pharmacist as Pharmacist (Pharmacy)    Review of Systems   Respiratory: Negative.     Cardiovascular: Negative.    Gastrointestinal: Negative.    Symptoms people start getting drunk.  Medical History Reviewed by provider this encounter:  Tobacco  Allergies  Meds  Problems  Med Hx  Surg Hx  Fam Hx       Annual Wellness Visit Questionnaire   Dominick is here for his Subsequent Wellness visit. Last Medicare Wellness visit information reviewed, patient  interviewed, no change since last AWV.     Health Risk Assessment:   Patient rates overall health as fair. Patient feels that their physical health rating is much worse. Patient is very dissatisfied with their life. Eyesight was rated as same. Hearing was rated as slightly worse. Patient feels that their emotional and mental health rating is slightly worse. Patients states they are never, rarely angry. Patient states they are sometimes unusually tired/fatigued. Pain experienced in the last 7 days has been a lot. Patient's pain rating has been 8/10. Patient states that he has experienced no weight loss or gain in last 6 months.     Fall Risk Screening:   In the past year, patient has experienced: history of falling in past year      Home Safety:  Patient has trouble with stairs inside or outside of their home. Patient has working smoke alarms and has working carbon monoxide detector. Home safety hazards include: none.     Nutrition:   Current diet is Low Carb and No Added Salt.     Medications:   Patient is not currently taking any over-the-counter supplements. Patient is able to manage medications.     Activities of Daily Living (ADLs)/Instrumental Activities of Daily Living (IADLs):   Walk and transfer into and out of bed and chair?: Yes  Dress and groom yourself?: Yes    Bathe or shower yourself?: Yes    Feed yourself? Yes  Do your laundry/housekeeping?: No  Manage your money, pay your bills and track your expenses?: Yes  Make your own meals?: Yes    Do your own shopping?: No    Previous Hospitalizations:   Any hospitalizations or ED visits within the last 12 months?: Yes    How many hospitalizations have you had in the last year?: 1-2    Advance Care Planning:   Living will: No    Durable POA for healthcare: Yes    Advanced directive: Yes      PREVENTIVE SCREENINGS      Cardiovascular Screening:    General: Screening Not Indicated and History Lipid Disorder      Diabetes Screening:     General: Screening Not  Indicated and History Diabetes      Colorectal Cancer Screening:     General: Screening Current      Prostate Cancer Screening:    General: Screening Current      Abdominal Aortic Aneurysm (AAA) Screening:    Risk factors include: age between 65-76 yo and tobacco use      Screening, Brief Intervention, and Referral to Treatment (SBIRT)    Screening  Typical number of drinks in a day: 0  Typical number of drinks in a week: 0  Interpretation: Low risk drinking behavior.    AUDIT-C Screenin) How often did you have a drink containing alcohol in the past year? monthly or less  2) How many drinks did you have on a typical day when you were drinking in the past year? 1 to 2  3) How often did you have 6 or more drinks on one occasion in the past year? never    AUDIT-C Score: 1  Interpretation: Score 0-3 (male): Negative screen for alcohol misuse    Single Item Drug Screening:  How often have you used an illegal drug (including marijuana) or a prescription medication for non-medical reasons in the past year? never    Single Item Drug Screen Score: 0  Interpretation: Negative screen for possible drug use disorder    Review of Current Opioid Use    Opioid Risk Tool (ORT) Interpretation: Complete Opioid Risk Tool (ORT)    Social Determinants of Health     Financial Resource Strain: Low Risk  (2023)    Overall Financial Resource Strain (CARDIA)     Difficulty of Paying Living Expenses: Not hard at all   Food Insecurity: No Food Insecurity (10/13/2024)    Hunger Vital Sign     Worried About Running Out of Food in the Last Year: Never true     Ran Out of Food in the Last Year: Never true   Transportation Needs: No Transportation Needs (10/13/2024)    PRAPARE - Transportation     Lack of Transportation (Medical): No     Lack of Transportation (Non-Medical): No   Housing Stability: Low Risk  (10/13/2024)    Housing Stability Vital Sign     Unable to Pay for Housing in the Last Year: No     Number of Times Moved in the Last  "Year: 0     Homeless in the Last Year: No   Utilities: Not At Risk (10/13/2024)    [facility] Utilities     Threatened with loss of utilities: No     No results found.    Objective     /64 (BP Location: Right arm, Patient Position: Sitting, Cuff Size: Large)   Pulse 64   Ht 5' 7\" (1.702 m)   Wt 113 kg (250 lb)   SpO2 95%   BMI 39.16 kg/m²     Physical Exam    "

## 2024-10-17 ENCOUNTER — HOSPITAL ENCOUNTER (OUTPATIENT)
Dept: CT IMAGING | Facility: HOSPITAL | Age: 64
End: 2024-10-17
Attending: FAMILY MEDICINE
Payer: MEDICARE

## 2024-10-17 ENCOUNTER — TELEPHONE (OUTPATIENT)
Age: 64
End: 2024-10-17

## 2024-10-17 ENCOUNTER — HOSPITAL ENCOUNTER (OUTPATIENT)
Dept: CT IMAGING | Facility: HOSPITAL | Age: 64
End: 2024-10-17
Attending: NEUROLOGICAL SURGERY
Payer: MEDICARE

## 2024-10-17 DIAGNOSIS — M54.9 MECHANICAL BACK PAIN: ICD-10-CM

## 2024-10-17 DIAGNOSIS — G89.29 OTHER CHRONIC PAIN: ICD-10-CM

## 2024-10-17 DIAGNOSIS — Z87.891 STOPPED SMOKING WITH GREATER THAN 20 PACK YEAR HISTORY: ICD-10-CM

## 2024-10-17 DIAGNOSIS — M48.061 SPINAL STENOSIS OF LUMBAR REGION: ICD-10-CM

## 2024-10-17 DIAGNOSIS — M47.816 FACET ARTHROPATHY, LUMBAR: ICD-10-CM

## 2024-10-17 PROCEDURE — 71271 CT THORAX LUNG CANCER SCR C-: CPT

## 2024-10-17 PROCEDURE — 72131 CT LUMBAR SPINE W/O DYE: CPT

## 2024-10-17 RX ORDER — GABAPENTIN 600 MG/1
600 TABLET ORAL 4 TIMES DAILY
Qty: 120 TABLET | Refills: 5 | Status: SHIPPED | OUTPATIENT
Start: 2024-10-17

## 2024-10-17 RX ORDER — POTASSIUM CHLORIDE 750 MG/1
20 TABLET, EXTENDED RELEASE ORAL 2 TIMES DAILY
Qty: 120 TABLET | Refills: 0 | Status: SHIPPED | OUTPATIENT
Start: 2024-10-17

## 2024-10-17 NOTE — TELEPHONE ENCOUNTER
Pt called stating that his Cardiologist has not received a surgical clearance form as they are within [facility].  Pts Cardiologist is Dr. Britta Brito Ph# [phone] and her Fax#[phone].  Will send to  [facility] as pt states that PAT has stated they sent this form 3 times and it has not yet been received.  Pt is worried because his surgery with DKO is scheduled for 10/30/24.  Pt is requesting a CB with status once received.

## 2024-10-18 ENCOUNTER — PATIENT MESSAGE (OUTPATIENT)
Dept: FAMILY MEDICINE CLINIC | Facility: CLINIC | Age: 64
End: 2024-10-18

## 2024-10-22 ENCOUNTER — CLINICAL SUPPORT (OUTPATIENT)
Dept: FAMILY MEDICINE CLINIC | Facility: CLINIC | Age: 64
End: 2024-10-22

## 2024-10-22 ENCOUNTER — TELEPHONE (OUTPATIENT)
Dept: NEUROSURGERY | Facility: CLINIC | Age: 64
End: 2024-10-22

## 2024-10-22 DIAGNOSIS — E11.9 TYPE 2 DIABETES MELLITUS WITHOUT COMPLICATION, WITHOUT LONG-TERM CURRENT USE OF INSULIN (HCC): Primary | ICD-10-CM

## 2024-10-22 PROCEDURE — PBNCHG PB NO CHARGE PLACEHOLDER: Performed by: PHARMACIST

## 2024-10-22 RX ORDER — INSULIN DEGLUDEC 100 U/ML
8 INJECTION, SOLUTION SUBCUTANEOUS DAILY
Qty: 15 ML | Refills: 1 | Status: SHIPPED | OUTPATIENT
Start: 2024-10-22

## 2024-10-22 NOTE — ASSESSMENT & PLAN NOTE
Lab Results   Component Value Date    HGBA1C 9.0 (H) 10/01/2024     Goal A1c <7% .  Newly diagnosed diabetes during the summer 2024.  Complications:  Microvascular complications: neuroapthy  Macrovascular complications: HF  Current Diabetes Regimen:  Ozempic 0.5 mg weekly  Metformin  mg -1 tab in AM 2 in PM   Farxiga 5 mg   Tresiba 10 units   Historical DM Meds (reason for discontinuation):  N/A  On Additional Therapies:  Statin: yes  ACEI/ARB: None   Glucose control on CGM report  Within goal range of 70 - 180 mg/dL 93% of time (goal >70% of time)  Average glucose 133 mg/dL (goal <154 mg/dL)  Having hypoglycemia events <70 mg/dL between 4-5 AM     Assessment: Glucose is controlled per Dexcom report. Continues to have occasional low glucose event on Dexcom Report. He has surgery planned for tomorrow 10/23/24.      Changes to medication regimen:  Decrease Tresiba to 8 units daily  Continue Ozempic 0.5 mg weekly, metformin 1500 mg total daily, Farxiga 5 mg daily    Orders:    insulin degludec (Tresiba FlexTouch) 100 units/mL injection pen; Inject 8 Units under the skin daily

## 2024-10-22 NOTE — PROGRESS NOTES
[facility] Clinical Pharmacy Services  Nicole Cameron, Pharmacist    Assessment/ Plan     Assessment & Plan  Type 2 diabetes mellitus without complication, without long-term current use of insulin ([facility])    Lab Results   Component Value Date    HGBA1C 9.0 (H) 10/01/2024     Goal A1c <7% .  Newly diagnosed diabetes during the summer 2024.  Complications:  Microvascular complications: neuroapthy  Macrovascular complications: HF  Current Diabetes Regimen:  Ozempic 0.5 mg weekly  Metformin  mg -1 tab in AM 2 in PM   Farxiga 5 mg   Tresiba 10 units   Historical DM Meds (reason for discontinuation):  N/A  On Additional Therapies:  Statin: yes  ACEI/ARB: None   Glucose control on CGM report  Within goal range of 70 - 180 mg/dL 93% of time (goal >70% of time)  Average glucose 133 mg/dL (goal <154 mg/dL)  Having hypoglycemia events <70 mg/dL between 4-5 AM     Assessment: Glucose is controlled per Dexcom report. Continues to have occasional low glucose event on Dexcom Report. He has surgery planned for tomorrow 10/23/24.      Changes to medication regimen:  Decrease Tresiba to 8 units daily  Continue Ozempic 0.5 mg weekly, metformin 1500 mg total daily, Farxiga 5 mg daily    Orders:    insulin degludec (Tresiba FlexTouch) 100 units/mL injection pen; Inject 8 Units under the skin daily        Follow-up: 1 weeks  Subjective   HPI    Medication Adherence/ Tolerability/ Cost:  metFORMIN -1 tab in am and 2 tabs in PM (1500 mg total)  semaglutide (0.25 or 0.5 mg/dose) - last injection was last Thurs 10/17  Tresiba- 10 units due to low events < 70 mg/dL  Farxiga 5 mg daily     Previous Medications  N/A    Review of Systems   Gastrointestinal:  Negative for abdominal pain, constipation, diarrhea, nausea and vomiting.        2. Lifestyle:   Avoids junk food  Home cooks meals in evening.   Smaller lunch and larger dinner.     3. Home monitoring devices  Glucometer: Yes, Brand: [employer] brand  Continuous Glucose Monitor: Yes, Brand:  starting on Dexcom G7      Objective         Dexcom AGP Report            ASCVD Risk:  The 10-year ASCVD risk score (Arvin SUN, et al., 2019) is: 24.6%    Values used to calculate the score:      Age: 64 years      Sex: Male      Is Non- : No      Diabetic: Yes      Tobacco smoker: No      Systolic Blood Pressure: 122 mmHg      Is BP treated: Yes      HDL Cholesterol: 33 mg/dL      Total Cholesterol: 141 mg/dL     Vitals:  There were no vitals filed for this visit.    Eye Exam:    Lab Results   Component Value Date    LEFTDIABRET None 02/06/2024    RIGHTDIABRET None 02/06/2024       Labs:  Lab Results   Component Value Date    SODIUM 137 10/01/2024    K 3.4 (L) 10/01/2024    EGFR 56 10/01/2024    CREATININE 1.32 (H) 10/01/2024    GLUF 111 (H) 10/01/2024       Lab Results   Component Value Date    HGBA1C 9.0 (H) 10/01/2024    HGBA1C 9.0 (A) 10/01/2024    HGBA1C 9.6 (A) 09/17/2024       Telemedicine consent  The patient was identified by name and date of birth. Dominick JUAN Irving was informed that this is a telemedicine visit and that the visit is being conducted through the Epic Embedded platform. He agrees to proceed..  My office door was closed. No one else was in the room.  He acknowledged consent and understanding of privacy and security of the video platform. The patient has agreed to participate and understands they can discontinue the visit at any time.    Pharmacist Tracking Tool     Pharmacist Tracking Tool  Reason For Outreach: Embedded Pharmacist  Demographics:  Intervention Method: Video  Type of Intervention: Follow-Up  Topics Addressed: Diabetes  Pharmacologic Interventions: Dose or Frequency Adjusted and Prevent or Manage NICOLAS  Non-Pharmacologic Interventions: Disease state education, Home Monitoring, Medication/Device education, and Personal CGM  Time:  Direct Patient Care:  20  mins  Care Coordination:  5  mins  Recommendation Recipient: Patient/Caregiver and Provider  Outcome:  Accepted      pink-tinged

## 2024-10-23 ENCOUNTER — HOSPITAL ENCOUNTER (OUTPATIENT)
Dept: RADIOLOGY | Facility: HOSPITAL | Age: 64
Setting detail: OUTPATIENT SURGERY
Discharge: HOME | End: 2024-10-23

## 2024-10-23 ENCOUNTER — ANESTHESIA EVENT (OUTPATIENT)
Dept: PERIOP | Facility: HOSPITAL | Age: 64
DRG: 402 | End: 2024-10-23
Payer: MEDICARE

## 2024-10-23 DIAGNOSIS — M47.816 FACET ARTHROPATHY, LUMBAR: ICD-10-CM

## 2024-10-23 NOTE — TELEPHONE ENCOUNTER
Long talk about timelines for diabetic medication prior to surgery    We are outside of the safe window for surgery tomorrow on Ozempic and patient's SGLP-2 inhibitor    Patient was very disappointed but understood    We will pledge to the surgery as soon after the mandatory wait times for these medications

## 2024-10-24 DIAGNOSIS — G89.4 CHRONIC PAIN SYNDROME: ICD-10-CM

## 2024-10-24 RX ORDER — HYDROMORPHONE HYDROCHLORIDE 8 MG/1
8 TABLET ORAL
Qty: 240 TABLET | Refills: 0 | Status: SHIPPED | OUTPATIENT
Start: 2024-10-24

## 2024-10-30 ENCOUNTER — APPOINTMENT (OUTPATIENT)
Dept: RADIOLOGY | Facility: HOSPITAL | Age: 64
DRG: 402 | End: 2024-10-30
Payer: MEDICARE

## 2024-10-30 ENCOUNTER — ANESTHESIA (OUTPATIENT)
Dept: PERIOP | Facility: HOSPITAL | Age: 64
DRG: 402 | End: 2024-10-30
Payer: MEDICARE

## 2024-10-30 ENCOUNTER — HOSPITAL ENCOUNTER (INPATIENT)
Facility: HOSPITAL | Age: 64
DRG: 402 | End: 2024-10-30
Attending: NEUROLOGICAL SURGERY | Admitting: NEUROLOGICAL SURGERY
Payer: MEDICARE

## 2024-10-30 DIAGNOSIS — F11.90 CHRONIC, CONTINUOUS USE OF OPIOIDS: ICD-10-CM

## 2024-10-30 DIAGNOSIS — M47.816 FACET ARTHROPATHY, LUMBAR: ICD-10-CM

## 2024-10-30 DIAGNOSIS — M54.9 MECHANICAL BACK PAIN: ICD-10-CM

## 2024-10-30 DIAGNOSIS — Z98.890 POSTOPERATIVE STATE: ICD-10-CM

## 2024-10-30 DIAGNOSIS — Z98.890 POST-OPERATIVE STATE: ICD-10-CM

## 2024-10-30 DIAGNOSIS — M48.061 SPINAL STENOSIS OF LUMBAR REGION: ICD-10-CM

## 2024-10-30 DIAGNOSIS — E87.6 HYPOKALEMIA: ICD-10-CM

## 2024-10-30 DIAGNOSIS — G47.33 OBSTRUCTIVE SLEEP APNEA ON CPAP: ICD-10-CM

## 2024-10-30 DIAGNOSIS — R73.9 HYPERGLYCEMIA: ICD-10-CM

## 2024-10-30 DIAGNOSIS — E87.8 ELECTROLYTE ABNORMALITY: ICD-10-CM

## 2024-10-30 DIAGNOSIS — E87.1 HYPONATREMIA: ICD-10-CM

## 2024-10-30 DIAGNOSIS — M54.16 LUMBAR RADICULOPATHY: Primary | ICD-10-CM

## 2024-10-30 DIAGNOSIS — R26.81 UNSTABLE GAIT: ICD-10-CM

## 2024-10-30 PROBLEM — G89.18 POSTOPERATIVE PAIN AFTER SPINAL SURGERY: Status: ACTIVE | Noted: 2024-10-30

## 2024-10-30 LAB
ABO GROUP BLD BPU: NORMAL
ABO GROUP BLD BPU: NORMAL
ABO GROUP BLD: NORMAL
BLD GP AB SCN SERPL QL: NEGATIVE
BPU ID: NORMAL
BPU ID: NORMAL
CROSSMATCH: NORMAL
CROSSMATCH: NORMAL
GLUCOSE SERPL-MCNC: 178 MG/DL (ref 65–140)
PLATELET # BLD AUTO: 193 THOUSANDS/UL (ref 149–390)
PMV BLD AUTO: 11.5 FL (ref 8.9–12.7)
RH BLD: POSITIVE
SPECIMEN EXPIRATION DATE: NORMAL
UNIT DISPENSE STATUS: NORMAL
UNIT DISPENSE STATUS: NORMAL
UNIT PRODUCT CODE: NORMAL
UNIT PRODUCT CODE: NORMAL
UNIT PRODUCT VOLUME: 350 ML
UNIT PRODUCT VOLUME: 350 ML
UNIT RH: NORMAL
UNIT RH: NORMAL

## 2024-10-30 PROCEDURE — 82803 BLOOD GASES ANY COMBINATION: CPT

## 2024-10-30 PROCEDURE — 20936 SP BONE AGRFT LOCAL ADD-ON: CPT | Performed by: NEUROLOGICAL SURGERY

## 2024-10-30 PROCEDURE — 22633 ARTHRD CMBN 1NTRSPC LUMBAR: CPT | Performed by: NEUROLOGICAL SURGERY

## 2024-10-30 PROCEDURE — 20930 SP BONE ALGRFT MORSEL ADD-ON: CPT | Performed by: NEUROLOGICAL SURGERY

## 2024-10-30 PROCEDURE — C1781 MESH (IMPLANTABLE): HCPCS | Performed by: NEUROLOGICAL SURGERY

## 2024-10-30 PROCEDURE — 63052 LAM FACETC/FRMT ARTHRD LUM 1: CPT | Performed by: NEUROLOGICAL SURGERY

## 2024-10-30 PROCEDURE — 22849 REINSERT SPINAL FIXATION: CPT | Performed by: NEUROLOGICAL SURGERY

## 2024-10-30 PROCEDURE — 82330 ASSAY OF CALCIUM: CPT

## 2024-10-30 PROCEDURE — 82948 REAGENT STRIP/BLOOD GLUCOSE: CPT

## 2024-10-30 PROCEDURE — 22853 INSJ BIOMECHANICAL DEVICE: CPT | Performed by: NEUROLOGICAL SURGERY

## 2024-10-30 PROCEDURE — 86901 BLOOD TYPING SEROLOGIC RH(D): CPT | Performed by: PHYSICIAN ASSISTANT

## 2024-10-30 PROCEDURE — 86900 BLOOD TYPING SEROLOGIC ABO: CPT | Performed by: PHYSICIAN ASSISTANT

## 2024-10-30 PROCEDURE — 84132 ASSAY OF SERUM POTASSIUM: CPT

## 2024-10-30 PROCEDURE — C1713 ANCHOR/SCREW BN/BN,TIS/BN: HCPCS | Performed by: NEUROLOGICAL SURGERY

## 2024-10-30 PROCEDURE — 0SG00AJ FUSION OF LUMBAR VERTEBRAL JOINT WITH INTERBODY FUSION DEVICE, POSTERIOR APPROACH, ANTERIOR COLUMN, OPEN APPROACH: ICD-10-PCS | Performed by: NEUROLOGICAL SURGERY

## 2024-10-30 PROCEDURE — 0QP004Z REMOVAL OF INTERNAL FIXATION DEVICE FROM LUMBAR VERTEBRA, OPEN APPROACH: ICD-10-PCS | Performed by: NEUROLOGICAL SURGERY

## 2024-10-30 PROCEDURE — 86850 RBC ANTIBODY SCREEN: CPT | Performed by: PHYSICIAN ASSISTANT

## 2024-10-30 PROCEDURE — 61783 SCAN PROC SPINAL: CPT | Performed by: NEUROLOGICAL SURGERY

## 2024-10-30 PROCEDURE — 86923 COMPATIBILITY TEST ELECTRIC: CPT

## 2024-10-30 PROCEDURE — 84295 ASSAY OF SERUM SODIUM: CPT

## 2024-10-30 PROCEDURE — 01NB0ZZ RELEASE LUMBAR NERVE, OPEN APPROACH: ICD-10-PCS | Performed by: NEUROLOGICAL SURGERY

## 2024-10-30 PROCEDURE — 87081 CULTURE SCREEN ONLY: CPT | Performed by: NEUROLOGICAL SURGERY

## 2024-10-30 PROCEDURE — 8E0W0CZ ROBOTIC ASSISTED PROCEDURE OF TRUNK REGION, OPEN APPROACH: ICD-10-PCS | Performed by: NEUROLOGICAL SURGERY

## 2024-10-30 PROCEDURE — 82947 ASSAY GLUCOSE BLOOD QUANT: CPT

## 2024-10-30 PROCEDURE — 99205 OFFICE O/P NEW HI 60 MIN: CPT | Performed by: ANESTHESIOLOGY

## 2024-10-30 PROCEDURE — 72100 X-RAY EXAM L-S SPINE 2/3 VWS: CPT

## 2024-10-30 PROCEDURE — 85014 HEMATOCRIT: CPT

## 2024-10-30 PROCEDURE — 85049 AUTOMATED PLATELET COUNT: CPT | Performed by: PHYSICIAN ASSISTANT

## 2024-10-30 PROCEDURE — 0SG0071 FUSION OF LUMBAR VERTEBRAL JOINT WITH AUTOLOGOUS TISSUE SUBSTITUTE, POSTERIOR APPROACH, POSTERIOR COLUMN, OPEN APPROACH: ICD-10-PCS | Performed by: NEUROLOGICAL SURGERY

## 2024-10-30 DEVICE — GRAFT DBF WITH DELIVERY TUBES 12ML: Type: IMPLANTABLE DEVICE | Site: SPINE LUMBAR | Status: FUNCTIONAL

## 2024-10-30 DEVICE — ROD 641003060 60MM CAPPED ROD 4.75MM CCM
Type: IMPLANTABLE DEVICE | Site: SPINE LUMBAR | Status: FUNCTIONAL
Brand: CD HORIZON® SOLERA® SPINAL SYSTEM

## 2024-10-30 DEVICE — SPACER 6068073 CATALYFT PL SHORT 7MM
Type: IMPLANTABLE DEVICE | Site: SPINE LUMBAR | Status: FUNCTIONAL
Brand: CATALYFT PL EXPANDABLE INTERBODY SYSTEM

## 2024-10-30 DEVICE — SCREW 54850046545 4.75VOYAGER ATS 6.5X45
Type: IMPLANTABLE DEVICE | Site: SPINE LUMBAR | Status: FUNCTIONAL
Brand: CD HORIZON® SOLERA® VOYAGER™ SPINAL SYSTEM

## 2024-10-30 DEVICE — SCREW SET 4.75MM SOLERA PERC: Type: IMPLANTABLE DEVICE | Site: SPINE LUMBAR | Status: FUNCTIONAL

## 2024-10-30 RX ORDER — CEFAZOLIN SODIUM 2 G/50ML
2000 SOLUTION INTRAVENOUS ONCE
Status: COMPLETED | OUTPATIENT
Start: 2024-10-30 | End: 2024-10-30

## 2024-10-30 RX ORDER — ONDANSETRON 2 MG/ML
INJECTION INTRAMUSCULAR; INTRAVENOUS AS NEEDED
Status: DISCONTINUED | OUTPATIENT
Start: 2024-10-30 | End: 2024-10-30

## 2024-10-30 RX ORDER — ONDANSETRON 2 MG/ML
4 INJECTION INTRAMUSCULAR; INTRAVENOUS EVERY 6 HOURS PRN
Status: DISCONTINUED | OUTPATIENT
Start: 2024-10-30 | End: 2024-11-04 | Stop reason: HOSPADM

## 2024-10-30 RX ORDER — HYDROMORPHONE HCL/PF 1 MG/ML
0.5 SYRINGE (ML) INJECTION
Status: COMPLETED | OUTPATIENT
Start: 2024-10-30 | End: 2024-10-30

## 2024-10-30 RX ORDER — AMOXICILLIN 250 MG
1 CAPSULE ORAL
Status: DISCONTINUED | OUTPATIENT
Start: 2024-10-30 | End: 2024-11-04 | Stop reason: HOSPADM

## 2024-10-30 RX ORDER — GABAPENTIN 300 MG/1
600 CAPSULE ORAL 4 TIMES DAILY
Status: DISCONTINUED | OUTPATIENT
Start: 2024-10-30 | End: 2024-11-04 | Stop reason: HOSPADM

## 2024-10-30 RX ORDER — DEXAMETHASONE SODIUM PHOSPHATE 10 MG/ML
INJECTION, SOLUTION INTRAMUSCULAR; INTRAVENOUS AS NEEDED
Status: DISCONTINUED | OUTPATIENT
Start: 2024-10-30 | End: 2024-10-30

## 2024-10-30 RX ORDER — VANCOMYCIN HYDROCHLORIDE 1 G/200ML
1000 INJECTION, SOLUTION INTRAVENOUS EVERY 12 HOURS
Status: COMPLETED | OUTPATIENT
Start: 2024-10-30 | End: 2024-10-31

## 2024-10-30 RX ORDER — POLYETHYLENE GLYCOL 3350 17 G/17G
17 POWDER, FOR SOLUTION ORAL DAILY
Status: DISCONTINUED | OUTPATIENT
Start: 2024-10-30 | End: 2024-11-04 | Stop reason: HOSPADM

## 2024-10-30 RX ORDER — MIDAZOLAM HYDROCHLORIDE 2 MG/2ML
INJECTION, SOLUTION INTRAMUSCULAR; INTRAVENOUS AS NEEDED
Status: DISCONTINUED | OUTPATIENT
Start: 2024-10-30 | End: 2024-10-30

## 2024-10-30 RX ORDER — SODIUM CHLORIDE 9 MG/ML
INJECTION, SOLUTION INTRAVENOUS CONTINUOUS PRN
Status: DISCONTINUED | OUTPATIENT
Start: 2024-10-30 | End: 2024-10-30

## 2024-10-30 RX ORDER — CHLORHEXIDINE GLUCONATE ORAL RINSE 1.2 MG/ML
15 SOLUTION DENTAL ONCE
Status: COMPLETED | OUTPATIENT
Start: 2024-10-30 | End: 2024-10-30

## 2024-10-30 RX ORDER — LIDOCAINE HYDROCHLORIDE 20 MG/ML
INJECTION, SOLUTION EPIDURAL; INFILTRATION; INTRACAUDAL; PERINEURAL AS NEEDED
Status: DISCONTINUED | OUTPATIENT
Start: 2024-10-30 | End: 2024-10-30

## 2024-10-30 RX ORDER — HYDROMORPHONE HYDROCHLORIDE 2 MG/1
8 TABLET ORAL
Status: DISCONTINUED | OUTPATIENT
Start: 2024-10-30 | End: 2024-11-04 | Stop reason: HOSPADM

## 2024-10-30 RX ORDER — POTASSIUM CHLORIDE 14.9 MG/ML
INJECTION INTRAVENOUS CONTINUOUS PRN
Status: DISCONTINUED | OUTPATIENT
Start: 2024-10-30 | End: 2024-10-30

## 2024-10-30 RX ORDER — METHOCARBAMOL 750 MG/1
750 TABLET, FILM COATED ORAL EVERY 6 HOURS SCHEDULED
Status: DISCONTINUED | OUTPATIENT
Start: 2024-10-30 | End: 2024-11-04 | Stop reason: HOSPADM

## 2024-10-30 RX ORDER — GLYCOPYRROLATE 0.2 MG/ML
INJECTION INTRAMUSCULAR; INTRAVENOUS AS NEEDED
Status: DISCONTINUED | OUTPATIENT
Start: 2024-10-30 | End: 2024-10-30

## 2024-10-30 RX ORDER — HYDROMORPHONE HCL/PF 1 MG/ML
1 SYRINGE (ML) INJECTION EVERY 4 HOURS PRN
Status: DISCONTINUED | OUTPATIENT
Start: 2024-10-30 | End: 2024-11-01

## 2024-10-30 RX ORDER — ACETAMINOPHEN 325 MG/1
975 TABLET ORAL EVERY 8 HOURS SCHEDULED
Status: DISCONTINUED | OUTPATIENT
Start: 2024-10-30 | End: 2024-11-04 | Stop reason: HOSPADM

## 2024-10-30 RX ORDER — MAGNESIUM HYDROXIDE 1200 MG/15ML
LIQUID ORAL AS NEEDED
Status: DISCONTINUED | OUTPATIENT
Start: 2024-10-30 | End: 2024-10-30 | Stop reason: HOSPADM

## 2024-10-30 RX ORDER — GLYCOPYRROLATE 0.2 MG/ML
0.2 INJECTION INTRAMUSCULAR; INTRAVENOUS EVERY 4 HOURS PRN
Status: DISCONTINUED | OUTPATIENT
Start: 2024-10-30 | End: 2024-11-04

## 2024-10-30 RX ORDER — METOLAZONE 2.5 MG/1
2.5 TABLET ORAL DAILY PRN
Status: DISCONTINUED | OUTPATIENT
Start: 2024-10-30 | End: 2024-11-04 | Stop reason: HOSPADM

## 2024-10-30 RX ORDER — METHADONE HYDROCHLORIDE 10 MG/ML
INJECTION, SOLUTION INTRAMUSCULAR; INTRAVENOUS; SUBCUTANEOUS AS NEEDED
Status: DISCONTINUED | OUTPATIENT
Start: 2024-10-30 | End: 2024-10-30

## 2024-10-30 RX ORDER — KETAMINE HCL IN NACL, ISO-OSM 100MG/10ML
SYRINGE (ML) INJECTION AS NEEDED
Status: DISCONTINUED | OUTPATIENT
Start: 2024-10-30 | End: 2024-10-30

## 2024-10-30 RX ORDER — FENTANYL CITRATE 50 UG/ML
INJECTION, SOLUTION INTRAMUSCULAR; INTRAVENOUS AS NEEDED
Status: DISCONTINUED | OUTPATIENT
Start: 2024-10-30 | End: 2024-10-30

## 2024-10-30 RX ORDER — SODIUM CHLORIDE, SODIUM LACTATE, POTASSIUM CHLORIDE, CALCIUM CHLORIDE 600; 310; 30; 20 MG/100ML; MG/100ML; MG/100ML; MG/100ML
INJECTION, SOLUTION INTRAVENOUS CONTINUOUS PRN
Status: DISCONTINUED | OUTPATIENT
Start: 2024-10-30 | End: 2024-10-30

## 2024-10-30 RX ORDER — LIDOCAINE 50 MG/G
1 PATCH TOPICAL DAILY
Status: DISCONTINUED | OUTPATIENT
Start: 2024-10-30 | End: 2024-11-04 | Stop reason: HOSPADM

## 2024-10-30 RX ORDER — INSULIN GLARGINE 100 [IU]/ML
8 INJECTION, SOLUTION SUBCUTANEOUS EVERY MORNING
Status: DISCONTINUED | OUTPATIENT
Start: 2024-10-31 | End: 2024-11-04 | Stop reason: HOSPADM

## 2024-10-30 RX ORDER — TORSEMIDE 20 MG/1
40 TABLET ORAL 2 TIMES DAILY
Status: DISCONTINUED | OUTPATIENT
Start: 2024-10-30 | End: 2024-11-04 | Stop reason: HOSPADM

## 2024-10-30 RX ORDER — SODIUM CHLORIDE 9 MG/ML
75 INJECTION, SOLUTION INTRAVENOUS CONTINUOUS
Status: DISPENSED | OUTPATIENT
Start: 2024-10-30 | End: 2024-10-31

## 2024-10-30 RX ORDER — HALOPERIDOL 5 MG/ML
2 INJECTION INTRAMUSCULAR
Status: DISCONTINUED | OUTPATIENT
Start: 2024-10-30 | End: 2024-11-04

## 2024-10-30 RX ORDER — CALCIUM CARBONATE 500 MG/1
1000 TABLET, CHEWABLE ORAL DAILY PRN
Status: DISCONTINUED | OUTPATIENT
Start: 2024-10-30 | End: 2024-11-04 | Stop reason: HOSPADM

## 2024-10-30 RX ORDER — ATORVASTATIN CALCIUM 40 MG/1
40 TABLET, FILM COATED ORAL DAILY
Status: DISCONTINUED | OUTPATIENT
Start: 2024-10-30 | End: 2024-11-04 | Stop reason: HOSPADM

## 2024-10-30 RX ORDER — LIDOCAINE HYDROCHLORIDE AND EPINEPHRINE 10; 10 MG/ML; UG/ML
INJECTION, SOLUTION INFILTRATION; PERINEURAL AS NEEDED
Status: DISCONTINUED | OUTPATIENT
Start: 2024-10-30 | End: 2024-10-30 | Stop reason: HOSPADM

## 2024-10-30 RX ORDER — PROPOFOL 10 MG/ML
INJECTION, EMULSION INTRAVENOUS CONTINUOUS PRN
Status: DISCONTINUED | OUTPATIENT
Start: 2024-10-30 | End: 2024-10-30

## 2024-10-30 RX ORDER — DULOXETIN HYDROCHLORIDE 60 MG/1
60 CAPSULE, DELAYED RELEASE ORAL DAILY
Status: DISCONTINUED | OUTPATIENT
Start: 2024-10-30 | End: 2024-11-04 | Stop reason: HOSPADM

## 2024-10-30 RX ORDER — PROPOFOL 10 MG/ML
INJECTION, EMULSION INTRAVENOUS AS NEEDED
Status: DISCONTINUED | OUTPATIENT
Start: 2024-10-30 | End: 2024-10-30

## 2024-10-30 RX ORDER — SUCCINYLCHOLINE/SOD CL,ISO/PF 100 MG/5ML
SYRINGE (ML) INTRAVENOUS AS NEEDED
Status: DISCONTINUED | OUTPATIENT
Start: 2024-10-30 | End: 2024-10-30

## 2024-10-30 RX ORDER — LORAZEPAM 2 MG/ML
1 INJECTION INTRAMUSCULAR EVERY 4 HOURS PRN
Status: DISCONTINUED | OUTPATIENT
Start: 2024-10-30 | End: 2024-11-04

## 2024-10-30 RX ORDER — HEPARIN SODIUM 5000 [USP'U]/ML
5000 INJECTION, SOLUTION INTRAVENOUS; SUBCUTANEOUS EVERY 8 HOURS SCHEDULED
Status: DISCONTINUED | OUTPATIENT
Start: 2024-10-31 | End: 2024-11-04 | Stop reason: HOSPADM

## 2024-10-30 RX ORDER — BUPIVACAINE HYDROCHLORIDE AND EPINEPHRINE 5; 5 MG/ML; UG/ML
INJECTION, SOLUTION EPIDURAL; INTRACAUDAL; PERINEURAL AS NEEDED
Status: DISCONTINUED | OUTPATIENT
Start: 2024-10-30 | End: 2024-10-30 | Stop reason: HOSPADM

## 2024-10-30 RX ORDER — POTASSIUM CHLORIDE 20MEQ/15ML
20 LIQUID (ML) ORAL 2 TIMES DAILY
Status: DISCONTINUED | OUTPATIENT
Start: 2024-10-30 | End: 2024-10-31

## 2024-10-30 RX ORDER — FENTANYL 75 UG/1
1 PATCH TRANSDERMAL
Status: DISCONTINUED | OUTPATIENT
Start: 2024-10-30 | End: 2024-11-04 | Stop reason: HOSPADM

## 2024-10-30 RX ADMIN — PROPOFOL 30 MG: 10 INJECTION, EMULSION INTRAVENOUS at 12:52

## 2024-10-30 RX ADMIN — PROPOFOL 150 MCG/KG/MIN: 10 INJECTION, EMULSION INTRAVENOUS at 08:42

## 2024-10-30 RX ADMIN — DEXMEDETOMIDINE HYDROCHLORIDE 8 MCG: 100 INJECTION, SOLUTION INTRAVENOUS at 12:45

## 2024-10-30 RX ADMIN — SODIUM CHLORIDE, SODIUM LACTATE, POTASSIUM CHLORIDE, AND CALCIUM CHLORIDE: .6; .31; .03; .02 INJECTION, SOLUTION INTRAVENOUS at 07:34

## 2024-10-30 RX ADMIN — CEFAZOLIN SODIUM 2000 MG: 2 SOLUTION INTRAVENOUS at 12:52

## 2024-10-30 RX ADMIN — CHLORHEXIDINE GLUCONATE 0.12% ORAL RINSE 15 ML: 1.2 LIQUID ORAL at 07:08

## 2024-10-30 RX ADMIN — VANCOMYCIN HYDROCHLORIDE 1000 MG: 1 INJECTION, SOLUTION INTRAVENOUS at 18:18

## 2024-10-30 RX ADMIN — GABAPENTIN 600 MG: 300 CAPSULE ORAL at 21:39

## 2024-10-30 RX ADMIN — LIDOCAINE 1 PATCH: 50 PATCH TOPICAL at 16:05

## 2024-10-30 RX ADMIN — PROPOFOL 150 MG: 10 INJECTION, EMULSION INTRAVENOUS at 08:36

## 2024-10-30 RX ADMIN — TORSEMIDE 40 MG: 20 TABLET ORAL at 18:12

## 2024-10-30 RX ADMIN — PROPOFOL 30 MG: 10 INJECTION, EMULSION INTRAVENOUS at 11:02

## 2024-10-30 RX ADMIN — GABAPENTIN 600 MG: 300 CAPSULE ORAL at 18:12

## 2024-10-30 RX ADMIN — HYDROMORPHONE HYDROCHLORIDE 8 MG: 2 TABLET ORAL at 16:04

## 2024-10-30 RX ADMIN — Medication 100 MG: at 08:38

## 2024-10-30 RX ADMIN — DEXAMETHASONE SODIUM PHOSPHATE 10 MG: 10 INJECTION, SOLUTION INTRAMUSCULAR; INTRAVENOUS at 08:42

## 2024-10-30 RX ADMIN — Medication 50 MG: at 08:37

## 2024-10-30 RX ADMIN — HYDROMORPHONE HYDROCHLORIDE 0.5 MG: 1 INJECTION, SOLUTION INTRAMUSCULAR; INTRAVENOUS; SUBCUTANEOUS at 13:46

## 2024-10-30 RX ADMIN — Medication 50 MG: at 13:25

## 2024-10-30 RX ADMIN — SODIUM CHLORIDE 0.2 MCG/KG/MIN: 900 INJECTION INTRAVENOUS at 08:42

## 2024-10-30 RX ADMIN — SENNOSIDES AND DOCUSATE SODIUM 1 TABLET: 50; 8.6 TABLET ORAL at 21:40

## 2024-10-30 RX ADMIN — Medication 10 MG: at 09:32

## 2024-10-30 RX ADMIN — Medication 10 MG: at 08:45

## 2024-10-30 RX ADMIN — FENTANYL 1 PATCH: 75 PATCH TRANSDERMAL at 16:05

## 2024-10-30 RX ADMIN — DEXMEDETOMIDINE HYDROCHLORIDE 12 MCG: 100 INJECTION, SOLUTION INTRAVENOUS at 12:41

## 2024-10-30 RX ADMIN — POTASSIUM CHLORIDE 20 MEQ: 1.5 SOLUTION ORAL at 18:13

## 2024-10-30 RX ADMIN — HYDROMORPHONE HYDROCHLORIDE 8 MG: 2 TABLET ORAL at 21:40

## 2024-10-30 RX ADMIN — DULOXETINE HYDROCHLORIDE 60 MG: 60 CAPSULE, DELAYED RELEASE ORAL at 16:04

## 2024-10-30 RX ADMIN — POTASSIUM CHLORIDE: 14.9 INJECTION, SOLUTION INTRAVENOUS at 10:34

## 2024-10-30 RX ADMIN — SODIUM CHLORIDE 75 ML/HR: 0.9 INJECTION, SOLUTION INTRAVENOUS at 17:23

## 2024-10-30 RX ADMIN — ONDANSETRON 4 MG: 2 INJECTION INTRAMUSCULAR; INTRAVENOUS at 12:47

## 2024-10-30 RX ADMIN — FENTANYL CITRATE 100 MCG: 50 INJECTION INTRAMUSCULAR; INTRAVENOUS at 08:36

## 2024-10-30 RX ADMIN — POLYETHYLENE GLYCOL 3350 17 G: 17 POWDER, FOR SOLUTION ORAL at 16:05

## 2024-10-30 RX ADMIN — ATORVASTATIN CALCIUM 40 MG: 40 TABLET, FILM COATED ORAL at 16:04

## 2024-10-30 RX ADMIN — PROPOFOL 150 MG: 10 INJECTION, EMULSION INTRAVENOUS at 08:42

## 2024-10-30 RX ADMIN — SODIUM CHLORIDE, SODIUM LACTATE, POTASSIUM CHLORIDE, AND CALCIUM CHLORIDE: .6; .31; .03; .02 INJECTION, SOLUTION INTRAVENOUS at 07:32

## 2024-10-30 RX ADMIN — HYDROMORPHONE HYDROCHLORIDE 0.5 MG: 1 INJECTION, SOLUTION INTRAMUSCULAR; INTRAVENOUS; SUBCUTANEOUS at 13:57

## 2024-10-30 RX ADMIN — GLYCOPYRROLATE 0.2 MCG: 0.2 INJECTION, SOLUTION INTRAMUSCULAR; INTRAVENOUS at 08:54

## 2024-10-30 RX ADMIN — METHOCARBAMOL 750 MG: 750 TABLET ORAL at 23:13

## 2024-10-30 RX ADMIN — MIDAZOLAM 2 MG: 1 INJECTION INTRAMUSCULAR; INTRAVENOUS at 08:31

## 2024-10-30 RX ADMIN — KETAMINE HYDROCHLORIDE 0.1 MG/KG/HR: 100 INJECTION INTRAMUSCULAR; INTRAVENOUS at 22:05

## 2024-10-30 RX ADMIN — PHENYLEPHRINE HYDROCHLORIDE 30 MCG/MIN: 10 INJECTION INTRAVENOUS at 08:58

## 2024-10-30 RX ADMIN — DEXMEDETOMIDINE HYDROCHLORIDE 20 MCG: 100 INJECTION, SOLUTION INTRAVENOUS at 13:16

## 2024-10-30 RX ADMIN — CEFAZOLIN SODIUM 2000 MG: 2 SOLUTION INTRAVENOUS at 09:05

## 2024-10-30 RX ADMIN — ACETAMINOPHEN 975 MG: 325 TABLET, FILM COATED ORAL at 16:04

## 2024-10-30 RX ADMIN — LIDOCAINE HYDROCHLORIDE 100 MG: 20 INJECTION, SOLUTION EPIDURAL; INFILTRATION; INTRACAUDAL at 08:36

## 2024-10-30 RX ADMIN — MIDAZOLAM 2 MG: 1 INJECTION INTRAMUSCULAR; INTRAVENOUS at 08:50

## 2024-10-30 RX ADMIN — VANCOMYCIN HYDROCHLORIDE 1500 MG: 10 INJECTION, POWDER, LYOPHILIZED, FOR SOLUTION INTRAVENOUS at 07:09

## 2024-10-30 RX ADMIN — SODIUM CHLORIDE, SODIUM LACTATE, POTASSIUM CHLORIDE, AND CALCIUM CHLORIDE: .6; .31; .03; .02 INJECTION, SOLUTION INTRAVENOUS at 13:08

## 2024-10-30 RX ADMIN — DEXMEDETOMIDINE HYDROCHLORIDE 40 MCG: 100 INJECTION, SOLUTION INTRAVENOUS at 13:25

## 2024-10-30 RX ADMIN — PROPOFOL 50 MG: 10 INJECTION, EMULSION INTRAVENOUS at 13:02

## 2024-10-30 RX ADMIN — METHOCARBAMOL 750 MG: 750 TABLET ORAL at 18:12

## 2024-10-30 RX ADMIN — ACETAMINOPHEN 975 MG: 325 TABLET, FILM COATED ORAL at 21:40

## 2024-10-30 RX ADMIN — SODIUM CHLORIDE: 0.9 INJECTION, SOLUTION INTRAVENOUS at 07:34

## 2024-10-30 NOTE — ASSESSMENT & PLAN NOTE
Patient maintained on chronic opioids in setting of chronic low back pain.  Currently managed by PCP: Robert Budinetz, MD   Patient does currently follow with palliative care I did prescribe him methadone for a short period of time although this was discontinued over apparent side effects.  Palliative care has since deferred opioid management back to PCP.    Current outpatient analgesic regimen:  Cymbalta 60 mg daily  Gabapentin 600 mg 4 times daily  Fentanyl 75 mcg/hour patch-1 patch transdermal every 72 hours  Oral Dilaudid 8 mg every 3 hours as needed for moderate to severe pain    PDMP review:

## 2024-10-30 NOTE — ASSESSMENT & PLAN NOTE
Patient's with sleep apnea are at higher risk of respiratory depression secondary to opioid use.  Attempt to minimize use of opioid pain medication while maintaining adequate analgesia.

## 2024-10-30 NOTE — ANESTHESIA POSTPROCEDURE EVALUATION
Post-Op Assessment Note    CV Status:  Stable  Pain Score: 3    Pain management: adequate    Multimodal analgesia used between 6 hours prior to anesthesia start to PACU discharge    Mental Status:  Alert and awake   Hydration Status:  Euvolemic   PONV Controlled:  None   Airway Patency:  Patent     Post Op Vitals Reviewed: Yes    No anethesia notable event occurred.    Staff: CRNA           Last Filed PACU Vitals:  Vitals Value Taken Time   Temp 97.6    Pulse 84 10/30/24 1337   /78 10/30/24 1330   Resp 19 10/30/24 1337   SpO2 96 % 10/30/24 1337   Vitals shown include unfiled device data.    Modified Erna:  No data recorded

## 2024-10-30 NOTE — ANESTHESIA PREPROCEDURE EVALUATION
Procedure:  Robotically assisted L3-4 decompressive hemilaminectomy and foraminotomies with transverse lumbar interbody fusion right-sided approach with add-on to an L4-5 fixation fusion (Right: Spine Lumbar)    Relevant Problems   ANESTHESIA (within normal limits)      CARDIO   (+) Coronary artery disease   (+) Hypertension   (+) Hypertensive heart disease with congestive heart failure (HCC)      ENDO   (+) Type 2 diabetes mellitus without complication, without long-term current use of insulin (HCC)      GI/HEPATIC (within normal limits)      /RENAL   (+) RODO (acute kidney injury) (HCC)   (+) Benign prostatic hyperplasia      HEMATOLOGY (within normal limits)      MUSCULOSKELETAL   (+) Acute low back pain with sciatica   (+) Chronic bilateral low back pain with bilateral sciatica   (+) Intractable low back pain      NEURO/PSYCH   (+) Anxiety   (+) Chronic bilateral low back pain with bilateral sciatica   (+) Chronic pain syndrome   (+) Chronic, continuous use of opioids   (+) Depression, recurrent (HCC)   (+) Intractable low back pain      PULMONARY   (+) Acute respiratory failure with hypoxia (HCC)   (+) COPD with acute exacerbation (HCC)   (+) Obstructive sleep apnea on CPAP      Other   (+) Morbid (severe) obesity due to excess calories ([facility])      Lab Results   Component Value Date    WBC 9.50 10/01/2024    HGB 14.7 10/01/2024    HCT 43.6 10/01/2024    MCV 93 10/01/2024     10/01/2024     Lab Results   Component Value Date    SODIUM 137 10/01/2024    K 3.4 (L) 10/01/2024    CL 90 (L) 10/01/2024    CO2 36 (H) 10/01/2024    AGAP 11 10/01/2024    BUN 25 10/01/2024    CREATININE 1.32 (H) 10/01/2024    GLUC 149 (H) 03/29/2024    GLUF 111 (H) 10/01/2024    CALCIUM 9.9 10/01/2024    AST 14 10/01/2024    ALT 19 10/01/2024    ALKPHOS 98 10/01/2024    TP 8.0 10/01/2024    TBILI 0.42 10/01/2024    EGFR 56 10/01/2024   Previous intubation 2023 - Mitchell required      TTE 7/2023:    Left Ventricle: Left ventricular  cavity size is normal. Wall thickness is mildly increased. The left ventricular ejection fraction is > 70%. Systolic function is vigorous. Wall motion is normal.    Right Ventricle: Right ventricular cavity size is upper normal. Normal tricuspid annular plane systolic excursion (TAPSE) > 1.7 cm.    Left Atrium: The atrium is mildly dilated.    Mitral Valve: There is annular calcification. There is mild regurgitation. There is no evidence of stenosis.    Tricuspid Valve: There is trace to mild regurgitation. There is no evidence of stenosis.    Pulmonic Valve: There is trace regurgitation. There is no evidence of stenosis.    Prior TTE study available for comparison. Prior study date: 11/25/2022. No significant changes noted compared to the prior study.     EKG 3/28/2023:  Normal sinus rhythm with sinus arrhythmia  Minimal voltage criteria for LVH, may be normal variant ( R in aVL )  Borderline ECG  When compared with ECG of 14-FEB-2023 08:32,  No significant change was found     TTE 11/2022:    Left Ventricle: Left ventricular cavity size is normal. Wall thickness is mildly increased. The left ventricular ejection fraction is 65%. Systolic function is normal. Wall motion is normal. Indeterminate diastolic function.    Right Ventricle: Right ventricular cavity size is mildly dilated. Systolic function is normal.    Left Atrium: The atrium is dilated.    Right Atrium: The atrium is dilated.    Tricuspid Valve: There is mild regurgitation. The estimated right ventricular systolic pressure is 28.00 mmHg.    Aorta: The aortic root is upper normal in size. The ascending aorta is normal in size. The aortic root is 3.70 cm. The ascending aorta is 3.1 cm.    Prior TTE study available for comparison. Prior study date: 7/31/2020. Changes noted when compared to prior study. Changes include: RV on prior study noted to be upper normal in size and right atrium noted to be normal in size. Trace tricuspid regurgitation noted on  prior study.     Nuclear Stress 8/2020:  Abnormal study after pharmacologic stress with a fixed inferior wall defect with a mild degree of ischemia noted in the apical inferior wall as well as a small fixed defect in the basal to mid anterior wall with mild ischemia  in the anterolateral wall. Study quality severely reduces accuracy of the test. Significant patient motion noted on raw images. Left ventricular systolic function was normal.       Physical Exam    Airway    Mallampati score: III  TM Distance: >3 FB  Neck ROM: limited     Dental   No notable dental hx     Cardiovascular  Rhythm: regular, Rate: normal    Pulmonary   Decreased breath sounds    Other Findings        Anesthesia Plan  ASA Score- 3     Anesthesia Type- general with ASA Monitors.         Additional Monitors: arterial line.    Airway Plan: ETT.    Comment: APS consult if admitted.       Plan Factors-Exercise tolerance (METS): >4 METS.    Chart reviewed. EKG reviewed.   Patient summary reviewed.    Patient is not a current smoker.      Obstructive sleep apnea risk education given perioperatively.        Induction- intravenous.    Postoperative Plan- Plan for postoperative opioid use.     Perioperative Resuscitation Plan - Level 1 - Full Code.       Informed Consent- Anesthetic plan and risks discussed with patient.  I personally reviewed this patient with the CRNA. Discussed and agreed on the Anesthesia Plan with the CRNA..

## 2024-10-30 NOTE — ASSESSMENT & PLAN NOTE
Patient is status post robotically assisted L3-4 decompressive hemilaminectomy and foraminotomies with transverse lumbar interbody fusion right-sided approach with add-on to an L4-5 fixation fusion with neurosurgery 10/29/2024.  Patient received 20 mg IV methadone intraoperatively.  Patient is managed on high-dose chronic opioids in the outpatient setting by his PCP.  APS consulted for assistance in postoperative pain management in setting of chronic opioid use and intraoperative IV methadone.    Multimodal analgesia:  Tylenol 975 mg every 8 hours scheduled  Cymbalta 60 mg daily  Gabapentin 600 mg 4 times daily  Robaxin 750 mg every 6 hours scheduled  Lidoderm patch, 12 hours on/12 hours off to affected area  Fentanyl transdermal patch 75 mcg/h, 1 patch every 72 hours  Oral Dilaudid 8 mg every 3 hours as needed for moderate/severe pain  Start IV Dilaudid 1 mg every 4 hours as needed for breakthrough pain  Add Narcan as needed for respiratory depression/opioid reversal    Start low-dose IV ketamine infusion for opioid sparing and analgesic support in opioid tolerant patient  As needed Haldol/Ativan/Robinul to mitigate ketamine related side effects

## 2024-10-30 NOTE — OP NOTE
OPERATIVE REPORT  PATIENT NAME: Dominick Irving    :  1960  MRN: [id number]  Pt Location: [facility] OR ROOM 17    SURGERY DATE: 10/30/2024    Surgeons and Role:     * Leland Aguilar MD - Primary    Preop Diagnosis:  Mechanical back pain [M54.9]  Facet arthropathy, lumbar [M47.816]  Spinal stenosis of lumbar region [M48.061]    Post-Op Diagnosis Codes:     * Mechanical back pain [M54.9]     * Facet arthropathy, lumbar [M47.816]     * Spinal stenosis of lumbar region [M48.061]    Procedure(s):  Right - Robotically assisted L3-4 decompressive hemilaminectomy and foraminotomies with transverse lumbar interbody fusion right-sided approach with add-on to an L4-5 fixation fusion    Specimen(s):  * No specimens in log *    Estimated Blood Loss:   Minimal    Drains:  Closed/Suction Drain Right Back Bulb 7 Fr. (Active)   Number of days: 0       [REMOVED] Urethral Catheter Latex 16 Fr. (Removed)   Number of days: 0       Anesthesia Type:   General    Operative Indications:  Mechanical back pain [M54.9]  Facet arthropathy, lumbar [M47.816]  Spinal stenosis of lumbar region [M48.061]    Operative Findings:  Severe facet arthropathy with adjacent level disease      Complications:   None    Procedure and Technique:  After adequate general endotracheal anesthesia the patient was placed prone on the Kory table.  The back was prepped with Betadine soap then DuraPrep.  Double layer drapes were placed in normal fashion and a 3M Betadine premade sticky drape was placed over these.    The [employer] robotic system was registered and calibrated.  Preloaded images utilizing a specialized series of CT scan images were imported into the robotic system and then calibrated against intraoperative fluoroscopic images.  Each of the instruments was registered and calibrated in preparation for the procedure.  The system allow for preoperative planning as well as intraoperative placement of screw positions.    Timeout was called and all  parameters of timeout were followed.    The procedure began by injecting the previous incision with 1% lidocaine with 1-100,000 epinephrine.  This was extended cephalad to a level as dictated by the image guidance system.  Bovie and bipolar were used throughout the procedure to maintain hemostasis.  The Bovie and the cutting setting was used to divide the tissues to the underlying fascia.  A subperiosteal dissection was carried out over the spinous process, lamina, and facets at L3-4.  The dissection began in the midline but quickly I diverted out over the screws was to avoid a fluid pocket which had been present and had been stable since his patient's original surgery.  The original operative report demonstrated a CSF leak and he did not want to drain the fluid for fear that this might contribute to a leak during this case.  There were no CSF leaks during this procedure.  This dissection then was angled laterally directly over the instrumentation.  Cerebellar style Onslow retractors were used to maintain out for exposure.    The robotic system was then utilized to introduce screws into the pedicles of L3 bilaterally.  I ensured that there was no deflection of the arm as the system was placed into position.  There were no alterations in the EMG, SSEP or motor evoked potentials following this.  Next the previous instrumentation was identified.  The caps were removed and the rods were removed.  T style pedicle screw remover was utilized to remove these screws intact without difficulty.  They were found to be 6.5 millimeter screws.  I presume that these were Abdulaziz system screws.  All screws caps and rods were removed and accounted for.    Next the robotic system was used to replace screws through the same hole for an 5 bilaterally.  Again there were no alterations in the neuromonitoring system.  Next a pedicle screw based retractor system was utilized at the L3-4 level on the right side.  The facet in this region  was quite large.  The intraoperative microscope was used at various degrees of magnification to aid in identification, illumination, and microdissection necessary to perform this procedure. A careful dissection then ensued to allow for a decompression of the contralateral side as well as the ipsilateral lamina.  A medial facetectomy was performed.  The ligament was removed and the thecal sac decompressed.  There were no CSF leaks.    A medial facetectomy was performed on the right side and the disc space was identified.    An annulotomy was performed and the disc base with a series of curettes pituitary rongeurs ring curettes and rasp were used to remove the disc and its cartilaginous endplates.  Next locally harvested bone was mixed with demineralized bone matrix and then inserted into the contralateral side at this space.  Under fluoroscopic guidance an 8 x 23 expandable cage was impacted into position.  This was angled medially.    Rods were measured carefully and a double Construct was placed bilaterally.  Caps were placed and the L3 and L4 screws were compressed and tightened.  The screws were tightened to the breakoff point and the extensors were broken off.  Copious quantities of antibiotic irrigation were used to cleanse out the region.  A 7 mm flat Kory-Fontana drain was placed.    Next the fascia was approximated with interrupted inverted 2-0 Vicryl suture.  The skin was closed with interrupted inverted 2-0 Vicryl suture in interrupted inverted 3-0 Vicryl suture and the epidermis.  0.5% bupivacaine with out epinephrine was injected into the surrounding tissues.  Staples were used to close the skin.  Mepilex dressings were placed.    Motor evoked potentials and somatosensory evoked potentials were carefully monitored.  There was some minor alteration in the motor evoked potential of the anterior tibialis but this was thought to be within normal limits of expectation for the procedure.    The patient was  taken to the recovery room having tolerated the procedure well.   I was present for the entire procedure.    Patient Disposition:  PACU              SIGNATURE: Leland Aguilar MD  DATE: October 30, 2024  TIME: 1:17 PM

## 2024-10-30 NOTE — INTERVAL H&P NOTE
H&P reviewed. After examining the patient I find no changes in the patients condition since the H&P had been written.    Vitals:    10/30/24 0625   BP: 102/68   Pulse: 70   Resp: 18   Temp: 97.5 °F (36.4 °C)   SpO2: 93%

## 2024-10-30 NOTE — ANESTHESIA PROCEDURE NOTES
"Arterial Line Insertion    Performed by: David Lebron DO  Authorized by: David Lebron DO  Consent given by: patient  Patient identity confirmed: verbally with patient and arm band  Time out: Immediately prior to procedure a \"time out\" was called to verify the correct patient, procedure, equipment, support staff and site/side marked as required.  Preparation: Patient was prepped and draped in the usual sterile fashion.  Indications: hemodynamic monitoring  Orientation:  Left  Location: radial artery  Sedation:  Patient sedated: yes    Procedure Details:  Needle gauge: 20    Post-procedure:  Post-procedure: dressing applied  Waveform: good waveform  Post-procedure CNS: normal and unchanged  Patient tolerance: Patient tolerated the procedure well with no immediate complications          "

## 2024-10-30 NOTE — CONSULTS
Consultation - Acute Pain   Name: Dominick Irving 64 y.o. male I MRN: [id number]  Unit/Bed#: [facility] 621-01 I Date of Admission: 10/30/2024   Date of Service: 10/30/2024 I Hospital Day: 0   Inpatient consult to Acute Pain Service  Consult performed by: DIA Hough  Consult ordered by: David Lebron DO        Physician Requesting Evaluation: Leland Aguilar MD   Reason for Evaluation / Principal Problem: Postoperative pain    Assessment & Plan  Postoperative pain after spinal surgery  Patient is status post robotically assisted L3-4 decompressive hemilaminectomy and foraminotomies with transverse lumbar interbody fusion right-sided approach with add-on to an L4-5 fixation fusion with neurosurgery 10/29/2024.  Patient received 20 mg IV methadone intraoperatively.  Patient is managed on high-dose chronic opioids in the outpatient setting by his PCP.  APS consulted for assistance in postoperative pain management in setting of chronic opioid use and intraoperative IV methadone.    Multimodal analgesia:  Tylenol 975 mg every 8 hours scheduled  Cymbalta 60 mg daily  Gabapentin 600 mg 4 times daily  Robaxin 750 mg every 6 hours scheduled  Lidoderm patch, 12 hours on/12 hours off to affected area  Fentanyl transdermal patch 75 mcg/h, 1 patch every 72 hours  Oral Dilaudid 8 mg every 3 hours as needed for moderate/severe pain  Start IV Dilaudid 1 mg every 4 hours as needed for breakthrough pain  Add Narcan as needed for respiratory depression/opioid reversal    Start low-dose IV ketamine infusion for opioid sparing and analgesic support in opioid tolerant patient  As needed Haldol/Ativan/Robinul to mitigate ketamine related side effects  Obstructive sleep apnea on CPAP  Patient's with sleep apnea are at higher risk of respiratory depression secondary to opioid use.  Attempt to minimize use of opioid pain medication while maintaining adequate analgesia.  Chronic, continuous use of opioids  Patient  maintained on chronic opioids in setting of chronic low back pain.  Currently managed by PCP: Robert Budinetz, MD   Patient does currently follow with palliative care I did prescribe him methadone for a short period of time although this was discontinued over apparent side effects.  Palliative care has since deferred opioid management back to PCP.    Current outpatient analgesic regimen:  Cymbalta 60 mg daily  Gabapentin 600 mg 4 times daily  Fentanyl 75 mcg/hour patch-1 patch transdermal every 72 hours  Oral Dilaudid 8 mg every 3 hours as needed for moderate to severe pain    PDMP review:    I have discussed the above management plan in detail with the primary service.       APS will continue to follow. Please contact Acute Pain Service - via SecureChat from [phone] with additional questions or concerns. See SecureChat or [employer] for additional contacts and after hours information.     History of Present Illness    HPI: Dominick Irving is a 64 y.o. year old male who presents with history of continuous opioid dependence in setting of chronic back pain and prior spinal surgery. Patient is status post robotically assisted L3-4 decompressive hemilaminectomy and foraminotomies with transverse lumbar interbody fusion right-sided approach with add-on to an L4-5 fixation fusion with neurosurgery 10/29/2024 and received 20 mg of IV methadone intraoperatively.  APS was consulted for assistance in postoperative pain management.    Current pain location(s): Pain Score: 8  Pain Location/Orientation: Location: Back  Pain Scale: Pain Assessment Tool: 0-10      Pain History: Longstanding history of chronic low back pain and prior spinal surgery.  Patient has managed on chronic opioids by his primary care physician.  Pain Management Physician:  Robert Budinetz, MD   I have reviewed the patient's controlled substance dispensing history in the Prescription Drug Monitoring Program in compliance with the [place] regulations before  prescribing any controlled substances.     Review of Systems   Gastrointestinal:  Negative for nausea and vomiting.   Musculoskeletal:  Positive for back pain.   All other systems reviewed and are negative.    I have reviewed the patient's PMH, PSH, Social History, Family History, Meds, and Allergies    Objective :  Temp:  [97.5 °F (36.4 °C)-97.8 °F (36.6 °C)] 97.8 °F (36.6 °C)  HR:  [70-89] 71  BP: (102-154)/(61-78) 146/67  Resp:  [14-26] 18  SpO2:  [90 %-98 %] 93 %  O2 Device: Nasal cannula  Nasal Cannula O2 Flow Rate (L/min):  [4 L/min] 4 L/min  FiO2 (%):  [4] 4    Physical Exam  Vitals reviewed.   Constitutional:       General: He is not in acute distress.  HENT:      Head: Normocephalic and atraumatic.   Cardiovascular:      Rate and Rhythm: Normal rate.   Pulmonary:      Effort: Pulmonary effort is normal.   Chest:      Chest wall: No tenderness.   Abdominal:      General: There is no distension.      Palpations: Abdomen is soft.      Tenderness: There is no abdominal tenderness.   Musculoskeletal:         General: Tenderness (Low back) present.      Cervical back: Normal range of motion.   Skin:     General: Skin is warm and dry.   Neurological:      Mental Status: He is alert and oriented to person, place, and time. Mental status is at baseline.      Sensory: No sensory deficit.      Motor: No weakness.          Lab Results: I have reviewed the following results:  CrCl cannot be calculated (Patient's most recent lab result is older than the maximum 7 days allowed.).  Lab Results   Component Value Date    WBC 9.50 10/01/2024    HGB 14.7 10/01/2024    HCT 43.6 10/01/2024     10/01/2024         Component Value Date/Time    K 3.4 (L) 10/01/2024 1006    CL 90 (L) 10/01/2024 1006    CO2 36 (H) 10/01/2024 1006    BUN 25 10/01/2024 1006    CREATININE 1.32 (H) 10/01/2024 1006         Component Value Date/Time    CALCIUM 9.9 10/01/2024 1006    ALKPHOS 98 10/01/2024 1006    AST 14 10/01/2024 1006    ALT 19  10/01/2024 1006    TP 8.0 10/01/2024 1006    ALB 4.2 10/01/2024 1006       Imaging Results Review: No pertinent imaging studies reviewed.  Other Study Results Review: No additional pertinent studies reviewed.

## 2024-10-31 ENCOUNTER — APPOINTMENT (OUTPATIENT)
Dept: RADIOLOGY | Facility: HOSPITAL | Age: 64
DRG: 402 | End: 2024-10-31
Payer: MEDICARE

## 2024-10-31 PROBLEM — M48.061 LUMBAR STENOSIS: Status: ACTIVE | Noted: 2024-10-31

## 2024-10-31 PROBLEM — M47.816 LUMBAR FACET ARTHROPATHY: Status: ACTIVE | Noted: 2024-10-31

## 2024-10-31 LAB
ANION GAP SERPL CALCULATED.3IONS-SCNC: 7 MMOL/L (ref 4–13)
APTT PPP: 34 SECONDS (ref 23–34)
BASE EXCESS BLDA CALC-SCNC: 11 MMOL/L (ref -2–3)
BASE EXCESS BLDA CALC-SCNC: 11 MMOL/L (ref -2–3)
BUN SERPL-MCNC: 21 MG/DL (ref 5–25)
CA-I BLD-SCNC: 1.04 MMOL/L (ref 1.12–1.32)
CA-I BLD-SCNC: 1.06 MMOL/L (ref 1.12–1.32)
CALCIUM SERPL-MCNC: 8 MG/DL (ref 8.4–10.2)
CHLORIDE SERPL-SCNC: 90 MMOL/L (ref 96–108)
CO2 SERPL-SCNC: 33 MMOL/L (ref 21–32)
CREAT SERPL-MCNC: 0.84 MG/DL (ref 0.6–1.3)
ERYTHROCYTE [DISTWIDTH] IN BLOOD BY AUTOMATED COUNT: 14.6 % (ref 11.6–15.1)
FIO2 GAS DIL.REBREATH: 60 L
FIO2 GAS DIL.REBREATH: 60 L
GFR SERPL CREATININE-BSD FRML MDRD: 91 ML/MIN/1.73SQ M
GLUCOSE SERPL-MCNC: 186 MG/DL (ref 65–140)
GLUCOSE SERPL-MCNC: 199 MG/DL (ref 65–140)
GLUCOSE SERPL-MCNC: 300 MG/DL (ref 65–140)
GLUCOSE SERPL-MCNC: 354 MG/DL (ref 65–140)
HCO3 BLDA-SCNC: 35.4 MMOL/L (ref 22–28)
HCO3 BLDA-SCNC: 37.2 MMOL/L (ref 22–28)
HCT VFR BLD AUTO: 35.9 % (ref 36.5–49.3)
HCT VFR BLD CALC: 36 % (ref 36.5–49.3)
HCT VFR BLD CALC: 36 % (ref 36.5–49.3)
HGB BLD-MCNC: 12.3 G/DL (ref 12–17)
HGB BLDA-MCNC: 12.2 G/DL (ref 12–17)
HGB BLDA-MCNC: 12.2 G/DL (ref 12–17)
INR PPP: 1.26 (ref 0.85–1.19)
MCH RBC QN AUTO: 32.5 PG (ref 26.8–34.3)
MCHC RBC AUTO-ENTMCNC: 34.3 G/DL (ref 31.4–37.4)
MCV RBC AUTO: 95 FL (ref 82–98)
PCO2 BLD: 37 MMOL/L (ref 21–32)
PCO2 BLD: 39 MMOL/L (ref 21–32)
PCO2 BLD: 44.2 MM HG (ref 36–44)
PCO2 BLD: 52.9 MM HG (ref 36–44)
PH BLD: 7.46 [PH] (ref 7.35–7.45)
PH BLD: 7.51 [PH] (ref 7.35–7.45)
PLATELET # BLD AUTO: 151 THOUSANDS/UL (ref 149–390)
PMV BLD AUTO: 10.7 FL (ref 8.9–12.7)
PO2 BLD: 135 MM HG (ref 75–129)
PO2 BLD: 166 MM HG (ref 75–129)
POTASSIUM BLD-SCNC: 2.4 MMOL/L (ref 3.5–5.3)
POTASSIUM BLD-SCNC: 3.1 MMOL/L (ref 3.5–5.3)
POTASSIUM SERPL-SCNC: 2.9 MMOL/L (ref 3.5–5.3)
PROTHROMBIN TIME: 16 SECONDS (ref 12.3–15)
RBC # BLD AUTO: 3.78 MILLION/UL (ref 3.88–5.62)
SAO2 % BLD FROM PO2: 100 % (ref 60–85)
SAO2 % BLD FROM PO2: 99 % (ref 60–85)
SODIUM BLD-SCNC: 135 MMOL/L (ref 136–145)
SODIUM BLD-SCNC: 135 MMOL/L (ref 136–145)
SODIUM SERPL-SCNC: 130 MMOL/L (ref 135–147)
SPECIMEN SOURCE: ABNORMAL
SPECIMEN SOURCE: ABNORMAL
WBC # BLD AUTO: 9.78 THOUSAND/UL (ref 4.31–10.16)

## 2024-10-31 PROCEDURE — 97163 PT EVAL HIGH COMPLEX 45 MIN: CPT

## 2024-10-31 PROCEDURE — 85730 THROMBOPLASTIN TIME PARTIAL: CPT | Performed by: PHYSICIAN ASSISTANT

## 2024-10-31 PROCEDURE — 82948 REAGENT STRIP/BLOOD GLUCOSE: CPT

## 2024-10-31 PROCEDURE — 99024 POSTOP FOLLOW-UP VISIT: CPT | Performed by: NEUROLOGICAL SURGERY

## 2024-10-31 PROCEDURE — NC001 PR NO CHARGE: Performed by: NURSE PRACTITIONER

## 2024-10-31 PROCEDURE — 85027 COMPLETE CBC AUTOMATED: CPT | Performed by: PHYSICIAN ASSISTANT

## 2024-10-31 PROCEDURE — 97167 OT EVAL HIGH COMPLEX 60 MIN: CPT

## 2024-10-31 PROCEDURE — 80048 BASIC METABOLIC PNL TOTAL CA: CPT | Performed by: PHYSICIAN ASSISTANT

## 2024-10-31 PROCEDURE — 99233 SBSQ HOSP IP/OBS HIGH 50: CPT

## 2024-10-31 PROCEDURE — 85610 PROTHROMBIN TIME: CPT | Performed by: PHYSICIAN ASSISTANT

## 2024-10-31 PROCEDURE — 72100 X-RAY EXAM L-S SPINE 2/3 VWS: CPT

## 2024-10-31 RX ORDER — POTASSIUM CHLORIDE 1500 MG/1
20 TABLET, EXTENDED RELEASE ORAL 2 TIMES DAILY
Status: DISCONTINUED | OUTPATIENT
Start: 2024-10-31 | End: 2024-11-01

## 2024-10-31 RX ORDER — INSULIN LISPRO 100 [IU]/ML
1-6 INJECTION, SOLUTION INTRAVENOUS; SUBCUTANEOUS
Status: DISCONTINUED | OUTPATIENT
Start: 2024-11-01 | End: 2024-11-04 | Stop reason: HOSPADM

## 2024-10-31 RX ORDER — INSULIN LISPRO 100 [IU]/ML
1-5 INJECTION, SOLUTION INTRAVENOUS; SUBCUTANEOUS
Status: DISCONTINUED | OUTPATIENT
Start: 2024-10-31 | End: 2024-11-04 | Stop reason: HOSPADM

## 2024-10-31 RX ADMIN — INSULIN GLARGINE 8 UNITS: 100 INJECTION, SOLUTION SUBCUTANEOUS at 09:20

## 2024-10-31 RX ADMIN — ATORVASTATIN CALCIUM 40 MG: 40 TABLET, FILM COATED ORAL at 09:11

## 2024-10-31 RX ADMIN — KETAMINE HYDROCHLORIDE 0.1 MG/KG/HR: 100 INJECTION INTRAMUSCULAR; INTRAVENOUS at 23:43

## 2024-10-31 RX ADMIN — HEPARIN SODIUM 5000 UNITS: 5000 INJECTION INTRAVENOUS; SUBCUTANEOUS at 21:10

## 2024-10-31 RX ADMIN — VANCOMYCIN HYDROCHLORIDE 1000 MG: 1 INJECTION, SOLUTION INTRAVENOUS at 05:45

## 2024-10-31 RX ADMIN — ACETAMINOPHEN 975 MG: 325 TABLET, FILM COATED ORAL at 21:10

## 2024-10-31 RX ADMIN — TORSEMIDE 40 MG: 20 TABLET ORAL at 09:11

## 2024-10-31 RX ADMIN — POTASSIUM CHLORIDE 20 MEQ: 1500 TABLET, EXTENDED RELEASE ORAL at 17:05

## 2024-10-31 RX ADMIN — GABAPENTIN 600 MG: 300 CAPSULE ORAL at 21:10

## 2024-10-31 RX ADMIN — DULOXETINE HYDROCHLORIDE 60 MG: 60 CAPSULE, DELAYED RELEASE ORAL at 09:11

## 2024-10-31 RX ADMIN — METHOCARBAMOL 750 MG: 750 TABLET ORAL at 23:43

## 2024-10-31 RX ADMIN — HYDROMORPHONE HYDROCHLORIDE 8 MG: 2 TABLET ORAL at 05:50

## 2024-10-31 RX ADMIN — METHOCARBAMOL 750 MG: 750 TABLET ORAL at 05:36

## 2024-10-31 RX ADMIN — ACETAMINOPHEN 975 MG: 325 TABLET, FILM COATED ORAL at 14:08

## 2024-10-31 RX ADMIN — INSULIN LISPRO 4 UNITS: 100 INJECTION, SOLUTION INTRAVENOUS; SUBCUTANEOUS at 21:10

## 2024-10-31 RX ADMIN — METHOCARBAMOL 750 MG: 750 TABLET ORAL at 17:05

## 2024-10-31 RX ADMIN — POTASSIUM CHLORIDE 20 MEQ: 1.5 SOLUTION ORAL at 09:14

## 2024-10-31 RX ADMIN — HYDROMORPHONE HYDROCHLORIDE 8 MG: 2 TABLET ORAL at 17:05

## 2024-10-31 RX ADMIN — SENNOSIDES AND DOCUSATE SODIUM 1 TABLET: 50; 8.6 TABLET ORAL at 21:10

## 2024-10-31 RX ADMIN — TORSEMIDE 40 MG: 20 TABLET ORAL at 17:05

## 2024-10-31 RX ADMIN — GABAPENTIN 600 MG: 300 CAPSULE ORAL at 09:11

## 2024-10-31 RX ADMIN — METHOCARBAMOL 750 MG: 750 TABLET ORAL at 12:18

## 2024-10-31 RX ADMIN — GABAPENTIN 600 MG: 300 CAPSULE ORAL at 12:18

## 2024-10-31 RX ADMIN — POLYETHYLENE GLYCOL 3350 17 G: 17 POWDER, FOR SOLUTION ORAL at 09:12

## 2024-10-31 RX ADMIN — HYDROMORPHONE HYDROCHLORIDE 8 MG: 2 TABLET ORAL at 12:20

## 2024-10-31 RX ADMIN — GABAPENTIN 600 MG: 300 CAPSULE ORAL at 17:05

## 2024-10-31 RX ADMIN — HYDROMORPHONE HYDROCHLORIDE 8 MG: 2 TABLET ORAL at 23:49

## 2024-10-31 RX ADMIN — ACETAMINOPHEN 975 MG: 325 TABLET, FILM COATED ORAL at 05:36

## 2024-10-31 NOTE — PHYSICAL THERAPY NOTE
"   PHYSICAL THERAPY EVALUATION  NAME:  Dominick Irving  DATE: 10/31/24    AGE:   64 y.o.  Mrn:   [id number]  ADMIT DX:  Mechanical back pain [M54.9]  Facet arthropathy, lumbar [M47.816]  Spinal stenosis of lumbar region [M48.061]    Past Medical History:   Diagnosis Date    Anxiety 3/01/2023    Arthritis     Bladder cancer (HCC)     Cancer (HCC) 3/17/2023    Cervical disc disorder 1/2016    Chronic narcotic dependence (HCC)     Chronic pain disorder     lower back and down both legs    Colon polyp     Coronary artery disease     CPAP (continuous positive airway pressure) dependence     bi-pap    Depression 3/17/2023    Does use hearing aid     will wear DOS    Full dentures     Heart failure (HCC)     and \"fluid retention\" [facility]\"    High cholesterol     Hypertension     Low back pain 2003    Mild ankle edema     and feet    Obstructive sleep apnea on CPAP     Prediabetes     Shortness of breath     per pt \"with just exertion\"    Sleep apnea     Wears glasses        Past Surgical History:   Procedure Laterality Date    BACK SURGERY      titanium rods implanted    CAUDAL BLOCK N/A 10/17/2023    Procedure: BLOCK / INJECTION CAUDAL;  Surgeon: Minh Rolon MD;  Location: [facility];  Service: Pain Management     COLONOSCOPY      CYSTOSCOPY W/ URETERAL STENT PLACEMENT Bilateral 03/17/2023    Procedure: bilateral retrograde;  Surgeon: Shan Sanabria MD;  Location: [facility];  Service: Urology    HERNIA REPAIR      x5    LUMBAR LAMINECTOMY N/A 06/30/2023    Procedure: Left L3-4 Metrx hemilaminectomy and bilateral foraminotomies;  Surgeon: Leland Aguilar MD;  Location:  MAIN OR;  Service: Neurosurgery    FL ARTHRODESIS COMBINED TQ 1NTRSPC LUMBAR Right 10/30/2024    Procedure: Robotically assisted L3-4 decompressive hemilaminectomy and foraminotomies with transverse lumbar interbody fusion right-sided approach with add-on to an L4-5 fixation fusion;  Surgeon: Leland Aguilar MD;  " Location: [facility];  Service: Neurosurgery    MO CYSTO W/REMOVAL OF LESIONS SMALL N/A 05/01/2023    Procedure: CYSTOSCOPY TURBT;  Surgeon: Shan Sanabria MD;  [facility];  Service: Urology    MO CYSTOURETHROSCOPY N/A 11/06/2023    Procedure: CYSTOSCOPY with  bladder biopsies and fulgeration;  Surgeon: Shan Sanabria MD;  [facility];  Service: Urology    SPINE SURGERY  2017    TRANSURETHRAL RESECTION OF BLADDER TUMOR N/A 03/17/2023    Procedure: TRANSURETHRAL RESECTION OF BLADDER TUMOR (TURBT);  Surgeon: Shan Sanabria MD;  [facility];  Service: Urology       Length Of Stay: 0    PHYSICAL THERAPY EVALUATION:        10/31/24 1102   Note Type   Note type Evaluation   Pain Assessment   Pain Assessment Tool 0-10   Pain Score 5   Pain Location/Orientation Location: Back   Pain Onset/Description Onset: Ongoing;Frequency: Constant/Continuous;Descriptor: Aching   Effect of Pain on Daily Activities increased pain with activity   Patient's Stated Pain Goal No pain   Hospital Pain Intervention(s) Ambulation/increased activity;Repositioned   Restrictions/Precautions   Weight Bearing Precautions Per Order No   Braces or Orthoses   (no back brace required per neurosx)   Other Precautions Multiple lines;Pain;Spinal precautions   Home Living   Type of Home House   Home Layout Two level;Able to live on main level with bedroom/bathroom  (0 WES)   Home Equipment Walker;Cane   Additional Comments Pt reports living with spouse. Pt also has local son who is able to assist   Prior Function   Level of [place] Independent with functional mobility   Lives With Spouse   Receives Help From Family   Falls in the last 6 months 1 to 4   Comments Pt reports the use of a RW for community distances and the use of a SPC for househole ambulation   General   Family/Caregiver Present No   Cognition   Overall Cognitive Status WFL   Arousal/Participation Alert   Attention Within functional limits   Orientation Level  Oriented X4   Memory Within functional limits   Following Commands Follows all commands and directions without difficulty   RUE Assessment   RUE Assessment WFL   LUE Assessment   LUE Assessment WFL   RLE Assessment   RLE Assessment WFL   Strength RLE   RLE Overall Strength 4/5   LLE Assessment   LLE Assessment WFL   Strength LLE   LLE Overall Strength 4/5   Bed Mobility   Additional Comments NA, Pt seated OOB in chair at time of PT eval   Transfers   Sit to Stand 5  Supervision   Additional items Increased time required   Stand to Sit 5  Supervision   Additional items Increased time required   Ambulation/Elevation   Gait pattern Short stride;Foward flexed   Gait Assistance 5  Supervision   Assistive Device Rolling walker   Distance 65ft x 2   Stair Management Assistance 5  Supervision   Stair Management Technique Two rails   Number of Stairs 3   Balance   Static Sitting Fair +   Static Standing Fair -   Ambulatory Fair -   Activity Tolerance   Activity Tolerance Patient tolerated treatment well   Medical Staff Made Aware Gagandeep OT; PAULETTE Leary student; OT present for co evaluation due to pts current medical presentation   Nurse Made Aware Pt appropriate to be seen and mobilize per nsg   Assessment   Prognosis Good   Problem List Decreased strength;Decreased endurance;Decreased mobility;Pain;Orthopedic restrictions   Assessment Pt is 64 y.o. male seen for PT evaluation s/p admit to [facility] on [facility]/30[facility]24. Two pt identifiers were used to confirm. Pt presented for scheduled Right - Robotically assisted L3-4 decompressive hemilaminectomy and foraminotomies with transverse lumbar interbody fusion right-sided approach with add-on to an L4-5 fixation fusion .  Pt was admitted with a primary dx of: s/ p Right - Robotically assisted L3-4 decompressive hemilaminectomy and foraminotomies with transverse lumbar interbody fusion right-sided approach with add-on to an L4-5 fixation fusion .  PT now consulted for  "assessment of mobility and d/c needs. Pt with Out of bed orders.  Pts current co morbidities affecting treatment include:  has a past medical history of Anxiety, Arthritis, Bladder cancer (HCC), Cancer (HCC), Cervical disc disorder, Chronic narcotic dependence (HCC), Chronic pain disorder, Colon polyp, Coronary artery disease, CPAP (continuous positive airway pressure) dependence, Depression, Does use hearing aid, Full dentures, Heart failure (HCC), High cholesterol, Hypertension, Low back pain, Mild ankle edema, Obstructive sleep apnea on CPAP, Prediabetes, Shortness of breath, Sleep apnea, and Wears glasses. . Pts current clinical presentation is Unstable/ Unpredictable (high complexity) due to Ongoing medical management for primary dx, Decreased activity tolerance compared to baseline, Fall risk, Spinal precautions at current time, Continuous pulse oximetry monitoring , s/p surgical intervention  . Upon evaluation, pt currently is requiring ; Supervision for transfers and Supervision for ambulation w/ RW. Pt presents at PT eval functioning below baseline and currently w/ overall mobility deficits 2* to: decreased endurance, pain, decreased activity tolerance compared to baseline, spinal precautions, decreased b/l LE strength.  At conclusion of PT session pt returned back in chair and chair alarm engaged with phone and call bell within reach. Pt denies any further questions at this time. PT is currently recommending Level III resource intensity .  D/C acute care PT at this time due to pt being supervision with all mobility and having supportive family who are able to assist as needed. Pt denies any mobility or safety concerns about returning home at d/c. Recommend pt continues to mobilize with nsg and restorative techs during hospital stay.   Barriers to Discharge None   Goals   Patient Goals \" to get better\"   Plan   PT Frequency   (DC IPPT)   Discharge Recommendation   Rehab Resource Intensity Level, PT III " (Minimum Resource Intensity)   Equipment Recommended Walker   Walker Package Recommended Wheeled walker   AM-PAC Basic Mobility Inpatient   Turning in Flat Bed Without Bedrails 4   Lying on Back to Sitting on Edge of Flat Bed Without Bedrails 4   Moving Bed to Chair 4   Standing Up From Chair Using Arms 4   Walk in Room 3   Climb 3-5 Stairs With Railing 3   Basic Mobility Inpatient Raw Score 22   Basic Mobility Standardized Score 47.4   [facility] Highest Level Of Mobility   JH-HLM Goal 7: Walk 25 feet or more   JH-HLM Achieved 7: Walk 25 feet or more   Modified [place]rena Scale   Modified [place] Scale 3   Portions of the documentation may have been created using voice recognition software.Occasional wrong word or sound alike substitutions may have occurred due to the inherent limitations of the voice recognition software. Read the chart carefully and recognize, using context, where substitutions have occurred.    Barrie Godoy, PT, DPT

## 2024-10-31 NOTE — ASSESSMENT & PLAN NOTE
1 Day Post-Op Procedure(s):  Robotically assisted L3-4 decompressive hemilaminectomy and foraminotomies with transverse lumbar interbody fusion right-sided approach with add-on to an L4-5 fixation fusion    Imaging reviewed personally and by attending. Final results as below  Xray lumbar spine 10/31/2024: Expected postop changes status post L3-4 TLIF with L3-L5  fixation/fusion.     Plan    Continue regular neurologic checks  MAXIMO drain with 190 cc overnight.  Maintain at this time due to her output.  Pain control : APS following.  Appreciate recommendations.  Tylenol 975 mg every 8 hours scheduled  Cymbalta 60 mg daily  Gabapentin 600 mg 4 times daily  Robaxin 750 mg every 6 hours scheduled  Lidoderm patch, 12 hours on/12 hours off to affected area  Fentanyl transdermal patch 75 mcg/h, 1 patch every 72 hours  Oral Dilaudid 8 mg every 3 hours as needed for moderate/severe pain  IV Dilaudid 1 mg every 4 hours as needed for breakthrough pain  Add Narcan as needed for respiratory depression/opioid reversal  Continue low-dose IV ketamine infusion at 0.1 mg/kg/h for opioid sparing and analgesic support in opioid tolerant patient  As needed Haldol/Ativan/Robinul to mitigate ketamine related side effects    Bowel regimen with MiraLAX and senna-S daily.  Eval and mobilize per PT/OT  No brace required.  DVT ppx: SCDs, subcu heparin  Vital stable.  Labs reviewed.  See below for further details.    Rounding completed with RN-Diane Cunningham.    Neurosurgery will continue to follow as primary.  Patient not medically stable for D/C at this time pending pain control, drain in place and electrolyte resuscitation.  Please contact us with any questions or concerns.

## 2024-10-31 NOTE — PROGRESS NOTES
Progress Note - Acute Pain   Name: Dominick Irving 64 y.o. male I MRN: [id number]  [facility] Date of Admission: 10/30/2024   Date of Service: 10/31/2024 I Hospital Day: 0    Assessment & Plan  Postoperative pain after spinal surgery  Patient is status post robotically assisted L3-4 decompressive hemilaminectomy and foraminotomies with transverse lumbar interbody fusion right-sided approach with add-on to an L4-5 fixation fusion with neurosurgery 10/29/2024.  Patient received 20 mg IV methadone intraoperatively.  Patient is managed on high-dose chronic opioids in the outpatient setting by his PCP.  APS consulted for assistance in postoperative pain management in setting of chronic opioid use and intraoperative IV methadone.    Multimodal analgesia:  Tylenol 975 mg every 8 hours scheduled  Cymbalta 60 mg daily  Gabapentin 600 mg 4 times daily  Robaxin 750 mg every 6 hours scheduled  Lidoderm patch, 12 hours on/12 hours off to affected area  Fentanyl transdermal patch 75 mcg/h, 1 patch every 72 hours  Oral Dilaudid 8 mg every 3 hours as needed for moderate/severe pain  IV Dilaudid 1 mg every 4 hours as needed for breakthrough pain  Add Narcan as needed for respiratory depression/opioid reversal    Continue low-dose IV ketamine infusion at 0.1 mg/kg/h for opioid sparing and analgesic support in opioid tolerant patient  As needed Haldol/Ativan/Robinul to mitigate ketamine related side effects  Obstructive sleep apnea on CPAP  Patient's with sleep apnea are at higher risk of respiratory depression secondary to opioid use.  Attempt to minimize use of opioid pain medication while maintaining adequate analgesia.  Chronic, continuous use of opioids  Patient maintained on chronic opioids in setting of chronic low back pain.  Currently managed by PCP: Robert Budinetz, MD   Patient does currently follow with palliative care I did prescribe him methadone for a short period of time although this was discontinued  over apparent side effects.  Palliative care has since deferred opioid management back to PCP.    Current outpatient analgesic regimen:  Cymbalta 60 mg daily  Gabapentin 600 mg 4 times daily  Fentanyl 75 mcg/hour patch-1 patch transdermal every 72 hours  Oral Dilaudid 8 mg every 3 hours as needed for moderate to severe pain    PDMP review:      APS will continue to follow. Please contact Acute Pain Service - via SecureChat from [phone] with additional questions or concerns. See SecureChat or Amion for additional contacts and after hours information.     Subjective   Dominick Irving is a 64 y.o. male who presents with history of continuous opioid dependence in setting of chronic back pain and prior spinal surgery. Patient is status post robotically assisted L3-4 decompressive hemilaminectomy and foraminotomies with transverse lumbar interbody fusion right-sided approach with add-on to an L4-5 fixation fusion with neurosurgery 10/29/2024 and received 20 mg of IV methadone intraoperatively.  APS was consulted for assistance in postoperative pain management.     Pain History  Current pain location(s):  Pain Score: 7  Pain Location/Orientation: Location: Back  Pain Scale: Pain Assessment Tool: 0-10  24 hour history: Seen sitting up in chair at bedside this morning, without acute distress.  Patient reports he is currently very happy with his pain control and reports his low back pain 4/10.  Prior to surgery he states his pain score was consistently 8/9 out of 10.  He is motivated to ambulate with physical therapy and denies any other acute complaints.    Opioid requirement previous 24 hours:   75 mcg/h fentanyl patch x1  8 mg oral Dilaudid x 3 doses  1 mg IV Dilaudid in PACU      Meds/Allergies   all current active meds have been reviewed, current meds:   Current Facility-Administered Medications:     acetaminophen (TYLENOL) tablet 975 mg, Q8H ISIS    atorvastatin (LIPITOR) tablet 40 mg, Daily    calcium carbonate  (TUMS) chewable tablet 1,000 mg, Daily PRN    DULoxetine (CYMBALTA) delayed release capsule 60 mg, Daily    fentaNYL (DURAGESIC) 75 mcg/hr TD 72 hr patch 1 patch, Q72H    gabapentin (NEURONTIN) capsule 600 mg, 4x Daily    glycopyrrolate (ROBINUL) injection 0.2 mg, Q4H PRN    haloperidol lactate (HALDOL) injection 2 mg, Q30 Min PRN    heparin (porcine) subcutaneous injection 5,000 Units, Q8H ISIS **AND** [COMPLETED] Platelet count, Once    HYDROmorphone (DILAUDID) injection 1 mg, Q4H PRN    HYDROmorphone (DILAUDID) tablet 8 mg, Q3H PRN    insulin glargine (LANTUS) subcutaneous injection 8 Units 0.08 mL, QAM    ketamine 250 mg (STANDARD CONCENTRATION) IV in sodium chloride 0.9% 250 mL, Continuous, Last Rate: Stopped (10/30/24 2234)    lidocaine (LIDODERM) 5 % patch 1 patch, Daily    LORazepam (ATIVAN) injection 1 mg, Q4H PRN    methocarbamol (ROBAXIN) tablet 750 mg, Q6H ISIS    metolazone (ZAROXOLYN) tablet 2.5 mg, Daily PRN    naloxone (NARCAN) 0.04 mg/mL syringe 0.04 mg, Q1MIN PRN    ondansetron (ZOFRAN) injection 4 mg, Q6H PRN    polyethylene glycol (MIRALAX) packet 17 g, Daily    potassium chloride oral solution 20 mEq, BID    senna-docusate sodium (SENOKOT S) 8.6-50 mg per tablet 1 tablet, HS    torsemide (DEMADEX) tablet 40 mg, BID, and PTA meds:   Prior to Admission Medications   Prescriptions Last Dose Informant Patient Reported? Taking?   DULoxetine (CYMBALTA) 60 mg delayed release capsule 10/23/2024  No No   Sig: Take 1 capsule (60 mg total) by mouth daily   Farxiga 5 MG TABS 10/21/2024 Self Yes No   Si mg daily 5mg alternating with 2.5mg for fluid retention   HYDROmorphone (DILAUDID) 8 MG tablet 10/30/2024 at 0300  No Yes   Sig: Take 1 tablet (8 mg total) by mouth every 3 (three) hours as needed for moderate pain or severe pain Max Daily Amount: 64 mg   Insulin Pen Needle 32G X 4 MM MISC   No No   Sig: Use daily Dx E11.9   Lancet Devices (Adjustable Lancing Device) MISC   No No   Sig: Use as directed to  test glucose once daily. One touch device or may change to what insurance covers.   Lancets (onetouch ultrasoft) lancets   No No   Sig: Use as instructed. Dx E11.9   aspirin 81 mg chewable tablet 10/20/2024 Self Yes No   Sig: Chew 81 mg daily   atorvastatin (LIPITOR) 40 mg tablet 10/25/2024  No No   Sig: Take 1 tablet (40 mg total) by mouth daily   co-enzyme Q-10 30 MG capsule 10/23/2024 Self Yes No   Sig: Take 100 mg by mouth daily    fentaNYL (DURAGESIC) 75 mcg/hr 10/27/2024  No No   Sig: Place 1 patch on the skin over 72 hours every third day Max Daily Amount: 1 patch   gabapentin (NEURONTIN) 600 MG tablet 10/25/2024  No Yes   Sig: Take 1 tablet (600 mg total) by mouth 4 (four) times a day   insulin degludec (Tresiba FlexTouch) 100 units/mL injection pen 10/25/2024  No No   Sig: Inject 8 Units under the skin daily   metFORMIN (GLUCOPHAGE-XR) 500 mg 24 hr tablet 10/23/2024  No No   Sig: Take 1 pill qam and 2 pills qpm   metolazone (ZAROXOLYN) 2.5 mg tablet 10/25/2024  No No   Sig: Take 1 tablet (2.5 mg total) by mouth daily as needed (edema)   multivitamin-iron-minerals-folic acid (CENTRUM) chewable tablet 10/23/2024 Self Yes No   Sig: Chew 1 tablet daily   naloxone (NARCAN) 4 mg/0.1 mL nasal spray   No No   Sig: Administer 1 spray into a nostril. If no response after 2-3 minutes, give another dose in the other nostril using a new spray.   potassium chloride (Klor-Con) 10 mEq tablet 10/23/2024  Yes No   potassium chloride (Klor-Con) 10 mEq tablet   No No   Sig: TAKE 2 TABLETS BY MOUTH TWICE DAILY   potassium chloride (Klor-Con) 10 mEq tablet   No No   Sig: TAKE 2 TABLETS BY MOUTH TWICE DAILY   semaglutide, 0.25 or 0.5 mg/dose, (Ozempic, 0.25 or 0.5 MG/DOSE,) 2 mg/3 mL injection pen 10/17/2024  No No   Sig: Inject 0.75 mL (0.5 mg total) under the skin every 7 days   torsemide (DEMADEX) 20 mg tablet 10/29/2024  No Yes   Sig: Take 2 tablets (40 mg total) by mouth 2 (two) times a day      Facility-Administered  Medications: None     Allergies   Allergen Reactions    Mirtazapine Other (See Comments) and Confusion     Pt drove without knowing and woke up at a new destination    Venlafaxine Dizziness     Objective :  Temp:  [97.6 °F (36.4 °C)-99.8 °F (37.7 °C)] 97.6 °F (36.4 °C)  HR:  [62-89] 62  BP: (116-154)/(55-78) 118/56  Resp:  [14-26] 16  SpO2:  [90 %-98 %] 94 %  O2 Device: None (Room air)  Nasal Cannula O2 Flow Rate (L/min):  [4 L/min] 4 L/min  FiO2 (%):  [4] 4    Physical Exam  Vitals reviewed.   Constitutional:       General: He is not in acute distress.  HENT:      Head: Normocephalic and atraumatic.   Cardiovascular:      Rate and Rhythm: Normal rate.   Pulmonary:      Effort: Pulmonary effort is normal.   Chest:      Chest wall: No tenderness.   Abdominal:      General: There is no distension.      Palpations: Abdomen is soft.      Tenderness: There is no abdominal tenderness.   Musculoskeletal:         General: Tenderness (Low back) present.      Cervical back: Normal range of motion.   Skin:     General: Skin is warm and dry.   Neurological:      Mental Status: He is alert and oriented to person, place, and time. Mental status is at baseline.      Sensory: No sensory deficit.      Motor: No weakness.            Lab Results: I have reviewed the following results:  CrCl cannot be calculated (Patient's most recent lab result is older than the maximum 7 days allowed.).  Lab Results   Component Value Date    WBC 9.50 10/01/2024    HGB 14.7 10/01/2024    HCT 43.6 10/01/2024     10/30/2024         Component Value Date/Time    K 3.4 (L) 10/01/2024 1006    CL 90 (L) 10/01/2024 1006    CO2 36 (H) 10/01/2024 1006    BUN 25 10/01/2024 1006    CREATININE 1.32 (H) 10/01/2024 1006         Component Value Date/Time    CALCIUM 9.9 10/01/2024 1006    ALKPHOS 98 10/01/2024 1006    AST 14 10/01/2024 1006    ALT 19 10/01/2024 1006    TP 8.0 10/01/2024 1006    ALB 4.2 10/01/2024 1006       Imaging Results Review: No pertinent  imaging studies reviewed.  Other Study Results Review: No additional pertinent studies reviewed.

## 2024-10-31 NOTE — PLAN OF CARE
Problem: OCCUPATIONAL THERAPY ADULT  Goal: Performs self-care activities at highest level of function for planned discharge setting.  See evaluation for individualized goals.  Description: Treatment Interventions: ADL retraining, Endurance training, Patient/family training, Equipment evaluation/education, Compensatory technique education, Energy conservation, Activityengagement          See flowsheet documentation for full assessment, interventions and recommendations.   Note: Limitation: Decreased ADL status, Decreased endurance, Decreased high-level ADLs  Prognosis: Good  Assessment: Pt is a 64 y.o. male admitted to \[facility]\"" on 10/30/24. Pt presented with back pain, lumbar facet arthropathy, and lumbar spinal stenosis. Pt s/p robotically assisted L3-4 decompressive hemilaminectomy and foraminotomies with transverse lumbar interbody fusion right-sided approach with add-on to an L4-5 fixation fusion with neurosurgery on 10/30. Pt  has a past medical history of Anxiety, Arthritis, Bladder cancer ([facility]), Cancer ([facility]), Cervical disc disorder, Chronic narcotic dependence ([facility]), Chronic pain disorder, Colon polyp, Coronary artery disease, CPAP (continuous positive airway pressure) dependence, Depression, Does use hearing aid, Full dentures, Heart failure ([facility]), High cholesterol, Hypertension, Low back pain, Mild ankle edema, Obstructive sleep apnea on CPAP, Prediabetes, Shortness of breath, Sleep apnea, and Wears glasses. Currently, the pt lives with his wife in a 4 SH. At baseline, pt required occ assistance with ADLs and was independent with most IADLs and functional mobility. Pt currently presents with impairments including, difficulty performing ADLS, difficulty performing IADLS, health management, and environment activity tolerance, endurance, standing balance/tolerance, and sitting balance/tolerance. These impairments, as well as pt's fatigue, pain, risk for falls, and home environment limit pt's ability to safely  engage in all baseline areas of occupation, including grooming, bathing, dressing, toileting, functional mobility/transfers, community mobility, social participation, and leisure activities, however has family support at home if necessary. From OT standpoint, recommend Level IV resources. OT will continue to follow to address the below stated goals.     Rehab Resource Intensity Level, OT: No post-acute rehabilitation needs

## 2024-10-31 NOTE — ASSESSMENT & PLAN NOTE
0 10/31/24 Na low at 130. Pt completed NaCl infusion.   Will continue to monitor with repeat BMP in am.

## 2024-10-31 NOTE — ASSESSMENT & PLAN NOTE
Patient is status post robotically assisted L3-4 decompressive hemilaminectomy and foraminotomies with transverse lumbar interbody fusion right-sided approach with add-on to an L4-5 fixation fusion with neurosurgery 10/29/2024.  Patient received 20 mg IV methadone intraoperatively.  Patient is managed on high-dose chronic opioids in the outpatient setting by his PCP.  APS consulted for assistance in postoperative pain management in setting of chronic opioid use and intraoperative IV methadone.    Multimodal analgesia:  Tylenol 975 mg every 8 hours scheduled  Cymbalta 60 mg daily  Gabapentin 600 mg 4 times daily  Robaxin 750 mg every 6 hours scheduled  Lidoderm patch, 12 hours on/12 hours off to affected area  Fentanyl transdermal patch 75 mcg/h, 1 patch every 72 hours  Oral Dilaudid 8 mg every 3 hours as needed for moderate/severe pain  IV Dilaudid 1 mg every 4 hours as needed for breakthrough pain  Add Narcan as needed for respiratory depression/opioid reversal    Continue low-dose IV ketamine infusion at 0.1 mg/kg/h for opioid sparing and analgesic support in opioid tolerant patient  As needed Haldol/Ativan/Robinul to mitigate ketamine related side effects

## 2024-10-31 NOTE — ANESTHESIA POSTPROCEDURE EVALUATION
Post-Op Assessment Note    Last Filed PACU Vitals:  Vitals Value Taken Time   Temp 97.6 °F (36.4 °C) 10/30/24 1330   Pulse 79 10/30/24 1456   /75 10/30/24 1445   Resp 15 10/30/24 1456   SpO2 95 % 10/30/24 1456   Vitals shown include unfiled device data.    Modified Erna:  Activity: 2 (10/30/2024  2:15 PM)  Respiration: 2 (10/30/2024  2:15 PM)  Circulation: 2 (10/30/2024  2:15 PM)  Consciousness: 2 (10/30/2024  2:15 PM)  Oxygen Saturation: 1 (10/30/2024  2:15 PM)  Modified Erna Score: 9 (10/30/2024  2:15 PM)

## 2024-10-31 NOTE — PROGRESS NOTES
Progress Note - Neurosurgery   Name: Dominick Irving 64 y.o. male I MRN: [id number]  [facility] I Date of Admission: 10/30/2024   Date of Service: 10/31/2024 I Hospital Day: 0    Assessment & Plan  Lumbar stenosis  1 Day Post-Op Procedure(s):  Robotically assisted L3-4 decompressive hemilaminectomy and foraminotomies with transverse lumbar interbody fusion right-sided approach with add-on to an L4-5 fixation fusion    Imaging reviewed personally and by attending. Final results as below  Xray lumbar spine 10/31/2024: Expected postop changes status post L3-4 TLIF with L3-L5  fixation/fusion.     Plan    Continue regular neurologic checks  MAXIMO drain with 190 cc overnight.  Maintain at this time due to her output.  Pain control : APS following.  Appreciate recommendations.  Tylenol 975 mg every 8 hours scheduled  Cymbalta 60 mg daily  Gabapentin 600 mg 4 times daily  Robaxin 750 mg every 6 hours scheduled  Lidoderm patch, 12 hours on/12 hours off to affected area  Fentanyl transdermal patch 75 mcg/h, 1 patch every 72 hours  Oral Dilaudid 8 mg every 3 hours as needed for moderate/severe pain  IV Dilaudid 1 mg every 4 hours as needed for breakthrough pain  Add Narcan as needed for respiratory depression/opioid reversal  Continue low-dose IV ketamine infusion at 0.1 mg/kg/h for opioid sparing and analgesic support in opioid tolerant patient  As needed Haldol/Ativan/Robinul to mitigate ketamine related side effects    Bowel regimen with MiraLAX and senna-S daily.  Eval and mobilize per PT/OT  No brace required.  DVT ppx: SCDs, subcu heparin  Vital stable.  Labs reviewed.  See below for further details.    Rounding completed with RN-Diane Cunningham.    Neurosurgery will continue to follow as primary.  Patient not medically stable for D/C at this time pending pain control, drain in place and electrolyte resuscitation.  Please contact us with any questions or concerns.     Obstructive sleep apnea on  CPAP    Chronic, continuous use of opioids    Postoperative pain after spinal surgery    Lumbar facet arthropathy    Hyponatremia  10/31/24 Na low at 130. Pt completed NaCl infusion.   Will continue to monitor with repeat BMP in am.   Hypokalemia  Pt with hx of Hypokalemia.   10/31 K low at 2.9. Resumed home Kcl 20 mEQ BID.   Will continue to monitor with repeat labs.     Neurosurgery service will follow.  Please contact the SecureChat role for the Neurosurgery service with any questions/concerns.    Subjective   Patient reports that he is doing well post op. He reports that this is the first time in years that he does not have radicular pain in BLE. He also reports improvement in BLE sensation and reports that he continues to have some residual abnormal sensation in BLE distally. He reports that he is tolerating oral intake.  He reports that he is voiding without issues.  Denies having a bowel movement since surgery yesterday.  Patient reports that he was able to mobilize with physical therapy out in the hallway and up the stairs.  Patient reports that he is motivated to mobilize and improve his mobility.    Objective :  Temp:  [97.6 °F (36.4 °C)-99.8 °F (37.7 °C)] 98.1 °F (36.7 °C)  HR:  [62-80] 69  BP: (115-147)/(51-69) 115/51  Resp:  [16-18] 17  SpO2:  [89 %-95 %] 89 %  O2 Device: None (Room air)    I/O         10/29 0701  10/30 0700 10/30 0701  10/31 0700 10/31 0701  11/01 0700    P.O.   480    I.V. (mL/kg)  2000 (18.5)     IV Piggyback  300     Total Intake(mL/kg)  2300 (21.3) 480 (4.4)    Urine (mL/kg/hr)  2415 (0.9) 1550 (1.7)    Drains  190 110    Total Output  2605 1660    Net  -305 -1180                 Physical Exam   General appearance: alert, appears stated age, cooperative and no distress  Head: Normocephalic, without obvious abnormality, atraumatic  Eyes: EOMI, PERRL  Neck: supple, symmetrical, trachea midline.   Back: no tenderness to percussion or palpation  Lungs: non labored breathing  Heart:  regular heart rate  Back:Incision CDI. MAXIMO drain with serosanguinous drainage.   Neurologic:   Mental status: Alert, oriented, thought content appropriate  Cranial nerves: grossly intact (Cranial nerves II-XII)  Sensory: normal to LT X 4 except decreased in bilateral feet.   Motor: moving all extremities, strength BUE 5/5, RLE HF/KF 4/5, DF 3-4/5, PF 4/5, LLE 4+/5.   Coordination:  no drift bilaterally          Lab Results: I have reviewed the following results:  Recent Labs     10/30/24  1136 10/30/24  1650 10/31/24  0615 10/31/24  0942   WBC  --   --   --  9.78   HGB 12.2  --   --  12.3   HCT 36*  --   --  35.9*   PLT  --    < >  --  151   SODIUM  --   --   --  130*   K  --   --   --  2.9*   CL  --   --   --  90*   CO2 39*  --   --  33*   BUN  --   --   --  21   CREATININE  --   --   --  0.84   GLUC  --   --   --  300*   CAIONIZED 1.06*  --   --   --    PTT  --   --  34  --    INR  --   --  1.26*  --     < > = values in this interval not displayed.

## 2024-10-31 NOTE — OCCUPATIONAL THERAPY NOTE
"    Occupational Therapy Evaluation     Patient Name: Dominick Irving  Today's Date: 10/31/2024  Problem List  Active Problems:    Obstructive sleep apnea on CPAP    Chronic, continuous use of opioids    Postoperative pain after spinal surgery    Past Medical History  Past Medical History:   Diagnosis Date    Anxiety 3/01/2023    Arthritis     Bladder cancer (HCC)     Cancer (HCC) 3/17/2023    Cervical disc disorder 1/2016    Chronic narcotic dependence (HCC)     Chronic pain disorder     lower back and down both legs    Colon polyp     Coronary artery disease     CPAP (continuous positive airway pressure) dependence     bi-pap    Depression 3/17/2023    Does use hearing aid     will wear DOS    Full dentures     Heart failure (HCC)     and \"fluid retention\" [facility]\"    High cholesterol     Hypertension     Low back pain 2003    Mild ankle edema     and feet    Obstructive sleep apnea on CPAP     Prediabetes     Shortness of breath     per pt \"with just exertion\"    Sleep apnea     Wears glasses      Past Surgical History  Past Surgical History:   Procedure Laterality Date    BACK SURGERY      titanium rods implanted    CAUDAL BLOCK N/A 10/17/2023    Procedure: BLOCK / INJECTION CAUDAL;  Surgeon: Minh Rolon MD;  Location: [facility];  Service: Pain Management     COLONOSCOPY      CYSTOSCOPY W/ URETERAL STENT PLACEMENT Bilateral 03/17/2023    Procedure: bilateral retrograde;  Surgeon: Shan Sanabria MD;  [facility];  Service: Urology    HERNIA REPAIR      x5    LUMBAR LAMINECTOMY N/A 06/30/2023    Procedure: Left L3-4 Metrx hemilaminectomy and bilateral foraminotomies;  Surgeon: Leland Aguilar MD;  [facility];  Service: Neurosurgery    RI ARTHRODESIS COMBINED TQ 1NTRSPC LUMBAR Right 10/30/2024    Procedure: Robotically assisted L3-4 decompressive hemilaminectomy and foraminotomies with transverse lumbar interbody fusion right-sided approach with add-on to an L4-5 " fixation fusion;  Surgeon: Leland Aguilar MD;  [facility];  Service: Neurosurgery    MI CYSTO W/REMOVAL OF LESIONS SMALL N/A 05/01/2023    Procedure: CYSTOSCOPY TURBT;  Surgeon: Shan Sanabria MD;  [facility];  Service: Urology    MI CYSTOURETHROSCOPY N/A 11/06/2023    Procedure: CYSTOSCOPY with  bladder biopsies and fulgeration;  Surgeon: Shan Sanabria MD;  [facility];  Service: Urology    SPINE SURGERY  2017    TRANSURETHRAL RESECTION OF BLADDER TUMOR N/A 03/17/2023    Procedure: TRANSURETHRAL RESECTION OF BLADDER TUMOR (TURBT);  Surgeon: Shan Sanabria MD;  [facility];  Service: Urology           10/31/24 1101   OT Last Visit   OT Visit Date 10/31/24   Note Type   Note type Evaluation   Pain Assessment   Pain Assessment Tool 0-10   Pain Score 5   Pain Location/Orientation Location: Back   Hospital Pain Intervention(s) Repositioned;Ambulation/increased activity   Restrictions/Precautions   Weight Bearing Precautions Per Order No   Other Precautions Multiple lines;Pain  (Drain)   Home Living   Type of Home House   Home Layout Multi-level;Bed/bath upstairs  (4 SH)   Bathroom Shower/Tub Walk-in shower   Bathroom Toilet Raised   Bathroom Equipment Built-in shower seat   Home Equipment Cane;Walker;Electric scooter   Additional Comments PTA pt reports staying on 1st fl, however would like to transition to 2nd fl bedroom/bathroom   Prior Function   Level of [place] Independent with functional mobility;Needs assistance with ADLs;Independent with IADLS   Lives With Spouse   Receives Help From Family   IADLs Family/Friend/Other provides transportation;Independent with medication management;Independent with meal prep   Falls in the last 6 months 1 to 4   Vocational Retired   Lifestyle   Autonomy Pt reports being mostly independent with ADLs with occ assistance from his wife. Pt is independent with IADLs and functional mobility.   Reciprocal Relationships Pt lives with his  "wife and his son lives nearby. Pt's wife is able to assist with ADLs and IADLs as needed.   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   Transfers   Sit to Stand 5  Supervision   Additional items Increased time required   Stand to Sit 5  Supervision   Additional items Increased time required   Functional Mobility   Functional Mobility 5  Supervision   Balance   Static Sitting Fair +   Static Standing Fair   Ambulatory Fair -   Activity Tolerance   Activity Tolerance Patient tolerated treatment well   Medical Staff Made Aware PT present for co-evaluation   Nurse Made Aware Nursing cleared pt for therapy   RUE Assessment   RUE Assessment WFL   LUE Assessment   LUE Assessment WFL   Hand Function   Gross Motor Coordination Functional   Fine Motor Coordination Functional   Cognition   Overall Cognitive Status WFL   Arousal/Participation Alert;Cooperative   Attention Within functional limits   Orientation Level Oriented X4   Memory Within functional limits   Following Commands Follows all commands and directions without difficulty   Assessment   Limitation Decreased ADL status;Decreased endurance;Decreased high-level ADLs   Prognosis Good   Assessment Pt is a 64 y.o. male admitted to [facility] on 10/30/24. Pt presented with back pain, lumbar facet arthropathy, and lumbar spinal stenosis. Pt s/p robotically assisted L3-4 " decompressive hemilaminectomy and foraminotomies with transverse lumbar interbody fusion right-sided approach with add-on to an L4-5 fixation fusion with neurosurgery on 10/30. Pt  has a past medical history of Anxiety, Arthritis, Bladder cancer (HCC), Cancer (HCC), Cervical disc disorder, Chronic narcotic dependence (HCC), Chronic pain disorder, Colon polyp, Coronary artery disease, CPAP (continuous positive airway pressure) dependence, Depression, Does use hearing aid, Full dentures, Heart failure (HCC), High cholesterol, Hypertension, Low back pain, Mild ankle edema, Obstructive sleep apnea on CPAP, Prediabetes, Shortness of breath, Sleep apnea, and Wears glasses. Currently, the pt lives with his wife in a 4 SH. At baseline, pt required occ assistance with ADLs and was independent with most IADLs and functional mobility. Pt currently presents with impairments including, difficulty performing ADLS, difficulty performing IADLS, health management, and environment activity tolerance, endurance, standing balance/tolerance, and sitting balance/tolerance. These impairments, as well as pt's fatigue, pain, risk for falls, and home environment limit pt's ability to safely engage in all baseline areas of occupation, including grooming, bathing, dressing, toileting, functional mobility/transfers, community mobility, social participation, and leisure activities, however has family support at home if necessary. From OT standpoint, recommend Level IV resources. OT will continue to follow to address the below stated goals.   Goals   Patient Goals to get better   Short Term Goal #1 1. Pt will complete UB/LB ADLs with mod I after setup using adaptive device and DME PRN. 2. Pt will complete toileting I. 3. Pt will increase activity tolerance to 30 to 35 min for participation in ADLs and leisure activities. 4. Pt will be I with bed mobility. 5. Pt will be I with functional mob/transfers to and from all surfaces with fair+ to good  balance and safety. 6. Will assess DME needs. 7. Assist with safe discharge recommendations. 8. Pt will have G carryover of energy conservation techniques during completion of ADL tasks. 9. Pt will demo G carryover of spinal precautions during completion of functional tasks.   Plan   Treatment Interventions ADL retraining;Endurance training;Patient/family training;Equipment evaluation/education;Compensatory technique education;Energy conservation;Activityengagement   Goal Expiration Date 11/14/24   OT Frequency 2-3x/wk   Discharge Recommendation   Rehab Resource Intensity Level, OT No post-acute rehabilitation needs   AM-PAC Daily Activity Inpatient   Lower Body Dressing 3   Bathing 3   Toileting 3   Upper Body Dressing 3   Grooming 3   Eating 4   Daily Activity Raw Score 19   Daily Activity Standardized Score (Calc for Raw Score >=11) 40.22   AM-PAC Applied Cognition Inpatient   Following a Speech/Presentation 4   Understanding Ordinary Conversation 4   Taking Medications 4   Remembering Where Things Are Placed or Put Away 4   Remembering List of 4-5 Errands 4   Taking Care of Complicated Tasks 4   Applied Cognition Raw Score 24   Applied Cognition Standardized Score 62.21   End of Consult   Education Provided Yes   Patient Position at End of Consult Bedside chair;Bed/Chair alarm activated;All needs within reach   Nurse Communication Nurse aware of consult     JAIME Wing

## 2024-10-31 NOTE — PLAN OF CARE
Problem: Prexisting or High Potential for Compromised Skin Integrity  Goal: Skin integrity is maintained or improved  Description: INTERVENTIONS:  - Identify patients at risk for skin breakdown  - Assess and monitor skin integrity  - Assess and monitor nutrition and hydration status  - Monitor labs   - Assess for incontinence   - Turn and reposition patient  - Assist with mobility/ambulation  - Relieve pressure over bony prominences  - Avoid friction and shearing  - Provide appropriate hygiene as needed including keeping skin clean and dry  - Evaluate need for skin moisturizer/barrier cream  - Collaborate with interdisciplinary team   - Patient/family teaching  - Consider wound care consult   Outcome: Progressing     Problem: PAIN - ADULT  Goal: Verbalizes/displays adequate comfort level or baseline comfort level  Description: Interventions:  - Encourage patient to monitor pain and request assistance  - Assess pain using appropriate pain scale  - Administer analgesics based on type and severity of pain and evaluate response  - Implement non-pharmacological measures as appropriate and evaluate response  - Consider cultural and social influences on pain and pain management  - Notify physician/advanced practitioner if interventions unsuccessful or patient reports new pain  Outcome: Progressing     Problem: INFECTION - ADULT  Goal: Absence or prevention of progression during hospitalization  Description: INTERVENTIONS:  - Assess and monitor for signs and symptoms of infection  - Monitor lab/diagnostic results  - Monitor all insertion sites, i.e. indwelling lines, tubes, and drains  - Monitor endotracheal if appropriate and nasal secretions for changes in amount and color  - [place] appropriate cooling/warming therapies per order  - Administer medications as ordered  - Instruct and encourage patient and family to use good hand hygiene technique  - Identify and instruct in appropriate isolation precautions for  identified infection/condition  Outcome: Progressing  Goal: Absence of fever/infection during neutropenic period  Description: INTERVENTIONS:  - Monitor WBC    Outcome: Progressing

## 2024-10-31 NOTE — PLAN OF CARE
Problem: PAIN - ADULT  Goal: Verbalizes/displays adequate comfort level or baseline comfort level  Description: Interventions:  - Encourage patient to monitor pain and request assistance  - Assess pain using appropriate pain scale  - Administer analgesics based on type and severity of pain and evaluate response  - Implement non-pharmacological measures as appropriate and evaluate response  - Consider cultural and social influences on pain and pain management  - Notify physician/advanced practitioner if interventions unsuccessful or patient reports new pain  Outcome: Progressing     Problem: INFECTION - ADULT  Goal: Absence or prevention of progression during hospitalization  Description: INTERVENTIONS:  - Assess and monitor for signs and symptoms of infection  - Monitor lab/diagnostic results  - Monitor all insertion sites, i.e. indwelling lines, tubes, and drains  - Monitor endotracheal if appropriate and nasal secretions for changes in amount and color  - [place] appropriate cooling/warming therapies per order  - Administer medications as ordered  - Instruct and encourage patient and family to use good hand hygiene technique  - Identify and instruct in appropriate isolation precautions for identified infection/condition  Outcome: Progressing  Goal: Absence of fever/infection during neutropenic period  Description: INTERVENTIONS:  - Monitor WBC    Outcome: Progressing

## 2024-10-31 NOTE — PLAN OF CARE
Problem: Prexisting or High Potential for Compromised Skin Integrity  Goal: Skin integrity is maintained or improved  Description: INTERVENTIONS:  - Identify patients at risk for skin breakdown  - Assess and monitor skin integrity  - Assess and monitor nutrition and hydration status  - Monitor labs   - Assess for incontinence   - Turn and reposition patient  - Assist with mobility/ambulation  - Relieve pressure over bony prominences  - Avoid friction and shearing  - Provide appropriate hygiene as needed including keeping skin clean and dry  - Evaluate need for skin moisturizer/barrier cream  - Collaborate with interdisciplinary team   - Patient/family teaching  - Consider wound care consult   Outcome: Progressing     Problem: INFECTION - ADULT  Goal: Absence or prevention of progression during hospitalization  Description: INTERVENTIONS:  - Assess and monitor for signs and symptoms of infection  - Monitor lab/diagnostic results  - Monitor all insertion sites, i.e. indwelling lines, tubes, and drains  - Monitor endotracheal if appropriate and nasal secretions for changes in amount and color  - [place] appropriate cooling/warming therapies per order  - Administer medications as ordered  - Instruct and encourage patient and family to use good hand hygiene technique  - Identify and instruct in appropriate isolation precautions for identified infection/condition  Outcome: Progressing     Problem: INFECTION - ADULT  Goal: Absence of fever/infection during neutropenic period  Description: INTERVENTIONS:  - Monitor WBC    Outcome: Progressing     Problem: PAIN - ADULT  Goal: Verbalizes/displays adequate comfort level or baseline comfort level  Description: Interventions:  - Encourage patient to monitor pain and request assistance  - Assess pain using appropriate pain scale  - Administer analgesics based on type and severity of pain and evaluate response  - Implement non-pharmacological measures as appropriate and  evaluate response  - Consider cultural and social influences on pain and pain management  - Notify physician/advanced practitioner if interventions unsuccessful or patient reports new pain  Outcome: Progressing

## 2024-11-01 ENCOUNTER — APPOINTMENT (INPATIENT)
Dept: RADIOLOGY | Facility: HOSPITAL | Age: 64
DRG: 402 | End: 2024-11-01
Payer: MEDICARE

## 2024-11-01 LAB
ANION GAP SERPL CALCULATED.3IONS-SCNC: 9 MMOL/L (ref 4–13)
BUN SERPL-MCNC: 22 MG/DL (ref 5–25)
CALCIUM SERPL-MCNC: 8.2 MG/DL (ref 8.4–10.2)
CHLORIDE SERPL-SCNC: 89 MMOL/L (ref 96–108)
CO2 SERPL-SCNC: 35 MMOL/L (ref 21–32)
CREAT SERPL-MCNC: 0.97 MG/DL (ref 0.6–1.3)
GFR SERPL CREATININE-BSD FRML MDRD: 81 ML/MIN/1.73SQ M
GLUCOSE SERPL-MCNC: 170 MG/DL (ref 65–140)
GLUCOSE SERPL-MCNC: 200 MG/DL (ref 65–140)
GLUCOSE SERPL-MCNC: 207 MG/DL (ref 65–140)
GLUCOSE SERPL-MCNC: 223 MG/DL (ref 65–140)
INR PPP: 1 (ref 0.85–1.19)
MRSA NOSE QL CULT: NORMAL
POTASSIUM SERPL-SCNC: 2.8 MMOL/L (ref 3.5–5.3)
PROTHROMBIN TIME: 13.5 SECONDS (ref 12.3–15)
SODIUM SERPL-SCNC: 133 MMOL/L (ref 135–147)

## 2024-11-01 PROCEDURE — 70496 CT ANGIOGRAPHY HEAD: CPT

## 2024-11-01 PROCEDURE — 70498 CT ANGIOGRAPHY NECK: CPT

## 2024-11-01 PROCEDURE — 80048 BASIC METABOLIC PNL TOTAL CA: CPT | Performed by: PHYSICIAN ASSISTANT

## 2024-11-01 PROCEDURE — 82948 REAGENT STRIP/BLOOD GLUCOSE: CPT

## 2024-11-01 PROCEDURE — 99024 POSTOP FOLLOW-UP VISIT: CPT | Performed by: NEUROLOGICAL SURGERY

## 2024-11-01 PROCEDURE — 99233 SBSQ HOSP IP/OBS HIGH 50: CPT

## 2024-11-01 PROCEDURE — 85610 PROTHROMBIN TIME: CPT | Performed by: PHYSICIAN ASSISTANT

## 2024-11-01 RX ORDER — POTASSIUM CHLORIDE 1500 MG/1
40 TABLET, EXTENDED RELEASE ORAL 2 TIMES DAILY
Status: DISCONTINUED | OUTPATIENT
Start: 2024-11-01 | End: 2024-11-04 | Stop reason: HOSPADM

## 2024-11-01 RX ORDER — HYDROMORPHONE HCL/PF 1 MG/ML
1 SYRINGE (ML) INJECTION EVERY 6 HOURS PRN
Status: DISCONTINUED | OUTPATIENT
Start: 2024-11-01 | End: 2024-11-04

## 2024-11-01 RX ADMIN — LIDOCAINE 1 PATCH: 50 PATCH TOPICAL at 08:32

## 2024-11-01 RX ADMIN — INSULIN LISPRO 1 UNITS: 100 INJECTION, SOLUTION INTRAVENOUS; SUBCUTANEOUS at 08:33

## 2024-11-01 RX ADMIN — INSULIN GLARGINE 8 UNITS: 100 INJECTION, SOLUTION SUBCUTANEOUS at 08:32

## 2024-11-01 RX ADMIN — HYDROMORPHONE HYDROCHLORIDE 1 MG: 1 INJECTION, SOLUTION INTRAMUSCULAR; INTRAVENOUS; SUBCUTANEOUS at 11:24

## 2024-11-01 RX ADMIN — DULOXETINE HYDROCHLORIDE 60 MG: 60 CAPSULE, DELAYED RELEASE ORAL at 08:32

## 2024-11-01 RX ADMIN — GABAPENTIN 600 MG: 300 CAPSULE ORAL at 21:40

## 2024-11-01 RX ADMIN — POTASSIUM CHLORIDE 20 MEQ: 1500 TABLET, EXTENDED RELEASE ORAL at 08:32

## 2024-11-01 RX ADMIN — ATORVASTATIN CALCIUM 40 MG: 40 TABLET, FILM COATED ORAL at 08:32

## 2024-11-01 RX ADMIN — HEPARIN SODIUM 5000 UNITS: 5000 INJECTION INTRAVENOUS; SUBCUTANEOUS at 21:53

## 2024-11-01 RX ADMIN — INSULIN LISPRO 2 UNITS: 100 INJECTION, SOLUTION INTRAVENOUS; SUBCUTANEOUS at 21:53

## 2024-11-01 RX ADMIN — IOHEXOL 75 ML: 350 INJECTION, SOLUTION INTRAVENOUS at 12:49

## 2024-11-01 RX ADMIN — METHOCARBAMOL 750 MG: 750 TABLET ORAL at 23:00

## 2024-11-01 RX ADMIN — ACETAMINOPHEN 975 MG: 325 TABLET, FILM COATED ORAL at 21:40

## 2024-11-01 RX ADMIN — TORSEMIDE 40 MG: 20 TABLET ORAL at 17:39

## 2024-11-01 RX ADMIN — HYDROMORPHONE HYDROCHLORIDE 8 MG: 2 TABLET ORAL at 21:40

## 2024-11-01 RX ADMIN — SENNOSIDES AND DOCUSATE SODIUM 1 TABLET: 50; 8.6 TABLET ORAL at 21:41

## 2024-11-01 RX ADMIN — ACETAMINOPHEN 975 MG: 325 TABLET, FILM COATED ORAL at 14:01

## 2024-11-01 RX ADMIN — TORSEMIDE 40 MG: 20 TABLET ORAL at 08:32

## 2024-11-01 RX ADMIN — POTASSIUM CHLORIDE 40 MEQ: 1500 TABLET, EXTENDED RELEASE ORAL at 17:37

## 2024-11-01 RX ADMIN — HEPARIN SODIUM 5000 UNITS: 5000 INJECTION INTRAVENOUS; SUBCUTANEOUS at 14:01

## 2024-11-01 RX ADMIN — GABAPENTIN 600 MG: 300 CAPSULE ORAL at 08:32

## 2024-11-01 RX ADMIN — METHOCARBAMOL 750 MG: 750 TABLET ORAL at 17:37

## 2024-11-01 RX ADMIN — HYDROMORPHONE HYDROCHLORIDE 8 MG: 2 TABLET ORAL at 04:17

## 2024-11-01 RX ADMIN — INSULIN LISPRO 2 UNITS: 100 INJECTION, SOLUTION INTRAVENOUS; SUBCUTANEOUS at 14:01

## 2024-11-01 RX ADMIN — KETAMINE HYDROCHLORIDE 0.05 MG/KG/HR: 100 INJECTION INTRAMUSCULAR; INTRAVENOUS at 21:55

## 2024-11-01 RX ADMIN — ACETAMINOPHEN 975 MG: 325 TABLET, FILM COATED ORAL at 05:53

## 2024-11-01 RX ADMIN — ONDANSETRON 4 MG: 2 INJECTION INTRAMUSCULAR; INTRAVENOUS at 11:24

## 2024-11-01 RX ADMIN — INSULIN LISPRO 2 UNITS: 100 INJECTION, SOLUTION INTRAVENOUS; SUBCUTANEOUS at 17:38

## 2024-11-01 RX ADMIN — GABAPENTIN 600 MG: 300 CAPSULE ORAL at 17:37

## 2024-11-01 RX ADMIN — POLYETHYLENE GLYCOL 3350 17 G: 17 POWDER, FOR SOLUTION ORAL at 08:32

## 2024-11-01 RX ADMIN — METHOCARBAMOL 750 MG: 750 TABLET ORAL at 05:53

## 2024-11-01 RX ADMIN — HEPARIN SODIUM 5000 UNITS: 5000 INJECTION INTRAVENOUS; SUBCUTANEOUS at 05:54

## 2024-11-01 NOTE — PLAN OF CARE
Problem: INFECTION - ADULT  Goal: Absence of fever/infection during neutropenic period  Description: INTERVENTIONS:  - Monitor WBC    Outcome: Progressing     Problem: SAFETY ADULT  Goal: Patient will remain free of falls  Description: INTERVENTIONS:  - Educate patient/family on patient safety including physical limitations  - Instruct patient to call for assistance with activity   - Consult OT/PT to assist with strengthening/mobility   - Keep Call bell within reach  - Keep bed low and locked with side rails adjusted as appropriate  - Keep care items and personal belongings within reach  - Initiate and maintain comfort rounds  - Make Fall Risk Sign visible to staff    - Consider moving patient to room near nurses station  Outcome: Progressing     Problem: Knowledge Deficit  Goal: Patient/family/caregiver demonstrates understanding of disease process, treatment plan, medications, and discharge instructions  Description: Complete learning assessment and assess knowledge base.  Interventions:  - Provide teaching at level of understanding  - Provide teaching via preferred learning methods  Outcome: Progressing     Problem: DISCHARGE PLANNING  Goal: Discharge to home or other facility with appropriate resources  Description: INTERVENTIONS:  - Identify barriers to discharge w/patient and caregiver  - Arrange for needed discharge resources and transportation as appropriate  - Identify discharge learning needs (meds, wound care, etc.)  - Arrange for interpretive services to assist at discharge as needed  - Refer to Case Management Department for coordinating discharge planning if the patient needs post-hospital services based on physician/advanced practitioner order or complex needs related to functional status, cognitive ability, or social support system  Outcome: Progressing

## 2024-11-01 NOTE — PROGRESS NOTES
Progress Note - Acute Pain   Name: Dominick Irving 64 y.o. male I MRN: [id number]  [facility] I Date of Admission: 10/30/2024   Date of Service: 2024 I Hospital Day: 1    Assessment & Plan  Postoperative pain after spinal surgery  Patient is status post robotically assisted L3-4 decompressive hemilaminectomy and foraminotomies with transverse lumbar interbody fusion right-sided approach with add-on to an L4-5 fixation fusion with neurosurgery 10/29/2024.  Patient received 20 mg IV methadone intraoperatively.  Patient is managed on high-dose chronic opioids in the outpatient setting by his PCP.  APS consulted for assistance in postoperative pain management in setting of chronic opioid use and intraoperative IV methadone.    Multimodal analgesia:  Tylenol 975 mg every 8 hours scheduled  Cymbalta 60 mg daily  Gabapentin 600 mg 4 times daily  Robaxin 750 mg every 6 hours scheduled  Lidoderm patch, 12 hours on/12 hours off to affected area  Fentanyl transdermal patch 75 mcg/h, 1 patch every 72 hours  Oral Dilaudid 8 mg every 3 hours as needed for moderate/severe pain  IV Dilaudid 1 mg decreased to every 6 hours as needed for breakthrough pain  Add Narcan as needed for respiratory depression/opioid reversal    Decrease IV ketamine infusion to 0.05 mg/kg/h for opioid sparing and analgesic support in opioid tolerant patient  Set to   at 9 AM  As needed Haldol/Ativan/Robinul to mitigate ketamine related side effects    If patient should discharge over the weekend would recommend the following regimen:  Tylenol 975 mg every 8 hours scheduled  Cymbalta 60 mg daily, home medication  Gabapentin 600 mg 4 times daily, home medication  Robaxin 750 mg every 6 hours scheduled  Lidoderm patch, 12 hours on/12 hours off to affected area  Fentanyl transdermal patch 25 mcg/h, 1 patch every 72 hours, home medication  Oral Dilaudid 8 mg every 3 hours as needed for moderate/severe pain, home medication  Patient  should not require any additional opioid prescriptions at time of discharge  Patient encouraged to follow-up with his primary care physician who manages his chronic pain medication promptly following discharge  Obstructive sleep apnea on CPAP  Patient's with sleep apnea are at higher risk of respiratory depression secondary to opioid use.  Attempt to minimize use of opioid pain medication while maintaining adequate analgesia.  Chronic, continuous use of opioids  Patient maintained on chronic opioids in setting of chronic low back pain.  Currently managed by PCP: Robert Budinetz, MD   Patient does currently follow with palliative care I did prescribe him methadone for a short period of time although this was discontinued over apparent side effects.  Palliative care has since deferred opioid management back to PCP.    Current outpatient analgesic regimen:  Cymbalta 60 mg daily  Gabapentin 600 mg 4 times daily  Fentanyl 75 mcg/hour patch-1 patch transdermal every 72 hours  Oral Dilaudid 8 mg every 3 hours as needed for moderate to severe pain    PDMP review:      The treatment plan and recommendations were discussed with the primary care service.  Plan to begin weaning of IV ketamine infusion with discontinuation tomorrow morning.  Please reach out to acute pain service over the weekend with any questions or concerns from an acute pain standpoint.    APS will continue to follow. Please contact Acute Pain Service - via [employer] from [phone] with additional questions or concerns. See [email or web address] or [employer] for additional contacts and after hours information.     Subjective   Dominick Irving is a 64 y.o. male who presents with history of continuous opioid dependence in setting of chronic back pain and prior spinal surgery. Patient is status post robotically assisted L3-4 decompressive hemilaminectomy and foraminotomies with transverse lumbar interbody fusion right-sided approach with add-on to an L4-5 fixation fusion  with neurosurgery 10/29/2024 and received 20 mg of IV methadone intraoperatively. APS was consulted for assistance in postoperative pain management.     Pain History  Current pain location(s):  Pain Score: 6  Pain Location/Orientation: Location: Back  Pain Scale: Pain Assessment Tool: 0-10  24 hour history: No acute events overnight, he remains hemodynamically stable.  Denies any opioid-induced side effects or psychotomimetic effects secondary to ketamine.  Patient reports adequate pain control and has been up and ambulating the hallways with assistance.  MAXIMO drain remains in place.  Patient is hopeful to discharge over the weekend.    Opioid requirement previous 24 hours:   8 mg oral Dilaudid x 4 doses    Meds/Allergies   all current active meds have been reviewed, current meds:   Current Facility-Administered Medications:     acetaminophen (TYLENOL) tablet 975 mg, Q8H ISIS    atorvastatin (LIPITOR) tablet 40 mg, Daily    calcium carbonate (TUMS) chewable tablet 1,000 mg, Daily PRN    DULoxetine (CYMBALTA) delayed release capsule 60 mg, Daily    fentaNYL (DURAGESIC) 75 mcg/hr TD 72 hr patch 1 patch, Q72H    gabapentin (NEURONTIN) capsule 600 mg, 4x Daily    glycopyrrolate (ROBINUL) injection 0.2 mg, Q4H PRN    haloperidol lactate (HALDOL) injection 2 mg, Q30 Min PRN    heparin (porcine) subcutaneous injection 5,000 Units, Q8H ISIS **AND** [COMPLETED] Platelet count, Once    HYDROmorphone (DILAUDID) injection 1 mg, Q6H PRN    HYDROmorphone (DILAUDID) tablet 8 mg, Q3H PRN    insulin glargine (LANTUS) subcutaneous injection 8 Units 0.08 mL, QAM    insulin lispro (HumALOG/ADMELOG) 100 units/mL subcutaneous injection 1-5 Units, HS    insulin lispro (HumALOG/ADMELOG) 100 units/mL subcutaneous injection 1-6 Units, TID AC **AND** Fingerstick Glucose (POCT), TID AC    ketamine 250 mg (STANDARD CONCENTRATION) IV in sodium chloride 0.9% 250 mL, Continuous, Last Rate: 0.05 mg/kg/hr (11/01/24 1000)    lidocaine (LIDODERM) 5 %  patch 1 patch, Daily    LORazepam (ATIVAN) injection 1 mg, Q4H PRN    methocarbamol (ROBAXIN) tablet 750 mg, Q6H ISIS    metolazone (ZAROXOLYN) tablet 2.5 mg, Daily PRN    naloxone (NARCAN) 0.04 mg/mL syringe 0.04 mg, Q1MIN PRN    ondansetron (ZOFRAN) injection 4 mg, Q6H PRN    polyethylene glycol (MIRALAX) packet 17 g, Daily    potassium chloride (Klor-Con M20) CR tablet 20 mEq, BID    senna-docusate sodium (SENOKOT S) 8.6-50 mg per tablet 1 tablet, HS    torsemide (DEMADEX) tablet 40 mg, BID, and PTA meds:   Prior to Admission Medications   Prescriptions Last Dose Informant Patient Reported? Taking?   DULoxetine (CYMBALTA) 60 mg delayed release capsule 10/23/2024  No No   Sig: Take 1 capsule (60 mg total) by mouth daily   Farxiga 5 MG TABS 10/21/2024 Self Yes No   Si mg daily 5mg alternating with 2.5mg for fluid retention   HYDROmorphone (DILAUDID) 8 MG tablet 10/30/2024 at 0300  No Yes   Sig: Take 1 tablet (8 mg total) by mouth every 3 (three) hours as needed for moderate pain or severe pain Max Daily Amount: 64 mg   Insulin Pen Needle 32G X 4 MM MISC   No No   Sig: Use daily Dx E11.9   Lancet Devices (Adjustable Lancing Device) MISC   No No   Sig: Use as directed to test glucose once daily. One touch device or may change to what insurance covers.   Lancets (onetouch ultrasoft) lancets   No No   Sig: Use as instructed. Dx E11.9   aspirin 81 mg chewable tablet 10/20/2024 Self Yes No   Sig: Chew 81 mg daily   atorvastatin (LIPITOR) 40 mg tablet 10/25/2024  No No   Sig: Take 1 tablet (40 mg total) by mouth daily   co-enzyme Q-10 30 MG capsule 10/23/2024 Self Yes No   Sig: Take 100 mg by mouth daily    fentaNYL (DURAGESIC) 75 mcg/hr 10/27/2024  No No   Sig: Place 1 patch on the skin over 72 hours every third day Max Daily Amount: 1 patch   gabapentin (NEURONTIN) 600 MG tablet 10/25/2024  No Yes   Sig: Take 1 tablet (600 mg total) by mouth 4 (four) times a day   insulin degludec (Tresiba FlexTouch) 100 units/mL  injection pen 10/25/2024  No No   Sig: Inject 8 Units under the skin daily   metFORMIN (GLUCOPHAGE-XR) 500 mg 24 hr tablet 10/23/2024  No No   Sig: Take 1 pill qam and 2 pills qpm   metolazone (ZAROXOLYN) 2.5 mg tablet 10/25/2024  No No   Sig: Take 1 tablet (2.5 mg total) by mouth daily as needed (edema)   multivitamin-iron-minerals-folic acid (CENTRUM) chewable tablet 10/23/2024 Self Yes No   Sig: Chew 1 tablet daily   naloxone (NARCAN) 4 mg/0.1 mL nasal spray   No No   Sig: Administer 1 spray into a nostril. If no response after 2-3 minutes, give another dose in the other nostril using a new spray.   potassium chloride (Klor-Con) 10 mEq tablet 10/23/2024  Yes No   potassium chloride (Klor-Con) 10 mEq tablet   No No   Sig: TAKE 2 TABLETS BY MOUTH TWICE DAILY   potassium chloride (Klor-Con) 10 mEq tablet   No No   Sig: TAKE 2 TABLETS BY MOUTH TWICE DAILY   semaglutide, 0.25 or 0.5 mg/dose, (Ozempic, 0.25 or 0.5 MG/DOSE,) 2 mg/3 mL injection pen 10/17/2024  No No   Sig: Inject 0.75 mL (0.5 mg total) under the skin every 7 days   torsemide (DEMADEX) 20 mg tablet 10/29/2024  No Yes   Sig: Take 2 tablets (40 mg total) by mouth 2 (two) times a day      Facility-Administered Medications: None     Allergies   Allergen Reactions    Mirtazapine Other (See Comments) and Confusion     Pt drove without knowing and woke up at a new destination    Venlafaxine Dizziness     Objective :  Temp:  [98.1 °F (36.7 °C)-98.3 °F (36.8 °C)] 98.2 °F (36.8 °C)  HR:  [52-73] 52  BP: (115-142)/(51-67) 125/67  Resp:  [17-20] 20  SpO2:  [88 %-96 %] 95 %  FiO2 (%):  [4] 4    Physical Exam  Vitals reviewed.   Constitutional:       General: He is not in acute distress.  HENT:      Head: Normocephalic and atraumatic.   Cardiovascular:      Rate and Rhythm: Normal rate.   Pulmonary:      Effort: Pulmonary effort is normal.   Chest:      Chest wall: No tenderness.   Abdominal:      General: There is no distension.      Palpations: Abdomen is soft.       Tenderness: There is no abdominal tenderness.   Musculoskeletal:         General: Tenderness (Low back) present.      Cervical back: Normal range of motion.   Skin:     General: Skin is warm and dry.   Neurological:      Mental Status: He is alert and oriented to person, place, and time. Mental status is at baseline.      Sensory: No sensory deficit.      Motor: No weakness.            Lab Results: I have reviewed the following results:  Estimated Creatinine Clearance: 89 mL/min (by C-G formula based on SCr of 0.97 mg/dL).  Lab Results   Component Value Date    WBC 9.78 10/31/2024    HGB 12.3 10/31/2024    HCT 35.9 (L) 10/31/2024     10/31/2024         Component Value Date/Time    K 2.8 (L) 11/01/2024 0544    CL 89 (L) 11/01/2024 0544    CO2 35 (H) 11/01/2024 0544    CO2 39 (H) 10/30/2024 1136    BUN 22 11/01/2024 0544    CREATININE 0.97 11/01/2024 0544         Component Value Date/Time    CALCIUM 8.2 (L) 11/01/2024 0544    ALKPHOS 98 10/01/2024 1006    AST 14 10/01/2024 1006    ALT 19 10/01/2024 1006    TP 8.0 10/01/2024 1006    ALB 4.2 10/01/2024 1006       Imaging Results Review: No pertinent imaging studies reviewed.  Other Study Results Review: No additional pertinent studies reviewed.

## 2024-11-01 NOTE — PROGRESS NOTES
Progress Note - Neurosurgery   Name: Dominick Irving 64 y.o. male I MRN: [id number]  [facility] Date of Admission: 10/30/2024   Date of Service: 11/1/2024 I Hospital Day: 1  Assessment & Plan  Lumbar stenosis  2 Days Post-Op Procedure(s):  Robotically assisted L3-4 decompressive hemilaminectomy and foraminotomies with transverse lumbar interbody fusion right-sided approach with add-on to an L4-5 fixation fusion  Prior L4-5 fusion with adjacent level disease which he was symptomatic from refractory from conservative measures.    Imaging reviewed personally and by attending. Final results as below  Xray lumbar spine 10/31/2024: Expected postop changes status post L3-4 TLIF with L3-L5  fixation/fusion.     Plan:  Continue regular neurologic checks  MAXIMO drain with 310ml/24hrs   Pain control : APS following.  Appreciate recommendations.  Tylenol 975 mg every 8 hours scheduled  Cymbalta 60 mg daily  Gabapentin 600 mg 4 times daily  Robaxin 750 mg every 6 hours scheduled  Lidoderm patch, 12 hours on/12 hours off to affected area  Fentanyl transdermal patch 75 mcg/h, 1 patch every 72 hours  Oral Dilaudid 8 mg every 3 hours as needed for moderate/severe pain  IV Dilaudid 1 mg every 4 hours as needed for breakthrough pain  Narcan as needed for respiratory depression/opioid reversal  Continue low-dose IV ketamine infusion at 0.1 mg/kg/h for opioid sparing and analgesic support in opioid tolerant patient  As needed Haldol/Ativan/Robinul to mitigate ketamine related side effects  Plan to start weaning ketamine infusion today  Patient tolerating his diet and voiding without difficulty  Bowel regimen with MiraLAX and senna-S daily.  Eval and mobilize per PT/OT, recommending home with outpatient PT  No brace required.  DVT ppx: SCDs, subcu heparin    Rounding completed with GUERA Hyatt    Neurosurgery will continue to follow as primary.  Patient not medically stable for D/C at this time pending pain control, drain in  place and electrolyte resuscitation.  Please contact us with any questions or concerns.     Obstructive sleep apnea on CPAP    Chronic, continuous use of opioids    Postoperative pain after spinal surgery    Lumbar facet arthropathy    Hyponatremia  Na low at 133.   Will continue to monitor with repeat BMP in am.   Hypokalemia  Pt with hx of Hypokalemia.   K 2.8 today. Resumed home Kcl 20 mEQ BID yesterday  Will increase home dose to 40 twice daily  Will continue to monitor with repeat labs.         Subjective patient states he is doing well and able to ambulate in the hallway.  He is tolerating his diet and voiding without difficulty.  He has not had a bowel movement but is passing gas.  He does complain of a headache which he rates 6-7/10 as well as 4-5/10 low back pain which is aching and does not radiate.  He states his current pain regimen does help with his pain.  He also reports ongoing numbness in his feet which is not new.  He offers no other complaints.      Objective : Patient comfortably sitting out in recliner, NAD.    Temp:  [98.1 °F (36.7 °C)-98.3 °F (36.8 °C)] 98.2 °F (36.8 °C)  HR:  [52-73] 52  BP: (115-142)/(51-67) 125/67  Resp:  [17-20] 20  SpO2:  [88 %-96 %] 95 %  FiO2 (%):  [4] 4    I/O         10/30 0701  10/31 0700 10/31 0701  11/01 0700 11/01 0701  11/02 0700    P.O.  1280 480    I.V. (mL/kg) 2000 (18.5) 82 (0.8)     IV Piggyback 300      Total Intake(mL/kg) 2300 (21.3) 1362 (12.6) 480 (4.4)    Urine (mL/kg/hr) 2415 (0.9) 2600 (1) 1000 (2.6)    Drains 190 310 70    Total Output 2605 2910 1070    Net -305 -1548 -590                   General appearance: alert, appears stated age, cooperative and no distress  Head: Normocephalic, without obvious abnormality, atraumatic  Eyes: EOMI, PERRL, conjugate gaze  Neck: supple, symmetrical, trachea midline   Back: Dressing intact with some old serosanguineous drainage, MAXIMO drain remains in place to full suction with serosanguineous drainage, maintain at  this time  Lungs: non labored breathing  Heart: regular heart rate  Neurologic:   Mental status: Alert, oriented x3, thought content appropriate, speech is clear, following commands  Cranial nerves: grossly intact (Cranial nerves II-XII)  Sensory: normal to light touch all extremities x 4  Motor: moving all extremities without focal weakness, strength 5/5 in upper extremities. LLE 4+/5. RLE 4/5 except DF 3-4/5  Reflexes: 2+ and symmetric, no Jeff's or clonus appreciated    Lab Results: I have reviewed the following results:  Recent Labs     10/30/24  1136 10/30/24  1650 10/31/24  0615 10/31/24  0942 11/01/24  0544   WBC  --   --   --  9.78  --    HGB 12.2  --   --  12.3  --    HCT 36*  --   --  35.9*  --    PLT  --    < >  --  151  --    SODIUM  --    < >  --  130* 133*   K  --    < >  --  2.9* 2.8*   CL  --    < >  --  90* 89*   CO2 39*   < >  --  33* 35*   BUN  --    < >  --  21 22   CREATININE  --    < >  --  0.84 0.97   GLUC  --    < >  --  300* 170*   CAIONIZED 1.06*  --   --   --   --    PTT  --   --  34  --   --    INR  --    < > 1.26*  --  1.00    < > = values in this interval not displayed.           VTE Pharmacologic Prophylaxis: Sequential compression device (Venodyne)  and Heparin

## 2024-11-01 NOTE — PROGRESS NOTES
Father Neftali gave a blessing with prayer and offered anointing which was declined by the patient.    11/01/24 1500   Clinical Encounter Type   Visited With Patient   Zoroastrianism Encounters   Zoroastrianism Needs Prayer   Sacramental Encounters   Sacrament of Sick-Anointing Patient declined anointing

## 2024-11-01 NOTE — ASSESSMENT & PLAN NOTE
2 Days Post-Op Procedure(s):  Robotically assisted L3-4 decompressive hemilaminectomy and foraminotomies with transverse lumbar interbody fusion right-sided approach with add-on to an L4-5 fixation fusion  Prior L4-5 fusion with adjacent level disease which he was symptomatic from refractory from conservative measures.    Imaging reviewed personally and by attending. Final results as below  Xray lumbar spine 10/31/2024: Expected postop changes status post L3-4 TLIF with L3-L5  fixation/fusion.     Plan:  Continue regular neurologic checks  MAXIMO drain with 310ml/24hrs   Pain control : APS following.  Appreciate recommendations.  Tylenol 975 mg every 8 hours scheduled  Cymbalta 60 mg daily  Gabapentin 600 mg 4 times daily  Robaxin 750 mg every 6 hours scheduled  Lidoderm patch, 12 hours on/12 hours off to affected area  Fentanyl transdermal patch 75 mcg/h, 1 patch every 72 hours  Oral Dilaudid 8 mg every 3 hours as needed for moderate/severe pain  IV Dilaudid 1 mg every 4 hours as needed for breakthrough pain  Narcan as needed for respiratory depression/opioid reversal  Continue low-dose IV ketamine infusion at 0.1 mg/kg/h for opioid sparing and analgesic support in opioid tolerant patient  As needed Haldol/Ativan/Robinul to mitigate ketamine related side effects  Plan to start weaning ketamine infusion today  Patient tolerating his diet and voiding without difficulty  Bowel regimen with MiraLAX and senna-S daily.  Eval and mobilize per PT/OT, recommending home with outpatient PT  No brace required.  DVT ppx: SCDs, subcu heparin    Rounding completed with GUERA Hyatt    Neurosurgery will continue to follow as primary.  Patient not medically stable for D/C at this time pending pain control, drain in place and electrolyte resuscitation.  Please contact us with any questions or concerns.

## 2024-11-01 NOTE — RESTORATIVE TECHNICIAN NOTE
Restorative Technician Note      Patient Name: Dominick Irving     [facility] Tech Visit Date: 11/01/24  Note Type: Mobility  Patient Position Upon Consult: Bedside chair  Activity Performed: Ambulated  Assistive Device: Roller walker (Ax1)  Patient Position at End of Consult: Bedside chair; All needs within reach    Lucero Maldonado BS

## 2024-11-01 NOTE — TREATMENT PLAN
Reviewed CTA head and neck, no acute findings.  Tried to reassess patient but he is comfortably resting in bed.  Neurosurgery will continue to follow, call with any further question or concerns.

## 2024-11-01 NOTE — PROGRESS NOTES
Problem: Stroke: Ischemic (Transient/Permanent)  Goal: Neurological status is maintained/restored to status at baseline  Description: Monitor neurological and mental status including symptoms of increasing intracranial pressure (headache, nausea/vomiting, or change in behavior). Hypertension (greater than 180 systolic) may also indicate a change in status related to stroke.  Outcome: Monitoring/Evaluating progress  Goal: Normal temperature is maintained  Outcome: Monitoring/Evaluating progress  Goal: Elimination status is maintained/returned to baseline  Description: Remove indwelling urinary catheter as soon as possible or collaborate with provider for order/reason for continued use.   Outcome: Monitoring/Evaluating progress  Goal: #Depressive s/s (self-reported/observed) are recognized and monitored  Outcome: Monitoring/Evaluating progress  Goal: Personal stroke risk factors are identified with initial plan for risk reduction  Description: Stroke risk reduction involves taking medication, changing diet, increasing physical exercise, smoking cessation, or alcohol/drug use reduction/cessation based on identified risks.  Outcome: Monitoring/Evaluating progress  Goal: Verbalizes understanding of condition and treatment plan  Description: Document on Patient Education Activity  Outcome: Monitoring/Evaluating progress     Problem: At Risk for Falls  Goal: Patient does not fall  Outcome: Monitoring/Evaluating progress  Goal: Patient takes action to control fall-related risks  Outcome: Monitoring/Evaluating progress     Problem: At Risk for Injury Due to Fall  Goal: Patient does not fall  Outcome: Monitoring/Evaluating progress  Goal: Takes action to control condition specific risks  Outcome: Monitoring/Evaluating progress  Goal: Verbalizes understanding of fall-related injury personal risks  Description: Document education using the patient education activity  Outcome: Monitoring/Evaluating progress     Problem:  PT Evaluation     Today's date: 2023  Patient name: Dominick Irving  : 1960  MRN: [id number]  Referring provider: Lake Connors PA-C  Dx:   Encounter Diagnosis     ICD-10-CM    1. Gait abnormality  R26.9       2. Chronic bilateral low back pain with bilateral sciatica  M54.42 Ambulatory Referral to [facility]    M54.41     G89.29       3. Paresthesia of bilateral legs  R20.2 Ambulatory Referral to [facility]      4. Physical deconditioning  R53.81 Ambulatory Referral to [facility]          Start Time: 810  Stop Time: 855  Total time in clinic (min): 45 minutes    Assessment  Assessment details: Pt is a 62 y/o male presenting to OP PT w/ c/o severe LBP and radicular symptoms into BLE. Upon evaluation, pt was extremely restricted with all lumbar motions, experienced increased radicular symptoms with B hip AROM and strength testing, decreased strength globally in BLE, TTP along erector spinae, paraspinals, and PSIS bilaterally, and postive special tests including Slump, Passive SLR, Cross SLR, and Active SLR. Due to his impairments, he is unable to sleep for more than 1 hour at a time, needs assistance to complete all ADLs, is unable to walk without an AD, and has had several falls at home. Pt is a good candidate for skilled PT in order to improve BLE and core strength, increase functional mobility, reduce pain with function, and decrease fall risk.   Impairments: abnormal gait, abnormal or restricted ROM, abnormal movement, activity intolerance, impaired physical strength, lacks appropriate home exercise program, pain with function, poor posture  and poor body mechanics    Symptom irritability: highUnderstanding of Dx/Px/POC: good   Prognosis: good    Goals  STG to be met within 4 weeks:  1. Pt will increase BLE strength by 1/2 muscle grade to improve functional mobility and reduce pain.   2. Pt will be independent with HEP to decrease pain and improve quality  Neurovascular Surgery/Procedure  Goal: Elimination status is maintained/returned to baseline  Description: Remove indwelling urinary catheter as soon as possible or collaborate with provider for order/reason for continued use.   Outcome: Monitoring/Evaluating progress  Goal: Vital signs are maintained within parameters  Description: Monitor for changes in vital signs including hypertension or symptoms associated with vagal nerve stimulation (e.g. hypotension or bradycardia).  Outcome: Monitoring/Evaluating progress  Goal: Neurological status is maintained/retuned to baseline  Description: Monitor for changes in neurological status including deficits in movement/strength/tone or sensation in the head/face/neck (e.g. facial asymmetry/droop and tongue deviation to weak side), speech or swallow deficits including loss of gag reflex, change in vocal quality or inability to handle oral  secretions (even if NPO status)  Outcome: Monitoring/Evaluating progress  Goal: Oral intake is resumed and tolerated  Outcome: Monitoring/Evaluating progress  Goal: Activity level is resumed to level needed for d/c  Outcome: Monitoring/Evaluating progress  Goal: Personal stroke risk factors are identified with initial plan for risk reduction  Description: Stroke risk reduction involves taking medication, changing diet, increasing physical exercise, smoking cessation, or alcohol/drug use reduction/cessation based on identified risks.  Outcome: Monitoring/Evaluating progress  Goal: Verbalizes/demonstrates understanding of treatment plan, hospital care, stroke risk reduction and d/c instructions  Description: Documented education using the patient education activity  Outcome: Monitoring/Evaluating progress      of life.   3. Pt will report 3/10 pain at worst to improve functional mobility and quality of life.   4. Pt will increase FOTO score by 10 points in order to improve lumbar spine mobility and reduce pain with function.     LTG to be met within 8 weeks:  1. Pt will improve BLE strength to WNL to improve functional mobility and quality of life.   2. Pt will restore lumbar spine AROM to WNL to improve functional mobility and quality of life.   3. Pt will meet FOTO score goal at DC to improve lumbar spine mobility in pain free range.        Plan  Patient would benefit from: PT eval and skilled physical therapy  Planned modality interventions: cryotherapy and thermotherapy: hydrocollator packs  Planned therapy interventions: flexibility, functional ROM exercises, graded activity, graded exercise, home exercise program, joint mobilization, manual therapy, neuromuscular re-education, nerve gliding, patient education, self care, strengthening, stretching, therapeutic activities and therapeutic exercise  Frequency: 2x week  Duration in weeks: 8  Treatment plan discussed with: patient        Subjective Evaluation    History of Present Illness  Mechanism of injury: Pt reports he began having LBP in 2016, resulting in L4-5 spinal fusion that was unsuccessful in reducing pain. He was then diagnosed with bladder cancer earlier this year and began immunotherapy to treat it. After completing first treatment, he states he had severe pain in his low back, requiring emergency surgery on June 30th. Afterwards, he states he felt better than he has in years and decided to resume immunotherapy. Within 3 weeks, he began having severe pain that has resulted in a steady decline in function and multiple falls.   Quality of life: fair    Patient Goals  Patient goals for therapy: decreased pain, improved balance, increased motion, increased strength, independence with ADLs/IADLs and return to sport/leisure activities  Patient goal: walk without  walker  Pain  Current pain ratin  At best pain ratin  At worst pain ratin  Quality: radiating  Relieving factors: rest and medications  Aggravating factors: sitting, stair climbing, lifting and standing (twisting, bending down)  Progression: worsening    Social Support  Stairs in house: yes   Lives in: multiple-level home  Lives with: spouse    Treatments  Previous treatment: medication  Current treatment comments: accupuncture.         Objective     Postural Observations  Seated posture: fair  Standing posture: fair      Palpation   Left   Tenderness of the erector spinae and lumbar paraspinals.     Right   Tenderness of the erector spinae and lumbar paraspinals.     Tenderness     Lumbar Spine  Tenderness in the spinous process.     Left Hip   Tenderness in the PSIS.     Right Hip   Tenderness in the PSIS.     Neurological Testing     Sensation     Lumbar   Left   Diminished: light touch    Right   Intact: light touch    Reflexes   Left   Patellar (L4): trace (1+)  Achilles (S1): trace (1+)    Right   Patellar (L4): trace (1+)  Achilles (S1): trace (1+)    Active Range of Motion     Lumbar   Flexion:  with pain Restriction level: maximal  Extension:  with pain Restriction level: maximal  Left lateral flexion:  with pain Restriction level: maximal  Right lateral flexion:  with pain Restriction level: maximal  Left rotation:  with pain Restriction level: maximal  Right rotation:  with pain Restriction level: maximal    Strength/Myotome Testing     Left Hip   Planes of Motion   Flexion: 3-  Extension: 3-  Abduction: 3-  Adduction: 3-  External rotation: 3  Internal rotation: 3    Right Hip   Planes of Motion   Flexion: 3-  Extension: 3-  Abduction: 3-  Adduction: 3-  External rotation: 3  Internal rotation: 3    Left Knee   Flexion: 3+  Extension: 3+    Right Knee   Flexion: 3+  Extension: 3+    Left Ankle/Foot   Dorsiflexion: 3+  Plantar flexion: 3+    Right Ankle/Foot   Dorsiflexion: 3+  Plantar  flexion: 3+    Additional Strength Details  Pain with all muscle testing, limiting full AROM specifically in b hips    Tests     Lumbar     Left   Positive crossed SLR, passive SLR and slump test.     Right   Positive crossed SLR, passive SLR and slump test.     Left Pelvic Girdle/Sacrum   Positive: active SLR test.     Right Pelvic Girdle/Sacrum   Positive: active SLR test.       Flowsheet Rows      Flowsheet Row Most Recent Value   PT/OT G-Codes    Current Score 33   Projected Score 46               Precautions: PMH HTN, CAD, Bladder Cancer, CHF, RODO, Lumbar fusion L4-5 in 2016, L3-4 hemilaminectomy June 2023      Manuals 12/14       B HS, piriformis, hip abd, gastroc stretch w/ traction         Nerve glides                         Neuro Re-Ed        Seated trunk rotation on TB        Seated PNF on TB        Bridge w/ hip ABD        PPT        PPU w/ exhale                        Ther Ex        SRC for ROM/ cardiovascular endurance         Lumbar rotation c TB        SL clamshells                                        HEP instruction/ education        Ther Activity                        Gait Training                        Modalities        MHP PRN

## 2024-11-01 NOTE — ASSESSMENT & PLAN NOTE
Patient is status post robotically assisted L3-4 decompressive hemilaminectomy and foraminotomies with transverse lumbar interbody fusion right-sided approach with add-on to an L4-5 fixation fusion with neurosurgery 10/29/2024.  Patient received 20 mg IV methadone intraoperatively.  Patient is managed on high-dose chronic opioids in the outpatient setting by his PCP.  APS consulted for assistance in postoperative pain management in setting of chronic opioid use and intraoperative IV methadone.    Multimodal analgesia:  Tylenol 975 mg every 8 hours scheduled  Cymbalta 60 mg daily  Gabapentin 600 mg 4 times daily  Robaxin 750 mg every 6 hours scheduled  Lidoderm patch, 12 hours on/12 hours off to affected area  Fentanyl transdermal patch 75 mcg/h, 1 patch every 72 hours  Oral Dilaudid 8 mg every 3 hours as needed for moderate/severe pain  IV Dilaudid 1 mg decreased to every 6 hours as needed for breakthrough pain  Add Narcan as needed for respiratory depression/opioid reversal    Decrease IV ketamine infusion to 0.05 mg/kg/h for opioid sparing and analgesic support in opioid tolerant patient  Set to   at 9 AM  As needed Haldol/Ativan/Robinul to mitigate ketamine related side effects    If patient should discharge over the weekend would recommend the following regimen:  Tylenol 975 mg every 8 hours scheduled  Cymbalta 60 mg daily, home medication  Gabapentin 600 mg 4 times daily, home medication  Robaxin 750 mg every 6 hours scheduled  Lidoderm patch, 12 hours on/12 hours off to affected area  Fentanyl transdermal patch 25 mcg/h, 1 patch every 72 hours, home medication  Oral Dilaudid 8 mg every 3 hours as needed for moderate/severe pain, home medication  Patient should not require any additional opioid prescriptions at time of discharge  Patient encouraged to follow-up with his primary care physician who manages his chronic pain medication promptly following discharge

## 2024-11-01 NOTE — ASSESSMENT & PLAN NOTE
Pt with hx of Hypokalemia.   K 2.8 today. Resumed home Kcl 20 mEQ BID yesterday  Will increase home dose to 40 twice daily  Will continue to monitor with repeat labs.

## 2024-11-02 PROBLEM — J96.00 ACUTE RESPIRATORY FAILURE (HCC): Status: ACTIVE | Noted: 2023-07-04

## 2024-11-02 PROBLEM — R51.9 HEADACHE: Status: ACTIVE | Noted: 2024-11-02

## 2024-11-02 LAB
ANION GAP SERPL CALCULATED.3IONS-SCNC: 10 MMOL/L (ref 4–13)
ANION GAP SERPL CALCULATED.3IONS-SCNC: 8 MMOL/L (ref 4–13)
BUN SERPL-MCNC: 19 MG/DL (ref 5–25)
BUN SERPL-MCNC: 19 MG/DL (ref 5–25)
CALCIUM SERPL-MCNC: 8.4 MG/DL (ref 8.4–10.2)
CALCIUM SERPL-MCNC: 8.7 MG/DL (ref 8.4–10.2)
CHLORIDE SERPL-SCNC: 90 MMOL/L (ref 96–108)
CHLORIDE SERPL-SCNC: 90 MMOL/L (ref 96–108)
CO2 SERPL-SCNC: 37 MMOL/L (ref 21–32)
CO2 SERPL-SCNC: 37 MMOL/L (ref 21–32)
CREAT SERPL-MCNC: 0.83 MG/DL (ref 0.6–1.3)
CREAT SERPL-MCNC: 0.86 MG/DL (ref 0.6–1.3)
ERYTHROCYTE [DISTWIDTH] IN BLOOD BY AUTOMATED COUNT: 14.4 % (ref 11.6–15.1)
GFR SERPL CREATININE-BSD FRML MDRD: 90 ML/MIN/1.73SQ M
GFR SERPL CREATININE-BSD FRML MDRD: 92 ML/MIN/1.73SQ M
GLUCOSE SERPL-MCNC: 157 MG/DL (ref 65–140)
GLUCOSE SERPL-MCNC: 164 MG/DL (ref 65–140)
GLUCOSE SERPL-MCNC: 214 MG/DL (ref 65–140)
GLUCOSE SERPL-MCNC: 245 MG/DL (ref 65–140)
GLUCOSE SERPL-MCNC: 250 MG/DL (ref 65–140)
GLUCOSE SERPL-MCNC: 261 MG/DL (ref 65–140)
HCT VFR BLD AUTO: 37.2 % (ref 36.5–49.3)
HGB BLD-MCNC: 12.5 G/DL (ref 12–17)
MAGNESIUM SERPL-MCNC: 1.7 MG/DL (ref 1.9–2.7)
MCH RBC QN AUTO: 32.1 PG (ref 26.8–34.3)
MCHC RBC AUTO-ENTMCNC: 33.6 G/DL (ref 31.4–37.4)
MCV RBC AUTO: 96 FL (ref 82–98)
PLATELET # BLD AUTO: 160 THOUSANDS/UL (ref 149–390)
PMV BLD AUTO: 11.8 FL (ref 8.9–12.7)
POTASSIUM SERPL-SCNC: 2.8 MMOL/L (ref 3.5–5.3)
POTASSIUM SERPL-SCNC: 3.4 MMOL/L (ref 3.5–5.3)
RBC # BLD AUTO: 3.89 MILLION/UL (ref 3.88–5.62)
SODIUM SERPL-SCNC: 135 MMOL/L (ref 135–147)
SODIUM SERPL-SCNC: 137 MMOL/L (ref 135–147)
WBC # BLD AUTO: 9.85 THOUSAND/UL (ref 4.31–10.16)

## 2024-11-02 PROCEDURE — 85027 COMPLETE CBC AUTOMATED: CPT | Performed by: NURSE PRACTITIONER

## 2024-11-02 PROCEDURE — 83735 ASSAY OF MAGNESIUM: CPT | Performed by: NURSE PRACTITIONER

## 2024-11-02 PROCEDURE — 94760 N-INVAS EAR/PLS OXIMETRY 1: CPT

## 2024-11-02 PROCEDURE — 80048 BASIC METABOLIC PNL TOTAL CA: CPT | Performed by: NURSE PRACTITIONER

## 2024-11-02 PROCEDURE — 99222 1ST HOSP IP/OBS MODERATE 55: CPT | Performed by: NURSE PRACTITIONER

## 2024-11-02 PROCEDURE — 99024 POSTOP FOLLOW-UP VISIT: CPT | Performed by: NEUROLOGICAL SURGERY

## 2024-11-02 PROCEDURE — 82948 REAGENT STRIP/BLOOD GLUCOSE: CPT

## 2024-11-02 RX ORDER — INSULIN LISPRO 100 [IU]/ML
2 INJECTION, SOLUTION INTRAVENOUS; SUBCUTANEOUS
Status: DISCONTINUED | OUTPATIENT
Start: 2024-11-02 | End: 2024-11-04 | Stop reason: HOSPADM

## 2024-11-02 RX ORDER — MAGNESIUM SULFATE HEPTAHYDRATE 40 MG/ML
2 INJECTION, SOLUTION INTRAVENOUS ONCE
Status: COMPLETED | OUTPATIENT
Start: 2024-11-02 | End: 2024-11-02

## 2024-11-02 RX ORDER — POTASSIUM CHLORIDE 14.9 MG/ML
20 INJECTION INTRAVENOUS ONCE
Status: COMPLETED | OUTPATIENT
Start: 2024-11-02 | End: 2024-11-02

## 2024-11-02 RX ADMIN — ATORVASTATIN CALCIUM 40 MG: 40 TABLET, FILM COATED ORAL at 08:16

## 2024-11-02 RX ADMIN — METHOCARBAMOL 750 MG: 750 TABLET ORAL at 18:04

## 2024-11-02 RX ADMIN — TORSEMIDE 40 MG: 20 TABLET ORAL at 18:04

## 2024-11-02 RX ADMIN — FENTANYL 1 PATCH: 75 PATCH TRANSDERMAL at 16:33

## 2024-11-02 RX ADMIN — GABAPENTIN 600 MG: 300 CAPSULE ORAL at 11:35

## 2024-11-02 RX ADMIN — HYDROMORPHONE HYDROCHLORIDE 1 MG: 1 INJECTION, SOLUTION INTRAMUSCULAR; INTRAVENOUS; SUBCUTANEOUS at 22:13

## 2024-11-02 RX ADMIN — GABAPENTIN 600 MG: 300 CAPSULE ORAL at 08:16

## 2024-11-02 RX ADMIN — POLYETHYLENE GLYCOL 3350 17 G: 17 POWDER, FOR SOLUTION ORAL at 08:15

## 2024-11-02 RX ADMIN — INSULIN LISPRO 2 UNITS: 100 INJECTION, SOLUTION INTRAVENOUS; SUBCUTANEOUS at 18:05

## 2024-11-02 RX ADMIN — INSULIN LISPRO 3 UNITS: 100 INJECTION, SOLUTION INTRAVENOUS; SUBCUTANEOUS at 12:02

## 2024-11-02 RX ADMIN — ONDANSETRON 4 MG: 2 INJECTION INTRAMUSCULAR; INTRAVENOUS at 13:48

## 2024-11-02 RX ADMIN — INSULIN LISPRO 2 UNITS: 100 INJECTION, SOLUTION INTRAVENOUS; SUBCUTANEOUS at 21:20

## 2024-11-02 RX ADMIN — GABAPENTIN 600 MG: 300 CAPSULE ORAL at 18:04

## 2024-11-02 RX ADMIN — SENNOSIDES AND DOCUSATE SODIUM 1 TABLET: 50; 8.6 TABLET ORAL at 21:18

## 2024-11-02 RX ADMIN — POTASSIUM CHLORIDE 40 MEQ: 1500 TABLET, EXTENDED RELEASE ORAL at 08:16

## 2024-11-02 RX ADMIN — DULOXETINE HYDROCHLORIDE 60 MG: 60 CAPSULE, DELAYED RELEASE ORAL at 08:16

## 2024-11-02 RX ADMIN — HEPARIN SODIUM 5000 UNITS: 5000 INJECTION INTRAVENOUS; SUBCUTANEOUS at 21:21

## 2024-11-02 RX ADMIN — ACETAMINOPHEN 975 MG: 325 TABLET, FILM COATED ORAL at 13:42

## 2024-11-02 RX ADMIN — HYDROMORPHONE HYDROCHLORIDE 8 MG: 2 TABLET ORAL at 11:35

## 2024-11-02 RX ADMIN — POTASSIUM CHLORIDE 40 MEQ: 1500 TABLET, EXTENDED RELEASE ORAL at 18:04

## 2024-11-02 RX ADMIN — INSULIN LISPRO 3 UNITS: 100 INJECTION, SOLUTION INTRAVENOUS; SUBCUTANEOUS at 18:05

## 2024-11-02 RX ADMIN — HYDROMORPHONE HYDROCHLORIDE 8 MG: 2 TABLET ORAL at 14:51

## 2024-11-02 RX ADMIN — HYDROMORPHONE HYDROCHLORIDE 1 MG: 1 INJECTION, SOLUTION INTRAMUSCULAR; INTRAVENOUS; SUBCUTANEOUS at 04:33

## 2024-11-02 RX ADMIN — METHOCARBAMOL 750 MG: 750 TABLET ORAL at 11:35

## 2024-11-02 RX ADMIN — HYDROMORPHONE HYDROCHLORIDE 8 MG: 2 TABLET ORAL at 21:18

## 2024-11-02 RX ADMIN — GABAPENTIN 600 MG: 300 CAPSULE ORAL at 21:18

## 2024-11-02 RX ADMIN — MAGNESIUM SULFATE HEPTAHYDRATE 2 G: 40 INJECTION, SOLUTION INTRAVENOUS at 18:04

## 2024-11-02 RX ADMIN — HEPARIN SODIUM 5000 UNITS: 5000 INJECTION INTRAVENOUS; SUBCUTANEOUS at 05:44

## 2024-11-02 RX ADMIN — HYDROMORPHONE HYDROCHLORIDE 1 MG: 1 INJECTION, SOLUTION INTRAMUSCULAR; INTRAVENOUS; SUBCUTANEOUS at 09:57

## 2024-11-02 RX ADMIN — METHOCARBAMOL 750 MG: 750 TABLET ORAL at 05:44

## 2024-11-02 RX ADMIN — HEPARIN SODIUM 5000 UNITS: 5000 INJECTION INTRAVENOUS; SUBCUTANEOUS at 13:43

## 2024-11-02 RX ADMIN — HYDROMORPHONE HYDROCHLORIDE 8 MG: 2 TABLET ORAL at 02:50

## 2024-11-02 RX ADMIN — ACETAMINOPHEN 975 MG: 325 TABLET, FILM COATED ORAL at 05:44

## 2024-11-02 RX ADMIN — TORSEMIDE 40 MG: 20 TABLET ORAL at 08:15

## 2024-11-02 RX ADMIN — POTASSIUM CHLORIDE 20 MEQ: 200 INJECTION, SOLUTION INTRAVENOUS at 12:54

## 2024-11-02 RX ADMIN — ACETAMINOPHEN 975 MG: 325 TABLET, FILM COATED ORAL at 21:18

## 2024-11-02 RX ADMIN — INSULIN LISPRO 1 UNITS: 100 INJECTION, SOLUTION INTRAVENOUS; SUBCUTANEOUS at 08:14

## 2024-11-02 RX ADMIN — INSULIN GLARGINE 8 UNITS: 100 INJECTION, SOLUTION SUBCUTANEOUS at 08:15

## 2024-11-02 RX ADMIN — HYDROMORPHONE HYDROCHLORIDE 8 MG: 2 TABLET ORAL at 08:15

## 2024-11-02 NOTE — ASSESSMENT & PLAN NOTE
Currently requiring 2L NC, improved from 5L. Does not wear oxygen at baseline.  CTA done 11/1 for HA showed biapical groundglass opacities which may reflect pulmonary interstitial edema versus pneumonia/pneumonitis.  Volume status appears euvolemic to slightly hypervolemic, in light of hypokalemia and improving oxygen requirements, will not escalate diuretics for now. Continue PO Torsemide and monitor.   Pulmonary toilet/IS  Wean oxygen as tolerated

## 2024-11-02 NOTE — ASSESSMENT & PLAN NOTE
Intolerant of CPAP, has machine but uses sparingly   If patient is having nocturnal hypoxia, suspect it is in the setting of this.

## 2024-11-02 NOTE — ASSESSMENT & PLAN NOTE
Improved over the last 24 hours.   CTA H/N ordered by primary team, no acute findings.   On multimodal pain regimen as above

## 2024-11-02 NOTE — RESPIRATORY THERAPY NOTE
"RT Protocol Note  Dominick Irving 64 y.o. male MRN: [id number]  Unit/Bed#: [facility] 621-01 Encounter: [id number]    Assessment    Principal Problem:    Lumbar stenosis  Active Problems:    Obstructive sleep apnea on CPAP    Bladder cancer (HCC)    Hypokalemia    Chronic diastolic (congestive) heart failure (HCC)    Chronic, continuous use of opioids    Hyponatremia    Acute respiratory failure (HCC)    DM2 (diabetes mellitus, type 2) (HCC)    Postoperative pain after spinal surgery    Lumbar facet arthropathy      Home Pulmonary Medications:  none       Past Medical History:   Diagnosis Date    Anxiety 3/01/2023    Arthritis     Bladder cancer (HCC)     Cancer (HCC) 3/17/2023    Cervical disc disorder 1/2016    Chronic narcotic dependence ([facility])     Chronic pain disorder     lower back and down both legs    Colon polyp     Coronary artery disease     CPAP (continuous positive airway pressure) dependence     bi-pap    Depression 3/17/2023    Does use hearing aid     will wear DOS    Full dentures     Heart failure (HCC)     and \"fluid retention\" SL OW B Daryl cardio PA\"    High cholesterol     Hypertension     Low back pain 2003    Mild ankle edema     and feet    Obstructive sleep apnea on CPAP     Prediabetes     Shortness of breath     per pt \"with just exertion\"    Sleep apnea     Wears glasses      Social History     Socioeconomic History    Marital status: /Civil Union     Spouse name: Not on file    Number of children: Not on file    Years of education: Not on file    Highest education level: Not on file   Occupational History    Not on file   Tobacco Use    Smoking status: Former     Current packs/day: 1.00     Average packs/day: 1 pack/day for 50.9 years (50.9 ttl pk-yrs)     Types: Cigarettes     Start date: 2/1/2023     Passive exposure: Never    Smokeless tobacco: Never   Vaping Use    Vaping status: Never Used   Substance and Sexual Activity    Alcohol use: Not Currently     Comment: 3 per year    " "Drug use: Not Currently     Types: Marijuana    Sexual activity: Not Currently     Partners: Female   Other Topics Concern    Not on file   Social History Narrative    Not on file     Social Determinants of Health     Financial Resource Strain: Low Risk  (2023)    Overall Financial Resource Strain (CARDIA)     Difficulty of Paying Living Expenses: Not hard at all   Food Insecurity: Patient Declined (10/31/2024)    Nursing - Inadequate Food Risk Classification     Worried About Running Out of Food in the Last Year: Never true     Ran Out of Food in the Last Year: Never true     Ran Out of Food in the Last Year: 98   Transportation Needs: Patient Declined (10/31/2024)    Nursing - Transportation Risk Classification     Lack of Transportation: Not on file     Lack of Transportation: 98   Physical Activity: Not on file   Stress: Not on file   Social Connections: Not on file   Intimate Partner Violence: Patient Declined (10/31/2024)    Nursing IPS     Feels Physically and Emotionally Safe: Not on file     Physically Hurt by Someone: Not on file     Humiliated or Emotionally Abused by Someone: Not on file     Physically Hurt by Someone: 98     Hurt or Threatened by Someone: 98   Housing Stability: Patient Declined (10/31/2024)    Nursing: Inadequate Housing Risk Classification     Has Housing: Not on file     Worried About Losing Housing: Not on file     Unable to Get Utilities: Not on file     Unable to Pay for Housing in the Last Year: 98     Has Housin       Subjective         Objective    Physical Exam:   Assessment Type: Assess only  General Appearance: Alert, Awake  Respiratory Pattern: Normal  Chest Assessment: Chest expansion symmetrical  Bilateral Breath Sounds: Clear  Cough: Productive, Strong    Vitals:  Blood pressure 148/64, pulse 76, temperature 97.9 °F (36.6 °C), resp. rate 16, height 5' 7\" (1.702 m), weight 112 kg (245 lb 13 oz), SpO2 95%.          Imaging and other studies: Results Review " Statement: No pertinent imaging studies reviewed.          Plan    Respiratory Plan: Discontinue Protocol        Resp Comments: Pt admitted S/P elective surgery. Pt in no acute resp distress. Pt with strong cough. SpO2 WNL on 1L NC. Pt deinies any pulmonary HX or home pulmonary meds taken, per chart COPD listed although no pulmonary home meds inchart. Pt stated that he has a CPAP at home but isnt going to bring his in and does not want to use ours. At this time no resp interventions indicated RCP will de D/C.

## 2024-11-02 NOTE — ASSESSMENT & PLAN NOTE
Lab Results   Component Value Date    HGBA1C 9.0 (H) 10/01/2024       Recent Labs     11/01/24  1700 11/01/24  2124 11/02/24  0753 11/02/24  1141   POCGLU 200* 223* 164* 261*   Uncontrolled as evidenced by A1c of 9.  Home regimen Tresiba 8 units daily, metformin 500 mg in a.m., 1000 mg in p.m, Ozempic weekly. Has DEXCOM at home. Follows with [facility] endocrinology.   For now, we will continue Lantus 8 units daily, will add Humalog 2 units 3 times daily with meals and continue sliding scale.  Continue to hold metformin/Ozempic  Diabetic diet  Monitor Accu-Cheks and adjust regimen as needed

## 2024-11-02 NOTE — ASSESSMENT & PLAN NOTE
Wt Readings from Last 3 Encounters:   11/02/24 112 kg (245 lb 13 oz)   10/16/24 113 kg (250 lb)   10/15/24 112 kg (247 lb 6.4 oz)     Follows with cardiology at [facility].  Recently taken off beta-blocker, patient is unsure why and there are no notes available for review.   Will continue torsemide 40 mg twice daily with potassium supplementation  Takes [facility] as outpatient, currently held.  As above, examines euvolemic to slightly hypervolemic.  In light of hypokalemia and improving oxygen requirements, will not escalate diuretics at this time.  I/O, daily weights, low NA diet  Will continue to monitor volume status

## 2024-11-02 NOTE — ASSESSMENT & PLAN NOTE
Pt with hx of Hypokalemia.   K 2.8 today. Resumed home Kcl 20 mEQ BID   Increased home dose to 40 twice daily yesterday  Will continue to monitor with repeat labs.

## 2024-11-02 NOTE — PROGRESS NOTES
Progress Note - Neurosurgery   Name: Dominick Irving 64 y.o. male I MRN: [id number]  [facility] I Date of Admission: 10/30/2024   Date of Service: 11/2/2024 I Hospital Day: 2  Assessment & Plan  Lumbar stenosis  3 Days Post-Op Procedure(s):  Robotically assisted L3-4 decompressive hemilaminectomy and foraminotomies with transverse lumbar interbody fusion right-sided approach with add-on to an L4-5 fixation fusion  Prior L4-5 fusion with adjacent level disease which he was symptomatic from refractory from conservative measures.    Imaging reviewed personally and by attending. Final results as below  Xray lumbar spine 10/31/2024: Expected postop changes status post L3-4 TLIF with L3-L5  fixation/fusion.     Plan:  Continue regular neurologic checks  MAXIMO drain with 405ml/24hrs, maintain at this time  Pain control : APS following.  Appreciate recommendations.  Tylenol 975 mg every 8 hours scheduled  Cymbalta 60 mg daily  Gabapentin 600 mg 4 times daily  Robaxin 750 mg every 6 hours scheduled  Lidoderm patch, 12 hours on/12 hours off to affected area  Fentanyl transdermal patch 75 mcg/h, 1 patch every 72 hours  Oral Dilaudid 8 mg every 3 hours as needed for moderate/severe pain  IV Dilaudid 1 mg every 4 hours as needed for breakthrough pain  Narcan as needed for respiratory depression/opioid reversal  Ketamine was stopped today  Patient tolerating his diet and voiding without difficulty  Bowel regimen with MiraLAX and senna-S daily.  Eval and mobilize per PT/OT, recommending home with outpatient PT  No brace required.  DVT ppx: SCDs, subcu heparin  Patient currently on 3 L nasal cannula, wean as tolerated  Consulted internal medicine to assist with electrolyte imbalances, elevated blood sugars and medical management.    Rounding completed with GUERA Delarosa    Neurosurgery will continue to follow as primary.  Patient not medically stable for D/C at this time pending pain control, drain in place and electrolyte  resuscitation.  Please contact us with any questions or concerns.     Obstructive sleep apnea on CPAP    Chronic, continuous use of opioids    Postoperative pain after spinal surgery    Lumbar facet arthropathy    Hyponatremia  Na low at 137  Hypokalemia  Pt with hx of Hypokalemia.   K 2.8 today. Resumed home Kcl 20 mEQ BID   Increased home dose to 40 twice daily yesterday  Will continue to monitor with repeat labs.         Subjective Patient complaining of a little shadow of a headache to the top of his head.  He states he was able to walk today.  He continues to endorse foot numbness which has been ongoing.  He currently complains of 4/10 dull low back pain without radiation.  He is currently off ketamine.  Encourage incentive spirometer.  He is voiding without difficulty but has not had a bowel movement but is passing gas.  He offers no other complaints.    Objective patient comfortably sitting at recliner, NAD.    Temp:  [97.8 °F (36.6 °C)-98.9 °F (37.2 °C)] 97.9 °F (36.6 °C)  HR:  [65-76] 65  BP: (115-148)/(61-64) 148/64  Resp:  [16-18] 16  SpO2:  [85 %-94 %] 92 %  O2 Device: Nasal cannula  Nasal Cannula O2 Flow Rate (L/min):  [3 L/min-5 L/min] 5 L/min    I/O         10/31 0701  11/01 0700 11/01 0701  11/02 0700 11/02 0701  11/03 0700    P.O. 1280 1200 300    I.V. (mL/kg) 82 (0.8) 145 (1.3)     IV Piggyback       Total Intake(mL/kg) 1362 (12.6) 1345 (12.1) 300 (2.7)    Urine (mL/kg/hr) 2600 (1) 2000 (0.8) 550 (1)    Drains 310 405     Total Output 2910 2405 550    Net -1548 -1060.1 -250               General appearance: alert, appears stated age, cooperative and no distress  Head: Normocephalic, without obvious abnormality, atraumatic  Eyes: EOMI, PERRL, conjugate gaze  Neck: supple, symmetrical, trachea midline   Back: Dressing intact with some old serosanguineous drainage, MAXIMO drain remains in place to full suction with serosanguineous drainage, maintain at this time  Lungs: non labored breathing  Heart:  regular heart rate  Neurologic:   Mental status: Alert, oriented x3, thought content appropriate, speech is clear, following commands  Cranial nerves: grossly intact (Cranial nerves II-XII)  Sensory: normal to light touch all extremities x 4  Motor: moving all extremities without focal weakness, strength 5/5 throughout  Reflexes: 2+ and symmetric, no Jeff's or clonus appreciated    Lab Results: I have reviewed the following results:  Recent Labs     10/31/24  0615 10/31/24  0942 11/01/24  0544 11/02/24  0617   WBC  --    < >  --  9.85   HGB  --    < >  --  12.5   HCT  --    < >  --  37.2   PLT  --    < >  --  160   SODIUM  --    < > 133* 137   K  --    < > 2.8* 2.8*   CL  --    < > 89* 90*   CO2  --    < > 35* 37*   BUN  --    < > 22 19   CREATININE  --    < > 0.97 0.83   GLUC  --    < > 170* 157*   PTT 34  --   --   --    INR 1.26*  --  1.00  --     < > = values in this interval not displayed.         VTE Pharmacologic Prophylaxis: Sequential compression device (Venodyne)  and Heparin

## 2024-11-02 NOTE — ASSESSMENT & PLAN NOTE
Patient with chronic pain and chronic opioid dependence. He is followed by palliative care in the outpatient setting.

## 2024-11-02 NOTE — ASSESSMENT & PLAN NOTE
Admitted to neurosurgery service for elective robotically assisted L3-4 decompressive hemilaminectomy with foraminotomies with transverse lumbar interbody fusion 10/30 with Dr. Aguilar   Surgical drain management per primary team  DVT ppx with SQ heparin  Pain control per primary team/APS.  Ketamine gtt stopped 11/2AM. Multimodal regimen in place.   PT/OT noted, no rehab needs at this time.

## 2024-11-02 NOTE — CONSULTS
Consultation - Hospitalist   Name: Dominick Irving 64 y.o. male I MRN: [id number]  Unit/Bed#: [facility] 621-01 I Date of Admission: 10/30/2024   Date of Service: 11/2/2024 I Hospital Day: 2   Inpatient consult to Internal Medicine  Consult performed by: DIA Michele  Consult ordered by: DIA Dockery        Physician Requesting Evaluation: Leland Aguilar MD   Reason for Evaluation / Principal Problem: medical management, hypokalemia, hyperglycemia     Assessment & Plan  Lumbar stenosis  Admitted to neurosurgery service for elective robotically assisted L3-4 decompressive hemilaminectomy with foraminotomies with transverse lumbar interbody fusion 10/30 with Dr. Aguilar   Surgical drain management per primary team  DVT ppx with SQ heparin  Pain control per primary team/APS.  Ketamine gtt stopped 11/2AM. Multimodal regimen in place.   PT/OT noted, no rehab needs at this time.   Acute respiratory failure (HCC)  Currently requiring 2L NC, improved from 5L. Does not wear oxygen at baseline.  CTA done 11/1 for HA showed biapical groundglass opacities which may reflect pulmonary interstitial edema versus pneumonia/pneumonitis.  Volume status appears euvolemic to slightly hypervolemic, in light of hypokalemia and improving oxygen requirements, will not escalate diuretics for now. Continue PO Torsemide and monitor.   Pulmonary toilet/IS  Wean oxygen as tolerated     Chronic diastolic (congestive) heart failure (HCC)  Wt Readings from Last 3 Encounters:   11/02/24 112 kg (245 lb 13 oz)   10/16/24 113 kg (250 lb)   10/15/24 112 kg (247 lb 6.4 oz)     Follows with cardiology at [facility].  Recently taken off beta-blocker, patient is unsure why and there are no notes available for review.   Will continue torsemide 40 mg twice daily with potassium supplementation  Takes [facility] as outpatient, currently held.  As above, examines euvolemic to slightly hypervolemic.  In light of hypokalemia and improving  "oxygen requirements, will not escalate diuretics at this time.  I/O, daily weights, low NA diet  Will continue to monitor volume status    Hypokalemia  2.8 today.   Will replete with IV potassium in addition to patients standing dose of 40 MEQ BID.   Repeat K later today as well as check magnesium level  Telemetry  DM2 (diabetes mellitus, type 2) ([facility])  Lab Results   Component Value Date    HGBA1C 9.0 (H) 10/01/2024       Recent Labs     11/01/24  1700 11/01/24  2124 11/02/24  0753 11/02/24  1141   POCGLU 200* 223* 164* 261*   Uncontrolled as evidenced by A1c of 9.  Home regimen Tresiba 8 units daily, metformin 500 mg in a.m., 1000 mg in p.m, Ozempic weekly. Has DEXCOM at home. Follows with [facility] endocrinology.   For now, we will continue Lantus 8 units daily, will add Humalog 2 units 3 times daily with meals and continue sliding scale.  Continue to hold metformin/Ozempic  Diabetic diet  Monitor Accu-Cheks and adjust regimen as needed      Obstructive sleep apnea on CPAP  Intolerant of CPAP, has machine but uses sparingly   If patient is having nocturnal hypoxia, suspect it is in the setting of this.   Hyponatremia  130 on admission, now normal.  Continue home Torsemide  BMP in AM  Chronic, continuous use of opioids  Patient with chronic pain and chronic opioid dependence. He is followed by palliative care in the outpatient setting.   Bladder cancer ([facility])  Follows with urology. S/P TURP and immunotherapy per record review.   Headache  Improved over the last 24 hours.   CTA H/N ordered by primary team, no acute findings.   On multimodal pain regimen as above        VTE Pharmacologic Prophylaxis:   Moderate Risk (Score 3-4) - Pharmacological DVT Prophylaxis Ordered: heparin.  Code Status: Level 1 - Full Code   Discussion with family: Patient declined call to .       History of Present Illness   Chief Complaint: \"I have a mild headache\"    Dominick Irving is a 64 y.o. male with a PMH of uncontrolled " "type 2 diabetes, chronic pain with chronic opioid dependence, sleep apnea, bladder cancer who presents for elective surgery with neurosurgery.  We are consulted for medical management specifically of electrolyte disturbance, hyperglycemia in setting of uncontrolled diabetes.    Review of Systems   Respiratory:  Negative for chest tightness, shortness of breath and wheezing.    Cardiovascular:  Positive for leg swelling (chronic, R >L). Negative for chest pain and palpitations.   Gastrointestinal:  Negative for constipation.   Genitourinary:  Negative for difficulty urinating.   Musculoskeletal:  Positive for back pain.   Neurological:  Positive for headaches (improving). Negative for weakness.   All other systems reviewed and are negative.      Historical Information   Past Medical History:   Diagnosis Date    Anxiety 3/01/2023    Arthritis     Bladder cancer (HCC)     Cancer (HCC) 3/17/2023    Cervical disc disorder 1/2016    Chronic narcotic dependence (HCC)     Chronic pain disorder     lower back and down both legs    Colon polyp     Coronary artery disease     CPAP (continuous positive airway pressure) dependence     bi-pap    Depression 3/17/2023    Does use hearing aid     will wear DOS    Full dentures     Heart failure (HCC)     and \"fluid retention\" SL  B Daryl cardio PA\"    High cholesterol     Hypertension     Low back pain 2003    Mild ankle edema     and feet    Obstructive sleep apnea on CPAP     Prediabetes     Shortness of breath     per pt \"with just exertion\"    Sleep apnea     Wears glasses      Past Surgical History:   Procedure Laterality Date    BACK SURGERY      titanium rods implanted    CAUDAL BLOCK N/A 10/17/2023    Procedure: BLOCK / INJECTION CAUDAL;  Surgeon: Minh Rolon MD;  Location: [facility];  Service: Pain Management     COLONOSCOPY      CYSTOSCOPY W/ URETERAL STENT PLACEMENT Bilateral 03/17/2023    Procedure: bilateral retrograde;  Surgeon: Shan Sanabria MD;  [facility]" MAIN OR;  Service: Urology    HERNIA REPAIR      x5    LUMBAR LAMINECTOMY N/A 06/30/2023    Procedure: Left L3-4 Metrx hemilaminectomy and bilateral foraminotomies;  Surgeon: Leland Aguilar MD;  Location: [facility] MAIN [facility];  Service: Neurosurgery    CT ARTHRODESIS COMBINED TQ 1NTRSPC LUMBAR Right 10/30/2024    Procedure: Robotically assisted L3-4 decompressive hemilaminectomy and foraminotomies with transverse lumbar interbody fusion right-sided approach with add-on to an L4-5 fixation fusion;  Surgeon: Leland Aguilar MD;  Location: [facility]IN [facility];  Service: Neurosurgery    CT CYSTO W/REMOVAL OF LESIONS SMALL N/A 05/01/2023    Procedure: CYSTOSCOPY TURBT;  Surgeon: Shan Sanabria MD;  Location: [facility] MAIN OR;  Service: Urology    CT CYSTOURETHROSCOPY N/A 11/06/2023    Procedure: CYSTOSCOPY with  bladder biopsies and fulgeration;  Surgeon: Shan Sanabria MD;  Location: [facility] MAIN OR;  Service: Urology    SPINE SURGERY  2017    TRANSURETHRAL RESECTION OF BLADDER TUMOR N/A 03/17/2023    Procedure: TRANSURETHRAL RESECTION OF BLADDER TUMOR (TURBT);  Surgeon: Shan Sanabria MD;  Location: [facility] MAIN [facility];  Service: Urology     Social History     Tobacco Use    Smoking status: Former     Current packs/day: 1.00     Average packs/day: 1 pack/day for 50.9 years (50.9 ttl pk-yrs)     Types: Cigarettes     Start date: 2/1/2023     Passive exposure: Never    Smokeless tobacco: Never   Vaping Use    Vaping status: Never Used   Substance and Sexual Activity    Alcohol use: Not Currently     Comment: 3 per year    Drug use: Not Currently     Types: Marijuana    Sexual activity: Not Currently     Partners: Female     E-Cigarette/Vaping    E-Cigarette Use Never User      E-Cigarette/Vaping Substances    Nicotine No     THC No     CBD No     Flavoring No     Other No     Unknown No      Family History   Problem Relation Age of Onset    Cancer Father         Adult leukemia    Aortic aneurysm Father     Leukemia Father     Alcohol abuse Father      Hypertension Mother     Colon cancer Maternal Grandfather      Social History:  Marital Status: /Civil Union   Patient Pre-hospital Living Situation: Home  Patient Pre-hospital Level of Mobility:  Uses cane/walker at home  Patient Pre-hospital Diet Restrictions: Diabetic    Meds/Allergies   I have reviewed home medications with patient personally.  Prior to Admission medications    Medication Sig Start Date End Date Taking? Authorizing Provider   gabapentin (NEURONTIN) 600 MG tablet Take 1 tablet (600 mg total) by mouth 4 (four) times a day 10/17/24  Yes Robert Budinetz, MD   HYDROmorphone (DILAUDID) 8 MG tablet Take 1 tablet (8 mg total) by mouth every 3 (three) hours as needed for moderate pain or severe pain Max Daily Amount: 64 mg 10/24/24  Yes Robert Budinetz, MD   torsemide (DEMADEX) 20 mg tablet Take 2 tablets (40 mg total) by mouth 2 (two) times a day 5/8/24  Yes Robert Budinetz, MD   aspirin 81 mg chewable tablet Chew 81 mg daily    Historical Provider, MD   atorvastatin (LIPITOR) 40 mg tablet Take 1 tablet (40 mg total) by mouth daily 7/31/24   Robert Budinetz, MD   co-enzyme Q-10 30 MG capsule Take 100 mg by mouth daily     Historical Provider, MD   DULoxetine (CYMBALTA) 60 mg delayed release capsule Take 1 capsule (60 mg total) by mouth daily 7/8/24   Robert Budinetz, MD   Farxiga 5 MG TABS 5 mg daily 5mg alternating with 2.5mg for fluid retention 10/26/22   Historical Provider, MD   fentaNYL (DURAGESIC) 75 mcg/hr Place 1 patch on the skin over 72 hours every third day Max Daily Amount: 1 patch 10/16/24   Robert Budinetz, MD   insulin degludec (Tresiba FlexTouch) 100 units/mL injection pen Inject 8 Units under the skin daily 10/22/24   Robert Budinetz, MD   Insulin Pen Needle 32G X 4 MM MISC Use daily Dx E11.9 9/18/24   Robert Budinetz, MD   Lancet Devices (Adjustable Lancing Device) MISC Use as directed to test glucose once daily. One touch device or may change to what insurance covers.  9/18/24   Robert Budinetz, MD   Lancets (onetouch ultrasoft) lancets Use as instructed. Dx E11.9 9/18/24   Robert Budinetz, MD   metFORMIN (GLUCOPHAGE-XR) 500 mg 24 hr tablet Take 1 pill qam and 2 pills qpm 10/1/24   Robert Budinetz, MD   metolazone (ZAROXOLYN) 2.5 mg tablet Take 1 tablet (2.5 mg total) by mouth daily as needed (edema) 12/18/23   Robert Budinetz, MD   multivitamin-iron-minerals-folic acid (CENTRUM) chewable tablet Chew 1 tablet daily    Historical Provider, MD   naloxone (NARCAN) 4 mg/0.1 mL nasal spray Administer 1 spray into a nostril. If no response after 2-3 minutes, give another dose in the other nostril using a new spray. 11/28/23 11/27/24  DIA Anglin   potassium chloride (Klor-Con) 10 mEq tablet  8/5/24   Historical Provider, MD   potassium chloride (Klor-Con) 10 mEq tablet TAKE 2 TABLETS BY MOUTH TWICE DAILY 9/26/24   Robert Budinetz, MD   potassium chloride (Klor-Con) 10 mEq tablet TAKE 2 TABLETS BY MOUTH TWICE DAILY 10/17/24   Robert Budinetz, MD   semaglutide, 0.25 or 0.5 mg/dose, (Ozempic, 0.25 or 0.5 MG/DOSE,) 2 mg/3 mL injection pen Inject 0.75 mL (0.5 mg total) under the skin every 7 days 9/17/24   Robert Budinetz, MD     Allergies   Allergen Reactions    Mirtazapine Other (See Comments) and Confusion     Pt drove without knowing and woke up at a new destination    Venlafaxine Dizziness       Objective :  Temp:  [97.8 °F (36.6 °C)-98.9 °F (37.2 °C)] 97.9 °F (36.6 °C)  HR:  [65-76] 65  BP: (115-148)/(61-64) 148/64  Resp:  [16-18] 16  SpO2:  [85 %-94 %] 92 %  O2 Device: Nasal cannula  Nasal Cannula O2 Flow Rate (L/min):  [3 L/min-5 L/min] 5 L/min    Physical Exam  Vitals and nursing note reviewed.   Constitutional:       General: He is not in acute distress.     Appearance: He is obese.      Interventions: Nasal cannula in place.   Cardiovascular:      Rate and Rhythm: Normal rate.   Pulmonary:      Breath sounds: Decreased breath sounds present.   Abdominal:       Tenderness: There is no abdominal tenderness.   Musculoskeletal:         General: Swelling (BLLE, R > L. Has some chronic LE edema per patient.) present.   Skin:     General: Skin is warm.      Comments: Lumbar incision   Neurological:      Mental Status: He is alert and oriented to person, place, and time. Mental status is at baseline.   Psychiatric:         Mood and Affect: Mood normal.          Lines/Drains:  Lines/Drains/Airways       Active Status       Name Placement date Placement time Site Days    Closed/Suction Drain Right Back Bulb 7 Fr. 10/30/24  1234  Back  2                          Lab Results: I have reviewed the following results:  Results from last 7 days   Lab Units 11/02/24  0617   WBC Thousand/uL 9.85   HEMOGLOBIN g/dL 12.5   HEMATOCRIT % 37.2   PLATELETS Thousands/uL 160     Results from last 7 days   Lab Units 11/02/24  0617   SODIUM mmol/L 137   POTASSIUM mmol/L 2.8*   CHLORIDE mmol/L 90*   CO2 mmol/L 37*   BUN mg/dL 19   CREATININE mg/dL 0.83   ANION GAP mmol/L 10   CALCIUM mg/dL 8.7   GLUCOSE RANDOM mg/dL 157*     Results from last 7 days   Lab Units 11/01/24  0544   INR  1.00     Results from last 7 days   Lab Units 11/02/24  1141 11/02/24  0753 11/01/24  2124 11/01/24  1700 11/01/24  1251 10/31/24  1930 10/30/24  0658   POC GLUCOSE mg/dl 261* 164* 223* 200* 207* 354* 178*     Lab Results   Component Value Date    HGBA1C 9.0 (H) 10/01/2024    HGBA1C 9.0 (A) 10/01/2024    HGBA1C 9.6 (A) 09/17/2024           Imaging Results Review: No pertinent imaging studies reviewed.  Other Study Results Review: No additional pertinent studies reviewed.    Administrative Statements       ** Please Note: This note has been constructed using a voice recognition system. **

## 2024-11-02 NOTE — ASSESSMENT & PLAN NOTE
2.8 today.   Will replete with IV potassium in addition to patients standing dose of 40 MEQ BID.   Repeat K later today as well as check magnesium level  Telemetry

## 2024-11-02 NOTE — ASSESSMENT & PLAN NOTE
3 Days Post-Op Procedure(s):  Robotically assisted L3-4 decompressive hemilaminectomy and foraminotomies with transverse lumbar interbody fusion right-sided approach with add-on to an L4-5 fixation fusion  Prior L4-5 fusion with adjacent level disease which he was symptomatic from refractory from conservative measures.    Imaging reviewed personally and by attending. Final results as below  Xray lumbar spine 10/31/2024: Expected postop changes status post L3-4 TLIF with L3-L5  fixation/fusion.     Plan:  Continue regular neurologic checks  MAXIMO drain with 405ml/24hrs, maintain at this time  Pain control : APS following.  Appreciate recommendations.  Tylenol 975 mg every 8 hours scheduled  Cymbalta 60 mg daily  Gabapentin 600 mg 4 times daily  Robaxin 750 mg every 6 hours scheduled  Lidoderm patch, 12 hours on/12 hours off to affected area  Fentanyl transdermal patch 75 mcg/h, 1 patch every 72 hours  Oral Dilaudid 8 mg every 3 hours as needed for moderate/severe pain  IV Dilaudid 1 mg every 4 hours as needed for breakthrough pain  Narcan as needed for respiratory depression/opioid reversal  Ketamine was stopped today  Patient tolerating his diet and voiding without difficulty  Bowel regimen with MiraLAX and senna-S daily.  Eval and mobilize per PT/OT, recommending home with outpatient PT  No brace required.  DVT ppx: SCDs, subcu heparin  Patient currently on 3 L nasal cannula, wean as tolerated  Consulted internal medicine to assist with electrolyte imbalances, elevated blood sugars and medical management.    Rounding completed with GUERA Delarosa    Neurosurgery will continue to follow as primary.  Patient not medically stable for D/C at this time pending pain control, drain in place and electrolyte resuscitation.  Please contact us with any questions or concerns.

## 2024-11-03 VITALS
SYSTOLIC BLOOD PRESSURE: 121 MMHG | RESPIRATION RATE: 16 BRPM | BODY MASS INDEX: 38.37 KG/M2 | HEART RATE: 69 BPM | HEIGHT: 67 IN | TEMPERATURE: 97.8 F | DIASTOLIC BLOOD PRESSURE: 51 MMHG | OXYGEN SATURATION: 96 % | WEIGHT: 244.49 LBS

## 2024-11-03 LAB
ANION GAP SERPL CALCULATED.3IONS-SCNC: 9 MMOL/L (ref 4–13)
BUN SERPL-MCNC: 18 MG/DL (ref 5–25)
CALCIUM SERPL-MCNC: 8.5 MG/DL (ref 8.4–10.2)
CHLORIDE SERPL-SCNC: 91 MMOL/L (ref 96–108)
CO2 SERPL-SCNC: 34 MMOL/L (ref 21–32)
CREAT SERPL-MCNC: 0.85 MG/DL (ref 0.6–1.3)
GFR SERPL CREATININE-BSD FRML MDRD: 91 ML/MIN/1.73SQ M
GLUCOSE SERPL-MCNC: 135 MG/DL (ref 65–140)
GLUCOSE SERPL-MCNC: 138 MG/DL (ref 65–140)
GLUCOSE SERPL-MCNC: 139 MG/DL (ref 65–140)
GLUCOSE SERPL-MCNC: 192 MG/DL (ref 65–140)
GLUCOSE SERPL-MCNC: 193 MG/DL (ref 65–140)
GLUCOSE SERPL-MCNC: 225 MG/DL (ref 65–140)
MAGNESIUM SERPL-MCNC: 1.9 MG/DL (ref 1.9–2.7)
POTASSIUM SERPL-SCNC: 3.3 MMOL/L (ref 3.5–5.3)
SODIUM SERPL-SCNC: 134 MMOL/L (ref 135–147)

## 2024-11-03 PROCEDURE — 99024 POSTOP FOLLOW-UP VISIT: CPT | Performed by: NEUROLOGICAL SURGERY

## 2024-11-03 PROCEDURE — 99232 SBSQ HOSP IP/OBS MODERATE 35: CPT | Performed by: PHYSICIAN ASSISTANT

## 2024-11-03 PROCEDURE — 83735 ASSAY OF MAGNESIUM: CPT | Performed by: NURSE PRACTITIONER

## 2024-11-03 PROCEDURE — 82948 REAGENT STRIP/BLOOD GLUCOSE: CPT

## 2024-11-03 PROCEDURE — 80048 BASIC METABOLIC PNL TOTAL CA: CPT | Performed by: NURSE PRACTITIONER

## 2024-11-03 RX ORDER — POTASSIUM CHLORIDE 1500 MG/1
20 TABLET, EXTENDED RELEASE ORAL ONCE
Status: COMPLETED | OUTPATIENT
Start: 2024-11-03 | End: 2024-11-03

## 2024-11-03 RX ORDER — BISACODYL 10 MG
10 SUPPOSITORY, RECTAL RECTAL DAILY PRN
Status: DISCONTINUED | OUTPATIENT
Start: 2024-11-03 | End: 2024-11-04 | Stop reason: HOSPADM

## 2024-11-03 RX ADMIN — ACETAMINOPHEN 975 MG: 325 TABLET, FILM COATED ORAL at 15:40

## 2024-11-03 RX ADMIN — METHOCARBAMOL 750 MG: 750 TABLET ORAL at 12:19

## 2024-11-03 RX ADMIN — METHOCARBAMOL 750 MG: 750 TABLET ORAL at 00:11

## 2024-11-03 RX ADMIN — POTASSIUM CHLORIDE 20 MEQ: 1500 TABLET, EXTENDED RELEASE ORAL at 15:41

## 2024-11-03 RX ADMIN — TORSEMIDE 40 MG: 20 TABLET ORAL at 09:05

## 2024-11-03 RX ADMIN — POTASSIUM CHLORIDE 40 MEQ: 1500 TABLET, EXTENDED RELEASE ORAL at 17:46

## 2024-11-03 RX ADMIN — HYDROMORPHONE HYDROCHLORIDE 8 MG: 2 TABLET ORAL at 21:47

## 2024-11-03 RX ADMIN — HYDROMORPHONE HYDROCHLORIDE 1 MG: 1 INJECTION, SOLUTION INTRAMUSCULAR; INTRAVENOUS; SUBCUTANEOUS at 22:58

## 2024-11-03 RX ADMIN — GABAPENTIN 600 MG: 300 CAPSULE ORAL at 17:46

## 2024-11-03 RX ADMIN — HYDROMORPHONE HYDROCHLORIDE 8 MG: 2 TABLET ORAL at 00:11

## 2024-11-03 RX ADMIN — TORSEMIDE 40 MG: 20 TABLET ORAL at 17:46

## 2024-11-03 RX ADMIN — HYDROMORPHONE HYDROCHLORIDE 8 MG: 2 TABLET ORAL at 12:23

## 2024-11-03 RX ADMIN — POLYETHYLENE GLYCOL 3350 17 G: 17 POWDER, FOR SOLUTION ORAL at 09:03

## 2024-11-03 RX ADMIN — GABAPENTIN 600 MG: 300 CAPSULE ORAL at 09:04

## 2024-11-03 RX ADMIN — INSULIN LISPRO 2 UNITS: 100 INJECTION, SOLUTION INTRAVENOUS; SUBCUTANEOUS at 17:45

## 2024-11-03 RX ADMIN — INSULIN LISPRO 1 UNITS: 100 INJECTION, SOLUTION INTRAVENOUS; SUBCUTANEOUS at 21:50

## 2024-11-03 RX ADMIN — ACETAMINOPHEN 975 MG: 325 TABLET, FILM COATED ORAL at 05:30

## 2024-11-03 RX ADMIN — HYDROMORPHONE HYDROCHLORIDE 8 MG: 2 TABLET ORAL at 02:42

## 2024-11-03 RX ADMIN — METHOCARBAMOL 750 MG: 750 TABLET ORAL at 05:30

## 2024-11-03 RX ADMIN — INSULIN GLARGINE 8 UNITS: 100 INJECTION, SOLUTION SUBCUTANEOUS at 09:03

## 2024-11-03 RX ADMIN — INSULIN LISPRO 2 UNITS: 100 INJECTION, SOLUTION INTRAVENOUS; SUBCUTANEOUS at 12:21

## 2024-11-03 RX ADMIN — HYDROMORPHONE HYDROCHLORIDE 8 MG: 2 TABLET ORAL at 15:43

## 2024-11-03 RX ADMIN — DULOXETINE HYDROCHLORIDE 60 MG: 60 CAPSULE, DELAYED RELEASE ORAL at 09:04

## 2024-11-03 RX ADMIN — METHOCARBAMOL 750 MG: 750 TABLET ORAL at 17:46

## 2024-11-03 RX ADMIN — INSULIN LISPRO 2 UNITS: 100 INJECTION, SOLUTION INTRAVENOUS; SUBCUTANEOUS at 17:46

## 2024-11-03 RX ADMIN — INSULIN LISPRO 2 UNITS: 100 INJECTION, SOLUTION INTRAVENOUS; SUBCUTANEOUS at 09:03

## 2024-11-03 RX ADMIN — GABAPENTIN 600 MG: 300 CAPSULE ORAL at 21:48

## 2024-11-03 RX ADMIN — HEPARIN SODIUM 5000 UNITS: 5000 INJECTION INTRAVENOUS; SUBCUTANEOUS at 21:50

## 2024-11-03 RX ADMIN — POTASSIUM CHLORIDE 40 MEQ: 1500 TABLET, EXTENDED RELEASE ORAL at 09:04

## 2024-11-03 RX ADMIN — INSULIN LISPRO 2 UNITS: 100 INJECTION, SOLUTION INTRAVENOUS; SUBCUTANEOUS at 12:20

## 2024-11-03 RX ADMIN — GABAPENTIN 600 MG: 300 CAPSULE ORAL at 12:19

## 2024-11-03 RX ADMIN — ATORVASTATIN CALCIUM 40 MG: 40 TABLET, FILM COATED ORAL at 09:04

## 2024-11-03 RX ADMIN — HEPARIN SODIUM 5000 UNITS: 5000 INJECTION INTRAVENOUS; SUBCUTANEOUS at 15:41

## 2024-11-03 RX ADMIN — METHOCARBAMOL 750 MG: 750 TABLET ORAL at 23:45

## 2024-11-03 RX ADMIN — HYDROMORPHONE HYDROCHLORIDE 8 MG: 2 TABLET ORAL at 09:04

## 2024-11-03 RX ADMIN — ACETAMINOPHEN 975 MG: 325 TABLET, FILM COATED ORAL at 21:48

## 2024-11-03 RX ADMIN — HEPARIN SODIUM 5000 UNITS: 5000 INJECTION INTRAVENOUS; SUBCUTANEOUS at 05:30

## 2024-11-03 NOTE — ASSESSMENT & PLAN NOTE
Initially required 5L. Does not wear oxygen at baseline.  CTA done 11/1 for HA showed biapical groundglass opacities which may reflect pulmonary interstitial edema versus pneumonia/pneumonitis.  Volume status appears euvolemic to slightly hypervolemic, in light of hypokalemia and improving oxygen requirements, will not escalate diuretics for now. Continue PO Torsemide and monitor.   Pulmonary toilet/IS  Weaned to room air

## 2024-11-03 NOTE — CASE MANAGEMENT
Case Management Assessment & Discharge Planning Note    Patient name Dominick Irving  Location [facility] 621/[facility] 621-01 MRN [id number]  : 1960 Date 11/3/2024       Current Admission Date: 10/30/2024  Current Admission Diagnosis:Lumbar stenosis   Patient Active Problem List    Diagnosis Date Noted Date Diagnosed    Headache 2024     Lumbar stenosis 10/31/2024     Lumbar facet arthropathy 10/31/2024     Postoperative pain after spinal surgery 10/30/2024     Unstable gait 2024     Hypertensive heart disease with congestive heart failure ([facility]) 2024     Depression, recurrent ([facility]) 2024     History of bladder cancer 2024     Closed fracture of right ankle with routine healing 2024     COPD with acute exacerbation ([facility]) 2024     DM2 (diabetes mellitus, type 2) ([facility]) 2024     Unable to ambulate 2023     Neuropathy 2023     Postlaminectomy syndrome 2023     S/P laminectomy 10/16/2023     Lumbar radiculopathy 10/16/2023     Chronic pain syndrome 10/16/2023     Acute low back pain with sciatica 10/05/2023     Benign prostatic hyperplasia 2023     Encounter for surgical aftercare following surgery of nervous system 2023     Abdominal pain 2023     Suprapubic pressure 2023     Right groin pain 2023     Morbid (severe) obesity due to excess calories ([facility]) 2023     History of hematuria 2023     Hyponatremia 2023     Acute respiratory failure ([facility]) 2023     Electrolyte abnormality 2023     Bradycardia 2023     Coronary artery disease      Chronic, continuous use of opioids 2023     Hyperglycemia 2023     RODO (acute kidney injury) ([facility]) 2023     Intractable low back pain 2023     Chronic diastolic (congestive) heart failure ([facility])      Opioid withdrawal ([facility])      Hypokalemia 2023     Anxiety 2023     Chronic bilateral low back pain with bilateral sciatica  04/06/2023     History of gross hematuria 03/18/2023     Bladder cancer (HCC) 03/17/2023     Obstructive sleep apnea on CPAP      Hypertension      Bladder mass 03/06/2023     Nocturia 03/05/2023       LOS (days): 3  Geometric Mean LOS (GMLOS) (days): 2  Days to GMLOS:-0.8     OBJECTIVE:    Risk of Unplanned Readmission Score: 24.02     Current admission status: Inpatient    Preferred Pharmacy:   [id number] [address]  Phone: [phone] Fax: [phone]    Primary Care Provider: Robert Budinetz, MD    Primary Insurance: MEDICARE  Secondary Insurance: BLUE CROSS    ASSESSMENT:  Active Health Care Proxies       Cami Irving Health Care Representative - Spouse   Primary Phone: [phone] (Mobile)                 Advance Directives  Does patient have a Health Care POA?: Yes  Does patient have Advance Directives?: Yes  Advance Directives: Power of  for health care, Power of  for finance, Living will  Primary Contact: Cami Irving (Spouse)    Patient Information  Admitted from:: Home  Mental Status: Alert  During Assessment patient was accompanied by: Not accompanied during assessment  Assessment information provided by:: Patient  Primary Caregiver: Self  Support Systems: Spouse/significant other  Home entry access options. Select all that apply.: No steps to enter home  Type of Current Residence: 2 story home  Upon entering residence, is there a bedroom on the main floor (no further steps)?: No  A bedroom is located on the following floor levels of residence (select all that apply):: 2nd Floor  Upon entering residence, is there a bathroom on the main floor (no further steps)?: Yes  Number of steps to 2nd floor from main floor: One Flight  Living Arrangements: Lives w/ Spouse/significant other    Activities of Daily Living Prior to Admission  Functional Status: Independent  Completes ADLs independently?:  Yes  Ambulates independently?: Yes  Does patient use assisted devices?: Yes  Assisted Devices (DME) used: Walker, Straight Cane  Does patient currently own DME?: Yes  What DME does the patient currently own?: Straight Cane, Walker  Does patient have a history of Outpatient Therapy (PT/OT)?: Yes  Does the patient have a history of Short-Term Rehab?: No  Does patient have a history of HHC?: No  Does patient currently have HHC?: No    Patient Information Continued  Does patient have prescription coverage?: Yes  Does patient have a history of substance abuse?: No  Does patient have a history of Mental Health Diagnosis?: No    Means of Transportation  Means of Transport to [facility]:: Drives Self          DISCHARGE DETAILS:    Discharge planning discussed with:: pt at bedside  [place] of Choice: Yes        Were Treatment Team discharge recommendations reviewed with patient/caregiver?: Yes  Did patient/caregiver verbalize understanding of patient care needs?: Yes  Were patient/caregiver advised of the risks associated with not following Treatment Team discharge recommendations?: Yes    DME Referral Provided  Referral made for DME?: No (pt already owns a RW)    Other Referral/Resources/Interventions Provided:  Interventions: HHC  Referral Comments: Initial assessment completed with pt at bedside. Therapy recommendations discussed. Pt would be interested in HHC services as after care plan instead of OP therapy. Pt also owns a RW, no further DME needs at this  time. [facility] referrals placed via [facility] at request of pt. CM will follow for accepting HHC options and review with pt when known.    Treatment Team Recommendation: Home with Home Health Care  Discharge Destination Plan:: Home with Home Health Care  Transport at Discharge : Family

## 2024-11-03 NOTE — ASSESSMENT & PLAN NOTE
Wt Readings from Last 3 Encounters:   11/03/24 111 kg (244 lb 7.8 oz)   10/16/24 113 kg (250 lb)   10/15/24 112 kg (247 lb 6.4 oz)

## 2024-11-03 NOTE — ASSESSMENT & PLAN NOTE
Wt Readings from Last 3 Encounters:   11/03/24 111 kg (244 lb 7.8 oz)   10/16/24 113 kg (250 lb)   10/15/24 112 kg (247 lb 6.4 oz)     Follows with [facility]ology at [facility].  Recently taken off beta-blocker, patient is unsure why and there are no notes available for review.   Will continue torsemide 40 mg twice daily with potassium supplementation  Takes [facility] as outpatient, currently held.  As above, examines euvolemic to slightly hypervolemic.  In light of hypokalemia and improving oxygen requirements, will not escalate diuretics at this time.  I/O, daily weights, low NA diet  Will continue to monitor volume status

## 2024-11-03 NOTE — PROGRESS NOTES
Progress Note - Hospitalist   Name: Dominick Irving 64 y.o. male I MRN: [id number]  [facility] I Date of Admission: 10/30/2024   Date of Service: 11/3/2024 I Hospital Day: 3    Assessment & Plan  Lumbar stenosis  Admitted to neurosurgery service for elective robotically assisted L3-4 decompressive hemilaminectomy with foraminotomies with transverse lumbar interbody fusion 10/30 with Dr. Aguilar   Surgical drain management per primary team  DVT ppx with SQ heparin  Pain control per primary team/APS.  Ketamine gtt stopped 11/2AM. Multimodal regimen in place.   PT/OT noted, no rehab needs at this time.   Acute respiratory failure (HCC)  Initially required 5L. Does not wear oxygen at baseline.  CTA done 11/1 for HA showed biapical groundglass opacities which may reflect pulmonary interstitial edema versus pneumonia/pneumonitis.  Volume status appears euvolemic to slightly hypervolemic, in light of hypokalemia and improving oxygen requirements, will not escalate diuretics for now. Continue PO Torsemide and monitor.   Pulmonary toilet/IS  Weaned to room air   Chronic diastolic (congestive) heart failure (HCC)  Wt Readings from Last 3 Encounters:   11/03/24 111 kg (244 lb 7.8 oz)   10/16/24 113 kg (250 lb)   10/15/24 112 kg (247 lb 6.4 oz)     Follows with cardiology at [facility].  Recently taken off beta-blocker, patient is unsure why and there are no notes available for review.   Will continue torsemide 40 mg twice daily with potassium supplementation  Takes [facility] as outpatient, currently held.  As above, examines euvolemic to slightly hypervolemic.  In light of hypokalemia and improving oxygen requirements, will not escalate diuretics at this time.  I/O, daily weights, low NA diet  Will continue to monitor volume status  Hypokalemia  Improving with replacement   Continue standing dose of 40 MEQ BID.   Mg acceptable   Can d/c tele  DM2 (diabetes mellitus, type 2) ([facility])  Lab Results   Component Value Date     HGBA1C 9.0 (H) 10/01/2024       Recent Labs     11/02/24  2047 11/03/24  0601 11/03/24  0746 11/03/24  1052   POCGLU 214* 139 138 225*   Uncontrolled as evidenced by A1c of 9.  Home regimen Tresiba 8 units daily, metformin 500 mg in a.m., 1000 mg in p.m, Ozempic weekly. Has DEXCOM at home. Follows with [facility] endocrinology.   For now, we will continue Lantus 8 units daily, will add Humalog 2 units 3 times daily with meals and continue sliding scale.  Continue to hold metformin/Ozempic  Diabetic diet  Monitor Accu-Cheks and adjust regimen as needed  Obstructive sleep apnea on CPAP  Intolerant of CPAP, has machine but uses sparingly   If patient is having nocturnal hypoxia, suspect it is in the setting of this.   Hyponatremia  Noted   Continue home Torsemide  BMP in AM  Chronic, continuous use of opioids  Patient with chronic pain and chronic opioid dependence. He is followed by palliative care in the outpatient setting.   Bladder cancer (HCC)  Follows with urology. S/P TURP and immunotherapy per record review.   Headache    CTA H/N ordered by primary team, no acute findings.   On multimodal pain regimen as above    VTE Pharmacologic Prophylaxis:   Moderate Risk (Score 3-4) - Pharmacological DVT Prophylaxis Ordered: heparin.    Mobility:   Basic Mobility Inpatient Raw Score: 22  JH-HLM Goal: 7: Walk 25 feet or more  JH-HLM Achieved: 8: Walk 250 feet ot more  JH-HLM Goal achieved. Continue to encourage appropriate mobility.    Patient Centered Rounds: I performed bedside rounds with nursing staff today.   Discussions with Specialists or Other Care Team Provider:     Education and Discussions with Family / Patient: pt    Current Length of Stay: 3 day(s)  Current Patient Status: Inpatient   Certification Statement: The patient will continue to require additional inpatient hospital stay due to per primary team  Discharge Plan: per primary team     Code Status: Level 1 - Full Code    Subjective   No complaints, doing  very well he says.  Went for a walk.  Going to bathroom ok and appetite is good.  Wants to know when he can go home.     Objective :  Temp:  [97.6 °F (36.4 °C)-99 °F (37.2 °C)] 97.8 °F (36.6 °C)  HR:  [50-76] 55  BP: (125-143)/(58-64) 125/58  Resp:  [16-20] 19  SpO2:  [85 %-97 %] 92 %  O2 Device: None (Room air)  Nasal Cannula O2 Flow Rate (L/min):  [0.5 L/min-2 L/min] 1 L/min    Body mass index is 38.29 kg/m².     Input and Output Summary (last 24 hours):     Intake/Output Summary (Last 24 hours) at 11/3/2024 1223  Last data filed at 11/3/2024 0912  Gross per 24 hour   Intake 1040 ml   Output 2846 ml   Net -1806 ml       Physical Exam  Vitals reviewed.   Constitutional:       General: He is not in acute distress.     Appearance: He is not toxic-appearing.   HENT:      Head: Normocephalic and atraumatic.   Eyes:      Extraocular Movements: Extraocular movements intact.   Pulmonary:      Effort: Pulmonary effort is normal. No respiratory distress.   Abdominal:      General: There is no distension.   Musculoskeletal:         General: Swelling (b/l LE edema) present. Normal range of motion.   Neurological:      General: No focal deficit present.      Mental Status: He is alert and oriented to person, place, and time.   Psychiatric:         Mood and Affect: Mood normal.         Behavior: Behavior normal.         Thought Content: Thought content normal.         Lines/Drains:  Lines/Drains/Airways       Active Status       Name Placement date Placement time Site Days    Closed/Suction Drain Right Back Bulb 7 Fr. 10/30/24  1234  Back  4                            Lab Results: I have reviewed the following results:   Results from last 7 days   Lab Units 11/02/24  0617   WBC Thousand/uL 9.85   HEMOGLOBIN g/dL 12.5   HEMATOCRIT % 37.2   PLATELETS Thousands/uL 160     Results from last 7 days   Lab Units 11/03/24  0609   SODIUM mmol/L 134*   POTASSIUM mmol/L 3.3*   CHLORIDE mmol/L 91*   CO2 mmol/L 34*   BUN mg/dL 18    CREATININE mg/dL 0.85   ANION GAP mmol/L 9   CALCIUM mg/dL 8.5   GLUCOSE RANDOM mg/dL 135     Results from last 7 days   Lab Units 11/01/24  0544   INR  1.00     Results from last 7 days   Lab Units 11/03/24  1052 11/03/24  0746 11/03/24  0601 11/02/24  2047 11/02/24  1731 11/02/24  1141 11/02/24  0753 11/01/24  2124 11/01/24  1700 11/01/24  1251 10/31/24  1930 10/30/24  0658   POC GLUCOSE mg/dl 225* 138 139 214* 250* 261* 164* 223* 200* 207* 354* 178*               Recent Cultures (last 7 days):         Imaging Results Review: No pertinent imaging studies reviewed.  Other Study Results Review: No additional pertinent studies reviewed.    Last 24 Hours Medication List:     Current Facility-Administered Medications:     acetaminophen (TYLENOL) tablet 975 mg, Q8H ISIS    atorvastatin (LIPITOR) tablet 40 mg, Daily    bisacodyl (DULCOLAX) rectal suppository 10 mg, Daily PRN    calcium carbonate (TUMS) chewable tablet 1,000 mg, Daily PRN    DULoxetine (CYMBALTA) delayed release capsule 60 mg, Daily    fentaNYL (DURAGESIC) 75 mcg/hr TD 72 hr patch 1 patch, Q72H    gabapentin (NEURONTIN) capsule 600 mg, 4x Daily    glycopyrrolate (ROBINUL) injection 0.2 mg, Q4H PRN    haloperidol lactate (HALDOL) injection 2 mg, Q30 Min PRN    heparin (porcine) subcutaneous injection 5,000 Units, Q8H ISIS **AND** [COMPLETED] Platelet count, Once    HYDROmorphone (DILAUDID) injection 1 mg, Q6H PRN    HYDROmorphone (DILAUDID) tablet 8 mg, Q3H PRN    insulin glargine (LANTUS) subcutaneous injection 8 Units 0.08 mL, QAM    insulin lispro (HumALOG/ADMELOG) 100 units/mL subcutaneous injection 1-5 Units, HS    insulin lispro (HumALOG/ADMELOG) 100 units/mL subcutaneous injection 1-6 Units, TID AC **AND** Fingerstick Glucose (POCT), TID AC    insulin lispro (HumALOG/ADMELOG) 100 units/mL subcutaneous injection 2 Units, TID With Meals    lidocaine (LIDODERM) 5 % patch 1 patch, Daily    LORazepam (ATIVAN) injection 1 mg, Q4H PRN    methocarbamol  (ROBAXIN) tablet 750 mg, Q6H ISIS    metolazone (ZAROXOLYN) tablet 2.5 mg, Daily PRN    naloxone (NARCAN) 0.04 mg/mL syringe 0.04 mg, Q1MIN PRN    ondansetron (ZOFRAN) injection 4 mg, Q6H PRN    polyethylene glycol (MIRALAX) packet 17 g, Daily    potassium chloride (Klor-Con M20) CR tablet 40 mEq, BID    senna-docusate sodium (SENOKOT S) 8.6-50 mg per tablet 1 tablet, HS    torsemide (DEMADEX) tablet 40 mg, BID    Administrative Statements   Today, Patient Was Seen By: Magui Naidu PA-C      **Please Note: This note may have been constructed using a voice recognition system.**

## 2024-11-03 NOTE — ASSESSMENT & PLAN NOTE
Lab Results   Component Value Date    HGBA1C 9.0 (H) 10/01/2024       Recent Labs     11/02/24  1731 11/02/24  2047 11/03/24  0601 11/03/24  0746   POCGLU 250* 214* 139 138       Blood Sugar Average: Last 72 hrs:  (P) 215

## 2024-11-03 NOTE — PLAN OF CARE
Problem: Prexisting or High Potential for Compromised Skin Integrity  Goal: Skin integrity is maintained or improved  Description: INTERVENTIONS:  - Identify patients at risk for skin breakdown  - Assess and monitor skin integrity  - Assess and monitor nutrition and hydration status  - Monitor labs   - Assess for incontinence   - Turn and reposition patient  - Assist with mobility/ambulation  - Relieve pressure over bony prominences  - Avoid friction and shearing  - Provide appropriate hygiene as needed including keeping skin clean and dry  - Evaluate need for skin moisturizer/barrier cream  - Collaborate with interdisciplinary team   - Patient/family teaching  - Consider wound care consult   Outcome: Progressing     Problem: PAIN - ADULT  Goal: Verbalizes/displays adequate comfort level or baseline comfort level  Description: Interventions:  - Encourage patient to monitor pain and request assistance  - Assess pain using appropriate pain scale  - Administer analgesics based on type and severity of pain and evaluate response  - Implement non-pharmacological measures as appropriate and evaluate response  - Consider cultural and social influences on pain and pain management  - Notify physician/advanced practitioner if interventions unsuccessful or patient reports new pain  Outcome: Progressing     Problem: INFECTION - ADULT  Goal: Absence or prevention of progression during hospitalization  Description: INTERVENTIONS:  - Assess and monitor for signs and symptoms of infection  - Monitor lab/diagnostic results  - Monitor all insertion sites, i.e. indwelling lines, tubes, and drains  - Monitor endotracheal if appropriate and nasal secretions for changes in amount and color  - Fowler appropriate cooling/warming therapies per order  - Administer medications as ordered  - Instruct and encourage patient and family to use good hand hygiene technique  - Identify and instruct in appropriate isolation precautions for  identified infection/condition  Outcome: Progressing  Goal: Absence of fever/infection during neutropenic period  Description: INTERVENTIONS:  - Monitor WBC    Outcome: Completed     Problem: SAFETY ADULT  Goal: Patient will remain free of falls  Description: INTERVENTIONS:  - Educate patient/family on patient safety including physical limitations  - Instruct patient to call for assistance with activity   - Consult OT/PT to assist with strengthening/mobility   - Keep Call bell within reach  - Keep bed low and locked with side rails adjusted as appropriate  - Keep care items and personal belongings within reach  - Initiate and maintain comfort rounds  - Make Fall Risk Sign visible to staff  - Offer Toileting every 4 Hours, in advance of need  - Initiate/Maintain fall alarm  - Obtain necessary fall risk management equipment.  - Apply yellow socks and bracelet for high fall risk patients  - Consider moving patient to room near nurses station  Outcome: Progressing  Goal: Maintain or return to baseline ADL function  Description: INTERVENTIONS:  -  Assess patient's ability to carry out ADLs; assess patient's baseline for ADL function and identify physical deficits which impact ability to perform ADLs (bathing, care of mouth/teeth, toileting, grooming, dressing, etc.)  - Assess/evaluate cause of self-care deficits   - Assess range of motion  - Assess patient's mobility; develop plan if impaired  - Assess patient's need for assistive devices and provide as appropriate  - Encourage maximum independence but intervene and supervise when necessary  - Involve family in performance of ADLs  - Assess for home care needs following discharge   - Consider OT consult to assist with ADL evaluation and planning for discharge  - Provide patient education as appropriate  Outcome: Progressing  Goal: Maintains/Returns to pre admission functional level  Description: INTERVENTIONS:  - Perform AM-PAC 6 Click Basic Mobility/ Daily Activity  assessment daily.  - Set and communicate daily mobility goal to care team and patient/family/caregiver.   - Collaborate with rehabilitation services on mobility goals if consulted  - Perform Range of Motion 2 times a day.  - Reposition patient every 2 hours.  - Dangle patient 2 times a day  - Stand patient 2 times a day  - Ambulate patient 2 times a day  - Out of bed to chair 2 times a day   - Out of bed for meals 2 times a day  - Out of bed for toileting  - Record patient progress and toleration of activity level   Outcome: Progressing     Problem: DISCHARGE PLANNING  Goal: Discharge to home or other facility with appropriate resources  Description: INTERVENTIONS:  - Identify barriers to discharge w/patient and caregiver  - Arrange for needed discharge resources and transportation as appropriate  - Identify discharge learning needs (meds, wound care, etc.)  - Arrange for interpretive services to assist at discharge as needed  - Refer to Case Management Department for coordinating discharge planning if the patient needs post-hospital services based on physician/advanced practitioner order or complex needs related to functional status, cognitive ability, or social support system  Outcome: Progressing     Problem: Knowledge Deficit  Goal: Patient/family/caregiver demonstrates understanding of disease process, treatment plan, medications, and discharge instructions  Description: Complete learning assessment and assess knowledge base.  Interventions:  - Provide teaching at level of understanding  - Provide teaching via preferred learning methods  Outcome: Progressing

## 2024-11-03 NOTE — ASSESSMENT & PLAN NOTE
4 Days Post-Op Procedure(s):  Robotically assisted L3-4 decompressive hemilaminectomy and foraminotomies with transverse lumbar interbody fusion right-sided approach with add-on to an L4-5 fixation fusion  Prior L4-5 fusion with adjacent level disease which he was symptomatic from refractory from conservative measures.    Imaging reviewed personally and by attending. Final results as below  Xray lumbar spine 10/31/2024: Expected postop changes status post L3-4 TLIF with L3-L5  fixation/fusion.     Plan:  Continue regular neurologic checks  MAXIMO drain to full suction with 343ml/24hrs, maintain at this time.  Will change to gravity today  Pain control : APS following.  Appreciate recommendations.  Tylenol 975 mg every 8 hours scheduled  Cymbalta 60 mg daily  Gabapentin 600 mg 4 times daily  Robaxin 750 mg every 6 hours scheduled  Lidoderm patch, 12 hours on/12 hours off to affected area  Fentanyl transdermal patch 75 mcg/h, 1 patch every 72 hours  Oral Dilaudid 8 mg every 3 hours as needed for moderate/severe pain  IV Dilaudid 1 mg every 6 hours as needed for breakthrough pain  Narcan as needed for respiratory depression/opioid reversal  Bowel regimen with MiraLAX and senna-S daily.  Added suppository as needed  Eval and mobilize per PT/OT, recommending home with outpatient PT  No brace required.  DVT ppx: SCDs, subcu heparin  Patient currently on 2 L nasal cannula, wean as tolerated  Consulted internal medicine to assist with electrolyte imbalances, elevated blood sugars and medical management.    Rounding completed with GUERA Purvis    Neurosurgery will continue to follow as primary.  Patient not medically stable for D/C at this time pending pain control, drain in place and electrolyte resuscitation.  Please contact us with any questions or concerns.

## 2024-11-03 NOTE — PROGRESS NOTES
Progress Note - Neurosurgery   Name: Dominick Irving 64 y.o. male I MRN: [id number]  [facility] I Date of Admission: 10/30/2024   Date of Service: 11/3/2024 I Hospital Day: 3  Assessment & Plan  Lumbar stenosis  4 Days Post-Op Procedure(s):  Robotically assisted L3-4 decompressive hemilaminectomy and foraminotomies with transverse lumbar interbody fusion right-sided approach with add-on to an L4-5 fixation fusion  Prior L4-5 fusion with adjacent level disease which he was symptomatic from refractory from conservative measures.    Imaging reviewed personally and by attending. Final results as below  Xray lumbar spine 10/31/2024: Expected postop changes status post L3-4 TLIF with L3-L5  fixation/fusion.     Plan:  Continue regular neurologic checks  MAXIMO drain to full suction with 343ml/24hrs, maintain at this time.  Will change to gravity today  Pain control : APS following.  Appreciate recommendations.  Tylenol 975 mg every 8 hours scheduled  Cymbalta 60 mg daily  Gabapentin 600 mg 4 times daily  Robaxin 750 mg every 6 hours scheduled  Lidoderm patch, 12 hours on/12 hours off to affected area  Fentanyl transdermal patch 75 mcg/h, 1 patch every 72 hours  Oral Dilaudid 8 mg every 3 hours as needed for moderate/severe pain  IV Dilaudid 1 mg every 6 hours as needed for breakthrough pain  Narcan as needed for respiratory depression/opioid reversal  Bowel regimen with MiraLAX and senna-S daily.  Added suppository as needed  Eval and mobilize per PT/OT, recommending home with outpatient PT  No brace required.  DVT ppx: SCDs, subcu heparin  Patient currently on 2 L nasal cannula, wean as tolerated  Consulted internal medicine to assist with electrolyte imbalances, elevated blood sugars and medical management.    Rounding completed with GUERA Purvis    Neurosurgery will continue to follow as primary.  Patient not medically stable for D/C at this time pending pain control, drain in place and electrolyte resuscitation.   Please contact us with any questions or concerns.     Obstructive sleep apnea on CPAP    Chronic, continuous use of opioids    Postoperative pain after spinal surgery    Lumbar facet arthropathy    Hyponatremia  Na low at 134  Internal medicine following, input appreciated  Hypokalemia  Pt with hx of Hypokalemia.   K 3.3  Internal medicine following, input appreciated  Will continue to monitor with repeat labs.   Bladder cancer (HCC)    Chronic diastolic (congestive) heart failure (HCC)  Wt Readings from Last 3 Encounters:   11/03/24 111 kg (244 lb 7.8 oz)   10/16/24 113 kg (250 lb)   10/15/24 112 kg (247 lb 6.4 oz)             Acute respiratory failure (HCC)    DM2 (diabetes mellitus, type 2) (HCC)  Lab Results   Component Value Date    HGBA1C 9.0 (H) 10/01/2024       Recent Labs     11/02/24  1731 11/02/24  2047 11/03/24  0601 11/03/24  0746   POCGLU 250* 214* 139 138       Blood Sugar Average: Last 72 hrs:  (P) 215    Headache          Subjective patient continues to have a shadow of a headache.  He currently rates his low back pain 4/10 without any radiation.  He states his current pain regimen helps with his pain.  He has not had a bowel movement is passing gas.  He remains on 2 L nasal cannula with plans to try and wean today.  Otherwise he offers no complaints.      Objective patient comfortably sitting at recliner, NAD.    Temp:  [97.6 °F (36.4 °C)-99 °F (37.2 °C)] 98.2 °F (36.8 °C)  HR:  [50-76] 54  BP: (128-148)/(58-64) 128/58  Resp:  [16-20] 19  SpO2:  [85 %-97 %] 93 %  O2 Device: Nasal cannula  Nasal Cannula O2 Flow Rate (L/min):  [0.5 L/min-3 L/min] 1 L/min    I/O         11/01 0701 11/02 0700 11/02 0701 11/03 0700 11/03 0701 11/04 0700    P.O. 1200 1180     I.V. (mL/kg) 145 (1.3) 17.5 (0.2)     IV Piggyback  100     Total Intake(mL/kg) 1345 (12.1) 1297.5 (11.7)     Urine (mL/kg/hr) 2000 (0.8) 4325 (1.6)     Drains 405 343     Stool  0     Total Output 2405 8588     Net -1060.1 -8200.5             Unmeasured Stool Occurrence  0 x           General appearance: alert, appears stated age, cooperative and no distress  Head: Normocephalic, without obvious abnormality, atraumatic  Eyes: EOMI, PERRL, conjugate gaze  Neck: supple, symmetrical, trachea midline   Back: Dressing intact with some old serosanguineous drainage, MAXIMO drain remains in place to gravity with serosanguineous drainage, maintain at this time  Lungs: non labored breathing, on 2L NC  Heart: regular heart rate  Neurologic:   Mental status: Alert, oriented x3, thought content appropriate, speech is clear, following commands  Cranial nerves: grossly intact (Cranial nerves II-XII)  Sensory: normal to light touch all extremities x 4  Motor: moving all extremities without focal weakness, strength 5/5 throughout  Reflexes: 2+ and symmetric, no Jeff's or clonus appreciated    Lab Results: I have reviewed the following results:  Recent Labs     11/01/24  0544 11/02/24  0617 11/02/24  1627 11/03/24  0609   WBC  --  9.85  --   --    HGB  --  12.5  --   --    HCT  --  37.2  --   --    PLT  --  160  --   --    SODIUM 133* 137   < > 134*   K 2.8* 2.8*   < > 3.3*   CL 89* 90*   < > 91*   CO2 35* 37*   < > 34*   BUN 22 19   < > 18   CREATININE 0.97 0.83   < > 0.85   GLUC 170* 157*   < > 135   MG  --   --    < > 1.9   INR 1.00  --   --   --     < > = values in this interval not displayed.         VTE Pharmacologic Prophylaxis: Sequential compression device (Venodyne)  and Heparin

## 2024-11-03 NOTE — ASSESSMENT & PLAN NOTE
Pt with hx of Hypokalemia.   K 3.3  Internal medicine following, input appreciated  Will continue to monitor with repeat labs.

## 2024-11-03 NOTE — ASSESSMENT & PLAN NOTE
Lab Results   Component Value Date    HGBA1C 9.0 (H) 10/01/2024       Recent Labs     11/02/24  2047 11/03/24  0601 11/03/24  0746 11/03/24  1052   POCGLU 214* 139 138 225*   Uncontrolled as evidenced by A1c of 9.  Home regimen Tresiba 8 units daily, metformin 500 mg in a.m., 1000 mg in p.m, Ozempic weekly. Has DEXCOM at home. Follows with [facility] endocrinology.   For now, we will continue Lantus 8 units daily, will add Humalog 2 units 3 times daily with meals and continue sliding scale.  Continue to hold metformin/Ozempic  Diabetic diet  Monitor Accu-Cheks and adjust regimen as needed

## 2024-11-04 ENCOUNTER — TRANSITIONAL CARE MANAGEMENT (OUTPATIENT)
Dept: FAMILY MEDICINE CLINIC | Facility: CLINIC | Age: 64
End: 2024-11-04

## 2024-11-04 ENCOUNTER — HOME HEALTH ADMISSION (OUTPATIENT)
Dept: HOME HEALTH SERVICES | Facility: HOME HEALTHCARE | Age: 64
End: 2024-11-04
Payer: MEDICARE

## 2024-11-04 VITALS
SYSTOLIC BLOOD PRESSURE: 124 MMHG | RESPIRATION RATE: 16 BRPM | WEIGHT: 244.93 LBS | HEIGHT: 67 IN | OXYGEN SATURATION: 91 % | BODY MASS INDEX: 38.44 KG/M2 | HEART RATE: 51 BPM | TEMPERATURE: 97.7 F | DIASTOLIC BLOOD PRESSURE: 53 MMHG

## 2024-11-04 LAB
ANION GAP SERPL CALCULATED.3IONS-SCNC: 8 MMOL/L (ref 4–13)
BUN SERPL-MCNC: 21 MG/DL (ref 5–25)
CALCIUM SERPL-MCNC: 8.6 MG/DL (ref 8.4–10.2)
CHLORIDE SERPL-SCNC: 91 MMOL/L (ref 96–108)
CO2 SERPL-SCNC: 35 MMOL/L (ref 21–32)
CREAT SERPL-MCNC: 0.9 MG/DL (ref 0.6–1.3)
GFR SERPL CREATININE-BSD FRML MDRD: 89 ML/MIN/1.73SQ M
GLUCOSE SERPL-MCNC: 146 MG/DL (ref 65–140)
GLUCOSE SERPL-MCNC: 203 MG/DL (ref 65–140)
GLUCOSE SERPL-MCNC: 218 MG/DL (ref 65–140)
POTASSIUM SERPL-SCNC: 3.8 MMOL/L (ref 3.5–5.3)
SODIUM SERPL-SCNC: 134 MMOL/L (ref 135–147)

## 2024-11-04 PROCEDURE — 80048 BASIC METABOLIC PNL TOTAL CA: CPT | Performed by: NURSE PRACTITIONER

## 2024-11-04 PROCEDURE — 99232 SBSQ HOSP IP/OBS MODERATE 35: CPT | Performed by: PHYSICIAN ASSISTANT

## 2024-11-04 PROCEDURE — NC001 PR NO CHARGE: Performed by: NEUROLOGICAL SURGERY

## 2024-11-04 PROCEDURE — 99024 POSTOP FOLLOW-UP VISIT: CPT | Performed by: NEUROLOGICAL SURGERY

## 2024-11-04 PROCEDURE — 82948 REAGENT STRIP/BLOOD GLUCOSE: CPT

## 2024-11-04 RX ORDER — ACETAMINOPHEN 325 MG/1
975 TABLET ORAL EVERY 8 HOURS SCHEDULED
Start: 2024-11-04

## 2024-11-04 RX ORDER — AMOXICILLIN 250 MG
1 CAPSULE ORAL
Start: 2024-11-04

## 2024-11-04 RX ORDER — POLYETHYLENE GLYCOL 3350 17 G/17G
17 POWDER, FOR SOLUTION ORAL DAILY
Start: 2024-11-05

## 2024-11-04 RX ORDER — METHOCARBAMOL 750 MG/1
750 TABLET, FILM COATED ORAL EVERY 6 HOURS SCHEDULED
Qty: 30 TABLET | Refills: 0 | Status: SHIPPED | OUTPATIENT
Start: 2024-11-04

## 2024-11-04 RX ORDER — POTASSIUM CHLORIDE 750 MG/1
40 TABLET, EXTENDED RELEASE ORAL 2 TIMES DAILY
Qty: 120 TABLET | Refills: 0 | Status: SHIPPED | OUTPATIENT
Start: 2024-11-04

## 2024-11-04 RX ADMIN — INSULIN LISPRO 2 UNITS: 100 INJECTION, SOLUTION INTRAVENOUS; SUBCUTANEOUS at 08:19

## 2024-11-04 RX ADMIN — HYDROMORPHONE HYDROCHLORIDE 8 MG: 2 TABLET ORAL at 08:33

## 2024-11-04 RX ADMIN — GABAPENTIN 600 MG: 300 CAPSULE ORAL at 08:27

## 2024-11-04 RX ADMIN — DULOXETINE HYDROCHLORIDE 60 MG: 60 CAPSULE, DELAYED RELEASE ORAL at 08:27

## 2024-11-04 RX ADMIN — ACETAMINOPHEN 975 MG: 325 TABLET, FILM COATED ORAL at 13:55

## 2024-11-04 RX ADMIN — ACETAMINOPHEN 975 MG: 325 TABLET, FILM COATED ORAL at 05:10

## 2024-11-04 RX ADMIN — HEPARIN SODIUM 5000 UNITS: 5000 INJECTION INTRAVENOUS; SUBCUTANEOUS at 05:10

## 2024-11-04 RX ADMIN — POTASSIUM CHLORIDE 40 MEQ: 1500 TABLET, EXTENDED RELEASE ORAL at 08:27

## 2024-11-04 RX ADMIN — GABAPENTIN 600 MG: 300 CAPSULE ORAL at 11:14

## 2024-11-04 RX ADMIN — METHOCARBAMOL 750 MG: 750 TABLET ORAL at 05:11

## 2024-11-04 RX ADMIN — INSULIN LISPRO 2 UNITS: 100 INJECTION, SOLUTION INTRAVENOUS; SUBCUTANEOUS at 12:20

## 2024-11-04 RX ADMIN — ATORVASTATIN CALCIUM 40 MG: 40 TABLET, FILM COATED ORAL at 08:27

## 2024-11-04 RX ADMIN — HYDROMORPHONE HYDROCHLORIDE 8 MG: 2 TABLET ORAL at 12:19

## 2024-11-04 RX ADMIN — TORSEMIDE 40 MG: 20 TABLET ORAL at 08:27

## 2024-11-04 RX ADMIN — HYDROMORPHONE HYDROCHLORIDE 8 MG: 2 TABLET ORAL at 05:27

## 2024-11-04 RX ADMIN — METHOCARBAMOL 750 MG: 750 TABLET ORAL at 11:14

## 2024-11-04 RX ADMIN — INSULIN GLARGINE 8 UNITS: 100 INJECTION, SOLUTION SUBCUTANEOUS at 08:27

## 2024-11-04 NOTE — PLAN OF CARE
Problem: PAIN - ADULT  Goal: Verbalizes/displays adequate comfort level or baseline comfort level  Description: Interventions:  - Encourage patient to monitor pain and request assistance  - Assess pain using appropriate pain scale  - Administer analgesics based on type and severity of pain and evaluate response  - Implement non-pharmacological measures as appropriate and evaluate response  - Consider cultural and social influences on pain and pain management  - Notify physician/advanced practitioner if interventions unsuccessful or patient reports new pain  Outcome: Progressing     Problem: INFECTION - ADULT  Goal: Absence or prevention of progression during hospitalization  Description: INTERVENTIONS:  - Assess and monitor for signs and symptoms of infection  - Monitor lab/diagnostic results  - Monitor all insertion sites, i.e. indwelling lines, tubes, and drains  - Monitor endotracheal if appropriate and nasal secretions for changes in amount and color  - [place] appropriate cooling/warming therapies per order  - Administer medications as ordered  - Instruct and encourage patient and family to use good hand hygiene technique  - Identify and instruct in appropriate isolation precautions for identified infection/condition  Outcome: Progressing

## 2024-11-04 NOTE — CASE MANAGEMENT
Case Management Discharge Planning Note    Patient name Dominick Irving  [facility] MRN [id number]  : 1960 Date 2024       Current Admission Date: 10/30/2024  Current Admission Diagnosis:Lumbar stenosis   Patient Active Problem List    Diagnosis Date Noted Date Diagnosed    Headache 2024     Lumbar stenosis 10/31/2024     Lumbar facet arthropathy 10/31/2024     Postoperative pain after spinal surgery 10/30/2024     Unstable gait 2024     Hypertensive heart disease with congestive heart failure ([facility]) 2024     Depression, recurrent ([facility]) 2024     History of bladder cancer 2024     Closed fracture of right ankle with routine healing 2024     COPD with acute exacerbation ([facility]) 2024     DM2 (diabetes mellitus, type 2) ([facility]) 2024     Unable to ambulate 2023     Neuropathy 2023     Postlaminectomy syndrome 2023     S/P laminectomy 10/16/2023     Lumbar radiculopathy 10/16/2023     Chronic pain syndrome 10/16/2023     Acute low back pain with sciatica 10/05/2023     Benign prostatic hyperplasia 2023     Encounter for surgical aftercare following surgery of nervous system 2023     Abdominal pain 2023     Suprapubic pressure 2023     Right groin pain 2023     Morbid (severe) obesity due to excess calories ([facility]) 2023     History of hematuria 2023     Hyponatremia 2023     Acute respiratory failure ([facility]) 2023     Electrolyte abnormality 2023     Bradycardia 2023     Coronary artery disease      Chronic, continuous use of opioids 2023     Hyperglycemia 2023     RODO (acute kidney injury) ([facility]) 2023     Intractable low back pain 2023     Chronic diastolic (congestive) heart failure ([facility])      Opioid withdrawal ([facility])      Hypokalemia 2023     Anxiety 2023     Chronic bilateral low back pain with bilateral sciatica 2023      History of gross hematuria 03/18/2023     Bladder cancer (HCC) 03/17/2023     Obstructive sleep apnea on CPAP      Hypertension      Bladder mass 03/06/2023     Nocturia 03/05/2023       LOS (days): 4  Geometric Mean LOS (GMLOS) (days): 2  Days to GMLOS:-1.9     OBJECTIVE:  Risk of Unplanned Readmission Score: 24.11   Current admission status: Inpatient   [facility] Pharm[facility]   [facility] [address]  [address] [facility] [address]  Phone: [phone] Fax: [phone]    Primary Care Provider: Robert Budinetz, MD    Primary Insurance: MEDICARE  Secondary Insurance: BLUE CROSS    DISCHARGE DETAILS:    Discharge planning discussed with:: patient  Freedom of Choice: Yes  Comments - Freedom of Choice: Discussed FOC  CM contacted family/caregiver?: No- see comments (Declined)  Were Treatment Team discharge recommendations reviewed with patient/caregiver?: Yes  Did patient/caregiver verbalize understanding of patient care needs?: Yes  Were patient/caregiver advised of the risks associated with not following Treatment Team discharge recommendations?: Yes    Requested Home Health Care         Is the patient interested in HHC at discharge?: Yes  Home Health Discipline requested:: Nursing, Physical Therapy  Home Health Agency Name:: [facility]  Home Health Follow-Up Provider:: PCP  Home Health Services Needed:: Evaluate Functional Status and Safety, Gait/ADL Training, Strengthening/Theraputic Exercises to Improve Function, Heart Failure Management  Homebound Criteria Met:: Requires the Assistance of Another Person for Safe Ambulation or to Leave the Home, Uses an Assist Device (i.e. cane, walker, etc)  Supporting Clincal Findings:: Limited Endurance, Fatigues Easliy in Short Distances    DME Referral Provided  Referral made for DME?: No    Other Referral/Resources/Interventions Provided:  Interventions: HHC  Referral Comments: This CM discussed therapy recs with pt, Therapy  recs Home PT, pt in aagreement with dcp, [facility] able to accept and reserved in aidin, awaiting medical stability.    Treatment Team Recommendation: Home with Home Health Care  Discharge Destination Plan:: Home with Home Health Care  Transport at Discharge : Family

## 2024-11-04 NOTE — DISCHARGE INSTR - AVS FIRST PAGE
Discharge Instructions  Posterior Lumbar Fusion/Fixation      Surgical incisional care:  Maintain dressing in place for 3 days. On postoperative day 3, dressing may be removed and incision may be left open to air.  Keep incision clean and dry. Avoid applying creams, lotion or antiseptic to incision area.  Check the wound daily. If the incision becomes red, swollen, tender, warm, or has increased drainage please notify physician immediately.  If steri-strips are in place, allow them to fall off.  If still in place at two week postoperative visit, we will remove them.  May shower 3 days after surgery, but do not soak in a tub and no swimming until cleared.  Incision may be cleaned with water and a mild antimicrobial soap using a clean washcloth. Incision is to be gently patted dry with a clean towel.   Continue to change bed linens and pajamas more frequently. Wear clean clothes daily.     Activity Restrictions:  No heavy lifting greater than 10lbs and no strenuous activities until cleared.   No bending or twisting. No BLTs (bending, lifting, twisting). Avoid pushing/pulling movements.  May walk as tolerated. Encourage at least 4 short walks per day. No prolonged sitting.   No driving for at least 2 weeks or until cleared by physician.  If you were provided a brace, it is to be worn whenever you are out of bed until directed otherwise. It may be put on while sitting at bedside.     Postoperative medication:  Take pain medications to relieve incision pain, and muscle relaxants to prevent spasms as directed. Please see after visit summary (AVS) for details.   Do not operate heavy machinery or vehicles while taking sedating medications.  Use a bowel regimen while on opioids as they induce constipation. Ie. Senokot-S, Miralax, Colace, etc. Increase fiber and water intake.   Do not take ibuprofen, Naproxen/Aleve or any non steroidal anti-inflammatory medications, NSAIDs, until cleared by surgeon. May take Tylenol  instead.  If taking Coumadin, Aspirin, or Plavix, you may resume these medications when cleared by Neurosurgery.    Follow-up as scheduled for a 2 week incision check. Follow-up as scheduled for 6 week postoperative visit with repeat Xrays.     **Please notify MD if you experience a fever 101F, chills or have increased pain, numbness, tingling, or weakness in your legs, increased walking difficulties and/or bowel/bladder dysfunction/incontinence**     F/u with PCP and diabetic physician in 1 wek

## 2024-11-04 NOTE — ASSESSMENT & PLAN NOTE
Pt with hx of Hypokalemia.   K 3.8  Internal medicine following, input appreciated  Will continue to monitor

## 2024-11-04 NOTE — PROGRESS NOTES
Progress Note - Acute Pain   Name: Dominick Irving 64 y.o. male I MRN: [id number]  [facility] I Date of Admission: 10/30/2024   Date of Service: 11/4/2024 I Hospital Day: 4    Assessment & Plan  Postoperative pain after spinal surgery  Patient is status post robotically assisted L3-4 decompressive hemilaminectomy and foraminotomies with transverse lumbar interbody fusion right-sided approach with add-on to an L4-5 fixation fusion with neurosurgery 10/29/2024.  Patient received 20 mg IV methadone intraoperatively.  Patient is managed on high-dose chronic opioids in the outpatient setting by his PCP.  APS consulted for assistance in postoperative pain management in setting of chronic opioid use and intraoperative IV methadone.    Multimodal analgesia:  Tylenol 975 mg every 8 hours scheduled  Cymbalta 60 mg daily  Gabapentin 600 mg 4 times daily  Robaxin 750 mg every 6 hours scheduled  Lidoderm patch, 12 hours on/12 hours off to affected area  Fentanyl transdermal patch 75 mcg/h, 1 patch every 72 hours  Oral Dilaudid 8 mg every 3 hours as needed for moderate/severe pain  Add Narcan as needed for respiratory depression/opioid reversal    At discharge, recommend the following regimen:  Tylenol 975 mg every 8 hours scheduled  Cymbalta 60 mg daily, home medication  Gabapentin 600 mg 4 times daily, home medication  Robaxin 750 mg every 6 hours scheduled  Lidoderm patch, 12 hours on/12 hours off to affected area  Fentanyl transdermal patch 25 mcg/h, 1 patch every 72 hours, home medication  Oral Dilaudid 8 mg every 3 hours as needed for moderate/severe pain, home medication  Patient should not require any additional opioid prescriptions at time of discharge  Patient encouraged to follow-up with his primary care physician who manages his chronic pain medication promptly following discharge  Obstructive sleep apnea on CPAP  Patient's with sleep apnea are at higher risk of respiratory depression secondary to opioid  use.  Attempt to minimize use of opioid pain medication while maintaining adequate analgesia.  Chronic, continuous use of opioids  Patient maintained on chronic opioids in setting of chronic low back pain.  Currently managed by PCP: Robert Budinetz, MD   Patient does currently follow with palliative care I did prescribe him methadone for a short period of time although this was discontinued over apparent side effects.  Palliative care has since deferred opioid management back to PCP.    Current outpatient analgesic regimen:  Cymbalta 60 mg daily  Gabapentin 600 mg 4 times daily  Fentanyl 75 mcg/hour patch-1 patch transdermal every 72 hours  Oral Dilaudid 8 mg every 3 hours as needed for moderate to severe pain    PDMP review:    Bladder cancer (HCC)    Chronic diastolic (congestive) heart failure (HCC)  Wt Readings from Last 3 Encounters:   11/04/24 111 kg (244 lb 14.9 oz)   10/16/24 113 kg (250 lb)   10/15/24 112 kg (247 lb 6.4 oz)             Acute respiratory failure (HCC)    DM2 (diabetes mellitus, type 2) ([facility])  Lab Results   Component Value Date    HGBA1C 9.0 (H) 10/01/2024       Recent Labs     11/03/24  1052 11/03/24  1646 11/03/24  2100 11/04/24  0733   POCGLU 225* 193* 192* 146*       Blood Sugar Average: Last 72 hrs:  (P) [id number]    Headache      APS will sign off at this time. Thank you for the consult. All opioids and other analgesics to be written at discretion of primary team. Please contact Acute Pain Service - via SecureChat from [phone] with additional questions or concerns. See redealize or [employer] for additional contacts and after hours information.    Subjective   Dominick Irving is a 64 y.o. male who presents with history of continuous opioid dependence in setting of chronic back pain and prior spinal surgery. Patient is status post robotically assisted L3-4 decompressive hemilaminectomy and foraminotomies with transverse lumbar interbody fusion right-sided approach with add-on  to an L4-5 fixation fusion with neurosurgery 10/29/2024 and received 20 mg of IV methadone intraoperatively. APS was consulted for assistance in postoperative pain management.     Pain History  Current pain location(s):  Pain Score: 5  Pain Location/Orientation: Location: Back  Pain Scale: Pain Assessment Tool: 0-10  24 hour history: Patient states his pain continues to be well-controlled.  Has used significantly less as needed opioid than he used at home prior to surgery.  Is anticipating discharge home today.    Opioid requirement previous 24 hours: Dilaudid 8 mg p.o. x 5.  Dilaudid 1 mg IV x 1.  Meds/Allergies   all current active meds have been reviewed, current meds:   Current Facility-Administered Medications:     acetaminophen (TYLENOL) tablet 975 mg, Q8H ISIS    atorvastatin (LIPITOR) tablet 40 mg, Daily    bisacodyl (DULCOLAX) rectal suppository 10 mg, Daily PRN    calcium carbonate (TUMS) chewable tablet 1,000 mg, Daily PRN    DULoxetine (CYMBALTA) delayed release capsule 60 mg, Daily    fentaNYL (DURAGESIC) 75 mcg/hr TD 72 hr patch 1 patch, Q72H    gabapentin (NEURONTIN) capsule 600 mg, 4x Daily    heparin (porcine) subcutaneous injection 5,000 Units, Q8H ISIS **AND** [COMPLETED] Platelet count, Once    HYDROmorphone (DILAUDID) tablet 8 mg, Q3H PRN    insulin glargine (LANTUS) subcutaneous injection 8 Units 0.08 mL, QAM    insulin lispro (HumALOG/ADMELOG) 100 units/mL subcutaneous injection 1-5 Units, HS    insulin lispro (HumALOG/ADMELOG) 100 units/mL subcutaneous injection 1-6 Units, TID AC **AND** Fingerstick Glucose (POCT), TID AC    insulin lispro (HumALOG/ADMELOG) 100 units/mL subcutaneous injection 2 Units, TID With Meals    lidocaine (LIDODERM) 5 % patch 1 patch, Daily    methocarbamol (ROBAXIN) tablet 750 mg, Q6H ISIS    metolazone (ZAROXOLYN) tablet 2.5 mg, Daily PRN    naloxone (NARCAN) 0.04 mg/mL syringe 0.04 mg, Q1MIN PRN    ondansetron (ZOFRAN) injection 4 mg, Q6H PRN    polyethylene glycol  (MIRALAX) packet 17 g, Daily    potassium chloride (Klor-Con M20) CR tablet 40 mEq, BID    senna-docusate sodium (SENOKOT S) 8.6-50 mg per tablet 1 tablet, HS    torsemide (DEMADEX) tablet 40 mg, BID, and PTA meds:   Prior to Admission Medications   Prescriptions Last Dose Informant Patient Reported? Taking?   DULoxetine (CYMBALTA) 60 mg delayed release capsule 10/23/2024  No No   Sig: Take 1 capsule (60 mg total) by mouth daily   Farxiga 5 MG TABS 10/21/2024 Self Yes No   Si mg daily 5mg alternating with 2.5mg for fluid retention   HYDROmorphone (DILAUDID) 8 MG tablet 10/30/2024 at 0300  No Yes   Sig: Take 1 tablet (8 mg total) by mouth every 3 (three) hours as needed for moderate pain or severe pain Max Daily Amount: 64 mg   Insulin Pen Needle 32G X 4 MM MISC   No No   Sig: Use daily Dx E11.9   Lancet Devices (Adjustable Lancing Device) MISC   No No   Sig: Use as directed to test glucose once daily. One touch device or may change to what insurance covers.   Lancets (onetouch ultrasoft) lancets   No No   Sig: Use as instructed. Dx E11.9   aspirin 81 mg chewable tablet 10/20/2024 Self Yes No   Sig: Chew 81 mg daily   atorvastatin (LIPITOR) 40 mg tablet 10/25/2024  No No   Sig: Take 1 tablet (40 mg total) by mouth daily   co-enzyme Q-10 30 MG capsule 10/23/2024 Self Yes No   Sig: Take 100 mg by mouth daily    fentaNYL (DURAGESIC) 75 mcg/hr 10/27/2024  No No   Sig: Place 1 patch on the skin over 72 hours every third day Max Daily Amount: 1 patch   gabapentin (NEURONTIN) 600 MG tablet 10/25/2024  No Yes   Sig: Take 1 tablet (600 mg total) by mouth 4 (four) times a day   insulin degludec (Tresiba FlexTouch) 100 units/mL injection pen 10/25/2024  No No   Sig: Inject 8 Units under the skin daily   metFORMIN (GLUCOPHAGE-XR) 500 mg 24 hr tablet 10/23/2024  No No   Sig: Take 1 pill qam and 2 pills qpm   metolazone (ZAROXOLYN) 2.5 mg tablet 10/25/2024  No No   Sig: Take 1 tablet (2.5 mg total) by mouth daily as needed  (edema)   multivitamin-iron-minerals-folic acid (CENTRUM) chewable tablet 10/23/2024 Self Yes No   Sig: Chew 1 tablet daily   naloxone (NARCAN) 4 mg/0.1 mL nasal spray   No No   Sig: Administer 1 spray into a nostril. If no response after 2-3 minutes, give another dose in the other nostril using a new spray.   potassium chloride (Klor-Con) 10 mEq tablet 10/23/2024  Yes No   potassium chloride (Klor-Con) 10 mEq tablet   No No   Sig: TAKE 2 TABLETS BY MOUTH TWICE DAILY   potassium chloride (Klor-Con) 10 mEq tablet   No No   Sig: TAKE 2 TABLETS BY MOUTH TWICE DAILY   semaglutide, 0.25 or 0.5 mg/dose, (Ozempic, 0.25 or 0.5 MG/DOSE,) 2 mg/3 mL injection pen 10/17/2024  No No   Sig: Inject 0.75 mL (0.5 mg total) under the skin every 7 days   torsemide (DEMADEX) 20 mg tablet 10/29/2024  No Yes   Sig: Take 2 tablets (40 mg total) by mouth 2 (two) times a day      Facility-Administered Medications: None     Allergies   Allergen Reactions    Mirtazapine Other (See Comments) and Confusion     Pt drove without knowing and woke up at a new destination    Venlafaxine Dizziness     Objective :  Temp:  [97.7 °F (36.5 °C)-98.2 °F (36.8 °C)] 97.7 °F (36.5 °C)  HR:  [51-69] 51  BP: (103-125)/(51-54) 124/53  Resp:  [16-17] 16  SpO2:  [90 %-96 %] 91 %  O2 Device: None (Room air)    Physical Exam  Vitals and nursing note reviewed.   Constitutional:       General: He is awake. He is not in acute distress.     Appearance: He is not ill-appearing, toxic-appearing or diaphoretic.      Comments: Sitting up in chair.  Appears comfortable.   Skin:     General: Skin is warm and dry.   Neurological:      Mental Status: He is alert and oriented to person, place, and time.      GCS: GCS eye subscore is 4. GCS verbal subscore is 5. GCS motor subscore is 6.   Psychiatric:         Attention and Perception: Attention normal.         Speech: Speech normal.         Behavior: Behavior normal. Behavior is cooperative.            Lab Results: I have  reviewed the following results:  Estimated Creatinine Clearance: 97.3 mL/min (by C-G formula based on SCr of 0.9 mg/dL).  Lab Results   Component Value Date    WBC 9.85 11/02/2024    HGB 12.5 11/02/2024    HCT 37.2 11/02/2024     11/02/2024         Component Value Date/Time    K 3.8 11/04/2024 0900    CL 91 (L) 11/04/2024 0900    CO2 35 (H) 11/04/2024 0900    CO2 39 (H) 10/30/2024 1136    BUN 21 11/04/2024 0900    CREATININE 0.90 11/04/2024 0900         Component Value Date/Time    CALCIUM 8.6 11/04/2024 0900    ALKPHOS 98 10/01/2024 1006    AST 14 10/01/2024 1006    ALT 19 10/01/2024 1006    TP 8.0 10/01/2024 1006    ALB 4.2 10/01/2024 1006       Imaging Results Review: No pertinent imaging studies reviewed.  Other Study Results Review: No additional pertinent studies reviewed.     Administrative Statements   I have spent a total time of 31 minutes in caring for this patient on the day of the visit/encounter including Risks and benefits of tx options, Instructions for management, Patient and family education, Importance of tx compliance, Risk factor reductions, Counseling / Coordination of care, Documenting in the medical record, Reviewing / ordering tests, medicine, procedures  , Obtaining or reviewing history  , and Communicating with other healthcare professionals .

## 2024-11-04 NOTE — ASSESSMENT & PLAN NOTE
Lab Results   Component Value Date    HGBA1C 9.0 (H) 10/01/2024       Recent Labs     11/03/24  1052 11/03/24  1646 11/03/24  2100 11/04/24  0733   POCGLU 225* 193* 192* 146*       Blood Sugar Average: Last 72 hrs:  (P) [id number]

## 2024-11-04 NOTE — ASSESSMENT & PLAN NOTE
5 Days Post-Op Procedure(s):  Robotically assisted L3-4 decompressive hemilaminectomy and foraminotomies with transverse lumbar interbody fusion right-sided approach with add-on to an L4-5 fixation fusion  Prior L4-5 fusion with adjacent level disease which he was symptomatic from refractory from conservative measures.    Imaging reviewed personally and by attending. Final results as below  Xray lumbar spine 10/31/2024: Expected postop changes status post L3-4 TLIF with L3-L5  fixation/fusion.     Plan:  Continue regular neurologic checks  MAXIMO drain removed this afternoon without difficulty, 1 suture tied down in place.  Incision open air clean, dry, intact  Pain control : APS following.  Appreciate recommendations.  Tylenol 975 mg every 8 hours scheduled  Cymbalta 60 mg daily  Gabapentin 600 mg 4 times daily  Robaxin 750 mg every 6 hours scheduled  Lidoderm patch, 12 hours on/12 hours off to affected area  Fentanyl transdermal patch 75 mcg/h, 1 patch every 72 hours  Oral Dilaudid 8 mg every 3 hours as needed for moderate/severe pain  IV Dilaudid stopped today  Narcan as needed for respiratory depression/opioid reversal  Bowel regimen with MiraLAX and senna-S daily.  Suppository as needed  Eval and mobilize per PT/OT, recommending home with outpatient PT  No brace required.  DVT ppx: SCDs, subcu heparin  Consulted internal medicine to assist with electrolyte imbalances, elevated blood sugars and medical management.    Rounding completed with GUERA Contreras     Neurosurgery will continue to follow as primary.  Patient is medically stable for discharge home, he will follow-up in 2 weeks for incision check in 6 weeks for postop visit with repeat lumbar spine x-rays, call with any further question or concerns.

## 2024-11-04 NOTE — PROGRESS NOTES
Patient:    MRN:  [id number]    Ray Request ID:  [id number]    Level of care reserved:  Home Health Agency    Partner Reserved:  [facility], [place] [address] [phone]    Clinical needs requested:    Geography searched:  [phone]    Start of Service:    Request sent:  9:48am EST on 11/3/2024 by Fly Newell    Partner reserved:  11:33am EST on 11/4/2024 by Job Chen    Choice list shared:  11:30am EST on 11/4/2024 by Job Chen

## 2024-11-04 NOTE — ASSESSMENT & PLAN NOTE
Lab Results   Component Value Date    HGBA1C 9.0 (H) 10/01/2024       Recent Labs     11/03/24  1646 11/03/24  2100 11/04/24  0733 11/04/24  1122   POCGLU 193* 192* 146* 203*         Blood Sugar Average: Last 72 hrs:  (P) [id number]  On SSI, Lantus and 2 units with meals  Internal medicine following, appreciated  Will discharge patient on home medication and has follow-up on 11/6 with physician

## 2024-11-04 NOTE — ASSESSMENT & PLAN NOTE
Patient is status post robotically assisted L3-4 decompressive hemilaminectomy and foraminotomies with transverse lumbar interbody fusion right-sided approach with add-on to an L4-5 fixation fusion with neurosurgery 10/29/2024.  Patient received 20 mg IV methadone intraoperatively.  Patient is managed on high-dose chronic opioids in the outpatient setting by his PCP.  APS consulted for assistance in postoperative pain management in setting of chronic opioid use and intraoperative IV methadone.    Multimodal analgesia:  Tylenol 975 mg every 8 hours scheduled  Cymbalta 60 mg daily  Gabapentin 600 mg 4 times daily  Robaxin 750 mg every 6 hours scheduled  Lidoderm patch, 12 hours on/12 hours off to affected area  Fentanyl transdermal patch 75 mcg/h, 1 patch every 72 hours  Oral Dilaudid 8 mg every 3 hours as needed for moderate/severe pain  Add Narcan as needed for respiratory depression/opioid reversal    At discharge, recommend the following regimen:  Tylenol 975 mg every 8 hours scheduled  Cymbalta 60 mg daily, home medication  Gabapentin 600 mg 4 times daily, home medication  Robaxin 750 mg every 6 hours scheduled  Lidoderm patch, 12 hours on/12 hours off to affected area  Fentanyl transdermal patch 25 mcg/h, 1 patch every 72 hours, home medication  Oral Dilaudid 8 mg every 3 hours as needed for moderate/severe pain, home medication  Patient should not require any additional opioid prescriptions at time of discharge  Patient encouraged to follow-up with his primary care physician who manages his chronic pain medication promptly following discharge

## 2024-11-04 NOTE — ASSESSMENT & PLAN NOTE
Lab Results   Component Value Date    HGBA1C 9.0 (H) 10/01/2024       Recent Labs     11/03/24  1052 11/03/24  1646 11/03/24  2100 11/04/24  0733   POCGLU 225* 193* 192* 146*       Blood Sugar Average: Last 72 hrs:  (P) 196.4108192437514493  On SSI, Lantus and 2 units with meals  Internal medicine following, appreciated

## 2024-11-04 NOTE — QUICK NOTE
Patient medically stable for discharge from SLIM standpoint if plan per primary team to do so.

## 2024-11-04 NOTE — ASSESSMENT & PLAN NOTE
Wt Readings from Last 3 Encounters:   11/04/24 111 kg (244 lb 14.9 oz)   10/16/24 113 kg (250 lb)   10/15/24 112 kg (247 lb 6.4 oz)

## 2024-11-04 NOTE — ASSESSMENT & PLAN NOTE
5 Days Post-Op Procedure(s):  Robotically assisted L3-4 decompressive hemilaminectomy and foraminotomies with transverse lumbar interbody fusion right-sided approach with add-on to an L4-5 fixation fusion  Prior L4-5 fusion with adjacent level disease which he was symptomatic from refractory from conservative measures.    Imaging reviewed personally and by attending. Final results as below  Xray lumbar spine 10/31/2024: Expected postop changes status post L3-4 TLIF with L3-L5  fixation/fusion.     Plan:  Continue regular neurologic checks  MAXIMO drain to gravity with 15ml/12hrs, maintain at this time.  Tentative plan for remove this afternoon  Pain control : APS following.  Appreciate recommendations.  Tylenol 975 mg every 8 hours scheduled  Cymbalta 60 mg daily  Gabapentin 600 mg 4 times daily  Robaxin 750 mg every 6 hours scheduled  Lidoderm patch, 12 hours on/12 hours off to affected area  Fentanyl transdermal patch 75 mcg/h, 1 patch every 72 hours  Oral Dilaudid 8 mg every 3 hours as needed for moderate/severe pain  IV Dilaudid stopped today  Narcan as needed for respiratory depression/opioid reversal  Bowel regimen with MiraLAX and senna-S daily.  Suppository as needed  Eval and mobilize per PT/OT, recommending home with outpatient PT  No brace required.  DVT ppx: SCDs, subcu heparin  Consulted internal medicine to assist with electrolyte imbalances, elevated blood sugars and medical management.    Rounding completed with GUERA Contreras     Neurosurgery will continue to follow as primary.  Tentative plan to remove MAXIMO drain this afternoon and discharge home if cleared by internal medicine, please contact us with any questions or concerns.

## 2024-11-04 NOTE — PLAN OF CARE
Problem: Prexisting or High Potential for Compromised Skin Integrity  Goal: Skin integrity is maintained or improved  Description: INTERVENTIONS:  - Identify patients at risk for skin breakdown  - Assess and monitor skin integrity  - Assess and monitor nutrition and hydration status  - Monitor labs   - Assess for incontinence   - Turn and reposition patient  - Assist with mobility/ambulation  - Relieve pressure over bony prominences  - Avoid friction and shearing  - Provide appropriate hygiene as needed including keeping skin clean and dry  - Evaluate need for skin moisturizer/barrier cream  - Collaborate with interdisciplinary team   - Patient/family teaching  - Consider wound care consult   Outcome: Progressing     Problem: PAIN - ADULT  Goal: Verbalizes/displays adequate comfort level or baseline comfort level  Description: Interventions:  - Encourage patient to monitor pain and request assistance  - Assess pain using appropriate pain scale  - Administer analgesics based on type and severity of pain and evaluate response  - Implement non-pharmacological measures as appropriate and evaluate response  - Consider cultural and social influences on pain and pain management  - Notify physician/advanced practitioner if interventions unsuccessful or patient reports new pain  Outcome: Progressing     Problem: INFECTION - ADULT  Goal: Absence or prevention of progression during hospitalization  Description: INTERVENTIONS:  - Assess and monitor for signs and symptoms of infection  - Monitor lab/diagnostic results  - Monitor all insertion sites, i.e. indwelling lines, tubes, and drains  - Monitor endotracheal if appropriate and nasal secretions for changes in amount and color  - [place] appropriate cooling/warming therapies per order  - Administer medications as ordered  - Instruct and encourage patient and family to use good hand hygiene technique  - Identify and instruct in appropriate isolation precautions for  identified infection/condition  Outcome: Progressing     Problem: SAFETY ADULT  Goal: Patient will remain free of falls  Description: INTERVENTIONS:  - Educate patient/family on patient safety including physical limitations  - Instruct patient to call for assistance with activity   - Consult OT/PT to assist with strengthening/mobility   - Keep Call bell within reach  - Keep bed low and locked with side rails adjusted as appropriate  - Keep care items and personal belongings within reach  - Initiate and maintain comfort rounds  - Make Fall Risk Sign visible to staff  - Offer Toileting every 4 Hours, in advance of need  - Initiate/Maintain fall alarm  - Obtain necessary fall risk management equipment  - Apply yellow socks and bracelet for high fall risk patients  - Consider moving patient to room near nurses station  Outcome: Progressing  Goal: Maintain or return to baseline ADL function  Description: INTERVENTIONS:  -  Assess patient's ability to carry out ADLs; assess patient's baseline for ADL function and identify physical deficits which impact ability to perform ADLs (bathing, care of mouth/teeth, toileting, grooming, dressing, etc.)  - Assess/evaluate cause of self-care deficits   - Assess range of motion  - Assess patient's mobility; develop plan if impaired  - Assess patient's need for assistive devices and provide as appropriate  - Encourage maximum independence but intervene and supervise when necessary  - Involve family in performance of ADLs  - Assess for home care needs following discharge   - Consider OT consult to assist with ADL evaluation and planning for discharge  - Provide patient education as appropriate  Outcome: Progressing  Goal: Maintains/Returns to pre admission functional level  Description: INTERVENTIONS:  - Perform AM-PAC 6 Click Basic Mobility/ Daily Activity assessment daily.  - Set and communicate daily mobility goal to care team and patient/family/caregiver.   - Collaborate with  rehabilitation services on mobility goals if consulted  - Perform Range of Motion 2 times a day.  - Reposition patient every 2 hours.  - Dangle patient 2 times a day  - Stand patient 2 times a day  - Ambulate patient 2 times a day  - Out of bed to chair 2 times a day   - Out of bed for meals 2 times a day  - Out of bed for toileting  - Record patient progress and toleration of activity level   Outcome: Progressing     Problem: DISCHARGE PLANNING  Goal: Discharge to home or other facility with appropriate resources  Description: INTERVENTIONS:  - Identify barriers to discharge w/patient and caregiver  - Arrange for needed discharge resources and transportation as appropriate  - Identify discharge learning needs (meds, wound care, etc.)  - Arrange for interpretive services to assist at discharge as needed  - Refer to Case Management Department for coordinating discharge planning if the patient needs post-hospital services based on physician/advanced practitioner order or complex needs related to functional status, cognitive ability, or social support system  Outcome: Progressing     Problem: Knowledge Deficit  Goal: Patient/family/caregiver demonstrates understanding of disease process, treatment plan, medications, and discharge instructions  Description: Complete learning assessment and assess knowledge base.  Interventions:  - Provide teaching at level of understanding  - Provide teaching via preferred learning methods  Outcome: Progressing

## 2024-11-04 NOTE — CASE MANAGEMENT
Case Management Discharge Planning Note    Patient name Dominick Irving  [facility] 621/[facility] 621-01 MRN [id number]  : 1960 Date 2024       Current Admission Date: 10/30/2024  Current Admission Diagnosis:Lumbar stenosis   Patient Active Problem List    Diagnosis Date Noted Date Diagnosed    Headache 2024     Lumbar stenosis 10/31/2024     Lumbar facet arthropathy 10/31/2024     Postoperative pain after spinal surgery 10/30/2024     Unstable gait 2024     Hypertensive heart disease with congestive heart failure ([facility]ly [facility]) 2024     Depression, recurrent ([facility]) 2024     History of bladder cancer 2024     Closed fracture of right ankle with routine healing 2024     COPD with acute exacerbation ([facility]) 2024     DM2 (diabetes mellitus, type 2) ([facility]) 2024     Unable to ambulate 2023     Neuropathy 2023     Postlaminectomy syndrome 2023     S/P laminectomy 10/16/2023     Lumbar radiculopathy 10/16/2023     Chronic pain syndrome 10/16/2023     Acute low back pain with sciatica 10/05/2023     Benign prostatic hyperplasia 2023     Encounter for surgical aftercare following surgery of nervous system 2023     Abdominal pain 2023     Suprapubic pressure 2023     Right groin pain 2023     Morbid (severe) obesity due to excess calories ([facility]) 2023     History of hematuria 2023     Hyponatremia 2023     Acute respiratory failure ([facility]) 2023     Electrolyte abnormality 2023     Bradycardia 2023     Coronary artery disease      Chronic, continuous use of opioids 2023     Hyperglycemia 2023     RODO (acute kidney injury) ([facility]) 2023     Intractable low back pain 2023     Chronic diastolic (congestive) heart failure ([facility])      Opioid withdrawal ([facility])      Hypokalemia 2023     Anxiety 2023     Chronic bilateral low back pain with bilateral sciatica 2023      History of gross hematuria 03/18/2023     Bladder cancer (HCC) 03/17/2023     Obstructive sleep apnea on CPAP      Hypertension      Bladder mass 03/06/2023     Nocturia 03/05/2023       LOS (days): 4  Geometric Mean LOS (GMLOS) (days): 2  Days to GMLOS:-1.9     OBJECTIVE:  Risk of Unplanned Readmission Score: 20.08   Current admission status: Inpatient   Preferred Pharmacy:   [facility] [address]  [address] [facility] [address]  Phone: [phone] Fax: [phone]    Primary Care Provider: Robert Budinetz, MD    Primary Insurance: MEDICARE  Secondary Insurance: BLUE CROSS    DISCHARGE DETAILS:    Discharge planning discussed with:: Patient  Freedom of Choice: Yes  Comments - Freedom of Choice: Discussed  CM contacted family/caregiver?: Yes (Spouse at bedside)  Were Treatment Team discharge recommendations reviewed with patient/caregiver?: Yes  Did patient/caregiver verbalize understanding of patient care needs?: Yes  Were patient/caregiver advised of the risks associated with not following Treatment Team discharge recommendations?: Yes      IMM Given (Date):: 11/04/24  IMM Given to:: Patient  Family notified:: Spouse at bedside.  IMM reviewed with patient, patient agrees with discharge determination.

## 2024-11-04 NOTE — ASSESSMENT & PLAN NOTE
Wt Readings from Last 3 Encounters:   11/04/24 111 kg (244 lb 14.9 oz)   10/16/24 113 kg (250 lb)   10/15/24 112 kg (247 lb 6.4 oz)     Follows with cardiology at [facility].  Recently taken off beta-blocker, patient is unsure why and there are no notes available for review.   Will continue torsemide 40 mg twice daily with potassium supplementation  Takes [facility] as outpatient, currently held.  As above, examines euvolemic to slightly hypervolemic.  In light of hypokalemia and improving oxygen requirements, will not escalate diuretics at this time.  I/O, daily weights, low NA diet

## 2024-11-04 NOTE — PROGRESS NOTES
Progress Note - Neurosurgery   Name: Dominick Irving 64 y.o. male I MRN: [id number]  [facility] I Date of Admission: 10/30/2024   Date of Service: 11/4/2024 I Hospital Day: 4  Assessment & Plan  Lumbar stenosis  5 Days Post-Op Procedure(s):  Robotically assisted L3-4 decompressive hemilaminectomy and foraminotomies with transverse lumbar interbody fusion right-sided approach with add-on to an L4-5 fixation fusion  Prior L4-5 fusion with adjacent level disease which he was symptomatic from refractory from conservative measures.    Imaging reviewed personally and by attending. Final results as below  Xray lumbar spine 10/31/2024: Expected postop changes status post L3-4 TLIF with L3-L5  fixation/fusion.     Plan:  Continue regular neurologic checks  MAXIMO drain to gravity with 15ml/12hrs, maintain at this time.  Tentative plan for remove this afternoon  Pain control : APS following.  Appreciate recommendations.  Tylenol 975 mg every 8 hours scheduled  Cymbalta 60 mg daily  Gabapentin 600 mg 4 times daily  Robaxin 750 mg every 6 hours scheduled  Lidoderm patch, 12 hours on/12 hours off to affected area  Fentanyl transdermal patch 75 mcg/h, 1 patch every 72 hours  Oral Dilaudid 8 mg every 3 hours as needed for moderate/severe pain  IV Dilaudid stopped today  Narcan as needed for respiratory depression/opioid reversal  Bowel regimen with MiraLAX and senna-S daily.  Suppository as needed  Eval and mobilize per PT/OT, recommending home with outpatient PT  No brace required.  DVT ppx: SCDs, subcu heparin  Consulted internal medicine to assist with electrolyte imbalances, elevated blood sugars and medical management.    Rounding completed with GUERA Contreras     Neurosurgery will continue to follow as primary.  Tentative plan to remove MAXIMO drain this afternoon and discharge home if cleared by internal medicine, please contact us with any questions or concerns.     Obstructive sleep apnea on CPAP    Chronic, continuous  use of opioids    Postoperative pain after spinal surgery    Lumbar facet arthropathy    Hyponatremia  Na low at 134  Internal medicine following, input appreciated  Hypokalemia  Pt with hx of Hypokalemia.   K 3.8  Internal medicine following, input appreciated  Will continue to monitor   Bladder cancer (HCC)    Chronic diastolic (congestive) heart failure (HCC)  Wt Readings from Last 3 Encounters:   11/04/24 111 kg (244 lb 14.9 oz)   10/16/24 113 kg (250 lb)   10/15/24 112 kg (247 lb 6.4 oz)             Acute respiratory failure (HCC)    DM2 (diabetes mellitus, type 2) (HCC)  Lab Results   Component Value Date    HGBA1C 9.0 (H) 10/01/2024       Recent Labs     11/03/24  1052 11/03/24  1646 11/03/24  2100 11/04/24  0733   POCGLU 225* 193* 192* 146*       Blood Sugar Average: Last 72 hrs:  (P) 196.3102071919961264  On SSI, Lantus and 2 units with meals  Internal medicine following, appreciated  Headache        Subjective Patient states he was doing well.  He is currently complaining of 4/10 low back pain without radiation and the shadow of a headache.  He states he is able to walk on his own.  He  reports numbness in his feet which is unchanged.  He states he had a bowel movement.  He offers no other complaints and is anxious to get home.      Objective : Patient comfortably sitting on recliner, NAD.  Temp:  [97.7 °F (36.5 °C)-98.2 °F (36.8 °C)] 97.7 °F (36.5 °C)  HR:  [51-69] 51  BP: (103-125)/(51-58) 124/53  Resp:  [16-17] 16  SpO2:  [90 %-96 %] 91 %  O2 Device: None (Room air)    I/O         11/02 0701 11/03 0700 11/03 0701 11/04 0700 11/04 0701 11/05 0700    P.O. 1180 900     I.V. (mL/kg) 17.5 (0.2)      IV Piggyback 100      Total Intake(mL/kg) 1297.5 (11.7) 900 (8.1)     Urine (mL/kg/hr) 4325 (1.6) 352 (0.1)     Drains 343 123     Stool 0 0     Total Output 4668 475     Net -3370.5 +425            Unmeasured Urine Occurrence  3 x     Unmeasured Stool Occurrence 0 x 1 x           General appearance: alert,  appears stated age, cooperative and no distress  Head: Normocephalic, without obvious abnormality, atraumatic  Eyes: EOMI, PERRL, conjugate gaze  Neck: supple, symmetrical, trachea midline   Back: Dressing intact with some old serosanguineous drainage, MAXIMO drain remains in place to gravity with serosanguineous drainage, maintain at this time  Lungs: non labored breathing, on RA  Heart: regular heart rate  Neurologic:   Mental status: Alert, oriented x3, thought content appropriate, speech is clear, following commands  Cranial nerves: grossly intact (Cranial nerves II-XII)  Sensory: normal to light touch all extremities x 4  Motor: moving all extremities without focal weakness, strength 5/5 throughout  Reflexes: 2+ and symmetric, no Jeff's or clonus appreciated    Lab Results: I have reviewed the following results:  Recent Labs     11/02/24  0617 11/02/24  1627 11/03/24  0609 11/04/24  0900   WBC 9.85  --   --   --    HGB 12.5  --   --   --    HCT 37.2  --   --   --      --   --   --    SODIUM 137   < > 134* 134*   K 2.8*   < > 3.3* 3.8   CL 90*   < > 91* 91*   CO2 37*   < > 34* 35*   BUN 19   < > 18 21   CREATININE 0.83   < > 0.85 0.90   GLUC 157*   < > 135 218*   MG  --    < > 1.9  --     < > = values in this interval not displayed.           VTE Pharmacologic Prophylaxis: Sequential compression device (Venodyne)  and Heparin

## 2024-11-04 NOTE — DISCHARGE SUMMARY
Discharge Summary - Neurosurgery   Name: Dominick Irving 64 y.o. male I MRN: [id number]  Unit/Bed#: [facility] 621-01 I Date of Admission: 10/30/2024   Date of Service: 11/4/2024 I Hospital Day: 4    Lumbar stenosis    5 Days Post-Op Procedure(s):  Robotically assisted L3-4 decompressive hemilaminectomy and foraminotomies with transverse lumbar interbody fusion right-sided approach with add-on to an L4-5 fixation fusion  Prior L4-5 fusion with adjacent level disease which he was symptomatic from refractory from conservative measures.     Imaging reviewed personally and by attending. Final results as below  Xray lumbar spine 10/31/2024: Expected postop changes status post L3-4 TLIF with L3-L5  fixation/fusion.      Plan:  Continue regular neurologic checks  MAXIMO drain removed this afternoon without difficulty, 1 suture tied down in place.  Incision open air clean, dry, intact  Pain control : APS following.  Appreciate recommendations.  Tylenol 975 mg every 8 hours scheduled  Cymbalta 60 mg daily  Gabapentin 600 mg 4 times daily  Robaxin 750 mg every 6 hours scheduled  Lidoderm patch, 12 hours on/12 hours off to affected area  Fentanyl transdermal patch 75 mcg/h, 1 patch every 72 hours  Oral Dilaudid 8 mg every 3 hours as needed for moderate/severe pain  IV Dilaudid stopped today  Narcan as needed for respiratory depression/opioid reversal  Bowel regimen with MiraLAX and senna-S daily.  Suppository as needed  Eval and mobilize per PT/OT, recommending home with outpatient PT  No brace required.  DVT ppx: SCDs, subcu heparin  Consulted internal medicine to assist with electrolyte imbalances, elevated blood sugars and medical management.     Rounding completed with GUERA Contreras      Neurosurgery will continue to follow as primary.  Patient is medically stable for discharge home, he will follow-up in 2 weeks for incision check in 6 weeks for postop visit with repeat lumbar spine x-rays, call with any further question or concerns.        Type 2 diabetes    Lab Results   Component Value Date     HGBA1C 9.0 (H) 10/01/2024                Recent Labs     11/03/24  1646 11/03/24  2100 11/04/24  0733 11/04/24  1122   POCGLU 193* 192* 146* 203*            Blood Sugar Average: Last 72 hrs:  (P) [id number]  On SSI, Lantus and 2 units with meals  Internal medicine following, appreciated  Will discharge patient on home medication and has follow-up on 11/6 with physician    Hyponatremia  Sodium 134  Internal medicine following, input appreciated    Hypokalemia  Patient with history of hypokalemia  Potassium 3.8  Internal medicine following, input appreciated  Will discharge patient home on 40 twice daily with follow-up with PCP in 1 week    Admission Date:   Admission Orders (From admission, onward)       Ordered        10/31/24 1511  INPATIENT ADMISSION  Once                           Discharge Date:  11/24    Admitting Diagnosis: Mechanical back pain [M54.9]  Facet arthropathy, lumbar [M47.816]  Spinal stenosis of lumbar region [M48.061]      Discharge Diagnosis:   Hypokalemia  CHF  Hyponatremia  Type 2 diabetes  Lumbar stenosis  Status post robotically assisted L3-4 decompressive hemilaminectomy and foraminotomies with transverse lumbar interbody fusion right-sided approach with add-on to an L4-5 fixation fusion       Medical Problems       Resolved Problems  Date Reviewed: 10/16/2024   None         Attending:     Consulting Physician(s): Dr.Mehta elva Quintero    Procedures Performed:S/p robotically assisted L3-4 decompressive hemilaminectomy and foraminotomies with transverse lumbar interbody fusion right-sided approach with add-on to an L4-5 fixation fusion     Radiology:  CTA head and neck w wo contrast    Result Date: 11/1/2024  Narrative: CTA NECK AND BRAIN WITH AND WITHOUT CONTRAST INDICATION: severe HA COMPARISON:   None. TECHNIQUE:  Routine CT imaging of the Brain without contrast.Post contrast imaging was performed  after administration of iodinated contrast through the neck and brain. Post contrast axial 0.625 mm images timed to opacify the arterial system.  3D rendering was performed on an independent workstation.   MIP reconstructions performed. Coronal and sagittal reconstructions were performed of the non contrast portion of the brain. Radiation dose length product (DLP) for this visit:  1653.15 mGy-cm .  This examination, like all CT scans performed [facility] the [facility], was performed utilizing techniques to minimize radiation dose exposure, including the use of iterative reconstruction and automated exposure control. IV Contrast: 85 cc of Omnipaque 350 IMAGE QUALITY:   Diagnostic FINDINGS: NONCONTRAST BRAIN PARENCHYMA: Decreased attenuation is noted in periventricular and subcortical white matter demonstrating an appearance that is statistically most likely to represent mild microangiopathic change. No CT signs of acute infarction.  No intracranial mass, mass effect or midline shift.  No acute parenchymal hemorrhage. VENTRICLES AND EXTRA-AXIAL SPACES:Normal for the patient's age. VISUALIZED ORBITS: Normal. PARANASAL SINUSES: Normal. CTA NECK ARCH AND GREAT VESSELS: Mild atherosclerotic changes with no severe stenosis. VERTEBRAL ARTERIES: Mild atherosclerotic calcifications of the origin of the left vertebral artery. The left vertebral artery is congenitally dominant. No evidence of dissection. RIGHT CAROTID: Less than 50% stenosis.   No dissection. LEFT CAROTID: Less than 50% stenosis.   No dissection. NASCET criteria was used to determine the degree of internal carotid artery diameter stenosis. CTA BRAIN: INTERNAL CAROTID ARTERIES: No stenosis or occlusion. ANTERIOR CEREBRAL ARTERY CIRCULATION:  No stenosis or occlusion. MIDDLE CEREBRAL ARTERY CIRCULATION:  No stenosis or occlusion. DISTAL VERTEBRAL ARTERIES:  No stenosis or occlusion. BASILAR ARTERY:  No stenosis or occlusion. POSTERIOR CEREBRAL ARTERIES:  No stenosis or occlusion. VENOUS STRUCTURES:  Normal. NON VASCULAR ANATOMY BONY STRUCTURES:  No acute osseous abnormality. There is nonspecific straightening of the cervical lordosis without subluxation. Mild to moderate multilevel spondylotic changes most pronounced at C5-C6 where there is a disc osteophyte complex and focal calcification perhaps of a disc herniation or the posterior longitudinal ligament causing mild to moderate central canal and right subarticular zone narrowing, exemplified on series 602, image 64., SOFT TISSUES OF THE NECK:  Normal. THORACIC INLET: Biapical groundglass opacities noted. There are apical emphysematous changes.     Impression: CT Brain: 1. No acute intracranial hemorrhage, mass effect or edema. 2. Mild, chronic microangiopathy. CT Angiography: 1.  No hemodynamically significant stenosis in the major arteries of the neck. 2.  No intracranial aneurysm or major intracranial arterial stenosis. Other: 1. Biapical groundglass opacities may reflect pulmonary interstitial edema. Pneumonia/pneumonitis not excluded. Further clinical assessment recommended. 2. Emphysema. Workstation performed: [id number]     XR spine lumbar 2 or 3 views injury    Result Date: 11/1/2024  Narrative: C-ARM -lumbar spine INDICATION: Facet arthropathy, lumbar.  Procedure guidance. COMPARISON: Lumbar spine radiographs 7/19/2024. TECHNIQUE: FLUOROSCOPY TIME:   30.0 SEC 2 FLUOROSCOPIC IMAGES FINDINGS: Fluoroscopic guidance provided for procedure guidance. Extension of interbody fusion previously L4-L5 now at L3-L5 with interbody spacer at L3-L4. Osseous and soft tissue detail limited by technique.     Impression: Fluoroscopic guidance provided for procedure guidance.  Please refer to the separate procedure notes for additional details. Workstation performed: [id number]     XR lumbar spine 2 or 3 views    Result Date: 11/1/2024  Narrative: XR SPINE LUMBAR 2 OR 3 VIEWS INJURY INDICATION: revision lumbar fusion.  COMPARISON: Lumbar spine radiographs 7/19/2024, fluoroscopy 10/30/2024. FINDINGS: Extension of interbody fusion previously L4-L5 now at L3-L5 with interbody spacer at L3-L4. No acute fracture. Intact pedicles. Five non-rib-bearing lumbar vertebral bodies. Normal alignment. Age-appropriate lumbar degenerative changes. Recent postsurgical changes in the lumbar spine with surgical drain in place. Atherosclerotic aortic calcifications.     Impression: Extension of posterior fusion L3-L5. Computerized Assisted Algorithm (CAA) may have been used to analyze all applicable images. Workstation performed: [id number]     CT lung screening program    Result Date: 10/22/2024  Narrative: CT CHEST LUNG CANCER SCREENING WITHOUT IV CONTRAST INDICATION: G89.29: Other chronic pain Z87.891: Personal history of nicotine dependence. COMPARISON: CTA chest dated July 3, 2023. CT chest dated July 26, 2020. TECHNIQUE: Unenhanced CT examination of the chest was performed utilizing a low dose protocol. Multiplanar 2D reformatted images were created from the source data. Radiation dose length product (DLP) for this visit:  224 mGy-cm . This examination, like all CT scans performed in the [facility], was performed utilizing techniques to minimize radiation dose exposure, including the use of iterative reconstruction and automated exposure control. FINDINGS: LUNGS: There is no acute infiltrate or pleural effusion. Mild to moderate emphysematous lung changes are again noted most prominent at the lung apices. There is an area of parenchymal scarring at the posterior left lung apex. There is a stable 7 mm right  middle lobe pulmonary nodule dating back to a CT chest performed on July 26, 2020. There is a stable 4 mm nodule at the right lower lobe, series 4 image 93. A stable right perifissural nodule measures 5 mm, series 4 image 55. A right middle lobe perifissural nodule measures 5 mm, stable (series 4 image 74). There is no  tracheal or endobronchial lesion. PLEURA: Unremarkable. HEART/GREAT VESSELS: Heart is unremarkable for patient's age. No thoracic aortic aneurysm. MEDIASTINUM AND SIMON: There are multiple scattered prominent but nonenlarged mediastinal and bilateral hilar lymph nodes similar to the prior exam. CHEST WALL AND LOWER NECK: Unremarkable. VISUALIZED STRUCTURES IN THE UPPER ABDOMEN: There is fatty infiltration of the visualized liver. OSSEOUS STRUCTURES: No acute fracture or destructive osseous lesion.     Impression: No new nodules and/or definitely benign nodules. Stable pulmonary nodules measuring up to 7 mm.   Lung-RADS2, benign appearance or behavior.  Continue annual screening with LDCT in 12 months. Background emphysema. Workstation performed: [id number]     CT lumbar spine without contrast    Result Date: 10/18/2024  Narrative: CT LUMBAR SPINE WITHOUT CONTRAST INDICATION:   M54.9: Dorsalgia, unspecified M47.816: Spondylosis without myelopathy or radiculopathy, lumbar region M48.061: Spinal stenosis, lumbar region without neurogenic claudication. COMPARISON: X-rays and MRI dated 7/19/2024. TECHNIQUE:  Contiguous axial images through the lumbar spine were obtained. Sagittal and coronal reconstructions were performed. Radiation dose length product (DLP) for this visit:  1006 mGy-cm .  This examination, like all CT scans performed in the [facility], was performed utilizing techniques to minimize radiation dose exposure, including the use of iterative reconstruction and automated exposure control. IMAGE QUALITY:  Diagnostic. FINDINGS: ALIGNMENT:  There are 5 lumbar type vertebral bodies. Grade 1 anterior spondylolisthesis of L3 upon L4 with chronic unilateral left-sided spondylolysis of L3. The patient has undergone posterior decompression and fusion at L4-5 with pedicle screws, spinal rods and bone graft material placed posterior to the facet joints. VERTEBRAE: Mild diffuse osteopenia of the bony  structures. Tiny chronic Schmorl's node within the anterior aspect of the L2 superior endplate. No acute fracture. The left SI joint is fused anteriorly. DEGENERATIVE CHANGES: Lower Thoracic spine: Normal lower thoracic disc spaces. Lumbar Spine: L1-2: No central canal stenosis or foraminal stenosis. L2-3: Mild annular bulging. No canal stenosis or foraminal narrowing. L3-4: Grade 1 anterior spondylolisthesis. Vacuum phenomenon with slight loss of disc height. Moderate circumferential annular bulging. There is chronic spondylolysis on the left with facet hypertrophic degenerative change and ligamentum flavum thickening. There appears to have been a prior left laminectomy. Mild to moderate tricompartmental canal stenosis. Mild bilateral foraminal narrowing. L4-5: Mild annular bulging with endplate osteophyte formation centrally, extending into the neural foramen bilaterally. Prior bilateral laminectomy. The residual facets are mildly hypertrophic and degenerated and there is bone graft material posterior to  the facets and at the laminectomy site. The osteophyte formation has mild mass effect upon the ventral aspect of the thecal sac at and to the left of midline. Only mild foraminal narrowing without foraminal nerve compression. L5-S1: Bilateral L5 laminectomy. Vacuum phenomenon centrally. There is moderate left paramedian and foraminal endplate hypertrophic osteophyte formation arising from the L5 and S1 endplates. Mild facet hypertrophic degenerative change with vacuum phenomenon. Mild canal stenosis with slight distortion of the left anterior lateral aspect of the thecal sac and mild mass effect upon the left S1 nerve. Mild to moderate left-sided foraminal narrowing with disc osteophyte complex abutting the undersurface of the L5 nerve. PARASPINAL SOFT TISSUES: Vascular calcification of the abdominal aorta without aneurysmal dilatation. OTHER: None.     Impression: Postoperative change at L4-5. Bilateral  laminectomies at L4-5 and L5-S1. Adequate posterior decompression without canal stenosis. Osteophyte formation involving the endplates as well as facet hypertrophic change results in moderate left foraminal narrowing at L5-S1. At the L3-4 level there has been prior left laminectomy and there is chronic spondylolysis on the left with annular bulging and posterior element hypertrophic changes resulting in mild to moderate canal stenosis and mild bilateral foraminal narrowing. Workstation performed: [id number]     Hospital Course:Dominick Irving is a 63 y/o male who presented to the outpatient with history of prior L4-5 fusion with adjacent level disease which was symptomatic from refractory conservative measures.Patient underwent Robotically assisted L3-4 decompressive hemilaminectomy and foraminotomies with transverse lumbar interbody fusion right-sided approach with add-on to an L4-5 fixation fusion  under general anesthesia with minimal blood loss and no complications.  Patient was kept in the PACU until stable and then moved to the floor.  Patient received lumbar spine x-ray postoperatively which revealed expected postop changes status post L3-4 TLIF with L3-5 fixation/fusion.  MAXIMO drain was placed perioperatively and removed today 11/4/2024; patient tolerated well.  Patient had some electrolyte imbalances for which internal medicine was consulted and better blood sugar control.  PT and OT were consulted and recommend home with outpatient therapy.  Patient was cleared medically for discharge.  Prior to discharge, postoperative instructions were discussed with patient.  During that time, all questions and concerns were addressed.  Patient will follow up outpatient in 2 weeks for an incision check and 6 weeks with repeat lumbar spine x-ray.     Condition at Discharge: good     Discharge instructions/Information to patient and family:   See after visit summary for information provided to patient and family.       Provisions for Follow-Up Care:  See after visit summary for information related to follow-up care and any pertinent home health orders.      Disposition: Home          Planned Readmission: No    Discharge Statement:  I have spent a total time of 26 minutes in caring for this patient on the day of the visit/encounter. .    Discharge Medications:  See after visit summary for reconciled discharge medications provided to patient and family.

## 2024-11-04 NOTE — ASSESSMENT & PLAN NOTE
Pt with hx of Hypokalemia.   K 3.8  Internal medicine following, input appreciated  Will discharge patient on 40 twice daily with PCP follow-up in 1 week  Will continue to monitor

## 2024-11-05 ENCOUNTER — TELEMEDICINE (OUTPATIENT)
Dept: PALLIATIVE MEDICINE | Facility: CLINIC | Age: 64
End: 2024-11-05
Payer: MEDICARE

## 2024-11-05 ENCOUNTER — TELEPHONE (OUTPATIENT)
Dept: PALLIATIVE MEDICINE | Facility: CLINIC | Age: 64
End: 2024-11-05

## 2024-11-05 ENCOUNTER — TELEPHONE (OUTPATIENT)
Dept: NEUROSURGERY | Facility: CLINIC | Age: 64
End: 2024-11-05

## 2024-11-05 ENCOUNTER — HOME CARE VISIT (OUTPATIENT)
Dept: HOME HEALTH SERVICES | Facility: HOME HEALTHCARE | Age: 64
End: 2024-11-05

## 2024-11-05 DIAGNOSIS — G89.29 CHRONIC BILATERAL LOW BACK PAIN WITH BILATERAL SCIATICA: ICD-10-CM

## 2024-11-05 DIAGNOSIS — Z51.5 PALLIATIVE CARE BY SPECIALIST: ICD-10-CM

## 2024-11-05 DIAGNOSIS — G89.18 POSTOPERATIVE PAIN AFTER SPINAL SURGERY: Primary | ICD-10-CM

## 2024-11-05 DIAGNOSIS — M54.41 CHRONIC BILATERAL LOW BACK PAIN WITH BILATERAL SCIATICA: ICD-10-CM

## 2024-11-05 DIAGNOSIS — Z85.51 HISTORY OF BLADDER CANCER: Primary | ICD-10-CM

## 2024-11-05 DIAGNOSIS — M54.42 CHRONIC BILATERAL LOW BACK PAIN WITH BILATERAL SCIATICA: ICD-10-CM

## 2024-11-05 PROCEDURE — 99214 OFFICE O/P EST MOD 30 MIN: CPT | Performed by: PHYSICIAN ASSISTANT

## 2024-11-05 NOTE — Clinical Note
Shannon Guzman, I met with Mr. Irving today who is doing much better since his surgery. He wanted to follow up with our team to share this news and had questions regarding opioid tapering which we reviewed. He will be seeing you soon and would like to discuss a plan to start tapering opioids down in the coming weeks. I documented liang points of our discussion regarding opioid tapering and what to expect. Please reach out to me our my team anytime for further recommendations if needed. Also, patient expressed gratitude for your support, and I would like to do the same..Thank you for the quality care you provide! Reach out to me our my team anytime for assistance with opioid tapering if needed.   [place]

## 2024-11-05 NOTE — ASSESSMENT & PLAN NOTE
PCP managing opioids.   S/p L3-4 decompressive hemilaminectomy and foraminotomies with transverse lumbar interbody fusion.   Significantly improved  pain.   Patient would like to attempt opioid taper in the coming weeks in hopes to be opioid-free eventually  Reviewed general tapering protocol with patient  Tapering off high dose opioids should be done cautiously and under close supervision to monitor for withdrawal symptoms and ensure safety  The tapering schedule needs to be individualized based on different factors such as overall health, pain levels, withdrawal symptoms  General principle is to go slow.   Recommend a gradual taper with reductions of 5-10% of total daily dose every 1 or 2 weeks  Monitor for withdrawal symptoms and communicate with your PCP  Exploring alternative pain management strategies, non-opioid medications, and physical therapy.  Please reach out to [facility] team for assistance with opioid tapering if needed.

## 2024-11-05 NOTE — TELEPHONE ENCOUNTER
Spoke to pt and informed him appt today was switched to a virtual for him. Provider is okay with pt doing vv for today's visit. Pt appreciative of call.

## 2024-11-05 NOTE — PROGRESS NOTES
Ambulatory Visit  Name: Dominick Irving      : 1960      MRN: [id number]  Encounter Provider: Lake Connors PA-C  Encounter Date: 2024   Encounter department: [facility] PALLIATIVE CARE [facility] DOCUMENTATION:     1. This service was provided via Telemedicine using Epic embedded platform     2. Parties in the room with patient during teleconsult Patient only    3. Confidentiality My office door was closed     4. Participants No one else was in the room    5. Patient acknowledged consent and understanding of privacy and security of the  Telemedicine consult. I informed the patient that I have reviewed their record in Epic and presented the opportunity for them to ask any questions regarding the visit today.  The patient agreed to participate.    6. Time spent 30 min       Assessment & Plan  History of bladder cancer  Follows with Urology, Dr. Sanabria  Chronic bilateral low back pain with bilateral sciatica  PCP managing opioids.   S/p L3-4 decompressive hemilaminectomy and foraminotomies with transverse lumbar interbody fusion.   Significantly improved  pain.   Patient would like to attempt opioid taper in the coming weeks in hopes to be opioid-free eventually  Reviewed general tapering protocol with patient  Tapering off high dose opioids should be done cautiously and under close supervision to monitor for withdrawal symptoms and ensure safety  The tapering schedule needs to be individualized based on different factors such as overall health, pain levels, withdrawal symptoms  General principle is to go slow.   Recommend a gradual taper with reductions of 5-10% of total daily dose every 1 or 2 weeks  Monitor for withdrawal symptoms and communicate with your PCP  Exploring alternative pain management strategies, non-opioid medications, and physical therapy.  Please reach out to [facility] team for assistance with opioid tapering if needed.   Palliative care by specialist  Follow up  PRN        History of Present Illness     Dominick Irving is a 64 y.o. male with hx of high-grade papillary urothelial carcinoma and chronic low back pain presents for [facility] follow up. Recently discharged from  [facility] following scheduled L3-4 decompressive hemilaminectomy and foraminotomies with transverse lumbar interbody fusion.    Today patient reports feeling amazing, states pain he had before has disappeared since his surgery. He is about a 3/4 out of pain today.  He is now very hopeful for the future and his chronic pain and is being cautious and following his surgeons recommendations. He is determined to get off the opioids and hopes he can do so now since the surgery.  He has follow up with his PCP on Friday who has been managing his opioids and plans to discuss a tapering plan. He is currently using Fentanyl 75mcg/hr and using dilaudid 8mg q3h for pain. He is also planning on following up with a diabetic counselor and may be considering starting sumaglutide to help with weight and improve his A1C. Patient expresses gratitude for PSC support and his PCP over the past year and helping him through tough times. He would like to follow up with PSC team as needed going forward,      Review of Systems   Constitutional:  Negative for activity change, appetite change, fatigue and unexpected weight change.   HENT:  Negative for mouth sores, trouble swallowing and voice change.    Eyes: Negative.    Respiratory:  Negative for shortness of breath.    Cardiovascular:  Negative for chest pain.   Gastrointestinal:  Negative for abdominal pain, constipation, diarrhea, nausea and vomiting.   Genitourinary: Negative.    Musculoskeletal:  Positive for back pain. Negative for arthralgias, gait problem and myalgias.   Skin:  Negative for color change, pallor and wound.   Neurological:  Negative for dizziness, weakness, light-headedness and headaches.   Hematological: Negative.    Psychiatric/Behavioral:  Negative for confusion and  sleep disturbance. The patient is not nervous/anxious.          Objective     There were no vitals taken for this visit.    Physical Exam  Vitals and nursing note reviewed.   Constitutional:       General: He is not in acute distress.     Appearance: He is not ill-appearing.   HENT:      Head: Normocephalic and atraumatic.   Neurological:      Mental Status: He is alert and oriented to person, place, and time.   Psychiatric:         Attention and Perception: Attention normal.         Mood and Affect: Mood is elated.         Speech: Speech normal.         Behavior: Behavior normal. Behavior is cooperative.         Recent labs:  Lab Results   Component Value Date/Time    SODIUM 134 (L) 11/04/2024 09:00 AM    K 3.8 11/04/2024 09:00 AM    BUN 21 11/04/2024 09:00 AM    CREATININE 0.90 11/04/2024 09:00 AM    GLUC 218 (H) 11/04/2024 09:00 AM    CALCIUM 8.6 11/04/2024 09:00 AM    AST 14 10/01/2024 10:06 AM    ALT 19 10/01/2024 10:06 AM    ALB 4.2 10/01/2024 10:06 AM    TP 8.0 10/01/2024 10:06 AM    EGFR 89 11/04/2024 09:00 AM     Lab Results   Component Value Date/Time    HGB 12.5 11/02/2024 06:17 AM    WBC 9.85 11/02/2024 06:17 AM     11/02/2024 06:17 AM    INR 1.00 11/01/2024 05:44 AM    PTT 34 10/31/2024 06:15 AM     Lab Results   Component Value Date/Time    USJ7ERQRXKGM 0.854 06/26/2023 08:57 AM       Recent Imaging:  Procedure: CTA head and neck w wo contrast    Result Date: 11/1/2024  Narrative: CTA NECK AND BRAIN WITH AND WITHOUT CONTRAST INDICATION: severe HA COMPARISON:   None. TECHNIQUE:  Routine CT imaging of the Brain without contrast.Post contrast imaging was performed after administration of iodinated contrast through the neck and brain. Post contrast axial 0.625 mm images timed to opacify the arterial system.  3D rendering was performed on an independent workstation.   MIP reconstructions performed. Coronal and sagittal reconstructions were performed of the non contrast portion of the brain. Radiation  dose length product (DLP) for this visit:  1653.15 mGy-cm .  This examination, like all CT scans performed in the [facility], was performed utilizing techniques to minimize radiation dose exposure, including the use of iterative reconstruction and automated exposure control. IV Contrast: 85 cc of Omnipaque 350 IMAGE QUALITY:   Diagnostic FINDINGS: NONCONTRAST BRAIN PARENCHYMA: Decreased attenuation is noted in periventricular and subcortical white matter demonstrating an appearance that is statistically most likely to represent mild microangiopathic change. No CT signs of acute infarction.  No intracranial mass, mass effect or midline shift.  No acute parenchymal hemorrhage. VENTRICLES AND EXTRA-AXIAL SPACES:Normal for the patient's age. VISUALIZED ORBITS: Normal. PARANASAL SINUSES: Normal. CTA NECK ARCH AND GREAT VESSELS: Mild atherosclerotic changes with no severe stenosis. VERTEBRAL ARTERIES: Mild atherosclerotic calcifications of the origin of the left vertebral artery. The left vertebral artery is congenitally dominant. No evidence of dissection. RIGHT CAROTID: Less than 50% stenosis.   No dissection. LEFT CAROTID: Less than 50% stenosis.   No dissection. NASCET criteria was used to determine the degree of internal carotid artery diameter stenosis. CTA BRAIN: INTERNAL CAROTID ARTERIES: No stenosis or occlusion. ANTERIOR CEREBRAL ARTERY CIRCULATION:  No stenosis or occlusion. MIDDLE CEREBRAL ARTERY CIRCULATION:  No stenosis or occlusion. DISTAL VERTEBRAL ARTERIES:  No stenosis or occlusion. BASILAR ARTERY:  No stenosis or occlusion. POSTERIOR CEREBRAL ARTERIES: No stenosis or occlusion. VENOUS STRUCTURES:  Normal. NON VASCULAR ANATOMY BONY STRUCTURES:  No acute osseous abnormality. There is nonspecific straightening of the cervical lordosis without subluxation. Mild to moderate multilevel spondylotic changes most pronounced at C5-C6 where there is a disc osteophyte complex and focal  calcification perhaps of a disc herniation or the posterior longitudinal ligament causing mild to moderate central canal and right subarticular zone narrowing, exemplified on series 602, image 64., SOFT TISSUES OF THE NECK:  Normal. THORACIC INLET: Biapical groundglass opacities noted. There are apical emphysematous changes.     Impression: CT Brain: 1. No acute intracranial hemorrhage, mass effect or edema. 2. Mild, chronic microangiopathy. CT Angiography: 1.  No hemodynamically significant stenosis in the major arteries of the neck. 2.  No intracranial aneurysm or major intracranial arterial stenosis. Other: 1. Biapical groundglass opacities may reflect pulmonary interstitial edema. Pneumonia/pneumonitis not excluded. Further clinical assessment recommended. 2. Emphysema. Workstation performed: [id number]     Procedure: XR spine lumbar 2 or 3 views injury    Result Date: 11/1/2024  Narrative: C-ARM -lumbar spine INDICATION: Facet arthropathy, lumbar.  Procedure guidance. COMPARISON: Lumbar spine radiographs 7/19/2024. TECHNIQUE: FLUOROSCOPY TIME:   30.0 SEC 2 FLUOROSCOPIC IMAGES FINDINGS: Fluoroscopic guidance provided for procedure guidance. Extension of interbody fusion previously L4-L5 now at L3-L5 with interbody spacer at L3-L4. Osseous and soft tissue detail limited by technique.     Impression: Fluoroscopic guidance provided for procedure guidance.  Please refer to the separate procedure notes for additional details. Workstation performed: [id number]     Procedure: XR lumbar spine 2 or 3 views    Result Date: 11/1/2024  Narrative: XR SPINE LUMBAR 2 OR 3 VIEWS INJURY INDICATION: revision lumbar fusion. COMPARISON: Lumbar spine radiographs 7/19/2024, fluoroscopy 10/30/2024. FINDINGS: Extension of interbody fusion previously L4-L5 now at L3-L5 with interbody spacer at L3-L4. No acute fracture. Intact pedicles. Five non-rib-bearing lumbar vertebral bodies. Normal alignment. Age-appropriate lumbar  degenerative changes. Recent postsurgical changes in the lumbar spine with surgical drain in place. Atherosclerotic aortic calcifications.     Impression: Extension of posterior fusion L3-L5. Computerized Assisted Algorithm (CAA) may have been used to analyze all applicable images. Workstation performed: [id number]     Procedure: CT lung screening program    Result Date: 10/22/2024  Narrative: CT CHEST LUNG CANCER SCREENING WITHOUT IV CONTRAST INDICATION: G89.29: Other chronic pain Z87.891: Personal history of nicotine dependence. COMPARISON: CTA chest dated July 3, 2023. CT chest dated July 26, 2020. TECHNIQUE: Unenhanced CT examination of the chest was performed utilizing a low dose protocol. Multiplanar 2D reformatted images were created from the source data. Radiation dose length product (DLP) for this visit:  224 mGy-cm . This examination, like all CT scans performed in the [facility], was performed utilizing techniques to minimize radiation dose exposure, including the use of iterative reconstruction and automated exposure control. FINDINGS: LUNGS: There is no acute infiltrate or pleural effusion. Mild to moderate emphysematous lung changes are again noted most prominent at the lung apices. There is an area of parenchymal scarring at the posterior left lung apex. There is a stable 7 mm right  middle lobe pulmonary nodule dating back to a CT chest performed on July 26, 2020. There is a stable 4 mm nodule at the right lower lobe, series 4 image 93. A stable right perifissural nodule measures 5 mm, series 4 image 55. A right middle lobe perifissural nodule measures 5 mm, stable (series 4 image 74). There is no tracheal or endobronchial lesion. PLEURA: Unremarkable. HEART/GREAT VESSELS: Heart is unremarkable for patient's age. No thoracic aortic aneurysm. MEDIASTINUM AND SIMON: There are multiple scattered prominent but nonenlarged mediastinal and bilateral hilar lymph nodes similar to the prior  exam. CHEST WALL AND LOWER NECK: Unremarkable. VISUALIZED STRUCTURES IN THE UPPER ABDOMEN: There is fatty infiltration of the visualized liver. OSSEOUS STRUCTURES: No acute fracture or destructive osseous lesion.     Impression: No new nodules and/or definitely benign nodules. Stable pulmonary nodules measuring up to 7 mm.   Lung-RADS2, benign appearance or behavior.  Continue annual screening with LDCT in 12 months. Background emphysema. Workstation performed: [id number]     Procedure: CT lumbar spine without contrast    Result Date: 10/18/2024  Narrative: CT LUMBAR SPINE WITHOUT CONTRAST INDICATION:   M54.9: Dorsalgia, unspecified M47.816: Spondylosis without myelopathy or radiculopathy, lumbar region M48.061: Spinal stenosis, lumbar region without neurogenic claudication. COMPARISON: X-rays and MRI dated 7/19/2024. TECHNIQUE:  Contiguous axial images through the lumbar spine were obtained. Sagittal and coronal reconstructions were performed. Radiation dose length product (DLP) for this visit:  1006 mGy-cm .  This examination, like all CT scans performed in the [facility], was performed utilizing techniques to minimize radiation dose exposure, including the use of iterative reconstruction and automated exposure control. IMAGE QUALITY:  Diagnostic. FINDINGS: ALIGNMENT:  There are 5 lumbar type vertebral bodies. Grade 1 anterior spondylolisthesis of L3 upon L4 with chronic unilateral left-sided spondylolysis of L3. The patient has undergone posterior decompression and fusion at L4-5 with pedicle screws, spinal rods and bone graft material placed posterior to the facet joints. VERTEBRAE: Mild diffuse osteopenia of the bony structures. Tiny chronic Schmorl's node within the anterior aspect of the L2 superior endplate. No acute fracture. The left SI joint is fused anteriorly. DEGENERATIVE CHANGES: Lower Thoracic spine: Normal lower thoracic disc spaces. Lumbar Spine: L1-2: No central canal stenosis or  foraminal stenosis. L2-3: Mild annular bulging. No canal stenosis or foraminal narrowing. L3-4: Grade 1 anterior spondylolisthesis. Vacuum phenomenon with slight loss of disc height. Moderate circumferential annular bulging. There is chronic spondylolysis on the left with facet hypertrophic degenerative change and ligamentum flavum thickening. There appears to have been a prior left laminectomy. Mild to moderate tricompartmental canal stenosis. Mild bilateral foraminal narrowing. L4-5: Mild annular bulging with endplate osteophyte formation centrally, extending into the neural foramen bilaterally. Prior bilateral laminectomy. The residual facets are mildly hypertrophic and degenerated and there is bone graft material posterior to  the facets and at the laminectomy site. The osteophyte formation has mild mass effect upon the ventral aspect of the thecal sac at and to the left of midline. Only mild foraminal narrowing without foraminal nerve compression. L5-S1: Bilateral L5 laminectomy. Vacuum phenomenon centrally. There is moderate left paramedian and foraminal endplate hypertrophic osteophyte formation arising from the L5 and S1 endplates. Mild facet hypertrophic degenerative change with vacuum phenomenon. Mild canal stenosis with slight distortion of the left anterior lateral aspect of the thecal sac and mild mass effect upon the left S1 nerve. Mild to moderate left-sided foraminal narrowing with disc osteophyte complex abutting the undersurface of the L5 nerve. PARASPINAL SOFT TISSUES: Vascular calcification of the abdominal aorta without aneurysmal dilatation. OTHER: None.     Impression: Postoperative change at L4-5. Bilateral laminectomies at L4-5 and L5-S1. Adequate posterior decompression without canal stenosis. Osteophyte formation involving the endplates as well as facet hypertrophic change results in moderate left foraminal narrowing at L5-S1. At the L3-4 level there has been prior left laminectomy and  there is chronic spondylolysis on the left with annular bulging and posterior element hypertrophic changes resulting in mild to moderate canal stenosis and mild bilateral foraminal narrowing. Workstation performed: [id number]     Procedure: MRI lumbar spine with and without contrast    Result Date: 7/24/2024  Narrative: MRI LUMBAR SPINE WITH AND WITHOUT CONTRAST INDICATION: M54.16: Radiculopathy, lumbar region.   Prior lumbar spine surgery. COMPARISON: Lumbar spine MR from 10/5/2023, 8/11/2023 TECHNIQUE:  Multiplanar, multisequence imaging of the lumbar spine was performed before and after gadolinium administration. . IV Contrast:  10 mL of Gadobutrol injection (SINGLE-DOSE) IMAGE QUALITY:  Diagnostic FINDINGS: VERTEBRAL BODIES:  There are 5 lumbar type vertebral bodies. There is mild levocurvature of the lumbar spine, without listhesis. There are postsurgical changes from posterior lumbar spinal fusion procedure with laminectomies at L4-L5, with left-sided laminectomy at L3. There is a new acute to subacute compression fracture involving the superior endplate of L4, with associated bone marrow edema and minimal height loss involving the superior endplate noted. There are additional degenerative endplate changes at L3-L4, and L5-S1. The remaining bone marrow signal intensity appears grossly normal. SACRUM:  Normal signal within the sacrum. No evidence of insufficiency or stress fracture. DISTAL CORD AND CONUS:  Normal size and signal within the distal cord and conus. PARASPINAL SOFT TISSUES: There is a stable fluid collection involving the laminectomy bed, which like represents a small postoperative seroma. No communication with the thecal sac is identified. LOWER THORACIC DISC SPACES:  Normal disc height and signal.  No disc herniation, canal stenosis or foraminal narrowing. LUMBAR DISC SPACES: L1-L2: Mild facet arthropathy. Otherwise normal. L2-L3: Mild disc bulge with stable small left foraminal disc  protrusion. Mild bilateral facet arthropathy. No canal stenosis. Mild left neural foraminal stenosis. Findings appear stable. L3-L4: Disc desiccation, disc height loss, diffuse disc bulge. Bilateral facet arthropathy. There is moderate canal stenosis. There is moderate right and mild to moderate left neural foraminal stenosis. Findings appear worse when compared to the prior examination. L4-L5: Stable posterior osteophyte. No canal stenosis. Mild bilateral neural foraminal stenosis. Findings appear stable. L5-S1: Disc osteophyte complex asymmetric to the left extending into the left foraminal and extraforaminal region. Bilateral facet arthropathy. No canal stenosis. Moderate left neural foraminal stenosis and mild left lateral recess stenosis again demonstrated. Findings appear unchanged. POSTCONTRAST IMAGING: No new abnormal enhancement is identified. There is stable enhancing granulation tissue noted along the thecal sac at the fused levels. OTHER FINDINGS:  None.     Impression: Mild new height loss involving the superior endplate of L4, with associated bone marrow edema, indicative of a subtle new acute to subacute compression fracture at this level. Prior posterior lumbar spine fusion procedure at L4-L5, with laminectomies as described above. Worsening lumbar spondylosis, at L3-L4, with a diffuse disc bulge lateral facet arthropathy, contributing to moderate canal stenosis, moderate right and mild to moderate left neural foraminal stenosis. Degenerative changes at remaining levels appear grossly stable. Workstation performed: [id number]     Procedure: XR spine lumbar minimum 4 views non injury    Result Date: 7/19/2024  Narrative: LUMBAR SPINE INDICATION:   Radiculopathy, lumbar region. COMPARISON:  None. VIEWS:  XR SPINE LUMBAR MINIMUM 4 VIEWS NON INJURY Images: 4 FINDINGS: There are 5 non rib bearing lumbar vertebral bodies. There is no evidence of acute fracture or destructive osseous lesion. Slight  anterolisthesis L3/4. . No dynamic instability. Stable L4/5 posterior instrumented fusion with stable disc height loss L3/4 and L5/S1. The pedicles appear intact. There are atherosclerotic calcifications. Soft tissues are otherwise unremarkable.     Impression: No acute osseous abnormality.  Degenerative changes as described. Electronically signed: 07/19/2024 01:47 PM Kamaljit Lynn, MPH,MD     Administrative Statements   I have spent a total time of 30 minutes in caring for this patient on the day of the visit/encounter including Risks and benefits of tx options, Instructions for management, Patient and family education, Importance of tx compliance, Risk factor reductions, Counseling / Coordination of care, Documenting in the medical record, Reviewing / ordering tests, medicine, procedures  , and Obtaining or reviewing history  . Topics discussed with the patient / family include medication review, psychosocial support, goals of care, supportive listening, and anticipatory guidance.

## 2024-11-05 NOTE — TELEPHONE ENCOUNTER
Insurance denying coverage for robaxin prescription, requesting tizanidine as an alternative however patient wears fentanyl patch 75mcg/hr, tizanidine is contraindicated. Completed prior authorization via [email or web address]

## 2024-11-05 NOTE — PROGRESS NOTES
Palliative Supportive Care  met with patient/family to continue to provide emotional support and guidance.      Updated biopsychosocial information relevant to support:     The patient was identified by name and date of birth. Dominick was informed that this is a telemedicine visit and that the visit is being conducted through the Epic Embedded platform. They agree to proceed..  My office door was closed. No one else was in the room. They acknowledged consent and understanding of privacy and security of the video platform. The patient has agreed to participate and understands they can discontinue the visit at any time.    Dominick spoke about being in 3/10 pain today and he cannot remember the last time his pain level was this low. He spoke about his surgery and time at the hospital. He said that he is up walking but is being very careful. He plans to reduce his opioids when possible and start PT in the near future. He has plans to get back upstairs in his house and work on his library. 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 He said he feels hopeful for the first time in a long time. He said he does not know how he made it through that experience and pain level for so long. [facility] supported Dominick in exploring all of his personal strengths through this difficult journey and explored all of his progress. He said he wants to try to take what he went through and find a way to use his new perspective for good. He expressed being extremely appreciative of [facility]'s support through his journey.      Identified areas of need include: emotional support    Resources provided:    Areas that need future follow-up include: reduce anxiety/depressed mood    I have spent 50 minutes with Patient  today in which greater than 50% of this time was spent in counseling/coordination of care     Palliative  will follow-up as requested by patient, family, and primary team.  Please  contact with any specific requests

## 2024-11-05 NOTE — TELEPHONE ENCOUNTER
Patient called to schedule follow up with Lake Connors for 11/7. Per patient he recently had surgery and wanted to see [facility] Connors to follow up.     Patient was scheduled for today at 2:30PM. Patient is requesting virtual visit. Advised patient follow up was scheduled as an in-person visit till approved by provider to have it changed to virtual.       Patient voiced understanding.     Please call patient with updates.

## 2024-11-06 ENCOUNTER — TELEMEDICINE (OUTPATIENT)
Dept: PALLIATIVE MEDICINE | Facility: CLINIC | Age: 64
End: 2024-11-06

## 2024-11-06 ENCOUNTER — DOCUMENTATION (OUTPATIENT)
Dept: OBGYN CLINIC | Facility: MEDICAL CENTER | Age: 64
End: 2024-11-06

## 2024-11-06 ENCOUNTER — CLINICAL SUPPORT (OUTPATIENT)
Dept: FAMILY MEDICINE CLINIC | Facility: CLINIC | Age: 64
End: 2024-11-06

## 2024-11-06 ENCOUNTER — HOME CARE VISIT (OUTPATIENT)
Dept: HOME HEALTH SERVICES | Facility: HOME HEALTHCARE | Age: 64
End: 2024-11-06
Payer: MEDICARE

## 2024-11-06 VITALS
TEMPERATURE: 97.2 F | DIASTOLIC BLOOD PRESSURE: 90 MMHG | SYSTOLIC BLOOD PRESSURE: 148 MMHG | HEART RATE: 71 BPM | RESPIRATION RATE: 16 BRPM | OXYGEN SATURATION: 94 %

## 2024-11-06 DIAGNOSIS — E11.9 TYPE 2 DIABETES MELLITUS WITHOUT COMPLICATION, WITHOUT LONG-TERM CURRENT USE OF INSULIN (HCC): ICD-10-CM

## 2024-11-06 DIAGNOSIS — Z71.89 COUNSELING AND COORDINATION OF CARE: Primary | ICD-10-CM

## 2024-11-06 PROCEDURE — NC001 PR NO CHARGE

## 2024-11-06 PROCEDURE — PBNCHG PB NO CHARGE PLACEHOLDER: Performed by: PHARMACIST

## 2024-11-06 PROCEDURE — 10330081 VN NO-PAY CLAIM PROCEDURE

## 2024-11-06 PROCEDURE — 400013 VN SOC

## 2024-11-06 PROCEDURE — G0299 HHS/HOSPICE OF RN EA 15 MIN: HCPCS

## 2024-11-06 NOTE — ASSESSMENT & PLAN NOTE
Lab Results   Component Value Date    HGBA1C 9.0 (H) 10/01/2024       Goal A1c <7% .  Newly diagnosed diabetes during the summer 2024.  Complications:  Microvascular complications: Neuropathy  Macrovascular complications: HF  Current Diabetes Regimen:  Metformin  mg -1 tab in AM 2 in PM   Farxiga 5 mg   Held after Surgery: Tresiba 8 units and Ozempic 0.5 mg weekly  Historical DM Meds (reason for discontinuation):  N/A  On Additional Therapies:  Statin: yes  ACEI/ARB: None      Assessment: Since hospital discharge he has been taking metformin and Farxiga.  He is currently holding Ozempic until he gets clearance from surgeon that he can restart.  He is also holding Tresiba.  He has not monitor his glucose since he is returned home but does plan to start Dexcom sensor today.    Changes to medication regimen:  For now continue oral medications of metformin 1500 mg and Farxiga 5 mg for glucose control.  Restart Ozempic 0.5 mg weekly once you obtain clearance from surgery  Hold Tresiba for now.  Will assess Dexcom report next week to determine if insulin is needed now that pain levels have been under better control.  Patient will reach out if he has glucose readings elevated above 180 mg/dL

## 2024-11-06 NOTE — PROGRESS NOTES
11/6 I called patient regarding setting up the spinal bone stimulator.  I will be in [place] tomorrow 11/7. The patient will need to call me back to schedule the fitting.  11/7 I met with patient and spouse at [facility].  The patient was demonstrate spinal bone stimulator.The patient and wife had full understanding and were give my card for contact if they had any concerns.

## 2024-11-06 NOTE — PROGRESS NOTES
[facility] Clinical Pharmacy Services  Nicole Cameron, Pharmacist    Assessment/ Plan     Assessment & Plan  Type 2 diabetes mellitus without complication, without long-term current use of insulin ([facility])  Lab Results   Component Value Date    HGBA1C 9.0 (H) 10/01/2024       Goal A1c <7% .  Newly diagnosed diabetes during the summer 2024.  Complications:  Microvascular complications: Neuropathy  Macrovascular complications: HF  Current Diabetes Regimen:  Metformin  mg -1 tab in AM 2 in PM   Farxiga 5 mg   Held after Surgery: Tresiba 8 units and Ozempic 0.5 mg weekly  Historical DM Meds (reason for discontinuation):  N/A  On Additional Therapies:  Statin: yes  ACEI/ARB: None      Assessment: Since hospital discharge he has been taking metformin and Farxiga.  He is currently holding Ozempic until he gets clearance from surgeon that he can restart.  He is also holding Tresiba.  He has not monitor his glucose since he is returned home but does plan to start Dexcom sensor today.    Changes to medication regimen:  For now continue oral medications of metformin 1500 mg and Farxiga 5 mg for glucose control.  Restart Ozempic 0.5 mg weekly once you obtain clearance from surgery  Hold Tresiba for now.  Will assess Dexcom report next week to determine if insulin is needed now that pain levels have been under better control.  Patient will reach out if he has glucose readings elevated above 180 mg/dL      Follow-up: 1.5 weeks  Subjective   HPI    Medication Adherence/ Tolerability/ Cost:  Since being discharged from the hospital he has   metFORMIN -1 tab in am and 2 tabs in PM (1500 mg total)  semaglutide (0.25 or 0.5 mg/dose) - last injection was last Oct 21 He will hold Ozempic until follow up with neurosurgeon (on next Thursday)   Tresiba-currently holding since discharge from hospital  Farxiga 5 mg daily     Previous Medications  N/A    Review of Systems   Gastrointestinal:  Negative for abdominal pain, constipation,  diarrhea, nausea and vomiting.        2. Lifestyle:   Avoids junk food  Home cooks meals in evening.   Smaller lunch and larger dinner.     3. Home monitoring devices  Glucometer: Yes, Brand: [employer] brand  Continuous Glucose Monitor: Yes, Brand: starting on Dexcom G7      Objective         Dexcom AGP Report  No current report    ASCVD Risk:  The 10-year ASCVD risk score (Arvin SUN, et al., 2019) is: 25.2%    Values used to calculate the score:      Age: 64 years      Sex: Male      Is Non- : No      Diabetic: Yes      Tobacco smoker: No      Systolic Blood Pressure: 124 mmHg      Is BP treated: Yes      HDL Cholesterol: 33 mg/dL      Total Cholesterol: 141 mg/dL     Vitals:  There were no vitals filed for this visit.    Eye Exam:    Lab Results   Component Value Date    LEFTDIABRET None 02/06/2024    RIGHTDIABRET None 02/06/2024       Labs:  Lab Results   Component Value Date    SODIUM 134 (L) 11/04/2024    K 3.8 11/04/2024    EGFR 89 11/04/2024    CREATININE 0.90 11/04/2024    GLUF 111 (H) 10/01/2024       Lab Results   Component Value Date    HGBA1C 9.0 (H) 10/01/2024    HGBA1C 9.0 (A) 10/01/2024    HGBA1C 9.6 (A) 09/17/2024       Telemedicine consent  The patient was identified by name and date of birth. Dominick Irving was informed that this is a telemedicine visit and that the visit is being conducted through the Epic Embedded platform. He agrees to proceed..  My office door was closed. No one else was in the room.  He acknowledged consent and understanding of privacy and security of the video platform. The patient has agreed to participate and understands they can discontinue the visit at any time.    Pharmacist Tracking Tool     Pharmacist Tracking Tool  Reason For Outreach: Embedded Pharmacist  Demographics:  Intervention Method: Video  Type of Intervention: Follow-Up  Topics Addressed: Diabetes  Pharmacologic Interventions: Med Rec  Non-Pharmacologic Interventions: Disease state  education, Home Monitoring, Medication/Device education, and Personal CGM  Time:  Direct Patient Care:  20  mins  Care Coordination:  10  mins  Recommendation Recipient: Patient/Caregiver and Provider  Outcome: Accepted

## 2024-11-08 ENCOUNTER — OFFICE VISIT (OUTPATIENT)
Dept: FAMILY MEDICINE CLINIC | Facility: CLINIC | Age: 64
End: 2024-11-08
Payer: MEDICARE

## 2024-11-08 ENCOUNTER — HOME CARE VISIT (OUTPATIENT)
Dept: HOME HEALTH SERVICES | Facility: HOME HEALTHCARE | Age: 64
End: 2024-11-08
Payer: MEDICARE

## 2024-11-08 VITALS
SYSTOLIC BLOOD PRESSURE: 120 MMHG | WEIGHT: 240.8 LBS | OXYGEN SATURATION: 93 % | DIASTOLIC BLOOD PRESSURE: 70 MMHG | BODY MASS INDEX: 37.79 KG/M2 | HEART RATE: 76 BPM | HEIGHT: 67 IN

## 2024-11-08 VITALS — HEART RATE: 65 BPM | OXYGEN SATURATION: 95 % | SYSTOLIC BLOOD PRESSURE: 138 MMHG | DIASTOLIC BLOOD PRESSURE: 78 MMHG

## 2024-11-08 DIAGNOSIS — Z98.1 S/P LUMBAR FUSION: Primary | ICD-10-CM

## 2024-11-08 DIAGNOSIS — G89.29 OTHER CHRONIC PAIN: ICD-10-CM

## 2024-11-08 PROCEDURE — 99495 TRANSJ CARE MGMT MOD F2F 14D: CPT | Performed by: FAMILY MEDICINE

## 2024-11-08 PROCEDURE — G0151 HHCP-SERV OF PT,EA 15 MIN: HCPCS

## 2024-11-08 RX ORDER — FENTANYL 25 UG/1
1 PATCH TRANSDERMAL
Qty: 5 PATCH | Refills: 0 | Status: SHIPPED | OUTPATIENT
Start: 2024-11-08

## 2024-11-08 RX ORDER — FENTANYL 50 UG/1
1 PATCH TRANSDERMAL
Qty: 5 PATCH | Refills: 0 | Status: SHIPPED | OUTPATIENT
Start: 2024-11-08

## 2024-11-08 NOTE — PROGRESS NOTES
TCM Call       Date and time call was made  11/4/2024  4:32 PM    Patient was hospitialized at  [facility]    Date of Admission  10/30/24    Date of discharge  11/04/24    Disposition  Home    Were the patients medications reviewed and updated  No    Current Symptoms  None          TCM Call       Post hospital issues  None    Should patient be enrolled in anticoag monitoring?  No    Scheduled for follow up?  Yes    Patients specialists  Other (comment)    Other specialists names  Palliative Care    Did you obtain your prescribed medications  Yes    Do you need help managing your prescriptions or medications  No    Is transportation to your appointment needed  No    I have advised the patient to call PCP with any new or worsening symptoms  [facility]    Living Arrangements  Family members; Friends; Spouse or Significiant other    Support System  Family; Friends    The type of support provided  Emotional; Financial; Physical    Do you have social support  Yes, as much as I need    Are you recieving any outpatient services  No    Are you recieving home care services  No    Are you using any community resources  No    Current waiver services  No    Have you fallen in the last 12 months  No    Interperter language line needed  No    Counseling  Patient         Assessment/Plan:       1. S/P lumbar fusion  Comments:  doing great  PT starts today  start to wean off narcotics  2. Other chronic pain  -     fentaNYL (DURAGESIC) 50 mcg/hr; Place 1 patch on the skin over 72 hours every third day Max Daily Amount: 1 patch  -     fentaNYL (DURAGESIC) 25 mcg/hr; Place 1 patch on the skin over 72 hours every third day Max Daily Amount: 1 patch        Subjective:      Patient ID: Dominick Irving is a 64 y.o. male.    The surgery went well he feels much better his pain is down to 3 out of 10 from 10 out of 10.  He is thrilled with his progress his sugars have been well-controlled.  I reviewed his hospital records.  He  "has follow-up with neurosurgery next week.  Working to start the process of weaning down and tapering his narcotics.  I do appreciate the input of palliative medicine.        The following portions of the patient's history were reviewed and updated as appropriate: allergies, current medications, past family history, past medical history, past social history, past surgical history, and problem list.    Review of Systems   Respiratory: Negative.     Cardiovascular: Negative.    Gastrointestinal: Negative.          Objective:      /70 (BP Location: Right arm, Patient Position: Sitting, Cuff Size: Large)   Pulse 76   Ht 5' 7\" (1.702 m)   Wt 109 kg (240 lb 12.8 oz)   SpO2 93%   BMI 37.71 kg/m²          Physical Exam  Vitals and nursing note reviewed.   Constitutional:       Appearance: Normal appearance.   HENT:      Head: Normocephalic and atraumatic.      Nose: Nose normal.      Mouth/Throat:      Mouth: Mucous membranes are moist.   Eyes:      Extraocular Movements: Extraocular movements intact.      Pupils: Pupils are equal, round, and reactive to light.   Cardiovascular:      Rate and Rhythm: Normal rate and regular rhythm.      Pulses: Normal pulses.   Pulmonary:      Effort: Pulmonary effort is normal.      Breath sounds: Normal breath sounds.   Abdominal:      General: Bowel sounds are normal.      Palpations: Abdomen is soft.   Musculoskeletal:      Cervical back: Normal range of motion.   Skin:     General: Skin is warm and dry.      Capillary Refill: Capillary refill takes less than 2 seconds.   Neurological:      General: No focal deficit present.      Mental Status: He is alert.   Psychiatric:         Mood and Affect: Mood normal.       "

## 2024-11-11 ENCOUNTER — TELEPHONE (OUTPATIENT)
Age: 64
End: 2024-11-11

## 2024-11-11 ENCOUNTER — HOME CARE VISIT (OUTPATIENT)
Dept: HOME HEALTH SERVICES | Facility: HOME HEALTHCARE | Age: 64
End: 2024-11-11
Payer: MEDICARE

## 2024-11-11 NOTE — TELEPHONE ENCOUNTER
Callback received. Spoke with Dominick regarding his recent call reporting clear yellowish drainage from his incision. He will send a picture of the incision to [facility] for visual review and he will contacted back to advise of next steps. He was appreciative.

## 2024-11-11 NOTE — TELEPHONE ENCOUNTER
Patient phoned and stated that he was sorry to bother but he had earlier sent photos of his incision that her reported had been draining and he wanted to know what we thought of his incisions.     This RN reviewed imaged while patient was on the phone and noted that edges all seemed approximated.      Patient stated no fever present. Patient stated that the drainage was a lot in a short period of time, over four hours. This RN stated that perhaps there was a seroma that drained but we would want to keep an eye out.  Patient asked if he had anything over the incision and he reported no. Patient encouraged to place gauze over incision, ensuring it is gauze as we do not want to trap moisture in with the incision.  If there continues to be drainage the patient can take photos of the drainage and send to the team. Patient in agreement with plan.      This RN stated she would review with provider and then reach back out with update.     This RN reviewed images of incision with MOHAN Garcia who agreed with current action plan in the absence of dehiscence and fever.      Pt 2 wk POV is on Wednesday this week.This RN stated to patient that she would call tomorrow just to check in.  Patient appreciative for assistance.

## 2024-11-11 NOTE — TELEPHONE ENCOUNTER
Pt called in this morning with concern about wound drainage . Pt had surgery done 10/30 by ELIAS. Yesterday felt lethargic and very tired. Overnight he states that he had a large amount  of drainage from incision. It was clear fluid, small tinge of yellow per pt.     Attempted to connect pt to a nurse but they were not available.     Please call pt ASAP.     Thank you.

## 2024-11-11 NOTE — CASE COMMUNICATION
PT initial evaluation completed.  Plan to see [id number] to address gait/stair deficits, ensure compliance with back/spinal precautions, provide education for hep.   Plan to see for PT visit later in the week of 11/10/2024 after follow up appt on 11/13/2024.

## 2024-11-11 NOTE — TELEPHONE ENCOUNTER
This RN phoned patient at home number and reached answering machine.  This RN reached VM and left message informing patient that we were reaching out to discuss his incision and drainage. Request for patient to phone back to address further.  Phone number for call back left as well. This RN will phone back again if no call back received.

## 2024-11-12 PROCEDURE — G0180 MD CERTIFICATION HHA PATIENT: HCPCS | Performed by: NEUROLOGICAL SURGERY

## 2024-11-12 NOTE — TELEPHONE ENCOUNTER
This RN phoned patient to see how he was feeling and he said increased pain but thinking it is the staples that are becoming bothersome.  Patient stated that the incision put out little drainage and has seemed to have stopped draining all together.  Patient denies any fevers or other s/s of incision issues or infection.  Patient and wife will be coming to 2 week POV tomorrow at [place].  Patient appreciative for call.

## 2024-11-13 ENCOUNTER — CLINICAL SUPPORT (OUTPATIENT)
Dept: NEUROSURGERY | Facility: CLINIC | Age: 64
End: 2024-11-13

## 2024-11-13 VITALS
OXYGEN SATURATION: 94 % | DIASTOLIC BLOOD PRESSURE: 66 MMHG | HEART RATE: 63 BPM | SYSTOLIC BLOOD PRESSURE: 122 MMHG | TEMPERATURE: 97.6 F

## 2024-11-13 DIAGNOSIS — Z98.890 STATUS POST SURGERY: Primary | ICD-10-CM

## 2024-11-13 PROCEDURE — 99024 POSTOP FOLLOW-UP VISIT: CPT | Performed by: NEUROLOGICAL SURGERY

## 2024-11-13 RX ORDER — CEPHALEXIN 500 MG/1
500 CAPSULE ORAL EVERY 6 HOURS SCHEDULED
Qty: 28 CAPSULE | Refills: 0 | Status: ON HOLD | OUTPATIENT
Start: 2024-11-13 | End: 2024-11-20

## 2024-11-13 NOTE — PROGRESS NOTES
Post-Op Visit- Neurosurgery    Dominick Irving 64 y.o. male MRN: [id number]    Chief Complaint:   Patient presents post: Robotically assisted L3-4 decompressive hemilaminectomy and foraminotomies with transverse lumbar interbody fusion right-sided approach with add-on to an L4-5 fixation fusion - Right    History of Present Illness:  Patient presents for 2 week POV for incision check accompanied by spouse and ambulating with an assistive device.  Patient reports he is doing well overall.  He denies any new weakness, numbness or tingling since the surgery and denies any new issues with his bowel or bladder.  Patient admits to surgical pain at this time and rates their pain as a Pain Score:   4/10.  Patient is currently taking prescribed medications with some relief of pain symptoms.      Assessment:  Vitals:    11/13/24 1524   BP: 122/66   Pulse: 63   Temp: 97.6 °F (36.4 °C)   SpO2: 94%        Wound Exam: Incision well approximated. Serous drainage noted and with gentle palpation around the middle of the incision. Copious amounts of serous to serosanguinous  drainage expressed from incisional area. RN Carlos in patient room to assist as second set of hands.  Location:Lumbar Spine  Incision cleansed, top 6 staples removed as well as the bottom two, SDS placed over incision.        Discussion/Summary:  Reviewed incision images and drainage with MOHAN Murguia.  MOHAN Murguia instructed and ordered antibiotics for seven days, instructed to remove the top 6 and bottom two staples the rest to remain.  Patient requested to send updated incisional photo through My Chart on Friday.  Patient to schedule nurse f/u in one week for incision check and remain staple removal. Instructions on incision care reviewed with patient including daily observation for s/s infection including: increased erythema, edema, drainage, dehiscence of incision or fever >101.   Should these be observed, he understands that he is to call and/or return immediately for reassessment.  Advised patient to continue cleansing area with mild soap and water and pat dry. Not to apply any lotions, creams, or ointments, & not to submerge in any water for two more weeks. He is to maintain activity restrictions until cleared by the surgeon. Activity levels were also reviewed with the patient in detail, he is to slowly increase his level of activity and ambulation is encouraged as tolerated. Verified date/time/location of upcoming POV and reminded him to call the office with any further questions or concerns, or if any incisional issues or fevers would arise.

## 2024-11-13 NOTE — PATIENT INSTRUCTIONS
Please  antibiotic and start taking today.  Return to office next Thursday 11/21/2024 at 1000 for incision check.      Requesting that image of incision be sent through my chart on Friday 11/15/2024 for observation.     Maintain sterile dressing while incision is draining, evaluating and changing dressing as needed.      Continue incision care as instructed. Cleans incision area(s) with soap and water and pat dry.  Avoid lotions, creams or ointments for two more weeks.  Avoid soaking in water (bath tubs, hot tubs) for two more weeks.    Maintain activity restrictions until cleared by the surgeon. Avoid heavy lifting and frequent bending/twisting.    Call the office immediately with any additional signs or symptoms of infection including: increasing redness, swelling, drainage or fevers (101 or greater).

## 2024-11-14 ENCOUNTER — HOME CARE VISIT (OUTPATIENT)
Dept: HOME HEALTH SERVICES | Facility: HOME HEALTHCARE | Age: 64
End: 2024-11-14
Payer: MEDICARE

## 2024-11-14 ENCOUNTER — TELEPHONE (OUTPATIENT)
Age: 64
End: 2024-11-14

## 2024-11-14 NOTE — TELEPHONE ENCOUNTER
Patient phoned and updated on providers approval or returning on Ozempic.  Patient stated that he did have drainage from the incision at 0530 this morning and needed to change his dressing, he stated difficulty walking around due to the discomfort but will continue to do his best. Patient will send updated photo of incision tomorrow for review.  This RN encouraged patient to reach back out with any issues or concerns. Patient appreciative for assistance.

## 2024-11-14 NOTE — TELEPHONE ENCOUNTER
----- Message from Leland Aguilar MD sent at 11/13/2024  5:11 PM EST -----  Regarding: RE: Ozempic post op  Yes.  He can restart  ----- Message -----  From: Zack Mcgowan RN  Sent: 11/13/2024   3:51 PM EST  To: Leland Aguilar MD  Subject: Ozempic post op                                  Patient in for 2 week follow up today patient wanting to know if he could return on his Ozempic, are there any contraindications?

## 2024-11-15 NOTE — TELEPHONE ENCOUNTER
Patient sent (3) photos through My Chart of his incision. Patient states he has had to change dressing 2 or three times since we last spoke yesterday. Patient states he is is having drainage, that the color is that same but the drainage is getting thicker, pasty like, almost slimy or pussy.    Patient has taking his Keflex now for almost 48 hours.  Patient denies any fever or other neurological changes.  He does stated that he is feeling a bit weaker over the past few days.      Patient encouraged to continue his medication regimen.  This RN stated she will route this info to the provider team to see if there are any other considerations heading into the weekend.  This RN stated if there were he would receive a call back this day, otherwise this RN will call and check on things Monday.  Patient in agreement with plan.    Patient informed that there is always someone available on off hours and weekends as well and that should any thing change over that time to not hesitate to reach out.

## 2024-11-18 ENCOUNTER — TELEPHONE (OUTPATIENT)
Age: 64
End: 2024-11-18

## 2024-11-18 ENCOUNTER — HOME CARE VISIT (OUTPATIENT)
Dept: HOME HEALTH SERVICES | Facility: HOME HEALTHCARE | Age: 64
End: 2024-11-18
Payer: MEDICARE

## 2024-11-18 ENCOUNTER — HOSPITAL ENCOUNTER (EMERGENCY)
Facility: HOSPITAL | Age: 64
End: 2024-11-19
Attending: EMERGENCY MEDICINE
Payer: MEDICARE

## 2024-11-18 ENCOUNTER — APPOINTMENT (EMERGENCY)
Dept: MRI IMAGING | Facility: HOSPITAL | Age: 64
End: 2024-11-18
Payer: MEDICARE

## 2024-11-18 DIAGNOSIS — T81.49XA SURGICAL SITE INFECTION: ICD-10-CM

## 2024-11-18 DIAGNOSIS — Z98.890 STATUS POST SURGERY: Primary | ICD-10-CM

## 2024-11-18 DIAGNOSIS — T81.9XXA POSTOPERATIVE COMPLICATION: Primary | ICD-10-CM

## 2024-11-18 LAB
ALBUMIN SERPL BCG-MCNC: 3.4 G/DL (ref 3.5–5)
ALP SERPL-CCNC: 142 U/L (ref 34–104)
ALT SERPL W P-5'-P-CCNC: 63 U/L (ref 7–52)
ANION GAP SERPL CALCULATED.3IONS-SCNC: 11 MMOL/L (ref 4–13)
AST SERPL W P-5'-P-CCNC: 24 U/L (ref 13–39)
BASOPHILS # BLD AUTO: 0.03 THOUSANDS/ÂΜL (ref 0–0.1)
BASOPHILS NFR BLD AUTO: 0 % (ref 0–1)
BILIRUB SERPL-MCNC: 0.35 MG/DL (ref 0.2–1)
BUN SERPL-MCNC: 18 MG/DL (ref 5–25)
CALCIUM ALBUM COR SERPL-MCNC: 9.7 MG/DL (ref 8.3–10.1)
CALCIUM SERPL-MCNC: 9.2 MG/DL (ref 8.4–10.2)
CHLORIDE SERPL-SCNC: 91 MMOL/L (ref 96–108)
CO2 SERPL-SCNC: 31 MMOL/L (ref 21–32)
CREAT SERPL-MCNC: 1.26 MG/DL (ref 0.6–1.3)
CRP SERPL QL: 190.1 MG/L
EOSINOPHIL # BLD AUTO: 0.26 THOUSAND/ÂΜL (ref 0–0.61)
EOSINOPHIL NFR BLD AUTO: 3 % (ref 0–6)
ERYTHROCYTE [DISTWIDTH] IN BLOOD BY AUTOMATED COUNT: 14.4 % (ref 11.6–15.1)
ERYTHROCYTE [SEDIMENTATION RATE] IN BLOOD: 120 MM/HOUR (ref 0–19)
GFR SERPL CREATININE-BSD FRML MDRD: 59 ML/MIN/1.73SQ M
GLUCOSE SERPL-MCNC: 110 MG/DL (ref 65–140)
HCT VFR BLD AUTO: 37.8 % (ref 36.5–49.3)
HGB BLD-MCNC: 12.7 G/DL (ref 12–17)
IMM GRANULOCYTES # BLD AUTO: 0.06 THOUSAND/UL (ref 0–0.2)
IMM GRANULOCYTES NFR BLD AUTO: 1 % (ref 0–2)
LYMPHOCYTES # BLD AUTO: 2.52 THOUSANDS/ÂΜL (ref 0.6–4.47)
LYMPHOCYTES NFR BLD AUTO: 25 % (ref 14–44)
MCH RBC QN AUTO: 31.3 PG (ref 26.8–34.3)
MCHC RBC AUTO-ENTMCNC: 33.6 G/DL (ref 31.4–37.4)
MCV RBC AUTO: 93 FL (ref 82–98)
MONOCYTES # BLD AUTO: 0.74 THOUSAND/ÂΜL (ref 0.17–1.22)
MONOCYTES NFR BLD AUTO: 7 % (ref 4–12)
NEUTROPHILS # BLD AUTO: 6.59 THOUSANDS/ÂΜL (ref 1.85–7.62)
NEUTS SEG NFR BLD AUTO: 64 % (ref 43–75)
NRBC BLD AUTO-RTO: 0 /100 WBCS
PLATELET # BLD AUTO: 301 THOUSANDS/UL (ref 149–390)
PMV BLD AUTO: 9.6 FL (ref 8.9–12.7)
POTASSIUM SERPL-SCNC: 3.4 MMOL/L (ref 3.5–5.3)
PROT SERPL-MCNC: 8 G/DL (ref 6.4–8.4)
RBC # BLD AUTO: 4.06 MILLION/UL (ref 3.88–5.62)
SODIUM SERPL-SCNC: 133 MMOL/L (ref 135–147)
WBC # BLD AUTO: 10.2 THOUSAND/UL (ref 4.31–10.16)

## 2024-11-18 PROCEDURE — 80053 COMPREHEN METABOLIC PANEL: CPT | Performed by: EMERGENCY MEDICINE

## 2024-11-18 PROCEDURE — 86140 C-REACTIVE PROTEIN: CPT | Performed by: EMERGENCY MEDICINE

## 2024-11-18 PROCEDURE — 85652 RBC SED RATE AUTOMATED: CPT | Performed by: EMERGENCY MEDICINE

## 2024-11-18 PROCEDURE — NC001 PR NO CHARGE: Performed by: STUDENT IN AN ORGANIZED HEALTH CARE EDUCATION/TRAINING PROGRAM

## 2024-11-18 PROCEDURE — 87040 BLOOD CULTURE FOR BACTERIA: CPT | Performed by: EMERGENCY MEDICINE

## 2024-11-18 PROCEDURE — 99285 EMERGENCY DEPT VISIT HI MDM: CPT | Performed by: EMERGENCY MEDICINE

## 2024-11-18 PROCEDURE — A9585 GADOBUTROL INJECTION: HCPCS | Performed by: EMERGENCY MEDICINE

## 2024-11-18 PROCEDURE — 99284 EMERGENCY DEPT VISIT MOD MDM: CPT

## 2024-11-18 PROCEDURE — 85025 COMPLETE CBC W/AUTO DIFF WBC: CPT | Performed by: EMERGENCY MEDICINE

## 2024-11-18 PROCEDURE — 96374 THER/PROPH/DIAG INJ IV PUSH: CPT

## 2024-11-18 PROCEDURE — 36415 COLL VENOUS BLD VENIPUNCTURE: CPT | Performed by: EMERGENCY MEDICINE

## 2024-11-18 PROCEDURE — 72158 MRI LUMBAR SPINE W/O & W/DYE: CPT

## 2024-11-18 RX ORDER — METHOCARBAMOL 500 MG/1
750 TABLET, FILM COATED ORAL ONCE
Status: COMPLETED | OUTPATIENT
Start: 2024-11-18 | End: 2024-11-18

## 2024-11-18 RX ORDER — GADOBUTROL 604.72 MG/ML
10 INJECTION INTRAVENOUS
Status: COMPLETED | OUTPATIENT
Start: 2024-11-18 | End: 2024-11-18

## 2024-11-18 RX ORDER — GABAPENTIN 300 MG/1
600 CAPSULE ORAL ONCE
Status: COMPLETED | OUTPATIENT
Start: 2024-11-18 | End: 2024-11-18

## 2024-11-18 RX ORDER — HYDROMORPHONE HCL/PF 1 MG/ML
0.5 SYRINGE (ML) INJECTION ONCE
Status: DISCONTINUED | OUTPATIENT
Start: 2024-11-18 | End: 2024-11-18

## 2024-11-18 RX ORDER — MORPHINE SULFATE 4 MG/ML
4 INJECTION, SOLUTION INTRAMUSCULAR; INTRAVENOUS
Status: DISCONTINUED | OUTPATIENT
Start: 2024-11-18 | End: 2024-11-19 | Stop reason: HOSPADM

## 2024-11-18 RX ORDER — DULOXETIN HYDROCHLORIDE 30 MG/1
60 CAPSULE, DELAYED RELEASE ORAL DAILY
Status: DISCONTINUED | OUTPATIENT
Start: 2024-11-19 | End: 2024-11-18

## 2024-11-18 RX ORDER — DULOXETIN HYDROCHLORIDE 30 MG/1
60 CAPSULE, DELAYED RELEASE ORAL DAILY
Status: DISCONTINUED | OUTPATIENT
Start: 2024-11-18 | End: 2024-11-19 | Stop reason: HOSPADM

## 2024-11-18 RX ORDER — POTASSIUM CHLORIDE 1500 MG/1
40 TABLET, EXTENDED RELEASE ORAL ONCE
Status: COMPLETED | OUTPATIENT
Start: 2024-11-18 | End: 2024-11-18

## 2024-11-18 RX ADMIN — GABAPENTIN 600 MG: 300 CAPSULE ORAL at 18:36

## 2024-11-18 RX ADMIN — METHOCARBAMOL TABLETS 750 MG: 500 TABLET, COATED ORAL at 18:36

## 2024-11-18 RX ADMIN — GADOBUTROL 10 ML: 604.72 INJECTION INTRAVENOUS at 14:30

## 2024-11-18 RX ADMIN — MORPHINE SULFATE 4 MG: 4 INJECTION INTRAVENOUS at 18:39

## 2024-11-18 RX ADMIN — DULOXETINE HYDROCHLORIDE 60 MG: 30 CAPSULE, DELAYED RELEASE ORAL at 18:36

## 2024-11-18 RX ADMIN — CEPHALEXIN 500 MG: 250 CAPSULE ORAL at 19:11

## 2024-11-18 RX ADMIN — POTASSIUM CHLORIDE 40 MEQ: 1500 TABLET, EXTENDED RELEASE ORAL at 18:36

## 2024-11-18 RX ADMIN — GABAPENTIN 600 MG: 300 CAPSULE ORAL at 18:49

## 2024-11-18 NOTE — TELEPHONE ENCOUNTER
Call received from Nichelle @ [facility] inquiring if the pt is indeed supposed to be going through the ED for evaluation and imaging.  I confirmed with her per documentation from RN that he is to be evaluated for incision drainage and imaging due to the fact that OP imaging was not able to be obtained in a timely manner.  Nichelle verbalized understanding and was appreciative.

## 2024-11-18 NOTE — ED NOTES
Pt made aware of awaiting a bed to become available at [facility] and that he will be staying the night. Emtala has been signed and placed on the chart. Pt given a dinner tray, all evening meds, bandage on his back was changed, and he has been placed in an inpatient bed for comfort. Denies any more needs at this time.      Ellie Martinez RN  11/18/24 9421

## 2024-11-18 NOTE — TELEPHONE ENCOUNTER
[address] in [place] at night and patient can't do that     The soonest is at Friday at what time at 0845 at [place].  There was an appointment at [place] this evening but patient wife can not drive at night,     This RN to update provider team and then follow up with patient.  Patient agreeable with plan and will await call back.

## 2024-11-18 NOTE — TELEMEDICINE
e-Consult ([facility])   Contacted by Dr. Acevedo.    Dominick Irving 64 y.o. male MRN: [id number]  Unit/Bed#: ED 10 Encounter: [id number]    Reason for Consult  Per provider report, patient is a 63yo M who underwent an L3-5 posterior fusion with an L3-4 TLIF on 10/30/2024.  Patient has been calling the office since  with reported drainage from his incision.  Multiple pictures have been sent to our office and it appears that over the last week this incision has been draining more persistently and has noted some increased redness.  On 11/15 he called the office reporting the drainage is getting thicker and pasty like.  He was started on Keflex 2 days prior and has been continued on Keflex without improvement.  Patient called the office today with reports of persistent drainage as well as some subjective lower extremity weakness.  He was advised to seek care in the emergency department.  Upon his arrival, the ER doctor states that the patient denies any radicular pain, severe/increased low back pain, lower extremity numbness, bowel/bladder incontinence, urinary retention, saddle anesthesia.  Per ER doctors report, patient has intact strength throughout bilateral lower extremities.    Available past medical history,social history, surgical history, medication list, drug allergies and review of systems were reviewed.    /57   Pulse 63   Temp (!) 96.8 °F (36 °C) (Temporal)   Resp 18   Wt 109 kg (240 lb)   SpO2 91%   BMI 37.59 kg/m²      Clinical exam:  (Per provider report, Dr. Kristina MD)   Posterior incision with staples in place. Some surrounding erythema, no streaking. No palpable collection   Strength and sensation equal and intact throughout bilateral lower extremities    Imagin/18 MRI L-spine: Approximately 3.3 x 4.5 cm subcutaneous fluid collection tracking from the operative site into the subcutaneous and paraspinal soft tissues. This demonstrates peripheral enhancement and contains air,  and an infected fluid collection cannot be excluded. No drainable epidural fluid collection identified. Consider sampling of the draining fluid to assess for infection. Endplate edema at the operative site is nonspecific. Minimal laminectomy bed fluid collection which mildly effaces the dorsum of the thecal sac. This is commonly seen in the recent postoperative setting due to postoperative blood products. There is significantly improved central stenosis at this level, now mild. Consider attention on follow-up.    Assessment and Recommendations  Continue to closely monitor neuro exam  Maintain normotensive BP goals, SBP < 160   Case and imaging reviewed  Noted expected postop changes on MRI without any significant central stenosis or compression.  There is a 3 x 4 cm subcutaneous fluid collection tracking from the operative site into the subcutaneous and paraspinal soft tissues.  This is not unexpected after surgery, could represent seroma.  As noted above, do not note any significant canal compression to explain his subjective weakness.  Thankfully on exam the patient is neurologically intact.  Given persistent drainage from this incision with ongoing back pain and subjective LE weakness, recommend transfer to B for our formal nsgy evaluation/consultation   Continue pain control per primary team  DVT ppx: krissy Boyd for chem dvt ppx from nsgy standpoint   Medical management per primary team   Pain control per primary team   PT/OT   Social work following for assistance with dispo once medically cleared     Neurosurgery will evaluate the pt upon his arrival to B. Please reach out with any further questions or concerns.       All questions answered. Provider is in agreement with the course of action.  21-30 minutes, >50% of the total time devoted to medical consultative verbal/EMR discussion between providers. Written report will be generated in the EMR.

## 2024-11-18 NOTE — TELEPHONE ENCOUNTER
Earliest STAT MRI we could get is this Friday 11/22/2024 at [facility] at 0815.  Is that appropriate or would you want him to go to ER? Still no fever however still draining and needing to change dressing 3x-4x times a day increase in weakness and decrease in mobility.

## 2024-11-18 NOTE — CASE COMMUNICATION
No SNV made as patient declined visit today.  Patient states he wants a few days of rest and will take a visit next week.

## 2024-11-18 NOTE — ED PROVIDER NOTES
Time reflects when diagnosis was documented in both MDM as applicable and the Disposition within this note       Time User Action Codes Description Comment    11/18/2024  5:49 PM RosemarieRoger luo Add [T81.9XXA] Postoperative complication           ED Disposition       ED Disposition   Transfer to Another Facility-In Network    Condition   --    Date/Time   Mon Nov 18, 2024  5:49 PM    Comment                  Assessment & Plan       Medical Decision Making  Patient presents to the emergency department with leakage from incisional wound from prior surgery.      Based on the MSE and the history provided, patient may be at risk for postsurgical infection or abscess or other postsurgical complication    Based on the work-up performed in the emergency department which includes physical examination, laboratory testing, imaging which may include advanced imaging as necessary such as CT scan or ultrasound, it is deemed that the patient will require admission to the hospital for treatment of fluid collection noted on MRI.    Patient presents to the ER complaining of leakage from his incision from his lumbar fusion and laminectomy.  He had been in contact with his neurosurgeons and they advised him to come to the ER for MRI to rule out infection.  He presented having stated to the neurosurgeons that he has been having increasing drainage from the site and intermittent weakness.  In the ER the site appears well without evidence of infection and his neurologic exam is normal, however, the MRI did reveal a fluid collection in the area of the incision and prior surgery.  Discussions with the neurosurgical providers and they recommend transfer for further evaluation.  Lab work reveals elevated sed rate and CRP but normal white count.  Patient afebrile.    Amount and/or Complexity of Data Reviewed  Labs: ordered. Decision-making details documented in ED Course.     Details: Sed rate and CRP elevated otherwise normal  Radiology:  ordered and independent interpretation performed. Decision-making details documented in ED Course.     Details: MRI of the lumbar spine reveals fluid collection.  Please refer to the included report.    Risk  Prescription drug management.        ED Course as of 11/18/24 1858   Mon Nov 18, 2024   1249 Discussed with on-call neurosurgery, they confirmed that patient requires MRI now as no stat MRI is available for the next several days and patient has been reporting increase in drainage and weakness.  They are concerned for infection.   1629 MRI resulted and after conversation with patient and neurosurgery, recommend transfer for further evaluation.  Will contact medicine at accepting facility to arrange for transfer   1726 Case discussed with medicine at accepting facility, willing to accept patient for neurosurgical evaluation.       Medications   DULoxetine (CYMBALTA) delayed release capsule 60 mg (60 mg Oral Given 11/18/24 1836)   morphine injection 4 mg (4 mg Intravenous Given 11/18/24 1839)   Gadobutrol injection (SINGLE-DOSE) SOLN 10 mL (10 mL Intravenous Given 11/18/24 1430)   potassium chloride (Klor-Con M20) CR tablet 40 mEq (40 mEq Oral Given 11/18/24 1836)   gabapentin (NEURONTIN) capsule 600 mg (600 mg Oral Given 11/18/24 1836)   methocarbamol (ROBAXIN) tablet 750 mg (750 mg Oral Given 11/18/24 1836)   gabapentin (NEURONTIN) capsule 600 mg (600 mg Oral Given 11/18/24 1849)       ED Risk Strat Scores                           SBIRT 20yo+      Flowsheet Row Most Recent Value   Initial Alcohol Screen: US AUDIT-C     1. How often do you have a drink containing alcohol? 0 Filed at: 11/18/2024 1232   2. How many drinks containing alcohol do you have on a typical day you are drinking?  0 Filed at: 11/18/2024 1232   3a. Male UNDER 65: How often do you have five or more drinks on one occasion? 0 Filed at: 11/18/2024 1232   3b. FEMALE Any Age, or MALE 65+: How often do you have 4 or more drinks on one occassion? 0  "Filed at: 11/18/2024 1232   Audit-C Score 0 Filed at: 11/18/2024 1232   ANN: How many times in the past year have you...    Used an illegal drug or used a prescription medication for non-medical reasons? Never Filed at: 11/18/2024 1232                            History of Present Illness       Chief Complaint   Patient presents with    Wound Check     Patient presents to the ED with complaints of drainage to the incision site on his back. He states that he had a fusion of L3-L5 completed on October 30th. Patient reports drainage since November 2.        Past Medical History:   Diagnosis Date    Anxiety 3/01/2023    Arthritis     Bladder cancer (HCC)     Cancer (HCC) 3/17/2023    Cervical disc disorder 1/2016    Chronic narcotic dependence (HCC)     Chronic pain disorder     lower back and down both legs    Colon polyp     Coronary artery disease     CPAP (continuous positive airway pressure) dependence     bi-pap    Depression 3/17/2023    Does use hearing aid     will wear DOS    Full dentures     Heart failure (HCC)     and \"fluid retention\" [facility]\"    High cholesterol     Hypertension     Low back pain 2003    Mild ankle edema     and feet    Obstructive sleep apnea on CPAP     Prediabetes     Shortness of breath     per pt \"with just exertion\"    Sleep apnea     Wears glasses       Past Surgical History:   Procedure Laterality Date    BACK SURGERY      titanium rods implanted    CAUDAL BLOCK N/A 10/17/2023    Procedure: BLOCK / INJECTION CAUDAL;  Surgeon: Minh Rolon MD;  Location:  [facility];  Service: Pain Management     COLONOSCOPY      CYSTOSCOPY W/ URETERAL STENT PLACEMENT Bilateral 03/17/2023    Procedure: bilateral retrograde;  Surgeon: Shan Sanabria MD;  Location:  MAIN OR;  Service: Urology    HERNIA REPAIR      x5    LUMBAR LAMINECTOMY N/A 06/30/2023    Procedure: Left L3-4 Metrx hemilaminectomy and bilateral foraminotomies;  Surgeon: Leland Aguilar MD;  Location: [facility];  Service: Neurosurgery    WV ARTHRODESIS COMBINED TQ 1NTRSPC LUMBAR Right 10/30/2024    Procedure: Robotically assisted L3-4 decompressive hemilaminectomy and foraminotomies with transverse lumbar interbody fusion right-sided approach with add-on to an L4-5 fixation fusion;  Surgeon: Leland Aguilar MD;  Location[facility];  Service: Neurosurgery    WV CYSTO W/REMOVAL OF LESIONS SMALL N/A 05/01/2023    Procedure: CYSTOSCOPY TURBT;  Surgeon: Shan Sanabria MD;  [facility];  Service: Urology    WV CYSTOURETHROSCOPY N/A 11/06/2023    Procedure: CYSTOSCOPY with  bladder biopsies and fulgeration;  Surgeon: Shan Sanabria MD;  Location[facility];  Service: Urology    SPINE SURGERY  2017    TRANSURETHRAL RESECTION OF BLADDER TUMOR N/A 03/17/2023    Procedure: TRANSURETHRAL RESECTION OF BLADDER TUMOR (TURBT);  Surgeon: Shan Sanabria MD;  Location: [facility];  Service: Urology      Family History   Problem Relation Age of Onset    Cancer Father         Adult leukemia    Aortic aneurysm Father     Leukemia Father     Alcohol abuse Father     Hypertension Mother     Colon cancer Maternal Grandfather       Social History     Tobacco Use    Smoking status: Former     Current packs/day: 1.00     Average packs/day: 1 pack/day for 50.9 years (50.9 ttl pk-yrs)     Types: Cigarettes     Start date: 2/1/2023     Passive exposure: Never    Smokeless tobacco: Never   Vaping Use    Vaping status: Never Used   Substance Use Topics    Alcohol use: Not Currently     Comment: 3 per year    Drug use: Not Currently     Types: Marijuana      E-Cigarette/Vaping    E-Cigarette Use Never User       E-Cigarette/Vaping Substances    Nicotine No     THC No     CBD No     Flavoring No     Other No     Unknown No       I have reviewed and agree with the history as documented.     Had laminectomy and L4-5 fusion on 10/30 in [place], has been having drainage from incision and sent in by [facility] for further evaluation  Pt states  they are requesting MRI  Patient has been in contact with neurosurgery attempting to obtain outpatient MRI and no notes available.  He has been describing increased drainage and weakness to them.  Denies fever, chills, nausea, vomiting      History provided by:  Patient   used: No    Medical Problem  Location:  Wound from lumbar fusion surgery located in the mid lumbar spine  Quality:  Drainage from wound  Severity:  Moderate  Onset quality:  Gradual  Duration:  1 week  Timing:  Constant  Progression:  Unchanged  Chronicity:  New  Context:  Lumbar fusion and laminectomy performed 10/30  Relieved by:  Nothing  Worsened by:  Nothing  Associated symptoms: no abdominal pain, no chest pain, no cough, no diarrhea, no ear pain, no fever, no headaches, no myalgias, no nausea, no rash, no shortness of breath, no sore throat, no vomiting and no wheezing        Review of Systems   Constitutional:  Negative for chills and fever.   HENT:  Negative for ear pain, hearing loss, sore throat, trouble swallowing and voice change.    Eyes:  Negative for pain and discharge.   Respiratory:  Negative for cough, shortness of breath and wheezing.    Cardiovascular:  Negative for chest pain and palpitations.   Gastrointestinal:  Negative for abdominal pain, blood in stool, constipation, diarrhea, nausea and vomiting.   Genitourinary:  Negative for dysuria, flank pain, frequency and hematuria.   Musculoskeletal:  Negative for joint swelling, myalgias, neck pain and neck stiffness.   Skin:  Negative for rash and wound.   Neurological:  Negative for dizziness, seizures, syncope, facial asymmetry and headaches.   Psychiatric/Behavioral:  Negative for hallucinations, self-injury and suicidal ideas.    All other systems reviewed and are negative.          Objective       ED Triage Vitals [11/18/24 1232]   Temperature Pulse Blood Pressure Respirations SpO2 Patient Position - Orthostatic VS   (!) 96.8 °F (36 °C) 77 126/60 18 93 %  Sitting      Temp Source Heart Rate Source BP Location FiO2 (%) Pain Score    Temporal Monitor Right arm -- 5      Vitals      Date and Time Temp Pulse SpO2 Resp BP Pain Score FACES Pain Rating User   11/18/24 1839 -- -- -- -- -- 5 --    11/18/24 1400 -- 63 91 % 18 106/57 -- -- AS   11/18/24 1232 96.8 °F (36 °C) 77 93 % 18 126/60 5 -- RR            Physical Exam  Vitals and nursing note reviewed.   Constitutional:       General: He is not in acute distress.     Appearance: He is well-developed.   HENT:      Head: Normocephalic and atraumatic.      Right Ear: External ear normal.      Left Ear: External ear normal.   Eyes:      General: No scleral icterus.        Right eye: No discharge.         Left eye: No discharge.      Extraocular Movements: Extraocular movements intact.      Conjunctiva/sclera: Conjunctivae normal.   Cardiovascular:      Rate and Rhythm: Normal rate and regular rhythm.      Heart sounds: Normal heart sounds. No murmur heard.  Pulmonary:      Effort: Pulmonary effort is normal.      Breath sounds: Normal breath sounds. No wheezing or rales.   Abdominal:      General: Bowel sounds are normal. There is no distension.      Palpations: Abdomen is soft.      Tenderness: There is no abdominal tenderness. There is no guarding or rebound.   Musculoskeletal:         General: No deformity. Normal range of motion.      Cervical back: Normal range of motion and neck supple.      Comments: There is a well-healing lumbar spine wound with staples in place.  There is no surrounding redness or warmth or fluctuance.  No appreciable drainage at this time.   Skin:     General: Skin is warm and dry.      Findings: No rash.   Neurological:      General: No focal deficit present.      Mental Status: He is alert and oriented to person, place, and time.      Cranial Nerves: No cranial nerve deficit.   Psychiatric:         Mood and Affect: Mood normal.         Behavior: Behavior normal.         Thought Content: Thought  content normal.         Judgment: Judgment normal.         Results Reviewed       Procedure Component Value Units Date/Time    Comprehensive metabolic panel [[id number]]  (Abnormal) Collected: 11/18/24 1257    Lab Status: Final result Specimen: Blood from Arm, Left Updated: 11/18/24 1325     Sodium 133 mmol/L      Potassium 3.4 mmol/L      Chloride 91 mmol/L      CO2 31 mmol/L      ANION GAP 11 mmol/L      BUN 18 mg/dL      Creatinine 1.26 mg/dL      Glucose 110 mg/dL      Calcium 9.2 mg/dL      Corrected Calcium 9.7 mg/dL      AST 24 U/L      ALT 63 U/L      Alkaline Phosphatase 142 U/L      Total Protein 8.0 g/dL      Albumin 3.4 g/dL      Total Bilirubin 0.35 mg/dL      eGFR 59 ml/min/1.73sq m     Narrative:      National Kid[employer] guidelines for Chronic Kidney Disease (CKD):     Stage 1 with normal or high GFR (GFR > 90 mL/min/1.73 square meters)    Stage 2 Mild CKD (GFR = 60-89 mL/min/1.73 square meters)    Stage 3A Moderate CKD (GFR = 45-59 mL/min/1.73 square meters)    Stage 3B Moderate CKD (GFR = 30-44 mL/min/1.73 square meters)    Stage 4 Severe CKD (GFR = 15-29 mL/min/1.73 square meters)    Stage 5 End Stage CKD (GFR <15 mL/min/1.73 square meters)  Note: GFR calculation is accurate only with a steady state creatinine    C-reactive protein [[id number]]  (Abnormal) Collected: 11/18/24 1257    Lab Status: Final result Specimen: Blood from Arm, Left Updated: 11/18/24 1325     .1 mg/L     Sedimentation rate, automated [[id number]]  (Abnormal) Collected: 11/18/24 1257    Lab Status: Final result Specimen: Blood from Arm, Left Updated: 11/18/24 1313     Sed Rate 120 mm/hour     Narrative:      .    CBC and differential [[id number]]  (Abnormal) Collected: 11/18/24 1257    Lab Status: Final result Specimen: Blood from Arm, Left Updated: 11/18/24 1306     WBC 10.20 Thousand/uL      RBC 4.06 Million/uL      Hemoglobin 12.7 g/dL      Hematocrit 37.8 %      MCV 93 fL      MCH 31.3 pg      MCHC 33.6  g/dL      RDW 14.4 %      MPV 9.6 fL      Platelets 301 Thousands/uL      nRBC 0 /100 WBCs      Segmented % 64 %      Immature Grans % 1 %      Lymphocytes % 25 %      Monocytes % 7 %      Eosinophils Relative 3 %      Basophils Relative 0 %      Absolute Neutrophils 6.59 Thousands/µL      Absolute Immature Grans 0.06 Thousand/uL      Absolute Lymphocytes 2.52 Thousands/µL      Absolute Monocytes 0.74 Thousand/µL      Eosinophils Absolute 0.26 Thousand/µL      Basophils Absolute 0.03 Thousands/µL     Blood culture #2 [[id number]] Collected: 11/18/24 1257    Lab Status: In process Specimen: Blood from Arm, Left Updated: 11/18/24 1304    Blood culture #1 [[id number]] Collected: 11/18/24 1259    Lab Status: In process Specimen: Blood from Arm, Right Updated: 11/18/24 1304            MRI lumbar spine w wo contrast   Final Interpretation by Connor Armijo MD (11/18 1512)      1. Approximately 3.3 x 4.5 cm subcutaneous fluid collection tracking from the operative site into the subcutaneous and paraspinal soft tissues. This demonstrates peripheral enhancement and contains air, and an infected fluid collection cannot be    excluded. No drainable epidural fluid collection identified. Consider sampling of the draining fluid to assess for infection.   2. Endplate edema at the operative site is nonspecific.   3. Minimal laminectomy bed fluid collection which mildly effaces the dorsum of the thecal sac. This is commonly seen in the recent postoperative setting due to postoperative blood products. There is significantly improved central stenosis at this level,    now mild. Consider attention on follow-up.      The study was marked in EPIC for immediate notification.      Workstation performed: [id number]             Procedures    ED Medication and Procedure Management   Prior to Admission Medications   Prescriptions Last Dose Informant Patient Reported? Taking?   DULoxetine (CYMBALTA) 60 mg delayed release capsule   No No    Sig: Take 1 capsule (60 mg total) by mouth daily   Farxiga 5 MG TABS  Self Yes No   Sig: Take 5 mg by mouth daily 5mg alternating with 2.5mg for fluid retention   HYDROmorphone (DILAUDID) 8 MG tablet   No No   Sig: Take 1 tablet (8 mg total) by mouth every 3 (three) hours as needed for moderate pain or severe pain Max Daily Amount: 64 mg   Insulin Pen Needle 32G X 4 MM MISC   No No   Sig: Use daily Dx E11.9   Patient not taking: Reported on 11/13/2024   Lancet Devices (Adjustable Lancing Device) MISC   No No   Sig: Use as directed to test glucose once daily. One touch device or may change to what insurance covers.   Patient not taking: Reported on 11/13/2024   Lancets (onetouch ultrasoft) lancets   No No   Sig: Use as instructed. Dx E11.9   acetaminophen (TYLENOL) 325 mg tablet   No No   Sig: Take 3 tablets (975 mg total) by mouth every 8 (eight) hours   atorvastatin (LIPITOR) 40 mg tablet   No No   Sig: Take 1 tablet (40 mg total) by mouth daily   cephalexin (KEFLEX) 500 mg capsule   No No   Sig: Take 1 capsule (500 mg total) by mouth every 6 (six) hours for 7 days   fentaNYL (DURAGESIC) 25 mcg/hr   No No   Sig: Place 1 patch on the skin over 72 hours every third day Max Daily Amount: 1 patch   fentaNYL (DURAGESIC) 50 mcg/hr   No No   Sig: Place 1 patch on the skin over 72 hours every third day Max Daily Amount: 1 patch   fentaNYL (DURAGESIC) 75 mcg/hr   No No   Sig: Place 1 patch on the skin over 72 hours every third day Max Daily Amount: 1 patch   gabapentin (NEURONTIN) 600 MG tablet   No No   Sig: Take 1 tablet (600 mg total) by mouth 4 (four) times a day   insulin degludec (Tresiba FlexTouch) 100 units/mL injection pen   No No   Sig: Inject 8 Units under the skin daily   Patient not taking: Reported on 11/13/2024   metFORMIN (GLUCOPHAGE-XR) 500 mg 24 hr tablet   No No   Sig: Take 1 pill qam and 2 pills qpm   Patient taking differently: Take 500 mg by mouth 2 (two) times a day with meals Take 1 pill qam and 2  pills qpm   methocarbamol (ROBAXIN) 750 mg tablet   No No   Sig: Take 1 tablet (750 mg total) by mouth every 6 (six) hours   metolazone (ZAROXOLYN) 2.5 mg tablet   No No   Sig: Take 1 tablet (2.5 mg total) by mouth daily as needed (edema)   naloxone (NARCAN) 4 mg/0.1 mL nasal spray   No No   Sig: Administer 1 spray into a nostril. If no response after 2-3 minutes, give another dose in the other nostril using a new spray.   Patient taking differently: Administer 1 spray into a nostril. If no response after 2-3 minutes, give another dose in the other nostril using a new spray.  Indications: Opioid Overdose   polyethylene glycol (MIRALAX) 17 g packet   No No   Sig: Take 17 g by mouth daily   Patient taking differently: Take 17 g by mouth daily Mix with 8oz fluid.   potassium chloride (Klor-Con) 10 mEq tablet   No No   Sig: Take 4 tablets (40 mEq total) by mouth 2 (two) times a day   senna-docusate sodium (SENOKOT S) 8.6-50 mg per tablet   No No   Sig: Take 1 tablet by mouth daily at bedtime   torsemide (DEMADEX) 20 mg tablet   No No   Sig: Take 2 tablets (40 mg total) by mouth 2 (two) times a day      Facility-Administered Medications: None     Patient's Medications   Discharge Prescriptions    No medications on file     No discharge procedures on file.  ED SEPSIS DOCUMENTATION   Time reflects when diagnosis was documented in both MDM as applicable and the Disposition within this note       Time User Action Codes Description Comment    11/18/2024  5:49 PM Roger Acevedo Add [T81.9XXA] Postoperative complication                  Roger Acevedo MD  11/18/24 1858

## 2024-11-18 NOTE — EMTALA/ACUTE CARE TRANSFER
[facility] EMERGENCY DEPARTMENT  [address] [address]  Dept: [phone]      EMTALA TRANSFER CONSENT    NAME Dominick Irving                                         1960                              MRN [id number]    I have been informed of my rights regarding examination, treatment, and transfer   by Dr. Roger Acevedo MD    Benefits: Specialized equipment and/or services available at the receiving facility (Include comment)________________________, Continuity of care    Risks: Potential for delay in receiving treatment, Potential deterioration of medical condition, Loss of IV, Increased discomfort during transfer, Possible worsening of condition or death during transfer      Consent for Transfer:  I acknowledge that my medical condition has been evaluated and explained to me by the emergency department physician or other qualified medical person and/or my attending physician, who has recommended that I be transferred to the service of  Accepting Physician: Dr KYLIE Logan at  . The above potential benefits of such transfer, the potential risks associated with such transfer, and the probable risks of not being transferred have been explained to me, and I fully understand them.  The doctor has explained that, in my case, the benefits of transfer outweigh the risks.  I agree to be transferred.    I authorize the performance of emergency medical procedures and treatments upon me in both transit and upon arrival at the receiving facility.  Additionally, I authorize the release of any and all medical records to the receiving facility and request they be transported with me, if possible.  I understand that the safest mode of transportation during a medical emergency is an ambulance and that the Hospital advocates the use of this mode of transport. Risks of traveling to the receiving facility by car, including absence of medical control, life sustaining equipment, such as  oxygen, and medical personnel has been explained to me and I fully understand them.    (GEN CORRECT BOX BELOW)  [X]  I consent to the stated transfer and to be transported by ambulance/helicopter.  [  ]  I consent to the stated transfer, but refuse transportation by ambulance and accept full responsibility for my transportation by car.  I understand the risks of non-ambulance transfers and I exonerate the Hospital and its staff from any deterioration in my condition that results from this refusal.    X___________________________________________    DATE  24  TIME________  Signature of patient or legally responsible individual signing on patient behalf           RELATIONSHIP TO PATIENT_________________________          Provider Certification    NAME Dominick Irving                                         1960                              MRN [id number]    A medical screening exam was performed on the above named patient.  Based on the examination:    Condition Necessitating Transfer The encounter diagnosis was Postoperative complication.    Patient Condition: The patient has been stabilized such that within reasonable medical probability, no material deterioration of the patient condition or the condition of the unborn child(nadege) is likely to result from the transfer    Reason for Transfer: Level of Care needed not available at this facility    Transfer Requirements: Facility     Space available and qualified personnel available for treatment as acknowledged by    Agreed to accept transfer and to provide appropriate medical treatment as acknowledged by       Dr KYLIE Logan  Appropriate medical records of the examination and treatment of the patient are provided at the time of transfer   STAFF INITIAL WHEN COMPLETED _______  Transfer will be performed by qualified personnel from    and appropriate transfer equipment as required, including the use of necessary and appropriate life support  measures.    Provider Certification: I have examined the patient and explained the following risks and benefits of being transferred/refusing transfer to the patient/family:  General risk, such as traffic hazards, adverse weather conditions, rough terrain or turbulence, possible failure of equipment (including vehicle or aircraft), or consequences of actions of persons outside the control of the transport personnel, Unanticipated needs of medical equipment and personnel during transport, Risk of worsening condition, The possibility of a transport vehicle being unavailable      Based on these reasonable risks and benefits to the patient and/or the unborn child(nadege), and based upon the information available at the time of the patient’s examination, I certify that the medical benefits reasonably to be expected from the provision of appropriate medical treatments at another medical facility outweigh the increasing risks, if any, to the individual’s medical condition, and in the case of labor to the unborn child, from effecting the transfer.    X____________________________________________ DATE 11/18/24        TIME_______      ORIGINAL - SEND TO MEDICAL RECORDS   COPY - SEND WITH PATIENT DURING TRANSFER

## 2024-11-18 NOTE — TELEPHONE ENCOUNTER
Patient phoned and updated that we would not want to wait till Friday for the MRI and request patient proceed to the ER for imaging.      Patient inquired what he should say upon arrival at ER.     This RN stated that it should be something along the lines of that he recently had surgery with [facility] and there has been drainage from his incision and they instructed me to come to the ER for imaging as I could otherwise not get in till Friday.      Patient informed that they should also be able to review our notes in the system.  If there are any issues they can call.      Patient states his wife is currently at an appointment and will be bach around 1130 and they will head over.      Patient appreciative for assistance.

## 2024-11-19 ENCOUNTER — HOME CARE VISIT (OUTPATIENT)
Dept: HOME HEALTH SERVICES | Facility: HOME HEALTHCARE | Age: 64
End: 2024-11-19
Payer: MEDICARE

## 2024-11-19 ENCOUNTER — PATIENT OUTREACH (OUTPATIENT)
Dept: CASE MANAGEMENT | Facility: OTHER | Age: 64
End: 2024-11-19

## 2024-11-19 ENCOUNTER — ANESTHESIA EVENT (INPATIENT)
Dept: PERIOP | Facility: HOSPITAL | Age: 64
DRG: 857 | End: 2024-11-19
Payer: MEDICARE

## 2024-11-19 ENCOUNTER — HOSPITAL ENCOUNTER (INPATIENT)
Facility: HOSPITAL | Age: 64
LOS: 6 days | Discharge: HOME HEALTH | DRG: 857 | End: 2024-11-25
Attending: INTERNAL MEDICINE | Admitting: INTERNAL MEDICINE
Payer: MEDICARE

## 2024-11-19 ENCOUNTER — TELEPHONE (OUTPATIENT)
Age: 64
End: 2024-11-19

## 2024-11-19 VITALS
SYSTOLIC BLOOD PRESSURE: 110 MMHG | HEART RATE: 54 BPM | TEMPERATURE: 97.9 F | DIASTOLIC BLOOD PRESSURE: 50 MMHG | RESPIRATION RATE: 17 BRPM | WEIGHT: 240 LBS | BODY MASS INDEX: 37.59 KG/M2 | OXYGEN SATURATION: 95 %

## 2024-11-19 DIAGNOSIS — R73.09 HEMOGLOBIN A1C GREATER THAN 9%, INDICATING POOR DIABETIC CONTROL: Primary | ICD-10-CM

## 2024-11-19 DIAGNOSIS — Z71.89 COORDINATION OF COMPLEX CARE: ICD-10-CM

## 2024-11-19 DIAGNOSIS — E87.6 HYPOKALEMIA: ICD-10-CM

## 2024-11-19 DIAGNOSIS — A49.02 MRSA (METHICILLIN RESISTANT STAPHYLOCOCCUS AUREUS) INFECTION: ICD-10-CM

## 2024-11-19 DIAGNOSIS — T81.49XA SURGICAL SITE INFECTION: Primary | ICD-10-CM

## 2024-11-19 DIAGNOSIS — I50.33 ACUTE ON CHRONIC DIASTOLIC HEART FAILURE (HCC): ICD-10-CM

## 2024-11-19 LAB
GLUCOSE SERPL-MCNC: 143 MG/DL (ref 65–140)
GLUCOSE SERPL-MCNC: 145 MG/DL (ref 65–140)

## 2024-11-19 PROCEDURE — 96361 HYDRATE IV INFUSION ADD-ON: CPT

## 2024-11-19 PROCEDURE — 87147 CULTURE TYPE IMMUNOLOGIC: CPT | Performed by: INTERNAL MEDICINE

## 2024-11-19 PROCEDURE — 96376 TX/PRO/DX INJ SAME DRUG ADON: CPT

## 2024-11-19 PROCEDURE — 82948 REAGENT STRIP/BLOOD GLUCOSE: CPT

## 2024-11-19 PROCEDURE — 87081 CULTURE SCREEN ONLY: CPT | Performed by: INTERNAL MEDICINE

## 2024-11-19 PROCEDURE — 99223 1ST HOSP IP/OBS HIGH 75: CPT | Performed by: INTERNAL MEDICINE

## 2024-11-19 RX ORDER — SODIUM CHLORIDE 9 MG/ML
125 INJECTION, SOLUTION INTRAVENOUS CONTINUOUS
Status: DISCONTINUED | OUTPATIENT
Start: 2024-11-19 | End: 2024-11-20

## 2024-11-19 RX ORDER — POTASSIUM CHLORIDE 750 MG/1
20 TABLET, EXTENDED RELEASE ORAL 2 TIMES DAILY
Status: DISCONTINUED | OUTPATIENT
Start: 2024-11-19 | End: 2024-11-19

## 2024-11-19 RX ORDER — HYDROMORPHONE HYDROCHLORIDE 4 MG/1
8 TABLET ORAL
Refills: 0 | Status: DISCONTINUED | OUTPATIENT
Start: 2024-11-19 | End: 2024-11-25 | Stop reason: HOSPADM

## 2024-11-19 RX ORDER — INSULIN LISPRO 100 [IU]/ML
1-5 INJECTION, SOLUTION INTRAVENOUS; SUBCUTANEOUS
Status: DISCONTINUED | OUTPATIENT
Start: 2024-11-19 | End: 2024-11-25 | Stop reason: HOSPADM

## 2024-11-19 RX ORDER — CHLORHEXIDINE GLUCONATE ORAL RINSE 1.2 MG/ML
15 SOLUTION DENTAL ONCE
Status: COMPLETED | OUTPATIENT
Start: 2024-11-19 | End: 2024-11-20

## 2024-11-19 RX ORDER — ACETAMINOPHEN 325 MG/1
975 TABLET ORAL EVERY 8 HOURS SCHEDULED
Status: DISCONTINUED | OUTPATIENT
Start: 2024-11-19 | End: 2024-11-25 | Stop reason: HOSPADM

## 2024-11-19 RX ORDER — ONDANSETRON 2 MG/ML
4 INJECTION INTRAMUSCULAR; INTRAVENOUS EVERY 6 HOURS PRN
Status: DISCONTINUED | OUTPATIENT
Start: 2024-11-19 | End: 2024-11-25 | Stop reason: HOSPADM

## 2024-11-19 RX ORDER — SODIUM CHLORIDE 9 MG/ML
75 INJECTION, SOLUTION INTRAVENOUS CONTINUOUS
Status: DISCONTINUED | OUTPATIENT
Start: 2024-11-19 | End: 2024-11-19 | Stop reason: HOSPADM

## 2024-11-19 RX ORDER — CHLORHEXIDINE GLUCONATE ORAL RINSE 1.2 MG/ML
15 SOLUTION DENTAL ONCE
Status: DISCONTINUED | OUTPATIENT
Start: 2024-11-19 | End: 2024-11-20 | Stop reason: HOSPADM

## 2024-11-19 RX ORDER — CEFAZOLIN SODIUM 2 G/50ML
2000 SOLUTION INTRAVENOUS ONCE
Status: COMPLETED | OUTPATIENT
Start: 2024-11-20 | End: 2024-11-20

## 2024-11-19 RX ORDER — AMOXICILLIN 250 MG
1 CAPSULE ORAL
Status: DISCONTINUED | OUTPATIENT
Start: 2024-11-19 | End: 2024-11-25 | Stop reason: HOSPADM

## 2024-11-19 RX ORDER — ATORVASTATIN CALCIUM 40 MG/1
40 TABLET, FILM COATED ORAL DAILY
Status: DISCONTINUED | OUTPATIENT
Start: 2024-11-19 | End: 2024-11-25 | Stop reason: HOSPADM

## 2024-11-19 RX ORDER — METHOCARBAMOL 750 MG/1
750 TABLET, FILM COATED ORAL EVERY 6 HOURS SCHEDULED
Status: DISCONTINUED | OUTPATIENT
Start: 2024-11-19 | End: 2024-11-25 | Stop reason: HOSPADM

## 2024-11-19 RX ORDER — GABAPENTIN 300 MG/1
600 CAPSULE ORAL 4 TIMES DAILY
Status: DISCONTINUED | OUTPATIENT
Start: 2024-11-19 | End: 2024-11-25 | Stop reason: HOSPADM

## 2024-11-19 RX ORDER — POTASSIUM CHLORIDE 1500 MG/1
20 TABLET, EXTENDED RELEASE ORAL 2 TIMES DAILY
Status: DISCONTINUED | OUTPATIENT
Start: 2024-11-19 | End: 2024-11-25 | Stop reason: HOSPADM

## 2024-11-19 RX ORDER — DULOXETIN HYDROCHLORIDE 60 MG/1
60 CAPSULE, DELAYED RELEASE ORAL DAILY
Status: DISCONTINUED | OUTPATIENT
Start: 2024-11-19 | End: 2024-11-25 | Stop reason: HOSPADM

## 2024-11-19 RX ORDER — CHLORHEXIDINE GLUCONATE ORAL RINSE 1.2 MG/ML
15 SOLUTION DENTAL ONCE
Status: CANCELLED | OUTPATIENT
Start: 2024-11-19 | End: 2024-11-19

## 2024-11-19 RX ORDER — TORSEMIDE 20 MG/1
80 TABLET ORAL DAILY
Status: DISCONTINUED | OUTPATIENT
Start: 2024-11-19 | End: 2024-11-25 | Stop reason: HOSPADM

## 2024-11-19 RX ORDER — FENTANYL 50 UG/1
1 PATCH TRANSDERMAL
Refills: 0 | Status: DISCONTINUED | OUTPATIENT
Start: 2024-11-21 | End: 2024-11-20

## 2024-11-19 RX ADMIN — ATORVASTATIN CALCIUM 40 MG: 40 TABLET, FILM COATED ORAL at 17:51

## 2024-11-19 RX ADMIN — SODIUM CHLORIDE 1000 ML: 0.9 INJECTION, SOLUTION INTRAVENOUS at 07:54

## 2024-11-19 RX ADMIN — MORPHINE SULFATE 4 MG: 4 INJECTION INTRAVENOUS at 01:41

## 2024-11-19 RX ADMIN — MORPHINE SULFATE 4 MG: 4 INJECTION INTRAVENOUS at 04:44

## 2024-11-19 RX ADMIN — METHOCARBAMOL 750 MG: 750 TABLET ORAL at 17:51

## 2024-11-19 RX ADMIN — TORSEMIDE 80 MG: 20 TABLET ORAL at 17:51

## 2024-11-19 RX ADMIN — SODIUM CHLORIDE 75 ML/HR: 0.9 INJECTION, SOLUTION INTRAVENOUS at 10:15

## 2024-11-19 RX ADMIN — HYDROMORPHONE HYDROCHLORIDE 8 MG: 4 TABLET ORAL at 21:04

## 2024-11-19 RX ADMIN — SODIUM CHLORIDE 125 ML/HR: 0.9 INJECTION, SOLUTION INTRAVENOUS at 23:05

## 2024-11-19 RX ADMIN — DULOXETINE HYDROCHLORIDE 60 MG: 30 CAPSULE, DELAYED RELEASE ORAL at 08:06

## 2024-11-19 RX ADMIN — MORPHINE SULFATE 4 MG: 4 INJECTION INTRAVENOUS at 14:16

## 2024-11-19 RX ADMIN — ACETAMINOPHEN 975 MG: 325 TABLET, FILM COATED ORAL at 21:03

## 2024-11-19 RX ADMIN — POTASSIUM CHLORIDE 20 MEQ: 1500 TABLET, EXTENDED RELEASE ORAL at 19:19

## 2024-11-19 RX ADMIN — HYDROMORPHONE HYDROCHLORIDE 8 MG: 4 TABLET ORAL at 17:51

## 2024-11-19 RX ADMIN — METHOCARBAMOL 750 MG: 750 TABLET ORAL at 23:05

## 2024-11-19 RX ADMIN — GABAPENTIN 600 MG: 300 CAPSULE ORAL at 21:02

## 2024-11-19 RX ADMIN — GABAPENTIN 600 MG: 300 CAPSULE ORAL at 17:51

## 2024-11-19 RX ADMIN — MORPHINE SULFATE 2 MG: 2 INJECTION, SOLUTION INTRAMUSCULAR; INTRAVENOUS at 07:53

## 2024-11-19 RX ADMIN — MORPHINE SULFATE 4 MG: 4 INJECTION INTRAVENOUS at 11:20

## 2024-11-19 RX ADMIN — DULOXETINE HYDROCHLORIDE 60 MG: 60 CAPSULE, DELAYED RELEASE ORAL at 17:51

## 2024-11-19 RX ADMIN — ACETAMINOPHEN 975 MG: 325 TABLET, FILM COATED ORAL at 17:51

## 2024-11-19 NOTE — ED NOTES
Received report from GUERA Argueta. Patient resting comfortably at this time.     Aleta Hansen RN  11/19/24 0251

## 2024-11-19 NOTE — PROGRESS NOTES
[facility] referral received on pt for DM  by PCP. Chart reviewed.Pt meets inclusion criteria except for the fact that he is currently inpt.  [facility] as of [facility]/18 at [facility] for post op complication/ wound infection s/p lumbar fusion and laminectomy on 10/30 at [facility]. He had been in contact with his neurosurgeons who advised pt to go to the ER for MRI to rule out infection. Pt will be transferred to [facility] this evening in plan for OR tomorrow for superficial washout of lumber wound.  Referral declined at this time.

## 2024-11-19 NOTE — ED NOTES
Patient ambulating with walker without assistance from staff.     Aleta Hansen RN  11/19/24 6614

## 2024-11-19 NOTE — ED NOTES
Patient set up with breakfast tray, sitting at the side of the bed with tray table.     Allison A Schoener, RN  11/19/24 6790

## 2024-11-19 NOTE — ED CARE HANDOFF
Emergency Department Sign Out Note        Sign out and transfer of care from Dr. Horvath. See Separate Emergency Department note.     The patient, Dominick Irving, was evaluated by the previous provider for wound check.    Workup Completed:  MRI    ED Course / Workup Pending (followup):  Transport for evaluation by neurosurgery pending.                                      Procedures  Medical Decision Making          Disposition  Final diagnoses:   Postoperative complication     Time reflects when diagnosis was documented in both MDM as applicable and the Disposition within this note       Time User Action Codes Description Comment    11/18/2024  5:49 PM Roger Acevedo Add [T81.9XXA] Postoperative complication           ED Disposition       ED Disposition   Transfer to Another Facility-In Network    Condition   --    Date/Time   Mon Nov 18, 2024  5:49 PM    Comment                  MD Documentation      Flowsheet Row Most Recent Value   Patient Condition The patient has been stabilized such that within reasonable medical probability, no material deterioration of the patient condition or the condition of the unborn child(nadege) is likely to result from the transfer   Reason for Transfer Level of Care needed not available at this facility   Benefits of Transfer Specialized equipment and/or services available at the receiving facility (Include comment)________________________, Continuity of care   Risks of Transfer Potential for delay in receiving treatment, Potential deterioration of medical condition, Loss of IV, Increased discomfort during transfer, Possible worsening of condition or death during transfer   Accepting Physician Dr KYLIE Acevedo   Provider Certification General risk, such as traffic hazards, adverse weather conditions, rough terrain or turbulence, possible failure of equipment (including vehicle or aircraft), or consequences of actions of persons outside the control of the  transport personnel, Unanticipated needs of medical equipment and personnel during transport, Risk of worsening condition, The possibility of a transport vehicle being unavailable          Follow-up Information    None       Patient's Medications   Discharge Prescriptions    No medications on file     No discharge procedures on file.       ED Provider  Electronically Signed by     Jonathan Campoverde DO  11/19/24 0666

## 2024-11-19 NOTE — ED NOTES
Patient frustrated and upset with delay in transfer.   This RN explained reason for delay and further need for transfer due to specialist.    Will reach out to PACs to further access potential transfer.     Allison A Schoener, RN  11/19/24 9392

## 2024-11-19 NOTE — ASSESSMENT & PLAN NOTE
"Lab Results   Component Value Date    HGBA1C 9.0 (H) 10/01/2024       No results for input(s): \"POCGLU\" in the last 72 hours.    Blood Sugar Average: Last 72 hrs:  Home regimen Tresiba 8u qd, metformin, and dapagliflozin  Lantus 8U qd while inpatient, start SSI, diabetic diet     "

## 2024-11-19 NOTE — ASSESSMENT & PLAN NOTE
Wt Readings from Last 3 Encounters:   11/18/24 109 kg (240 lb)   11/08/24 109 kg (240 lb 12.8 oz)   11/04/24 111 kg (244 lb 14.9 oz)     Continue torsemide and diamox, monitor K  Daily weights, cardiac diet

## 2024-11-19 NOTE — H&P
"H&P - Hospitalist   Name: Dominick Irving 64 y.o. male I MRN: [id number]  Unit/Bed#: [facility] 604-01 I Date of Admission: 11/19/2024   Date of Service: 11/19/2024 I Hospital Day: 0     Assessment & Plan  Surgical site infection  Presented to [facility] ED with drainage from surgical incision site since 11/11 associated with intermittent subjective weakness of LEs. No red flag symptoms.  MRI L spine w wo contrast 3.3 x 4.5 cm subcutaneous fluid collection tracking from the operative site into the subcutaneous and paraspinal soft tissues. No  significant canal compression or drainable epidural fluid collection  Case d/w neurosurgery who recommended transfer [facility] [place] [facility] formal eval   BC x 2 drawn at [facility]. Afebrile, WBC 10  Started on keflex in OP setting, continue pending neurosurgery consult  Obstructive sleep apnea on CPAP    Chronic diastolic (congestive) heart failure (HCC)  Wt Readings from Last 3 Encounters:   11/18/24 109 kg (240 lb)   11/08/24 109 kg (240 lb 12.8 oz)   11/04/24 111 kg (244 lb 14.9 oz)     Continue torsemide and diamox, monitor K  Daily weights, cardiac diet  Chronic, continuous use of opioids    S/P laminectomy  L3-5 posterior fusion with an L3-4 TLIF on 10/30/2024 by Dr. Aguilar  DM2 (diabetes mellitus, type 2) ([facility])  Lab Results   Component Value Date    HGBA1C 9.0 (H) 10/01/2024       No results for input(s): \"POCGLU\" in the last 72 hours.    Blood Sugar Average: Last 72 hrs:  Home regimen Tresiba 8u qd, metformin, and dapagliflozin  Lantus 8U qd while inpatient, start SSI, diabetic diet         VTE Pharmacologic Prophylaxis:   Moderate Risk (Score 3-4) - Pharmacological DVT Prophylaxis Contraindicated. Sequential Compression Devices Ordered.  Code Status: Level 1 - Full Code     Anticipated Length of Stay: Patient will be admitted on an inpatient basis with an anticipated length of stay of greater than 2 midnights secondary to post op infection.    History of Present Illness   Chief Complaint: " "drainage from surgical incision    Dominick Irving is a 64 y.o. male with a PMH of lumbar stenosis who presents with drainage from surgical incision. He had a Robotically assisted L3-4 decompressive hemilaminectomy and foraminotomies with transverse lumbar interbody fusion right-sided approach with add-on to an L4-5 fixation fusion on 10/30. He was sent to the ED today due worsening purulent appearing drainage from his incision site. He denies any fever, chills, worsening back pain or weakness in the legs. MRI results taken prior to admission are as mentioned above.    Review of Systems   All other systems reviewed and are negative.      Historical Information   Past Medical History:   Diagnosis Date    Anxiety 3/01/2023    Arthritis     Bladder cancer (HCC)     Cancer (HCC) 3/17/2023    Cervical disc disorder 1/2016    Chronic narcotic dependence (HCC)     Chronic pain disorder     lower back and down both legs    Colon polyp     Coronary artery disease     CPAP (continuous positive airway pressure) dependence     bi-pap    Depression 3/17/2023    Does use hearing aid     will wear DOS    Full dentures     Heart failure (HCC)     and \"fluid retention\" [facility]IM[facility] Brito cardio PA\"    High cholesterol     Hypertension     Low back pain 2003    Mild ankle edema     and feet    Obstructive sleep apnea on CPAP     Prediabetes     Shortness of breath     per pt \"with just exertion\"    Sleep apnea     Wears glasses      Past Surgical History:   Procedure Laterality Date    BACK SURGERY      titanium rods implanted    CAUDAL BLOCK N/A 10/17/2023    Procedure: BLOCK / INJECTION CAUDAL;  Surgeon: Minh Rolon MD;  Location:  ENDO;  Service: Pain Management     COLONOSCOPY      CYSTOSCOPY W/ URETERAL STENT PLACEMENT Bilateral 03/17/2023    Procedure: bilateral retrograde;  Surgeon: Shan Sanabria MD;  Location:  MAIN OR;  Service: Urology    HERNIA REPAIR      x5    LUMBAR LAMINECTOMY N/A 06/30/2023    Procedure: Left " L3-4 Metrx hemilaminectomy and bilateral foraminotomies;  Surgeon: Leland Aguilar MD;  Location: [facility] MAIN OR;  Service: Neurosurgery    ND ARTHRODESIS COMBINED TQ 1NTRSPC LUMBAR Right 10/30/2024    Procedure: Robotically assisted L3-4 decompressive hemilaminectomy and foraminotomies with transverse lumbar interbody fusion right-sided approach with add-on to an L4-5 fixation fusion;  Surgeon: Leland Aguilar MD;  Location: [facility];  Service: Neurosurgery    ND CYSTO W/REMOVAL OF LESIONS SMALL N/A 05/01/2023    Procedure: CYSTOSCOPY TURBT;  Surgeon: Shan Sanabria MD;  Location: [facility];  Service: Urology    ND CYSTOURETHROSCOPY N/A 11/06/2023    Procedure: CYSTOSCOPY with  bladder biopsies and fulgeration;  Surgeon: Shan Sanabria MD;  Location: [facility];  Service: Urology    SPINE SURGERY  2017    TRANSURETHRAL RESECTION OF BLADDER TUMOR N/A 03/17/2023    Procedure: TRANSURETHRAL RESECTION OF BLADDER TUMOR (TURBT);  Surgeon: Shan Sanabria MD;  Location: [facility];  Service: Urology     Social History     Tobacco Use    Smoking status: Former     Current packs/day: 1.00     Average packs/day: 1 pack/day for 50.9 years (50.9 ttl pk-yrs)     Types: Cigarettes     Start date: 2/1/2023     Passive exposure: Never    Smokeless tobacco: Never   Vaping Use    Vaping status: Never Used   Substance and Sexual Activity    Alcohol use: Not Currently     Comment: 3 per year    Drug use: Not Currently     Types: Marijuana    Sexual activity: Not Currently     Partners: Female     E-Cigarette/Vaping    E-Cigarette Use Never User      E-Cigarette/Vaping Substances    Nicotine No     THC No     CBD No     Flavoring No     Other No     Unknown No      Family history non-contributory  Social History:  Marital Status: /Civil Union   Occupation: disabled  Patient Pre-hospital Living Situation: Home  Patient Pre-hospital Level of Mobility: walks with walker  Patient Pre-hospital Diet Restrictions:  None    Meds/Allergies   I have reviewed home medications with patient personally.  Prior to Admission medications    Medication Sig Start Date End Date Taking? Authorizing Provider   semaglutide, 0.25 or 0.5 mg/dose, (Ozempic) 2 mg/1.5 mL injection pen Inject 0.5 mg under the skin every 7 days   Yes Historical Provider, MD   acetaminophen (TYLENOL) 325 mg tablet Take 3 tablets (975 mg total) by mouth every 8 (eight) hours 11/4/24   DIA Dockery   atorvastatin (LIPITOR) 40 mg tablet Take 1 tablet (40 mg total) by mouth daily 7/31/24   Robert Budinetz, MD   cephalexin (KEFLEX) 500 mg capsule Take 1 capsule (500 mg total) by mouth every 6 (six) hours for 7 days 11/13/24 11/20/24  Annabelle Murguia PA-C   DULoxetine (CYMBALTA) 60 mg delayed release capsule Take 1 capsule (60 mg total) by mouth daily 7/8/24   Robert Budinetz, MD   Farxiga 5 MG TABS Take 5 mg by mouth daily 5mg alternating with 2.5mg for fluid retention 10/26/22   Historical Provider, MD   fentaNYL (DURAGESIC) 25 mcg/hr Place 1 patch on the skin over 72 hours every third day Max Daily Amount: 1 patch 11/8/24   Robert Budinetz, MD   fentaNYL (DURAGESIC) 50 mcg/hr Place 1 patch on the skin over 72 hours every third day Max Daily Amount: 1 patch 11/8/24   Robert Budinetz, MD   gabapentin (NEURONTIN) 600 MG tablet Take 1 tablet (600 mg total) by mouth 4 (four) times a day 10/17/24   Robert Budinetz, MD   HYDROmorphone (DILAUDID) 8 MG tablet Take 1 tablet (8 mg total) by mouth every 3 (three) hours as needed for moderate pain or severe pain Max Daily Amount: 64 mg 10/24/24   Robert Budinetz, MD   Lancet Devices (Adjustable Lancing Device) MISC Use as directed to test glucose once daily. One touch device or may change to what insurance covers.  Patient not taking: Reported on 11/13/2024 9/18/24   Robert Budinetz, MD   metFORMIN (GLUCOPHAGE-XR) 500 mg 24 hr tablet Take 1 pill qam and 2 pills qpm  Patient taking differently: Take 1,000 mg by mouth  daily with dinner Take 1 pill qam and 2 pills qpm 10/1/24   Robert Budinetz, MD   methocarbamol (ROBAXIN) 750 mg tablet Take 1 tablet (750 mg total) by mouth every 6 (six) hours 11/4/24   DIA Dockery   metolazone (ZAROXOLYN) 2.5 mg tablet Take 1 tablet (2.5 mg total) by mouth daily as needed (edema) 12/18/23   Robert Budinetz, MD   naloxone (NARCAN) 4 mg/0.1 mL nasal spray Administer 1 spray into a nostril. If no response after 2-3 minutes, give another dose in the other nostril using a new spray.  Patient taking differently: Administer 1 spray into a nostril. If no response after 2-3 minutes, give another dose in the other nostril using a new spray.  Indications: Opioid Overdose 11/28/23 11/27/24  DIA Anglin   potassium chloride (Klor-Con) 10 mEq tablet Take 4 tablets (40 mEq total) by mouth 2 (two) times a day  Patient taking differently: Take 20 mEq by mouth 2 (two) times a day 11/4/24   DIA Dockery   senna-docusate sodium (SENOKOT S) 8.6-50 mg per tablet Take 1 tablet by mouth daily at bedtime 11/4/24   DIA Dockery   torsemide (DEMADEX) 20 mg tablet Take 2 tablets (40 mg total) by mouth 2 (two) times a day  Patient taking differently: Take 80 mg by mouth daily 5/8/24   Robert Budinetz, MD   fentaNYL (DURAGESIC) 75 mcg/hr Place 1 patch on the skin over 72 hours every third day Max Daily Amount: 1 patch 10/16/24 11/19/24  Robert Budinetz, MD   insulin degludec (Tresiba FlexTouch) 100 units/mL injection pen Inject 8 Units under the skin daily  Patient not taking: Reported on 11/13/2024 10/22/24 11/19/24  Robert Budinetz, MD   Insulin Pen Needle 32G X 4 MM MISC Use daily Dx E11.9  Patient not taking: Reported on 11/13/2024 9/18/24 11/19/24  Robert Budinetz, MD   Lancets (onetouch ultrasoft) lancets Use as instructed. Dx E11.9 9/18/24 11/19/24  Robert Budinetz, MD   polyethylene glycol (MIRALAX) 17 g packet Take 17 g by mouth daily  Patient taking  differently: Take 17 g by mouth daily Mix with 8oz fluid. 11/5/24 11/19/24  Sara CHRISTIAN Check-Fl[place], CRNP     Allergies   Allergen Reactions    Mirtazapine Other (See Comments) and Confusion     Pt drove without knowing and woke up at a new destination    Venlafaxine Dizziness       Objective :  Temp:  [97.5 °F (36.4 °C)-98.2 °F (36.8 °C)] 98.2 °F (36.8 °C)  HR:  [54-82] 54  BP: ()/(50-61) 116/55  Resp:  [16-17] 16  SpO2:  [93 %-97 %] 95 %  O2 Device: None (Room air)    Physical Exam  Constitutional:       Appearance: Normal appearance.   HENT:      Head: Normocephalic and atraumatic.      Nose: Nose normal.      Mouth/Throat:      Mouth: Mucous membranes are moist.      Pharynx: Oropharynx is clear.   Eyes:      Extraocular Movements: Extraocular movements intact.   Cardiovascular:      Rate and Rhythm: Normal rate and regular rhythm.   Pulmonary:      Effort: Pulmonary effort is normal.      Breath sounds: No wheezing or rales.   Abdominal:      General: There is no distension.      Palpations: Abdomen is soft.      Tenderness: There is no abdominal tenderness.   Musculoskeletal:      Right lower leg: No edema.      Left lower leg: No edema.      Comments: Lumbar incision with staples intact, minimal erythema and drainage at this time   Skin:     General: Skin is warm.   Neurological:      Mental Status: He is alert and oriented to person, place, and time.   Psychiatric:         Mood and Affect: Mood normal.         Behavior: Behavior normal.          Lines/Drains:            Lab Results: I have reviewed the following results:  Results from last 7 days   Lab Units 11/18/24  1257   WBC Thousand/uL 10.20*   HEMOGLOBIN g/dL 12.7   HEMATOCRIT % 37.8   PLATELETS Thousands/uL 301   SEGS PCT % 64   LYMPHO PCT % 25   MONO PCT % 7   EOS PCT % 3     Results from last 7 days   Lab Units 11/18/24  1257   SODIUM mmol/L 133*   POTASSIUM mmol/L 3.4*   CHLORIDE mmol/L 91*   CO2 mmol/L 31   BUN mg/dL 18   CREATININE mg/dL 1.26    ANION GAP mmol/L 11   CALCIUM mg/dL 9.2   ALBUMIN g/dL 3.4*   TOTAL BILIRUBIN mg/dL 0.35   ALK PHOS U/L 142*   ALT U/L 63*   AST U/L 24   GLUCOSE RANDOM mg/dL 110             Lab Results   Component Value Date    HGBA1C 9.0 (H) 10/01/2024    HGBA1C 9.0 (A) 10/01/2024    HGBA1C 9.6 (A) 09/17/2024           Imaging Results Review: I reviewed radiology reports from this admission including: MRI spine.  Other Study Results Review: No additional pertinent studies reviewed.    Administrative Statements   I have spent a total time of 50 minutes in caring for this patient on the day of the visit/encounter including Diagnostic results, Instructions for management, Patient and family education, Impressions, Counseling / Coordination of care, Documenting in the medical record, Reviewing / ordering tests, medicine, procedures  , Obtaining or reviewing history  , and Communicating with other healthcare professionals .    ** Please Note: This note has been constructed using a voice recognition system. **

## 2024-11-19 NOTE — TREATMENT PLAN
Case and imaging reviewed by Dr. Aguilar.     Recommendation made for superficial washout.  Patient is pending transfer to [facility] which is anticipated this evening.    Keflex can be held at this time.    Patient to be n.p.o. after midnight.  Do not start pharmacological DVT prophylaxis.  Preop panels ordered signed and held. -To be released upon admission to [place].    Call with questions or concerns.

## 2024-11-19 NOTE — ASSESSMENT & PLAN NOTE
Presented to [facility] ED with drainage from surgical incision site since 11/11 associated with intermittent subjective weakness of LEs. No red flag symptoms.  MRI L spine w wo contrast 3.3 x 4.5 cm subcutaneous fluid collection tracking from the operative site into the subcutaneous and paraspinal soft tissues. No  significant canal compression or drainable epidural fluid collection  Case d/w neurosurgery who recommended transfer to [facility] for formal eval   BC x 2 drawn at [facility]. Afebrile, WBC 10  Started on keflex in OP setting, continue pending neurosurgery consult

## 2024-11-19 NOTE — ED NOTES
Patient denies lightheaded and dizziness with hypotension.  Provider notified.     Allison A Schoener, RN  11/19/24 7263

## 2024-11-19 NOTE — ED NOTES
Wound dressing saturated with serous fluid. Dressing changed at this time. Wound redressed with sterile gauze and covered with abd pad.     Allison A Schoener, RN  11/19/24 1080

## 2024-11-20 ENCOUNTER — ANESTHESIA (INPATIENT)
Dept: PERIOP | Facility: HOSPITAL | Age: 64
DRG: 857 | End: 2024-11-20
Payer: MEDICARE

## 2024-11-20 ENCOUNTER — TELEPHONE (OUTPATIENT)
Dept: NEUROSURGERY | Facility: CLINIC | Age: 64
End: 2024-11-20

## 2024-11-20 PROBLEM — Z79.4 TYPE 2 DIABETES MELLITUS, WITH LONG-TERM CURRENT USE OF INSULIN (HCC): Status: ACTIVE | Noted: 2024-01-02

## 2024-11-20 LAB
ALBUMIN SERPL BCG-MCNC: 3 G/DL (ref 3.5–5)
ALP SERPL-CCNC: 116 U/L (ref 34–104)
ALT SERPL W P-5'-P-CCNC: 46 U/L (ref 7–52)
ANION GAP SERPL CALCULATED.3IONS-SCNC: 10 MMOL/L (ref 4–13)
APTT PPP: 34 SECONDS (ref 23–34)
AST SERPL W P-5'-P-CCNC: 24 U/L (ref 13–39)
BILIRUB SERPL-MCNC: 0.29 MG/DL (ref 0.2–1)
BUN SERPL-MCNC: 16 MG/DL (ref 5–25)
CALCIUM ALBUM COR SERPL-MCNC: 9.2 MG/DL (ref 8.3–10.1)
CALCIUM SERPL-MCNC: 8.4 MG/DL (ref 8.4–10.2)
CHLORIDE SERPL-SCNC: 98 MMOL/L (ref 96–108)
CO2 SERPL-SCNC: 31 MMOL/L (ref 21–32)
CREAT SERPL-MCNC: 0.76 MG/DL (ref 0.6–1.3)
ERYTHROCYTE [DISTWIDTH] IN BLOOD BY AUTOMATED COUNT: 14.3 % (ref 11.6–15.1)
GFR SERPL CREATININE-BSD FRML MDRD: 95 ML/MIN/1.73SQ M
GLUCOSE SERPL-MCNC: 119 MG/DL (ref 65–140)
GLUCOSE SERPL-MCNC: 120 MG/DL (ref 65–140)
GLUCOSE SERPL-MCNC: 128 MG/DL (ref 65–140)
GLUCOSE SERPL-MCNC: 132 MG/DL (ref 65–140)
GLUCOSE SERPL-MCNC: 134 MG/DL (ref 65–140)
GLUCOSE SERPL-MCNC: 237 MG/DL (ref 65–140)
GLUCOSE SERPL-MCNC: 263 MG/DL (ref 65–140)
HCT VFR BLD AUTO: 32 % (ref 36.5–49.3)
HGB BLD-MCNC: 10.7 G/DL (ref 12–17)
INR PPP: 1.03 (ref 0.85–1.19)
MCH RBC QN AUTO: 31.4 PG (ref 26.8–34.3)
MCHC RBC AUTO-ENTMCNC: 33.4 G/DL (ref 31.4–37.4)
MCV RBC AUTO: 94 FL (ref 82–98)
PLATELET # BLD AUTO: 281 THOUSANDS/UL (ref 149–390)
PLATELET # BLD AUTO: 330 THOUSANDS/UL (ref 149–390)
PMV BLD AUTO: 10.1 FL (ref 8.9–12.7)
PMV BLD AUTO: 9.8 FL (ref 8.9–12.7)
POTASSIUM SERPL-SCNC: 3 MMOL/L (ref 3.5–5.3)
PROT SERPL-MCNC: 6.7 G/DL (ref 6.4–8.4)
PROTHROMBIN TIME: 13.8 SECONDS (ref 12.3–15)
RBC # BLD AUTO: 3.41 MILLION/UL (ref 3.88–5.62)
SODIUM SERPL-SCNC: 139 MMOL/L (ref 135–147)
WBC # BLD AUTO: 7.18 THOUSAND/UL (ref 4.31–10.16)

## 2024-11-20 PROCEDURE — 87147 CULTURE TYPE IMMUNOLOGIC: CPT | Performed by: NEUROLOGICAL SURGERY

## 2024-11-20 PROCEDURE — 87116 MYCOBACTERIA CULTURE: CPT | Performed by: NEUROLOGICAL SURGERY

## 2024-11-20 PROCEDURE — 0JD70ZZ EXTRACTION OF BACK SUBCUTANEOUS TISSUE AND FASCIA, OPEN APPROACH: ICD-10-PCS | Performed by: NEUROLOGICAL SURGERY

## 2024-11-20 PROCEDURE — 85730 THROMBOPLASTIN TIME PARTIAL: CPT | Performed by: INTERNAL MEDICINE

## 2024-11-20 PROCEDURE — 82948 REAGENT STRIP/BLOOD GLUCOSE: CPT

## 2024-11-20 PROCEDURE — 99222 1ST HOSP IP/OBS MODERATE 55: CPT | Performed by: INTERNAL MEDICINE

## 2024-11-20 PROCEDURE — 85610 PROTHROMBIN TIME: CPT | Performed by: INTERNAL MEDICINE

## 2024-11-20 PROCEDURE — 87070 CULTURE OTHR SPECIMN AEROBIC: CPT | Performed by: NEUROLOGICAL SURGERY

## 2024-11-20 PROCEDURE — 80053 COMPREHEN METABOLIC PANEL: CPT | Performed by: INTERNAL MEDICINE

## 2024-11-20 PROCEDURE — 85027 COMPLETE CBC AUTOMATED: CPT | Performed by: INTERNAL MEDICINE

## 2024-11-20 PROCEDURE — 99232 SBSQ HOSP IP/OBS MODERATE 35: CPT | Performed by: PHYSICIAN ASSISTANT

## 2024-11-20 PROCEDURE — 87102 FUNGUS ISOLATION CULTURE: CPT | Performed by: NEUROLOGICAL SURGERY

## 2024-11-20 PROCEDURE — 87205 SMEAR GRAM STAIN: CPT | Performed by: NEUROLOGICAL SURGERY

## 2024-11-20 PROCEDURE — 22015 I&D ABSCESS P-SPINE L/S/LS: CPT | Performed by: NEUROLOGICAL SURGERY

## 2024-11-20 PROCEDURE — 87206 SMEAR FLUORESCENT/ACID STAI: CPT | Performed by: NEUROLOGICAL SURGERY

## 2024-11-20 PROCEDURE — 99024 POSTOP FOLLOW-UP VISIT: CPT | Performed by: NEUROLOGICAL SURGERY

## 2024-11-20 PROCEDURE — 87075 CULTR BACTERIA EXCEPT BLOOD: CPT | Performed by: NEUROLOGICAL SURGERY

## 2024-11-20 PROCEDURE — 87186 SC STD MICRODIL/AGAR DIL: CPT | Performed by: NEUROLOGICAL SURGERY

## 2024-11-20 PROCEDURE — 85049 AUTOMATED PLATELET COUNT: CPT | Performed by: PHYSICIAN ASSISTANT

## 2024-11-20 RX ORDER — CEFAZOLIN SODIUM 2 G/50ML
2000 SOLUTION INTRAVENOUS ONCE
Status: DISCONTINUED | OUTPATIENT
Start: 2024-11-20 | End: 2024-11-20 | Stop reason: HOSPADM

## 2024-11-20 RX ORDER — ROCURONIUM BROMIDE 10 MG/ML
INJECTION, SOLUTION INTRAVENOUS AS NEEDED
Status: DISCONTINUED | OUTPATIENT
Start: 2024-11-20 | End: 2024-11-20

## 2024-11-20 RX ORDER — FENTANYL CITRATE 50 UG/ML
INJECTION, SOLUTION INTRAMUSCULAR; INTRAVENOUS AS NEEDED
Status: DISCONTINUED | OUTPATIENT
Start: 2024-11-20 | End: 2024-11-20

## 2024-11-20 RX ORDER — FENTANYL CITRATE/PF 50 MCG/ML
25 SYRINGE (ML) INJECTION
Status: DISCONTINUED | OUTPATIENT
Start: 2024-11-20 | End: 2024-11-20 | Stop reason: HOSPADM

## 2024-11-20 RX ORDER — HYDROMORPHONE HCL/PF 1 MG/ML
SYRINGE (ML) INJECTION AS NEEDED
Status: DISCONTINUED | OUTPATIENT
Start: 2024-11-20 | End: 2024-11-20

## 2024-11-20 RX ORDER — LIDOCAINE HYDROCHLORIDE 10 MG/ML
INJECTION, SOLUTION EPIDURAL; INFILTRATION; INTRACAUDAL; PERINEURAL AS NEEDED
Status: DISCONTINUED | OUTPATIENT
Start: 2024-11-20 | End: 2024-11-20

## 2024-11-20 RX ORDER — SODIUM CHLORIDE, SODIUM LACTATE, POTASSIUM CHLORIDE, CALCIUM CHLORIDE 600; 310; 30; 20 MG/100ML; MG/100ML; MG/100ML; MG/100ML
INJECTION, SOLUTION INTRAVENOUS CONTINUOUS PRN
Status: DISCONTINUED | OUTPATIENT
Start: 2024-11-20 | End: 2024-11-20

## 2024-11-20 RX ORDER — DOCUSATE SODIUM 100 MG/1
100 CAPSULE, LIQUID FILLED ORAL 2 TIMES DAILY
Status: DISCONTINUED | OUTPATIENT
Start: 2024-11-20 | End: 2024-11-25 | Stop reason: HOSPADM

## 2024-11-20 RX ORDER — POTASSIUM CHLORIDE 14.9 MG/ML
20 INJECTION INTRAVENOUS
Status: DISCONTINUED | OUTPATIENT
Start: 2024-11-20 | End: 2024-11-20

## 2024-11-20 RX ORDER — CEFAZOLIN SODIUM 2 G/50ML
2000 SOLUTION INTRAVENOUS EVERY 8 HOURS
Status: COMPLETED | OUTPATIENT
Start: 2024-11-20 | End: 2024-11-21

## 2024-11-20 RX ORDER — GLYCOPYRROLATE 0.2 MG/ML
INJECTION INTRAMUSCULAR; INTRAVENOUS AS NEEDED
Status: DISCONTINUED | OUTPATIENT
Start: 2024-11-20 | End: 2024-11-20

## 2024-11-20 RX ORDER — EPHEDRINE SULFATE 50 MG/ML
INJECTION INTRAVENOUS AS NEEDED
Status: DISCONTINUED | OUTPATIENT
Start: 2024-11-20 | End: 2024-11-20

## 2024-11-20 RX ORDER — ONDANSETRON 2 MG/ML
4 INJECTION INTRAMUSCULAR; INTRAVENOUS ONCE AS NEEDED
Status: DISCONTINUED | OUTPATIENT
Start: 2024-11-20 | End: 2024-11-20 | Stop reason: HOSPADM

## 2024-11-20 RX ORDER — CEFAZOLIN SODIUM 1 G/3ML
INJECTION, POWDER, FOR SOLUTION INTRAMUSCULAR; INTRAVENOUS AS NEEDED
Status: DISCONTINUED | OUTPATIENT
Start: 2024-11-20 | End: 2024-11-20

## 2024-11-20 RX ORDER — HYDROMORPHONE HCL/PF 1 MG/ML
0.5 SYRINGE (ML) INJECTION
Status: DISCONTINUED | OUTPATIENT
Start: 2024-11-20 | End: 2024-11-20 | Stop reason: HOSPADM

## 2024-11-20 RX ORDER — BISACODYL 10 MG
10 SUPPOSITORY, RECTAL RECTAL DAILY PRN
Status: DISCONTINUED | OUTPATIENT
Start: 2024-11-20 | End: 2024-11-25 | Stop reason: HOSPADM

## 2024-11-20 RX ORDER — DEXAMETHASONE SODIUM PHOSPHATE 10 MG/ML
INJECTION, SOLUTION INTRAMUSCULAR; INTRAVENOUS AS NEEDED
Status: DISCONTINUED | OUTPATIENT
Start: 2024-11-20 | End: 2024-11-20

## 2024-11-20 RX ORDER — FENTANYL 50 UG/1
1 PATCH TRANSDERMAL
Refills: 0 | Status: DISCONTINUED | OUTPATIENT
Start: 2024-11-20 | End: 2024-11-25 | Stop reason: HOSPADM

## 2024-11-20 RX ORDER — SODIUM CHLORIDE 9 MG/ML
75 INJECTION, SOLUTION INTRAVENOUS CONTINUOUS
Status: DISCONTINUED | OUTPATIENT
Start: 2024-11-20 | End: 2024-11-20

## 2024-11-20 RX ORDER — ONDANSETRON 2 MG/ML
INJECTION INTRAMUSCULAR; INTRAVENOUS AS NEEDED
Status: DISCONTINUED | OUTPATIENT
Start: 2024-11-20 | End: 2024-11-20

## 2024-11-20 RX ORDER — HEPARIN SODIUM 5000 [USP'U]/ML
5000 INJECTION, SOLUTION INTRAVENOUS; SUBCUTANEOUS EVERY 8 HOURS SCHEDULED
Status: DISCONTINUED | OUTPATIENT
Start: 2024-11-21 | End: 2024-11-25 | Stop reason: HOSPADM

## 2024-11-20 RX ORDER — POTASSIUM CHLORIDE 1500 MG/1
40 TABLET, EXTENDED RELEASE ORAL ONCE
Status: COMPLETED | OUTPATIENT
Start: 2024-11-20 | End: 2024-11-20

## 2024-11-20 RX ORDER — PROPOFOL 10 MG/ML
INJECTION, EMULSION INTRAVENOUS AS NEEDED
Status: DISCONTINUED | OUTPATIENT
Start: 2024-11-20 | End: 2024-11-20

## 2024-11-20 RX ORDER — MIDAZOLAM HYDROCHLORIDE 2 MG/2ML
INJECTION, SOLUTION INTRAMUSCULAR; INTRAVENOUS AS NEEDED
Status: DISCONTINUED | OUTPATIENT
Start: 2024-11-20 | End: 2024-11-20

## 2024-11-20 RX ADMIN — FENTANYL CITRATE 50 MCG: 50 INJECTION INTRAMUSCULAR; INTRAVENOUS at 09:49

## 2024-11-20 RX ADMIN — CHLORHEXIDINE GLUCONATE 0.12% ORAL RINSE 15 ML: 1.2 LIQUID ORAL at 08:34

## 2024-11-20 RX ADMIN — DEXAMETHASONE SODIUM PHOSPHATE 5 MG: 10 INJECTION, SOLUTION INTRAMUSCULAR; INTRAVENOUS at 10:16

## 2024-11-20 RX ADMIN — PROPOFOL 50 MG: 10 INJECTION, EMULSION INTRAVENOUS at 10:36

## 2024-11-20 RX ADMIN — INSULIN LISPRO 2 UNITS: 100 INJECTION, SOLUTION INTRAVENOUS; SUBCUTANEOUS at 21:31

## 2024-11-20 RX ADMIN — CEFAZOLIN 2000 MG: 1 INJECTION, POWDER, FOR SOLUTION INTRAMUSCULAR; INTRAVENOUS at 10:24

## 2024-11-20 RX ADMIN — HYDROMORPHONE HYDROCHLORIDE 8 MG: 4 TABLET ORAL at 17:41

## 2024-11-20 RX ADMIN — HYDROMORPHONE HYDROCHLORIDE 8 MG: 4 TABLET ORAL at 21:21

## 2024-11-20 RX ADMIN — EPHEDRINE SULFATE 10 MG: 50 INJECTION, SOLUTION INTRAVENOUS at 09:49

## 2024-11-20 RX ADMIN — SODIUM CHLORIDE, SODIUM LACTATE, POTASSIUM CHLORIDE, AND CALCIUM CHLORIDE: .6; .31; .03; .02 INJECTION, SOLUTION INTRAVENOUS at 09:20

## 2024-11-20 RX ADMIN — METHOCARBAMOL 750 MG: 750 TABLET ORAL at 05:16

## 2024-11-20 RX ADMIN — HYDROMORPHONE HYDROCHLORIDE 8 MG: 4 TABLET ORAL at 05:23

## 2024-11-20 RX ADMIN — HYDROMORPHONE HYDROCHLORIDE 8 MG: 4 TABLET ORAL at 08:24

## 2024-11-20 RX ADMIN — EPHEDRINE SULFATE 10 MG: 50 INJECTION, SOLUTION INTRAVENOUS at 09:59

## 2024-11-20 RX ADMIN — GLYCOPYRROLATE 0.1 MG: 0.2 INJECTION, SOLUTION INTRAMUSCULAR; INTRAVENOUS at 09:34

## 2024-11-20 RX ADMIN — VANCOMYCIN HYDROCHLORIDE 2000 MG: 10 INJECTION, POWDER, LYOPHILIZED, FOR SOLUTION INTRAVENOUS at 15:15

## 2024-11-20 RX ADMIN — CEFAZOLIN SODIUM 2000 MG: 2 SOLUTION INTRAVENOUS at 08:13

## 2024-11-20 RX ADMIN — TORSEMIDE 80 MG: 20 TABLET ORAL at 08:13

## 2024-11-20 RX ADMIN — POTASSIUM CHLORIDE 20 MEQ: 1500 TABLET, EXTENDED RELEASE ORAL at 17:07

## 2024-11-20 RX ADMIN — POTASSIUM CHLORIDE 40 MEQ: 1500 TABLET, EXTENDED RELEASE ORAL at 13:38

## 2024-11-20 RX ADMIN — ACETAMINOPHEN 975 MG: 325 TABLET, FILM COATED ORAL at 21:21

## 2024-11-20 RX ADMIN — FENTANYL CITRATE 25 MCG: 50 INJECTION INTRAMUSCULAR; INTRAVENOUS at 11:35

## 2024-11-20 RX ADMIN — METHOCARBAMOL 750 MG: 750 TABLET ORAL at 23:26

## 2024-11-20 RX ADMIN — ONDANSETRON 4 MG: 2 INJECTION INTRAMUSCULAR; INTRAVENOUS at 09:34

## 2024-11-20 RX ADMIN — POTASSIUM CHLORIDE 20 MEQ: 1500 TABLET, EXTENDED RELEASE ORAL at 08:14

## 2024-11-20 RX ADMIN — GABAPENTIN 600 MG: 300 CAPSULE ORAL at 17:07

## 2024-11-20 RX ADMIN — FENTANYL CITRATE 25 MCG: 50 INJECTION INTRAMUSCULAR; INTRAVENOUS at 11:45

## 2024-11-20 RX ADMIN — ACETAMINOPHEN 975 MG: 325 TABLET, FILM COATED ORAL at 13:39

## 2024-11-20 RX ADMIN — NOREPINEPHRINE BITARTRATE 3 MCG/MIN: 1 INJECTION, SOLUTION, CONCENTRATE INTRAVENOUS at 10:23

## 2024-11-20 RX ADMIN — PROPOFOL 150 MG: 10 INJECTION, EMULSION INTRAVENOUS at 09:49

## 2024-11-20 RX ADMIN — GABAPENTIN 600 MG: 300 CAPSULE ORAL at 21:21

## 2024-11-20 RX ADMIN — METHOCARBAMOL 750 MG: 750 TABLET ORAL at 17:07

## 2024-11-20 RX ADMIN — FENTANYL 1 PATCH: 50 PATCH TRANSDERMAL at 21:21

## 2024-11-20 RX ADMIN — ACETAMINOPHEN 975 MG: 325 TABLET, FILM COATED ORAL at 05:16

## 2024-11-20 RX ADMIN — ROCURONIUM BROMIDE 60 MG: 10 INJECTION, SOLUTION INTRAVENOUS at 09:49

## 2024-11-20 RX ADMIN — PROPOFOL 100 MG: 10 INJECTION, EMULSION INTRAVENOUS at 10:50

## 2024-11-20 RX ADMIN — LIDOCAINE HYDROCHLORIDE 50 MG: 10 INJECTION, SOLUTION EPIDURAL; INFILTRATION; INTRACAUDAL; PERINEURAL at 09:49

## 2024-11-20 RX ADMIN — FENTANYL CITRATE 50 MCG: 50 INJECTION INTRAMUSCULAR; INTRAVENOUS at 10:33

## 2024-11-20 RX ADMIN — GABAPENTIN 600 MG: 300 CAPSULE ORAL at 08:13

## 2024-11-20 RX ADMIN — HYDROMORPHONE HYDROCHLORIDE 8 MG: 4 TABLET ORAL at 13:50

## 2024-11-20 RX ADMIN — ATORVASTATIN CALCIUM 40 MG: 40 TABLET, FILM COATED ORAL at 08:13

## 2024-11-20 RX ADMIN — SUGAMMADEX 400 MG: 100 INJECTION, SOLUTION INTRAVENOUS at 10:53

## 2024-11-20 RX ADMIN — MIDAZOLAM 2 MG: 1 INJECTION INTRAMUSCULAR; INTRAVENOUS at 09:34

## 2024-11-20 RX ADMIN — DOCUSATE SODIUM 100 MG: 100 CAPSULE, LIQUID FILLED ORAL at 17:41

## 2024-11-20 RX ADMIN — METHOCARBAMOL 750 MG: 750 TABLET ORAL at 13:38

## 2024-11-20 RX ADMIN — CEFAZOLIN SODIUM 2000 MG: 2 SOLUTION INTRAVENOUS at 16:58

## 2024-11-20 RX ADMIN — DOCUSATE SODIUM 100 MG: 100 CAPSULE, LIQUID FILLED ORAL at 13:38

## 2024-11-20 RX ADMIN — GABAPENTIN 600 MG: 300 CAPSULE ORAL at 13:38

## 2024-11-20 RX ADMIN — DULOXETINE HYDROCHLORIDE 60 MG: 60 CAPSULE, DELAYED RELEASE ORAL at 08:13

## 2024-11-20 RX ADMIN — INSULIN LISPRO 2 UNITS: 100 INJECTION, SOLUTION INTRAVENOUS; SUBCUTANEOUS at 17:13

## 2024-11-20 RX ADMIN — SODIUM CHLORIDE 75 ML/HR: 0.9 INJECTION, SOLUTION INTRAVENOUS at 13:41

## 2024-11-20 RX ADMIN — HYDROMORPHONE HYDROCHLORIDE 0.5 MG: 1 INJECTION, SOLUTION INTRAMUSCULAR; INTRAVENOUS; SUBCUTANEOUS at 10:37

## 2024-11-20 NOTE — TELEPHONE ENCOUNTER
Missed call from patients wife and this RN then phoned her back.  Mrs. Irving stated that her spouse was having a hard time as he is still in the ER at  and does not understand what is going on.  He is telling her that they are admitting him and that he doesn't know why as they can not do anything.  There is not an understanding why he can't be sent from the ER at  to the ER at [place]. Requesting that if there is anything that this RN could do to assist in that transfer, it would be appreciated. This RN attempted to explain why that would not really be any better as well as an overview of the hospital admission and discharge process ensuring her that pt is on the admission list and has not been forgotten.    As soon as a bed becomes available they will transfer. This RN ensured Aristides Brandt that the team here was aware of the imaging results as well as the transfer plans. This seemed to bring a little comfort.     Mrs. Irving asked if this RN would not mind calling the patient to which this RN agreed. Mrs. Irving encouraged to reach out should there be any additional questions or concerns.

## 2024-11-20 NOTE — OP NOTE
OPERATIVE REPORT  PATIENT NAME: Dominick Irving    :  1960  MRN: [id number]  Pt Location: [facility] OR ROOM 17    SURGERY DATE: 2024    Surgeons and Role:     * Leland Aguilar MD - Primary    Preop Diagnosis:  Surgical site infection [T81.49XA]    Post-Op Diagnosis Codes:     * Surgical site infection [T81.49XA]    Procedure(s):  LUMBAR DEBRIDEMENT WOUND (WASH OUT)    Specimen(s):  ID Type Source Tests Collected by Time Destination   A : Lumbar incision, deep Tissue Back ANAEROBIC CULTURE AND GRAM STAIN, FUNGAL CULTURE, CULTURE, TISSUE AND GRAM STAIN, AFB CULTURE WITH STAIN Leland Aguilar MD 2024 1018    B : Lumbar incision, superficial Tissue Back ANAEROBIC CULTURE AND GRAM STAIN, FUNGAL CULTURE, CULTURE, TISSUE AND GRAM STAIN, AFB CULTURE WITH STAIN Leland Aguilar MD 2024 1022        Estimated Blood Loss:   0 mL    Drains:  Closed/Suction Drain Left Back Bulb 7 Fr. (Active)   Number of days: 0       Anesthesia Type:   General    Operative Indications:  Surgical site infection [T81.49XA]  Draining wound    Operative Findings:  Purulent exudate      Complications:   None    Procedure and Technique:  After adequate general endotracheal anesthesia the patient was placed prone on the operating table.  The back was prepped with Betadine.  AA or drapes were placed in normal fashion and a 3M Betadine.  A sticky drape was placed over these.    A timeout was called and all parameters of timeout were followed.    The previous incision in the superior aspect was opened and probed with a hemostat.  Purulence was immediately identified.  This was sent to the laboratory as specimen for a superficial culture.  Copious quantities of irrigation were used to cleanse out the region.  Next, following the guidance of the available MRI I probed more deeply and slightly more caudally and found a second pocket of fluid.  This was sent to the laboratory as deep culture.  I believe these 2 spaces  were likely in continuity.  This also appeared to be a thin but purulent fluid.  The pulse VAC was utilized to cleanse out the area with 1 g of vancomycin.  Copious additional quantities of antibiotic irrigation were used to cleanse out the region.  A 7 mm flat Kory-Fontana drain was tunneled through the superficial and deep spaces.  Instrumentation was not exposed.  The deep tissues were closed with interrupted PDS suture.  Monocryl was utilized to approximate the skin surfaces.  Staples were used to close the skin.  A second region more inferiorly was open because of suspicion of some purulence however no purulent tissue was observed in this region.  This area was left open to be closed by secondary intention.    The patient was aroused from anesthesia placed supine on the operating table and taken to the recovery room having tolerated the procedure well.   I was present for the entire procedure.    Patient Disposition:  PACU              SIGNATURE: Leland Aguilar MD  DATE: November 20, 2024  TIME: 11:11 AM

## 2024-11-20 NOTE — PLAN OF CARE
Problem: PAIN - ADULT  Goal: Verbalizes/displays adequate comfort level or baseline comfort level  Description: Interventions:  - Encourage patient to monitor pain and request assistance  - Assess pain using appropriate pain scale  - Administer analgesics based on type and severity of pain and evaluate response  - Implement non-pharmacological measures as appropriate and evaluate response  - Consider cultural and social influences on pain and pain management  - Notify physician/advanced practitioner if interventions unsuccessful or patient reports new pain  Outcome: Progressing     Problem: INFECTION - ADULT  Goal: Absence or prevention of progression during hospitalization  Description: INTERVENTIONS:  - Assess and monitor for signs and symptoms of infection  - Monitor lab/diagnostic results  - Monitor all insertion sites, i.e. indwelling lines, tubes, and drains  - Monitor endotracheal if appropriate and nasal secretions for changes in amount and color  - [place] appropriate cooling/warming therapies per order  - Administer medications as ordered  - Instruct and encourage patient and family to use good hand hygiene technique  - Identify and instruct in appropriate isolation precautions for identified infection/condition  Outcome: Progressing     Problem: SAFETY ADULT  Goal: Patient will remain free of falls  Description: INTERVENTIONS:  - Educate patient/family on patient safety including physical limitations  - Instruct patient to call for assistance with activity   - Consult OT/PT to assist with strengthening/mobility   - Keep Call bell within reach  - Keep bed low and locked with side rails adjusted as appropriate  - Keep care items and personal belongings within reach  - Initiate and maintain comfort rounds  - Make Fall Risk Sign visible to staff  - Apply yellow socks and bracelet for high fall risk patients  - Consider moving patient to room near nurses station  Outcome: Progressing  Goal: Maintain or  return to baseline ADL function  Description: INTERVENTIONS:  -  Assess patient's ability to carry out ADLs; assess patient's baseline for ADL function and identify physical deficits which impact ability to perform ADLs (bathing, care of mouth/teeth, toileting, grooming, dressing, etc.)  - Assess/evaluate cause of self-care deficits   - Assess range of motion  - Assess patient's mobility; develop plan if impaired  - Assess patient's need for assistive devices and provide as appropriate  - Encourage maximum independence but intervene and supervise when necessary  - Involve family in performance of ADLs  - Assess for home care needs following discharge   - Consider OT consult to assist with ADL evaluation and planning for discharge  - Provide patient education as appropriate  Outcome: Progressing  Goal: Maintains/Returns to pre admission functional level  Description: INTERVENTIONS:  - Perform AM-PAC 6 Click Basic Mobility/ Daily Activity assessment daily.  - Set and communicate daily mobility goal to care team and patient/family/caregiver.   - Collaborate with rehabilitation services on mobility goals if consulted  - Out of bed for toileting  - Record patient progress and toleration of activity level   Outcome: Progressing     Problem: DISCHARGE PLANNING  Goal: Discharge to home or other facility with appropriate resources  Description: INTERVENTIONS:  - Identify barriers to discharge w/patient and caregiver  - Arrange for needed discharge resources and transportation as appropriate  - Identify discharge learning needs (meds, wound care, etc.)  - Arrange for interpretive services to assist at discharge as needed  - Refer to Case Management Department for coordinating discharge planning if the patient needs post-hospital services based on physician/advanced practitioner order or complex needs related to functional status, cognitive ability, or social support system  Outcome: Progressing     Problem: Knowledge  Deficit  Goal: Patient/family/caregiver demonstrates understanding of disease process, treatment plan, medications, and discharge instructions  Description: Complete learning assessment and assess knowledge base.  Interventions:  - Provide teaching at level of understanding  - Provide teaching via preferred learning methods  Outcome: Progressing

## 2024-11-20 NOTE — APP STUDENT NOTE
DARIO STUDENT  Inpatient Progress Note for TRAINING ONLY  Not Part of Legal Medical Record     Progress Note - Dominick Irving 64 y.o. male MRN: [id number]  Unit/Bed#: [facility] 604-01 Encounter: [id number]    Assessment:    Principal Problem:    Surgical site infection  Active Problems:    Obstructive sleep apnea on CPAP    Chronic diastolic (congestive) heart failure (HCC)    Chronic, continuous use of opioids    S/P laminectomy    DM2 (diabetes mellitus, type 2) ([facility])      Plan:  Surgical site infection  S/p L3-4 laminectomy performed on 10/30/24 for L3-5 lumbar stenosis.   Presented to [facility] ED 11/18 for increasing drainage and redness at incision site and LE weakness. Was previously started on Keflex outpatient. MRI spine showed 3.3 x 4.5cm subcutaneous fluid collection tracking from operative site to subcutaneous and paraspinal soft tissues. Neurosurgery recommended transfer here to [facility] for further management.   Per neurosurgery, plan for wound wash out in OR this afternoon. Has been NPO since midnight.   Blood cultures drawn at [facility] so far negative. WBCs initially elevated at 10.2, but WNL at 7.18 today. No fevers.   Pain controlled with Tylenol, Robaxin, PRN Dilaudid, and PRN morphine.   ID also consulted. Continue management as per neurosurgery.     S/P laminectomy   Plan as stated above.     Diabetes mellitus type 2   Outpatient medications include Tresiba 8 units TID with meals and at bedtime, metformin, and dapagliflozin. Continue to hold oral meds.   Most recent HgbA1c on 10/1 at 9.0. POC glucose levels acceptable at 120-140s.   Continue Lantus 8 units TID with meals and at bedtime with sliding scale. Continue monitoring glucose levels TID prior to meals.     Chronic diastolic (congestive) heart failure   Continue outpatient torsemide and monitoring daily weights.     Hypokalemia   Serum potassium low at 3.0 today, down from 3.4 on 11/18. 20 mEq KCl given yesterday and this morning.   Recheck BMP in AM.      Obstructive sleep apnea on CPAP  Continue to monitor O2 levels.     Chronic, continuous use of opioids   Continue PRN analgesics for back pain.           VTE Pharmacologic Prophylaxis:   Pharmacologic: Pharmacologic VTE Prophylaxis contraindicated due to surgery.  Mechanical VTE Prophylaxis in Place: Yes    Patient Centered Rounds: I have performed bedside rounds with nursing staff today.    Discussions with Specialists or Other Care Team Provider: nursing     Education and Discussions with Family / Patient: ***    Time Spent for Care: 20 minutes.  More than 50% of total time spent on counseling and coordination of care as described above.    Current Length of Stay: 1 day(s)    Current Patient Status: Inpatient   Certification Statement: The patient will continue to require additional inpatient hospital stay due to OR procedure.    Discharge Plan: Anticipate discharge to home in 24-48hrs.     Code Status: Level 1 - Full Code    Subjective:   Dominick Irving is a 64-year-old male with a past medical history of lumbar stenosis, DM2, CHF, and ERICA who is HD#1 for post-op infection. Patient is doing well this morning in anticipation of the OR wash out later this afternoon. He reports his pain is currently well-controlled and states the numbness/tingling in his feet is improving. He has been NPO since midnight, but he is having normal bowel movements. Patient reports no overnight issues. He denies fever/chills, weakness, or numbness/tingling in other parts of his extremities.     Objective:     Vitals:   Temp (24hrs), Av °F (36.7 °C), Min:97.9 °F (36.6 °C), Max:98.2 °F (36.8 °C)    Temp:  [97.9 °F (36.6 °C)-98.2 °F (36.8 °C)] 97.9 °F (36.6 °C)  HR:  [54-66] 58  Resp:  [12-17] 16  BP: (105-133)/(50-64) 133/64  SpO2:  [91 %-97 %] 94 %  Body mass index is 36.49 kg/m².     Input and Output Summary (last 24 hours):     No intake or output data in the 24 hours ending 24 0910    Physical Exam:     Physical  "Exam  Vitals and nursing note reviewed.   Constitutional:       Appearance: Normal appearance.   Cardiovascular:      Rate and Rhythm: Normal rate and regular rhythm.      Heart sounds: Normal heart sounds. No murmur heard.     No friction rub.   Pulmonary:      Effort: Pulmonary effort is normal.      Breath sounds: Normal breath sounds. No wheezing or rhonchi.   Skin:     General: Skin is warm and dry.      Findings: No rash.      Comments: Dressing over lumbar spine is dry and intact. No visible erythema or swelling extending from dressing edges.    Neurological:      Mental Status: He is alert and oriented to person, place, and time.   Psychiatric:         Mood and Affect: Mood normal.         Behavior: Behavior normal.       Historical Information   Past Medical History:   Diagnosis Date    Anxiety 3/01/2023    Arthritis     Bladder cancer (HCC)     Cancer (HCC) 3/17/2023    Cervical disc disorder 1/2016    Chronic narcotic dependence (HCC)     Chronic pain disorder     lower back and down both legs    Colon polyp     Coronary artery disease     CPAP (continuous positive airway pressure) dependence     bi-pap    Depression 3/17/2023    Does use hearing aid     will wear DOS    Full dentures     Heart failure (HCC)     and \"fluid retention\" SL  YEIMI Brito cardio PA\"    High cholesterol     Hypertension     Low back pain 2003    Mild ankle edema     and feet    Obstructive sleep apnea on CPAP     Prediabetes     Shortness of breath     per pt \"with just exertion\"    Sleep apnea     Wears glasses      Past Surgical History:   Procedure Laterality Date    BACK SURGERY      titanium rods implanted    CAUDAL BLOCK N/A 10/17/2023    Procedure: BLOCK / INJECTION CAUDAL;  Surgeon: Minh Rolon MD;  Location:  ENDO;  Service: Pain Management     COLONOSCOPY      CYSTOSCOPY W/ URETERAL STENT PLACEMENT Bilateral 03/17/2023    Procedure: bilateral retrograde;  Surgeon: Shan Sanabria MD;  Location:  MAIN OR;  " Service: Urology    HERNIA REPAIR      x5    LUMBAR LAMINECTOMY N/A 06/30/2023    Procedure: Left L3-4 Metrx hemilaminectomy and bilateral foraminotomies;  Surgeon: Leland Aguilar MD;  Location: [facility];  Service: Neurosurgery    AR ARTHRODESIS COMBINED TQ 1NTRSPC LUMBAR Right 10/30/2024    Procedure: Robotically assisted L3-4 decompressive hemilaminectomy and foraminotomies with transverse lumbar interbody fusion right-sided approach with add-on to an L4-5 fixation fusion;  Surgeon: Leland Aguilar MD;  Location: [facility];  Service: Neurosurgery    AR CYSTO W/REMOVAL OF LESIONS SMALL N/A 05/01/2023    Procedure: CYSTOSCOPY TURBT;  Surgeon: Shan Sanabria MD;  Location: [facility];  Service: Urology    AR CYSTOURETHROSCOPY N/A 11/06/2023    Procedure: CYSTOSCOPY with  bladder biopsies and fulgeration;  Surgeon: Shan Sanabria MD;  Location: [facility];  Service: Urology    SPINE SURGERY  2017    TRANSURETHRAL RESECTION OF BLADDER TUMOR N/A 03/17/2023    Procedure: TRANSURETHRAL RESECTION OF BLADDER TUMOR (TURBT);  Surgeon: Shan Sanabria MD;  Location: [facility];  Service: Urology     Social History   Social History     Substance and Sexual Activity   Alcohol Use Not Currently    Comment: 3 per year     Social History     Substance and Sexual Activity   Drug Use Not Currently    Types: Marijuana     Social History     Tobacco Use   Smoking Status Former    Current packs/day: 1.00    Average packs/day: 1 pack/day for 50.9 years (50.9 ttl pk-yrs)    Types: Cigarettes    Start date: 2/1/2023    Passive exposure: Never   Smokeless Tobacco Never     Family History: {SL IP FAM HISTORY SMARTLIST:[id number]}    Meds/Allergies   all medications and allergies reviewed  Allergies   Allergen Reactions    Mirtazapine Other (See Comments) and Confusion     Pt drove without knowing and woke up at a new destination    Venlafaxine Dizziness       Additional Data:     Labs:    Results from last 7 days   Lab Units  11/20/24  0448 11/18/24  1257   WBC Thousand/uL 7.18 10.20*   HEMOGLOBIN g/dL 10.7* 12.7   HEMATOCRIT % 32.0* 37.8   PLATELETS Thousands/uL 281 301   SEGS PCT %  --  64   LYMPHO PCT %  --  25   MONO PCT %  --  7   EOS PCT %  --  3     Results from last 7 days   Lab Units 11/20/24  0448   SODIUM mmol/L 139   POTASSIUM mmol/L 3.0*   CHLORIDE mmol/L 98   CO2 mmol/L 31   BUN mg/dL 16   CREATININE mg/dL 0.76   ANION GAP mmol/L 10   CALCIUM mg/dL 8.4   ALBUMIN g/dL 3.0*   TOTAL BILIRUBIN mg/dL 0.29   ALK PHOS U/L 116*   ALT U/L 46   AST U/L 24   GLUCOSE RANDOM mg/dL 119     Results from last 7 days   Lab Units 11/20/24  0448   INR  1.03     Results from last 7 days   Lab Units 11/20/24  0726 11/20/24  0526 11/19/24  2052 11/19/24  1736   POC GLUCOSE mg/dl 120 132 143* 145*                 * I Have Reviewed All Lab Data Listed Above.  * Additional Pertinent Lab Tests Reviewed: All Labs For Current Hospital Admission Reviewed    Imaging:    Imaging Reports Reviewed Today Include: MRI lumbar spine  Imaging Personally Reviewed by Myself Includes:  none     Recent Cultures (last 7 days):     Results from last 7 days   Lab Units 11/18/24  1259 11/18/24  1257   BLOOD CULTURE  No Growth at 24 hrs. No Growth at 24 hrs.       Last 24 Hours Medication List:   Current Facility-Administered Medications   Medication Dose Route Frequency Provider Last Rate    acetaminophen  975 mg Oral Q8H [facility] Dennis Cross MD      atorvastatin  40 mg Oral Daily Dennis Cross MD      cefazolin  2,000 mg Intravenous Once Leland Aguilar MD      chlorhexidine  15 mL Swish & Spit Once Dennis Cross MD      DULoxetine  60 mg Oral Daily Dennis Cross MD      [START ON 11/21/2024] fentaNYL  1 patch Transdermal Q72H Dennis Cross MD      gabapentin  600 mg Oral 4x Daily Dennis Cross MD      HYDROmorphone  8 mg Oral Q3H PRN Dennis Cross MD      insulin lispro  1-5 Units Subcutaneous TID AC Dennis Cross MD      insulin lispro  1-5 Units  Subcutaneous HS Dennis Cross MD      methocarbamol  750 mg Oral Q6H [facility] Dennis Cross MD      ondansetron  4 mg Intravenous Q6H PRN Dennis Cross MD      potassium chloride  20 mEq Oral BID Dennis Cross MD      potassium chloride  20 mEq Intravenous Q2H Bernie Nielsen PA-C Stopped (11/20/24 0907)    senna-docusate sodium  1 tablet Oral HS Dennis Cross MD      sodium chloride  125 mL/hr Intravenous Continuous Dennis Cross  mL/hr (11/19/24 6260)    torsemide  80 mg Oral Daily Dennis Cross MD          Today, Patient Was Seen By: Mariposa Mendieta    ** Please Note: Dictation voice to text software may have been used in the creation of this document. **

## 2024-11-20 NOTE — PROGRESS NOTES
Pt. To PACU. Report given. Course uneventful. VSS. Airway patent/ TAI's x4. No c/o cp, sob, or PONV.

## 2024-11-20 NOTE — PLAN OF CARE
Problem: PAIN - ADULT  Goal: Verbalizes/displays adequate comfort level or baseline comfort level  Description: Interventions:  - Encourage patient to monitor pain and request assistance  - Assess pain using appropriate pain scale  - Administer analgesics based on type and severity of pain and evaluate response  - Implement non-pharmacological measures as appropriate and evaluate response  - Consider cultural and social influences on pain and pain management  - Notify physician/advanced practitioner if interventions unsuccessful or patient reports new pain  Outcome: Progressing     Problem: INFECTION - ADULT  Goal: Absence or prevention of progression during hospitalization  Description: INTERVENTIONS:  - Assess and monitor for signs and symptoms of infection  - Monitor lab/diagnostic results  - Monitor all insertion sites, i.e. indwelling lines, tubes, and drains  - Monitor endotracheal if appropriate and nasal secretions for changes in amount and color  - [place] appropriate cooling/warming therapies per order  - Administer medications as ordered  - Instruct and encourage patient and family to use good hand hygiene technique  - Identify and instruct in appropriate isolation precautions for identified infection/condition  Outcome: Progressing     Problem: SAFETY ADULT  Goal: Patient will remain free of falls  Description: INTERVENTIONS:  - Educate patient/family on patient safety including physical limitations  - Instruct patient to call for assistance with activity   - Consult OT/PT to assist with strengthening/mobility   - Keep Call bell within reach  - Keep bed low and locked with side rails adjusted as appropriate  - Keep care items and personal belongings within reach  - Initiate and maintain comfort rounds  - Make Fall Risk Sign visible to staff  - Apply yellow socks and bracelet for high fall risk patients  - Consider moving patient to room near nurses station  Outcome: Progressing  Goal: Maintain or  return to baseline ADL function  Description: INTERVENTIONS:  -  Assess patient's ability to carry out ADLs; assess patient's baseline for ADL function and identify physical deficits which impact ability to perform ADLs (bathing, care of mouth/teeth, toileting, grooming, dressing, etc.)  - Assess/evaluate cause of self-care deficits   - Assess range of motion  - Assess patient's mobility; develop plan if impaired  - Assess patient's need for assistive devices and provide as appropriate  - Encourage maximum independence but intervene and supervise when necessary  - Involve family in performance of ADLs  - Assess for home care needs following discharge   - Consider OT consult to assist with ADL evaluation and planning for discharge  - Provide patient education as appropriate  Outcome: Progressing  Goal: Maintains/Returns to pre admission functional level  Description: INTERVENTIONS:  - Perform AM-PAC 6 Click Basic Mobility/ Daily Activity assessment daily.  - Set and communicate daily mobility goal to care team and patient/family/caregiver.   - Collaborate with rehabilitation services on mobility goals if consulted  - Out of bed for toileting  - Record patient progress and toleration of activity level   Outcome: Progressing     Problem: DISCHARGE PLANNING  Goal: Discharge to home or other facility with appropriate resources  Description: INTERVENTIONS:  - Identify barriers to discharge w/patient and caregiver  - Arrange for needed discharge resources and transportation as appropriate  - Identify discharge learning needs (meds, wound care, etc.)  - Arrange for interpretive services to assist at discharge as needed  - Refer to Case Management Department for coordinating discharge planning if the patient needs post-hospital services based on physician/advanced practitioner order or complex needs related to functional status, cognitive ability, or social support system  Outcome: Progressing     Problem: Knowledge  Deficit  Goal: Patient/family/caregiver demonstrates understanding of disease process, treatment plan, medications, and discharge instructions  Description: Complete learning assessment and assess knowledge base.  Interventions:  - Provide teaching at level of understanding  - Provide teaching via preferred learning methods  Outcome: Progressing

## 2024-11-20 NOTE — ASSESSMENT & PLAN NOTE
PDMP reviewed: Fentanyl patch 50 mcg/h every 72 hours, p.o. Dilaudid 8 mg every 3 hours  Outpatient follow-up with PCP and palliative care for ongoing management  Consideration for APS consult if develops acute postoperative pain as above

## 2024-11-20 NOTE — CONSULTS
Consultation - Infectious Disease   Dominick Irving 64 y.o. male MRN: [id number]  Unit/Bed#: [facility] 604-01 Encounter: [id number]      Inpatient consult to Infectious Diseases  Consult performed by: Amadeo Barnhart MD  Consult ordered by: Danna Pandya PA-C          IMPRESSION & RECOMMENDATIONS:   Impression:  1.  Postoperative lumbar wound infection s/p lumbar wound debridement and washout 11/20  2.  Lumbar stenosis s/p L3-4 decompressive hemilaminectomy with transverse lumbar interbody fusion and L4-5 fixation and fusion 10/30  3.  DM type II  4.  ERICA    Recommendations:    To discuss therapy with the primary service.  1.  Check results of operative cultures  2.  Pending above continue cefazolin 2 g every 8 hours IV and vancomycin 1.25 g every 12 hours IV with further dosing by pharmacy      HISTORY OF PRESENT ILLNESS:    Reason for Consult: Lumbar wound infection  HPI: Dominick Irving is a 64 y.o. year old male with a history of DM type II, ERICA lumbar stenosis, who was admitted here on 10/30 to undergo a robotically assisted L3-4 decompressive hemilaminectomy and foraminotomies with transverse lumbar interbody fusion with add on L4-5 fixation fusion.  Patient was discharged on 11/4.  Approximately 1 week later he noticed a purulent discharge from the wound.  He was seen at the [facility] ER 11/18 referred by neurosurgery for leakage from his wound and intermittent weakness.  Previously the patient had been given a prescription for cephalexin on 11/13 which he had taken until admission.  Although the site appeared well in the ER and MRI revealed a fluid collection in the area of the incision and he was advised to be transferred here which was accomplished 11/19.  There was no history of fever or chills or worsening back pain.  Today he underwent debridement of his lumbar wound with washout.  Cultures are pending and Gram stain is negative.  Patient has been placed on cefazolin 2 g every 8 hours  "IV and vancomycin 1.25 g every 12 hours IV with further dosing by pharmacy.      Review of Systems   Musculoskeletal:  Positive for back pain (Pain is less than it was prior to his initial surgery).   Skin:  Positive for wound (Lumbar wound drainage).     A [id number] point system-based review of systems is otherwise negative.    PAST MEDICAL HISTORY:  Past Medical History:   Diagnosis Date    Anxiety 3/01/2023    Arthritis     Bladder cancer (HCC)     Cancer (HCC) 3/17/2023    Cervical disc disorder 1/2016    Chronic narcotic dependence (HCC)     Chronic pain disorder     lower back and down both legs    Colon polyp     Coronary artery disease     CPAP (continuous positive airway pressure) dependence     bi-pap    Depression 3/17/2023    Does use hearing aid     will wear DOS    Full dentures     Heart failure (HCC)     and \"fluid retention\" [facility] PA\"    High cholesterol     Hypertension     Low back pain 2003    Mild ankle edema     and feet    Obstructive sleep apnea on CPAP     Prediabetes     Shortness of breath     per pt \"with just exertion\"    Sleep apnea     Wears glasses      Past Surgical History:   Procedure Laterality Date    BACK SURGERY      titanium rods implanted    CAUDAL BLOCK N/A 10/17/2023    Procedure: BLOCK / INJECTION CAUDAL;  Surgeon: Minh Rolon MD;  Location: [facility];  Service: Pain Management     COLONOSCOPY      CYSTOSCOPY W/ URETERAL STENT PLACEMENT Bilateral 03/17/2023    Procedure: bilateral retrograde;  Surgeon: Shan Sanabria MD;  Location: [facility];  Service: Urology    HERNIA REPAIR      x5    LUMBAR LAMINECTOMY N/A 06/30/2023    Procedure: Left L3-4 Metrx hemilaminectomy and bilateral foraminotomies;  Surgeon: Leland Aguilar MD;  Location:  MAIN OR;  Service: Neurosurgery    MA ARTHRODESIS COMBINED TQ [id number] LUMBAR Right 10/30/2024    Procedure: Robotically assisted L3-4 decompressive hemilaminectomy and foraminotomies with transverse lumbar " interbody fusion right-sided approach with add-on to an L4-5 fixation fusion;  Surgeon: Leland Aguilar MD;  Location: [facility];  Service: Neurosurgery    WY CYSTO W/REMOVAL OF LESIONS SMALL N/A 2023    Procedure: CYSTOSCOPY TURBT;  Surgeon: Shan Sanabria MD;  [facility];  Service: Urology    WY CYSTOURETHROSCOPY N/A 2023    Procedure: CYSTOSCOPY with  bladder biopsies and fulgeration;  Surgeon: Shan Sanabria MD;  [facility];  Service: Urology    SPINE SURGERY  2017    TRANSURETHRAL RESECTION OF BLADDER TUMOR N/A 2023    Procedure: TRANSURETHRAL RESECTION OF BLADDER TUMOR (TURBT);  Surgeon: Shan Sanabria MD;  [facility];  Service: Urology       FAMILY HISTORY:  Non-contributory    SOCIAL HISTORY:  Social History   /Civil Union  Social History     Substance and Sexual Activity   Alcohol Use Not Currently    Comment: 3 per year     Social History     Substance and Sexual Activity   Drug Use Not Currently    Types: Marijuana     Social History     Tobacco Use   Smoking Status Former    Current packs/day: 1.00    Average packs/day: 1 pack/day for 50.9 years (50.9 ttl pk-yrs)    Types: Cigarettes    Start date: 2023    Passive exposure: Never   Smokeless Tobacco Never       ALLERGIES:  Allergies   Allergen Reactions    Mirtazapine Other (See Comments) and Confusion     Pt drove without knowing and woke up at a new destination    Venlafaxine Dizziness       MEDICATIONS:  All current active medications have been reviewed.      PHYSICAL EXAM:  Temp:  [97.8 °F (36.6 °C)-98.4 °F (36.9 °C)] 98 °F (36.7 °C)  HR:  [55-69] 67  Resp:  [12-20] 18  BP: (108-158)/(47-88) 111/47  SpO2:  [91 %-99 %] 95 %  Temp (24hrs), Av °F (36.7 °C), Min:97.8 °F (36.6 °C), Max:98.4 °F (36.9 °C)  Current: Temperature: 98 °F (36.7 °C)    Intake/Output Summary (Last 24 hours) at 2024 1814  Last data filed at 2024 1500  Gross per 24 hour   Intake 1135.75 ml   Output 600  ml   Net 535.75 ml       General Appearance:  Appearing well, nontoxic, and in no distress, appears stated age   Head:  Normocephalic, without obvious abnormality, atraumatic   Eyes:  PERRL, conjunctiva pink and sclera anicteric, both eyes   Nose: Nares normal, mucosa normal, no drainage   Throat: Oropharynx moist without lesions; lips, mucosa, and tongue normal; edentulous with full dentures   Neck: Supple, symmetrical, trachea midline, no adenopathy, no tenderness/mass/nodules   Back:   Lumbar wound with dry fresh dressing and MAXIMO drain with serosanguineous drainage   Lungs:   Clear to auscultation bilaterally, no audible wheezes, rhonchi and rales, respirations unlabored   Chest Wall:  No tenderness or deformity   Heart:  Regular rate and rhythm, S1, S2 normal, no murmur, rub or gallop   Abdomen:   Soft, non-tender, non-distended, positive bowel sounds, no masses, no organomegaly    No CVA tenderness   Extremities: Extremities normal, atraumatic, no cyanosis, clubbing or edema   Skin: As above   Lymph nodes: Cervical, supraclavicular, and axillary nodes normal   Neurologic: Alert and oriented times 3, extremity strength 5/5 and symmetric           Invasive Devices:   Peripheral IV 11/18/24 Left;Ventral (anterior) Forearm (Active)   Site Assessment [facility] 11/20/24 1330   Dressing Type Transparent 11/20/24 1330   Line Status Flushed & Clamped 11/20/24 1330   Dressing Status Clean;Dry;Intact 11/20/24 1330   Dressing Change Due 11/22/24 11/20/24 1330       Peripheral IV 11/20/24 Right Hand (Active)   Site Assessment WDL 11/20/24 1330   Dressing Type Transparent 11/20/24 1330   Line Status Flushed & Clamped 11/20/24 1330   Dressing Status Clean;Dry;Intact 11/20/24 1330   Dressing Change Due 11/24/24 11/20/24 1330   Reason Not Rotated Not due 11/20/24 1330       Closed/Suction Drain Left Back Bulb 7 Fr. (Active)   Site Description Unable to view 11/20/24 1330   Dressing Status Clean;Dry;Intact 11/20/24 1330   Drainage  Appearance Bloody 11/20/24 1330   Status To bulb suction 11/20/24 1330       LABS, IMAGING, & OTHER STUDIES:  Lab Results:      I have personally reviewed pertinent labs.    Results from last 7 days   Lab Units 11/20/24  1335 11/20/24  0448 11/18/24  1257   WBC Thousand/uL  --  7.18 10.20*   HEMOGLOBIN g/dL  --  10.7* 12.7   PLATELETS Thousands/uL 330 281 301     Results from last 7 days   Lab Units 11/20/24  0448 11/18/24  1257   SODIUM mmol/L 139 133*   POTASSIUM mmol/L 3.0* 3.4*   CHLORIDE mmol/L 98 91*   CO2 mmol/L 31 31   BUN mg/dL 16 18   CREATININE mg/dL 0.76 1.26   EGFR ml/min/1.73sq m 95 59   CALCIUM mg/dL 8.4 9.2   AST U/L 24 24   ALT U/L 46 63*   ALK PHOS U/L 116* 142*     Results from last 7 days   Lab Units 11/20/24  1022 11/20/24  1018 11/18/24  1259 11/18/24  1257   BLOOD CULTURE   --   --  No Growth at 24 hrs. No Growth at 24 hrs.   GRAM STAIN RESULT  2+ Polys  No organisms seen 1+ Polys  No organisms seen  --   --        Imaging Studies:   I have personally reviewed pertinent imaging study reports and images in PACS.        EKG, Pathology, and Other Studies:   I have personally reviewed pertinent reports.

## 2024-11-20 NOTE — ANESTHESIA POSTPROCEDURE EVALUATION
Post-Op Assessment Note    CV Status:  Stable    Pain management: adequate       Mental Status:  Alert and awake   Hydration Status:  Euvolemic   PONV Controlled:  Controlled   Airway Patency:  Patent     Post Op Vitals Reviewed: Yes    No anethesia notable event occurred.    Staff: Anesthesiologist           Last Filed PACU Vitals:  Vitals Value Taken Time   Temp 98 °F (36.7 °C) 11/20/24 1154   Pulse 61 11/20/24 1155   /65 11/20/24 1154   Resp 20 11/20/24 1154   SpO2 92 % 11/20/24 1155   Vitals shown include unfiled device data.    Modified Erna:  Activity: 2 (11/20/2024 11:54 AM)  Respiration: 2 (11/20/2024 11:54 AM)  Circulation: 2 (11/20/2024 11:54 AM)  Consciousness: 2 (11/20/2024 11:54 AM)  Oxygen Saturation: 2 (11/20/2024 11:54 AM)  Modified Erna Score: 10 (11/20/2024 11:54 AM)

## 2024-11-20 NOTE — ASSESSMENT & PLAN NOTE
Wt Readings from Last 3 Encounters:   11/19/24 106 kg (233 lb)   11/18/24 109 kg (240 lb)   11/08/24 109 kg (240 lb 12.8 oz)     Continue home dose torsemide 80 mg daily   Also takes PRN metolazone at home   Monitor volume status with daily weights, intake and output   Cardiac diet

## 2024-11-20 NOTE — ANESTHESIA POSTPROCEDURE EVALUATION
Post-Op Assessment Note    CV Status:  Stable  Pain Score: 1    Pain management: adequate       Mental Status:  Alert and awake   Hydration Status:  Stable   PONV Controlled:  None   Airway Patency:  Patent     Post Op Vitals Reviewed: Yes    No anethesia notable event occurred.    Staff: CRNA       Last Filed PACU Vitals:  Vitals Value Taken Time   Temp 98.4 °F (36.9 °C) 11/20/24 1126   Pulse 57 11/20/24 1128   /67 11/20/24 1126   Resp 17 11/20/24 1128   SpO2 98 % 11/20/24 1128   Vitals shown include unfiled device data.    Modified Erna:  No data recorded

## 2024-11-20 NOTE — ASSESSMENT & PLAN NOTE
History of L3-5 fusion with L3-4 TLIF on 10/30/2024 by Dr. Aguilar with concern for incisional drainage and possible wound infection    Imagin/18 MRI L-spine: Approximately 3.3 x 4.5 cm subcutaneous fluid collection tracking from the operative site into the subcutaneous and paraspinal soft tissues. This demonstrates peripheral enhancement and contains air, and an infected fluid collection cannot be excluded. No drainable epidural fluid collection identified. Consider sampling of the draining fluid to assess for infection. Endplate edema at the operative site is nonspecific. Minimal laminectomy bed fluid collection which mildly effaces the dorsum of the thecal sac. This is commonly seen in the recent postoperative setting due to postoperative blood products. There is significantly improved central stenosis at this level, now mild. Consider attention on follow-up.    Plan:  Plan for OR today with Dr. Aguilar for lumbar wound wash out  Remains NPO  No dvt ppx  Labs reviewed  Cultures to be taken in the OR  ID consult  Continue to monitor neuro exam  Medical management and pain control per primary team  DVT ppx:  SCDs only  Mobilize as tolerated with assistance, PT / OT evaluation postop    Neurosurgery will continue to follow.  Please call with questions or concerns.

## 2024-11-20 NOTE — PROGRESS NOTES
Progress Note - Hospitalist   Name: Dominick Irving 64 y.o. male I MRN: [id number]  [facility] I Date of Admission: 11/19/2024   Date of Service: 11/20/2024 I Hospital Day: 1    Assessment & Plan  Surgical site infection  Presented to [facility] [place] ED with drainage from surgical incision site since 11/11 associated with intermittent subjective weakness of LEs. No red flag symptoms.  Fortunately patient is hemodynamically stable and nontoxic-appearing, remains afebrile.  MRI L spine w wo contrast showed: 3.3 x 4.5 cm subcutaneous fluid collection tracking from the operative site into the subcutaneous and paraspinal soft tissues. No significant canal compression or drainable epidural fluid collection  Transferred to [facility] for neurosurgery evaluation, recommendations appreciated   Planning for operative washout today, follow-up OR cultures   ID consulted for antibiotic management   Pain has been manageable on home regimen: Fentanyl patch 50 mcg/h every 72 hours, p.o. Dilaudid 8 mg every 3 hours as needed  Consider APS consult if patient develops acute/uncontrolled postop pain   PT/OT evaluations postoperatively  Chronic, continuous use of opioids  PDMP reviewed: Fentanyl patch 50 mcg/h every 72 hours, p.o. Dilaudid 8 mg every 3 hours  Outpatient follow-up with PCP and palliative care for ongoing management  Consideration for APS consult if develops acute postoperative pain as above  Type 2 diabetes mellitus, with long-term current use of insulin (HCC)  Lab Results   Component Value Date    HGBA1C 9.0 (H) 10/01/2024       Recent Labs     11/19/24  1736 11/19/24  2052 11/20/24  0526 11/20/24  0726   POCGLU 145* 143* 132 120       Blood Sugar Average: Last 72 hrs:  (P) 135  Home regimen: Tresiba 8u qd, metformin, and dapagliflozin  Hold oral medications while inpatient  Currently on Lantus 8U qd + SSI  Monitor Accu-Cheks and adjust insulin regimen as needed   Diabetic diet, hypoglycemia protocol  Chronic diastolic  (congestive) heart failure (HCC)  Wt Readings from Last 3 Encounters:   11/19/24 106 kg (233 lb)   11/18/24 109 kg (240 lb)   11/08/24 109 kg (240 lb 12.8 oz)     Continue home dose torsemide 80 mg daily   Also takes PRN metolazone at home   Monitor volume status with daily weights, intake and output   Cardiac diet  S/P laminectomy  L3-5 posterior fusion with an L3-4 TLIF on 10/30/2024 by Dr. Aguilar  See related plan above   Obstructive sleep apnea on CPAP  Continue CPAP QHS    VTE Pharmacologic Prophylaxis:   High Risk (Score >/= 5) - Pharmacological DVT Prophylaxis Contraindicated. Sequential Compression Devices Ordered. On hold for OR today with Neurosurgery.     Mobility:   Basic Mobility Inpatient Raw Score: 22  JH-HLM Goal: 7: Walk 25 feet or more  JH-HLM Achieved: 7: Walk 25 feet or more  JH-HLM Goal achieved. Continue to encourage appropriate mobility.    Patient Centered Rounds: I performed bedside rounds with nursing staff today.   Discussions with Specialists or Other Care Team Provider: primary RN, case management     Education and Discussions with Family / Patient: Patient declined call to .     Current Length of Stay: 1 day(s)  Current Patient Status: Inpatient   Certification Statement: The patient will continue to require additional inpatient hospital stay due to pending OR, postop monitoring, pain control, IV antibiotic, therapy evaluations, clearance from NS/ID  Discharge Plan: Anticipate discharge in >72 hrs to discharge location to be determined pending rehab evaluations.    Code Status: Level 1 - Full Code    Subjective   Patient reports overall doing okay, pain is controlled on his home regimen. Notes he is hungry and thirsty due to being n.p.o., he is eager to have surgery.  Advised if he has uncontrolled pain postoperatively we can have our acute pain service follow along.  Also discussed plan for ID consult for antibiotic management.    Objective :  Temp:  [97.9 °F (36.6  °C)-98.2 °F (36.8 °C)] 97.9 °F (36.6 °C)  HR:  [54-66] 58  BP: (105-133)/(50-64) 133/64  Resp:  [12-17] 16  SpO2:  [91 %-97 %] 94 %  O2 Device: None (Room air)    Body mass index is 36.49 kg/m².     Input and Output Summary (last 24 hours):   No intake or output data in the 24 hours ending 11/20/24 0820    Physical Exam  Vitals and nursing note reviewed. Exam conducted with a chaperone present.   Constitutional:       General: He is not in acute distress.     Appearance: He is obese.   Cardiovascular:      Rate and Rhythm: Normal rate.   Pulmonary:      Effort: Pulmonary effort is normal. No respiratory distress.   Skin:     Comments: Lumbar dressing CDI   Neurological:      General: No focal deficit present.      Mental Status: He is alert and oriented to person, place, and time. Mental status is at baseline.           Lines/Drains:              Lab Results: I have reviewed the following results:   Results from last 7 days   Lab Units 11/20/24  0448 11/18/24  1257   WBC Thousand/uL 7.18 10.20*   HEMOGLOBIN g/dL 10.7* 12.7   HEMATOCRIT % 32.0* 37.8   PLATELETS Thousands/uL 281 301   SEGS PCT %  --  64   LYMPHO PCT %  --  25   MONO PCT %  --  7   EOS PCT %  --  3     Results from last 7 days   Lab Units 11/20/24  0448   SODIUM mmol/L 139   POTASSIUM mmol/L 3.0*   CHLORIDE mmol/L 98   CO2 mmol/L 31   BUN mg/dL 16   CREATININE mg/dL 0.76   ANION GAP mmol/L 10   CALCIUM mg/dL 8.4   ALBUMIN g/dL 3.0*   TOTAL BILIRUBIN mg/dL 0.29   ALK PHOS U/L 116*   ALT U/L 46   AST U/L 24   GLUCOSE RANDOM mg/dL 119     Results from last 7 days   Lab Units 11/20/24  0448   INR  1.03     Results from last 7 days   Lab Units 11/20/24  0726 11/20/24  0526 11/19/24 2052 11/19/24  1736   POC GLUCOSE mg/dl 120 132 143* 145*               Recent Cultures (last 7 days):   Results from last 7 days   Lab Units 11/18/24  1259 11/18/24  1257   BLOOD CULTURE  No Growth at 24 hrs. No Growth at 24 hrs.             Last 24 Hours Medication List:      Current Facility-Administered Medications:     acetaminophen (TYLENOL) tablet 975 mg, Q8H ISIS    atorvastatin (LIPITOR) tablet 40 mg, Daily    ceFAZolin (ANCEF) IVPB (premix in dextrose) 2,000 mg 50 mL, Once, Last Rate: 2,000 mg (11/20/24 0813)    chlorhexidine (PERIDEX) 0.12 % oral rinse 15 mL, Once    chlorhexidine (PERIDEX) 0.12 % oral rinse 15 mL, Once    DULoxetine (CYMBALTA) delayed release capsule 60 mg, Daily    [START ON 11/21/2024] fentaNYL (DURAGESIC) 50 mcg/hr TD 72 hr patch 1 patch, Q72H    gabapentin (NEURONTIN) capsule 600 mg, 4x Daily    HYDROmorphone (DILAUDID) tablet 8 mg, Q3H PRN    insulin lispro (HumALOG/ADMELOG) 100 units/mL subcutaneous injection 1-5 Units, TID AC **AND** Fingerstick Glucose (POCT), TID AC    insulin lispro (HumALOG/ADMELOG) 100 units/mL subcutaneous injection 1-5 Units, HS    methocarbamol (ROBAXIN) tablet 750 mg, Q6H ISIS    ondansetron (ZOFRAN) injection 4 mg, Q6H PRN    potassium chloride (Klor-Con M20) CR tablet 20 mEq, BID    potassium chloride 20 mEq IVPB (premix), Q2H    senna-docusate sodium (SENOKOT S) 8.6-50 mg per tablet 1 tablet, HS    sodium chloride 0.9 % infusion, Continuous, Last Rate: 125 mL/hr (11/19/24 2305)    torsemide (DEMADEX) tablet 80 mg, Daily    Administrative Statements   Today, Patient Was Seen By: Bernie Nielsen PA-C      **Please Note: This note may have been constructed using a voice recognition system.**

## 2024-11-20 NOTE — CASE MANAGEMENT
Case Management Assessment & Discharge Planning Note    Patient name Dominick Irving  Location [facility] 604/[facility] 604-01 MRN [id number]  : 1960 Date 2024       Current Admission Date: 2024  Current Admission Diagnosis:Surgical site infection   Patient Active Problem List    Diagnosis Date Noted Date Diagnosed    Surgical site infection 2024     Headache 2024     Lumbar stenosis 10/31/2024     Lumbar facet arthropathy 10/31/2024     Postoperative pain after spinal surgery 10/30/2024     Unstable gait 2024     Hypertensive heart disease with congestive heart failure ([facility]) 2024     Depression, recurrent ([facility]) 2024     History of bladder cancer 2024     Closed fracture of right ankle with routine healing 2024     COPD with acute exacerbation ([facility]) 2024     Type 2 diabetes mellitus, with long-term current use of insulin ([facility]) 2024     Unable to ambulate 2023     Neuropathy 2023     Postlaminectomy syndrome 2023     S/P laminectomy 10/16/2023     Lumbar radiculopathy 10/16/2023     Chronic pain syndrome 10/16/2023     Acute low back pain with sciatica 10/05/2023     Benign prostatic hyperplasia 2023     Encounter for surgical aftercare following surgery of nervous system 2023     Abdominal pain 2023     Suprapubic pressure 2023     Right groin pain 2023     Morbid (severe) obesity due to excess calories ([facility]) 2023     History of hematuria 2023     Hyponatremia 2023     Acute respiratory failure ([facility]) 2023     Electrolyte abnormality 2023     Bradycardia 2023     Coronary artery disease      Chronic, continuous use of opioids 2023     Hyperglycemia 2023     RODO (acute kidney injury) ([facility]) 2023     Intractable low back pain 2023     Chronic diastolic (congestive) heart failure ([facility])      Opioid withdrawal ([facility])      Hypokalemia 2023      Anxiety 04/06/2023     Chronic bilateral low back pain with bilateral sciatica 04/06/2023     History of gross hematuria 03/18/2023     Bladder cancer (HCC) 03/17/2023     Obstructive sleep apnea on CPAP      Hypertension      Bladder mass 03/06/2023     Nocturia 03/05/2023       LOS (days): 1  Geometric Mean LOS (GMLOS) (days): 3.4  Days to GMLOS:2.5     OBJECTIVE:  PATIENT READMITTED TO HOSPITAL  Risk of Unplanned Readmission Score: 23.99         Current admission status: Inpatient       Preferred Pharmacy:   [facility] [address] [place] [address]  [address] [facility] [address]  Phone: [phone] Fax: [phone]    Primary Care Provider: Robert Budinetz, MD    Primary Insurance: MEDICARE  Secondary Insurance: BLUE CROSS    ASSESSMENT:  Active Health Care Proxies       Cami Irving Health Care Representative - Spouse   Primary Phone: [phone] (Mobile)                 Advance Directives  Does patient have a Health Care POA?: Yes  Does patient have Advance Directives?: Yes  Advance Directives: Living will  Primary Contact: Cami Irving (Spouse)         Readmission Root Cause  30 Day Readmission: Yes  During your hospital stay, did someone (provider, nurse, ) explain your care to you in a way you could understand?: Yes  Did you feel medically stable to leave the hospital?: Yes  Were you able to pay for your medication at the pharmacy?: Yes  Did you have reliable transportation to take you to your appointments?: Yes  Patient was readmitted due to: Wound Infection s/p back surgery  Action Plan: MRI-treat infection    Patient Information  Admitted from:: Facility ([facility])  Mental Status: Alert  During Assessment patient was accompanied by: Not accompanied during assessment  Assessment information provided by:: Patient  Primary Caregiver: Self  Support Systems: Self, Spouse/significant other, Son  County [facility] Residence: [facility]  What city do you live in?:  [place]  Home entry access options. Select all that apply.: Stairs  Number of steps to enter home.: 1  Type of Current Residence: 2 story home  Upon entering residence, is there a bedroom on the main floor (no further steps)?: No  A bedroom is located on the following floor levels of residence (select all that apply):: 2nd Floor  Upon entering residence, is there a bathroom on the main floor (no further steps)?: Yes  Number of steps to 2nd floor from main floor:  (15)  Living Arrangements: Lives w/ Spouse/significant other  Is patient a ?: No    Activities of Daily Living Prior to Admission  Functional Status: Independent  Completes ADLs independently?: Yes  Ambulates independently?: Yes  Does patient use assisted devices?: Yes  Assisted Devices (DME) used: Straight Cane, Walker  Does patient currently own DME?: Yes  What DME does the patient currently own?: Straight Cane, Walker  Does patient have a history of Outpatient Therapy (PT/OT)?: No  Does the patient have a history of Short-Term Rehab?: No  Does patient have a history of HHC?: Yes  Does patient currently have HHC?: Yes    Current Home Health Care  Type of Current Home Care Services: Home PT, Nurse visit, Home OT  Home Health Agency Name:: [facility]  Current Home Health Follow-Up Provider:: PCP    Patient Information Continued  Income Source: Pension/alf  Does patient have prescription coverage?: Yes  Does patient receive dialysis treatments?: No  Does patient have a history of substance abuse?: No  Does patient have a history of Mental Health Diagnosis?: No         Means of Transportation  Means of Transport to Appts:: Family transport      Social Determinants of Health (SDOH)      Flowsheet Row Most Recent Value   Housing Stability    In the last 12 months, was there a time when you were not able to pay the mortgage or rent on time? N   In the past 12 months, how many times have you moved where you were living? 0   At any time in the  past 12 months, were you homeless or living in a shelter (including now)? N   Transportation Needs    In the past 12 months, has lack of transportation kept you from medical appointments or from getting medications? no   In the past 12 months, has lack of transportation kept you from meetings, work, or from getting things needed for daily living? No   Food Insecurity    Within the past 12 months, you worried that your food would run out before you got the money to buy more. Never true   Within the past 12 months, the food you bought just didn't last and you didn't have money to get more. Never true   Utilities    In the past 12 months has the electric, gas, oil, or water company threatened to shut off services in your home? No            DISCHARGE DETAILS:    Discharge planning discussed with:: patient  Freedom of Choice: Yes  Comments - Freedom of Choice: pending any recs-[facility] to [facility] referral sent  CM contacted family/caregiver?: No- see comments  Were Treatment Team discharge recommendations reviewed with patient/caregiver?: Yes  Did patient/caregiver verbalize understanding of patient care needs?: Yes  Were patient/caregiver advised of the risks associated with not following Treatment Team discharge recommendations?: Yes    Contacts  Patient Contacts: Cami Irving (Spouse)  Relationship to Patient:: Family  Contact Method: Phone  Phone Number: [phone] (M)  Reason/Outcome: Continuity of Care, Emergency Contact, Discharge Planning    Requested Home Health Care         Is the patient interested in HHC at discharge?: Yes  Home Health Discipline requested:: Nursing, Occupational Therapy, Physical Therapy  Home Health Agency Name:: [facility]  Home Health Services Needed:: Wound/Ostomy Care, Evaluate Functional Status and Safety  Homebound Criteria Met:: Uses an Assist Device (i.e. cane, walker, etc)  Supporting Clincal Findings:: Limited Endurance     Patient lives with spouse in a two story home with  one step to enter, has a main bath on first level, 15 steps to second floor bedroom.  Patient is independent with ambulation  (uses cane/walker) and adls.  Retired, spouse drives him to all appointments, No hx of STR/OP/SA or MI, hx of HHC and currently with [facility], referral sent for PRABHU services.  CM to follow with d/c needs...CM reviewed d/c planning process including the following: identifying help at home, patient preference for d/c planning needs, Discharge Lounge, [facility] Meds to Bed program, availability of treatment team to discuss questions or concerns patient and/or family may have regarding understanding medications and recognizing signs and symptoms once discharged.  CM also encouraged patient to follow up with all recommended appointments after discharge. Patient advised of importance for patient and family to participate in managing patient’s medical well being.

## 2024-11-20 NOTE — PROGRESS NOTES
Dominick Irving is a 64 y.o. male who is currently ordered Vancomycin IV with management by the Pharmacy Consult service.  Relevant clinical data and objective / subjective history reviewed.  Vancomycin Assessment:  Indication and Goal AUC/Trough: Soft tissue (goal -600, trough >10)  Clinical Status: stable  Micro:     Renal Function:  SCr: 0.76 mg/dL  CrCl: 111.3 mL/min  Renal replacement: Not on dialysis  Days of Therapy: 1  Current Dose: 2000mg IV loading dose  Vancomycin Plan:  New Dosinmg IV q12h  Estimated AUC: 466 mcg*hr/mL  Estimated Trough: 13.1 mcg/mL  Next Level: Random  at 0600  Renal Function Monitoring: Daily BMP and UOP  Pharmacy will continue to follow closely for s/sx of nephrotoxicity, infusion reactions and appropriateness of therapy.  BMP and CBC will be ordered per protocol. We will continue to follow the patient’s culture results and clinical progress daily.    Vita Smyth, Pharmacist

## 2024-11-20 NOTE — ASSESSMENT & PLAN NOTE
Lab Results   Component Value Date    HGBA1C 9.0 (H) 10/01/2024       Recent Labs     11/19/24  1736 11/19/24 2052 11/20/24  0526 11/20/24  0726   POCGLU 145* 143* 132 120       Blood Sugar Average: Last 72 hrs:  (P) 135  Home regimen: Tresiba 8u qd, metformin, and dapagliflozin  Hold oral medications while inpatient  Currently on Lantus 8U qd + SSI  Monitor Accu-Cheks and adjust insulin regimen as needed   Diabetic diet, hypoglycemia protocol

## 2024-11-20 NOTE — ANESTHESIA PREPROCEDURE EVALUATION
Procedure:  LUMBAR DEBRIDEMENT WOUND (WASH OUT) (Spine Lumbar)    Relevant Problems   ANESTHESIA (within normal limits)      CARDIO   (+) Coronary artery disease   (+) Hypertension   (+) Hypertensive heart disease with congestive heart failure (HCC)      ENDO   (+) DM2 (diabetes mellitus, type 2) (HCC)      GI/HEPATIC (within normal limits)      /RENAL   (+) RODO (acute kidney injury) (HCC)   (+) Benign prostatic hyperplasia      HEMATOLOGY (within normal limits)      MUSCULOSKELETAL   (+) Acute low back pain with sciatica   (+) Chronic bilateral low back pain with bilateral sciatica   (+) Intractable low back pain      NEURO/PSYCH   (+) Anxiety   (+) Chronic bilateral low back pain with bilateral sciatica   (+) Chronic pain syndrome   (+) Chronic, continuous use of opioids   (+) Depression, recurrent (HCC)   (+) Headache   (+) Intractable low back pain      PULMONARY   (+) Acute respiratory failure (HCC)   (+) COPD with acute exacerbation (HCC)   (+) Obstructive sleep apnea on CPAP     Cefazolin 2000 mg last 11/20/24 @ 0813      Previous intubation 2023 - Mitchell required     EKG 7/15/2024:  Sinus bradycardia  Otherwise normal ECG  When compared with ECG of 26-JUN-2024 08:59,  No significant change was found    TTE 7/2023:    Left Ventricle: Left ventricular cavity size is normal. Wall thickness is mildly increased. The left ventricular ejection fraction is > 70%. Systolic function is vigorous. Wall motion is normal.    Right Ventricle: Right ventricular cavity size is upper normal. Normal tricuspid annular plane systolic excursion (TAPSE) > 1.7 cm.    Left Atrium: The atrium is mildly dilated.    Mitral Valve: There is annular calcification. There is mild regurgitation. There is no evidence of stenosis.    Tricuspid Valve: There is trace to mild regurgitation. There is no evidence of stenosis.    Pulmonic Valve: There is trace regurgitation. There is no evidence of stenosis.    Prior TTE study available for  comparison. Prior study date: 11/25/2022. No significant changes noted compared to the prior study.     TTE 11/2022:    Left Ventricle: Left ventricular cavity size is normal. Wall thickness is mildly increased. The left ventricular ejection fraction is 65%. Systolic function is normal. Wall motion is normal. Indeterminate diastolic function.    Right Ventricle: Right ventricular cavity size is mildly dilated. Systolic function is normal.    Left Atrium: The atrium is dilated.    Right Atrium: The atrium is dilated.    Tricuspid Valve: There is mild regurgitation. The estimated right ventricular systolic pressure is 28.00 mmHg.    Aorta: The aortic root is upper normal in size. The ascending aorta is normal in size. The aortic root is 3.70 cm. The ascending aorta is 3.1 cm.    Prior TTE study available for comparison. Prior study date: 7/31/2020. Changes noted when compared to prior study. Changes include: RV on prior study noted to be upper normal in size and right atrium noted to be normal in size. Trace tricuspid regurgitation noted on prior study.     Nuclear Stress 8/2020:  Abnormal study after pharmacologic stress with a fixed inferior wall defect with a mild degree of ischemia noted in the apical inferior wall as well as a small fixed defect in the basal to mid anterior wall with mild ischemia  in the anterolateral wall. Study quality severely reduces accuracy of the test. Significant patient motion noted on raw images. Left ventricular systolic function was normal.    Lab Results   Component Value Date    WBC 7.18 11/20/2024    HGB 10.7 (L) 11/20/2024    HCT 32.0 (L) 11/20/2024    MCV 94 11/20/2024     11/20/2024     Lab Results   Component Value Date    SODIUM 139 11/20/2024    K 3.0 (L) 11/20/2024    CL 98 11/20/2024    CO2 31 11/20/2024    BUN 16 11/20/2024    CREATININE 0.76 11/20/2024    GLUC 119 11/20/2024    CALCIUM 8.4 11/20/2024     Lab Results   Component Value Date    INR 1.03 11/20/2024     INR 1.00 11/01/2024    INR 1.26 (H) 10/31/2024    PROTIME 13.8 11/20/2024    PROTIME 13.5 11/01/2024    PROTIME 16.0 (H) 10/31/2024     Lab Results   Component Value Date    HGBA1C 9.0 (H) 10/01/2024          Physical Exam    Airway    Mallampati score: III  TM Distance: >3 FB  Neck ROM: full     Dental   No notable dental hx     Cardiovascular  Cardiovascular exam normal    Pulmonary  Pulmonary exam normal     Other Findings        Anesthesia Plan  ASA Score- 3     Anesthesia Type- general with ASA Monitors.         Additional Monitors: arterial line.    Airway Plan: ETT.           Plan Factors-    Chart reviewed. EKG reviewed.  Existing labs reviewed. Patient summary reviewed.                  Induction- intravenous.    Postoperative Plan-     Perioperative Resuscitation Plan - Level 1 - Full Code.       Informed Consent- Anesthetic plan and risks discussed with patient.  I personally reviewed this patient with the CRNA. Discussed and agreed on the Anesthesia Plan with the CRNA..

## 2024-11-20 NOTE — ASSESSMENT & PLAN NOTE
Presented to [facility] ED with drainage from surgical incision site since 11/11 associated with intermittent subjective weakness of LEs. No red flag symptoms.  Fortunately patient is hemodynamically stable and nontoxic-appearing, remains afebrile.  MRI L spine w wo contrast showed: 3.3 x 4.5 cm subcutaneous fluid collection tracking from the operative site into the subcutaneous and paraspinal soft tissues. No significant canal compression or drainable epidural fluid collection  Transferred to [facility] for neurosurgery evaluation, recommendations appreciated   Planning for operative washout today, follow-up OR cultures   ID consulted for antibiotic management   Pain has been manageable on home regimen: Fentanyl patch 50 mcg/h every 72 hours, p.o. Dilaudid 8 mg every 3 hours as needed  Consider APS consult if patient develops acute/uncontrolled postop pain   PT/OT evaluations postoperatively

## 2024-11-20 NOTE — TELEPHONE ENCOUNTER
This RN phoned and spoke with patient.   Listening as he updated on his experience and frustration.  This RN stated understanding and reviewed with him why they would want to get him out of the ER and up to a room as temporary as it may be.  The plan is still to transfer to [place] and be evaluated by Neurosurgery in person, currently it is looking as if the plan is to go to the OR for a wash out.  This RN explained what that would entail.  Patient appreciative for the conversation and explanation.  This RN encouraged patient that his team is aware and prepared to address things once the transfer is available.  Patient was appreciative for assistance and phone call.  He is glad to hear about everyone being looped in as he was not aware.

## 2024-11-20 NOTE — CONSULTS
Consultation - Neurosurgery   Name: Dominick Irving 64 y.o. male I MRN: [id number]  Unit/Bed#: [facility] 604-01 I Date of Admission: 2024   Date of Service: 2024 I Hospital Day: 1   Inpatient consult to Neurosurgery  Consult performed by: Danna Pandya PA-C  Consult ordered by: Dennis Cross MD        Physician Requesting Evaluation: Suly Sofia MD   Reason for Evaluation / Principal Problem: possible wound infection    Assessment & Plan  Surgical site infection  History of L3-5 fusion with L3-4 TLIF on 10/30/2024 by Dr. Aguilar with concern for incisional drainage and possible wound infection    Imagin/18 MRI L-spine: Approximately 3.3 x 4.5 cm subcutaneous fluid collection tracking from the operative site into the subcutaneous and paraspinal soft tissues. This demonstrates peripheral enhancement and contains air, and an infected fluid collection cannot be excluded. No drainable epidural fluid collection identified. Consider sampling of the draining fluid to assess for infection. Endplate edema at the operative site is nonspecific. Minimal laminectomy bed fluid collection which mildly effaces the dorsum of the thecal sac. This is commonly seen in the recent postoperative setting due to postoperative blood products. There is significantly improved central stenosis at this level, now mild. Consider attention on follow-up.    Plan:  Plan for OR today with Dr. Aguilar for lumbar wound wash out  Remains NPO  No dvt ppx  Labs reviewed  Cultures to be taken in the OR  ID consult  Continue to monitor neuro exam  Medical management and pain control per primary team  DVT ppx:  SCDs only  Mobilize as tolerated with assistance, PT / OT evaluation postop    Neurosurgery will continue to follow.  Please call with questions or concerns.    DM2 (diabetes mellitus, type 2) (Former[facility])  Lab Results   Component Value Date    HGBA1C 9.0 (H) 10/01/2024       Recent Labs     24  1736 24  11/20/24  0526 11/20/24  0726   POCGLU 145* 143* 132 120       Blood Sugar Average: Last 72 hrs:  (P) 135    Needs strict blood glucose control for optimal wound healing      History of Present Illness   HPI: Dominick Irving is a 64 y.o. year old male with PMHx significant for bladder cancer, chronic narcotic use, CAD, HF, ERICA on CPAP, DM with A1c of 9.0, recent lumbar fusion with concern for wound infection.    Patient has been calling the office since 11/11 with reported drainage from his incision.  Multiple pictures have been sent to our office and it appears that over the last week this incision has been draining more persistently and has noted some increased redness.  On 11/15 he called the office reporting the drainage is getting thicker and pasty like.  He was started on Keflex 2 days prior and has been continued on Keflex without improvement.  Patient called the office again on 11/18 with reports of persistent drainage as well as some subjective lower extremity weakness.  He was advised to seek care in the emergency department.      He only reports incisional drainage at this time.  Numbness in his feet has been improving.  No new weakness, fevers / chills, bowel / bladder issues.        Review of Systems   Constitutional:  Negative for activity change, chills and fever.   HENT:  Negative for hearing loss and trouble swallowing.    Eyes:  Negative for visual disturbance.   Respiratory:  Negative for chest tightness and shortness of breath.    Cardiovascular:  Negative for chest pain.   Gastrointestinal:  Negative for abdominal pain, constipation, diarrhea, nausea and vomiting.   Genitourinary:  Negative for difficulty urinating.   Musculoskeletal:  Negative for back pain and neck pain.   Skin:  Positive for wound (incisional drainage).   Neurological:  Positive for numbness (improving). Negative for dizziness, facial asymmetry, speech difficulty, weakness and headaches.   Psychiatric/Behavioral:  Negative  "for confusion.      I have reviewed the patient's PMH, PSH, Social History, Family History, Meds, and Allergies  Historical Information   Past Medical History:   Diagnosis Date    Anxiety 3/01/2023    Arthritis     Bladder cancer (HCC)     Cancer (HCC) 3/17/2023    Cervical disc disorder 1/2016    Chronic narcotic dependence (HCC)     Chronic pain disorder     lower back and down both legs    Colon polyp     Coronary artery disease     CPAP (continuous positive airway pressure) dependence     bi-pap    Depression 3/17/2023    Does use hearing aid     will wear DOS    Full dentures     Heart failure (HCC)     and \"fluid retention\" [facility]\"    High cholesterol     Hypertension     Low back pain 2003    Mild ankle edema     and feet    Obstructive sleep apnea on CPAP     Prediabetes     Shortness of breath     per pt \"with just exertion\"    Sleep apnea     Wears glasses      Past Surgical History:   Procedure Laterality Date    BACK SURGERY      titanium rods implanted    CAUDAL BLOCK N/A 10/17/2023    Procedure: BLOCK / INJECTION CAUDAL;  Surgeon: Minh Rolon MD;  Location: [facility];  Service: Pain Management     COLONOSCOPY      CYSTOSCOPY W/ URETERAL STENT PLACEMENT Bilateral 03/17/2023    Procedure: bilateral retrograde;  Surgeon: Shan Sanabria MD;  Location: [facility];  Service: Urology    HERNIA REPAIR      x5    LUMBAR LAMINECTOMY N/A 06/30/2023    Procedure: Left L3-4 Metrx hemilaminectomy and bilateral foraminotomies;  Surgeon: Leland Aguilar MD;  [facility];  Service: Neurosurgery    GA ARTHRODESIS COMBINED TQ 1NTRSPC LUMBAR Right 10/30/2024    Procedure: Robotically assisted L3-4 decompressive hemilaminectomy and foraminotomies with transverse lumbar interbody fusion right-sided approach with add-on to an L4-5 fixation fusion;  Surgeon: Leland Aguilar MD;  [facility];  Service: Neurosurgery    GA CYSTO W/REMOVAL OF LESIONS SMALL N/A 05/01/2023    " Procedure: CYSTOSCOPY TURBT;  Surgeon: Shan Sanabria MD;  [facility];  Service: Urology    HI CYSTOURETHROSCOPY N/A 11/06/2023    Procedure: CYSTOSCOPY with  bladder biopsies and fulgeration;  Surgeon: Shan Sanabria MD;  [facility];  Service: Urology    SPINE SURGERY  2017    TRANSURETHRAL RESECTION OF BLADDER TUMOR N/A 03/17/2023    Procedure: TRANSURETHRAL RESECTION OF BLADDER TUMOR (TURBT);  Surgeon: Shan Sanabria MD;  [facility];  Service: Urology     Social History     Tobacco Use    Smoking status: Former     Current packs/day: 1.00     Average packs/day: 1 pack/day for 50.9 years (50.9 ttl pk-yrs)     Types: Cigarettes     Start date: 2/1/2023     Passive exposure: Never    Smokeless tobacco: Never   Vaping Use    Vaping status: Never Used   Substance and Sexual Activity    Alcohol use: Not Currently     Comment: 3 per year    Drug use: Not Currently     Types: Marijuana    Sexual activity: Not Currently     Partners: Female     E-Cigarette/Vaping    E-Cigarette Use Never User      E-Cigarette/Vaping Substances    Nicotine No     THC No     CBD No     Flavoring No     Other No     Unknown No      Family History   Problem Relation Age of Onset    Cancer Father         Adult leukemia    Aortic aneurysm Father     Leukemia Father     Alcohol abuse Father     Hypertension Mother     Colon cancer Maternal Grandfather      Social History     Tobacco Use    Smoking status: Former     Current packs/day: 1.00     Average packs/day: 1 pack/day for 50.9 years (50.9 ttl pk-yrs)     Types: Cigarettes     Start date: 2/1/2023     Passive exposure: Never    Smokeless tobacco: Never   Vaping Use    Vaping status: Never Used   Substance and Sexual Activity    Alcohol use: Not Currently     Comment: 3 per year    Drug use: Not Currently     Types: Marijuana    Sexual activity: Not Currently     Partners: Female       Current Facility-Administered Medications:     acetaminophen (TYLENOL) tablet 975  mg, Q8H ISIS    atorvastatin (LIPITOR) tablet 40 mg, Daily    ceFAZolin (ANCEF) IVPB (premix in dextrose) 2,000 mg 50 mL, Once    chlorhexidine (PERIDEX) 0.12 % oral rinse 15 mL, Once    chlorhexidine (PERIDEX) 0.12 % oral rinse 15 mL, Once    DULoxetine (CYMBALTA) delayed release capsule 60 mg, Daily    [START ON 11/21/2024] fentaNYL (DURAGESIC) 50 mcg/hr TD 72 hr patch 1 patch, Q72H    gabapentin (NEURONTIN) capsule 600 mg, 4x Daily    HYDROmorphone (DILAUDID) tablet 8 mg, Q3H PRN    insulin lispro (HumALOG/ADMELOG) 100 units/mL subcutaneous injection 1-5 Units, TID AC **AND** Fingerstick Glucose (POCT), TID AC    insulin lispro (HumALOG/ADMELOG) 100 units/mL subcutaneous injection 1-5 Units, HS    methocarbamol (ROBAXIN) tablet 750 mg, Q6H ISIS    ondansetron (ZOFRAN) injection 4 mg, Q6H PRN    potassium chloride (Klor-Con M20) CR tablet 20 mEq, BID    potassium chloride 20 mEq IVPB (premix), Q2H    senna-docusate sodium (SENOKOT S) 8.6-50 mg per tablet 1 tablet, HS    sodium chloride 0.9 % infusion, Continuous, Last Rate: 125 mL/hr (11/19/24 2305)    torsemide (DEMADEX) tablet 80 mg, Daily  Prior to Admission Medications   Prescriptions Last Dose Informant Patient Reported? Taking?   DULoxetine (CYMBALTA) 60 mg delayed release capsule   No No   Sig: Take 1 capsule (60 mg total) by mouth daily   Farxiga 5 MG TABS  Self Yes No   Sig: Take 5 mg by mouth daily 5mg alternating with 2.5mg for fluid retention   HYDROmorphone (DILAUDID) 8 MG tablet   No No   Sig: Take 1 tablet (8 mg total) by mouth every 3 (three) hours as needed for moderate pain or severe pain Max Daily Amount: 64 mg   Lancet Devices (Adjustable Lancing Device) MISC   No No   Sig: Use as directed to test glucose once daily. One touch device or may change to what insurance covers.   Patient not taking: Reported on 11/13/2024   acetaminophen (TYLENOL) 325 mg tablet   No No   Sig: Take 3 tablets (975 mg total) by mouth every 8 (eight) hours   atorvastatin  (LIPITOR) 40 mg tablet   No No   Sig: Take 1 tablet (40 mg total) by mouth daily   cephalexin (KEFLEX) 500 mg capsule   No No   Sig: Take 1 capsule (500 mg total) by mouth every 6 (six) hours for 7 days   fentaNYL (DURAGESIC) 25 mcg/hr   No No   Sig: Place 1 patch on the skin over 72 hours every third day Max Daily Amount: 1 patch   fentaNYL (DURAGESIC) 50 mcg/hr   No No   Sig: Place 1 patch on the skin over 72 hours every third day Max Daily Amount: 1 patch   gabapentin (NEURONTIN) 600 MG tablet   No No   Sig: Take 1 tablet (600 mg total) by mouth 4 (four) times a day   metFORMIN (GLUCOPHAGE-XR) 500 mg 24 hr tablet   No No   Sig: Take 1 pill qam and 2 pills qpm   Patient taking differently: Take 1,000 mg by mouth daily with dinner Take 1 pill qam and 2 pills qpm   methocarbamol (ROBAXIN) 750 mg tablet   No No   Sig: Take 1 tablet (750 mg total) by mouth every 6 (six) hours   metolazone (ZAROXOLYN) 2.5 mg tablet   No No   Sig: Take 1 tablet (2.5 mg total) by mouth daily as needed (edema)   naloxone (NARCAN) 4 mg/0.1 mL nasal spray   No No   Sig: Administer 1 spray into a nostril. If no response after 2-3 minutes, give another dose in the other nostril using a new spray.   Patient taking differently: Administer 1 spray into a nostril. If no response after 2-3 minutes, give another dose in the other nostril using a new spray.  Indications: Opioid Overdose   potassium chloride (Klor-Con) 10 mEq tablet   No No   Sig: Take 4 tablets (40 mEq total) by mouth 2 (two) times a day   Patient taking differently: Take 20 mEq by mouth 2 (two) times a day   semaglutide, 0.25 or 0.5 mg/dose, (Ozempic) 2 mg/1.5 mL injection pen   Yes Yes   Sig: Inject 0.5 mg under the skin every 7 days   senna-docusate sodium (SENOKOT S) 8.6-50 mg per tablet   No No   Sig: Take 1 tablet by mouth daily at bedtime   torsemide (DEMADEX) 20 mg tablet   No No   Sig: Take 2 tablets (40 mg total) by mouth 2 (two) times a day   Patient taking differently:  Take 80 mg by mouth daily      Facility-Administered Medications: None     Mirtazapine and Venlafaxine    Objective :  Temp:  [97.9 °F (36.6 °C)-98.2 °F (36.8 °C)] 97.9 °F (36.6 °C)  HR:  [54-66] 58  BP: (105-133)/(50-64) 133/64  Resp:  [12-17] 16  SpO2:  [91 %-97 %] 94 %  O2 Device: None (Room air)    Physical Exam  Constitutional:       General: He is awake.      Appearance: He is well-developed.   HENT:      Head: Normocephalic and atraumatic.      Right Ear: Hearing normal.      Left Ear: Hearing normal.   Eyes:      Conjunctiva/sclera: Conjunctivae normal.   Cardiovascular:      Rate and Rhythm: Normal rate.   Pulmonary:      Effort: Pulmonary effort is normal. No respiratory distress.   Abdominal:      General: Bowel sounds are normal.      Palpations: Abdomen is soft.   Musculoskeletal:         General: Normal range of motion.      Cervical back: Normal range of motion. No tenderness.      Thoracic back: No tenderness.      Lumbar back: No tenderness.      Comments: Lumbar incision with staples intact, no swelling, redness or belinda wound dehiscence noted however there is a serous drainage coming from the center of the incision   Skin:     General: Skin is warm and dry.   Neurological:      Mental Status: He is alert and oriented to person, place, and time.      Motor: Motor strength is normal.  Psychiatric:         Attention and Perception: Attention and perception normal.         Mood and Affect: Mood and affect normal.         Speech: Speech normal.         Behavior: Behavior normal. Behavior is cooperative.         Thought Content: Thought content normal.         Cognition and Memory: Cognition and memory normal.         Judgment: Judgment normal.      Neurological Exam  Mental Status  Awake and alert. Oriented to person, place and time. Oriented to person, place, and time. Memory is normal. Speech is normal. Follows one-step commands. Attention and concentration are normal. Fund of knowledge is  appropriate for level of education.    Cranial Nerves  CN VIII:  Right: Hearing is normal.  Left: Hearing is normal.    Motor  Normal muscle bulk throughout. No fasciculations present. Normal muscle tone. No abnormal involuntary movements. Strength is 5/5 throughout all four extremities.    Coordination  Right: Finger-to-nose normal.Left: Finger-to-nose normal.        Lab Results: I have reviewed the following results:  Recent Labs     11/20/24  0448   WBC 7.18   HGB 10.7*   HCT 32.0*      SODIUM 139   K 3.0*   CL 98   CO2 31   BUN 16   CREATININE 0.76   GLUC 119   AST 24   ALT 46   ALB 3.0*   TBILI 0.29   ALKPHOS 116*   PTT 34   INR 1.03       Imaging Results Review: I personally reviewed the following image studies in PACS and associated radiology reports: MRI spine. My interpretation of the radiology images/reports is: as noted above.  Other Study Results Review: No additional pertinent studies reviewed.    VTE Pharmacologic Prophylaxis: VTE covered by:    None    and Sequential compression device (Venodyne)

## 2024-11-20 NOTE — ASSESSMENT & PLAN NOTE
Lab Results   Component Value Date    HGBA1C 9.0 (H) 10/01/2024       Recent Labs     11/19/24  1736 11/19/24 2052 11/20/24  0526 11/20/24  0726   POCGLU 145* 143* 132 120       Blood Sugar Average: Last 72 hrs:  (P) 135    Needs strict blood glucose control for optimal wound healing

## 2024-11-21 LAB
ANION GAP SERPL CALCULATED.3IONS-SCNC: 10 MMOL/L (ref 4–13)
APTT PPP: 32 SECONDS (ref 23–34)
BUN SERPL-MCNC: 21 MG/DL (ref 5–25)
CALCIUM SERPL-MCNC: 8.5 MG/DL (ref 8.4–10.2)
CHLORIDE SERPL-SCNC: 93 MMOL/L (ref 96–108)
CO2 SERPL-SCNC: 32 MMOL/L (ref 21–32)
CREAT SERPL-MCNC: 0.98 MG/DL (ref 0.6–1.3)
ERYTHROCYTE [DISTWIDTH] IN BLOOD BY AUTOMATED COUNT: 14.2 % (ref 11.6–15.1)
GFR SERPL CREATININE-BSD FRML MDRD: 80 ML/MIN/1.73SQ M
GLUCOSE SERPL-MCNC: 175 MG/DL (ref 65–140)
GLUCOSE SERPL-MCNC: 178 MG/DL (ref 65–140)
GLUCOSE SERPL-MCNC: 187 MG/DL (ref 65–140)
GLUCOSE SERPL-MCNC: 196 MG/DL (ref 65–140)
GLUCOSE SERPL-MCNC: 236 MG/DL (ref 65–140)
HCT VFR BLD AUTO: 34.6 % (ref 36.5–49.3)
HGB BLD-MCNC: 11.1 G/DL (ref 12–17)
INR PPP: 1.08 (ref 0.85–1.19)
MAGNESIUM SERPL-MCNC: 2 MG/DL (ref 1.9–2.7)
MCH RBC QN AUTO: 31.4 PG (ref 26.8–34.3)
MCHC RBC AUTO-ENTMCNC: 32.1 G/DL (ref 31.4–37.4)
MCV RBC AUTO: 98 FL (ref 82–98)
MRSA NOSE QL CULT: ABNORMAL
MRSA NOSE QL CULT: ABNORMAL
PLATELET # BLD AUTO: 329 THOUSANDS/UL (ref 149–390)
PMV BLD AUTO: 10.2 FL (ref 8.9–12.7)
POTASSIUM SERPL-SCNC: 4 MMOL/L (ref 3.5–5.3)
PROTHROMBIN TIME: 14.3 SECONDS (ref 12.3–15)
RBC # BLD AUTO: 3.54 MILLION/UL (ref 3.88–5.62)
RHODAMINE-AURAMINE STN SPEC: NORMAL
RHODAMINE-AURAMINE STN SPEC: NORMAL
SODIUM SERPL-SCNC: 135 MMOL/L (ref 135–147)
WBC # BLD AUTO: 10.33 THOUSAND/UL (ref 4.31–10.16)

## 2024-11-21 PROCEDURE — 85610 PROTHROMBIN TIME: CPT | Performed by: PHYSICIAN ASSISTANT

## 2024-11-21 PROCEDURE — 80048 BASIC METABOLIC PNL TOTAL CA: CPT | Performed by: PHYSICIAN ASSISTANT

## 2024-11-21 PROCEDURE — 85027 COMPLETE CBC AUTOMATED: CPT | Performed by: PHYSICIAN ASSISTANT

## 2024-11-21 PROCEDURE — 82948 REAGENT STRIP/BLOOD GLUCOSE: CPT

## 2024-11-21 PROCEDURE — 99024 POSTOP FOLLOW-UP VISIT: CPT | Performed by: NEUROLOGICAL SURGERY

## 2024-11-21 PROCEDURE — 85730 THROMBOPLASTIN TIME PARTIAL: CPT | Performed by: PHYSICIAN ASSISTANT

## 2024-11-21 PROCEDURE — 99232 SBSQ HOSP IP/OBS MODERATE 35: CPT | Performed by: INTERNAL MEDICINE

## 2024-11-21 PROCEDURE — 83735 ASSAY OF MAGNESIUM: CPT | Performed by: PHYSICIAN ASSISTANT

## 2024-11-21 RX ORDER — CEFAZOLIN SODIUM 2 G/50ML
2000 SOLUTION INTRAVENOUS EVERY 8 HOURS
Status: DISCONTINUED | OUTPATIENT
Start: 2024-11-21 | End: 2024-11-22

## 2024-11-21 RX ORDER — HYDROMORPHONE HCL/PF 1 MG/ML
0.5 SYRINGE (ML) INJECTION ONCE
Refills: 0 | Status: COMPLETED | OUTPATIENT
Start: 2024-11-21 | End: 2024-11-21

## 2024-11-21 RX ADMIN — INSULIN LISPRO 1 UNITS: 100 INJECTION, SOLUTION INTRAVENOUS; SUBCUTANEOUS at 17:23

## 2024-11-21 RX ADMIN — VANCOMYCIN HYDROCHLORIDE 1250 MG: 5 INJECTION, POWDER, LYOPHILIZED, FOR SOLUTION INTRAVENOUS at 01:29

## 2024-11-21 RX ADMIN — ACETAMINOPHEN 975 MG: 325 TABLET, FILM COATED ORAL at 22:55

## 2024-11-21 RX ADMIN — ACETAMINOPHEN 975 MG: 325 TABLET, FILM COATED ORAL at 05:24

## 2024-11-21 RX ADMIN — ACETAMINOPHEN 975 MG: 325 TABLET, FILM COATED ORAL at 13:46

## 2024-11-21 RX ADMIN — METHOCARBAMOL 750 MG: 750 TABLET ORAL at 17:23

## 2024-11-21 RX ADMIN — METHOCARBAMOL 750 MG: 750 TABLET ORAL at 23:29

## 2024-11-21 RX ADMIN — HYDROMORPHONE HYDROCHLORIDE 0.5 MG: 1 INJECTION, SOLUTION INTRAMUSCULAR; INTRAVENOUS; SUBCUTANEOUS at 01:51

## 2024-11-21 RX ADMIN — GABAPENTIN 600 MG: 300 CAPSULE ORAL at 09:55

## 2024-11-21 RX ADMIN — DULOXETINE HYDROCHLORIDE 60 MG: 60 CAPSULE, DELAYED RELEASE ORAL at 09:55

## 2024-11-21 RX ADMIN — SENNOSIDES AND DOCUSATE SODIUM 1 TABLET: 50; 8.6 TABLET ORAL at 22:55

## 2024-11-21 RX ADMIN — INSULIN LISPRO 1 UNITS: 100 INJECTION, SOLUTION INTRAVENOUS; SUBCUTANEOUS at 23:08

## 2024-11-21 RX ADMIN — GABAPENTIN 600 MG: 300 CAPSULE ORAL at 17:23

## 2024-11-21 RX ADMIN — HYDROMORPHONE HYDROCHLORIDE 8 MG: 4 TABLET ORAL at 23:29

## 2024-11-21 RX ADMIN — GABAPENTIN 600 MG: 300 CAPSULE ORAL at 12:18

## 2024-11-21 RX ADMIN — POTASSIUM CHLORIDE 20 MEQ: 1500 TABLET, EXTENDED RELEASE ORAL at 17:23

## 2024-11-21 RX ADMIN — CEFAZOLIN SODIUM 2000 MG: 2 SOLUTION INTRAVENOUS at 22:56

## 2024-11-21 RX ADMIN — CEFAZOLIN SODIUM 2000 MG: 2 SOLUTION INTRAVENOUS at 09:55

## 2024-11-21 RX ADMIN — DOCUSATE SODIUM 100 MG: 100 CAPSULE, LIQUID FILLED ORAL at 10:08

## 2024-11-21 RX ADMIN — ATORVASTATIN CALCIUM 40 MG: 40 TABLET, FILM COATED ORAL at 09:55

## 2024-11-21 RX ADMIN — HEPARIN SODIUM 5000 UNITS: 5000 INJECTION, SOLUTION INTRAVENOUS; SUBCUTANEOUS at 13:46

## 2024-11-21 RX ADMIN — HYDROMORPHONE HYDROCHLORIDE 8 MG: 4 TABLET ORAL at 05:24

## 2024-11-21 RX ADMIN — HYDROMORPHONE HYDROCHLORIDE 8 MG: 4 TABLET ORAL at 20:26

## 2024-11-21 RX ADMIN — CEFAZOLIN SODIUM 2000 MG: 2 SOLUTION INTRAVENOUS at 00:54

## 2024-11-21 RX ADMIN — INSULIN LISPRO 1 UNITS: 100 INJECTION, SOLUTION INTRAVENOUS; SUBCUTANEOUS at 09:54

## 2024-11-21 RX ADMIN — POTASSIUM CHLORIDE 20 MEQ: 1500 TABLET, EXTENDED RELEASE ORAL at 09:55

## 2024-11-21 RX ADMIN — HYDROMORPHONE HYDROCHLORIDE 8 MG: 4 TABLET ORAL at 00:48

## 2024-11-21 RX ADMIN — HYDROMORPHONE HYDROCHLORIDE 8 MG: 4 TABLET ORAL at 13:10

## 2024-11-21 RX ADMIN — GABAPENTIN 600 MG: 300 CAPSULE ORAL at 22:55

## 2024-11-21 RX ADMIN — TORSEMIDE 80 MG: 20 TABLET ORAL at 10:01

## 2024-11-21 RX ADMIN — VANCOMYCIN HYDROCHLORIDE 1250 MG: 5 INJECTION, POWDER, LYOPHILIZED, FOR SOLUTION INTRAVENOUS at 13:46

## 2024-11-21 RX ADMIN — METHOCARBAMOL 750 MG: 750 TABLET ORAL at 05:24

## 2024-11-21 RX ADMIN — HYDROMORPHONE HYDROCHLORIDE 8 MG: 4 TABLET ORAL at 17:26

## 2024-11-21 RX ADMIN — INSULIN LISPRO 2 UNITS: 100 INJECTION, SOLUTION INTRAVENOUS; SUBCUTANEOUS at 12:20

## 2024-11-21 RX ADMIN — DOCUSATE SODIUM 100 MG: 100 CAPSULE, LIQUID FILLED ORAL at 17:22

## 2024-11-21 RX ADMIN — METHOCARBAMOL 750 MG: 750 TABLET ORAL at 12:18

## 2024-11-21 RX ADMIN — HEPARIN SODIUM 5000 UNITS: 5000 INJECTION, SOLUTION INTRAVENOUS; SUBCUTANEOUS at 22:55

## 2024-11-21 RX ADMIN — HYDROMORPHONE HYDROCHLORIDE 8 MG: 4 TABLET ORAL at 10:01

## 2024-11-21 NOTE — ASSESSMENT & PLAN NOTE
Presented to [facility] ED with drainage from surgical incision site since 11/11 associated with intermittent subjective weakness of LEs. No red flag symptoms.  Fortunately patient hemodynamically stable and nontoxic-appearing, remains afebrile.  MRI L spine w wo contrast showed: 3.3 x 4.5 cm subcutaneous fluid collection tracking from the operative site into the subcutaneous and paraspinal soft tissues. No significant canal compression or drainable epidural fluid collection  Transferred to [facility] for neurosurgery evaluation, recommendations appreciated   S/P OR on 11/20 for lumbar debridement/wound washout  MAXIMO drain in place  ID consulted for antibiotic management   F/u OR cultures   Pain has been manageable on home regimen: Fentanyl patch 50 mcg/h every 72 hours, p.o. Dilaudid 8 mg every 3 hours as needed  Consider APS consult if patient develops acute/uncontrolled postop pain   PT/OT evaluations postoperatively

## 2024-11-21 NOTE — PROGRESS NOTES
Pastoral Care Progress Note             11/21/24 1500   Clinical Encounter Type   Visited With Patient  (Father Neftali)   Routine Visit   (Pastoral Visit)   Synagogue Encounters   Synagogue Needs   (Father Neftali offered the patient a blessing. Patient declined.)   Sacramental Encounters   Sacrament of Sick-Anointing Patient declined anointing

## 2024-11-21 NOTE — PROGRESS NOTES
Dominick Irving is a 64 y.o. male who is currently ordered Vancomycin IV with management by the Pharmacy Consult service.  Relevant clinical data and objective / subjective history reviewed.  Vancomycin Assessment:  Indication and Goal AUC/Trough: Soft tissue (goal -600, trough >10)  Clinical Status: stable  Micro:     Renal Function:  SCr: 0.98 mg/dL  CrCl: 88.5 mL/min  Renal replacement: Not on dialysis  Days of Therapy: 2  Current Dose: 2000mg IV loading dose  Vancomycin Plan:   Dosinmg IV q12h  Estimated AUC: 591 mcg*hr/mL  Estimated Trough: 18.3 mcg/mL  Next Level:  AM draw  Renal Function Monitoring: Daily BMP and UOP  Pharmacy will continue to follow closely for s/sx of nephrotoxicity, infusion reactions and appropriateness of therapy.  BMP and CBC will be ordered per protocol. We will continue to follow the patient’s culture results and clinical progress daily.    Christie Motley, Pharmacist

## 2024-11-21 NOTE — PROGRESS NOTES
Progress Note - Hospitalist   Name: Dominick Irving 64 y.o. male I MRN: [id number]  [facility] I Date of Admission: 11/19/2024   Date of Service: 11/21/2024 I Hospital Day: 2    Assessment & Plan  Surgical site infection  Presented to [facility] ED with drainage from surgical incision site since 11/11 associated with intermittent subjective weakness of LEs. No red flag symptoms.  Fortunately patient hemodynamically stable and nontoxic-appearing, remains afebrile.  MRI L spine w wo contrast showed: 3.3 x 4.5 cm subcutaneous fluid collection tracking from the operative site into the subcutaneous and paraspinal soft tissues. No significant canal compression or drainable epidural fluid collection  Transferred to [facility] for neurosurgery evaluation, recommendations appreciated   S/P OR on 11/20 for lumbar debridement/wound washout  MAXIMO drain in place  ID consulted for antibiotic management   F/u OR cultures   Pain has been manageable on home regimen: Fentanyl patch 50 mcg/h every 72 hours, p.o. Dilaudid 8 mg every 3 hours as needed  Consider APS consult if patient develops acute/uncontrolled postop pain   PT/OT evaluations postoperatively  Chronic, continuous use of opioids  PDMP reviewed: Fentanyl patch 50 mcg/h every 72 hours, p.o. Dilaudid 8 mg every 3 hours  Outpatient follow-up with PCP and palliative care for ongoing management  Consideration for APS consult if develops acute postoperative pain as above  Type 2 diabetes mellitus, with long-term current use of insulin ([facility])  Lab Results   Component Value Date    HGBA1C 9.0 (H) 10/01/2024       Recent Labs     11/20/24  1618 11/20/24  2120 11/21/24  0714 11/21/24  1141   POCGLU 263* 237* 178* 236*       Blood Sugar Average: Last 72 hrs:  (P) 171.6  Home regimen: Tresiba 8u qd, metformin, and dapagliflozin  Hold oral medications while inpatient  Currently on Lantus 8U qd + SSI  Monitor Accu-Cheks and adjust insulin regimen as needed   Diabetic diet, hypoglycemia  protocol  Chronic diastolic (congestive) heart failure (HCC)  Wt Readings from Last 3 Encounters:   11/21/24 109 kg (240 lb 1.3 oz)   11/18/24 109 kg (240 lb)   11/08/24 109 kg (240 lb 12.8 oz)     Continue home dose torsemide 80 mg daily   Also takes PRN metolazone at home   Monitor volume status with daily weights, intake and output     S/P laminectomy  L3-5 posterior fusion with an L3-4 TLIF on 10/30/2024 by Dr. Aguilar  See related plan above   Obstructive sleep apnea on CPAP  Continue CPAP QHS    VTE Pharmacologic Prophylaxis:   Moderate Risk (Score 3-4) - Pharmacological DVT Prophylaxis Ordered: heparin.    Mobility:   Basic Mobility Inpatient Raw Score: 22  JH-HLM Goal: 7: Walk 25 feet or more  JH-HLM Achieved: 6: Walk 10 steps or more  JH-HLM Goal achieved. Continue to encourage appropriate mobility.    Patient Centered Rounds: I performed bedside rounds with nursing staff today.   Discussions with Specialists or Other Care Team Provider: appreciate ID and NSx input     Education and Discussions with Family / Patient: Patient declined call to .     Current Length of Stay: 2 day(s)  Current Patient Status: Inpatient   Certification Statement: The patient will continue to require additional inpatient hospital stay due to final OR cultures, post op plan   Discharge Plan: Anticipate discharge in >72 hrs to home with home services.    Code Status: Level 1 - Full Code    Subjective   Doing well post op. Has pain but well controlled with home regimen. Urinating normally. No BM yet. Drain in place. Wants to work with therapy more.    Objective :  Temp:  [97.6 °F (36.4 °C)-98.1 °F (36.7 °C)] 97.6 °F (36.4 °C)  HR:  [50-69] 55  BP: (109-133)/(47-88) 133/65  Resp:  [16-18] 16  SpO2:  [93 %-99 %] 95 %  O2 Device: None (Room air)    Body mass index is 37.6 kg/m².     Input and Output Summary (last 24 hours):     Intake/Output Summary (Last 24 hours) at 11/21/2024 1237  Last data filed at 11/21/2024  0533  Gross per 24 hour   Intake 635.75 ml   Output 645 ml   Net -9.25 ml       Physical Exam  Vitals and nursing note reviewed.   Constitutional:       Appearance: He is obese.      Comments: On RA    Pulmonary:      Effort: No respiratory distress.   Abdominal:      General: There is no distension.      Palpations: Abdomen is soft.   Musculoskeletal:      Right lower leg: Edema present.      Left lower leg: Edema present.      Comments: Lumbar incision noted, drain in place    Neurological:      Mental Status: He is alert and oriented to person, place, and time.   Psychiatric:         Mood and Affect: Mood normal.           Lines/Drains:  Lines/Drains/Airways       Active Status       Name Placement date Placement time Site Days    Closed/Suction Drain Left Back Bulb 7 Fr. 11/20/24  1043  Back  1                            Lab Results: I have reviewed the following results:   Results from last 7 days   Lab Units 11/21/24  0527 11/20/24  0448 11/18/24  1257   WBC Thousand/uL 10.33*   < > 10.20*   HEMOGLOBIN g/dL 11.1*   < > 12.7   HEMATOCRIT % 34.6*   < > 37.8   PLATELETS Thousands/uL 329   < > 301   SEGS PCT %  --   --  64   LYMPHO PCT %  --   --  25   MONO PCT %  --   --  7   EOS PCT %  --   --  3    < > = values in this interval not displayed.     Results from last 7 days   Lab Units 11/21/24  0527 11/20/24  0448   SODIUM mmol/L 135 139   POTASSIUM mmol/L 4.0 3.0*   CHLORIDE mmol/L 93* 98   CO2 mmol/L 32 31   BUN mg/dL 21 16   CREATININE mg/dL 0.98 0.76   ANION GAP mmol/L 10 10   CALCIUM mg/dL 8.5 8.4   ALBUMIN g/dL  --  3.0*   TOTAL BILIRUBIN mg/dL  --  0.29   ALK PHOS U/L  --  116*   ALT U/L  --  46   AST U/L  --  24   GLUCOSE RANDOM mg/dL 187* 119     Results from last 7 days   Lab Units 11/21/24  0527   INR  1.08     Results from last 7 days   Lab Units 11/21/24  1141 11/21/24  0714 11/20/24  2120 11/20/24  1618 11/20/24  1243 11/20/24  1130 11/20/24  0726 11/20/24  0526 11/19/24 2052 11/19/24  1736   POC  GLUCOSE mg/dl 236* 178* 237* 263* 134 128 120 132 143* 145*               Recent Cultures (last 7 days):   Results from last 7 days   Lab Units 11/20/24  1022 11/20/24  1018 11/18/24  1259 11/18/24  1257   BLOOD CULTURE   --   --  No Growth at 48 hrs. No Growth at 48 hrs.   GRAM STAIN RESULT  2+ Polys  No organisms seen 1+ Polys  No organisms seen  --   --        Imaging Results Review: No pertinent imaging studies reviewed.  Other Study Results Review: No additional pertinent studies reviewed.    Last 24 Hours Medication List:     Current Facility-Administered Medications:     acetaminophen (TYLENOL) tablet 975 mg, Q8H ISIS    atorvastatin (LIPITOR) tablet 40 mg, Daily    bisacodyl (DULCOLAX) rectal suppository 10 mg, Daily PRN    docusate sodium (COLACE) capsule 100 mg, BID    DULoxetine (CYMBALTA) delayed release capsule 60 mg, Daily    fentaNYL (DURAGESIC) 50 mcg/hr TD 72 hr patch 1 patch, Q72H    gabapentin (NEURONTIN) capsule 600 mg, 4x Daily    heparin (porcine) subcutaneous injection 5,000 Units, Q8H ISIS **AND** [COMPLETED] Platelet count, Once    HYDROmorphone (DILAUDID) tablet 8 mg, Q3H PRN    insulin lispro (HumALOG/ADMELOG) 100 units/mL subcutaneous injection 1-5 Units, TID AC **AND** Fingerstick Glucose (POCT), TID AC    insulin lispro (HumALOG/ADMELOG) 100 units/mL subcutaneous injection 1-5 Units, HS    methocarbamol (ROBAXIN) tablet 750 mg, Q6H ISIS    ondansetron (ZOFRAN) injection 4 mg, Q6H PRN    potassium chloride (Klor-Con M20) CR tablet 20 mEq, BID    senna-docusate sodium (SENOKOT S) 8.6-50 mg per tablet 1 tablet, HS    torsemide (DEMADEX) tablet 80 mg, Daily    vancomycin (VANCOCIN) 1,250 mg in sodium chloride 0.9 % 250 mL IVPB, Q12H, Last Rate: Stopped (11/21/24 0259)    Administrative Statements   Today, Patient Was Seen By: Charlette Velez PA-C      **Please Note: This note may have been constructed using a voice recognition system.**

## 2024-11-21 NOTE — PLAN OF CARE
Problem: PAIN - ADULT  Goal: Verbalizes/displays adequate comfort level or baseline comfort level  Description: Interventions:  - Encourage patient to monitor pain and request assistance  - Assess pain using appropriate pain scale  - Administer analgesics based on type and severity of pain and evaluate response  - Implement non-pharmacological measures as appropriate and evaluate response  - Consider cultural and social influences on pain and pain management  - Notify physician/advanced practitioner if interventions unsuccessful or patient reports new pain  Outcome: Progressing     Problem: INFECTION - ADULT  Goal: Absence or prevention of progression during hospitalization  Description: INTERVENTIONS:  - Assess and monitor for signs and symptoms of infection  - Monitor lab/diagnostic results  - Monitor all insertion sites, i.e. indwelling lines, tubes, and drains  - Monitor endotracheal if appropriate and nasal secretions for changes in amount and color  - [place] appropriate cooling/warming therapies per order  - Administer medications as ordered  - Instruct and encourage patient and family to use good hand hygiene technique  - Identify and instruct in appropriate isolation precautions for identified infection/condition  Outcome: Progressing     Problem: SAFETY ADULT  Goal: Patient will remain free of falls  Description: INTERVENTIONS:  - Educate patient/family on patient safety including physical limitations  - Instruct patient to call for assistance with activity   - Consult OT/PT to assist with strengthening/mobility   - Keep Call bell within reach  - Keep bed low and locked with side rails adjusted as appropriate  - Keep care items and personal belongings within reach  - Initiate and maintain comfort rounds  - Make Fall Risk Sign visible to staff  - Apply yellow socks and bracelet for high fall risk patients  - Consider moving patient to room near nurses station  Outcome: Progressing  Goal: Maintain or  return to baseline ADL function  Description: INTERVENTIONS:  -  Assess patient's ability to carry out ADLs; assess patient's baseline for ADL function and identify physical deficits which impact ability to perform ADLs (bathing, care of mouth/teeth, toileting, grooming, dressing, etc.)  - Assess/evaluate cause of self-care deficits   - Assess range of motion  - Assess patient's mobility; develop plan if impaired  - Assess patient's need for assistive devices and provide as appropriate  - Encourage maximum independence but intervene and supervise when necessary  - Involve family in performance of ADLs  - Assess for home care needs following discharge   - Consider OT consult to assist with ADL evaluation and planning for discharge  - Provide patient education as appropriate  Outcome: Progressing  Goal: Maintains/Returns to pre admission functional level  Description: INTERVENTIONS:  - Perform AM-PAC 6 Click Basic Mobility/ Daily Activity assessment daily.  - Set and communicate daily mobility goal to care team and patient/family/caregiver.   - Collaborate with rehabilitation services on mobility goals if consulted  - Out of bed for toileting  - Record patient progress and toleration of activity level   Outcome: Progressing     Problem: DISCHARGE PLANNING  Goal: Discharge to home or other facility with appropriate resources  Description: INTERVENTIONS:  - Identify barriers to discharge w/patient and caregiver  - Arrange for needed discharge resources and transportation as appropriate  - Identify discharge learning needs (meds, wound care, etc.)  - Arrange for interpretive services to assist at discharge as needed  - Refer to Case Management Department for coordinating discharge planning if the patient needs post-hospital services based on physician/advanced practitioner order or complex needs related to functional status, cognitive ability, or social support system  Outcome: Progressing     Problem: Knowledge  Deficit  Goal: Patient/family/caregiver demonstrates understanding of disease process, treatment plan, medications, and discharge instructions  Description: Complete learning assessment and assess knowledge base.  Interventions:  - Provide teaching at level of understanding  - Provide teaching via preferred learning methods  Outcome: Progressing

## 2024-11-21 NOTE — ASSESSMENT & PLAN NOTE
Wt Readings from Last 3 Encounters:   11/21/24 109 kg (240 lb 1.3 oz)   11/18/24 109 kg (240 lb)   11/08/24 109 kg (240 lb 12.8 oz)     Continue home dose torsemide 80 mg daily   Also takes PRN metolazone at home   Monitor volume status with daily weights, intake and output

## 2024-11-21 NOTE — ASSESSMENT & PLAN NOTE
Wt Readings from Last 3 Encounters:   11/21/24 109 kg (240 lb 1.3 oz)   11/18/24 109 kg (240 lb)   11/08/24 109 kg (240 lb 12.8 oz)

## 2024-11-21 NOTE — PROGRESS NOTES
Progress Note - Neurosurgery   Name: Dominick Irvign 64 y.o. male I MRN: [id number]  Unit/Bed#: [facility] 604-01 I Date of Admission: 2024   Date of Service: 2024 I Hospital Day: 2  Assessment & Plan  Surgical site infection  POD 1 lumbar debridement wound washout  History of L3-5 fusion with L3-4 TLIF on 10/30/2024 by Dr. Aguilar with concern for incisional drainage and possible wound infection    Imagin/18 MRI L-spine: Approximately 3.3 x 4.5 cm subcutaneous fluid collection tracking from the operative site into the subcutaneous and paraspinal soft tissues. This demonstrates peripheral enhancement and contains air, and an infected fluid collection cannot be excluded. No drainable epidural fluid collection identified. Consider sampling of the draining fluid to assess for infection. Endplate edema at the operative site is nonspecific. Minimal laminectomy bed fluid collection which mildly effaces the dorsum of the thecal sac. This is commonly seen in the recent postoperative setting due to postoperative blood products. There is significantly improved central stenosis at this level, now mild. Consider attention on follow-up.    Plan:  Continue neurological checks  MAXIMO to FS with 45ml output since OR, maintain at this time  ID following, but appreciated  WBCs 10.33  Afebrile  Blood cultures x 2 from  negative for 48 hours  Cultures to be taken in the OR, pending.  Purulence was seen IntraOp.  Patient currently on vancomycin and Ancef   Medical management and pain control per primary team  Consider APS consult if patient's postop pain is not controlled on his chronic pain medication  DVT ppx:  SCDs and SQH  Mobilize as tolerated with assistance, PT / OT evaluation postop     Neurosurgery will continue to follow.  Please call with questions or concerns.    Type 2 diabetes mellitus, with long-term current use of insulin (HCC)  Lab Results   Component Value Date    HGBA1C 9.0 (H) 10/01/2024       Recent  Labs     11/20/24  1243 11/20/24  1618 11/20/24  2120 11/21/24  0714   POCGLU 134 263* 237* 178*       Blood Sugar Average: Last 72 hrs:  (P) [id number]    Needs strict blood glucose control for optimal wound healing  Obstructive sleep apnea on CPAP    Chronic diastolic (congestive) heart failure (HCC)  Wt Readings from Last 3 Encounters:   11/21/24 109 kg (240 lb 1.3 oz)   11/18/24 109 kg (240 lb)   11/08/24 109 kg (240 lb 12.8 oz)             Chronic, continuous use of opioids    S/P laminectomy        Subjective Patient states he is doing well he is currently complaining of 5/10 incisional pain without radiation.  He reports ongoing numbness in his bilateral feet which is not new but has improved.  He states his current pain regimen does help with his pain.  He denies any fevers or chills.  He offers no other complaints.      Objective : Patient comfortably sitting out in recliner, NAD.    Temp:  [97.6 °F (36.4 °C)-98.4 °F (36.9 °C)] 97.6 °F (36.4 °C)  HR:  [50-69] 50  BP: (109-158)/(47-88) 128/66  Resp:  [13-20] 18  SpO2:  [93 %-99 %] 98 %  O2 Device: None (Room air)  Nasal Cannula O2 Flow Rate (L/min):  [3 L/min-4 L/min] 4 L/min  FiO2 (%):  [100] 100    I/O         11/19 0701  11/20 0700 11/20 0701  11/21 0700 11/21 0701  11/22 0700    P.O.  237     I.V. (mL/kg)  898.8 (8.2)     IV Piggyback  300     Total Intake(mL/kg)  1435.8 (13.2)     Urine (mL/kg/hr)  600 (0.2)     Drains  45     Blood  0     Total Output  645     Net  +790.8            Unmeasured Urine Occurrence  4 x             General appearance: alert, appears stated age, cooperative and no distress  Head: Normocephalic, without obvious abnormality, atraumatic  Eyes: EOMI, PERRL, conjugate gaze  Neck: supple, symmetrical, trachea midline   Back: Lumbar dressing in place with some old bloody drainage distally.  MAXIMO drain to full suction remains in place  Lungs: non labored breathing  Heart: regular heart rate  Neurologic:   Mental status:  Alert, oriented x3, thought content appropriate, speech is clear, following commands  Cranial nerves: grossly intact (Cranial nerves II-XII)  Sensory: normal to light touch all extremities x 4  Motor: moving all extremities without focal weakness, 5/5 throughout      Lab Results: I have reviewed the following results:  Recent Labs     11/20/24  0448 11/20/24  1335 11/21/24  0527   WBC 7.18  --  10.33*   HGB 10.7*  --  11.1*   HCT 32.0*  --  34.6*      < > 329   SODIUM 139  --  135   K 3.0*  --  4.0   CL 98  --  93*   CO2 31  --  32   BUN 16  --  21   CREATININE 0.76  --  0.98   GLUC 119  --  187*   MG  --   --  2.0   AST 24  --   --    ALT 46  --   --    ALB 3.0*  --   --    TBILI 0.29  --   --    ALKPHOS 116*  --   --    PTT 34  --  32   INR 1.03  --  1.08    < > = values in this interval not displayed.       VTE Pharmacologic Prophylaxis: Sequential compression device (Venodyne)  and Heparin

## 2024-11-21 NOTE — ASSESSMENT & PLAN NOTE
Lab Results   Component Value Date    HGBA1C 9.0 (H) 10/01/2024       Recent Labs     11/20/24  1618 11/20/24  2120 11/21/24  0714 11/21/24  1141   POCGLU 263* 237* 178* 236*       Blood Sugar Average: Last 72 hrs:  (P) 171.6  Home regimen: Tresiba 8u qd, metformin, and dapagliflozin  Hold oral medications while inpatient  Currently on Lantus 8U qd + SSI  Monitor Accu-Cheks and adjust insulin regimen as needed   Diabetic diet, hypoglycemia protocol

## 2024-11-21 NOTE — CASE MANAGEMENT
Case Management Discharge Planning Note    Patient name Dominick Irving  [facility] 604-01 MRN [id number]  : 1960 Date 2024       Current Admission Date: 2024  Current Admission Diagnosis:Surgical site infection   Patient Active Problem List    Diagnosis Date Noted Date Diagnosed    Surgical site infection 2024     Headache 2024     Lumbar stenosis 10/31/2024     Lumbar facet arthropathy 10/31/2024     Postoperative pain after spinal surgery 10/30/2024     Unstable gait 2024     Hypertensive heart disease with congestive heart failure ([facility]) 2024     Depression, recurrent ([facility]) 2024     History of bladder cancer 2024     Closed fracture of right ankle with routine healing 2024     COPD with acute exacerbation ([facility]) 2024     Type 2 diabetes mellitus, with long-term current use of insulin ([facility]) 2024     Unable to ambulate 2023     Neuropathy 2023     Postlaminectomy syndrome 2023     S/P laminectomy 10/16/2023     Lumbar radiculopathy 10/16/2023     Chronic pain syndrome 10/16/2023     Acute low back pain with sciatica 10/05/2023     Benign prostatic hyperplasia 2023     Encounter for surgical aftercare following surgery of nervous system 2023     Abdominal pain 2023     Suprapubic pressure 2023     Right groin pain 2023     Morbid (severe) obesity due to excess calories ([facility]) 2023     History of hematuria 2023     Hyponatremia 2023     Acute respiratory failure ([facility]) 2023     Electrolyte abnormality 2023     Bradycardia 2023     Coronary artery disease      Chronic, continuous use of opioids 2023     Hyperglycemia 2023     RODO (acute kidney injury) ([facility]) 2023     Intractable low back pain 2023     Chronic diastolic (congestive) heart failure ([facility])      Opioid withdrawal ([facility])      Hypokalemia 2023     Anxiety  04/06/2023     Chronic bilateral low back pain with bilateral sciatica 04/06/2023     History of gross hematuria 03/18/2023     Bladder cancer (HCC) 03/17/2023     Obstructive sleep apnea on CPAP      Hypertension      Bladder mass 03/06/2023     Nocturia 03/05/2023       LOS (days): 2  Geometric Mean LOS (GMLOS) (days): 3.4  Days to GMLOS:1.6     OBJECTIVE:  Risk of Unplanned Readmission Score: 25.98         Current admission status: Inpatient   Preferred Pharmacy:   [facility]  [address]  [address] [address]  Phone: [phone] Fax: [phone]    Primary Care Provider: Robert Budinetz, MD    Primary Insurance: MEDICARE  Secondary Insurance: BLUE CROSS    DISCHARGE DETAILS:    Discharge planning discussed with:: Patient  Freedom of Choice: Yes  Comments - Freedom of Choice: Discussed FOC  CM contacted family/caregiver?: No- see comments  Were Treatment Team discharge recommendations reviewed with patient/caregiver?: Yes  Did patient/caregiver verbalize understanding of patient care needs?: Yes  Were patient/caregiver advised of the risks associated with not following Treatment Team discharge recommendations?: Yes    Requested Home Health Care         Is the patient interested in HHC at discharge?: Yes  Home Health Discipline requested:: Nursing, Occupational Therapy, Physical Therapy  Home Health Agency Name:: [facility]  Home Health Services Needed:: Evaluate Functional Status and Safety, Wound/Ostomy Care, Strengthening/Theraputic Exercises to Improve Function, Gait/ADL Training  Homebound Criteria Met:: Requires the Assistance of Another Person for Safe Ambulation or to Leave the Home  Supporting Clincal Findings:: Limited Endurance    Other Referral/Resources/Interventions Provided:  Interventions: HHC  Referral Comments: [facility] services when medically cleared for SN/PT/OT, [facility] able to accept and reserved in aidin      Treatment Team  Recommendation: Home with Home Health Care  Discharge Destination Plan:: Home with Home Health Care

## 2024-11-21 NOTE — ASSESSMENT & PLAN NOTE
POD 1 lumbar debridement wound washout  History of L3-5 fusion with L3-4 TLIF on 10/30/2024 by Dr. Aguilar with concern for incisional drainage and possible wound infection    Imagin/18 MRI L-spine: Approximately 3.3 x 4.5 cm subcutaneous fluid collection tracking from the operative site into the subcutaneous and paraspinal soft tissues. This demonstrates peripheral enhancement and contains air, and an infected fluid collection cannot be excluded. No drainable epidural fluid collection identified. Consider sampling of the draining fluid to assess for infection. Endplate edema at the operative site is nonspecific. Minimal laminectomy bed fluid collection which mildly effaces the dorsum of the thecal sac. This is commonly seen in the recent postoperative setting due to postoperative blood products. There is significantly improved central stenosis at this level, now mild. Consider attention on follow-up.    Plan:  Continue neurological checks  MAXIMO to FS with 45ml output since OR, maintain at this time  ID following, but appreciated  WBCs 10.33  Afebrile  Blood cultures x 2 from  negative for 48 hours  Cultures to be taken in the OR, pending.  Purulence was seen IntraOp.  Patient currently on vancomycin and Ancef   Medical management and pain control per primary team  Consider APS consult if patient's postop pain is not controlled on his chronic pain medication  DVT ppx:  SCDs and SQH  Mobilize as tolerated with assistance, PT / OT evaluation postop     Neurosurgery will continue to follow.  Please call with questions or concerns.

## 2024-11-21 NOTE — ASSESSMENT & PLAN NOTE
Lab Results   Component Value Date    HGBA1C 9.0 (H) 10/01/2024       Recent Labs     11/20/24  1243 11/20/24  1618 11/20/24  2120 11/21/24  0714   POCGLU 134 263* 237* 178*       Blood Sugar Average: Last 72 hrs:  (P) [id number]    Needs strict blood glucose control for optimal wound healing

## 2024-11-21 NOTE — OCCUPATIONAL THERAPY NOTE
Occupational Therapy         Patient Name: Dominick Irving  Today's Date: 11/21/2024 11/21/24 1356   OT Last Visit   OT Visit Date 11/21/24   Note Type   Note type Cancelled Session   Cancel Reasons Patient to operating room   Additional Comments pending OR 11/20 will defer and address OT needs post op       Carissa Paulino

## 2024-11-22 ENCOUNTER — TELEPHONE (OUTPATIENT)
Dept: NEUROSURGERY | Facility: CLINIC | Age: 64
End: 2024-11-22

## 2024-11-22 LAB
BACTERIA SPEC ANAEROBE CULT: NORMAL
BACTERIA SPEC ANAEROBE CULT: NORMAL
BACTERIA TISS AEROBE CULT: ABNORMAL
BACTERIA TISS AEROBE CULT: ABNORMAL
GLUCOSE SERPL-MCNC: 102 MG/DL (ref 65–140)
GLUCOSE SERPL-MCNC: 157 MG/DL (ref 65–140)
GLUCOSE SERPL-MCNC: 168 MG/DL (ref 65–140)
GLUCOSE SERPL-MCNC: 170 MG/DL (ref 65–140)
GRAM STN SPEC: ABNORMAL
VANCOMYCIN SERPL-MCNC: 31.4 UG/ML (ref 10–20)

## 2024-11-22 PROCEDURE — C1751 CATH, INF, PER/CENT/MIDLINE: HCPCS

## 2024-11-22 PROCEDURE — 82948 REAGENT STRIP/BLOOD GLUCOSE: CPT

## 2024-11-22 PROCEDURE — 99024 POSTOP FOLLOW-UP VISIT: CPT | Performed by: NEUROLOGICAL SURGERY

## 2024-11-22 PROCEDURE — 99232 SBSQ HOSP IP/OBS MODERATE 35: CPT | Performed by: INTERNAL MEDICINE

## 2024-11-22 PROCEDURE — 36569 INSJ PICC 5 YR+ W/O IMAGING: CPT

## 2024-11-22 PROCEDURE — 80202 ASSAY OF VANCOMYCIN: CPT | Performed by: PHYSICIAN ASSISTANT

## 2024-11-22 PROCEDURE — 97166 OT EVAL MOD COMPLEX 45 MIN: CPT

## 2024-11-22 PROCEDURE — 97163 PT EVAL HIGH COMPLEX 45 MIN: CPT

## 2024-11-22 RX ADMIN — POTASSIUM CHLORIDE 20 MEQ: 1500 TABLET, EXTENDED RELEASE ORAL at 17:04

## 2024-11-22 RX ADMIN — SENNOSIDES AND DOCUSATE SODIUM 1 TABLET: 50; 8.6 TABLET ORAL at 21:05

## 2024-11-22 RX ADMIN — ATORVASTATIN CALCIUM 40 MG: 40 TABLET, FILM COATED ORAL at 08:24

## 2024-11-22 RX ADMIN — HYDROMORPHONE HYDROCHLORIDE 8 MG: 4 TABLET ORAL at 20:07

## 2024-11-22 RX ADMIN — GABAPENTIN 600 MG: 300 CAPSULE ORAL at 08:24

## 2024-11-22 RX ADMIN — HYDROMORPHONE HYDROCHLORIDE 8 MG: 4 TABLET ORAL at 17:03

## 2024-11-22 RX ADMIN — GABAPENTIN 600 MG: 300 CAPSULE ORAL at 21:05

## 2024-11-22 RX ADMIN — POTASSIUM CHLORIDE 20 MEQ: 1500 TABLET, EXTENDED RELEASE ORAL at 08:24

## 2024-11-22 RX ADMIN — DOCUSATE SODIUM 100 MG: 100 CAPSULE, LIQUID FILLED ORAL at 08:24

## 2024-11-22 RX ADMIN — HYDROMORPHONE HYDROCHLORIDE 8 MG: 4 TABLET ORAL at 06:00

## 2024-11-22 RX ADMIN — HEPARIN SODIUM 5000 UNITS: 5000 INJECTION, SOLUTION INTRAVENOUS; SUBCUTANEOUS at 21:06

## 2024-11-22 RX ADMIN — VANCOMYCIN HYDROCHLORIDE 1250 MG: 5 INJECTION, POWDER, LYOPHILIZED, FOR SOLUTION INTRAVENOUS at 02:43

## 2024-11-22 RX ADMIN — CEFAZOLIN SODIUM 2000 MG: 2 SOLUTION INTRAVENOUS at 06:51

## 2024-11-22 RX ADMIN — HYDROMORPHONE HYDROCHLORIDE 8 MG: 4 TABLET ORAL at 09:06

## 2024-11-22 RX ADMIN — INSULIN LISPRO 1 UNITS: 100 INJECTION, SOLUTION INTRAVENOUS; SUBCUTANEOUS at 21:06

## 2024-11-22 RX ADMIN — GABAPENTIN 600 MG: 300 CAPSULE ORAL at 17:03

## 2024-11-22 RX ADMIN — INSULIN LISPRO 1 UNITS: 100 INJECTION, SOLUTION INTRAVENOUS; SUBCUTANEOUS at 12:53

## 2024-11-22 RX ADMIN — METHOCARBAMOL 750 MG: 750 TABLET ORAL at 17:04

## 2024-11-22 RX ADMIN — GABAPENTIN 600 MG: 300 CAPSULE ORAL at 12:53

## 2024-11-22 RX ADMIN — ACETAMINOPHEN 975 MG: 325 TABLET, FILM COATED ORAL at 05:12

## 2024-11-22 RX ADMIN — DULOXETINE HYDROCHLORIDE 60 MG: 60 CAPSULE, DELAYED RELEASE ORAL at 08:24

## 2024-11-22 RX ADMIN — TORSEMIDE 80 MG: 20 TABLET ORAL at 08:24

## 2024-11-22 RX ADMIN — ACETAMINOPHEN 975 MG: 325 TABLET, FILM COATED ORAL at 21:05

## 2024-11-22 RX ADMIN — INSULIN LISPRO 1 UNITS: 100 INJECTION, SOLUTION INTRAVENOUS; SUBCUTANEOUS at 17:07

## 2024-11-22 RX ADMIN — METHOCARBAMOL 750 MG: 750 TABLET ORAL at 12:53

## 2024-11-22 RX ADMIN — HEPARIN SODIUM 5000 UNITS: 5000 INJECTION, SOLUTION INTRAVENOUS; SUBCUTANEOUS at 05:12

## 2024-11-22 RX ADMIN — HYDROMORPHONE HYDROCHLORIDE 8 MG: 4 TABLET ORAL at 12:52

## 2024-11-22 RX ADMIN — METHOCARBAMOL 750 MG: 750 TABLET ORAL at 05:07

## 2024-11-22 RX ADMIN — DOCUSATE SODIUM 100 MG: 100 CAPSULE, LIQUID FILLED ORAL at 17:03

## 2024-11-22 RX ADMIN — ACETAMINOPHEN 975 MG: 325 TABLET, FILM COATED ORAL at 12:54

## 2024-11-22 RX ADMIN — HYDROMORPHONE HYDROCHLORIDE 8 MG: 4 TABLET ORAL at 02:44

## 2024-11-22 RX ADMIN — HEPARIN SODIUM 5000 UNITS: 5000 INJECTION, SOLUTION INTRAVENOUS; SUBCUTANEOUS at 12:54

## 2024-11-22 RX ADMIN — VANCOMYCIN HYDROCHLORIDE 1500 MG: 10 INJECTION, POWDER, LYOPHILIZED, FOR SOLUTION INTRAVENOUS at 17:02

## 2024-11-22 NOTE — ASSESSMENT & PLAN NOTE
PDMP reviewed: Fentanyl patch 50 mcg/h every 72 hours, p.o. Dilaudid 8 mg every 3 hours  Outpatient follow-up with PCP and palliative care for ongoing management

## 2024-11-22 NOTE — PROGRESS NOTES
"  Progress Note - Infectious Disease   Dominick Irving 64 y.o. male MRN: [id number]  Unit/Bed#: [facility] 604-01 Encounter: [id number]      Impression:  1.  Postoperative Staphylococcus aureus lumbar wound infection s/p lumbar wound debridement and washout   2.  Lumbar stenosis s/p L3-4 decompressive hemilaminectomy with transverse lumbar interbody fusion and L4-5 fixation and fusion 10/30  3.  DM type II  4.  ERICA    Recommendations:  Patient is afebrile with WBC count 10,330.  Nares MRSA culture is positive  1.  Operative wound cultures are showing Staphylococcus aureus with susceptibilities pending  2.  Pending susceptibility results above we will continue vancomycin 1.25 g every 12 hours IV with further dosing by pharmacy and cefazolin 2 g every 8 hours IV.  3.  If MRSA recovered from surgical wound will discontinue cefazolin.  At this point would continue IV Rx for at least a week before switching to p.o. antibiotic for additional 4 to 6 weeks    Antibiotics:  1.  Vancomycin 1.25 g every 12 hours IV, day 1 POD  2.  Cefazolin 2 g every 8 hours IV, day 1 POD    Subjective:  Patient's postoperative back pain is a \"4.\"  Denies fevers, chills, or sweats.  Denies nausea, vomiting, or diarrhea.      Objective:  Vitals:  Temp:  [97.6 °F (36.4 °C)-98 °F (36.7 °C)] 98 °F (36.7 °C)  HR:  [50-56] 52  Resp:  [12-18] 12  BP: (125-133)/(62-66) 126/63  SpO2:  [93 %-98 %] 94 %  Temp (24hrs), Av.8 °F (36.6 °C), Min:97.6 °F (36.4 °C), Max:98 °F (36.7 °C)  Current: Temperature: 98 °F (36.7 °C)    Physical Exam:     General Appearance:  Alert, nontoxic, no acute distress.   Throat: Oropharynx moist without lesions.  Lips, mucosa, and tongue normal, edentulous with full dentures   Neck: Supple, symmetrical, trachea midline, no adenopathy,  no tenderness/mass/nodules   Lungs:   Clear to auscultation bilaterally, no audible wheezes, rhonchi or rales; respirations unlabored   Heart:  Regular rate and rhythm, S1, S2 normal, no " murmur, rub or gallop   Abdomen:   Soft, non-tender, non-distended, positive bowel sounds.  No masses, no organomegaly    No CVA tenderness   Extremities: Extremities normal, atraumatic, no clubbing, cyanosis or edema   Skin: Lumbar wound with dry surgical dressing and MAXIMO drain with serosanguineous drainage         Invasive Devices       Peripheral Intravenous Line  Duration             Peripheral IV 11/20/24 Right Hand 1 day              Drain  Duration             Closed/Suction Drain Left Back Bulb 7 Fr. 1 day                    Labs, Imaging, & Other studies:   All pertinent labs were personally reviewed  Results from last 7 days   Lab Units 11/21/24  0527 11/20/24  1335 11/20/24  0448 11/18/24  1257   WBC Thousand/uL 10.33*  --  7.18 10.20*   HEMOGLOBIN g/dL 11.1*  --  10.7* 12.7   PLATELETS Thousands/uL 329 330 281 301     Results from last 7 days   Lab Units 11/21/24  0527 11/20/24  0448 11/18/24  1257   SODIUM mmol/L 135 139 133*   POTASSIUM mmol/L 4.0 3.0* 3.4*   CHLORIDE mmol/L 93* 98 91*   CO2 mmol/L 32 31 31   BUN mg/dL 21 16 18   CREATININE mg/dL 0.98 0.76 1.26   EGFR ml/min/1.73sq m 80 95 59   CALCIUM mg/dL 8.5 8.4 9.2   AST U/L  --  24 24   ALT U/L  --  46 63*   ALK PHOS U/L  --  116* 142*     Results from last 7 days   Lab Units 11/20/24  1022 11/20/24  1018 11/19/24  1748 11/18/24  1259 11/18/24  1257   BLOOD CULTURE   --   --   --  No Growth at 72 hrs. No Growth at 72 hrs.   GRAM STAIN RESULT  2+ Polys  No organisms seen 1+ Polys  No organisms seen  --   --   --    MRSA CULTURE ONLY   --   --  Methicillin Resistant Staphylococcus aureus isolated*  This patient requires contact isolation precautions per [place] law. Contact precautions are not required in [place] for nasal surveillance cultures.  --   --

## 2024-11-22 NOTE — PLAN OF CARE
Problem: SAFETY ADULT  Goal: Patient will remain free of falls  Description: INTERVENTIONS:  - Educate patient/family on patient safety including physical limitations  - Instruct patient to call for assistance with activity   - Consult OT/PT to assist with strengthening/mobility   - Keep Call bell within reach  - Keep bed low and locked with side rails adjusted as appropriate  - Keep care items and personal belongings within reach  - Initiate and maintain comfort rounds  - Make Fall Risk Sign visible to staff  - Apply yellow socks and bracelet for high fall risk patients  - Consider moving patient to room near nurses station  Outcome: Progressing     Problem: INFECTION - ADULT  Goal: Absence or prevention of progression during hospitalization  Description: INTERVENTIONS:  - Assess and monitor for signs and symptoms of infection  - Monitor lab/diagnostic results  - Monitor all insertion sites, i.e. indwelling lines, tubes, and drains  - Monitor endotracheal if appropriate and nasal secretions for changes in amount and color  - [place] appropriate cooling/warming therapies per order  - Administer medications as ordered  - Instruct and encourage patient and family to use good hand hygiene technique  - Identify and instruct in appropriate isolation precautions for identified infection/condition  Outcome: Progressing     Problem: SAFETY ADULT  Goal: Maintains/Returns to pre admission functional level  Description: INTERVENTIONS:  - Perform AM-PAC 6 Click Basic Mobility/ Daily Activity assessment daily.  - Set and communicate daily mobility goal to care team and patient/family/caregiver.   - Collaborate with rehabilitation services on mobility goals if consulted  - Out of bed for toileting  - Record patient progress and toleration of activity level   Outcome: Progressing     Problem: Knowledge Deficit  Goal: Patient/family/caregiver demonstrates understanding of disease process, treatment plan, medications, and  discharge instructions  Description: Complete learning assessment and assess knowledge base.  Interventions:  - Provide teaching at level of understanding  - Provide teaching via preferred learning methods  Outcome: Progressing

## 2024-11-22 NOTE — ASSESSMENT & PLAN NOTE
POD 2 lumbar debridement wound washout  History of L3-5 fusion with L3-4 TLIF on 10/30/2024 by Dr. Aguilar with concern for incisional drainage and possible wound infection    Imagin/18 MRI L-spine: Approximately 3.3 x 4.5 cm subcutaneous fluid collection tracking from the operative site into the subcutaneous and paraspinal soft tissues. This demonstrates peripheral enhancement and contains air, and an infected fluid collection cannot be excluded. No drainable epidural fluid collection identified. Consider sampling of the draining fluid to assess for infection. Endplate edema at the operative site is nonspecific. Minimal laminectomy bed fluid collection which mildly effaces the dorsum of the thecal sac. This is commonly seen in the recent postoperative setting due to postoperative blood products. There is significantly improved central stenosis at this level, now mild. Consider attention on follow-up.    Plan:  Continue neurological checks  MAXIMO to FS with 20ml output/24hrs, removed this morning without difficulty, 1 suture tied down in place.  Incision clean, dry, tact  ID following, input appreciated  WBCs 10.33 yesterday  Afebrile  Blood cultures x 2 from  negative for 72 hours  Cultures to be taken in the OR, positive MRSA.  Purulence was seen IntraOp.  Patient currently on vancomycin   Medical management and pain control per primary team  Consider APS consult if patient's postop pain is not controlled on his chronic pain medication  DVT ppx:  SCDs and SQH  Mobilize as tolerated with assistance, PT / OT evaluation postop     Neurosurgery will sign off, patient will follow-up in 2 weeks for incision check and 6 weeks for postoperative visit, call with any further question or concerns.

## 2024-11-22 NOTE — PROGRESS NOTES
Dominick Irving is a 64 y.o. male who is currently ordered Vancomycin IV with management by the Pharmacy Consult service.  Relevant clinical data and objective / subjective history reviewed.  Vancomycin Assessment:  Indication and Goal AUC/Trough: Soft tissue (goal -600, trough >10), -600, trough >10  Clinical Status: stable  Micro:     Renal Function:  SCr: 0.98 mg/dL  CrCl: 88.5 mL/min  Renal replacement: Not on dialysis  Days of Therapy: 3  Current Dose: 1250 mg q 12h  Vancomycin Plan:  New Dosin mg q 24h to start at 1700 today  Estimated AUC: 436 mcg*hr/mL  Estimated Trough: 11.4 mcg/mL  Next Level:  AM draw  Renal Function Monitoring: Daily BMP and UOP  Pharmacy will continue to follow closely for s/sx of nephrotoxicity, infusion reactions and appropriateness of therapy.  BMP and CBC will be ordered per protocol. We will continue to follow the patient’s culture results and clinical progress daily.    hCristie Motley, Pharmacist

## 2024-11-22 NOTE — ASSESSMENT & PLAN NOTE
Presented to [facility] ED with drainage from surgical incision site since 11/11 associated with intermittent subjective weakness of LEs. No red flag symptoms.  Fortunately patient hemodynamically stable and nontoxic-appearing, remains afebrile.  MRI L spine w wo contrast showed: 3.3 x 4.5 cm subcutaneous fluid collection tracking from the operative site into the subcutaneous and paraspinal soft tissues. No significant canal compression or drainable epidural fluid collection  Transferred to [facility] for neurosurgery evaluation, recommendations appreciated   S/P OR on 11/20 for lumbar debridement/wound washout  MAXIMO drain removed on 11/22  ID consulted for antibiotic management   Wound cultures with MRSA-d/w ID, recommending 7 days of IV vancomycin followed by 4-6 weeks of PO abx (TBD which agent, appreciate ID recs)  Will place PICC on 11/22 with hopeful weekend dc if abx are coordinated --ID aware  Pain has been controlled on home regimen: Fentanyl patch 50 mcg/h every 72 hours, p.o. Dilaudid 8 mg every 3 hours as needed  PT/OT recommending HHC, will also need VNA due to IV abx

## 2024-11-22 NOTE — DISCHARGE INSTR - AVS FIRST PAGE
Discharge Instructions  Posterior Lumbar Fusion/Fixation      Surgical incisional care:  Maintain dressing in place for 3 days. On postoperative day 3, dressing may be removed and incision may be left open to air.  Keep incision clean and dry. Avoid applying creams, lotion or antiseptic to incision area.  Check the wound daily. If the incision becomes red, swollen, tender, warm, or has increased drainage please notify physician immediately.  If steri-strips are in place, allow them to fall off.  If still in place at two week postoperative visit, we will remove them.  May shower 3 days after surgery, but do not soak in a tub and no swimming until cleared.  Incision may be cleaned with water and a mild antimicrobial soap using a clean washcloth. Incision is to be gently patted dry with a clean towel.   Continue to change bed linens and pajamas more frequently. Wear clean clothes daily.     Activity Restrictions:  No heavy lifting greater than 10lbs and no strenuous activities until cleared.   No bending or twisting. No BLTs (bending, lifting, twisting). Avoid pushing/pulling movements.  May walk as tolerated. Encourage at least 4 short walks per day. No prolonged sitting.   No driving for at least 2 weeks or until cleared by physician.  If you were provided a brace, it is to be worn whenever you are out of bed until directed otherwise. It may be put on while sitting at bedside.     Postoperative medication:  Take pain medications to relieve incision pain, and muscle relaxants to prevent spasms as directed. Please see after visit summary (AVS) for details.   Do not operate heavy machinery or vehicles while taking sedating medications.  Use a bowel regimen while on opioids as they induce constipation. Ie. Senokot-S, Miralax, Colace, etc. Increase fiber and water intake.   Do not take ibuprofen, Naproxen/Aleve or any non steroidal anti-inflammatory medications, NSAIDs, until cleared by surgeon. May take Tylenol  instead.  If taking Coumadin, Aspirin, or Plavix, you may resume these medications when cleared by Neurosurgery.    Follow-up as scheduled for a 2 week incision check. Follow-up as scheduled for 6 week postoperative visit     **Please notify MD if you experience a fever 101F, chills or have increased pain, numbness, tingling, or weakness in your legs, increased walking difficulties and/or bowel/bladder dysfunction/incontinence**

## 2024-11-22 NOTE — PROGRESS NOTES
Progress Note - Hospitalist   Name: Dominick Irving 64 y.o. male I MRN: [id number]  [facility] I Date of Admission: 11/19/2024   Date of Service: 11/22/2024 I Hospital Day: 3    Assessment & Plan  Surgical site infection  Presented to [facility] ED with drainage from surgical incision site since 11/11 associated with intermittent subjective weakness of LEs. No red flag symptoms.  Fortunately patient hemodynamically stable and nontoxic-appearing, remains afebrile.  MRI L spine w wo contrast showed: 3.3 x 4.5 cm subcutaneous fluid collection tracking from the operative site into the subcutaneous and paraspinal soft tissues. No significant canal compression or drainable epidural fluid collection  Transferred to [facility] for neurosurgery evaluation, recommendations appreciated   S/P OR on 11/20 for lumbar debridement/wound washout  MAXIMO drain removed on 11/22  ID consulted for antibiotic management   Wound cultures with MRSA-d/w ID, recommending 7 days of IV vancomycin followed by 4-6 weeks of PO abx (TBD which agent, appreciate ID recs)  Will place PICC on 11/22 with hopeful weekend dc if abx are coordinated --ID aware  Pain has been controlled on home regimen: Fentanyl patch 50 mcg/h every 72 hours, p.o. Dilaudid 8 mg every 3 hours as needed  PT/OT recommending HHC, will also need VNA due to IV abx  Chronic, continuous use of opioids  PDMP reviewed: Fentanyl patch 50 mcg/h every 72 hours, p.o. Dilaudid 8 mg every 3 hours  Outpatient follow-up with PCP and palliative care for ongoing management  Type 2 diabetes mellitus, with long-term current use of insulin ([facility])  Lab Results   Component Value Date    HGBA1C 9.0 (H) 10/01/2024       Recent Labs     11/21/24  1141 11/21/24  1644 11/21/24  2033 11/22/24  0719   POCGLU 236* 175* 196* 102       Blood Sugar Average: Last 72 hrs:  (P) 168.4910159315116451  Home regimen: Tresiba 8u qd, metformin, and dapagliflozin  Hold oral medications while inpatient  Currently on Lantus 8U  qd + SSI  Monitor Accu-Cheks and adjust insulin regimen as needed   Diabetic diet, hypoglycemia protocol  Chronic diastolic (congestive) heart failure (HCC)  Wt Readings from Last 3 Encounters:   11/21/24 109 kg (240 lb 1.3 oz)   11/18/24 109 kg (240 lb)   11/08/24 109 kg (240 lb 12.8 oz)     Continue home dose torsemide 80 mg daily   Also takes PRN metolazone at home   Monitor volume status with daily weights, intake and output     S/P laminectomy  L3-5 posterior fusion with an L3-4 TLIF on 10/30/2024 by Dr. Aguilar  See related plan above   Obstructive sleep apnea on CPAP  Continue CPAP QHS    VTE Pharmacologic Prophylaxis:   Moderate Risk (Score 3-4) - Pharmacological DVT Prophylaxis Ordered: heparin.    Mobility:   Basic Mobility Inpatient Raw Score: 22  JH-HLM Goal: 7: Walk 25 feet or more  JH-HLM Achieved: 7: Walk 25 feet or more  JH-HLM Goal achieved. Continue to encourage appropriate mobility.    Patient Centered Rounds: I performed bedside rounds with nursing staff today.   Discussions with Specialists or Other Care Team Provider: marisol Weinstein and Dr. Barnhart as well as CM    Education and Discussions with Family / Patient: Patient declined call to .     Current Length of Stay: 3 day(s)  Current Patient Status: Inpatient   Certification Statement: The patient will continue to require additional inpatient hospital stay due to PICC placement   Discharge Plan: Anticipate discharge in 24-48 hrs to home with home services.    Code Status: Level 1 - Full Code    Subjective   Doing great today. OOB in chair, worked with PT. Pain well controlled. Had BM.     Objective :  Temp:  [97.7 °F (36.5 °C)-98 °F (36.7 °C)] 97.9 °F (36.6 °C)  HR:  [46-55] 46  BP: (120-133)/(61-65) 120/63  Resp:  [12-17] 17  SpO2:  [94 %-96 %] 96 %  O2 Device: None (Room air)    Body mass index is 37.6 kg/m².     Input and Output Summary (last 24 hours):     Intake/Output Summary (Last 24 hours) at 11/22/2024 1036  Last data filed at  11/22/2024 0920  Gross per 24 hour   Intake 1700 ml   Output 20 ml   Net 1680 ml       Physical Exam  Vitals and nursing note reviewed.   Constitutional:       General: He is not in acute distress.     Appearance: He is obese.   Cardiovascular:      Rate and Rhythm: Normal rate.   Pulmonary:      Effort: No respiratory distress.   Abdominal:      General: There is no distension.      Palpations: Abdomen is soft.   Musculoskeletal:      Right lower leg: Edema present.      Left lower leg: Edema present.      Comments: Lumbar incision noted    Neurological:      Mental Status: He is oriented to person, place, and time.           Lines/Drains:  Lines/Drains/Airways       Active Status       Name Placement date Placement time Site Days    Closed/Suction Drain Left Back Bulb 7 Fr. 11/20/24  1043  Back  1                            Lab Results: I have reviewed the following results:   Results from last 7 days   Lab Units 11/21/24  0527 11/20/24  0448 11/18/24  1257   WBC Thousand/uL 10.33*   < > 10.20*   HEMOGLOBIN g/dL 11.1*   < > 12.7   HEMATOCRIT % 34.6*   < > 37.8   PLATELETS Thousands/uL 329   < > 301   SEGS PCT %  --   --  64   LYMPHO PCT %  --   --  25   MONO PCT %  --   --  7   EOS PCT %  --   --  3    < > = values in this interval not displayed.     Results from last 7 days   Lab Units 11/21/24  0527 11/20/24  0448   SODIUM mmol/L 135 139   POTASSIUM mmol/L 4.0 3.0*   CHLORIDE mmol/L 93* 98   CO2 mmol/L 32 31   BUN mg/dL 21 16   CREATININE mg/dL 0.98 0.76   ANION GAP mmol/L 10 10   CALCIUM mg/dL 8.5 8.4   ALBUMIN g/dL  --  3.0*   TOTAL BILIRUBIN mg/dL  --  0.29   ALK PHOS U/L  --  116*   ALT U/L  --  46   AST U/L  --  24   GLUCOSE RANDOM mg/dL 187* 119     Results from last 7 days   Lab Units 11/21/24  0527   INR  1.08     Results from last 7 days   Lab Units 11/22/24  0719 11/21/24  2033 11/21/24  1644 11/21/24  1141 11/21/24  0714 11/20/24  2120 11/20/24  1618 11/20/24  1243 11/20/24  1130 11/20/24  0726  11/20/24  0526 11/19/24  2052   POC GLUCOSE mg/dl 102 196* 175* 236* 178* 237* 263* 134 128 120 132 143*               Recent Cultures (last 7 days):   Results from last 7 days   Lab Units 11/20/24  1022 11/20/24  1018 11/18/24  1259 11/18/24  1257   BLOOD CULTURE   --   --  No Growth at 72 hrs. No Growth at 72 hrs.   GRAM STAIN RESULT  2+ Polys  No organisms seen 1+ Polys  No organisms seen  --   --        Imaging Results Review: No pertinent imaging studies reviewed.  Other Study Results Review: No additional pertinent studies reviewed.    Last 24 Hours Medication List:     Current Facility-Administered Medications:     acetaminophen (TYLENOL) tablet 975 mg, Q8H ISIS    atorvastatin (LIPITOR) tablet 40 mg, Daily    bisacodyl (DULCOLAX) rectal suppository 10 mg, Daily PRN    docusate sodium (COLACE) capsule 100 mg, BID    DULoxetine (CYMBALTA) delayed release capsule 60 mg, Daily    fentaNYL (DURAGESIC) 50 mcg/hr TD 72 hr patch 1 patch, Q72H    gabapentin (NEURONTIN) capsule 600 mg, 4x Daily    heparin (porcine) subcutaneous injection 5,000 Units, Q8H ISIS **AND** [COMPLETED] Platelet count, Once    HYDROmorphone (DILAUDID) tablet 8 mg, Q3H PRN    insulin lispro (HumALOG/ADMELOG) 100 units/mL subcutaneous injection 1-5 Units, TID AC **AND** Fingerstick Glucose (POCT), TID AC    insulin lispro (HumALOG/ADMELOG) 100 units/mL subcutaneous injection 1-5 Units, HS    methocarbamol (ROBAXIN) tablet 750 mg, Q6H ISIS    ondansetron (ZOFRAN) injection 4 mg, Q6H PRN    potassium chloride (Klor-Con M20) CR tablet 20 mEq, BID    senna-docusate sodium (SENOKOT S) 8.6-50 mg per tablet 1 tablet, HS    torsemide (DEMADEX) tablet 80 mg, Daily    vancomycin (VANCOCIN) 1500 mg in sodium chloride 0.9% 250 mL IVPB, Q24H    Administrative Statements   Today, Patient Was Seen By: Charlette Velez PA-C      **Please Note: This note may have been constructed using a voice recognition system.**

## 2024-11-22 NOTE — PROCEDURES
Insert Complex Venous Access Line    Date/Time: 11/22/2024 2:00 PM    Performed by: Vanessa Armstrong RN  Authorized by: Charlette Velez PA-C    Patient location:  Bedside  Other Assisting Provider: Yes (comment) (Nadege VALENCIA,  Tech)    Consent:     Consent obtained:  Written (Physician obtained)    Consent given by:  Patient    Risks discussed:  Arterial puncture, incorrect placement, nerve damage, bleeding, infection and pneumothorax    Alternatives discussed:  No treatment  Universal protocol:     Procedure explained and questions answered to patient or proxy's satisfaction: yes      Immediately prior to procedure, a time out was called: yes      Site/side marked: yes      Patient identity confirmed:  Verbally with patient, arm band, provided demographic data and hospital-assigned identification number  Pre-procedure details:     Hand hygiene: Hand hygiene performed prior to insertion      Sterile barrier technique: All elements of maximal sterile technique followed      Skin preparation:  ChloraPrep    Skin preparation agent: Skin preparation agent completely dried prior to procedure    Procedure details:     Complex Venous Access Line Type: PICC      Complex Venous Access Line Indications: long term antibiotics      Catheter tip vessel location: atriocaval junction      Orientation:  Right    Location:  Basilic    Procedural supplies:  Single lumen    Catheter size:  4 Fr    Total catheter length (cm):  46    Catheter out on skin (cm):  0    Max flow rate:  999    Arm circumference:  35    Patient evaluated for contraindications to access (i.e. fistula, thrombosis, etc): Yes      Site selection rationale:  Largest most patent vessel    Approach: percutaneous technique used      Patient position:  Flat    Ultrasound image availability:  Not saved    Sterile ultrasound techniques: Sterile gel and sterile probe covers were used      Number of attempts:  1    Successful placement: yes      Landmarks identified: yes       Vessel of catheter tip end:  Sherlock 3CG confirmed  Anesthesia (see MAR for exact dosages):     Anesthesia method:  Local infiltration    Local anesthetic:  Lidocaine 1% w/o epi (2 ml)  Post-procedure details:     Post-procedure:  Dressing applied and securement device placed    Assessment:  Blood return through all ports and free fluid flow    Post-procedure complications: none      Patient tolerance of procedure:  Tolerated well, no immediate complications

## 2024-11-22 NOTE — PROGRESS NOTES
Progress Note - Infectious Disease   Dominick Irving 64 y.o. male MRN: [id number]  Unit/Bed#: [facility] 604-01 Encounter: [id number]      Impression:  1.  Postoperative Staphylococcus aureus (MRSA) lumbar wound infection s/p lumbar wound debridement and washout   2.  Lumbar stenosis s/p L3-4 decompressive hemilaminectomy with transverse lumbar interbody fusion and L4-5 fixation and fusion 10/30  3.  DM type II  4.  ERICA    Recommendations:  Patient is afebrile with WBC count 10,330.  Nares MRSA culture is positive.  Therapy discussed with the primary service will write orders  1.  Operative wound cultures are showing Staphylococcus aureus that is an MRSA   2.  Agree with discontinuing cefazolin and continuing vancomycin currently 1.5 g every 24 hours IV with further dosing by pharmacy  3.  As discussed would continue IV Rx for at least a week before switching to p.o. Bactrim DS 1 tab every 12 hours  for additional 4 to 6 weeks  4.  Right basilic PICC line inserted  5.  If patient is discharged he will need weekly CBC, differential, BMP, ESR and CRP.  6.  PICC line should be removed promptly after his IV portion of therapy is completed  7.  Patient can be followed in the ID office by virtual visit.  He should call [phone] for weekly appointment    Antibiotics:  1.  Vancomycin 1.5 g every 12 hours IV, day 2 POD      Subjective:  Patient's postoperative back pain is minimal  Denies fevers, chills, or sweats.  Denies nausea, vomiting, or diarrhea.      Objective:  Vitals:  Temp:  [97.5 °F (36.4 °C)-97.9 °F (36.6 °C)] 97.5 °F (36.4 °C)  HR:  [46-54] 52  Resp:  [16-17] 16  BP: (120-146)/(61-63) 146/63  SpO2:  [95 %-96 %] 95 %  Temp (24hrs), Av.7 °F (36.5 °C), Min:97.5 °F (36.4 °C), Max:97.9 °F (36.6 °C)  Current: Temperature: 97.5 °F (36.4 °C)    Physical Exam:     General Appearance:  Alert, nontoxic, no acute distress.   Throat: Oropharynx moist without lesions.  Lips, mucosa, and tongue normal, edentulous  with full dentures   Neck: Supple, symmetrical, trachea midline, no adenopathy,  no tenderness/mass/nodules   Lungs:   Clear to auscultation bilaterally, no audible wheezes, rhonchi or rales; respirations unlabored   Heart:  Regular rate and rhythm, S1, S2 normal, no murmur, rub or gallop   Abdomen:   Soft, non-tender, non-distended, positive bowel sounds.  No masses, no organomegaly    No CVA tenderness   Extremities: Right basilic PICC line   Skin: Lumbar wound with dry surgical dressing and MAXIMO drain with serosanguineous drainage         Invasive Devices       Peripherally Inserted Central Catheter Line  Duration             PICC Line 11/22/24 Right Basilic <1 day              Drain  Duration             Closed/Suction Drain Left Back Bulb 7 Fr. 2 days                    Labs, Imaging, & Other studies:   All pertinent labs were personally reviewed  Results from last 7 days   Lab Units 11/21/24  0527 11/20/24  1335 11/20/24  0448 11/18/24  1257   WBC Thousand/uL 10.33*  --  7.18 10.20*   HEMOGLOBIN g/dL 11.1*  --  10.7* 12.7   PLATELETS Thousands/uL 329 330 281 301     Results from last 7 days   Lab Units 11/21/24  0527 11/20/24  0448 11/18/24  1257   SODIUM mmol/L 135 139 133*   POTASSIUM mmol/L 4.0 3.0* 3.4*   CHLORIDE mmol/L 93* 98 91*   CO2 mmol/L 32 31 31   BUN mg/dL 21 16 18   CREATININE mg/dL 0.98 0.76 1.26   EGFR ml/min/1.73sq m 80 95 59   CALCIUM mg/dL 8.5 8.4 9.2   AST U/L  --  24 24   ALT U/L  --  46 63*   ALK PHOS U/L  --  116* 142*     Results from last 7 days   Lab Units 11/20/24  1022 11/20/24  1018 11/19/24  1748 11/18/24  1259 11/18/24  1257   BLOOD CULTURE   --   --   --  No Growth at 72 hrs. No Growth at 72 hrs.   GRAM STAIN RESULT  2+ Polys  No organisms seen 1+ Polys  No organisms seen  --   --   --    MRSA CULTURE ONLY   --   --  Methicillin Resistant Staphylococcus aureus isolated*  This patient requires contact isolation precautions per [place] law. Contact precautions are not  required in [place] for nasal surveillance cultures.  --   --

## 2024-11-22 NOTE — TELEPHONE ENCOUNTER
11/22/24 - PT IN [facility]  12/5/24 2 WK POV W/NURSE *ANTWAN*  1/2/25 6 WK POV W/ELIAS Gallo-DIA Henriquez Clerical  Patient needs 2-week incision check with nurse in 6-week POV with DKO sx was on 11/20

## 2024-11-22 NOTE — ASSESSMENT & PLAN NOTE
Lab Results   Component Value Date    HGBA1C 9.0 (H) 10/01/2024       Recent Labs     11/21/24  1141 11/21/24  1644 11/21/24 2033 11/22/24  0719   POCGLU 236* 175* 196* 102       Blood Sugar Average: Last 72 hrs:  (P) 168.3995957777437528  Home regimen: Tresiba 8u qd, metformin, and dapagliflozin  Hold oral medications while inpatient  Currently on Lantus 8U qd + SSI  Monitor Accu-Cheks and adjust insulin regimen as needed   Diabetic diet, hypoglycemia protocol

## 2024-11-22 NOTE — PHYSICAL THERAPY NOTE
"   PHYSICAL THERAPY EVALUATION  NAME:  Dominick Irving  DATE: 11/22/24    AGE:   64 y.o.  Mrn:   [id number]  ADMIT DX:  Wound check, abscess [Z51.89]    Past Medical History:   Diagnosis Date    Anxiety 3/01/2023    Arthritis     Bladder cancer (HCC)     Cancer (HCC) 3/17/2023    Cervical disc disorder 1/2016    Chronic narcotic dependence (HCC)     Chronic pain disorder     lower back and down both legs    Colon polyp     Coronary artery disease     CPAP (continuous positive airway pressure) dependence     bi-pap    Depression 3/17/2023    Does use hearing aid     will wear DOS    Full dentures     Heart failure (HCC)     and \"fluid retention\" [facility] PA\"    High cholesterol     Hypertension     Low back pain 2003    Mild ankle edema     and feet    Obstructive sleep apnea on CPAP     Prediabetes     Shortness of breath     per pt \"with just exertion\"    Sleep apnea     Wears glasses        Past Surgical History:   Procedure Laterality Date    BACK SURGERY      titanium rods implanted    CAUDAL BLOCK N/A 10/17/2023    Procedure: BLOCK / INJECTION CAUDAL;  Surgeon: Minh Rolon MD;  [facility];  Service: Pain Management     COLONOSCOPY      CYSTOSCOPY W/ URETERAL STENT PLACEMENT Bilateral 03/17/2023    Procedure: bilateral retrograde;  Surgeon: Shan Sanabria MD;  [facility];  Service: Urology    HERNIA REPAIR      x5    LUMBAR LAMINECTOMY N/A 06/30/2023    Procedure: Left L3-4 Metrx hemilaminectomy and bilateral foraminotomies;  Surgeon: Leland Aguilar MD;  [facility];  Service: Neurosurgery    NE ARTHRODESIS COMBINED TQ 1NTRSPC LUMBAR Right 10/30/2024    Procedure: Robotically assisted L3-4 decompressive hemilaminectomy and foraminotomies with transverse lumbar interbody fusion right-sided approach with add-on to an L4-5 fixation fusion;  Surgeon: Leland Aguilar MD;  [facility];  Service: Neurosurgery    NE CYSTO W/REMOVAL OF LESIONS SMALL N/A " 05/01/2023    Procedure: CYSTOSCOPY TURBT;  Surgeon: Shan Sanabria MD;  [facility];  Service: Urology    MS CYSTOURETHROSCOPY N/A 11/06/2023    Procedure: CYSTOSCOPY with  bladder biopsies and fulgeration;  Surgeon: Shan Sanabria MD;  [facility];  Service: Urology    SPINE SURGERY  2017    TRANSURETHRAL RESECTION OF BLADDER TUMOR N/A 03/17/2023    Procedure: TRANSURETHRAL RESECTION OF BLADDER TUMOR (TURBT);  Surgeon: Shan Sanabria MD;  [facility];  Service: Urology    WOUND DEBRIDEMENT N/A 11/20/2024    Procedure: LUMBAR DEBRIDEMENT WOUND (WASH OUT);  Surgeon: Leland Aguilar MD;  [facility];  Service: Neurosurgery       Length Of Stay: 3    PHYSICAL THERAPY EVALUATION:       11/22/24 0856   Note Type   Note type Evaluation   Pain Assessment   Pain Assessment Tool 0-10   Pain Score 3   Pain Location/Orientation Location: Back   Pain Onset/Description Onset: Ongoing;Frequency: Constant/Continuous;Descriptor: Aching   Effect of Pain on Daily Activities increased pain with activity   Patient's Stated Pain Goal No pain   Hospital Pain Intervention(s) Ambulation/increased activity;Repositioned   Restrictions/Precautions   Weight Bearing Precautions Per Order No   Braces or Orthoses   (no brace required per neurosx)   Other Precautions Multiple lines;Pain;Spinal precautions   Home Living   Type of Home House   Home Layout Multi-level;Bed/bath upstairs;Able to live on main level with bedroom/bathroom;Stairs to enter with rails  (1 WES)   Home Equipment Walker;Cane   Additional Comments Pt reports living with spouse who is able to assist. Pt also has local son who is able to assist   Prior Function   Level of [place] Independent with functional mobility   Lives With Spouse   Receives Help From Family   Falls in the last 6 months 5 to 10   Comments Pt reports the use of a RW for ambulation PTA   General   Family/Caregiver Present No   Cognition   Overall Cognitive Status  WFL   Arousal/Participation Alert   Attention Within functional limits   Orientation Level Oriented X4   Memory Within functional limits   Following Commands Follows all commands and directions without difficulty   RUE Assessment   RUE Assessment WFL   LUE Assessment   LUE Assessment WFL   RLE Assessment   RLE Assessment WFL   Strength RLE   RLE Overall Strength 4+/5   LLE Assessment   LLE Assessment WFL   Strength LLE   LLE Overall Strength 4+/5   Bed Mobility   Additional Comments NA, pt seated OOB in chair at time of PT eval   Transfers   Sit to Stand 5  Supervision   Stand to Sit 5  Supervision   Ambulation/Elevation   Gait pattern Short stride   Gait Assistance 5  Supervision   Assistive Device Rolling walker   Distance 100ft x 2   Stair Management Assistance 5  Supervision   Stair Management Technique One rail R   Number of Stairs 3   Balance   Static Sitting Fair +   Static Standing Fair   Ambulatory Fair -   Activity Tolerance   Activity Tolerance Patient tolerated treatment well   Medical Staff Made Aware Gagandeep, OT; [place] OT student; [place], SPT; OT present for co evaluation due to pts current medical presentation   Nurse Made Aware Pt appropriate to be seen and mobilize per nsg   Assessment   Prognosis Good   Problem List Decreased strength;Decreased mobility;Pain;Orthopedic restrictions   Assessment Pt is 64 y.o. male seen for PT evaluation s/p admit to [facility] on 11/19/2024. Two pt identifiers were used to confirm. Pt presented w/ drainage from surgical incision. Pt with recent hx of spinal sx with neurosx.  Pt was admitted with a primary dx of: surgical site infection. Pt underwent LUMBAR DEBRIDEMENT WOUND (WASH OUT) which was performed on 11/20/24.  PT now consulted for assessment of mobility and d/c needs. Pt with Out of bed orders.  Pts current co morbidities affecting treatment include:  has a past medical history of Anxiety, Arthritis, Bladder cancer (HCC), Cancer (HCC), Cervical disc  "disorder, Chronic narcotic dependence (HCC), Chronic pain disorder, Colon polyp, Coronary artery disease, CPAP (continuous positive airway pressure) dependence, Depression, Does use hearing aid, Full dentures, Heart failure (HCC), High cholesterol, Hypertension, Low back pain, Mild ankle edema, Obstructive sleep apnea on CPAP, Prediabetes, Shortness of breath, Sleep apnea, and Wears glasses. . Pts current clinical presentation is Unstable/ Unpredictable (high complexity) due to Ongoing medical management for primary dx, Increased reliance on more restrictive AD compared to baseline, Decreased activity tolerance compared to baseline, Spinal precautions at current time, Continuous pulse oximetry monitoring , s/p surgical intervention  .  Upon evaluation, pt currently is requiring ; Supervision for transfers and Supervision for ambulation w/ RW. Pt presents at PT eval functioning below baseline and currently w/ overall mobility deficits 2* to: BLE weakness, pain, decreased activity tolerance compared to baseline, spinal precautions.  At conclusion of PT session pt returned back in chair with phone and call bell within reach. Pt denies any further questions at this time. PT is currently recommending Level III resource intensity . D/C acute care PT at this time due to pt being supervision with all mobility and having supportive spouse who is able to assist as needed. Pt denies any mobility or safety concerns about returning home at d/c. Recommend pt continues to mobilize with nsg and restorative techs during hospital stay.   Barriers to Discharge None   Goals   Patient Goals \" to go home\"   Plan   PT Frequency   (DC IPPT)   Discharge Recommendation   Rehab Resource Intensity Level, PT III (Minimum Resource Intensity)   Equipment Recommended Walker   Walker Package Recommended Wheeled walker   AM-PAC Basic Mobility Inpatient   Turning in Flat Bed Without Bedrails 4   Lying on Back to Sitting on Edge of Flat Bed Without " Bedrails 4   Moving Bed to Chair 4   Standing Up From Chair Using Arms 4   Walk in Room 3   Climb 3-5 Stairs With Railing 3   Basic Mobility Inpatient Raw Score 22   Basic Mobility Standardized Score 47.4   [facility] Highest Level Of Mobility   -HLM Goal 7: Walk 25 feet or more   JH-HLM Achieved 7: Walk 25 feet or more   Modified [place] Scale   Modified [place] Scale 2   Portions of the documentation may have been created using voice recognition software.Occasional wrong word or sound alike substitutions may have occurred due to the inherent limitations of the voice recognition software. Read the chart carefully and recognize, using context, where substitutions have occurred.    Barrie Godoy, PT, DPT

## 2024-11-22 NOTE — OCCUPATIONAL THERAPY NOTE
"    Occupational Therapy Evaluation     Patient Name: Dominick Irving  Today's Date: 11/22/2024  Problem List  Principal Problem:    Surgical site infection  Active Problems:    Obstructive sleep apnea on CPAP    Chronic diastolic (congestive) heart failure (HCC)    Chronic, continuous use of opioids    S/P laminectomy    Type 2 diabetes mellitus, with long-term current use of insulin (HCC)    Past Medical History  Past Medical History:   Diagnosis Date    Anxiety 3/01/2023    Arthritis     Bladder cancer (HCC)     Cancer (HCC) 3/17/2023    Cervical disc disorder 1/2016    Chronic narcotic dependence (HCC)     Chronic pain disorder     lower back and down both legs    Colon polyp     Coronary artery disease     CPAP (continuous positive airway pressure) dependence     bi-pap    Depression 3/17/2023    Does use hearing aid     will wear DOS    Full dentures     Heart failure (HCC)     and \"fluid retention\" [facility] PA\"    High cholesterol     Hypertension     Low back pain 2003    Mild ankle edema     and feet    Obstructive sleep apnea on CPAP     Prediabetes     Shortness of breath     per pt \"with just exertion\"    Sleep apnea     Wears glasses      Past Surgical History  Past Surgical History:   Procedure Laterality Date    BACK SURGERY      titanium rods implanted    CAUDAL BLOCK N/A 10/17/2023    Procedure: BLOCK / INJECTION CAUDAL;  Surgeon: Minh Rolon MD;  [facility];  Service: Pain Management     COLONOSCOPY      CYSTOSCOPY W/ URETERAL STENT PLACEMENT Bilateral 03/17/2023    Procedure: bilateral retrograde;  Surgeon: Shan Sanabria MD;  [facility];  Service: Urology    HERNIA REPAIR      x5    LUMBAR LAMINECTOMY N/A 06/30/2023    Procedure: Left L3-4 Metrx hemilaminectomy and bilateral foraminotomies;  Surgeon: Leland Aguilar MD;  Location:  MAIN OR;  Service: Neurosurgery    OR ARTHRODESIS COMBINED TQ 1NTRSPC LUMBAR Right 10/30/2024    Procedure: Robotically " assisted L3-4 decompressive hemilaminectomy and foraminotomies with transverse lumbar interbody fusion right-sided approach with add-on to an L4-5 fixation fusion;  Surgeon: Leland Aguilar MD;  [facility];  Service: Neurosurgery    NM CYSTO W/REMOVAL OF LESIONS SMALL N/A 05/01/2023    Procedure: CYSTOSCOPY TURBT;  Surgeon: Shan Sanabria MD;  [facility];  Service: Urology    NM CYSTOURETHROSCOPY N/A 11/06/2023    Procedure: CYSTOSCOPY with  bladder biopsies and fulgeration;  Surgeon: Shan Sanabria MD;  [facility];  Service: Urology    SPINE SURGERY  2017    TRANSURETHRAL RESECTION OF BLADDER TUMOR N/A 03/17/2023    Procedure: TRANSURETHRAL RESECTION OF BLADDER TUMOR (TURBT);  Surgeon: Shan Sanabria MD;  [facility];  Service: Urology    WOUND DEBRIDEMENT N/A 11/20/2024    Procedure: LUMBAR DEBRIDEMENT WOUND (WASH OUT);  Surgeon: Leland Aguilar MD;  [facility];  Service: Neurosurgery           11/22/24 0854   OT Last Visit   OT Visit Date 11/22/24   Note Type   Note type Evaluation   Pain Assessment   Pain Assessment Tool 0-10   Pain Score 3   Pain Location/Orientation Orientation: Lower;Location: Back   Hospital Pain Intervention(s) Repositioned;Ambulation/increased activity   Restrictions/Precautions   Weight Bearing Precautions Per Order No   Other Precautions Multiple lines;Pain;Spinal precautions  (MAXIMO drain)   Home Living   Type of Home House   Home Layout Multi-level;Stairs to enter with rails;Able to live on main level with bedroom/bathroom;Bed/bath upstairs  (3 SH, 1 WES, 2nd fl setup, full flight of stairs to 2nd fl, 1st fl full bath available)   Bathroom Shower/Tub Walk-in shower   Bathroom Toilet Raised   Bathroom Equipment Shower chair;Grab bars around toilet   Home Equipment Walker;Cane   Additional Comments Pt reports staying on the 1st fl of his house. Pt has access to full bathroom on 1st fl.   Prior Function   Level of [place]  "Independent with ADLs;Independent with functional mobility;Independent with IADLS   Lives With Spouse   Receives Help From Family   IADLs Independent with meal prep;Independent with medication management;Family/Friend/Other provides transportation   Falls in the last 6 months 5 to 10   Vocational Retired   Lifestyle   Autonomy Pt reports being independent with ADLs, IADLs, and functional mobility w/cane for short distances and walker for long distances. Pt reports needing assistance with donning socks.   Reciprocal Relationships Lives with his wife who can provide assistance as needed   Service to Others Retired   Intrinsic Gratification Enjoys [employer]   Subjective   Subjective \"Can I walk around on my own now?\"   ADL   Eating Assistance 7  Independent   Grooming Assistance 5  Supervision/Setup   UB Bathing Assistance 5  Supervision/Setup   LB Bathing Assistance 4  Minimal Assistance   UB Dressing Assistance 5  Supervision/Setup   LB Dressing Assistance 4  Minimal Assistance   Toileting Assistance  6  Modified independent   Bed Mobility   Supine to Sit Unable to assess   Sit to Supine Unable to assess   Additional Comments Pt seated in bedside recliner upon entering room for evaluation   Transfers   Sit to Stand 5  Supervision   Stand to Sit 5  Supervision   Functional Mobility   Functional Mobility 4  Minimal assistance   Additional Comments CGA   Additional items Rolling walker   Balance   Static Sitting Fair +   Static Standing Fair   Ambulatory Fair -   Activity Tolerance   Activity Tolerance Patient tolerated treatment well   Medical Staff Made Aware PT present for co-evaluation   Nurse Made Aware Nursing cleared pt for therapy   RUE Assessment   RUE Assessment WFL   LUE Assessment   LUE Assessment WFL   Hand Function   Gross Motor Coordination Functional   Fine Motor Coordination Functional   Cognition   Overall Cognitive Status WFL   Arousal/Participation Alert;Cooperative   Attention Within " functional limits   Orientation Level Oriented X4   Memory Within functional limits   Following Commands Follows all commands and directions without difficulty   Assessment   Limitation Decreased ADL status;Decreased endurance;Decreased high-level ADLs   Prognosis Good   Assessment Pt is a 64 y.o. male [facility]. Pt presented with drainage from surgical incision site since 11/11 associated with intermittent subjective weakness of LEs. Pt with hx of L3-5 fusion with L3-4 TLIF on 10/30. Pt has a past medical history of Anxiety, Arthritis, Bladder cancer (HCC), Cancer (HCC), Cervical disc disorder, Chronic narcotic dependence (HCC), Chronic pain disorder, Colon polyp, Coronary artery disease, CPAP (continuous positive airway pressure) dependence, Depression, Does use hearing aid, Full dentures, Heart failure (HCC), High cholesterol, Hypertension, Low back pain, Mild ankle edema, Obstructive sleep apnea on CPAP, Prediabetes, Shortness of breath, Sleep apnea, and Wears glasses. Currently, pt lives with his wife in a 3 SH. At baseline, pt was independent with ADLs, IADLs, and functional mobility. Pt currently presents with impairments including, difficulty performing ADLS, difficulty performing IADLS, health management, environment activity tolerance, endurance, standing balance/tolerance, and sitting balance/tolerance. These impairments limit pt's ability to safely engage in baseline areas of occupation, including grooming, bathing, dressing, toileting, functional mobility/transfers, community mobility, social participation, and leisure activities, however has family support at home if necessary. From OT standpoint, recommend Level III resources upon D/C. Anticipate d/c home when medically cleared with increased caregiver support. No further acute OT needs indicated at this time, d/c from caseload.   Goals   Patient Goals to go home   Plan   OT Frequency Eval only   Discharge Recommendation   Rehab  Resource Intensity Level, OT III (Minimum Resource Intensity)   AM-PAC Daily Activity Inpatient   Lower Body Dressing 3   Bathing 3   Toileting 3   Upper Body Dressing 3   Grooming 4   Eating 4   Daily Activity Raw Score 20   Daily Activity Standardized Score (Calc for Raw Score >=11) 42.03   AM-PAC Applied Cognition Inpatient   Following a Speech/Presentation 4   Understanding Ordinary Conversation 4   Taking Medications 4   Remembering Where Things Are Placed or Put Away 4   Remembering List of 4-5 Errands 4   Taking Care of Complicated Tasks 4   Applied Cognition Raw Score 24   Applied Cognition Standardized Score 62.21   End of Consult   Education Provided Yes   Patient Position at End of Consult Bedside chair;All needs within reach   Nurse Communication Nurse aware of consult     JAIME Wing

## 2024-11-22 NOTE — ASSESSMENT & PLAN NOTE
Lab Results   Component Value Date    HGBA1C 9.0 (H) 10/01/2024       Recent Labs     11/21/24  1141 11/21/24  1644 11/21/24 2033 11/22/24  0719   POCGLU 236* 175* 196* 102       Blood Sugar Average: Last 72 hrs:  (P) [id number]    Needs strict blood glucose control for optimal wound healing

## 2024-11-22 NOTE — PLAN OF CARE
Problem: PAIN - ADULT  Goal: Verbalizes/displays adequate comfort level or baseline comfort level  Description: Interventions:  - Encourage patient to monitor pain and request assistance  - Assess pain using appropriate pain scale  - Administer analgesics based on type and severity of pain and evaluate response  - Implement non-pharmacological measures as appropriate and evaluate response  - Consider cultural and social influences on pain and pain management  - Notify physician/advanced practitioner if interventions unsuccessful or patient reports new pain  Outcome: Progressing     Problem: INFECTION - ADULT  Goal: Absence or prevention of progression during hospitalization  Description: INTERVENTIONS:  - Assess and monitor for signs and symptoms of infection  - Monitor lab/diagnostic results  - Monitor all insertion sites, i.e. indwelling lines, tubes, and drains  - Monitor endotracheal if appropriate and nasal secretions for changes in amount and color  - [place] appropriate cooling/warming therapies per order  - Administer medications as ordered  - Instruct and encourage patient and family to use good hand hygiene technique  - Identify and instruct in appropriate isolation precautions for identified infection/condition  Outcome: Progressing     Problem: SAFETY ADULT  Goal: Patient will remain free of falls  Description: INTERVENTIONS:  - Educate patient/family on patient safety including physical limitations  - Instruct patient to call for assistance with activity   - Consult OT/PT to assist with strengthening/mobility   - Keep Call bell within reach  - Keep bed low and locked with side rails adjusted as appropriate  - Keep care items and personal belongings within reach  - Initiate and maintain comfort rounds  - Make Fall Risk Sign visible to staff  - Apply yellow socks and bracelet for high fall risk patients  - Consider moving patient to room near nurses station  Outcome: Progressing  Goal: Maintain or  return to baseline ADL function  Description: INTERVENTIONS:  -  Assess patient's ability to carry out ADLs; assess patient's baseline for ADL function and identify physical deficits which impact ability to perform ADLs (bathing, care of mouth/teeth, toileting, grooming, dressing, etc.)  - Assess/evaluate cause of self-care deficits   - Assess range of motion  - Assess patient's mobility; develop plan if impaired  - Assess patient's need for assistive devices and provide as appropriate  - Encourage maximum independence but intervene and supervise when necessary  - Involve family in performance of ADLs  - Assess for home care needs following discharge   - Consider OT consult to assist with ADL evaluation and planning for discharge  - Provide patient education as appropriate  Outcome: Progressing  Goal: Maintains/Returns to pre admission functional level  Description: INTERVENTIONS:  - Perform AM-PAC 6 Click Basic Mobility/ Daily Activity assessment daily.  - Set and communicate daily mobility goal to care team and patient/family/caregiver.   - Collaborate with rehabilitation services on mobility goals if consulted  - Out of bed for toileting  - Record patient progress and toleration of activity level   Outcome: Progressing     Problem: DISCHARGE PLANNING  Goal: Discharge to home or other facility with appropriate resources  Description: INTERVENTIONS:  - Identify barriers to discharge w/patient and caregiver  - Arrange for needed discharge resources and transportation as appropriate  - Identify discharge learning needs (meds, wound care, etc.)  - Arrange for interpretive services to assist at discharge as needed  - Refer to Case Management Department for coordinating discharge planning if the patient needs post-hospital services based on physician/advanced practitioner order or complex needs related to functional status, cognitive ability, or social support system  Outcome: Progressing     Problem: Knowledge  Deficit  Goal: Patient/family/caregiver demonstrates understanding of disease process, treatment plan, medications, and discharge instructions  Description: Complete learning assessment and assess knowledge base.  Interventions:  - Provide teaching at level of understanding  - Provide teaching via preferred learning methods  Outcome: Progressing

## 2024-11-23 LAB
ANION GAP SERPL CALCULATED.3IONS-SCNC: 10 MMOL/L (ref 4–13)
BACTERIA BLD CULT: NORMAL
BACTERIA BLD CULT: NORMAL
BASOPHILS # BLD AUTO: 0.05 THOUSANDS/ÂΜL (ref 0–0.1)
BASOPHILS NFR BLD AUTO: 1 % (ref 0–1)
BUN SERPL-MCNC: 21 MG/DL (ref 5–25)
CALCIUM SERPL-MCNC: 8.7 MG/DL (ref 8.4–10.2)
CHLORIDE SERPL-SCNC: 93 MMOL/L (ref 96–108)
CO2 SERPL-SCNC: 32 MMOL/L (ref 21–32)
CREAT SERPL-MCNC: 0.93 MG/DL (ref 0.6–1.3)
CRP SERPL QL: 44 MG/L
EOSINOPHIL # BLD AUTO: 0.25 THOUSAND/ÂΜL (ref 0–0.61)
EOSINOPHIL NFR BLD AUTO: 3 % (ref 0–6)
ERYTHROCYTE [DISTWIDTH] IN BLOOD BY AUTOMATED COUNT: 14.3 % (ref 11.6–15.1)
ERYTHROCYTE [SEDIMENTATION RATE] IN BLOOD: 99 MM/HOUR (ref 0–19)
GFR SERPL CREATININE-BSD FRML MDRD: 85 ML/MIN/1.73SQ M
GLUCOSE SERPL-MCNC: 106 MG/DL (ref 65–140)
GLUCOSE SERPL-MCNC: 120 MG/DL (ref 65–140)
GLUCOSE SERPL-MCNC: 122 MG/DL (ref 65–140)
GLUCOSE SERPL-MCNC: 177 MG/DL (ref 65–140)
GLUCOSE SERPL-MCNC: 98 MG/DL (ref 65–140)
HCT VFR BLD AUTO: 33.4 % (ref 36.5–49.3)
HGB BLD-MCNC: 11.3 G/DL (ref 12–17)
IMM GRANULOCYTES # BLD AUTO: 0.05 THOUSAND/UL (ref 0–0.2)
IMM GRANULOCYTES NFR BLD AUTO: 1 % (ref 0–2)
LYMPHOCYTES # BLD AUTO: 2.67 THOUSANDS/ÂΜL (ref 0.6–4.47)
LYMPHOCYTES NFR BLD AUTO: 31 % (ref 14–44)
MCH RBC QN AUTO: 31.7 PG (ref 26.8–34.3)
MCHC RBC AUTO-ENTMCNC: 33.8 G/DL (ref 31.4–37.4)
MCV RBC AUTO: 94 FL (ref 82–98)
MONOCYTES # BLD AUTO: 0.61 THOUSAND/ÂΜL (ref 0.17–1.22)
MONOCYTES NFR BLD AUTO: 7 % (ref 4–12)
NEUTROPHILS # BLD AUTO: 4.86 THOUSANDS/ÂΜL (ref 1.85–7.62)
NEUTS SEG NFR BLD AUTO: 57 % (ref 43–75)
NRBC BLD AUTO-RTO: 0 /100 WBCS
PLATELET # BLD AUTO: 329 THOUSANDS/UL (ref 149–390)
PMV BLD AUTO: 9.6 FL (ref 8.9–12.7)
POTASSIUM SERPL-SCNC: 3.9 MMOL/L (ref 3.5–5.3)
RBC # BLD AUTO: 3.57 MILLION/UL (ref 3.88–5.62)
SODIUM SERPL-SCNC: 135 MMOL/L (ref 135–147)
WBC # BLD AUTO: 8.49 THOUSAND/UL (ref 4.31–10.16)

## 2024-11-23 PROCEDURE — 85652 RBC SED RATE AUTOMATED: CPT | Performed by: INTERNAL MEDICINE

## 2024-11-23 PROCEDURE — 80048 BASIC METABOLIC PNL TOTAL CA: CPT | Performed by: INTERNAL MEDICINE

## 2024-11-23 PROCEDURE — 86140 C-REACTIVE PROTEIN: CPT | Performed by: INTERNAL MEDICINE

## 2024-11-23 PROCEDURE — 99232 SBSQ HOSP IP/OBS MODERATE 35: CPT | Performed by: PHYSICIAN ASSISTANT

## 2024-11-23 PROCEDURE — 99232 SBSQ HOSP IP/OBS MODERATE 35: CPT | Performed by: INTERNAL MEDICINE

## 2024-11-23 PROCEDURE — 82948 REAGENT STRIP/BLOOD GLUCOSE: CPT

## 2024-11-23 PROCEDURE — 85025 COMPLETE CBC W/AUTO DIFF WBC: CPT | Performed by: INTERNAL MEDICINE

## 2024-11-23 RX ADMIN — ACETAMINOPHEN 975 MG: 325 TABLET, FILM COATED ORAL at 13:14

## 2024-11-23 RX ADMIN — GABAPENTIN 600 MG: 300 CAPSULE ORAL at 17:58

## 2024-11-23 RX ADMIN — HYDROMORPHONE HYDROCHLORIDE 8 MG: 4 TABLET ORAL at 04:41

## 2024-11-23 RX ADMIN — POTASSIUM CHLORIDE 20 MEQ: 1500 TABLET, EXTENDED RELEASE ORAL at 08:03

## 2024-11-23 RX ADMIN — HYDROMORPHONE HYDROCHLORIDE 8 MG: 4 TABLET ORAL at 21:26

## 2024-11-23 RX ADMIN — METHOCARBAMOL 750 MG: 750 TABLET ORAL at 17:58

## 2024-11-23 RX ADMIN — HEPARIN SODIUM 5000 UNITS: 5000 INJECTION, SOLUTION INTRAVENOUS; SUBCUTANEOUS at 13:14

## 2024-11-23 RX ADMIN — HYDROMORPHONE HYDROCHLORIDE 8 MG: 4 TABLET ORAL at 00:00

## 2024-11-23 RX ADMIN — ATORVASTATIN CALCIUM 40 MG: 40 TABLET, FILM COATED ORAL at 08:03

## 2024-11-23 RX ADMIN — ACETAMINOPHEN 975 MG: 325 TABLET, FILM COATED ORAL at 06:52

## 2024-11-23 RX ADMIN — HYDROMORPHONE HYDROCHLORIDE 8 MG: 4 TABLET ORAL at 17:58

## 2024-11-23 RX ADMIN — METHOCARBAMOL 750 MG: 750 TABLET ORAL at 06:52

## 2024-11-23 RX ADMIN — FENTANYL 1 PATCH: 50 PATCH TRANSDERMAL at 21:31

## 2024-11-23 RX ADMIN — INSULIN LISPRO 1 UNITS: 100 INJECTION, SOLUTION INTRAVENOUS; SUBCUTANEOUS at 21:27

## 2024-11-23 RX ADMIN — VANCOMYCIN HYDROCHLORIDE 1500 MG: 10 INJECTION, POWDER, LYOPHILIZED, FOR SOLUTION INTRAVENOUS at 17:57

## 2024-11-23 RX ADMIN — TORSEMIDE 80 MG: 20 TABLET ORAL at 08:03

## 2024-11-23 RX ADMIN — HYDROMORPHONE HYDROCHLORIDE 8 MG: 4 TABLET ORAL at 14:45

## 2024-11-23 RX ADMIN — DULOXETINE HYDROCHLORIDE 60 MG: 60 CAPSULE, DELAYED RELEASE ORAL at 08:03

## 2024-11-23 RX ADMIN — HYDROMORPHONE HYDROCHLORIDE 8 MG: 4 TABLET ORAL at 11:41

## 2024-11-23 RX ADMIN — HYDROMORPHONE HYDROCHLORIDE 8 MG: 4 TABLET ORAL at 08:02

## 2024-11-23 RX ADMIN — ACETAMINOPHEN 975 MG: 325 TABLET, FILM COATED ORAL at 21:26

## 2024-11-23 RX ADMIN — GABAPENTIN 600 MG: 300 CAPSULE ORAL at 21:26

## 2024-11-23 RX ADMIN — HEPARIN SODIUM 5000 UNITS: 5000 INJECTION, SOLUTION INTRAVENOUS; SUBCUTANEOUS at 21:26

## 2024-11-23 RX ADMIN — GABAPENTIN 600 MG: 300 CAPSULE ORAL at 11:41

## 2024-11-23 RX ADMIN — POTASSIUM CHLORIDE 20 MEQ: 1500 TABLET, EXTENDED RELEASE ORAL at 17:58

## 2024-11-23 RX ADMIN — GABAPENTIN 600 MG: 300 CAPSULE ORAL at 08:03

## 2024-11-23 RX ADMIN — METHOCARBAMOL 750 MG: 750 TABLET ORAL at 00:00

## 2024-11-23 RX ADMIN — HEPARIN SODIUM 5000 UNITS: 5000 INJECTION, SOLUTION INTRAVENOUS; SUBCUTANEOUS at 06:52

## 2024-11-23 RX ADMIN — METHOCARBAMOL 750 MG: 750 TABLET ORAL at 11:42

## 2024-11-23 NOTE — PROGRESS NOTES
Progress Note - Infectious Disease   Dominick Irving 64 y.o. male MRN: [id number]  Unit/Bed#: [facility] 604-01 Encounter: [id number]      Impression:  1.  Postoperative Staphylococcus aureus (MRSA) lumbar wound infection s/p lumbar wound debridement and washout   2.  Lumbar stenosis s/p L3-4 decompressive hemilaminectomy with transverse lumbar interbody fusion and L4-5 fixation and fusion 10/30  3.  DM type II  4.  ERICA    Recommendations:  Patient is afebrile with WBC count 8,490.Therapy to be discussed with the primary service will write orders  1.  Operative wound cultures are showing Staphylococcus aureus that is an MRSA   2.  Agree with continuing vancomycin currently 1.5 g every 24 hours IV with further dosing by pharmacy  3.  As discussed would continue IV Rx for at least a week before switching to p.o. Bactrim DS 1 tab every 12 hours  for additional 4 to 6 weeks  4.  Right basilic PICC line inserted  5.  If patient is discharged he will need weekly CBC, differential, BMP, ESR and CRP.  6.  PICC line should be removed promptly after his IV portion of therapy is completed  7.  Patient can be followed in the ID office by virtual visit.  He should call [phone] for weekly appointment    Antibiotics:  1.  Vancomycin 1.5 g every 24 hours IV, day 3 POD      Subjective:  Patient's postoperative back pain remains minimal  Denies fevers, chills, or sweats.  Denies nausea, vomiting, or diarrhea.      Objective:  Vitals:  Temp:  [97.9 °F (36.6 °C)-98 °F (36.7 °C)] 97.9 °F (36.6 °C)  HR:  [57-62] 62  Resp:  [16-21] 18  BP: (120-130)/(58-60) 130/60  SpO2:  [93 %-96 %] 96 %  Temp (24hrs), Av °F (36.7 °C), Min:97.9 °F (36.6 °C), Max:98 °F (36.7 °C)  Current: Temperature: 97.9 °F (36.6 °C)    Physical Exam:     General Appearance:  Alert, nontoxic, no acute distress.   Throat: Oropharynx moist without lesions.  Lips, mucosa, and tongue normal, edentulous with full dentures   Neck: Supple, symmetrical, trachea  midline, no adenopathy,  no tenderness/mass/nodules   Lungs:   Clear to auscultation bilaterally, no audible wheezes, rhonchi or rales; respirations unlabored   Heart:  Regular rate and rhythm, S1, S2 normal, no murmur, rub or gallop   Abdomen:   Soft, non-tender, non-distended, positive bowel sounds.  No masses, no organomegaly    No CVA tenderness   Extremities: Right basilic PICC line   Skin: Lumbar wound is dry without inflammation         Invasive Devices       Peripherally Inserted Central Catheter Line  Duration             PICC Line 11/22/24 Right Basilic 1 day              Drain  Duration             Closed/Suction Drain Left Back Bulb 7 Fr. 3 days                    Labs, Imaging, & Other studies:   All pertinent labs were personally reviewed  Results from last 7 days   Lab Units 11/23/24  0449 11/21/24  0527 11/20/24  1335 11/20/24  0448   WBC Thousand/uL 8.49 10.33*  --  7.18   HEMOGLOBIN g/dL 11.3* 11.1*  --  10.7*   PLATELETS Thousands/uL 329 329 330 281     Results from last 7 days   Lab Units 11/23/24  0449 11/21/24  0527 11/20/24  0448 11/18/24  1257   SODIUM mmol/L 135 135 139 133*   POTASSIUM mmol/L 3.9 4.0 3.0* 3.4*   CHLORIDE mmol/L 93* 93* 98 91*   CO2 mmol/L 32 32 31 31   BUN mg/dL 21 21 16 18   CREATININE mg/dL 0.93 0.98 0.76 1.26   EGFR ml/min/1.73sq m 85 80 95 59   CALCIUM mg/dL 8.7 8.5 8.4 9.2   AST U/L  --   --  24 24   ALT U/L  --   --  46 63*   ALK PHOS U/L  --   --  116* 142*     Results from last 7 days   Lab Units 11/20/24  1022 11/20/24  1018 11/19/24  1748 11/18/24  1259 11/18/24  1257   BLOOD CULTURE   --   --   --  No Growth After 4 Days. No Growth After 4 Days.   GRAM STAIN RESULT  2+ Polys  No organisms seen 1+ Polys  No organisms seen  --   --   --    MRSA CULTURE ONLY   --   --  Methicillin Resistant Staphylococcus aureus isolated*  This patient requires contact isolation precautions per [place] law. Contact precautions are not required in [place] for nasal  surveillance cultures.  --   --

## 2024-11-23 NOTE — CASE MANAGEMENT
Case Management Discharge Planning Note    Patient name Dominick Irving  [facility] 604/[facility] 604-01 MRN [id number]  : 1960 Date 2024       Current Admission Date: 2024  Current Admission Diagnosis:Surgical site infection   Patient Active Problem List    Diagnosis Date Noted Date Diagnosed    Surgical site infection 2024     Headache 2024     Lumbar stenosis 10/31/2024     Lumbar facet arthropathy 10/31/2024     Postoperative pain after spinal surgery 10/30/2024     Unstable gait 2024     Hypertensive heart disease with congestive heart failure (HCC) 2024     Depression, recurrent ([facility]) 2024     History of bladder cancer 2024     Closed fracture of right ankle with routine healing 2024     COPD with acute exacerbation ([facility]) 2024     Type 2 diabetes mellitus, with long-term current use of insulin ([facility]) 2024     Unable to ambulate 2023     Neuropathy 2023     Postlaminectomy syndrome 2023     S/P laminectomy 10/16/2023     Lumbar radiculopathy 10/16/2023     Chronic pain syndrome 10/16/2023     Acute low back pain with sciatica 10/05/2023     Benign prostatic hyperplasia 2023     Encounter for surgical aftercare following surgery of nervous system 2023     Abdominal pain 2023     Suprapubic pressure 2023     Right groin pain 2023     Morbid (severe) obesity due to excess calories ([facility]) 2023     History of hematuria 2023     Hyponatremia 2023     Acute respiratory failure (HCC) 2023     Electrolyte abnormality 2023     Bradycardia 2023     Coronary artery disease      Chronic, continuous use of opioids 2023     Hyperglycemia 2023     Intractable low back pain 2023     Chronic diastolic (congestive) heart failure (HCC)      Opioid withdrawal ([facility])      Hypokalemia 2023     Anxiety 2023     Chronic bilateral low back pain with  bilateral sciatica 04/06/2023     History of gross hematuria 03/18/2023     Bladder cancer (HCC) 03/17/2023     Obstructive sleep apnea on CPAP      Hypertension      Bladder mass 03/06/2023     Nocturia 03/05/2023       LOS (days): 4  Geometric Mean LOS (GMLOS) (days): 3.4  Days to GMLOS:-0.6     OBJECTIVE:  Risk of Unplanned Readmission Score: 23.47         Current admission status: Inpatient   Preferred Pharmacy:   [facility] [address]  [address] [facility] [address]  Phone: [phone] Fax: [phone]    Primary Care Provider: Robert Budinetz, MD    Primary Insurance: MEDICARE  Secondary Insurance: BLUE CROSS    DISCHARGE DETAILS:    Discharge planning discussed with:: Patient     Other Referral/Resources/Interventions Provided:  Referral Comments: Pt was told he could discharge today, but infusion is not set up.  Pending script for vancomycin.  CM will send to [facility] for Monday delivery if possible.  Patient has been updated.  CM will continue to follow for infusion confirmation.

## 2024-11-23 NOTE — PLAN OF CARE
Problem: PAIN - ADULT  Goal: Verbalizes/displays adequate comfort level or baseline comfort level  Description: Interventions:  - Encourage patient to monitor pain and request assistance  - Assess pain using appropriate pain scale  - Administer analgesics based on type and severity of pain and evaluate response  - Implement non-pharmacological measures as appropriate and evaluate response  - Consider cultural and social influences on pain and pain management  - Notify physician/advanced practitioner if interventions unsuccessful or patient reports new pain  Outcome: Progressing     Problem: INFECTION - ADULT  Goal: Absence or prevention of progression during hospitalization  Description: INTERVENTIONS:  - Assess and monitor for signs and symptoms of infection  - Monitor lab/diagnostic results  - Monitor all insertion sites, i.e. indwelling lines, tubes, and drains  - Monitor endotracheal if appropriate and nasal secretions for changes in amount and color  - [place] appropriate cooling/warming therapies per order  - Administer medications as ordered  - Instruct and encourage patient and family to use good hand hygiene technique  - Identify and instruct in appropriate isolation precautions for identified infection/condition  Outcome: Progressing     Problem: SAFETY ADULT  Goal: Patient will remain free of falls  Description: INTERVENTIONS:  - Educate patient/family on patient safety including physical limitations  - Instruct patient to call for assistance with activity   - Consult OT/PT to assist with strengthening/mobility   - Keep Call bell within reach  - Keep bed low and locked with side rails adjusted as appropriate  - Keep care items and personal belongings within reach  - Initiate and maintain comfort rounds  - Make Fall Risk Sign visible to staff  - Apply yellow socks and bracelet for high fall risk patients  - Consider moving patient to room near nurses station  Outcome: Progressing  Goal: Maintain or  return to baseline ADL function  Description: INTERVENTIONS:  -  Assess patient's ability to carry out ADLs; assess patient's baseline for ADL function and identify physical deficits which impact ability to perform ADLs (bathing, care of mouth/teeth, toileting, grooming, dressing, etc.)  - Assess/evaluate cause of self-care deficits   - Assess range of motion  - Assess patient's mobility; develop plan if impaired  - Assess patient's need for assistive devices and provide as appropriate  - Encourage maximum independence but intervene and supervise when necessary  - Involve family in performance of ADLs  - Assess for home care needs following discharge   - Consider OT consult to assist with ADL evaluation and planning for discharge  - Provide patient education as appropriate  Outcome: Progressing  Goal: Maintains/Returns to pre admission functional level  Description: INTERVENTIONS:  - Perform AM-PAC 6 Click Basic Mobility/ Daily Activity assessment daily.  - Set and communicate daily mobility goal to care team and patient/family/caregiver.   - Collaborate with rehabilitation services on mobility goals if consulted  - Out of bed for toileting  - Record patient progress and toleration of activity level   Outcome: Progressing     Problem: DISCHARGE PLANNING  Goal: Discharge to home or other facility with appropriate resources  Description: INTERVENTIONS:  - Identify barriers to discharge w/patient and caregiver  - Arrange for needed discharge resources and transportation as appropriate  - Identify discharge learning needs (meds, wound care, etc.)  - Arrange for interpretive services to assist at discharge as needed  - Refer to Case Management Department for coordinating discharge planning if the patient needs post-hospital services based on physician/advanced practitioner order or complex needs related to functional status, cognitive ability, or social support system  Outcome: Progressing     Problem: Knowledge  Deficit  Goal: Patient/family/caregiver demonstrates understanding of disease process, treatment plan, medications, and discharge instructions  Description: Complete learning assessment and assess knowledge base.  Interventions:  - Provide teaching at level of understanding  - Provide teaching via preferred learning methods  Outcome: Progressing

## 2024-11-23 NOTE — ASSESSMENT & PLAN NOTE
Presented to [facility] ED with drainage from surgical incision site since 11/11 associated with intermittent subjective weakness of LEs. No red flag symptoms.  Fortunately patient hemodynamically stable and nontoxic-appearing, remains afebrile.  MRI L spine w wo contrast showed: 3.3 x 4.5 cm subcutaneous fluid collection tracking from the operative site into the subcutaneous and paraspinal soft tissues. No significant canal compression or drainable epidural fluid collection  Transferred to [facility] for neurosurgery evaluation, recommendations appreciated   S/P OR on 11/20 for lumbar debridement/wound washout  MAXIMO drain removed on 11/22.  Will need f/u with neurosurgery in 2 weeks and then 6 weeks.   ID consulted for antibiotic management   Wound cultures with MRSA-d/w ID, recommending 7 days of IV vancomycin followed by 4-6 weeks of PO Bactrim.  Will need outpt f/u with ID.   PICC placed on 11/22 with hopeful weekend dc if abx are coordinated --ID aware  Pain has been controlled on home regimen: Fentanyl patch 50 mcg/h every 72 hours, p.o. Dilaudid 8 mg every 3 hours as needed  PT/OT recommending HHC, will also need VNA due to IV abx -- CM working on arranging this, from d/w them will not be set up until Monday 11/25 at the earliest

## 2024-11-23 NOTE — ASSESSMENT & PLAN NOTE
Lab Results   Component Value Date    HGBA1C 9.0 (H) 10/01/2024       Recent Labs     11/22/24  1623 11/22/24  2058 11/23/24  0729 11/23/24  1120   POCGLU 168* 170* 122 120       Blood Sugar Average: Last 72 hrs:  (P) 164.875  Home regimen: Tresiba 8u qd, metformin, and dapagliflozin -- would resume this regimen on discharge.   Hold oral medications while inpatient  Currently on Lantus 8U qd + SSI  Monitor Accu-Cheks and adjust insulin regimen as needed   Diabetic diet, hypoglycemia protocol

## 2024-11-23 NOTE — PROGRESS NOTES
Dominick Irving is a 64 y.o. male who is currently ordered Vancomycin IV with management by the Pharmacy Consult service.  Relevant clinical data and objective / subjective history reviewed.  Vancomycin Assessment:  Indication and Goal AUC/Trough: Soft tissue (goal -600, trough >10), -600, trough >10  Clinical Status: stable  Micro:   No new  Renal Function:  SCr: 0.93 mg/dL  CrCl: 93 mL/min  Renal replacement: Not on dialysis  Days of Therapy: 429  Current Dose: 1250 mg q 12h  Vancomycin Plan:  New Dosin mg q 24h  Estimated AUC: 429 mcg*hr/mL  Estimated Trough: 10.6 mcg/mL  Next Level:   Renal Function Monitoring: Daily BMP and UOP  Pharmacy will continue to follow closely for s/sx of nephrotoxicity, infusion reactions and appropriateness of therapy.  BMP and CBC will be ordered per protocol. We will continue to follow the patient’s culture results and clinical progress daily.    Alex Scales, Pharmacist

## 2024-11-23 NOTE — PROGRESS NOTES
Progress Note - Hospitalist   Name: Dominick Irvign 64 y.o. male I MRN: [id number]  [facility] I Date of Admission: 11/19/2024   Date of Service: 11/23/2024 I Hospital Day: 4    Assessment & Plan  Surgical site infection  Presented to [facility] ED with drainage from surgical incision site since 11/11 associated with intermittent subjective weakness of LEs. No red flag symptoms.  Fortunately patient hemodynamically stable and nontoxic-appearing, remains afebrile.  MRI L spine w wo contrast showed: 3.3 x 4.5 cm subcutaneous fluid collection tracking from the operative site into the subcutaneous and paraspinal soft tissues. No significant canal compression or drainable epidural fluid collection  Transferred to [facility] for neurosurgery evaluation, recommendations appreciated   S/P OR on 11/20 for lumbar debridement/wound washout  MAXIMO drain removed on 11/22.  Will need f/u with neurosurgery in 2 weeks and then 6 weeks.   ID consulted for antibiotic management   Wound cultures with MRSA-d/w ID, recommending 7 days of IV vancomycin followed by 4-6 weeks of PO Bactrim.  Will need outpt f/u with ID.   PICC placed on 11/22 with hopeful weekend dc if abx are coordinated --ID aware  Pain has been controlled on home regimen: Fentanyl patch 50 mcg/h every 72 hours, p.o. Dilaudid 8 mg every 3 hours as needed  PT/OT recommending HHC, will also need VNA due to IV abx -- CM working on arranging this, from d/w them will not be set up until Monday 11/25 at the earliest   Chronic, continuous use of opioids  PDMP reviewed: Fentanyl patch 50 mcg/h every 72 hours, p.o. Dilaudid 8 mg every 3 hours  Outpatient follow-up with PCP and palliative care for ongoing management  Type 2 diabetes mellitus, with long-term current use of insulin ([facility])  Lab Results   Component Value Date    HGBA1C 9.0 (H) 10/01/2024       Recent Labs     11/22/24  1623 11/22/24  2058 11/23/24  0729 11/23/24  1120   POCGLU 168* 170* 122 120       Blood Sugar Average:  Last 72 hrs:  (P) 164.875  Home regimen: Tresiba 8u qd, metformin, and dapagliflozin -- would resume this regimen on discharge.   Hold oral medications while inpatient  Currently on Lantus 8U qd + SSI  Monitor Accu-Cheks and adjust insulin regimen as needed   Diabetic diet, hypoglycemia protocol  Chronic diastolic (congestive) heart failure (HCC)  Wt Readings from Last 3 Encounters:   11/21/24 109 kg (240 lb 1.3 oz)   11/18/24 109 kg (240 lb)   11/08/24 109 kg (240 lb 12.8 oz)     Continue home dose torsemide 80 mg daily   Also takes PRN metolazone at home   Monitor volume status with daily weights, intake and output   S/P laminectomy  L3-5 posterior fusion with an L3-4 TLIF on 10/30/2024 by Dr. Aguilar  See related plan above   Obstructive sleep apnea on CPAP  Continue CPAP QHS    VTE Pharmacologic Prophylaxis:   Moderate Risk (Score 3-4) - Pharmacological DVT Prophylaxis Ordered: heparin.    Mobility:   Basic Mobility Inpatient Raw Score: 22  JH-HLM Goal: 7: Walk 25 feet or more  JH-HLM Achieved: 6: Walk 10 steps or more  JH-HLM Goal NOT achieved. Continue with multidisciplinary rounding and encourage appropriate mobility to improve upon JH-HLM goals.    Patient Centered Rounds: I evaluated the patient without nursing staff present due to RN with other patient    Discussions with Specialists or Other Care Team Provider: CM    Education and Discussions with Family / Patient: Patient declined call to .     Current Length of Stay: 4 day(s)  Current Patient Status: Inpatient   Certification Statement: The patient will continue to require additional inpatient hospital stay due to arrange for outpt IV abx  Discharge Plan: Anticipate discharge in 48 hrs to home with home services.    Code Status: Level 1 - Full Code    Subjective   Reports he feels great.  Pain is well controlled, going to the bathroom, tolerating abx without difficulty.  Disappointed he may not be able to go until Monday to get home IV  abx arranged.     Objective :  Temp:  [97.5 °F (36.4 °C)-98 °F (36.7 °C)] 98 °F (36.7 °C)  HR:  [52-60] 57  BP: (120-146)/(58-63) 123/59  Resp:  [16-21] 21  SpO2:  [93 %-95 %] 93 %  O2 Device: None (Room air)    Body mass index is 37.6 kg/m².     Input and Output Summary (last 24 hours):     Intake/Output Summary (Last 24 hours) at 11/23/2024 1135  Last data filed at 11/22/2024 1747  Gross per 24 hour   Intake 220 ml   Output --   Net 220 ml       Physical Exam  Vitals reviewed.   Constitutional:       General: He is not in acute distress.     Appearance: He is not toxic-appearing.   HENT:      Head: Normocephalic and atraumatic.   Eyes:      Extraocular Movements: Extraocular movements intact.   Cardiovascular:      Rate and Rhythm: Normal rate.   Pulmonary:      Effort: Pulmonary effort is normal. No respiratory distress.   Musculoskeletal:         General: Normal range of motion.   Skin:     Comments: Incision site c/d/i   Neurological:      General: No focal deficit present.      Mental Status: He is alert and oriented to person, place, and time.   Psychiatric:         Mood and Affect: Mood normal.         Behavior: Behavior normal.         Thought Content: Thought content normal.         Lines/Drains:  Lines/Drains/Airways       Active Status       Name Placement date Placement time Site Days    PICC Line 11/22/24 Right Basilic 11/22/24  1401  Basilic  less than 1    Closed/Suction Drain Left Back Bulb 7 Fr. 11/20/24  1043  Back  3                    Central Line:  Goal for removal: N/A - Discharging with PICC for IV ABX/medications               Lab Results: I have reviewed the following results:   Results from last 7 days   Lab Units 11/23/24  0449   WBC Thousand/uL 8.49   HEMOGLOBIN g/dL 11.3*   HEMATOCRIT % 33.4*   PLATELETS Thousands/uL 329   SEGS PCT % 57   LYMPHO PCT % 31   MONO PCT % 7   EOS PCT % 3     Results from last 7 days   Lab Units 11/23/24  0449 11/21/24  0527 11/20/24  0448   SODIUM mmol/L 135    < > 139   POTASSIUM mmol/L 3.9   < > 3.0*   CHLORIDE mmol/L 93*   < > 98   CO2 mmol/L 32   < > 31   BUN mg/dL 21   < > 16   CREATININE mg/dL 0.93   < > 0.76   ANION GAP mmol/L 10   < > 10   CALCIUM mg/dL 8.7   < > 8.4   ALBUMIN g/dL  --   --  3.0*   TOTAL BILIRUBIN mg/dL  --   --  0.29   ALK PHOS U/L  --   --  116*   ALT U/L  --   --  46   AST U/L  --   --  24   GLUCOSE RANDOM mg/dL 98   < > 119    < > = values in this interval not displayed.     Results from last 7 days   Lab Units 11/21/24  0527   INR  1.08     Results from last 7 days   Lab Units 11/23/24  1120 11/23/24  0729 11/22/24  2058 11/22/24  1623 11/22/24  1106 11/22/24  0719 11/21/24  2033 11/21/24  1644 11/21/24  1141 11/21/24  0714 11/20/24  2120 11/20/24  1618   POC GLUCOSE mg/dl 120 122 170* 168* 157* 102 196* 175* 236* 178* 237* 263*               Recent Cultures (last 7 days):   Results from last 7 days   Lab Units 11/20/24  1022 11/20/24  1018 11/18/24  1259 11/18/24  1257   BLOOD CULTURE   --   --  No Growth After 4 Days. No Growth After 4 Days.   GRAM STAIN RESULT  2+ Polys  No organisms seen 1+ Polys  No organisms seen  --   --        Imaging Results Review: No pertinent imaging studies reviewed.  Other Study Results Review: No additional pertinent studies reviewed.    Last 24 Hours Medication List:     Current Facility-Administered Medications:     acetaminophen (TYLENOL) tablet 975 mg, Q8H ISIS    atorvastatin (LIPITOR) tablet 40 mg, Daily    bisacodyl (DULCOLAX) rectal suppository 10 mg, Daily PRN    docusate sodium (COLACE) capsule 100 mg, BID    DULoxetine (CYMBALTA) delayed release capsule 60 mg, Daily    fentaNYL (DURAGESIC) 50 mcg/hr TD 72 hr patch 1 patch, Q72H    gabapentin (NEURONTIN) capsule 600 mg, 4x Daily    heparin (porcine) subcutaneous injection 5,000 Units, Q8H ISIS **AND** [COMPLETED] Platelet count, Once    HYDROmorphone (DILAUDID) tablet 8 mg, Q3H PRN    insulin lispro (HumALOG/ADMELOG) 100 units/mL subcutaneous  injection 1-5 Units, TID AC **AND** Fingerstick Glucose (POCT), TID AC    insulin lispro (HumALOG/ADMELOG) 100 units/mL subcutaneous injection 1-5 Units, HS    methocarbamol (ROBAXIN) tablet 750 mg, Q6H ISIS    ondansetron (ZOFRAN) injection 4 mg, Q6H PRN    potassium chloride (Klor-Con M20) CR tablet 20 mEq, BID    senna-docusate sodium (SENOKOT S) 8.6-50 mg per tablet 1 tablet, HS    torsemide (DEMADEX) tablet 80 mg, Daily    vancomycin (VANCOCIN) 1500 mg in sodium chloride 0.9% 250 mL IVPB, Q24H    Administrative Statements   Today, Patient Was Seen By: Magui Naidu PA-C      **Please Note: This note may have been constructed using a voice recognition system.**

## 2024-11-24 ENCOUNTER — RESULTS FOLLOW-UP (OUTPATIENT)
Dept: EMERGENCY DEPT | Facility: HOSPITAL | Age: 64
End: 2024-11-24

## 2024-11-24 LAB
ANION GAP SERPL CALCULATED.3IONS-SCNC: 8 MMOL/L (ref 4–13)
BASOPHILS # BLD AUTO: 0.04 THOUSANDS/ΜL (ref 0–0.1)
BASOPHILS NFR BLD AUTO: 1 % (ref 0–1)
BUN SERPL-MCNC: 19 MG/DL (ref 5–25)
CALCIUM SERPL-MCNC: 8.7 MG/DL (ref 8.4–10.2)
CHLORIDE SERPL-SCNC: 94 MMOL/L (ref 96–108)
CO2 SERPL-SCNC: 33 MMOL/L (ref 21–32)
CREAT SERPL-MCNC: 0.86 MG/DL (ref 0.6–1.3)
EOSINOPHIL # BLD AUTO: 0.29 THOUSAND/ΜL (ref 0–0.61)
EOSINOPHIL NFR BLD AUTO: 4 % (ref 0–6)
ERYTHROCYTE [DISTWIDTH] IN BLOOD BY AUTOMATED COUNT: 14.2 % (ref 11.6–15.1)
GFR SERPL CREATININE-BSD FRML MDRD: 90 ML/MIN/1.73SQ M
GLUCOSE SERPL-MCNC: 131 MG/DL (ref 65–140)
GLUCOSE SERPL-MCNC: 150 MG/DL (ref 65–140)
GLUCOSE SERPL-MCNC: 153 MG/DL (ref 65–140)
GLUCOSE SERPL-MCNC: 197 MG/DL (ref 65–140)
GLUCOSE SERPL-MCNC: 99 MG/DL (ref 65–140)
HCT VFR BLD AUTO: 33.9 % (ref 36.5–49.3)
HGB BLD-MCNC: 11.3 G/DL (ref 12–17)
IMM GRANULOCYTES # BLD AUTO: 0.03 THOUSAND/UL (ref 0–0.2)
IMM GRANULOCYTES NFR BLD AUTO: 0 % (ref 0–2)
LYMPHOCYTES # BLD AUTO: 2.55 THOUSANDS/ΜL (ref 0.6–4.47)
LYMPHOCYTES NFR BLD AUTO: 31 % (ref 14–44)
MCH RBC QN AUTO: 32 PG (ref 26.8–34.3)
MCHC RBC AUTO-ENTMCNC: 33.3 G/DL (ref 31.4–37.4)
MCV RBC AUTO: 96 FL (ref 82–98)
MONOCYTES # BLD AUTO: 0.61 THOUSAND/ΜL (ref 0.17–1.22)
MONOCYTES NFR BLD AUTO: 8 % (ref 4–12)
NEUTROPHILS # BLD AUTO: 4.64 THOUSANDS/ΜL (ref 1.85–7.62)
NEUTS SEG NFR BLD AUTO: 56 % (ref 43–75)
NRBC BLD AUTO-RTO: 0 /100 WBCS
PLATELET # BLD AUTO: 307 THOUSANDS/UL (ref 149–390)
PMV BLD AUTO: 9.9 FL (ref 8.9–12.7)
POTASSIUM SERPL-SCNC: 3.7 MMOL/L (ref 3.5–5.3)
RBC # BLD AUTO: 3.53 MILLION/UL (ref 3.88–5.62)
SODIUM SERPL-SCNC: 135 MMOL/L (ref 135–147)
WBC # BLD AUTO: 8.16 THOUSAND/UL (ref 4.31–10.16)

## 2024-11-24 PROCEDURE — 99232 SBSQ HOSP IP/OBS MODERATE 35: CPT | Performed by: INTERNAL MEDICINE

## 2024-11-24 PROCEDURE — 80048 BASIC METABOLIC PNL TOTAL CA: CPT | Performed by: PHYSICIAN ASSISTANT

## 2024-11-24 PROCEDURE — 85025 COMPLETE CBC W/AUTO DIFF WBC: CPT | Performed by: PHYSICIAN ASSISTANT

## 2024-11-24 PROCEDURE — 82948 REAGENT STRIP/BLOOD GLUCOSE: CPT

## 2024-11-24 RX ORDER — METOLAZONE 2.5 MG/1
2.5 TABLET ORAL ONCE
Status: COMPLETED | OUTPATIENT
Start: 2024-11-24 | End: 2024-11-24

## 2024-11-24 RX ORDER — POTASSIUM CHLORIDE 1500 MG/1
40 TABLET, EXTENDED RELEASE ORAL ONCE
Status: COMPLETED | OUTPATIENT
Start: 2024-11-24 | End: 2024-11-24

## 2024-11-24 RX ORDER — VANCOMYCIN HYDROCHLORIDE 1 G/200ML
1000 INJECTION, SOLUTION INTRAVENOUS EVERY 12 HOURS
Status: COMPLETED | OUTPATIENT
Start: 2024-11-24 | End: 2024-11-24

## 2024-11-24 RX ADMIN — ATORVASTATIN CALCIUM 40 MG: 40 TABLET, FILM COATED ORAL at 09:31

## 2024-11-24 RX ADMIN — INSULIN LISPRO 1 UNITS: 100 INJECTION, SOLUTION INTRAVENOUS; SUBCUTANEOUS at 11:57

## 2024-11-24 RX ADMIN — GABAPENTIN 600 MG: 300 CAPSULE ORAL at 21:39

## 2024-11-24 RX ADMIN — GABAPENTIN 600 MG: 300 CAPSULE ORAL at 09:31

## 2024-11-24 RX ADMIN — HYDROMORPHONE HYDROCHLORIDE 8 MG: 4 TABLET ORAL at 15:57

## 2024-11-24 RX ADMIN — METHOCARBAMOL 750 MG: 750 TABLET ORAL at 11:59

## 2024-11-24 RX ADMIN — GABAPENTIN 600 MG: 300 CAPSULE ORAL at 17:25

## 2024-11-24 RX ADMIN — POTASSIUM CHLORIDE 20 MEQ: 1500 TABLET, EXTENDED RELEASE ORAL at 17:25

## 2024-11-24 RX ADMIN — INSULIN LISPRO 1 UNITS: 100 INJECTION, SOLUTION INTRAVENOUS; SUBCUTANEOUS at 17:26

## 2024-11-24 RX ADMIN — HEPARIN SODIUM 5000 UNITS: 5000 INJECTION, SOLUTION INTRAVENOUS; SUBCUTANEOUS at 15:57

## 2024-11-24 RX ADMIN — METOLAZONE 2.5 MG: 2.5 TABLET ORAL at 10:11

## 2024-11-24 RX ADMIN — HEPARIN SODIUM 5000 UNITS: 5000 INJECTION, SOLUTION INTRAVENOUS; SUBCUTANEOUS at 05:36

## 2024-11-24 RX ADMIN — VANCOMYCIN HYDROCHLORIDE 1000 MG: 1 INJECTION, SOLUTION INTRAVENOUS at 21:40

## 2024-11-24 RX ADMIN — VANCOMYCIN HYDROCHLORIDE 1000 MG: 1 INJECTION, SOLUTION INTRAVENOUS at 10:11

## 2024-11-24 RX ADMIN — HYDROMORPHONE HYDROCHLORIDE 8 MG: 4 TABLET ORAL at 12:51

## 2024-11-24 RX ADMIN — METHOCARBAMOL 750 MG: 750 TABLET ORAL at 17:25

## 2024-11-24 RX ADMIN — DULOXETINE HYDROCHLORIDE 60 MG: 60 CAPSULE, DELAYED RELEASE ORAL at 09:30

## 2024-11-24 RX ADMIN — ACETAMINOPHEN 975 MG: 325 TABLET, FILM COATED ORAL at 15:56

## 2024-11-24 RX ADMIN — ACETAMINOPHEN 975 MG: 325 TABLET, FILM COATED ORAL at 05:36

## 2024-11-24 RX ADMIN — HYDROMORPHONE HYDROCHLORIDE 8 MG: 4 TABLET ORAL at 23:43

## 2024-11-24 RX ADMIN — INSULIN LISPRO 1 UNITS: 100 INJECTION, SOLUTION INTRAVENOUS; SUBCUTANEOUS at 21:39

## 2024-11-24 RX ADMIN — METHOCARBAMOL 750 MG: 750 TABLET ORAL at 05:36

## 2024-11-24 RX ADMIN — GABAPENTIN 600 MG: 300 CAPSULE ORAL at 12:51

## 2024-11-24 RX ADMIN — ACETAMINOPHEN 975 MG: 325 TABLET, FILM COATED ORAL at 21:39

## 2024-11-24 RX ADMIN — TORSEMIDE 80 MG: 20 TABLET ORAL at 09:30

## 2024-11-24 RX ADMIN — POTASSIUM CHLORIDE 40 MEQ: 1500 TABLET, EXTENDED RELEASE ORAL at 09:33

## 2024-11-24 RX ADMIN — METHOCARBAMOL 750 MG: 750 TABLET ORAL at 00:43

## 2024-11-24 RX ADMIN — POTASSIUM CHLORIDE 20 MEQ: 1500 TABLET, EXTENDED RELEASE ORAL at 09:31

## 2024-11-24 RX ADMIN — HYDROMORPHONE HYDROCHLORIDE 8 MG: 4 TABLET ORAL at 09:35

## 2024-11-24 RX ADMIN — HYDROMORPHONE HYDROCHLORIDE 8 MG: 4 TABLET ORAL at 20:39

## 2024-11-24 RX ADMIN — HEPARIN SODIUM 5000 UNITS: 5000 INJECTION, SOLUTION INTRAVENOUS; SUBCUTANEOUS at 21:39

## 2024-11-24 RX ADMIN — HYDROMORPHONE HYDROCHLORIDE 8 MG: 4 TABLET ORAL at 03:56

## 2024-11-24 RX ADMIN — METHOCARBAMOL 750 MG: 750 TABLET ORAL at 23:43

## 2024-11-24 RX ADMIN — HYDROMORPHONE HYDROCHLORIDE 8 MG: 4 TABLET ORAL at 00:43

## 2024-11-24 NOTE — ASSESSMENT & PLAN NOTE
Lab Results   Component Value Date    HGBA1C 9.0 (H) 10/01/2024       Recent Labs     11/23/24  1120 11/23/24  1632 11/23/24 2026 11/24/24  0735   POCGLU 120 106 177* 99       Blood Sugar Average: Last 72 hrs:  (P) 154.1366914688714389  Home regimen: Tresiba 8u qd, metformin, and dapagliflozin -- would resume this regimen on discharge.   Hold oral medications while inpatient  Currently on Lantus 8U qd + SSI  Monitor Accu-Cheks and adjust insulin regimen as needed   Diabetic diet, hypoglycemia protocol

## 2024-11-24 NOTE — PROGRESS NOTES
Dominick Irving is a 64 y.o. male who is currently ordered Vancomycin IV with management by the Pharmacy Consult service.  Relevant clinical data and objective / subjective history reviewed.  Vancomycin Assessment:  Indication and Goal AUC/Trough: Soft tissue (goal -600, trough >10), -600, trough >10  Clinical Status: stable  Micro:       Renal Function:  SCr: 0.86 mg/dL (baseline 0.9-1.01)  CrCl: 100.9 mL/min  Renal replacement: Not on dialysis  Days of Therapy: 5  Current Dose: 1500mg iv q24hrs  Vancomycin Plan:  New Dosinmg iv q12hr  Estimated AUC: 510 mcg*hr/mL  Estimated Trough: 15.7 mcg/mL  Next Level:  0600  Renal Function Monitoring: Daily BMP and UOP  Pharmacy will continue to follow closely for s/sx of nephrotoxicity, infusion reactions and appropriateness of therapy.  BMP and CBC will be ordered per protocol. We will continue to follow the patient’s culture results and clinical progress daily.    Rupa Castillo, Pharmacist

## 2024-11-24 NOTE — PLAN OF CARE
Problem: PAIN - ADULT  Goal: Verbalizes/displays adequate comfort level or baseline comfort level  Description: Interventions:  - Encourage patient to monitor pain and request assistance  - Assess pain using appropriate pain scale  - Administer analgesics based on type and severity of pain and evaluate response  - Implement non-pharmacological measures as appropriate and evaluate response  - Consider cultural and social influences on pain and pain management  - Notify physician/advanced practitioner if interventions unsuccessful or patient reports new pain  Outcome: Progressing     Problem: INFECTION - ADULT  Goal: Absence or prevention of progression during hospitalization  Description: INTERVENTIONS:  - Assess and monitor for signs and symptoms of infection  - Monitor lab/diagnostic results  - Monitor all insertion sites, i.e. indwelling lines, tubes, and drains  - Monitor endotracheal if appropriate and nasal secretions for changes in amount and color  - [place] appropriate cooling/warming therapies per order  - Administer medications as ordered  - Instruct and encourage patient and family to use good hand hygiene technique  - Identify and instruct in appropriate isolation precautions for identified infection/condition  Outcome: Progressing     Problem: SAFETY ADULT  Goal: Patient will remain free of falls  Description: INTERVENTIONS:  - Educate patient/family on patient safety including physical limitations  - Instruct patient to call for assistance with activity   - Consult OT/PT to assist with strengthening/mobility   - Keep Call bell within reach  - Keep bed low and locked with side rails adjusted as appropriate  - Keep care items and personal belongings within reach  - Initiate and maintain comfort rounds  - Make Fall Risk Sign visible to staff  - Apply yellow socks and bracelet for high fall risk patients  - Consider moving patient to room near nurses station  Outcome: Progressing  Goal: Maintain or  return to baseline ADL function  Description: INTERVENTIONS:  -  Assess patient's ability to carry out ADLs; assess patient's baseline for ADL function and identify physical deficits which impact ability to perform ADLs (bathing, care of mouth/teeth, toileting, grooming, dressing, etc.)  - Assess/evaluate cause of self-care deficits   - Assess range of motion  - Assess patient's mobility; develop plan if impaired  - Assess patient's need for assistive devices and provide as appropriate  - Encourage maximum independence but intervene and supervise when necessary  - Involve family in performance of ADLs  - Assess for home care needs following discharge   - Consider OT consult to assist with ADL evaluation and planning for discharge  - Provide patient education as appropriate  Outcome: Progressing  Goal: Maintains/Returns to pre admission functional level  Description: INTERVENTIONS:  - Perform AM-PAC 6 Click Basic Mobility/ Daily Activity assessment daily.  - Set and communicate daily mobility goal to care team and patient/family/caregiver.   - Collaborate with rehabilitation services on mobility goals if consulted  - Out of bed for toileting  - Record patient progress and toleration of activity level   Outcome: Progressing     Problem: DISCHARGE PLANNING  Goal: Discharge to home or other facility with appropriate resources  Description: INTERVENTIONS:  - Identify barriers to discharge w/patient and caregiver  - Arrange for needed discharge resources and transportation as appropriate  - Identify discharge learning needs (meds, wound care, etc.)  - Arrange for interpretive services to assist at discharge as needed  - Refer to Case Management Department for coordinating discharge planning if the patient needs post-hospital services based on physician/advanced practitioner order or complex needs related to functional status, cognitive ability, or social support system  Outcome: Progressing     Problem: Knowledge  Deficit  Goal: Patient/family/caregiver demonstrates understanding of disease process, treatment plan, medications, and discharge instructions  Description: Complete learning assessment and assess knowledge base.  Interventions:  - Provide teaching at level of understanding  - Provide teaching via preferred learning methods  Outcome: Progressing

## 2024-11-24 NOTE — PROGRESS NOTES
Progress Note - Hospitalist   Name: Dominick Irving 64 y.o. male I MRN: [id number]  [facility] Date of Admission: 11/19/2024   Date of Service: 11/24/2024 I Hospital Day: 5    Assessment & Plan  Surgical site infection  Presented to [facility] ED with drainage from surgical incision site since 11/11 associated with intermittent subjective weakness of LEs. No red flag symptoms.  Fortunately patient hemodynamically stable and nontoxic-appearing, remains afebrile.  MRI L spine w wo contrast showed: 3.3 x 4.5 cm subcutaneous fluid collection tracking from the operative site into the subcutaneous and paraspinal soft tissues. No significant canal compression or drainable epidural fluid collection  Transferred to [facility] for neurosurgery evaluation, recommendations appreciated   S/P OR on 11/20 for lumbar debridement/wound washout  MAXIMO drain removed on 11/22.  Will need f/u with neurosurgery in 2 weeks and then 6 weeks.   ID consulted for antibiotic management   Wound cultures with MRSA-d/w ID, recommending 7 days of IV vancomycin (thru 11/27) followed by 4-6 weeks of PO Bactrim, starting 11/28.  Will need outpt f/u with ID.   PICC placed on 11/22, ID giving scripts to CM  Pain has been controlled on home regimen: Fentanyl patch 50 mcg/h every 72 hours, p.o. Dilaudid 8 mg every 3 hours as needed  PT/OT recommending HHC, will also need VNA due to IV abx -- CM working on arranging this, from d/w them will not be set up until Monday 11/25 at the earliest   Chronic, continuous use of opioids  PDMP reviewed: Fentanyl patch 50 mcg/h every 72 hours, p.o. Dilaudid 8 mg every 3 hours  Outpatient follow-up with PCP and palliative care for ongoing management  Type 2 diabetes mellitus, with long-term current use of insulin (HCC)  Lab Results   Component Value Date    HGBA1C 9.0 (H) 10/01/2024       Recent Labs     11/23/24  1120 11/23/24  1632 11/23/24 2026 11/24/24  0735   POCGLU 120 106 177* 99       Blood Sugar Average: Last 72  hrs:  (P) 154.5273222143166750  Home regimen: Tresiba 8u qd, metformin, and dapagliflozin -- would resume this regimen on discharge.   Hold oral medications while inpatient  Currently on Lantus 8U qd + SSI  Monitor Accu-Cheks and adjust insulin regimen as needed   Diabetic diet, hypoglycemia protocol  Chronic diastolic (congestive) heart failure (HCC)  Wt Readings from Last 3 Encounters:   11/21/24 109 kg (240 lb 1.3 oz)   11/18/24 109 kg (240 lb)   11/08/24 109 kg (240 lb 12.8 oz)     Continue home dose torsemide 80 mg daily   Also takes PRN metolazone at home, will give dose on 11/24  Monitor volume status with daily weights, intake and output   S/P laminectomy  L3-5 posterior fusion with an L3-4 TLIF on 10/30/2024 by Dr. Aguilar  See related plan above   Obstructive sleep apnea on CPAP  Continue CPAP QHS    VTE Pharmacologic Prophylaxis:   Moderate Risk (Score 3-4) - Pharmacological DVT Prophylaxis Ordered: heparin.    Mobility:   Basic Mobility Inpatient Raw Score: 22  JH-HLM Goal: 7: Walk 25 feet or more  JH-HLM Achieved: 5: Stand (1 or more minutes)  JH-HLM Goal achieved. Continue to encourage appropriate mobility.    Patient Centered Rounds: I performed bedside rounds with nursing staff today.   Discussions with Specialists or Other Care Team Provider: marisol MONDRAGON ([place]) and CM    Education and Discussions with Family / Patient: Patient declined call to .     Current Length of Stay: 5 day(s)  Current Patient Status: Inpatient   Certification Statement: The patient will continue to require additional inpatient hospital stay due to coordination of home IV abx   Discharge Plan: Anticipate discharge tomorrow to home with home services.    Code Status: Level 1 - Full Code    Subjective   Doing okay today. Denies any pain. Ambulating independently. Legs are swollen, feel he would benefit from his PRN metolazone. He is upset about the delay in home abx but hopeful for dc soon.     Objective :  Temp:  [97.5  °F (36.4 °C)-98 °F (36.7 °C)] 98 °F (36.7 °C)  HR:  [58-62] 60  BP: (130-131)/(57-60) 130/57  Resp:  [18-21] 21  SpO2:  [92 %-96 %] 92 %  O2 Device: None (Room air)    Body mass index is 37.6 kg/m².     Input and Output Summary (last 24 hours):     Intake/Output Summary (Last 24 hours) at 11/24/2024 0916  Last data filed at 11/24/2024 0433  Gross per 24 hour   Intake 2715 ml   Output --   Net 2715 ml       Physical Exam  Vitals and nursing note reviewed.   Constitutional:       General: He is not in acute distress.     Appearance: He is obese.      Comments: On RA   Cardiovascular:      Rate and Rhythm: Normal rate and regular rhythm.   Pulmonary:      Effort: No respiratory distress.   Abdominal:      General: There is no distension.      Palpations: Abdomen is soft.   Musculoskeletal:      Right lower leg: Edema present.      Left lower leg: Edema present.   Neurological:      Mental Status: He is alert and oriented to person, place, and time.   Psychiatric:         Mood and Affect: Mood normal.           Lines/Drains:  Lines/Drains/Airways       Active Status       Name Placement date Placement time Site Days    PICC Line 11/22/24 Right Basilic 11/22/24  1401  Basilic  1                    Central Line:  Goal for removal: N/A - Discharging with PICC for IV ABX/medications               Lab Results: I have reviewed the following results:   Results from last 7 days   Lab Units 11/24/24  0433   WBC Thousand/uL 8.16   HEMOGLOBIN g/dL 11.3*   HEMATOCRIT % 33.9*   PLATELETS Thousands/uL 307   SEGS PCT % 56   LYMPHO PCT % 31   MONO PCT % 8   EOS PCT % 4     Results from last 7 days   Lab Units 11/24/24  0433 11/21/24  0527 11/20/24  0448   SODIUM mmol/L 135   < > 139   POTASSIUM mmol/L 3.7   < > 3.0*   CHLORIDE mmol/L 94*   < > 98   CO2 mmol/L 33*   < > 31   BUN mg/dL 19   < > 16   CREATININE mg/dL 0.86   < > 0.76   ANION GAP mmol/L 8   < > 10   CALCIUM mg/dL 8.7   < > 8.4   ALBUMIN g/dL  --   --  3.0*   TOTAL BILIRUBIN  mg/dL  --   --  0.29   ALK PHOS U/L  --   --  116*   ALT U/L  --   --  46   AST U/L  --   --  24   GLUCOSE RANDOM mg/dL 131   < > 119    < > = values in this interval not displayed.     Results from last 7 days   Lab Units 11/21/24  0527   INR  1.08     Results from last 7 days   Lab Units 11/24/24  0735 11/23/24  2026 11/23/24  1632 11/23/24  1120 11/23/24  0729 11/22/24  2058 11/22/24  1623 11/22/24  1106 11/22/24  0719 11/21/24  2033 11/21/24  1644 11/21/24  1141   POC GLUCOSE mg/dl 99 177* 106 120 122 170* 168* 157* 102 196* 175* 236*               Recent Cultures (last 7 days):   Results from last 7 days   Lab Units 11/20/24  1022 11/20/24  1018 11/18/24  1259 11/18/24  1257   BLOOD CULTURE   --   --  No Growth After 5 Days. No Growth After 5 Days.   GRAM STAIN RESULT  2+ Polys  No organisms seen 1+ Polys  No organisms seen  --   --        Imaging Results Review: No pertinent imaging studies reviewed.  Other Study Results Review: No additional pertinent studies reviewed.    Last 24 Hours Medication List:     Current Facility-Administered Medications:     acetaminophen (TYLENOL) tablet 975 mg, Q8H ISIS    atorvastatin (LIPITOR) tablet 40 mg, Daily    bisacodyl (DULCOLAX) rectal suppository 10 mg, Daily PRN    docusate sodium (COLACE) capsule 100 mg, BID    DULoxetine (CYMBALTA) delayed release capsule 60 mg, Daily    fentaNYL (DURAGESIC) 50 mcg/hr TD 72 hr patch 1 patch, Q72H    gabapentin (NEURONTIN) capsule 600 mg, 4x Daily    heparin (porcine) subcutaneous injection 5,000 Units, Q8H ISIS **AND** [COMPLETED] Platelet count, Once    HYDROmorphone (DILAUDID) tablet 8 mg, Q3H PRN    insulin lispro (HumALOG/ADMELOG) 100 units/mL subcutaneous injection 1-5 Units, TID AC **AND** Fingerstick Glucose (POCT), TID AC    insulin lispro (HumALOG/ADMELOG) 100 units/mL subcutaneous injection 1-5 Units, HS    methocarbamol (ROBAXIN) tablet 750 mg, Q6H ISIS    metolazone (ZAROXOLYN) tablet 2.5 mg, Once    ondansetron (ZOFRAN)  injection 4 mg, Q6H PRN    potassium chloride (Klor-Con M20) CR tablet 20 mEq, BID    potassium chloride (Klor-Con M20) CR tablet 40 mEq, Once    senna-docusate sodium (SENOKOT S) 8.6-50 mg per tablet 1 tablet, HS    torsemide (DEMADEX) tablet 80 mg, Daily    vancomycin (VANCOCIN) IVPB (premix in dextrose) 1,000 mg 200 mL, Q12H    Administrative Statements   Today, Patient Was Seen By: Charlette Velez PA-C      **Please Note: This note may have been constructed using a voice recognition system.**

## 2024-11-24 NOTE — PROGRESS NOTES
Progress Note - Infectious Disease   Dominick Irving 64 y.o. male MRN: [id number]  Unit/Bed#: [facility] 604-01 Encounter: [id number]      Impression:  1.  Postoperative Staphylococcus aureus (MRSA) lumbar wound infection s/p lumbar wound debridement and washout   2.  Lumbar stenosis s/p L3-4 decompressive hemilaminectomy with transverse lumbar interbody fusion and L4-5 fixation and fusion 10/30  3.  DM type II  4.  ERICA    Recommendations:  Patient is afebrile with WBC count 8,160.Therapy discussed with the primary service who will write orders.  1.  Operative wound cultures are showing Staphylococcus aureus that is an MRSA   2.  Currently on vancomycin 1 g every 12 hours IV per pharmacy.  Discussed with them and as of tomorrow will be continuing vancomycin  1.5 g every 24 hours IV for remainder of 3 days for ease of administration with visiting nurse.  3.  As discussed would continue IV Rx for at least a week postop before switching to p.o. Bactrim DS 1 tab every 12 hours  for additional 4 to 6 weeks  4.  Right basilic PICC line inserted  5.  If patient is discharged tomorrow he will need weekly CBC, differential, BMP, ESR and CRP.  6.  PICC line should be removed promptly after his IV portion of therapy is completed  7.  Patient can be followed in the ID office by virtual visit.  He should call [phone] for weekly appointment  8.  Prescriptions written for 3 additional days of vancomycin IV as well as weekly laboratory test as above and were left in the patient's paper chart for case management    Antibiotics:  1.  Vancomycin 1 g every 12 hours IV, day 4 POD      Subjective:  Patient's postoperative back pain remains minimal  Denies fevers, chills, or sweats.  Denies nausea, vomiting, or diarrhea.      Objective:  Vitals:  Temp:  [97.5 °F (36.4 °C)-98 °F (36.7 °C)] 97.9 °F (36.6 °C)  HR:  [57-71] 71  Resp:  [18-22] 22  BP: (121-149)/(57-68) 121/68  SpO2:  [92 %-97 %] 95 %  Temp (24hrs), Av.8 °F (36.6  °C), Min:97.5 °F (36.4 °C), Max:98 °F (36.7 °C)  Current: Temperature: 97.9 °F (36.6 °C)    Physical Exam:     General Appearance:  Alert, nontoxic, no acute distress.   Throat: Oropharynx moist without lesions.  Lips, mucosa, and tongue normal, edentulous with full dentures   Neck: Supple, symmetrical, trachea midline, no adenopathy,  no tenderness/mass/nodules   Lungs:   Clear to auscultation bilaterally, no audible wheezes, rhonchi or rales; respirations unlabored   Heart:  Regular rate and rhythm, S1, S2 normal, no murmur, rub or gallop   Abdomen:   Soft, non-tender, non-distended, positive bowel sounds.  No masses, no organomegaly    No CVA tenderness   Extremities: Right basilic PICC line   Skin: Lumbar wound is dry without inflammation         Invasive Devices       Peripherally Inserted Central Catheter Line  Duration             PICC Line 11/22/24 Right Basilic 2 days                    Labs, Imaging, & Other studies:   All pertinent labs were personally reviewed  Results from last 7 days   Lab Units 11/24/24  0433 11/23/24 0449 11/21/24  0527   WBC Thousand/uL 8.16 8.49 10.33*   HEMOGLOBIN g/dL 11.3* 11.3* 11.1*   PLATELETS Thousands/uL 307 329 329     Results from last 7 days   Lab Units 11/24/24  0433 11/23/24  0449 11/21/24  0527 11/20/24  0448 11/18/24  1257   SODIUM mmol/L 135 135 135 139 133*   POTASSIUM mmol/L 3.7 3.9 4.0 3.0* 3.4*   CHLORIDE mmol/L 94* 93* 93* 98 91*   CO2 mmol/L 33* 32 32 31 31   BUN mg/dL 19 21 21 16 18   CREATININE mg/dL 0.86 0.93 0.98 0.76 1.26   EGFR ml/min/1.73sq m 90 85 80 95 59   CALCIUM mg/dL 8.7 8.7 8.5 8.4 9.2   AST U/L  --   --   --  24 24   ALT U/L  --   --   --  46 63*   ALK PHOS U/L  --   --   --  116* 142*     Results from last 7 days   Lab Units 11/20/24  1022 11/20/24  1018 11/19/24  1748 11/18/24  1259 11/18/24  1257   BLOOD CULTURE   --   --   --  No Growth After 5 Days. No Growth After 5 Days.   GRAM STAIN RESULT  2+ Polys  No organisms seen 1+ Polys  No  organisms seen  --   --   --    MRSA CULTURE ONLY   --   --  Methicillin Resistant Staphylococcus aureus isolated*  This patient requires contact isolation precautions per [place] law. Contact precautions are not required in [place] for nasal surveillance cultures.  --   --

## 2024-11-24 NOTE — ASSESSMENT & PLAN NOTE
Presented to [facility] ED with drainage from surgical incision site since 11/11 associated with intermittent subjective weakness of LEs. No red flag symptoms.  Fortunately patient hemodynamically stable and nontoxic-appearing, remains afebrile.  MRI L spine w wo contrast showed: 3.3 x 4.5 cm subcutaneous fluid collection tracking from the operative site into the subcutaneous and paraspinal soft tissues. No significant canal compression or drainable epidural fluid collection  Transferred to [facility] for neurosurgery evaluation, recommendations appreciated   S/P OR on 11/20 for lumbar debridement/wound washout  MAXIMO drain removed on 11/22.  Will need f/u with neurosurgery in 2 weeks and then 6 weeks.   ID consulted for antibiotic management   Wound cultures with MRSA-d/w ID, recommending 7 days of IV vancomycin (thru 11/27) followed by 4-6 weeks of PO Bactrim, starting 11/28.  Will need outpt f/u with ID.   PICC placed on 11/22, ID giving scripts to CM  Pain has been controlled on home regimen: Fentanyl patch 50 mcg/h every 72 hours, p.o. Dilaudid 8 mg every 3 hours as needed  PT/OT recommending C, will also need VNA due to IV abx -- CM working on arranging this, from d/w them will not be set up until Monday 11/25 at the earliest

## 2024-11-24 NOTE — ASSESSMENT & PLAN NOTE
Wt Readings from Last 3 Encounters:   11/21/24 109 kg (240 lb 1.3 oz)   11/18/24 109 kg (240 lb)   11/08/24 109 kg (240 lb 12.8 oz)     Continue home dose torsemide 80 mg daily   Also takes PRN metolazone at home, will give dose on 11/24  Monitor volume status with daily weights, intake and output

## 2024-11-25 ENCOUNTER — TRANSITIONAL CARE MANAGEMENT (OUTPATIENT)
Dept: FAMILY MEDICINE CLINIC | Facility: CLINIC | Age: 64
End: 2024-11-25

## 2024-11-25 ENCOUNTER — RA CDI HCC (OUTPATIENT)
Dept: OTHER | Facility: HOSPITAL | Age: 64
End: 2024-11-25

## 2024-11-25 VITALS
TEMPERATURE: 97.5 F | DIASTOLIC BLOOD PRESSURE: 61 MMHG | SYSTOLIC BLOOD PRESSURE: 130 MMHG | BODY MASS INDEX: 37.09 KG/M2 | HEART RATE: 68 BPM | OXYGEN SATURATION: 89 % | HEIGHT: 67 IN | WEIGHT: 236.33 LBS | RESPIRATION RATE: 18 BRPM

## 2024-11-25 LAB
ANION GAP SERPL CALCULATED.3IONS-SCNC: 11 MMOL/L (ref 4–13)
BUN SERPL-MCNC: 22 MG/DL (ref 5–25)
CALCIUM SERPL-MCNC: 9.3 MG/DL (ref 8.4–10.2)
CHLORIDE SERPL-SCNC: 89 MMOL/L (ref 96–108)
CO2 SERPL-SCNC: 34 MMOL/L (ref 21–32)
CREAT SERPL-MCNC: 1.07 MG/DL (ref 0.6–1.3)
GFR SERPL CREATININE-BSD FRML MDRD: 72 ML/MIN/1.73SQ M
GLUCOSE SERPL-MCNC: 119 MG/DL (ref 65–140)
GLUCOSE SERPL-MCNC: 119 MG/DL (ref 65–140)
GLUCOSE SERPL-MCNC: 159 MG/DL (ref 65–140)
POTASSIUM SERPL-SCNC: 3.3 MMOL/L (ref 3.5–5.3)
SODIUM SERPL-SCNC: 134 MMOL/L (ref 135–147)

## 2024-11-25 PROCEDURE — 99239 HOSP IP/OBS DSCHRG MGMT >30: CPT | Performed by: NURSE PRACTITIONER

## 2024-11-25 PROCEDURE — 82948 REAGENT STRIP/BLOOD GLUCOSE: CPT

## 2024-11-25 PROCEDURE — 99232 SBSQ HOSP IP/OBS MODERATE 35: CPT | Performed by: INTERNAL MEDICINE

## 2024-11-25 PROCEDURE — 80048 BASIC METABOLIC PNL TOTAL CA: CPT | Performed by: INTERNAL MEDICINE

## 2024-11-25 RX ORDER — SULFAMETHOXAZOLE AND TRIMETHOPRIM 800; 160 MG/1; MG/1
1 TABLET ORAL EVERY 12 HOURS SCHEDULED
Qty: 60 TABLET | Refills: 0 | Status: SHIPPED | OUTPATIENT
Start: 2024-11-29 | End: 2024-12-29

## 2024-11-25 RX ORDER — POTASSIUM CHLORIDE 750 MG/1
20 TABLET, EXTENDED RELEASE ORAL 2 TIMES DAILY
Start: 2024-11-25

## 2024-11-25 RX ORDER — TORSEMIDE 20 MG/1
80 TABLET ORAL DAILY
Start: 2024-11-25

## 2024-11-25 RX ADMIN — GABAPENTIN 600 MG: 300 CAPSULE ORAL at 08:16

## 2024-11-25 RX ADMIN — METHOCARBAMOL 750 MG: 750 TABLET ORAL at 05:04

## 2024-11-25 RX ADMIN — POTASSIUM CHLORIDE 20 MEQ: 1500 TABLET, EXTENDED RELEASE ORAL at 08:16

## 2024-11-25 RX ADMIN — ATORVASTATIN CALCIUM 40 MG: 40 TABLET, FILM COATED ORAL at 08:15

## 2024-11-25 RX ADMIN — ACETAMINOPHEN 975 MG: 325 TABLET, FILM COATED ORAL at 05:04

## 2024-11-25 RX ADMIN — METHOCARBAMOL 750 MG: 750 TABLET ORAL at 12:40

## 2024-11-25 RX ADMIN — HYDROMORPHONE HYDROCHLORIDE 8 MG: 4 TABLET ORAL at 12:40

## 2024-11-25 RX ADMIN — HYDROMORPHONE HYDROCHLORIDE 8 MG: 4 TABLET ORAL at 09:33

## 2024-11-25 RX ADMIN — INSULIN LISPRO 1 UNITS: 100 INJECTION, SOLUTION INTRAVENOUS; SUBCUTANEOUS at 12:40

## 2024-11-25 RX ADMIN — GABAPENTIN 600 MG: 300 CAPSULE ORAL at 12:40

## 2024-11-25 RX ADMIN — HEPARIN SODIUM 5000 UNITS: 5000 INJECTION, SOLUTION INTRAVENOUS; SUBCUTANEOUS at 05:05

## 2024-11-25 RX ADMIN — HYDROMORPHONE HYDROCHLORIDE 8 MG: 4 TABLET ORAL at 03:31

## 2024-11-25 RX ADMIN — DULOXETINE HYDROCHLORIDE 60 MG: 60 CAPSULE, DELAYED RELEASE ORAL at 08:15

## 2024-11-25 RX ADMIN — HYDROMORPHONE HYDROCHLORIDE 8 MG: 4 TABLET ORAL at 06:39

## 2024-11-25 RX ADMIN — VANCOMYCIN HYDROCHLORIDE 1500 MG: 1 INJECTION, POWDER, LYOPHILIZED, FOR SOLUTION INTRAVENOUS at 08:15

## 2024-11-25 RX ADMIN — TORSEMIDE 80 MG: 20 TABLET ORAL at 08:15

## 2024-11-25 NOTE — ASSESSMENT & PLAN NOTE
Presented to [facility] ED with drainage from surgical incision site since 11/11 associated with intermittent subjective weakness of LEs. No red flag symptoms.  Fortunately patient hemodynamically stable and nontoxic-appearing, remains afebrile.  MRI L spine w wo contrast showed: 3.3 x 4.5 cm subcutaneous fluid collection tracking from the operative site into the subcutaneous and paraspinal soft tissues. No significant canal compression or drainable epidural fluid collection  Transferred to [facility] for neurosurgery evaluation, recommendations appreciated   S/P OR on 11/20 for lumbar debridement/wound washout  MAXIMO drain removed on 11/22.  Will need f/u with neurosurgery in 2 weeks and then 6 weeks.   ID consulted for antibiotic management   Wound cultures with MRSA-d/w ID, recommending 7 days of IV vancomycin (thru 11/27) followed by 4-6 weeks of PO Bactrim, starting 11/28.  Will need outpt f/u with ID.   PICC placed on 11/22, ID provided scripts to CM, awaiting VNA availability.   Pain has been controlled on home regimen: Fentanyl patch 50 mcg/h every 72 hours, p.o. Dilaudid 8 mg every 3 hours as needed  PT/OT recommending C, will also need VNA due to IV abx -- CM working on arranging this

## 2024-11-25 NOTE — ASSESSMENT & PLAN NOTE
Presented to [facility] ED with drainage from surgical incision site since 11/11 associated with intermittent subjective weakness of LEs. No red flag symptoms.  Fortunately patient hemodynamically stable and nontoxic-appearing, remains afebrile.  MRI L spine w wo contrast showed: 3.3 x 4.5 cm subcutaneous fluid collection tracking from the operative site into the subcutaneous and paraspinal soft tissues. No significant canal compression or drainable epidural fluid collection  Transferred to [facility] for neurosurgery evaluation, recommendations appreciated   S/P OR on 11/20 for lumbar debridement/wound washout  MAXIMO drain removed on 11/22.  Will need f/u with neurosurgery in 2 weeks and then 6 weeks.   ID consulted for antibiotic management   Wound cultures with MRSA-d/w ID, recommending 7 days of IV vancomycin (thru 11/28) followed by 4-6 weeks of PO Bactrim, starting 11/29.  Will need outpt f/u with ID.   PICC placed on 11/22  [facility] infusion to deliver supplies prior to discharge, VNA will be out to home tomorrow morning.  Weekly CBC with differential and BMP while on antibiotic, I did message PCP and lab prescription for next week provided.  Has ID follow-up scheduled 12/4  Pain has been controlled on home regimen: Fentanyl patch 50 mcg/h every 72 hours, p.o. Dilaudid 8 mg every 3 hours as needed  VNA on discharge

## 2024-11-25 NOTE — DISCHARGE SUMMARY
Discharge Summary - Hospitalist   Name: Dominick Irving 64 y.o. male I MRN: [id number]  [facility] I Date of Admission: 11/19/2024   Date of Service: 11/25/2024 I Hospital Day: 6     Assessment & Plan  Surgical site infection  Presented to [facility] ED with drainage from surgical incision site since 11/11 associated with intermittent subjective weakness of LEs. No red flag symptoms.  Fortunately patient hemodynamically stable and nontoxic-appearing, remains afebrile.  MRI L spine w wo contrast showed: 3.3 x 4.5 cm subcutaneous fluid collection tracking from the operative site into the subcutaneous and paraspinal soft tissues. No significant canal compression or drainable epidural fluid collection  Transferred to [facility] for neurosurgery evaluation, recommendations appreciated   S/P OR on 11/20 for lumbar debridement/wound washout  MAXMIO drain removed on 11/22.  Will need f/u with neurosurgery in 2 weeks and then 6 weeks.   ID consulted for antibiotic management   Wound cultures with MRSA-d/w ID, recommending 7 days of IV vancomycin (thru 11/28) followed by 4-6 weeks of PO Bactrim, starting 11/29.  Will need outpt f/u with ID.   PICC placed on 11/22  [facility] infusion to deliver supplies prior to discharge, VNA will be out to home tomorrow morning.  Weekly CBC with differential and BMP while on antibiotic, I did message PCP and lab prescription for next week provided.  Has ID follow-up scheduled 12/4  Pain has been controlled on home regimen: Fentanyl patch 50 mcg/h every 72 hours, p.o. Dilaudid 8 mg every 3 hours as needed  VNA on discharge  Chronic, continuous use of opioids  PDMP reviewed: Fentanyl patch 50 mcg/h every 72 hours, p.o. Dilaudid 8 mg every 3 hours  Outpatient follow-up with PCP and palliative care for ongoing management  Type 2 diabetes mellitus, with long-term current use of insulin (HCC)  Lab Results   Component Value Date    HGBA1C 9.0 (H) 10/01/2024       Recent Labs     11/24/24  2055  11/24/24 2047 11/25/24  0705 11/25/24  1100   POCGLU 150* 197* 119 159*       Home regimen: Tresiba 8u qd, metformin, and dapagliflozin -- would resume this regimen on discharge.   Resume PTA regimen on discharge  Chronic diastolic (congestive) heart failure (HCC)  Wt Readings from Last 3 Encounters:   11/25/24 107 kg (236 lb 5.3 oz)   11/18/24 109 kg (240 lb)   11/08/24 109 kg (240 lb 12.8 oz)     Continue home dose torsemide 80 mg daily   Also takes PRN metolazone at home, given dose on 11/24  Monitor volume status with daily weights, intake and output   S/P laminectomy  L3-5 posterior fusion with an L3-4 TLIF on 10/30/2024 by Dr. Aguilar  See related plan above   Obstructive sleep apnea on CPAP  Continue CPAP QHS     Medical Problems       Resolved Problems  Date Reviewed: 11/24/2024   None       Discharging Physician / Practitioner: DIA Michele  PCP: Robert Budinetz, MD  Admission Date:   Admission Orders (From admission, onward)       Ordered        11/19/24 1711  INPATIENT ADMISSION  Once                          Discharge Date: 11/25/24    Consultations During Hospital Stay:  ID  Neurosurgery  Case management    Procedures Performed:   OR 11/20 for lumbar debridement/wound washout with drain placement, drain subsequently removed 11/22    Significant Findings / Test Results:   Wound cultures positive MRSA  MRI lumbar spine with and without contrast Approximately 3.3 x 4.5 cm subcutaneous fluid collection tracking from the operative site into the subcutaneous and paraspinal soft tissues. This demonstrates peripheral enhancement and contains air, and an infected fluid collection cannot be excluded. No drainable epidural fluid collection identified. Consider sampling of the draining fluid to assess for infection. Endplate edema at the operative site is nonspecific. Minimal laminectomy bed fluid collection which mildly effaces the dorsum of the thecal sac. This is commonly seen in the recent  postoperative setting due to postoperative blood products. There is significantly improved central stenosis at this level,   now mild. Consider attention on follow-up.    Incidental Findings:   none    Test Results Pending at Discharge (will require follow up):   none     Outpatient Tests Requested:  F/u with PCP  F/u with ID and NSX as scheduled  Weekly CBC with differential and BMP while on antibiotic    Complications:  none    Reason for Admission: Surgical site infection    Hospital Course:   Dominick Irving is a 64 y.o. male patient who originally presented to the hospital on 11/19/2024 due to drainage from lumbar surgical incision.  Patient had MRI imaging which showed a subcutaneous fluid collection, was transferred to [place] for neurosurgery evaluation and underwent lumbar debridement/wound washout 11/20.  ID was consulted for antibiotic management, wound cultures grew MRSA.  Plan for 7 days of IV vancomycin through 11/28 followed by course of Bactrim.  I have provided a 1 month supply.  Patient has ID follow-up scheduled 12/4.  He is aware that he will need weekly labs with CBC with differential and BMP while on antibiotic.  I did provide labs for next week.  I messaged PCP and notified him of hospitalization and discharge recommendations.    Patient is stable for discharge home today with PICC line.  Case management has arranged for VNA to start tomorrow for home IV antibiotic.  PICC line should be removed after last dose IV antibiotic.        Please see above list of diagnoses and related plan for additional information.     Condition at Discharge: stable    Discharge Day Visit / Exam:   * Please refer to separate progress note for these details *    Discussion with Family: Updated  (wife) at bedside.    Discharge instructions/Information to patient and family:   See after visit summary for information provided to patient and family.      Provisions for Follow-Up Care:  See after visit  summary for information related to follow-up care and any pertinent home health orders.      Mobility at time of Discharge:   Basic Mobility Inpatient Raw Score: 22  JH-HLM Goal: 7: Walk 25 feet or more  JH-HLM Achieved: 6: Walk 10 steps or more  HLM Goal NOT achieved. Continue to encourage mobility in post discharge setting.     Disposition:   Home with VNA Services (Reminder: Complete face to face encounter)    Planned Readmission: no    Discharge Medications:  See after visit summary for reconciled discharge medications provided to patient and/or family.      Administrative Statements   Discharge Statement:  I have spent a total time of 60 minutes in caring for this patient on the day of the visit/encounter..    **Please Note: This note may have been constructed using a voice recognition system**

## 2024-11-25 NOTE — ASSESSMENT & PLAN NOTE
Wt Readings from Last 3 Encounters:   11/25/24 107 kg (236 lb 5.3 oz)   11/18/24 109 kg (240 lb)   11/08/24 109 kg (240 lb 12.8 oz)     Continue home dose torsemide 80 mg daily   Also takes PRN metolazone at home, given dose on 11/24  Monitor volume status with daily weights, intake and output

## 2024-11-25 NOTE — PROGRESS NOTES
Progress Note - Infectious Disease   Dominick Irving 64 y.o. male MRN: [id number]  Unit/Bed#: [facility] 604-01 Encounter: [id number]      Impression:  1.  Postoperative Staphylococcus aureus (MRSA) lumbar wound infection s/p lumbar wound debridement and washout   2.  Lumbar stenosis s/p L3-4 decompressive hemilaminectomy with transverse lumbar interbody fusion and L4-5 fixation and fusion 10/30  3.  DM type II  4.  ERICA    Recommendations:  Patient is afebrile with WBC count 8,160.Therapy discussed and patient seen with the primary service who will write orders as well as the patient's wife at bedside.  1.  Operative wound cultures are showing Staphylococcus aureus that is an MRSA   2.  Continue vancomycin  1.5 g every 24 hours IV for remainder of 2 days for ease of administration with visiting nurse.  3.  As discussed would continue IV Rx for at least a week postop before switching to p.o. Bactrim DS 1 tab every 12 hours  for additional 4 to 6 weeks  4.  Right basilic PICC line inserted  5.  If patient is discharged tomorrow he will need weekly CBC, differential, BMP, ESR and CRP.  6.  PICC line should be removed promptly after his IV portion of therapy is completed  7.  Patient can be followed in the ID office by virtual visit.  He should call [phone] for weekly appointment  8.  Prescriptions written for 3 additional days of vancomycin IV as well as weekly laboratory test as above and were left in the patient's paper chart for case management    Antibiotics:  1.  Vancomycin 1 g every 12 hours IV, day 5 POD      Subjective:  Patient's postoperative back pain remains minimal  Denies fevers, chills, or sweats.  Denies nausea, vomiting, or diarrhea.      Objective:  Vitals:  Temp:  [97.5 °F (36.4 °C)-98 °F (36.7 °C)] 97.5 °F (36.4 °C)  HR:  [62-71] 68  Resp:  [17-22] 18  BP: (115-130)/(51-97) 130/61  SpO2:  [89 %-95 %] 89 %  Temp (24hrs), Av.9 °F (36.6 °C), Min:97.5 °F (36.4 °C), Max:98 °F (36.7  °C)  Current: Temperature: 97.5 °F (36.4 °C)    Physical Exam:     General Appearance:  Alert, nontoxic, no acute distress.   Throat: Oropharynx moist without lesions.  Lips, mucosa, and tongue normal, edentulous with full dentures   Neck: Supple, symmetrical, trachea midline, no adenopathy,  no tenderness/mass/nodules   Lungs:   Clear to auscultation bilaterally, no audible wheezes, rhonchi or rales; respirations unlabored   Heart:  Regular rate and rhythm, S1, S2 normal, no murmur, rub or gallop   Abdomen:   Soft, non-tender, non-distended, positive bowel sounds.  No masses, no organomegaly    No CVA tenderness   Extremities: Right basilic PICC line   Skin: Lumbar wound is dry without inflammation.  Staples in place         Invasive Devices       Peripherally Inserted Central Catheter Line  Duration             PICC Line 11/22/24 Right Basilic 2 days                    Labs, Imaging, & Other studies:   All pertinent labs were personally reviewed  Results from last 7 days   Lab Units 11/24/24  0433 11/23/24 0449 11/21/24  0527   WBC Thousand/uL 8.16 8.49 10.33*   HEMOGLOBIN g/dL 11.3* 11.3* 11.1*   PLATELETS Thousands/uL 307 329 329     Results from last 7 days   Lab Units 11/25/24  0513 11/24/24  0433 11/23/24 0449 11/21/24  0527 11/20/24  0448   SODIUM mmol/L 134* 135 135   < > 139   POTASSIUM mmol/L 3.3* 3.7 3.9   < > 3.0*   CHLORIDE mmol/L 89* 94* 93*   < > 98   CO2 mmol/L 34* 33* 32   < > 31   BUN mg/dL 22 19 21   < > 16   CREATININE mg/dL 1.07 0.86 0.93   < > 0.76   EGFR ml/min/1.73sq m 72 90 85   < > 95   CALCIUM mg/dL 9.3 8.7 8.7   < > 8.4   AST U/L  --   --   --   --  24   ALT U/L  --   --   --   --  46   ALK PHOS U/L  --   --   --   --  116*    < > = values in this interval not displayed.     Results from last 7 days   Lab Units 11/20/24  1022 11/20/24  1018 11/19/24  1748   GRAM STAIN RESULT  2+ Polys  No organisms seen 1+ Polys  No organisms seen  --    MRSA CULTURE ONLY   --   --  Methicillin  Resistant Staphylococcus aureus isolated*  This patient requires contact isolation precautions per [place] law. Contact precautions are not required in [place] for nasal surveillance cultures.

## 2024-11-25 NOTE — PROGRESS NOTES
Progress Note - Hospitalist   Name: Dominick Irving 64 y.o. male I MRN: [id number]  [facility] I Date of Admission: 11/19/2024   Date of Service: 11/25/2024 I Hospital Day: 6     Assessment & Plan  Surgical site infection  Presented to [facility] ED with drainage from surgical incision site since 11/11 associated with intermittent subjective weakness of LEs. No red flag symptoms.  Fortunately patient hemodynamically stable and nontoxic-appearing, remains afebrile.  MRI L spine w wo contrast showed: 3.3 x 4.5 cm subcutaneous fluid collection tracking from the operative site into the subcutaneous and paraspinal soft tissues. No significant canal compression or drainable epidural fluid collection  Transferred to \[facility]\"" for neurosurgery evaluation, recommendations appreciated   S/P OR on 11/20 for lumbar debridement/wound washout  MAXIMO drain removed on 11/22.  Will need f/u with neurosurgery in 2 weeks and then 6 weeks.   ID consulted for antibiotic management   Wound cultures with MRSA-d/w ID, recommending 7 days of IV vancomycin (thru 11/27) followed by 4-6 weeks of PO Bactrim, starting 11/28.  Will need outpt f/u with ID.   PICC placed on 11/22, ID provided scripts to CM, awaiting VNA availability.   Pain has been controlled on home regimen: Fentanyl patch 50 mcg/h every 72 hours, p.o. Dilaudid 8 mg every 3 hours as needed  PT/OT recommending C, will also need VNA due to IV abx -- CM working on arranging this  Chronic, continuous use of opioids  PDMP reviewed: Fentanyl patch 50 mcg/h every 72 hours, p.o. Dilaudid 8 mg every 3 hours  Outpatient follow-up with PCP and palliative care for ongoing management  Type 2 diabetes mellitus, with long-term current use of insulin (HCC)  Lab Results   Component Value Date    HGBA1C 9.0 (H) 10/01/2024       Recent Labs     11/24/24  1119 11/24/24  1639 11/24/24  2047 11/25/24  0705   POCGLU 153* 150* 197* 119       Home regimen: Tresiba 8u qd, metformin, and dapagliflozin --  would resume this regimen on discharge.   Hold oral medications while inpatient  Currently on Lantus 8U qd + SSI  Monitor Accu-Cheks and adjust insulin regimen as needed   Diabetic diet, hypoglycemia protocol  Chronic diastolic (congestive) heart failure (HCC)  Wt Readings from Last 3 Encounters:   11/25/24 107 kg (236 lb 5.3 oz)   11/18/24 109 kg (240 lb)   11/08/24 109 kg (240 lb 12.8 oz)     Continue home dose torsemide 80 mg daily   Also takes PRN metolazone at home, given dose on 11/24  Monitor volume status with daily weights, intake and output   S/P laminectomy  L3-5 posterior fusion with an L3-4 TLIF on 10/30/2024 by Dr. Aguilar  See related plan above   Obstructive sleep apnea on CPAP  Continue CPAP QHS    VTE Pharmacologic Prophylaxis:   Moderate Risk (Score 3-4) - Pharmacological DVT Prophylaxis Ordered: heparin.    Mobility:   Basic Mobility Inpatient Raw Score: 22  JH-HLM Goal: 7: Walk 25 feet or more  JH-HLM Achieved: 6: Walk 10 steps or more  JH-HLM Goal NOT achieved. Continue with multidisciplinary rounding and encourage appropriate mobility to improve upon JH-HLM goals.    Patient Centered Rounds: I performed bedside rounds with nursing staff today.   Discussions with Specialists or Other Care Team Provider: nursing, case management     Education and Discussions with Family / Patient: Patient declined call to .     Current Length of Stay: 6 day(s)  Current Patient Status: Inpatient   Certification Statement: The patient will continue to require additional inpatient hospital stay due to pending VNA   Discharge Plan: Anticipate discharge later today or tomorrow to home with home services.    Code Status: Level 1 - Full Code    Subjective   No new complaints.  Hopeful for discharge today.    Objective :  Temp:  [97.9 °F (36.6 °C)-98 °F (36.7 °C)] 98 °F (36.7 °C)  HR:  [57-71] 63  BP: (115-149)/(51-97) 127/97  Resp:  [17-22] 18  SpO2:  [93 %-97 %] 93 %  O2 Device: None (Room air)    Body  mass index is 37.02 kg/m².     Input and Output Summary (last 24 hours):     Intake/Output Summary (Last 24 hours) at 11/25/2024 0918  Last data filed at 11/25/2024 0852  Gross per 24 hour   Intake 1800 ml   Output --   Net 1800 ml       Physical Exam  Vitals and nursing note reviewed.   Constitutional:       General: He is not in acute distress.  Cardiovascular:      Rate and Rhythm: Normal rate.   Pulmonary:      Breath sounds: Normal breath sounds.   Abdominal:      Tenderness: There is no abdominal tenderness.   Musculoskeletal:         General: No swelling.   Skin:     General: Skin is warm.   Neurological:      Mental Status: He is alert and oriented to person, place, and time. Mental status is at baseline.   Psychiatric:         Mood and Affect: Mood normal.           Lines/Drains:  Lines/Drains/Airways       Active Status       Name Placement date Placement time Site Days    PICC Line 11/22/24 Right Basilic 11/22/24  1401  Basilic  2                    Central Line:  Goal for removal: N/A - Discharging with PICC for IV ABX/medications               Lab Results: I have reviewed the following results:   Results from last 7 days   Lab Units 11/24/24  0433   WBC Thousand/uL 8.16   HEMOGLOBIN g/dL 11.3*   HEMATOCRIT % 33.9*   PLATELETS Thousands/uL 307   SEGS PCT % 56   LYMPHO PCT % 31   MONO PCT % 8   EOS PCT % 4     Results from last 7 days   Lab Units 11/25/24  0513 11/21/24  0527 11/20/24  0448   SODIUM mmol/L 134*   < > 139   POTASSIUM mmol/L 3.3*   < > 3.0*   CHLORIDE mmol/L 89*   < > 98   CO2 mmol/L 34*   < > 31   BUN mg/dL 22   < > 16   CREATININE mg/dL 1.07   < > 0.76   ANION GAP mmol/L 11   < > 10   CALCIUM mg/dL 9.3   < > 8.4   ALBUMIN g/dL  --   --  3.0*   TOTAL BILIRUBIN mg/dL  --   --  0.29   ALK PHOS U/L  --   --  116*   ALT U/L  --   --  46   AST U/L  --   --  24   GLUCOSE RANDOM mg/dL 119   < > 119    < > = values in this interval not displayed.     Results from last 7 days   Lab Units  11/21/24  0527   INR  1.08     Results from last 7 days   Lab Units 11/25/24  0705 11/24/24  2047 11/24/24  1639 11/24/24  1119 11/24/24  0735 11/23/24  2026 11/23/24  1632 11/23/24  1120 11/23/24  0729 11/22/24  2058 11/22/24  1623 11/22/24  1106   POC GLUCOSE mg/dl 119 197* 150* 153* 99 177* 106 120 122 170* 168* 157*               Recent Cultures (last 7 days):   Results from last 7 days   Lab Units 11/20/24  1022 11/20/24  1018 11/18/24  1259 11/18/24  1257   BLOOD CULTURE   --   --  No Growth After 5 Days. No Growth After 5 Days.   GRAM STAIN RESULT  2+ Polys  No organisms seen 1+ Polys  No organisms seen  --   --        Imaging Results Review: No pertinent imaging studies reviewed.  Other Study Results Review: No additional pertinent studies reviewed.    Last 24 Hours Medication List:     Current Facility-Administered Medications:     acetaminophen (TYLENOL) tablet 975 mg, Q8H ISIS    atorvastatin (LIPITOR) tablet 40 mg, Daily    bisacodyl (DULCOLAX) rectal suppository 10 mg, Daily PRN    docusate sodium (COLACE) capsule 100 mg, BID    DULoxetine (CYMBALTA) delayed release capsule 60 mg, Daily    fentaNYL (DURAGESIC) 50 mcg/hr TD 72 hr patch 1 patch, Q72H    gabapentin (NEURONTIN) capsule 600 mg, 4x Daily    heparin (porcine) subcutaneous injection 5,000 Units, Q8H ISIS **AND** [COMPLETED] Platelet count, Once    HYDROmorphone (DILAUDID) tablet 8 mg, Q3H PRN    insulin lispro (HumALOG/ADMELOG) 100 units/mL subcutaneous injection 1-5 Units, TID AC **AND** Fingerstick Glucose (POCT), TID AC    insulin lispro (HumALOG/ADMELOG) 100 units/mL subcutaneous injection 1-5 Units, HS    methocarbamol (ROBAXIN) tablet 750 mg, Q6H ISIS    ondansetron (ZOFRAN) injection 4 mg, Q6H PRN    potassium chloride (Klor-Con M20) CR tablet 20 mEq, BID    senna-docusate sodium (SENOKOT S) 8.6-50 mg per tablet 1 tablet, HS    torsemide (DEMADEX) tablet 80 mg, Daily    vancomycin (VANCOCIN) 1500 mg in sodium chloride 0.9% 250 mL IVPB,  Q24H    Administrative Statements   Today, Patient Was Seen By: DIA Michele      **Please Note: This note may have been constructed using a voice recognition system.**

## 2024-11-25 NOTE — CASE MANAGEMENT
Case Management Discharge Planning Note    Patient name Dominick Irving  [facility] 604/[facility] 604-01 MRN [id number]  : 1960 Date 2024       Current Admission Date: 2024  Current Admission Diagnosis:Surgical site infection   Patient Active Problem List    Diagnosis Date Noted Date Diagnosed    Surgical site infection 2024     Headache 2024     Lumbar stenosis 10/31/2024     Lumbar facet arthropathy 10/31/2024     Postoperative pain after spinal surgery 10/30/2024     Unstable gait 2024     Hypertensive heart disease with congestive heart failure (HCC) 2024     Depression, recurrent ([facility]) 2024     History of bladder cancer 2024     Closed fracture of right ankle with routine healing 2024     COPD with acute exacerbation ([facility]) 2024     Type 2 diabetes mellitus, with long-term current use of insulin ([facility]) 2024     Unable to ambulate 2023     Neuropathy 2023     Postlaminectomy syndrome 2023     S/P laminectomy 10/16/2023     Lumbar radiculopathy 10/16/2023     Chronic pain syndrome 10/16/2023     Acute low back pain with sciatica 10/05/2023     Benign prostatic hyperplasia 2023     Encounter for surgical aftercare following surgery of nervous system 2023     Abdominal pain 2023     Suprapubic pressure 2023     Right groin pain 2023     Morbid (severe) obesity due to excess calories ([facility]) 2023     History of hematuria 2023     Hyponatremia 2023     Acute respiratory failure (HCC) 2023     Electrolyte abnormality 2023     Bradycardia 2023     Coronary artery disease      Chronic, continuous use of opioids 2023     Hyperglycemia 2023     Intractable low back pain 2023     Chronic diastolic (congestive) heart failure (HCC)      Opioid withdrawal ([facility])      Hypokalemia 2023     Anxiety 2023     Chronic bilateral low back pain with  bilateral sciatica 04/06/2023     History of gross hematuria 03/18/2023     Bladder cancer (HCC) 03/17/2023     Obstructive sleep apnea on CPAP      Hypertension      Bladder mass 03/06/2023     Nocturia 03/05/2023       LOS (days): 6  Geometric Mean LOS (GMLOS) (days): 5.4  Days to GMLOS:-0.3     OBJECTIVE:  Risk of Unplanned Readmission Score: 24.69         Current admission status: Inpatient   Preferred Pharmacy:   [facility] [address]  [address] [facility] [address]  Phone: [phone] Fax: [phone]    Primary Care Provider: Robert Budinetz, MD    Primary Insurance: MEDICARE  Secondary Insurance: BLUE CROSS    DISCHARGE DETAILS:    Requested Home Health Care         Home Health Agency Name:: [facility]  HHA External Referral Reason (only applicable if external HHA name selected): Patient has established relationship with provider  Home Health Follow-Up Provider:: PCP  Home Health Services Needed:: Administration of IV, IM or SC Medications, Evaluate Functional Status and Safety, Gait/ADL Training, Strengthening/Theraputic Exercises to Improve Function, Wound/Ostomy Care  Homebound Criteria Met:: Requires the Assistance of Another Person for Safe Ambulation or to Leave the Home, Uses an Assist Device (i.e. cane, walker, etc)  Supporting Clincal Findings:: Limited Endurance    Other Referral/Resources/Interventions Provided:  Interventions: HHC, Home Infusion  Referral Comments: Per discussion with SLIM, pt cleared for discharge home today. Plan for discharge home with [facility]: SN/PT/OT. Pt will also require home iv abx. Scripts obtained from ID. Discussed with pt, pt comfortable managing IV abx at home and in agreement with home infusion referral. Referral placed to [facility], awaiting response at this time. IV abx scripts uploaded in [facility]. CM will follow for order/delivery status. [facility] also updated on discharge plan, CM awaiting confirmation  on nursing availability for nursing visit tomorrow as would be required for next iv abx dose. CM team will follow.    Treatment Team Recommendation: Home with Home Health Care  Discharge Destination Plan:: Home with Home Health Care

## 2024-11-25 NOTE — CASE MANAGEMENT
Case Management Discharge Planning Note    Patient name Dominick Irving  [facility] 604/[facility] 604-01 MRN [id number]  : 1960 Date 2024       Current Admission Date: 2024  Current Admission Diagnosis:Surgical site infection   Patient Active Problem List    Diagnosis Date Noted Date Diagnosed    Surgical site infection 2024     Headache 2024     Lumbar stenosis 10/31/2024     Lumbar facet arthropathy 10/31/2024     Postoperative pain after spinal surgery 10/30/2024     Unstable gait 2024     Hypertensive heart disease with congestive heart failure (HCC) 2024     Depression, recurrent ([facility]) 2024     History of bladder cancer 2024     Closed fracture of right ankle with routine healing 2024     COPD with acute exacerbation ([facility]) 2024     Type 2 diabetes mellitus, with long-term current use of insulin ([facility]) 2024     Unable to ambulate 2023     Neuropathy 2023     Postlaminectomy syndrome 2023     S/P laminectomy 10/16/2023     Lumbar radiculopathy 10/16/2023     Chronic pain syndrome 10/16/2023     Acute low back pain with sciatica 10/05/2023     Benign prostatic hyperplasia 2023     Encounter for surgical aftercare following surgery of nervous system 2023     Abdominal pain 2023     Suprapubic pressure 2023     Right groin pain 2023     Morbid (severe) obesity due to excess calories ([facility]) 2023     History of hematuria 2023     Hyponatremia 2023     Acute respiratory failure (HCC) 2023     Electrolyte abnormality 2023     Bradycardia 2023     Coronary artery disease      Chronic, continuous use of opioids 2023     Hyperglycemia 2023     Intractable low back pain 2023     Chronic diastolic (congestive) heart failure (HCC)      Opioid withdrawal ([facility])      Hypokalemia 2023     Anxiety 2023     Chronic bilateral low back pain with  bilateral sciatica 04/06/2023     History of gross hematuria 03/18/2023     Bladder cancer (HCC) 03/17/2023     Obstructive sleep apnea on CPAP      Hypertension      Bladder mass 03/06/2023     Nocturia 03/05/2023       LOS (days): 6  Geometric Mean LOS (GMLOS) (days): 5.4  Days to GMLOS:-0.5     OBJECTIVE:  Risk of Unplanned Readmission Score: 24.28         Current admission status: Inpatient   Preferred Pharmacy:   [facility]  [facility] [address]  [address] [facility] [address]  Phone: [phone] Fax: [phone]    Primary Care Provider: Robert Budinetz, MD    Primary Insurance: MEDICARE  Secondary Insurance: BLUE CROSS    DISCHARGE DETAILS:    Other Referral/Resources/Interventions Provided:  Interventions: HHC, Home Infusion  Referral Comments: [facility] accepted for SN/PT/OT, [facility] requested scheduled SN visit tomorrow 11/26 at 8AM for iv abx mgmt/education. [facility] accepted order. Drug covered at 100%, supply surcharge of $45/per day, totaling $135.00. Discussed with pt and spouse, pt in agreement with cost's. CM was informed, plan for IV abx/supply delivery by 4pm today, CM requested expedited delivery per pt's request as pt is cleared for discharged. Spouse present at bedside and will provide transport home. CM updated bedside RN and SLIM of same. CM to await confirmed delivery of abx/supplies.    Treatment Team Recommendation: Home with Home Health Care  Discharge Destination Plan:: Home with Home Health Care  Transport at Discharge : Family

## 2024-11-25 NOTE — PLAN OF CARE
Problem: PAIN - ADULT  Goal: Verbalizes/displays adequate comfort level or baseline comfort level  Description: Interventions:  - Encourage patient to monitor pain and request assistance  - Assess pain using appropriate pain scale  - Administer analgesics based on type and severity of pain and evaluate response  - Implement non-pharmacological measures as appropriate and evaluate response  - Consider cultural and social influences on pain and pain management  - Notify physician/advanced practitioner if interventions unsuccessful or patient reports new pain  11/25/2024 0750 by Yaneth Dukes RN  Outcome: Progressing  11/25/2024 0750 by Yaneth Dukes RN  Outcome: Progressing     Problem: INFECTION - ADULT  Goal: Absence or prevention of progression during hospitalization  Description: INTERVENTIONS:  - Assess and monitor for signs and symptoms of infection  - Monitor lab/diagnostic results  - Monitor all insertion sites, i.e. indwelling lines, tubes, and drains  - Monitor endotracheal if appropriate and nasal secretions for changes in amount and color  - [place] appropriate cooling/warming therapies per order  - Administer medications as ordered  - Instruct and encourage patient and family to use good hand hygiene technique  - Identify and instruct in appropriate isolation precautions for identified infection/condition  11/25/2024 0750 by Yaneth Dukes RN  Outcome: Progressing  11/25/2024 0750 by Yaneth Dukes RN  Outcome: Progressing     Problem: SAFETY ADULT  Goal: Patient will remain free of falls  Description: INTERVENTIONS:  - Educate patient/family on patient safety including physical limitations  - Instruct patient to call for assistance with activity   - Consult OT/PT to assist with strengthening/mobility   - Keep Call bell within reach  - Keep bed low and locked with side rails adjusted as appropriate  - Keep care items and personal belongings within reach  - Initiate and maintain  comfort rounds  - Make Fall Risk Sign visible to staff  - Apply yellow socks and bracelet for high fall risk patients  - Consider moving patient to room near nurses station  11/25/2024 0750 by Yaneth Dukes RN  Outcome: Progressing  11/25/2024 0750 by Yaneth Dukes RN  Outcome: Progressing  Goal: Maintain or return to baseline ADL function  Description: INTERVENTIONS:  -  Assess patient's ability to carry out ADLs; assess patient's baseline for ADL function and identify physical deficits which impact ability to perform ADLs (bathing, care of mouth/teeth, toileting, grooming, dressing, etc.)  - Assess/evaluate cause of self-care deficits   - Assess range of motion  - Assess patient's mobility; develop plan if impaired  - Assess patient's need for assistive devices and provide as appropriate  - Encourage maximum independence but intervene and supervise when necessary  - Involve family in performance of ADLs  - Assess for home care needs following discharge   - Consider OT consult to assist with ADL evaluation and planning for discharge  - Provide patient education as appropriate  11/25/2024 0750 by Yaneth Dukes RN  Outcome: Progressing  11/25/2024 0750 by Yaneth Dukes RN  Outcome: Progressing  Goal: Maintains/Returns to pre admission functional level  Description: INTERVENTIONS:  - Perform AM-PAC 6 Click Basic Mobility/ Daily Activity assessment daily.  - Set and communicate daily mobility goal to care team and patient/family/caregiver.   - Collaborate with rehabilitation services on mobility goals if consulted  - Out of bed for toileting  - Record patient progress and toleration of activity level   11/25/2024 0750 by Yaneth Dukes RN  Outcome: Progressing  11/25/2024 0750 by Yaneth Dukes RN  Outcome: Progressing     Problem: DISCHARGE PLANNING  Goal: Discharge to home or other facility with appropriate resources  Description: INTERVENTIONS:  - Identify barriers to discharge  w/patient and caregiver  - Arrange for needed discharge resources and transportation as appropriate  - Identify discharge learning needs (meds, wound care, etc.)  - Arrange for interpretive services to assist at discharge as needed  - Refer to Case Management Department for coordinating discharge planning if the patient needs post-hospital services based on physician/advanced practitioner order or complex needs related to functional status, cognitive ability, or social support system  Outcome: Progressing     Problem: Knowledge Deficit  Goal: Patient/family/caregiver demonstrates understanding of disease process, treatment plan, medications, and discharge instructions  Description: Complete learning assessment and assess knowledge base.  Interventions:  - Provide teaching at level of understanding  - Provide teaching via preferred learning methods  Outcome: Progressing

## 2024-11-25 NOTE — PLAN OF CARE
Problem: PAIN - ADULT  Goal: Verbalizes/displays adequate comfort level or baseline comfort level  Description: Interventions:  - Encourage patient to monitor pain and request assistance  - Assess pain using appropriate pain scale  - Administer analgesics based on type and severity of pain and evaluate response  - Implement non-pharmacological measures as appropriate and evaluate response  - Consider cultural and social influences on pain and pain management  - Notify physician/advanced practitioner if interventions unsuccessful or patient reports new pain  Outcome: Progressing     Problem: INFECTION - ADULT  Goal: Absence or prevention of progression during hospitalization  Description: INTERVENTIONS:  - Assess and monitor for signs and symptoms of infection  - Monitor lab/diagnostic results  - Monitor all insertion sites, i.e. indwelling lines, tubes, and drains  - Monitor endotracheal if appropriate and nasal secretions for changes in amount and color  - [place] appropriate cooling/warming therapies per order  - Administer medications as ordered  - Instruct and encourage patient and family to use good hand hygiene technique  - Identify and instruct in appropriate isolation precautions for identified infection/condition  Outcome: Progressing     Problem: SAFETY ADULT  Goal: Patient will remain free of falls  Description: INTERVENTIONS:  - Educate patient/family on patient safety including physical limitations  - Instruct patient to call for assistance with activity   - Consult OT/PT to assist with strengthening/mobility   - Keep Call bell within reach  - Keep bed low and locked with side rails adjusted as appropriate  - Keep care items and personal belongings within reach  - Initiate and maintain comfort rounds  - Make Fall Risk Sign visible to staff  - Apply yellow socks and bracelet for high fall risk patients  - Consider moving patient to room near nurses station  Outcome: Progressing  Goal: Maintain or  return to baseline ADL function  Description: INTERVENTIONS:  -  Assess patient's ability to carry out ADLs; assess patient's baseline for ADL function and identify physical deficits which impact ability to perform ADLs (bathing, care of mouth/teeth, toileting, grooming, dressing, etc.)  - Assess/evaluate cause of self-care deficits   - Assess range of motion  - Assess patient's mobility; develop plan if impaired  - Assess patient's need for assistive devices and provide as appropriate  - Encourage maximum independence but intervene and supervise when necessary  - Involve family in performance of ADLs  - Assess for home care needs following discharge   - Consider OT consult to assist with ADL evaluation and planning for discharge  - Provide patient education as appropriate  Outcome: Progressing  Goal: Maintains/Returns to pre admission functional level  Description: INTERVENTIONS:  - Perform AM-PAC 6 Click Basic Mobility/ Daily Activity assessment daily.  - Set and communicate daily mobility goal to care team and patient/family/caregiver.   - Collaborate with rehabilitation services on mobility goals if consulted  - Out of bed for toileting  - Record patient progress and toleration of activity level   Outcome: Progressing     Problem: DISCHARGE PLANNING  Goal: Discharge to home or other facility with appropriate resources  Description: INTERVENTIONS:  - Identify barriers to discharge w/patient and caregiver  - Arrange for needed discharge resources and transportation as appropriate  - Identify discharge learning needs (meds, wound care, etc.)  - Arrange for interpretive services to assist at discharge as needed  - Refer to Case Management Department for coordinating discharge planning if the patient needs post-hospital services based on physician/advanced practitioner order or complex needs related to functional status, cognitive ability, or social support system  Outcome: Progressing     Problem: Knowledge  Deficit  Goal: Patient/family/caregiver demonstrates understanding of disease process, treatment plan, medications, and discharge instructions  Description: Complete learning assessment and assess knowledge base.  Interventions:  - Provide teaching at level of understanding  - Provide teaching via preferred learning methods  Outcome: Progressing

## 2024-11-25 NOTE — ASSESSMENT & PLAN NOTE
Lab Results   Component Value Date    HGBA1C 9.0 (H) 10/01/2024       Recent Labs     11/24/24  1119 11/24/24  1639 11/24/24  2047 11/25/24  0705   POCGLU 153* 150* 197* 119       Home regimen: Tresiba 8u qd, metformin, and dapagliflozin -- would resume this regimen on discharge.   Hold oral medications while inpatient  Currently on Lantus 8U qd + SSI  Monitor Accu-Cheks and adjust insulin regimen as needed   Diabetic diet, hypoglycemia protocol

## 2024-11-25 NOTE — ASSESSMENT & PLAN NOTE
Lab Results   Component Value Date    HGBA1C 9.0 (H) 10/01/2024       Recent Labs     11/24/24  1639 11/24/24  2047 11/25/24  0705 11/25/24  1100   POCGLU 150* 197* 119 159*       Home regimen: Tresiba 8u qd, metformin, and dapagliflozin -- would resume this regimen on discharge.   Resume PTA regimen on discharge

## 2024-11-26 ENCOUNTER — HOME CARE VISIT (OUTPATIENT)
Dept: HOME HEALTH SERVICES | Facility: HOME HEALTHCARE | Age: 64
End: 2024-11-26
Payer: MEDICARE

## 2024-11-26 DIAGNOSIS — A49.02 MRSA INFECTION: Primary | ICD-10-CM

## 2024-11-26 DIAGNOSIS — B99.9 SOFT TISSUE INFECTION OF LUMBAR SPINE: ICD-10-CM

## 2024-11-26 DIAGNOSIS — M79.89 SOFT TISSUE INFECTION OF LUMBAR SPINE: ICD-10-CM

## 2024-11-26 PROCEDURE — G0299 HHS/HOSPICE OF RN EA 15 MIN: HCPCS

## 2024-11-26 NOTE — PROGRESS NOTES
OPAT NOTE      Supervising/Discharge provider: Bronwyn    Diagnosis: Postoperative Staphylococcus aureus (MRSA) lumbar wound infection s/p lumbar wound debridement and washout 11/20     Drug:   IV Vancomycin through 11/27,then pull PICC.  On 11/28, switch to PO Bactrim DS 1 tab  Q12 for 4-6 weeks.    Labs/Frequency: CBCD BMP ESR CRP weekly    End Date: Bactrim DS to start 11/28 (4-6 weeks) based on lab values.      Infusion/VNA/SNF contact: DANIEL    Next appointment: 12/4      MA assigned:  Ellie

## 2024-11-26 NOTE — ASSESSMENT & PLAN NOTE
Patient has been referred by SUNDEEP Valverde. The initial referral follow-up message, which includes the consent form link, has been sent to the patient.    Eligible Studies: HNP    Pt with hx of Hypokalemia.   10/31 K low at 2.9. Resumed home Kcl 20 mEQ BID.   Will continue to monitor with repeat labs.

## 2024-11-26 NOTE — TELEPHONE ENCOUNTER
11/26/2024- PT WAS DISCHARGED HOME. CALLED AND SPOKE WITH PT CONFIRMING 12/05/2024 AND 01/02/2025 APTS. PT ASKED IF THE 12/05 APT COULD BE SWITCHED TO [place] AS THIS CAMPUS IS CLOSER. PT WAS RESCHEDULED FOR 12/05 IN THE [place] OFFICE. PT WAS ALSO AWARE OF THE 01/02 APT WITH [facility].

## 2024-11-26 NOTE — PROGRESS NOTES
"Palliative Supportive Care  met with patient/family to continue to provide emotional support and guidance.      Updated biopsychosocial information relevant to support:     The patient was identified by name and date of birth. Dominick was informed that this is a telemedicine visit and that the visit is being conducted through the Epic Embedded platform. They agree to proceed..  My office door was closed. No one else was in the room. They acknowledged consent and understanding of privacy and security of the video platform. The patient has agreed to participate and understands they can discontinue the visit at any time.    Dominick spoke about his MRSA infection in his back and his hospital stay. He said he is grateful for the care he received but was devastated that \"one more thing went wrong\". He feels like he cannot catch a break and something bad constantly is happening. He said the surgery being successful got his hopes so high and then this infection crushed him. LCSW provided emotional support and helped Dominick process what he has been going through. He said he had a thought in the hospital about dying and wondered what would be the \"stupid thing\" that would kill him such as an infection. He said he also was triggered because his father was in the hospital 9 months one time because they could not find out what the infection was. He said he kept thinking that he would not get answers and be in a similar situation which was stressful. He said his thanksgiving plans have been canceled because he thinks being around a lot of people could be dangerous. He feels very upset about this because he feels he is getting older and has less and less holidays and family time ahead of him and \"you can't get the memories back\". He said he is trying to stay hopeful but he is always waiting for the other shoe to drop. LCSW provided Dominick with encouragement and emotional support, challenging and reframing some " negative thinking.      Identified areas of need include: emotional support    Resources provided:    Areas that need future follow-up include: reduce anxiety/depressed mood, processing medical related trauma    I have spent 60 minutes with Patient  today in which greater than 50% of this time was spent in counseling/coordination of care     Palliative  will follow-up as requested by patient, family, and primary team.  Please contact with any specific requests

## 2024-11-27 ENCOUNTER — HOME CARE VISIT (OUTPATIENT)
Dept: HOME HEALTH SERVICES | Facility: HOME HEALTHCARE | Age: 64
End: 2024-11-27
Payer: MEDICARE

## 2024-11-27 ENCOUNTER — TELEMEDICINE (OUTPATIENT)
Dept: PALLIATIVE MEDICINE | Facility: CLINIC | Age: 64
End: 2024-11-27

## 2024-11-27 VITALS
DIASTOLIC BLOOD PRESSURE: 70 MMHG | BODY MASS INDEX: 35.77 KG/M2 | WEIGHT: 236 LBS | HEART RATE: 72 BPM | HEIGHT: 68 IN | RESPIRATION RATE: 16 BRPM | TEMPERATURE: 97.9 F | SYSTOLIC BLOOD PRESSURE: 140 MMHG | OXYGEN SATURATION: 95 %

## 2024-11-27 VITALS
SYSTOLIC BLOOD PRESSURE: 146 MMHG | TEMPERATURE: 97.3 F | OXYGEN SATURATION: 93 % | DIASTOLIC BLOOD PRESSURE: 76 MMHG | RESPIRATION RATE: 16 BRPM | HEART RATE: 72 BPM

## 2024-11-27 VITALS — OXYGEN SATURATION: 94 % | DIASTOLIC BLOOD PRESSURE: 62 MMHG | HEART RATE: 71 BPM | SYSTOLIC BLOOD PRESSURE: 110 MMHG

## 2024-11-27 DIAGNOSIS — Z71.89 COUNSELING AND COORDINATION OF CARE: Primary | ICD-10-CM

## 2024-11-27 LAB
MYCOBACTERIUM SPEC CULT: NORMAL
MYCOBACTERIUM SPEC CULT: NORMAL
RHODAMINE-AURAMINE STN SPEC: NORMAL
RHODAMINE-AURAMINE STN SPEC: NORMAL

## 2024-11-27 PROCEDURE — NC001 PR NO CHARGE

## 2024-11-27 PROCEDURE — G0152 HHCP-SERV OF OT,EA 15 MIN: HCPCS

## 2024-11-27 PROCEDURE — G0299 HHS/HOSPICE OF RN EA 15 MIN: HCPCS

## 2024-11-27 RX ORDER — METFORMIN HYDROCHLORIDE 500 MG/1
1000 TABLET, EXTENDED RELEASE ORAL
Qty: 90 TABLET | Refills: 3 | Status: CANCELLED | OUTPATIENT
Start: 2024-11-27

## 2024-11-29 ENCOUNTER — HOME CARE VISIT (OUTPATIENT)
Dept: HOME HEALTH SERVICES | Facility: HOME HEALTHCARE | Age: 64
End: 2024-11-29
Payer: MEDICARE

## 2024-11-29 ENCOUNTER — TELEPHONE (OUTPATIENT)
Dept: NEUROSURGERY | Facility: CLINIC | Age: 64
End: 2024-11-29

## 2024-11-29 VITALS — OXYGEN SATURATION: 100 % | SYSTOLIC BLOOD PRESSURE: 156 MMHG | DIASTOLIC BLOOD PRESSURE: 86 MMHG | HEART RATE: 57 BPM

## 2024-11-29 PROCEDURE — G0151 HHCP-SERV OF PT,EA 15 MIN: HCPCS

## 2024-12-01 LAB
FUNGUS SPEC CULT: NORMAL
FUNGUS SPEC CULT: NORMAL

## 2024-12-02 ENCOUNTER — OFFICE VISIT (OUTPATIENT)
Dept: FAMILY MEDICINE CLINIC | Facility: CLINIC | Age: 64
End: 2024-12-02
Payer: MEDICARE

## 2024-12-02 VITALS
HEART RATE: 76 BPM | TEMPERATURE: 97.3 F | RESPIRATION RATE: 16 BRPM | SYSTOLIC BLOOD PRESSURE: 140 MMHG | OXYGEN SATURATION: 94 % | DIASTOLIC BLOOD PRESSURE: 70 MMHG

## 2024-12-02 VITALS
HEIGHT: 67 IN | OXYGEN SATURATION: 96 % | WEIGHT: 236.4 LBS | HEART RATE: 61 BPM | SYSTOLIC BLOOD PRESSURE: 118 MMHG | BODY MASS INDEX: 37.1 KG/M2 | DIASTOLIC BLOOD PRESSURE: 60 MMHG

## 2024-12-02 DIAGNOSIS — G89.4 CHRONIC PAIN SYNDROME: ICD-10-CM

## 2024-12-02 DIAGNOSIS — Z22.322 MRSA (METHICILLIN RESISTANT STAPH AUREUS) CULTURE POSITIVE: Primary | ICD-10-CM

## 2024-12-02 DIAGNOSIS — E11.69 TYPE 2 DIABETES MELLITUS WITH OTHER SPECIFIED COMPLICATION, UNSPECIFIED WHETHER LONG TERM INSULIN USE (HCC): Primary | ICD-10-CM

## 2024-12-02 PROCEDURE — 99214 OFFICE O/P EST MOD 30 MIN: CPT | Performed by: FAMILY MEDICINE

## 2024-12-02 RX ORDER — HYDROMORPHONE HYDROCHLORIDE 8 MG/1
8 TABLET ORAL
Qty: 240 TABLET | Refills: 0 | Status: SHIPPED | OUTPATIENT
Start: 2024-12-02

## 2024-12-02 RX ORDER — METFORMIN HYDROCHLORIDE 500 MG/1
1000 TABLET, EXTENDED RELEASE ORAL
Qty: 90 TABLET | Refills: 3 | Status: SHIPPED | OUTPATIENT
Start: 2024-12-02

## 2024-12-02 NOTE — PROGRESS NOTES
Transition of Care Visit  Name: Dominick Irving      : 1960      MRN: [id number]  Encounter Provider: Robert Budinetz, MD  Encounter Date: 2024   Encounter department: [facility] PRIMARY [facility]    Assessment & Plan  MRSA (methicillin resistant staph aureus) culture positive       continue bactrim and ID f/u  Chronic pain syndrome  Decrease fentanyl  Orders:    HYDROmorphone (DILAUDID) 8 MG tablet; Take 1 tablet (8 mg total) by mouth every 3 (three) hours as needed for moderate pain or severe pain Max Daily Amount: 64 mg         History of Present Illness     Transitional Care Management Review:   Dominick Irving is a 64 y.o. male here for TCM follow up.     During the TCM phone call patient stated:  TCM Call       Date and time call was made  2024  5:00 PM    Patient was hospitialized at  [facility]    Date of Admission  24    Date of discharge  24    Diagnosis  surgical site infection    Disposition  Home    Were the patients medications reviewed and updated  No    Current Symptoms  None          TCM Call       Post hospital issues  None    Should patient be enrolled in anticoag monitoring?  No    Scheduled for follow up?  Yes    Patients specialists  Other (comment)    Other specialists names  Palliative Care    Did you obtain your prescribed medications  Yes    Do you need help managing your prescriptions or medications  No    Is transportation to your appointment needed  No    I have advised the patient to call PCP with any new or worsening symptoms  [facility]    Living Arrangements  Family members    Support System  Family    The type of support provided  Emotional; Financial; Physical    Do you have social support  Yes, as much as I need    Are you recieving any outpatient services  No    Are you recieving home care services  No    Are you using any community resources  No    Current waiver services  No    Have you fallen in the last 12  "months  No    Interperter language line needed  No    Counseling  Patient          The patient is here to follow-up on his hospital stay.  His incision from his back surgery got infected he had an fluid collection with MRSA he was treated with IV Vanco for a week and now is on oral Bactrim and has appropriate follow-up with infectious disease.  Overall he is doing well and feels much better.  His pain is 2 out of 10 in intensity.  We are starting to de-escalate his narcotics.      Review of Systems   Respiratory: Negative.     Cardiovascular: Negative.      Objective   /60 (BP Location: Right arm, Patient Position: Sitting, Cuff Size: Large)   Pulse 61   Ht 5' 7\" (1.702 m)   Wt 107 kg (236 lb 6.4 oz)   SpO2 96%   BMI 37.03 kg/m²     Physical Exam  Vitals and nursing note reviewed.   Constitutional:       General: He is not in acute distress.     Appearance: He is well-developed.   HENT:      Head: Normocephalic and atraumatic.   Eyes:      Conjunctiva/sclera: Conjunctivae normal.   Cardiovascular:      Rate and Rhythm: Normal rate and regular rhythm.      Heart sounds: No murmur heard.  Pulmonary:      Effort: Pulmonary effort is normal. No respiratory distress.      Breath sounds: Normal breath sounds.   Abdominal:      Palpations: Abdomen is soft.      Tenderness: There is no abdominal tenderness.   Musculoskeletal:         General: No swelling.      Cervical back: Neck supple.   Skin:     General: Skin is warm and dry.      Capillary Refill: Capillary refill takes less than 2 seconds.   Neurological:      Mental Status: He is alert.   Psychiatric:         Mood and Affect: Mood normal.       Medications have been reviewed by provider in current encounter      "

## 2024-12-02 NOTE — ASSESSMENT & PLAN NOTE
Decrease fentanyl  Orders:    HYDROmorphone (DILAUDID) 8 MG tablet; Take 1 tablet (8 mg total) by mouth every 3 (three) hours as needed for moderate pain or severe pain Max Daily Amount: 64 mg

## 2024-12-03 ENCOUNTER — HOME CARE VISIT (OUTPATIENT)
Dept: HOME HEALTH SERVICES | Facility: HOME HEALTHCARE | Age: 64
End: 2024-12-03
Payer: MEDICARE

## 2024-12-03 ENCOUNTER — TELEPHONE (OUTPATIENT)
Dept: FAMILY MEDICINE CLINIC | Facility: CLINIC | Age: 64
End: 2024-12-03

## 2024-12-03 ENCOUNTER — APPOINTMENT (OUTPATIENT)
Dept: LAB | Facility: MEDICAL CENTER | Age: 64
End: 2024-12-03
Payer: MEDICARE

## 2024-12-03 VITALS
RESPIRATION RATE: 18 BRPM | BODY MASS INDEX: 36.96 KG/M2 | OXYGEN SATURATION: 98 % | HEART RATE: 66 BPM | TEMPERATURE: 98.3 F | SYSTOLIC BLOOD PRESSURE: 132 MMHG | DIASTOLIC BLOOD PRESSURE: 70 MMHG | WEIGHT: 236 LBS

## 2024-12-03 VITALS — DIASTOLIC BLOOD PRESSURE: 86 MMHG | HEART RATE: 84 BPM | SYSTOLIC BLOOD PRESSURE: 156 MMHG | OXYGEN SATURATION: 97 %

## 2024-12-03 DIAGNOSIS — A49.02 MRSA (METHICILLIN RESISTANT STAPHYLOCOCCUS AUREUS) INFECTION: ICD-10-CM

## 2024-12-03 DIAGNOSIS — A49.02 MRSA INFECTION: ICD-10-CM

## 2024-12-03 DIAGNOSIS — T81.49XA SURGICAL SITE INFECTION: ICD-10-CM

## 2024-12-03 DIAGNOSIS — B99.9 SOFT TISSUE INFECTION OF LUMBAR SPINE: ICD-10-CM

## 2024-12-03 DIAGNOSIS — M79.89 SOFT TISSUE INFECTION OF LUMBAR SPINE: ICD-10-CM

## 2024-12-03 LAB
ANION GAP SERPL CALCULATED.3IONS-SCNC: 10 MMOL/L (ref 4–13)
BASOPHILS # BLD AUTO: 0.04 THOUSANDS/ΜL (ref 0–0.1)
BASOPHILS NFR BLD AUTO: 1 % (ref 0–1)
BUN SERPL-MCNC: 24 MG/DL (ref 5–25)
CALCIUM SERPL-MCNC: 8.6 MG/DL (ref 8.4–10.2)
CHLORIDE SERPL-SCNC: 92 MMOL/L (ref 96–108)
CO2 SERPL-SCNC: 35 MMOL/L (ref 21–32)
CREAT SERPL-MCNC: 1.42 MG/DL (ref 0.6–1.3)
CRP SERPL QL: 140 MG/L
EOSINOPHIL # BLD AUTO: 0.33 THOUSAND/ΜL (ref 0–0.61)
EOSINOPHIL NFR BLD AUTO: 5 % (ref 0–6)
ERYTHROCYTE [DISTWIDTH] IN BLOOD BY AUTOMATED COUNT: 14.9 % (ref 11.6–15.1)
ERYTHROCYTE [SEDIMENTATION RATE] IN BLOOD: 82 MM/HOUR (ref 0–19)
GFR SERPL CREATININE-BSD FRML MDRD: 51 ML/MIN/1.73SQ M
GLUCOSE SERPL-MCNC: 137 MG/DL (ref 65–140)
HCT VFR BLD AUTO: 35.4 % (ref 36.5–49.3)
HGB BLD-MCNC: 11.4 G/DL (ref 12–17)
IMM GRANULOCYTES # BLD AUTO: 0.02 THOUSAND/UL (ref 0–0.2)
IMM GRANULOCYTES NFR BLD AUTO: 0 % (ref 0–2)
LYMPHOCYTES # BLD AUTO: 2.11 THOUSANDS/ΜL (ref 0.6–4.47)
LYMPHOCYTES NFR BLD AUTO: 31 % (ref 14–44)
MCH RBC QN AUTO: 31.6 PG (ref 26.8–34.3)
MCHC RBC AUTO-ENTMCNC: 32.2 G/DL (ref 31.4–37.4)
MCV RBC AUTO: 98 FL (ref 82–98)
MONOCYTES # BLD AUTO: 0.51 THOUSAND/ΜL (ref 0.17–1.22)
MONOCYTES NFR BLD AUTO: 8 % (ref 4–12)
MYCOBACTERIUM SPEC CULT: NORMAL
MYCOBACTERIUM SPEC CULT: NORMAL
NEUTROPHILS # BLD AUTO: 3.83 THOUSANDS/ΜL (ref 1.85–7.62)
NEUTS SEG NFR BLD AUTO: 55 % (ref 43–75)
NRBC BLD AUTO-RTO: 0 /100 WBCS
PLATELET # BLD AUTO: 211 THOUSANDS/UL (ref 149–390)
PMV BLD AUTO: 12.2 FL (ref 8.9–12.7)
POTASSIUM SERPL-SCNC: 3.5 MMOL/L (ref 3.5–5.3)
RBC # BLD AUTO: 3.61 MILLION/UL (ref 3.88–5.62)
RHODAMINE-AURAMINE STN SPEC: NORMAL
RHODAMINE-AURAMINE STN SPEC: NORMAL
SODIUM SERPL-SCNC: 137 MMOL/L (ref 135–147)
WBC # BLD AUTO: 6.84 THOUSAND/UL (ref 4.31–10.16)

## 2024-12-03 PROCEDURE — G0299 HHS/HOSPICE OF RN EA 15 MIN: HCPCS

## 2024-12-03 PROCEDURE — 36415 COLL VENOUS BLD VENIPUNCTURE: CPT

## 2024-12-03 PROCEDURE — 85025 COMPLETE CBC W/AUTO DIFF WBC: CPT

## 2024-12-03 PROCEDURE — 80048 BASIC METABOLIC PNL TOTAL CA: CPT

## 2024-12-03 PROCEDURE — 85652 RBC SED RATE AUTOMATED: CPT

## 2024-12-03 PROCEDURE — 86140 C-REACTIVE PROTEIN: CPT

## 2024-12-03 PROCEDURE — G0151 HHCP-SERV OF PT,EA 15 MIN: HCPCS

## 2024-12-03 NOTE — PROGRESS NOTES
"Palliative Supportive Care  met with patient/family to continue to provide emotional support and guidance.      Updated biopsychosocial information relevant to support:     The patient was identified by name and date of birth. Dominick was informed that this is a telemedicine visit and that the visit is being conducted through the Epic Embedded platform. They agree to proceed..  My office door was closed. No one else was in the room. They acknowledged consent and understanding of privacy and security of the video platform. The patient has agreed to participate and understands they can discontinue the visit at any time.    Dominick spoke about having an infectious disease appointment today and it going well. He said some of his levels are elevated but that is to be expected. He will continue antibiotics for several more weeks and they will continue to monitor. He said that he feels very weak and unable to do things he wants to do which is frustrating. He said his sister told him that this is to be expected and he is \"too hard on himself\". LCSW seconded this opinion and reminded Dominick of all he has been through and that it will take time to recover. LCSW supported Dominick in challenging and reframing some of his negative thinking. Dominick acknowledged the infection and hospital stay have been hard on him but he will get through this. He said he just wishes he could start building on some of his progress instead of always feeling like he is starting over at square one. LCSW provided emotional support.      Identified areas of need include: emotional support    Resources provided:    Areas that need future follow-up include: reduce anxiety/depressed mood, challenging and reframing negative thinking    I have spent 40 minutes with Patient  today in which greater than 50% of this time was spent in counseling/coordination of care     Palliative  will follow-up as requested by patient, family, and " primary team.  Please contact with any specific requests

## 2024-12-04 ENCOUNTER — HOME CARE VISIT (OUTPATIENT)
Dept: HOME HEALTH SERVICES | Facility: HOME HEALTHCARE | Age: 64
End: 2024-12-04
Payer: MEDICARE

## 2024-12-04 ENCOUNTER — TELEMEDICINE (OUTPATIENT)
Dept: INFECTIOUS DISEASES | Facility: CLINIC | Age: 64
End: 2024-12-04

## 2024-12-04 ENCOUNTER — RESULTS FOLLOW-UP (OUTPATIENT)
Dept: FAMILY MEDICINE CLINIC | Facility: CLINIC | Age: 64
End: 2024-12-04

## 2024-12-04 ENCOUNTER — TELEMEDICINE (OUTPATIENT)
Dept: PALLIATIVE MEDICINE | Facility: CLINIC | Age: 64
End: 2024-12-04

## 2024-12-04 VITALS — SYSTOLIC BLOOD PRESSURE: 138 MMHG | DIASTOLIC BLOOD PRESSURE: 78 MMHG | OXYGEN SATURATION: 100 % | HEART RATE: 76 BPM

## 2024-12-04 DIAGNOSIS — T81.49XA SURGICAL SITE INFECTION: Primary | ICD-10-CM

## 2024-12-04 DIAGNOSIS — R79.89 ELEVATED SERUM CREATININE: Primary | ICD-10-CM

## 2024-12-04 DIAGNOSIS — E11.9 TYPE 2 DIABETES MELLITUS WITHOUT COMPLICATION, WITH LONG-TERM CURRENT USE OF INSULIN (HCC): ICD-10-CM

## 2024-12-04 DIAGNOSIS — E66.01 MORBID (SEVERE) OBESITY DUE TO EXCESS CALORIES (HCC): ICD-10-CM

## 2024-12-04 DIAGNOSIS — Z79.4 TYPE 2 DIABETES MELLITUS WITHOUT COMPLICATION, WITH LONG-TERM CURRENT USE OF INSULIN (HCC): ICD-10-CM

## 2024-12-04 DIAGNOSIS — Z98.890 S/P LAMINECTOMY: ICD-10-CM

## 2024-12-04 DIAGNOSIS — Z71.89 COUNSELING AND COORDINATION OF CARE: Primary | ICD-10-CM

## 2024-12-04 DIAGNOSIS — I50.32 CHRONIC DIASTOLIC (CONGESTIVE) HEART FAILURE (HCC): ICD-10-CM

## 2024-12-04 PROCEDURE — NC001 PR NO CHARGE

## 2024-12-04 PROCEDURE — G0152 HHCP-SERV OF OT,EA 15 MIN: HCPCS

## 2024-12-04 NOTE — PROGRESS NOTES
Name: Dominick Irving      : 1960      MRN: [id number]  Encounter Provider: Edmar Pate PA-C  Encounter Date: 2024   Encounter department: [facility]  :  Assessment & Plan  Surgical site infection  Postoperative Staphylococcus aureus (MRSA) lumbar wound infection s/p lumbar wound debridement and washout .  Initial L3-4 decompressive hemilaminectomy with transverse lumbar interbody fusion and L4-5 fixation and fusion 10/30.  One week after his initial surgery he noted purulent discharge from the wound.  He was given a script for cephalexin.  MRI revealed a fluid collection in the area of the incision.  OR cultures positive for MRSA.  AFB and Fungal cultures remain negative to date.  Patient discharged and finished a one week course of IV Vancomycin then transition to po bactrim.     Patient states he is doing well on the bactrim.  He does have a slight increase in his Cr although his Cr does appear to fluctuate so I do not necessarily think this is out of his normal range.  Will continue to monitor closely.  MRSA is Intermediate to tetracyclines so limited to other good po options.  There is some consideration for hardware involvement as it appears infection was deep (as per Op Note).     Plan  - continue Bactrim DS 1 tab po bid x 4-6 weeks at least (6 week end date would be 25).  BUT due to concern for hardware involvement would go at least until normalization/plateau.    ESR/CRP still quite elevated at this time.   - continue weekly CBCD, BMP, ESR, CRP for now  - follow up with Neurosurgery as scheduled  - RTO 2-3 weeks         S/P laminectomy  L3-4 decompressive hemilaminectomy with transverse lumbar interbody fusion and L4-5 fixation and fusion 10/30.         Type 2 diabetes mellitus without complication, with long-term current use of insulin (HCC)    Lab Results   Component Value Date    HGBA1C 9.0 (H) 10/01/2024            Chronic diastolic (congestive)  heart failure (HCC)  Patient on daily diuretic.  Does take a second one prn if he notes weight gain.                  Morbid (severe) obesity due to excess calories ([facility])  BMI 36.96             History of Present Illness     HPI  Dominick Irving is a 64 y.o. male who presents for virtual follow up today regarding Postoperative Staphylococcus aureus (MRSA) lumbar wound infection s/p lumbar wound debridement and washout 11/20.  Initial L3-4 decompressive hemilaminectomy with transverse lumbar interbody fusion and L4-5 fixation and fusion 10/30.  One week after his initial surgery he noted purulent discharge from the wound.  He was given a script for cephalexin.  MRI revealed a fluid collection in the area of the incision.  OR cultures positive for MRSA.  AFB and Fungal cultures remain negative to date.  Patient discharged and finished a one week course of IV Vancomycin then transition to po bactrim.  Patient tolerating the bactrim well.  He has no new complaints.  He still feels weak.  His back feels good.  He denies any drainage from the incision.       Review of Systems   Constitutional:  Negative for chills and fever.   Respiratory:  Negative for cough and shortness of breath.    Gastrointestinal:  Negative for abdominal pain, diarrhea, nausea and vomiting.   Skin:  Negative for rash.   Psychiatric/Behavioral:  Negative for behavioral problems and confusion.           Objective   There were no vitals taken for this visit.     Physical Exam  Vitals reviewed.   Constitutional:       General: He is not in acute distress.     Appearance: Normal appearance. He is not ill-appearing, toxic-appearing or diaphoretic.   HENT:      Head: Normocephalic and atraumatic.   Eyes:      General: No scleral icterus.        Right eye: No discharge.         Left eye: No discharge.      Conjunctiva/sclera: Conjunctivae normal.   Cardiovascular:      Rate and Rhythm: Normal rate.   Pulmonary:      Effort: Pulmonary effort is normal. No  respiratory distress.      Breath sounds: Normal breath sounds. No stridor. No wheezing, rhonchi or rales.   Chest:      Chest wall: No tenderness.   Abdominal:      General: Bowel sounds are normal. There is no distension.      Palpations: Abdomen is soft.      Tenderness: There is no abdominal tenderness.   Musculoskeletal:      Comments: Back incision without erythema or drainage   Skin:     General: Skin is warm and dry.      Coloration: Skin is not jaundiced or pale.      Findings: No erythema or rash.   Neurological:      Mental Status: He is alert and oriented to person, place, and time.   Psychiatric:         Mood and Affect: Mood normal.         Behavior: Behavior normal.           Labs;   12/3/24  Wbc: 6.8  Hgb: 11.4  Plt: 211  Cr: 1.42  ESR: 82  CRP: 140.0    Telemedicine consent    Patient: Dominick MARTINEZ Aristides  Provider: Edmar Pate PA-C  Provider located at [facility]  [address] [place] [address] [id number] [phone]    The patient was identified by name and date of birth. Dominick Irving was informed that this is a telemedicine visit and that the visit is being conducted through the Epic Embedded platform. He agrees to proceed..  My office door was closed. No one else was in the room.  He acknowledged consent and understanding of privacy and security of the video platform. The patient has agreed to participate and understands they can discontinue the visit at any time.    Patient is aware this is a billable service.     I spent 15 minutes with the patient during this visit.     Additional time spent on chart/lab review, documentation and order placement.

## 2024-12-04 NOTE — ASSESSMENT & PLAN NOTE
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  Initial L3-4 decompressive hemilaminectomy with transverse lumbar interbody fusion and L4-5 fixation and fusion 10/30.  One week after his initial surgery he noted purulent discharge from the wound.  He was given a script for cephalexin.  MRI revealed a fluid collection in the area of the incision.  OR cultures positive for MRSA.  AFB and Fungal cultures remain negative to date.  Patient discharged and finished a one week course of IV Vancomycin then transition to po bactrim.     Patient states he is doing well on the bactrim.  He does have a slight increase in his Cr although his Cr does appear to fluctuate so I do not necessarily think this is out of his normal range.  Will continue to monitor closely.  MRSA is Intermediate to tetracyclines so limited to other good po options.  There is some consideration for hardware involvement as it appears infection was deep (as per Op Note).     Plan  - continue Bactrim DS 1 tab po bid x 4-6 weeks at least (6 week end date would be 1/19/25).  BUT due to concern for hardware involvement would go at least until normalization/plateau.    ESR/CRP still quite elevated at this time.   - continue weekly CBCD, BMP, ESR, CRP for now  - follow up with Neurosurgery as scheduled  - RTO 2-3 weeks

## 2024-12-04 NOTE — ASSESSMENT & PLAN NOTE
L3-4 decompressive hemilaminectomy with transverse lumbar interbody fusion and L4-5 fixation and fusion 10/30.

## 2024-12-04 NOTE — PATIENT INSTRUCTIONS
- continue bactrim DS one tab po every 12 hours  - continue weekly labs  - follow up with Neurosurgery as scheduled  - RTO 12/23 as scheduled - virtual follow up

## 2024-12-05 ENCOUNTER — CLINICAL SUPPORT (OUTPATIENT)
Dept: NEUROSURGERY | Facility: CLINIC | Age: 64
End: 2024-12-05

## 2024-12-05 ENCOUNTER — HOME CARE VISIT (OUTPATIENT)
Dept: HOME HEALTH SERVICES | Facility: HOME HEALTHCARE | Age: 64
End: 2024-12-05
Payer: MEDICARE

## 2024-12-05 VITALS — SYSTOLIC BLOOD PRESSURE: 152 MMHG | HEART RATE: 76 BPM | DIASTOLIC BLOOD PRESSURE: 62 MMHG | OXYGEN SATURATION: 95 %

## 2024-12-05 DIAGNOSIS — Z98.890 STATUS POST SURGERY: Primary | ICD-10-CM

## 2024-12-05 PROCEDURE — G0151 HHCP-SERV OF PT,EA 15 MIN: HCPCS

## 2024-12-05 PROCEDURE — 99024 POSTOP FOLLOW-UP VISIT: CPT | Performed by: NEUROLOGICAL SURGERY

## 2024-12-06 VITALS
OXYGEN SATURATION: 97 % | HEART RATE: 67 BPM | SYSTOLIC BLOOD PRESSURE: 120 MMHG | DIASTOLIC BLOOD PRESSURE: 62 MMHG | TEMPERATURE: 97.8 F

## 2024-12-06 NOTE — PROGRESS NOTES
Post-Op Visit- Neurosurgery    Dominick Irving 64 y.o. male MRN: [id number]    Chief Complaint:   Patient presents post: Lumbar Debridement Wound (wash Out)    History of Present Illness:  Patient presents for 2 week POV for incision check accompanied by spouse and ambulating with rolling walker Patient reports he is doing well overall and denies any incisional issues or fevers.  He denies any new weakness, numbness or tingling since the surgery and denies any new issues with his bowel or bladder.  Patient admits to surgical pain at this time and rates their pain as a Pain Score:   2/10.  Patient is currently taking tylenol, fentanyl patches, gabapentin, dilaudid, and robaxin with some relief of pain symptoms.      Assessment:  Vitals:    12/05/24 1045   BP: 120/62   Pulse: 67   Temp: 97.8 °F (36.6 °C)   SpO2: 97%        Wound Exam: Incision well approximated.  No erythema, edema or drainage present.   location:lumbar spine   Incision and drain suture site cleansed with sterile NSS, staples removed from primary lumbar incision, suture removed from drain site.  Incisions cleansed again with sterile NSS and DSD applied.    Complications: None.          Discussion/Summary:  Reviewed incision care with patient including daily observation for s/s infection including: increased erythema, edema, drainage, dehiscence of incision or fever >101.  Should these be observed, he understands that he is to call and/or return immediately for reassessment.  Advised patient to continue cleansing area with mild soap and water and pat dry. Not to apply any lotions, creams, or ointments, & not to submerge in any water for two more weeks. He is to maintain activity restrictions until cleared by the surgeon. Activity levels were also reviewed with the patient in detail, he is to slowly increase his level of activity and ambulation is encouraged as tolerated. Verified date/time/location of upcoming POV and  reminded him of need for xray to be completed 3-5 days prior to that next office appointment.  Patient encouraged to call the office with any further questions or concerns, or if any incisional issues or fevers would arise.

## 2024-12-09 ENCOUNTER — TELEPHONE (OUTPATIENT)
Age: 64
End: 2024-12-09

## 2024-12-09 LAB
FUNGUS SPEC CULT: NORMAL
FUNGUS SPEC CULT: NORMAL

## 2024-12-09 NOTE — TELEPHONE ENCOUNTER
Patient was calling to reschedule his cysto appt. He just had major back surgery and is fighting off an infection he got as a result.     He pushed out the cysto appt til Feb 2025 in hopes of being better and back on his feet by then    He will call back to reschedule if anything changes.

## 2024-12-09 NOTE — PROGRESS NOTES
Palliative Supportive Care  met with patient/family to continue to provide emotional support and guidance.      Updated biopsychosocial information relevant to support:     The patient was identified by name and date of birth. Dominick was informed that this is a telemedicine visit and that the visit is being conducted through the Epic Embedded platform. They agree to proceed..  My office door was closed. No one else was in the room. They acknowledged consent and understanding of privacy and security of the video platform. The patient has agreed to participate and understands they can discontinue the visit at any time.    Dominick spoke about being very concerned his sedimentation rate was higher than last week and he is concerned his infection is worse or possible, worse case scenario, something else troublesome is going on. He told the story of the end of his father's life and spending 9 months in a hospital not finding out what was wrong with him and then finding out months before his death he had end stage lukemia. He said that he fears the worst could happen to him too. He did say he send a message to infectious disease and was waiting for a reply if this is something to be concerned about. Moments later, the call was interrupted.     When Dominick returned he said he received a call from his provider. He said she is not concerned about this sedimentation rate because it is trending down and has to be looked at over a period of time. She explained many things can affect this SR and that they are closely monitoring him. She also encouraged him that things will take time and reminded him he has not been on the antibiotic very long. He said in someway he feels relieved but the fear and concern are still there due to his past experiences. LCSW provided emotional support, processed how past trauma could be triggering these fears, and also discussed positive self talk.       Identified areas of need  include: emotional support    Resources provided:    Areas that need future follow-up include: reduce anxiety/depressed mood    I have spent 60 minutes with Patient  today in which greater than 50% of this time was spent in counseling/coordination of care     Palliative  will follow-up as requested by patient, family, and primary team.  Please contact with any specific requests

## 2024-12-10 ENCOUNTER — HOME CARE VISIT (OUTPATIENT)
Dept: HOME HEALTH SERVICES | Facility: HOME HEALTHCARE | Age: 64
End: 2024-12-10
Payer: MEDICARE

## 2024-12-10 ENCOUNTER — APPOINTMENT (OUTPATIENT)
Dept: LAB | Facility: MEDICAL CENTER | Age: 64
End: 2024-12-10
Payer: MEDICARE

## 2024-12-10 VITALS — OXYGEN SATURATION: 98 % | DIASTOLIC BLOOD PRESSURE: 58 MMHG | SYSTOLIC BLOOD PRESSURE: 112 MMHG | HEART RATE: 74 BPM

## 2024-12-10 VITALS
TEMPERATURE: 98.1 F | DIASTOLIC BLOOD PRESSURE: 80 MMHG | SYSTOLIC BLOOD PRESSURE: 156 MMHG | OXYGEN SATURATION: 93 % | HEART RATE: 70 BPM

## 2024-12-10 DIAGNOSIS — B99.9 SOFT TISSUE INFECTION OF LUMBAR SPINE: ICD-10-CM

## 2024-12-10 DIAGNOSIS — M79.89 SOFT TISSUE INFECTION OF LUMBAR SPINE: ICD-10-CM

## 2024-12-10 DIAGNOSIS — A49.02 MRSA INFECTION: ICD-10-CM

## 2024-12-10 LAB
BASOPHILS # BLD AUTO: 0.03 THOUSANDS/ÂΜL (ref 0–0.1)
BASOPHILS NFR BLD AUTO: 0 % (ref 0–1)
EOSINOPHIL # BLD AUTO: 0.23 THOUSAND/ÂΜL (ref 0–0.61)
EOSINOPHIL NFR BLD AUTO: 3 % (ref 0–6)
ERYTHROCYTE [DISTWIDTH] IN BLOOD BY AUTOMATED COUNT: 15.9 % (ref 11.6–15.1)
ERYTHROCYTE [SEDIMENTATION RATE] IN BLOOD: 95 MM/HOUR (ref 0–19)
HCT VFR BLD AUTO: 33.6 % (ref 36.5–49.3)
HGB BLD-MCNC: 10.8 G/DL (ref 12–17)
IMM GRANULOCYTES # BLD AUTO: 0.03 THOUSAND/UL (ref 0–0.2)
IMM GRANULOCYTES NFR BLD AUTO: 0 % (ref 0–2)
LYMPHOCYTES # BLD AUTO: 2.1 THOUSANDS/ÂΜL (ref 0.6–4.47)
LYMPHOCYTES NFR BLD AUTO: 30 % (ref 14–44)
MCH RBC QN AUTO: 30.8 PG (ref 26.8–34.3)
MCHC RBC AUTO-ENTMCNC: 32.1 G/DL (ref 31.4–37.4)
MCV RBC AUTO: 96 FL (ref 82–98)
MONOCYTES # BLD AUTO: 0.56 THOUSAND/ÂΜL (ref 0.17–1.22)
MONOCYTES NFR BLD AUTO: 8 % (ref 4–12)
NEUTROPHILS # BLD AUTO: 4.18 THOUSANDS/ÂΜL (ref 1.85–7.62)
NEUTS SEG NFR BLD AUTO: 59 % (ref 43–75)
NRBC BLD AUTO-RTO: 0 /100 WBCS
PLATELET # BLD AUTO: 221 THOUSANDS/UL (ref 149–390)
PMV BLD AUTO: 11.6 FL (ref 8.9–12.7)
RBC # BLD AUTO: 3.51 MILLION/UL (ref 3.88–5.62)
WBC # BLD AUTO: 7.13 THOUSAND/UL (ref 4.31–10.16)

## 2024-12-10 PROCEDURE — 36415 COLL VENOUS BLD VENIPUNCTURE: CPT

## 2024-12-10 PROCEDURE — 85652 RBC SED RATE AUTOMATED: CPT

## 2024-12-10 PROCEDURE — 80048 BASIC METABOLIC PNL TOTAL CA: CPT

## 2024-12-10 PROCEDURE — G0151 HHCP-SERV OF PT,EA 15 MIN: HCPCS

## 2024-12-10 PROCEDURE — 85025 COMPLETE CBC W/AUTO DIFF WBC: CPT

## 2024-12-10 PROCEDURE — 86140 C-REACTIVE PROTEIN: CPT

## 2024-12-10 PROCEDURE — G0299 HHS/HOSPICE OF RN EA 15 MIN: HCPCS

## 2024-12-11 ENCOUNTER — TELEMEDICINE (OUTPATIENT)
Dept: PALLIATIVE MEDICINE | Facility: CLINIC | Age: 64
End: 2024-12-11

## 2024-12-11 ENCOUNTER — HOME CARE VISIT (OUTPATIENT)
Dept: HOME HEALTH SERVICES | Facility: HOME HEALTHCARE | Age: 64
End: 2024-12-11
Payer: MEDICARE

## 2024-12-11 VITALS — HEART RATE: 81 BPM | DIASTOLIC BLOOD PRESSURE: 62 MMHG | SYSTOLIC BLOOD PRESSURE: 128 MMHG | OXYGEN SATURATION: 96 %

## 2024-12-11 DIAGNOSIS — Z71.89 COUNSELING AND COORDINATION OF CARE: Primary | ICD-10-CM

## 2024-12-11 LAB
ANION GAP SERPL CALCULATED.3IONS-SCNC: 11 MMOL/L (ref 4–13)
BUN SERPL-MCNC: 20 MG/DL (ref 5–25)
CALCIUM SERPL-MCNC: 9.1 MG/DL (ref 8.4–10.2)
CHLORIDE SERPL-SCNC: 97 MMOL/L (ref 96–108)
CO2 SERPL-SCNC: 27 MMOL/L (ref 21–32)
CREAT SERPL-MCNC: 1.44 MG/DL (ref 0.6–1.3)
CRP SERPL QL: 88.2 MG/L
GFR SERPL CREATININE-BSD FRML MDRD: 50 ML/MIN/1.73SQ M
GLUCOSE SERPL-MCNC: 112 MG/DL (ref 65–140)
MYCOBACTERIUM SPEC CULT: NORMAL
MYCOBACTERIUM SPEC CULT: NORMAL
POTASSIUM SERPL-SCNC: 3.9 MMOL/L (ref 3.5–5.3)
RHODAMINE-AURAMINE STN SPEC: NORMAL
RHODAMINE-AURAMINE STN SPEC: NORMAL
SODIUM SERPL-SCNC: 135 MMOL/L (ref 135–147)

## 2024-12-11 PROCEDURE — NC001 PR NO CHARGE

## 2024-12-11 PROCEDURE — G0152 HHCP-SERV OF OT,EA 15 MIN: HCPCS

## 2024-12-11 NOTE — CASE COMMUNICATION
OT discipline discharge today as planned.  Goals mostly met.  Pt continues to be impacted by pain and infection.  Pt is able to perform majority of ADL's independently although needs increased time.

## 2024-12-11 NOTE — CASE COMMUNICATION
SN discharged from nursing with all goals met.  Patient aware to go to outpatient lab for lab work next week.

## 2024-12-12 ENCOUNTER — HOME CARE VISIT (OUTPATIENT)
Dept: HOME HEALTH SERVICES | Facility: HOME HEALTHCARE | Age: 64
End: 2024-12-12
Payer: MEDICARE

## 2024-12-12 VITALS — DIASTOLIC BLOOD PRESSURE: 64 MMHG | HEART RATE: 70 BPM | SYSTOLIC BLOOD PRESSURE: 118 MMHG | OXYGEN SATURATION: 93 %

## 2024-12-12 PROCEDURE — G0151 HHCP-SERV OF PT,EA 15 MIN: HCPCS

## 2024-12-13 ENCOUNTER — LAB (OUTPATIENT)
Dept: LAB | Facility: HOSPITAL | Age: 64
End: 2024-12-13
Payer: MEDICARE

## 2024-12-13 ENCOUNTER — RESULTS FOLLOW-UP (OUTPATIENT)
Dept: FAMILY MEDICINE CLINIC | Facility: CLINIC | Age: 64
End: 2024-12-13

## 2024-12-13 DIAGNOSIS — R79.89 ELEVATED SERUM CREATININE: ICD-10-CM

## 2024-12-13 LAB
ANION GAP SERPL CALCULATED.3IONS-SCNC: 10 MMOL/L (ref 4–13)
BUN SERPL-MCNC: 22 MG/DL (ref 5–25)
CALCIUM SERPL-MCNC: 8.9 MG/DL (ref 8.4–10.2)
CHLORIDE SERPL-SCNC: 94 MMOL/L (ref 96–108)
CO2 SERPL-SCNC: 30 MMOL/L (ref 21–32)
CREAT SERPL-MCNC: 1.45 MG/DL (ref 0.6–1.3)
GFR SERPL CREATININE-BSD FRML MDRD: 50 ML/MIN/1.73SQ M
GLUCOSE P FAST SERPL-MCNC: 128 MG/DL (ref 65–99)
POTASSIUM SERPL-SCNC: 3.6 MMOL/L (ref 3.5–5.3)
SODIUM SERPL-SCNC: 134 MMOL/L (ref 135–147)

## 2024-12-13 PROCEDURE — 36415 COLL VENOUS BLD VENIPUNCTURE: CPT

## 2024-12-13 PROCEDURE — 80048 BASIC METABOLIC PNL TOTAL CA: CPT

## 2024-12-13 NOTE — TELEPHONE ENCOUNTER
----- Message from Robert Budinetz, MD sent at 12/13/2024  1:52 PM EST -----  Mildly reduced renal function is new  Hold metalozone  Decrease torsemide to 40mg daily

## 2024-12-16 DIAGNOSIS — E87.6 HYPOKALEMIA: ICD-10-CM

## 2024-12-16 DIAGNOSIS — R79.89 ELEVATED SERUM CREATININE: Primary | ICD-10-CM

## 2024-12-16 LAB
FUNGUS SPEC CULT: NORMAL
FUNGUS SPEC CULT: NORMAL

## 2024-12-16 RX ORDER — POTASSIUM CHLORIDE 750 MG/1
40 TABLET, EXTENDED RELEASE ORAL 2 TIMES DAILY
Qty: 120 TABLET | Refills: 0 | Status: SHIPPED | OUTPATIENT
Start: 2024-12-16

## 2024-12-16 NOTE — TELEPHONE ENCOUNTER
Good afternoon Dr. Budinetz,     It appears internal medicine prescribed potassium supplement for this mutual patient upon discharge. We have received this refill request in error as we do not manage this medication. Are you able to assist or have someone from your team reach out to schedule if needed? Thanks for your time.   GUERA Philip

## 2024-12-16 NOTE — TELEPHONE ENCOUNTER
Patient aware of lab results and PCP recommendations. He will be composing a MyChart message with questions to better understand his results.

## 2024-12-17 ENCOUNTER — APPOINTMENT (OUTPATIENT)
Dept: LAB | Facility: HOSPITAL | Age: 64
End: 2024-12-17
Payer: MEDICARE

## 2024-12-17 DIAGNOSIS — M79.89 SOFT TISSUE INFECTION OF LUMBAR SPINE: ICD-10-CM

## 2024-12-17 DIAGNOSIS — B99.9 SOFT TISSUE INFECTION OF LUMBAR SPINE: ICD-10-CM

## 2024-12-17 DIAGNOSIS — A49.02 MRSA INFECTION: ICD-10-CM

## 2024-12-17 LAB
ANION GAP SERPL CALCULATED.3IONS-SCNC: 9 MMOL/L (ref 4–13)
BASOPHILS # BLD AUTO: 0.04 THOUSANDS/ÂΜL (ref 0–0.1)
BASOPHILS NFR BLD AUTO: 1 % (ref 0–1)
BUN SERPL-MCNC: 41 MG/DL (ref 5–25)
CALCIUM SERPL-MCNC: 9.6 MG/DL (ref 8.4–10.2)
CHLORIDE SERPL-SCNC: 93 MMOL/L (ref 96–108)
CO2 SERPL-SCNC: 31 MMOL/L (ref 21–32)
CREAT SERPL-MCNC: 1.38 MG/DL (ref 0.6–1.3)
CRP SERPL QL: 41.5 MG/L
EOSINOPHIL # BLD AUTO: 0.26 THOUSAND/ÂΜL (ref 0–0.61)
EOSINOPHIL NFR BLD AUTO: 3 % (ref 0–6)
ERYTHROCYTE [DISTWIDTH] IN BLOOD BY AUTOMATED COUNT: 15.8 % (ref 11.6–15.1)
ERYTHROCYTE [SEDIMENTATION RATE] IN BLOOD: 121 MM/HOUR (ref 0–19)
GFR SERPL CREATININE-BSD FRML MDRD: 53 ML/MIN/1.73SQ M
GLUCOSE SERPL-MCNC: 165 MG/DL (ref 65–140)
HCT VFR BLD AUTO: 33.6 % (ref 36.5–49.3)
HGB BLD-MCNC: 11.1 G/DL (ref 12–17)
IMM GRANULOCYTES # BLD AUTO: 0.03 THOUSAND/UL (ref 0–0.2)
IMM GRANULOCYTES NFR BLD AUTO: 0 % (ref 0–2)
LYMPHOCYTES # BLD AUTO: 2 THOUSANDS/ÂΜL (ref 0.6–4.47)
LYMPHOCYTES NFR BLD AUTO: 26 % (ref 14–44)
MCH RBC QN AUTO: 30.9 PG (ref 26.8–34.3)
MCHC RBC AUTO-ENTMCNC: 33 G/DL (ref 31.4–37.4)
MCV RBC AUTO: 94 FL (ref 82–98)
MONOCYTES # BLD AUTO: 0.68 THOUSAND/ÂΜL (ref 0.17–1.22)
MONOCYTES NFR BLD AUTO: 9 % (ref 4–12)
MYCOBACTERIUM SPEC CULT: NORMAL
MYCOBACTERIUM SPEC CULT: NORMAL
NEUTROPHILS # BLD AUTO: 4.77 THOUSANDS/ÂΜL (ref 1.85–7.62)
NEUTS SEG NFR BLD AUTO: 61 % (ref 43–75)
NRBC BLD AUTO-RTO: 0 /100 WBCS
PLATELET # BLD AUTO: 296 THOUSANDS/UL (ref 149–390)
PMV BLD AUTO: 10.8 FL (ref 8.9–12.7)
POTASSIUM SERPL-SCNC: 3.5 MMOL/L (ref 3.5–5.3)
RBC # BLD AUTO: 3.59 MILLION/UL (ref 3.88–5.62)
RHODAMINE-AURAMINE STN SPEC: NORMAL
RHODAMINE-AURAMINE STN SPEC: NORMAL
SODIUM SERPL-SCNC: 133 MMOL/L (ref 135–147)
WBC # BLD AUTO: 7.78 THOUSAND/UL (ref 4.31–10.16)

## 2024-12-17 PROCEDURE — 85652 RBC SED RATE AUTOMATED: CPT

## 2024-12-17 PROCEDURE — 36415 COLL VENOUS BLD VENIPUNCTURE: CPT

## 2024-12-17 PROCEDURE — 80048 BASIC METABOLIC PNL TOTAL CA: CPT

## 2024-12-17 PROCEDURE — 85025 COMPLETE CBC W/AUTO DIFF WBC: CPT

## 2024-12-17 PROCEDURE — 86140 C-REACTIVE PROTEIN: CPT

## 2024-12-20 ENCOUNTER — APPOINTMENT (OUTPATIENT)
Dept: LAB | Facility: HOSPITAL | Age: 64
End: 2024-12-20
Payer: MEDICARE

## 2024-12-20 ENCOUNTER — RA CDI HCC (OUTPATIENT)
Dept: OTHER | Facility: HOSPITAL | Age: 64
End: 2024-12-20

## 2024-12-20 ENCOUNTER — RESULTS FOLLOW-UP (OUTPATIENT)
Dept: FAMILY MEDICINE CLINIC | Facility: CLINIC | Age: 64
End: 2024-12-20

## 2024-12-20 DIAGNOSIS — R79.89 ELEVATED SERUM CREATININE: ICD-10-CM

## 2024-12-20 LAB
ANION GAP SERPL CALCULATED.3IONS-SCNC: 9 MMOL/L (ref 4–13)
BUN SERPL-MCNC: 28 MG/DL (ref 5–25)
CALCIUM SERPL-MCNC: 8.8 MG/DL (ref 8.4–10.2)
CHLORIDE SERPL-SCNC: 95 MMOL/L (ref 96–108)
CO2 SERPL-SCNC: 30 MMOL/L (ref 21–32)
CREAT SERPL-MCNC: 1.32 MG/DL (ref 0.6–1.3)
GFR SERPL CREATININE-BSD FRML MDRD: 56 ML/MIN/1.73SQ M
GLUCOSE P FAST SERPL-MCNC: 177 MG/DL (ref 65–99)
POTASSIUM SERPL-SCNC: 4.1 MMOL/L (ref 3.5–5.3)
SODIUM SERPL-SCNC: 134 MMOL/L (ref 135–147)

## 2024-12-20 PROCEDURE — 80048 BASIC METABOLIC PNL TOTAL CA: CPT

## 2024-12-20 PROCEDURE — 36415 COLL VENOUS BLD VENIPUNCTURE: CPT

## 2024-12-20 NOTE — TELEPHONE ENCOUNTER
----- Message from Robert Budinetz, MD sent at 12/20/2024 12:50 PM EST -----  Numbers are better  Will repeat in 2-3 weeks

## 2024-12-23 ENCOUNTER — TELEMEDICINE (OUTPATIENT)
Dept: INFECTIOUS DISEASES | Facility: CLINIC | Age: 64
End: 2024-12-23
Payer: MEDICARE

## 2024-12-23 DIAGNOSIS — T81.49XA SURGICAL SITE INFECTION: Primary | ICD-10-CM

## 2024-12-23 DIAGNOSIS — T84.7XXD HARDWARE COMPLICATING WOUND INFECTION, SUBSEQUENT ENCOUNTER: ICD-10-CM

## 2024-12-23 DIAGNOSIS — E11.9 TYPE 2 DIABETES MELLITUS WITHOUT COMPLICATION, WITH LONG-TERM CURRENT USE OF INSULIN (HCC): ICD-10-CM

## 2024-12-23 DIAGNOSIS — E66.01 MORBID (SEVERE) OBESITY DUE TO EXCESS CALORIES (HCC): ICD-10-CM

## 2024-12-23 DIAGNOSIS — I50.32 CHRONIC DIASTOLIC (CONGESTIVE) HEART FAILURE (HCC): ICD-10-CM

## 2024-12-23 DIAGNOSIS — Z79.4 TYPE 2 DIABETES MELLITUS WITHOUT COMPLICATION, WITH LONG-TERM CURRENT USE OF INSULIN (HCC): ICD-10-CM

## 2024-12-23 LAB
FUNGUS SPEC CULT: NORMAL
FUNGUS SPEC CULT: NORMAL
MYCOBACTERIUM SPEC CULT: NORMAL
MYCOBACTERIUM SPEC CULT: NORMAL
RHODAMINE-AURAMINE STN SPEC: NORMAL
RHODAMINE-AURAMINE STN SPEC: NORMAL

## 2024-12-23 PROCEDURE — 99214 OFFICE O/P EST MOD 30 MIN: CPT | Performed by: PHYSICIAN ASSISTANT

## 2024-12-23 RX ORDER — SULFAMETHOXAZOLE AND TRIMETHOPRIM 800; 160 MG/1; MG/1
1 TABLET ORAL EVERY 12 HOURS SCHEDULED
Qty: 60 TABLET | Refills: 0 | Status: SHIPPED | OUTPATIENT
Start: 2024-12-23 | End: 2025-01-22

## 2024-12-23 NOTE — ASSESSMENT & PLAN NOTE
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  Initial L3-4 decompressive hemilaminectomy with transverse lumbar interbody fusion and L4-5 fixation and fusion 10/30.  One week after his initial surgery he noted purulent discharge from the wound.  He was given a script for cephalexin.  MRI revealed a fluid collection in the area of the incision.  OR cultures positive for MRSA.  AFB and Fungal cultures remain negative to date.  Patient discharged and finished a one week course of IV Vancomycin then transition to po bactrim.      12/4/24- Patient states he is doing well on the bactrim.  He does have a slight increase in his Cr although his Cr does appear to fluctuate so I do not necessarily think this is out of his normal range.  Will continue to monitor closely.  MRSA is Intermediate to tetracyclines so limited to other good po options.  There is some consideration for hardware involvement as it appears infection was deep (as per Op Note).     12/23/24- Patient tolerating the bactrim well.  No new complaints today.  Labs overall ok.  Cr slightly up but stable.  Also note BUN up as well, ? Dehydration playing a part.  Also could be false elevation of Cr from the bactrim.  Will continue to monitor closely.  ESR up and CRP down.  Patient feeling well so will continue to trend.      Plan  - extend Bactrim DS 1 tab po bid through 1/19/25 to complete 6 weeks BUT due to concern for hardware involvement would go at least until normalization/plateau.    ESR/CRP still quite elevated at this time.   - continue weekly CBCD, BMP, ESR, CRP for now  - follow up with Neurosurgery as scheduled  - RTO 3 weeks    Orders:    sulfamethoxazole-trimethoprim (BACTRIM DS) 800-160 mg per tablet; Take 1 tablet by mouth every 12 (twelve) hours    CBC and differential; Standing    Basic metabolic panel; Standing    Sedimentation rate, automated; Standing    C-reactive protein;  Standing

## 2024-12-23 NOTE — PROGRESS NOTES
Name: Dominick Irving      : 1960      MRN: [id number]  Encounter Provider: Edmar Pate PA-C  Encounter Date: 2024   Encounter department: [facility]  :  Assessment & Plan  Surgical site infection  Postoperative Staphylococcus aureus (MRSA) lumbar wound infection s/p lumbar wound debridement and washout .  Initial L3-4 decompressive hemilaminectomy with transverse lumbar interbody fusion and L4-5 fixation and fusion 10/30.  One week after his initial surgery he noted purulent discharge from the wound.  He was given a script for cephalexin.  MRI revealed a fluid collection in the area of the incision.  OR cultures positive for MRSA.  AFB and Fungal cultures remain negative to date.  Patient discharged and finished a one week course of IV Vancomycin then transition to po bactrim.      24- Patient states he is doing well on the bactrim.  He does have a slight increase in his Cr although his Cr does appear to fluctuate so I do not necessarily think this is out of his normal range.  Will continue to monitor closely.  MRSA is Intermediate to tetracyclines so limited to other good po options.  There is some consideration for hardware involvement as it appears infection was deep (as per Op Note).     24- Patient tolerating the bactrim well.  No new complaints today.  Labs overall ok.  Cr slightly up but stable.  Also note BUN up as well, ? Dehydration playing a part.  Also could be false elevation of Cr from the bactrim.  Will continue to monitor closely.  ESR up and CRP down.  Patient feeling well so will continue to trend.      Plan  - extend Bactrim DS 1 tab po bid through 25 to complete 6 weeks BUT due to concern for hardware involvement would go at least until normalization/plateau.    ESR/CRP still quite elevated at this time.   - continue weekly CBCD, BMP, ESR, CRP for now  - follow up with Neurosurgery as scheduled  - RTO 3 weeks    Orders:     sulfamethoxazole-trimethoprim (BACTRIM DS) 800-160 mg per tablet; Take 1 tablet by mouth every 12 (twelve) hours    CBC and differential; Standing    Basic metabolic panel; Standing    Sedimentation rate, automated; Standing    C-reactive protein; Standing    Hardware complicating wound infection, subsequent encounter    Orders:    CBC and differential; Standing    Type 2 diabetes mellitus without complication, with long-term current use of insulin ([facility])    Lab Results   Component Value Date    HGBA1C 9.0 (H) 10/01/2024            Chronic diastolic (congestive) heart failure ([facility])      Patient on daily diuretic. Does take a second one prn if he notes weight gain.              Morbid (severe) obesity due to excess calories ([facility])  BMI 36.96             History of Present Illness   HPI  Dominick Irving is a 64 y.o. male who presents for virtual follow up today regarding  Postoperative Staphylococcus aureus (MRSA) lumbar wound infection s/p lumbar wound debridement and washout 11/20. Patient currently remains on bactrim.  He continues to tolerate it well.  His main complaint today is weakness.  He just does not feel like he has progressed as quickly as he would like post surgery and infection.  Otherwise he feels well.  His back feels good.        Review of Systems   Constitutional:  Negative for chills and fever.   Respiratory:  Negative for cough and shortness of breath.    Gastrointestinal:  Negative for abdominal pain, diarrhea, nausea and vomiting.   Skin:  Negative for rash.   Psychiatric/Behavioral:  Negative for behavioral problems and confusion.           Objective   There were no vitals taken for this visit.     Physical Exam  Constitutional:       General: He is not in acute distress.     Appearance: Normal appearance. He is not ill-appearing, toxic-appearing or diaphoretic.   HENT:      Head: Normocephalic and atraumatic.   Eyes:      General: No scleral icterus.        Right eye: No discharge.         Left  eye: No discharge.      Conjunctiva/sclera: Conjunctivae normal.   Pulmonary:      Effort: Pulmonary effort is normal. No respiratory distress.   Skin:     Coloration: Skin is not jaundiced or pale.      Findings: No erythema or rash.   Neurological:      Mental Status: He is alert and oriented to person, place, and time.   Psychiatric:         Mood and Affect: Mood normal.         Behavior: Behavior normal.         12/17/24  Wbc: 7.78  Hgb: 11.1  Plt: 296  Cr: 1.38  ESR: 121  CRP: 41.5    12/20/24  Cr: 1.32        Telemedicine consent    Patient: Dominick MARTINEZ Aristides  Provider: Edmar Pate PA-C  Provider located at [facility]  [address] [place] [id number] [phone]    The patient was identified by name and date of birth. Dominick Irving was informed that this is a telemedicine visit and that the visit is being conducted through the Epic Embedded platform. He agrees to proceed..  My office door was closed. No one else was in the room.  He acknowledged consent and understanding of privacy and security of the video platform. The patient has agreed to participate and understands they can discontinue the visit at any time.    Patient is aware this is a billable service.     I spent 10 minutes with the patient during this visit.    Additional time spent on chart/lab review, documentation and order placement.

## 2024-12-30 ENCOUNTER — OFFICE VISIT (OUTPATIENT)
Dept: FAMILY MEDICINE CLINIC | Facility: CLINIC | Age: 64
End: 2024-12-30
Payer: MEDICARE

## 2024-12-30 VITALS
HEIGHT: 67 IN | HEART RATE: 60 BPM | WEIGHT: 245.4 LBS | SYSTOLIC BLOOD PRESSURE: 118 MMHG | DIASTOLIC BLOOD PRESSURE: 54 MMHG | BODY MASS INDEX: 38.52 KG/M2 | OXYGEN SATURATION: 95 %

## 2024-12-30 DIAGNOSIS — G89.18 POSTOPERATIVE PAIN AFTER SPINAL SURGERY: ICD-10-CM

## 2024-12-30 DIAGNOSIS — M54.41 CHRONIC BILATERAL LOW BACK PAIN WITH BILATERAL SCIATICA: Primary | ICD-10-CM

## 2024-12-30 DIAGNOSIS — G89.4 CHRONIC PAIN SYNDROME: ICD-10-CM

## 2024-12-30 DIAGNOSIS — M54.42 CHRONIC BILATERAL LOW BACK PAIN WITH BILATERAL SCIATICA: Primary | ICD-10-CM

## 2024-12-30 DIAGNOSIS — Z98.890 S/P LAMINECTOMY: ICD-10-CM

## 2024-12-30 DIAGNOSIS — M54.16 LUMBAR RADICULOPATHY: ICD-10-CM

## 2024-12-30 DIAGNOSIS — E11.69 TYPE 2 DIABETES MELLITUS WITH OTHER SPECIFIED COMPLICATION, UNSPECIFIED WHETHER LONG TERM INSULIN USE (HCC): ICD-10-CM

## 2024-12-30 DIAGNOSIS — G89.29 CHRONIC BILATERAL LOW BACK PAIN WITH BILATERAL SCIATICA: Primary | ICD-10-CM

## 2024-12-30 LAB — SL AMB POCT HEMOGLOBIN AIC: 7.1 (ref ?–6.5)

## 2024-12-30 PROCEDURE — 99214 OFFICE O/P EST MOD 30 MIN: CPT | Performed by: FAMILY MEDICINE

## 2024-12-30 PROCEDURE — 83036 HEMOGLOBIN GLYCOSYLATED A1C: CPT | Performed by: FAMILY MEDICINE

## 2024-12-30 RX ORDER — HYDROMORPHONE HYDROCHLORIDE 8 MG/1
8 TABLET ORAL
Qty: 240 TABLET | Refills: 0 | Status: SHIPPED | OUTPATIENT
Start: 2024-12-30

## 2024-12-30 NOTE — ASSESSMENT & PLAN NOTE
Orders:    HYDROmorphone (DILAUDID) 8 MG tablet; Take 1 tablet (8 mg total) by mouth every 3 (three) hours as needed for moderate pain or severe pain Max Daily Amount: 64 mg

## 2024-12-30 NOTE — ASSESSMENT & PLAN NOTE
Orders:    HYDROmorphone (DILAUDID) 8 MG tablet; Take 1 tablet (8 mg total) by mouth every 3 (three) hours as needed for moderate pain or severe pain Max Daily Amount: 64 mg  continue meds, no changes

## 2024-12-30 NOTE — PROGRESS NOTES
Diabetic Foot Exam    Patient's shoes and socks removed.    Right Foot/Ankle   Right Foot Inspection  Skin Exam: skin normal and skin intact. No dry skin, no warmth, no callus, no erythema, no maceration, no abnormal color, no pre-ulcer, no ulcer and no callus.     Toe Exam: ROM and strength within normal limits.     Sensory   Monofilament testing: diminished    Vascular  The right DP pulse is 2+.     Left Foot/Ankle  Left Foot Inspection  Skin Exam: skin normal and skin intact. No dry skin, no warmth, no erythema, no maceration, normal color, no pre-ulcer, no ulcer and no callus.     Toe Exam: ROM and strength within normal limits.     Sensory   Monofilament testing: diminished    Vascular  The left DP pulse is 2+.     Assign Risk Category  No deformity present  No loss of protective sensation  No weak pulses  Risk: 0      Name: Dominick Irving      : 1960      MRN: [id number]  Encounter Provider: Robert Budinetz, MD  Encounter Date: 2024   Encounter department: [facility]  :  Assessment & Plan  Chronic pain syndrome    Orders:    HYDROmorphone (DILAUDID) 8 MG tablet; Take 1 tablet (8 mg total) by mouth every 3 (three) hours as needed for moderate pain or severe pain Max Daily Amount: 64 mg  continue meds, no changes  Chronic bilateral low back pain with bilateral sciatica    Orders:    HYDROmorphone (DILAUDID) 8 MG tablet; Take 1 tablet (8 mg total) by mouth every 3 (three) hours as needed for moderate pain or severe pain Max Daily Amount: 64 mg  contiue meds, no changes  Lumbar radiculopathy    Orders:    HYDROmorphone (DILAUDID) 8 MG tablet; Take 1 tablet (8 mg total) by mouth every 3 (three) hours as needed for moderate pain or severe pain Max Daily Amount: 64 mg  continue meds, no changes  Postoperative pain after spinal surgery    Orders:    HYDROmorphone (DILAUDID) 8 MG tablet; Take 1 tablet (8 mg total) by mouth every 3 (three) hours as needed for moderate pain or severe  "pain Max Daily Amount: 64 mg  continue meds, no changes  S/P laminectomy    Orders:    HYDROmorphone (DILAUDID) 8 MG tablet; Take 1 tablet (8 mg total) by mouth every 3 (three) hours as needed for moderate pain or severe pain Max Daily Amount: 64 mg    Continue meds, no changes       History of Present Illness     His A1c is 7.1 went on to make any changes.  His back pain is significantly better he is off fentanyl patches.  He has been to follow-up with neurosurgery in the near future he is in therapy.  We can refill the Dilaudid for now and wean off that gradually and slowly over time.  He has been on significant amount of pain meds for quite some time.      Review of Systems   Respiratory: Negative.     Cardiovascular: Negative.        Objective   /54 (BP Location: Left arm, Patient Position: Sitting, Cuff Size: Large)   Pulse 60   Ht 5' 7\" (1.702 m)   Wt 111 kg (245 lb 6.4 oz)   SpO2 95%   BMI 38.44 kg/m²      Physical Exam  Vitals and nursing note reviewed.   Constitutional:       General: He is not in acute distress.     Appearance: He is well-developed.   HENT:      Head: Normocephalic and atraumatic.   Eyes:      Conjunctiva/sclera: Conjunctivae normal.   Cardiovascular:      Rate and Rhythm: Normal rate and regular rhythm.      Pulses: no weak pulses.           Dorsalis pedis pulses are 2+ on the right side and 2+ on the left side.      Heart sounds: No murmur heard.  Pulmonary:      Effort: Pulmonary effort is normal. No respiratory distress.      Breath sounds: Normal breath sounds.   Abdominal:      Palpations: Abdomen is soft.      Tenderness: There is no abdominal tenderness.   Musculoskeletal:         General: No swelling.      Cervical back: Neck supple.        Feet:    Feet:      Right foot:      Skin integrity: No ulcer, skin breakdown, erythema, warmth, callus or dry skin.      Left foot:      Skin integrity: No ulcer, skin breakdown, erythema, warmth, callus or dry skin.   Skin:     " General: Skin is warm and dry.      Capillary Refill: Capillary refill takes less than 2 seconds.   Neurological:      Mental Status: He is alert.   Psychiatric:         Mood and Affect: Mood normal.

## 2024-12-30 NOTE — ASSESSMENT & PLAN NOTE
Orders:    HYDROmorphone (DILAUDID) 8 MG tablet; Take 1 tablet (8 mg total) by mouth every 3 (three) hours as needed for moderate pain or severe pain Max Daily Amount: 64 mg  contiue meds, no changes

## 2024-12-31 ENCOUNTER — APPOINTMENT (OUTPATIENT)
Dept: LAB | Facility: HOSPITAL | Age: 64
End: 2024-12-31
Payer: MEDICARE

## 2024-12-31 ENCOUNTER — HOSPITAL ENCOUNTER (OUTPATIENT)
Dept: RADIOLOGY | Facility: HOSPITAL | Age: 64
Discharge: HOME | End: 2024-12-31
Payer: MEDICARE

## 2024-12-31 DIAGNOSIS — T84.7XXD HARDWARE COMPLICATING WOUND INFECTION, SUBSEQUENT ENCOUNTER: ICD-10-CM

## 2024-12-31 DIAGNOSIS — T81.49XA SURGICAL SITE INFECTION: ICD-10-CM

## 2024-12-31 DIAGNOSIS — Z98.890 POST-OPERATIVE STATE: ICD-10-CM

## 2024-12-31 LAB
ANION GAP SERPL CALCULATED.3IONS-SCNC: 9 MMOL/L (ref 4–13)
BASOPHILS # BLD AUTO: 0.04 THOUSANDS/ΜL (ref 0–0.1)
BASOPHILS NFR BLD AUTO: 1 % (ref 0–1)
BUN SERPL-MCNC: 31 MG/DL (ref 5–25)
CALCIUM SERPL-MCNC: 9.3 MG/DL (ref 8.4–10.2)
CHLORIDE SERPL-SCNC: 95 MMOL/L (ref 96–108)
CO2 SERPL-SCNC: 30 MMOL/L (ref 21–32)
CREAT SERPL-MCNC: 1.45 MG/DL (ref 0.6–1.3)
CRP SERPL QL: 72.2 MG/L
EOSINOPHIL # BLD AUTO: 0.3 THOUSAND/ΜL (ref 0–0.61)
EOSINOPHIL NFR BLD AUTO: 4 % (ref 0–6)
ERYTHROCYTE [DISTWIDTH] IN BLOOD BY AUTOMATED COUNT: 16.2 % (ref 11.6–15.1)
ERYTHROCYTE [SEDIMENTATION RATE] IN BLOOD: 107 MM/HOUR (ref 0–19)
GFR SERPL CREATININE-BSD FRML MDRD: 50 ML/MIN/1.73SQ M
GLUCOSE P FAST SERPL-MCNC: 119 MG/DL (ref 65–99)
HCT VFR BLD AUTO: 36.3 % (ref 36.5–49.3)
HGB BLD-MCNC: 12.3 G/DL (ref 12–17)
IMM GRANULOCYTES # BLD AUTO: 0.02 THOUSAND/UL (ref 0–0.2)
IMM GRANULOCYTES NFR BLD AUTO: 0 % (ref 0–2)
LYMPHOCYTES # BLD AUTO: 2.49 THOUSANDS/ΜL (ref 0.6–4.47)
LYMPHOCYTES NFR BLD AUTO: 32 % (ref 14–44)
MCH RBC QN AUTO: 31 PG (ref 26.8–34.3)
MCHC RBC AUTO-ENTMCNC: 33.9 G/DL (ref 31.4–37.4)
MCV RBC AUTO: 91 FL (ref 82–98)
MONOCYTES # BLD AUTO: 0.62 THOUSAND/ΜL (ref 0.17–1.22)
MONOCYTES NFR BLD AUTO: 8 % (ref 4–12)
MYCOBACTERIUM SPEC CULT: NORMAL
MYCOBACTERIUM SPEC CULT: NORMAL
NEUTROPHILS # BLD AUTO: 4.43 THOUSANDS/ΜL (ref 1.85–7.62)
NEUTS SEG NFR BLD AUTO: 55 % (ref 43–75)
NRBC BLD AUTO-RTO: 0 /100 WBCS
PLATELET # BLD AUTO: 211 THOUSANDS/UL (ref 149–390)
PMV BLD AUTO: 11 FL (ref 8.9–12.7)
POTASSIUM SERPL-SCNC: 3.4 MMOL/L (ref 3.5–5.3)
RBC # BLD AUTO: 3.97 MILLION/UL (ref 3.88–5.62)
RHODAMINE-AURAMINE STN SPEC: NORMAL
RHODAMINE-AURAMINE STN SPEC: NORMAL
SODIUM SERPL-SCNC: 134 MMOL/L (ref 135–147)
WBC # BLD AUTO: 7.9 THOUSAND/UL (ref 4.31–10.16)

## 2024-12-31 PROCEDURE — 85025 COMPLETE CBC W/AUTO DIFF WBC: CPT

## 2024-12-31 PROCEDURE — 86140 C-REACTIVE PROTEIN: CPT

## 2024-12-31 PROCEDURE — 80048 BASIC METABOLIC PNL TOTAL CA: CPT

## 2024-12-31 PROCEDURE — 85652 RBC SED RATE AUTOMATED: CPT

## 2024-12-31 PROCEDURE — 72100 X-RAY EXAM L-S SPINE 2/3 VWS: CPT

## 2024-12-31 PROCEDURE — 36415 COLL VENOUS BLD VENIPUNCTURE: CPT

## 2024-12-31 NOTE — PROGRESS NOTES
"Palliative Supportive Care  met with patient/family to continue to provide emotional support and guidance.      Updated biopsychosocial information relevant to support:     The patient was identified by name and date of birth. Dominick was informed that this is a telemedicine visit and that the visit is being conducted through the Epic Embedded platform. They agree to proceed..  My office door was closed. No one else was in the room. They acknowledged consent and understanding of privacy and security of the video platform. The patient has agreed to participate and understands they can discontinue the visit at any time.    Dominick spoke about feeling pretty down about his infection and not feeling like he is making any progress. He said that his doctors keep saying they are not concerned but he wonders at what point they will become concerned. He said that he feels upset he has limited energy and cannot do as much as he would like. He is able to go up the stairs twice a day but \"not whenever I feel like it\". LCSW encouraged Dominick to reframe his negative thinking and encouraged him to explore his progress. LCSW also discussed remembering he has a new baseline and cannot compare his progress to what he was able to do 2 years ago because he is recovering from 2 years of deconditioning as well as the infection. He agreed but he is just disappointed and wishes he had more concrete answers. LCSW encouraged positive self talk.       Identified areas of need include: emotional support    Resources provided:    Areas that need future follow-up include: reduce anxiety/depressed mood, positive self talk, reframing negative thoughts    I have spent 60 minutes with Patient  today in which greater than 50% of this time was spent in counseling/coordination of care     Palliative  will follow-up as requested by patient, family, and primary team.  Please contact with any specific requests    "

## 2025-01-02 ENCOUNTER — OFFICE VISIT (OUTPATIENT)
Dept: NEUROSURGERY | Facility: CLINIC | Age: 65
End: 2025-01-02

## 2025-01-02 ENCOUNTER — SOCIAL WORK (OUTPATIENT)
Dept: PALLIATIVE MEDICINE | Facility: CLINIC | Age: 65
End: 2025-01-02

## 2025-01-02 VITALS
SYSTOLIC BLOOD PRESSURE: 130 MMHG | HEIGHT: 67 IN | BODY MASS INDEX: 37.45 KG/M2 | OXYGEN SATURATION: 92 % | WEIGHT: 238.6 LBS | HEART RATE: 60 BPM | DIASTOLIC BLOOD PRESSURE: 72 MMHG | TEMPERATURE: 97.2 F

## 2025-01-02 DIAGNOSIS — Z09 POSTOPERATIVE EXAMINATION: Primary | ICD-10-CM

## 2025-01-02 DIAGNOSIS — Z71.89 COUNSELING AND COORDINATION OF CARE: Primary | ICD-10-CM

## 2025-01-02 PROCEDURE — 99024 POSTOP FOLLOW-UP VISIT: CPT | Performed by: NEUROLOGICAL SURGERY

## 2025-01-02 PROCEDURE — NC001 PR NO CHARGE

## 2025-01-02 NOTE — PROGRESS NOTES
Name: Dominick Irving      : 1960      MRN: [id number]  Encounter Provider: Leland Aguilar MD  Encounter Date: 2025   Encounter department: [facility] [place]  :  Assessment & Plan  Postoperative examination  Overall improving.  He does need physical therapy for functional power restoration  I have recommended having a follow-up in 3 months time with x-rays to follow him along carefully  Orders:    Ambulatory referral to Physical Therapy; Future    XR spine lumbar minimum 4 views non injury; Future        History of Present Illness   Pain much improved now at a 2 out of 10  He does have a reduction in his ability to ambulate significant distances  His significant other believes that he has had a tremendous improvement in comparison to prior to the surgery  He ambulates with a walker  He has not fallen  He is able to traverse stairs twice a day.          Review of Systems   Constitutional: Negative.    HENT: Negative.     Eyes: Negative.    Respiratory: Negative.     Cardiovascular: Negative.    Gastrointestinal: Negative.    Endocrine: Negative.    Genitourinary:         Has had 2 episodes of incontinence of the bladder    Musculoskeletal:  Positive for back pain (constant LBP 8/10 radiates to hips R>L buttocks and down legs into both feet) and gait problem (off balance, uses walker for support when out of house, uses cane at home, hx of falls, 10 since last visit). Myalgias: muscle spasms from right hip to right foot, feels jolts of pain inthe right foot that travel upwards into leg..       1/2 pt reports numbness in feet is not as intense as before surgery, if overdoes activity feels weak, muscle spasms subsided, back symptoms improved   Skin: Negative.    Allergic/Immunologic: Negative.    Neurological:  Positive for dizziness, weakness (bilateral leg weakness) and numbness (both in feet but right foot has been numb for about 2 months).   Hematological: Negative.   "  Psychiatric/Behavioral: Negative.      I have personally reviewed the MA's review of systems and made changes as necessary.         Objective   /72 (BP Location: Right arm, Patient Position: Sitting, Cuff Size: Adult)   Pulse 60   Temp (!) 97.2 °F (36.2 °C) (Temporal)   Ht 5' 7\" (1.702 m)   Wt 108 kg (238 lb 9.6 oz)   SpO2 92%   BMI 37.37 kg/m²     Physical Exam  Skin:     Comments: Incision is clean and dry and well-healed   Neurological:      Motor: Weakness (He can bend his legs only to about 20 degrees prior to having to be helped back up) present.      Gait: Gait abnormal (His gait remains slow however he can heel walk toe walk and perform normal gait which is well-balanced.  His turnaround's are safe.).       Neurological Exam    Gait   Abnormal gait (His gait remains slow however he can heel walk toe walk and perform normal gait which is well-balanced.  His turnaround's are safe.).            "

## 2025-01-07 NOTE — PROGRESS NOTES
"Palliative Supportive Care  met with patient/family to continue to provide emotional support and guidance.      Updated biopsychosocial information relevant to support:     The patient was identified by name and date of birth. Dominick was informed that this is a telemedicine visit and that the visit is being conducted through the Epic Embedded platform. They agree to proceed..  My office door was closed. No one else was in the room. They acknowledged consent and understanding of privacy and security of the video platform. The patient has agreed to participate and understands they can discontinue the visit at any time.    Dominick arrived on video platform looking distraught. Dominick spoke about his wife going into a seizure on Sunday night and being rushed to the hospital. They found a mass in her brain and one in her lung but they have not biopsied yet to know if they are cancer or something else. She is having major brain surgery tomorrow and the whole family is very worried. He said he has never seen his wife shaken in all their years together but she is frightened. He said he is devastated that something is happening to her. He said it was much easier when it was his own illness he was dealing with. It is so terrible to watch what she is going through and feel helpless. He spoke about how he had dreams of getting through his health issues and then being able to \"give her all that she deserved\" during their [facility] days that he couldn't do while he was sick. LCSW provided an immense amount of emotional support and validated all of Dominick's feelings. He said he feels so lost. LCSW encouraged Dominick that he must take things one day at a time and discussed meeting twice this week for emotional support. He said he is trying to stay strong for his wife. LCSW expressed completely understanding wanting to be strong for her and in front of her but it is okay to express his emotions with LCSW and others " in his life when needed. He agreed he is allowing himself time to cry and process but keeps it away from his wife and son. Plan to meet 1/10 at 3pm to check in.       Identified areas of need include: emotional support    Resources provided:    Areas that need future follow-up include: reduce anxiety/depressed mood, processing sudden family crisis    I have spent 60 minutes with Patient  today in which greater than 50% of this time was spent in counseling/coordination of care     Palliative  will follow-up as requested by patient, family, and primary team.  Please contact with any specific requests

## 2025-01-08 ENCOUNTER — SOCIAL WORK (OUTPATIENT)
Dept: PALLIATIVE MEDICINE | Facility: CLINIC | Age: 65
End: 2025-01-08

## 2025-01-08 DIAGNOSIS — F41.9 ANXIETY: Primary | ICD-10-CM

## 2025-01-08 DIAGNOSIS — Z71.89 COUNSELING AND COORDINATION OF CARE: Primary | ICD-10-CM

## 2025-01-08 PROCEDURE — NC001 PR NO CHARGE

## 2025-01-08 RX ORDER — ALPRAZOLAM 0.25 MG/1
0.25 TABLET ORAL 3 TIMES DAILY PRN
Qty: 30 TABLET | Refills: 1 | Status: SHIPPED | OUTPATIENT
Start: 2025-01-08

## 2025-01-09 NOTE — PROGRESS NOTES
Palliative Supportive Care  met with patient/family to continue to provide emotional support and guidance.      Updated biopsychosocial information relevant to support:     The patient was identified by name and date of birth. Dominick was informed that this is a telemedicine visit and that the visit is being conducted through the Epic Embedded platform. They agree to proceed..  My office door was closed. No one else was in the room. They acknowledged consent and understanding of privacy and security of the video platform. The patient has agreed to participate and understands they can discontinue the visit at any time.    Dominick spoke about his wife's surgery going very well and she is recovering well. He said unfortunately he received a call from an oncologist who said most likely they will find that his wife has stage 4 lung cancer with mets to the brain. He said that his wife does not know this yet but was told it was most likely cancerous. They will get the pathology on Monday. Dominick was tearful and said that his brain automatically goes to the worst place. He said his fear is she will die very quickly and this would be devastating. [facility] provided immense emotional support and encouraged Dominick to try to remember that they do not have all the information yet, there are palliative treatments that can prolong life, and even two people with the same diagnosis can have different timelines. He said he is trying to stay strong but this is very difficult. He said his wife really needs him and he worries about how he will continue healing while also caring for her. BISI strongly encouraged Dominick to speak with their children about how they may be able to help. He said he has already reached out to the to give them the update and discuss lightly how they will have to work together. Dominick asked to end the call. BISI offered to meet again sooner than next Wednesday but Dominick declined. He thanked [facility]  for being a support and said he knows he can reach out sooner if needed.       Identified areas of need include: emotional support    Resources provided:    Areas that need future follow-up include: reduce anxiety/depressed mood, processing emotions related his wife's new health concerns    I have spent 40 minutes with Patient  today in which greater than 50% of this time was spent in counseling/coordination of care     Palliative  will follow-up as requested by patient, family, and primary team.  Please contact with any specific requests

## 2025-01-10 ENCOUNTER — SOCIAL WORK (OUTPATIENT)
Dept: PALLIATIVE MEDICINE | Facility: CLINIC | Age: 65
End: 2025-01-10

## 2025-01-10 DIAGNOSIS — Z71.89 COUNSELING AND COORDINATION OF CARE: Primary | ICD-10-CM

## 2025-01-10 PROCEDURE — NC001 PR NO CHARGE

## 2025-01-13 ENCOUNTER — TELEMEDICINE (OUTPATIENT)
Dept: INFECTIOUS DISEASES | Facility: CLINIC | Age: 65
End: 2025-01-13
Payer: MEDICARE

## 2025-01-13 DIAGNOSIS — T81.49XA SURGICAL SITE INFECTION: Primary | ICD-10-CM

## 2025-01-13 DIAGNOSIS — E66.01 MORBID (SEVERE) OBESITY DUE TO EXCESS CALORIES (HCC): ICD-10-CM

## 2025-01-13 DIAGNOSIS — Z79.4 TYPE 2 DIABETES MELLITUS WITHOUT COMPLICATION, WITH LONG-TERM CURRENT USE OF INSULIN (HCC): ICD-10-CM

## 2025-01-13 DIAGNOSIS — I50.32 CHRONIC DIASTOLIC (CONGESTIVE) HEART FAILURE (HCC): ICD-10-CM

## 2025-01-13 DIAGNOSIS — E11.9 TYPE 2 DIABETES MELLITUS WITHOUT COMPLICATION, WITH LONG-TERM CURRENT USE OF INSULIN (HCC): ICD-10-CM

## 2025-01-13 PROCEDURE — 99214 OFFICE O/P EST MOD 30 MIN: CPT | Performed by: PHYSICIAN ASSISTANT

## 2025-01-13 NOTE — PATIENT INSTRUCTIONS
- continue Bactrim DS 1 tab po every 12 hours as ordered  - Labs tomorrow  - will contact you after last set of labs have been reviewed with further plans.   - RTO 6 weeks

## 2025-01-13 NOTE — ASSESSMENT & PLAN NOTE
Patient states he is on one diuretic daily and takes another diuretic if he gains more than 3 pounds.  The second diuretic causes K loss and when he takes this one he ups his K supplementation.  He states last set of labs could have been done while he was taking the second diuretic.

## 2025-01-13 NOTE — ASSESSMENT & PLAN NOTE
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  Initial L3-4 decompressive hemilaminectomy with transverse lumbar interbody fusion and L4-5 fixation and fusion 10/30.  One week after his initial surgery he noted purulent discharge from the wound.  He was given a script for cephalexin.  MRI revealed a fluid collection in the area of the incision.  OR cultures positive for MRSA.  AFB and Fungal cultures remain negative to date.  Patient discharged and finished a one week course of IV Vancomycin then transition to po bactrim.      12/4/24- Patient states he is doing well on the bactrim.  He does have a slight increase in his Cr although his Cr does appear to fluctuate so I do not necessarily think this is out of his normal range.  Will continue to monitor closely.  MRSA is Intermediate to tetracyclines so limited to other good po options.  There is some consideration for hardware involvement as it appears infection was deep (as per Op Note).      12/23/24- Patient tolerating the bactrim well.  No new complaints today.  Labs overall ok.  Cr slightly up but stable.  Also note BUN up as well, ? Dehydration playing a part.  Also could be false elevation of Cr from the bactrim.  Will continue to monitor closely.  ESR up and CRP down.  Patient feeling well so will continue to trend.      1/13/25 - patient remains on po Bactrim DS.  He continues to tolerate it.  Still with fluctuating Cr - does take diuretics.  Also noted to have low K - I did reach out to his PCP.   Also fluctuating inflammatory markers.  Patient due for labs tomorrow.  Will hold off on refills until they are reviewed.     Plan  - extend Bactrim DS 1 tab po bid through 1/19/25 to complete 6 weeks BUT due to concern for hardware involvement would go at least until normalization/plateau.  Patient has enough bactrim to get through 1/22 then will need a refill.  We order refills after labs from  1/14 are reviewed.   ESR/CRP still quite elevated at this time.   - continue weekly CBCD, BMP, ESR, CRP for now - may be able to adjust to biweekly once 1/14 labs are reviewed  - follow up with Neurosurgery as scheduled  - RTO 4-6 weeks

## 2025-01-13 NOTE — PROGRESS NOTES
Name: Dominick Irving      : 1960      MRN: [id number]  Encounter Provider: Edmar Pate PA-C  Encounter Date: 2025   Encounter department: [facility]  :  Assessment & Plan  Surgical site infection  Postoperative Staphylococcus aureus (MRSA) lumbar wound infection s/p lumbar wound debridement and washout .  Initial L3-4 decompressive hemilaminectomy with transverse lumbar interbody fusion and L4-5 fixation and fusion 10/30.  One week after his initial surgery he noted purulent discharge from the wound.  He was given a script for cephalexin.  MRI revealed a fluid collection in the area of the incision.  OR cultures positive for MRSA.  AFB and Fungal cultures remain negative to date.  Patient discharged and finished a one week course of IV Vancomycin then transition to po bactrim.      24- Patient states he is doing well on the bactrim.  He does have a slight increase in his Cr although his Cr does appear to fluctuate so I do not necessarily think this is out of his normal range.  Will continue to monitor closely.  MRSA is Intermediate to tetracyclines so limited to other good po options.  There is some consideration for hardware involvement as it appears infection was deep (as per Op Note).      24- Patient tolerating the bactrim well.  No new complaints today.  Labs overall ok.  Cr slightly up but stable.  Also note BUN up as well, ? Dehydration playing a part.  Also could be false elevation of Cr from the bactrim.  Will continue to monitor closely.  ESR up and CRP down.  Patient feeling well so will continue to trend.      25 - patient remains on po Bactrim DS.  He continues to tolerate it.  Still with fluctuating Cr - does take diuretics.  Also noted to have low K - I did reach out to his PCP.   Also fluctuating inflammatory markers.  Patient due for labs tomorrow.  Will hold off on refills until they are reviewed.     Plan  - extend Bactrim DS 1  tab po bid through 1/19/25 to complete 6 weeks BUT due to concern for hardware involvement would go at least until normalization/plateau.  Patient has enough bactrim to get through 1/22 then will need a refill.  We order refills after labs from 1/14 are reviewed.   ESR/CRP still quite elevated at this time.   - continue weekly CBCD, BMP, ESR, CRP for now - may be able to adjust to biweekly once 1/14 labs are reviewed  - follow up with Neurosurgery as scheduled  - RTO 4-6 weeks         Type 2 diabetes mellitus without complication, with long-term current use of insulin ([facility])    Lab Results   Component Value Date    HGBA1C 7.1 (A) 12/30/2024            Morbid (severe) obesity due to excess calories (HCC)  37.37         Chronic diastolic (congestive) heart failure (HCC)  Patient states he is on one diuretic daily and takes another diuretic if he gains more than 3 pounds.  The second diuretic causes K loss and when he takes this one he ups his K supplementation.  He states last set of labs could have been done while he was taking the second diuretic.                      History of Present Illness   HPI  Dominick Irving is a 64 y.o. male who presents for office follow up today regarding Postoperative Staphylococcus aureus (MRSA) lumbar wound infection s/p lumbar wound debridement and washout 11/20.  Patient remains on po bactrim.  He continues to tolerate it well.  He has no new complaints today.  His back continues to feel good.  He does still have weakness.  He recently saw Neurosurgery who ordered PT for him.      Review of Systems   Constitutional:  Negative for chills and fever.   Respiratory:  Negative for cough and shortness of breath.    Gastrointestinal:  Negative for abdominal pain, diarrhea, nausea and vomiting.   Skin:  Negative for rash.   Psychiatric/Behavioral:  Negative for behavioral problems and confusion.           Objective   There were no vitals taken for this visit.     Physical  Exam  Constitutional:       General: He is not in acute distress.     Appearance: Normal appearance. He is not ill-appearing, toxic-appearing or diaphoretic.   HENT:      Head: Normocephalic and atraumatic.   Eyes:      General: No scleral icterus.        Right eye: No discharge.         Left eye: No discharge.      Conjunctiva/sclera: Conjunctivae normal.   Pulmonary:      Effort: Pulmonary effort is normal. No respiratory distress.   Skin:     Coloration: Skin is not jaundiced or pale.      Findings: No erythema or rash.   Neurological:      Mental Status: He is alert and oriented to person, place, and time.   Psychiatric:         Mood and Affect: Mood normal.         Behavior: Behavior normal.       Labs:   12/31/24  Wbc: 7.9  Hgb: 12.3  Plt: 211  Cr: 1.45  K: 3.4  ESR: 107  CRP: 72.2      Telemedicine consent    Patient: Dominick Irving  Provider: Edmar Pate PA-C  Provider located at [facility]  [address] [place] [id number] [phone]    The patient was identified by name and date of birth. Dominick Irving was informed that this is a telemedicine visit and that the visit is being conducted through the Epic Embedded platform. He agrees to proceed..  My office door was closed. No one else was in the room.  He acknowledged consent and understanding of privacy and security of the video platform. The patient has agreed to participate and understands they can discontinue the visit at any time.    Patient is aware this is a billable service.     I spent 15 minutes with the patient during this visit.   Additional time spent on chart/lab review, documentation and order placement.

## 2025-01-14 ENCOUNTER — RESULTS FOLLOW-UP (OUTPATIENT)
Dept: INFECTIOUS DISEASES | Facility: CLINIC | Age: 65
End: 2025-01-14

## 2025-01-14 ENCOUNTER — APPOINTMENT (OUTPATIENT)
Dept: LAB | Facility: HOSPITAL | Age: 65
End: 2025-01-14
Payer: MEDICARE

## 2025-01-14 DIAGNOSIS — T81.49XA SURGICAL SITE INFECTION: ICD-10-CM

## 2025-01-14 DIAGNOSIS — T84.7XXA HARDWARE COMPLICATING WOUND INFECTION (HCC): ICD-10-CM

## 2025-01-14 DIAGNOSIS — T84.7XXD HARDWARE COMPLICATING WOUND INFECTION, SUBSEQUENT ENCOUNTER: ICD-10-CM

## 2025-01-14 LAB
ANION GAP SERPL CALCULATED.3IONS-SCNC: 6 MMOL/L (ref 4–13)
BASOPHILS # BLD AUTO: 0.04 THOUSANDS/ΜL (ref 0–0.1)
BASOPHILS NFR BLD AUTO: 1 % (ref 0–1)
BUN SERPL-MCNC: 25 MG/DL (ref 5–25)
CALCIUM SERPL-MCNC: 9.2 MG/DL (ref 8.4–10.2)
CHLORIDE SERPL-SCNC: 103 MMOL/L (ref 96–108)
CO2 SERPL-SCNC: 28 MMOL/L (ref 21–32)
CREAT SERPL-MCNC: 1.19 MG/DL (ref 0.6–1.3)
CRP SERPL QL: 80.3 MG/L
EOSINOPHIL # BLD AUTO: 0.32 THOUSAND/ΜL (ref 0–0.61)
EOSINOPHIL NFR BLD AUTO: 4 % (ref 0–6)
ERYTHROCYTE [DISTWIDTH] IN BLOOD BY AUTOMATED COUNT: 16 % (ref 11.6–15.1)
ERYTHROCYTE [SEDIMENTATION RATE] IN BLOOD: 55 MM/HOUR (ref 0–19)
GFR SERPL CREATININE-BSD FRML MDRD: 63 ML/MIN/1.73SQ M
GLUCOSE P FAST SERPL-MCNC: 100 MG/DL (ref 65–99)
HCT VFR BLD AUTO: 34.1 % (ref 36.5–49.3)
HGB BLD-MCNC: 11.2 G/DL (ref 12–17)
IMM GRANULOCYTES # BLD AUTO: 0.02 THOUSAND/UL (ref 0–0.2)
IMM GRANULOCYTES NFR BLD AUTO: 0 % (ref 0–2)
LYMPHOCYTES # BLD AUTO: 2.28 THOUSANDS/ΜL (ref 0.6–4.47)
LYMPHOCYTES NFR BLD AUTO: 30 % (ref 14–44)
MCH RBC QN AUTO: 30.7 PG (ref 26.8–34.3)
MCHC RBC AUTO-ENTMCNC: 32.8 G/DL (ref 31.4–37.4)
MCV RBC AUTO: 93 FL (ref 82–98)
MONOCYTES # BLD AUTO: 0.63 THOUSAND/ΜL (ref 0.17–1.22)
MONOCYTES NFR BLD AUTO: 8 % (ref 4–12)
NEUTROPHILS # BLD AUTO: 4.43 THOUSANDS/ΜL (ref 1.85–7.62)
NEUTS SEG NFR BLD AUTO: 57 % (ref 43–75)
NRBC BLD AUTO-RTO: 0 /100 WBCS
PLATELET # BLD AUTO: 212 THOUSANDS/UL (ref 149–390)
PMV BLD AUTO: 10.7 FL (ref 8.9–12.7)
POTASSIUM SERPL-SCNC: 4.4 MMOL/L (ref 3.5–5.3)
RBC # BLD AUTO: 3.65 MILLION/UL (ref 3.88–5.62)
SODIUM SERPL-SCNC: 137 MMOL/L (ref 135–147)
WBC # BLD AUTO: 7.72 THOUSAND/UL (ref 4.31–10.16)

## 2025-01-14 PROCEDURE — 86140 C-REACTIVE PROTEIN: CPT

## 2025-01-14 PROCEDURE — 85652 RBC SED RATE AUTOMATED: CPT

## 2025-01-14 PROCEDURE — 80048 BASIC METABOLIC PNL TOTAL CA: CPT

## 2025-01-14 PROCEDURE — 85025 COMPLETE CBC W/AUTO DIFF WBC: CPT

## 2025-01-14 PROCEDURE — 36415 COLL VENOUS BLD VENIPUNCTURE: CPT

## 2025-01-14 RX ORDER — SULFAMETHOXAZOLE AND TRIMETHOPRIM 800; 160 MG/1; MG/1
1 TABLET ORAL EVERY 12 HOURS SCHEDULED
Qty: 60 TABLET | Refills: 0 | Status: SHIPPED | OUTPATIENT
Start: 2025-01-14 | End: 2025-02-13

## 2025-01-14 NOTE — PROGRESS NOTES
Labs from this morning reviewed.  Cr improved.  Inflammatory markers remain elevated.  Will refill his Bactrim DS 1 tab po bid and CBCD, BMP, ESR, CRP every other week.  Patient to RTO as scheduled

## 2025-01-15 ENCOUNTER — SOCIAL WORK (OUTPATIENT)
Dept: PALLIATIVE MEDICINE | Facility: CLINIC | Age: 65
End: 2025-01-15

## 2025-01-15 DIAGNOSIS — Z71.89 COUNSELING AND COORDINATION OF CARE: Primary | ICD-10-CM

## 2025-01-15 DIAGNOSIS — E87.6 HYPOKALEMIA: ICD-10-CM

## 2025-01-15 PROCEDURE — NC001 PR NO CHARGE

## 2025-01-15 RX ORDER — POTASSIUM CHLORIDE 750 MG/1
40 TABLET, EXTENDED RELEASE ORAL 2 TIMES DAILY
Qty: 120 TABLET | Refills: 0 | Status: SHIPPED | OUTPATIENT
Start: 2025-01-15

## 2025-01-15 NOTE — PROGRESS NOTES
"Palliative Supportive Care  met with patient/family to continue to provide emotional support and guidance.      Updated biopsychosocial information relevant to support:     The patient was identified by name and date of birth. Dominick was informed that this is a telemedicine visit and that the visit is being conducted through the Epic Embedded platform. They agree to proceed..  My office door was closed. No one else was in the room. They acknowledged consent and understanding of privacy and security of the video platform. The patient has agreed to participate and understands they can discontinue the visit at any time.    Dominick spoke about Paula getting out of the hospital and now getting set up with all the doctors she needs to follow up with. He said it has been overwhelming between appointments, making sure she gets her medications very 2 hours, and keeping an eye on her because she is trying to do things around the house when she should be resting. Dominick said he finally \"blew up\" this morning when she was bending to pull wet clothes out of the washer. He said that he told her she needs to stop and she could cause another seisure or fall. He said that it is really hard for her to accept she cannot do what she usually does. LCSW validated Dominick's frustration and concern. LCSW also supported him in exploring the feeling of sudden changes in abilities and how he has struggled with this in the past during his health issues. He said he sympathizes with having trouble accepting changes so he tries to be patient. His goal is to keep her safe and calm. He said that going up and down the stairs multiple times a day is difficult but he is managing. He said right now he needs to put PT on hold because he needs to focus on these other appointments and his wife's care. He said he worries that this could affect his own progress but he cannot add more to their plate right now. LCSW encouraged Dominick to " "continue his exercises as tolerated on his own time and explore returning if he wishes when he feels things have settled down. He agreed. He sighed and said he knows they will get through this though admitted he does not know what \"through\" will look like. LCSW provided emotional support. He thanked LCSW for being a constant support in his life.       Identified areas of need include: emotional support    Resources provided:    Areas that need future follow-up include: reduce anxiety/depressed mood    I have spent 60 minutes with Patient  today in which greater than 50% of this time was spent in counseling/coordination of care     Palliative  will follow-up as requested by patient, family, and primary team.  Please contact with any specific requests    "

## 2025-01-21 NOTE — PROGRESS NOTES
Palliative Supportive Care  met with patient/family to continue to provide emotional support and guidance.      Updated biopsychosocial information relevant to support:     The patient was identified by name and date of birth. Dominick was informed that this is a telemedicine visit and that the visit is being conducted through the Epic Embedded platform. They agree to proceed..  My office door was closed. No one else was in the room. They acknowledged consent and understanding of privacy and security of the video platform. The patient has agreed to participate and understands they can discontinue the visit at any time.    Dominick spoke about being overwhelmed and exhausted this week. He admitted he is not doing much, if any self care at the moment because he is so focused on his wife. He said that next week is a week of many appointments and they should have a lot more information then which will help them move forward. He said that he has been postponing and rescheduling his own appointments recently to make sure his wife gets to hers. LCSW discussed having a conversation with the family about how they may work together to tackle appointments so Dominick can make sure he is also focusing on his health concerns too. He said that he plans to do this in 2 weeks after all the initial consults have happened. He said he knows he needs to work on his own needs too. LCSW supported Dominick in exploring how crucial self care is because he will be no good to anyone if he doesn't take care of himself too. He agreed that he cannot afford to ignore his own care. LCSW supported Dominick in setting a goal to put aside time everyday for self care. Dominick said he could commit to an hour a day whether its walking, rescheduling appointments, or doing something for enjoyment. He said its hard being stuck in the house too because he cannot get away from the constant reminder that his wife is sick. He said he would consider  letting family help him with his wife in the future so he could get out of the house. He said he wants to get through the next two weeks and then he will begin making more long term plans to support getting back to focusing on his own needs and appointments.       Identified areas of need include: emotional support    Resources provided:    Areas that need future follow-up include: reduce anxiety/depressed mood, self care    I have spent 30 minutes with Patient  today in which greater than 50% of this time was spent in counseling/coordination of care     Palliative  will follow-up as requested by patient, family, and primary team.  Please contact with any specific requests

## 2025-01-22 ENCOUNTER — SOCIAL WORK (OUTPATIENT)
Dept: PALLIATIVE MEDICINE | Facility: CLINIC | Age: 65
End: 2025-01-22

## 2025-01-22 DIAGNOSIS — Z71.89 COUNSELING AND COORDINATION OF CARE: Primary | ICD-10-CM

## 2025-01-22 PROCEDURE — 99024 POSTOP FOLLOW-UP VISIT: CPT

## 2025-01-24 NOTE — PROGRESS NOTES
"Palliative Supportive Care  met with patient/family to continue to provide emotional support and guidance.      Updated biopsychosocial information relevant to support:     The patient was identified by name and date of birth. Dominick was informed that this is a telemedicine visit and that the visit is being conducted through the Epic Embedded platform. They agree to proceed..  My office door was closed. No one else was in the room. They acknowledged consent and understanding of privacy and security of the video platform. The patient has agreed to participate and understands they can discontinue the visit at any time.    Dominick spoke about being exhausted and having a very hard week. He gave some updates about his wife's health. They did not have the results yet of what type of lung cancer she has but they are planning possibly to do radiation on the brain and then chemo. He said it is all finally starting to feel real and its very difficult. LCSW provided immense emotional support and validated his experience. He was tearful and said this will be a long journey. He has been putting his own appointments on hold but did discuss in a week or so beginning to ask more of the children because he knows he cannot keep doing this alone. He said he is \"running out of steam\". LCSW highly encouraged self care and seeking support from the family. He said he is not afraid to ask for help and knows he needs to more than ever. He asked to end the meeting due to exhaustion.       Identified areas of need include: emotional support    Resources provided:    Areas that need future follow-up include: reduce anxiety/depressed mood    I have spent 30 minutes with Patient  today in which greater than 50% of this time was spent in counseling/coordination of care     Palliative  will follow-up as requested by patient, family, and primary team.  Please contact with any specific requests    "

## 2025-01-27 DIAGNOSIS — M54.16 LUMBAR RADICULOPATHY: ICD-10-CM

## 2025-01-27 DIAGNOSIS — G89.29 CHRONIC BILATERAL LOW BACK PAIN WITH BILATERAL SCIATICA: ICD-10-CM

## 2025-01-27 DIAGNOSIS — G89.4 CHRONIC PAIN SYNDROME: ICD-10-CM

## 2025-01-27 DIAGNOSIS — M54.41 CHRONIC BILATERAL LOW BACK PAIN WITH BILATERAL SCIATICA: ICD-10-CM

## 2025-01-27 DIAGNOSIS — G89.18 POSTOPERATIVE PAIN AFTER SPINAL SURGERY: ICD-10-CM

## 2025-01-27 DIAGNOSIS — M54.42 CHRONIC BILATERAL LOW BACK PAIN WITH BILATERAL SCIATICA: ICD-10-CM

## 2025-01-27 DIAGNOSIS — Z98.890 S/P LAMINECTOMY: ICD-10-CM

## 2025-01-27 DIAGNOSIS — F41.9 ANXIETY: ICD-10-CM

## 2025-01-28 ENCOUNTER — TELEPHONE (OUTPATIENT)
Age: 65
End: 2025-01-28

## 2025-01-28 ENCOUNTER — APPOINTMENT (OUTPATIENT)
Dept: LAB | Facility: HOSPITAL | Age: 65
End: 2025-01-28
Payer: MEDICARE

## 2025-01-28 DIAGNOSIS — T84.7XXD HARDWARE COMPLICATING WOUND INFECTION, SUBSEQUENT ENCOUNTER: ICD-10-CM

## 2025-01-28 DIAGNOSIS — T81.49XA SURGICAL SITE INFECTION: ICD-10-CM

## 2025-01-28 LAB
ANION GAP SERPL CALCULATED.3IONS-SCNC: 10 MMOL/L (ref 4–13)
BASOPHILS # BLD AUTO: 0.04 THOUSANDS/ΜL (ref 0–0.1)
BASOPHILS NFR BLD AUTO: 1 % (ref 0–1)
BUN SERPL-MCNC: 30 MG/DL (ref 5–25)
CALCIUM SERPL-MCNC: 10 MG/DL (ref 8.4–10.2)
CHLORIDE SERPL-SCNC: 93 MMOL/L (ref 96–108)
CO2 SERPL-SCNC: 33 MMOL/L (ref 21–32)
CREAT SERPL-MCNC: 1.57 MG/DL (ref 0.6–1.3)
CRP SERPL QL: 42.9 MG/L
EOSINOPHIL # BLD AUTO: 0.36 THOUSAND/ΜL (ref 0–0.61)
EOSINOPHIL NFR BLD AUTO: 4 % (ref 0–6)
ERYTHROCYTE [DISTWIDTH] IN BLOOD BY AUTOMATED COUNT: 15.9 % (ref 11.6–15.1)
ERYTHROCYTE [SEDIMENTATION RATE] IN BLOOD: >130 MM/HOUR (ref 0–19)
GFR SERPL CREATININE-BSD FRML MDRD: 45 ML/MIN/1.73SQ M
GLUCOSE P FAST SERPL-MCNC: 152 MG/DL (ref 65–99)
HCT VFR BLD AUTO: 38 % (ref 36.5–49.3)
HGB BLD-MCNC: 12.7 G/DL (ref 12–17)
IMM GRANULOCYTES # BLD AUTO: 0.02 THOUSAND/UL (ref 0–0.2)
IMM GRANULOCYTES NFR BLD AUTO: 0 % (ref 0–2)
LYMPHOCYTES # BLD AUTO: 1.72 THOUSANDS/ΜL (ref 0.6–4.47)
LYMPHOCYTES NFR BLD AUTO: 21 % (ref 14–44)
MCH RBC QN AUTO: 30.5 PG (ref 26.8–34.3)
MCHC RBC AUTO-ENTMCNC: 33.4 G/DL (ref 31.4–37.4)
MCV RBC AUTO: 91 FL (ref 82–98)
MONOCYTES # BLD AUTO: 0.67 THOUSAND/ΜL (ref 0.17–1.22)
MONOCYTES NFR BLD AUTO: 8 % (ref 4–12)
NEUTROPHILS # BLD AUTO: 5.46 THOUSANDS/ΜL (ref 1.85–7.62)
NEUTS SEG NFR BLD AUTO: 66 % (ref 43–75)
NRBC BLD AUTO-RTO: 0 /100 WBCS
PLATELET # BLD AUTO: 253 THOUSANDS/UL (ref 149–390)
PMV BLD AUTO: 10.5 FL (ref 8.9–12.7)
POTASSIUM SERPL-SCNC: 4.1 MMOL/L (ref 3.5–5.3)
RBC # BLD AUTO: 4.17 MILLION/UL (ref 3.88–5.62)
SODIUM SERPL-SCNC: 136 MMOL/L (ref 135–147)
WBC # BLD AUTO: 8.27 THOUSAND/UL (ref 4.31–10.16)

## 2025-01-28 PROCEDURE — 36415 COLL VENOUS BLD VENIPUNCTURE: CPT

## 2025-01-28 PROCEDURE — 85652 RBC SED RATE AUTOMATED: CPT

## 2025-01-28 PROCEDURE — 80048 BASIC METABOLIC PNL TOTAL CA: CPT

## 2025-01-28 PROCEDURE — 86140 C-REACTIVE PROTEIN: CPT

## 2025-01-28 PROCEDURE — 85025 COMPLETE CBC W/AUTO DIFF WBC: CPT

## 2025-01-28 RX ORDER — ALPRAZOLAM 0.25 MG/1
0.25 TABLET ORAL 3 TIMES DAILY PRN
Qty: 30 TABLET | Refills: 0 | Status: SHIPPED | OUTPATIENT
Start: 2025-01-28 | End: 2025-02-07

## 2025-01-28 RX ORDER — HYDROMORPHONE HYDROCHLORIDE 8 MG/1
8 TABLET ORAL
Qty: 240 TABLET | Refills: 0 | Status: SHIPPED | OUTPATIENT
Start: 2025-01-28 | End: 2025-01-29 | Stop reason: SDUPTHER

## 2025-01-28 NOTE — TELEPHONE ENCOUNTER
Reason for call:   [x] Prior Auth  [] Other:     Caller:  [] Patient  [x] Pharmacy  [facility] [address] [place] [address]     Medication:   HYDROmorphone (DILAUDID) 8 MG tablet     Dose/Frequency: Take 1 tablet (8 mg total) by mouth every 3 (three) hours as needed for moderate pain or severe pain Max Daily Amount: 64 mg     Quantity: 240 tab    Ordering Provider:   [x] PCP/Provider -   [] Speciality/Provider -     Has the patient tried other medications and failed? If failed, which medications did they fail?    [] No   [] Yes -     Is the patient's insurance updated in EPIC?   [] Yes   [] No     Is a copy of the patient's insurance scanned in EPIC?   [] Yes   [] No

## 2025-01-29 ENCOUNTER — SOCIAL WORK (OUTPATIENT)
Dept: PALLIATIVE MEDICINE | Facility: CLINIC | Age: 65
End: 2025-01-29

## 2025-01-29 DIAGNOSIS — M54.42 CHRONIC BILATERAL LOW BACK PAIN WITH BILATERAL SCIATICA: ICD-10-CM

## 2025-01-29 DIAGNOSIS — G89.4 CHRONIC PAIN SYNDROME: ICD-10-CM

## 2025-01-29 DIAGNOSIS — G89.18 POSTOPERATIVE PAIN AFTER SPINAL SURGERY: ICD-10-CM

## 2025-01-29 DIAGNOSIS — G89.29 CHRONIC BILATERAL LOW BACK PAIN WITH BILATERAL SCIATICA: ICD-10-CM

## 2025-01-29 DIAGNOSIS — Z98.890 S/P LAMINECTOMY: ICD-10-CM

## 2025-01-29 DIAGNOSIS — Z71.89 COUNSELING AND COORDINATION OF CARE: Primary | ICD-10-CM

## 2025-01-29 DIAGNOSIS — M54.16 LUMBAR RADICULOPATHY: ICD-10-CM

## 2025-01-29 DIAGNOSIS — M54.41 CHRONIC BILATERAL LOW BACK PAIN WITH BILATERAL SCIATICA: ICD-10-CM

## 2025-01-29 PROCEDURE — NC001 PR NO CHARGE

## 2025-01-29 RX ORDER — HYDROMORPHONE HYDROCHLORIDE 8 MG/1
8 TABLET ORAL
Qty: 210 TABLET | Refills: 0 | Status: SHIPPED | OUTPATIENT
Start: 2025-01-29

## 2025-01-30 NOTE — TELEPHONE ENCOUNTER
PA for HYDROmorphone 8MG tablet SUBMITTED to [employer]    via    []CMM-KEY:   [x]Surescripts-Case ID # [id number]   []Availity-Auth ID # NDC #   []Faxed to plan   []Other website   []Phone call Case ID #     [x]PA sent as URGENT    All office notes, labs and other pertaining documents and studies sent. Clinical questions answered. Awaiting determination from insurance company.     Turnaround time for your insurance to make a decision on your Prior Authorization can take 7-21 business days.

## 2025-01-31 NOTE — TELEPHONE ENCOUNTER
PA for HYDROmorphone 8MG tablet APPROVED     Date(s) approved 01/30/2025-12/31/2025    Case #[id number]    Patient advised by          [x]Dana Translationhart Message  []Phone call   []LMOM  []L/M to call office as no active Communication consent on file  [x]Unable to leave detailed message as VM not approved on Communication consent       Pharmacy advised by    [x]Fax  []Phone call    Approval letter scanned into Media Yes

## 2025-02-03 ENCOUNTER — RA CDI HCC (OUTPATIENT)
Dept: OTHER | Facility: HOSPITAL | Age: 65
End: 2025-02-03

## 2025-02-05 ENCOUNTER — SOCIAL WORK (OUTPATIENT)
Dept: PALLIATIVE MEDICINE | Facility: CLINIC | Age: 65
End: 2025-02-05

## 2025-02-05 DIAGNOSIS — E11.9 TYPE 2 DIABETES MELLITUS WITHOUT COMPLICATION, WITHOUT LONG-TERM CURRENT USE OF INSULIN (HCC): ICD-10-CM

## 2025-02-05 DIAGNOSIS — I50.33 ACUTE ON CHRONIC DIASTOLIC HEART FAILURE (HCC): ICD-10-CM

## 2025-02-05 DIAGNOSIS — E87.6 HYPOKALEMIA: ICD-10-CM

## 2025-02-05 DIAGNOSIS — Z71.89 COUNSELING AND COORDINATION OF CARE: Primary | ICD-10-CM

## 2025-02-05 PROCEDURE — NC001 PR NO CHARGE

## 2025-02-05 RX ORDER — DULOXETIN HYDROCHLORIDE 60 MG/1
60 CAPSULE, DELAYED RELEASE ORAL DAILY
Qty: 30 CAPSULE | Refills: 0 | Status: SHIPPED | OUTPATIENT
Start: 2025-02-05

## 2025-02-05 RX ORDER — POTASSIUM CHLORIDE 750 MG/1
40 TABLET, EXTENDED RELEASE ORAL 2 TIMES DAILY
Qty: 120 TABLET | Refills: 0 | Status: SHIPPED | OUTPATIENT
Start: 2025-02-05

## 2025-02-06 RX ORDER — TORSEMIDE 20 MG/1
80 TABLET ORAL DAILY
Qty: 120 TABLET | Refills: 3 | Status: SHIPPED | OUTPATIENT
Start: 2025-02-06

## 2025-02-08 ENCOUNTER — APPOINTMENT (OUTPATIENT)
Dept: LAB | Facility: HOSPITAL | Age: 65
End: 2025-02-08
Payer: MEDICARE

## 2025-02-08 NOTE — PROGRESS NOTES
UROLOGY PROGRESS NOTE         NAME: Dominick Irving  AGE: 65 y.o. SEX: male  : 1960   MRN: [id number]    DATE: 2025  TIME: 4:50 PM    Assessment and Plan      Cystoscopy     Date/Time  2025 8:30 AM     Performed by  Shan Sanabria MD   Authorized by  Shan Sanabria MD         Procedure Details:  Procedure type: cystoscopy    Additional Procedure Details: Patient was placed in dorsal thumb position prepped regular sterile fashion.  Flexible cystoscopy was carried out he had a normal anterior posterior urethra minimally obstructing prostate bladder was normal minimal trabeculation no recurrent tumors no signs of acute cystitis.  Patient tolerated well he is on Bactrim for MRSA.  MRSA of his back.       Impression:   1. Benign prostatic hyperplasia with urinary frequency  2. History of bladder cancer  3. History of gross hematuria  4. Nocturia       Plan: Follow-up in 2025 with a cystoscopy cytology EDWIN PSA and then he will then be followed up every 6 months for 2 years.  Patient agrees.  Will send cytology today.    He will continue Flomax.      Chief Complaint   No chief complaint on file.    History of Present Illness     HPI: Dominick Irving is a 65 y.o. year old male who presents with follow-up office visit from 2024.  Patient with a history of high-grade T1 bladder cancer diagnosed in the spring 2023 completed induction therapy with BCG.  Last cystoscopy on 2024 showed minimally obstructing prostate mildly trabeculated bladder no recurrent bladder cancer no urethral strictures.    Patient also with a history of BPH, and history of gross hematuria.    The plan was patient to follow-up in 4 months for repeat cystoscopy and cytology.  And if does well at this office visit will have 1 more 4-month follow-up and then we will go to every 6 months.    Please note his last PSA on 2024 was 0.8.  He was on Flomax for BPH.  Last office visit was having no irritative or obstructive  "voiding complaints no hematuria.    Creatinine level on 2/8/2025 was 1.2.  Patient doing well no irritative or obstructive voiding complaints no gross hematuria.        The following portions of the patient's history were reviewed and updated as appropriate: allergies, current medications, past family history, past medical history, past social history, past surgical history and problem list.  Past Medical History:   Diagnosis Date    Anxiety 3/01/2023    Arthritis     Bladder cancer (HCC)     Cancer (HCC) 3/17/2023    Cervical disc disorder 1/2016    Chronic narcotic dependence (HCC)     Chronic pain disorder     lower back and down both legs    Colon polyp     Coronary artery disease 7/2021    Weakness left ventricle    CPAP (continuous positive airway pressure) dependence     bi-pap    Depression 3/17/2023    Does use hearing aid     will wear DOS    Full dentures     Heart failure (HCC)     and \"fluid retention\" [facility] PA\"    High cholesterol     Hypertension     Low back pain 2003    Mild ankle edema     and feet    Obstructive sleep apnea on CPAP     Prediabetes     Shortness of breath     per pt \"with just exertion\"    Sleep apnea     Wears glasses      Past Surgical History:   Procedure Laterality Date    BACK SURGERY      titanium rods implanted    CAUDAL BLOCK N/A 10/17/2023    Procedure: BLOCK / INJECTION CAUDAL;  Surgeon: Minh Rolon MD;  Location:  [facility];  Service: Pain Management     COLONOSCOPY      CYSTOSCOPY W/ URETERAL STENT PLACEMENT Bilateral 03/17/2023    Procedure: bilateral retrograde;  Surgeon: Shan Sanabria MD;  Location:  MAIN OR;  Service: Urology    HERNIA REPAIR      x5    LUMBAR LAMINECTOMY N/A 06/30/2023    Procedure: Left L3-4 Metrx hemilaminectomy and bilateral foraminotomies;  Surgeon: Leland Aguilar MD;  Location:  MAIN OR;  Service: Neurosurgery    OH ARTHRODESIS COMBINED TQ 1NTRSPC LUMBAR Right 10/30/2024    Procedure: Robotically assisted L3-4 " decompressive hemilaminectomy and foraminotomies with transverse lumbar interbody fusion right-sided approach with add-on to an L4-5 fixation fusion;  Surgeon: Leland Aguilar MD;  Location: [facility];  Service: Neurosurgery    IA CYSTO W/REMOVAL OF LESIONS SMALL N/A 05/01/2023    Procedure: CYSTOSCOPY TURBT;  Surgeon: Shan Sanabria MD;  Location: [facility];  Service: Urology    IA CYSTOURETHROSCOPY N/A 11/06/2023    Procedure: CYSTOSCOPY with  bladder biopsies and fulgeration;  Surgeon: Shan Sanabria MD;  Location: [facility];  Service: Urology    SPINE SURGERY  2017    TONSILLECTOMY  1966    TRANSURETHRAL RESECTION OF BLADDER TUMOR N/A 03/17/2023    Procedure: TRANSURETHRAL RESECTION OF BLADDER TUMOR (TURBT);  Surgeon: Shan Sanabria MD;  Location: [facility];  Service: Urology    WOUND DEBRIDEMENT N/A 11/20/2024    Procedure: LUMBAR DEBRIDEMENT WOUND (WASH OUT);  Surgeon: Leland Aguilar MD;  Location: [facility];  Service: Neurosurgery     shoulder  Review of Systems     Const: Denies chills, fever and weight loss.  CV: Denies chest pain.  Resp: Denies SOB.  GI: Denies abdominal pain, nausea and vomiting.  : Denies symptoms other than stated above.  Musculo: Denies back pain.    Objective   There were no vitals taken for this visit.    Physical Exam  Const: Appears healthy and well developed. No signs of acute distress present.  Resp: Respirations are regular and unlabored.   CV: Rate is regular. Rhythm is regular.  Abdomen: Abdomen is soft, nontender, and nondistended. Kidneys are not palpable.  : nl  Psych: Patient's attitude is cooperative. Mood is normal. Affect is normal.    Current Medications     Current Outpatient Medications:     acetaminophen (TYLENOL) 325 mg tablet, Take 3 tablets (975 mg total) by mouth every 8 (eight) hours, Disp: , Rfl:     atorvastatin (LIPITOR) 40 mg tablet, Take 1 tablet (40 mg total) by mouth daily, Disp: 90 tablet, Rfl: 1    DULoxetine (CYMBALTA) 60 mg  delayed release capsule, Take 1 capsule (60 mg total) by mouth daily, Disp: 30 capsule, Rfl: 0    Farxiga 5 MG TABS, Take 5 mg by mouth daily 5mg alternating with 2.5mg for fluid retention, Disp: , Rfl:     gabapentin (NEURONTIN) 600 MG tablet, Take 1 tablet (600 mg total) by mouth 4 (four) times a day, Disp: 120 tablet, Rfl: 5    HYDROmorphone (DILAUDID) 8 MG tablet, Take 1 tablet (8 mg total) by mouth every 3 (three) hours as needed for moderate pain or severe pain Max Daily Amount: 64 mg, Disp: 210 tablet, Rfl: 0    metFORMIN (GLUCOPHAGE-XR) 500 mg 24 hr tablet, Take 2 tablets (1,000 mg total) by mouth daily with dinner Take 2 pills qpm, Disp: 90 tablet, Rfl: 3    metolazone (ZAROXOLYN) 2.5 mg tablet, Take 1 tablet (2.5 mg total) by mouth daily as needed (edema), Disp: 90 tablet, Rfl: 3    potassium chloride (Klor-Con) 10 mEq tablet, Take 4 tablets (40 mEq total) by mouth 2 (two) times a day, Disp: 120 tablet, Rfl: 0    semaglutide, 0.25 or 0.5 mg/dose, (Ozempic) 2 mg/1.5 mL injection pen, Inject 0.5 mg under the skin every 7 days, Disp: , Rfl:     sulfamethoxazole-trimethoprim (BACTRIM DS) 800-160 mg per tablet, Take 1 tablet by mouth every 12 (twelve) hours, Disp: 60 tablet, Rfl: 0    torsemide (DEMADEX) 20 mg tablet, Take 4 tablets (80 mg total) by mouth daily, Disp: 120 tablet, Rfl: 3        Shan Sanabria MD

## 2025-02-10 ENCOUNTER — OFFICE VISIT (OUTPATIENT)
Dept: FAMILY MEDICINE CLINIC | Facility: CLINIC | Age: 65
End: 2025-02-10
Payer: MEDICARE

## 2025-02-10 VITALS
OXYGEN SATURATION: 96 % | TEMPERATURE: 97.7 F | WEIGHT: 249.13 LBS | HEART RATE: 63 BPM | BODY MASS INDEX: 39.1 KG/M2 | SYSTOLIC BLOOD PRESSURE: 134 MMHG | DIASTOLIC BLOOD PRESSURE: 82 MMHG | HEIGHT: 67 IN

## 2025-02-10 DIAGNOSIS — I50.33 ACUTE ON CHRONIC DIASTOLIC HEART FAILURE (HCC): Primary | ICD-10-CM

## 2025-02-10 DIAGNOSIS — F11.20 OPIOID DEPENDENCE, UNCOMPLICATED (HCC): ICD-10-CM

## 2025-02-10 DIAGNOSIS — F33.9 DEPRESSION, RECURRENT (HCC): ICD-10-CM

## 2025-02-10 DIAGNOSIS — M54.42 CHRONIC BILATERAL LOW BACK PAIN WITH BILATERAL SCIATICA: ICD-10-CM

## 2025-02-10 DIAGNOSIS — M54.41 CHRONIC BILATERAL LOW BACK PAIN WITH BILATERAL SCIATICA: ICD-10-CM

## 2025-02-10 DIAGNOSIS — G89.29 CHRONIC BILATERAL LOW BACK PAIN WITH BILATERAL SCIATICA: ICD-10-CM

## 2025-02-10 DIAGNOSIS — G89.4 CHRONIC PAIN SYNDROME: ICD-10-CM

## 2025-02-10 PROBLEM — E11.69 TYPE 2 DIABETES MELLITUS WITH OTHER SPECIFIED COMPLICATION, UNSPECIFIED WHETHER LONG TERM INSULIN USE (HCC): Status: ACTIVE | Noted: 2024-01-02

## 2025-02-10 PROCEDURE — 99214 OFFICE O/P EST MOD 30 MIN: CPT | Performed by: FAMILY MEDICINE

## 2025-02-10 PROCEDURE — G2211 COMPLEX E/M VISIT ADD ON: HCPCS | Performed by: FAMILY MEDICINE

## 2025-02-10 RX ORDER — HYDROMORPHONE HYDROCHLORIDE 8 MG/1
8 TABLET ORAL
Qty: 180 TABLET | Refills: 0 | Status: SHIPPED | OUTPATIENT
Start: 2025-02-10

## 2025-02-10 RX ORDER — BUPROPION HYDROCHLORIDE 150 MG/1
150 TABLET, EXTENDED RELEASE ORAL 2 TIMES DAILY
Qty: 60 TABLET | Refills: 1 | Status: SHIPPED | OUTPATIENT
Start: 2025-02-10

## 2025-02-10 NOTE — PROGRESS NOTES
Depression Screening Follow-up Plan: Patient's depression screening was positive with a PHQ-9 score of 18. Clinically patient does not have depression. No treatment is required.        Name: Dominick Irving      : 1960      MRN: [id number]  Encounter Provider: Robert Budinetz, MD  Encounter Date: 2/10/2025   Encounter department: [facility]  :  Assessment & Plan  Acute on chronic diastolic heart failure (HCC)  Wt Readings from Last 3 Encounters:   02/10/25 113 kg (249 lb 2 oz)   25 108 kg (238 lb 9.6 oz)   24 111 kg (245 lb 6.4 oz)       Increase metalozone frequency to 3-5x a week             Depression, recurrent (HCC)  Depression Screening Follow-up Plan: Patient's depression screening was positive with a PHQ-9 score of 18. Clinically patient does not have depression. No treatment is required.    Restart wellbutrin    Orders:    buPROPion (Wellbutrin SR) 150 mg 12 hr tablet; Take 1 tablet (150 mg total) by mouth 2 (two) times a day  decrease dilaudid to 180 a month  Pdmp reviewed  Contract updated/signed  Chronic pain syndrome  Decrease dilaudid  Orders:    [facility] Prescribed Meds and Special Instructions    Amphetamines, Methamphetamines    Butalbital    Phenobarbital    Secobarbital    Alprazolam    Clonazepam    Diazepam, Temazepam, Oxazepam    Lorazepam    Gabapentin    Pregabalin    Cocaine    Heroin    Buprenorphine    Levorphanol    Meperidine    Naltrexone    Fentanyl    Methadone    Oxycodone    Tapentadol    THC    Tramadol    Codeine, Hydrocodone, Hydropmorphone, Morphine    Bath Salts    Ethyl Glucuronide/Ethyl Sulfate    Kratom    Spice    Methylphenidate    Phentermine    Validity Oxidant    Validity Creatinine    Validity pH    Validity Specific    Xylazine Definitive Test    HYDROmorphone (DILAUDID) 8 MG tablet; Take 1 tablet (8 mg total) by mouth every 3 (three) hours as needed for moderate pain or severe pain Max Daily Amount: 64 mg    Chronic  "bilateral low back pain with bilateral sciatica    Orders:    HYDROmorphone (DILAUDID) 8 MG tablet; Take 1 tablet (8 mg total) by mouth every 3 (three) hours as needed for moderate pain or severe pain Max Daily Amount: 64 mg    Opioid dependence, uncomplicated (HCC)                History of Present Illness   The patient is here to follow-up on chronic pain as well as depression and edema.  He has a history of CHF and can increase his Zaroxolyn usage.  His legs have been swelling recently.  He has mild shortness of breath when laying flat.  He currently has no symptoms in the office today.  He does have swollen legs.  He will keep an eye on his weights.  He has been smoking again Wellbutrin worked well for him in the past he is mildly depressed his wife is going through a new diagnosis of cancer we will restart that.  He is agreeable to decreasing Dilaudid amount.      Review of Systems   Respiratory: Negative.     Cardiovascular: Negative.    Gastrointestinal: Negative.        Objective   /82 (BP Location: Right arm, Patient Position: Sitting, Cuff Size: Large)   Pulse 63   Temp 97.7 °F (36.5 °C) (Temporal)   Ht 5' 7\" (1.702 m)   Wt 113 kg (249 lb 2 oz)   SpO2 96%   BMI 39.02 kg/m²      Physical Exam  Vitals and nursing note reviewed.   Constitutional:       General: He is not in acute distress.     Appearance: He is well-developed.   HENT:      Head: Normocephalic and atraumatic.   Eyes:      Conjunctiva/sclera: Conjunctivae normal.   Cardiovascular:      Rate and Rhythm: Normal rate and regular rhythm.      Heart sounds: No murmur heard.  Pulmonary:      Effort: Pulmonary effort is normal. No respiratory distress.      Breath sounds: Normal breath sounds.   Abdominal:      Palpations: Abdomen is soft.      Tenderness: There is no abdominal tenderness.   Musculoskeletal:         General: No swelling.      Cervical back: Neck supple.   Skin:     General: Skin is warm and dry.      Capillary Refill: " Capillary refill takes less than 2 seconds.   Neurological:      Mental Status: He is alert.   Psychiatric:         Mood and Affect: Mood normal.

## 2025-02-10 NOTE — ASSESSMENT & PLAN NOTE
Depression Screening Follow-up Plan: Patient's depression screening was positive with a PHQ-9 score of 18. Clinically patient does not have depression. No treatment is required.    Restart wellbutrin    Orders:    buPROPion (Wellbutrin SR) 150 mg 12 hr tablet; Take 1 tablet (150 mg total) by mouth 2 (two) times a day  decrease dilaudid to 180 a month  Pdmp reviewed  Contract updated/signed

## 2025-02-10 NOTE — ASSESSMENT & PLAN NOTE
Wt Readings from Last 3 Encounters:   02/10/25 113 kg (249 lb 2 oz)   01/02/25 108 kg (238 lb 9.6 oz)   12/30/24 111 kg (245 lb 6.4 oz)       Increase metalozone frequency to 3-5x a week

## 2025-02-10 NOTE — ASSESSMENT & PLAN NOTE
Decrease dilaudid  Orders:    [facility] Prescribed Meds and Special Instructions    Amphetamines, Methamphetamines    Butalbital    Phenobarbital    Secobarbital    Alprazolam    Clonazepam    Diazepam, Temazepam, Oxazepam    Lorazepam    Gabapentin    Pregabalin    Cocaine    Heroin    Buprenorphine    Levorphanol    Meperidine    Naltrexone    Fentanyl    Methadone    Oxycodone    Tapentadol    THC    Tramadol    Codeine, Hydrocodone, Hydropmorphone, Morphine    Bath Salts    Ethyl Glucuronide/Ethyl Sulfate    Kratom    Spice    Methylphenidate    Phentermine    Validity Oxidant    Validity Creatinine    Validity pH    Validity Specific    Xylazine Definitive Test    HYDROmorphone (DILAUDID) 8 MG tablet; Take 1 tablet (8 mg total) by mouth every 3 (three) hours as needed for moderate pain or severe pain Max Daily Amount: 64 mg

## 2025-02-14 LAB
4OH-XYLAZINE UR QL CFM: NEGATIVE NG/ML
6MAM UR QL CFM: NEGATIVE NG/ML
7AMINOCLONAZEPAM UR QL CFM: NEGATIVE NG/ML
A-OH ALPRAZ UR QL CFM: ABNORMAL NG/ML
ACCEPTABLE CREAT UR QL: ABNORMAL MG/DL
ACCEPTIBLE SP GR UR QL: NORMAL
AMPHET UR QL CFM: NEGATIVE NG/ML
BUPRENORPHINE UR QL CFM: NEGATIVE NG/ML
BUTALBITAL UR QL CFM: ABNORMAL NG/ML
BZE UR QL CFM: NEGATIVE NG/ML
CODEINE UR QL CFM: NEGATIVE NG/ML
EDDP UR QL CFM: NEGATIVE NG/ML
ETHYL GLUCURONIDE UR QL CFM: NEGATIVE NG/ML
ETHYL SULFATE UR QL SCN: NEGATIVE NG/ML
EUTYLONE UR QL: NEGATIVE NG/ML
FENTANYL UR QL CFM: NEGATIVE NG/ML
GLIADIN IGG SER IA-ACNC: NEGATIVE NG/ML
HYDROCODONE UR QL CFM: NEGATIVE NG/ML
HYDROMORPHONE UR QL CFM: NORMAL NG/ML
LORAZEPAM UR QL CFM: NEGATIVE NG/ML
ME-PHENIDATE UR QL CFM: NEGATIVE NG/ML
MEPERIDINE UR QL CFM: NEGATIVE NG/ML
METHADONE UR QL CFM: NEGATIVE NG/ML
METHAMPHET UR QL CFM: NEGATIVE NG/ML
MORPHINE UR QL CFM: NEGATIVE NG/ML
NALTREXONE UR QL CFM: NEGATIVE NG/ML
NITRITE UR QL: NORMAL UG/ML
NORBUPRENORPHINE UR QL CFM: NEGATIVE NG/ML
NORDIAZEPAM UR QL CFM: NEGATIVE NG/ML
NORFENTANYL UR QL CFM: NEGATIVE NG/ML
NORHYDROCODONE UR QL CFM: NEGATIVE NG/ML
NORMEPERIDINE UR QL CFM: NEGATIVE NG/ML
NOROXYCODONE UR QL CFM: NEGATIVE NG/ML
OXAZEPAM UR QL CFM: NEGATIVE NG/ML
OXYCODONE UR QL CFM: NEGATIVE NG/ML
OXYMORPHONE UR QL CFM: NEGATIVE NG/ML
PARA-FLUOROFENTANYL QUANTIFICATION: NORMAL NG/ML
PHENOBARB UR QL CFM: NEGATIVE NG/ML
RESULT ALL_PRESCRIBED MEDS AND SPECIAL INSTRUCTIONS: NORMAL
SECOBARBITAL UR QL CFM: NEGATIVE NG/ML
SL AMB 5F-ADB-M7 METABOLITE QUANTIFICATION: NEGATIVE NG/ML
SL AMB 7-OH-MITRAGYNINE (KRATOM ALKALOID) QUANTIFICATION: NEGATIVE NG/ML
SL AMB AB-FUBINACA-M3 METABOLITE QUANTIFICATION: NEGATIVE NG/ML
SL AMB ACETYL FENTANYL QUANTIFICATION: NORMAL NG/ML
SL AMB ACETYL NORFENTANYL QUANTIFICATION: NORMAL NG/ML
SL AMB ACRYL FENTANYL QUANTIFICATION: NORMAL NG/ML
SL AMB CARFENTANIL QUANTIFICATION: NORMAL NG/ML
SL AMB CTHC (MARIJUANA METABOLITE) QUANTIFICATION: NEGATIVE NG/ML
SL AMB DEXTRORPHAN (DEXTROMETHORPHAN METABOLITE) QUANT: NEGATIVE NG/ML
SL AMB GABAPENTIN QUANTIFICATION: NORMAL NG/ML
SL AMB JWH018 METABOLITE QUANTIFICATION: NEGATIVE NG/ML
SL AMB JWH073 METABOLITE QUANTIFICATION: NEGATIVE NG/ML
SL AMB MDMB-FUBINACA-M1 METABOLITE QUANTIFICATION: NEGATIVE NG/ML
SL AMB METHYLONE QUANTIFICATION: NEGATIVE NG/ML
SL AMB N-DESMETHYL-TRAMADOL QUANTIFICATION: NEGATIVE NG/ML
SL AMB PHENTERMINE QUANTIFICATION: NEGATIVE NG/ML
SL AMB PREGABALIN QUANTIFICATION: NEGATIVE NG/ML
SL AMB RCS4 METABOLITE QUANTIFICATION: NEGATIVE NG/ML
SL AMB RITALINIC ACID QUANTIFICATION: NEGATIVE NG/ML
SMOOTH MUSCLE AB TITR SER IF: NEGATIVE NG/ML
SPECIMEN DRAWN SERPL: NEGATIVE NG/ML
SPECIMEN PH ACCEPTABLE UR: NORMAL
TAPENTADOL UR QL CFM: NEGATIVE NG/ML
TEMAZEPAM UR QL CFM: NEGATIVE NG/ML
TRAMADOL UR QL CFM: NEGATIVE NG/ML
URATE/CREAT 24H UR: NEGATIVE NG/ML
XYLAZINE UR QL CFM: NEGATIVE NG/ML

## 2025-02-17 ENCOUNTER — TELEPHONE (OUTPATIENT)
Age: 65
End: 2025-02-17

## 2025-02-17 NOTE — TELEPHONE ENCOUNTER
Patient called in to verify his pre op instructions. I advised he can have a small breakfast, continue taking medications as prescribed, and to arrive to his appointment with a comfortably full bladder to provide a specimen. Patient verbalized understanding.

## 2025-02-18 ENCOUNTER — PROCEDURE VISIT (OUTPATIENT)
Dept: UROLOGY | Facility: CLINIC | Age: 65
End: 2025-02-18
Payer: MEDICARE

## 2025-02-18 VITALS
HEIGHT: 67 IN | TEMPERATURE: 98.1 F | WEIGHT: 247 LBS | OXYGEN SATURATION: 97 % | HEART RATE: 54 BPM | SYSTOLIC BLOOD PRESSURE: 140 MMHG | DIASTOLIC BLOOD PRESSURE: 64 MMHG | BODY MASS INDEX: 38.77 KG/M2

## 2025-02-18 DIAGNOSIS — N40.1 BENIGN PROSTATIC HYPERPLASIA WITH URINARY FREQUENCY: Primary | ICD-10-CM

## 2025-02-18 DIAGNOSIS — Z87.898 HISTORY OF GROSS HEMATURIA: ICD-10-CM

## 2025-02-18 DIAGNOSIS — Z85.51 HISTORY OF BLADDER CANCER: ICD-10-CM

## 2025-02-18 DIAGNOSIS — R35.0 BENIGN PROSTATIC HYPERPLASIA WITH URINARY FREQUENCY: Primary | ICD-10-CM

## 2025-02-18 DIAGNOSIS — R35.1 NOCTURIA: ICD-10-CM

## 2025-02-18 LAB
SL AMB  POCT GLUCOSE, UA: NORMAL
SL AMB LEUKOCYTE ESTERASE,UA: NORMAL
SL AMB POCT BILIRUBIN,UA: NORMAL
SL AMB POCT BLOOD,UA: NORMAL
SL AMB POCT CLARITY,UA: CLEAR
SL AMB POCT COLOR,UA: YELLOW
SL AMB POCT KETONES,UA: NORMAL
SL AMB POCT NITRITE,UA: NORMAL
SL AMB POCT PH,UA: 7
SL AMB POCT SPECIFIC GRAVITY,UA: 1.01
SL AMB POCT URINE PROTEIN: NORMAL
SL AMB POCT UROBILINOGEN: 0.2

## 2025-02-18 PROCEDURE — 52000 CYSTOURETHROSCOPY: CPT | Performed by: UROLOGY

## 2025-02-18 PROCEDURE — 81003 URINALYSIS AUTO W/O SCOPE: CPT | Performed by: UROLOGY

## 2025-02-18 PROCEDURE — 88112 CYTOPATH CELL ENHANCE TECH: CPT | Performed by: PATHOLOGY

## 2025-02-18 RX ORDER — CIPROFLOXACIN 500 MG/1
500 TABLET, FILM COATED ORAL ONCE
Status: DISCONTINUED | OUTPATIENT
Start: 2025-02-18 | End: 2025-02-18

## 2025-02-20 PROCEDURE — 88112 CYTOPATH CELL ENHANCE TECH: CPT | Performed by: PATHOLOGY

## 2025-02-22 ENCOUNTER — APPOINTMENT (OUTPATIENT)
Dept: LAB | Facility: HOSPITAL | Age: 65
End: 2025-02-22
Payer: MEDICARE

## 2025-02-22 DIAGNOSIS — T84.7XXA HARDWARE COMPLICATING WOUND INFECTION (HCC): ICD-10-CM

## 2025-02-22 DIAGNOSIS — T81.49XA SURGICAL SITE INFECTION: ICD-10-CM

## 2025-02-22 LAB
ANION GAP SERPL CALCULATED.3IONS-SCNC: 6 MMOL/L (ref 4–13)
BASOPHILS # BLD AUTO: 0.04 THOUSANDS/ΜL (ref 0–0.1)
BASOPHILS NFR BLD AUTO: 1 % (ref 0–1)
BUN SERPL-MCNC: 31 MG/DL (ref 5–25)
CALCIUM SERPL-MCNC: 8.9 MG/DL (ref 8.4–10.2)
CHLORIDE SERPL-SCNC: 95 MMOL/L (ref 96–108)
CO2 SERPL-SCNC: 33 MMOL/L (ref 21–32)
CREAT SERPL-MCNC: 1.35 MG/DL (ref 0.6–1.3)
CRP SERPL QL: 62 MG/L
EOSINOPHIL # BLD AUTO: 0.28 THOUSAND/ΜL (ref 0–0.61)
EOSINOPHIL NFR BLD AUTO: 4 % (ref 0–6)
ERYTHROCYTE [DISTWIDTH] IN BLOOD BY AUTOMATED COUNT: 16 % (ref 11.6–15.1)
ERYTHROCYTE [SEDIMENTATION RATE] IN BLOOD: 49 MM/HOUR (ref 0–19)
GFR SERPL CREATININE-BSD FRML MDRD: 54 ML/MIN/1.73SQ M
GLUCOSE P FAST SERPL-MCNC: 134 MG/DL (ref 65–99)
HCT VFR BLD AUTO: 38.4 % (ref 36.5–49.3)
HGB BLD-MCNC: 12.6 G/DL (ref 12–17)
IMM GRANULOCYTES # BLD AUTO: 0.02 THOUSAND/UL (ref 0–0.2)
IMM GRANULOCYTES NFR BLD AUTO: 0 % (ref 0–2)
LYMPHOCYTES # BLD AUTO: 1.78 THOUSANDS/ΜL (ref 0.6–4.47)
LYMPHOCYTES NFR BLD AUTO: 24 % (ref 14–44)
MCH RBC QN AUTO: 30.4 PG (ref 26.8–34.3)
MCHC RBC AUTO-ENTMCNC: 32.8 G/DL (ref 31.4–37.4)
MCV RBC AUTO: 93 FL (ref 82–98)
MONOCYTES # BLD AUTO: 0.66 THOUSAND/ΜL (ref 0.17–1.22)
MONOCYTES NFR BLD AUTO: 9 % (ref 4–12)
NEUTROPHILS # BLD AUTO: 4.62 THOUSANDS/ΜL (ref 1.85–7.62)
NEUTS SEG NFR BLD AUTO: 62 % (ref 43–75)
NRBC BLD AUTO-RTO: 0 /100 WBCS
PLATELET # BLD AUTO: 184 THOUSANDS/UL (ref 149–390)
PMV BLD AUTO: 11.2 FL (ref 8.9–12.7)
POTASSIUM SERPL-SCNC: 4 MMOL/L (ref 3.5–5.3)
RBC # BLD AUTO: 4.14 MILLION/UL (ref 3.88–5.62)
SODIUM SERPL-SCNC: 134 MMOL/L (ref 135–147)
WBC # BLD AUTO: 7.4 THOUSAND/UL (ref 4.31–10.16)

## 2025-02-22 PROCEDURE — 86140 C-REACTIVE PROTEIN: CPT

## 2025-02-22 PROCEDURE — 80048 BASIC METABOLIC PNL TOTAL CA: CPT

## 2025-02-22 PROCEDURE — 85025 COMPLETE CBC W/AUTO DIFF WBC: CPT

## 2025-02-22 PROCEDURE — 85652 RBC SED RATE AUTOMATED: CPT

## 2025-02-22 PROCEDURE — 36415 COLL VENOUS BLD VENIPUNCTURE: CPT

## 2025-02-24 ENCOUNTER — TELEMEDICINE (OUTPATIENT)
Dept: INFECTIOUS DISEASES | Facility: CLINIC | Age: 65
End: 2025-02-24
Payer: MEDICARE

## 2025-02-24 DIAGNOSIS — E66.01 MORBID (SEVERE) OBESITY DUE TO EXCESS CALORIES (HCC): ICD-10-CM

## 2025-02-24 DIAGNOSIS — M96.89 OTHER INTRAOPERATIVE AND POSTPROCEDURAL COMPLICATIONS AND DISORDERS OF THE MUSCULOSKELETAL SYSTEM: ICD-10-CM

## 2025-02-24 DIAGNOSIS — T81.49XA SURGICAL SITE INFECTION: Primary | ICD-10-CM

## 2025-02-24 DIAGNOSIS — I50.33 ACUTE ON CHRONIC DIASTOLIC HEART FAILURE (HCC): ICD-10-CM

## 2025-02-24 DIAGNOSIS — E11.69 TYPE 2 DIABETES MELLITUS WITH OTHER SPECIFIED COMPLICATION, UNSPECIFIED WHETHER LONG TERM INSULIN USE (HCC): ICD-10-CM

## 2025-02-24 PROCEDURE — 99214 OFFICE O/P EST MOD 30 MIN: CPT | Performed by: PHYSICIAN ASSISTANT

## 2025-02-24 RX ORDER — SULFAMETHOXAZOLE AND TRIMETHOPRIM 800; 160 MG/1; MG/1
1 TABLET ORAL EVERY 12 HOURS SCHEDULED
Qty: 180 TABLET | Refills: 0 | Status: SHIPPED | OUTPATIENT
Start: 2025-02-24 | End: 2025-05-25

## 2025-02-24 RX ORDER — SULFAMETHOXAZOLE AND TRIMETHOPRIM 800; 160 MG/1; MG/1
1 TABLET ORAL EVERY 12 HOURS SCHEDULED
COMMUNITY
End: 2025-02-24 | Stop reason: SDUPTHER

## 2025-02-24 NOTE — PATIENT INSTRUCTIONS
- continue bactrim ds one tablet po every 12 hours  - labs in 3 and 6 weeks  - follow up with Neurosurgery as scheduled  - start PT  - RTO 6 weeks  - please call with any concerns

## 2025-02-24 NOTE — ASSESSMENT & PLAN NOTE
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  Initial L3-4 decompressive hemilaminectomy with transverse lumbar interbody fusion and L4-5 fixation and fusion 10/30.  One week after his initial surgery he noted purulent discharge from the wound.  He was given a script for cephalexin.  MRI revealed a fluid collection in the area of the incision.  OR cultures positive for MRSA.  AFB and Fungal cultures remain negative to date.  Patient discharged and finished a one week course of IV Vancomycin then transition to po bactrim.      12/4/24- Patient states he is doing well on the bactrim.  He does have a slight increase in his Cr although his Cr does appear to fluctuate so I do not necessarily think this is out of his normal range.  Will continue to monitor closely.  MRSA is Intermediate to tetracyclines so limited to other good po options.  There is some consideration for hardware involvement as it appears infection was deep (as per Op Note).      12/23/24- Patient tolerating the bactrim well.  No new complaints today.  Labs overall ok.  Cr slightly up but stable.  Also note BUN up as well, ? Dehydration playing a part.  Also could be false elevation of Cr from the bactrim.  Will continue to monitor closely.  ESR up and CRP down.  Patient feeling well so will continue to trend.      1/13/25 - patient remains on po Bactrim DS.  He continues to tolerate it.  Still with fluctuating Cr - does take diuretics.  Also noted to have low K - I did reach out to his PCP.   Also fluctuating inflammatory markers.  Patient due for labs tomorrow.  Will hold off on refills until they are reviewed.     [id number]- patient remains on po bactrim DS.  He continues to tolerate it well.  Labs are stable for him.  ESR now seems to be trending down.  CRP still fluctuating.  Back pain improved but he still feels weak and fatigues easily.  Discussed that he should be seeing PT.        Plan  - continue Bactrim DS 1 tab po bid  due to concern for hardware involvement would go at least until inflammatory markers normalization/plateau.     - CBCD, BMP, ESR, CRP in 3 and 6 weeks  - follow up with Neurosurgery as scheduled  - recommend he start PT  - RTO 6 weeks    Orders:    sulfamethoxazole-trimethoprim (BACTRIM DS) 800-160 mg per tablet; Take 1 tablet by mouth every 12 (twelve) hours    CBC and differential; Standing    Basic metabolic panel; Standing    Sedimentation rate, automated; Standing    C-reactive protein; Standing

## 2025-02-24 NOTE — PROGRESS NOTES
Name: Dominick Irving      : 1960      MRN: [id number]  Encounter Provider: Edmar Pate PA-C  Encounter Date: 2025   Encounter department: [facility]  :  Assessment & Plan  Surgical site infection  Postoperative Staphylococcus aureus (MRSA) lumbar wound infection s/p lumbar wound debridement and washout .  Initial L3-4 decompressive hemilaminectomy with transverse lumbar interbody fusion and L4-5 fixation and fusion 10/30.  One week after his initial surgery he noted purulent discharge from the wound.  He was given a script for cephalexin.  MRI revealed a fluid collection in the area of the incision.  OR cultures positive for MRSA.  AFB and Fungal cultures remain negative to date.  Patient discharged and finished a one week course of IV Vancomycin then transition to po bactrim.      24- Patient states he is doing well on the bactrim.  He does have a slight increase in his Cr although his Cr does appear to fluctuate so I do not necessarily think this is out of his normal range.  Will continue to monitor closely.  MRSA is Intermediate to tetracyclines so limited to other good po options.  There is some consideration for hardware involvement as it appears infection was deep (as per Op Note).      24- Patient tolerating the bactrim well.  No new complaints today.  Labs overall ok.  Cr slightly up but stable.  Also note BUN up as well, ? Dehydration playing a part.  Also could be false elevation of Cr from the bactrim.  Will continue to monitor closely.  ESR up and CRP down.  Patient feeling well so will continue to trend.      25 - patient remains on po Bactrim DS.  He continues to tolerate it.  Still with fluctuating Cr - does take diuretics.  Also noted to have low K - I did reach out to his PCP.   Also fluctuating inflammatory markers.  Patient due for labs tomorrow.  Will hold off on refills until they are reviewed.     [id number]- patient remains on po  bactrim DS.  He continues to tolerate it well.  Labs are stable for him.  ESR now seems to be trending down.  CRP still fluctuating.  Back pain improved but he still feels weak and fatigues easily.  Discussed that he should be seeing PT.       Plan  - continue Bactrim DS 1 tab po bid  due to concern for hardware involvement would go at least until inflammatory markers normalization/plateau.     - CBCD, BMP, ESR, CRP in 3 and 6 weeks  - follow up with Neurosurgery as scheduled  - recommend he start PT  - RTO 6 weeks    Orders:    sulfamethoxazole-trimethoprim (BACTRIM DS) 800-160 mg per tablet; Take 1 tablet by mouth every 12 (twelve) hours    CBC and differential; Standing    Basic metabolic panel; Standing    Sedimentation rate, automated; Standing    C-reactive protein; Standing    Other intraoperative and postprocedural complications and disorders of the musculoskeletal system    Orders:    CBC and differential; Standing    Type 2 diabetes mellitus with other specified complication, unspecified whether long term insulin use ([facility])    Lab Results   Component Value Date    HGBA1C 7.1 (A) 12/30/2024            Morbid (severe) obesity due to excess calories ([facility])  BMI 38.69         Acute on chronic diastolic heart failure (HCC)  Patient states he is on one diuretic daily and takes another diuretic if he gains more than 3 pounds. The second diuretic causes K loss and when he takes this one he ups his K supplementation.                History of Present Illness   HPI  Dominick Irving is a 65 y.o. male who presents for virtual follow up today regarding Postoperative Staphylococcus aureus (MRSA) lumbar wound infection s/p lumbar wound debridement and washout 11/20.  Patient remains on po bactrim.  He continues to tolerate the bactrim well.  He continues to complain of fatigue and deconditioning.  He has yet to do PT.  He has been putting off PT due to his wife's recent cancer diagnosis.  Otherwise he has no other  complaints and states his back pain is much improved.        Review of Systems   Constitutional:  Negative for chills and fever.   Respiratory:  Negative for cough and shortness of breath.    Gastrointestinal:  Negative for abdominal pain, diarrhea, nausea and vomiting.   Musculoskeletal:  Negative for back pain.   Skin:  Negative for rash.   Psychiatric/Behavioral:  Negative for behavioral problems and confusion.           Objective   There were no vitals taken for this visit.     Physical Exam  Vitals reviewed.   Constitutional:       General: He is not in acute distress.     Appearance: Normal appearance. He is not ill-appearing, toxic-appearing or diaphoretic.   HENT:      Head: Normocephalic and atraumatic.   Eyes:      General: No scleral icterus.        Right eye: No discharge.         Left eye: No discharge.      Conjunctiva/sclera: Conjunctivae normal.   Pulmonary:      Effort: Pulmonary effort is normal. No respiratory distress.   Skin:     Coloration: Skin is not jaundiced or pale.      Findings: No erythema or rash.   Neurological:      Mental Status: He is alert and oriented to person, place, and time.   Psychiatric:         Mood and Affect: Mood normal.         Behavior: Behavior normal.             Labs:   2/22/25  Wbc: 7.4  Hgb: 12.6  Plt: 184  Cr: 1.35  ESR: 49  CRP: 62.0        Administrative Statements   Encounter provider Edmar Pate PA-C    The Patient is located at Home and in the following state in which I hold an active license [place].    The patient was identified by name and date of birth. Dominick MARTINEZ Aristides was informed that this is a telemedicine visit and that the visit is being conducted through the Epic Embedded platform. He agrees to proceed..  My office door was closed. No one else was in the room.  He acknowledged consent and understanding of privacy and security of the video platform. The patient has agreed to participate and understands they can discontinue the visit at any  time.    I have spent a total time of 30 minutes in caring for this patient on the day of the visit/encounter including Diagnostic results, Prognosis, Risks and benefits of tx options, Instructions for management, Patient and family education, Importance of tx compliance, Risk factor reductions, Impressions, Documenting in the medical record, and Reviewing/placing orders in the medical record (including tests, medications, and/or procedures).

## 2025-02-24 NOTE — ASSESSMENT & PLAN NOTE
Patient states he is on one diuretic daily and takes another diuretic if he gains more than 3 pounds. The second diuretic causes K loss and when he takes this one he ups his K supplementation.

## 2025-03-08 DIAGNOSIS — E87.6 HYPOKALEMIA: ICD-10-CM

## 2025-03-08 DIAGNOSIS — E11.9 TYPE 2 DIABETES MELLITUS WITHOUT COMPLICATION, WITHOUT LONG-TERM CURRENT USE OF INSULIN (HCC): ICD-10-CM

## 2025-03-10 RX ORDER — POTASSIUM CHLORIDE 750 MG/1
TABLET, EXTENDED RELEASE ORAL
Qty: 120 TABLET | Refills: 0 | OUTPATIENT
Start: 2025-03-10

## 2025-03-10 RX ORDER — DULOXETIN HYDROCHLORIDE 60 MG/1
60 CAPSULE, DELAYED RELEASE ORAL DAILY
Qty: 30 CAPSULE | Refills: 0 | OUTPATIENT
Start: 2025-03-10

## 2025-03-10 RX ORDER — DULOXETIN HYDROCHLORIDE 60 MG/1
60 CAPSULE, DELAYED RELEASE ORAL DAILY
Qty: 30 CAPSULE | Refills: 5 | Status: SHIPPED | OUTPATIENT
Start: 2025-03-10

## 2025-03-10 RX ORDER — POTASSIUM CHLORIDE 750 MG/1
40 TABLET, EXTENDED RELEASE ORAL 2 TIMES DAILY
Qty: 120 TABLET | Refills: 0 | Status: SHIPPED | OUTPATIENT
Start: 2025-03-10

## 2025-03-11 ENCOUNTER — OFFICE VISIT (OUTPATIENT)
Dept: FAMILY MEDICINE CLINIC | Facility: CLINIC | Age: 65
End: 2025-03-11
Payer: MEDICARE

## 2025-03-11 VITALS
OXYGEN SATURATION: 94 % | BODY MASS INDEX: 39.11 KG/M2 | WEIGHT: 249.2 LBS | HEIGHT: 67 IN | DIASTOLIC BLOOD PRESSURE: 64 MMHG | SYSTOLIC BLOOD PRESSURE: 132 MMHG | HEART RATE: 60 BPM

## 2025-03-11 DIAGNOSIS — J96.00 ACUTE RESPIRATORY FAILURE, UNSPECIFIED WHETHER WITH HYPOXIA OR HYPERCAPNIA (HCC): ICD-10-CM

## 2025-03-11 DIAGNOSIS — C67.9 MALIGNANT NEOPLASM OF URINARY BLADDER, UNSPECIFIED SITE (HCC): ICD-10-CM

## 2025-03-11 DIAGNOSIS — I10 PRIMARY HYPERTENSION: ICD-10-CM

## 2025-03-11 DIAGNOSIS — E11.69 TYPE 2 DIABETES MELLITUS WITH OTHER SPECIFIED COMPLICATION, UNSPECIFIED WHETHER LONG TERM INSULIN USE (HCC): Primary | ICD-10-CM

## 2025-03-11 DIAGNOSIS — M54.41 CHRONIC BILATERAL LOW BACK PAIN WITH BILATERAL SCIATICA: ICD-10-CM

## 2025-03-11 DIAGNOSIS — Z12.11 COLON CANCER SCREENING: ICD-10-CM

## 2025-03-11 DIAGNOSIS — Z13.6 ENCOUNTER FOR ABDOMINAL AORTIC ANEURYSM (AAA) SCREENING: ICD-10-CM

## 2025-03-11 DIAGNOSIS — M54.42 CHRONIC BILATERAL LOW BACK PAIN WITH BILATERAL SCIATICA: ICD-10-CM

## 2025-03-11 DIAGNOSIS — J44.1 COPD WITH ACUTE EXACERBATION (HCC): ICD-10-CM

## 2025-03-11 DIAGNOSIS — G89.29 CHRONIC BILATERAL LOW BACK PAIN WITH BILATERAL SCIATICA: ICD-10-CM

## 2025-03-11 DIAGNOSIS — E66.01 MORBID (SEVERE) OBESITY DUE TO EXCESS CALORIES (HCC): ICD-10-CM

## 2025-03-11 DIAGNOSIS — Z23 NEED FOR COVID-19 VACCINE: ICD-10-CM

## 2025-03-11 LAB — SL AMB POCT HEMOGLOBIN AIC: 6.8 (ref ?–6.5)

## 2025-03-11 PROCEDURE — 83036 HEMOGLOBIN GLYCOSYLATED A1C: CPT | Performed by: FAMILY MEDICINE

## 2025-03-11 PROCEDURE — 99214 OFFICE O/P EST MOD 30 MIN: CPT | Performed by: FAMILY MEDICINE

## 2025-03-11 PROCEDURE — G2211 COMPLEX E/M VISIT ADD ON: HCPCS | Performed by: FAMILY MEDICINE

## 2025-03-11 NOTE — ASSESSMENT & PLAN NOTE
Lab Results   Component Value Date    HGBA1C 6.8 (A) 03/11/2025       Orders:    POCT hemoglobin A1c

## 2025-03-11 NOTE — PROGRESS NOTES
"Name: Dominick Irving      : 1960      MRN: [id number]  Encounter Provider: Robert Budinetz, MD  Encounter Date: 3/11/2025   Encounter department: [facility]  :  Assessment & Plan  Type 2 diabetes mellitus with other specified complication, unspecified whether long term insulin use (HCC)    Lab Results   Component Value Date    HGBA1C 6.8 (A) 2025       Orders:    POCT hemoglobin A1c    Acute respiratory failure, unspecified whether with hypoxia or hypercapnia (HCC)         Malignant neoplasm of urinary bladder, unspecified site (HCC)         COPD with acute exacerbation (HCC)         Morbid (severe) obesity due to excess calories (HCC)           Primary hypertension  Continue meds, no changes         Chronic bilateral low back pain with bilateral sciatica  Decrease dilaudid to #150         Need for COVID-19 vaccine    Orders:    COVID-19 Pfizer mRNA vaccine 12 yr and older (Comirnaty pre-filled syringe)    Colon cancer screening    Orders:    Ambulatory Referral to Gastroenterology; Future    Encounter for abdominal aortic aneurysm (AAA) screening    Orders:    US abdominal aorta; Future           History of Present Illness   Dominick is here for a follow-up.  His blood pressure is stable and well-controlled.  He has no chest pain or shortness of breath.  His A1c is 6.8 he is doing great were not can make adjustments there.  Chronic pain is stable on his current meds working to decrease the Dilaudid to #150 from 180 at his next refill.  He is due for colon screening I placed a gastroenterology referral and a abdominal aortic aneurysm screen as well.        Review of Systems   Respiratory: Negative.     Cardiovascular: Negative.    Gastrointestinal: Negative.    Ultrasound    Objective   /64 (BP Location: Left arm, Patient Position: Sitting, Cuff Size: Large)   Pulse 60   Ht 5' 7\" (1.702 m)   Wt 113 kg (249 lb 3.2 oz)   SpO2 94%   BMI 39.03 kg/m²      Physical " Exam  Vitals and nursing note reviewed.   Constitutional:       General: He is not in acute distress.     Appearance: He is well-developed.   HENT:      Head: Normocephalic and atraumatic.   Eyes:      Conjunctiva/sclera: Conjunctivae normal.   Cardiovascular:      Rate and Rhythm: Normal rate and regular rhythm.      Heart sounds: No murmur heard.  Pulmonary:      Effort: Pulmonary effort is normal. No respiratory distress.      Breath sounds: Normal breath sounds.   Abdominal:      Palpations: Abdomen is soft.      Tenderness: There is no abdominal tenderness.   Musculoskeletal:         General: No swelling.      Cervical back: Neck supple.   Skin:     General: Skin is warm and dry.      Capillary Refill: Capillary refill takes less than 2 seconds.   Neurological:      Mental Status: He is alert.   Psychiatric:         Mood and Affect: Mood normal.

## 2025-03-12 ENCOUNTER — TELEPHONE (OUTPATIENT)
Dept: FAMILY MEDICINE CLINIC | Facility: CLINIC | Age: 65
End: 2025-03-12

## 2025-03-12 DIAGNOSIS — E11.69 TYPE 2 DIABETES MELLITUS WITH OTHER SPECIFIED COMPLICATION, UNSPECIFIED WHETHER LONG TERM INSULIN USE (HCC): Primary | ICD-10-CM

## 2025-03-12 NOTE — TELEPHONE ENCOUNTER
Prior authorization for Ozempic 2MG scanned into media, please start prior authoization process    Key [id number]

## 2025-03-12 NOTE — TELEPHONE ENCOUNTER
PA for Ozempic 0.25 or 0.5mg/dose 2mg/1.5mL APPROVED     Date(s) approved 03/12/2025-12/31/2025    Case #[id number]     Patient advised by          []MyChart Message  []Phone call   []LMOM  []L/M to call office as no active Communication consent on file  [x]Unable to leave detailed message as VM not approved on Communication consent       Pharmacy advised by    [x]Fax  []Phone call  []Secure Chat    Approval letter scanned into Media Yes

## 2025-03-12 NOTE — TELEPHONE ENCOUNTER
Please advise if pt is currently using semaglutide ( Ozempic) as it is not on the current medication list as an active medication thank you

## 2025-03-12 NOTE — TELEPHONE ENCOUNTER
PA for Ozempic 0.25 or 0.5mg/dose 2mg/1.5mL SUBMITTED to [employer]    via    []CMM-KEY:   [x]Surescripts-Case ID # [id number]   []Availity-Auth ID # NDC #   []Faxed to plan   []Other website   []Phone call Case ID #     [x]PA sent as URGENT    All office notes, labs and other pertaining documents and studies sent. Clinical questions answered. Awaiting determination from insurance company.     Turnaround time for your insurance to make a decision on your Prior Authorization can take 7-21 business days.

## 2025-03-14 ENCOUNTER — APPOINTMENT (OUTPATIENT)
Dept: LAB | Facility: HOSPITAL | Age: 65
End: 2025-03-14
Payer: MEDICARE

## 2025-03-14 DIAGNOSIS — T81.49XA SURGICAL SITE INFECTION: ICD-10-CM

## 2025-03-14 DIAGNOSIS — M96.89 OTHER INTRAOPERATIVE AND POSTPROCEDURAL COMPLICATIONS AND DISORDERS OF THE MUSCULOSKELETAL SYSTEM: ICD-10-CM

## 2025-03-14 LAB
ANION GAP SERPL CALCULATED.3IONS-SCNC: 8 MMOL/L (ref 4–13)
BASOPHILS # BLD AUTO: 0.03 THOUSANDS/ÂΜL (ref 0–0.1)
BASOPHILS NFR BLD AUTO: 1 % (ref 0–1)
BUN SERPL-MCNC: 25 MG/DL (ref 5–25)
CALCIUM SERPL-MCNC: 9.4 MG/DL (ref 8.4–10.2)
CHLORIDE SERPL-SCNC: 95 MMOL/L (ref 96–108)
CO2 SERPL-SCNC: 32 MMOL/L (ref 21–32)
CREAT SERPL-MCNC: 1.48 MG/DL (ref 0.6–1.3)
CRP SERPL QL: 45.8 MG/L
EOSINOPHIL # BLD AUTO: 0.28 THOUSAND/ÂΜL (ref 0–0.61)
EOSINOPHIL NFR BLD AUTO: 4 % (ref 0–6)
ERYTHROCYTE [DISTWIDTH] IN BLOOD BY AUTOMATED COUNT: 15.8 % (ref 11.6–15.1)
ERYTHROCYTE [SEDIMENTATION RATE] IN BLOOD: 49 MM/HOUR (ref 0–19)
GFR SERPL CREATININE-BSD FRML MDRD: 48 ML/MIN/1.73SQ M
GLUCOSE P FAST SERPL-MCNC: 139 MG/DL (ref 65–99)
HCT VFR BLD AUTO: 40.1 % (ref 36.5–49.3)
HGB BLD-MCNC: 13.3 G/DL (ref 12–17)
IMM GRANULOCYTES # BLD AUTO: 0.02 THOUSAND/UL (ref 0–0.2)
IMM GRANULOCYTES NFR BLD AUTO: 0 % (ref 0–2)
LYMPHOCYTES # BLD AUTO: 1.86 THOUSANDS/ÂΜL (ref 0.6–4.47)
LYMPHOCYTES NFR BLD AUTO: 28 % (ref 14–44)
MCH RBC QN AUTO: 30.8 PG (ref 26.8–34.3)
MCHC RBC AUTO-ENTMCNC: 33.2 G/DL (ref 31.4–37.4)
MCV RBC AUTO: 93 FL (ref 82–98)
MONOCYTES # BLD AUTO: 0.53 THOUSAND/ÂΜL (ref 0.17–1.22)
MONOCYTES NFR BLD AUTO: 8 % (ref 4–12)
NEUTROPHILS # BLD AUTO: 3.92 THOUSANDS/ÂΜL (ref 1.85–7.62)
NEUTS SEG NFR BLD AUTO: 59 % (ref 43–75)
NRBC BLD AUTO-RTO: 0 /100 WBCS
PLATELET # BLD AUTO: 195 THOUSANDS/UL (ref 149–390)
PMV BLD AUTO: 11.7 FL (ref 8.9–12.7)
POTASSIUM SERPL-SCNC: 3.2 MMOL/L (ref 3.5–5.3)
RBC # BLD AUTO: 4.32 MILLION/UL (ref 3.88–5.62)
SODIUM SERPL-SCNC: 135 MMOL/L (ref 135–147)
WBC # BLD AUTO: 6.64 THOUSAND/UL (ref 4.31–10.16)

## 2025-03-14 PROCEDURE — 36415 COLL VENOUS BLD VENIPUNCTURE: CPT

## 2025-03-14 PROCEDURE — 85652 RBC SED RATE AUTOMATED: CPT

## 2025-03-14 PROCEDURE — 86140 C-REACTIVE PROTEIN: CPT

## 2025-03-14 PROCEDURE — 80048 BASIC METABOLIC PNL TOTAL CA: CPT

## 2025-03-14 PROCEDURE — 85025 COMPLETE CBC W/AUTO DIFF WBC: CPT

## 2025-03-17 ENCOUNTER — RESULTS FOLLOW-UP (OUTPATIENT)
Dept: INFECTIOUS DISEASES | Facility: CLINIC | Age: 65
End: 2025-03-17

## 2025-03-21 DIAGNOSIS — M54.41 CHRONIC BILATERAL LOW BACK PAIN WITH BILATERAL SCIATICA: ICD-10-CM

## 2025-03-21 DIAGNOSIS — G89.29 CHRONIC BILATERAL LOW BACK PAIN WITH BILATERAL SCIATICA: ICD-10-CM

## 2025-03-21 DIAGNOSIS — G89.4 CHRONIC PAIN SYNDROME: ICD-10-CM

## 2025-03-21 DIAGNOSIS — M54.42 CHRONIC BILATERAL LOW BACK PAIN WITH BILATERAL SCIATICA: ICD-10-CM

## 2025-03-21 RX ORDER — HYDROMORPHONE HYDROCHLORIDE 8 MG/1
8 TABLET ORAL
Qty: 150 TABLET | Refills: 0 | Status: SHIPPED | OUTPATIENT
Start: 2025-03-21

## 2025-03-24 ENCOUNTER — HOSPITAL ENCOUNTER (OUTPATIENT)
Dept: ULTRASOUND IMAGING | Facility: HOSPITAL | Age: 65
Discharge: HOME | End: 2025-03-24
Attending: FAMILY MEDICINE
Payer: MEDICARE

## 2025-03-24 DIAGNOSIS — Z13.6 ENCOUNTER FOR ABDOMINAL AORTIC ANEURYSM (AAA) SCREENING: ICD-10-CM

## 2025-03-24 PROCEDURE — 76706 US ABDL AORTA SCREEN AAA: CPT

## 2025-03-26 ENCOUNTER — TELEPHONE (OUTPATIENT)
Dept: NEUROSURGERY | Facility: CLINIC | Age: 65
End: 2025-03-26

## 2025-03-26 NOTE — TELEPHONE ENCOUNTER
Call Complete - Called pt to get XR done prior to upcoming appt, April 1st. Pt understood and will complete prior to appt.

## 2025-03-27 ENCOUNTER — HOSPITAL ENCOUNTER (OUTPATIENT)
Dept: RADIOLOGY | Facility: HOSPITAL | Age: 65
End: 2025-03-27
Payer: MEDICARE

## 2025-03-27 DIAGNOSIS — Z09 POSTOPERATIVE EXAMINATION: ICD-10-CM

## 2025-03-27 PROCEDURE — 72110 X-RAY EXAM L-2 SPINE 4/>VWS: CPT

## 2025-03-28 ENCOUNTER — RESULTS FOLLOW-UP (OUTPATIENT)
Dept: FAMILY MEDICINE CLINIC | Facility: CLINIC | Age: 65
End: 2025-03-28

## 2025-03-31 ENCOUNTER — APPOINTMENT (OUTPATIENT)
Dept: LAB | Facility: HOSPITAL | Age: 65
End: 2025-03-31
Payer: MEDICARE

## 2025-03-31 DIAGNOSIS — M96.89 OTHER INTRAOPERATIVE AND POSTPROCEDURAL COMPLICATIONS AND DISORDERS OF THE MUSCULOSKELETAL SYSTEM: ICD-10-CM

## 2025-03-31 DIAGNOSIS — T84.7XXA HARDWARE COMPLICATING WOUND INFECTION (HCC): ICD-10-CM

## 2025-03-31 DIAGNOSIS — T84.7XXD HARDWARE COMPLICATING WOUND INFECTION, SUBSEQUENT ENCOUNTER: ICD-10-CM

## 2025-03-31 DIAGNOSIS — T81.49XA SURGICAL SITE INFECTION: ICD-10-CM

## 2025-03-31 LAB
ANION GAP SERPL CALCULATED.3IONS-SCNC: 6 MMOL/L (ref 4–13)
BASOPHILS # BLD AUTO: 0.04 THOUSANDS/ÂΜL (ref 0–0.1)
BASOPHILS NFR BLD AUTO: 1 % (ref 0–1)
BUN SERPL-MCNC: 38 MG/DL (ref 5–25)
CALCIUM SERPL-MCNC: 9.1 MG/DL (ref 8.4–10.2)
CHLORIDE SERPL-SCNC: 106 MMOL/L (ref 96–108)
CO2 SERPL-SCNC: 27 MMOL/L (ref 21–32)
CREAT SERPL-MCNC: 1.22 MG/DL (ref 0.6–1.3)
CRP SERPL QL: 46.7 MG/L
EOSINOPHIL # BLD AUTO: 0.26 THOUSAND/ÂΜL (ref 0–0.61)
EOSINOPHIL NFR BLD AUTO: 3 % (ref 0–6)
ERYTHROCYTE [DISTWIDTH] IN BLOOD BY AUTOMATED COUNT: 15.7 % (ref 11.6–15.1)
ERYTHROCYTE [SEDIMENTATION RATE] IN BLOOD: 48 MM/HOUR (ref 0–19)
GFR SERPL CREATININE-BSD FRML MDRD: 61 ML/MIN/1.73SQ M
GLUCOSE P FAST SERPL-MCNC: 95 MG/DL (ref 65–99)
HCT VFR BLD AUTO: 37.6 % (ref 36.5–49.3)
HGB BLD-MCNC: 12.4 G/DL (ref 12–17)
IMM GRANULOCYTES # BLD AUTO: 0.03 THOUSAND/UL (ref 0–0.2)
IMM GRANULOCYTES NFR BLD AUTO: 0 % (ref 0–2)
LYMPHOCYTES # BLD AUTO: 1.95 THOUSANDS/ÂΜL (ref 0.6–4.47)
LYMPHOCYTES NFR BLD AUTO: 24 % (ref 14–44)
MCH RBC QN AUTO: 31 PG (ref 26.8–34.3)
MCHC RBC AUTO-ENTMCNC: 33 G/DL (ref 31.4–37.4)
MCV RBC AUTO: 94 FL (ref 82–98)
MONOCYTES # BLD AUTO: 0.62 THOUSAND/ÂΜL (ref 0.17–1.22)
MONOCYTES NFR BLD AUTO: 8 % (ref 4–12)
NEUTROPHILS # BLD AUTO: 5.41 THOUSANDS/ÂΜL (ref 1.85–7.62)
NEUTS SEG NFR BLD AUTO: 64 % (ref 43–75)
NRBC BLD AUTO-RTO: 0 /100 WBCS
PLATELET # BLD AUTO: 177 THOUSANDS/UL (ref 149–390)
PMV BLD AUTO: 10.7 FL (ref 8.9–12.7)
POTASSIUM SERPL-SCNC: 4.3 MMOL/L (ref 3.5–5.3)
RBC # BLD AUTO: 4 MILLION/UL (ref 3.88–5.62)
SODIUM SERPL-SCNC: 139 MMOL/L (ref 135–147)
WBC # BLD AUTO: 8.31 THOUSAND/UL (ref 4.31–10.16)

## 2025-03-31 PROCEDURE — 80048 BASIC METABOLIC PNL TOTAL CA: CPT

## 2025-03-31 PROCEDURE — 36415 COLL VENOUS BLD VENIPUNCTURE: CPT

## 2025-03-31 PROCEDURE — 86140 C-REACTIVE PROTEIN: CPT

## 2025-03-31 PROCEDURE — 85025 COMPLETE CBC W/AUTO DIFF WBC: CPT

## 2025-03-31 PROCEDURE — 85652 RBC SED RATE AUTOMATED: CPT

## 2025-04-01 ENCOUNTER — TELEMEDICINE (OUTPATIENT)
Dept: NEUROSURGERY | Facility: CLINIC | Age: 65
End: 2025-04-01
Payer: MEDICARE

## 2025-04-01 VITALS — BODY MASS INDEX: 36.88 KG/M2 | WEIGHT: 235 LBS | HEIGHT: 67 IN

## 2025-04-01 DIAGNOSIS — Z98.1 ARTHRODESIS STATUS: Primary | ICD-10-CM

## 2025-04-01 PROCEDURE — 99213 OFFICE O/P EST LOW 20 MIN: CPT | Performed by: NEUROLOGICAL SURGERY

## 2025-04-01 NOTE — PROGRESS NOTES
"Virtual Regular VisitName: Dominick Irving      : 1960      MRN: [id number]  Encounter Provider: Leland Aguilar MD  Encounter Date: 2025   [facility]  :  Assessment & Plan        History of Present Illness {?Quick Links Encounters * My Last Note * Last Note in Specialty * Snapshot * Since Last Visit * History :75948}    His back pain is minimal  He is doing his exercises but feels that his durability for long-term activities still not where it should be  He is overall very appreciative of the intervention.    MRSA infection -will meet with ID on the seventh of this month regarding duration of antibiotics.  No signs of active infection  His wife has a brain tumor and she has suffered an infection herself.  A tremendous amount of his time.  Review of Systems   Musculoskeletal:  Positive for gait problem. Negative for back pain.        2025 - c/o:  LBP symptoms improved since sx; Partial numbness in feet; Uses cane; Pain 1/10; No recent falls.  PT: 2024 - No relief  LEILANI: 10/2023 - No relief  Imaging: XR Lumbar 2025  SX: LUMBAR DEBRIDEMENT WOUND (WASH OUT) (Spine Lumbar) x DKO 2024  SX: Robotically assisted L3-4 decompressive hemilaminectomy and foraminotomies with transverse lumbar interbody fusion right-sided approach with add-on to an L4-5 fixation fusion (Right: Spine Lumbar) x DKO 10/30/2024  SX: Left L3-4 Metrx hemilaminectomy and bilateral foraminotomies (Spine Lumbar) x DKO 2023  AntiCoag: No AC  Smoker: Former (Cigarettes)   Neurological:  Positive for numbness.   All other systems reviewed and are negative.      Objective {?Quick Links Trend Vitals * Enter New Vitals * Results Review * Timeline (Adult) * Labs * Imaging * Cardiology * Procedures * Lung Cancer Screening * Surgical eConsent :78158}  Ht 5' 7\" (1.702 m)   Wt 107 kg (235 lb)   BMI 36.81 kg/m²     Physical Exam    Administrative Statements "   Encounter provider Leland Aguilar MD    The Patient is located at {[facility] Virtual Patient Location:[address]1991} and in the following state in which I hold an active license {[facility] virtual patient location:[address]}.    The patient was identified by name and date of birth. Dominick Irving was informed that this is a telemedicine visit and that the visit is being conducted through {[facility] VIRTUAL VISIT MEDIUM:38534}.  {Telemedicine confidentiality :22386} {Telemedicine participants:06980}  He acknowledged consent and understanding of privacy and security of the video platform. The patient has agreed to participate and understands they can discontinue the visit at any time.    I have spent a total time of *** minutes in caring for this patient on the day of the visit/encounter including {[facility] Counseling Topics:[id number]}, not including the time spent for establishing the audio/video connection.

## 2025-04-01 NOTE — PROGRESS NOTES
Palliative Supportive Care  met with patient/family to continue to provide emotional support and guidance.      Updated biopsychosocial information relevant to support:     The patient was identified by name and date of birth. Dominick was informed that this is a telemedicine visit and that the visit is being conducted through the Epic Embedded platform. They agree to proceed..  My office door was closed. No one else was in the room. They acknowledged consent and understanding of privacy and security of the video platform. The patient has agreed to participate and understands they can discontinue the visit at any time.    Dominick spoke about what has been going on in his life since last meeting. He apologized for canceling a few times but things just kept coming up with his wife's health and care. He gave updates on her recent seizures, hosital stays, and difficulty getting her the help she needs. LCSW provided emotional support. He said he is currently sitting in the ER parking lot in his car because she had to return to the hospital. He said he is exhausted and even with his sons' help, he is not able to keep up with all her care long term. He said he isn't sleeping at night because he is so worried something will happen to her and he needs to be alert. Due to his health issues, he isn't able to lift her which scares him. He said that her whole left side has been affected by the seizures and she needs 24 hour care at this time. He said he knows he is trying his best but he knows she needs more than he can give her. He also acknowledged that he has been neglecting his own health because he has been so involved in her care. He said if she can get into a facility and get stronger, he also can work on his health issues and be better off to care for her when she gets out. He said he is going to make an effort to reschedule same week if he has to cancel future appointments but LCSW encouraged him she  understands life happens and we will just plan to communicate if changes to schedule need to occur. He thanked LCSW for the support and said he had to end call because he was informed the  has entered his wife's room and wants to speak with him.      Identified areas of need include: emotional support    Resources provided:    Areas that need future follow-up include: reduce anxiety/depressed mood    I have spent 60 minutes with Patient  today in which greater than 50% of this time was spent in counseling/coordination of care     Palliative  will follow-up as requested by patient, family, and primary team.  Please contact with any specific requests

## 2025-04-01 NOTE — PROGRESS NOTES
"Virtual Regular VisitName: Dominick Irving      : 1960      MRN: [id number]  Encounter Provider: Leland Aguilar MD  Encounter Date: 2025   Encounter department: [facility] [place]  :  Assessment & Plan  Arthrodesis status  Instrumentation in ideal position without signs of loosening or breakage.  He is slowly improving and is on track.  I have recommended physical therapy for him on a regular basis.  This will be difficult during the trying time involving his wife however he understands the importance of daily exercise and stretching.           History of Present Illness     His back pain is minimal  He is doing his exercises but feels that his durability for long-term activities still not where it should be  He is overall very appreciative of the intervention.     MRSA infection -will meet with ID on the seventh of this month regarding duration of antibiotics.  No signs of active infection  His wife has a brain tumor and she has suffered an infection herself.  A tremendous amount of his time.        Review of Systems  Musculoskeletal:  Positive for gait problem. Negative for back pain.        2025 - c/o:  LBP symptoms improved since sx; Partial numbness in feet; Uses cane; Pain 1/10; No recent falls.  PT: 2024 - No relief  LEILANI: 10/2023 - No relief  Imaging: XR Lumbar 2025  SX: LUMBAR DEBRIDEMENT WOUND (WASH OUT) (Spine Lumbar) x DKO 2024  SX: Robotically assisted L3-4 decompressive hemilaminectomy and foraminotomies with transverse lumbar interbody fusion right-sided approach with add-on to an L4-5 fixation fusion (Right: Spine Lumbar) x DKO 10/30/2024  SX: Left L3-4 Metrx hemilaminectomy and bilateral foraminotomies (Spine Lumbar) x DKO 2023  AntiCoag: No AC  Smoker: Former (Cigarettes)   Neurological:  Positive for numbness.   All other systems reviewed and are negative.     Objective   Ht 5' 7\" (1.702 m)   Wt 107 kg (235 lb)   BMI " 36.81 kg/m²     Physical Exam    Administrative Statements   Encounter provider Leland Aguilar MD    The Patient is located at Home and in the following state in which I hold an active license [place].    The patient was identified by name and date of birth. Dominick MARTINEZ Aristides was informed that this is a telemedicine visit and that the visit is being conducted through the Epic Embedded platform. He agrees to proceed..  My office door was closed. No one else was in the room.  He acknowledged consent and understanding of privacy and security of the video platform. The patient has agreed to participate and understands they can discontinue the visit at any time.    I have spent a total time of 20 minutes in caring for this patient on the day of the visit/encounter including Diagnostic results, Prognosis, and Impressions, not including the time spent for establishing the audio/video connection.

## 2025-04-04 ENCOUNTER — SOCIAL WORK (OUTPATIENT)
Dept: PALLIATIVE MEDICINE | Facility: CLINIC | Age: 65
End: 2025-04-04

## 2025-04-04 DIAGNOSIS — Z71.89 COUNSELING AND COORDINATION OF CARE: Primary | ICD-10-CM

## 2025-04-04 PROCEDURE — NC001 PR NO CHARGE

## 2025-04-07 ENCOUNTER — TELEMEDICINE (OUTPATIENT)
Dept: INFECTIOUS DISEASES | Facility: CLINIC | Age: 65
End: 2025-04-07
Payer: MEDICARE

## 2025-04-07 DIAGNOSIS — T81.49XA SURGICAL SITE INFECTION: Primary | ICD-10-CM

## 2025-04-07 DIAGNOSIS — I50.33 ACUTE ON CHRONIC DIASTOLIC HEART FAILURE (HCC): ICD-10-CM

## 2025-04-07 DIAGNOSIS — E66.01 MORBID (SEVERE) OBESITY DUE TO EXCESS CALORIES (HCC): ICD-10-CM

## 2025-04-07 DIAGNOSIS — E11.69 TYPE 2 DIABETES MELLITUS WITH OTHER SPECIFIED COMPLICATION, UNSPECIFIED WHETHER LONG TERM INSULIN USE (HCC): ICD-10-CM

## 2025-04-07 DIAGNOSIS — M96.89: ICD-10-CM

## 2025-04-07 PROCEDURE — 99214 OFFICE O/P EST MOD 30 MIN: CPT | Performed by: PHYSICIAN ASSISTANT

## 2025-04-07 NOTE — PROGRESS NOTES
Name: Dominick Irving      : 1960      MRN: [id number]  Encounter Provider: Edmar Pate PA-C  Encounter Date: 2025   Encounter department: [facility]  :  Assessment & Plan  Surgical site infection  Postoperative Staphylococcus aureus (MRSA) lumbar wound infection s/p lumbar wound debridement and washout .  Initial L3-4 decompressive hemilaminectomy with transverse lumbar interbody fusion and L4-5 fixation and fusion 10/30.  One week after his initial surgery he noted purulent discharge from the wound.  He was given a script for cephalexin.  MRI revealed a fluid collection in the area of the incision.  OR cultures positive for MRSA.  AFB and Fungal cultures remain negative to date.  Patient discharged and finished a one week course of IV Vancomycin then transition to po bactrim.      24- Patient states he is doing well on the bactrim.  He does have a slight increase in his Cr although his Cr does appear to fluctuate so I do not necessarily think this is out of his normal range.  Will continue to monitor closely.  MRSA is Intermediate to tetracyclines so limited to other good po options.  There is some consideration for hardware involvement as it appears infection was deep (as per Op Note).      24- Patient tolerating the bactrim well.  No new complaints today.  Labs overall ok.  Cr slightly up but stable.  Also note BUN up as well, ? Dehydration playing a part.  Also could be false elevation of Cr from the bactrim.  Will continue to monitor closely.  ESR up and CRP down.  Patient feeling well so will continue to trend.      25 - patient remains on po Bactrim DS.  He continues to tolerate it.  Still with fluctuating Cr - does take diuretics.  Also noted to have low K - I did reach out to his PCP.   Also fluctuating inflammatory markers.  Patient due for labs tomorrow.  Will hold off on refills until they are reviewed.      23288- patient remains on po  bactrim DS.  He continues to tolerate it well.  Labs are stable for him.  ESR now seems to be trending down.  CRP still fluctuating.  Back pain improved but he still feels weak and fatigues easily.  Discussed that he should be seeing PT.      4/7/25- Patient tolerating the bactrim well.  Labs stable.  ESR/CRP seem to have plateaued and he clinically seems to have turned a corner.  We discussed that due to the hardware he may be at some risk for relapsing infection when coming of the antibiotic.  It does not appear the hardware was exposed during the wash out but there is always some concern.  I discussed this with Dominick and he understands.  At this time we both agree to continue the antibiotics as it would not be a good time for his infection to relapse with his wife so sick.       Plan  - continue Bactrim DS 1 tab po bid  due to concern for hardware involvement would go at least until inflammatory markers normalization/plateau.     - patient states he should have enough bactrim to get to next appt.  If not he will call  - CBCD, BMP, ESR, CRP in 3 and 6 weeks  - follow up with Neurosurgery as scheduled  - recommend he start PT  - RTO 6 weeks or sooner if needed.        Orders:    CBC and differential; Standing    Basic metabolic panel; Standing    Sedimentation rate, automated; Standing    C-reactive protein; Standing    Type 2 diabetes mellitus with other specified complication, unspecified whether long term insulin use (HCC)    Lab Results   Component Value Date    HGBA1C 6.8 (A) 03/11/2025            Morbid (severe) obesity due to excess calories (HCC)  BMI 36.81         Acute on chronic diastolic heart failure (HCC)      Patient states he is on one diuretic daily and takes another diuretic if he gains more than 3 pounds. The second diuretic causes K loss and when he takes this one he ups his K supplementation.              Other surgical complication involving musculoskeletal system    Orders:    CBC and  differential; Standing        History of Present Illness   HPI  Dominick Irving is a 65 y.o. male who presents for virtual follow up today regarding Postoperative Staphylococcus aureus (MRSA) lumbar wound infection.  Patient currently remains on po bactrim.  He continues to tolerate it well.  He has no new complaints today.  He actually felt he was improving in early March.  Since that time he has felt pretty run down as he has been caring for his sick wife.  She was recently discharged from the hospital with an infection and required a lot of care.  He was able to place her in a facility this past weekend and has been able to get some rest.        Review of Systems   Constitutional:  Negative for chills and fever.   Respiratory:  Negative for cough and shortness of breath.    Gastrointestinal:  Negative for abdominal pain, diarrhea, nausea and vomiting.   Skin:  Negative for rash.   Psychiatric/Behavioral:  Negative for behavioral problems and confusion.           Objective   There were no vitals taken for this visit.     Physical Exam  Vitals reviewed.   Constitutional:       General: He is not in acute distress.     Appearance: Normal appearance. He is not ill-appearing, toxic-appearing or diaphoretic.   HENT:      Head: Normocephalic and atraumatic.   Eyes:      General: No scleral icterus.        Right eye: No discharge.         Left eye: No discharge.      Conjunctiva/sclera: Conjunctivae normal.   Pulmonary:      Effort: Pulmonary effort is normal. No respiratory distress.   Skin:     Coloration: Skin is not jaundiced or pale.      Findings: No erythema or rash.   Neurological:      Mental Status: He is alert and oriented to person, place, and time.   Psychiatric:         Mood and Affect: Mood normal.         Behavior: Behavior normal.           Labs:   3/31/25  Wbc: 8.31  Hgb: 12.4  Plt: 177  Cr: 1.22  ESR: 48  CRP: 46.7    Administrative Statements   Encounter provider Edmar Pate PA-C    The Patient is  located at Home and in the following state in which I hold an active license [place].    The patient was identified by name and date of birth. Dominick Irving was informed that this is a telemedicine visit and that the visit is being conducted through the Epic Embedded platform. He agrees to proceed..  My office door was closed. No one else was in the room.  He acknowledged consent and understanding of privacy and security of the video platform. The patient has agreed to participate and understands they can discontinue the visit at any time.    I have spent a total time of 30 minutes in caring for this patient on the day of the visit/encounter including Diagnostic results, Prognosis, Risks and benefits of tx options, Instructions for management, Patient and family education, Importance of tx compliance, Risk factor reductions, Impressions, Documenting in the medical record, and Reviewing/placing orders in the medical record (including tests, medications, and/or procedures), not including the time spent for establishing the audio/video connection.

## 2025-04-07 NOTE — ASSESSMENT & PLAN NOTE
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  Initial L3-4 decompressive hemilaminectomy with transverse lumbar interbody fusion and L4-5 fixation and fusion 10/30.  One week after his initial surgery he noted purulent discharge from the wound.  He was given a script for cephalexin.  MRI revealed a fluid collection in the area of the incision.  OR cultures positive for MRSA.  AFB and Fungal cultures remain negative to date.  Patient discharged and finished a one week course of IV Vancomycin then transition to po bactrim.      12/4/24- Patient states he is doing well on the bactrim.  He does have a slight increase in his Cr although his Cr does appear to fluctuate so I do not necessarily think this is out of his normal range.  Will continue to monitor closely.  MRSA is Intermediate to tetracyclines so limited to other good po options.  There is some consideration for hardware involvement as it appears infection was deep (as per Op Note).      12/23/24- Patient tolerating the bactrim well.  No new complaints today.  Labs overall ok.  Cr slightly up but stable.  Also note BUN up as well, ? Dehydration playing a part.  Also could be false elevation of Cr from the bactrim.  Will continue to monitor closely.  ESR up and CRP down.  Patient feeling well so will continue to trend.      1/13/25 - patient remains on po Bactrim DS.  He continues to tolerate it.  Still with fluctuating Cr - does take diuretics.  Also noted to have low K - I did reach out to his PCP.   Also fluctuating inflammatory markers.  Patient due for labs tomorrow.  Will hold off on refills until they are reviewed.      03832- patient remains on po bactrim DS.  He continues to tolerate it well.  Labs are stable for him.  ESR now seems to be trending down.  CRP still fluctuating.  Back pain improved but he still feels weak and fatigues easily.  Discussed that he should be seeing PT.       4/7/25- Patient tolerating the bactrim well.  Labs stable.  ESR/CRP seem to have plateaued and he clinically seems to have turned a corner.  We discussed that due to the hardware he may be at some risk for relapsing infection when coming of the antibiotic.  It does not appear the hardware was exposed during the wash out but there is always some concern.  I discussed this with Dominick and he understands.  At this time we both agree to continue the antibiotics as it would not be a good time for his infection to relapse with his wife so sick.       Plan  - continue Bactrim DS 1 tab po bid  due to concern for hardware involvement would go at least until inflammatory markers normalization/plateau.     - patient states he should have enough bactrim to get to next appt.  If not he will call  - CBCD, BMP, ESR, CRP in 3 and 6 weeks  - follow up with Neurosurgery as scheduled  - recommend he start PT  - RTO 6 weeks or sooner if needed.        Orders:    CBC and differential; Standing    Basic metabolic panel; Standing    Sedimentation rate, automated; Standing    C-reactive protein; Standing

## 2025-04-07 NOTE — PATIENT INSTRUCTIONS
Plan  - continue Bactrim DS 1 tab po bid    - please call if refills are needed  - CBCD, BMP, ESR, CRP in 3 and 6 weeks  - follow up with Neurosurgery as scheduled  - recommend  PT  - RTO 6 weeks or sooner if needed.

## 2025-04-13 DIAGNOSIS — G89.29 CHRONIC BILATERAL LOW BACK PAIN WITH BILATERAL SCIATICA: ICD-10-CM

## 2025-04-13 DIAGNOSIS — E87.6 HYPOKALEMIA: ICD-10-CM

## 2025-04-13 DIAGNOSIS — M54.42 CHRONIC BILATERAL LOW BACK PAIN WITH BILATERAL SCIATICA: ICD-10-CM

## 2025-04-13 DIAGNOSIS — M54.41 CHRONIC BILATERAL LOW BACK PAIN WITH BILATERAL SCIATICA: ICD-10-CM

## 2025-04-13 DIAGNOSIS — G89.4 CHRONIC PAIN SYNDROME: ICD-10-CM

## 2025-04-13 DIAGNOSIS — F33.9 DEPRESSION, RECURRENT (HCC): ICD-10-CM

## 2025-04-13 RX ORDER — BUPROPION HYDROCHLORIDE 150 MG/1
150 TABLET, EXTENDED RELEASE ORAL 2 TIMES DAILY
Qty: 60 TABLET | Refills: 5 | Status: SHIPPED | OUTPATIENT
Start: 2025-04-13

## 2025-04-14 RX ORDER — POTASSIUM CHLORIDE 750 MG/1
40 TABLET, EXTENDED RELEASE ORAL 2 TIMES DAILY
Qty: 120 TABLET | Refills: 0 | Status: SHIPPED | OUTPATIENT
Start: 2025-04-14

## 2025-04-14 RX ORDER — HYDROMORPHONE HYDROCHLORIDE 8 MG/1
8 TABLET ORAL
Qty: 150 TABLET | Refills: 0 | Status: SHIPPED | OUTPATIENT
Start: 2025-04-14

## 2025-04-21 NOTE — PROGRESS NOTES
Palliative Supportive Care  met with patient/family to continue to provide emotional support and guidance.      Updated biopsychosocial information relevant to support:     The patient was identified by name and date of birth. Dominick was informed that this is a telemedicine visit and that the visit is being conducted through the Epic Embedded platform. They agree to proceed..  My office door was closed. No one else was in the room. They acknowledged consent and understanding of privacy and security of the video platform. The patient has agreed to participate and understands they can discontinue the visit at any time.    Dominick spoke about finally being able to get his wife into a rehab facility and her making a lot of progress. He said they still need to monitor her infection to decide when they can go back in and put in the reconstructed piece for her skull. He said she will be starting home PT and OT soon. He will be doing outpatient PT shortly and is looking forward to getting to do something for himself again after having to put his needs aside during his wife's health crisis. LCSW supported Dominick in processing this recent experience with his wife's health and supported his decision to begin making his needs a priority now that things are slowing down and becoming a little more stable. He said that he just feels overwhelmed and disappointed by how things have been going. He said he was hopeful he and his wife would get to have a good year after everything that he just got through with his own health and then she suddenly became ill as he was getting better. He is trying to stay positive and also is learning to ask for help from the kids more. He knows he must care for himself or he will not be able to continue to support her long term.        Identified areas of need include: emotional support    Resources provided:    Areas that need future follow-up include: reduce anxiety/depressed mood    I  have spent 60 minutes with Patient  today in which greater than 50% of this time was spent in counseling/coordination of care     Palliative  will follow-up as requested by patient, family, and primary team.  Please contact with any specific requests

## 2025-04-23 ENCOUNTER — SOCIAL WORK (OUTPATIENT)
Dept: PALLIATIVE MEDICINE | Facility: CLINIC | Age: 65
End: 2025-04-23

## 2025-04-23 DIAGNOSIS — Z71.89 COUNSELING AND COORDINATION OF CARE: Primary | ICD-10-CM

## 2025-04-23 PROCEDURE — NC001 PR NO CHARGE

## 2025-04-24 ENCOUNTER — TELEPHONE (OUTPATIENT)
Age: 65
End: 2025-04-24

## 2025-04-24 NOTE — TELEPHONE ENCOUNTER
Patient called today regarding a patient referral and asked if it was still good. Warm transferred the patient to physical therapy for more info.

## 2025-04-25 ENCOUNTER — APPOINTMENT (OUTPATIENT)
Dept: LAB | Facility: HOSPITAL | Age: 65
End: 2025-04-25
Payer: MEDICARE

## 2025-04-25 DIAGNOSIS — M96.89: ICD-10-CM

## 2025-04-25 DIAGNOSIS — T81.49XA SURGICAL SITE INFECTION: ICD-10-CM

## 2025-04-25 LAB
ANION GAP SERPL CALCULATED.3IONS-SCNC: 6 MMOL/L (ref 4–13)
BASOPHILS # BLD AUTO: 0.02 THOUSANDS/ÂΜL (ref 0–0.1)
BASOPHILS NFR BLD AUTO: 0 % (ref 0–1)
BUN SERPL-MCNC: 21 MG/DL (ref 5–25)
CALCIUM SERPL-MCNC: 9 MG/DL (ref 8.4–10.2)
CHLORIDE SERPL-SCNC: 102 MMOL/L (ref 96–108)
CO2 SERPL-SCNC: 29 MMOL/L (ref 21–32)
CREAT SERPL-MCNC: 1.2 MG/DL (ref 0.6–1.3)
CRP SERPL QL: 207.9 MG/L
EOSINOPHIL # BLD AUTO: 0.18 THOUSAND/ÂΜL (ref 0–0.61)
EOSINOPHIL NFR BLD AUTO: 2 % (ref 0–6)
ERYTHROCYTE [DISTWIDTH] IN BLOOD BY AUTOMATED COUNT: 15.9 % (ref 11.6–15.1)
ERYTHROCYTE [SEDIMENTATION RATE] IN BLOOD: 89 MM/HOUR (ref 0–19)
GFR SERPL CREATININE-BSD FRML MDRD: 62 ML/MIN/1.73SQ M
GLUCOSE P FAST SERPL-MCNC: 100 MG/DL (ref 65–99)
HCT VFR BLD AUTO: 35.4 % (ref 36.5–49.3)
HGB BLD-MCNC: 11.5 G/DL (ref 12–17)
IMM GRANULOCYTES # BLD AUTO: 0.04 THOUSAND/UL (ref 0–0.2)
IMM GRANULOCYTES NFR BLD AUTO: 1 % (ref 0–2)
LYMPHOCYTES # BLD AUTO: 1.7 THOUSANDS/ÂΜL (ref 0.6–4.47)
LYMPHOCYTES NFR BLD AUTO: 20 % (ref 14–44)
MCH RBC QN AUTO: 29.9 PG (ref 26.8–34.3)
MCHC RBC AUTO-ENTMCNC: 32.5 G/DL (ref 31.4–37.4)
MCV RBC AUTO: 92 FL (ref 82–98)
MONOCYTES # BLD AUTO: 0.75 THOUSAND/ÂΜL (ref 0.17–1.22)
MONOCYTES NFR BLD AUTO: 9 % (ref 4–12)
NEUTROPHILS # BLD AUTO: 5.77 THOUSANDS/ÂΜL (ref 1.85–7.62)
NEUTS SEG NFR BLD AUTO: 68 % (ref 43–75)
NRBC BLD AUTO-RTO: 0 /100 WBCS
PLATELET # BLD AUTO: 192 THOUSANDS/UL (ref 149–390)
PMV BLD AUTO: 10.4 FL (ref 8.9–12.7)
POTASSIUM SERPL-SCNC: 4.4 MMOL/L (ref 3.5–5.3)
RBC # BLD AUTO: 3.84 MILLION/UL (ref 3.88–5.62)
SODIUM SERPL-SCNC: 137 MMOL/L (ref 135–147)
WBC # BLD AUTO: 8.46 THOUSAND/UL (ref 4.31–10.16)

## 2025-04-25 PROCEDURE — 85025 COMPLETE CBC W/AUTO DIFF WBC: CPT

## 2025-04-25 PROCEDURE — 85652 RBC SED RATE AUTOMATED: CPT

## 2025-04-25 PROCEDURE — 86140 C-REACTIVE PROTEIN: CPT

## 2025-04-25 PROCEDURE — 80048 BASIC METABOLIC PNL TOTAL CA: CPT

## 2025-04-25 PROCEDURE — 36415 COLL VENOUS BLD VENIPUNCTURE: CPT

## 2025-04-28 ENCOUNTER — RESULTS FOLLOW-UP (OUTPATIENT)
Dept: INFECTIOUS DISEASES | Facility: CLINIC | Age: 65
End: 2025-04-28

## 2025-04-28 ENCOUNTER — APPOINTMENT (EMERGENCY)
Dept: RADIOLOGY | Facility: HOSPITAL | Age: 65
End: 2025-04-28
Payer: MEDICARE

## 2025-04-28 ENCOUNTER — APPOINTMENT (EMERGENCY)
Dept: CT IMAGING | Facility: HOSPITAL | Age: 65
End: 2025-04-28
Payer: MEDICARE

## 2025-04-28 ENCOUNTER — HOSPITAL ENCOUNTER (EMERGENCY)
Facility: HOSPITAL | Age: 65
Discharge: HOME | End: 2025-04-28
Attending: EMERGENCY MEDICINE
Payer: MEDICARE

## 2025-04-28 VITALS
OXYGEN SATURATION: 94 % | WEIGHT: 230 LBS | TEMPERATURE: 97.3 F | RESPIRATION RATE: 14 BRPM | HEART RATE: 54 BPM | SYSTOLIC BLOOD PRESSURE: 139 MMHG | DIASTOLIC BLOOD PRESSURE: 64 MMHG | BODY MASS INDEX: 34.86 KG/M2 | HEIGHT: 68 IN

## 2025-04-28 DIAGNOSIS — R07.9 CHEST PAIN: ICD-10-CM

## 2025-04-28 DIAGNOSIS — M79.602 LEFT ARM PAIN: Primary | ICD-10-CM

## 2025-04-28 LAB
ALBUMIN SERPL BCG-MCNC: 4.1 G/DL (ref 3.5–5)
ALP SERPL-CCNC: 112 U/L (ref 34–104)
ALT SERPL W P-5'-P-CCNC: 36 U/L (ref 7–52)
ANION GAP SERPL CALCULATED.3IONS-SCNC: 11 MMOL/L (ref 4–13)
AST SERPL W P-5'-P-CCNC: 25 U/L (ref 13–39)
BASE EX.OXY STD BLDV CALC-SCNC: 88.7 % (ref 60–80)
BASE EXCESS BLDV CALC-SCNC: 3.2 MMOL/L
BASOPHILS # BLD AUTO: 0.03 THOUSANDS/ÂΜL (ref 0–0.1)
BASOPHILS NFR BLD AUTO: 0 % (ref 0–1)
BILIRUB SERPL-MCNC: 0.4 MG/DL (ref 0.2–1)
BILIRUB UR QL STRIP: NEGATIVE
BUN SERPL-MCNC: 23 MG/DL (ref 5–25)
CALCIUM SERPL-MCNC: 9.4 MG/DL (ref 8.4–10.2)
CARDIAC TROPONIN I PNL SERPL HS: 3 NG/L (ref ?–50)
CHLORIDE SERPL-SCNC: 99 MMOL/L (ref 96–108)
CLARITY UR: CLEAR
CO2 SERPL-SCNC: 24 MMOL/L (ref 21–32)
COLOR UR: YELLOW
CREAT SERPL-MCNC: 1.29 MG/DL (ref 0.6–1.3)
CRP SERPL QL: 125.2 MG/L
EOSINOPHIL # BLD AUTO: 0.1 THOUSAND/ÂΜL (ref 0–0.61)
EOSINOPHIL NFR BLD AUTO: 1 % (ref 0–6)
ERYTHROCYTE [DISTWIDTH] IN BLOOD BY AUTOMATED COUNT: 15.6 % (ref 11.6–15.1)
ERYTHROCYTE [SEDIMENTATION RATE] IN BLOOD: 104 MM/HOUR (ref 0–19)
GFR SERPL CREATININE-BSD FRML MDRD: 57 ML/MIN/1.73SQ M
GLUCOSE SERPL-MCNC: 87 MG/DL (ref 65–140)
GLUCOSE UR STRIP-MCNC: NEGATIVE MG/DL
HCO3 BLDV-SCNC: 25.7 MMOL/L (ref 24–30)
HCT VFR BLD AUTO: 37.2 % (ref 36.5–49.3)
HGB BLD-MCNC: 12.5 G/DL (ref 12–17)
HGB UR QL STRIP.AUTO: NEGATIVE
IMM GRANULOCYTES # BLD AUTO: 0.02 THOUSAND/UL (ref 0–0.2)
IMM GRANULOCYTES NFR BLD AUTO: 0 % (ref 0–2)
KETONES UR STRIP-MCNC: NEGATIVE MG/DL
LACTATE SERPL-SCNC: 0.8 MMOL/L (ref 0.5–2)
LEUKOCYTE ESTERASE UR QL STRIP: NEGATIVE
LYMPHOCYTES # BLD AUTO: 1.75 THOUSANDS/ÂΜL (ref 0.6–4.47)
LYMPHOCYTES NFR BLD AUTO: 22 % (ref 14–44)
MAGNESIUM SERPL-MCNC: 2 MG/DL (ref 1.9–2.7)
MCH RBC QN AUTO: 30.4 PG (ref 26.8–34.3)
MCHC RBC AUTO-ENTMCNC: 33.6 G/DL (ref 31.4–37.4)
MCV RBC AUTO: 91 FL (ref 82–98)
MONOCYTES # BLD AUTO: 0.61 THOUSAND/ÂΜL (ref 0.17–1.22)
MONOCYTES NFR BLD AUTO: 8 % (ref 4–12)
NEUTROPHILS # BLD AUTO: 5.44 THOUSANDS/ÂΜL (ref 1.85–7.62)
NEUTS SEG NFR BLD AUTO: 69 % (ref 43–75)
NITRITE UR QL STRIP: NEGATIVE
NRBC BLD AUTO-RTO: 0 /100 WBCS
O2 CT BLDV-SCNC: 16.8 ML/DL
PCO2 BLDV: 32.9 MM HG (ref 42–50)
PH BLDV: 7.51 [PH] (ref 7.3–7.4)
PH UR STRIP.AUTO: 7 [PH]
PLATELET # BLD AUTO: 238 THOUSANDS/UL (ref 149–390)
PMV BLD AUTO: 10.5 FL (ref 8.9–12.7)
PO2 BLDV: 91.5 MM HG (ref 35–45)
POTASSIUM SERPL-SCNC: 4.4 MMOL/L (ref 3.5–5.3)
PROCALCITONIN SERPL-MCNC: 0.22 NG/ML
PROT SERPL-MCNC: 8.5 G/DL (ref 6.4–8.4)
PROT UR STRIP-MCNC: NEGATIVE MG/DL
RBC # BLD AUTO: 4.11 MILLION/UL (ref 3.88–5.62)
SODIUM SERPL-SCNC: 134 MMOL/L (ref 135–147)
SP GR UR STRIP.AUTO: 1.01 (ref 1–1.03)
UROBILINOGEN UR QL STRIP.AUTO: 0.2 E.U./DL
WBC # BLD AUTO: 7.95 THOUSAND/UL (ref 4.31–10.16)

## 2025-04-28 PROCEDURE — 82805 BLOOD GASES W/O2 SATURATION: CPT | Performed by: PHYSICIAN ASSISTANT

## 2025-04-28 PROCEDURE — 87040 BLOOD CULTURE FOR BACTERIA: CPT | Performed by: PHYSICIAN ASSISTANT

## 2025-04-28 PROCEDURE — 72131 CT LUMBAR SPINE W/O DYE: CPT

## 2025-04-28 PROCEDURE — 86140 C-REACTIVE PROTEIN: CPT | Performed by: PHYSICIAN ASSISTANT

## 2025-04-28 PROCEDURE — 96361 HYDRATE IV INFUSION ADD-ON: CPT

## 2025-04-28 PROCEDURE — 96360 HYDRATION IV INFUSION INIT: CPT

## 2025-04-28 PROCEDURE — 80053 COMPREHEN METABOLIC PANEL: CPT | Performed by: PHYSICIAN ASSISTANT

## 2025-04-28 PROCEDURE — 83735 ASSAY OF MAGNESIUM: CPT | Performed by: PHYSICIAN ASSISTANT

## 2025-04-28 PROCEDURE — 93005 ELECTROCARDIOGRAM TRACING: CPT

## 2025-04-28 PROCEDURE — 83605 ASSAY OF LACTIC ACID: CPT | Performed by: PHYSICIAN ASSISTANT

## 2025-04-28 PROCEDURE — 72125 CT NECK SPINE W/O DYE: CPT

## 2025-04-28 PROCEDURE — 71045 X-RAY EXAM CHEST 1 VIEW: CPT

## 2025-04-28 PROCEDURE — 36415 COLL VENOUS BLD VENIPUNCTURE: CPT | Performed by: PHYSICIAN ASSISTANT

## 2025-04-28 PROCEDURE — 99285 EMERGENCY DEPT VISIT HI MDM: CPT

## 2025-04-28 PROCEDURE — 85025 COMPLETE CBC W/AUTO DIFF WBC: CPT | Performed by: PHYSICIAN ASSISTANT

## 2025-04-28 PROCEDURE — 84484 ASSAY OF TROPONIN QUANT: CPT | Performed by: PHYSICIAN ASSISTANT

## 2025-04-28 PROCEDURE — 84145 PROCALCITONIN (PCT): CPT | Performed by: PHYSICIAN ASSISTANT

## 2025-04-28 PROCEDURE — 85652 RBC SED RATE AUTOMATED: CPT | Performed by: PHYSICIAN ASSISTANT

## 2025-04-28 PROCEDURE — 81003 URINALYSIS AUTO W/O SCOPE: CPT | Performed by: PHYSICIAN ASSISTANT

## 2025-04-28 PROCEDURE — 99285 EMERGENCY DEPT VISIT HI MDM: CPT | Performed by: PHYSICIAN ASSISTANT

## 2025-04-28 PROCEDURE — 70450 CT HEAD/BRAIN W/O DYE: CPT

## 2025-04-28 PROCEDURE — 72128 CT CHEST SPINE W/O DYE: CPT

## 2025-04-28 RX ORDER — SEMAGLUTIDE 0.68 MG/ML
INJECTION, SOLUTION SUBCUTANEOUS
Refills: 0 | OUTPATIENT
Start: 2025-04-28

## 2025-04-28 RX ORDER — CELECOXIB 200 MG/1
200 CAPSULE ORAL 2 TIMES DAILY WITH MEALS
Qty: 14 CAPSULE | Refills: 0 | Status: SHIPPED | OUTPATIENT
Start: 2025-04-28 | End: 2025-05-05

## 2025-04-28 RX ORDER — METHOCARBAMOL 500 MG/1
500 TABLET, FILM COATED ORAL 2 TIMES DAILY
Qty: 20 TABLET | Refills: 0 | Status: SHIPPED | OUTPATIENT
Start: 2025-04-28

## 2025-04-28 RX ADMIN — SODIUM CHLORIDE 1000 ML: 0.9 INJECTION, SOLUTION INTRAVENOUS at 13:30

## 2025-04-28 NOTE — DISCHARGE INSTRUCTIONS
Of degenerative disc disease found in your neck and spine, your arm and chest pain could be related to musculoskeletal etiology or a radiculopathy from the neck.  Please continue pain medication at home. We also have prescribed you additional medications to help. Your ID doctor was made aware of results today    RUE AROM Limited due to Chronic RTC Tear. BLE Hip and Knee Flex AROM limited due to weakness and BLE Knee OA/bilateral upper extremity Active ROM was WFL (within functional limits)/bilateral  lower extremity Active ROM was WFL (within functional limits)

## 2025-04-28 NOTE — ED PROVIDER NOTES
Time reflects when diagnosis was documented in both MDM as applicable and the Disposition within this note       Time User Action Codes Description Comment    4/28/2025  3:23 PM Shlomo Aguillon [M79.602] Left arm pain     4/28/2025  3:23 PM Shlomo Aguillon [R07.9] Chest pain           ED Disposition       ED Disposition   Discharge    Condition   Stable    Date/Time   Mon Apr 28, 2025  3:24 PM    Comment   Dominick Irving discharge to home/self care.                   Assessment & Plan       Medical Decision Making  Patient is a 65-year-old male with a past medical history of spinal surgery with infectious complication on chronic Bactrim followed by infectious disease who presents with a chief complaint of chest pain left arm pain neck pain headaches and subjective fevers chills.  The patient states that he has been following with infectious disease and was called to be informed that his inflammation markers were elevated.  Patient was instructed to come in for evaluation.  The patient denies any falls or traumas.  States that the chest pain and arm pain have been going on for 48 hours.  The patient states that the headache as well has been constant.  He has not tried any medications today.  He states that he has been having ongoing low back pain with sensation of his feet feeling cold.  He states that he has been having difficulty with ambulation for few weeks.  Was seen by neurosurgery in March where they instructed him that his hardware was in normal position.  He is currently still on Bactrim twice daily but soon he should be finishing per patient.    Condition is most similar to a musculoskeletal etiology versus a cervical radiculopathy.  The patient's imaging studies were unremarkable for any acute etiology but did note degenerative changes.  The patient was ambulating to the bathroom well with his cane.  The patient's troponin negative and EKG was nonischemic.  Patient case was discussed with ID due to  his CRP and sed rate changes and ultimately there was little to no concern for any infectious etiology from her standpoint based off of the inflammatory markers and they will continue to trend them.  Was discharged home to continue medication treatment for his symptoms and he was in agreement with treatment plan.  The patient does take Dilaudid several times a day for chronic ongoing pain control.  Differential Diagnosis included but was not limited to : Pneumonia, ACS, costochondritis, musculoskeletal pain, GERD, esophageal spasm,cervical radiculopathy, hardware dysfunction.       Amount and/or Complexity of Data Reviewed  Labs: ordered. Decision-making details documented in ED Course.  Radiology: ordered and independent interpretation performed. Decision-making details documented in ED Course.  ECG/medicine tests: ordered and independent interpretation performed. Decision-making details documented in ED Course.    Risk  Prescription drug management.        ED Course as of 04/28/25 1549 Mon Apr 28, 2025 1524 Rom PRESTON with ID contacted and from an infectious standpoint the patient has been having waxing and waning inflammatory markers and is on Bactrim so there is little to no concern.  The patient was sent in because of the chest pain and arm pain and as long as the imaging and workup is negative they can follow with him in the office or through telemedicine to continue to trend his lab work.       Medications   sodium chloride 0.9 % bolus 1,000 mL (0 mL Intravenous Stopped 4/28/25 1525)       ED Risk Strat Scores   HEART Risk Score      Flowsheet Row Most Recent Value   Heart Score Risk Calculator    History 0 Filed at: 04/28/2025 1545   ECG 0 Filed at: 04/28/2025 1545   Age 2 Filed at: 04/28/2025 1545   Risk Factors 2 Filed at: 04/28/2025 1545   Troponin 0 Filed at: 04/28/2025 1545   HEART Score 4 Filed at: 04/28/2025 1545          HEART Risk Score      Flowsheet Row Most Recent Value   Heart Score Risk  "Calculator    History 0 Filed at: 04/28/2025 1545   ECG 0 Filed at: 04/28/2025 1545   Age 2 Filed at: 04/28/2025 1545   Risk Factors 2 Filed at: 04/28/2025 1545   Troponin 0 Filed at: 04/28/2025 1545   HEART Score 4 Filed at: 04/28/2025 1545                      No data recorded        SBIRT 20yo+      Flowsheet Row Most Recent Value   Initial Alcohol Screen: US AUDIT-C     1. How often do you have a drink containing alcohol? 0 Filed at: 04/28/2025 1309   2. How many drinks containing alcohol do you have on a typical day you are drinking?  0 Filed at: 04/28/2025 1309   3b. FEMALE Any Age, or MALE 65+: How often do you have 4 or more drinks on one occassion? 0 Filed at: 04/28/2025 1309   Audit-C Score 0 Filed at: 04/28/2025 1309   ANN: How many times in the past year have you...    Used an illegal drug or used a prescription medication for non-medical reasons? Never Filed at: 04/28/2025 1309                            History of Present Illness       Chief Complaint   Patient presents with    Abnormal Lab     Patient states his ID doctor called him this morning to inform him that his inflammatory markers were elevated. Patient reports for the last 2-3 weeks he has been having left shoulder/arm cramping, difficulty walking due to foot pain, low temperature of 93, headaches.        Past Medical History:   Diagnosis Date    Anxiety 3/01/2023    Arthritis     Bladder cancer (HCC)     Cancer (HCC) 3/17/2023    Cervical disc disorder 1/2016    Chronic narcotic dependence (HCC)     Chronic pain disorder     lower back and down both legs    Colon polyp     Coronary artery disease 7/2021    Weakness left ventricle    CPAP (continuous positive airway pressure) dependence     bi-pap    Depression 3/17/2023    Does use hearing aid     will wear DOS    Full dentures     Heart failure (HCC)     and \"fluid retention\" [facility]\"    High cholesterol     Hypertension     Low back pain 2003    Mild ankle edema     and " "feet    Obstructive sleep apnea on CPAP     Prediabetes     Shortness of breath     per pt \"with just exertion\"    Sleep apnea     Wears glasses       Past Surgical History:   Procedure Laterality Date    BACK SURGERY      titanium rods implanted    BACK SURGERY      Fusino surgery    CAUDAL BLOCK N/A 10/17/2023    Procedure: BLOCK / INJECTION CAUDAL;  Surgeon: Minh Rolon MD;  Location: [facility];  Service: Pain Management     COLONOSCOPY      CYSTOSCOPY W/ URETERAL STENT PLACEMENT Bilateral 03/17/2023    Procedure: bilateral retrograde;  Surgeon: Shan Sanabria MD;  Location[facility];  Service: Urology    HERNIA REPAIR      x5    LUMBAR LAMINECTOMY N/A 06/30/2023    Procedure: Left L3-4 Metrx hemilaminectomy and bilateral foraminotomies;  Surgeon: Leland Aguilar MD;  [facility];  Service: Neurosurgery    VA ARTHRODESIS COMBINED TQ 1NTRSPC LUMBAR Right 10/30/2024    Procedure: Robotically assisted L3-4 decompressive hemilaminectomy and foraminotomies with transverse lumbar interbody fusion right-sided approach with add-on to an L4-5 fixation fusion;  Surgeon: Leland Aguilar MD;  [facility];  Service: Neurosurgery    VA CYSTO W/REMOVAL OF LESIONS SMALL N/A 05/01/2023    Procedure: CYSTOSCOPY TURBT;  Surgeon: Shan Sanabria MD;  [facility];  Service: Urology    VA CYSTOURETHROSCOPY N/A 11/06/2023    Procedure: CYSTOSCOPY with  bladder biopsies and fulgeration;  Surgeon: Shan Sanabria MD;  [facility];  Service: Urology    SPINE SURGERY  2017    TONSILLECTOMY  1966    TRANSURETHRAL RESECTION OF BLADDER TUMOR N/A 03/17/2023    Procedure: TRANSURETHRAL RESECTION OF BLADDER TUMOR (TURBT);  Surgeon: Shan Sanabria MD;  [facility];  Service: Urology    WOUND DEBRIDEMENT N/A 11/20/2024    Procedure: LUMBAR DEBRIDEMENT WOUND (WASH OUT);  Surgeon: Leland Aguilar MD;  [facility];  Service: Neurosurgery      Family History   Problem Relation " Age of Onset    Cancer Father         Adult leukemia    Aortic aneurysm Father     Leukemia Father     Alcohol abuse Father     Hypertension Mother     Colon cancer Maternal Grandfather       Social History     Tobacco Use    Smoking status: Former     Current packs/day: 1.00     Average packs/day: 1 pack/day for 51.3 years (51.3 ttl pk-yrs)     Types: Cigarettes     Start date: 2/1/2023     Passive exposure: Never    Smokeless tobacco: Never   Vaping Use    Vaping status: Never Used   Substance Use Topics    Alcohol use: Not Currently     Comment: 3 per year    Drug use: Not Currently     Types: Marijuana      E-Cigarette/Vaping    E-Cigarette Use Never User       E-Cigarette/Vaping Substances    Nicotine No     THC No     CBD No     Flavoring No     Other No     Unknown No       I have reviewed and agree with the history as documented.     Patient is a 65-year-old male with a past medical history of spinal surgery with infectious complication on chronic Bactrim followed by infectious disease who presents with a chief complaint of chest pain left arm pain neck pain headaches and subjective fevers chills.  The patient states that he has been following with infectious disease and was called to be informed that his inflammation markers were elevated.  Patient was instructed to come in for evaluation.  The patient denies any falls or traumas.  States that the chest pain and arm pain have been going on for 48 hours.  The patient states that the headache as well has been constant.  He has not tried any medications today.  He states that he has been having ongoing low back pain with sensation of his feet feeling cold.  He states that he has been having difficulty with ambulation for few weeks.  Was seen by neurosurgery in March where they instructed him that his hardware was in normal position.  He is currently still on Bactrim twice daily but soon he should be finishing per patient.          Review of Systems   All other  systems reviewed and are negative.          Objective       ED Triage Vitals [04/28/25 1209]   Temperature Pulse Blood Pressure Respirations SpO2 Patient Position - Orthostatic VS   (!) 97.3 °F (36.3 °C) 67 158/76 20 96 % Lying      Temp Source Heart Rate Source BP Location FiO2 (%) Pain Score    Temporal Monitor Left arm -- 7      Vitals      Date and Time Temp Pulse SpO2 Resp BP Pain Score FACES Pain Rating User   04/28/25 1500 -- 54 94 % 14 139/64 -- -- KW   04/28/25 1345 -- 50 94 % 14 132/57 -- -- KW   04/28/25 1209 97.3 °F (36.3 °C) 67 96 % 20 158/76 7 -- SBE            Physical Exam  Vitals and nursing note reviewed.   Constitutional:       General: He is not in acute distress.     Appearance: He is well-developed.   HENT:      Head: Normocephalic and atraumatic.   Eyes:      Pupils: Pupils are equal, round, and reactive to light.   Cardiovascular:      Rate and Rhythm: Normal rate and regular rhythm.      Heart sounds: No murmur heard.  Pulmonary:      Effort: Pulmonary effort is normal. No respiratory distress.      Breath sounds: Normal breath sounds.   Abdominal:      General: Bowel sounds are normal.      Palpations: Abdomen is soft.      Tenderness: There is no abdominal tenderness.   Musculoskeletal:      Cervical back: Normal range of motion. Muscular tenderness present. No spinous process tenderness.   Skin:     General: Skin is warm and dry.      Capillary Refill: Capillary refill takes less than 2 seconds.   Neurological:      Mental Status: He is alert and oriented to person, place, and time.      Gait: Gait is intact.      Comments: Uses cane    Psychiatric:         Behavior: Behavior normal.         Results Reviewed       Procedure Component Value Units Date/Time    UA w Reflex to Microscopic w Reflex to Culture [[id number]] Collected: 04/28/25 1421    Lab Status: Final result Specimen: Urine, Clean Catch Updated: 04/28/25 1428     Color, UA Yellow     Clarity, UA Clear     Specific Peck, UA  1.010     pH, UA 7.0     Leukocytes, UA Negative     Nitrite, UA Negative     Protein, UA Negative mg/dl      Glucose, UA Negative mg/dl      Ketones, UA Negative mg/dl      Urobilinogen, UA 0.2 E.U./dl      Bilirubin, UA Negative     Occult Blood, UA Negative    Comprehensive metabolic panel [[id number]]  (Abnormal) Collected: 04/28/25 1320    Lab Status: Final result Specimen: Blood from Arm, Right Updated: 04/28/25 1422     Sodium 134 mmol/L      Potassium 4.4 mmol/L      Chloride 99 mmol/L      CO2 24 mmol/L      ANION GAP 11 mmol/L      BUN 23 mg/dL      Creatinine 1.29 mg/dL      Glucose 87 mg/dL      Calcium 9.4 mg/dL      AST 25 U/L      ALT 36 U/L      Alkaline Phosphatase 112 U/L      Total Protein 8.5 g/dL      Albumin 4.1 g/dL      Total Bilirubin 0.40 mg/dL      eGFR 57 ml/min/1.73sq m     Narrative:      [employer] guidelines for Chronic Kidney Disease (CKD):     Stage 1 with normal or high GFR (GFR > 90 mL/min/1.73 square meters)    Stage 2 Mild CKD (GFR = 60-89 mL/min/1.73 square meters)    Stage 3A Moderate CKD (GFR = 45-59 mL/min/1.73 square meters)    Stage 3B Moderate CKD (GFR = 30-44 mL/min/1.73 square meters)    Stage 4 Severe CKD (GFR = 15-29 mL/min/1.73 square meters)    Stage 5 End Stage CKD (GFR <15 mL/min/1.73 square meters)  Note: GFR calculation is accurate only with a steady state creatinine    Magnesium [[id number]]  (Normal) Collected: 04/28/25 1320    Lab Status: Final result Specimen: Blood from Arm, Right Updated: 04/28/25 1422     Magnesium 2.0 mg/dL     Lactic acid, plasma (w/reflex if result > 2.0) [[id number]]  (Normal) Collected: 04/28/25 1320    Lab Status: Final result Specimen: Blood from Arm, Right Updated: 04/28/25 1422     LACTIC ACID 0.8 mmol/L     Narrative:      Result may be elevated if tourniquet was used during collection.    C-reactive protein [[id number]]  (Abnormal) Collected: 04/28/25 1320    Lab Status: Final result Specimen: Blood from  Arm, Right Updated: 04/28/25 1422     .2 mg/L     HS Troponin 0hr (reflex protocol) [[id number]]  (Normal) Collected: 04/28/25 1320    Lab Status: Final result Specimen: Blood from Arm, Right Updated: 04/28/25 1414     hs TnI 0hr 3 ng/L     Procalcitonin [[id number]]  (Normal) Collected: 04/28/25 1320    Lab Status: Final result Specimen: Blood from Arm, Right Updated: 04/28/25 1414     Procalcitonin 0.22 ng/ml     Sedimentation rate, automated [[id number]]  (Abnormal) Collected: 04/28/25 1320    Lab Status: Final result Specimen: Blood from Arm, Right Updated: 04/28/25 1348     Sed Rate 104 mm/hour     Blood gas, venous [[id number]]  (Abnormal) Collected: 04/28/25 1320    Lab Status: Final result Specimen: Blood from Arm, Right Updated: 04/28/25 1345     pH, Franco 7.511     pCO2, Franco 32.9 mm Hg      pO2, Franco 91.5 mm Hg      HCO3, Franco 25.7 mmol/L      Base Excess, Franco 3.2 mmol/L      O2 Content, Franco 16.8 ml/dL      O2 HGB, VENOUS 88.7 %     CBC and differential [[id number]]  (Abnormal) Collected: 04/28/25 1320    Lab Status: Final result Specimen: Blood from Arm, Right Updated: 04/28/25 1344     WBC 7.95 Thousand/uL      RBC 4.11 Million/uL      Hemoglobin 12.5 g/dL      Hematocrit 37.2 %      MCV 91 fL      MCH 30.4 pg      MCHC 33.6 g/dL      RDW 15.6 %      MPV 10.5 fL      Platelets 238 Thousands/uL      nRBC 0 /100 WBCs      Segmented % 69 %      Immature Grans % 0 %      Lymphocytes % 22 %      Monocytes % 8 %      Eosinophils Relative 1 %      Basophils Relative 0 %      Absolute Neutrophils 5.44 Thousands/µL      Absolute Immature Grans 0.02 Thousand/uL      Absolute Lymphocytes 1.75 Thousands/µL      Absolute Monocytes 0.61 Thousand/µL      Eosinophils Absolute 0.10 Thousand/µL      Basophils Absolute 0.03 Thousands/µL     Blood culture #2 [[id number]] Collected: 04/28/25 1320    Lab Status: In process Specimen: Blood from Arm, Right Updated: 04/28/25 1343    Blood culture #1 [[id number]] Collected: 04/28/25  1320    Lab Status: In process Specimen: Blood from Hand, Right Updated: 04/28/25 1343            CT head without contrast   Final Interpretation by Jose Garg MD (04/28 6456)      No acute intracranial abnormality.                  Workstation performed: [id number]         CT cervical spine without contrast   Final Interpretation by Jose Garg MD (04/28 5384)      No cervical spine fracture or traumatic malalignment                  Workstation performed: [id number]         CT spine thoracic and lumbar wo contrast   Final Interpretation by Jose Garg MD (04/28 8746)      No acute osseous abnormality. See comments above for full analysis.            Workstation performed: [id number]         XR chest 1 view portable    (Results Pending)       Procedures    ED Medication and Procedure Management   Prior to Admission Medications   Prescriptions Last Dose Informant Patient Reported? Taking?   DULoxetine (CYMBALTA) 60 mg delayed release capsule   No No   Sig: Take 1 capsule (60 mg total) by mouth daily   Farxiga 5 MG TABS  Self Yes No   Sig: Take 5 mg by mouth daily 5mg alternating with 2.5mg for fluid retention   HYDROmorphone (DILAUDID) 8 MG tablet   No No   Sig: Take 1 tablet (8 mg total) by mouth every 3 (three) hours as needed for moderate pain or severe pain Max Daily Amount: 64 mg   Ozempic, 0.25 or 0.5 MG/DOSE, 2 MG/3ML injection pen   Yes No   acetaminophen (TYLENOL) 325 mg tablet   No No   Sig: Take 3 tablets (975 mg total) by mouth every 8 (eight) hours   atorvastatin (LIPITOR) 40 mg tablet   No No   Sig: Take 1 tablet (40 mg total) by mouth daily   buPROPion (Wellbutrin SR) 150 mg 12 hr tablet   No No   Sig: Take 1 tablet (150 mg total) by mouth 2 (two) times a day   gabapentin (NEURONTIN) 600 MG tablet   No No   Sig: Take 1 tablet (600 mg total) by mouth 4 (four) times a day   metFORMIN (GLUCOPHAGE-XR) 500 mg 24 hr tablet   No No   Sig: Take 2 tablets (1,000 mg total) by mouth daily with  dinner Take 2 pills qpm   metolazone (ZAROXOLYN) 2.5 mg tablet   No No   Sig: Take 1 tablet (2.5 mg total) by mouth daily as needed (edema)   potassium chloride (Klor-Con) 10 mEq tablet   No No   Sig: Take 4 tablets (40 mEq total) by mouth 2 (two) times a day   sulfamethoxazole-trimethoprim (BACTRIM DS) 800-160 mg per tablet   No No   Sig: Take 1 tablet by mouth every 12 (twelve) hours   torsemide (DEMADEX) 20 mg tablet   No No   Sig: Take 4 tablets (80 mg total) by mouth daily      Facility-Administered Medications: None     Discharge Medication List as of 4/28/2025  3:27 PM        START taking these medications    Details   celecoxib (CeleBREX) 200 mg capsule Take 1 capsule (200 mg total) by mouth 2 (two) times a day with meals for 7 days, Starting Mon 4/28/2025, Until Mon 5/5/2025, Normal      methocarbamol (ROBAXIN) 500 mg tablet Take 1 tablet (500 mg total) by mouth 2 (two) times a day, Starting Mon 4/28/2025, Normal           CONTINUE these medications which have NOT CHANGED    Details   acetaminophen (TYLENOL) 325 mg tablet Take 3 tablets (975 mg total) by mouth every 8 (eight) hours, Starting Mon 11/4/2024, No Print      atorvastatin (LIPITOR) 40 mg tablet Take 1 tablet (40 mg total) by mouth daily, Starting Wed 7/31/2024, Normal      buPROPion (Wellbutrin SR) 150 mg 12 hr tablet Take 1 tablet (150 mg total) by mouth 2 (two) times a day, Starting Sun 4/13/2025, Normal      DULoxetine (CYMBALTA) 60 mg delayed release capsule Take 1 capsule (60 mg total) by mouth daily, Starting Mon 3/10/2025, Normal      Farxiga 5 MG TABS Take 5 mg by mouth daily 5mg alternating with 2.5mg for fluid retention, Starting Wed 10/26/2022, Historical Med      gabapentin (NEURONTIN) 600 MG tablet Take 1 tablet (600 mg total) by mouth 4 (four) times a day, Starting u 10/17/2024, Normal      HYDROmorphone (DILAUDID) 8 MG tablet Take 1 tablet (8 mg total) by mouth every 3 (three) hours as needed for moderate pain or severe pain Max  Daily Amount: 64 mg, Starting Mon 4/14/2025, Normal      metFORMIN (GLUCOPHAGE-XR) 500 mg 24 hr tablet Take 2 tablets (1,000 mg total) by mouth daily with dinner Take 2 pills qpm, Starting Mon 12/2/2024, Normal      metolazone (ZAROXOLYN) 2.5 mg tablet Take 1 tablet (2.5 mg total) by mouth daily as needed (edema), Starting Mon 12/18/2023, Normal      Ozempic, 0.25 or 0.5 MG/DOSE, 2 MG/3ML injection pen Historical Med      potassium chloride (Klor-Con) 10 mEq tablet Take 4 tablets (40 mEq total) by mouth 2 (two) times a day, Starting Mon 4/14/2025, Normal      sulfamethoxazole-trimethoprim (BACTRIM DS) 800-160 mg per tablet Take 1 tablet by mouth every 12 (twelve) hours, Starting Mon 2/24/2025, Until Sun 5/25/2025, Normal      torsemide (DEMADEX) 20 mg tablet Take 4 tablets (80 mg total) by mouth daily, Starting u 2/6/2025, Normal           No discharge procedures on file.  ED SEPSIS DOCUMENTATION   Time reflects when diagnosis was documented in both MDM as applicable and the Disposition within this note       Time User Action Codes Description Comment    4/28/2025  3:23 PM Shlomo Aguillon [M79.602] Left arm pain     4/28/2025  3:23 PM Shlomo Aguillon [R07.9] Chest pain                  Shlomo Aguillon PA-C  04/28/25 [id number]

## 2025-04-29 ENCOUNTER — VBI (OUTPATIENT)
Dept: FAMILY MEDICINE CLINIC | Facility: CLINIC | Age: 65
End: 2025-04-29

## 2025-04-29 DIAGNOSIS — T84.7XXA HARDWARE COMPLICATING WOUND INFECTION (HCC): ICD-10-CM

## 2025-04-29 DIAGNOSIS — A49.02 MRSA INFECTION: Primary | ICD-10-CM

## 2025-04-29 LAB
ATRIAL RATE: 58 BPM
P AXIS: 54 DEGREES
PR INTERVAL: 162 MS
QRS AXIS: 34 DEGREES
QRSD INTERVAL: 88 MS
QT INTERVAL: 414 MS
QTC INTERVAL: 406 MS
T WAVE AXIS: 39 DEGREES
VENTRICULAR RATE: 58 BPM

## 2025-04-29 NOTE — TELEPHONE ENCOUNTER
04/29/25 10:41 AM    Patient contacted post ED visit, [facility] department spoke with patient/caregiver and outreach was successful.    Thank you.  Rupal Moreau  [facility]

## 2025-04-29 NOTE — PROGRESS NOTES
Spoke to Dominick this morning to follow up on his ED eval yesterday.  W/u in ED essentially negative.  Will repeat labs next week.  Patient agreeable.  He will call if he notes any changes.

## 2025-05-02 NOTE — PROGRESS NOTES
Palliative Supportive Care  met with patient/family to continue to provide emotional support and guidance.      Updated biopsychosocial information relevant to support:     The patient was identified by name and date of birth. Dominick was informed that this is a telemedicine visit and that the visit is being conducted through the Epic Embedded platform. They agree to proceed..  My office door was closed. No one else was in the room. They acknowledged consent and understanding of privacy and security of the video platform. The patient has agreed to participate and understands they can discontinue the visit at any time.    Dominick spoke about having a strange medical episode where he had a 93.5 F temp, nausea, vomiting, and pain from his left arm into his chest. The ED ran many tests and found no answers to what was wrong with him. He still feels fatigued and very nauseous. He has lost 18 pounds in 2 weeks. LCSW strongly encouraged him to make an appointment with his palliative care provider. He said he would consider this but he feels overwhelmed by appointments. LCSW encouraged him this could help him with symptom management. He spoke about all of his wife's recent medical issues including possibly not being eligible anymore for the plastic skull piece which is very upsetting. He said they both are feeling frustrated and it causes tension at times. He said he feels beaten down by these set backs in their health. He did express he has some hope PT may be helpful for them both and they are looking at going at the same time so they don't have as many appointments. LCSW encouraged Dominick to explore is self care and coping strategies and did some cognitive reframing. He said he is trying to have some hope they can both make progress but it is difficult at times. He did mention in positive news his wife's tumors have shrunk from chemo. LCSW encouraged Dominick to continue to try to find positives in his daily  life. He asked to end call for today because he is tired.       Identified areas of need include: emotional support    Resources provided:    Areas that need future follow-up include: reduce anxiety/depressed mood    I have spent 30 minutes with Patient  today in which greater than 50% of this time was spent in counseling/coordination of care     Palliative  will follow-up as requested by patient, family, and primary team.  Please contact with any specific requests

## 2025-05-03 LAB
BACTERIA BLD CULT: NORMAL
BACTERIA BLD CULT: NORMAL

## 2025-05-05 ENCOUNTER — APPOINTMENT (OUTPATIENT)
Dept: LAB | Facility: HOSPITAL | Age: 65
End: 2025-05-05
Payer: MEDICARE

## 2025-05-05 DIAGNOSIS — R35.1 NOCTURIA: ICD-10-CM

## 2025-05-05 DIAGNOSIS — T84.7XXA HARDWARE COMPLICATING WOUND INFECTION (HCC): ICD-10-CM

## 2025-05-05 DIAGNOSIS — E11.9 TYPE 2 DIABETES MELLITUS WITHOUT COMPLICATION, WITHOUT LONG-TERM CURRENT USE OF INSULIN (HCC): ICD-10-CM

## 2025-05-05 DIAGNOSIS — E87.6 HYPOKALEMIA: ICD-10-CM

## 2025-05-05 DIAGNOSIS — A49.02 MRSA INFECTION: ICD-10-CM

## 2025-05-05 LAB
ANION GAP SERPL CALCULATED.3IONS-SCNC: 10 MMOL/L (ref 4–13)
BASOPHILS # BLD AUTO: 0.04 THOUSANDS/ÂΜL (ref 0–0.1)
BASOPHILS NFR BLD AUTO: 1 % (ref 0–1)
BUN SERPL-MCNC: 21 MG/DL (ref 5–25)
CALCIUM SERPL-MCNC: 9.4 MG/DL (ref 8.4–10.2)
CHLORIDE SERPL-SCNC: 100 MMOL/L (ref 96–108)
CO2 SERPL-SCNC: 28 MMOL/L (ref 21–32)
CREAT SERPL-MCNC: 1.64 MG/DL (ref 0.6–1.3)
CRP SERPL QL: 92 MG/L
EOSINOPHIL # BLD AUTO: 0.24 THOUSAND/ÂΜL (ref 0–0.61)
EOSINOPHIL NFR BLD AUTO: 3 % (ref 0–6)
ERYTHROCYTE [DISTWIDTH] IN BLOOD BY AUTOMATED COUNT: 15.4 % (ref 11.6–15.1)
ERYTHROCYTE [SEDIMENTATION RATE] IN BLOOD: 84 MM/HOUR (ref 0–19)
EST. AVERAGE GLUCOSE BLD GHB EST-MCNC: 154 MG/DL
GFR SERPL CREATININE-BSD FRML MDRD: 42 ML/MIN/1.73SQ M
GLUCOSE P FAST SERPL-MCNC: 106 MG/DL (ref 65–99)
HBA1C MFR BLD: 7 %
HCT VFR BLD AUTO: 39.6 % (ref 36.5–49.3)
HGB BLD-MCNC: 13 G/DL (ref 12–17)
IMM GRANULOCYTES # BLD AUTO: 0.01 THOUSAND/UL (ref 0–0.2)
IMM GRANULOCYTES NFR BLD AUTO: 0 % (ref 0–2)
LYMPHOCYTES # BLD AUTO: 1.93 THOUSANDS/ÂΜL (ref 0.6–4.47)
LYMPHOCYTES NFR BLD AUTO: 26 % (ref 14–44)
MCH RBC QN AUTO: 30.3 PG (ref 26.8–34.3)
MCHC RBC AUTO-ENTMCNC: 32.8 G/DL (ref 31.4–37.4)
MCV RBC AUTO: 92 FL (ref 82–98)
MONOCYTES # BLD AUTO: 0.43 THOUSAND/ÂΜL (ref 0.17–1.22)
MONOCYTES NFR BLD AUTO: 6 % (ref 4–12)
NEUTROPHILS # BLD AUTO: 4.67 THOUSANDS/ÂΜL (ref 1.85–7.62)
NEUTS SEG NFR BLD AUTO: 64 % (ref 43–75)
NRBC BLD AUTO-RTO: 0 /100 WBCS
PLATELET # BLD AUTO: 291 THOUSANDS/UL (ref 149–390)
PMV BLD AUTO: 10.9 FL (ref 8.9–12.7)
POTASSIUM SERPL-SCNC: 4.1 MMOL/L (ref 3.5–5.3)
PSA SERPL-MCNC: 1.13 NG/ML (ref 0–4)
RBC # BLD AUTO: 4.29 MILLION/UL (ref 3.88–5.62)
SODIUM SERPL-SCNC: 138 MMOL/L (ref 135–147)
WBC # BLD AUTO: 7.32 THOUSAND/UL (ref 4.31–10.16)

## 2025-05-05 PROCEDURE — 83036 HEMOGLOBIN GLYCOSYLATED A1C: CPT

## 2025-05-05 PROCEDURE — 80048 BASIC METABOLIC PNL TOTAL CA: CPT

## 2025-05-05 PROCEDURE — 86140 C-REACTIVE PROTEIN: CPT

## 2025-05-05 PROCEDURE — 36415 COLL VENOUS BLD VENIPUNCTURE: CPT

## 2025-05-05 PROCEDURE — 84153 ASSAY OF PSA TOTAL: CPT

## 2025-05-05 PROCEDURE — 85652 RBC SED RATE AUTOMATED: CPT

## 2025-05-05 PROCEDURE — 85025 COMPLETE CBC W/AUTO DIFF WBC: CPT

## 2025-05-06 RX ORDER — POTASSIUM CHLORIDE 750 MG/1
TABLET, EXTENDED RELEASE ORAL
Qty: 120 TABLET | Refills: 5 | Status: SHIPPED | OUTPATIENT
Start: 2025-05-06

## 2025-05-07 ENCOUNTER — EVALUATION (OUTPATIENT)
Dept: PHYSICAL THERAPY | Facility: CLINIC | Age: 65
End: 2025-05-07
Attending: NEUROLOGICAL SURGERY
Payer: MEDICARE

## 2025-05-07 ENCOUNTER — SOCIAL WORK (OUTPATIENT)
Dept: PALLIATIVE MEDICINE | Facility: CLINIC | Age: 65
End: 2025-05-07

## 2025-05-07 DIAGNOSIS — Z09 POSTOPERATIVE EXAMINATION: ICD-10-CM

## 2025-05-07 DIAGNOSIS — R53.81 PHYSICAL DECONDITIONING: ICD-10-CM

## 2025-05-07 DIAGNOSIS — Z71.89 COUNSELING AND COORDINATION OF CARE: Primary | ICD-10-CM

## 2025-05-07 DIAGNOSIS — Z98.1 S/P LUMBAR FUSION: Primary | ICD-10-CM

## 2025-05-07 PROCEDURE — 97162 PT EVAL MOD COMPLEX 30 MIN: CPT

## 2025-05-07 PROCEDURE — RECHECK

## 2025-05-07 PROCEDURE — 97112 NEUROMUSCULAR REEDUCATION: CPT

## 2025-05-07 PROCEDURE — 97110 THERAPEUTIC EXERCISES: CPT

## 2025-05-07 NOTE — HOME EXERCISE EDUCATION
Program_ID:[id number]   Access Code: [email or web address]  URL: [email or web address]  Date: 05-  Prepared By: Cuca Costello    Program Notes      Exercises      - Abdominal Press into Ball - 1 x daily - 7 x weekly -  sets - 15 reps - 5 seconds hold      - Supine Hip Adduction Isometric with Ball - 1 x daily - 7 x weekly -  sets - 20 reps - 3-5 seconds hold      - Hooklying Clamshell with Resistance - 1 x daily - 7 x weekly -  sets - 20 reps - 3-5 seconds hold

## 2025-05-07 NOTE — PROGRESS NOTES
PT Evaluation   Today's date: 2025  Patient name: Dominick Irving  : 1960  MRN: [id number]  Referring provider: Leland Aguilar,*  Dx:   Encounter Diagnosis     ICD-10-CM    1. S/P lumbar fusion  Z98.1 PT plan of care cert/re-cert      2. Postoperative examination  Z09 PT plan of care cert/re-cert      3. Physical deconditioning  R53.81 PT plan of care cert/re-cert            Assessment/Plan    Assessment  Assessment details: Patient is a 65 y.o. male who presents s/p lumbar fusion on 10/30/24 c/ physical deconditioning and weakness. Patient's greatest concern is decreased quality of life due to lack of mobility and endurance.    Following a thorough physical therapy evaluation, patient demonstrates the following primary movement impairment diagnoses: core weakness, LE weakness, impaired functional mobility, impaired balance and deconditioning. Five time sit to stand testing and TUG testing combined c/ h/o falls place patient at greater risk of falls. The aforementioned impairments have limited the patient's ability to climb stairs, walk for exercise, and work in his lawn. No further referral appears necessary at this time based upon examination results.     Patient education performed during today's session included: lumbar spine anatomy, progressive overload    Etiological factors include: H/o prolonged immobility secondary to severe L/S pain    Patient will benefit from skilled physical therapy treatment to address the aforementioned impairments and functional deficits, and accomplish patient goals.          Impairments: Abnormal or restricted ROM, Activity intolerance, Impaired physical strength, Lacks appropriate HEP, Pain with function, Abnormal movement, Poor posture, Poor body mechanics, participation limitations, activity limitations, and endurance  Understanding of Dx/Px/POC: Good  Prognosis: Good    Provided patient c/  "handout c/ HEP. Patient demonstrated each exercise c/ good form and verbalized understanding of expectations c/ HEP.  Patient verbalized understanding of POC.        Plan  Plan details: TE, NMR, TA, MT, self-care, and modalities as needed in order to progress through skilled PT focused on ROM, strength, balance, motor control.      Patient would benefit from: PT Eval and Skilled physical therapy  Planned modality interventions: Cryotherapy and Thermotherapy: Hydrocollator Packs  Planned therapy interventions: home exercise program, manual therapy, neuromuscular re-education, patient/caregiver education, self care, therapeutic activities, and therapeutic exercises  Frequency: 1-2x/week  Duration in weeks: 8  Plan of Care beginning date: 5/8/2025  Plan of Care expiration date: 8 weeks - 7/3/2025  Treatment plan discussed with: Patient       Goals  Short Term Goals (3-4 weeks):    - Patient will be independent in basic HEP 2-3 weeks  - Patient will report >50% reduction in pain  - Patient will demonstrate improved LE strength by 1/2 grade.     Long Term Goals (16 weeks):  - Patient will be independent in a comprehensive home exercise program  - Patient FOTO score will improve to nv/100  - Patient will self-report >80% improvement in function  - Patient will demonstrate BLE strength >=4+/5.   - Five time sit to stand <=12.6\"; TUG <=9\"      Subjective    History of Present Illness  - Mechanism of injury: IE: Patient is a 65 y.o. male presenting s/p L/S fusion on 10/30/24. Had complication c/ MRSA in Nov 2024 and was unable to start PT. Pain since fusion has been minimal and is weaning off medications as able. Wife had health issues in early 2025 which prevented him from starting therapy as he had to become a caregiver. Primary concerns at this point are his deconditioning and balance. Wants to increase strength and mobility to improve QOL. 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     - Primary AD: SPC at all times in the community and furniture walks as needed in houes (notes he keeps it in case he needs it).        Pain  - Current pain ratin-3/10  - At best pain ratin/10  - At worst pain ratin-7/10  - Location: L/S  -Doesn't occur frequently, more frustrated by lack of mobility and deconditioning.     Social Support  - Steps to enter house: 1 WES  - Stairs in house: chair lift on 2 flights in home   - Bedroom/bathroom set up: grab bars in bathroom  - DME available: SPC  - Lives in: multilevel home  - Lives with: wife (caregiver)    Objective     Red Flag Screening  - Positive for: age >50 years old, history of cancer, and recent infection    - Negative for: fever, chills, night sweats, weight loss, rest/night pain, saddle anesthesia, bladder dysfunction, and LE neurological deficits       Sensation  - Light touch: WNL  - Reflexes:   Left: Patellar: 2+  Achilles: 2+   Right: Patellar: 2+  Achilles: 2+   - Upper Motor Neuron Signs: LE Clonus (-) b/l    LE MMT  Movement Left Right   Hip Flexion 4 4-   Hip ABDuction Hklyg 4 Hklyg 4-   Hip ADDuction Hklyg 4 Hklyg 4   Knee Flexion 4 4   Knee Extension 4 4   Ankle DF 4 4-   Ankle PF 4 4-          Lumbar Spine Range of Motion: Will assess at future visit. Focused on strength and functional mobility at IE.  Movement Left Right   Flexion    Extension    Side Flexion     Rotation         Hip Range of Motion  Movement Left Right   Hip Flexion WNL WNL   Hip Extension Moderate limitation Moderate limitation   Hip IR WNL Slight limitation   Hip ER WNL WNL       Functional Assessment  -Gait Assessment: Gait: Ambulates c/ SPC, slow gait speed  -Stairs: Step to step gait pattern  -Transfers:    -Sit to Stand:B/l Hands on thighs/arm rest   -Supine <> Sit: Uses log roll to attain supine position, but goes into long sit to sit up from supine position  - Functional Tests:  Five Time Sit to Stand: 33.38\" " "b/l hands on thighs  TU.25\" no AD    Balance  Assess further at future visit         Precautions: L/S L3-5 Fusion c/ MRSA infection on chronic antibiotics (10/30/24)  Past Medical History:   Diagnosis Date    Anxiety 3/01/2023    Arthritis     Bladder cancer (HCC)     Cancer (HCC) 3/17/2023    Cervical disc disorder 2016    Chronic narcotic dependence (HCC)     Chronic pain disorder     lower back and down both legs    Colon polyp     Coronary artery disease 2021    Weakness left ventricle    CPAP (continuous positive airway pressure) dependence     bi-pap    Depression 3/17/2023    Does use hearing aid     will wear DOS    Full dentures     Heart failure (HCC)     and \"fluid retention\" SL OW B Daryl cardio PA\"    High cholesterol     Hypertension     Low back pain     Mild ankle edema     and feet    Obstructive sleep apnea on CPAP     Prediabetes     Shortness of breath     per pt \"with just exertion\"    Sleep apnea     Wears glasses        POC expires Unit Limit Auth Expiration Date PT/OT + Visit Limit Co-pay/Co-insurance   8 weeks - 7/3/2025 -- N/a BOMN PCY 20% co-insurance billed to secondary         Initial Evaluation Date: 2025  Re-evaluation:  POC Expiration: 8 weeks - 7/3/2025  Ambulatory Referral to PT: \"Evaluate and Treat. Range of motion and strengthening of the LS spine and lower extremities with accident on quadricep and gastrocnemius as well as core muscle balance training\"  Compliance 2025                     Visit Number 1                    Re-Eval  IE                 MC   Foto Captured Y                      Pertinent Findings:                                                                                         Test / Measure 2025   FOTO (Predicted nv) nv   Core Stability weak   Functional Tests Five Time Sit to Stand: 33.38\" b/l hands on thighs    TU.25\" no AD   LE Strength weak     Daily Treatment Diary:       Date 2025       Manuals        Lateral Hip " "Distraction                Neuro Re-Ed        ABD Iso c/ SB 5\"/15       PPT c/ March        C/L UE/LE Isometric        Hook-lying Clams BlTB 5\"/15       Hip ADDuction Iso c/ Ball 5\"/15       S/Lying Clams        Side-stepping        DAYAN nv       Pallof Press nv       Bridge        Bird-dog        Deadbug                Ther Ex        LTRs        SKTC        LAQ        HC        Leg Press        Standing Hand/Heel Rock @ Counter        Bent Over Hip Extension        NuStep nv       Education L/S Anatomy and Regional Interdependence                       Ther Activity        Chop/Lift        Box Lift        Wall Squat        Lunge        Sit to Stand                        Modalities                                 "

## 2025-05-12 ENCOUNTER — OFFICE VISIT (OUTPATIENT)
Dept: FAMILY MEDICINE CLINIC | Facility: CLINIC | Age: 65
End: 2025-05-12
Payer: MEDICARE

## 2025-05-12 ENCOUNTER — TELEPHONE (OUTPATIENT)
Age: 65
End: 2025-05-12

## 2025-05-12 ENCOUNTER — APPOINTMENT (OUTPATIENT)
Dept: PHYSICAL THERAPY | Facility: CLINIC | Age: 65
End: 2025-05-12
Attending: NEUROLOGICAL SURGERY
Payer: MEDICARE

## 2025-05-12 VITALS
HEIGHT: 68 IN | HEART RATE: 61 BPM | DIASTOLIC BLOOD PRESSURE: 78 MMHG | WEIGHT: 241.6 LBS | SYSTOLIC BLOOD PRESSURE: 132 MMHG | OXYGEN SATURATION: 94 % | BODY MASS INDEX: 36.62 KG/M2 | RESPIRATION RATE: 16 BRPM

## 2025-05-12 DIAGNOSIS — M54.12 LEFT CERVICAL RADICULOPATHY: ICD-10-CM

## 2025-05-12 DIAGNOSIS — G89.29 OTHER CHRONIC PAIN: Primary | ICD-10-CM

## 2025-05-12 PROCEDURE — G2211 COMPLEX E/M VISIT ADD ON: HCPCS | Performed by: FAMILY MEDICINE

## 2025-05-12 PROCEDURE — 99214 OFFICE O/P EST MOD 30 MIN: CPT | Performed by: FAMILY MEDICINE

## 2025-05-12 RX ORDER — OXYCODONE HYDROCHLORIDE 30 MG/1
30 TABLET, FILM COATED, EXTENDED RELEASE ORAL EVERY 6 HOURS
Qty: 120 TABLET | Refills: 0 | Status: SHIPPED | OUTPATIENT
Start: 2025-05-12

## 2025-05-12 RX ORDER — METHYLPREDNISOLONE 4 MG/1
TABLET ORAL
Qty: 21 EACH | Refills: 0 | Status: SHIPPED | OUTPATIENT
Start: 2025-05-12 | End: 2025-05-21

## 2025-05-12 NOTE — PROGRESS NOTES
Name: Dominick Irving      : 1960      MRN: [id number]  Encounter Provider: Robert Budinetz, MD  Encounter Date: 2025   [facility]  :  Assessment & Plan  Other chronic pain  Switch back from dilaudid to oxycontin  40mg of dilaudid is 200 morphine equivalents which would be 100-133mg of oxycontin daily  Orders:    oxyCODONE HCl ER (OxyCONTIN) 30 MG T12A; Take 1 tablet (30 mg total) by mouth every 6 (six) hours Max Daily Amount: 120 mg    Left cervical radiculopathy  Start a medrol dose pack  Orders:    methylPREDNISolone 4 MG tablet therapy pack; Use as directed on package           History of Present Illness   Dominick is having a lot of pain numbness and tingling down his left arm he is already on narcotics and gabapentin.  He was to the ER for this 2 weeks ago.  His symptoms are consistent with cervical radiculopathy.  He has been dealing since November with hardware infection post spinal surgery and follows routinely with infectious disease.  He understands there is risk in taking steroids that may help his radiculopathy as far as worsening his infection he is still on antibiotics.  Working to do a quick Medrol Dosepak.  He also has been on narcotics for many years.  He was followed by palliative care which was helping control and manage his narcotics.  He is on Dilaudid now for 2 years it does not seem to be effective working to convert him back to OxyContin.  He is on 40 mg daily of Dilaudid which is 200 morphine equivalents divided by 1-1/2-2 so we will convert him to 30 mg 4 times a day.  The chronic pain is severe.  He has had multiple surgeries.  The Dilaudid is no longer effective he has been on OxyContin in the past which helped.  I am going to switch him back to that which is still a decrease from his chronic pain medicine.  Pain has not been adequately controlled with hysingla/fentanyl/dilaudid/oxycodone/methadone/morphine.  He has been seen in  "conjunction with palliative care for pain mgt.      Review of Systems    Objective   /78 (BP Location: Right arm, Patient Position: Sitting, Cuff Size: Standard)   Pulse 61   Resp 16   Ht 5' 8\" (1.727 m)   Wt 110 kg (241 lb 9.6 oz)   SpO2 94%   BMI 36.74 kg/m²      Physical Exam    " Yes

## 2025-05-12 NOTE — PROGRESS NOTES
Palliative Supportive Care  met with patient/family to continue to provide emotional support and guidance.      Updated biopsychosocial information relevant to support:     The patient was identified by name and date of birth. Dominick was informed that this is a telemedicine visit and that the visit is being conducted through the Epic Embedded platform. They agree to proceed..  My office door was closed. No one else was in the room. They acknowledged consent and understanding of privacy and security of the video platform. The patient has agreed to participate and understands they can discontinue the visit at any time.    Dominick spoke about things finally settling down with appointments for him and his wife. He said they both start PT soon and he is hopeful it will help them. He said that he had an issue getting his oxycodone and is getting a run around from insurance. He is hoping this will be settled today. He said in positive news, he and his wife made a weekend trip up to [facility] and they had a great time. He said he wants to try to commit to making a small trip every month or so because it was so good for them to get away. He is trying to stay positive and feels better than previous weeks. He hopes his family can get a break from issues for a while. LCSW encouraged positive self talk and self care.       Identified areas of need include: emotional support    Resources provided:    Areas that need future follow-up include: reduce anxiety/depressed mood    I have spent 30 minutes with Patient  today in which greater than 50% of this time was spent in counseling/coordination of care     Palliative  will follow-up as requested by patient, family, and primary team.  Please contact with any specific requests

## 2025-05-12 NOTE — TELEPHONE ENCOUNTER
[facility] calling to get get clarification of oxycodone order. Needs clarification, going to send a fax regarding clarification questions

## 2025-05-13 ENCOUNTER — TELEPHONE (OUTPATIENT)
Dept: FAMILY MEDICINE CLINIC | Facility: CLINIC | Age: 65
End: 2025-05-13

## 2025-05-13 ENCOUNTER — TELEPHONE (OUTPATIENT)
Age: 65
End: 2025-05-13

## 2025-05-13 NOTE — TELEPHONE ENCOUNTER
PA for (OxyCONTIN) 30 MG T12A  DENIED    Reason:(Screenshot if applicable)        Message sent to office clinical pool Yes    Denial letter scanned into Media Yes    We can gladly do an appeal but the process can take about 30-60 days to provide determination. Please have the office staff schedule a Peer to Peer at phone [phone] . If an appeal is truly warranted please have Provider send clinical documentation to the PA department to support the appeal.     **Please follow up with your patient regarding denial and next steps**

## 2025-05-13 NOTE — TELEPHONE ENCOUNTER
The insurance company required a pre-authorization for this script. They have denied the request saying that alternative options ( morphine, etc) are available. I explained to them that I have a long history of pain management and that OxyContin is the one that has given me the best treatment.   They would need you to call them and justify this script and then they will approve it. Sorry to be a pain in the ass, but today is my last day of having any dilaudid left and I don’t want to be in the position of having nothing for pain tomorrow. They should have sent you a copy of the denial.     If possible, please drop me a message that this is cleared up….. thanks so much!

## 2025-05-13 NOTE — TELEPHONE ENCOUNTER
PA for oxyCODONE HCl ER (OxyCONTIN) 30 MG T12A SUBMITTED to     via    [x]CMM-KEY: [id number]  []Surescripts-Case ID #   []Availity-Auth ID # NDC #   []Faxed to plan   []Other website   []Phone call Case ID #     [x]PA sent as URGENT    All office notes, labs and other pertaining documents and studies sent. Clinical questions answered. Awaiting determination from insurance company.     Turnaround time for your insurance to make a decision on your Prior Authorization can take 7-21 business days.

## 2025-05-13 NOTE — TELEPHONE ENCOUNTER
Patient requesting auth processed urgently as he does not want to be without the medication too long.     Reason for call:   [x] Prior Auth  [] Other:     Caller:  [x] Patient  [] Pharmacy  Name:   Address:   Callback Number:     Medication: oxyCODONE HCl ER (OxyCONTIN) 30 MG      Dose/Frequency:  Take 1 tablet (30 mg total) by mouth every 6 (six) hours     Quantity: 120    Ordering Provider:   [x] PCP/Provider -   [] Speciality/Provider -

## 2025-05-14 ENCOUNTER — SOCIAL WORK (OUTPATIENT)
Dept: PALLIATIVE MEDICINE | Facility: CLINIC | Age: 65
End: 2025-05-14

## 2025-05-14 ENCOUNTER — TELEPHONE (OUTPATIENT)
Age: 65
End: 2025-05-14

## 2025-05-14 ENCOUNTER — PATIENT MESSAGE (OUTPATIENT)
Dept: FAMILY MEDICINE CLINIC | Facility: CLINIC | Age: 65
End: 2025-05-14

## 2025-05-14 DIAGNOSIS — Z71.89 COUNSELING AND COORDINATION OF CARE: Primary | ICD-10-CM

## 2025-05-14 PROCEDURE — NC001 PR NO CHARGE

## 2025-05-14 NOTE — TELEPHONE ENCOUNTER
Patient called and stated that the insurance company will be contacting the office for more information in regards to his prior authorization such as previous medications tried. Patient stated the pharmacy has also sent a fax to the office for pcp to please fill out so that he is able to obtain his script by paying out of pocket for now until the insurance covers the medication. Please advise.

## 2025-05-14 NOTE — TELEPHONE ENCOUNTER
PA for (OxyCONTIN) 30 MG T12A  APPEALED via     []CMM  []SS  [x]Letter sent to insurance via fax [phone]  []Other site or means     All necessary records sent. Will await response from insurance company    Turnaround time for a decision to be made on an appeal could take up to 30 business days

## 2025-05-14 NOTE — TELEPHONE ENCOUNTER
Patient is requesting to reschedule virtual visit with Giovanna to 230PM or 3PM.     Per patient please send [employer] message with update.       Thanks!

## 2025-05-15 ENCOUNTER — APPOINTMENT (OUTPATIENT)
Dept: PHYSICAL THERAPY | Facility: CLINIC | Age: 65
End: 2025-05-15
Attending: NEUROLOGICAL SURGERY
Payer: MEDICARE

## 2025-05-15 NOTE — PATIENT COMMUNICATION
Patient called in to follow up on his request to call in the pharmacy. Please do help with the request. Thanks

## 2025-05-15 NOTE — PATIENT COMMUNICATION
Patient is requesting Doctor to call pharmacy to answer some questions about script. He will pay out of pocket for THIS refill until approved.

## 2025-05-16 NOTE — PATIENT COMMUNICATION
Patient called in stated his form for Rx: Oxycodone was to be completed yesterday on 5/15 and  sent to [facility].    Patient stated he has been out of his medicaiton for 3-4 days now.    Please review and advice as soon as possible.  Thank you

## 2025-05-19 ENCOUNTER — TELEMEDICINE (OUTPATIENT)
Dept: INFECTIOUS DISEASES | Facility: CLINIC | Age: 65
End: 2025-05-19
Payer: MEDICARE

## 2025-05-19 ENCOUNTER — OFFICE VISIT (OUTPATIENT)
Dept: PHYSICAL THERAPY | Facility: CLINIC | Age: 65
End: 2025-05-19
Attending: NEUROLOGICAL SURGERY
Payer: MEDICARE

## 2025-05-19 DIAGNOSIS — E11.69 TYPE 2 DIABETES MELLITUS WITH OTHER SPECIFIED COMPLICATION, UNSPECIFIED WHETHER LONG TERM INSULIN USE (HCC): Primary | ICD-10-CM

## 2025-05-19 DIAGNOSIS — Z98.1 S/P LUMBAR FUSION: Primary | ICD-10-CM

## 2025-05-19 DIAGNOSIS — E66.01 MORBID (SEVERE) OBESITY DUE TO EXCESS CALORIES (HCC): ICD-10-CM

## 2025-05-19 DIAGNOSIS — I50.33 ACUTE ON CHRONIC DIASTOLIC HEART FAILURE (HCC): ICD-10-CM

## 2025-05-19 DIAGNOSIS — T84.7XXA HARDWARE COMPLICATING WOUND INFECTION (HCC): ICD-10-CM

## 2025-05-19 DIAGNOSIS — T81.49XA SURGICAL SITE INFECTION: ICD-10-CM

## 2025-05-19 DIAGNOSIS — Z09 POSTOPERATIVE EXAMINATION: ICD-10-CM

## 2025-05-19 DIAGNOSIS — R53.81 PHYSICAL DECONDITIONING: ICD-10-CM

## 2025-05-19 PROCEDURE — 97112 NEUROMUSCULAR REEDUCATION: CPT

## 2025-05-19 PROCEDURE — 97110 THERAPEUTIC EXERCISES: CPT

## 2025-05-19 PROCEDURE — 99214 OFFICE O/P EST MOD 30 MIN: CPT | Performed by: PHYSICIAN ASSISTANT

## 2025-05-19 RX ORDER — SULFAMETHOXAZOLE AND TRIMETHOPRIM 800; 160 MG/1; MG/1
1 TABLET ORAL EVERY 12 HOURS SCHEDULED
Qty: 84 TABLET | Refills: 0 | Status: SHIPPED | OUTPATIENT
Start: 2025-05-19 | End: 2025-05-19

## 2025-05-19 RX ORDER — SULFAMETHOXAZOLE AND TRIMETHOPRIM 800; 160 MG/1; MG/1
1 TABLET ORAL EVERY 12 HOURS SCHEDULED
Qty: 84 TABLET | Refills: 0 | Status: SHIPPED | OUTPATIENT
Start: 2025-05-19 | End: 2025-06-30

## 2025-05-19 NOTE — PROGRESS NOTES
Name: Dominick Irving      : 1960      MRN: [id number]  Encounter Provider: Edmar Pate PA-C  Encounter Date: 2025   Encounter department: [facility]  :  Assessment & Plan  Surgical site infection  Postoperative Staphylococcus aureus (MRSA) lumbar wound infection s/p lumbar wound debridement and washout .  Initial L3-4 decompressive hemilaminectomy with transverse lumbar interbody fusion and L4-5 fixation and fusion 10/30.  One week after his initial surgery he noted purulent discharge from the wound.  He was given a script for cephalexin.  MRI revealed a fluid collection in the area of the incision.  OR cultures positive for MRSA.  AFB and Fungal cultures remain negative to date.  Patient discharged and finished a one week course of IV Vancomycin then transition to po bactrim.      24- Patient states he is doing well on the bactrim.  He does have a slight increase in his Cr although his Cr does appear to fluctuate so I do not necessarily think this is out of his normal range.  Will continue to monitor closely.  MRSA is Intermediate to tetracyclines so limited to other good po options.  There is some consideration for hardware involvement as it appears infection was deep (as per Op Note).      24- Patient tolerating the bactrim well.  No new complaints today.  Labs overall ok.  Cr slightly up but stable.  Also note BUN up as well, ? Dehydration playing a part.  Also could be false elevation of Cr from the bactrim.  Will continue to monitor closely.  ESR up and CRP down.  Patient feeling well so will continue to trend.      25 - patient remains on po Bactrim DS.  He continues to tolerate it.  Still with fluctuating Cr - does take diuretics.  Also noted to have low K - I did reach out to his PCP.   Also fluctuating inflammatory markers.  Patient due for labs tomorrow.  Will hold off on refills until they are reviewed.      [id number]- patient remains on  po bactrim DS.  He continues to tolerate it well.  Labs are stable for him.  ESR now seems to be trending down.  CRP still fluctuating.  Back pain improved but he still feels weak and fatigues easily.  Discussed that he should be seeing PT.      4/7/25- Patient tolerating the bactrim well.  Labs stable.  ESR/CRP seem to have plateaued and he clinically seems to have turned a corner.  We discussed that due to the hardware he may be at some risk for relapsing infection when coming of the antibiotic.  It does not appear the hardware was exposed during the wash out but there is always some concern.  I discussed this with Dominick and he understands.  At this time we both agree to continue the antibiotics as it would not be a good time for his infection to relapse with his wife so sick.      5/19/25- Patient remains on po bactrim.  He did have an elevation in ESR and CRP.  He was evaluated in the ED as he was also not feeling well at the time.  Now his inflammatory markers are trending back down.  We discussed continuing the antibiotics another 4 weeks and if his ESR settles back down into the 40 range then would stop the bactrim at that time.      Plan  - continue Bactrim DS 1 tab po bid  due to concern for hardware involvement would go at least until inflammatory markers normalization/plateau.     - will refill bactrim  - CBCD, BMP, ESR, CRP now and ins 3 weeks  - follow up with Neurosurgery as scheduled  - recommend he start PT  - RTO 4 weeks or sooner if needed.        Orders:    CBC and differential; Standing    Basic metabolic panel; Standing    Sedimentation rate, automated; Standing    C-reactive protein; Standing    sulfamethoxazole-trimethoprim (BACTRIM DS) 800-160 mg per tablet; Take 1 tablet by mouth every 12 (twelve) hours      Type 2 diabetes mellitus with other specified complication, unspecified whether long term insulin use (HCC)    Lab Results   Component Value Date    HGBA1C 7.0 (H) 05/05/2025             Morbid (severe) obesity due to excess calories (HCC)  BMI 36.74         Acute on chronic diastolic heart failure (HCC)      Patient states he is on one diuretic daily and takes another diuretic if he gains more than 3 pounds. The second diuretic causes K loss and when he takes this one he ups his K supplementation.                            History of Present Illness   HPI  Dominick Irving is a 65 y.o. male who presents for virtual follow up today regarding Postoperative Staphylococcus aureus (MRSA) lumbar wound infection.  Patient currently remains on po bactrim.  He continues to tolerate it well.  He has no new complaints today.  He recently completed a short course of steroid as his PCP thought he may have had a pinched nerve.  Patient states no improvement on the steroid.        Review of Systems   Constitutional:  Negative for chills and fever.   Respiratory:  Negative for cough and shortness of breath.    Gastrointestinal:  Negative for abdominal pain, diarrhea, nausea and vomiting.   Skin:  Negative for rash.   Psychiatric/Behavioral:  Negative for behavioral problems and confusion.           Objective   There were no vitals taken for this visit.     Physical Exam  Constitutional:       General: He is not in acute distress.     Appearance: Normal appearance. He is not ill-appearing, toxic-appearing or diaphoretic.   HENT:      Head: Normocephalic and atraumatic.     Eyes:      General: No scleral icterus.        Right eye: No discharge.         Left eye: No discharge.      Conjunctiva/sclera: Conjunctivae normal.     Pulmonary:      Effort: Pulmonary effort is normal. No respiratory distress.     Skin:     Coloration: Skin is not jaundiced or pale.      Findings: No erythema or rash.     Neurological:      Mental Status: He is alert and oriented to person, place, and time.     Psychiatric:         Mood and Affect: Mood normal.         Behavior: Behavior normal.         Labs:   5/5/25  Wbc: 7.32  Hgb:  13.0  Plt: 291  Cr: 1.64  ESR: 84  CRP: 92.0      Administrative Statements   Encounter provider Edmar Pate PA-C    The Patient is located at Home and in the following state in which I hold an active license [place].    The patient was identified by name and date of birth. Dominick JUAN Irving was informed that this is a telemedicine visit and that the visit is being conducted through the Epic Embedded platform. He agrees to proceed..  My office door was closed. No one else was in the room.  He acknowledged consent and understanding of privacy and security of the video platform. The patient has agreed to participate and understands they can discontinue the visit at any time.    I have spent a total time of 30 minutes in caring for this patient on the day of the visit/encounter including Diagnostic results, Prognosis, Risks and benefits of tx options, Instructions for management, Patient and family education, Importance of tx compliance, Risk factor reductions, Impressions, Documenting in the medical record, and Reviewing/placing orders in the medical record (including tests, medications, and/or procedures), not including the time spent for establishing the audio/video connection.

## 2025-05-19 NOTE — ASSESSMENT & PLAN NOTE
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  Initial L3-4 decompressive hemilaminectomy with transverse lumbar interbody fusion and L4-5 fixation and fusion 10/30.  One week after his initial surgery he noted purulent discharge from the wound.  He was given a script for cephalexin.  MRI revealed a fluid collection in the area of the incision.  OR cultures positive for MRSA.  AFB and Fungal cultures remain negative to date.  Patient discharged and finished a one week course of IV Vancomycin then transition to po bactrim.      12/4/24- Patient states he is doing well on the bactrim.  He does have a slight increase in his Cr although his Cr does appear to fluctuate so I do not necessarily think this is out of his normal range.  Will continue to monitor closely.  MRSA is Intermediate to tetracyclines so limited to other good po options.  There is some consideration for hardware involvement as it appears infection was deep (as per Op Note).      12/23/24- Patient tolerating the bactrim well.  No new complaints today.  Labs overall ok.  Cr slightly up but stable.  Also note BUN up as well, ? Dehydration playing a part.  Also could be false elevation of Cr from the bactrim.  Will continue to monitor closely.  ESR up and CRP down.  Patient feeling well so will continue to trend.      1/13/25 - patient remains on po Bactrim DS.  He continues to tolerate it.  Still with fluctuating Cr - does take diuretics.  Also noted to have low K - I did reach out to his PCP.   Also fluctuating inflammatory markers.  Patient due for labs tomorrow.  Will hold off on refills until they are reviewed.      23014- patient remains on po bactrim DS.  He continues to tolerate it well.  Labs are stable for him.  ESR now seems to be trending down.  CRP still fluctuating.  Back pain improved but he still feels weak and fatigues easily.  Discussed that he should be seeing PT.       4/7/25- Patient tolerating the bactrim well.  Labs stable.  ESR/CRP seem to have plateaued and he clinically seems to have turned a corner.  We discussed that due to the hardware he may be at some risk for relapsing infection when coming of the antibiotic.  It does not appear the hardware was exposed during the wash out but there is always some concern.  I discussed this with Dominick and he understands.  At this time we both agree to continue the antibiotics as it would not be a good time for his infection to relapse with his wife so sick.      5/19/25- Patient remains on po bactrim.  He did have an elevation in ESR and CRP.  He was evaluated in the ED as he was also not feeling well at the time.  Now his inflammatory markers are trending back down.  We discussed continuing the antibiotics another 4 weeks and if his ESR settles back down into the 40 range then would stop the bactrim at that time.      Plan  - continue Bactrim DS 1 tab po bid  due to concern for hardware involvement would go at least until inflammatory markers normalization/plateau.     - will refill bactrim  - CBCD, BMP, ESR, CRP now and ins 3 weeks  - follow up with Neurosurgery as scheduled  - recommend he start PT  - RTO 4 weeks or sooner if needed.        Orders:    CBC and differential; Standing    Basic metabolic panel; Standing    Sedimentation rate, automated; Standing    C-reactive protein; Standing    sulfamethoxazole-trimethoprim (BACTRIM DS) 800-160 mg per tablet; Take 1 tablet by mouth every 12 (twelve) hours

## 2025-05-19 NOTE — PATIENT INSTRUCTIONS
- continue bactrim ds one tab po every 12 hours  - labs this week then in 3 weeks  - RTO 4 weeks  - call with any concerns

## 2025-05-19 NOTE — PROGRESS NOTES
"Daily Note     Today's date: 2025  Patient name: Dominick Irving  : 1960  MRN: [id number]  Referring provider: Leland Aguilar,*  Dx:   Encounter Diagnosis     ICD-10-CM    1. S/P lumbar fusion  Z98.1       2. Postoperative examination  Z09       3. Physical deconditioning  R53.81           Start Time: 830  Stop Time: 915  Total time in clinic (min): 45 minutes    Subjective: Did a lot this weekend, was able to mow the lawn for 1st time in 2 years and walked around the flea market. Has been having some symptoms down L arm which he reports doctors have said is likely a pinched nerve, but does feel function is improving. Was fatigued by activity yesterday slightly.       Objective: See treatment diary below      Assessment: Tolerated treatment well. Patient demonstrated fatigue post treatment, exhibited good technique with therapeutic exercises, and would benefit from continued PT. Fatigued by program and noted some relief of L arm symptoms c/ standing TB exercises.       Plan: Continue per plan of care.      Precautions: L/S L3-5 Fusion c/ MRSA infection on chronic antibiotics (10/30/24)  Past Medical History:   Diagnosis Date    Anxiety 3/01/2023    Arthritis     Bladder cancer (HCC)     Cancer (HCC) 3/17/2023    Cervical disc disorder 2016    Chronic narcotic dependence (HCC)     Chronic pain disorder     lower back and down both legs    Colon polyp     Coronary artery disease 2021    Weakness left ventricle    CPAP (continuous positive airway pressure) dependence     bi-pap    Depression 3/17/2023    Does use hearing aid     will wear DOS    Full dentures     Heart failure (HCC)     and \"fluid retention\" SL ABDULAZIZ Brito cardio PA\"    High cholesterol     Hypertension     Low back pain     Mild ankle edema     and feet    Obstructive sleep apnea on CPAP     Prediabetes     Shortness of breath     per pt \"with just exertion\"    Sleep apnea     Wears glasses        POC expires Unit Limit " "Auth Expiration Date PT/OT + Visit Limit Co-pay/Co-insurance   8 weeks - 7/3/2025 -- N/a BOMN PCY 20% co-insurance billed to secondary         Initial Evaluation Date: 2025  Re-evaluation:  POC Expiration: 8 weeks - 7/3/2025  Ambulatory Referral to PT: \"Evaluate and Treat. Range of motion and strengthening of the LS spine and lower extremities with accident on quadricep and gastrocnemius as well as core muscle balance training\"  Compliance 2025  5/15                   Visit Number 1 2                   Re-Eval  IE                    Foto Captured Y                      Pertinent Findings:                                                                                         Test / Measure 2025   FOTO (Predicted nv) nv   Core Stability weak   Functional Tests Five Time Sit to Stand: 33.38\" b/l hands on thighs    TU.25\" no AD   LE Strength weak     Daily Treatment Diary:       Date 2025      Manuals        Lateral Hip Distraction                Neuro Re-Ed        ABD Iso c/ SB 5\"/15 Held 2* L arm N/T      PPT / March        C/L UE/LE Isometric        Hook-lying Clams BlTB 5\"/15 YCS 5\"/15      Hip ADDuction Iso c/ Ball 5\"/15 5\"/15      S/Lying Clams        Side-stepping        DAYAN nv GTB 2x10      Pallof Press nv Pallof Single GTB x10ea 5\"      Rows  GTB 5\" 2x10      Bridge  Low x10 5\"      Bird-dog        Deadbug                Ther Ex        LTRs  X10 5\"      SKTC        LAQ  nv      HC  nv      HR  nv      Leg Press        Standing Hand/Heel Rock @ Counter        Bent Over Hip Extension        NuStep nv L1 7'      Education L/S Anatomy and Regional Interdependence Fatigue level                      Ther Activity        Chop/Lift        Box Lift        Wall Squat        Lunge        Sit to Stand                        Modalities                                  "

## 2025-05-20 ENCOUNTER — APPOINTMENT (OUTPATIENT)
Dept: LAB | Facility: HOSPITAL | Age: 65
End: 2025-05-20
Payer: MEDICARE

## 2025-05-20 DIAGNOSIS — T81.49XA SURGICAL SITE INFECTION: ICD-10-CM

## 2025-05-20 DIAGNOSIS — M96.89: ICD-10-CM

## 2025-05-20 DIAGNOSIS — T84.7XXA HARDWARE COMPLICATING WOUND INFECTION (HCC): ICD-10-CM

## 2025-05-20 LAB
ANION GAP SERPL CALCULATED.3IONS-SCNC: 8 MMOL/L (ref 4–13)
BASOPHILS # BLD AUTO: 0.04 THOUSANDS/ÂΜL (ref 0–0.1)
BASOPHILS NFR BLD AUTO: 1 % (ref 0–1)
BUN SERPL-MCNC: 20 MG/DL (ref 5–25)
CALCIUM SERPL-MCNC: 8.9 MG/DL (ref 8.4–10.2)
CHLORIDE SERPL-SCNC: 102 MMOL/L (ref 96–108)
CO2 SERPL-SCNC: 28 MMOL/L (ref 21–32)
CREAT SERPL-MCNC: 1.14 MG/DL (ref 0.6–1.3)
CRP SERPL QL: 37.1 MG/L
EOSINOPHIL # BLD AUTO: 0.37 THOUSAND/ÂΜL (ref 0–0.61)
EOSINOPHIL NFR BLD AUTO: 4 % (ref 0–6)
ERYTHROCYTE [DISTWIDTH] IN BLOOD BY AUTOMATED COUNT: 16.1 % (ref 11.6–15.1)
ERYTHROCYTE [SEDIMENTATION RATE] IN BLOOD: 63 MM/HOUR (ref 0–19)
GFR SERPL CREATININE-BSD FRML MDRD: 66 ML/MIN/1.73SQ M
GLUCOSE P FAST SERPL-MCNC: 136 MG/DL (ref 65–99)
HCT VFR BLD AUTO: 39 % (ref 36.5–49.3)
HGB BLD-MCNC: 12.5 G/DL (ref 12–17)
IMM GRANULOCYTES # BLD AUTO: 0.03 THOUSAND/UL (ref 0–0.2)
IMM GRANULOCYTES NFR BLD AUTO: 0 % (ref 0–2)
LYMPHOCYTES # BLD AUTO: 2.23 THOUSANDS/ÂΜL (ref 0.6–4.47)
LYMPHOCYTES NFR BLD AUTO: 26 % (ref 14–44)
MCH RBC QN AUTO: 29.9 PG (ref 26.8–34.3)
MCHC RBC AUTO-ENTMCNC: 32.1 G/DL (ref 31.4–37.4)
MCV RBC AUTO: 93 FL (ref 82–98)
MONOCYTES # BLD AUTO: 0.5 THOUSAND/ÂΜL (ref 0.17–1.22)
MONOCYTES NFR BLD AUTO: 6 % (ref 4–12)
NEUTROPHILS # BLD AUTO: 5.42 THOUSANDS/ÂΜL (ref 1.85–7.62)
NEUTS SEG NFR BLD AUTO: 63 % (ref 43–75)
PLATELET # BLD AUTO: 198 THOUSANDS/UL (ref 149–390)
PMV BLD AUTO: 10.7 FL (ref 8.9–12.7)
POTASSIUM SERPL-SCNC: 4.5 MMOL/L (ref 3.5–5.3)
RBC # BLD AUTO: 4.18 MILLION/UL (ref 3.88–5.62)
SODIUM SERPL-SCNC: 138 MMOL/L (ref 135–147)
WBC # BLD AUTO: 8.59 THOUSAND/UL (ref 4.31–10.16)

## 2025-05-20 PROCEDURE — 86140 C-REACTIVE PROTEIN: CPT

## 2025-05-20 PROCEDURE — 36415 COLL VENOUS BLD VENIPUNCTURE: CPT

## 2025-05-20 PROCEDURE — 80048 BASIC METABOLIC PNL TOTAL CA: CPT

## 2025-05-20 PROCEDURE — 85025 COMPLETE CBC W/AUTO DIFF WBC: CPT

## 2025-05-20 PROCEDURE — 85652 RBC SED RATE AUTOMATED: CPT

## 2025-05-21 ENCOUNTER — RESULTS FOLLOW-UP (OUTPATIENT)
Dept: INFECTIOUS DISEASES | Facility: CLINIC | Age: 65
End: 2025-05-21

## 2025-05-22 ENCOUNTER — APPOINTMENT (OUTPATIENT)
Dept: PHYSICAL THERAPY | Facility: CLINIC | Age: 65
End: 2025-05-22
Attending: NEUROLOGICAL SURGERY
Payer: MEDICARE

## 2025-05-27 NOTE — PROGRESS NOTES
Palliative Supportive Care  met with patient/family to continue to provide emotional support and guidance.      Updated biopsychosocial information relevant to support:     The patient was identified by name and date of birth. Dominick was informed that this is a telemedicine visit and that the visit is being conducted through the Epic Embedded platform. They agree to proceed..  My office door was closed. No one else was in the room. They acknowledged consent and understanding of privacy and security of the video platform. The patient has agreed to participate and understands they can discontinue the visit at any time.    Dominick spoke about Paula having another seizure over the weekend that landed her back in the hospital. He said she ended up having to have surgery again. She is back to square one with her mobility and has little control over her left side. He said he is strongly advocating that she must go back to rehab before returning home because he knows he can not safely care for her alone in her condition. LCSW encouraged him to advocate for his wife and families needs as he knows their situation and personal abilities/disabilities best. He said he is exhausted and has not slept much at all this whole weekend. He plans to go lay down after this appointment, he just didn't want to cancel. He said he tries to remember all that Paula did for him when he was very sick but it takes a big toll on him. LCSW provided emotional support and encouragement. He said he is trying his best to stay positive but feels bogged down by the constant crisis. He said he wants to be hopeful that now that they have closed the whole in her head and increased her seizure medications, she will get some relief.       Identified areas of need include: emotional support    Resources provided:    Areas that need future follow-up include: reduce anxiety/depressed mood    I have spent 25 minutes with Patient  today in which  greater than 50% of this time was spent in counseling/coordination of care     Palliative  will follow-up as requested by patient, family, and primary team.  Please contact with any specific requests

## 2025-05-28 ENCOUNTER — SOCIAL WORK (OUTPATIENT)
Dept: PALLIATIVE MEDICINE | Facility: CLINIC | Age: 65
End: 2025-05-28

## 2025-05-28 ENCOUNTER — APPOINTMENT (OUTPATIENT)
Dept: PHYSICAL THERAPY | Facility: CLINIC | Age: 65
End: 2025-05-28
Attending: NEUROLOGICAL SURGERY
Payer: MEDICARE

## 2025-05-28 DIAGNOSIS — Z71.89 COUNSELING AND COORDINATION OF CARE: Primary | ICD-10-CM

## 2025-05-28 DIAGNOSIS — M50.90 CERVICAL DISC DISEASE: Primary | ICD-10-CM

## 2025-05-28 PROCEDURE — RECHECK

## 2025-05-29 ENCOUNTER — APPOINTMENT (OUTPATIENT)
Dept: PHYSICAL THERAPY | Facility: CLINIC | Age: 65
End: 2025-05-29
Attending: NEUROLOGICAL SURGERY
Payer: MEDICARE

## 2025-06-02 ENCOUNTER — OFFICE VISIT (OUTPATIENT)
Dept: PHYSICAL THERAPY | Facility: CLINIC | Age: 65
End: 2025-06-02
Attending: NEUROLOGICAL SURGERY
Payer: MEDICARE

## 2025-06-02 DIAGNOSIS — Z09 POSTOPERATIVE EXAMINATION: ICD-10-CM

## 2025-06-02 DIAGNOSIS — R53.81 PHYSICAL DECONDITIONING: ICD-10-CM

## 2025-06-02 DIAGNOSIS — Z98.1 S/P LUMBAR FUSION: Primary | ICD-10-CM

## 2025-06-02 PROCEDURE — 97110 THERAPEUTIC EXERCISES: CPT

## 2025-06-02 PROCEDURE — 97112 NEUROMUSCULAR REEDUCATION: CPT

## 2025-06-02 NOTE — PROGRESS NOTES
"Daily Note     Today's date: 2025  Patient name: Dominick Irving  : 1960  MRN: [id number]  Referring provider: Leland Aguilar,*  Dx:   Encounter Diagnosis     ICD-10-CM    1. S/P lumbar fusion  Z98.1       2. Postoperative examination  Z09       3. Physical deconditioning  R53.81           Start Time: 830  Stop Time: 915  Total time in clinic (min): 45 minutes    Subjective: Has had a rough week with this wife and did some clean-up around the yard which started some LBP. Pain is 7-8/10. Also still having neck pain which steroids did not help with. Planning on calling pain management for consult referred to by PCP.       Objective: See treatment diary below      Assessment: Tolerated treatment fair. Patient demonstrated fatigue post treatment and exhibited good technique with therapeutic exercises. Good technique c/ TE, though had a lot of pain when performing supine<>sit transfers. Attempted to start patient on NuStep, but very fatigued and feeling light headed. Reported he needed to be done and refused BP assessment.       Plan: Continue per plan of care.      Precautions: L/S L3-5 Fusion c/ MRSA infection on chronic antibiotics (10/30/24)  Past Medical History:   Diagnosis Date    Anxiety 3/01/2023    Arthritis     Bladder cancer (HCC)     Cancer (HCC) 3/17/2023    Cervical disc disorder 2016    Chronic narcotic dependence (HCC)     Chronic pain disorder     lower back and down both legs    Colon polyp     Coronary artery disease 2021    Weakness left ventricle    CPAP (continuous positive airway pressure) dependence     bi-pap    Depression 3/17/2023    Does use hearing aid     will wear DOS    Full dentures     Heart failure (HCC)     and \"fluid retention\" SL ABDULAZIZ Brito cardio PA\"    High cholesterol     Hypertension     Low back pain     Mild ankle edema     and feet    Obstructive sleep apnea on CPAP     Prediabetes     Shortness of breath     per pt \"with just exertion\"    " "Sleep apnea     Wears glasses        POC expires Unit Limit Auth Expiration Date PT/OT + Visit Limit Co-pay/Co-insurance   8 weeks - 7/3/2025 -- N/a BOMN PCY 20% co-insurance billed to secondary         Initial Evaluation Date: 2025  Re-evaluation:  POC Expiration: 8 weeks - 7/3/2025  Ambulatory Referral to PT: \"Evaluate and Treat. Range of motion and strengthening of the LS spine and lower extremities with accident on quadricep and gastrocnemius as well as core muscle balance training\"  Compliance 2025  5/15  6/2                 Visit Number 1 2  3                 Re-Eval  IE                 MC   Foto Captured Y                      Pertinent Findings:                                                                                         Test / Measure 2025   FOTO (Predicted nv) nv   Core Stability weak   Functional Tests Five Time Sit to Stand: 33.38\" b/l hands on thighs    TU.25\" no AD   LE Strength weak     Daily Treatment Diary:       Date 2025     Manuals        Lateral Hip Distraction                Neuro Re-Ed        ABD Iso c/ SB 5\"/15 Held 2* L arm N/T 5\"/15     PPT c/ March        C/L UE/LE Isometric        Hook-lying Clams BlTB 5\"/15 YCS 5\"/15 BlTB 5\"/15     Hip ADDuction Iso c/ Ball 5\"/15 5\"/15 5\"/15     S/Lying Clams        Side-stepping        DAYAN nv GTB 2x10 held     Pallof Press nv Pallof Single GTB x10ea 5\" held     Rows  GTB 5\" 2x10 held     Bridge  Low x10 5\" held     Bird-dog        Deadbug                Ther Ex        LTRs  X10 5\" 5\"x15     BKFO   5\"/15ea     LAQ  nv np     HC  nv np     HR  nv np     Leg Press        Standing Hand/Heel Rock @ Counter        Bent Over Hip Extension        NuStep nv L1 7' Held     Education L/S Anatomy and Regional Interdependence Fatigue level                      Ther Activity        Chop/Lift        Box Lift        Wall Squat        Lunge        Sit to Stand                        Modalities                                    "

## 2025-06-03 ENCOUNTER — RA CDI HCC (OUTPATIENT)
Dept: OTHER | Facility: HOSPITAL | Age: 65
End: 2025-06-03

## 2025-06-05 ENCOUNTER — APPOINTMENT (OUTPATIENT)
Dept: PHYSICAL THERAPY | Facility: CLINIC | Age: 65
End: 2025-06-05
Attending: NEUROLOGICAL SURGERY
Payer: MEDICARE

## 2025-06-06 DIAGNOSIS — T81.49XA SURGICAL SITE INFECTION: Primary | ICD-10-CM

## 2025-06-06 DIAGNOSIS — G89.29 CHRONIC INTRACTABLE PAIN: ICD-10-CM

## 2025-06-06 DIAGNOSIS — G89.4 CHRONIC PAIN SYNDROME: ICD-10-CM

## 2025-06-06 DIAGNOSIS — Z79.2 LONG TERM (CURRENT) USE OF ANTIBIOTICS: ICD-10-CM

## 2025-06-06 DIAGNOSIS — M79.604 BILATERAL LEG PAIN: ICD-10-CM

## 2025-06-06 DIAGNOSIS — M79.605 BILATERAL LEG PAIN: ICD-10-CM

## 2025-06-06 RX ORDER — GABAPENTIN 600 MG/1
600 TABLET ORAL 4 TIMES DAILY
Qty: 120 TABLET | Refills: 5 | Status: SHIPPED | OUTPATIENT
Start: 2025-06-06

## 2025-06-06 NOTE — PROGRESS NOTES
Orders placed for labs to be completed prior to patient's upcoming ID appointment on 6/16; patient aware.

## 2025-06-08 ENCOUNTER — APPOINTMENT (EMERGENCY)
Dept: CT IMAGING | Facility: HOSPITAL | Age: 65
End: 2025-06-08
Payer: MEDICARE

## 2025-06-08 ENCOUNTER — HOSPITAL ENCOUNTER (EMERGENCY)
Facility: HOSPITAL | Age: 65
Discharge: HOME | End: 2025-06-08
Attending: EMERGENCY MEDICINE | Admitting: EMERGENCY MEDICINE
Payer: MEDICARE

## 2025-06-08 VITALS
RESPIRATION RATE: 18 BRPM | WEIGHT: 242.51 LBS | DIASTOLIC BLOOD PRESSURE: 60 MMHG | OXYGEN SATURATION: 97 % | TEMPERATURE: 97.7 F | BODY MASS INDEX: 36.87 KG/M2 | SYSTOLIC BLOOD PRESSURE: 114 MMHG | HEART RATE: 66 BPM

## 2025-06-08 DIAGNOSIS — R07.89 CHEST DISCOMFORT: ICD-10-CM

## 2025-06-08 DIAGNOSIS — R06.00 DYSPNEA, UNSPECIFIED TYPE: Primary | ICD-10-CM

## 2025-06-08 DIAGNOSIS — F43.0 ACUTE STRESS REACTION: ICD-10-CM

## 2025-06-08 LAB
ALBUMIN SERPL BCG-MCNC: 3.9 G/DL (ref 3.5–5)
ALP SERPL-CCNC: 103 U/L (ref 34–104)
ALT SERPL W P-5'-P-CCNC: 21 U/L (ref 7–52)
ANION GAP SERPL CALCULATED.3IONS-SCNC: 11 MMOL/L (ref 4–13)
APTT PPP: 34 SECONDS (ref 23–34)
AST SERPL W P-5'-P-CCNC: 12 U/L (ref 13–39)
BASOPHILS # BLD AUTO: 0.04 THOUSANDS/ÂΜL (ref 0–0.1)
BASOPHILS NFR BLD AUTO: 1 % (ref 0–1)
BILIRUB SERPL-MCNC: 0.37 MG/DL (ref 0.2–1)
BNP SERPL-MCNC: 27 PG/ML (ref 0–100)
BUN SERPL-MCNC: 22 MG/DL (ref 5–25)
CALCIUM SERPL-MCNC: 9.2 MG/DL (ref 8.4–10.2)
CARDIAC TROPONIN I PNL SERPL HS: 4 NG/L (ref ?–50)
CHLORIDE SERPL-SCNC: 96 MMOL/L (ref 96–108)
CO2 SERPL-SCNC: 30 MMOL/L (ref 21–32)
CREAT SERPL-MCNC: 1.08 MG/DL (ref 0.6–1.3)
EOSINOPHIL # BLD AUTO: 0.16 THOUSAND/ÂΜL (ref 0–0.61)
EOSINOPHIL NFR BLD AUTO: 2 % (ref 0–6)
ERYTHROCYTE [DISTWIDTH] IN BLOOD BY AUTOMATED COUNT: 15.2 % (ref 11.6–15.1)
GFR SERPL CREATININE-BSD FRML MDRD: 71 ML/MIN/1.73SQ M
GLUCOSE SERPL-MCNC: 128 MG/DL (ref 65–140)
HCT VFR BLD AUTO: 35.5 % (ref 36.5–49.3)
HGB BLD-MCNC: 11.7 G/DL (ref 12–17)
IMM GRANULOCYTES # BLD AUTO: 0.02 THOUSAND/UL (ref 0–0.2)
IMM GRANULOCYTES NFR BLD AUTO: 0 % (ref 0–2)
INR PPP: 1.01 (ref 0.85–1.19)
LYMPHOCYTES # BLD AUTO: 1.49 THOUSANDS/ÂΜL (ref 0.6–4.47)
LYMPHOCYTES NFR BLD AUTO: 21 % (ref 14–44)
MCH RBC QN AUTO: 30.2 PG (ref 26.8–34.3)
MCHC RBC AUTO-ENTMCNC: 33 G/DL (ref 31.4–37.4)
MCV RBC AUTO: 92 FL (ref 82–98)
MONOCYTES # BLD AUTO: 0.59 THOUSAND/ÂΜL (ref 0.17–1.22)
MONOCYTES NFR BLD AUTO: 8 % (ref 4–12)
NEUTROPHILS # BLD AUTO: 4.73 THOUSANDS/ÂΜL (ref 1.85–7.62)
NEUTS SEG NFR BLD AUTO: 68 % (ref 43–75)
NRBC BLD AUTO-RTO: 0 /100 WBCS
PLATELET # BLD AUTO: 235 THOUSANDS/UL (ref 149–390)
PMV BLD AUTO: 10.7 FL (ref 8.9–12.7)
POTASSIUM SERPL-SCNC: 3 MMOL/L (ref 3.5–5.3)
PROT SERPL-MCNC: 7.7 G/DL (ref 6.4–8.4)
PROTHROMBIN TIME: 13.7 SECONDS (ref 12.3–15)
RBC # BLD AUTO: 3.87 MILLION/UL (ref 3.88–5.62)
SODIUM SERPL-SCNC: 137 MMOL/L (ref 135–147)
WBC # BLD AUTO: 7.03 THOUSAND/UL (ref 4.31–10.16)

## 2025-06-08 PROCEDURE — 80053 COMPREHEN METABOLIC PANEL: CPT | Performed by: EMERGENCY MEDICINE

## 2025-06-08 PROCEDURE — 96361 HYDRATE IV INFUSION ADD-ON: CPT

## 2025-06-08 PROCEDURE — 93005 ELECTROCARDIOGRAM TRACING: CPT

## 2025-06-08 PROCEDURE — 85730 THROMBOPLASTIN TIME PARTIAL: CPT | Performed by: EMERGENCY MEDICINE

## 2025-06-08 PROCEDURE — 36415 COLL VENOUS BLD VENIPUNCTURE: CPT | Performed by: EMERGENCY MEDICINE

## 2025-06-08 PROCEDURE — 99285 EMERGENCY DEPT VISIT HI MDM: CPT

## 2025-06-08 PROCEDURE — 85025 COMPLETE CBC W/AUTO DIFF WBC: CPT | Performed by: EMERGENCY MEDICINE

## 2025-06-08 PROCEDURE — 96360 HYDRATION IV INFUSION INIT: CPT

## 2025-06-08 PROCEDURE — 99285 EMERGENCY DEPT VISIT HI MDM: CPT | Performed by: EMERGENCY MEDICINE

## 2025-06-08 PROCEDURE — 83880 ASSAY OF NATRIURETIC PEPTIDE: CPT | Performed by: EMERGENCY MEDICINE

## 2025-06-08 PROCEDURE — 84484 ASSAY OF TROPONIN QUANT: CPT | Performed by: EMERGENCY MEDICINE

## 2025-06-08 PROCEDURE — 71275 CT ANGIOGRAPHY CHEST: CPT

## 2025-06-08 PROCEDURE — 85610 PROTHROMBIN TIME: CPT | Performed by: EMERGENCY MEDICINE

## 2025-06-08 RX ORDER — LORAZEPAM 1 MG/1
1 TABLET ORAL 3 TIMES DAILY PRN
Qty: 6 TABLET | Refills: 0 | Status: SHIPPED | OUTPATIENT
Start: 2025-06-08 | End: 2025-06-10 | Stop reason: SDUPTHER

## 2025-06-08 RX ORDER — POTASSIUM CHLORIDE 1500 MG/1
40 TABLET, EXTENDED RELEASE ORAL ONCE
Status: COMPLETED | OUTPATIENT
Start: 2025-06-08 | End: 2025-06-08

## 2025-06-08 RX ORDER — LORAZEPAM 1 MG/1
1 TABLET ORAL ONCE
Status: COMPLETED | OUTPATIENT
Start: 2025-06-08 | End: 2025-06-08

## 2025-06-08 RX ADMIN — IOHEXOL 80 ML: 350 INJECTION, SOLUTION INTRAVENOUS at 14:37

## 2025-06-08 RX ADMIN — SODIUM CHLORIDE 1000 ML: 0.9 INJECTION, SOLUTION INTRAVENOUS at 13:43

## 2025-06-08 RX ADMIN — POTASSIUM CHLORIDE 40 MEQ: 1500 TABLET, EXTENDED RELEASE ORAL at 14:55

## 2025-06-08 RX ADMIN — LORAZEPAM 1 MG: 1 TABLET ORAL at 13:43

## 2025-06-08 NOTE — ED PROVIDER NOTES
Time reflects when diagnosis was documented in both MDM as applicable and the Disposition within this note       Time User Action Codes Description Comment    6/8/2025  5:07 PM Amadeo Wadsworth [R06.00] Dyspnea, unspecified type     6/8/2025  5:07 PM Amadeo Wadsworth [R07.89] Chest discomfort     6/8/2025  5:07 PM Amadeo Wadsworth [F43.0] Acute stress reaction           ED Disposition       ED Disposition   Discharge    Condition   Stable    Date/Time   Sun Jun 8, 2025  5:07 PM    Comment   Dominick Irving discharge to home/self care.                   Assessment & Plan       Medical Decision Making  Patient was seen and evaluated for his presentation as outlined.  Patient at risk for acute pulmonary embolism, acute coronary syndrome, electrolyte disturbance, organ dysfunction, acute stress reaction, other.  No suicidal or homicidal ideation.  Testing as shown.  No acute cardiac ischemia or dysrhythmia noted.  High-sensitivity troponin normal.  CT angiogram without PE or other acute process, shows stable chronic findings per report.  Given Ativan in the ED with improvement in symptoms.  No clear indication for psychiatric admission.  Has close follow-up with his primary care physician.  Will prescribe Ativan for the next couple days.  Also discussed use of over-the-counter Benadryl for tonight given that patient's pharmacy is not open until tomorrow.  Patient does feel comfortable going home.  Stressed caution regarding potential sedation of benzodiazepines and his previously prescribed pain medication.  Patient expressed understanding.  Strict return precautions reviewed.  All questions answered.  Stable for discharge from the emergency department.    Amount and/or Complexity of Data Reviewed  Labs: ordered.  Radiology: ordered.  ECG/medicine tests: ordered and independent interpretation performed.     Details: EKG per my interpretation demonstrates normal sinus rhythm at a ventricular rate of 68 bpm.   Normal axis, normal intervals.  No acute ST elevations or T wave inversions.  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  Compared with prior EKG from April 28, 2025, ventricular rate has increased by 10 bpm, criteria for old inferior infarct now present.    Risk  Prescription drug management.             Medications   sodium chloride 0.9 % bolus 1,000 mL (0 mL Intravenous Stopped 6/8/25 1554)   LORazepam (ATIVAN) tablet 1 mg (1 mg Oral Given 6/8/25 1343)   iohexol (OMNIPAQUE) 350 MG/ML injection (MULTI-DOSE) 80 mL (80 mL Intravenous Given 6/8/25 1437)   potassium chloride (Klor-Con M20) CR tablet 40 mEq (40 mEq Oral Given 6/8/25 1455)       ED Risk Strat Scores   HEART Risk Score      Flowsheet Row Most Recent Value   Heart Score Risk Calculator    History 0 Filed at: 06/08/2025 1654   ECG 0 Filed at: 06/08/2025 1654   Age 2 Filed at: 06/08/2025 1654   Risk Factors 1 Filed at: 06/08/2025 1654   Troponin 0 Filed at: 06/08/2025 1654   HEART Score 3 Filed at: 06/08/2025 1654          HEART Risk Score      Flowsheet Row Most Recent Value   Heart Score Risk Calculator    History 0 Filed at: 06/08/2025 1654   ECG 0 Filed at: 06/08/2025 1654   Age 2 Filed at: 06/08/2025 1654   Risk Factors 1 Filed at: 06/08/2025 1654   Troponin 0 Filed at: 06/08/2025 1654   HEART Score 3 Filed at: 06/08/2025 1654                      No data recorded        SBIRT 22yo+      Flowsheet Row Most Recent Value   Initial Alcohol Screen: US AUDIT-C     1. How often do you have a drink containing alcohol? 0 Filed at: 06/08/2025 1306   2. How many drinks containing alcohol do you have on a typical day you are drinking?  0 Filed at: 06/08/2025 1306   3b. FEMALE Any Age, or MALE 65+: How often do you have 4 or more drinks on one occassion? 0 Filed at: 06/08/2025 1306   Audit-C Score 0 Filed at: 06/08/2025 1306   ANN: How many times in the past year have you...    Used an illegal drug or used a prescription medication for non-medical reasons? Never  Filed at: 06/08/2025 [id number]                            History of Present Illness       Chief Complaint   Patient presents with    Shortness of Breath     Patient presents to the ED with complaints of shortness of breath and confusion that began 3 days ago. The patient reports driving himself here. Patient alert and oriented x3. Patient also reports feeling anxious and overwhelmed to his wife's recent diagnosis of brain cancer. He is reporting that he afraid he will not be able to care for her at home.        Past Medical History[1]   Past Surgical History[2]   Family History[3]   Social History[4]   E-Cigarette/Vaping    E-Cigarette Use Never User       E-Cigarette/Vaping Substances    Nicotine No     THC No     CBD No     Flavoring No     Other No     Unknown No       I have reviewed and agree with the history as documented.     Patient presents to the emergency department for evaluation of shortness of breath, difficulty sleeping, feeling of extreme anxiety and overwhelm.  Patient states that his wife has brain cancer, recently had multiple surgeries, is currently in neuro rehab in [place].  States that they have told him that she is unlikely to improve, and they anticipate discharging her from the rehab on Wednesday.  He is feeling anxious because he does not think he can care for her at home due to her deficits and current condition.  He has been unable to sleep at night, wandering the house, having difficulty concentrating on tasks at hand, overall feeling overwhelmed.  Reports sensation of feeling short of breath, chest tightness, rating down his left arm.  He denies any fevers or chills.  Takes Wellbutrin and duloxetine, also takes oxycodone 30 mg 4 times a day for low back pain.  No suicidal or homicidal ideation.  Has an appointment with his primary care physician on Tuesday.  Meeting with case management for his wife tomorrow.  He does state that he has a family history of clotting disorder, cannot recall  the specific type.  No other complaints, modifying factors, or associated symptoms.        Review of Systems   All other systems reviewed and are negative.          Objective       ED Triage Vitals [06/08/25 1306]   Temperature Pulse Blood Pressure Respirations SpO2 Patient Position - Orthostatic VS   97.7 °F (36.5 °C) 61 (!) 141/40 18 95 % Lying      Temp Source Heart Rate Source BP Location FiO2 (%) Pain Score    Temporal Monitor Right arm -- 6      Vitals      Date and Time Temp Pulse SpO2 Resp BP Pain Score FACES Pain Rating User   06/08/25 1645 -- 62 98 % -- -- -- --    06/08/25 1630 -- 69 97 % -- 114/60 -- --    06/08/25 1615 -- 64 97 % 18 115/57 -- --    06/08/25 1600 -- 62 97 % 22 128/57 -- --    06/08/25 1545 -- 60 97 % 20 134/61 -- --    06/08/25 1530 -- 56 97 % 13 132/60 -- --    06/08/25 1515 -- 59 96 % 16 134/63 -- --    06/08/25 1500 -- 54 96 % 14 119/58 -- --    06/08/25 1345 -- 57 96 % 18 122/58 -- --    06/08/25 1330 -- 61 98 % 20 124/60 -- --    06/08/25 1315 -- 63 95 % 20 141/70 -- --    06/08/25 1306 97.7 °F (36.5 °C) 61 95 % 18 141/40 6 -- RR            Physical Exam  Vitals and nursing note reviewed.   Constitutional:       General: He is not in acute distress.     Appearance: He is well-developed. He is not ill-appearing or toxic-appearing.   HENT:      Head: Normocephalic and atraumatic.     Eyes:      Conjunctiva/sclera: Conjunctivae normal.       Cardiovascular:      Rate and Rhythm: Normal rate and regular rhythm.      Heart sounds: No murmur heard.     No friction rub. No gallop.   Pulmonary:      Effort: Pulmonary effort is normal. No respiratory distress.      Breath sounds: Normal breath sounds. No decreased breath sounds, wheezing, rhonchi or rales.   Abdominal:      Palpations: Abdomen is soft.      Tenderness: There is no abdominal tenderness.     Musculoskeletal:         General: No swelling. Normal range of motion.      Cervical back: Normal range of motion  and neck supple.      Right lower leg: No edema.      Left lower leg: No edema.     Skin:     General: Skin is warm and dry.      Capillary Refill: Capillary refill takes less than 2 seconds.     Neurological:      General: No focal deficit present.      Mental Status: He is alert and oriented to person, place, and time.     Psychiatric:         Mood and Affect: Mood normal.         Behavior: Behavior normal.         Results Reviewed       Procedure Component Value Units Date/Time    B-Type Natriuretic Peptide(BNP) [[id number]]  (Normal) Collected: 06/08/25 1340    Lab Status: Final result Specimen: Blood from Arm, Left Updated: 06/08/25 1527     BNP 27 pg/mL     HS Troponin 0hr (reflex protocol) [[id number]]  (Normal) Collected: 06/08/25 1340    Lab Status: Final result Specimen: Blood from Arm, Left Updated: 06/08/25 1420     hs TnI 0hr 4 ng/L     Comprehensive metabolic panel [[id number]]  (Abnormal) Collected: 06/08/25 1340    Lab Status: Final result Specimen: Blood from Arm, Left Updated: 06/08/25 1411     Sodium 137 mmol/L      Potassium 3.0 mmol/L      Chloride 96 mmol/L      CO2 30 mmol/L      ANION GAP 11 mmol/L      BUN 22 mg/dL      Creatinine 1.08 mg/dL      Glucose 128 mg/dL      Calcium 9.2 mg/dL      AST 12 U/L      ALT 21 U/L      Alkaline Phosphatase 103 U/L      Total Protein 7.7 g/dL      Albumin 3.9 g/dL      Total Bilirubin 0.37 mg/dL      eGFR 71 ml/min/1.73sq m     Narrative:      National Kidney Disease Foundation guidelines for Chronic Kidney Disease (CKD):     Stage 1 with normal or high GFR (GFR > 90 mL/min/1.73 square meters)    Stage 2 Mild CKD (GFR = 60-89 mL/min/1.73 square meters)    Stage 3A Moderate CKD (GFR = 45-59 mL/min/1.73 square meters)    Stage 3B Moderate CKD (GFR = 30-44 mL/min/1.73 square meters)    Stage 4 Severe CKD (GFR = 15-29 mL/min/1.73 square meters)    Stage 5 End Stage CKD (GFR <15 mL/min/1.73 square meters)  Note: GFR calculation is accurate only with a steady  state creatinine    Protime-INR [[id number]]  (Normal) Collected: 06/08/25 1340    Lab Status: Final result Specimen: Blood from Arm, Left Updated: 06/08/25 1408     Protime 13.7 seconds      INR 1.01    Narrative:      INR Therapeutic Range    Indication                                             INR Range      Atrial Fibrillation                                               2.0-3.0  Hypercoagulable State                                    2.0.2.3  Left Ventricular Asist Device                            2.0-3.0  Mechanical Heart Valve                                  -    Aortic(with afib, MI, embolism, HF, LA enlargement,    and/or coagulopathy)                                     2.0-3.0 (2.5-3.5)     Mitral                                                             2.5-3.5  Prosthetic/Bioprosthetic Heart Valve               2.0-3.0  Venous thromboembolism (VTE: VT, PE        2.0-3.0    APTT [[id number]]  (Normal) Collected: 06/08/25 1340    Lab Status: Final result Specimen: Blood from Arm, Left Updated: 06/08/25 1408     PTT 34 seconds     CBC and differential [[id number]]  (Abnormal) Collected: 06/08/25 1340    Lab Status: Final result Specimen: Blood from Arm, Left Updated: 06/08/25 1347     WBC 7.03 Thousand/uL      RBC 3.87 Million/uL      Hemoglobin 11.7 g/dL      Hematocrit 35.5 %      MCV 92 fL      MCH 30.2 pg      MCHC 33.0 g/dL      RDW 15.2 %      MPV 10.7 fL      Platelets 235 Thousands/uL      nRBC 0 /100 WBCs      Segmented % 68 %      Immature Grans % 0 %      Lymphocytes % 21 %      Monocytes % 8 %      Eosinophils Relative 2 %      Basophils Relative 1 %      Absolute Neutrophils 4.73 Thousands/µL      Absolute Immature Grans 0.02 Thousand/uL      Absolute Lymphocytes 1.49 Thousands/µL      Absolute Monocytes 0.59 Thousand/µL      Eosinophils Absolute 0.16 Thousand/µL      Basophils Absolute 0.04 Thousands/µL             CTA chest pe study   Final Interpretation by Katlyn Gongora MD (06/08  1623)      No pulmonary embolism.      Stable chronic findings as above.                  Computerized Assisted Algorithm (CAA) may have aided analysis of applicable images.      Workstation performed: [id number]             Procedures    ED Medication and Procedure Management   Prior to Admission Medications   Prescriptions Last Dose Informant Patient Reported? Taking?   DULoxetine (CYMBALTA) 60 mg delayed release capsule   No No   Sig: Take 1 capsule (60 mg total) by mouth daily   Farxiga 5 MG TABS  Self Yes No   Sig: Take 5 mg by mouth in the morning. 5mg alternating with 2.5mg for fluid retention.   Ozempic, 0.25 or 0.5 MG/DOSE, 2 MG/3ML injection pen   Yes No   atorvastatin (LIPITOR) 40 mg tablet   No No   Sig: Take 1 tablet (40 mg total) by mouth daily   buPROPion (Wellbutrin SR) 150 mg 12 hr tablet   No No   Sig: Take 1 tablet (150 mg total) by mouth 2 (two) times a day   celecoxib (CeleBREX) 200 mg capsule   No No   Sig: Take 1 capsule (200 mg total) by mouth 2 (two) times a day with meals for 7 days   gabapentin (NEURONTIN) 600 MG tablet   No No   Sig: Take 1 tablet (600 mg total) by mouth 4 (four) times a day   metFORMIN (GLUCOPHAGE-XR) 500 mg 24 hr tablet   No No   Sig: Take 2 tablets (1,000 mg total) by mouth daily with dinner Take 2 pills qpm   methocarbamol (ROBAXIN) 500 mg tablet   No No   Sig: Take 1 tablet (500 mg total) by mouth 2 (two) times a day   metolazone (ZAROXOLYN) 2.5 mg tablet   No No   Sig: Take 1 tablet (2.5 mg total) by mouth daily as needed (edema)   oxyCODONE HCl ER (OxyCONTIN) 30 MG T12A   No No   Sig: Take 1 tablet (30 mg total) by mouth every 6 (six) hours Max Daily Amount: 120 mg   potassium chloride (Klor-Con) 10 mEq tablet   No No   Sig: Take 4 tablets (40 mEq total) by mouth 2 (two) times aday   sulfamethoxazole-trimethoprim (BACTRIM DS) 800-160 mg per tablet   No No   Sig: Take 1 tablet by mouth every 12 (twelve) hours   torsemide (DEMADEX) 20 mg tablet   No No   Sig: Take 4  "tablets (80 mg total) by mouth daily      Facility-Administered Medications: None     Patient's Medications   Discharge Prescriptions    LORAZEPAM (ATIVAN) 1 MG TABLET    Take 1 tablet (1 mg total) by mouth 3 (three) times a day as needed for anxiety or sleep for up to 2 days       Start Date: 6/8/2025  End Date: 6/10/2025       Order Dose: 1 mg       Quantity: 6 tablet    Refills: 0     No discharge procedures on file.  ED SEPSIS DOCUMENTATION   Time reflects when diagnosis was documented in both MDM as applicable and the Disposition within this note       Time User Action Codes Description Comment    6/8/2025  5:07 PM Amadeo Wadsworth [R06.00] Dyspnea, unspecified type     6/8/2025  5:07 PM Amadeo Wadsworth [R07.89] Chest discomfort     6/8/2025  5:07 PM Amadeo Wadsworth [F43.0] Acute stress reaction                      [1]   Past Medical History:  Diagnosis Date    Anxiety 3/01/2023    Arthritis     Bladder cancer (HCC)     Cancer (HCC) 3/17/2023    Cervical disc disorder 1/2016    Chronic narcotic dependence (HCC)     Chronic pain disorder     lower back and down both legs    Colon polyp     Coronary artery disease 7/2021    Weakness left ventricle    CPAP (continuous positive airway pressure) dependence     bi-pap    Depression 3/17/2023    Does use hearing aid     will wear DOS    Full dentures     Heart failure (HCC)     and \"fluid retention\" [facility]\"    High cholesterol     Hypertension     Low back pain 2003    Mild ankle edema     and feet    Obstructive sleep apnea on CPAP     Prediabetes     Shortness of breath     per pt \"with just exertion\"    Sleep apnea     Wears glasses    [2]   Past Surgical History:  Procedure Laterality Date    BACK SURGERY      titanium rods implanted    BACK SURGERY      Fusino surgery    CAUDAL BLOCK N/A 10/17/2023    Procedure: BLOCK / INJECTION CAUDAL;  Surgeon: Minh Rolon MD;  Location: [facility];  Service: Pain Management     COLONOSCOPY      " CYSTOSCOPY W/ URETERAL STENT PLACEMENT Bilateral 03/17/2023    Procedure: bilateral retrograde;  Surgeon: Shan Sanabria MD;  Location[facility];  Service: Urology    HERNIA REPAIR      x5    LUMBAR LAMINECTOMY N/A 06/30/2023    Procedure: Left L3-4 Metrx hemilaminectomy and bilateral foraminotomies;  Surgeon: Leland Aguilar MD;  [facility]: [facility];  Service: Neurosurgery    MS ARTHRODESIS COMBINED TQ 1NTRSPC LUMBAR Right 10/30/2024    Procedure: Robotically assisted L3-4 decompressive hemilaminectomy and foraminotomies with transverse lumbar interbody fusion right-sided approach with add-on to an L4-5 fixation fusion;  Surgeon: Leland Aguilar MD;  [facility];  Service: Neurosurgery    MS CYSTO W/REMOVAL OF LESIONS SMALL N/A 05/01/2023    Procedure: CYSTOSCOPY TURBT;  Surgeon: Shan Sanabria MD;  [facility];  Service: Urology    MS CYSTOURETHROSCOPY N/A 11/06/2023    Procedure: CYSTOSCOPY with  bladder biopsies and fulgeration;  Surgeon: Shan Sanabria MD;  Location: [facility];  Service: Urology    SPINE SURGERY  2017    TONSILLECTOMY  1966    TRANSURETHRAL RESECTION OF BLADDER TUMOR N/A 03/17/2023    Procedure: TRANSURETHRAL RESECTION OF BLADDER TUMOR (TURBT);  Surgeon: Shan Sanabria MD;  [facility];  Service: Urology    WOUND DEBRIDEMENT N/A 11/20/2024    Procedure: LUMBAR DEBRIDEMENT WOUND (WASH OUT);  Surgeon: Leland Aguilar MD;  [facility];  Service: Neurosurgery   [3]   Family History  Problem Relation Name Age of Onset    Cancer Father Dominick Waldrop         Adult leukemia    Aortic aneurysm Father Dominick Waldrop     Leukemia Father Dominick Waldrop     Alcohol abuse Father Dominick Waldrop     Hypertension Mother Radha mead     Colon cancer Maternal Grandfather Jamie Tidwelldorothy    [4]   Social History  Tobacco Use    Smoking status: Former     Current packs/day: 1.00     Average packs/day: 1 pack/day for 51.4 years (51.4 ttl pk-yrs)      Types: Cigarettes     Start date: 2/1/2023     Passive exposure: Never    Smokeless tobacco: Never   Vaping Use    Vaping status: Never Used   Substance Use Topics    Alcohol use: Not Currently     Comment: 3 per year    Drug use: Not Currently     Types: Marijuana        Amadeo Wadsworth MD  06/08/25 2019

## 2025-06-10 ENCOUNTER — OFFICE VISIT (OUTPATIENT)
Dept: FAMILY MEDICINE CLINIC | Facility: CLINIC | Age: 65
End: 2025-06-10
Payer: MEDICARE

## 2025-06-10 VITALS
SYSTOLIC BLOOD PRESSURE: 122 MMHG | HEART RATE: 79 BPM | DIASTOLIC BLOOD PRESSURE: 70 MMHG | BODY MASS INDEX: 37.53 KG/M2 | HEIGHT: 68 IN | OXYGEN SATURATION: 98 % | WEIGHT: 247.6 LBS

## 2025-06-10 DIAGNOSIS — F43.0 ACUTE STRESS REACTION: Primary | ICD-10-CM

## 2025-06-10 DIAGNOSIS — E11.69 TYPE 2 DIABETES MELLITUS WITH OTHER SPECIFIED COMPLICATION, UNSPECIFIED WHETHER LONG TERM INSULIN USE (HCC): ICD-10-CM

## 2025-06-10 DIAGNOSIS — G89.29 OTHER CHRONIC PAIN: ICD-10-CM

## 2025-06-10 PROCEDURE — 99214 OFFICE O/P EST MOD 30 MIN: CPT | Performed by: FAMILY MEDICINE

## 2025-06-10 PROCEDURE — G2211 COMPLEX E/M VISIT ADD ON: HCPCS | Performed by: FAMILY MEDICINE

## 2025-06-10 RX ORDER — OXYCODONE HYDROCHLORIDE 30 MG/1
30 TABLET, FILM COATED, EXTENDED RELEASE ORAL EVERY 6 HOURS
Qty: 120 TABLET | Refills: 0 | Status: SHIPPED | OUTPATIENT
Start: 2025-06-10

## 2025-06-10 RX ORDER — LORAZEPAM 1 MG/1
1 TABLET ORAL EVERY 6 HOURS PRN
Qty: 120 TABLET | Refills: 1 | Status: SHIPPED | OUTPATIENT
Start: 2025-06-10 | End: 2025-06-11 | Stop reason: SDUPTHER

## 2025-06-10 NOTE — ASSESSMENT & PLAN NOTE
Continue current tx plan-- ozempic/metformin/farxiga  Lab Results   Component Value Date    HGBA1C 7.0 (H) 05/05/2025

## 2025-06-10 NOTE — PROGRESS NOTES
"Name: Dominick Irving      : 1960      MRN: [id number]  Encounter Provider: Robert Budinetz, MD  Encounter Date: 6/10/2025   Encounter department: [facility]  :  Assessment & Plan  Acute stress reaction  Increase ativan dose  Orders:    LORazepam (Ativan) 1 mg tablet; Take 1 tablet (1 mg total) by mouth every 6 (six) hours as needed for anxiety or sleep for up to 2 days    Type 2 diabetes mellitus with other specified complication, unspecified whether long term insulin use (HCC)  Continue current tx plan-- ozempic/metformin/farxiga  Lab Results   Component Value Date    HGBA1C 7.0 (H) 2025            Other chronic pain  Stable on oxycodone  Orders:    oxyCODONE HCl ER (OxyCONTIN) 30 MG T12A; Take 1 tablet (30 mg total) by mouth every 6 (six) hours Max Daily Amount: 120 mg           History of Present Illness   Dominick is struggling a lot given his wife's condition.  She has cancer.  He ended up in the ER recently with SOB due to acute stress and anxiety/adjustment rxn.  He was given ativan which helped.  Will increase this to 4x a day from 3x a day.  Chronic pain is ok with the switch to oxycodone.  His A1c is 7.0 and stable and well controlled.        Review of Systems   Respiratory: Negative.     Cardiovascular: Negative.    Gastrointestinal: Negative.        Objective   /70 (BP Location: Left arm, Patient Position: Sitting, Cuff Size: Large)   Pulse 79   Ht 5' 8\" (1.727 m)   Wt 112 kg (247 lb 9.6 oz)   SpO2 98%   BMI 37.65 kg/m²      Physical Exam  Vitals and nursing note reviewed.   Constitutional:       General: He is not in acute distress.     Appearance: He is well-developed.   HENT:      Head: Normocephalic and atraumatic.     Eyes:      Conjunctiva/sclera: Conjunctivae normal.       Cardiovascular:      Rate and Rhythm: Normal rate and regular rhythm.      Heart sounds: No murmur heard.  Pulmonary:      Effort: Pulmonary effort is normal. No respiratory " distress.      Breath sounds: Normal breath sounds.   Abdominal:      Palpations: Abdomen is soft.      Tenderness: There is no abdominal tenderness.     Musculoskeletal:         General: No swelling.      Cervical back: Neck supple.     Skin:     General: Skin is warm and dry.      Capillary Refill: Capillary refill takes less than 2 seconds.     Neurological:      Mental Status: He is alert.     Psychiatric:         Mood and Affect: Mood normal.

## 2025-06-11 DIAGNOSIS — F43.0 ACUTE STRESS REACTION: ICD-10-CM

## 2025-06-11 LAB
ATRIAL RATE: 68 BPM
P AXIS: 24 DEGREES
PR INTERVAL: 170 MS
QRS AXIS: -4 DEGREES
QRSD INTERVAL: 86 MS
QT INTERVAL: 408 MS
QTC INTERVAL: 433 MS
T WAVE AXIS: -8 DEGREES
VENTRICULAR RATE: 68 BPM

## 2025-06-11 RX ORDER — LORAZEPAM 1 MG/1
1 TABLET ORAL EVERY 6 HOURS PRN
Qty: 120 TABLET | Refills: 0 | Status: SHIPPED | OUTPATIENT
Start: 2025-06-11 | End: 2025-06-12 | Stop reason: SDUPTHER

## 2025-06-12 ENCOUNTER — SOCIAL WORK (OUTPATIENT)
Dept: PALLIATIVE MEDICINE | Facility: CLINIC | Age: 65
End: 2025-06-12

## 2025-06-12 DIAGNOSIS — F43.0 ACUTE STRESS REACTION: ICD-10-CM

## 2025-06-12 DIAGNOSIS — Z71.89 COUNSELING AND COORDINATION OF CARE: Primary | ICD-10-CM

## 2025-06-12 PROCEDURE — RECHECK

## 2025-06-12 RX ORDER — LORAZEPAM 1 MG/1
1 TABLET ORAL EVERY 6 HOURS PRN
Qty: 120 TABLET | Refills: 0 | Status: SHIPPED | OUTPATIENT
Start: 2025-06-12

## 2025-06-12 RX ORDER — LORAZEPAM 1 MG/1
1 TABLET ORAL EVERY 6 HOURS PRN
Qty: 120 TABLET | Refills: 0 | Status: SHIPPED | OUTPATIENT
Start: 2025-06-12 | End: 2025-06-18

## 2025-06-12 NOTE — PROGRESS NOTES
"Palliative Supportive Care  met with patient/family to continue to provide emotional support and guidance.      Updated biopsychosocial information relevant to support:     The patient was identified by name and date of birth. Dominick was informed that this is a telemedicine visit and that the visit is being conducted through the Epic Embedded platform. They agree to proceed..  My office door was closed. No one else was in the room. They acknowledged consent and understanding of privacy and security of the video platform. The patient has agreed to participate and understands they can discontinue the visit at any time.    Dominick spoke about having to go look at care facilities today for his wife. He said she has made no progress at rehab and she cannot come home in this state. He is overwhelmed with all that needs to go into finding her care and good care at that. He spoke about also needing to find a  and draft a will because he does not want to end up in a situation where she is deemed incompetent and they cannot deal with her business or assets. He expressed being so overwhelmed he had to go to ED and now he is taking ativan. He said he had an issue getting his ativan and now he has been out of it a few days. He said he feels very \"fragile and edgy\". LCSW provided emotional support and encouragement. LCSW also supported Dominick in process all that he has going on and not having time to hold space for his own feelings about his wife's declining status. He said \"I'm worried I won't get my wife back\" and was tearful. He said he just wants to give her the best shot at success so he can live with whatever happens at the end. LCSW supported Dominick in processing his anticipatory grief.      Identified areas of need include: emotional support    Resources provided:    Areas that need future follow-up include: reduce anxiety/depressed mood    I have spent 35 minutes with Patient  today in which greater " than 50% of this time was spent in counseling/coordination of care     Palliative  will follow-up as requested by patient, family, and primary team.  Please contact with any specific requests

## 2025-06-13 ENCOUNTER — APPOINTMENT (OUTPATIENT)
Dept: LAB | Facility: HOSPITAL | Age: 65
End: 2025-06-13
Payer: MEDICARE

## 2025-06-13 DIAGNOSIS — T81.49XA SURGICAL SITE INFECTION: ICD-10-CM

## 2025-06-13 DIAGNOSIS — Z79.2 LONG TERM (CURRENT) USE OF ANTIBIOTICS: ICD-10-CM

## 2025-06-13 LAB
ANION GAP SERPL CALCULATED.3IONS-SCNC: 9 MMOL/L (ref 4–13)
BASOPHILS # BLD AUTO: 0.04 THOUSANDS/ÂΜL (ref 0–0.1)
BASOPHILS NFR BLD AUTO: 0 % (ref 0–1)
BUN SERPL-MCNC: 23 MG/DL (ref 5–25)
CALCIUM SERPL-MCNC: 9.1 MG/DL (ref 8.4–10.2)
CHLORIDE SERPL-SCNC: 99 MMOL/L (ref 96–108)
CO2 SERPL-SCNC: 30 MMOL/L (ref 21–32)
CREAT SERPL-MCNC: 1.15 MG/DL (ref 0.6–1.3)
CRP SERPL QL: 132.2 MG/L
EOSINOPHIL # BLD AUTO: 0.24 THOUSAND/ÂΜL (ref 0–0.61)
EOSINOPHIL NFR BLD AUTO: 2 % (ref 0–6)
ERYTHROCYTE [DISTWIDTH] IN BLOOD BY AUTOMATED COUNT: 15.4 % (ref 11.6–15.1)
ERYTHROCYTE [SEDIMENTATION RATE] IN BLOOD: >130 MM/HOUR (ref 0–19)
GFR SERPL CREATININE-BSD FRML MDRD: 65 ML/MIN/1.73SQ M
GLUCOSE P FAST SERPL-MCNC: 122 MG/DL (ref 65–99)
HCT VFR BLD AUTO: 35.9 % (ref 36.5–49.3)
HGB BLD-MCNC: 11.8 G/DL (ref 12–17)
IMM GRANULOCYTES # BLD AUTO: 0.05 THOUSAND/UL (ref 0–0.2)
IMM GRANULOCYTES NFR BLD AUTO: 1 % (ref 0–2)
LYMPHOCYTES # BLD AUTO: 1.78 THOUSANDS/ÂΜL (ref 0.6–4.47)
LYMPHOCYTES NFR BLD AUTO: 17 % (ref 14–44)
MCH RBC QN AUTO: 30.5 PG (ref 26.8–34.3)
MCHC RBC AUTO-ENTMCNC: 32.9 G/DL (ref 31.4–37.4)
MCV RBC AUTO: 93 FL (ref 82–98)
MONOCYTES # BLD AUTO: 0.88 THOUSAND/ÂΜL (ref 0.17–1.22)
MONOCYTES NFR BLD AUTO: 9 % (ref 4–12)
NEUTROPHILS # BLD AUTO: 7.3 THOUSANDS/ÂΜL (ref 1.85–7.62)
NEUTS SEG NFR BLD AUTO: 71 % (ref 43–75)
NRBC BLD AUTO-RTO: 0 /100 WBCS
PLATELET # BLD AUTO: 247 THOUSANDS/UL (ref 149–390)
PMV BLD AUTO: 10.7 FL (ref 8.9–12.7)
POTASSIUM SERPL-SCNC: 3.5 MMOL/L (ref 3.5–5.3)
RBC # BLD AUTO: 3.87 MILLION/UL (ref 3.88–5.62)
SODIUM SERPL-SCNC: 138 MMOL/L (ref 135–147)
WBC # BLD AUTO: 10.29 THOUSAND/UL (ref 4.31–10.16)

## 2025-06-13 PROCEDURE — 36415 COLL VENOUS BLD VENIPUNCTURE: CPT

## 2025-06-13 PROCEDURE — 86140 C-REACTIVE PROTEIN: CPT

## 2025-06-13 PROCEDURE — 85652 RBC SED RATE AUTOMATED: CPT

## 2025-06-13 PROCEDURE — 85025 COMPLETE CBC W/AUTO DIFF WBC: CPT

## 2025-06-13 PROCEDURE — 80048 BASIC METABOLIC PNL TOTAL CA: CPT

## 2025-06-13 NOTE — PROGRESS NOTES
"Palliative Supportive Care  met with patient/family to continue to provide emotional support and guidance.      Updated biopsychosocial information relevant to support:     The patient was identified by name and date of birth. Dominick was informed that this is a telemedicine visit and that the visit is being conducted through the Epic Embedded platform. They agree to proceed..  My office door was closed. No one else was in the room. They acknowledged consent and understanding of privacy and security of the video platform. The patient has agreed to participate and understands they can discontinue the visit at any time.    Dominick spoke about getting his wife into a skilled facility and 24 hour care. He said it was very expensive but he is willing to pay for it and give her the best chance at making progress. He said that he is setting up her water color paint set in the spare room of her apartment in hopes that this will help her emotionally and with her OT. He said that he was able to find a  to help with her will and with her business. He said that he is exhausted but he is glad she is set up with care. LCSW provided emotional support and encouraged Dominick to use this time to get back on track with his own needs, appointments, and self care. He was very agreeable to this and said he knows he needs to \"reset\". He said it will be good to know she is well taken care of and safe so he can get back to doing some of the things he needs to do. He said she should be arriving any minute and asked to end the call for today.      Identified areas of need include: emotional support    Resources provided:    Areas that need future follow-up include: reduce anxiety/depressed mood    I have spent 30 minutes with Patient  today in which greater than 50% of this time was spent in counseling/coordination of care     Palliative  will follow-up as requested by patient, family, and primary team.  Please " contact with any specific requests

## 2025-06-16 ENCOUNTER — TELEMEDICINE (OUTPATIENT)
Dept: INFECTIOUS DISEASES | Facility: CLINIC | Age: 65
End: 2025-06-16
Payer: MEDICARE

## 2025-06-16 DIAGNOSIS — F41.9 ANXIETY: ICD-10-CM

## 2025-06-16 DIAGNOSIS — E66.01 MORBID (SEVERE) OBESITY DUE TO EXCESS CALORIES (HCC): ICD-10-CM

## 2025-06-16 DIAGNOSIS — T81.49XA SURGICAL SITE INFECTION: Primary | ICD-10-CM

## 2025-06-16 DIAGNOSIS — E11.69 TYPE 2 DIABETES MELLITUS WITH OTHER SPECIFIED COMPLICATION, UNSPECIFIED WHETHER LONG TERM INSULIN USE (HCC): ICD-10-CM

## 2025-06-16 DIAGNOSIS — I50.33 ACUTE ON CHRONIC DIASTOLIC HEART FAILURE (HCC): ICD-10-CM

## 2025-06-16 PROCEDURE — 99214 OFFICE O/P EST MOD 30 MIN: CPT | Performed by: PHYSICIAN ASSISTANT

## 2025-06-16 NOTE — ASSESSMENT & PLAN NOTE
Patient having anxiety due to his wife's medical condition.  He was recently in the ED for same.  Now on ativan

## 2025-06-16 NOTE — ASSESSMENT & PLAN NOTE
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  Initial L3-4 decompressive hemilaminectomy with transverse lumbar interbody fusion and L4-5 fixation and fusion 10/30.  One week after his initial surgery he noted purulent discharge from the wound.  He was given a script for cephalexin.  MRI revealed a fluid collection in the area of the incision.  OR cultures positive for MRSA.  AFB and Fungal cultures remain negative to date.  Patient discharged and finished a one week course of IV Vancomycin then transition to po bactrim.      12/4/24- Patient states he is doing well on the bactrim.  He does have a slight increase in his Cr although his Cr does appear to fluctuate so I do not necessarily think this is out of his normal range.  Will continue to monitor closely.  MRSA is Intermediate to tetracyclines so limited to other good po options.  There is some consideration for hardware involvement as it appears infection was deep (as per Op Note).      12/23/24- Patient tolerating the bactrim well.  No new complaints today.  Labs overall ok.  Cr slightly up but stable.  Also note BUN up as well, ? Dehydration playing a part.  Also could be false elevation of Cr from the bactrim.  Will continue to monitor closely.  ESR up and CRP down.  Patient feeling well so will continue to trend.      1/13/25 - patient remains on po Bactrim DS.  He continues to tolerate it.  Still with fluctuating Cr - does take diuretics.  Also noted to have low K - I did reach out to his PCP.   Also fluctuating inflammatory markers.  Patient due for labs tomorrow.  Will hold off on refills until they are reviewed.      47731- patient remains on po bactrim DS.  He continues to tolerate it well.  Labs are stable for him.  ESR now seems to be trending down.  CRP still fluctuating.  Back pain improved but he still feels weak and fatigues easily.  Discussed that he should be seeing PT.       4/7/25- Patient tolerating the bactrim well.  Labs stable.  ESR/CRP seem to have plateaued and he clinically seems to have turned a corner.  We discussed that due to the hardware he may be at some risk for relapsing infection when coming of the antibiotic.  It does not appear the hardware was exposed during the wash out but there is always some concern.  I discussed this with Dominick and he understands.  At this time we both agree to continue the antibiotics as it would not be a good time for his infection to relapse with his wife so sick.      5/19/25- Patient remains on po bactrim.  He did have an elevation in ESR and CRP.  He was evaluated in the ED as he was also not feeling well at the time.  Now his inflammatory markers are trending back down.  We discussed continuing the antibiotics another 4 weeks and if his ESR settles back down into the 40 range then would stop the bactrim at that time.     6/16/25 - patient remains on po bactrim.  He continues to tolerate it well.  His back is very stable.  His ESR/CRP have now increased quite abruptly.  I suspect this may be d/t to his ongoing stress reaction that landed him in the ED.  Discussed with patient that he has now been on antibiotics for 6 months and I would ultimately like to get him off.  I offered 2 options - #1 - stop the antibiotic today and see how he does vs #2 - wait a week and repeat the inflammatory markers to see if they have improved prior to discontinuation.  Patient is very sleepy today and does not seem to be in a good place to make a decision.  Asked patient to think about the options when he is more clear headed and then update me on his decision.      Plan  - continue Bactrim DS 1 tab po bid  due to concern for hardware involvement would go at least until inflammatory markers normalization/plateau.   Continue until I hear back from patient.   - patient to think about his options and let me know his decision.

## 2025-06-16 NOTE — PROGRESS NOTES
Name: Dominick Irving      : 1960      MRN: [id number]  Encounter Provider: Edmar Pate PA-C  Encounter Date: 2025   Encounter department: [facility]  :  Assessment & Plan  Surgical site infection  Postoperative Staphylococcus aureus (MRSA) lumbar wound infection s/p lumbar wound debridement and washout .  Initial L3-4 decompressive hemilaminectomy with transverse lumbar interbody fusion and L4-5 fixation and fusion 10/30.  One week after his initial surgery he noted purulent discharge from the wound.  He was given a script for cephalexin.  MRI revealed a fluid collection in the area of the incision.  OR cultures positive for MRSA.  AFB and Fungal cultures remain negative to date.  Patient discharged and finished a one week course of IV Vancomycin then transition to po bactrim.      24- Patient states he is doing well on the bactrim.  He does have a slight increase in his Cr although his Cr does appear to fluctuate so I do not necessarily think this is out of his normal range.  Will continue to monitor closely.  MRSA is Intermediate to tetracyclines so limited to other good po options.  There is some consideration for hardware involvement as it appears infection was deep (as per Op Note).      24- Patient tolerating the bactrim well.  No new complaints today.  Labs overall ok.  Cr slightly up but stable.  Also note BUN up as well, ? Dehydration playing a part.  Also could be false elevation of Cr from the bactrim.  Will continue to monitor closely.  ESR up and CRP down.  Patient feeling well so will continue to trend.      25 - patient remains on po Bactrim DS.  He continues to tolerate it.  Still with fluctuating Cr - does take diuretics.  Also noted to have low K - I did reach out to his PCP.   Also fluctuating inflammatory markers.  Patient due for labs tomorrow.  Will hold off on refills until they are reviewed.      90941- patient remains on  po bactrim DS.  He continues to tolerate it well.  Labs are stable for him.  ESR now seems to be trending down.  CRP still fluctuating.  Back pain improved but he still feels weak and fatigues easily.  Discussed that he should be seeing PT.      4/7/25- Patient tolerating the bactrim well.  Labs stable.  ESR/CRP seem to have plateaued and he clinically seems to have turned a corner.  We discussed that due to the hardware he may be at some risk for relapsing infection when coming of the antibiotic.  It does not appear the hardware was exposed during the wash out but there is always some concern.  I discussed this with Dominick and he understands.  At this time we both agree to continue the antibiotics as it would not be a good time for his infection to relapse with his wife so sick.      5/19/25- Patient remains on po bactrim.  He did have an elevation in ESR and CRP.  He was evaluated in the ED as he was also not feeling well at the time.  Now his inflammatory markers are trending back down.  We discussed continuing the antibiotics another 4 weeks and if his ESR settles back down into the 40 range then would stop the bactrim at that time.     6/16/25 - patient remains on po bactrim.  He continues to tolerate it well.  His back is very stable.  His ESR/CRP have now increased quite abruptly.  I suspect this may be d/t to his ongoing stress reaction that landed him in the ED.  Discussed with patient that he has now been on antibiotics for 6 months and I would ultimately like to get him off.  I offered 2 options - #1 - stop the antibiotic today and see how he does vs #2 - wait a week and repeat the inflammatory markers to see if they have improved prior to discontinuation.  Patient is very sleepy today and does not seem to be in a good place to make a decision.  Asked patient to think about the options when he is more clear headed and then update me on his decision.      Plan  - continue Bactrim DS 1 tab po bid  due to  concern for hardware involvement would go at least until inflammatory markers normalization/plateau.   Continue until I hear back from patient.   - patient to think about his options and let me know his decision.            Type 2 diabetes mellitus with other specified complication, unspecified whether long term insulin use (HCC)    Lab Results   Component Value Date    HGBA1C 7.0 (H) 05/05/2025            Morbid (severe) obesity due to excess calories (HCC)  BMI 37.65         Acute on chronic diastolic heart failure (HCC)      Patient states he is on one diuretic daily and takes another diuretic if he gains more than 3 pounds. The second diuretic causes K loss and when he takes this one he ups his K supplementation.                                  Anxiety  Patient having anxiety due to his wife's medical condition.  He was recently in the ED for same.  Now on ativan             History of Present Illness   HPI  Dominick Irving is a 65 y.o. male who presents for virtual follow up today regarding Postoperative Staphylococcus aureus (MRSA) lumbar wound infection. Patient currently remains on po bactrim. He continues to tolerate it well.  Patient under a lot of stress as his wife was recently diagnosed with brain cancer.  He was in  ED last week d/t sob - cause was stress/anxiety.  Patient now taking ativan.  He is very sleepy during our visit today.       Review of Systems   Constitutional:  Negative for chills and fever.   Respiratory:  Negative for cough and shortness of breath.    Gastrointestinal:  Negative for abdominal pain, diarrhea, nausea and vomiting.   Skin:  Negative for rash.   Psychiatric/Behavioral:  Negative for behavioral problems and confusion.           Objective   There were no vitals taken for this visit.     Physical Exam  Vitals reviewed.   Constitutional:       General: He is not in acute distress.     Appearance: Normal appearance. He is not ill-appearing, toxic-appearing or diaphoretic.    HENT:      Head: Normocephalic and atraumatic.     Eyes:      General: No scleral icterus.        Right eye: No discharge.         Left eye: No discharge.      Conjunctiva/sclera: Conjunctivae normal.     Pulmonary:      Effort: Pulmonary effort is normal. No respiratory distress.     Skin:     Coloration: Skin is not jaundiced or pale.      Findings: No erythema or rash.     Neurological:      Mental Status: He is alert.     Psychiatric:         Mood and Affect: Mood normal.      Comments: Patient falling asleep during visit             Labs:   6/13/25    Administrative Statements   Encounter provider Edmar Pate PA-C    The Patient is located at Home and in the following state in which I hold an active license [place].    The patient was identified by name and date of birth. Dominick Irving was informed that this is a telemedicine visit and that the visit is being conducted through the Epic Embedded platform. He agrees to proceed..  My office door was closed. No one else was in the room.  He acknowledged consent and understanding of privacy and security of the video platform. The patient has agreed to participate and understands they can discontinue the visit at any time.    I have spent a total time of 30 minutes in caring for this patient on the day of the visit/encounter including Diagnostic results, Prognosis, Risks and benefits of tx options, Instructions for management, Patient and family education, Importance of tx compliance, Risk factor reductions, Impressions, Documenting in the medical record, and Reviewing/placing orders in the medical record (including tests, medications, and/or procedures), not including the time spent for establishing the audio/video connection.

## 2025-06-18 ENCOUNTER — SOCIAL WORK (OUTPATIENT)
Dept: PALLIATIVE MEDICINE | Facility: CLINIC | Age: 65
End: 2025-06-18

## 2025-06-18 DIAGNOSIS — Z71.89 COUNSELING AND COORDINATION OF CARE: Primary | ICD-10-CM

## 2025-06-18 PROCEDURE — NC001 PR NO CHARGE

## 2025-06-23 ENCOUNTER — TELEPHONE (OUTPATIENT)
Dept: INFECTIOUS DISEASES | Facility: CLINIC | Age: 65
End: 2025-06-23

## 2025-06-23 NOTE — TELEPHONE ENCOUNTER
Reached out to patient to see if he made a decision regarding his antibiotics.  He has now been on antibiotics for over 6 months.  He would like to discontinue the bactrim and see how he does.  Patient aware there is risk for relapsing infection and if notes any changes tito to his back he should be evaluated ASAP.

## 2025-06-24 ENCOUNTER — OFFICE VISIT (OUTPATIENT)
Dept: FAMILY MEDICINE CLINIC | Facility: CLINIC | Age: 65
End: 2025-06-24
Payer: MEDICARE

## 2025-06-24 VITALS
SYSTOLIC BLOOD PRESSURE: 128 MMHG | DIASTOLIC BLOOD PRESSURE: 54 MMHG | HEIGHT: 68 IN | OXYGEN SATURATION: 98 % | WEIGHT: 244.6 LBS | BODY MASS INDEX: 37.07 KG/M2 | HEART RATE: 69 BPM

## 2025-06-24 DIAGNOSIS — F43.22 ADJUSTMENT DISORDER WITH ANXIETY: Primary | ICD-10-CM

## 2025-06-24 DIAGNOSIS — E11.69 TYPE 2 DIABETES MELLITUS WITH OTHER SPECIFIED COMPLICATION, UNSPECIFIED WHETHER LONG TERM INSULIN USE (HCC): ICD-10-CM

## 2025-06-24 DIAGNOSIS — M54.41 CHRONIC BILATERAL LOW BACK PAIN WITH BILATERAL SCIATICA: ICD-10-CM

## 2025-06-24 DIAGNOSIS — M54.42 CHRONIC BILATERAL LOW BACK PAIN WITH BILATERAL SCIATICA: ICD-10-CM

## 2025-06-24 DIAGNOSIS — G89.29 CHRONIC BILATERAL LOW BACK PAIN WITH BILATERAL SCIATICA: ICD-10-CM

## 2025-06-24 PROCEDURE — G2211 COMPLEX E/M VISIT ADD ON: HCPCS | Performed by: FAMILY MEDICINE

## 2025-06-24 PROCEDURE — 99214 OFFICE O/P EST MOD 30 MIN: CPT | Performed by: FAMILY MEDICINE

## 2025-06-24 NOTE — PROGRESS NOTES
"Name: Dominick Irving      : 1960      MRN: [id number]  Encounter Provider: Robert Budinetz, MD  Encounter Date: 2025   Encounter department: [facility]  :  Assessment & Plan  Adjustment disorder with anxiety  We increased ativan last appt. this helped.       Type 2 diabetes mellitus with other specified complication, unspecified whether long term insulin use (HCC)  Continue meds, no changes  Lab Results   Component Value Date    HGBA1C 7.0 (H) 2025            Chronic bilateral low back pain with bilateral sciatica  Continue  meds, no changes                History of Present Illness   Dominick is going through a lot with his wife and her illness.  The increase in Ativan did not do much 100 go back to how he was taking it before.  His chronic diabetes is stable and well-controlled he is chronic pain issues which is manageable with his current meds.      Review of Systems   Respiratory: Negative.     Cardiovascular: Negative.    Gastrointestinal: Negative.        Objective   /54 (BP Location: Left arm, Patient Position: Sitting, Cuff Size: Large)   Pulse 69   Ht 5' 8\" (1.727 m)   Wt 111 kg (244 lb 9.6 oz)   SpO2 98%   BMI 37.19 kg/m²      Physical Exam  Vitals and nursing note reviewed.   Constitutional:       General: He is not in acute distress.     Appearance: He is well-developed.   HENT:      Head: Normocephalic and atraumatic.     Eyes:      Conjunctiva/sclera: Conjunctivae normal.       Cardiovascular:      Rate and Rhythm: Normal rate and regular rhythm.      Heart sounds: No murmur heard.  Pulmonary:      Effort: Pulmonary effort is normal. No respiratory distress.      Breath sounds: Normal breath sounds.   Abdominal:      Palpations: Abdomen is soft.      Tenderness: There is no abdominal tenderness.     Musculoskeletal:         General: No swelling.      Cervical back: Neck supple.     Skin:     General: Skin is warm and dry.      Capillary Refill: " Capillary refill takes less than 2 seconds.     Neurological:      Mental Status: He is alert.     Psychiatric:         Mood and Affect: Mood normal.

## 2025-06-26 ENCOUNTER — TELEPHONE (OUTPATIENT)
Age: 65
End: 2025-06-26

## 2025-06-26 NOTE — TELEPHONE ENCOUNTER
Pt calling regarding his virtual with Giovanna today - his wife recently had brain surgery and requires assistance from him and he is requesting to cancel appointment today. Pt has a f/u next week with Giovanna.    Thank you.

## 2025-06-27 NOTE — PROGRESS NOTES
Palliative Supportive Care  met with patient/family to continue to provide emotional support and guidance.      Updated biopsychosocial information relevant to support:     The patient was identified by name and date of birth. Dominick was informed that this is a telemedicine visit and that the visit is being conducted through the Epic Embedded platform. They agree to proceed..  My office door was closed. No one else was in the room. They acknowledged consent and understanding of privacy and security of the video platform. The patient has agreed to participate and understands they can discontinue the visit at any time.    Dominick spoke about being very unhappy and frustrated with his wife's care at the facility she is in. He spoke about some of the issues they have been having with fairly basic needs being met and services not being established for over 2 weeks and he feels for $30,000 a month they should be doing more. He said that he has requested that the head of each department of her care team have a provider meeting with him present to get on the same page. He said his wife seems deflated and let down. He hates seeing her struggle and he was promised this would help her, not cause her more stress. He said he feels he is failing her and he is looking for another facility. MARIA ALEJANDRAW provided emotional support and encouraged Dominick that he is not failing his wife and he is advocating to the best of his ability though he is facing barriers. He said all he thinks about from the time he wakes up to the time he does to bed is about trying to get his wife what she needs and fix issues. He said he is constantly on the verge of a panic attack. He began trying to take some deep breathes. BISI discussed mindfulness activities and deep breathing for stress reduction, also finding positive distractions even if it is 15 minutes sitting outside, etc. He said he would be willing to try the deep breathing video BISI  told him about and LCSW sent the link via [employer] for him to try for homework. He asked to end meeting due to exhaustion.       Identified areas of need include: emotional support    Resources provided:    Areas that need future follow-up include: reduce anxiety/depressed mood    I have spent 30 minutes with Patient  today in which greater than 50% of this time was spent in counseling/coordination of care     Palliative  will follow-up as requested by patient, family, and primary team.  Please contact with any specific requests

## 2025-07-02 ENCOUNTER — SOCIAL WORK (OUTPATIENT)
Dept: PALLIATIVE MEDICINE | Facility: CLINIC | Age: 65
End: 2025-07-02

## 2025-07-02 DIAGNOSIS — Z71.89 COUNSELING AND COORDINATION OF CARE: Primary | ICD-10-CM

## 2025-07-02 PROCEDURE — NC001 PR NO CHARGE

## 2025-07-03 NOTE — PROGRESS NOTES
Palliative Supportive Care  met with patient/family to continue to provide emotional support and guidance.      Updated biopsychosocial information relevant to support:     The patient was identified by name and date of birth. Dominick was informed that this is a telemedicine visit and that the visit is being conducted through the Epic Embedded platform. They agree to proceed..  My office door was closed. No one else was in the room. They acknowledged consent and understanding of privacy and security of the video platform. The patient has agreed to participate and understands they can discontinue the visit at any time.    Dominick spoke about having a provider meeting with all the heads of each department involved in his wife's care. He said it went very well and and he is very happy with the solutions they came up with. He feels now that she is getting the best possible care he could ask for. He said that he thinks now that therapy will be daily, she can try to make as much progress as possible. He said he wants to be realistic that this may not work but he can rest easy knowing he gave it his all to provide her with all the tools for success. He said she was home for July 4th and it was wonderful but also heartbreaking. LCSW provided emotional support. He processed he is starting to have thoughts that she may not improve and if this is so, he may not want to do aggressive measures and just keep her comfortable and spend as much quality time as they can together at home. He said that he is trying to stay hopeful but its in the back of his mind. LCSMATEUS encouraged Dominick that he can always change their course of action as they get more information or situations progress in anyway. He said right now he feels good about the cares and therapies they have in place but ultimately, if her scans begin to look worse, he wants her to come home and he will take things from there. He said he is going to a family  reunion in 2 weeks and thinks it will be good for him. He was tearful and said he has not seen any of his family since his mother passed away. He and his son will go together and they are making a stop to see his closest sister who cannot attend the event. LCSW encouraged Dominick to enjoy his time and return to his goal of find some self care everyday, even if it is something small. He said he would try.      Identified areas of need include: emotional support    Resources provided:    Areas that need future follow-up include: reduce anxiety/depressed mood    I have spent 40 minutes with Patient  today in which greater than 50% of this time was spent in counseling/coordination of care     Palliative  will follow-up as requested by patient, family, and primary team.  Please contact with any specific requests

## 2025-07-08 DIAGNOSIS — G89.29 OTHER CHRONIC PAIN: ICD-10-CM

## 2025-07-09 ENCOUNTER — SOCIAL WORK (OUTPATIENT)
Dept: PALLIATIVE MEDICINE | Facility: CLINIC | Age: 65
End: 2025-07-09

## 2025-07-09 DIAGNOSIS — Z71.89 COUNSELING AND COORDINATION OF CARE: Primary | ICD-10-CM

## 2025-07-09 PROCEDURE — RECHECK

## 2025-07-09 RX ORDER — OXYCODONE HYDROCHLORIDE 30 MG/1
30 TABLET, FILM COATED, EXTENDED RELEASE ORAL EVERY 6 HOURS
Qty: 120 TABLET | Refills: 0 | Status: SHIPPED | OUTPATIENT
Start: 2025-07-09

## 2025-07-14 NOTE — PROGRESS NOTES
Palliative Supportive Care  met with patient/family to continue to provide emotional support and guidance.      Updated biopsychosocial information relevant to support:     The patient was identified by name and date of birth. Dominick was informed that this is a telemedicine visit and that the visit is being conducted through the Epic Embedded platform. They agree to proceed..  My office door was closed. No one else was in the room. They acknowledged consent and understanding of privacy and security of the video platform. The patient has agreed to participate and understands they can discontinue the visit at any time.    Dominick spoke about his wife being back in the hospital again. She was doing well last week until over the weekend she had a high heart rate, swelling on one side of her body, and became confused/not herself. She is at [facility] waiting to be transferred to [facility] but there are no beds presently. She is very frustrated and Bernard is extremely overwhelmed. LCSW provided emotional support. He said there is nothing he can do but wait so he is trying to let it go but its hard. He said that he is still planning to go to his family reunion this weekend and his son will be taking him. He said it may be good for him to be around family right now. He apologized but said he was exhausted and just wanted to go lay down. He has been making phone calls and stressing over his wife's health for several days. LCSW encouraged him to get some rest and take care of himself while she is being taken care of at the hospital.       Identified areas of need include: emotional support    Resources provided:    Areas that need future follow-up include: reduce anxiety/depressed mood    I have spent 15 minutes with Patient  today in which greater than 50% of this time was spent in counseling/coordination of care     Palliative  will follow-up as requested by patient, family, and primary team.   Please contact with any specific requests

## 2025-07-16 ENCOUNTER — SOCIAL WORK (OUTPATIENT)
Dept: PALLIATIVE MEDICINE | Facility: CLINIC | Age: 65
End: 2025-07-16

## 2025-07-16 ENCOUNTER — RA CDI HCC (OUTPATIENT)
Dept: OTHER | Facility: HOSPITAL | Age: 65
End: 2025-07-16

## 2025-07-16 DIAGNOSIS — Z71.89 COUNSELING AND COORDINATION OF CARE: Primary | ICD-10-CM

## 2025-07-16 PROCEDURE — NC001 PR NO CHARGE

## 2025-07-16 NOTE — PROGRESS NOTES
HCC coding opportunities          Chart Reviewed number of suggestions sent to Provider: 2  I11.0  E11.65     Patients Insurance     Medicare Insurance: Medicare

## 2025-07-23 ENCOUNTER — OFFICE VISIT (OUTPATIENT)
Dept: FAMILY MEDICINE CLINIC | Facility: CLINIC | Age: 65
End: 2025-07-23
Payer: MEDICARE

## 2025-07-23 ENCOUNTER — TELEPHONE (OUTPATIENT)
Dept: PALLIATIVE MEDICINE | Facility: CLINIC | Age: 65
End: 2025-07-23

## 2025-07-23 VITALS
RESPIRATION RATE: 16 BRPM | HEIGHT: 68 IN | WEIGHT: 243 LBS | OXYGEN SATURATION: 96 % | BODY MASS INDEX: 36.83 KG/M2 | DIASTOLIC BLOOD PRESSURE: 78 MMHG | HEART RATE: 74 BPM | SYSTOLIC BLOOD PRESSURE: 132 MMHG

## 2025-07-23 DIAGNOSIS — I11.0 HYPERTENSIVE HEART DISEASE WITH CONGESTIVE HEART FAILURE, UNSPECIFIED HEART FAILURE TYPE (HCC): ICD-10-CM

## 2025-07-23 DIAGNOSIS — M54.41 CHRONIC BILATERAL LOW BACK PAIN WITH BILATERAL SCIATICA: ICD-10-CM

## 2025-07-23 DIAGNOSIS — G89.29 CHRONIC BILATERAL LOW BACK PAIN WITH BILATERAL SCIATICA: ICD-10-CM

## 2025-07-23 DIAGNOSIS — E11.69 TYPE 2 DIABETES MELLITUS WITH OTHER SPECIFIED COMPLICATION, UNSPECIFIED WHETHER LONG TERM INSULIN USE (HCC): Primary | ICD-10-CM

## 2025-07-23 DIAGNOSIS — M54.42 CHRONIC BILATERAL LOW BACK PAIN WITH BILATERAL SCIATICA: ICD-10-CM

## 2025-07-23 LAB — SL AMB POCT HEMOGLOBIN AIC: 6.9 (ref ?–6.5)

## 2025-07-23 PROCEDURE — G2211 COMPLEX E/M VISIT ADD ON: HCPCS | Performed by: FAMILY MEDICINE

## 2025-07-23 PROCEDURE — 83036 HEMOGLOBIN GLYCOSYLATED A1C: CPT | Performed by: FAMILY MEDICINE

## 2025-07-23 PROCEDURE — 99214 OFFICE O/P EST MOD 30 MIN: CPT | Performed by: FAMILY MEDICINE

## 2025-07-23 NOTE — TELEPHONE ENCOUNTER
Called patient and left message to see if he could change his appointment to 3:00 virtual today 07/23/25 instead of 2:00 07/23/25

## 2025-07-23 NOTE — PROGRESS NOTES
"Name: Dominick Irving      : 1960      MRN: [id number]  Encounter Provider: Robert Budinetz, MD  Encounter Date: 2025   Encounter department: [facility]  :  Assessment & Plan  Type 2 diabetes mellitus with other specified complication, unspecified whether long term insulin use (HCC)  A1c 6.9  Stable  No changes  Lab Results   Component Value Date    HGBA1C 7.0 (H) 2025       Orders:    POCT hemoglobin A1c    Chronic bilateral low back pain with bilateral sciatica  Continue meds  No changes         Hypertensive heart disease with congestive heart failure, unspecified heart failure type (HCC)  Wt Readings from Last 3 Encounters:   25 110 kg (243 lb)   25 111 kg (244 lb 9.6 oz)   06/10/25 112 kg (247 lb 9.6 oz)     Continue meds, no changes                      History of Present Illness   The patient is here to f/u on htn/chf/diabetes/hyperlipidemia and chronic pain.  All is stable.  He is starting [employer] classes.  Meds and labs reviewed.  A1c is 6.9.  he feels well but is struggling mentally with his wife's cancer.  They just had her admitted to [facility].  This is tough for him but he is handling it ok.  He has good family support.  BP is stable and well controlled.        Review of Systems   Respiratory: Negative.     Cardiovascular: Negative.        Objective   /78 (BP Location: Right arm, Patient Position: Sitting, Cuff Size: Standard)   Pulse 74   Resp 16   Ht 5' 8\" (1.727 m)   Wt 110 kg (243 lb)   SpO2 96%   BMI 36.95 kg/m²      Physical Exam  Vitals and nursing note reviewed.   Constitutional:       General: He is not in acute distress.     Appearance: He is well-developed.   HENT:      Head: Normocephalic and atraumatic.     Eyes:      Conjunctiva/sclera: Conjunctivae normal.       Cardiovascular:      Rate and Rhythm: Normal rate and regular rhythm.      Heart sounds: No murmur heard.  Pulmonary:      Effort: Pulmonary effort is normal. No " respiratory distress.      Breath sounds: Normal breath sounds.   Abdominal:      Palpations: Abdomen is soft.      Tenderness: There is no abdominal tenderness.     Musculoskeletal:         General: No swelling.      Cervical back: Neck supple.     Skin:     General: Skin is warm and dry.      Capillary Refill: Capillary refill takes less than 2 seconds.     Neurological:      Mental Status: He is alert.     Psychiatric:         Mood and Affect: Mood normal.

## 2025-07-23 NOTE — ASSESSMENT & PLAN NOTE
Wt Readings from Last 3 Encounters:   07/23/25 110 kg (243 lb)   06/24/25 111 kg (244 lb 9.6 oz)   06/10/25 112 kg (247 lb 9.6 oz)     Continue meds, no changes

## 2025-07-23 NOTE — TELEPHONE ENCOUNTER
Patient returned Lucero call, advised patient that Jacqui is able to see him at 3PM today virtually instead of 2PM. Per patient he is actually calling to cancel appointment due to wife being in the hospital. Per patient will follow up with jacqui next week as scheduled.

## 2025-07-23 NOTE — ASSESSMENT & PLAN NOTE
A1c 6.9  Stable  No changes  Lab Results   Component Value Date    HGBA1C 7.0 (H) 05/05/2025       Orders:    POCT hemoglobin A1c

## 2025-07-30 ENCOUNTER — TELEPHONE (OUTPATIENT)
Age: 65
End: 2025-07-30

## 2025-07-31 DIAGNOSIS — I50.33 ACUTE ON CHRONIC DIASTOLIC HEART FAILURE (HCC): ICD-10-CM

## 2025-07-31 RX ORDER — TORSEMIDE 20 MG/1
80 TABLET ORAL DAILY
Qty: 120 TABLET | Refills: 3 | Status: SHIPPED | OUTPATIENT
Start: 2025-07-31

## 2025-08-05 DIAGNOSIS — G89.29 OTHER CHRONIC PAIN: ICD-10-CM

## 2025-08-06 RX ORDER — OXYCODONE HYDROCHLORIDE 30 MG/1
30 TABLET, FILM COATED, EXTENDED RELEASE ORAL EVERY 6 HOURS
Qty: 120 TABLET | Refills: 0 | Status: SHIPPED | OUTPATIENT
Start: 2025-08-06

## 2025-08-12 ENCOUNTER — PATIENT MESSAGE (OUTPATIENT)
Dept: INFECTIOUS DISEASES | Facility: CLINIC | Age: 65
End: 2025-08-12

## 2025-08-13 ENCOUNTER — APPOINTMENT (OUTPATIENT)
Dept: LAB | Facility: HOSPITAL | Age: 65
End: 2025-08-13
Payer: MEDICARE

## 2025-08-13 ENCOUNTER — RESULTS FOLLOW-UP (OUTPATIENT)
Dept: INFECTIOUS DISEASES | Facility: CLINIC | Age: 65
End: 2025-08-13

## 2025-08-13 DIAGNOSIS — A49.02 MRSA INFECTION: ICD-10-CM

## 2025-08-13 DIAGNOSIS — T81.49XA SURGICAL SITE INFECTION: ICD-10-CM

## 2025-08-13 LAB
ANION GAP SERPL CALCULATED.3IONS-SCNC: 9 MMOL/L (ref 4–13)
BASOPHILS # BLD AUTO: 0.05 THOUSANDS/ÂΜL (ref 0–0.1)
BASOPHILS NFR BLD AUTO: 1 % (ref 0–1)
BUN SERPL-MCNC: 31 MG/DL (ref 5–25)
CALCIUM SERPL-MCNC: 9 MG/DL (ref 8.4–10.2)
CHLORIDE SERPL-SCNC: 99 MMOL/L (ref 96–108)
CO2 SERPL-SCNC: 31 MMOL/L (ref 21–32)
CREAT SERPL-MCNC: 1.34 MG/DL (ref 0.6–1.3)
CRP SERPL QL: 94.9 MG/L
EOSINOPHIL # BLD AUTO: 0.3 THOUSAND/ÂΜL (ref 0–0.61)
EOSINOPHIL NFR BLD AUTO: 3 % (ref 0–6)
ERYTHROCYTE [DISTWIDTH] IN BLOOD BY AUTOMATED COUNT: 15.6 % (ref 11.6–15.1)
ERYTHROCYTE [SEDIMENTATION RATE] IN BLOOD: 112 MM/HOUR (ref 0–19)
GFR SERPL CREATININE-BSD FRML MDRD: 54 ML/MIN/1.73SQ M
GLUCOSE P FAST SERPL-MCNC: 130 MG/DL (ref 65–99)
HCT VFR BLD AUTO: 37.6 % (ref 36.5–49.3)
HGB BLD-MCNC: 12.1 G/DL (ref 12–17)
IMM GRANULOCYTES # BLD AUTO: 0.02 THOUSAND/UL (ref 0–0.2)
IMM GRANULOCYTES NFR BLD AUTO: 0 % (ref 0–2)
LYMPHOCYTES # BLD AUTO: 1.87 THOUSANDS/ÂΜL (ref 0.6–4.47)
LYMPHOCYTES NFR BLD AUTO: 21 % (ref 14–44)
MCH RBC QN AUTO: 29.4 PG (ref 26.8–34.3)
MCHC RBC AUTO-ENTMCNC: 32.2 G/DL (ref 31.4–37.4)
MCV RBC AUTO: 91 FL (ref 82–98)
MONOCYTES # BLD AUTO: 0.87 THOUSAND/ÂΜL (ref 0.17–1.22)
MONOCYTES NFR BLD AUTO: 10 % (ref 4–12)
NEUTROPHILS # BLD AUTO: 5.68 THOUSANDS/ÂΜL (ref 1.85–7.62)
NEUTS SEG NFR BLD AUTO: 65 % (ref 43–75)
NRBC BLD AUTO-RTO: 0 /100 WBCS
PLATELET # BLD AUTO: 205 THOUSANDS/UL (ref 149–390)
PMV BLD AUTO: 11.1 FL (ref 8.9–12.7)
POTASSIUM SERPL-SCNC: 3.7 MMOL/L (ref 3.5–5.3)
RBC # BLD AUTO: 4.12 MILLION/UL (ref 3.88–5.62)
SODIUM SERPL-SCNC: 139 MMOL/L (ref 135–147)
WBC # BLD AUTO: 8.79 THOUSAND/UL (ref 4.31–10.16)

## 2025-08-13 PROCEDURE — 85652 RBC SED RATE AUTOMATED: CPT

## 2025-08-13 PROCEDURE — 85025 COMPLETE CBC W/AUTO DIFF WBC: CPT

## 2025-08-13 PROCEDURE — 36415 COLL VENOUS BLD VENIPUNCTURE: CPT

## 2025-08-13 PROCEDURE — 80048 BASIC METABOLIC PNL TOTAL CA: CPT

## 2025-08-13 PROCEDURE — 86140 C-REACTIVE PROTEIN: CPT

## 2025-08-18 ENCOUNTER — TELEPHONE (OUTPATIENT)
Dept: INFECTIOUS DISEASES | Facility: CLINIC | Age: 65
End: 2025-08-18

## 2025-08-20 ENCOUNTER — APPOINTMENT (EMERGENCY)
Dept: MRI IMAGING | Facility: HOSPITAL | Age: 65
End: 2025-08-20
Payer: MEDICARE

## 2025-08-20 ENCOUNTER — APPOINTMENT (EMERGENCY)
Dept: RADIOLOGY | Facility: HOSPITAL | Age: 65
End: 2025-08-20
Payer: MEDICARE

## 2025-08-20 ENCOUNTER — APPOINTMENT (EMERGENCY)
Dept: NON INVASIVE DIAGNOSTICS | Facility: HOSPITAL | Age: 65
End: 2025-08-20
Payer: MEDICARE

## 2025-08-20 ENCOUNTER — HOSPITAL ENCOUNTER (EMERGENCY)
Facility: HOSPITAL | Age: 65
Discharge: HOME | End: 2025-08-20
Attending: EMERGENCY MEDICINE | Admitting: EMERGENCY MEDICINE
Payer: MEDICARE

## 2025-08-20 VITALS
HEART RATE: 67 BPM | WEIGHT: 236 LBS | BODY MASS INDEX: 35.77 KG/M2 | HEIGHT: 68 IN | TEMPERATURE: 97.5 F | SYSTOLIC BLOOD PRESSURE: 124 MMHG | DIASTOLIC BLOOD PRESSURE: 55 MMHG | OXYGEN SATURATION: 95 % | RESPIRATION RATE: 16 BRPM

## 2025-08-20 DIAGNOSIS — M54.9 BACK PAIN: Primary | ICD-10-CM

## 2025-08-20 LAB
2HR DELTA HS TROPONIN: 0 NG/L
ALBUMIN SERPL BCG-MCNC: 3.5 G/DL (ref 3.5–5)
ALP SERPL-CCNC: 102 U/L (ref 34–104)
ALT SERPL W P-5'-P-CCNC: 11 U/L (ref 7–52)
ANION GAP SERPL CALCULATED.3IONS-SCNC: 8 MMOL/L (ref 4–13)
APTT PPP: 31 SECONDS (ref 23–34)
AST SERPL W P-5'-P-CCNC: 12 U/L (ref 13–39)
ATRIAL RATE: 55 BPM
BASOPHILS # BLD AUTO: 0.03 THOUSANDS/ÂΜL (ref 0–0.1)
BASOPHILS NFR BLD AUTO: 0 % (ref 0–1)
BILIRUB SERPL-MCNC: 0.31 MG/DL (ref 0.2–1)
BILIRUB UR QL STRIP: NEGATIVE
BNP SERPL-MCNC: 78 PG/ML (ref 0–100)
BUN SERPL-MCNC: 27 MG/DL (ref 5–25)
CALCIUM SERPL-MCNC: 9.1 MG/DL (ref 8.4–10.2)
CARDIAC TROPONIN I PNL SERPL HS: 3 NG/L (ref ?–50)
CARDIAC TROPONIN I PNL SERPL HS: 3 NG/L (ref ?–50)
CHLORIDE SERPL-SCNC: 99 MMOL/L (ref 96–108)
CLARITY UR: CLEAR
CO2 SERPL-SCNC: 30 MMOL/L (ref 21–32)
COLOR UR: NORMAL
CREAT SERPL-MCNC: 1.2 MG/DL (ref 0.6–1.3)
CRP SERPL QL: 121.1 MG/L
EOSINOPHIL # BLD AUTO: 0.3 THOUSAND/ÂΜL (ref 0–0.61)
EOSINOPHIL NFR BLD AUTO: 4 % (ref 0–6)
ERYTHROCYTE [DISTWIDTH] IN BLOOD BY AUTOMATED COUNT: 15.6 % (ref 11.6–15.1)
ERYTHROCYTE [SEDIMENTATION RATE] IN BLOOD: 85 MM/HOUR (ref 0–19)
GFR SERPL CREATININE-BSD FRML MDRD: 62 ML/MIN/1.73SQ M
GLUCOSE SERPL-MCNC: 140 MG/DL (ref 65–140)
GLUCOSE UR STRIP-MCNC: NEGATIVE MG/DL
HCT VFR BLD AUTO: 36.2 % (ref 36.5–49.3)
HGB BLD-MCNC: 11.4 G/DL (ref 12–17)
HGB UR QL STRIP.AUTO: NEGATIVE
IMM GRANULOCYTES # BLD AUTO: 0.02 THOUSAND/UL (ref 0–0.2)
IMM GRANULOCYTES NFR BLD AUTO: 0 % (ref 0–2)
INR PPP: 1 (ref 0.85–1.19)
KETONES UR STRIP-MCNC: NEGATIVE MG/DL
LACTATE SERPL-SCNC: 0.7 MMOL/L (ref 0.5–2)
LEUKOCYTE ESTERASE UR QL STRIP: NEGATIVE
LYMPHOCYTES # BLD AUTO: 1.62 THOUSANDS/ÂΜL (ref 0.6–4.47)
LYMPHOCYTES NFR BLD AUTO: 19 % (ref 14–44)
MCH RBC QN AUTO: 29 PG (ref 26.8–34.3)
MCHC RBC AUTO-ENTMCNC: 31.5 G/DL (ref 31.4–37.4)
MCV RBC AUTO: 92 FL (ref 82–98)
MONOCYTES # BLD AUTO: 0.65 THOUSAND/ÂΜL (ref 0.17–1.22)
MONOCYTES NFR BLD AUTO: 8 % (ref 4–12)
NEUTROPHILS # BLD AUTO: 5.91 THOUSANDS/ÂΜL (ref 1.85–7.62)
NEUTS SEG NFR BLD AUTO: 69 % (ref 43–75)
NITRITE UR QL STRIP: NEGATIVE
NRBC BLD AUTO-RTO: 0 /100 WBCS
P AXIS: 55 DEGREES
PH UR STRIP.AUTO: 6.5 [PH]
PLATELET # BLD AUTO: 232 THOUSANDS/UL (ref 149–390)
PMV BLD AUTO: 10.3 FL (ref 8.9–12.7)
POTASSIUM SERPL-SCNC: 3.8 MMOL/L (ref 3.5–5.3)
PR INTERVAL: 162 MS
PROCALCITONIN SERPL-MCNC: 0.1 NG/ML
PROT SERPL-MCNC: 7.9 G/DL (ref 6.4–8.4)
PROT UR STRIP-MCNC: NEGATIVE MG/DL
PROTHROMBIN TIME: 13.7 SECONDS (ref 12.3–15)
QRS AXIS: 38 DEGREES
QRSD INTERVAL: 86 MS
QT INTERVAL: 422 MS
QTC INTERVAL: 403 MS
RBC # BLD AUTO: 3.93 MILLION/UL (ref 3.88–5.62)
SODIUM SERPL-SCNC: 137 MMOL/L (ref 135–147)
SP GR UR STRIP.AUTO: 1.01 (ref 1–1.03)
T WAVE AXIS: 67 DEGREES
UROBILINOGEN UR QL STRIP.AUTO: 0.2 E.U./DL
VENTRICULAR RATE: 55 BPM
WBC # BLD AUTO: 8.53 THOUSAND/UL (ref 4.31–10.16)

## 2025-08-20 PROCEDURE — 99285 EMERGENCY DEPT VISIT HI MDM: CPT | Performed by: EMERGENCY MEDICINE

## 2025-08-20 PROCEDURE — A9585 GADOBUTROL INJECTION: HCPCS | Performed by: PHYSICIAN ASSISTANT

## 2025-08-20 PROCEDURE — 85652 RBC SED RATE AUTOMATED: CPT | Performed by: EMERGENCY MEDICINE

## 2025-08-20 PROCEDURE — 72158 MRI LUMBAR SPINE W/O & W/DYE: CPT

## 2025-08-20 PROCEDURE — 93005 ELECTROCARDIOGRAM TRACING: CPT

## 2025-08-20 PROCEDURE — 84145 PROCALCITONIN (PCT): CPT | Performed by: EMERGENCY MEDICINE

## 2025-08-20 PROCEDURE — 87040 BLOOD CULTURE FOR BACTERIA: CPT | Performed by: EMERGENCY MEDICINE

## 2025-08-20 PROCEDURE — 86140 C-REACTIVE PROTEIN: CPT | Performed by: EMERGENCY MEDICINE

## 2025-08-20 PROCEDURE — 80053 COMPREHEN METABOLIC PANEL: CPT | Performed by: EMERGENCY MEDICINE

## 2025-08-20 PROCEDURE — 83605 ASSAY OF LACTIC ACID: CPT | Performed by: EMERGENCY MEDICINE

## 2025-08-20 PROCEDURE — 85610 PROTHROMBIN TIME: CPT | Performed by: EMERGENCY MEDICINE

## 2025-08-20 PROCEDURE — 84484 ASSAY OF TROPONIN QUANT: CPT | Performed by: EMERGENCY MEDICINE

## 2025-08-20 PROCEDURE — 36415 COLL VENOUS BLD VENIPUNCTURE: CPT | Performed by: EMERGENCY MEDICINE

## 2025-08-20 PROCEDURE — 99284 EMERGENCY DEPT VISIT MOD MDM: CPT

## 2025-08-20 PROCEDURE — 71045 X-RAY EXAM CHEST 1 VIEW: CPT

## 2025-08-20 PROCEDURE — 81003 URINALYSIS AUTO W/O SCOPE: CPT | Performed by: EMERGENCY MEDICINE

## 2025-08-20 PROCEDURE — 83880 ASSAY OF NATRIURETIC PEPTIDE: CPT | Performed by: EMERGENCY MEDICINE

## 2025-08-20 PROCEDURE — 93970 EXTREMITY STUDY: CPT

## 2025-08-20 PROCEDURE — 96374 THER/PROPH/DIAG INJ IV PUSH: CPT

## 2025-08-20 PROCEDURE — 85730 THROMBOPLASTIN TIME PARTIAL: CPT | Performed by: EMERGENCY MEDICINE

## 2025-08-20 PROCEDURE — 85025 COMPLETE CBC W/AUTO DIFF WBC: CPT | Performed by: EMERGENCY MEDICINE

## 2025-08-20 RX ORDER — MORPHINE SULFATE 4 MG/ML
4 INJECTION, SOLUTION INTRAMUSCULAR; INTRAVENOUS ONCE
Status: COMPLETED | OUTPATIENT
Start: 2025-08-20 | End: 2025-08-20

## 2025-08-20 RX ORDER — GADOBUTROL 604.72 MG/ML
10 INJECTION INTRAVENOUS
Status: COMPLETED | OUTPATIENT
Start: 2025-08-20 | End: 2025-08-20

## 2025-08-20 RX ADMIN — GADOBUTROL 10 ML: 604.72 INJECTION INTRAVENOUS at 13:17

## 2025-08-20 RX ADMIN — MORPHINE SULFATE 4 MG: 4 INJECTION INTRAVENOUS at 09:32

## 2025-08-21 ENCOUNTER — VBI (OUTPATIENT)
Dept: FAMILY MEDICINE CLINIC | Facility: CLINIC | Age: 65
End: 2025-08-21

## 2025-08-21 PROCEDURE — 93970 EXTREMITY STUDY: CPT | Performed by: SURGERY

## 2025-08-22 ENCOUNTER — TELEPHONE (OUTPATIENT)
Dept: INFECTIOUS DISEASES | Facility: CLINIC | Age: 65
End: 2025-08-22

## 2025-08-25 LAB
BACTERIA BLD CULT: NORMAL
BACTERIA BLD CULT: NORMAL

## (undated) DEVICE — BETHLEHEM UNIVERSAL SPINE, KIT: Brand: CARDINAL HEALTH

## (undated) DEVICE — DRESSING MEPILEX AG BORDER 4 X 4 IN

## (undated) DEVICE — PAD GROUNDING ADULT

## (undated) DEVICE — GAUZE SPONGES,USP TYPE VII GAUZE, 12 PLY: Brand: CURITY

## (undated) DEVICE — TOOL MR8-31TD3030 MR8 TWST DRIL 3MMX30MM: Brand: MIDAS REX

## (undated) DEVICE — TRAVELKIT CONTAINS FIRST STEP KIT (200ML EP-4 KIT) AND SOILED SCOPE BAG - 1 KIT: Brand: TRAVELKIT CONTAINS FIRST STEP KIT AND SOILED SCOPE BAG

## (undated) DEVICE — MARKER REFLECTIVE RADIOPAQUE SPHERE

## (undated) DEVICE — DISPOSABLE OR TOWEL: Brand: CARDINAL HEALTH

## (undated) DEVICE — DRAPE SHEET X-LG

## (undated) DEVICE — GLOVE SRG BIOGEL 7.5

## (undated) DEVICE — HANDPIECE SET WITH RETRACTABLE COAXIAL FAN SPRAY TIP AND SUCTION TUBE: Brand: INTERPULSE

## (undated) DEVICE — NEEDLE SPINAL 22G X 5IN QUINCKE

## (undated) DEVICE — DISPOSABLE EQUIPMENT COVER: Brand: SMALL TOWEL DRAPE

## (undated) DEVICE — MAT FLOOR STEP DRI 24 X 36 IN N-STRL

## (undated) DEVICE — SKN PRP WNG SPNGE PVP SCRB STR: Brand: MEDLINE INDUSTRIES, INC.

## (undated) DEVICE — INTENDED FOR TISSUE SEPARATION, AND OTHER PROCEDURES THAT REQUIRE A SHARP SURGICAL BLADE TO PUNCTURE OR CUT.: Brand: BARD-PARKER SAFETY BLADES SIZE 15, STERILE

## (undated) DEVICE — ANTIBACTERIAL VIOLET BRAIDED (POLYGLACTIN 910), SYNTHETIC ABSORBABLE SUTURE: Brand: COATED VICRYL

## (undated) DEVICE — URO CATCHER BAG STERILE 0-UC32

## (undated) DEVICE — 3M™ STERI-STRIP™ REINFORCED ADHESIVE SKIN CLOSURES, R1547, 1/2 IN X 4 IN (12 MM X 100 MM), 6 STRIPS/ENVELOPE: Brand: 3M™ STERI-STRIP™

## (undated) DEVICE — CHLORAPREP HI-LITE 10.5ML ORANGE

## (undated) DEVICE — PLASTIC ADHESIVE BANDAGE: Brand: CURITY

## (undated) DEVICE — Device: Brand: MEDEX

## (undated) DEVICE — CULTURE TUBE ANAEROBIC

## (undated) DEVICE — CYSTO TUBING TUR Y IRRIGATION

## (undated) DEVICE — DRAPE SHEET THREE QUARTER

## (undated) DEVICE — PENCIL ELECTROSURG E-Z CLEAN -0035H

## (undated) DEVICE — TIBURON SPLIT SHEET: Brand: CONVERTORS

## (undated) DEVICE — SYRINGE 10ML LL

## (undated) DEVICE — PREP SURGICAL PURPREP 26ML

## (undated) DEVICE — BASIC SINGLE BASIN-LF: Brand: MEDLINE INDUSTRIES, INC.

## (undated) DEVICE — INTENDED FOR TISSUE SEPARATION, AND OTHER PROCEDURES THAT REQUIRE A SHARP SURGICAL BLADE TO PUNCTURE OR CUT.: Brand: BARD-PARKER ® CARBON RIB-BACK BLADES

## (undated) DEVICE — STERILE SURGICAL LUBRICANT,  TUBE: Brand: SURGILUBE

## (undated) DEVICE — HF-RESECTION ELECTRODE PLASMALOOP LOOP, MEDIUM, 24 FR., 12°-30°, ESG TURIS: Brand: OLYMPUS

## (undated) DEVICE — ELECTRODE BLADE MOD  E-Z CLEAN 6.5IN -0014M

## (undated) DEVICE — GLOVE INDICATOR PI UNDERGLOVE SZ 7.5 BLUE

## (undated) DEVICE — URETERAL DUAL LUMEN CATH

## (undated) DEVICE — TOOL MR8-15BA50 MR8 15CM BALL 5MM: Brand: MIDAS REX MR8

## (undated) DEVICE — SYRINGE,TOOMEY,IRRIGATION,70CC,STERILE: Brand: MEDLINE

## (undated) DEVICE — SUPPLY FEE STD

## (undated) DEVICE — PROXIMATE SKIN STAPLERS (35 WIDE) CONTAINS 35 STAINLESS STEEL STAPLES (FIXED HEAD): Brand: PROXIMATE

## (undated) DEVICE — TRAY FOLEY 16FR URIMETER SURESTEP

## (undated) DEVICE — SNAP KOVER: Brand: UNBRANDED

## (undated) DEVICE — SCD SEQUENTIAL COMPRESSION COMFORT SLEEVE MEDIUM KNEE LENGTH: Brand: KENDALL SCD

## (undated) DEVICE — PACK TUR

## (undated) DEVICE — NEEDLE 25G X 1 1/2

## (undated) DEVICE — GLOVE SRG BIOGEL 8

## (undated) DEVICE — 3M™ TEGADERM™ TRANSPARENT FILM DRESSING FRAME STYLE, 1627, 4 IN X 10 IN (10 CM X 25 CM), 20/CT 4CT/CASE: Brand: 3M™ TEGADERM™

## (undated) DEVICE — SHEATH URETERAL ACCESS 12/14FR 35CM PROXIS

## (undated) DEVICE — Device

## (undated) DEVICE — GAUZE SPONGES,16 PLY: Brand: CURITY

## (undated) DEVICE — SYRINGE 5ML LL

## (undated) DEVICE — CHLORHEXIDINE 4PCT 4 OZ

## (undated) DEVICE — DRESSING MEPILEX FOAM BORDER FLEX 4 X 4IN

## (undated) DEVICE — DRAPE C-ARMOUR

## (undated) DEVICE — DRAPE MICROSCOPE OPMI PENTERO

## (undated) DEVICE — TELFA NON-ADHERENT ABSORBENT DRESSING: Brand: TELFA

## (undated) DEVICE — NEEDLE 18 G X 1 1/2

## (undated) DEVICE — SCREW SCHANZ 4 X 120MM MAZOR

## (undated) DEVICE — TUBING SUCTION 5MM X 12 FT

## (undated) DEVICE — ALL PURPOSE SPONGES,NONWOVEN, 4 PLY: Brand: CURITY

## (undated) DEVICE — 3M™ STERI-STRIP™ COMPOUND BENZOIN TINCTURE 40 BAGS/CARTON 4 CARTONS/CASE C1544: Brand: 3M™ STERI-STRIP™

## (undated) DEVICE — ELECTRODE BLADE E-Z CLEAN 4IN -0014A

## (undated) DEVICE — SINGLE PORT MANIFOLD: Brand: NEPTUNE 2

## (undated) DEVICE — MONITORING SPINAL IMPULSE CASE FEE

## (undated) DEVICE — SHEATH URETERAL ACCESS 10/12FR 25CM PROXIS

## (undated) DEVICE — GLOVE INDICATOR PI UNDERGLOVE SZ 8.5 BLUE

## (undated) DEVICE — BETHLEHEM UNIVERSAL MINOR GEN: Brand: CARDINAL HEALTH

## (undated) DEVICE — 3M™ TEGADERM™ TRANSPARENT FILM DRESSING FRAME STYLE, 1624W, 2-3/8 IN X 2-3/4 IN (6 CM X 7 CM), 100/CT 4CT/CASE: Brand: 3M™ TEGADERM™

## (undated) DEVICE — JACKSON TABLE FOAM POSITIONING KIT: Brand: CARDINAL HEALTH

## (undated) DEVICE — NEEDLE BLUNT 18 G X 1 1/2IN

## (undated) DEVICE — 4-PORT MANIFOLD: Brand: NEPTUNE 2

## (undated) DEVICE — MAZOR X SPINE DISP KIT

## (undated) DEVICE — DRESSING MEPILEX AG BORDER POST-OP 4 X 8 IN

## (undated) DEVICE — BAG URINE DRAINAGE 4000ML CONTINUOUS IRR

## (undated) DEVICE — SYRINGE 3ML LL

## (undated) DEVICE — JP PERF DRN SIL FLT 7MM FULL: Brand: CARDINAL HEALTH

## (undated) DEVICE — SYRINGE 30ML LL

## (undated) DEVICE — LIGHT HANDLE COVER SLEEVE DISP BLUE STELLAR

## (undated) DEVICE — SUT VICRYL PLUS 3-0 RB-1 CR/8 18 IN VCP713D

## (undated) DEVICE — PAD GROUNDING DUAL ADULT

## (undated) DEVICE — DRAPE SURGIKIT SADDLE BAG

## (undated) DEVICE — PREMIUM DRY TRAY LF: Brand: MEDLINE INDUSTRIES, INC.

## (undated) DEVICE — HF-RESECTION ELECTRODE PLASMABUTTON BUTTON, 24 FR., 12°-30°, ESG TURIS: Brand: OLYMPUS

## (undated) DEVICE — GLOVE SRG BIOGEL 7

## (undated) DEVICE — SWABSTCK, BENZOIN TINCTURE, 1/PK, STRL: Brand: APLICARE

## (undated) DEVICE — CULTURE TUBE AEROBIC

## (undated) DEVICE — JACKSON-PRATT 100CC BULB RESERVOIR: Brand: CARDINAL HEALTH

## (undated) DEVICE — GUIDEWIRE STRGHT TIP 0.035 IN  SOLO PLUS

## (undated) DEVICE — DRAPE TOWEL: Brand: CONVERTORS

## (undated) DEVICE — SPECIMEN CONTAINER STERILE PEEL PACK

## (undated) DEVICE — SPONGE PVP SCRUB WING STERILE

## (undated) DEVICE — HEMOSTATIC MATRIX SURGIFLO 8ML W/THROMBIN

## (undated) DEVICE — NEEDLE SPINAL 22G X 3.5IN  QUINCKE

## (undated) DEVICE — SURGIFOAM 7 X 12 SPONGE ABS